# Patient Record
Sex: MALE | Race: WHITE | NOT HISPANIC OR LATINO | Employment: OTHER | URBAN - NONMETROPOLITAN AREA
[De-identification: names, ages, dates, MRNs, and addresses within clinical notes are randomized per-mention and may not be internally consistent; named-entity substitution may affect disease eponyms.]

---

## 2017-12-13 ENCOUNTER — HOSPITAL ENCOUNTER (INPATIENT)
Facility: HOSPITAL | Age: 57
LOS: 6 days | Discharge: HOME | End: 2017-12-19
Attending: FAMILY MEDICINE | Admitting: FAMILY MEDICINE

## 2017-12-13 ENCOUNTER — APPOINTMENT (OUTPATIENT)
Dept: GENERAL RADIOLOGY | Facility: HOSPITAL | Age: 57
End: 2017-12-13

## 2017-12-13 ENCOUNTER — APPOINTMENT (OUTPATIENT)
Dept: CARDIOLOGY | Facility: HOSPITAL | Age: 57
End: 2017-12-13

## 2017-12-13 DIAGNOSIS — I35.0 SEVERE AORTIC VALVE STENOSIS: ICD-10-CM

## 2017-12-13 DIAGNOSIS — R06.02 SHORTNESS OF BREATH: Primary | ICD-10-CM

## 2017-12-13 DIAGNOSIS — J18.9 PNEUMONIA OF RIGHT MIDDLE LOBE DUE TO INFECTIOUS ORGANISM: ICD-10-CM

## 2017-12-13 LAB
ALBUMIN SERPL-MCNC: 4.5 G/DL (ref 3.5–5)
ALBUMIN SERPL-MCNC: 4.7 G/DL (ref 3.5–5)
ALBUMIN/GLOB SERPL: 1.7 G/DL (ref 1.1–2.5)
ALBUMIN/GLOB SERPL: 1.7 G/DL (ref 1.1–2.5)
ALP SERPL-CCNC: 82 U/L (ref 24–120)
ALP SERPL-CCNC: 96 U/L (ref 24–120)
ALT SERPL W P-5'-P-CCNC: 41 U/L (ref 0–54)
ALT SERPL W P-5'-P-CCNC: 43 U/L (ref 0–54)
ANION GAP SERPL CALCULATED.3IONS-SCNC: 13 MMOL/L (ref 4–13)
ANION GAP SERPL CALCULATED.3IONS-SCNC: 13 MMOL/L (ref 4–13)
APTT PPP: 27.9 SECONDS (ref 24.1–34.8)
ARTERIAL PATENCY WRIST A: POSITIVE
ARTERIAL PATENCY WRIST A: POSITIVE
AST SERPL-CCNC: 32 U/L (ref 7–45)
AST SERPL-CCNC: 38 U/L (ref 7–45)
ATMOSPHERIC PRESS: 750 MMHG
ATMOSPHERIC PRESS: 753 MMHG
BASE EXCESS BLDA CALC-SCNC: -2.5 MMOL/L (ref 0–2)
BASE EXCESS BLDA CALC-SCNC: 1.8 MMOL/L (ref 0–2)
BDY SITE: ABNORMAL
BDY SITE: ABNORMAL
BH CV ECHO MEAS - AO MAX PG (FULL): 71.4 MMHG
BH CV ECHO MEAS - AO MAX PG: 79.9 MMHG
BH CV ECHO MEAS - AO MEAN PG (FULL): 59 MMHG
BH CV ECHO MEAS - AO MEAN PG: 64 MMHG
BH CV ECHO MEAS - AO ROOT AREA (BSA CORRECTED): 1.9
BH CV ECHO MEAS - AO ROOT AREA: 7.5 CM^2
BH CV ECHO MEAS - AO ROOT DIAM: 3.1 CM
BH CV ECHO MEAS - AO V2 MAX: 447 CM/SEC
BH CV ECHO MEAS - AO V2 MEAN: 395 CM/SEC
BH CV ECHO MEAS - AO V2 VTI: 117 CM
BH CV ECHO MEAS - AVA(I,A): 0.82 CM^2
BH CV ECHO MEAS - AVA(I,D): 0.82 CM^2
BH CV ECHO MEAS - AVA(V,A): 0.83 CM^2
BH CV ECHO MEAS - AVA(V,D): 0.83 CM^2
BH CV ECHO MEAS - BSA(HAYCOCK): 1.7 M^2
BH CV ECHO MEAS - BSA: 1.7 M^2
BH CV ECHO MEAS - BZI_BMI: 21 KILOGRAMS/M^2
BH CV ECHO MEAS - BZI_METRIC_HEIGHT: 167.6 CM
BH CV ECHO MEAS - BZI_METRIC_WEIGHT: 59 KG
BH CV ECHO MEAS - EDV(CUBED): 56.6 ML
BH CV ECHO MEAS - EDV(MOD-SP4): 56.7 ML
BH CV ECHO MEAS - EDV(TEICH): 63.5 ML
BH CV ECHO MEAS - EF(CUBED): 64.5 %
BH CV ECHO MEAS - EF(TEICH): 56.7 %
BH CV ECHO MEAS - ESV(CUBED): 20.1 ML
BH CV ECHO MEAS - ESV(TEICH): 27.5 ML
BH CV ECHO MEAS - FS: 29.2 %
BH CV ECHO MEAS - IVS/LVPW: 0.97
BH CV ECHO MEAS - IVSD: 1.6 CM
BH CV ECHO MEAS - LA DIMENSION: 4.1 CM
BH CV ECHO MEAS - LA/AO: 1.3
BH CV ECHO MEAS - LV DIASTOLIC VOL/BSA (35-75): 34 ML/M^2
BH CV ECHO MEAS - LV MASS(C)D: 245 GRAMS
BH CV ECHO MEAS - LV MASS(C)DI: 147.1 GRAMS/M^2
BH CV ECHO MEAS - LV MAX PG: 8.5 MMHG
BH CV ECHO MEAS - LV MEAN PG: 5 MMHG
BH CV ECHO MEAS - LV V1 MAX: 146 CM/SEC
BH CV ECHO MEAS - LV V1 MEAN: 108 CM/SEC
BH CV ECHO MEAS - LV V1 VTI: 37.9 CM
BH CV ECHO MEAS - LVIDD: 3.8 CM
BH CV ECHO MEAS - LVIDS: 2.7 CM
BH CV ECHO MEAS - LVLD AP4: 8 CM
BH CV ECHO MEAS - LVOT AREA (M): 2.5 CM^2
BH CV ECHO MEAS - LVOT AREA: 2.5 CM^2
BH CV ECHO MEAS - LVOT DIAM: 1.8 CM
BH CV ECHO MEAS - LVPWD: 1.6 CM
BH CV ECHO MEAS - MV A MAX VEL: 121 CM/SEC
BH CV ECHO MEAS - MV DEC TIME: 0.23 SEC
BH CV ECHO MEAS - MV E MAX VEL: 97 CM/SEC
BH CV ECHO MEAS - MV E/A: 0.8
BH CV ECHO MEAS - RAP SYSTOLE: 10 MMHG
BH CV ECHO MEAS - RVSP: 41.4 MMHG
BH CV ECHO MEAS - SI(AO): 530.2 ML/M^2
BH CV ECHO MEAS - SI(CUBED): 21.9 ML/M^2
BH CV ECHO MEAS - SI(LVOT): 57.9 ML/M^2
BH CV ECHO MEAS - SI(TEICH): 21.6 ML/M^2
BH CV ECHO MEAS - SV(AO): 883.1 ML
BH CV ECHO MEAS - SV(CUBED): 36.5 ML
BH CV ECHO MEAS - SV(LVOT): 96.4 ML
BH CV ECHO MEAS - SV(TEICH): 36 ML
BH CV ECHO MEAS - TR MAX VEL: 280 CM/SEC
BILIRUB SERPL-MCNC: 0.1 MG/DL (ref 0.1–1)
BILIRUB SERPL-MCNC: 0.3 MG/DL (ref 0.1–1)
BODY TEMPERATURE: 37 C
BODY TEMPERATURE: 37 C
BUN BLD-MCNC: 9 MG/DL (ref 5–21)
BUN BLD-MCNC: 9 MG/DL (ref 5–21)
BUN/CREAT SERPL: 13.6 (ref 7–25)
BUN/CREAT SERPL: 16.1 (ref 7–25)
CALCIUM SPEC-SCNC: 9.1 MG/DL (ref 8.4–10.4)
CALCIUM SPEC-SCNC: 9.2 MG/DL (ref 8.4–10.4)
CHLORIDE SERPL-SCNC: 97 MMOL/L (ref 98–110)
CHLORIDE SERPL-SCNC: 98 MMOL/L (ref 98–110)
CO2 SERPL-SCNC: 24 MMOL/L (ref 24–31)
CO2 SERPL-SCNC: 25 MMOL/L (ref 24–31)
CREAT BLD-MCNC: 0.56 MG/DL (ref 0.5–1.4)
CREAT BLD-MCNC: 0.66 MG/DL (ref 0.5–1.4)
D DIMER PPP FEU-MCNC: 0.69 MG/L (FEU) (ref 0–0.5)
D-LACTATE SERPL-SCNC: 2.2 MMOL/L (ref 0.5–2)
D-LACTATE SERPL-SCNC: 2.3 MMOL/L (ref 0.5–2)
DEPRECATED RDW RBC AUTO: 40.8 FL (ref 40–54)
E/E' RATIO: 19
EOSINOPHIL # BLD MANUAL: 0.22 10*3/MM3 (ref 0–0.7)
EOSINOPHIL NFR BLD MANUAL: 1 % (ref 0–4)
ERYTHROCYTE [DISTWIDTH] IN BLOOD BY AUTOMATED COUNT: 12.6 % (ref 12–15)
FLUAV AG NPH QL: NEGATIVE
FLUBV AG NPH QL IA: NEGATIVE
GAS FLOW AIRWAY: 3 LPM
GAS FLOW AIRWAY: 8 LPM
GFR SERPL CREATININE-BSD FRML MDRD: 124 ML/MIN/1.73
GFR SERPL CREATININE-BSD FRML MDRD: 150 ML/MIN/1.73
GLOBULIN UR ELPH-MCNC: 2.7 GM/DL
GLOBULIN UR ELPH-MCNC: 2.8 GM/DL
GLUCOSE BLD-MCNC: 151 MG/DL (ref 70–100)
GLUCOSE BLD-MCNC: 156 MG/DL (ref 70–100)
HCO3 BLDA-SCNC: 23.3 MMOL/L (ref 20–26)
HCO3 BLDA-SCNC: 25.4 MMOL/L (ref 20–26)
HCT VFR BLD AUTO: 39 % (ref 40–52)
HGB BLD-MCNC: 13.5 G/DL (ref 14–18)
HOLD SPECIMEN: NORMAL
HOROWITZ INDEX BLD+IHG-RTO: 35 %
INR PPP: 0.86 (ref 0.91–1.09)
LEFT ATRIUM VOLUME INDEX: 21.3 ML/M2
LEFT ATRIUM VOLUME: 35.6 CM3
LIPASE SERPL-CCNC: 91 U/L (ref 23–203)
LV EF 2D ECHO EST: 70 %
LYMPHOCYTES # BLD MANUAL: 5.96 10*3/MM3 (ref 0.72–4.86)
LYMPHOCYTES NFR BLD MANUAL: 27 % (ref 15–45)
LYMPHOCYTES NFR BLD MANUAL: 5 % (ref 4–12)
Lab: ABNORMAL
Lab: ABNORMAL
MAGNESIUM SERPL-MCNC: 1.4 MG/DL (ref 1.4–2.2)
MAXIMAL PREDICTED HEART RATE: 163 BPM
MCH RBC QN AUTO: 30.5 PG (ref 28–32)
MCHC RBC AUTO-ENTMCNC: 34.6 G/DL (ref 33–36)
MCV RBC AUTO: 88 FL (ref 82–95)
MODALITY: ABNORMAL
MODALITY: ABNORMAL
MONOCYTES # BLD AUTO: 1.1 10*3/MM3 (ref 0.19–1.3)
NEUTROPHILS # BLD AUTO: 13.92 10*3/MM3 (ref 1.87–8.4)
NEUTROPHILS NFR BLD MANUAL: 62 % (ref 39–78)
NEUTS BAND NFR BLD MANUAL: 1 % (ref 0–10)
NT-PROBNP SERPL-MCNC: 1990 PG/ML (ref 0–900)
NT-PROBNP SERPL-MCNC: 3000 PG/ML (ref 0–900)
PCO2 BLDA: 35.6 MM HG (ref 35–45)
PCO2 BLDA: 43 MM HG (ref 35–45)
PH BLDA: 7.34 PH UNITS (ref 7.35–7.45)
PH BLDA: 7.46 PH UNITS (ref 7.35–7.45)
PHOSPHATE SERPL-MCNC: 3.1 MG/DL (ref 2.5–4.5)
PLAT MORPH BLD: NORMAL
PLATELET # BLD AUTO: 393 10*3/MM3 (ref 130–400)
PMV BLD AUTO: 9.9 FL (ref 6–12)
PO2 BLDA: 61 MM HG (ref 83–108)
PO2 BLDA: 62.6 MM HG (ref 83–108)
POTASSIUM BLD-SCNC: 3.4 MMOL/L (ref 3.5–5.3)
POTASSIUM BLD-SCNC: 3.5 MMOL/L (ref 3.5–5.3)
PROCALCITONIN SERPL-MCNC: <0.25 NG/ML
PROT SERPL-MCNC: 7.2 G/DL (ref 6.3–8.7)
PROT SERPL-MCNC: 7.5 G/DL (ref 6.3–8.7)
PROTHROMBIN TIME: 12 SECONDS (ref 11.9–14.6)
RBC # BLD AUTO: 4.43 10*6/MM3 (ref 4.8–5.9)
RBC MORPH BLD: NORMAL
SAO2 % BLDCOA: 90.4 % (ref 94–99)
SAO2 % BLDCOA: 93.7 % (ref 94–99)
SCAN SLIDE: NORMAL
SODIUM BLD-SCNC: 135 MMOL/L (ref 135–145)
SODIUM BLD-SCNC: 135 MMOL/L (ref 135–145)
STRESS TARGET HR: 139 BPM
TROPONIN I SERPL-MCNC: 0.04 NG/ML (ref 0–0.03)
TROPONIN I SERPL-MCNC: 0.07 NG/ML (ref 0–0.03)
TROPONIN I SERPL-MCNC: 0.08 NG/ML (ref 0–0.03)
TROPONIN I SERPL-MCNC: 0.08 NG/ML (ref 0–0.03)
VARIANT LYMPHS NFR BLD MANUAL: 4 % (ref 0–5)
VENTILATOR MODE: ABNORMAL
VENTILATOR MODE: ABNORMAL
WBC MORPH BLD: NORMAL
WBC NRBC COR # BLD: 22.09 10*3/MM3 (ref 4.8–10.8)

## 2017-12-13 PROCEDURE — 82803 BLOOD GASES ANY COMBINATION: CPT

## 2017-12-13 PROCEDURE — 85730 THROMBOPLASTIN TIME PARTIAL: CPT | Performed by: FAMILY MEDICINE

## 2017-12-13 PROCEDURE — 83605 ASSAY OF LACTIC ACID: CPT | Performed by: FAMILY MEDICINE

## 2017-12-13 PROCEDURE — 94640 AIRWAY INHALATION TREATMENT: CPT

## 2017-12-13 PROCEDURE — 90732 PPSV23 VACC 2 YRS+ SUBQ/IM: CPT | Performed by: INTERNAL MEDICINE

## 2017-12-13 PROCEDURE — 85379 FIBRIN DEGRADATION QUANT: CPT | Performed by: FAMILY MEDICINE

## 2017-12-13 PROCEDURE — G0008 ADMIN INFLUENZA VIRUS VAC: HCPCS | Performed by: INTERNAL MEDICINE

## 2017-12-13 PROCEDURE — 93005 ELECTROCARDIOGRAM TRACING: CPT | Performed by: FAMILY MEDICINE

## 2017-12-13 PROCEDURE — 25010000002 CEFTRIAXONE PER 250 MG: Performed by: NURSE PRACTITIONER

## 2017-12-13 PROCEDURE — 87040 BLOOD CULTURE FOR BACTERIA: CPT | Performed by: FAMILY MEDICINE

## 2017-12-13 PROCEDURE — 93306 TTE W/DOPPLER COMPLETE: CPT

## 2017-12-13 PROCEDURE — 93010 ELECTROCARDIOGRAM REPORT: CPT | Performed by: INTERNAL MEDICINE

## 2017-12-13 PROCEDURE — 25010000002 METHYLPREDNISOLONE PER 125 MG: Performed by: FAMILY MEDICINE

## 2017-12-13 PROCEDURE — 25010000002 PNEUMOCOCCAL VAC POLYVALENT PER 0.5 ML: Performed by: INTERNAL MEDICINE

## 2017-12-13 PROCEDURE — 25010000002 METHYLPREDNISOLONE PER 125 MG: Performed by: INTERNAL MEDICINE

## 2017-12-13 PROCEDURE — 85025 COMPLETE CBC W/AUTO DIFF WBC: CPT | Performed by: FAMILY MEDICINE

## 2017-12-13 PROCEDURE — 90661 CCIIV3 VAC ABX FR 0.5 ML IM: CPT | Performed by: INTERNAL MEDICINE

## 2017-12-13 PROCEDURE — 94799 UNLISTED PULMONARY SVC/PX: CPT

## 2017-12-13 PROCEDURE — 93005 ELECTROCARDIOGRAM TRACING: CPT | Performed by: INTERNAL MEDICINE

## 2017-12-13 PROCEDURE — 99254 IP/OBS CNSLTJ NEW/EST MOD 60: CPT | Performed by: INTERNAL MEDICINE

## 2017-12-13 PROCEDURE — 84100 ASSAY OF PHOSPHORUS: CPT | Performed by: INTERNAL MEDICINE

## 2017-12-13 PROCEDURE — 83880 ASSAY OF NATRIURETIC PEPTIDE: CPT | Performed by: INTERNAL MEDICINE

## 2017-12-13 PROCEDURE — 25010000002 ENOXAPARIN PER 10 MG: Performed by: INTERNAL MEDICINE

## 2017-12-13 PROCEDURE — 80053 COMPREHEN METABOLIC PANEL: CPT | Performed by: INTERNAL MEDICINE

## 2017-12-13 PROCEDURE — 83880 ASSAY OF NATRIURETIC PEPTIDE: CPT | Performed by: FAMILY MEDICINE

## 2017-12-13 PROCEDURE — 71010 HC CHEST PA OR AP: CPT

## 2017-12-13 PROCEDURE — 93306 TTE W/DOPPLER COMPLETE: CPT | Performed by: INTERNAL MEDICINE

## 2017-12-13 PROCEDURE — 99285 EMERGENCY DEPT VISIT HI MDM: CPT

## 2017-12-13 PROCEDURE — 25010000002 INFLUENZA VAC SUBUNIT QUAD 0.5 ML SUSPENSION PREFILLED SYRINGE: Performed by: INTERNAL MEDICINE

## 2017-12-13 PROCEDURE — 84145 PROCALCITONIN (PCT): CPT | Performed by: FAMILY MEDICINE

## 2017-12-13 PROCEDURE — 36600 WITHDRAWAL OF ARTERIAL BLOOD: CPT

## 2017-12-13 PROCEDURE — 87804 INFLUENZA ASSAY W/OPTIC: CPT | Performed by: FAMILY MEDICINE

## 2017-12-13 PROCEDURE — 84484 ASSAY OF TROPONIN QUANT: CPT | Performed by: INTERNAL MEDICINE

## 2017-12-13 PROCEDURE — 25010000002 FUROSEMIDE PER 20 MG: Performed by: INTERNAL MEDICINE

## 2017-12-13 PROCEDURE — 83735 ASSAY OF MAGNESIUM: CPT | Performed by: INTERNAL MEDICINE

## 2017-12-13 PROCEDURE — 85007 BL SMEAR W/DIFF WBC COUNT: CPT | Performed by: FAMILY MEDICINE

## 2017-12-13 PROCEDURE — 80053 COMPREHEN METABOLIC PANEL: CPT | Performed by: FAMILY MEDICINE

## 2017-12-13 PROCEDURE — 25010000002 AZITHROMYCIN PER 500 MG: Performed by: NURSE PRACTITIONER

## 2017-12-13 PROCEDURE — 85610 PROTHROMBIN TIME: CPT | Performed by: FAMILY MEDICINE

## 2017-12-13 PROCEDURE — G0009 ADMIN PNEUMOCOCCAL VACCINE: HCPCS | Performed by: INTERNAL MEDICINE

## 2017-12-13 PROCEDURE — 83690 ASSAY OF LIPASE: CPT | Performed by: FAMILY MEDICINE

## 2017-12-13 PROCEDURE — 84484 ASSAY OF TROPONIN QUANT: CPT | Performed by: FAMILY MEDICINE

## 2017-12-13 PROCEDURE — 25010000002 LEVOFLOXACIN PER 250 MG: Performed by: FAMILY MEDICINE

## 2017-12-13 RX ORDER — IPRATROPIUM BROMIDE AND ALBUTEROL SULFATE 2.5; .5 MG/3ML; MG/3ML
3 SOLUTION RESPIRATORY (INHALATION) ONCE
Status: COMPLETED | OUTPATIENT
Start: 2017-12-13 | End: 2017-12-13

## 2017-12-13 RX ORDER — ATORVASTATIN CALCIUM 10 MG/1
10 TABLET, FILM COATED ORAL DAILY
COMMUNITY
End: 2017-12-19 | Stop reason: HOSPADM

## 2017-12-13 RX ORDER — LISINOPRIL 20 MG/1
20 TABLET ORAL DAILY
Status: DISCONTINUED | OUTPATIENT
Start: 2017-12-13 | End: 2017-12-18

## 2017-12-13 RX ORDER — METHYLPREDNISOLONE SODIUM SUCCINATE 125 MG/2ML
30 INJECTION, POWDER, LYOPHILIZED, FOR SOLUTION INTRAMUSCULAR; INTRAVENOUS EVERY 12 HOURS
Status: DISCONTINUED | OUTPATIENT
Start: 2017-12-13 | End: 2017-12-13

## 2017-12-13 RX ORDER — SODIUM CHLORIDE 0.9 % (FLUSH) 0.9 %
1-10 SYRINGE (ML) INJECTION AS NEEDED
Status: DISCONTINUED | OUTPATIENT
Start: 2017-12-13 | End: 2017-12-19 | Stop reason: HOSPADM

## 2017-12-13 RX ORDER — FUROSEMIDE 10 MG/ML
40 INJECTION INTRAMUSCULAR; INTRAVENOUS ONCE
Status: COMPLETED | OUTPATIENT
Start: 2017-12-13 | End: 2017-12-13

## 2017-12-13 RX ORDER — ATORVASTATIN CALCIUM 10 MG/1
10 TABLET, FILM COATED ORAL DAILY
Status: DISCONTINUED | OUTPATIENT
Start: 2017-12-13 | End: 2017-12-15

## 2017-12-13 RX ORDER — CHLORTHALIDONE 25 MG/1
25 TABLET ORAL DAILY
Status: DISCONTINUED | OUTPATIENT
Start: 2017-12-13 | End: 2017-12-18

## 2017-12-13 RX ORDER — LEVOFLOXACIN 5 MG/ML
750 INJECTION, SOLUTION INTRAVENOUS ONCE
Status: COMPLETED | OUTPATIENT
Start: 2017-12-13 | End: 2017-12-13

## 2017-12-13 RX ORDER — POTASSIUM CHLORIDE 750 MG/1
40 CAPSULE, EXTENDED RELEASE ORAL ONCE
Status: COMPLETED | OUTPATIENT
Start: 2017-12-13 | End: 2017-12-13

## 2017-12-13 RX ORDER — FUROSEMIDE 10 MG/ML
40 INJECTION INTRAMUSCULAR; INTRAVENOUS 2 TIMES DAILY
Status: DISCONTINUED | OUTPATIENT
Start: 2017-12-13 | End: 2017-12-14

## 2017-12-13 RX ORDER — CHLORTHALIDONE 25 MG/1
25 TABLET ORAL DAILY
COMMUNITY
End: 2018-01-25

## 2017-12-13 RX ORDER — METOPROLOL TARTRATE 5 MG/5ML
5 INJECTION INTRAVENOUS ONCE
Status: COMPLETED | OUTPATIENT
Start: 2017-12-13 | End: 2017-12-13

## 2017-12-13 RX ORDER — LISINOPRIL 20 MG/1
20 TABLET ORAL DAILY
COMMUNITY
End: 2018-01-25

## 2017-12-13 RX ORDER — METHYLPREDNISOLONE SODIUM SUCCINATE 125 MG/2ML
125 INJECTION, POWDER, LYOPHILIZED, FOR SOLUTION INTRAMUSCULAR; INTRAVENOUS ONCE
Status: COMPLETED | OUTPATIENT
Start: 2017-12-13 | End: 2017-12-13

## 2017-12-13 RX ADMIN — CEFTRIAXONE SODIUM 1 G: 1 INJECTION, POWDER, FOR SOLUTION INTRAMUSCULAR; INTRAVENOUS at 12:56

## 2017-12-13 RX ADMIN — LISINOPRIL 20 MG: 20 TABLET ORAL at 09:53

## 2017-12-13 RX ADMIN — LEVOFLOXACIN 750 MG: 5 INJECTION, SOLUTION INTRAVENOUS at 00:29

## 2017-12-13 RX ADMIN — IPRATROPIUM BROMIDE 0.5 MG: 0.5 SOLUTION RESPIRATORY (INHALATION) at 19:57

## 2017-12-13 RX ADMIN — IPRATROPIUM BROMIDE AND ALBUTEROL SULFATE 3 ML: 2.5; .5 SOLUTION RESPIRATORY (INHALATION) at 00:15

## 2017-12-13 RX ADMIN — IPRATROPIUM BROMIDE AND ALBUTEROL SULFATE 3 ML: 2.5; .5 SOLUTION RESPIRATORY (INHALATION) at 00:22

## 2017-12-13 RX ADMIN — IPRATROPIUM BROMIDE 0.5 MG: 0.5 SOLUTION RESPIRATORY (INHALATION) at 14:38

## 2017-12-13 RX ADMIN — METHYLPREDNISOLONE SODIUM SUCCINATE 30 MG: 125 INJECTION, POWDER, FOR SOLUTION INTRAMUSCULAR; INTRAVENOUS at 13:02

## 2017-12-13 RX ADMIN — IPRATROPIUM BROMIDE 0.5 MG: 0.5 SOLUTION RESPIRATORY (INHALATION) at 07:06

## 2017-12-13 RX ADMIN — METHYLPREDNISOLONE SODIUM SUCCINATE 125 MG: 125 INJECTION, POWDER, FOR SOLUTION INTRAMUSCULAR; INTRAVENOUS at 01:01

## 2017-12-13 RX ADMIN — ENOXAPARIN SODIUM 40 MG: 40 INJECTION SUBCUTANEOUS at 09:53

## 2017-12-13 RX ADMIN — METOROPROLOL TARTRATE 5 MG: 5 INJECTION, SOLUTION INTRAVENOUS at 03:51

## 2017-12-13 RX ADMIN — A/SINGAPORE/GP1908/2015 IVR-180 (H1N1) (AN A/MICHIGAN/45/2015-LIKE VIRUS), A/SINGAPORE/GP2050/2015 (H3N2) (AN A/HONG KONG/4801/2014 - LIKE VIRUS), B/UTAH/9/2014 (A B/PHUKET/3073/2013-LIKE VIRUS), B/HONG KONG/259/2010 (A B/BRISBANE/60/08-LIKE VIRUS) 0.5 ML: 15; 15; 15; 15 INJECTION, SUSPENSION INTRAMUSCULAR at 17:50

## 2017-12-13 RX ADMIN — FUROSEMIDE 40 MG: 10 INJECTION, SOLUTION INTRAMUSCULAR; INTRAVENOUS at 17:49

## 2017-12-13 RX ADMIN — POTASSIUM CHLORIDE 40 MEQ: 750 CAPSULE, EXTENDED RELEASE ORAL at 10:51

## 2017-12-13 RX ADMIN — SODIUM CHLORIDE 1000 ML: 9 INJECTION, SOLUTION INTRAVENOUS at 03:00

## 2017-12-13 RX ADMIN — IPRATROPIUM BROMIDE 0.5 MG: 0.5 SOLUTION RESPIRATORY (INHALATION) at 10:58

## 2017-12-13 RX ADMIN — FUROSEMIDE 40 MG: 10 INJECTION, SOLUTION INTRAMUSCULAR; INTRAVENOUS at 09:53

## 2017-12-13 RX ADMIN — PNEUMOCOCCAL VACCINE POLYVALENT 0.5 ML
25; 25; 25; 25; 25; 25; 25; 25; 25; 25; 25; 25; 25; 25; 25; 25; 25; 25; 25; 25; 25; 25; 25 INJECTION, SOLUTION INTRAMUSCULAR; SUBCUTANEOUS at 17:59

## 2017-12-13 RX ADMIN — FUROSEMIDE 40 MG: 10 INJECTION, SOLUTION INTRAMUSCULAR; INTRAVENOUS at 03:50

## 2017-12-13 RX ADMIN — AZITHROMYCIN MONOHYDRATE 500 MG: 500 INJECTION, POWDER, LYOPHILIZED, FOR SOLUTION INTRAVENOUS at 10:46

## 2017-12-13 RX ADMIN — CHLORTHALIDONE 25 MG: 25 TABLET ORAL at 09:53

## 2017-12-13 RX ADMIN — ATORVASTATIN CALCIUM 10 MG: 10 TABLET, FILM COATED ORAL at 09:53

## 2017-12-13 NOTE — CONSULTS
[facility] HEART GROUP CONSULT NOTE    Referring Provider: Manoj Allen MD    Reason for Consultation: elevated troponin, cardiac murmur    Chief Complaint   Patient presents with   • Shortness of Breath     Subjective     History of present illness:  Sharad Ryder is a 57 y.o. male with a known PMH significant for hypertension, hyperlipidemia, chronic back pain, and valvular disease. The patient presented to the ED last night with complaints of shortness of breath.  He relates that he had an acute episode of shortness of breath last night after coming in from taking the trash out.  This improved after some time and he went on to bed. He awoke from sleep in distress and couldn't get his breath.   Prior to last night he states that he was breathing well on his own at rest, he noted some dyspnea on exertion that he states he has had for some time.  He describes associated chest pain with the shortness of breath, that has resolved since breathing better. Positive for shortness of breath, orthopnea, PND, sputum production with cough.  Symptoms improving at this time.  Denies peripheral edema.  He has a PMH of hypertension and hyperlipidemia which he takes home medications for and states he was told back some time that he had a bad valve. He does not follow with a cardiologist and poor historian as it relates to his valvular disease. 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 He smokes 1-1.5 packs of cigarettes per day.  We were consulted to evaluate the patient due to his minimal rise in troponin and cardiac murmur.    History  Past Medical History:   Diagnosis Date   • Arthritis    • Hyperlipidemia    • Hypertension      Past Surgical History:   Procedure Laterality Date   • FINGER SURGERY Right 1990     History reviewed. No pertinent family history.,   Social History   Substance Use Topics   • Smoking status: Current Every Day Smoker     Packs/day: 1.50     Types: Cigarettes    • Smokeless tobacco: None   • Alcohol use No       Medications  Current Facility-Administered Medications   Medication Dose Route Frequency Provider Last Rate Last Dose   • atorvastatin (LIPITOR) tablet 10 mg  10 mg Oral Daily Manoj Allen MD   10 mg at 12/13/17 0953   • AZITHROMYCIN 500 MG/250 ML 0.9% NS IVPB (vial-mate)  500 mg Intravenous Q24H KIRBY Richards   500 mg at 12/13/17 1046   • cefTRIAXone (ROCEPHIN) 1 g/100 mL 0.9% NS (MBP)  1 g Intravenous Q24H Barbra Trent APRN       • chlorthalidone (HYGROTON) tablet 25 mg  25 mg Oral Daily Manoj Allen MD   25 mg at 12/13/17 0953   • enoxaparin (LOVENOX) syringe 40 mg  40 mg Subcutaneous Q24H Manoj Allen MD   40 mg at 12/13/17 0953   • furosemide (LASIX) injection 40 mg  40 mg Intravenous BID Manoj Allen MD   40 mg at 12/13/17 0953   • Influenza Vac Subunit Quad (FLUCELVAX) injection 0.5 mL  0.5 mL Intramuscular Once Hamlet Quinteros MD       • ipratropium (ATROVENT) nebulizer solution 0.5 mg  0.5 mg Nebulization 4x Daily - RT Manoj Allen MD   0.5 mg at 12/13/17 1058   • lisinopril (PRINIVIL,ZESTRIL) tablet 20 mg  20 mg Oral Daily Manoj Allen MD   20 mg at 12/13/17 0953   • methylPREDNISolone sodium succinate (SOLU-Medrol) injection 30 mg  30 mg Intravenous Q12H Manoj Allen MD       • pneumococcal polysaccharide 23-valent (PNEUMOVAX-23) vaccine 0.5 mL  0.5 mL Intramuscular Once Hamlet Quinteros MD       • sodium chloride 0.9 % flush 1-10 mL  1-10 mL Intravenous PRN Manoj Allen MD           Allergies:  Review of patient's allergies indicates no known allergies.    Review of Systems  Review of Systems   Constitution: Positive for malaise/fatigue. Negative for chills, fever, weight gain and weight loss.   Cardiovascular: Positive for chest pain, dyspnea on exertion, orthopnea, palpitations and paroxysmal nocturnal dyspnea. Negative for irregular heartbeat, leg swelling, near-syncope and  "syncope.   Respiratory: Positive for cough, shortness of breath and sputum production. Negative for wheezing.    Musculoskeletal: Positive for back pain. Negative for falls and joint swelling.   Gastrointestinal: Positive for bloating. Negative for abdominal pain, constipation, diarrhea, nausea and vomiting.   Genitourinary: Negative for bladder incontinence and dysuria.   Neurological: Negative for dizziness, headaches and light-headedness.   All other systems reviewed and are negative.      Objective     Physical Exam:  Patient Vitals for the past 24 hrs:   BP Temp Temp src Pulse Resp SpO2 Height Weight   12/13/17 1058 - - - - - 95 % - -   12/13/17 0825 125/70 98.5 °F (36.9 °C) Temporal Art 92 18 94 % - -   12/13/17 0706 - - - 88 18 97 % - -   12/13/17 0448 119/77 98.5 °F (36.9 °C) Temporal Art 100 20 92 % - -   12/13/17 0422 - - - 103 - 91 % - -   12/13/17 0404 - - - 106 - (!) 88 % - -   12/13/17 0357 - - - (!) 138 26 (!) 88 % - -   12/13/17 0350 - - - (!) 132 - - - -   12/13/17 0334 148/95 98.4 °F (36.9 °C) Temporal Art 106 24 90 % 167.6 cm (66\") 59 kg (130 lb)   12/13/17 0300 119/72 98.8 °F (37.1 °C) Temporal Art 108 20 94 % - -   12/13/17 0245 119/72 - - 107 - 93 % - -   12/13/17 0231 113/66 - - 106 - 93 % - -   12/13/17 0216 111/67 - - 108 - 93 % - -   12/13/17 0141 127/78 98.7 °F (37.1 °C) Oral 111 20 92 % - -   12/13/17 0121 - - - - - - 167.6 cm (66\") 59 kg (130 lb)   12/13/17 0044 165/95 - - - - - - -   12/13/17 0031 165/95 - - (!) 124 28 91 % - -   12/13/17 0022 - - - (!) 124 20 98 % - -   12/13/17 0015 - - - - 25 - - -     Physical Exam   Constitutional: He is oriented to person, place, and time. Vital signs are normal. He appears well-developed and well-nourished. He is cooperative. He has a sickly appearance. No distress. Nasal cannula in place.   Nasal cannula at 4L   HENT:   Head: Normocephalic and atraumatic.   Nose: Nose normal.   Mouth/Throat: No oropharyngeal exudate.   Eyes: Conjunctivae are " normal. No scleral icterus.   Cardiovascular: Normal rate and regular rhythm.  Exam reveals no gallop and no friction rub.    Murmur heard.  Pulmonary/Chest: Effort normal. No accessory muscle usage. No tachypnea. No respiratory distress. He has decreased breath sounds (throughout). He has no wheezes. He has no rales.   Abdominal: Soft. Bowel sounds are normal. He exhibits no distension. There is no tenderness.   Musculoskeletal: Normal range of motion. He exhibits no edema.   Neurological: He is alert and oriented to person, place, and time.   Skin: Skin is warm, dry and intact. No rash noted. He is not diaphoretic. No erythema.   Psychiatric: He has a normal mood and affect. His behavior is normal.   Vitals reviewed.      Results Review:   I reviewed the patient's new clinical results.    Lab Results (last 72 hours)     Procedure Component Value Units Date/Time    Blood Gas, Arterial [[id number]]  (Abnormal) Collected:  12/13/17 0027    Specimen:  Arterial Blood Updated:  12/13/17 0030     Site Right Radial     Glenroy's Test Positive     pH, Arterial 7.343 (L) pH units      pCO2, Arterial 43.0 mm Hg      pO2, Arterial 61.0 (L) mm Hg      HCO3, Arterial 23.3 mmol/L      Base Excess, Arterial -2.5 (L) mmol/L      O2 Saturation, Arterial 90.4 (L) %      Temperature 37.0 C      Barometric Pressure for Blood Gas 753 mmHg      Modality Nasal Cannula     Flow Rate 3.0 lpm      Ventilator Mode NA     Collected by [id number]    Blood Culture - Blood, [[id number]] Collected:  12/13/17 0015    Specimen:  Blood from Arm, Right Updated:  12/13/17 0032    Blood Culture - Blood, [[id number]] Collected:  12/13/17 0018    Specimen:  Blood from Arm, Right Updated:  12/13/17 0033    Protime-INR [[id number]]  (Abnormal) Collected:  12/13/17 0020    Specimen:  Blood Updated:  12/13/17 0042     Protime 12.0 Seconds      INR 0.86 (L)    aPTT [[id number]]  (Normal) Collected:  12/13/17 0020    Specimen:  Blood Updated:  12/13/17 0042     PTT 27.9  seconds     D-dimer, Quantitative [[id number]]  (Abnormal) Collected:  12/13/17 0020    Specimen:  Blood Updated:  12/13/17 0042     D-Dimer, Quantitative 0.69 (H) mg/L (FEU)     Narrative:       Reference Range is 0-0.50 mg/L FEU. However, results <0.50 mg/L FEU tends to rule out DVT or PE. Results >0.50 mg/L FEU are not useful in predicting absence or presence of DVT or PE.    Comprehensive Metabolic Panel [[id number]]  (Abnormal) Collected:  12/13/17 0020    Specimen:  Blood Updated:  12/13/17 0043     Glucose 156 (H) mg/dL      BUN 9 mg/dL      Creatinine 0.66 mg/dL      Sodium 135 mmol/L      Potassium 3.5 mmol/L      Chloride 97 (L) mmol/L      CO2 25.0 mmol/L      Calcium 9.1 mg/dL      Total Protein 7.2 g/dL      Albumin 4.50 g/dL      ALT (SGPT) 41 U/L      AST (SGOT) 32 U/L      Alkaline Phosphatase 96 U/L      Total Bilirubin 0.1 mg/dL      eGFR Non African Amer 124 mL/min/1.73      Globulin 2.7 gm/dL      A/G Ratio 1.7 g/dL      BUN/Creatinine Ratio 13.6     Anion Gap 13.0 mmol/L     Lipase [[id number]]  (Normal) Collected:  12/13/17 0020    Specimen:  Blood Updated:  12/13/17 0043     Lipase 91 U/L     Lactic Acid, Plasma [[id number]]  (Abnormal) Collected:  12/13/17 0020    Specimen:  Blood Updated:  12/13/17 0043     Lactate 2.3 (C) mmol/L     Troponin [[id number]]  (Abnormal) Collected:  12/13/17 0020    Specimen:  Blood Updated:  12/13/17 0054     Troponin I 0.044 (H) ng/mL     BNP [[id number]]  (Abnormal) Collected:  12/13/17 0020    Specimen:  Blood Updated:  12/13/17 0054     proBNP 1990.0 (H) pg/mL     CBC Auto Differential [[id number]]  (Abnormal) Collected:  12/13/17 0020    Specimen:  Blood Updated:  12/13/17 0057     WBC 22.09 (H) 10*3/mm3      RBC 4.43 (L) 10*6/mm3      Hemoglobin 13.5 (L) g/dL      Hematocrit 39.0 (L) %      MCV 88.0 fL      MCH 30.5 pg      MCHC 34.6 g/dL      RDW 12.6 %      RDW-SD 40.8 fl      MPV 9.9 fL      Platelets 393 10*3/mm3     Narrative:       The previously reported  component NRBC is no longer being reported.    Scan Slide [[id number]] Collected:  12/13/17 0020    Specimen:  Blood Updated:  12/13/17 0057     Scan Slide --      See Manual Differential Results       CBC & Differential [[id number]] Collected:  12/13/17 0020    Specimen:  Blood Updated:  12/13/17 0057    Narrative:       The following orders were created for panel order CBC & Differential.  Procedure                               Abnormality         Status                     ---------                               -----------         ------                     Manual Differential[[id number]]          Abnormal            Final result               Scan Slide[[id number]]                                       Final result               CBC Auto Differential[[id number]]        Abnormal            Final result                 Please view results for these tests on the individual orders.    Manual Differential [[id number]]  (Abnormal) Collected:  12/13/17 0020    Specimen:  Blood Updated:  12/13/17 0057     Neutrophil % 62.0 %      Lymphocyte % 27.0 %      Monocyte % 5.0 %      Eosinophil % 1.0 %      Bands %  1.0 %      Atypical Lymphocyte % 4.0 %      Neutrophils Absolute 13.92 (H) 10*3/mm3      Lymphocytes Absolute 5.96 (H) 10*3/mm3      Monocytes Absolute 1.10 10*3/mm3      Eosinophils Absolute 0.22 10*3/mm3      RBC Morphology Normal     WBC Morphology Normal     Platelet Morphology Normal    Procalcitonin [[id number]]  (Normal) Collected:  12/13/17 0020    Specimen:  Blood Updated:  12/13/17 0100     Procalcitonin <0.25 ng/mL     Narrative:       SIRS, sepsis, severe sepsis, and septic shock are categorized according to the criteria of the consensus conference of the American College of Chest Physicians/Society of Critical Care Medicine.    PCT < 0.5 ng/mL     Systemic infection (sepsis) is not likely.    PCT >0.5 and < 2.0 ng/mL Systemic infection (sepsis) is possible, but other conditions are known to elevate PCT as  well.    PCT > 2.0 ng/mL     Systemic infection (sepsis) is likely, unless other causes are known.      PCT > 10.0 ng/mL    Important systemic inflammatory response, almost exclusively due to severe bacterial sepsis or septic shock.    PCT values of < 0.5 ng/mL do not exclude an infection, because localized infections (without systemic signs) may be associated with such low concentrations, or a systemic infection in its initial stages (<6 hours).  Increased PCT can occur without infection.  PCT concentrations between 0.5 and 2.0 ng/mL should be interpreted taking into account the patients history.  It is recommended to retest PCT within 6-24 hours if any concentrations < 2.0 ng/mL are obtained.    Influenza Antigen, Rapid - Swab, Nasopharynx [[id number]]  (Normal) Collected:  12/13/17 0128    Specimen:  Swab from Nasopharynx Updated:  12/13/17 0151     Influenza A Ag, EIA Negative     Influenza B Ag, EIA Negative    Narrative:         Recommend confirmation of negative results by viral culture or molecular assay.    Lactic Acid, Reflex Timer [[id number]] Collected:  12/13/17 0020    Specimen:  Blood Updated:  12/13/17 0346    Lactic Acid, Reflex [[id number]]  (Abnormal) Collected:  12/13/17 0437    Specimen:  Blood Updated:  12/13/17 0457     Lactate 2.2 (C) mmol/L     Magnesium [[id number]]  (Normal) Collected:  12/13/17 0517    Specimen:  Blood Updated:  12/13/17 0535     Magnesium 1.4 mg/dL     Phosphorus [[id number]]  (Normal) Collected:  12/13/17 0517    Specimen:  Blood Updated:  12/13/17 0535     Phosphorus 3.1 mg/dL     Comprehensive Metabolic Panel [[id number]]  (Abnormal) Collected:  12/13/17 0517    Specimen:  Blood Updated:  12/13/17 0535     Glucose 151 (H) mg/dL      BUN 9 mg/dL      Creatinine 0.56 mg/dL      Sodium 135 mmol/L      Potassium 3.4 (L) mmol/L      Chloride 98 mmol/L      CO2 24.0 mmol/L      Calcium 9.2 mg/dL      Total Protein 7.5 g/dL      Albumin 4.70 g/dL      ALT (SGPT) 43 U/L      AST  (SGOT) 38 U/L      Alkaline Phosphatase 82 U/L      Total Bilirubin 0.3 mg/dL      eGFR Non African Amer 150 mL/min/1.73      Globulin 2.8 gm/dL      A/G Ratio 1.7 g/dL      BUN/Creatinine Ratio 16.1     Anion Gap 13.0 mmol/L     Blood Gas, Arterial [[id number]]  (Abnormal) Collected:  12/13/17 0534    Specimen:  Arterial Blood Updated:  12/13/17 0546     Site Right Radial     Glenroy's Test Positive     pH, Arterial 7.461 (H) pH units      pCO2, Arterial 35.6 mm Hg      pO2, Arterial 62.6 (L) mm Hg      HCO3, Arterial 25.4 mmol/L      Base Excess, Arterial 1.8 mmol/L      O2 Saturation, Arterial 93.7 (L) %      Temperature 37.0 C      Barometric Pressure for Blood Gas 750 mmHg      Modality Venti Mask     FIO2 35 %      Flow Rate 8.0 lpm      Ventilator Mode NA     Collected by [id number]    Troponin [[id number]]  (Abnormal) Collected:  12/13/17 0517    Specimen:  Blood Updated:  12/13/17 0547     Troponin I 0.076 (H) ng/mL     BNP [[id number]]  (Abnormal) Collected:  12/13/17 0517    Specimen:  Blood Updated:  12/13/17 0547     proBNP 3000.0 (H) pg/mL           Imaging Results (last 72 hours)     Procedure Component Value Units Date/Time    XR Chest 1 View [[id number]] Collected:  12/13/17 0703     Updated:  12/13/17 0707    Narrative:       EXAMINATION: XR CHEST 1 VW-. 12/13/2017 8:03 AM EST     CHEST, ONE VIEW:     HISTORY: Respiratory distress     COMPARISON: None     A single frontal chest radiograph was obtained.     FINDINGS:     Bilateral perihilar interstitial prominence appreciated with mild  interstitial prominence also noted in the lung bases with minimal patchy  airspace opacities in the right lung base medially. Septal line/Kerley B  lines also observed. Correlate for mild interstitial pulmonary edema  possible cardiogenic etiology.     Bony structures are intact.                                     Impression:       1. Interstitial prominence with mild patchy airspace opacities in the  right lung base, acute  interstitial infiltration from an inflammatory  process considered. Pulmonary edema also a differential consideration.     This report was finalized on 12/13/2017 07:04 by Dr. Vito Abrams MD.          Assessment     1. Acute hypoxic respiratory failure  2. Abnormal chest x ray, pulmonary edema and right lung opacities  3. Elevated BNP  4. Cardiac Murmur  5. Leukocytosis   6. Hypertension  7. Hyperlipidemia  8. Tobacco Abuse      Plan     - 2D echocardiogram today to help further diagnose and treatment of   ? Cardiomyopathy or ? Valvular disease  - close respiratory monitoring  - close renal monitoring with IV diuresis on board  - agree with antibiotics for ? Pneumonia  - continue cardiac telemetry   - Further orders per Dr. Francois upon his evaluation of the patient.     Thank you for the consultation, cardiology will gladly continue to follow.     KIRBY So  12/13/2017  11:09 AM      Please note this cardiology consultation note is the result of a face to face consultation with the patient, in addition to reviewing medical records at length by myself, KIRBY So.       Time: approximately 35 minutes

## 2017-12-13 NOTE — H&P
[facility] Medicine Services  HISTORY AND PHYSICAL    Date of Admission: 12/13/2017  Primary Care Physician: Sharad Cline MD    Subjective     Chief Complaint: Shortness of breath    History of Present Illness  Patient is a 57-year-old white male past medical history of hypertension who smokes.  He smoked most of his life.  He presents emergency room with increased shortness of breath over the last few days.  He states he also does have abdominal swelling and trouble taking breath.  He is been coughing up some yellow sputum as well.  He denies fevers chills.  He was given neb treatments in the emergency room and a fluid bolus.  His chest x-ray to me looks more like congestive heart failure type symptoms.  Upon my evaluation of him in his room he is in worsening respiratory distress and apparently when he was when he came in.  He states he initially got better but now is much worse than he was he is in tripod position is tachycardic with a heart rate apart 150.  His O2 sat is in the mid to upper 80s and he has JVD up to his mandible.  He denies chest pain he is using accessory muscles to breathe oh.        Review of Systems   14 point review of systems negative except for as per HPI  Otherwise complete ROS reviewed and negative except as mentioned in the HPI.    Past Medical History:   Past Medical History:   Diagnosis Date   • Arthritis    • Hyperlipidemia    • Hypertension      Past Surgical History:  Past Surgical History:   Procedure Laterality Date   • FINGER SURGERY Right 1990     Social History:  reports that he has been smoking Cigarettes.  He has been smoking about 1.50 packs per day. He does not have any smokeless tobacco history on file. He reports that he does not drink alcohol or use illicit drugs.    Family History: Patient's family has no history of diabetes or hypertension    Allergies:  No Known Allergies  Medications:  Prior to Admission medications   "  Medication Sig Start Date End Date Taking? Authorizing Provider   atorvastatin (LIPITOR) 10 MG tablet Take 10 mg by mouth Daily.   Yes Historical Provider, MD   chlorthalidone (HYGROTON) 25 MG tablet Take 25 mg by mouth Daily.   Yes Historical Provider, MD   lisinopril (PRINIVIL,ZESTRIL) 20 MG tablet Take 20 mg by mouth Daily.   Yes Historical Provider, MD     Objective     Vital Signs: /77 (BP Location: Left arm, Patient Position: Lying)  Pulse 100  Temp 98.5 °F (36.9 °C) (Temporal Artery )   Resp 20  Ht 167.6 cm (66\")  Wt 59 kg (130 lb)  SpO2 92%  BMI 20.98 kg/m2  Physical Exam  CONSTITUTIONAL: Well developed, well nourished, not diaphoretic nor distressed  HENT: Normocephalic, atraumatic, oropharynx clear and moist  EYES: PERRL, EOM normal, no discharge, no scleral icterus  NECK: ROM normal, supple, no tracheal deviation pos JVD, no stridor  CARDIOVASCULAR: Tachycardia, heart sounds normal, no rub no gallop, intact distal pulses, normal cap refill  PULMONARY: Patient is breathing heavily rapidly decreased breath sounds at bases fair air movement rales throughout  ABDOMINAL: Soft, nontender, no guarding, no mass, no rebound, no hernia  GENITOURINARY/ANORECTAL: deferred  MUSCKULOSKELETAL: ROM normal, no tenderness nor deformity, no edema  NEUROLOGICAL: Alert, oriented x 3, DTRS normal, CN x 10 normal, normal tone  SKIN: Warm, dry, no erythema, no rash, normal color  PSYCH: Mood and affect normal, behavior normal, thought content and judgement normal.      Results Reviewed:  Lab Results (last 24 hours)     Procedure Component Value Units Date/Time    Blood Gas, Arterial [[id number]]  (Abnormal) Collected:  12/13/17 0027    Specimen:  Arterial Blood Updated:  12/13/17 0030     Site Right Radial     Glenroy's Test Positive     pH, Arterial 7.343 (L) pH units      pCO2, Arterial 43.0 mm Hg      pO2, Arterial 61.0 (L) mm Hg      HCO3, Arterial 23.3 mmol/L      Base Excess, Arterial -2.5 (L) mmol/L      O2 " Saturation, Arterial 90.4 (L) %      Temperature 37.0 C      Barometric Pressure for Blood Gas 753 mmHg      Modality Nasal Cannula     Flow Rate 3.0 lpm      Ventilator Mode NA     Collected by [id number]    Blood Culture - Blood, [[id number]] Collected:  12/13/17 0015    Specimen:  Blood from Arm, Right Updated:  12/13/17 0032    Blood Culture - Blood, [[id number]] Collected:  12/13/17 0018    Specimen:  Blood from Arm, Right Updated:  12/13/17 0033    Protime-INR [[id number]]  (Abnormal) Collected:  12/13/17 0020    Specimen:  Blood Updated:  12/13/17 0042     Protime 12.0 Seconds      INR 0.86 (L)    aPTT [[id number]]  (Normal) Collected:  12/13/17 0020    Specimen:  Blood Updated:  12/13/17 0042     PTT 27.9 seconds     D-dimer, Quantitative [[id number]]  (Abnormal) Collected:  12/13/17 0020    Specimen:  Blood Updated:  12/13/17 0042     D-Dimer, Quantitative 0.69 (H) mg/L (FEU)     Narrative:       Reference Range is 0-0.50 mg/L FEU. However, results <0.50 mg/L FEU tends to rule out DVT or PE. Results >0.50 mg/L FEU are not useful in predicting absence or presence of DVT or PE.    Comprehensive Metabolic Panel [[id number]]  (Abnormal) Collected:  12/13/17 0020    Specimen:  Blood Updated:  12/13/17 0043     Glucose 156 (H) mg/dL      BUN 9 mg/dL      Creatinine 0.66 mg/dL      Sodium 135 mmol/L      Potassium 3.5 mmol/L      Chloride 97 (L) mmol/L      CO2 25.0 mmol/L      Calcium 9.1 mg/dL      Total Protein 7.2 g/dL      Albumin 4.50 g/dL      ALT (SGPT) 41 U/L      AST (SGOT) 32 U/L      Alkaline Phosphatase 96 U/L      Total Bilirubin 0.1 mg/dL      eGFR Non African Amer 124 mL/min/1.73      Globulin 2.7 gm/dL      A/G Ratio 1.7 g/dL      BUN/Creatinine Ratio 13.6     Anion Gap 13.0 mmol/L     Lipase [[id number]]  (Normal) Collected:  12/13/17 0020    Specimen:  Blood Updated:  12/13/17 0043     Lipase 91 U/L     Lactic Acid, Plasma [[id number]]  (Abnormal) Collected:  12/13/17 0020    Specimen:  Blood Updated:   12/13/17 0043     Lactate 2.3 (C) mmol/L     Troponin [[id number]]  (Abnormal) Collected:  12/13/17 0020    Specimen:  Blood Updated:  12/13/17 0054     Troponin I 0.044 (H) ng/mL     BNP [[id number]]  (Abnormal) Collected:  12/13/17 0020    Specimen:  Blood Updated:  12/13/17 0054     proBNP 1990.0 (H) pg/mL     CBC Auto Differential [[id number]]  (Abnormal) Collected:  12/13/17 0020    Specimen:  Blood Updated:  12/13/17 0057     WBC 22.09 (H) 10*3/mm3      RBC 4.43 (L) 10*6/mm3      Hemoglobin 13.5 (L) g/dL      Hematocrit 39.0 (L) %      MCV 88.0 fL      MCH 30.5 pg      MCHC 34.6 g/dL      RDW 12.6 %      RDW-SD 40.8 fl      MPV 9.9 fL      Platelets 393 10*3/mm3     Narrative:       The previously reported component NRBC is no longer being reported.    Scan Slide [[id number]] Collected:  12/13/17 0020    Specimen:  Blood Updated:  12/13/17 0057     Scan Slide --      See Manual Differential Results       CBC & Differential [[id number]] Collected:  12/13/17 0020    Specimen:  Blood Updated:  12/13/17 0057    Narrative:       The following orders were created for panel order CBC & Differential.  Procedure                               Abnormality         Status                     ---------                               -----------         ------                     Manual Differential[[id number]]          Abnormal            Final result               Scan Slide[[id number]]                                       Final result               CBC Auto Differential[[id number]]        Abnormal            Final result                 Please view results for these tests on the individual orders.    Manual Differential [[id number]]  (Abnormal) Collected:  12/13/17 0020    Specimen:  Blood Updated:  12/13/17 0057     Neutrophil % 62.0 %      Lymphocyte % 27.0 %      Monocyte % 5.0 %      Eosinophil % 1.0 %      Bands %  1.0 %      Atypical Lymphocyte % 4.0 %      Neutrophils Absolute 13.92 (H) 10*3/mm3      Lymphocytes Absolute  5.96 (H) 10*3/mm3      Monocytes Absolute 1.10 10*3/mm3      Eosinophils Absolute 0.22 10*3/mm3      RBC Morphology Normal     WBC Morphology Normal     Platelet Morphology Normal    Procalcitonin [[id number]]  (Normal) Collected:  12/13/17 0020    Specimen:  Blood Updated:  12/13/17 0100     Procalcitonin <0.25 ng/mL     Narrative:       SIRS, sepsis, severe sepsis, and septic shock are categorized according to the criteria of the consensus conference of the American College of Chest Physicians/Society of Critical Care Medicine.    PCT < 0.5 ng/mL     Systemic infection (sepsis) is not likely.    PCT >0.5 and < 2.0 ng/mL Systemic infection (sepsis) is possible, but other conditions are known to elevate PCT as well.    PCT > 2.0 ng/mL     Systemic infection (sepsis) is likely, unless other causes are known.      PCT > 10.0 ng/mL    Important systemic inflammatory response, almost exclusively due to severe bacterial sepsis or septic shock.    PCT values of < 0.5 ng/mL do not exclude an infection, because localized infections (without systemic signs) may be associated with such low concentrations, or a systemic infection in its initial stages (<6 hours).  Increased PCT can occur without infection.  PCT concentrations between 0.5 and 2.0 ng/mL should be interpreted taking into account the patients history.  It is recommended to retest PCT within 6-24 hours if any concentrations < 2.0 ng/mL are obtained.    Influenza Antigen, Rapid - Swab, Nasopharynx [[id number]]  (Normal) Collected:  12/13/17 0128    Specimen:  Swab from Nasopharynx Updated:  12/13/17 0151     Influenza A Ag, EIA Negative     Influenza B Ag, EIA Negative    Narrative:         Recommend confirmation of negative results by viral culture or molecular assay.    Lactic Acid, Reflex Timer [[id number]] Collected:  12/13/17 0020    Specimen:  Blood Updated:  12/13/17 0346     Extra Tube Hold for add-ons.      Auto resulted.       Lactic Acid, Reflex [[id number]]   (Abnormal) Collected:  12/13/17 0437    Specimen:  Blood Updated:  12/13/17 0457     Lactate 2.2 (C) mmol/L         Imaging Results (last 24 hours)     Procedure Component Value Units Date/Time    XR Chest 1 View [[id number]] Updated:  12/13/17 0034        I have personally reviewed and interpreted the radiology studies and ECG obtained at time of admission.     Assessment / Plan     Assessment:   Hospital Problem List     Shortness of breath      1.  Acute CHF exacerbation with probable COPD exacerbation as well plan is to admit patient I am going to diurese him now give him a dose of Lopressor to slow his heart rate down and see if he can stabilize him.  May have to consider transferring him to the intensive care unit he does not turn around with those measures.  We will also check a 2-D echo as well as continue serial troponins he does have an elevated troponin as well I note that he did screen positive for sepsis he was given a fluid bolus in the emergency room but I do not think he needed it.  We will cover with broad-spectrum antibiotics and follow cultures monitor progress patient.  2.  Hypertension continue current medications monitor blood pressure           Code Status: Full     I discussed the patients findings and my recommendations with the patient he designates his wife is his healthcare power surrogate.    Estimated length of stay 1-2 days    Manoj Allen MD   12/13/17   5:12 AM

## 2017-12-13 NOTE — PLAN OF CARE
Problem: Patient Care Overview (Adult)  Goal: Plan of Care Review  Outcome: Ongoing (interventions implemented as appropriate)  Admitted from ER to room 495 tachycardiac at 140 02 sats in 80's - eventually on Venti-mask at 40% - Dr Allen to bedside 40mg lasix & voided 1L which was bolused in ER - 5mg IV lopressor given HR down to 102 - patients voices feeling better    Problem: COPD, Chronic Bronchitis/Emphysema (Adult)  Goal: Signs and Symptoms of Listed Potential Problems Will be Absent or Manageable (COPD, Chronic Bronchitis/Emphysema)  Outcome: Ongoing (interventions implemented as appropriate)

## 2017-12-13 NOTE — PAYOR COMM NOTE
"Sharad Lopes (57 y.o. Male)     Date of Birth Social Security Number Address Home Phone MRN    1960  [address]   [place] [address] [phone] [id number]    Buddhist Marital Status          None        Admission Date Admission Type Admitting Provider Attending Provider Department, Room/Bed    12/13/17 Emergency Hamlet Quinteros MD [place], Glenn Riego, MD [facility] 4C, 495/1    Discharge Date Discharge Disposition Discharge Destination                      Attending Provider: Hamlet Quinteros MD     Allergies:  No Known Allergies    Isolation:  None   Infection:  None   Code Status:  FULL    Ht:  167.6 cm (66\")   Wt:  59 kg (130 lb)    Admission Cmt:  None   Principal Problem:  None                Active Insurance as of 12/13/2017     Primary Coverage     Payor Plan Insurance Group Employer/Plan Group    HUMANA HUMANA [id number]     Payor Plan Address Payor Plan Phone Number Effective From Effective To    [address] [address] [phone] 6/1/2017     [place] [address]       Subscriber Name Subscriber Birth Date Member ID       SHARAD LOPES 1960 [id number]                 Emergency Contacts      (Rel.) Home Phone Work Phone Mobile Phone    Ekaterina Lopes (Spouse) [phone] -- [phone]       12/13/17. [facility]  12/13/17 INPATIENT ADMISSION.  [phone]         H&P  Date of Service: 12/13/2017 3:12 AM  Manoj Allen MD   Medicine   Expand All Collapse All    []Hide copied text       [facility] Medicine Services  HISTORY AND PHYSICAL     Date of Admission: 12/13/2017  Primary Care Physician: Sharad Cline MD     Subjective      Chief Complaint: Shortness of breath     History of Present Illness  Patient is a 57-year-old white male past medical history of hypertension who smokes.  He smoked most of his life.  He presents emergency room with increased shortness of breath " over the last few days.  He states he also does have abdominal swelling and trouble taking breath.  He is been coughing up some yellow sputum as well.  He denies fevers chills.  He was given neb treatments in the emergency room and a fluid bolus.  His chest x-ray to me looks more like congestive heart failure type symptoms.  Upon my evaluation of him in his room he is in worsening respiratory distress and apparently when he was when he came in.  He states he initially got better but now is much worse than he was he is in tripod position is tachycardic with a heart rate apart 150.  His O2 sat is in the mid to upper 80s and he has JVD up to his mandible.  He denies chest pain he is using accessory muscles to breathe oh.           Review of Systems   14 point review of systems negative except for as per HPI  Otherwise complete ROS reviewed and negative except as mentioned in the HPI.     Past Medical History:    Medical History         Past Medical History:   Diagnosis Date   • Arthritis     • Hyperlipidemia     • Hypertension           Past Surgical History:   Surgical History          Past Surgical History:   Procedure Laterality Date   • FINGER SURGERY Right 1990         Social History:  reports that he has been smoking Cigarettes.  He has been smoking about 1.50 packs per day. He does not have any smokeless tobacco history on file. He reports that he does not drink alcohol or use illicit drugs.     Family History: Patient's family has no history of diabetes or hypertension     Allergies:  No Known Allergies  Medications:          Prior to Admission medications    Medication Sig Start Date End Date Taking? Authorizing Provider   atorvastatin (LIPITOR) 10 MG tablet Take 10 mg by mouth Daily.     Yes Historical Provider, MD   chlorthalidone (HYGROTON) 25 MG tablet Take 25 mg by mouth Daily.     Yes Historical Provider, MD   lisinopril (PRINIVIL,ZESTRIL) 20 MG tablet Take 20 mg by mouth Daily.     Yes Historical  "Provider, MD      Objective      Vital Signs: /77 (BP Location: Left arm, Patient Position: Lying)  Pulse 100  Temp 98.5 °F (36.9 °C) (Temporal Artery )   Resp 20  Ht 167.6 cm (66\")  Wt 59 kg (130 lb)  SpO2 92%  BMI 20.98 kg/m2  Physical Exam  CONSTITUTIONAL: Well developed, well nourished, not diaphoretic nor distressed  HENT: Normocephalic, atraumatic, oropharynx clear and moist  EYES: PERRL, EOM normal, no discharge, no scleral icterus  NECK: ROM normal, supple, no tracheal deviation pos JVD, no stridor  CARDIOVASCULAR: Tachycardia, heart sounds normal, no rub no gallop, intact distal pulses, normal cap refill  PULMONARY: Patient is breathing heavily rapidly decreased breath sounds at bases fair air movement rales throughout  ABDOMINAL: Soft, nontender, no guarding, no mass, no rebound, no hernia  GENITOURINARY/ANORECTAL: deferred  MUSCKULOSKELETAL: ROM normal, no tenderness nor deformity, no edema  NEUROLOGICAL: Alert, oriented x 3, DTRS normal, CN x 10 normal, normal tone  SKIN: Warm, dry, no erythema, no rash, normal color  PSYCH: Mood and affect normal, behavior normal, thought content and judgement normal.        Results Reviewed:  Lab Results (last 24 hours)     Procedure Component Value Units Date/Time             Blood Gas, Arterial [[id number]]  (Abnormal) Collected:  12/13/17 0027     Specimen:  Arterial Blood Updated:  12/13/17 0030       Site Right Radial       Glenroy's Test Positive       pH, Arterial 7.343 (L) pH units         pCO2, Arterial 43.0 mm Hg         pO2, Arterial 61.0 (L) mm Hg         HCO3, Arterial 23.3 mmol/L         Base Excess, Arterial -2.5 (L) mmol/L         O2 Saturation, Arterial 90.4 (L) %         Temperature 37.0 C         Barometric Pressure for Blood Gas 753 mmHg         Modality Nasal Cannula       Flow Rate 3.0 lpm         Ventilator Mode NA       Collected by [id number]     Blood Culture - Blood, [[id number]] Collected:  12/13/17 0015     Specimen:  Blood from Arm, " Right Updated:  12/13/17 0032     Blood Culture - Blood, [[id number]] Collected:  12/13/17 0018     Specimen:  Blood from Arm, Right Updated:  12/13/17 0033     Protime-INR [[id number]]  (Abnormal) Collected:  12/13/17 0020     Specimen:  Blood Updated:  12/13/17 0042       Protime 12.0 Seconds         INR 0.86 (L)     aPTT [[id number]]  (Normal) Collected:  12/13/17 0020     Specimen:  Blood Updated:  12/13/17 0042       PTT 27.9 seconds       D-dimer, Quantitative [[id number]]  (Abnormal) Collected:  12/13/17 0020     Specimen:  Blood Updated:  12/13/17 0042       D-Dimer, Quantitative 0.69 (H) mg/L (FEU)       Narrative:        Reference Range is 0-0.50 mg/L FEU. However, results <0.50 mg/L FEU tends to rule out DVT or PE. Results >0.50 mg/L FEU are not useful in predicting absence or presence of DVT or PE.     Comprehensive Metabolic Panel [[id number]]  (Abnormal) Collected:  12/13/17 0020     Specimen:  Blood Updated:  12/13/17 0043       Glucose 156 (H) mg/dL         BUN 9 mg/dL         Creatinine 0.66 mg/dL         Sodium 135 mmol/L         Potassium 3.5 mmol/L         Chloride 97 (L) mmol/L         CO2 25.0 mmol/L         Calcium 9.1 mg/dL         Total Protein 7.2 g/dL         Albumin 4.50 g/dL         ALT (SGPT) 41 U/L         AST (SGOT) 32 U/L         Alkaline Phosphatase 96 U/L         Total Bilirubin 0.1 mg/dL         eGFR Non African Amer 124 mL/min/1.73         Globulin 2.7 gm/dL         A/G Ratio 1.7 g/dL         BUN/Creatinine Ratio 13.6       Anion Gap 13.0 mmol/L       Lipase [[id number]]  (Normal) Collected:  12/13/17 0020     Specimen:  Blood Updated:  12/13/17 0043       Lipase 91 U/L       Lactic Acid, Plasma [[id number]]  (Abnormal) Collected:  12/13/17 0020     Specimen:  Blood Updated:  12/13/17 0043       Lactate 2.3 (C) mmol/L       Troponin [[id number]]  (Abnormal) Collected:  12/13/17 0020     Specimen:  Blood Updated:  12/13/17 0054       Troponin I 0.044 (H) ng/mL       BNP [[id number]]   (Abnormal) Collected:  12/13/17 0020     Specimen:  Blood Updated:  12/13/17 0054       proBNP 1990.0 (H) pg/mL       CBC Auto Differential [[id number]]  (Abnormal) Collected:  12/13/17 0020     Specimen:  Blood Updated:  12/13/17 0057       WBC 22.09 (H) 10*3/mm3         RBC 4.43 (L) 10*6/mm3         Hemoglobin 13.5 (L) g/dL         Hematocrit 39.0 (L) %         MCV 88.0 fL         MCH 30.5 pg         MCHC 34.6 g/dL         RDW 12.6 %         RDW-SD 40.8 fl         MPV 9.9 fL         Platelets 393 10*3/mm3       Narrative:        The previously reported component NRBC is no longer being reported.     Scan Slide [[id number]] Collected:  12/13/17 0020     Specimen:  Blood Updated:  12/13/17 0057       Scan Slide --         See Manual Differential Results         CBC & Differential [[id number]] Collected:  12/13/17 0020     Specimen:  Blood Updated:  12/13/17 0057     Narrative:        The following orders were created for panel order CBC & Differential.  Procedure                               Abnormality         Status                     ---------                               -----------         ------                     Manual Differential[[id number]]          Abnormal            Final result               Scan Slide[[id number]]                                       Final result               CBC Auto Differential[[id number]]        Abnormal            Final result                  Please view results for these tests on the individual orders.     Manual Differential [[id number]]  (Abnormal) Collected:  12/13/17 0020     Specimen:  Blood Updated:  12/13/17 0057       Neutrophil % 62.0 %         Lymphocyte % 27.0 %         Monocyte % 5.0 %         Eosinophil % 1.0 %         Bands %  1.0 %         Atypical Lymphocyte % 4.0 %         Neutrophils Absolute 13.92 (H) 10*3/mm3         Lymphocytes Absolute 5.96 (H) 10*3/mm3         Monocytes Absolute 1.10 10*3/mm3         Eosinophils Absolute 0.22 10*3/mm3         RBC  Morphology Normal       WBC Morphology Normal       Platelet Morphology Normal     Procalcitonin [[id number]]  (Normal) Collected:  12/13/17 0020     Specimen:  Blood Updated:  12/13/17 0100       Procalcitonin <0.25 ng/mL       Narrative:        SIRS, sepsis, severe sepsis, and septic shock are categorized according to the criteria of the consensus conference of the American College of Chest Physicians/Society of Critical Care Medicine.     PCT < 0.5 ng/mL                                                                                             Systemic infection (sepsis) is not likely.     PCT >0.5 and < 2.0 ng/mL                  Systemic infection (sepsis) is possible, but other conditions are known to elevate PCT as well.     PCT > 2.0 ng/mL                                                                                             Systemic infection (sepsis) is likely, unless other causes are known.        PCT > 10.0 ng/mL                                                                         Important systemic inflammatory response, almost exclusively due to severe bacterial sepsis or septic shock.     PCT values of < 0.5 ng/mL do not exclude an infection, because localized infections (without systemic signs) may be associated with such low concentrations, or a systemic infection in its initial stages (<6 hours).  Increased PCT can occur without infection.  PCT concentrations between 0.5 and 2.0 ng/mL should be interpreted taking into account the patients history.  It is recommended to retest PCT within 6-24 hours if any concentrations < 2.0 ng/mL are obtained.     Influenza Antigen, Rapid - Swab, Nasopharynx [[id number]]  (Normal) Collected:  12/13/17 0128     Specimen:  Swab from Nasopharynx Updated:  12/13/17 0151       Influenza A Ag, EIA Negative       Influenza B Ag, EIA Negative     Narrative:           Recommend confirmation of negative results by viral culture or molecular assay.     Lactic Acid,  Reflex Timer [[id number]] Collected:  12/13/17 0020     Specimen:  Blood Updated:  12/13/17 0346       Extra Tube Hold for add-ons.         Auto resulted.         Lactic Acid, Reflex [[id number]]  (Abnormal) Collected:  12/13/17 0437     Specimen:  Blood Updated:  12/13/17 0457       Lactate 2.2 (C) mmol/L           Imaging Results (last 24 hours)     Procedure Component Value Units Date/Time     XR Chest 1 View [[id number]] Updated:  12/13/17 0034         I have personally reviewed and interpreted the radiology studies and ECG obtained at time of admission.      Assessment / Plan      Assessment:       Hospital Problem List      Shortness of breath       1.  Acute CHF exacerbation with probable COPD exacerbation as well plan is to admit patient I am going to diurese him now give him a dose of Lopressor to slow his heart rate down and see if he can stabilize him.  May have to consider transferring him to the intensive care unit he does not turn around with those measures.  We will also check a 2-D echo as well as continue serial troponins he does have an elevated troponin as well I note that he did screen positive for sepsis he was given a fluid bolus in the emergency room but I do not think he needed it.  We will cover with broad-spectrum antibiotics and follow cultures monitor progress patient.  2.  Hypertension continue current medications monitor blood pressure               Code Status: Full      I discussed the patients findings and my recommendations with the patient he designates his wife is his healthcare power surrogate.     Estimated length of stay 1-2 days     Manoj Allen MD   12/13/17   5:12 AM                       Progress Notes  Cosign Needed   Date of Service: 12/13/2017 9:09 AM  KIRBY Richards   Medicine      []Hide copied text       [facility] Medicine Services  INPATIENT PROGRESS NOTE     Length of Stay: 0  Date of Admission: 12/13/2017  Primary Care  "Physician: Sharad Cline MD     Subjective   Chief Complaint: shortness of breath  HPI   Presented to the emergency room with shortness of breath.  He says he took the trash out yesterday came into the house and noted acute onset of shortness of breath.  He went to bed about 9 PM and awoke at 10 PM with worsening shortness of breath.  Reports he could not get his breath.  He complained of chest tightness and took 2 aspirin as he felt he was \"having a heart attack\".  He reports that he felt like he had the flu a few days ago.  He reported yellow sputum production yesterday.  He did not check his temperature.  He denies nausea or vomiting. He reports feeling as though his abdomen is a little swollen but denies any peripheral edema..  He has no prior history of COPD.  He does not wear oxygen at home.     ProBNP 1990.  D-dimer 0.69.  White blood cell count 22.  Heart rate 124, respirations 25.  He will required Venturi mask 50%.  He is currently on cannula at 8 L.  Received Lasix 40 mg IV in the emergency room.  He is breathing some better this morning.  He reports chest pressure at the time he had shortness of breath last night.  No sputum production today.     Review of Systems   Constitutional: Positive for fatigue. Negative for activity change and unexpected weight change.   HENT: Negative for congestion and trouble swallowing.    Eyes: Negative for photophobia and visual disturbance.   Respiratory: Positive for cough and shortness of breath. Negative for wheezing.    Cardiovascular: Positive for chest pain (Chest tightness). Negative for palpitations and leg swelling.   Gastrointestinal: Positive for abdominal distention (Reports abdomen feels \"a little swollen\"). Negative for constipation, diarrhea, nausea and vomiting.   Endocrine: Negative for cold intolerance, heat intolerance and polyuria.   Genitourinary: Negative for dysuria, frequency and urgency.   Musculoskeletal: Positive for back pain (Chronic). "   Skin: Negative for rash and wound.   Allergic/Immunologic: Negative for immunocompromised state.   Neurological: Positive for weakness. Negative for dizziness and numbness.   Hematological: Negative for adenopathy. Does not bruise/bleed easily.   Psychiatric/Behavioral: Negative for agitation, behavioral problems and confusion.      All pertinent negatives and positives are as above. All other systems have been reviewed and are negative unless otherwise stated.      Objective    Temp:  [98.4 °F (36.9 °C)-98.8 °F (37.1 °C)] 98.5 °F (36.9 °C)  Heart Rate:  [] 92  Resp:  [18-28] 18  BP: (111-165)/(66-95) 125/70  Physical Exam   Constitutional: He is oriented to person, place, and time. He appears well-developed and well-nourished.   HENT:   Head: Normocephalic and atraumatic.   Eyes: EOM are normal. Pupils are equal, round, and reactive to light.   Neck: Normal range of motion. Neck supple. No tracheal deviation present. No thyromegaly present.   Cardiovascular: Normal rate, regular rhythm and intact distal pulses.  Exam reveals no gallop and no friction rub.    Murmur heard.  Pulmonary/Chest: He has no wheezes. He exhibits no tenderness.   Oxygen at 8 L, decreased breath sounds posteriorly no wheezing.   Abdominal: Soft. Bowel sounds are normal. He exhibits distension (slight abdominal distention). There is no tenderness.   Genitourinary:   Genitourinary Comments: Deferred   Musculoskeletal: Normal range of motion. He exhibits no edema or deformity.   Neurological: He is alert and oriented to person, place, and time. He has normal reflexes.   Skin: Skin is warm and dry. No rash noted. No erythema.   Psychiatric: He has a normal mood and affect. His behavior is normal. Judgment and thought content normal.      Results Review:  I have reviewed the labs, radiology results, and diagnostic studies.     Laboratory Data:      Results from last 7 days  Lab Units 12/13/17  0020   WBC 10*3/mm3 22.09*   HEMOGLOBIN g/dL  13.5*   HEMATOCRIT % 39.0*   PLATELETS 10*3/mm3 393             Results from last 7 days  Lab Units 12/13/17  0517 12/13/17  0020   SODIUM mmol/L 135 135   POTASSIUM mmol/L 3.4* 3.5   CHLORIDE mmol/L 98 97*   CO2 mmol/L 24.0 25.0   BUN mg/dL 9 9   CREATININE mg/dL 0.56 0.66   CALCIUM mg/dL 9.2 9.1   BILIRUBIN mg/dL 0.3 0.1   ALK PHOS U/L 82 96   ALT (SGPT) U/L 43 41   AST (SGOT) U/L 38 32   GLUCOSE mg/dL 151* 156*      Culture Data:             Specimen: Swab from Nasopharynx Updated: 12/13/17 0151          Influenza A Ag, EIA Negative        Influenza B Ag, EIA Negative      Radiology Data:   Imaging Results (all)     Procedure Component Value Units Date/Time     XR Chest 1 View [[id number]] Collected:  12/13/17 0703       Updated:  12/13/17 0707     Narrative:        EXAMINATION: XR CHEST 1 VW-. 12/13/2017 8:03 AM EST      CHEST, ONE VIEW:      HISTORY: Respiratory distress      COMPARISON: None      A single frontal chest radiograph was obtained.      FINDINGS:      Bilateral perihilar interstitial prominence appreciated with mild  interstitial prominence also noted in the lung bases with minimal patchy  airspace opacities in the right lung base medially. Septal line/Kerley B  lines also observed. Correlate for mild interstitial pulmonary edema  possible cardiogenic etiology.      Bony structures are intact.             Impression:        1. Interstitial prominence with mild patchy airspace opacities in the  right lung base, acute interstitial infiltration from an inflammatory  process considered. Pulmonary edema also a differential consideration.      This report was finalized on 12/13/2017 07:04 by Dr. Vito Abrams MD.         Intake/Output     Intake/Output Summary (Last 24 hours) at 12/13/17 0910  Last data filed at 12/13/17 0532    Gross per 24 hour   Intake                0 ml   Output             1500 ml   Net            -1500 ml         Scheduled Meds     atorvastatin 10 mg Oral Daily   chlorthalidone 25  "mg Oral Daily   enoxaparin 40 mg Subcutaneous Q24H   furosemide 40 mg Intravenous BID   ipratropium 0.5 mg Nebulization 4x Daily - RT   lisinopril 20 mg Oral Daily   methylPREDNISolone sodium succinate 30 mg Intravenous Q12H         I have reviewed the patient current medications.      Assessment/Plan          Hospital Problem List      Shortness of breath          Assessment:  1.  Acute hypoxic respiratory failure, required 50% oxygen, currently 8 L  2.  Acute pulmonary edema  3.  Opacities right lung base probable pneumonia  4.  Chest pain with elevated troponin  5.  Elevated proBNP, concern for acute congestive heart failure, no prior history.  Check echocardiogram.  6.  Murmur  7.  Essential hypertension  8.  Hyperlipidemia  9.  Leukocytosis  10.  Mild hypokalemia  11.  Tobacco abuse     Plan:  1.  Sudden onset of acute shortness of breath last night.  Acute hypoxia.  On oxygen at 8 L.  Wean down oxygen as tolerated.  as tolerated.  2.  Chest x-ray curly B-lines and airspace opacities right lung.  Suspect both acute pulmonary edema and concern for pneumonia.  He received Lasix in the emergency room.  On Lasix 40 mg twice daily.  3.  ProBNP 3000.  No prior history of heart failure per patient.  No prior history of chronic obstructive pulmonary disease.  No inhalers.  4.  Murmur noted.  Check echocardiogram as patient also reported chest \"tightness and he felt as though he was having a heart attack\".  Troponins trending upward.  5.  Cardiology consult with troponins pending upward and episode of chest pain.  Patient took 2 aspirin at home.  6.  Home medications reviewed and resumed.  7.  Lovenox 40 mg every 24 hours deep vein thrombosis prophylaxis.  8.  Solu-Medrol 125 mg in ER.  30 mg every 12.  Discontinue.  He has no history of COPD.  9.  Received Levaquin in the ER.  We will start Rocephin and azithromycin as concern for pneumonia.  10.  CBC, basic metabolic panel tomorrow.  11.  Potassium 40 mEq orally ×1 " dose.     The above documentation resulted from a face-to-face encounter by me Barbra ORR, [facility].           Discharge Planning: I expect patient to be discharged to home in 2-4 days.     KIRBY Ho   12/13/17   9:10 AM                 Results   CBC & Differential (Order [id number])   Procedure Abnormality Status   Manual Differential Abnormal Final result   Scan Slide  Final result   CBC Auto Differential Abnormal Final result   CBC & Differential   Order: [id number]   Status:  Final result   Visible to patient:  No (Not Released)      Specimen Collected: 12/13/17 12:20 AM Last Resulted: 12/13/17 12:57 AM                         CBC Auto Differential   Order: [id number] - Part of Panel Order [id number]   Status:  Final result   Visible to patient:  No (Not Released)      Ref Range & Units 12:20 AM     WBC 4.80 - 10.80 10*3/mm3 22.09 (H)   RBC 4.80 - 5.90 10*6/mm3 4.43 (L)   Hemoglobin 14.0 - 18.0 g/dL 13.5 (L)   Hematocrit 40.0 - 52.0 % 39.0 (L)   MCV 82.0 - 95.0 fL 88.0   MCH 28.0 - 32.0 pg 30.5   MCHC 33.0 - 36.0 g/dL 34.6   RDW 12.0 - 15.0 % 12.6   RDW-SD 40.0 - 54.0 fl 40.8   MPV 6.0 - 12.0 fL 9.9   Platelets 130 - 400 10*3/mm3 393   Resulting [facility]   Narrative   The previously reported component NRBC is no longer being reported.      Specimen Collected: 12/13/17 12:20 AM Last Resulted: 12/13/17 12:57 AM                       Scan Slide   Order: [id number] - Part of Panel Order [id number]   Status:  Final result   Visible to patient:  No (Not Released)      12:20 AM     Scan Slide    Comments: See Manual Differential Results   Resulting Agency  [facility]      Specimen Collected: 12/13/17 12:20 AM Last Resulted: 12/13/17 12:57 AM                          Manual Differential   Order: [id number] - Part of Panel Order [id number]   Status:  Final result   Visible to patient:  No (Not Released)      Ref Range & Units 12:20 AM     Neutrophil % 39.0 - 78.0 % 62.0   Lymphocyte % 15.0 - 45.0 %  27.0   Monocyte % 4.0 - 12.0 % 5.0   Eosinophil % 0.0 - 4.0 % 1.0   Bands %  0.0 - 10.0 % 1.0   Atypical Lymphocyte % 0.0 - 5.0 % 4.0   Neutrophils Absolute 1.87 - 8.40 10*3/mm3 13.92 (H)   Lymphocytes Absolute 0.72 - 4.86 10*3/mm3 5.96 (H)   Monocytes Absolute 0.19 - 1.30 10*3/mm3 1.10   Eosinophils Absolute 0.00 - 0.70 10*3/mm3 0.22   RBC Morphology Normal Normal   WBC Morphology Normal Normal   Platelet Morphology Normal Normal   Resulting [facility]      Specimen Collected: 12/13/17 12:20 AM Last Resulted: 12/13/17 12:57 AM                              Additional Information   Specimen ID Bill Type Client ID   [id number]     Specimen Date Taken Specimen Time Taken Specimen Received Date Specimen Received Time Result Date Result Time   Dec 13, 2017 12:20 AM   Dec 13, 2017 12:57 AM   Results Routing Tracking   View Results Routing Information   CBC & Differential [[id number]] (Order [id number])   Lab Panel   Date: 12/13/2017 Department: [address] Released By/Authorizing: Idalia Wellington DO (auto-released)   Procedure Abnormality Status   Manual Differential Abnormal Final result   Scan Slide  Final result   CBC Auto Differential Abnormal Final result   Order History   Inpatient      Lactic Acid, Plasma   Order: [id number]   Status:  Final result   Visible to patient:  No (Not Released)      Newer results are available. Click to view them now.            Ref Range & Units 12:20 AM     Lactate 0.5 - 2.0 mmol/L 2.3 (C)   Resulting [facility]      Specimen Collected: 12/13/17 12:20 AM Last Resulted: 12/13/17 12:43 AM                       Lactic Acid, Reflex Timer   Order: [id number] - Reflex for Order [id number]   Status:  Final result   Visible to patient:  No (Not Released)      12:20 AM     Extra Tube Hold for add-ons.   Comments: Auto resulted.   Resulting [facility]      Specimen Collected: 12/13/17 12:20 AM Last Resulted: 12/13/17  3:46 AM                       Related Result  Highlights       Lactic Acid, Reflex  Final result 12/13/2017            Ot      BNP [UWE806] (Order [id number])   Lab   Date: 12/13/2017 Department: [address] Released By/Authorizing: Idalia Wellington DO (auto-released)          BNP   Order: [id number]   Status:  Final result   Visible to patient:  No (Not Released)      Newer results are available. Click to view them now.            Ref Range & Units 12:20 AM     proBNP 0.0 - 900.0 pg/mL 1990.0 (H)   Resulting Agency  [facility]      Specimen Collected: 12/13/17 12:20 AM Last Resulted: 12/13/17 12:54 AM                             XR Chest 1 View [[id number]] (Order [id number])   Status: Final result   Patient Location   Patient Class Location Department Phone   Inpatient [facility] 4C, 495, [phone]   Appointment Information   PACS Images   Radiology Images   Study Result   EXAMINATION: XR CHEST 1 VW-. 12/13/2017 8:03 AM EST      CHEST, ONE VIEW:      HISTORY: Respiratory distress      COMPARISON: None      A single frontal chest radiograph was obtained.      FINDINGS:      Bilateral perihilar interstitial prominence appreciated with mild  interstitial prominence also noted in the lung bases with minimal patchy  airspace opacities in the right lung base medially. Septal line/Kerley B  lines also observed. Correlate for mild interstitial pulmonary edema  possible cardiogenic etiology.      Bony structures are intact.          IMPRESSION:  1. Interstitial prominence with mild patchy airspace opacities in the  right lung base, acute interstitial infiltration from an inflammatory  process considered. Pulmonary edema also a differential consideration.      This report was finalized on 12/13/2017 07:04 by Dr. Vito Abrams MD.   Imaging   XR Chest 1 View (Order #[id number]) on 12/13/2017 - Imaging Information   Signed by   Signed Date/Time    Phone Pager   VITO ABRAMS 12/13/2017 07:04 [phone]                           Hospital Medications (active)        Dose Frequency Start End    atorvastatin (LIPITOR) tablet 10 mg 10 mg Daily 12/13/2017     Sig - Route: Take 1 tablet by mouth Daily. - Oral    AZITHROMYCIN 500 MG/250 ML 0.9% NS IVPB (vial-mate) 500 mg Every 24 Hours 12/13/2017 12/18/2017    Sig - Route: Infuse 500 mg into a venous catheter Daily. - Intravenous    cefTRIAXone (ROCEPHIN) 1 g/100 mL 0.9% NS (MBP) 1 g Every 24 Hours 12/13/2017 12/20/2017    Sig - Route: Infuse 100 mL into a venous catheter Daily. - Intravenous    chlorthalidone (HYGROTON) tablet 25 mg 25 mg Daily 12/13/2017     Sig - Route: Take 1 tablet by mouth Daily. - Oral    enoxaparin (LOVENOX) syringe 40 mg 40 mg Every 24 Hours 12/13/2017     Sig - Route: Inject 0.4 mL under the skin Daily. - Subcutaneous    furosemide (LASIX) injection 40 mg 40 mg Once 12/13/2017 12/13/2017    Sig - Route: Infuse 4 mL into a venous catheter 1 (One) Time. - Intravenous    furosemide (LASIX) injection 40 mg 40 mg 2 Times Daily 12/13/2017     Sig - Route: Infuse 4 mL into a venous catheter 2 (Two) Times a Day. - Intravenous    Influenza Vac Subunit Quad (FLUCELVAX) injection 0.5 mL 0.5 mL Once 12/13/2017     Sig - Route: Inject 0.5 mL into the shoulder, thigh, or buttocks 1 (One) Time. - Intramuscular    ipratropium (ATROVENT) nebulizer solution 0.5 mg 0.5 mg 4 Times Daily - RT 12/13/2017     Sig - Route: Take 2.5 mL by nebulization 4 (Four) Times a Day. - Nebulization    ipratropium-albuterol (DUO-NEB) nebulizer solution 3 mL 3 mL Once 12/13/2017 12/13/2017    Sig - Route: Take 3 mL by nebulization 1 (One) Time. - Nebulization    ipratropium-albuterol (DUO-NEB) nebulizer solution 3 mL 3 mL Once 12/13/2017 12/13/2017    Sig - Route: Take 3 mL by nebulization 1 (One) Time. - Nebulization    levoFLOXacin (LEVAQUIN) 750 mg/150 mL D5W (premix) (LEVAQUIN) 750 mg 750 mg Once 12/13/2017 12/13/2017    Sig - Route: Infuse 150 mL into a venous catheter 1 (One) Time. - Intravenous    lisinopril (PRINIVIL,ZESTRIL)  tablet 20 mg 20 mg Daily 12/13/2017     Sig - Route: Take 1 tablet by mouth Daily. - Oral    methylPREDNISolone sodium succinate (SOLU-Medrol) injection 125 mg 125 mg Once 12/13/2017 12/13/2017    Sig - Route: Infuse 2 mL into a venous catheter 1 (One) Time. - Intravenous    methylPREDNISolone sodium succinate (SOLU-Medrol) injection 30 mg 30 mg Every 12 Hours 12/13/2017     Sig - Route: Infuse 0.48 mL into a venous catheter Every 12 (Twelve) Hours. - Intravenous    metoprolol tartrate (LOPRESSOR) injection 5 mg 5 mg Once 12/13/2017 12/13/2017    Sig - Route: Infuse 5 mL into a venous catheter 1 (One) Time. - Intravenous    pneumococcal polysaccharide 23-valent (PNEUMOVAX-23) vaccine 0.5 mL 0.5 mL Once 12/13/2017     Sig - Route: Inject 0.5 mL into the shoulder, thigh, or buttocks 1 (One) Time. - Intramuscular    potassium chloride (MICRO-K) CR capsule 40 mEq 40 mEq Once 12/13/2017 12/13/2017    Sig - Route: Take 4 capsules by mouth 1 (One) Time. - Oral    sodium chloride 0.9 % bolus 1,000 mL 1,000 mL Once 12/13/2017 12/13/2017    Sig - Route: Infuse 1,000 mL into a venous catheter 1 (One) Time. - Intravenous    sodium chloride 0.9 % flush 1-10 mL 1-10 mL As Needed 12/13/2017     Sig - Route: Infuse 1-10 mL into a venous catheter As Needed for Line Care. - Intravenous    Influenza Vac Subunit Quad (FLUCELVAX) injection 0.5 mL (Discontinued) 0.5 mL During Hospitalization 12/13/2017 12/13/2017    Sig - Route: Inject 0.5 mL into the shoulder, thigh, or buttocks During Hospitalization for Immunization. - Intramuscular    pneumococcal polysaccharide 23-valent (PNEUMOVAX-23) vaccine 0.5 mL (Discontinued) 0.5 mL During Hospitalization 12/13/2017 12/13/2017    Sig - Route: Inject 0.5 mL into the shoulder, thigh, or buttocks During Hospitalization for Immunization. - Intramuscular

## 2017-12-13 NOTE — PROGRESS NOTES
"    [facility]  INPATIENT PROGRESS NOTE    Length of Stay: 0  Date of Admission: 12/13/2017  Primary Care Physician: Sharad Cline MD    Subjective   Chief Complaint: shortness of breath  HPI   Presented to the emergency room with shortness of breath.  He says he took the trash out yesterday came into the house and noted acute onset of shortness of breath.  He went to bed about 9 PM and awoke at 10 PM with worsening shortness of breath.  Reports he could not get his breath.  He complained of chest tightness and took 2 aspirin as he felt he was \"having a heart attack\".  He reports that he felt like he had the flu a few days ago.  He reported yellow sputum production yesterday.  He did not check his temperature.  He denies nausea or vomiting. He reports feeling as though his abdomen is a little swollen but denies any peripheral edema..  He has no prior history of COPD.  He does not wear oxygen at home.    ProBNP 1990.  D-dimer 0.69.  White blood cell count 22.  Heart rate 124, respirations 25.  He will required Venturi mask 50%.  He is currently on cannula at 8 L.  Received Lasix 40 mg IV in the emergency room.  He is breathing some better this morning.  He reports chest pressure at the time he had shortness of breath last night.  No sputum production today.    Review of Systems   Constitutional: Positive for fatigue. Negative for activity change and unexpected weight change.   HENT: Negative for congestion and trouble swallowing.    Eyes: Negative for photophobia and visual disturbance.   Respiratory: Positive for cough and shortness of breath. Negative for wheezing.    Cardiovascular: Positive for chest pain (Chest tightness). Negative for palpitations and leg swelling.   Gastrointestinal: Positive for abdominal distention (Reports abdomen feels \"a little swollen\"). Negative for constipation, diarrhea, nausea and vomiting.   Endocrine: Negative for cold " intolerance, heat intolerance and polyuria.   Genitourinary: Negative for dysuria, frequency and urgency.   Musculoskeletal: Positive for back pain (Chronic).   Skin: Negative for rash and wound.   Allergic/Immunologic: Negative for immunocompromised state.   Neurological: Positive for weakness. Negative for dizziness and numbness.   Hematological: Negative for adenopathy. Does not bruise/bleed easily.   Psychiatric/Behavioral: Negative for agitation, behavioral problems and confusion.      All pertinent negatives and positives are as above. All other systems have been reviewed and are negative unless otherwise stated.     Objective    Temp:  [98.4 °F (36.9 °C)-98.8 °F (37.1 °C)] 98.5 °F (36.9 °C)  Heart Rate:  [] 92  Resp:  [18-28] 18  BP: (111-165)/(66-95) 125/70  Physical Exam   Constitutional: He is oriented to person, place, and time. He appears well-developed and well-nourished.   HENT:   Head: Normocephalic and atraumatic.   Eyes: EOM are normal. Pupils are equal, round, and reactive to light.   Neck: Normal range of motion. Neck supple. No tracheal deviation present. No thyromegaly present.   Cardiovascular: Normal rate, regular rhythm and intact distal pulses.  Exam reveals no gallop and no friction rub.    Murmur heard.  Pulmonary/Chest: He has no wheezes. He exhibits no tenderness.   Oxygen at 8 L, decreased breath sounds posteriorly no wheezing.   Abdominal: Soft. Bowel sounds are normal. He exhibits distension (slight abdominal distention). There is no tenderness.   Genitourinary:   Genitourinary Comments: Deferred   Musculoskeletal: Normal range of motion. He exhibits no edema or deformity.   Neurological: He is alert and oriented to person, place, and time. He has normal reflexes.   Skin: Skin is warm and dry. No rash noted. No erythema.   Psychiatric: He has a normal mood and affect. His behavior is normal. Judgment and thought content normal.     Results Review:  I have reviewed the labs,  radiology results, and diagnostic studies.    Laboratory Data:     Results from last 7 days  Lab Units 12/13/17  0020   WBC 10*3/mm3 22.09*   HEMOGLOBIN g/dL 13.5*   HEMATOCRIT % 39.0*   PLATELETS 10*3/mm3 393          Results from last 7 days  Lab Units 12/13/17  0517 12/13/17  0020   SODIUM mmol/L 135 135   POTASSIUM mmol/L 3.4* 3.5   CHLORIDE mmol/L 98 97*   CO2 mmol/L 24.0 25.0   BUN mg/dL 9 9   CREATININE mg/dL 0.56 0.66   CALCIUM mg/dL 9.2 9.1   BILIRUBIN mg/dL 0.3 0.1   ALK PHOS U/L 82 96   ALT (SGPT) U/L 43 41   AST (SGOT) U/L 38 32   GLUCOSE mg/dL 151* 156*     Culture Data:    Specimen: Swab from Nasopharynx Updated: 12/13/17 0151       Influenza A Ag, EIA Negative      Influenza B Ag, EIA Negative     Radiology Data:   Imaging Results (all)     Procedure Component Value Units Date/Time    XR Chest 1 View [[id number]] Collected:  12/13/17 0703     Updated:  12/13/17 0707    Narrative:       EXAMINATION: XR CHEST 1 VW-. 12/13/2017 8:03 AM EST     CHEST, ONE VIEW:     HISTORY: Respiratory distress     COMPARISON: None     A single frontal chest radiograph was obtained.     FINDINGS:     Bilateral perihilar interstitial prominence appreciated with mild  interstitial prominence also noted in the lung bases with minimal patchy  airspace opacities in the right lung base medially. Septal line/Kerley B  lines also observed. Correlate for mild interstitial pulmonary edema  possible cardiogenic etiology.     Bony structures are intact.                                     Impression:       1. Interstitial prominence with mild patchy airspace opacities in the  right lung base, acute interstitial infiltration from an inflammatory  process considered. Pulmonary edema also a differential consideration.     This report was finalized on 12/13/2017 07:04 by Dr. Vito Abrams MD.        Intake/Output    Intake/Output Summary (Last 24 hours) at 12/13/17 0910  Last data filed at 12/13/17 0532   Gross per 24 hour   Intake         "        0 ml   Output             1500 ml   Net            -1500 ml       Scheduled Meds    atorvastatin 10 mg Oral Daily   chlorthalidone 25 mg Oral Daily   enoxaparin 40 mg Subcutaneous Q24H   furosemide 40 mg Intravenous BID   ipratropium 0.5 mg Nebulization 4x Daily - RT   lisinopril 20 mg Oral Daily   methylPREDNISolone sodium succinate 30 mg Intravenous Q12H       I have reviewed the patient current medications.     Assessment/Plan     Hospital Problem List     Shortness of breath        Assessment:  I think the patient had acute/flash pulmonary edema sec to critical aortic stenosis.  Will defer to cardiology further w/u; ?heart cath; ct surgery consult   1.  Acute hypoxic respiratory failure, required 50% oxygen, currently 8 L  2.  Acute pulmonary edema  3.  Opacities right lung base probable pneumonia  4.  Chest pain with elevated troponin  5.  Elevated proBNP likely secondary to #2, no prior history heart failure.  Check echocardiogram.  6.  Murmur  7.  Essential hypertension  8.  Hyperlipidemia  9.  Leukocytosis  10.  Mild hypokalemia  11.  Tobacco abuse  12.  Severe Aortic stenosis, bicuspid valve per echo 12/13/17    Plan:  1.  Sudden onset of acute shortness of breath last night.  Acute hypoxia.  On oxygen at 8 L.  Wean down oxygen as tolerated.  as tolerated.  2.  Chest x-ray curly B-lines and airspace opacities right lung.  Suspect both acute pulmonary edema and concern for pneumonia.  He received Lasix in the emergency room.  On Lasix 40 mg twice daily.  3.  ProBNP 3000.  No prior history of heart failure per patient.  No prior history of chronic obstructive pulmonary disease.  No inhalers.  4.  Murmur noted.  Check echocardiogram as patient also reported chest \"tightness and he felt as though he was having a heart attack\".  Troponins trending upward.  5.  Cardiology consult with troponins pending upward and episode of chest pain.  Patient took 2 aspirin at home.  6.  Home medications reviewed and " resumed.  7.  Lovenox 40 mg every 24 hours deep vein thrombosis prophylaxis.  8.  Solu-Medrol 125 mg in ER.  30 mg every 12.  Discontinue.  He has no history of COPD.  9.  Received Levaquin in the ER.  We will start Rocephin and azithromycin as concern for pneumonia.  10.  CBC, basic metabolic panel tomorrow.  11.  Potassium 40 mEq orally ×1 dose.    The above documentation resulted from a face-to-face encounter by me Barbra ORR, [facility].    Transthoracic echocardiogram 12/13/13  · Left ventricular wall thickness is consistent with moderate-to-severe concentric hypertrophy.  · Left ventricular systolic function is normal. Estimated EF = 70%.  · Left ventricular diastolic dysfunction (grade I a) consistent with impaired relaxation.  · Left atrial cavity size is mildly dilated.  · calcification of the aortic valve  · Severe aortic valve stenosis is present.  · The AV appears to be bicuspid        Discharge Planning: I expect patient to be discharged to home in 2-4 days.    KIRBY Ho   12/13/17   9:10 AM    I personally evaluated and examined the patient in conjunction with KIRBY Belle and agree with the assessment, treatment plan, and disposition of the patient as recorded by her. My history, exam, and further recommendations are:     Patient presented with acute SOB.  He did not endorse any fever or chills  His CXR showed Interstitial prominence with mild patchy airspace opacities in the  right lung base, acute interstitial infiltration from an inflammatory  process considered.  No history of passing out; he had chest described as tightness; troponin  Are slightly elevated and trending down.  BNP is 3000   Specimen: Arterial Blood Updated: 12/13/17 0030         Site Right Radial        Glenroy's Test Positive        pH, Arterial 7.343 (L) pH units         pCO2, Arterial 43.0 mm Hg         pO2, Arterial 61.0 (L) mm Hg         HCO3, Arterial 23.3 mmol/L         Base Excess, Arterial -2.5 (L) mmol/L          O2 Saturation, Arterial 90.4 (L) %         Temperature 37.0 C         Barometric Pressure for Blood Gas 753 mmHg         Modality Nasal Cannula        Flow Rate 3.0 lpm         Ventilator Mode NA        Collected by [id number]      He is breathing better.  Lung  Sounds are clear; he has had negative 2400ml fluid balance and now on 2lpm nc compared to 8 lpm earlier.  I rounded the glendy patient with Barbra who gave me feedback that he looks a lot better than earlier  He has loud systolic murmur at base; echo showed severe aortic stenosis ; F 70%; LVH  Cardiology has evaluated the patient      atorvastatin 10 mg Oral Daily   azithromycin 500 mg Intravenous Q24H   ceftriaxone 1 g Intravenous Q24H   chlorthalidone 25 mg Oral Daily   enoxaparin 40 mg Subcutaneous Q24H   furosemide 40 mg Intravenous BID   ipratropium 0.5 mg Nebulization 4x Daily - RT   lisinopril 20 mg Oral Daily   methylPREDNISolone sodium succinate 30 mg Intravenous Q12H   pneumococcal polysaccharide 23-valent 0.5 mL Intramuscular Once     D/c solumedrol  Cont diuretic  Cont empiric abx but anticipate this may be d/c soon  F/u cxr in AM   Hamlet Quinteros MD  12/13/17  5:49 PM

## 2017-12-13 NOTE — ED PROVIDER NOTES
[facility]  eMERGENCY dEPARTMENT eNCOUnter      Pt Name: Sharad Ryder  MRN: [id number]  Birthdate 1960  Date of evaluation: 12/13/2017      CHIEF COMPLAINT       Chief Complaint   Patient presents with   • Shortness of Breath       Nurses Notes reviewed and I agree except as noted in the HPI.      HISTORY OF PRESENT ILLNESS    Sharad Ryder is a 57 y.o. male who presents   Location/Symptom: Patient presents in respiratory distress tripoding.  Timing/Onset: Patient reports that he has had shortness of breath for several days worsening since yesterday.  Patient finally could not catch his breath at home so he presented here for evaluation and treatment.  Patient arrived in respiratory distress tripoding pale mottled skin obviously uncomfortable tachycardic.  Patient's O2 saturation upon arrival was 90% on 2 L.  Soon after beginning on emergent do AB patient went up to 98%.   severely tachypnea, arrival with a respiratory rate of 28-35.   had much decreased capillary refill on arrival.       REVIEW OF SYSTEMS     Review of Systems  CONSTITUION: No Fever, No chills, No activity change, No diaphoresis, No unexpected wt change.    HENT: No congestion, no dental problems, no ear pain or discharge,   EYES: No drainage, no itching, no photophobia, no visual disturbance  RESPIRATORY: As per the HPI otherwise negative   CARDIOVASCULAR: No chest pain, no palpitations, no leg swelling  GI: No abdominal distention, no abdominal pain, no rectal bleeding, no melena, no hematachezia, no diarrhea, no nausea, no vomiting  ENDOCRINE: No polydipsia, no polyphagia, no polyuria, no cold or heat intolerance  : No difficulty urinating, no dyspareunia, nodysuria, no flank pain, no frequency, no genital sore, no hematuria, no menstrual problem if female, no decreased urniation, no hesitation of urination, no vaginal discharge if female, no vaginal pain if female no penile pain if male  ALLERG/IMMUNO:  No env  "or food allergies, not immunocompromised  NEUROLOGICAL: No dizziness, no facial asymmetry, no Headaches, no Light-headedness, no numbess nor paresthesias, no seizures, no speech difficulty, No syncope, no temors, no weakness  HEMATOLOGIC: No adenopathy, no unusual bleeding or bruising  MUSC: No arthralgia, no back pain, no joint swelling, no myalgias, no neck pain, no neck stiffness  SKIN: No rash, no color change, no pallor, no wound  PSYCH: No agitation, no behavior problem, no confusion, no decr concentration, no hallucinations, no suicidal ideation, no homicidal ideation, no self injury, no sleep disturbance      PAST MEDICAL HISTORY     Past Medical History:   Diagnosis Date   • Arthritis    • Hyperlipidemia    • Hypertension        SURGICAL HISTORY      has a past surgical history that includes Finger surgery (Right, 1990).    CURRENT MEDICATIONS        Medication List      Notice     You have not been prescribed any medications.        ALLERGIES     has No Known Allergies.    FAMILY HISTORY     has no family status information on file.  family history is not on file.    SOCIAL HISTORY      reports that he has been smoking Cigarettes.  He has been smoking about 1.50 packs per day. He does not have any smokeless tobacco history on file. He reports that he does not drink alcohol or use illicit drugs.    PHYSICAL EXAM     INITIAL VITALS:  height is 167.6 cm (66\") and weight is 59 kg (130 lb). His oral temperature is 98.7 °F (37.1 °C). His blood pressure is 127/78 and his pulse is 111. His respiration is 20 and oxygen saturation is 92%.    Physical Exam    CONSTITUTIONAL: Well developed, well nourished, Patient very distressed on arrival diaphoretic   HENT: Normocephalic, atraumatic, oropharynx clear and moist  EYES: PERRL, EOM normal, no discharge, no scleral icterus  NECK: ROM normal, supple, no tracheal deviation nor JVD, no stridor  CARDIOVASCULAR: Normal rate and rhythm, heart sounds normal, no rub no gallop, " intact distal pulses, normal cap refill  PULMONARY: Patient in respiratory distress tripoding very little air movement throughout any lung fields on arrival.    ABDOMINAL: Soft, nontender, no guarding, no mass, no rebound, no hernia  GENITOURINARY/ANORECTAL: deferred  MUSCKULOSKELETAL: ROM normal, no tenderness nor deformity, no edema  NEUROLOGICAL: Alert, oriented x 3, DTRS normal, CN x 10 normal, normal tone  SKIN: Patient's skin is extremely mottled decreased capillary refill noted which greatly improved after DuoNeb.  Patient noted to be severely diaphoretic on arrival.  Prior to being admitted to the floor reevaluation revealed Refill less than 3 seconds.  PSYCH: Mood and affect normal, behavior normal, thought content and judgement normal.    DIFFERENTIAL DIAGNOSIS:       DIAGNOSTIC RESULTS     EKG: All EKG's are interpreted by the Emergency Department Physician who either signs or Co-signs this chart in the absence of a cardiologist.      RADIOLOGY: non-plain film images(s) such as CT, Ultrasound and MRI are read by the radiologist.  Plain radiographic images are visualized and preliminarily interpreted by the emergency physician unless otherwise stated below.  XR Chest 1 View    (Results Pending)              LABS:   Lab Results (last 24 hours)     Procedure Component Value Units Date/Time    Blood Culture - Blood, [[id number]] Collected:  12/13/17 0015    Specimen:  Blood from Arm, Right Updated:  12/13/17 0032    Blood Culture - Blood, [[id number]] Collected:  12/13/17 0018    Specimen:  Blood from Arm, Right Updated:  12/13/17 0033    Lactic Acid, Plasma [[id number]]  (Abnormal) Collected:  12/13/17 0020    Specimen:  Blood Updated:  12/13/17 0043     Lactate 2.3 (C) mmol/L     Procalcitonin [[id number]]  (Normal) Collected:  12/13/17 0020    Specimen:  Blood Updated:  12/13/17 0100     Procalcitonin <0.25 ng/mL     Narrative:       SIRS, sepsis, severe sepsis, and septic shock are categorized according to  the criteria of the consensus conference of the American College of Chest Physicians/Society of Critical Care Medicine.    PCT < 0.5 ng/mL     Systemic infection (sepsis) is not likely.    PCT >0.5 and < 2.0 ng/mL Systemic infection (sepsis) is possible, but other conditions are known to elevate PCT as well.    PCT > 2.0 ng/mL     Systemic infection (sepsis) is likely, unless other causes are known.      PCT > 10.0 ng/mL    Important systemic inflammatory response, almost exclusively due to severe bacterial sepsis or septic shock.    PCT values of < 0.5 ng/mL do not exclude an infection, because localized infections (without systemic signs) may be associated with such low concentrations, or a systemic infection in its initial stages (<6 hours).  Increased PCT can occur without infection.  PCT concentrations between 0.5 and 2.0 ng/mL should be interpreted taking into account the patients history.  It is recommended to retest PCT within 6-24 hours if any concentrations < 2.0 ng/mL are obtained.    CBC & Differential [[id number]] Collected:  12/13/17 0020    Specimen:  Blood Updated:  12/13/17 0057    Narrative:       The following orders were created for panel order CBC & Differential.  Procedure                               Abnormality         Status                     ---------                               -----------         ------                     Manual Differential[[id number]]          Abnormal            Final result               Scan Slide[[id number]]                                       Final result               CBC Auto Differential[[id number]]        Abnormal            Final result                 Please view results for these tests on the individual orders.    Comprehensive Metabolic Panel [[id number]]  (Abnormal) Collected:  12/13/17 0020    Specimen:  Blood Updated:  12/13/17 0043     Glucose 156 (H) mg/dL      BUN 9 mg/dL      Creatinine 0.66 mg/dL      Sodium 135 mmol/L      Potassium 3.5 mmol/L       Chloride 97 (L) mmol/L      CO2 25.0 mmol/L      Calcium 9.1 mg/dL      Total Protein 7.2 g/dL      Albumin 4.50 g/dL      ALT (SGPT) 41 U/L      AST (SGOT) 32 U/L      Alkaline Phosphatase 96 U/L      Total Bilirubin 0.1 mg/dL      eGFR Non African Amer 124 mL/min/1.73      Globulin 2.7 gm/dL      A/G Ratio 1.7 g/dL      BUN/Creatinine Ratio 13.6     Anion Gap 13.0 mmol/L     Protime-INR [[id number]]  (Abnormal) Collected:  12/13/17 0020    Specimen:  Blood Updated:  12/13/17 0042     Protime 12.0 Seconds      INR 0.86 (L)    aPTT [[id number]]  (Normal) Collected:  12/13/17 0020    Specimen:  Blood Updated:  12/13/17 0042     PTT 27.9 seconds     Lipase [[id number]]  (Normal) Collected:  12/13/17 0020    Specimen:  Blood Updated:  12/13/17 0043     Lipase 91 U/L     Troponin [[id number]]  (Abnormal) Collected:  12/13/17 0020    Specimen:  Blood Updated:  12/13/17 0054     Troponin I 0.044 (H) ng/mL     BNP [[id number]]  (Abnormal) Collected:  12/13/17 0020    Specimen:  Blood Updated:  12/13/17 0054     proBNP 1990.0 (H) pg/mL     D-dimer, Quantitative [[id number]]  (Abnormal) Collected:  12/13/17 0020    Specimen:  Blood Updated:  12/13/17 0042     D-Dimer, Quantitative 0.69 (H) mg/L (FEU)     Narrative:       Reference Range is 0-0.50 mg/L FEU. However, results <0.50 mg/L FEU tends to rule out DVT or PE. Results >0.50 mg/L FEU are not useful in predicting absence or presence of DVT or PE.    CBC Auto Differential [[id number]]  (Abnormal) Collected:  12/13/17 0020    Specimen:  Blood Updated:  12/13/17 0057     WBC 22.09 (H) 10*3/mm3      RBC 4.43 (L) 10*6/mm3      Hemoglobin 13.5 (L) g/dL      Hematocrit 39.0 (L) %      MCV 88.0 fL      MCH 30.5 pg      MCHC 34.6 g/dL      RDW 12.6 %      RDW-SD 40.8 fl      MPV 9.9 fL      Platelets 393 10*3/mm3     Narrative:       The previously reported component NRBC is no longer being reported.    Scan Slide [[id number]] Collected:  12/13/17 0020    Specimen:  Blood Updated:   "12/13/17 0057     Scan Slide --      See Manual Differential Results       Lactic Acid, Reflex Timer [[id number]] Collected:  12/13/17 0020    Specimen:  Blood Updated:  12/13/17 0043    Manual Differential [[id number]]  (Abnormal) Collected:  12/13/17 0020    Specimen:  Blood Updated:  12/13/17 0057     Neutrophil % 62.0 %      Lymphocyte % 27.0 %      Monocyte % 5.0 %      Eosinophil % 1.0 %      Bands %  1.0 %      Atypical Lymphocyte % 4.0 %      Neutrophils Absolute 13.92 (H) 10*3/mm3      Lymphocytes Absolute 5.96 (H) 10*3/mm3      Monocytes Absolute 1.10 10*3/mm3      Eosinophils Absolute 0.22 10*3/mm3      RBC Morphology Normal     WBC Morphology Normal     Platelet Morphology Normal    Blood Gas, Arterial [[id number]]  (Abnormal) Collected:  12/13/17 0027    Specimen:  Arterial Blood Updated:  12/13/17 0030     Site Right Radial     Glenroy's Test Positive     pH, Arterial 7.343 (L) pH units      pCO2, Arterial 43.0 mm Hg      pO2, Arterial 61.0 (L) mm Hg      HCO3, Arterial 23.3 mmol/L      Base Excess, Arterial -2.5 (L) mmol/L      O2 Saturation, Arterial 90.4 (L) %      Temperature 37.0 C      Barometric Pressure for Blood Gas 753 mmHg      Modality Nasal Cannula     Flow Rate 3.0 lpm      Ventilator Mode NA     Collected by [id number]    Influenza Antigen, Rapid - Swab, Nasopharynx [[id number]]  (Normal) Collected:  12/13/17 0128    Specimen:  Swab from Nasopharynx Updated:  12/13/17 0151     Influenza A Ag, EIA Negative     Influenza B Ag, EIA Negative    Narrative:         Recommend confirmation of negative results by viral culture or molecular assay.          EMERGENCY DEPARTMENT COURSE:   Vitals:    Vitals:    12/13/17 0031 12/13/17 0044 12/13/17 0121 12/13/17 0141   BP: 165/95 165/95  127/78   BP Location:    Left arm   Patient Position:    Sitting   Pulse: (!) 124   111   Resp: 28   20   Temp:    98.7 °F (37.1 °C)   TempSrc:    Oral   SpO2: 91%   92%   Weight:   59 kg (130 lb)    Height:   167.6 cm (66\")  "       The patient was given the following medications:  Medications   ipratropium-albuterol (DUO-NEB) nebulizer solution 3 mL (3 mL Nebulization Given 12/13/17 0022)   ipratropium-albuterol (DUO-NEB) nebulizer solution 3 mL (3 mL Nebulization Given 12/13/17 0015)   levoFLOXacin (LEVAQUIN) 750 mg/150 mL D5W (premix) (LEVAQUIN) 750 mg (0 mg Intravenous Stopped 12/13/17 0158)   methylPREDNISolone sodium succinate (SOLU-Medrol) injection 125 mg (125 mg Intravenous Given 12/13/17 0101)       ED Course       CRITICAL CARE:  CRITICAL CARE: There was a high probability of clinically significant/life threatening deterioration in this patient's condition which required my urgent intervention.  Total critical care time was 45 minutes.  This excludes any time for separately reportable procedures.         CONSULTS:  none    PROCEDURES:  None    FINAL IMPRESSION      1. Shortness of breath          DISPOSITION/PLAN   Admit to hospitalist service in much improved condition.      PATIENT REFERRED TO:  No follow-up provider specified.    DISCHARGE MEDICATIONS:     Medication List      Notice     You have not been prescribed any medications.        (Please note that portions of this note were completed with a voice recognition program.)    Idalia Wellington, DO Idalia Wellington,   12/13/17 3845

## 2017-12-13 NOTE — ED NOTES
Dr. Wellington made aware of pt screening sepsis positive. No new orders at this time.      Candis Molina RN  12/13/17 0141

## 2017-12-13 NOTE — PROGRESS NOTES
Discharge Planning Assessment  [facility]     Patient Name: Sharad Ryder  MRN: [id number]  Today's Date: 12/13/2017    Admit Date: 12/13/2017          Discharge Needs Assessment       12/13/17 1438    Living Environment    Lives With spouse    Living Arrangements house    Type of Financial/Environmental Concern none    Transportation Available car    Living Environment    Provides Primary Care For no one    Quality Of Family Relationships supportive    Able to Return to Prior Living Arrangements yes    Discharge Needs Assessment    Concerns To Be Addressed no discharge needs identified    Readmission Within The Last 30 Days no previous admission in last 30 days    Anticipated Changes Related to Illness none    Equipment Currently Used at Home nebulizer    Equipment Needed After Discharge oxygen            Discharge Plan       12/13/17 1439    Case Management/Social Work Plan    Plan Home    Additional Comments Spoke with pt to assess for home needs.  Pt lives with wife and plans same. Pt is independent, works on boat full-time.  Pt might need home O2 if ordered/qualifies.  No other needs identified. Will follow.         Discharge Placement     No information found                Demographic Summary     None            Functional Status     None            Psychosocial     None            Abuse/Neglect     None            Legal     None            Substance Abuse     None            Patient Forms     None          TERRELL Prater

## 2017-12-14 ENCOUNTER — APPOINTMENT (OUTPATIENT)
Dept: GENERAL RADIOLOGY | Facility: HOSPITAL | Age: 57
End: 2017-12-14

## 2017-12-14 ENCOUNTER — APPOINTMENT (OUTPATIENT)
Dept: CT IMAGING | Facility: HOSPITAL | Age: 57
End: 2017-12-14

## 2017-12-14 ENCOUNTER — APPOINTMENT (OUTPATIENT)
Dept: ULTRASOUND IMAGING | Facility: HOSPITAL | Age: 57
End: 2017-12-14

## 2017-12-14 PROBLEM — I10 HYPERTENSION: Status: ACTIVE | Noted: 2017-12-14

## 2017-12-14 PROBLEM — E78.5 HYPERLIPIDEMIA: Status: ACTIVE | Noted: 2017-12-14

## 2017-12-14 PROBLEM — I35.0 SEVERE AORTIC VALVE STENOSIS: Status: ACTIVE | Noted: 2017-12-14

## 2017-12-14 PROBLEM — Z72.0 TOBACCO USE: Status: ACTIVE | Noted: 2017-12-14

## 2017-12-14 LAB
ANION GAP SERPL CALCULATED.3IONS-SCNC: 9 MMOL/L (ref 4–13)
BASOPHILS # BLD AUTO: 0.01 10*3/MM3 (ref 0–0.2)
BASOPHILS NFR BLD AUTO: 0.1 % (ref 0–2)
BUN BLD-MCNC: 21 MG/DL (ref 5–21)
BUN/CREAT SERPL: 30.4 (ref 7–25)
CALCIUM SPEC-SCNC: 9.3 MG/DL (ref 8.4–10.4)
CHLORIDE SERPL-SCNC: 95 MMOL/L (ref 98–110)
CO2 SERPL-SCNC: 31 MMOL/L (ref 24–31)
CREAT BLD-MCNC: 0.69 MG/DL (ref 0.5–1.4)
DEPRECATED RDW RBC AUTO: 41.1 FL (ref 40–54)
EOSINOPHIL # BLD AUTO: 0 10*3/MM3 (ref 0–0.7)
EOSINOPHIL NFR BLD AUTO: 0 % (ref 0–4)
ERYTHROCYTE [DISTWIDTH] IN BLOOD BY AUTOMATED COUNT: 12.9 % (ref 12–15)
GFR SERPL CREATININE-BSD FRML MDRD: 118 ML/MIN/1.73
GLUCOSE BLD-MCNC: 118 MG/DL (ref 70–100)
HBA1C MFR BLD: 5.6 %
HCT VFR BLD AUTO: 35.8 % (ref 40–52)
HGB BLD-MCNC: 12.3 G/DL (ref 14–18)
IMM GRANULOCYTES # BLD: 0.08 10*3/MM3 (ref 0–0.03)
IMM GRANULOCYTES NFR BLD: 0.5 % (ref 0–5)
LYMPHOCYTES # BLD AUTO: 1.84 10*3/MM3 (ref 0.72–4.86)
LYMPHOCYTES NFR BLD AUTO: 11.1 % (ref 15–45)
MCH RBC QN AUTO: 30.2 PG (ref 28–32)
MCHC RBC AUTO-ENTMCNC: 34.4 G/DL (ref 33–36)
MCV RBC AUTO: 88 FL (ref 82–95)
MONOCYTES # BLD AUTO: 1.36 10*3/MM3 (ref 0.19–1.3)
MONOCYTES NFR BLD AUTO: 8.2 % (ref 4–12)
NEUTROPHILS # BLD AUTO: 13.28 10*3/MM3 (ref 1.87–8.4)
NEUTROPHILS NFR BLD AUTO: 80.1 % (ref 39–78)
NRBC BLD MANUAL-RTO: 0 /100 WBC (ref 0–0)
PLATELET # BLD AUTO: 359 10*3/MM3 (ref 130–400)
PMV BLD AUTO: 9.8 FL (ref 6–12)
POTASSIUM BLD-SCNC: 3.3 MMOL/L (ref 3.5–5.3)
RBC # BLD AUTO: 4.07 10*6/MM3 (ref 4.8–5.9)
SODIUM BLD-SCNC: 135 MMOL/L (ref 135–145)
WBC NRBC COR # BLD: 16.57 10*3/MM3 (ref 4.8–10.8)

## 2017-12-14 PROCEDURE — 25010000002 ENOXAPARIN PER 10 MG: Performed by: INTERNAL MEDICINE

## 2017-12-14 PROCEDURE — 99223 1ST HOSP IP/OBS HIGH 75: CPT | Performed by: NURSE PRACTITIONER

## 2017-12-14 PROCEDURE — 25010000002 FUROSEMIDE PER 20 MG: Performed by: NURSE PRACTITIONER

## 2017-12-14 PROCEDURE — 80048 BASIC METABOLIC PNL TOTAL CA: CPT | Performed by: NURSE PRACTITIONER

## 2017-12-14 PROCEDURE — 94799 UNLISTED PULMONARY SVC/PX: CPT

## 2017-12-14 PROCEDURE — 0 IOPAMIDOL PER 1 ML: Performed by: INTERNAL MEDICINE

## 2017-12-14 PROCEDURE — 85025 COMPLETE CBC W/AUTO DIFF WBC: CPT | Performed by: NURSE PRACTITIONER

## 2017-12-14 PROCEDURE — 93880 EXTRACRANIAL BILAT STUDY: CPT

## 2017-12-14 PROCEDURE — 83036 HEMOGLOBIN GLYCOSYLATED A1C: CPT | Performed by: NURSE PRACTITIONER

## 2017-12-14 PROCEDURE — 93880 EXTRACRANIAL BILAT STUDY: CPT | Performed by: SURGERY

## 2017-12-14 PROCEDURE — 25010000002 AZITHROMYCIN PER 500 MG: Performed by: NURSE PRACTITIONER

## 2017-12-14 PROCEDURE — 25010000002 CEFTRIAXONE PER 250 MG: Performed by: NURSE PRACTITIONER

## 2017-12-14 PROCEDURE — 71010 HC CHEST PA OR AP: CPT

## 2017-12-14 PROCEDURE — 94640 AIRWAY INHALATION TREATMENT: CPT

## 2017-12-14 PROCEDURE — 71275 CT ANGIOGRAPHY CHEST: CPT

## 2017-12-14 PROCEDURE — 74174 CTA ABD&PLVS W/CONTRAST: CPT

## 2017-12-14 PROCEDURE — 99232 SBSQ HOSP IP/OBS MODERATE 35: CPT | Performed by: INTERNAL MEDICINE

## 2017-12-14 RX ORDER — POTASSIUM CHLORIDE 750 MG/1
20 CAPSULE, EXTENDED RELEASE ORAL 2 TIMES DAILY WITH MEALS
Status: DISCONTINUED | OUTPATIENT
Start: 2017-12-14 | End: 2017-12-19 | Stop reason: HOSPADM

## 2017-12-14 RX ORDER — DOCUSATE SODIUM 100 MG/1
100 CAPSULE, LIQUID FILLED ORAL 2 TIMES DAILY
Status: DISCONTINUED | OUTPATIENT
Start: 2017-12-14 | End: 2017-12-18

## 2017-12-14 RX ORDER — ACETAMINOPHEN 325 MG/1
650 TABLET ORAL EVERY 6 HOURS PRN
Status: DISCONTINUED | OUTPATIENT
Start: 2017-12-14 | End: 2017-12-17 | Stop reason: SDUPTHER

## 2017-12-14 RX ORDER — POTASSIUM CHLORIDE 750 MG/1
40 CAPSULE, EXTENDED RELEASE ORAL DAILY
Status: DISCONTINUED | OUTPATIENT
Start: 2017-12-14 | End: 2017-12-19 | Stop reason: HOSPADM

## 2017-12-14 RX ORDER — CITALOPRAM 20 MG/1
20 TABLET ORAL DAILY
Status: DISCONTINUED | OUTPATIENT
Start: 2017-12-14 | End: 2017-12-18 | Stop reason: SDUPTHER

## 2017-12-14 RX ORDER — HYDROCODONE BITARTRATE AND ACETAMINOPHEN 5; 325 MG/1; MG/1
1 TABLET ORAL EVERY 4 HOURS PRN
Status: DISCONTINUED | OUTPATIENT
Start: 2017-12-14 | End: 2017-12-18 | Stop reason: SDUPTHER

## 2017-12-14 RX ORDER — FUROSEMIDE 10 MG/ML
40 INJECTION INTRAMUSCULAR; INTRAVENOUS DAILY
Status: DISCONTINUED | OUTPATIENT
Start: 2017-12-14 | End: 2017-12-18

## 2017-12-14 RX ORDER — POLYETHYLENE GLYCOL 3350 17 G/17G
17 POWDER, FOR SOLUTION ORAL DAILY
Status: DISCONTINUED | OUTPATIENT
Start: 2017-12-14 | End: 2017-12-18

## 2017-12-14 RX ORDER — CITALOPRAM 20 MG/1
20 TABLET ORAL DAILY
COMMUNITY

## 2017-12-14 RX ADMIN — HYDROCODONE BITARTRATE AND ACETAMINOPHEN 1 TABLET: 5; 325 TABLET ORAL at 20:57

## 2017-12-14 RX ADMIN — AZITHROMYCIN MONOHYDRATE 500 MG: 500 INJECTION, POWDER, LYOPHILIZED, FOR SOLUTION INTRAVENOUS at 09:36

## 2017-12-14 RX ADMIN — IPRATROPIUM BROMIDE 0.5 MG: 0.5 SOLUTION RESPIRATORY (INHALATION) at 20:20

## 2017-12-14 RX ADMIN — CITALOPRAM 20 MG: 20 TABLET, FILM COATED ORAL at 16:35

## 2017-12-14 RX ADMIN — IOPAMIDOL 150 ML: 755 INJECTION, SOLUTION INTRAVENOUS at 18:00

## 2017-12-14 RX ADMIN — DOCUSATE SODIUM 100 MG: 100 CAPSULE ORAL at 18:08

## 2017-12-14 RX ADMIN — IPRATROPIUM BROMIDE 0.5 MG: 0.5 SOLUTION RESPIRATORY (INHALATION) at 08:19

## 2017-12-14 RX ADMIN — ATORVASTATIN CALCIUM 10 MG: 10 TABLET, FILM COATED ORAL at 08:33

## 2017-12-14 RX ADMIN — POTASSIUM CHLORIDE 40 MEQ: 750 CAPSULE, EXTENDED RELEASE ORAL at 17:30

## 2017-12-14 RX ADMIN — CEFTRIAXONE SODIUM 1 G: 1 INJECTION, POWDER, FOR SOLUTION INTRAMUSCULAR; INTRAVENOUS at 13:36

## 2017-12-14 RX ADMIN — ACETAMINOPHEN 650 MG: 325 TABLET ORAL at 12:46

## 2017-12-14 RX ADMIN — FUROSEMIDE 40 MG: 10 INJECTION, SOLUTION INTRAMUSCULAR; INTRAVENOUS at 08:59

## 2017-12-14 RX ADMIN — POTASSIUM CHLORIDE 20 MEQ: 750 CAPSULE, EXTENDED RELEASE ORAL at 08:58

## 2017-12-14 RX ADMIN — IPRATROPIUM BROMIDE 0.5 MG: 0.5 SOLUTION RESPIRATORY (INHALATION) at 11:38

## 2017-12-14 RX ADMIN — IPRATROPIUM BROMIDE 0.5 MG: 0.5 SOLUTION RESPIRATORY (INHALATION) at 15:12

## 2017-12-14 RX ADMIN — ENOXAPARIN SODIUM 40 MG: 40 INJECTION SUBCUTANEOUS at 08:33

## 2017-12-14 RX ADMIN — HYDROCODONE BITARTRATE AND ACETAMINOPHEN 1 TABLET: 5; 325 TABLET ORAL at 16:35

## 2017-12-14 RX ADMIN — POTASSIUM CHLORIDE 20 MEQ: 750 CAPSULE, EXTENDED RELEASE ORAL at 18:08

## 2017-12-14 RX ADMIN — POLYETHYLENE GLYCOL (3350) 17 G: 17 POWDER, FOR SOLUTION ORAL at 08:33

## 2017-12-14 RX ADMIN — DOCUSATE SODIUM 100 MG: 100 CAPSULE ORAL at 08:33

## 2017-12-14 NOTE — CONSULTS
"Referring Provider: DANYA ORR  Reason for Consultation: Severe aortic valve stenosis    Patient Care Team:  Sharad KLEIN MD as PCP - General (Family Medicine)    Chief complaint Shortness of breath     Subjective      History of present illness:  Mr. Ryder is a 57 year old male with past medical history of hypertension, hyperlipidemia, chronic tobacco use, and chronic back pain. He sees Dr. Cline (PCP) in [place], [place] only when he is sick. 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 He is supposed to return to work tomorrow. He says he has progressively become more short of breath over the last couple months. He thinks he was told he had a cardiac murmur sometime ago. Strong family history of coronary artery disease. He also reports \"an aneurysm in my groin.\" Unsure of last follow up for this but states \"it's been a while.\" Smokes 1-1.5 packs of cigarettes per day. States he takes Talwin for back pain but this is not listed on MAR. Past surgical history includes appendectomy and left sided cranial surgery for an accident as a child.     He reports increased shortness of breath taking the trash out 2 nights ago. This prompted a visit to the emergency department and he was admitted for acute CHF exacerbation. He has required oxygen via venti mask at 50% but this has been weaned down to 2 liters nasal cannula currently. An echo was obtained and demonstrated severe aortic stenosis, bicupsid aortic valve, moderate-severe left ventricular hypertrophy with estimated ejection fraction of 70%. CT surgery has been consulted for severe aortic valve stenosis with evaluation for aortic valve replacement.     He is resting in bed now. No chest pain or shortness of breath. On Rocephin and azithromycin for upper respiratory infection. Receiving IV lasix daily. US carotids have been ordered.     History  Past Medical History:   Diagnosis Date   • Arthritis    • Hyperlipidemia    • Hypertension    , Past " "Surgical History:   Procedure Laterality Date   • FINGER SURGERY Right 1990   , History reviewed. No pertinent family history., Social History   Substance Use Topics   • Smoking status: Current Every Day Smoker     Packs/day: 1.50     Types: Cigarettes   • Smokeless tobacco: None   • Alcohol use No   , Prescriptions Prior to Admission   Medication Sig Dispense Refill Last Dose   • atorvastatin (LIPITOR) 10 MG tablet Take 10 mg by mouth Daily.      • chlorthalidone (HYGROTON) 25 MG tablet Take 25 mg by mouth Daily.      • lisinopril (PRINIVIL,ZESTRIL) 20 MG tablet Take 20 mg by mouth Daily.      , Allergies: Review of patient's allergies indicates no known allergies.    Review of Systems  Review of Systems   Constitutional: Positive for activity change and fatigue. Negative for appetite change, chills, diaphoresis and fever.   HENT: Negative for sore throat, trouble swallowing and voice change.         Full set of dentures   Respiratory: Positive for shortness of breath. Negative for cough and wheezing.    Cardiovascular: Positive for chest pain. Negative for palpitations and leg swelling.   Gastrointestinal: Negative for abdominal distention, abdominal pain, blood in stool, constipation, diarrhea and vomiting.   Endocrine: Negative for cold intolerance and heat intolerance.   Musculoskeletal: Positive for back pain. Negative for gait problem.   Skin: Negative for color change, pallor, rash and wound.   Neurological: Positive for dizziness. Negative for seizures, syncope, facial asymmetry, speech difficulty, weakness and headaches.   Hematological: Negative for adenopathy. Does not bruise/bleed easily.   Psychiatric/Behavioral: The patient is nervous/anxious.         Objective     Vital Signs   Visit Vitals   • /66   • Pulse 100   • Temp 97.9 °F (36.6 °C)   • Resp 18   • Ht 167.6 cm (66\")   • Wt 55.2 kg (121 lb 9.6 oz)   • SpO2 95%   • BMI 19.63 kg/m2       Physical Exam   Constitutional: He is oriented to " person, place, and time. He is active and cooperative.  Non-toxic appearance. No distress. Nasal cannula in place.   HENT:   Head: Normocephalic.   Eyes: Pupils are equal, round, and reactive to light.   Neck: Normal range of motion.   Cardiovascular: Normal rate, regular rhythm and intact distal pulses.    Murmur heard.   Systolic murmur is present with a grade of 3/6   Pulmonary/Chest: Effort normal and breath sounds normal. No accessory muscle usage or stridor. No respiratory distress.   Abdominal: Soft. He exhibits no distension. There is no tenderness. There is no rebound and no guarding.   Musculoskeletal: Normal range of motion. He exhibits no edema.   Neurological: He is alert and oriented to person, place, and time.   Skin: Skin is warm and dry. He is not diaphoretic.   Psychiatric: He has a normal mood and affect. His behavior is normal.   Nursing note and vitals reviewed.    LAB:   CBC:  Results from last 7 days  Lab Units 12/14/17  0532 12/13/17  0020   WBC 10*3/mm3 16.57* 22.09*   HEMATOCRIT % 35.8* 39.0*   PLATELETS 10*3/mm3 359 393          BMP:)  Results from last 7 days  Lab Units 12/14/17  0532 12/13/17  0517 12/13/17  0020   SODIUM mmol/L 135 135 135   POTASSIUM mmol/L 3.3* 3.4* 3.5   CHLORIDE mmol/L 95* 98 97*   CO2 mmol/L 31.0 24.0 25.0   GLUCOSE mg/dL 118* 151* 156*   BUN mg/dL 21 9 9   CREATININE mg/dL 0.69 0.56 0.66           COAG:  Results from last 7 days  Lab Units 12/13/17  0020   INR  0.86*   APTT seconds 27.9           IMAGES:       Imaging Results (last 24 hours)     Procedure Component Value Units Date/Time    XR Chest 1 View [[id number]] Collected:  12/14/17 0739     Updated:  12/14/17 0744    Narrative:       History:  57-year-old evaluated for pulmonary edema.      Reference:  Chest radiograph one day prior.     Findings:  Frontal chest radiograph performed.     Heart and mediastinum unchanged. Atherosclerotic thoracic aorta.  Background emphysema. Right basilar opacities remain. No  new or  increasing opacity. No pleural effusion or pneumothorax. No acute  osseous finding.       Impression:       Impression:  Persistent opacities at the right base suspicious for pneumonia.  Emphysema.  This report was finalized on 12/14/2017 07:41 by  Abel Suarez, .                   Assessment/Plan        Active Problems:    Shortness of breath  Severe aortic valve stenosis  Acute hypoxic respiratory failure  History of hypertension  Hyperlipidemia  History of anxiety  Chronic back pain  Leukocytosis  Chronic tobacco use    I discussed the patients findings and my recommendations with the patient.   We discussed the options for treatment of aortic valvular disease to include medical therapy, surgical aortic valve replacement, balloon valvuloplasty, and percutaneous aortic valve replacement. We discussed the pros and cons of each option and how it pertains to the current case. The best option given patient's findings and risk factors is probably surgical valve replacement, however additional workup is required. He will need cardiac catheterization. Carotid ultrasound has been ordered. Probably the best course of action is to proceed with obtaining a CT scan the chest and pulmonary function tests when medically optimized.       We discussed the operative conduct and expected hospital and outpatient recovery for SAV[facility]. Risks were briefly discussed to include but not limited to bleeding, infection,heart attack, need for additional procedures, anesthesia risk, organ dysfunction and/or death, prolonged mechanical ventilation, prolonged ICU stay, chronic pain syndromes, sternal nonunion, need for pacemaker, and/or death. We discussed valvular prosthetic options and that a tissue valve be recommended. The valvular prosthetic options and each's pros/cons were discussed. He has full set of dentures.     We discussed the above and the patient is very nervous. Attempted to provide support. Discussed that we probably need to  obtain medical records from Dr. Cline office regarding his aneurysm. We discussed that this may require additional workup. His wife is not present. Will follow along.     Nely Rodriguez, APRN  12/14/17  11:19 AM

## 2017-12-14 NOTE — PLAN OF CARE
Problem: Patient Care Overview (Adult)  Goal: Plan of Care Review  Outcome: Ongoing (interventions implemented as appropriate)    12/14/17 0331   Coping/Psychosocial Response Interventions   Plan Of Care Reviewed With patient   Patient Care Overview   Progress no change   Outcome Evaluation   Outcome Summary/Follow up Plan Patient denies any CP, or SOA at rest. O2 at 2L, O2 sats in mid 90s. NSR on tele. BP low throughout the night. Will continue to monitor.         Problem: COPD, Chronic Bronchitis/Emphysema (Adult)  Goal: Signs and Symptoms of Listed Potential Problems Will be Absent or Manageable (COPD, Chronic Bronchitis/Emphysema)  Outcome: Ongoing (interventions implemented as appropriate)    Problem: Infection, Risk/Actual (Adult)  Goal: Identify Related Risk Factors and Signs and Symptoms  Outcome: Ongoing (interventions implemented as appropriate)  Goal: Infection Prevention/Resolution  Outcome: Ongoing (interventions implemented as appropriate)

## 2017-12-14 NOTE — PLAN OF CARE
Problem: Patient Care Overview (Adult)  Goal: Plan of Care Review  Outcome: Ongoing (interventions implemented as appropriate)    12/13/17 1479   Coping/Psychosocial Response Interventions   Plan Of Care Reviewed With patient   Patient Care Overview   Progress improving   Outcome Evaluation   Outcome Summary/Follow up Plan Pt denies any CP, denies any SOA at rest and slight SOA with exertion. Denies any pain. O2 at 2LNC with Sats stable. WIll conitnue to monitor.          Problem: COPD, Chronic Bronchitis/Emphysema (Adult)  Goal: Signs and Symptoms of Listed Potential Problems Will be Absent or Manageable (COPD, Chronic Bronchitis/Emphysema)  Outcome: Ongoing (interventions implemented as appropriate)

## 2017-12-14 NOTE — PLAN OF CARE
Problem: Patient Care Overview (Adult)  Goal: Plan of Care Review  Outcome: Ongoing (interventions implemented as appropriate)    12/14/17 2079   Coping/Psychosocial Response Interventions   Plan Of Care Reviewed With patient   Patient Care Overview   Progress no change   Outcome Evaluation   Outcome Summary/Follow up Plan planning for heart cath on monday. us of carotids today. c/o left groin pain unrelieved with tylenol. vss. will continue to monitor         Problem: COPD, Chronic Bronchitis/Emphysema (Adult)  Goal: Signs and Symptoms of Listed Potential Problems Will be Absent or Manageable (COPD, Chronic Bronchitis/Emphysema)  Outcome: Ongoing (interventions implemented as appropriate)    Problem: Infection, Risk/Actual (Adult)  Goal: Infection Prevention/Resolution  Outcome: Ongoing (interventions implemented as appropriate)

## 2017-12-14 NOTE — NURSING NOTE
"Pt stated that he saw Dr. Wilkes in [place], [place]. Pt states he has an \"aneurysm\" and points to his groin area. I called the office and they stated that his records were in storage and she would obtain them and fax them. She stated that it may be next week. I also called Dr. Cline' office; they faxed records and I placed them in medical records cabinet to be scanned in.   "

## 2017-12-14 NOTE — PROGRESS NOTES
[facility]  INPATIENT PROGRESS NOTE    Length of Stay: 1  Date of Admission: 12/13/2017  Primary Care Physician: Sharad Cline MD    Subjective   Chief Complaint: follow up soa and chest pain     HPI   Currently sitting up in bed on 2 lpm nasal cannula watching TV.  Discussed his ECHO results with him and the need to be seen by CTS.  He understands and wishes to proceed to seeing what his options are.  Discussed surgical intervention briefly so that he wouldn't be in such shock when talking with the surgeon. Denies any shortness of breath or chest pain currently.  He was able to lie flat during the night without any difficulty.      Review of Systems   Constitutional: Positive for activity change and fatigue. Negative for appetite change, chills and fever.   HENT: Negative for hearing loss, nosebleeds, tinnitus and trouble swallowing.    Eyes: Negative for visual disturbance.   Respiratory: Positive for cough and shortness of breath (intermittent). Negative for chest tightness and wheezing.    Cardiovascular: Negative for chest pain, palpitations and leg swelling.   Gastrointestinal: Negative for abdominal distention, abdominal pain, blood in stool, constipation, diarrhea, nausea and vomiting.   Endocrine: Negative for cold intolerance, heat intolerance, polydipsia, polyphagia and polyuria.   Genitourinary: Negative for decreased urine volume, difficulty urinating, dysuria, flank pain, frequency and hematuria.   Musculoskeletal: Negative for arthralgias, joint swelling and myalgias.   Skin: Negative for rash.   Allergic/Immunologic: Negative for immunocompromised state.   Neurological: Positive for weakness. Negative for dizziness, syncope, light-headedness and headaches.   Hematological: Negative for adenopathy. Does not bruise/bleed easily.   Psychiatric/Behavioral: Negative for confusion and sleep disturbance. The patient is not nervous/anxious.       All  pertinent negatives and positives are as above. All other systems have been reviewed and are negative unless otherwise stated.     Objective    Temp:  [98 °F (36.7 °C)-99.3 °F (37.4 °C)] 98.1 °F (36.7 °C)  Heart Rate:  [] 76  Resp:  [16-20] 16  BP: ()/(46-76) 90/57     Physical Exam   Constitutional: He is oriented to person, place, and time. He appears well-developed and well-nourished.   Thin frame    HENT:   Head: Normocephalic and atraumatic.   Eyes: Conjunctivae and EOM are normal. Pupils are equal, round, and reactive to light.   Neck: Neck supple. No JVD present. No thyromegaly present.   Cardiovascular: Normal rate, regular rhythm and intact distal pulses.  Exam reveals no gallop and no friction rub.    Murmur heard.   Systolic murmur is present with a grade of 3/6   Sinus 74-97   Pulmonary/Chest: Effort normal and breath sounds normal. No respiratory distress. He has no wheezes. He has no rales. He exhibits no tenderness.   Diminished bilateral bases. Currently on 2 lpm nasal cannula.    Abdominal: Soft. Bowel sounds are normal. He exhibits no distension. There is no tenderness. There is no rebound and no guarding.   Musculoskeletal: Normal range of motion. He exhibits no edema, tenderness or deformity.   Lymphadenopathy:     He has no cervical adenopathy.   Neurological: He is alert and oriented to person, place, and time. He displays normal reflexes. No cranial nerve deficit. He exhibits normal muscle tone.   Skin: Skin is warm and dry. No rash noted.   Psychiatric: He has a normal mood and affect. His behavior is normal. Judgment and thought content normal.   Vitals reviewed.    Results Review:  I have reviewed the labs, radiology results, and diagnostic studies.    Laboratory Data:     Results from last 7 days  Lab Units 12/14/17  0532 12/13/17  0020   WBC 10*3/mm3 16.57* 22.09*   HEMOGLOBIN g/dL 12.3* 13.5*   HEMATOCRIT % 35.8* 39.0*   PLATELETS 10*3/mm3 359 393       Results from last 7  days  Lab Units 17  0532 17  0517 17  0020   SODIUM mmol/L 135 135 135   POTASSIUM mmol/L 3.3* 3.4* 3.5   CHLORIDE mmol/L 95* 98 97*   CO2 mmol/L 31.0 24.0 25.0   BUN mg/dL 21 9 9   CREATININE mg/dL 0.69 0.56 0.66   CALCIUM mg/dL 9.3 9.2 9.1   BILIRUBIN mg/dL  --  0.3 0.1   ALK PHOS U/L  --  82 96   ALT (SGPT) U/L  --  43 41   AST (SGOT) U/L  --  38 32   GLUCOSE mg/dL 118* 151* 156*     Culture Data:   Blood Culture   Date Value Ref Range Status   2017 No growth at 24 hours  Preliminary   2017 No growth at 24 hours  Preliminary     Radiology Data:   Imaging Results (last 24 hours)     Procedure Component Value Units Date/Time    XR Chest 1 View [[id number]] Collected:  17     Updated:  17    Narrative:       History:  57-year-old evaluated for pulmonary edema.      Reference:  Chest radiograph one day prior.     Findings:  Frontal chest radiograph performed.     Heart and mediastinum unchanged. Atherosclerotic thoracic aorta.  Background emphysema. Right basilar opacities remain. No new or  increasing opacity. No pleural effusion or pneumothorax. No acute  osseous finding.       Impression:       Impression:  Persistent opacities at the right base suspicious for pneumonia.  Emphysema.  This report was finalized on 2017 07:41 by  Abel Suarez .        Sharadtana Ryder   Echocardiogram   Order# [id number]    Reading physician: Son Hunter MD   Ordering physician: KIRBY Richards   Study date: 17         Patient Information      Patient Name MRN Sex  (Age)     Sharad Ryder [id number] Male 1960 (57 y.o.)       Admission Information      Admission Date/Time Discharge Date/Time Room/Bed     17  0010  495/1       Sedation Narrator Report      Sedation Narrator Report       Interpretation Summary      · Left ventricular wall thickness is consistent with moderate-to-severe concentric hypertrophy.  · Left ventricular systolic function is normal.  Estimated EF = 70%.  · Left ventricular diastolic dysfunction (grade I a) consistent with impaired relaxation.  · Left atrial cavity size is mildly dilated.  · calcification of the aortic valve  · Severe aortic valve stenosis is present.  · The AV appears to be bicuspid     I have reviewed the patient current medications.     Assessment/Plan     Assessment:  1. Severe aortic stenosis  2. Acute hypoxic respiratory failure, Spo2 88% on 40% venti mask  3. Pulmonary edema, secondary to severe aortic stenosis  4. Chest pain with mildly elevated troponin  5. Diastolic dysfunction, impaired relaxation, preserved EF 70%  6. Essential hypertension  7. Hyperlipidemia  8. Leukocytosis, improving  9. Hypokalemia, mild  10. Ongoing tobacco abuse    Plan:  1. AM CXR waiting formal read  2. Day #2 Rocephin 2/7 and Azithromycin 2/5  3. Replace K+, start oral replacement with 20 mEq BID.  4. Continue Lasix, 40 mg daily   5. Will need CTS consult, will defer to Cardiology on timing, before or after heart catheterization, etc.  6. CBC and BMP in AM  7. Continue Lovenox 40 mg SC every 24 hours for DVT prophylaxis  8. Continue oxygen therapy to keep saturations > 90%  9. Add colace and miralax  10. As needed tylenol  11. Lipid panel in am  12. Check hemoglobin A1c  13. Ambulate TID    Discharge Planning: I expect the patient to be discharged to home in ? days depending on course of action with severe aortic stenosis and valve replacement.    KIRBY Greer   12/14/17   7:59 AM  I personally evaluated and examined the patient in conjunction with KIRBY Vides and agree with the assessment, treatment plan, and disposition of the patient as recorded by her. My history, exam, and further recommendations are:     Doing better  Now in room air  CT surgery had seen him and  requested further w/u PFT, CT of chest  I have not seen cardiologist's note yet for today    Blood pressure 108 71, heart rate 95, respiratory rate 18, temperature  90.7, oxygen saturation is 95% in room air  Lungs are clear to auscultation  Positive murmur of aortic stenosis  Carotid ultrasound report still pending  Follow-up chest x-ray reported to have persistent opacities at the right base suspicious for pneumonia although clinically patient does behave as such.  Blood cultures remains no growth  I will continue to supplemental him with potassium given the fact that she he remains on Lasix  It is prudent to continue on antibiotic/s   Hamlet Quinteros MD  12/14/17  4:48 PM

## 2017-12-15 ENCOUNTER — APPOINTMENT (OUTPATIENT)
Dept: CT IMAGING | Facility: HOSPITAL | Age: 57
End: 2017-12-15

## 2017-12-15 LAB
ANION GAP SERPL CALCULATED.3IONS-SCNC: 8 MMOL/L (ref 4–13)
ARTICHOKE IGE QN: 104 MG/DL (ref 0–99)
BASOPHILS # BLD AUTO: 0.03 10*3/MM3 (ref 0–0.2)
BASOPHILS NFR BLD AUTO: 0.3 % (ref 0–2)
BUN BLD-MCNC: 14 MG/DL (ref 5–21)
BUN/CREAT SERPL: 21.9 (ref 7–25)
CALCIUM SPEC-SCNC: 9.2 MG/DL (ref 8.4–10.4)
CHLORIDE SERPL-SCNC: 97 MMOL/L (ref 98–110)
CHOLEST SERPL-MCNC: 159 MG/DL (ref 130–200)
CO2 SERPL-SCNC: 29 MMOL/L (ref 24–31)
CREAT BLD-MCNC: 0.64 MG/DL (ref 0.5–1.4)
DEPRECATED RDW RBC AUTO: 41.1 FL (ref 40–54)
EOSINOPHIL # BLD AUTO: 0.07 10*3/MM3 (ref 0–0.7)
EOSINOPHIL NFR BLD AUTO: 0.7 % (ref 0–4)
ERYTHROCYTE [DISTWIDTH] IN BLOOD BY AUTOMATED COUNT: 12.9 % (ref 12–15)
GFR SERPL CREATININE-BSD FRML MDRD: 129 ML/MIN/1.73
GLUCOSE BLD-MCNC: 96 MG/DL (ref 70–100)
HCT VFR BLD AUTO: 36.7 % (ref 40–52)
HDLC SERPL-MCNC: 40 MG/DL
HGB BLD-MCNC: 13 G/DL (ref 14–18)
IMM GRANULOCYTES # BLD: 0.02 10*3/MM3 (ref 0–0.03)
IMM GRANULOCYTES NFR BLD: 0.2 % (ref 0–5)
LDLC/HDLC SERPL: 1.94 {RATIO}
LYMPHOCYTES # BLD AUTO: 3.82 10*3/MM3 (ref 0.72–4.86)
LYMPHOCYTES NFR BLD AUTO: 38.5 % (ref 15–45)
MCH RBC QN AUTO: 31 PG (ref 28–32)
MCHC RBC AUTO-ENTMCNC: 35.4 G/DL (ref 33–36)
MCV RBC AUTO: 87.6 FL (ref 82–95)
MONOCYTES # BLD AUTO: 1.08 10*3/MM3 (ref 0.19–1.3)
MONOCYTES NFR BLD AUTO: 10.9 % (ref 4–12)
NEUTROPHILS # BLD AUTO: 4.9 10*3/MM3 (ref 1.87–8.4)
NEUTROPHILS NFR BLD AUTO: 49.4 % (ref 39–78)
NRBC BLD MANUAL-RTO: 0 /100 WBC (ref 0–0)
PLATELET # BLD AUTO: 346 10*3/MM3 (ref 130–400)
PMV BLD AUTO: 9.7 FL (ref 6–12)
POTASSIUM BLD-SCNC: 3.8 MMOL/L (ref 3.5–5.3)
RBC # BLD AUTO: 4.19 10*6/MM3 (ref 4.8–5.9)
SODIUM BLD-SCNC: 134 MMOL/L (ref 135–145)
TRIGL SERPL-MCNC: 207 MG/DL (ref 0–149)
WBC NRBC COR # BLD: 9.92 10*3/MM3 (ref 4.8–10.8)

## 2017-12-15 PROCEDURE — 80061 LIPID PANEL: CPT | Performed by: NURSE PRACTITIONER

## 2017-12-15 PROCEDURE — 25010000002 AZITHROMYCIN PER 500 MG: Performed by: NURSE PRACTITIONER

## 2017-12-15 PROCEDURE — 94799 UNLISTED PULMONARY SVC/PX: CPT

## 2017-12-15 PROCEDURE — 0 IOPAMIDOL PER 1 ML: Performed by: INTERNAL MEDICINE

## 2017-12-15 PROCEDURE — 99231 SBSQ HOSP IP/OBS SF/LOW 25: CPT | Performed by: NURSE PRACTITIONER

## 2017-12-15 PROCEDURE — 25010000002 FUROSEMIDE PER 20 MG: Performed by: NURSE PRACTITIONER

## 2017-12-15 PROCEDURE — 25010000002 ENOXAPARIN PER 10 MG: Performed by: INTERNAL MEDICINE

## 2017-12-15 PROCEDURE — 25010000002 CEFTRIAXONE PER 250 MG: Performed by: NURSE PRACTITIONER

## 2017-12-15 PROCEDURE — 99233 SBSQ HOSP IP/OBS HIGH 50: CPT | Performed by: INTERNAL MEDICINE

## 2017-12-15 PROCEDURE — 70498 CT ANGIOGRAPHY NECK: CPT

## 2017-12-15 PROCEDURE — 80048 BASIC METABOLIC PNL TOTAL CA: CPT | Performed by: NURSE PRACTITIONER

## 2017-12-15 PROCEDURE — 85025 COMPLETE CBC W/AUTO DIFF WBC: CPT | Performed by: NURSE PRACTITIONER

## 2017-12-15 RX ORDER — ATORVASTATIN CALCIUM 40 MG/1
80 TABLET, FILM COATED ORAL DAILY
Status: DISCONTINUED | OUTPATIENT
Start: 2017-12-16 | End: 2017-12-18

## 2017-12-15 RX ADMIN — POTASSIUM CHLORIDE 20 MEQ: 750 CAPSULE, EXTENDED RELEASE ORAL at 08:18

## 2017-12-15 RX ADMIN — CEFTRIAXONE SODIUM 1 G: 1 INJECTION, POWDER, FOR SOLUTION INTRAMUSCULAR; INTRAVENOUS at 13:11

## 2017-12-15 RX ADMIN — IPRATROPIUM BROMIDE 0.5 MG: 0.5 SOLUTION RESPIRATORY (INHALATION) at 12:00

## 2017-12-15 RX ADMIN — IPRATROPIUM BROMIDE 0.5 MG: 0.5 SOLUTION RESPIRATORY (INHALATION) at 08:03

## 2017-12-15 RX ADMIN — IOPAMIDOL 100 ML: 755 INJECTION, SOLUTION INTRAVENOUS at 11:45

## 2017-12-15 RX ADMIN — CITALOPRAM 20 MG: 20 TABLET, FILM COATED ORAL at 08:19

## 2017-12-15 RX ADMIN — FUROSEMIDE 40 MG: 10 INJECTION, SOLUTION INTRAMUSCULAR; INTRAVENOUS at 08:19

## 2017-12-15 RX ADMIN — IPRATROPIUM BROMIDE 0.5 MG: 0.5 SOLUTION RESPIRATORY (INHALATION) at 16:14

## 2017-12-15 RX ADMIN — HYDROCODONE BITARTRATE AND ACETAMINOPHEN 1 TABLET: 5; 325 TABLET ORAL at 10:02

## 2017-12-15 RX ADMIN — ENOXAPARIN SODIUM 40 MG: 40 INJECTION SUBCUTANEOUS at 08:19

## 2017-12-15 RX ADMIN — IPRATROPIUM BROMIDE 0.5 MG: 0.5 SOLUTION RESPIRATORY (INHALATION) at 19:50

## 2017-12-15 RX ADMIN — DOCUSATE SODIUM 100 MG: 100 CAPSULE ORAL at 17:37

## 2017-12-15 RX ADMIN — POTASSIUM CHLORIDE 40 MEQ: 750 CAPSULE, EXTENDED RELEASE ORAL at 08:19

## 2017-12-15 RX ADMIN — ATORVASTATIN CALCIUM 10 MG: 10 TABLET, FILM COATED ORAL at 08:19

## 2017-12-15 RX ADMIN — HYDROCODONE BITARTRATE AND ACETAMINOPHEN 1 TABLET: 5; 325 TABLET ORAL at 16:32

## 2017-12-15 RX ADMIN — POTASSIUM CHLORIDE 20 MEQ: 750 CAPSULE, EXTENDED RELEASE ORAL at 17:37

## 2017-12-15 RX ADMIN — AZITHROMYCIN MONOHYDRATE 500 MG: 500 INJECTION, POWDER, LYOPHILIZED, FOR SOLUTION INTRAVENOUS at 09:53

## 2017-12-15 NOTE — PROGRESS NOTES
"[facility] -  Progress Note     LOS: 2 days   Patient Care Team:  Sharad KLEIN MD as PCP - General (Family Medicine)    Chief Complaint: shortness of breath    Subjective     Interval History: sitting up in bed without oxygen, finishing breathing treatment. Denies chest pain and breathing has improved. Denies orthopnea, requiring nasal cannula at rest.     Patient Complaints: \"my back hurts\"      Review of Systems:     Review of Systems   Constitutional: Negative for activity change, appetite change, fatigue and fever.   Respiratory: Positive for cough (productive) and shortness of breath. Negative for chest tightness and wheezing.    Cardiovascular: Negative for chest pain, palpitations and leg swelling.   Genitourinary: Negative for difficulty urinating and dysuria.   Musculoskeletal: Positive for back pain.   Skin: Negative for color change and pallor.   Neurological: Negative for dizziness, seizures, syncope, speech difficulty, weakness and headaches.   Psychiatric/Behavioral: Negative for agitation and behavioral problems.   All other systems reviewed and are negative.    Objective     Vital Sign Min/Max for last 24 hours  Temp  Min: 97 °F (36.1 °C)  Max: 99.7 °F (37.6 °C)   BP  Min: 98/59  Max: 108/58   Pulse  Min: 74  Max: 95   Resp  Min: 16  Max: 18   SpO2  Min: 88 %  Max: 100 %   Flow (L/min)  Min: 2  Max: 2   Weight  Min: 55.8 kg (123 lb)  Max: 55.8 kg (123 lb)     Last Weight    12/14/17  2000   Weight: 55.8 kg (123 lb)       Physical Exam:    Physical Exam   Constitutional: He is oriented to person, place, and time. He appears well-developed and well-nourished. No distress.   HENT:   Head: Normocephalic and atraumatic.   Right Ear: External ear normal.   Left Ear: External ear normal.   Nose: Nose normal.   Mouth/Throat: Oropharynx is clear and moist. No oropharyngeal exudate.   Eyes: Conjunctivae and EOM are normal. Pupils are equal, round, and reactive to light. No scleral " icterus.   Neck: Normal range of motion. Neck supple. Thyromegaly present.   Cardiovascular: Normal rate and regular rhythm.  Exam reveals no gallop and no friction rub.    Murmur heard.  Pulmonary/Chest: Effort normal. He has decreased breath sounds. He has no wheezes. He has no rales.   Abdominal: Soft. Normal appearance and bowel sounds are normal. He exhibits no distension. There is no tenderness.   Musculoskeletal: Normal range of motion. He exhibits no edema.   Neurological: He is alert and oriented to person, place, and time.   Skin: Skin is warm, dry and intact. No rash noted. He is not diaphoretic. No erythema. No pallor.   Psychiatric: He has a normal mood and affect. His behavior is normal.   Vitals reviewed.    Results Review:   Lab Results (last 72 hours)     Procedure Component Value Units Date/Time    Blood Gas, Arterial [[id number]]  (Abnormal) Collected:  12/13/17 0027    Specimen:  Arterial Blood Updated:  12/13/17 0030     Site Right Radial     Glenroy's Test Positive     pH, Arterial 7.343 (L) pH units      pCO2, Arterial 43.0 mm Hg      pO2, Arterial 61.0 (L) mm Hg      HCO3, Arterial 23.3 mmol/L      Base Excess, Arterial -2.5 (L) mmol/L      O2 Saturation, Arterial 90.4 (L) %      Temperature 37.0 C      Barometric Pressure for Blood Gas 753 mmHg      Modality Nasal Cannula     Flow Rate 3.0 lpm      Ventilator Mode NA     Collected by [id number]    Protime-INR [[id number]]  (Abnormal) Collected:  12/13/17 0020    Specimen:  Blood Updated:  12/13/17 0042     Protime 12.0 Seconds      INR 0.86 (L)    aPTT [[id number]]  (Normal) Collected:  12/13/17 0020    Specimen:  Blood Updated:  12/13/17 0042     PTT 27.9 seconds     D-dimer, Quantitative [[id number]]  (Abnormal) Collected:  12/13/17 0020    Specimen:  Blood Updated:  12/13/17 0042     D-Dimer, Quantitative 0.69 (H) mg/L (FEU)     Narrative:       Reference Range is 0-0.50 mg/L FEU. However, results <0.50 mg/L FEU tends to rule out DVT or PE.  Results >0.50 mg/L FEU are not useful in predicting absence or presence of DVT or PE.    Comprehensive Metabolic Panel [[id number]]  (Abnormal) Collected:  12/13/17 0020    Specimen:  Blood Updated:  12/13/17 0043     Glucose 156 (H) mg/dL      BUN 9 mg/dL      Creatinine 0.66 mg/dL      Sodium 135 mmol/L      Potassium 3.5 mmol/L      Chloride 97 (L) mmol/L      CO2 25.0 mmol/L      Calcium 9.1 mg/dL      Total Protein 7.2 g/dL      Albumin 4.50 g/dL      ALT (SGPT) 41 U/L      AST (SGOT) 32 U/L      Alkaline Phosphatase 96 U/L      Total Bilirubin 0.1 mg/dL      eGFR Non African Amer 124 mL/min/1.73      Globulin 2.7 gm/dL      A/G Ratio 1.7 g/dL      BUN/Creatinine Ratio 13.6     Anion Gap 13.0 mmol/L     Lipase [[id number]]  (Normal) Collected:  12/13/17 0020    Specimen:  Blood Updated:  12/13/17 0043     Lipase 91 U/L     Lactic Acid, Plasma [[id number]]  (Abnormal) Collected:  12/13/17 0020    Specimen:  Blood Updated:  12/13/17 0043     Lactate 2.3 (C) mmol/L     Troponin [[id number]]  (Abnormal) Collected:  12/13/17 0020    Specimen:  Blood Updated:  12/13/17 0054     Troponin I 0.044 (H) ng/mL     BNP [[id number]]  (Abnormal) Collected:  12/13/17 0020    Specimen:  Blood Updated:  12/13/17 0054     proBNP 1990.0 (H) pg/mL     CBC Auto Differential [[id number]]  (Abnormal) Collected:  12/13/17 0020    Specimen:  Blood Updated:  12/13/17 0057     WBC 22.09 (H) 10*3/mm3      RBC 4.43 (L) 10*6/mm3      Hemoglobin 13.5 (L) g/dL      Hematocrit 39.0 (L) %      MCV 88.0 fL      MCH 30.5 pg      MCHC 34.6 g/dL      RDW 12.6 %      RDW-SD 40.8 fl      MPV 9.9 fL      Platelets 393 10*3/mm3     Narrative:       The previously reported component NRBC is no longer being reported.    Scan Slide [[id number]] Collected:  12/13/17 0020    Specimen:  Blood Updated:  12/13/17 0057     Scan Slide --      See Manual Differential Results       CBC & Differential [[id number]] Collected:  12/13/17 0020    Specimen:  Blood Updated:   12/13/17 0057    Narrative:       The following orders were created for panel order CBC & Differential.  Procedure                               Abnormality         Status                     ---------                               -----------         ------                     Manual Differential[[id number]]          Abnormal            Final result               Scan Slide[[id number]]                                       Final result               CBC Auto Differential[[id number]]        Abnormal            Final result                 Please view results for these tests on the individual orders.    Manual Differential [[id number]]  (Abnormal) Collected:  12/13/17 0020    Specimen:  Blood Updated:  12/13/17 0057     Neutrophil % 62.0 %      Lymphocyte % 27.0 %      Monocyte % 5.0 %      Eosinophil % 1.0 %      Bands %  1.0 %      Atypical Lymphocyte % 4.0 %      Neutrophils Absolute 13.92 (H) 10*3/mm3      Lymphocytes Absolute 5.96 (H) 10*3/mm3      Monocytes Absolute 1.10 10*3/mm3      Eosinophils Absolute 0.22 10*3/mm3      RBC Morphology Normal     WBC Morphology Normal     Platelet Morphology Normal    Procalcitonin [[id number]]  (Normal) Collected:  12/13/17 0020    Specimen:  Blood Updated:  12/13/17 0100     Procalcitonin <0.25 ng/mL     Narrative:       SIRS, sepsis, severe sepsis, and septic shock are categorized according to the criteria of the consensus conference of the American College of Chest Physicians/Society of Critical Care Medicine.    PCT < 0.5 ng/mL     Systemic infection (sepsis) is not likely.    PCT >0.5 and < 2.0 ng/mL Systemic infection (sepsis) is possible, but other conditions are known to elevate PCT as well.    PCT > 2.0 ng/mL     Systemic infection (sepsis) is likely, unless other causes are known.      PCT > 10.0 ng/mL    Important systemic inflammatory response, almost exclusively due to severe bacterial sepsis or septic shock.    PCT values of < 0.5 ng/mL do not exclude an infection,  because localized infections (without systemic signs) may be associated with such low concentrations, or a systemic infection in its initial stages (<6 hours).  Increased PCT can occur without infection.  PCT concentrations between 0.5 and 2.0 ng/mL should be interpreted taking into account the patients history.  It is recommended to retest PCT within 6-24 hours if any concentrations < 2.0 ng/mL are obtained.    Influenza Antigen, Rapid - Swab, Nasopharynx [[id number]]  (Normal) Collected:  12/13/17 0128    Specimen:  Swab from Nasopharynx Updated:  12/13/17 0151     Influenza A Ag, EIA Negative     Influenza B Ag, EIA Negative    Narrative:         Recommend confirmation of negative results by viral culture or molecular assay.    Lactic Acid, Reflex Timer [[id number]] Collected:  12/13/17 0020    Specimen:  Blood Updated:  12/13/17 0346     Extra Tube Hold for add-ons.      Auto resulted.       Lactic Acid, Reflex [[id number]]  (Abnormal) Collected:  12/13/17 0437    Specimen:  Blood Updated:  12/13/17 0457     Lactate 2.2 (C) mmol/L     Magnesium [[id number]]  (Normal) Collected:  12/13/17 0517    Specimen:  Blood Updated:  12/13/17 0535     Magnesium 1.4 mg/dL     Phosphorus [[id number]]  (Normal) Collected:  12/13/17 0517    Specimen:  Blood Updated:  12/13/17 0535     Phosphorus 3.1 mg/dL     Comprehensive Metabolic Panel [[id number]]  (Abnormal) Collected:  12/13/17 0517    Specimen:  Blood Updated:  12/13/17 0535     Glucose 151 (H) mg/dL      BUN 9 mg/dL      Creatinine 0.56 mg/dL      Sodium 135 mmol/L      Potassium 3.4 (L) mmol/L      Chloride 98 mmol/L      CO2 24.0 mmol/L      Calcium 9.2 mg/dL      Total Protein 7.5 g/dL      Albumin 4.70 g/dL      ALT (SGPT) 43 U/L      AST (SGOT) 38 U/L      Alkaline Phosphatase 82 U/L      Total Bilirubin 0.3 mg/dL      eGFR Non African Amer 150 mL/min/1.73      Globulin 2.8 gm/dL      A/G Ratio 1.7 g/dL      BUN/Creatinine Ratio 16.1     Anion Gap 13.0 mmol/L     Blood  Gas, Arterial [[id number]]  (Abnormal) Collected:  12/13/17 0534    Specimen:  Arterial Blood Updated:  12/13/17 0546     Site Right Radial     Glenroy's Test Positive     pH, Arterial 7.461 (H) pH units      pCO2, Arterial 35.6 mm Hg      pO2, Arterial 62.6 (L) mm Hg      HCO3, Arterial 25.4 mmol/L      Base Excess, Arterial 1.8 mmol/L      O2 Saturation, Arterial 93.7 (L) %      Temperature 37.0 C      Barometric Pressure for Blood Gas 750 mmHg      Modality Venti Mask     FIO2 35 %      Flow Rate 8.0 lpm      Ventilator Mode NA     Collected by [id number]    Troponin [[id number]]  (Abnormal) Collected:  12/13/17 0517    Specimen:  Blood Updated:  12/13/17 0547     Troponin I 0.076 (H) ng/mL     BNP [[id number]]  (Abnormal) Collected:  12/13/17 0517    Specimen:  Blood Updated:  12/13/17 0547     proBNP 3000.0 (H) pg/mL     Troponin [[id number]]  (Abnormal) Collected:  12/13/17 1105    Specimen:  Blood Updated:  12/13/17 1225     Troponin I 0.082 (H) ng/mL     Troponin [[id number]]  (Abnormal) Collected:  12/13/17 1647    Specimen:  Blood Updated:  12/13/17 1718     Troponin I 0.071 (H) ng/mL     CBC & Differential [[id number]] Collected:  12/14/17 0532    Specimen:  Blood Updated:  12/14/17 0629    Narrative:       The following orders were created for panel order CBC & Differential.  Procedure                               Abnormality         Status                     ---------                               -----------         ------                     CBC Auto Differential[[id number]]        Abnormal            Final result                 Please view results for these tests on the individual orders.    CBC Auto Differential [[id number]]  (Abnormal) Collected:  12/14/17 0532    Specimen:  Blood Updated:  12/14/17 0629     WBC 16.57 (H) 10*3/mm3      RBC 4.07 (L) 10*6/mm3      Hemoglobin 12.3 (L) g/dL      Hematocrit 35.8 (L) %      MCV 88.0 fL      MCH 30.2 pg      MCHC 34.4 g/dL      RDW 12.9 %      RDW-SD 41.1 fl       MPV 9.8 fL      Platelets 359 10*3/mm3      Neutrophil % 80.1 (H) %      Lymphocyte % 11.1 (L) %      Monocyte % 8.2 %      Eosinophil % 0.0 %      Basophil % 0.1 %      Immature Grans % 0.5 %      Neutrophils, Absolute 13.28 (H) 10*3/mm3      Lymphocytes, Absolute 1.84 10*3/mm3      Monocytes, Absolute 1.36 (H) 10*3/mm3      Eosinophils, Absolute 0.00 10*3/mm3      Basophils, Absolute 0.01 10*3/mm3      Immature Grans, Absolute 0.08 (H) 10*3/mm3      nRBC 0.0 /100 WBC     Basic Metabolic Panel [[id number]]  (Abnormal) Collected:  12/14/17 0532    Specimen:  Blood Updated:  12/14/17 0652     Glucose 118 (H) mg/dL      BUN 21 mg/dL      Creatinine 0.69 mg/dL      Sodium 135 mmol/L      Potassium 3.3 (L) mmol/L      Chloride 95 (L) mmol/L      CO2 31.0 mmol/L      Calcium 9.3 mg/dL      eGFR Non African Amer 118 mL/min/1.73      BUN/Creatinine Ratio 30.4 (H)     Anion Gap 9.0 mmol/L     Narrative:       GFR Normal >60  Chronic Kidney Disease <60  Kidney Failure <15    Hemoglobin A1c [[id number]] Collected:  12/14/17 0532    Specimen:  Blood Updated:  12/14/17 1010     Hemoglobin A1C 5.6 %     Narrative:       Less than 6.0           Non-Diabetic Range  6.0-7.0                 ADA Therapeutic Target  Greater than 7.0        Action Suggested    Blood Culture - Blood, [[id number]]  (Normal) Collected:  12/13/17 0015    Specimen:  Blood from Arm, Right Updated:  12/15/17 0046     Blood Culture No growth at 2 days    Blood Culture - Blood, [[id number]]  (Normal) Collected:  12/13/17 0018    Specimen:  Blood from Arm, Right Updated:  12/15/17 0046     Blood Culture No growth at 2 days    CBC & Differential [[id number]] Collected:  12/15/17 0444    Specimen:  Blood Updated:  12/15/17 0540    Narrative:       The following orders were created for panel order CBC & Differential.  Procedure                               Abnormality         Status                     ---------                               -----------         ------                      CBC Auto Differential[[id number]]        Abnormal            Final result                 Please view results for these tests on the individual orders.    CBC Auto Differential [[id number]]  (Abnormal) Collected:  12/15/17 0444    Specimen:  Blood Updated:  12/15/17 0540     WBC 9.92 10*3/mm3      RBC 4.19 (L) 10*6/mm3      Hemoglobin 13.0 (L) g/dL      Hematocrit 36.7 (L) %      MCV 87.6 fL      MCH 31.0 pg      MCHC 35.4 g/dL      RDW 12.9 %      RDW-SD 41.1 fl      MPV 9.7 fL      Platelets 346 10*3/mm3      Neutrophil % 49.4 %      Lymphocyte % 38.5 %      Monocyte % 10.9 %      Eosinophil % 0.7 %      Basophil % 0.3 %      Immature Grans % 0.2 %      Neutrophils, Absolute 4.90 10*3/mm3      Lymphocytes, Absolute 3.82 10*3/mm3      Monocytes, Absolute 1.08 10*3/mm3      Eosinophils, Absolute 0.07 10*3/mm3      Basophils, Absolute 0.03 10*3/mm3      Immature Grans, Absolute 0.02 10*3/mm3      nRBC 0.0 /100 WBC     Basic Metabolic Panel [[id number]]  (Abnormal) Collected:  12/15/17 0444    Specimen:  Blood Updated:  12/15/17 0552     Glucose 96 mg/dL      BUN 14 mg/dL      Creatinine 0.64 mg/dL      Sodium 134 (L) mmol/L      Potassium 3.8 mmol/L      Chloride 97 (L) mmol/L      CO2 29.0 mmol/L      Calcium 9.2 mg/dL      eGFR Non African Amer 129 mL/min/1.73      BUN/Creatinine Ratio 21.9     Anion Gap 8.0 mmol/L     Narrative:       GFR Normal >60  Chronic Kidney Disease <60  Kidney Failure <15    Lipid Panel [[id number]]  (Abnormal) Collected:  12/15/17 0444    Specimen:  Blood Updated:  12/15/17 0603     Total Cholesterol 159 mg/dL      Triglycerides 207 (H) mg/dL      HDL Cholesterol 40 mg/dL      LDL Cholesterol  104 (H) mg/dL      LDL/HDL Ratio 1.94              Echo EF Estimated  Lab Results   Component Value Date    ECHOEFEST 70 12/13/2017       Medication Review: yes  Current Facility-Administered Medications   Medication Dose Route Frequency Provider Last Rate Last Dose   • acetaminophen  (TYLENOL) tablet 650 mg  650 mg Oral Q6H PRN KIRBY Greer   650 mg at 12/14/17 1246   • atorvastatin (LIPITOR) tablet 10 mg  10 mg Oral Daily Manoj Allen MD   10 mg at 12/15/17 0819   • AZITHROMYCIN 500 MG/250 ML 0.9% NS IVPB (vial-mate)  500 mg Intravenous Q24H KIRBY Richards 0 mL/hr at 12/14/17 1336 500 mg at 12/15/17 0953   • cefTRIAXone (ROCEPHIN) 1 g/100 mL 0.9% NS (MBP)  1 g Intravenous Q24H KIRBY Richards   Stopped at 12/14/17 1538   • chlorthalidone (HYGROTON) tablet 25 mg  25 mg Oral Daily Manoj Allen MD   25 mg at 12/13/17 0953   • citalopram (CeleXA) tablet 20 mg  20 mg Oral Daily KIRBY Greer   20 mg at 12/15/17 0819   • docusate sodium (COLACE) capsule 100 mg  100 mg Oral BID KIRBY Greer   100 mg at 12/14/17 1808   • enoxaparin (LOVENOX) syringe 40 mg  40 mg Subcutaneous Q24H Manoj Allen MD   40 mg at 12/15/17 0819   • furosemide (LASIX) injection 40 mg  40 mg Intravenous Daily KIRBY Greer   40 mg at 12/15/17 0819   • HYDROcodone-acetaminophen (NORCO) 5-325 MG per tablet 1 tablet  1 tablet Oral Q4H PRN KIRBY Greer   1 tablet at 12/15/17 1002   • [COMPLETED] iopamidol (ISOVUE-370) 76 % injection 100 mL  100 mL Intravenous Once in imaging Hamlet Quinteros MD   100 mL at 12/15/17 1145   • ipratropium (ATROVENT) nebulizer solution 0.5 mg  0.5 mg Nebulization 4x Daily - RT Manoj Allne MD   0.5 mg at 12/15/17 0803   • lisinopril (PRINIVIL,ZESTRIL) tablet 20 mg  20 mg Oral Daily Manoj Allen MD   20 mg at 12/13/17 0953   • polyethylene glycol (MIRALAX) packet 17 g  17 g Oral Daily KIRBY Greer   17 g at 12/14/17 0833   • potassium chloride (MICRO-K) CR capsule 20 mEq  20 mEq Oral BID With Meals KIRBY Greer   20 mEq at 12/15/17 0818   • potassium chloride (MICRO-K) CR capsule 40 mEq  40 mEq Oral Daily Hamlet Quinteros MD   40 mEq at 12/15/17 0819   • sodium chloride 0.9 % flush  1-10 mL  1-10 mL Intravenous PRN Manoj Allen MD             Assessment/Plan     1. Acute hypoxic respiratory failure: improving with severe background lung emphysema per CT scan  2. Abnormal chest x ray, pulmonary edema and right lung opacities  3. Abnormal carotid doppler: identified heavy plaque burden at right bifurcation  4. Aortic Stenosis: severe  5. Leukocytosis: resolved   6. Hypertension  7. Hyperlipidemia:   8. Tobacco Abuse        Plan:    - needs cardiac catherization planned for Monday 12/18 1300  - CTA neck ordered   - continue to optimize respiratory status  - increase statin dose LDL 1041, Lipitor 80 daily  - close monitoring of renal status          Muriel Khan, APRN  12/15/17  11:31 AM

## 2017-12-15 NOTE — PROGRESS NOTES
Continued Stay Note   Cynthia     Patient Name: Sharad Ryder  MRN: [id number]  Today's Date: 12/15/2017    Admit Date: 12/13/2017          Discharge Plan       12/15/17 1216    Case Management/Social Work Plan    Plan Home    Additional Comments Pt lives with wife and plans same. Pt is independent, works on boat full-time.  Pt might need home O2 if ordered/qualifies. Note pt for heart cath on Monday.   No other needs identified. Will follow.              Discharge Codes     None            TERRELL Prater

## 2017-12-15 NOTE — PROGRESS NOTES
LOS: 1 day   Patient Care Team:  Sharad KLEIN MD as PCP - General (Family Medicine)    Chief Complaint: Shortness of breath  Subjective  Feeling  better  No chest pain   Moderate  shortness of breath  Still orthopneic  No new complaints    Interval History: Improved overall    Patient Complaints:   Chief Complaint   Patient presents with   • Shortness of Breath     Denies chest pain currently. Denies excessive shortness of breath. Denies abdominal pain, nausea vomiting or diarrhoea.    Telemetry: no malignant arrhythmia. No significant pauses.    Review of Systems   Constitutional: Negative for chills, fatigue and fever.   HENT: Negative.    Eyes: Negative.    Respiratory: Negative for cough, chest wall soreness,   Moderate shortness of breath, no wheezing, no stridor.    Cardiovascular: Negative for chest pain, palpitations and leg swelling.   Gastrointestinal: Negative for abdominal distention, Mild abdominal pain, No blood in stool, constipation, diarrhea, nausea and vomiting.   Endocrine: Negative.    Genitourinary: Negative for difficulty urinating, dysuria, flank pain and hematuria.   Musculoskeletal: Negative.    Skin: Negative for rash and wound.   Allergic/Immunologic: Negative.    Neurological: Negative for dizziness, syncope, weakness, light-headedness and headaches.   Hematological: Does not bruise/bleed easily.   Psychiatric/Behavioral: Negative for agitation, behavioral problems, confusion, the patient is not nervous/anxious.       History:   Past Medical History:   Diagnosis Date   • Arthritis    • Hyperlipidemia    • Hypertension      Past Surgical History:   Procedure Laterality Date   • FINGER SURGERY Right 1990     Social History     Social History   • Marital status:      Spouse name: N/A   • Number of children: N/A   • Years of education: N/A     Occupational History   • Not on file.     Social History Main Topics   • Smoking status: Current Every Day Smoker     Packs/day: 1.50      Types: Cigarettes   • Smokeless tobacco: Not on file   • Alcohol use No   • Drug use: No   • Sexual activity: Defer     Other Topics Concern   • Not on file     Social History Narrative   • No narrative on file     History reviewed. No pertinent family history.    Labs:  WBC WBC   Date Value Ref Range Status   12/14/2017 16.57 (H) 4.80 - 10.80 10*3/mm3 Final   12/13/2017 22.09 (H) 4.80 - 10.80 10*3/mm3 Final      HGB Hemoglobin   Date Value Ref Range Status   12/14/2017 12.3 (L) 14.0 - 18.0 g/dL Final   12/13/2017 13.5 (L) 14.0 - 18.0 g/dL Final      HCT Hematocrit   Date Value Ref Range Status   12/14/2017 35.8 (L) 40.0 - 52.0 % Final   12/13/2017 39.0 (L) 40.0 - 52.0 % Final      Platlets Platelets   Date Value Ref Range Status   12/14/2017 359 130 - 400 10*3/mm3 Final   12/13/2017 393 130 - 400 10*3/mm3 Final      MCV MCV   Date Value Ref Range Status   12/14/2017 88.0 82.0 - 95.0 fL Final   12/13/2017 88.0 82.0 - 95.0 fL Final        Results from last 7 days  Lab Units 12/14/17  0532 12/13/17  0517 12/13/17  0020   SODIUM mmol/L 135 135 135   POTASSIUM mmol/L 3.3* 3.4* 3.5   CHLORIDE mmol/L 95* 98 97*   CO2 mmol/L 31.0 24.0 25.0   BUN mg/dL 21 9 9   CREATININE mg/dL 0.69 0.56 0.66   CALCIUM mg/dL 9.3 9.2 9.1   BILIRUBIN mg/dL  --  0.3 0.1   ALK PHOS U/L  --  82 96   ALT (SGPT) U/L  --  43 41   AST (SGOT) U/L  --  38 32   GLUCOSE mg/dL 118* 151* 156*     Lab Results   Component Value Date    TROPONINI 0.071 (H) 12/13/2017     PT/INR:    Protime   Date Value Ref Range Status   12/13/2017 12.0 11.9 - 14.6 Seconds Final   /  INR   Date Value Ref Range Status   12/13/2017 0.86 (L) 0.91 - 1.09 Final       Imaging Results (last 72 hours)     Procedure Component Value Units Date/Time    XR Chest 1 View [[id number]] Collected:  12/13/17 0703     Updated:  12/13/17 0707    Narrative:       EXAMINATION: XR CHEST 1 VW-. 12/13/2017 8:03 AM EST     CHEST, ONE VIEW:     HISTORY: Respiratory distress     COMPARISON: None     A  single frontal chest radiograph was obtained.     FINDINGS:     Bilateral perihilar interstitial prominence appreciated with mild  interstitial prominence also noted in the lung bases with minimal patchy  airspace opacities in the right lung base medially. Septal line/Kerley B  lines also observed. Correlate for mild interstitial pulmonary edema  possible cardiogenic etiology.     Bony structures are intact.                                     Impression:       1. Interstitial prominence with mild patchy airspace opacities in the  right lung base, acute interstitial infiltration from an inflammatory  process considered. Pulmonary edema also a differential consideration.     This report was finalized on 12/13/2017 07:04 by Dr. Vito Abrams MD.    XR Chest 1 View [[id number]] Collected:  12/14/17 0739     Updated:  12/14/17 0744    Narrative:       History:  57-year-old evaluated for pulmonary edema.      Reference:  Chest radiograph one day prior.     Findings:  Frontal chest radiograph performed.     Heart and mediastinum unchanged. Atherosclerotic thoracic aorta.  Background emphysema. Right basilar opacities remain. No new or  increasing opacity. No pleural effusion or pneumothorax. No acute  osseous finding.       Impression:       Impression:  Persistent opacities at the right base suspicious for pneumonia.  Emphysema.  This report was finalized on 12/14/2017 07:41 by  Abel Suarez, .    US Carotid Bilateral [[id number]] Collected:  12/14/17 1652     Updated:  12/14/17 1656    Narrative:       History: Bruit       Impression:       Impression:  1. There is less than 50% stenosis of the right internal carotid artery.  2. There is less than 50% stenosis of the left internal carotid artery.  3. Antegrade flow is demonstrated in bilateral vertebral arteries.  4. There is heavy plaque burden at the right bifurcation. Further  imaging with a CTA the neck may be warranted.     Comments: Bilateral carotid vertebral arterial  duplex scan was  performed.     Grayscale imaging shows intimal thickening and calcified elements at the  carotid bifurcation. The right internal carotid artery peak systolic  velocity is 102 cm/sec. The end-diastolic velocity is 34.6 cm/sec. The  right ICA/CCA ratio is approximately 1.79 . These findings correlate  with less than 50% stenosis of the right internal carotid artery.     Grayscale imaging shows intimal thickening and calcified elements at the  carotid bifurcation. The left internal carotid artery peak systolic  velocity is 105 cm/sec. The end-diastolic velocity is 37.7 cm/sec. The  left ICA/CCA ratio is approximately 1.0 . These findings correlate with  less than 50% stenosis of the left internal carotid artery.     Antegrade flow is demonstrated in bilateral vertebral arteries.       This report was finalized on 12/14/2017 16:53 by Dr. Jl Salgado MD.    CT Angiogram Chest With & Without Contrast [[id number]] Collected:  12/14/17 1753     Updated:  12/14/17 1801    Narrative:       CT ANGIOGRAM CHEST W WO CONTRAST- 12/14/2017 5:14 PM CST      HISTORY: chest pain; R06.02-Shortness of breath; J18.1-Lobar pneumonia,  unspecified organism      COMPARISON: None.      DOSE LENGTH PRODUCT: 275 mGy cm. Automated exposure control was also  utilized to decrease patient radiation dose.     TECHNIQUE: Helical tomographic images of the chest were obtained after  the administration of intravenous contrast following angiogram protocol.  Additionally, 3D and multiplanar reformatted images were provided.        FINDINGS:    Pulmonary arteries: There is adequate enhancement of the pulmonary  arteries to evaluate for central and segmental pulmonary emboli. There  are no filling defects within the main, lobar, segmental or visualized  subsegmental pulmonary arteries. The pulmonary arteries are within  normal limits for size.      Aorta and great vessels: The aorta is well opacified and demonstrates no  dissection or  aneurysm. The great vessels are normal in appearance. Mild  ostial narrowing of the left common carotid and left subclavian origins.     Visualized neck base: The imaged portion of the base of the neck appears  unremarkable.      Lungs: There is severe background lung emphysema. Bilateral dependent  lung atelectasis. No mass or suspicious pulmonary nodule. No acute lung  infiltrate. The trachea and bronchial tree are patent.      Heart: The heart is normal in size. There is no pericardial effusion.  Moderate coronary artery atheromatous calcification.     Mediastinum and lymph nodes: No enlarged mediastinal, hilar, or axillary  lymph nodes are present.      Skeletal and soft tissues: Age-indeterminate mild wedge compression  deformity at T11. No destructive bone lesion.       Impression:       1. No evidence of pulmonary embolus or other acute cardiopulmonary  process.  2. Severe background lung emphysema.  3. Moderate coronary artery atheromatous calcification.  4. Age-indeterminate mild compression deformity at T11.        This report was finalized on 12/14/2017 17:58 by Dr Javy Romano, .    CT Angiogram Abdomen Pelvis With & Without Contrast [[id number]] Collected:  12/14/17 1808     Updated:  12/14/17 1914    Narrative:       CT ANGIOGRAM ABDOMEN PELVIS W WO CONTRAST- 12/14/2017 5:14 PM CST     HISTORY: groin pain, ? hx of aneursym; R06.02-Shortness of breath;  J18.1-Lobar pneumonia, unspecified organism     COMPARISON: None     DOSE LENGTH PRODUCT: 275 mGy cm. Automated exposure control was also  utilized to decrease patient radiation dose.     TECHNIQUE: Helical tomographic images of the abdomen and pelvis  utilizing angiographic protocol were obtained following the intravenous  infusion of contrast. Multiplanar and 3 D reformatted images were  provided for review.     FINDINGS:     Angiogram:  Moderate atheromatous calcification throughout the aorta. The origins of  the celiac and SMA are unremarkable. Renal  artery origins are  unremarkable. There is fusiform aneurysmal dilation of an approximately  4.6 cm segment of the infrarenal aorta with the aneurysm sac measuring  up to 3.4 cm (series 4-image 77). No evidence of aneurysm rupture. There  is moderate atheromatous narrowing of the bilateral common iliac artery  origins. Moderate diffuse disease throughout the bilateral internal and  external iliac arteries. No vessel cut off identified. Incidentally  noted small accessory left renal artery.     Other findings:  Extensive colonic diverticulosis without evidence of acute  diverticulitis. Moderate prostatomegaly with coarse calcification  identified in the gland. No suspicious adenopathy in the pelvis. Severe  L5-S1 level degenerative change with at least mild spinal stenosis.       Impression:       1. Fusiform aneurysmal dilation of the infrarenal aorta up to 3.4 cm. No  evidence of aneurysm rupture.  2. Colonic diverticulosis without acute diverticulitis. No acute process  identified in the abdomen on this arteriogram.     This report was finalized on 12/14/2017 19:11 by Dr Javy Romano, .          Objective     No Known Allergies    Medication Review: Performed  Current Facility-Administered Medications   Medication Dose Route Frequency Provider Last Rate Last Dose   • acetaminophen (TYLENOL) tablet 650 mg  650 mg Oral Q6H PRN KIRBY Greer   650 mg at 12/14/17 1246   • atorvastatin (LIPITOR) tablet 10 mg  10 mg Oral Daily Manoj Allen MD   10 mg at 12/14/17 0833   • AZITHROMYCIN 500 MG/250 ML 0.9% NS IVPB (vial-mate)  500 mg Intravenous Q24H KIRBY Richards   Stopped at 12/14/17 1336   • cefTRIAXone (ROCEPHIN) 1 g/100 mL 0.9% NS (MBP)  1 g Intravenous Q24H KIRBY Richards   Stopped at 12/14/17 1538   • chlorthalidone (HYGROTON) tablet 25 mg  25 mg Oral Daily Manoj Allen MD   25 mg at 12/13/17 0953   • citalopram (CeleXA) tablet 20 mg  20 mg Oral Daily Peewee Morejon APRN   20  "mg at 12/14/17 1635   • docusate sodium (COLACE) capsule 100 mg  100 mg Oral BID KIRBY Greer   100 mg at 12/14/17 1808   • enoxaparin (LOVENOX) syringe 40 mg  40 mg Subcutaneous Q24H Manoj Allen MD   40 mg at 12/14/17 0833   • furosemide (LASIX) injection 40 mg  40 mg Intravenous Daily KIRBY Greer   40 mg at 12/14/17 0859   • HYDROcodone-acetaminophen (NORCO) 5-325 MG per tablet 1 tablet  1 tablet Oral Q4H PRN KIRBY Greer   1 tablet at 12/14/17 2057   • ipratropium (ATROVENT) nebulizer solution 0.5 mg  0.5 mg Nebulization 4x Daily - RT Manoj Allen MD   0.5 mg at 12/14/17 2020   • lisinopril (PRINIVIL,ZESTRIL) tablet 20 mg  20 mg Oral Daily Manoj Allen MD   20 mg at 12/13/17 0953   • polyethylene glycol (MIRALAX) packet 17 g  17 g Oral Daily KIRBY Greer   17 g at 12/14/17 0833   • potassium chloride (MICRO-K) CR capsule 20 mEq  20 mEq Oral BID With Meals KIRBY Greer   20 mEq at 12/14/17 1808   • potassium chloride (MICRO-K) CR capsule 40 mEq  40 mEq Oral Daily Hamlet Quinteros MD   40 mEq at 12/14/17 1730   • sodium chloride 0.9 % flush 1-10 mL  1-10 mL Intravenous PRN Manoj Allen MD           Vital Sign Min/Max for last 24 hours  Temp  Min: 97 °F (36.1 °C)  Max: 99.7 °F (37.6 °C)   BP  Min: 90/57  Max: 109/57   Pulse  Min: 76  Max: 100   Resp  Min: 16  Max: 20   SpO2  Min: 91 %  Max: 98 %   Flow (L/min)  Min: 2  Max: 2   Weight  Min: 55.8 kg (123 lb)  Max: 55.8 kg (123 lb)     Flowsheet Rows         First Filed Value    Admission Height  167.6 cm (66\") Documented at 12/13/2017 0121    Admission Weight  59 kg (130 lb) Documented at 12/13/2017 0121          Results for orders placed during the hospital encounter of 12/13/17   Adult Transthoracic Echo Complete W/ Cont if Necessary Per Protocol    Narrative · Left ventricular wall thickness is consistent with moderate-to-severe   concentric hypertrophy.  · Left ventricular systolic " function is normal. Estimated EF = 70%.  · Left ventricular diastolic dysfunction (grade I a) consistent with   impaired relaxation.  · Left atrial cavity size is mildly dilated.  · calcification of the aortic valve  · Severe aortic valve stenosis is present.  · The AV appears to be bicuspid          Physical Exam:  Eyes: Pupils equal and reactive    Ears: Appear intact with no abnormalities noted  Nose: Nares normal, septum midline, mucosa normal and no drainage  Neck: supple, trachea midline, no thyromegaly, no carotid bruit and no JVD  Back: no kyphosis present, no scoliosis present, no skin lesions, erythema, or scars, no tenderness to percussion or palpation and range of motion normal  Lungs: respirations regular, respirations even and respirations unlabored  Heart: normal S1, S2,  2/6 pansystolic murmur in the left sternal border, , no rub and no click  Abdomen: soft  Skin: no bleeding, bruising or rash     Results Review:   I reviewed the patient's new clinical results.  I reviewed the patient's new imaging results and agree with the interpretation.  I reviewed the patient's other test results and agree with the interpretation  I personally viewed and interpreted the patient's EKG/Telemetry data  Discussed with patient and present family member(s)     Assessment/Plan     Active Problems:    Shortness of breath    Severe aortic valve stenosis    Tobacco use    Hyperlipidemia    Hypertension      Plan  Continue same medications  Cardiac cath once orthopnea improves, possibly Monday  Supportive care  Telemetry  Optimal medical therapy  Deep vein thrombosis prophylaxis/precautions  Appropriate diet, fluid, sodium, caffeine, stimulants intake   Compliance to diet and medications   Avoid NSAIDS    Aime Francois MD  12/14/17  11:21 PM

## 2017-12-15 NOTE — PLAN OF CARE
Problem: Patient Care Overview (Adult)  Goal: Plan of Care Review  Outcome: Ongoing (interventions implemented as appropriate)    12/15/17 1859   Coping/Psychosocial Response Interventions   Plan Of Care Reviewed With patient   Patient Care Overview   Progress no change   Outcome Evaluation   Outcome Summary/Follow up Plan prn med given x1 for pain, NSR on tele, No c/o SOA, or acute discomfort. Pt. currently resting, will continue to monitor         Problem: COPD, Chronic Bronchitis/Emphysema (Adult)  Goal: Signs and Symptoms of Listed Potential Problems Will be Absent or Manageable (COPD, Chronic Bronchitis/Emphysema)  Outcome: Ongoing (interventions implemented as appropriate)    Problem: Infection, Risk/Actual (Adult)  Goal: Identify Related Risk Factors and Signs and Symptoms  Outcome: Ongoing (interventions implemented as appropriate)  Goal: Infection Prevention/Resolution  Outcome: Ongoing (interventions implemented as appropriate)

## 2017-12-15 NOTE — PROGRESS NOTES
"\"Doing good\" No complaints of chest pain or shortness of breath. On room air.     Visit Vitals   • /58 (BP Location: Left arm, Patient Position: Lying)   • Pulse 79   • Temp 97.3 °F (36.3 °C) (Tympanic)   • Resp 18   • Ht 167.6 cm (66\")   • Wt 55.8 kg (123 lb)   • SpO2 95%   • BMI 19.85 kg/m2       Intake/Output Summary (Last 24 hours) at 12/15/17 1136  Last data filed at 12/15/17 0815   Gross per 24 hour   Intake              250 ml   Output             2100 ml   Net            -1850 ml     Labs: Na 134, K 3.8, BUN 14, Creat 0.64, Glucose 96, WBC 9.92, Hgb 13.0, Hct 36.7, Plt 346    CT chest, abd/pelvis reviewed  CTA neck ordered    Physical Exam:  General: No apparent distress. In good spirits.  Cardiovascular: Regular rate and rhythm. Systolic murmur 3/6. No rubs, or gallops.    Pulmonary: Clear to auscultation bilaterally without wheezing, rubs, or rales.  Abdomen: Soft, mild distention, and non-tender.  Extremities: Warm, moves all extremities. No edema.   Neurologic: Grossly intact with no focal deficits.       Impression:  Severe aortic valve stenosis, bicuspid AV  Acute hypoxic respiratory failure-improved  Severe emphysema  Hypertension  Hyperlipidemia  History of anxiety  Chronic back pain  [id number] azithromycin and rocephin   Chronic tobacco use  AAA    Plan:  Heart cath tentatively planned for Monday per Dr. Francois's note  Timing of SAVR to be determined   Will need PFTs once medically optimized  Favor completing antibiotic therapy prior to SAVR  Patient thinks he would like to go home and have surgery in Jan. Wants to discuss with his wife who is not present. Discussed that probably need heart cath results prior to making this decision.   Smoking cessation-patient states he is going to try to quit smoking  "

## 2017-12-15 NOTE — PROGRESS NOTES
[facility] Medicine Services  INPATIENT PROGRESS NOTE    Length of Stay: 2  Date of Admission: 12/13/2017  Primary Care Physician: Sharad Cline MD    Subjective   Chief Complaint: follow up soa and chest pain     HPI   Currently sitting up in bed on room air.  He states that he was able to lay flat most of the night last night.  He states that he was up in his room and to the bathroom yesterday without any shortness of breath.  He denies any chest pain.     Review of Systems   Constitutional: Positive for activity change and fatigue. Negative for appetite change and chills.   HENT: Negative for hearing loss, nosebleeds, tinnitus and trouble swallowing.    Eyes: Negative for visual disturbance.   Respiratory: Positive for cough. Negative for chest tightness.    Cardiovascular: Negative for palpitations.   Gastrointestinal: Negative for abdominal distention, blood in stool, constipation, diarrhea and nausea.   Endocrine: Negative for cold intolerance, heat intolerance, polydipsia, polyphagia and polyuria.   Genitourinary: Negative for decreased urine volume, difficulty urinating, dysuria, flank pain, frequency and hematuria.   Musculoskeletal: Negative for arthralgias, joint swelling and myalgias.   Allergic/Immunologic: Negative for immunocompromised state.   Neurological: Positive for weakness. Negative for dizziness, syncope and light-headedness.   Hematological: Negative for adenopathy. Does not bruise/bleed easily.   Psychiatric/Behavioral: Negative for confusion and sleep disturbance. The patient is not nervous/anxious.       All pertinent negatives and positives are as above. All other systems have been reviewed and are negative unless otherwise stated.     Objective    Temp:  [97 °F (36.1 °C)-99.7 °F (37.6 °C)] 97.7 °F (36.5 °C)  Heart Rate:  [] 74  Resp:  [16-18] 16  BP: ()/(56-71) 105/56     Physical Exam   Constitutional: He is oriented to person, place,  and time. He appears well-developed and well-nourished.   Thin frame    HENT:   Head: Normocephalic and atraumatic.   Eyes: Conjunctivae and EOM are normal. Pupils are equal, round, and reactive to light.   Neck: Neck supple. No JVD present. No thyromegaly present.   Cardiovascular: Normal rate, regular rhythm and intact distal pulses.  Exam reveals no gallop and no friction rub.    Murmur heard.   Systolic murmur is present with a grade of 3/6   Sinus 74-97   Pulmonary/Chest: Effort normal and breath sounds normal. No respiratory distress. He has no wheezes. He has no rales. He exhibits no tenderness.   Diminished bilateral bases. Currently on 2 lpm nasal cannula.    Abdominal: Soft. Bowel sounds are normal. He exhibits no distension. There is no tenderness. There is no rebound and no guarding.   Musculoskeletal: Normal range of motion. He exhibits no edema, tenderness or deformity.   Lymphadenopathy:     He has no cervical adenopathy.   Neurological: He is alert and oriented to person, place, and time. He displays normal reflexes. No cranial nerve deficit. He exhibits normal muscle tone.   Skin: Skin is warm and dry. No rash noted.   Psychiatric: He has a normal mood and affect. His behavior is normal. Judgment and thought content normal.   Vitals reviewed.    Results Review:  I have reviewed the labs, radiology results, and diagnostic studies.    Laboratory Data:     Results from last 7 days  Lab Units 12/15/17  0444 12/14/17  0532 12/13/17  0020   WBC 10*3/mm3 9.92 16.57* 22.09*   HEMOGLOBIN g/dL 13.0* 12.3* 13.5*   HEMATOCRIT % 36.7* 35.8* 39.0*   PLATELETS 10*3/mm3 346 359 393       Results from last 7 days  Lab Units 12/15/17  0444 12/14/17  0532 12/13/17  0517 12/13/17  0020   SODIUM mmol/L 134* 135 135 135   POTASSIUM mmol/L 3.8 3.3* 3.4* 3.5   CHLORIDE mmol/L 97* 95* 98 97*   CO2 mmol/L 29.0 31.0 24.0 25.0   BUN mg/dL 14 21 9 9   CREATININE mg/dL 0.64 0.69 0.56 0.66   CALCIUM mg/dL 9.2 9.3 9.2 9.1    BILIRUBIN mg/dL  --   --  0.3 0.1   ALK PHOS U/L  --   --  82 96   ALT (SGPT) U/L  --   --  43 41   AST (SGOT) U/L  --   --  38 32   GLUCOSE mg/dL 96 118* 151* 156*     Culture Data:   Blood Culture   Date Value Ref Range Status   12/13/2017 No growth at 24 hours  Preliminary   12/13/2017 No growth at 24 hours  Preliminary     Radiology Data:   Imaging Results (last 24 hours)     Procedure Component Value Units Date/Time    XR Chest 1 View [[id number]] Collected:  12/14/17 0739     Updated:  12/14/17 0744    Narrative:       History:  57-year-old evaluated for pulmonary edema.      Reference:  Chest radiograph one day prior.     Findings:  Frontal chest radiograph performed.     Heart and mediastinum unchanged. Atherosclerotic thoracic aorta.  Background emphysema. Right basilar opacities remain. No new or  increasing opacity. No pleural effusion or pneumothorax. No acute  osseous finding.       Impression:       Impression:  Persistent opacities at the right base suspicious for pneumonia.  Emphysema.  This report was finalized on 12/14/2017 07:41 by  Abel Suarez, .    US Carotid Bilateral [[id number]] Collected:  12/14/17 1652     Updated:  12/14/17 1656    Narrative:       History: Bruit       Impression:       Impression:  1. There is less than 50% stenosis of the right internal carotid artery.  2. There is less than 50% stenosis of the left internal carotid artery.  3. Antegrade flow is demonstrated in bilateral vertebral arteries.  4. There is heavy plaque burden at the right bifurcation. Further  imaging with a CTA the neck may be warranted.     Comments: Bilateral carotid vertebral arterial duplex scan was  performed.     Grayscale imaging shows intimal thickening and calcified elements at the  carotid bifurcation. The right internal carotid artery peak systolic  velocity is 102 cm/sec. The end-diastolic velocity is 34.6 cm/sec. The  right ICA/CCA ratio is approximately 1.79 . These findings correlate  with  less than 50% stenosis of the right internal carotid artery.     Grayscale imaging shows intimal thickening and calcified elements at the  carotid bifurcation. The left internal carotid artery peak systolic  velocity is 105 cm/sec. The end-diastolic velocity is 37.7 cm/sec. The  left ICA/CCA ratio is approximately 1.0 . These findings correlate with  less than 50% stenosis of the left internal carotid artery.     Antegrade flow is demonstrated in bilateral vertebral arteries.       This report was finalized on 12/14/2017 16:53 by Dr. Jl Salgado MD.    CT Angiogram Chest With & Without Contrast [[id number]] Collected:  12/14/17 1753     Updated:  12/14/17 1801    Narrative:       CT ANGIOGRAM CHEST W WO CONTRAST- 12/14/2017 5:14 PM CST      HISTORY: chest pain; R06.02-Shortness of breath; J18.1-Lobar pneumonia,  unspecified organism      COMPARISON: None.      DOSE LENGTH PRODUCT: 275 mGy cm. Automated exposure control was also  utilized to decrease patient radiation dose.     TECHNIQUE: Helical tomographic images of the chest were obtained after  the administration of intravenous contrast following angiogram protocol.  Additionally, 3D and multiplanar reformatted images were provided.        FINDINGS:    Pulmonary arteries: There is adequate enhancement of the pulmonary  arteries to evaluate for central and segmental pulmonary emboli. There  are no filling defects within the main, lobar, segmental or visualized  subsegmental pulmonary arteries. The pulmonary arteries are within  normal limits for size.      Aorta and great vessels: The aorta is well opacified and demonstrates no  dissection or aneurysm. The great vessels are normal in appearance. Mild  ostial narrowing of the left common carotid and left subclavian origins.     Visualized neck base: The imaged portion of the base of the neck appears  unremarkable.      Lungs: There is severe background lung emphysema. Bilateral dependent  lung atelectasis. No  mass or suspicious pulmonary nodule. No acute lung  infiltrate. The trachea and bronchial tree are patent.      Heart: The heart is normal in size. There is no pericardial effusion.  Moderate coronary artery atheromatous calcification.     Mediastinum and lymph nodes: No enlarged mediastinal, hilar, or axillary  lymph nodes are present.      Skeletal and soft tissues: Age-indeterminate mild wedge compression  deformity at T11. No destructive bone lesion.       Impression:       1. No evidence of pulmonary embolus or other acute cardiopulmonary  process.  2. Severe background lung emphysema.  3. Moderate coronary artery atheromatous calcification.  4. Age-indeterminate mild compression deformity at T11.        This report was finalized on 12/14/2017 17:58 by Dr Javy Romano, .    CT Angiogram Abdomen Pelvis With & Without Contrast [[id number]] Collected:  12/14/17 1808     Updated:  12/14/17 1914    Narrative:       CT ANGIOGRAM ABDOMEN PELVIS W WO CONTRAST- 12/14/2017 5:14 PM CST     HISTORY: groin pain, ? hx of aneursym; R06.02-Shortness of breath;  J18.1-Lobar pneumonia, unspecified organism     COMPARISON: None     DOSE LENGTH PRODUCT: 275 mGy cm. Automated exposure control was also  utilized to decrease patient radiation dose.     TECHNIQUE: Helical tomographic images of the abdomen and pelvis  utilizing angiographic protocol were obtained following the intravenous  infusion of contrast. Multiplanar and 3 D reformatted images were  provided for review.     FINDINGS:     Angiogram:  Moderate atheromatous calcification throughout the aorta. The origins of  the celiac and SMA are unremarkable. Renal artery origins are  unremarkable. There is fusiform aneurysmal dilation of an approximately  4.6 cm segment of the infrarenal aorta with the aneurysm sac measuring  up to 3.4 cm (series 4-image 77). No evidence of aneurysm rupture. There  is moderate atheromatous narrowing of the bilateral common iliac artery  origins.  Moderate diffuse disease throughout the bilateral internal and  external iliac arteries. No vessel cut off identified. Incidentally  noted small accessory left renal artery.     Other findings:  Extensive colonic diverticulosis without evidence of acute  diverticulitis. Moderate prostatomegaly with coarse calcification  identified in the gland. No suspicious adenopathy in the pelvis. Severe  L5-S1 level degenerative change with at least mild spinal stenosis.       Impression:       1. Fusiform aneurysmal dilation of the infrarenal aorta up to 3.4 cm. No  evidence of aneurysm rupture.  2. Colonic diverticulosis without acute diverticulitis. No acute process  identified in the abdomen on this arteriogram.     This report was finalized on 2017 19:11 by Dr Javy Romano, .        Sharad Ryder   Echocardiogram   Order# [id number]    Reading physician: Son Hunter MD   Ordering physician: KIRBY Richards   Study date: 17         Patient Information      Patient Name MRN Sex  (Age)     Sharad Ryder [id number] Male 1960 (57 y.o.)       Admission Information      Admission Date/Time Discharge Date/Time Room/Bed     17  0010  495/1       Sedation Narrator Report      Sedation Narrator Report       Interpretation Summary      · Left ventricular wall thickness is consistent with moderate-to-severe concentric hypertrophy.  · Left ventricular systolic function is normal. Estimated EF = 70%.  · Left ventricular diastolic dysfunction (grade I a) consistent with impaired relaxation.  · Left atrial cavity size is mildly dilated.  · calcification of the aortic valve  · Severe aortic valve stenosis is present.  · The AV appears to be bicuspid     I have reviewed the patient current medications.     Assessment/Plan     Assessment:  1. Severe aortic stenosis  2. Acute hypoxic respiratory failure, Spo2 88% on 40% venti mask  3. Pulmonary edema, secondary to severe aortic stenosis  4. Chest pain with  mildly elevated troponin  5. Diastolic dysfunction, impaired relaxation, preserved EF 70%  6. Essential hypertension  7. Hyperlipidemia  8. Leukocytosis, improving  9. Hypokalemia, mild  10. Ongoing tobacco abuse  11. Fusiform aneurysmal dilation of the infrarenal aorta up to 3.4 cm, No  evidence of aneurysm rupture.    Plan:  1. Heart catheterization on Monday per Dr. Francois   2. Day #2 Rocephin 3/7 and Azithromycin 3/5  3. Continue Potassium 20 mEq BID.  4. Continue Lasix 40 mg daily   5. Appreciate CTS and Cardiology assistance   6. CBC and BMP in AM  7. Continue Lovenox 40 mg SC every 24 hours for DVT prophylaxis  8. Continue oxygen therapy to keep saturations > 90%  9. Add colace and miralax  10. As needed tylenol  11. Lipid panel - total 159, HDL 40, , Triglycerirdes 207  12. HgbA1c 5.6  13. Ambulate TID in landeros    Discharge Planning: I expect the patient to be discharged to home in 3 days.     KIRBY Greer   12/15/17   6:41 AM    I personally evaluated and examined the patient in conjunction with KIRBY Vides and agree with the assessment, treatment plan, and disposition of the patient as recorded by her. My history, exam, and further recommendations are:     Discussed with Dr. Robin.  I thought with his presentation he has flash pulmonary edema.  The follow up cxr on 12/14 read as persistent opacities at the right base suspicious for pneumonia.  Emphysema.  CT angiogram though reports no acute cardiopulmonary process and severe lung emphysema.   I reviewed the CT of chest with Dr. Robin and he agreed but had mentioned he would want ID.  I would leave to his comfort level as we are expecting that he will undergo operation subsequently timing is up in the air pending other w/u from cardiology.   WBC improve (back to normal); ngtd on culture  Vitals:    12/15/17 0815   BP: 108/58   Pulse: 79   Resp: 18   Temp: 97.3 °F (36.3 °C)   SpO2: 95%   also he has been found with critical carotid bulb due to  bulky calcific plaquing.     Hamlet Quinteros MD  12/15/17  9:43 AM

## 2017-12-16 LAB
ANION GAP SERPL CALCULATED.3IONS-SCNC: 9 MMOL/L (ref 4–13)
BUN BLD-MCNC: 12 MG/DL (ref 5–21)
BUN/CREAT SERPL: 19.7 (ref 7–25)
CALCIUM SPEC-SCNC: 9.4 MG/DL (ref 8.4–10.4)
CHLORIDE SERPL-SCNC: 99 MMOL/L (ref 98–110)
CO2 SERPL-SCNC: 28 MMOL/L (ref 24–31)
CREAT BLD-MCNC: 0.61 MG/DL (ref 0.5–1.4)
DEPRECATED RDW RBC AUTO: 41.6 FL (ref 40–54)
ERYTHROCYTE [DISTWIDTH] IN BLOOD BY AUTOMATED COUNT: 12.7 % (ref 12–15)
GFR SERPL CREATININE-BSD FRML MDRD: 136 ML/MIN/1.73
GLUCOSE BLD-MCNC: 96 MG/DL (ref 70–100)
HCT VFR BLD AUTO: 38.2 % (ref 40–52)
HGB BLD-MCNC: 13 G/DL (ref 14–18)
MCH RBC QN AUTO: 30.3 PG (ref 28–32)
MCHC RBC AUTO-ENTMCNC: 34 G/DL (ref 33–36)
MCV RBC AUTO: 89 FL (ref 82–95)
PLATELET # BLD AUTO: 392 10*3/MM3 (ref 130–400)
PMV BLD AUTO: 9.6 FL (ref 6–12)
POTASSIUM BLD-SCNC: 4.3 MMOL/L (ref 3.5–5.3)
RBC # BLD AUTO: 4.29 10*6/MM3 (ref 4.8–5.9)
SODIUM BLD-SCNC: 136 MMOL/L (ref 135–145)
WBC NRBC COR # BLD: 9.49 10*3/MM3 (ref 4.8–10.8)

## 2017-12-16 PROCEDURE — 94799 UNLISTED PULMONARY SVC/PX: CPT

## 2017-12-16 PROCEDURE — 25010000002 CEFTRIAXONE PER 250 MG: Performed by: NURSE PRACTITIONER

## 2017-12-16 PROCEDURE — 25010000002 ENOXAPARIN PER 10 MG: Performed by: INTERNAL MEDICINE

## 2017-12-16 PROCEDURE — 85027 COMPLETE CBC AUTOMATED: CPT | Performed by: NURSE PRACTITIONER

## 2017-12-16 PROCEDURE — 25010000002 AZITHROMYCIN PER 500 MG: Performed by: NURSE PRACTITIONER

## 2017-12-16 PROCEDURE — 99232 SBSQ HOSP IP/OBS MODERATE 35: CPT | Performed by: INTERNAL MEDICINE

## 2017-12-16 PROCEDURE — 80048 BASIC METABOLIC PNL TOTAL CA: CPT | Performed by: NURSE PRACTITIONER

## 2017-12-16 PROCEDURE — 25010000002 FUROSEMIDE PER 20 MG: Performed by: NURSE PRACTITIONER

## 2017-12-16 RX ORDER — CEFDINIR 300 MG/1
300 CAPSULE ORAL EVERY 12 HOURS SCHEDULED
Status: DISCONTINUED | OUTPATIENT
Start: 2017-12-16 | End: 2017-12-18

## 2017-12-16 RX ORDER — ASPIRIN 81 MG/1
81 TABLET, CHEWABLE ORAL DAILY
Status: DISCONTINUED | OUTPATIENT
Start: 2017-12-16 | End: 2017-12-17 | Stop reason: SDUPTHER

## 2017-12-16 RX ORDER — AZITHROMYCIN 250 MG/1
500 TABLET, FILM COATED ORAL
Status: COMPLETED | OUTPATIENT
Start: 2017-12-17 | End: 2017-12-17

## 2017-12-16 RX ADMIN — HYDROCODONE BITARTRATE AND ACETAMINOPHEN 1 TABLET: 5; 325 TABLET ORAL at 15:24

## 2017-12-16 RX ADMIN — CITALOPRAM 20 MG: 20 TABLET, FILM COATED ORAL at 10:23

## 2017-12-16 RX ADMIN — DOCUSATE SODIUM 100 MG: 100 CAPSULE ORAL at 18:10

## 2017-12-16 RX ADMIN — HYDROCODONE BITARTRATE AND ACETAMINOPHEN 1 TABLET: 5; 325 TABLET ORAL at 10:46

## 2017-12-16 RX ADMIN — IPRATROPIUM BROMIDE 0.5 MG: 0.5 SOLUTION RESPIRATORY (INHALATION) at 08:13

## 2017-12-16 RX ADMIN — DOCUSATE SODIUM 100 MG: 100 CAPSULE ORAL at 10:24

## 2017-12-16 RX ADMIN — AZITHROMYCIN MONOHYDRATE 500 MG: 500 INJECTION, POWDER, LYOPHILIZED, FOR SOLUTION INTRAVENOUS at 10:45

## 2017-12-16 RX ADMIN — CEFTRIAXONE SODIUM 1 G: 1 INJECTION, POWDER, FOR SOLUTION INTRAMUSCULAR; INTRAVENOUS at 10:45

## 2017-12-16 RX ADMIN — CEFDINIR 300 MG: 300 CAPSULE ORAL at 21:24

## 2017-12-16 RX ADMIN — POTASSIUM CHLORIDE 40 MEQ: 750 CAPSULE, EXTENDED RELEASE ORAL at 10:25

## 2017-12-16 RX ADMIN — DESMOPRESSIN ACETATE 80 MG: 0.2 TABLET ORAL at 10:23

## 2017-12-16 RX ADMIN — CHLORTHALIDONE 25 MG: 25 TABLET ORAL at 10:24

## 2017-12-16 RX ADMIN — IPRATROPIUM BROMIDE 0.5 MG: 0.5 SOLUTION RESPIRATORY (INHALATION) at 12:03

## 2017-12-16 RX ADMIN — POTASSIUM CHLORIDE 20 MEQ: 750 CAPSULE, EXTENDED RELEASE ORAL at 10:23

## 2017-12-16 RX ADMIN — IPRATROPIUM BROMIDE 0.5 MG: 0.5 SOLUTION RESPIRATORY (INHALATION) at 20:39

## 2017-12-16 RX ADMIN — FUROSEMIDE 40 MG: 10 INJECTION, SOLUTION INTRAMUSCULAR; INTRAVENOUS at 10:24

## 2017-12-16 RX ADMIN — LISINOPRIL 20 MG: 20 TABLET ORAL at 10:24

## 2017-12-16 RX ADMIN — POTASSIUM CHLORIDE 20 MEQ: 750 CAPSULE, EXTENDED RELEASE ORAL at 18:10

## 2017-12-16 RX ADMIN — IPRATROPIUM BROMIDE 0.5 MG: 0.5 SOLUTION RESPIRATORY (INHALATION) at 16:15

## 2017-12-16 RX ADMIN — ASPIRIN 81 MG 81 MG: 81 TABLET ORAL at 11:56

## 2017-12-16 RX ADMIN — ENOXAPARIN SODIUM 40 MG: 40 INJECTION SUBCUTANEOUS at 10:24

## 2017-12-16 NOTE — PLAN OF CARE
Problem: Patient Care Overview (Adult)  Goal: Plan of Care Review    12/16/17 1731   Coping/Psychosocial Response Interventions   Plan Of Care Reviewed With patient   Patient Care Overview   Progress improving   Outcome Evaluation   Outcome Summary/Follow up Plan antibiotics now switched to oral, awaiting vascular consult, plan is for heart catheterization on monday. O2/2L prn, up ad linda, no SOA reported         Problem: COPD, Chronic Bronchitis/Emphysema (Adult)  Goal: Signs and Symptoms of Listed Potential Problems Will be Absent or Manageable (COPD, Chronic Bronchitis/Emphysema)  Outcome: Ongoing (interventions implemented as appropriate)    Problem: Infection, Risk/Actual (Adult)  Goal: Identify Related Risk Factors and Signs and Symptoms  Outcome: Ongoing (interventions implemented as appropriate)  Goal: Infection Prevention/Resolution  Outcome: Ongoing (interventions implemented as appropriate)

## 2017-12-16 NOTE — PLAN OF CARE
Problem: Patient Care Overview (Adult)  Goal: Plan of Care Review  Outcome: Ongoing (interventions implemented as appropriate)    12/15/17 0019   Coping/Psychosocial Response Interventions   Plan Of Care Reviewed With patient   Patient Care Overview   Progress no change   Outcome Evaluation   Outcome Summary/Follow up Plan RA. vss. c/o back pain; medicated with relief. heart cath planned for monday. will continue to monitor         Problem: COPD, Chronic Bronchitis/Emphysema (Adult)  Goal: Signs and Symptoms of Listed Potential Problems Will be Absent or Manageable (COPD, Chronic Bronchitis/Emphysema)  Outcome: Ongoing (interventions implemented as appropriate)    Problem: Infection, Risk/Actual (Adult)  Goal: Identify Related Risk Factors and Signs and Symptoms  Outcome: Ongoing (interventions implemented as appropriate)  Goal: Infection Prevention/Resolution  Outcome: Ongoing (interventions implemented as appropriate)

## 2017-12-16 NOTE — PROGRESS NOTES
[facility] Medicine Services  INPATIENT PROGRESS NOTE    Length of Stay: 3  Date of Admission: 12/13/2017  Primary Care Physician: Sharad Cline MD    Subjective   Chief Complaint: follow up soa and chest pain     HPI   Afebrile.  Sitting up in bed.  He denies any chest pain or shortness of breath. He states that he had a good night sleep and has no complaints.  He has been up ambulating ad linda.  No date set for surgery at this time as we are awaiting further information from his heart catheterization on Monday with Dr. Francois.     Review of Systems   Constitutional: Positive for activity change and fatigue. Negative for appetite change and chills.   HENT: Negative for hearing loss, nosebleeds, tinnitus and trouble swallowing.    Eyes: Negative for visual disturbance.   Respiratory: Positive for cough. Negative for chest tightness.    Cardiovascular: Negative for palpitations.   Gastrointestinal: Negative for abdominal distention, blood in stool, constipation, diarrhea and nausea.   Endocrine: Negative for cold intolerance, heat intolerance, polydipsia, polyphagia and polyuria.   Genitourinary: Negative for decreased urine volume, difficulty urinating, dysuria, flank pain, frequency and hematuria.   Musculoskeletal: Negative for arthralgias, joint swelling and myalgias.   Allergic/Immunologic: Negative for immunocompromised state.   Neurological: Positive for weakness. Negative for dizziness, syncope and light-headedness.   Hematological: Negative for adenopathy. Does not bruise/bleed easily.   Psychiatric/Behavioral: Negative for confusion and sleep disturbance. The patient is not nervous/anxious.       All pertinent negatives and positives are as above. All other systems have been reviewed and are negative unless otherwise stated.     Objective    Temp:  [97.3 °F (36.3 °C)-98.8 °F (37.1 °C)] 98.1 °F (36.7 °C)  Heart Rate:  [70-95] 70  Resp:  [16-18] 18  BP: ()/(54-84)  95/69     Physical Exam   Constitutional: He is oriented to person, place, and time. He appears well-developed and well-nourished.   Thin frame    HENT:   Head: Normocephalic and atraumatic.   Prominent carotid bruit b/l   Eyes: Conjunctivae and EOM are normal. Pupils are equal, round, and reactive to light.   Neck: Neck supple. No JVD present. No thyromegaly present.   Cardiovascular: Normal rate, regular rhythm and intact distal pulses.  Exam reveals no gallop and no friction rub.    Murmur heard.   Systolic murmur is present with a grade of 3/6   Sinus 74-97   Pulmonary/Chest: Effort normal and breath sounds normal. No respiratory distress. He has no wheezes. He has no rales. He exhibits no tenderness.   Diminished bilateral bases. On room air.    Abdominal: Soft. Bowel sounds are normal. He exhibits no distension. There is no tenderness. There is no rebound and no guarding.   Musculoskeletal: Normal range of motion. He exhibits no edema, tenderness or deformity.   Lymphadenopathy:     He has no cervical adenopathy.   Neurological: He is alert and oriented to person, place, and time. He displays normal reflexes. No cranial nerve deficit. He exhibits normal muscle tone.   Skin: Skin is warm and dry. No rash noted.   Psychiatric: He has a normal mood and affect. His behavior is normal. Judgment and thought content normal.   Vitals reviewed.    Results Review:  I have reviewed the labs, radiology results, and diagnostic studies.    Laboratory Data:     Intake/Output Summary (Last 24 hours) at 12/16/17 0612  Last data filed at 12/16/17 0329   Gross per 24 hour   Intake              600 ml   Output             3200 ml   Net            -2600 ml       Results from last 7 days  Lab Units 12/16/17  0417 12/15/17  0444 12/14/17  0532   WBC 10*3/mm3 9.49 9.92 16.57*   HEMOGLOBIN g/dL 13.0* 13.0* 12.3*   HEMATOCRIT % 38.2* 36.7* 35.8*   PLATELETS 10*3/mm3 392 346 359       Results from last 7 days  Lab Units 12/16/17  0416  12/15/17  0444 12/14/17  0532 12/13/17  0517 12/13/17  0020   SODIUM mmol/L 136 134* 135 135 135   POTASSIUM mmol/L 4.3 3.8 3.3* 3.4* 3.5   CHLORIDE mmol/L 99 97* 95* 98 97*   CO2 mmol/L 28.0 29.0 31.0 24.0 25.0   BUN mg/dL 12 14 21 9 9   CREATININE mg/dL 0.61 0.64 0.69 0.56 0.66   CALCIUM mg/dL 9.4 9.2 9.3 9.2 9.1   BILIRUBIN mg/dL  --   --   --  0.3 0.1   ALK PHOS U/L  --   --   --  82 96   ALT (SGPT) U/L  --   --   --  43 41   AST (SGOT) U/L  --   --   --  38 32   GLUCOSE mg/dL 96 96 118* 151* 156*     Culture Data:   Blood Culture   Date Value Ref Range Status   12/13/2017 No growth at 24 hours  Preliminary   12/13/2017 No growth at 24 hours  Preliminary     Radiology Data:   Imaging Results (last 24 hours)     Procedure Component Value Units Date/Time    CT Angiogram Neck With & Without Contrast [[id number]] Collected:  12/15/17 1338     Updated:  12/15/17 1351    Narrative:          History:  57-year-old evaluated for carotid plaques.      Reference   Carotid ultrasound 1 day prior.     Technique  Thin slice helical scanning of the neck is performed post intravenous  contrast administration for evaluation of the cervical arterial  vasculature. Multiple 3-D reformations are obtained. Automated exposure  control was utilized to limit radiation dose.  mGy-cm.     Findings  The visualized arch is atherosclerotic. There is a normal three-vessel  branching pattern. There is mild stenosis suspected of the left common  carotid artery origin. Eccentric plaquing of the mid left common carotid  artery without significant stenosis. The right common carotid artery  shows no significant stenosis.     There is marked plaquing in both carotid bulbs. There is severe stenosis  of the right carotid bulb. This may be exacerbated by blooming artifact  from degree of calcification. However lumen may only be 2 mm in  diameter. There is mild narrowing of the left carotid bulb from calcific  plaque.     There is eccentric bulky  plaquing of the proximal right cervical ICA  without significant stenosis. Remainder of the right cervical ICA is  unremarkable. Eccentric plaque of the proximal left cervical ICA without  stenosis. Remainder of the left cervical ICA is unremarkable.     Both vertebral artery origins are visualized with suspected mild  stenosis of the right vertebral artery origin. Otherwise the cervical  portions of both vertebral arteries are unremarkable without occlusion,  severe stenosis, or dissection.     Mild wall thickening of the proximal esophagus, nonspecific. There is  severe emphysema in the lung apices. Incidental mucosal thickening  throughout the left maxillary sinus with sclerosis consistent with  chronic sinusitis. Degenerative changes throughout the cervical spine,  worse at C5-C6 and C6-C7.        Impression  Severe stenosis at the right carotid bulb due to bulky calcific  plaquing.  This report was finalized on 12/15/2017 13:48 by  Maryann Velázquez   Echocardiogram   Order# [id number]    Reading physician: Son Hunter MD   Ordering physician: KIRBY Richards   Study date: 17         Patient Information      Patient Name MRN Sex  (Age)     Sharad Ryder [id number] Male 1960 (57 y.o.)       Admission Information      Admission Date/Time Discharge Date/Time Room/Bed     17  0010  495/1       Sedation Narrator Report      Sedation Narrator Report       Interpretation Summary      · Left ventricular wall thickness is consistent with moderate-to-severe concentric hypertrophy.  · Left ventricular systolic function is normal. Estimated EF = 70%.  · Left ventricular diastolic dysfunction (grade I a) consistent with impaired relaxation.  · Left atrial cavity size is mildly dilated.  · calcification of the aortic valve  · Severe aortic valve stenosis is present.  · The AV appears to be bicuspid     I have reviewed the patient current medications.     Assessment/Plan      Assessment:  1. Severe aortic stenosis  2. Severe right carotid bulb stenosis   3. Acute hypoxic respiratory failure, Spo2 88% on 40% venti mask  4. Pulmonary edema, secondary to severe aortic stenosis  5. Chest pain with mildly elevated troponin  6. Diastolic dysfunction, impaired relaxation, preserved EF 70%  7. Essential hypertension  8. Hyperlipidemia  9. Leukocytosis, improving  10. Hypokalemia, mild  11. Ongoing tobacco abuse  12. Fusiform aneurysmal dilation of the infrarenal aorta up to 3.4 cm, No  evidence of aneurysm rupture.    Plan:  1. Heart catheterization on Monday per Dr. Francois   2. Day #2 Rocephin 4/7 and Azithromycin 4/5  3. Continue Potassium 20 mEq BID.  4. Continue Lasix 40 mg daily   5. Appreciate CTS and Cardiology assistance   6. CBC and BMP in AM  7. Continue Lovenox 40 mg SC every 24 hours for DVT prophylaxis  8. Continue oxygen therapy to keep saturations > 90%  9. Add colace and miralax  10. As needed tylenol  11. Lipid panel - total 159, HDL 40, , Triglycerirdes 207  12. HgbA1c 5.6  13. Ambulate TID in [place]  14. Vascular surgery to see patient for severe right carotid bulb stenosis     Discharge Planning: I expect the patient to be discharged to home in ? days.     KIRBY Greer   12/16/17   6:08 AM    I personally evaluated and examined the patient in conjunction with KIRBY Vides and agree with the assessment, treatment plan, and disposition of the patient as recorded by her. My history, exam, and further recommendations are:   Pleasant man, NAD,   Prominent neck bruit b/l  + murmur  Clear breath sounds  No edema  Vitals:    12/16/17 1220   BP: 94/55   Pulse: 75   Resp: 20   Temp: 97.6 °F (36.4 °C)   SpO2: 94%     Added aspirin  Electrolytes ok   Awaiting further plan ofd care from others  awaiting vascular consult\  cta of neck requested by cardiology   Can be switched to oral abx (cultures negative)  Hamlet Quinteros MD  12/16/17  1:36 PM

## 2017-12-16 NOTE — PROGRESS NOTES
Pharmacy Dosing Service  Automatic IV to PO Conversion  Zithromax, Rocephin    Assessment/Action/Plan:  Patient meets criteria listed below.   Zithromax  500 mg IV every 24 hours has been changed to Zithromax  500 mg PO every 24 hours and   Rocephin 1g IV q24 has been changed to Cefdinir 300mg po q12.      Subjective:  Sharad Ryder is a 57 y.o. male who meets the following criteria for IV to PO therapy conversion     Policy Criteria:  · Tolerating oral fluids or 40ml/hour of enteral nutrition and oral route not otherwise compromised  · Receiving other oral medications on a scheduled basis  · Afebrile (temperature less than 100.4 degrees F) for at least 24 hours  · WBC within/decreasing toward normal range    Additional Factors Considered:  • Anti-emetic usage  • Patient disposition per documentation  • Disease states or conditions contraindicating oral conversion    Objective:  Lab Results   Component Value Date    WBC 9.49 12/16/2017     Temp Readings from Last 1 Encounters:   12/16/17 97.5 °F (36.4 °C) (Axillary)     Diet Order   Procedures   • Diet Regular; Cardiac       Active Medications    Current Facility-Administered Medications:   •  acetaminophen (TYLENOL) tablet 650 mg, 650 mg, Oral, Q6H PRN, KIRBY Greer, 650 mg at 12/14/17 1246  •  aspirin chewable tablet 81 mg, 81 mg, Oral, Daily, Hamlet Quinteros MD, 81 mg at 12/16/17 1156  •  atorvastatin (LIPITOR) tablet 80 mg, 80 mg, Oral, Daily, KIRBY So, 80 mg at 12/16/17 1023  •  [START ON 12/17/2017] azithromycin (ZITHROMAX) tablet 500 mg, 500 mg, Oral, Q24H, Hamlet Quinteros MD  •  cefdinir (OMNICEF) capsule 300 mg, 300 mg, Oral, Q12H, Hamlet Quinteros MD  •  chlorthalidone (HYGROTON) tablet 25 mg, 25 mg, Oral, Daily, Manoj Allen MD, 25 mg at 12/16/17 1024  •  citalopram (CeleXA) tablet 20 mg, 20 mg, Oral, Daily, KIRBY Greer, 20 mg at 12/16/17 1023  •  docusate sodium (COLACE) capsule 100 mg,  100 mg, Oral, BID, KIRBY Greer, 100 mg at 12/16/17 1024  •  enoxaparin (LOVENOX) syringe 40 mg, 40 mg, Subcutaneous, Q24H, Manoj Allen MD, 40 mg at 12/16/17 1024  •  furosemide (LASIX) injection 40 mg, 40 mg, Intravenous, Daily, KIRBY Greer, 40 mg at 12/16/17 1024  •  HYDROcodone-acetaminophen (NORCO) 5-325 MG per tablet 1 tablet, 1 tablet, Oral, Q4H PRN, KIRBY Greer, 1 tablet at 12/16/17 1046  •  ipratropium (ATROVENT) nebulizer solution 0.5 mg, 0.5 mg, Nebulization, 4x Daily - RT, Manoj Allen MD, 0.5 mg at 12/16/17 1203  •  lisinopril (PRINIVIL,ZESTRIL) tablet 20 mg, 20 mg, Oral, Daily, Manoj Allen MD, 20 mg at 12/16/17 1024  •  polyethylene glycol (MIRALAX) packet 17 g, 17 g, Oral, Daily, KIRBY Greer, 17 g at 12/14/17 0833  •  potassium chloride (MICRO-K) CR capsule 20 mEq, 20 mEq, Oral, BID With Meals, KIRBY Greer, 20 mEq at 12/16/17 1023  •  potassium chloride (MICRO-K) CR capsule 40 mEq, 40 mEq, Oral, Daily, Hamlet Quinteros MD, 40 mEq at 12/16/17 1025  •  sodium chloride 0.9 % flush 1-10 mL, 1-10 mL, Intravenous, PRN, MD MARCELLUS Chang, Pharm.D.    12/16/1712:16 PM

## 2017-12-16 NOTE — PLAN OF CARE
Problem: Patient Care Overview (Adult)  Goal: Plan of Care Review  Outcome: Ongoing (interventions implemented as appropriate)    12/16/17 0226   Coping/Psychosocial Response Interventions   Plan Of Care Reviewed With patient   Patient Care Overview   Progress no change   Outcome Evaluation   Outcome Summary/Follow up Plan Pt. denies pain so far this shift; O2 PRN; no SOA reported; will monitor.       Goal: Adult Individualization and Mutuality  Outcome: Ongoing (interventions implemented as appropriate)  Goal: Discharge Needs Assessment  Outcome: Ongoing (interventions implemented as appropriate)    Problem: COPD, Chronic Bronchitis/Emphysema (Adult)  Goal: Signs and Symptoms of Listed Potential Problems Will be Absent or Manageable (COPD, Chronic Bronchitis/Emphysema)  Outcome: Ongoing (interventions implemented as appropriate)    Problem: Infection, Risk/Actual (Adult)  Goal: Identify Related Risk Factors and Signs and Symptoms  Outcome: Ongoing (interventions implemented as appropriate)  Goal: Infection Prevention/Resolution  Outcome: Ongoing (interventions implemented as appropriate)

## 2017-12-17 LAB
ALBUMIN SERPL-MCNC: 4.4 G/DL (ref 3.5–5)
ALBUMIN/GLOB SERPL: 1.7 G/DL (ref 1.1–2.5)
ALP SERPL-CCNC: 66 U/L (ref 24–120)
ALT SERPL W P-5'-P-CCNC: 46 U/L (ref 0–54)
ANION GAP SERPL CALCULATED.3IONS-SCNC: 10 MMOL/L (ref 4–13)
ANION GAP SERPL CALCULATED.3IONS-SCNC: 10 MMOL/L (ref 4–13)
AST SERPL-CCNC: 22 U/L (ref 7–45)
BASOPHILS # BLD AUTO: 0.04 10*3/MM3 (ref 0–0.2)
BASOPHILS # BLD AUTO: 0.05 10*3/MM3 (ref 0–0.2)
BASOPHILS NFR BLD AUTO: 0.4 % (ref 0–2)
BASOPHILS NFR BLD AUTO: 0.6 % (ref 0–2)
BILIRUB SERPL-MCNC: 0.2 MG/DL (ref 0.1–1)
BUN BLD-MCNC: 13 MG/DL (ref 5–21)
BUN BLD-MCNC: 14 MG/DL (ref 5–21)
BUN/CREAT SERPL: 21 (ref 7–25)
BUN/CREAT SERPL: 22.2 (ref 7–25)
CALCIUM SPEC-SCNC: 9.3 MG/DL (ref 8.4–10.4)
CALCIUM SPEC-SCNC: 9.5 MG/DL (ref 8.4–10.4)
CHLORIDE SERPL-SCNC: 94 MMOL/L (ref 98–110)
CHLORIDE SERPL-SCNC: 99 MMOL/L (ref 98–110)
CO2 SERPL-SCNC: 24 MMOL/L (ref 24–31)
CO2 SERPL-SCNC: 26 MMOL/L (ref 24–31)
CREAT BLD-MCNC: 0.62 MG/DL (ref 0.5–1.4)
CREAT BLD-MCNC: 0.63 MG/DL (ref 0.5–1.4)
DEPRECATED RDW RBC AUTO: 39.6 FL (ref 40–54)
DEPRECATED RDW RBC AUTO: 41.1 FL (ref 40–54)
EOSINOPHIL # BLD AUTO: 0.18 10*3/MM3 (ref 0–0.7)
EOSINOPHIL # BLD AUTO: 0.24 10*3/MM3 (ref 0–0.7)
EOSINOPHIL NFR BLD AUTO: 1.9 % (ref 0–4)
EOSINOPHIL NFR BLD AUTO: 2.8 % (ref 0–4)
ERYTHROCYTE [DISTWIDTH] IN BLOOD BY AUTOMATED COUNT: 12.3 % (ref 12–15)
ERYTHROCYTE [DISTWIDTH] IN BLOOD BY AUTOMATED COUNT: 12.5 % (ref 12–15)
GFR SERPL CREATININE-BSD FRML MDRD: 131 ML/MIN/1.73
GFR SERPL CREATININE-BSD FRML MDRD: 134 ML/MIN/1.73
GLOBULIN UR ELPH-MCNC: 2.6 GM/DL
GLUCOSE BLD-MCNC: 90 MG/DL (ref 70–100)
GLUCOSE BLD-MCNC: 93 MG/DL (ref 70–100)
HCT VFR BLD AUTO: 37.2 % (ref 40–52)
HCT VFR BLD AUTO: 39 % (ref 40–52)
HGB BLD-MCNC: 12.9 G/DL (ref 14–18)
HGB BLD-MCNC: 13.3 G/DL (ref 14–18)
IMM GRANULOCYTES # BLD: 0.03 10*3/MM3 (ref 0–0.03)
IMM GRANULOCYTES # BLD: 0.03 10*3/MM3 (ref 0–0.03)
IMM GRANULOCYTES NFR BLD: 0.3 % (ref 0–5)
IMM GRANULOCYTES NFR BLD: 0.4 % (ref 0–5)
INR PPP: 0.94 (ref 0.91–1.09)
LYMPHOCYTES # BLD AUTO: 2.83 10*3/MM3 (ref 0.72–4.86)
LYMPHOCYTES # BLD AUTO: 3.08 10*3/MM3 (ref 0.72–4.86)
LYMPHOCYTES NFR BLD AUTO: 32.4 % (ref 15–45)
LYMPHOCYTES NFR BLD AUTO: 33.6 % (ref 15–45)
MCH RBC QN AUTO: 30.3 PG (ref 28–32)
MCH RBC QN AUTO: 30.3 PG (ref 28–32)
MCHC RBC AUTO-ENTMCNC: 34.1 G/DL (ref 33–36)
MCHC RBC AUTO-ENTMCNC: 34.7 G/DL (ref 33–36)
MCV RBC AUTO: 87.3 FL (ref 82–95)
MCV RBC AUTO: 88.8 FL (ref 82–95)
MONOCYTES # BLD AUTO: 0.81 10*3/MM3 (ref 0.19–1.3)
MONOCYTES # BLD AUTO: 0.82 10*3/MM3 (ref 0.19–1.3)
MONOCYTES NFR BLD AUTO: 8.5 % (ref 4–12)
MONOCYTES NFR BLD AUTO: 9.7 % (ref 4–12)
NEUTROPHILS # BLD AUTO: 4.46 10*3/MM3 (ref 1.87–8.4)
NEUTROPHILS # BLD AUTO: 5.37 10*3/MM3 (ref 1.87–8.4)
NEUTROPHILS NFR BLD AUTO: 52.9 % (ref 39–78)
NEUTROPHILS NFR BLD AUTO: 56.5 % (ref 39–78)
NRBC BLD MANUAL-RTO: 0 /100 WBC (ref 0–0)
NRBC BLD MANUAL-RTO: 0 /100 WBC (ref 0–0)
PLATELET # BLD AUTO: 388 10*3/MM3 (ref 130–400)
PLATELET # BLD AUTO: 401 10*3/MM3 (ref 130–400)
PMV BLD AUTO: 9.4 FL (ref 6–12)
PMV BLD AUTO: 9.5 FL (ref 6–12)
POTASSIUM BLD-SCNC: 4.1 MMOL/L (ref 3.5–5.3)
POTASSIUM BLD-SCNC: 4.3 MMOL/L (ref 3.5–5.3)
PROT SERPL-MCNC: 7 G/DL (ref 6.3–8.7)
PROTHROMBIN TIME: 12.8 SECONDS (ref 11.9–14.6)
RBC # BLD AUTO: 4.26 10*6/MM3 (ref 4.8–5.9)
RBC # BLD AUTO: 4.39 10*6/MM3 (ref 4.8–5.9)
SODIUM BLD-SCNC: 128 MMOL/L (ref 135–145)
SODIUM BLD-SCNC: 135 MMOL/L (ref 135–145)
WBC NRBC COR # BLD: 8.43 10*3/MM3 (ref 4.8–10.8)
WBC NRBC COR # BLD: 9.51 10*3/MM3 (ref 4.8–10.8)

## 2017-12-17 PROCEDURE — 94799 UNLISTED PULMONARY SVC/PX: CPT

## 2017-12-17 PROCEDURE — 85610 PROTHROMBIN TIME: CPT | Performed by: INTERNAL MEDICINE

## 2017-12-17 PROCEDURE — 80048 BASIC METABOLIC PNL TOTAL CA: CPT | Performed by: NURSE PRACTITIONER

## 2017-12-17 PROCEDURE — 99232 SBSQ HOSP IP/OBS MODERATE 35: CPT | Performed by: INTERNAL MEDICINE

## 2017-12-17 PROCEDURE — 85025 COMPLETE CBC W/AUTO DIFF WBC: CPT | Performed by: INTERNAL MEDICINE

## 2017-12-17 PROCEDURE — 80053 COMPREHEN METABOLIC PANEL: CPT | Performed by: INTERNAL MEDICINE

## 2017-12-17 PROCEDURE — 25010000002 ENOXAPARIN PER 10 MG: Performed by: INTERNAL MEDICINE

## 2017-12-17 PROCEDURE — 25010000002 FUROSEMIDE PER 20 MG: Performed by: NURSE PRACTITIONER

## 2017-12-17 PROCEDURE — 85025 COMPLETE CBC W/AUTO DIFF WBC: CPT | Performed by: NURSE PRACTITIONER

## 2017-12-17 RX ORDER — SODIUM CHLORIDE 9 MG/ML
75 INJECTION, SOLUTION INTRAVENOUS CONTINUOUS
Status: DISCONTINUED | OUTPATIENT
Start: 2017-12-17 | End: 2017-12-18

## 2017-12-17 RX ORDER — ASPIRIN 81 MG/1
81 TABLET ORAL DAILY
Status: DISCONTINUED | OUTPATIENT
Start: 2017-12-18 | End: 2017-12-18 | Stop reason: HOSPADM

## 2017-12-17 RX ORDER — SODIUM CHLORIDE 9 MG/ML
50 INJECTION, SOLUTION INTRAVENOUS ONCE
Status: COMPLETED | OUTPATIENT
Start: 2017-12-18 | End: 2017-12-18

## 2017-12-17 RX ORDER — HYDROCODONE BITARTRATE AND ACETAMINOPHEN 5; 325 MG/1; MG/1
1 TABLET ORAL EVERY 4 HOURS PRN
Status: DISCONTINUED | OUTPATIENT
Start: 2017-12-17 | End: 2017-12-17 | Stop reason: SDUPTHER

## 2017-12-17 RX ORDER — ACETAMINOPHEN 325 MG/1
650 TABLET ORAL EVERY 4 HOURS PRN
Status: DISCONTINUED | OUTPATIENT
Start: 2017-12-17 | End: 2017-12-18 | Stop reason: HOSPADM

## 2017-12-17 RX ORDER — ASPIRIN 81 MG/1
324 TABLET, CHEWABLE ORAL ONCE
Status: COMPLETED | OUTPATIENT
Start: 2017-12-17 | End: 2017-12-18

## 2017-12-17 RX ORDER — NITROGLYCERIN 0.4 MG/1
0.4 TABLET SUBLINGUAL
Status: DISCONTINUED | OUTPATIENT
Start: 2017-12-17 | End: 2017-12-18 | Stop reason: HOSPADM

## 2017-12-17 RX ORDER — DIPHENHYDRAMINE HCL 50 MG
50 CAPSULE ORAL ONCE
Status: DISCONTINUED | OUTPATIENT
Start: 2017-12-17 | End: 2017-12-18 | Stop reason: HOSPADM

## 2017-12-17 RX ORDER — ONDANSETRON 2 MG/ML
4 INJECTION INTRAMUSCULAR; INTRAVENOUS EVERY 6 HOURS PRN
Status: DISCONTINUED | OUTPATIENT
Start: 2017-12-17 | End: 2017-12-18 | Stop reason: HOSPADM

## 2017-12-17 RX ORDER — SODIUM CHLORIDE 0.9 % (FLUSH) 0.9 %
1-10 SYRINGE (ML) INJECTION AS NEEDED
Status: DISCONTINUED | OUTPATIENT
Start: 2017-12-17 | End: 2017-12-18 | Stop reason: HOSPADM

## 2017-12-17 RX ADMIN — IPRATROPIUM BROMIDE 0.5 MG: 0.5 SOLUTION RESPIRATORY (INHALATION) at 08:20

## 2017-12-17 RX ADMIN — AZITHROMYCIN 500 MG: 250 TABLET, FILM COATED ORAL at 11:27

## 2017-12-17 RX ADMIN — CEFDINIR 300 MG: 300 CAPSULE ORAL at 21:44

## 2017-12-17 RX ADMIN — FUROSEMIDE 40 MG: 10 INJECTION, SOLUTION INTRAMUSCULAR; INTRAVENOUS at 08:18

## 2017-12-17 RX ADMIN — DOCUSATE SODIUM 100 MG: 100 CAPSULE ORAL at 08:17

## 2017-12-17 RX ADMIN — HYDROCODONE BITARTRATE AND ACETAMINOPHEN 1 TABLET: 5; 325 TABLET ORAL at 07:02

## 2017-12-17 RX ADMIN — POTASSIUM CHLORIDE 20 MEQ: 750 CAPSULE, EXTENDED RELEASE ORAL at 17:04

## 2017-12-17 RX ADMIN — ENOXAPARIN SODIUM 40 MG: 40 INJECTION SUBCUTANEOUS at 08:17

## 2017-12-17 RX ADMIN — CEFDINIR 300 MG: 300 CAPSULE ORAL at 08:16

## 2017-12-17 RX ADMIN — DESMOPRESSIN ACETATE 80 MG: 0.2 TABLET ORAL at 08:17

## 2017-12-17 RX ADMIN — IPRATROPIUM BROMIDE 0.5 MG: 0.5 SOLUTION RESPIRATORY (INHALATION) at 16:16

## 2017-12-17 RX ADMIN — IPRATROPIUM BROMIDE 0.5 MG: 0.5 SOLUTION RESPIRATORY (INHALATION) at 12:13

## 2017-12-17 RX ADMIN — HYDROCODONE BITARTRATE AND ACETAMINOPHEN 1 TABLET: 5; 325 TABLET ORAL at 12:08

## 2017-12-17 RX ADMIN — DOCUSATE SODIUM 100 MG: 100 CAPSULE ORAL at 18:49

## 2017-12-17 RX ADMIN — ASPIRIN 81 MG 81 MG: 81 TABLET ORAL at 08:17

## 2017-12-17 RX ADMIN — CITALOPRAM 20 MG: 20 TABLET, FILM COATED ORAL at 08:17

## 2017-12-17 RX ADMIN — POTASSIUM CHLORIDE 20 MEQ: 750 CAPSULE, EXTENDED RELEASE ORAL at 08:17

## 2017-12-17 RX ADMIN — IPRATROPIUM BROMIDE 0.5 MG: 0.5 SOLUTION RESPIRATORY (INHALATION) at 20:59

## 2017-12-17 RX ADMIN — HYDROCODONE BITARTRATE AND ACETAMINOPHEN 1 TABLET: 5; 325 TABLET ORAL at 19:23

## 2017-12-17 RX ADMIN — POTASSIUM CHLORIDE 40 MEQ: 750 CAPSULE, EXTENDED RELEASE ORAL at 08:18

## 2017-12-17 NOTE — PROGRESS NOTES
[facility] Medicine Services  INPATIENT PROGRESS NOTE    Length of Stay: 4  Date of Admission: 12/13/2017  Primary Care Physician: hSarad Cline MD    Subjective   Chief Complaint: follow up soa and chest pain     HPI   Changed antibiotics to oral yesterday.  Getting a heart cath tomorrow morning. Vascular to see patient tomorrow.  Discussed with Dr. Abrams. Patient denies any shortness of breath or chest pain.  He states that he feels good.  Ready to get this heart cath done and figure out the next step.     Review of Systems   Constitutional: Positive for activity change and fatigue. Negative for appetite change and chills.   HENT: Negative for hearing loss, nosebleeds, tinnitus and trouble swallowing.    Eyes: Negative for visual disturbance.   Respiratory: Positive for cough. Negative for chest tightness.    Cardiovascular: Negative for palpitations.   Gastrointestinal: Negative for abdominal distention, blood in stool, constipation, diarrhea and nausea.   Endocrine: Negative for cold intolerance, heat intolerance, polydipsia, polyphagia and polyuria.   Genitourinary: Negative for decreased urine volume, difficulty urinating, dysuria, flank pain, frequency and hematuria.   Musculoskeletal: Negative for arthralgias, joint swelling and myalgias.   Allergic/Immunologic: Negative for immunocompromised state.   Neurological: Positive for weakness. Negative for dizziness, syncope and light-headedness.   Hematological: Negative for adenopathy. Does not bruise/bleed easily.   Psychiatric/Behavioral: Negative for confusion and sleep disturbance. The patient is not nervous/anxious.       All pertinent negatives and positives are as above. All other systems have been reviewed and are negative unless otherwise stated.     Objective    Temp:  [96.6 °F (35.9 °C)-98.4 °F (36.9 °C)] 96.6 °F (35.9 °C)  Heart Rate:  [71-88] 71  Resp:  [14-20] 16  BP: ()/(49-75) 85/49     Physical Exam    Constitutional: He is oriented to person, place, and time. He appears well-developed and well-nourished.   Thin frame    HENT:   Head: Normocephalic and atraumatic.   Prominent carotid bruit b/l   Eyes: Conjunctivae and EOM are normal. Pupils are equal, round, and reactive to light.   Neck: Neck supple. No JVD present. No thyromegaly present.   Cardiovascular: Normal rate, regular rhythm and intact distal pulses.  Exam reveals no gallop and no friction rub.    Murmur heard.   Systolic murmur is present with a grade of 3/6   Sinus 74-97   Pulmonary/Chest: Effort normal and breath sounds normal. No respiratory distress. He has no wheezes. He has no rales. He exhibits no tenderness.   Diminished bilateral bases. On room air.    Abdominal: Soft. Bowel sounds are normal. He exhibits no distension. There is no tenderness. There is no rebound and no guarding.   Musculoskeletal: Normal range of motion. He exhibits no edema, tenderness or deformity.   Lymphadenopathy:     He has no cervical adenopathy.   Neurological: He is alert and oriented to person, place, and time. He displays normal reflexes. No cranial nerve deficit. He exhibits normal muscle tone.   Skin: Skin is warm and dry. No rash noted.   Psychiatric: He has a normal mood and affect. His behavior is normal. Judgment and thought content normal.   Vitals reviewed.    Results Review:  I have reviewed the labs, radiology results, and diagnostic studies.    Laboratory Data:     Intake/Output Summary (Last 24 hours) at 12/17/17 0612  Last data filed at 12/16/17 1840   Gross per 24 hour   Intake              720 ml   Output             2700 ml   Net            -1980 ml       Results from last 7 days  Lab Units 12/17/17  0449 12/16/17  0417 12/15/17  0444   WBC 10*3/mm3 8.43 9.49 9.92   HEMOGLOBIN g/dL 13.3* 13.0* 13.0*   HEMATOCRIT % 39.0* 38.2* 36.7*   PLATELETS 10*3/mm3 388 392 346       Results from last 7 days  Lab Units 12/16/17  0417 12/15/17  0444  17  0532 17  0517 17  0020   SODIUM mmol/L 136 134* 135 135 135   POTASSIUM mmol/L 4.3 3.8 3.3* 3.4* 3.5   CHLORIDE mmol/L 99 97* 95* 98 97*   CO2 mmol/L 28.0 29.0 31.0 24.0 25.0   BUN mg/dL 12 14 21 9 9   CREATININE mg/dL 0.61 0.64 0.69 0.56 0.66   CALCIUM mg/dL 9.4 9.2 9.3 9.2 9.1   BILIRUBIN mg/dL  --   --   --  0.3 0.1   ALK PHOS U/L  --   --   --  82 96   ALT (SGPT) U/L  --   --   --  43 41   AST (SGOT) U/L  --   --   --  38 32   GLUCOSE mg/dL 96 96 118* 151* 156*     Culture Data:   Blood Culture   Date Value Ref Range Status   2017 No growth at 24 hours  Preliminary   2017 No growth at 24 hours  Preliminary     Radiology Data:   Imaging Results (last 24 hours)     ** No results found for the last 24 hours. **        Sharad Ryder   Echocardiogram   Order# [id number]    Reading physician: Son Hunter MD   Ordering physician: KIRBY Richards   Study date: 17         Patient Information      Patient Name MRN Sex  (Age)     Sharad Ryder [id number] Male 1960 (57 y.o.)       Admission Information      Admission Date/Time Discharge Date/Time Room/Bed     17  0010  495/1       Sedation Narrator Report      Sedation Narrator Report       Interpretation Summary      · Left ventricular wall thickness is consistent with moderate-to-severe concentric hypertrophy.  · Left ventricular systolic function is normal. Estimated EF = 70%.  · Left ventricular diastolic dysfunction (grade I a) consistent with impaired relaxation.  · Left atrial cavity size is mildly dilated.  · calcification of the aortic valve  · Severe aortic valve stenosis is present.  · The AV appears to be bicuspid     I have reviewed the patient current medications.     Assessment/Plan     Assessment:  1. Severe aortic stenosis  2. Severe right carotid bulb stenosis   3. Acute hypoxic respiratory failure, Spo2 88% on 40% venti mask  4. Acute  Pulmonary edema, secondary to severe aortic stenosis  5.  Chest pain with mildly elevated troponin  6. Diastolic dysfunction, impaired relaxation, preserved EF 70%  7. Essential hypertension  8. Hyperlipidemia  9. Leukocytosis, improving  10. Hypokalemia, mild  11. Ongoing tobacco abuse  12. Fusiform aneurysmal dilation of the infrarenal aorta up to 3.4 cm, No  evidence of aneurysm rupture.    Plan:  1. Heart catheterization in the AM per Dr. Francois   2. Oral antibiotic therapy: Rocephin 5/7 and Azithromycin 5/5  3. Continue Potassium 20 mEq BID.  4. Continue Lasix 40 mg daily   5. Appreciate CTS and Cardiology assistance   6. CBC and BMP in AM  7. Continue Lovenox 40 mg SC every 24 hours for DVT prophylaxis  8. Continue oxygen therapy to keep saturations > 90%  9. Add colace and miralax  10. As needed tylenol  11. Lipid panel - total 159, HDL 40, , Triglycerirdes 207  12. HgbA1c 5.6  13. Ambulate TID in [place]  14. Vascular surgery to see patient for severe right carotid bulb stenosis     Discharge Planning: I expect the patient to be discharged to home in ? days.     KIRBY Greer   12/17/17   6:12 AM    I personally evaluated and examined the patient in conjunction with KIRBY Vides and agree with the assessment, treatment plan, and disposition of the patient as recorded by her. My history, exam, and further recommendations are:   Stable vital signs with blood pressure 103/62, heart rate 74, respiratory rate 16 temperature 97.1  Doing well, no apparent distress, normal respiratory effort, positive murmur and carotid  Bruit, edema, no cyanosis or clubbing  Metabolic Panel [[id number]] (Normal) Collected: 12/17/17 0449        Lab Status: Final result Specimen: Blood Updated: 12/17/17 0627        Glucose 93 mg/dL         BUN 13 mg/dL         Creatinine 0.62 mg/dL         Sodium 135 mmol/L         Potassium 4.3 mmol/L         Chloride 99 mmol/L         CO2 26.0 mmol/L         Calcium 9.5 mg/dL         eGFR Non African Amer 134 mL/min/1.73         BUN/Creatinine  Ratio 21.0        Anion Gap 10.0 mmol/L        Narrative:         GFR Normal >60  Chronic Kidney Disease <60  Kidney Failure <15       CBC Auto Differential [[id number]] (Abnormal) Collected: 12/17/17 0449       Lab Status: Final result Specimen: Blood Updated: 12/17/17 0545        WBC 8.43 10*3/mm3         RBC 4.39 (L) 10*6/mm3         Hemoglobin 13.3 (L) g/dL         Hematocrit 39.0 (L) %         MCV 88.8 fL         MCH 30.3 pg         MCHC 34.1 g/dL         RDW 12.5 %         RDW-SD 41.1 fl         MPV 9.4 fL         Platelets 388 10*3/mm3         Neutrophil % 52.9 %         Lymphocyte % 33.6 %         Monocyte % 9.7 %         Eosinophil % 2.8 %         Basophil % 0.6 %         Immature Grans % 0.4 %         Neutrophils, Absolute 4.46 10*3/mm3         Lymphocytes, Absolute 2.83 10*3/mm3         Monocytes, Absolute 0.82 10*3/mm3         Eosinophils, Absolute 0.24 10*3/mm3         Basophils, Absolute 0.05 10*3/mm3         Immature Grans, Absolute 0.03 10*3/mm3         nRBC 0.0 /100 WBC        CBC (No Diff) [[id number]] (Abnormal) Collected      Anticipate cardiac catheterization tomorrow  Awaiting vascular consult  Continue present management  Hamlet Quinteros MD  12/17/17  11:45 AM

## 2017-12-17 NOTE — PROGRESS NOTES
LOS: 4 days   Patient Care Team:  Sharad KLEIN MD as PCP - General (Family Medicine)    Chief Complaint: Shortness of breath  Subjective  Feeling  better  No chest pain   Less  shortness of breath  Less orthopneic  No new complaints    Interval History: Improved overall    Patient Complaints:   Chief Complaint   Patient presents with   • Shortness of Breath     Denies chest pain currently. Denies excessive shortness of breath. Denies abdominal pain, nausea vomiting or diarrhoea.    Telemetry: no malignant arrhythmia. No significant pauses.    Review of Systems   Constitutional: Negative for chills, fatigue and fever.   HENT: Negative.    Eyes: Negative.    Respiratory: Negative for cough, chest wall soreness,   Mild  shortness of breath, no wheezing, no stridor.    Cardiovascular: Negative for chest pain, palpitations and leg swelling.   Gastrointestinal: Negative for abdominal distention, Mild abdominal pain, No blood in stool, constipation, diarrhea, nausea and vomiting.   Endocrine: Negative.    Genitourinary: Negative for difficulty urinating, dysuria, flank pain and hematuria.   Musculoskeletal: Negative.    Skin: Negative for rash and wound.   Allergic/Immunologic: Negative.    Neurological: Negative for dizziness, syncope, weakness, light-headedness and headaches.   Hematological: Does not bruise/bleed easily.   Psychiatric/Behavioral: Negative for agitation, behavioral problems, confusion, the patient is not nervous/anxious.       History:   Past Medical History:   Diagnosis Date   • Arthritis    • Hyperlipidemia    • Hypertension      Past Surgical History:   Procedure Laterality Date   • FINGER SURGERY Right 1990     Social History     Social History   • Marital status:      Spouse name: N/A   • Number of children: N/A   • Years of education: N/A     Occupational History   • Not on file.     Social History Main Topics   • Smoking status: Current Every Day Smoker     Packs/day: 1.50     Types:  Cigarettes   • Smokeless tobacco: Not on file   • Alcohol use No   • Drug use: No   • Sexual activity: Defer     Other Topics Concern   • Not on file     Social History Narrative   • No narrative on file     History reviewed. No pertinent family history.    Labs:  WBC WBC   Date Value Ref Range Status   12/17/2017 8.43 4.80 - 10.80 10*3/mm3 Final   12/16/2017 9.49 4.80 - 10.80 10*3/mm3 Final   12/15/2017 9.92 4.80 - 10.80 10*3/mm3 Final      HGB Hemoglobin   Date Value Ref Range Status   12/17/2017 13.3 (L) 14.0 - 18.0 g/dL Final   12/16/2017 13.0 (L) 14.0 - 18.0 g/dL Final   12/15/2017 13.0 (L) 14.0 - 18.0 g/dL Final      HCT Hematocrit   Date Value Ref Range Status   12/17/2017 39.0 (L) 40.0 - 52.0 % Final   12/16/2017 38.2 (L) 40.0 - 52.0 % Final   12/15/2017 36.7 (L) 40.0 - 52.0 % Final      Platlets Platelets   Date Value Ref Range Status   12/17/2017 388 130 - 400 10*3/mm3 Final   12/16/2017 392 130 - 400 10*3/mm3 Final   12/15/2017 346 130 - 400 10*3/mm3 Final      MCV MCV   Date Value Ref Range Status   12/17/2017 88.8 82.0 - 95.0 fL Final   12/16/2017 89.0 82.0 - 95.0 fL Final   12/15/2017 87.6 82.0 - 95.0 fL Final          Results from last 7 days  Lab Units 12/17/17  0449 12/16/17  0417 12/15/17  0444  12/13/17  0517 12/13/17  0020   SODIUM mmol/L 135 136 134*  < > 135 135   POTASSIUM mmol/L 4.3 4.3 3.8  < > 3.4* 3.5   CHLORIDE mmol/L 99 99 97*  < > 98 97*   CO2 mmol/L 26.0 28.0 29.0  < > 24.0 25.0   BUN mg/dL 13 12 14  < > 9 9   CREATININE mg/dL 0.62 0.61 0.64  < > 0.56 0.66   CALCIUM mg/dL 9.5 9.4 9.2  < > 9.2 9.1   BILIRUBIN mg/dL  --   --   --   --  0.3 0.1   ALK PHOS U/L  --   --   --   --  82 96   ALT (SGPT) U/L  --   --   --   --  43 41   AST (SGOT) U/L  --   --   --   --  38 32   GLUCOSE mg/dL 93 96 96  < > 151* 156*   < > = values in this interval not displayed.  Lab Results   Component Value Date    TROPONINI 0.071 (H) 12/13/2017     PT/INR:    No results found for: PROTIME/  No results found  for: INR    Imaging Results (last 72 hours)     Procedure Component Value Units Date/Time    XR Chest 1 View [[id number]] Collected:  12/13/17 0703     Updated:  12/13/17 0707    Narrative:       EXAMINATION: XR CHEST 1 VW-. 12/13/2017 8:03 AM EST     CHEST, ONE VIEW:     HISTORY: Respiratory distress     COMPARISON: None     A single frontal chest radiograph was obtained.     FINDINGS:     Bilateral perihilar interstitial prominence appreciated with mild  interstitial prominence also noted in the lung bases with minimal patchy  airspace opacities in the right lung base medially. Septal line/Kerley B  lines also observed. Correlate for mild interstitial pulmonary edema  possible cardiogenic etiology.     Bony structures are intact.                                     Impression:       1. Interstitial prominence with mild patchy airspace opacities in the  right lung base, acute interstitial infiltration from an inflammatory  process considered. Pulmonary edema also a differential consideration.     This report was finalized on 12/13/2017 07:04 by Dr. Vito Abrams MD.    XR Chest 1 View [[id number]] Collected:  12/14/17 0739     Updated:  12/14/17 0744    Narrative:       History:  57-year-old evaluated for pulmonary edema.      Reference:  Chest radiograph one day prior.     Findings:  Frontal chest radiograph performed.     Heart and mediastinum unchanged. Atherosclerotic thoracic aorta.  Background emphysema. Right basilar opacities remain. No new or  increasing opacity. No pleural effusion or pneumothorax. No acute  osseous finding.       Impression:       Impression:  Persistent opacities at the right base suspicious for pneumonia.  Emphysema.  This report was finalized on 12/14/2017 07:41 by  Abel Suarez, .    US Carotid Bilateral [[id number]] Collected:  12/14/17 1652     Updated:  12/14/17 1656    Narrative:       History: Bruit       Impression:       Impression:  1. There is less than 50% stenosis of the right  internal carotid artery.  2. There is less than 50% stenosis of the left internal carotid artery.  3. Antegrade flow is demonstrated in bilateral vertebral arteries.  4. There is heavy plaque burden at the right bifurcation. Further  imaging with a CTA the neck may be warranted.     Comments: Bilateral carotid vertebral arterial duplex scan was  performed.     Grayscale imaging shows intimal thickening and calcified elements at the  carotid bifurcation. The right internal carotid artery peak systolic  velocity is 102 cm/sec. The end-diastolic velocity is 34.6 cm/sec. The  right ICA/CCA ratio is approximately 1.79 . These findings correlate  with less than 50% stenosis of the right internal carotid artery.     Grayscale imaging shows intimal thickening and calcified elements at the  carotid bifurcation. The left internal carotid artery peak systolic  velocity is 105 cm/sec. The end-diastolic velocity is 37.7 cm/sec. The  left ICA/CCA ratio is approximately 1.0 . These findings correlate with  less than 50% stenosis of the left internal carotid artery.     Antegrade flow is demonstrated in bilateral vertebral arteries.       This report was finalized on 12/14/2017 16:53 by Dr. Jl Salgado MD.    CT Angiogram Chest With & Without Contrast [[id number]] Collected:  12/14/17 1753     Updated:  12/14/17 1801    Narrative:       CT ANGIOGRAM CHEST W WO CONTRAST- 12/14/2017 5:14 PM CST      HISTORY: chest pain; R06.02-Shortness of breath; J18.1-Lobar pneumonia,  unspecified organism      COMPARISON: None.      DOSE LENGTH PRODUCT: 275 mGy cm. Automated exposure control was also  utilized to decrease patient radiation dose.     TECHNIQUE: Helical tomographic images of the chest were obtained after  the administration of intravenous contrast following angiogram protocol.  Additionally, 3D and multiplanar reformatted images were provided.        FINDINGS:    Pulmonary arteries: There is adequate enhancement of the  pulmonary  arteries to evaluate for central and segmental pulmonary emboli. There  are no filling defects within the main, lobar, segmental or visualized  subsegmental pulmonary arteries. The pulmonary arteries are within  normal limits for size.      Aorta and great vessels: The aorta is well opacified and demonstrates no  dissection or aneurysm. The great vessels are normal in appearance. Mild  ostial narrowing of the left common carotid and left subclavian origins.     Visualized neck base: The imaged portion of the base of the neck appears  unremarkable.      Lungs: There is severe background lung emphysema. Bilateral dependent  lung atelectasis. No mass or suspicious pulmonary nodule. No acute lung  infiltrate. The trachea and bronchial tree are patent.      Heart: The heart is normal in size. There is no pericardial effusion.  Moderate coronary artery atheromatous calcification.     Mediastinum and lymph nodes: No enlarged mediastinal, hilar, or axillary  lymph nodes are present.      Skeletal and soft tissues: Age-indeterminate mild wedge compression  deformity at T11. No destructive bone lesion.       Impression:       1. No evidence of pulmonary embolus or other acute cardiopulmonary  process.  2. Severe background lung emphysema.  3. Moderate coronary artery atheromatous calcification.  4. Age-indeterminate mild compression deformity at T11.        This report was finalized on 12/14/2017 17:58 by Dr Javy Romano, .    CT Angiogram Abdomen Pelvis With & Without Contrast [[id number]] Collected:  12/14/17 1808     Updated:  12/14/17 1914    Narrative:       CT ANGIOGRAM ABDOMEN PELVIS W WO CONTRAST- 12/14/2017 5:14 PM CST     HISTORY: groin pain, ? hx of aneursym; R06.02-Shortness of breath;  J18.1-Lobar pneumonia, unspecified organism     COMPARISON: None     DOSE LENGTH PRODUCT: 275 mGy cm. Automated exposure control was also  utilized to decrease patient radiation dose.     TECHNIQUE: Helical tomographic  images of the abdomen and pelvis  utilizing angiographic protocol were obtained following the intravenous  infusion of contrast. Multiplanar and 3 D reformatted images were  provided for review.     FINDINGS:     Angiogram:  Moderate atheromatous calcification throughout the aorta. The origins of  the celiac and SMA are unremarkable. Renal artery origins are  unremarkable. There is fusiform aneurysmal dilation of an approximately  4.6 cm segment of the infrarenal aorta with the aneurysm sac measuring  up to 3.4 cm (series 4-image [id number]). No evidence of aneurysm rupture. There  is moderate atheromatous narrowing of the bilateral common iliac artery  origins. Moderate diffuse disease throughout the bilateral internal and  external iliac arteries. No vessel cut off identified. Incidentally  noted small accessory left renal artery.     Other findings:  Extensive colonic diverticulosis without evidence of acute  diverticulitis. Moderate prostatomegaly with coarse calcification  identified in the gland. No suspicious adenopathy in the pelvis. Severe  L5-S1 level degenerative change with at least mild spinal stenosis.       Impression:       1. Fusiform aneurysmal dilation of the infrarenal aorta up to 3.4 cm. No  evidence of aneurysm rupture.  2. Colonic diverticulosis without acute diverticulitis. No acute process  identified in the abdomen on this arteriogram.     This report was finalized on 12/14/2017 19:11 by Dr Javy Romano, .          Objective     No Known Allergies    Medication Review: Performed  Current Facility-Administered Medications   Medication Dose Route Frequency Provider Last Rate Last Dose   • acetaminophen (TYLENOL) tablet 650 mg  650 mg Oral Q6H PRN KIRBY Greer   650 mg at 12/14/17 1246   • aspirin chewable tablet 81 mg  81 mg Oral Daily Hamlet Quinteros MD   81 mg at 12/17/17 0817   • atorvastatin (LIPITOR) tablet 80 mg  80 mg Oral Daily KIRBY So   80 mg at 12/17/17 0817  "  • cefdinir (OMNICEF) capsule 300 mg  300 mg Oral Q12H Hamlet Quinteros MD   300 mg at 12/17/17 0816   • chlorthalidone (HYGROTON) tablet 25 mg  25 mg Oral Daily Manoj Allen MD   25 mg at 12/16/17 1024   • citalopram (CeleXA) tablet 20 mg  20 mg Oral Daily KIRBY Greer   20 mg at 12/17/17 0817   • docusate sodium (COLACE) capsule 100 mg  100 mg Oral BID KIRBY Greer   100 mg at 12/17/17 0817   • enoxaparin (LOVENOX) syringe 40 mg  40 mg Subcutaneous Q24H Manoj Allen MD   40 mg at 12/17/17 0817   • furosemide (LASIX) injection 40 mg  40 mg Intravenous Daily KIRBY Greer   40 mg at 12/17/17 0818   • HYDROcodone-acetaminophen (NORCO) 5-325 MG per tablet 1 tablet  1 tablet Oral Q4H PRN KIRBY Greer   1 tablet at 12/17/17 1208   • ipratropium (ATROVENT) nebulizer solution 0.5 mg  0.5 mg Nebulization 4x Daily - RT Manoj Allen MD   0.5 mg at 12/17/17 1616   • lisinopril (PRINIVIL,ZESTRIL) tablet 20 mg  20 mg Oral Daily Manoj Allen MD   20 mg at 12/16/17 1024   • polyethylene glycol (MIRALAX) packet 17 g  17 g Oral Daily KIRBY Greer   17 g at 12/14/17 0833   • potassium chloride (MICRO-K) CR capsule 20 mEq  20 mEq Oral BID With Meals KIRBY Greer   20 mEq at 12/17/17 0817   • potassium chloride (MICRO-K) CR capsule 40 mEq  40 mEq Oral Daily Hamlet Quinteros MD   40 mEq at 12/17/17 0818   • sodium chloride 0.9 % flush 1-10 mL  1-10 mL Intravenous PRN Manoj Allen MD           Vital Sign Min/Max for last 24 hours  Temp  Min: 96.6 °F (35.9 °C)  Max: 98.4 °F (36.9 °C)   BP  Min: 84/52  Max: 107/64   Pulse  Min: 69  Max: 88   Resp  Min: 14  Max: 20   SpO2  Min: 91 %  Max: 96 %   Flow (L/min)  Min: 2  Max: 2   Weight  Min: 58.6 kg (129 lb 1.6 oz)  Max: 58.6 kg (129 lb 1.6 oz)     Flowsheet Rows         First Filed Value    Admission Height  167.6 cm (66\") Documented at 12/13/2017 0121    Admission Weight  59 kg (130 lb) " Documented at 12/13/2017 0121          Results for orders placed during the hospital encounter of 12/13/17   Adult Transthoracic Echo Complete W/ Cont if Necessary Per Protocol    Narrative · Left ventricular wall thickness is consistent with moderate-to-severe   concentric hypertrophy.  · Left ventricular systolic function is normal. Estimated EF = 70%.  · Left ventricular diastolic dysfunction (grade I a) consistent with   impaired relaxation.  · Left atrial cavity size is mildly dilated.  · calcification of the aortic valve  · Severe aortic valve stenosis is present.  · The AV appears to be bicuspid          Physical Exam:  Eyes: Pupils equal and reactive    Ears: Appear intact with no abnormalities noted  Nose: Nares normal, septum midline, mucosa normal and no drainage  Neck: supple, trachea midline, no thyromegaly, no carotid bruit and no JVD  Back: no kyphosis present, no scoliosis present, no skin lesions, erythema, or scars, no tenderness to percussion or palpation and range of motion normal  Lungs: respirations regular, respirations even and respirations unlabored  Heart: normal S1, S2,  2/6 pansystolic murmur in the left sternal border, , no rub and no click  Abdomen: soft  Skin: no bleeding, bruising or rash     Results Review:   I reviewed the patient's new clinical results.  I reviewed the patient's new imaging results and agree with the interpretation.  I reviewed the patient's other test results and agree with the interpretation  I personally viewed and interpreted the patient's EKG/Telemetry data  Discussed with patient and present family member(s) and Dr Robin    Assessment/Plan     Active Problems:    Shortness of breath    Severe aortic valve stenosis    Tobacco use    Hyperlipidemia    Hypertension      Plan  Continue same medications  Cardiac cath tomorrow 2 pm  NPO after light breakfast  Supportive care  Telemetry  Optimal medical therapy  Deep vein thrombosis  prophylaxis/precautions  Appropriate diet, fluid, sodium, caffeine, stimulants intake   Compliance to diet and medications   Avoid NSAIDS  Improved  Will follow up, thank you for the opportunity to participate in the care of this  very pleasant.     Aime Francois MD  12/17/17  4:50 PM

## 2017-12-17 NOTE — PROGRESS NOTES
LOS: 3 days   Patient Care Team:  Sharad KLEIN MD as PCP - General (Family Medicine)    Chief Complaint: Shortness of breath  Subjective  Feeling  better  No chest pain   Moderate  shortness of breath  Less orthopneic  No new complaints    Interval History: Improved overall    Patient Complaints:   Chief Complaint   Patient presents with   • Shortness of Breath     Denies chest pain currently. Denies excessive shortness of breath. Denies abdominal pain, nausea vomiting or diarrhoea.    Telemetry: no malignant arrhythmia. No significant pauses.    Review of Systems   Constitutional: Negative for chills, fatigue and fever.   HENT: Negative.    Eyes: Negative.    Respiratory: Negative for cough, chest wall soreness,   Mild to moderate shortness of breath, no wheezing, no stridor.    Cardiovascular: Negative for chest pain, palpitations and leg swelling.   Gastrointestinal: Negative for abdominal distention, Mild abdominal pain, No blood in stool, constipation, diarrhea, nausea and vomiting.   Endocrine: Negative.    Genitourinary: Negative for difficulty urinating, dysuria, flank pain and hematuria.   Musculoskeletal: Negative.    Skin: Negative for rash and wound.   Allergic/Immunologic: Negative.    Neurological: Negative for dizziness, syncope, weakness, light-headedness and headaches.   Hematological: Does not bruise/bleed easily.   Psychiatric/Behavioral: Negative for agitation, behavioral problems, confusion, the patient is not nervous/anxious.       History:   Past Medical History:   Diagnosis Date   • Arthritis    • Hyperlipidemia    • Hypertension      Past Surgical History:   Procedure Laterality Date   • FINGER SURGERY Right 1990     Social History     Social History   • Marital status:      Spouse name: N/A   • Number of children: N/A   • Years of education: N/A     Occupational History   • Not on file.     Social History Main Topics   • Smoking status: Current Every Day Smoker     Packs/day:  1.50     Types: Cigarettes   • Smokeless tobacco: Not on file   • Alcohol use No   • Drug use: No   • Sexual activity: Defer     Other Topics Concern   • Not on file     Social History Narrative   • No narrative on file     History reviewed. No pertinent family history.    Labs:  WBC WBC   Date Value Ref Range Status   12/16/2017 9.49 4.80 - 10.80 10*3/mm3 Final   12/15/2017 9.92 4.80 - 10.80 10*3/mm3 Final   12/14/2017 16.57 (H) 4.80 - 10.80 10*3/mm3 Final      HGB Hemoglobin   Date Value Ref Range Status   12/16/2017 13.0 (L) 14.0 - 18.0 g/dL Final   12/15/2017 13.0 (L) 14.0 - 18.0 g/dL Final   12/14/2017 12.3 (L) 14.0 - 18.0 g/dL Final      HCT Hematocrit   Date Value Ref Range Status   12/16/2017 38.2 (L) 40.0 - 52.0 % Final   12/15/2017 36.7 (L) 40.0 - 52.0 % Final   12/14/2017 35.8 (L) 40.0 - 52.0 % Final      Platlets Platelets   Date Value Ref Range Status   12/16/2017 392 130 - 400 10*3/mm3 Final   12/15/2017 346 130 - 400 10*3/mm3 Final   12/14/2017 359 130 - 400 10*3/mm3 Final      MCV MCV   Date Value Ref Range Status   12/16/2017 89.0 82.0 - 95.0 fL Final   12/15/2017 87.6 82.0 - 95.0 fL Final   12/14/2017 88.0 82.0 - 95.0 fL Final          Results from last 7 days  Lab Units 12/16/17  0417 12/15/17  0444 12/14/17  0532 12/13/17  0517 12/13/17  0020   SODIUM mmol/L 136 134* 135 135 135   POTASSIUM mmol/L 4.3 3.8 3.3* 3.4* 3.5   CHLORIDE mmol/L 99 97* 95* 98 97*   CO2 mmol/L 28.0 29.0 31.0 24.0 25.0   BUN mg/dL 12 14 21 9 9   CREATININE mg/dL 0.61 0.64 0.69 0.56 0.66   CALCIUM mg/dL 9.4 9.2 9.3 9.2 9.1   BILIRUBIN mg/dL  --   --   --  0.3 0.1   ALK PHOS U/L  --   --   --  82 96   ALT (SGPT) U/L  --   --   --  43 41   AST (SGOT) U/L  --   --   --  38 32   GLUCOSE mg/dL 96 96 118* 151* 156*     Lab Results   Component Value Date    TROPONINI 0.071 (H) 12/13/2017     PT/INR:    No results found for: PROTIME/  No results found for: INR    Imaging Results (last 72 hours)     Procedure Component Value Units  Date/Time    XR Chest 1 View [[id number]] Collected:  12/13/17 0703     Updated:  12/13/17 0707    Narrative:       EXAMINATION: XR CHEST 1 VW-. 12/13/2017 8:03 AM EST     CHEST, ONE VIEW:     HISTORY: Respiratory distress     COMPARISON: None     A single frontal chest radiograph was obtained.     FINDINGS:     Bilateral perihilar interstitial prominence appreciated with mild  interstitial prominence also noted in the lung bases with minimal patchy  airspace opacities in the right lung base medially. Septal line/Kerley B  lines also observed. Correlate for mild interstitial pulmonary edema  possible cardiogenic etiology.     Bony structures are intact.                                     Impression:       1. Interstitial prominence with mild patchy airspace opacities in the  right lung base, acute interstitial infiltration from an inflammatory  process considered. Pulmonary edema also a differential consideration.     This report was finalized on 12/13/2017 07:04 by Dr. Vito Abrams MD.    XR Chest 1 View [[id number]] Collected:  12/14/17 0739     Updated:  12/14/17 0744    Narrative:       History:  57-year-old evaluated for pulmonary edema.      Reference:  Chest radiograph one day prior.     Findings:  Frontal chest radiograph performed.     Heart and mediastinum unchanged. Atherosclerotic thoracic aorta.  Background emphysema. Right basilar opacities remain. No new or  increasing opacity. No pleural effusion or pneumothorax. No acute  osseous finding.       Impression:       Impression:  Persistent opacities at the right base suspicious for pneumonia.  Emphysema.  This report was finalized on 12/14/2017 07:41 by  Abel Suarez, .    US Carotid Bilateral [[id number]] Collected:  12/14/17 1652     Updated:  12/14/17 1656    Narrative:       History: Bruit       Impression:       Impression:  1. There is less than 50% stenosis of the right internal carotid artery.  2. There is less than 50% stenosis of the left internal  carotid artery.  3. Antegrade flow is demonstrated in bilateral vertebral arteries.  4. There is heavy plaque burden at the right bifurcation. Further  imaging with a CTA the neck may be warranted.     Comments: Bilateral carotid vertebral arterial duplex scan was  performed.     Grayscale imaging shows intimal thickening and calcified elements at the  carotid bifurcation. The right internal carotid artery peak systolic  velocity is 102 cm/sec. The end-diastolic velocity is 34.6 cm/sec. The  right ICA/CCA ratio is approximately 1.79 . These findings correlate  with less than 50% stenosis of the right internal carotid artery.     Grayscale imaging shows intimal thickening and calcified elements at the  carotid bifurcation. The left internal carotid artery peak systolic  velocity is 105 cm/sec. The end-diastolic velocity is 37.7 cm/sec. The  left ICA/CCA ratio is approximately 1.0 . These findings correlate with  less than 50% stenosis of the left internal carotid artery.     Antegrade flow is demonstrated in bilateral vertebral arteries.       This report was finalized on 12/14/2017 16:53 by Dr. Jl Salgado MD.    CT Angiogram Chest With & Without Contrast [[id number]] Collected:  12/14/17 1753     Updated:  12/14/17 1801    Narrative:       CT ANGIOGRAM CHEST W WO CONTRAST- 12/14/2017 5:14 PM CST      HISTORY: chest pain; R06.02-Shortness of breath; J18.1-Lobar pneumonia,  unspecified organism      COMPARISON: None.      DOSE LENGTH PRODUCT: 275 mGy cm. Automated exposure control was also  utilized to decrease patient radiation dose.     TECHNIQUE: Helical tomographic images of the chest were obtained after  the administration of intravenous contrast following angiogram protocol.  Additionally, 3D and multiplanar reformatted images were provided.        FINDINGS:    Pulmonary arteries: There is adequate enhancement of the pulmonary  arteries to evaluate for central and segmental pulmonary emboli. There  are no  filling defects within the main, lobar, segmental or visualized  subsegmental pulmonary arteries. The pulmonary arteries are within  normal limits for size.      Aorta and great vessels: The aorta is well opacified and demonstrates no  dissection or aneurysm. The great vessels are normal in appearance. Mild  ostial narrowing of the left common carotid and left subclavian origins.     Visualized neck base: The imaged portion of the base of the neck appears  unremarkable.      Lungs: There is severe background lung emphysema. Bilateral dependent  lung atelectasis. No mass or suspicious pulmonary nodule. No acute lung  infiltrate. The trachea and bronchial tree are patent.      Heart: The heart is normal in size. There is no pericardial effusion.  Moderate coronary artery atheromatous calcification.     Mediastinum and lymph nodes: No enlarged mediastinal, hilar, or axillary  lymph nodes are present.      Skeletal and soft tissues: Age-indeterminate mild wedge compression  deformity at T11. No destructive bone lesion.       Impression:       1. No evidence of pulmonary embolus or other acute cardiopulmonary  process.  2. Severe background lung emphysema.  3. Moderate coronary artery atheromatous calcification.  4. Age-indeterminate mild compression deformity at T11.        This report was finalized on 12/14/2017 17:58 by Dr Javy Romano, .    CT Angiogram Abdomen Pelvis With & Without Contrast [[id number]] Collected:  12/14/17 1808     Updated:  12/14/17 1914    Narrative:       CT ANGIOGRAM ABDOMEN PELVIS W WO CONTRAST- 12/14/2017 5:14 PM CST     HISTORY: groin pain, ? hx of aneursym; R06.02-Shortness of breath;  J18.1-Lobar pneumonia, unspecified organism     COMPARISON: None     DOSE LENGTH PRODUCT: 275 mGy cm. Automated exposure control was also  utilized to decrease patient radiation dose.     TECHNIQUE: Helical tomographic images of the abdomen and pelvis  utilizing angiographic protocol were obtained following  the intravenous  infusion of contrast. Multiplanar and 3 D reformatted images were  provided for review.     FINDINGS:     Angiogram:  Moderate atheromatous calcification throughout the aorta. The origins of  the celiac and SMA are unremarkable. Renal artery origins are  unremarkable. There is fusiform aneurysmal dilation of an approximately  4.6 cm segment of the infrarenal aorta with the aneurysm sac measuring  up to 3.4 cm (series 4-image 77). No evidence of aneurysm rupture. There  is moderate atheromatous narrowing of the bilateral common iliac artery  origins. Moderate diffuse disease throughout the bilateral internal and  external iliac arteries. No vessel cut off identified. Incidentally  noted small accessory left renal artery.     Other findings:  Extensive colonic diverticulosis without evidence of acute  diverticulitis. Moderate prostatomegaly with coarse calcification  identified in the gland. No suspicious adenopathy in the pelvis. Severe  L5-S1 level degenerative change with at least mild spinal stenosis.       Impression:       1. Fusiform aneurysmal dilation of the infrarenal aorta up to 3.4 cm. No  evidence of aneurysm rupture.  2. Colonic diverticulosis without acute diverticulitis. No acute process  identified in the abdomen on this arteriogram.     This report was finalized on 12/14/2017 19:11 by Dr Javy Romano, .          Objective     No Known Allergies    Medication Review: Performed  Current Facility-Administered Medications   Medication Dose Route Frequency Provider Last Rate Last Dose   • acetaminophen (TYLENOL) tablet 650 mg  650 mg Oral Q6H PRN KIRBY Greer   650 mg at 12/14/17 1246   • aspirin chewable tablet 81 mg  81 mg Oral Daily Hamlet Quinteros MD   81 mg at 12/16/17 1156   • atorvastatin (LIPITOR) tablet 80 mg  80 mg Oral Daily KIRBY So   80 mg at 12/16/17 1023   • [START ON 12/17/2017] azithromycin (ZITHROMAX) tablet 500 mg  500 mg Oral Q24H Hamlet  "Jordan Quinteros MD       • cefdinir (OMNICEF) capsule 300 mg  300 mg Oral Q12H Hamlet Quinteros MD       • chlorthalidone (HYGROTON) tablet 25 mg  25 mg Oral Daily Manoj Allen MD   25 mg at 12/16/17 1024   • citalopram (CeleXA) tablet 20 mg  20 mg Oral Daily KIRBY Greer   20 mg at 12/16/17 1023   • docusate sodium (COLACE) capsule 100 mg  100 mg Oral BID KIRBY Greer   100 mg at 12/16/17 1810   • enoxaparin (LOVENOX) syringe 40 mg  40 mg Subcutaneous Q24H Manoj Allen MD   40 mg at 12/16/17 1024   • furosemide (LASIX) injection 40 mg  40 mg Intravenous Daily KIRBY Greer   40 mg at 12/16/17 1024   • HYDROcodone-acetaminophen (NORCO) 5-325 MG per tablet 1 tablet  1 tablet Oral Q4H PRN KIRBY Greer   1 tablet at 12/16/17 1524   • ipratropium (ATROVENT) nebulizer solution 0.5 mg  0.5 mg Nebulization 4x Daily - RT Manoj Allen MD   0.5 mg at 12/16/17 2039   • lisinopril (PRINIVIL,ZESTRIL) tablet 20 mg  20 mg Oral Daily Manoj Allen MD   20 mg at 12/16/17 1024   • polyethylene glycol (MIRALAX) packet 17 g  17 g Oral Daily KIRBY Greer   17 g at 12/14/17 0833   • potassium chloride (MICRO-K) CR capsule 20 mEq  20 mEq Oral BID With Meals KIRBY Greer   20 mEq at 12/16/17 1810   • potassium chloride (MICRO-K) CR capsule 40 mEq  40 mEq Oral Daily Hamlet Quinteros MD   40 mEq at 12/16/17 1025   • sodium chloride 0.9 % flush 1-10 mL  1-10 mL Intravenous PRN Manoj Allen MD           Vital Sign Min/Max for last 24 hours  Temp  Min: 97.5 °F (36.4 °C)  Max: 98.4 °F (36.9 °C)   BP  Min: 88/58  Max: 119/84   Pulse  Min: 70  Max: 88   Resp  Min: 16  Max: 20   SpO2  Min: 92 %  Max: 96 %   Flow (L/min)  Min: 2  Max: 2   No Data Recorded     Flowsheet Rows         First Filed Value    Admission Height  167.6 cm (66\") Documented at 12/13/2017 0121    Admission Weight  59 kg (130 lb) Documented at 12/13/2017 0121          Results for " orders placed during the hospital encounter of 12/13/17   Adult Transthoracic Echo Complete W/ Cont if Necessary Per Protocol    Narrative · Left ventricular wall thickness is consistent with moderate-to-severe   concentric hypertrophy.  · Left ventricular systolic function is normal. Estimated EF = 70%.  · Left ventricular diastolic dysfunction (grade I a) consistent with   impaired relaxation.  · Left atrial cavity size is mildly dilated.  · calcification of the aortic valve  · Severe aortic valve stenosis is present.  · The AV appears to be bicuspid          Physical Exam:  Eyes: Pupils equal and reactive    Ears: Appear intact with no abnormalities noted  Nose: Nares normal, septum midline, mucosa normal and no drainage  Neck: supple, trachea midline, no thyromegaly, no carotid bruit and no JVD  Back: no kyphosis present, no scoliosis present, no skin lesions, erythema, or scars, no tenderness to percussion or palpation and range of motion normal  Lungs: respirations regular, respirations even and respirations unlabored  Heart: normal S1, S2,  2/6 pansystolic murmur in the left sternal border, , no rub and no click  Abdomen: soft  Skin: no bleeding, bruising or rash     Results Review:   I reviewed the patient's new clinical results.  I reviewed the patient's new imaging results and agree with the interpretation.  I reviewed the patient's other test results and agree with the interpretation  I personally viewed and interpreted the patient's EKG/Telemetry data  Discussed with patient and present family member(s) and Dr Robin    Assessment/Plan     Active Problems:    Shortness of breath    Severe aortic valve stenosis    Tobacco use    Hyperlipidemia    Hypertension      Plan  Continue same medications  Cardiac cath in 2 days  Supportive care  Telemetry  Optimal medical therapy  Deep vein thrombosis prophylaxis/precautions  Appropriate diet, fluid, sodium, caffeine, stimulants intake   Compliance to diet and  medications   Avoid NSAIDS  Improved  Will follow up, thank you for the opportunity to participate in the care of this  very pleasant.     Aime Francois MD  12/16/17  8:56 PM

## 2017-12-17 NOTE — PLAN OF CARE
Problem: Patient Care Overview (Adult)  Goal: Plan of Care Review  Outcome: Ongoing (interventions implemented as appropriate)    12/17/17 0414   Coping/Psychosocial Response Interventions   Plan Of Care Reviewed With patient   Patient Care Overview   Progress improving   Outcome Evaluation   Outcome Summary/Follow up Plan Patient's systolic BP drops into the 80s while patient is sleeping, however patient shows no adverse signs r/t low bp. All other patient's vitals remained stable through shift. Patient's O2 saturation remains 91-94 on room air.       Goal: Adult Individualization and Mutuality  Outcome: Ongoing (interventions implemented as appropriate)  Goal: Discharge Needs Assessment  Outcome: Ongoing (interventions implemented as appropriate)    Problem: COPD, Chronic Bronchitis/Emphysema (Adult)  Goal: Signs and Symptoms of Listed Potential Problems Will be Absent or Manageable (COPD, Chronic Bronchitis/Emphysema)  Outcome: Ongoing (interventions implemented as appropriate)    Problem: Infection, Risk/Actual (Adult)  Goal: Identify Related Risk Factors and Signs and Symptoms  Outcome: Ongoing (interventions implemented as appropriate)  Goal: Infection Prevention/Resolution  Outcome: Ongoing (interventions implemented as appropriate)

## 2017-12-18 LAB
ANION GAP SERPL CALCULATED.3IONS-SCNC: 12 MMOL/L (ref 4–13)
BACTERIA SPEC AEROBE CULT: NORMAL
BACTERIA SPEC AEROBE CULT: NORMAL
BUN BLD-MCNC: 11 MG/DL (ref 5–21)
BUN/CREAT SERPL: 17.5 (ref 7–25)
CALCIUM SPEC-SCNC: 9.9 MG/DL (ref 8.4–10.4)
CHLORIDE SERPL-SCNC: 95 MMOL/L (ref 98–110)
CO2 SERPL-SCNC: 29 MMOL/L (ref 24–31)
CREAT BLD-MCNC: 0.63 MG/DL (ref 0.5–1.4)
GFR SERPL CREATININE-BSD FRML MDRD: 131 ML/MIN/1.73
GLUCOSE BLD-MCNC: 94 MG/DL (ref 70–100)
POTASSIUM BLD-SCNC: 4.4 MMOL/L (ref 3.5–5.3)
SODIUM BLD-SCNC: 136 MMOL/L (ref 135–145)

## 2017-12-18 PROCEDURE — C1894 INTRO/SHEATH, NON-LASER: HCPCS | Performed by: INTERNAL MEDICINE

## 2017-12-18 PROCEDURE — 93456 R HRT CORONARY ARTERY ANGIO: CPT | Performed by: INTERNAL MEDICINE

## 2017-12-18 PROCEDURE — 0 IOPAMIDOL PER 1 ML: Performed by: INTERNAL MEDICINE

## 2017-12-18 PROCEDURE — 94799 UNLISTED PULMONARY SVC/PX: CPT

## 2017-12-18 PROCEDURE — C1769 GUIDE WIRE: HCPCS | Performed by: INTERNAL MEDICINE

## 2017-12-18 PROCEDURE — C1760 CLOSURE DEV, VASC: HCPCS | Performed by: INTERNAL MEDICINE

## 2017-12-18 PROCEDURE — 80048 BASIC METABOLIC PNL TOTAL CA: CPT | Performed by: NURSE PRACTITIONER

## 2017-12-18 PROCEDURE — B2111ZZ FLUOROSCOPY OF MULTIPLE CORONARY ARTERIES USING LOW OSMOLAR CONTRAST: ICD-10-PCS | Performed by: INTERNAL MEDICINE

## 2017-12-18 PROCEDURE — 25010000002 FUROSEMIDE PER 20 MG: Performed by: NURSE PRACTITIONER

## 2017-12-18 PROCEDURE — 25010000002 ENOXAPARIN PER 10 MG: Performed by: INTERNAL MEDICINE

## 2017-12-18 PROCEDURE — 4A023N6 MEASUREMENT OF CARDIAC SAMPLING AND PRESSURE, RIGHT HEART, PERCUTANEOUS APPROACH: ICD-10-PCS | Performed by: INTERNAL MEDICINE

## 2017-12-18 PROCEDURE — 93454 CORONARY ARTERY ANGIO S&I: CPT | Performed by: INTERNAL MEDICINE

## 2017-12-18 RX ORDER — ATORVASTATIN CALCIUM 40 MG/1
80 TABLET, FILM COATED ORAL NIGHTLY
Status: DISCONTINUED | OUTPATIENT
Start: 2017-12-18 | End: 2017-12-19 | Stop reason: HOSPADM

## 2017-12-18 RX ORDER — DOCUSATE SODIUM 100 MG/1
100 CAPSULE, LIQUID FILLED ORAL 2 TIMES DAILY
Status: DISCONTINUED | OUTPATIENT
Start: 2017-12-18 | End: 2017-12-19 | Stop reason: HOSPADM

## 2017-12-18 RX ORDER — POLYETHYLENE GLYCOL 3350 17 G/17G
17 POWDER, FOR SOLUTION ORAL DAILY
Status: DISCONTINUED | OUTPATIENT
Start: 2017-12-18 | End: 2017-12-19 | Stop reason: HOSPADM

## 2017-12-18 RX ORDER — CITALOPRAM 20 MG/1
20 TABLET ORAL DAILY
Status: DISCONTINUED | OUTPATIENT
Start: 2017-12-19 | End: 2017-12-18 | Stop reason: SDUPTHER

## 2017-12-18 RX ORDER — CEFDINIR 300 MG/1
300 CAPSULE ORAL EVERY 12 HOURS SCHEDULED
Status: DISCONTINUED | OUTPATIENT
Start: 2017-12-18 | End: 2017-12-19 | Stop reason: HOSPADM

## 2017-12-18 RX ORDER — HYDROCODONE BITARTRATE AND ACETAMINOPHEN 5; 325 MG/1; MG/1
1 TABLET ORAL EVERY 4 HOURS PRN
Status: DISCONTINUED | OUTPATIENT
Start: 2017-12-18 | End: 2017-12-18 | Stop reason: SDUPTHER

## 2017-12-18 RX ORDER — HYDROCODONE BITARTRATE AND ACETAMINOPHEN 5; 325 MG/1; MG/1
1 TABLET ORAL EVERY 4 HOURS PRN
Status: DISCONTINUED | OUTPATIENT
Start: 2017-12-18 | End: 2017-12-19 | Stop reason: HOSPADM

## 2017-12-18 RX ORDER — CITALOPRAM 20 MG/1
20 TABLET ORAL DAILY
Status: DISCONTINUED | OUTPATIENT
Start: 2017-12-19 | End: 2017-12-19 | Stop reason: HOSPADM

## 2017-12-18 RX ORDER — SODIUM CHLORIDE 9 MG/ML
100 INJECTION, SOLUTION INTRAVENOUS CONTINUOUS
Status: DISCONTINUED | OUTPATIENT
Start: 2017-12-18 | End: 2017-12-19

## 2017-12-18 RX ORDER — CHLORTHALIDONE 25 MG/1
25 TABLET ORAL DAILY
Status: DISCONTINUED | OUTPATIENT
Start: 2017-12-19 | End: 2017-12-19 | Stop reason: HOSPADM

## 2017-12-18 RX ORDER — LISINOPRIL 20 MG/1
20 TABLET ORAL
Status: DISCONTINUED | OUTPATIENT
Start: 2017-12-18 | End: 2017-12-19 | Stop reason: HOSPADM

## 2017-12-18 RX ORDER — FUROSEMIDE 10 MG/ML
40 INJECTION INTRAMUSCULAR; INTRAVENOUS DAILY
Status: DISCONTINUED | OUTPATIENT
Start: 2017-12-19 | End: 2017-12-19

## 2017-12-18 RX ORDER — LIDOCAINE HYDROCHLORIDE 20 MG/ML
INJECTION, SOLUTION INFILTRATION; PERINEURAL AS NEEDED
Status: DISCONTINUED | OUTPATIENT
Start: 2017-12-18 | End: 2017-12-18 | Stop reason: HOSPADM

## 2017-12-18 RX ADMIN — CITALOPRAM 20 MG: 20 TABLET, FILM COATED ORAL at 09:04

## 2017-12-18 RX ADMIN — DESMOPRESSIN ACETATE 80 MG: 0.2 TABLET ORAL at 09:04

## 2017-12-18 RX ADMIN — POLYETHYLENE GLYCOL (3350) 17 G: 17 POWDER, FOR SOLUTION ORAL at 09:05

## 2017-12-18 RX ADMIN — IPRATROPIUM BROMIDE 0.5 MG: 0.5 SOLUTION RESPIRATORY (INHALATION) at 10:48

## 2017-12-18 RX ADMIN — HYDROCODONE BITARTRATE AND ACETAMINOPHEN 1 TABLET: 5; 325 TABLET ORAL at 17:43

## 2017-12-18 RX ADMIN — DOCUSATE SODIUM 100 MG: 100 CAPSULE ORAL at 09:04

## 2017-12-18 RX ADMIN — ENOXAPARIN SODIUM 40 MG: 40 INJECTION SUBCUTANEOUS at 21:58

## 2017-12-18 RX ADMIN — FUROSEMIDE 40 MG: 10 INJECTION, SOLUTION INTRAMUSCULAR; INTRAVENOUS at 09:05

## 2017-12-18 RX ADMIN — POTASSIUM CHLORIDE 40 MEQ: 750 CAPSULE, EXTENDED RELEASE ORAL at 09:04

## 2017-12-18 RX ADMIN — CEFDINIR 300 MG: 300 CAPSULE ORAL at 21:47

## 2017-12-18 RX ADMIN — SODIUM CHLORIDE 50 ML/HR: 9 INJECTION, SOLUTION INTRAVENOUS at 10:03

## 2017-12-18 RX ADMIN — SODIUM CHLORIDE 100 ML/HR: 9 INJECTION, SOLUTION INTRAVENOUS at 21:59

## 2017-12-18 RX ADMIN — CEFDINIR 300 MG: 300 CAPSULE ORAL at 09:05

## 2017-12-18 RX ADMIN — SODIUM CHLORIDE 100 ML/HR: 9 INJECTION, SOLUTION INTRAVENOUS at 18:36

## 2017-12-18 RX ADMIN — IPRATROPIUM BROMIDE 0.5 MG: 0.5 SOLUTION RESPIRATORY (INHALATION) at 07:01

## 2017-12-18 RX ADMIN — CHLORTHALIDONE 25 MG: 25 TABLET ORAL at 09:04

## 2017-12-18 RX ADMIN — Medication 324 MG: at 09:28

## 2017-12-18 RX ADMIN — POTASSIUM CHLORIDE 20 MEQ: 750 CAPSULE, EXTENDED RELEASE ORAL at 18:38

## 2017-12-18 RX ADMIN — DESMOPRESSIN ACETATE 80 MG: 0.2 TABLET ORAL at 21:48

## 2017-12-18 RX ADMIN — HYDROCODONE BITARTRATE AND ACETAMINOPHEN 1 TABLET: 5; 325 TABLET ORAL at 09:28

## 2017-12-18 RX ADMIN — HYDROCODONE BITARTRATE AND ACETAMINOPHEN 1 TABLET: 5; 325 TABLET ORAL at 21:57

## 2017-12-18 NOTE — PLAN OF CARE
Problem: Patient Care Overview (Adult)  Goal: Plan of Care Review    12/17/17 7410   Coping/Psychosocial Response Interventions   Plan Of Care Reviewed With patient   Patient Care Overview   Progress improving   Outcome Evaluation   Outcome Summary/Follow up Plan Cardiac cath tomorrow at 1400, NPO after light breakfast, pt refused lisinopril and hygrotron this AM due to low BPs, patient on RA         Problem: COPD, Chronic Bronchitis/Emphysema (Adult)  Goal: Signs and Symptoms of Listed Potential Problems Will be Absent or Manageable (COPD, Chronic Bronchitis/Emphysema)  Outcome: Ongoing (interventions implemented as appropriate)    Problem: Infection, Risk/Actual (Adult)  Goal: Identify Related Risk Factors and Signs and Symptoms  Outcome: Ongoing (interventions implemented as appropriate)  Goal: Infection Prevention/Resolution  Outcome: Ongoing (interventions implemented as appropriate)

## 2017-12-18 NOTE — PLAN OF CARE
Problem: Patient Care Overview (Adult)  Goal: Plan of Care Review  Outcome: Ongoing (interventions implemented as appropriate)    12/18/17 031   Coping/Psychosocial Response Interventions   Plan Of Care Reviewed With patient   Patient Care Overview   Progress improving   Outcome Evaluation   Outcome Summary/Follow up Plan cardiac cath today at 1400. pt rested well no c/o pain this shift. vss. sbp 110's. up ad linda. hibicleans bath given.        Goal: Adult Individualization and Mutuality  Outcome: Ongoing (interventions implemented as appropriate)  Goal: Discharge Needs Assessment  Outcome: Ongoing (interventions implemented as appropriate)    Problem: COPD, Chronic Bronchitis/Emphysema (Adult)  Goal: Signs and Symptoms of Listed Potential Problems Will be Absent or Manageable (COPD, Chronic Bronchitis/Emphysema)  Outcome: Ongoing (interventions implemented as appropriate)    Problem: Infection, Risk/Actual (Adult)  Goal: Identify Related Risk Factors and Signs and Symptoms  Outcome: Ongoing (interventions implemented as appropriate)  Goal: Infection Prevention/Resolution  Outcome: Ongoing (interventions implemented as appropriate)    Problem: Cardiac Catheterization with/without PCI (Adult)  Goal: Signs and Symptoms of Listed Potential Problems Will be Absent or Manageable (Cardiac Catheterization with/without PCI)  Outcome: Ongoing (interventions implemented as appropriate)

## 2017-12-18 NOTE — PROGRESS NOTES
Continued Stay Note   Cynthia     Patient Name: Sharad Ryder  MRN: [id number]  Today's Date: 12/18/2017    Admit Date: 12/13/2017          Discharge Plan       12/18/17 1446    Case Management/Social Work Plan    Plan Home    Patient/Family In Agreement With Plan yes    Additional Comments Pt had a heart cath today. He has been on O2 while in the hospital. Will arrange if pt needs and qualifies at d/c.               Discharge Codes     None            TERRELL Reed

## 2017-12-18 NOTE — PROGRESS NOTES
[facility]  INPATIENT PROGRESS NOTE    Length of Stay: 5  Date of Admission: 12/13/2017  Primary Care Physician: Sharad Cline MD    Subjective   Chief Complaint: follow up soa and chest pain     HPI   Ready for his heart catheterization today.  Vascular should see today.  Depending on results of heart cath depends on if he can be discharged home prior to surgery or not.  He denies any chest pain or shortness of breath.     Review of Systems   Constitutional: Positive for activity change and fatigue. Negative for appetite change and chills.   HENT: Negative for hearing loss, nosebleeds, tinnitus and trouble swallowing.    Eyes: Negative for visual disturbance.   Respiratory: Positive for cough. Negative for chest tightness.    Cardiovascular: Negative for palpitations.   Gastrointestinal: Negative for abdominal distention, blood in stool, constipation, diarrhea and nausea.   Endocrine: Negative for cold intolerance, heat intolerance, polydipsia, polyphagia and polyuria.   Genitourinary: Negative for decreased urine volume, difficulty urinating, dysuria, flank pain, frequency and hematuria.   Musculoskeletal: Negative for arthralgias, joint swelling and myalgias.   Allergic/Immunologic: Negative for immunocompromised state.   Neurological: Positive for weakness. Negative for dizziness, syncope and light-headedness.   Hematological: Negative for adenopathy. Does not bruise/bleed easily.   Psychiatric/Behavioral: Negative for confusion and sleep disturbance. The patient is not nervous/anxious.       All pertinent negatives and positives are as above. All other systems have been reviewed and are negative unless otherwise stated.     Objective    Temp:  [97.1 °F (36.2 °C)-98.4 °F (36.9 °C)] 97.7 °F (36.5 °C)  Heart Rate:  [69-88] 72  Resp:  [16-20] 18  BP: ()/(44-81) 91/44     Physical Exam   Constitutional: He is oriented to person, place, and time. He  appears well-developed and well-nourished.   Thin frame    HENT:   Head: Normocephalic and atraumatic.   Prominent carotid bruit b/l   Eyes: Conjunctivae and EOM are normal. Pupils are equal, round, and reactive to light.   Neck: Neck supple. No JVD present. No thyromegaly present.   Cardiovascular: Normal rate, regular rhythm and intact distal pulses.  Exam reveals no gallop and no friction rub.    Murmur heard.   Systolic murmur is present with a grade of 3/6   Sinus 74-97   Pulmonary/Chest: Effort normal and breath sounds normal. No respiratory distress. He has no wheezes. He has no rales. He exhibits no tenderness.   Diminished bilateral bases. On room air.    Abdominal: Soft. Bowel sounds are normal. He exhibits no distension. There is no tenderness. There is no rebound and no guarding.   Musculoskeletal: Normal range of motion. He exhibits no edema, tenderness or deformity.   Lymphadenopathy:     He has no cervical adenopathy.   Neurological: He is alert and oriented to person, place, and time. He displays normal reflexes. No cranial nerve deficit. He exhibits normal muscle tone.   Skin: Skin is warm and dry. No rash noted.   Psychiatric: He has a normal mood and affect. His behavior is normal. Judgment and thought content normal.   Vitals reviewed.    Results Review:  I have reviewed the labs, radiology results, and diagnostic studies.    Laboratory Data:     Intake/Output Summary (Last 24 hours) at 12/18/17 0635  Last data filed at 12/17/17 2000   Gross per 24 hour   Intake              840 ml   Output             2700 ml   Net            -1860 ml       Results from last 7 days  Lab Units 12/17/17  1704 12/17/17  0449 12/16/17  0417   WBC 10*3/mm3 9.51 8.43 9.49   HEMOGLOBIN g/dL 12.9* 13.3* 13.0*   HEMATOCRIT % 37.2* 39.0* 38.2*   PLATELETS 10*3/mm3 401* 388 392       Results from last 7 days  Lab Units 12/18/17  0418 12/17/17  1704 12/17/17  0449  12/13/17  0517 12/13/17  0020   SODIUM mmol/L 136 128* 135   < > 135 135   POTASSIUM mmol/L 4.4 4.1 4.3  < > 3.4* 3.5   CHLORIDE mmol/L 95* 94* 99  < > 98 97*   CO2 mmol/L 29.0 24.0 26.0  < > 24.0 25.0   BUN mg/dL 11 14 13  < > 9 9   CREATININE mg/dL 0.63 0.63 0.62  < > 0.56 0.66   CALCIUM mg/dL 9.9 9.3 9.5  < > 9.2 9.1   BILIRUBIN mg/dL  --  0.2  --   --  0.3 0.1   ALK PHOS U/L  --  66  --   --  82 96   ALT (SGPT) U/L  --  46  --   --  43 41   AST (SGOT) U/L  --  22  --   --  38 32   GLUCOSE mg/dL 94 90 93  < > 151* 156*   < > = values in this interval not displayed.     Culture Data:   Blood Culture   Date Value Ref Range Status   2017 No growth at 24 hours  Preliminary   2017 No growth at 24 hours  Preliminary     Radiology Data:   Imaging Results (last 24 hours)     ** No results found for the last 24 hours. **        Sharad Ryder   Echocardiogram   Order# [id number]    Reading physician: Son Hunter MD   Ordering physician: KIRBY Richards   Study date: 17         Patient Information      Patient Name MRN Sex  (Age)     Sharad Ryder [id number] Male 1960 (57 y.o.)       Admission Information      Admission Date/Time Discharge Date/Time Room/Bed     17  0010  495/1       Sedation Narrator Report      Sedation Narrator Report       Interpretation Summary      · Left ventricular wall thickness is consistent with moderate-to-severe concentric hypertrophy.  · Left ventricular systolic function is normal. Estimated EF = 70%.  · Left ventricular diastolic dysfunction (grade I a) consistent with impaired relaxation.  · Left atrial cavity size is mildly dilated.  · calcification of the aortic valve  · Severe aortic valve stenosis is present.  · The AV appears to be bicuspid     I have reviewed the patient current medications.     Assessment/Plan     Assessment:  1. Severe aortic stenosis  2. Severe right carotid bulb stenosis   3. Acute hypoxic respiratory failure, Spo2 88% on 40% venti mask  4. Acute  Pulmonary edema, secondary to severe  aortic stenosis  5. Chest pain with mildly elevated troponin  6. Diastolic dysfunction, impaired relaxation, preserved EF 70%  7. Essential hypertension  8. Hyperlipidemia  9. Leukocytosis, improving  10. Hypokalemia, mild  11. Ongoing tobacco abuse  12. Fusiform aneurysmal dilation of the infrarenal aorta up to 3.4 cm, No  evidence of aneurysm rupture.    Plan:  1. Heart catheterization at 2 pm this afternoon with Dr. Francois.    2. Oral antibiotic therapy: Rocephin 6/7 and Azithromycin 5/5 completed on 12/17/17.  3. Continue Potassium 20 mEq BID.  4. Continue Lasix 40 mg daily   5. Appreciate CTS and Cardiology assistance   6. CBC and BMP in AM  7. Continue Lovenox 40 mg SC every 24 hours for DVT prophylaxis  8. Continue oxygen therapy to keep saturations > 90%  9. Add colace and miralax  10. As needed tylenol  11. Lipid panel - total 159, HDL 40, , Triglycerirdes 207  12. HgbA1c 5.6  13. Ambulate TID in landeros  14. Vascular surgery to see patient for severe right carotid bulb stenosis     Discharge Planning: I expect the patient to be discharged to home in ? days.     KIRBY Greer   12/18/17   6:35 AM    Chart reviewed.  Patient examined.   Agree with assessment and plan.  Still awaiting consult from vascular surgery placed on 12/15/2017 at 1611.  Will have floor recall consult to vascular surgeon.    Phuong Lerner,   12/18/17  5:54 PM

## 2017-12-19 ENCOUNTER — APPOINTMENT (OUTPATIENT)
Dept: PULMONOLOGY | Facility: HOSPITAL | Age: 57
End: 2017-12-19
Attending: THORACIC SURGERY (CARDIOTHORACIC VASCULAR SURGERY)

## 2017-12-19 VITALS
DIASTOLIC BLOOD PRESSURE: 68 MMHG | WEIGHT: 126.98 LBS | TEMPERATURE: 98.2 F | OXYGEN SATURATION: 95 % | BODY MASS INDEX: 20.41 KG/M2 | HEIGHT: 66 IN | RESPIRATION RATE: 18 BRPM | SYSTOLIC BLOOD PRESSURE: 116 MMHG | HEART RATE: 76 BPM

## 2017-12-19 LAB
ANION GAP SERPL CALCULATED.3IONS-SCNC: 8 MMOL/L (ref 4–13)
BASOPHILS # BLD AUTO: 0.06 10*3/MM3 (ref 0–0.2)
BASOPHILS NFR BLD AUTO: 0.5 % (ref 0–2)
BUN BLD-MCNC: 9 MG/DL (ref 5–21)
BUN/CREAT SERPL: 13.8 (ref 7–25)
CALCIUM SPEC-SCNC: 9.3 MG/DL (ref 8.4–10.4)
CHLORIDE SERPL-SCNC: 99 MMOL/L (ref 98–110)
CO2 SERPL-SCNC: 29 MMOL/L (ref 24–31)
CREAT BLD-MCNC: 0.65 MG/DL (ref 0.5–1.4)
DEPRECATED RDW RBC AUTO: 40.5 FL (ref 40–54)
EOSINOPHIL # BLD AUTO: 0.31 10*3/MM3 (ref 0–0.7)
EOSINOPHIL NFR BLD AUTO: 2.4 % (ref 0–4)
ERYTHROCYTE [DISTWIDTH] IN BLOOD BY AUTOMATED COUNT: 12.4 % (ref 12–15)
GFR SERPL CREATININE-BSD FRML MDRD: 127 ML/MIN/1.73
GLUCOSE BLD-MCNC: 104 MG/DL (ref 70–100)
HCT VFR BLD AUTO: 37.8 % (ref 40–52)
HGB BLD-MCNC: 13 G/DL (ref 14–18)
IMM GRANULOCYTES # BLD: 0.06 10*3/MM3 (ref 0–0.03)
IMM GRANULOCYTES NFR BLD: 0.5 % (ref 0–5)
LYMPHOCYTES # BLD AUTO: 3.2 10*3/MM3 (ref 0.72–4.86)
LYMPHOCYTES NFR BLD AUTO: 24.8 % (ref 15–45)
MCH RBC QN AUTO: 30.8 PG (ref 28–32)
MCHC RBC AUTO-ENTMCNC: 34.4 G/DL (ref 33–36)
MCV RBC AUTO: 89.6 FL (ref 82–95)
MONOCYTES # BLD AUTO: 1.28 10*3/MM3 (ref 0.19–1.3)
MONOCYTES NFR BLD AUTO: 9.9 % (ref 4–12)
NEUTROPHILS # BLD AUTO: 8 10*3/MM3 (ref 1.87–8.4)
NEUTROPHILS NFR BLD AUTO: 61.9 % (ref 39–78)
NRBC BLD MANUAL-RTO: 0 /100 WBC (ref 0–0)
PLATELET # BLD AUTO: 382 10*3/MM3 (ref 130–400)
PMV BLD AUTO: 9.3 FL (ref 6–12)
POTASSIUM BLD-SCNC: 3.8 MMOL/L (ref 3.5–5.3)
RBC # BLD AUTO: 4.22 10*6/MM3 (ref 4.8–5.9)
SODIUM BLD-SCNC: 136 MMOL/L (ref 135–145)
WBC NRBC COR # BLD: 12.91 10*3/MM3 (ref 4.8–10.8)

## 2017-12-19 PROCEDURE — 94726 PLETHYSMOGRAPHY LUNG VOLUMES: CPT

## 2017-12-19 PROCEDURE — 25010000002 FUROSEMIDE PER 20 MG: Performed by: INTERNAL MEDICINE

## 2017-12-19 PROCEDURE — 94060 EVALUATION OF WHEEZING: CPT

## 2017-12-19 PROCEDURE — 94799 UNLISTED PULMONARY SVC/PX: CPT

## 2017-12-19 PROCEDURE — 99232 SBSQ HOSP IP/OBS MODERATE 35: CPT | Performed by: INTERNAL MEDICINE

## 2017-12-19 PROCEDURE — 94729 DIFFUSING CAPACITY: CPT

## 2017-12-19 PROCEDURE — 99222 1ST HOSP IP/OBS MODERATE 55: CPT | Performed by: SURGERY

## 2017-12-19 PROCEDURE — 80048 BASIC METABOLIC PNL TOTAL CA: CPT | Performed by: NURSE PRACTITIONER

## 2017-12-19 PROCEDURE — 85025 COMPLETE CBC W/AUTO DIFF WBC: CPT | Performed by: NURSE PRACTITIONER

## 2017-12-19 RX ORDER — FUROSEMIDE 40 MG/1
40 TABLET ORAL DAILY
Status: DISCONTINUED | OUTPATIENT
Start: 2017-12-20 | End: 2017-12-19 | Stop reason: HOSPADM

## 2017-12-19 RX ORDER — FUROSEMIDE 40 MG/1
40 TABLET ORAL DAILY
Qty: 30 TABLET | Refills: 0 | Status: ON HOLD | OUTPATIENT
Start: 2017-12-20 | End: 2018-02-16

## 2017-12-19 RX ORDER — BUDESONIDE AND FORMOTEROL FUMARATE DIHYDRATE 160; 4.5 UG/1; UG/1
2 AEROSOL RESPIRATORY (INHALATION)
Qty: 10.2 G | Refills: 0 | Status: SHIPPED | OUTPATIENT
Start: 2017-12-19 | End: 2018-10-15

## 2017-12-19 RX ORDER — ATORVASTATIN CALCIUM 80 MG/1
80 TABLET, FILM COATED ORAL NIGHTLY
Qty: 30 TABLET | Refills: 0 | Status: ON HOLD | OUTPATIENT
Start: 2017-12-19 | End: 2018-02-16

## 2017-12-19 RX ORDER — ALBUTEROL SULFATE 2.5 MG/3ML
2.5 SOLUTION RESPIRATORY (INHALATION) ONCE
Status: COMPLETED | OUTPATIENT
Start: 2017-12-19 | End: 2017-12-19

## 2017-12-19 RX ORDER — ALBUTEROL SULFATE 90 UG/1
2 AEROSOL, METERED RESPIRATORY (INHALATION) EVERY 4 HOURS PRN
Qty: 1 INHALER | Refills: 0 | Status: ON HOLD | OUTPATIENT
Start: 2017-12-19 | End: 2021-09-15

## 2017-12-19 RX ORDER — POTASSIUM CHLORIDE 750 MG/1
20 CAPSULE, EXTENDED RELEASE ORAL 2 TIMES DAILY
Qty: 120 CAPSULE | Refills: 0 | Status: SHIPPED | OUTPATIENT
Start: 2017-12-19 | End: 2018-01-04

## 2017-12-19 RX ADMIN — CEFDINIR 300 MG: 300 CAPSULE ORAL at 08:29

## 2017-12-19 RX ADMIN — POTASSIUM CHLORIDE 20 MEQ: 750 CAPSULE, EXTENDED RELEASE ORAL at 08:30

## 2017-12-19 RX ADMIN — CHLORTHALIDONE 25 MG: 25 TABLET ORAL at 08:28

## 2017-12-19 RX ADMIN — POTASSIUM CHLORIDE 40 MEQ: 750 CAPSULE, EXTENDED RELEASE ORAL at 08:29

## 2017-12-19 RX ADMIN — HYDROCODONE BITARTRATE AND ACETAMINOPHEN 1 TABLET: 5; 325 TABLET ORAL at 11:30

## 2017-12-19 RX ADMIN — ALBUTEROL SULFATE 2.5 MG: 2.5 SOLUTION RESPIRATORY (INHALATION) at 11:03

## 2017-12-19 RX ADMIN — CITALOPRAM 20 MG: 20 TABLET, FILM COATED ORAL at 08:29

## 2017-12-19 RX ADMIN — IPRATROPIUM BROMIDE 0.5 MG: 0.5 SOLUTION RESPIRATORY (INHALATION) at 15:48

## 2017-12-19 RX ADMIN — HYDROCODONE BITARTRATE AND ACETAMINOPHEN 1 TABLET: 5; 325 TABLET ORAL at 06:01

## 2017-12-19 RX ADMIN — LISINOPRIL 20 MG: 20 TABLET ORAL at 08:29

## 2017-12-19 RX ADMIN — SODIUM CHLORIDE 100 ML/HR: 9 INJECTION, SOLUTION INTRAVENOUS at 07:27

## 2017-12-19 RX ADMIN — DOCUSATE SODIUM 100 MG: 100 CAPSULE ORAL at 08:29

## 2017-12-19 RX ADMIN — FUROSEMIDE 40 MG: 10 INJECTION, SOLUTION INTRAMUSCULAR; INTRAVENOUS at 08:29

## 2017-12-19 NOTE — CONSULTS
Sharad Ryder  [id number]  [id number]  495/1  Phuong Lerner, DO  12/13/2017      HPI: Mr. Ryder is a 57 year old male with past medical history of hypertension, hyperlipidemia, chronic tobacco use, and chronic back pain. He sees Dr. Cline (PCP) in [place], [place] only when he is sick. 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 He is supposed to return to work tomorrow. He says he has progressively become more short of breath over the last couple months. He thinks he was told he had a cardiac murmur sometime ago. Strong family history of coronary artery disease. He reports increased shortness of breath taking the trash out 2 nights ago. This prompted a visit to the emergency department and he was admitted for acute CHF exacerbation. He has required oxygen via venti mask at 50% but this has been weaned down to 2 liters nasal cannula currently. An echo was obtained and demonstrated severe aortic stenosis, bicupsid aortic valve, moderate-severe left ventricular hypertrophy with estimated ejection fraction of 70%. CT surgery has been consulted for severe aortic valve stenosis with evaluation for aortic valve replacement.  He had a carotid duplex and a CTA of the neck both of which were personally reviewed by me.  He does have significant carotid occlusive disease bilaterally right greater than left.  He is asymptomatic from a strokelike standpoint.    Past Medical History:   Diagnosis Date   • Arthritis    • Hyperlipidemia    • Hypertension        Past Surgical History:   Procedure Laterality Date   • CARDIAC CATHETERIZATION N/A 12/18/2017    Procedure: Left Heart Cath;  Surgeon: Aime Francois MD;  Location:  [facility];  Service:    • CARDIAC CATHETERIZATION N/A 12/18/2017    Procedure: Right Heart Cath;  Surgeon: Aime Francois MD;  Location:  [facility];  Service:    • FINGER SURGERY Right 1990       History reviewed. No pertinent family history.    Social  History     Social History   • Marital status:      Spouse name: N/A   • Number of children: N/A   • Years of education: N/A     Occupational History   • Not on file.     Social History Main Topics   • Smoking status: Current Every Day Smoker     Packs/day: 1.50     Types: Cigarettes   • Smokeless tobacco: Not on file   • Alcohol use No   • Drug use: No   • Sexual activity: Defer     Other Topics Concern   • Not on file     Social History Narrative       No Known Allergies    Hospital Medications (active)       Dose Frequency Start End    albuterol (PROVENTIL) nebulizer solution 0.083% 2.5 mg/3mL 2.5 mg Once 12/19/2017 12/19/2017    Sig - Route: Take 2.5 mg by nebulization 1 (One) Time. - Nebulization    Notes to Pharmacy: Please send to resp care dept    atorvastatin (LIPITOR) tablet 80 mg 80 mg Nightly 12/18/2017     Sig - Route: Take 2 tablets by mouth Every Night. - Oral    cefdinir (OMNICEF) capsule 300 mg 300 mg Every 12 Hours Scheduled 12/18/2017 12/23/2017    Sig - Route: Take 1 capsule by mouth Every 12 (Twelve) Hours. - Oral    chlorthalidone (HYGROTON) tablet 25 mg 25 mg Daily 12/19/2017     Sig - Route: Take 1 tablet by mouth Daily. - Oral    citalopram (CeleXA) tablet 20 mg 20 mg Daily 12/19/2017     Sig - Route: Take 1 tablet by mouth Daily. - Oral    docusate sodium (COLACE) capsule 100 mg 100 mg 2 Times Daily 12/18/2017     Sig - Route: Take 1 capsule by mouth 2 (Two) Times a Day. - Oral    enoxaparin (LOVENOX) syringe 40 mg 40 mg Every 24 Hours 12/18/2017     Sig - Route: Inject 0.4 mL under the skin Daily. - Subcutaneous    furosemide (LASIX) injection 40 mg 40 mg Daily 12/19/2017     Sig - Route: Infuse 4 mL into a venous catheter Daily. - Intravenous    HYDROcodone-acetaminophen (NORCO) 5-325 MG per tablet 1 tablet 1 tablet Every 4 Hours PRN 12/18/2017 12/28/2017    Sig - Route: Take 1 tablet by mouth Every 4 (Four) Hours As Needed for Moderate Pain . - Oral    ipratropium (ATROVENT)  "nebulizer solution 0.5 mg 0.5 mg 4 Times Daily - RT 12/18/2017     Sig - Route: Take 2.5 mL by nebulization 4 (Four) Times a Day. - Nebulization    lisinopril (PRINIVIL,ZESTRIL) tablet 20 mg 20 mg Every 24 Hours Scheduled 12/18/2017     Sig - Route: Take 1 tablet by mouth Daily. - Oral    polyethylene glycol (MIRALAX) packet 17 g 17 g Daily 12/18/2017     Sig - Route: Take 17 g by mouth Daily. - Oral    potassium chloride (MICRO-K) CR capsule 20 mEq 20 mEq 2 Times Daily With Meals 12/14/2017     Sig - Route: Take 2 capsules by mouth 2 (Two) Times a Day With Meals. - Oral    potassium chloride (MICRO-K) CR capsule 40 mEq 40 mEq Daily 12/14/2017     Sig - Route: Take 4 capsules by mouth Daily. - Oral    sodium chloride 0.9 % flush 1-10 mL (MAR Hold) ((MAR Hold) since 12/18/2017  1:39 PM) 1-10 mL As Needed 12/13/2017     Sig - Route: Infuse 1-10 mL into a venous catheter As Needed for Line Care. - Intravenous    acetaminophen (TYLENOL) tablet 650 mg (Discontinued) 650 mg Every 4 Hours PRN 12/17/2017 12/18/2017    Sig - Route: Take 2 tablets by mouth Every 4 (Four) Hours As Needed for Mild Pain . - Oral    Reason for Discontinue: Patient Transfer    aspirin EC tablet 81 mg (Discontinued) 81 mg Daily 12/18/2017 12/18/2017    Sig - Route: Take 1 tablet by mouth Daily. - Oral    Reason for Discontinue: Patient Transfer    Cosign for Ordering: Accepted by Aime Francois MD on 12/18/2017  2:26 PM    Linked Group 1:  \"And\" Linked Group Details        atorvastatin (LIPITOR) tablet 80 mg (Discontinued) 80 mg Daily 12/16/2017 12/18/2017    Sig - Route: Take 2 tablets by mouth Daily. - Oral    cefdinir (OMNICEF) capsule 300 mg (Discontinued) 300 mg Every 12 Hours Scheduled 12/16/2017 12/18/2017    Sig - Route: Take 1 capsule by mouth Every 12 (Twelve) Hours. - Oral    chlorthalidone (HYGROTON) tablet 25 mg (Discontinued) 25 mg Daily 12/13/2017 12/18/2017    Sig - Route: Take 1 tablet by mouth Daily. - Oral    citalopram (CeleXA) " tablet 20 mg (Discontinued) 20 mg Daily 12/14/2017 12/18/2017    Sig - Route: Take 1 tablet by mouth Daily. - Oral    Reason for Discontinue: Duplicate order    citalopram (CeleXA) tablet 20 mg (Discontinued) 20 mg Daily 12/19/2017 12/18/2017    Sig - Route: Take 1 tablet by mouth Daily. - Oral    Reason for Discontinue: Duplicate order    diphenhydrAMINE (BENADRYL) capsule 50 mg (Discontinued) 50 mg Once 12/17/2017 12/18/2017    Sig - Route: Take 1 capsule by mouth 1 (One) Time. - Oral    Reason for Discontinue: Patient Transfer    Cosign for Ordering: Accepted by Aime Francois MD on 12/18/2017  2:26 PM    docusate sodium (COLACE) capsule 100 mg (Discontinued) 100 mg 2 Times Daily 12/14/2017 12/18/2017    Sig - Route: Take 1 capsule by mouth 2 (Two) Times a Day. - Oral    enoxaparin (LOVENOX) syringe 40 mg (Discontinued) 40 mg Every 24 Hours 12/13/2017 12/18/2017    Sig - Route: Inject 0.4 mL under the skin Daily. - Subcutaneous    furosemide (LASIX) injection 40 mg (Discontinued) 40 mg Daily 12/14/2017 12/18/2017    Sig - Route: Infuse 4 mL into a venous catheter Daily. - Intravenous    HYDROcodone-acetaminophen (NORCO) 5-325 MG per tablet 1 tablet (Discontinued) 1 tablet Every 4 Hours PRN 12/14/2017 12/18/2017    Sig - Route: Take 1 tablet by mouth Every 4 (Four) Hours As Needed for Moderate Pain . - Oral    Reason for Discontinue: Duplicate order    HYDROcodone-acetaminophen (NORCO) 5-325 MG per tablet 1 tablet (Discontinued) 1 tablet Every 4 Hours PRN 12/18/2017 12/18/2017    Sig - Route: Take 1 tablet by mouth Every 4 (Four) Hours As Needed for Moderate Pain . - Oral    Reason for Discontinue: Duplicate order    iopamidol (ISOVUE-370) 76 % injection (Discontinued)  As Needed 12/18/2017 12/18/2017    Sig: As Needed.    Reason for Discontinue: Patient Discharge    ipratropium (ATROVENT) nebulizer solution 0.5 mg (Discontinued) 0.5 mg 4 Times Daily - RT 12/13/2017 12/18/2017    Sig - Route: Take 2.5 mL by  nebulization 4 (Four) Times a Day. - Nebulization    Reason for Discontinue: Duplicate order    ipratropium (ATROVENT) nebulizer solution 0.5 mg (Discontinued) 0.5 mg 4 Times Daily - RT 12/18/2017 12/18/2017    Sig - Route: Take 2.5 mL by nebulization 4 (Four) Times a Day. - Nebulization    Reason for Discontinue: Duplicate order    lidocaine (XYLOCAINE) 2% injection (Discontinued)  As Needed 12/18/2017 12/18/2017    Sig: As Needed.    Reason for Discontinue: Patient Discharge    lisinopril (PRINIVIL,ZESTRIL) tablet 20 mg (Discontinued) 20 mg Daily 12/13/2017 12/18/2017    Sig - Route: Take 1 tablet by mouth Daily. - Oral    nitroglycerin (NITROSTAT) SL tablet 0.4 mg (Discontinued) 0.4 mg Every 5 Minutes PRN 12/17/2017 12/18/2017    Sig - Route: Place 1 tablet under the tongue Every 5 (Five) Minutes As Needed for Chest Pain. - Sublingual    Reason for Discontinue: Patient Transfer    ondansetron (ZOFRAN) injection 4 mg (Discontinued) 4 mg Every 6 Hours PRN 12/17/2017 12/18/2017    Sig - Route: Infuse 2 mL into a venous catheter Every 6 (Six) Hours As Needed for Nausea or Vomiting. - Intravenous    Reason for Discontinue: Patient Transfer    polyethylene glycol (MIRALAX) packet 17 g (Discontinued) 17 g Daily 12/14/2017 12/18/2017    Sig - Route: Take 17 g by mouth Daily. - Oral    polyethylene glycol 3350 powder (packet) (Discontinued) 17 g Daily 12/19/2017 12/18/2017    Sig - Route: Take 17 g by mouth Daily. - Oral    Reason for Discontinue: Reorder    sodium chloride 0.9 % flush 1-10 mL (Discontinued) 1-10 mL As Needed 12/17/2017 12/18/2017    Sig - Route: Infuse 1-10 mL into a venous catheter As Needed for Line Care. - Intravenous    Reason for Discontinue: Patient Transfer    sodium chloride 0.9 % infusion (Discontinued) 75 mL/hr Continuous 12/17/2017 12/18/2017    Sig - Route: Infuse 75 mL/hr into a venous catheter Continuous. - Intravenous    Reason for Discontinue: Therapy completed    Cosign for Ordering:  Accepted by Aime Francois MD on 12/18/2017  2:26 PM    sodium chloride 0.9 % infusion (Discontinued) 100 mL/hr Continuous 12/18/2017 12/19/2017    Sig - Route: Infuse 100 mL/hr into a venous catheter Continuous. - Intravenous          Review of Systems  Constitutional: Positive for activity change and fatigue. Negative for appetite change, chills, diaphoresis and fever.   HENT: Negative for sore throat, trouble swallowing and voice change.         Full set of dentures   Respiratory: Positive for shortness of breath. Negative for cough and wheezing.    Cardiovascular: Positive for chest pain. Negative for palpitations and leg swelling.   Gastrointestinal: Negative for abdominal distention, abdominal pain, blood in stool, constipation, diarrhea and vomiting.   Endocrine: Negative for cold intolerance and heat intolerance.   Musculoskeletal: Positive for back pain. Negative for gait problem.   Skin: Negative for color change, pallor, rash and wound.   Neurological: Positive for dizziness. Negative for seizures, syncope, facial asymmetry, speech difficulty, weakness and headaches.   Hematological: Negative for adenopathy. Does not bruise/bleed easily.   Psychiatric/Behavioral: The patient is nervous/anxious.      Physical Exam   Constitutional: He is oriented to person, place, and time. He appears well-developed and well-nourished.   HENT:   Head: Normocephalic and atraumatic.   Eyes: Pupils are equal, round, and reactive to light. No scleral icterus.   Neck: Neck supple. No JVD present. Carotid bruit is not present. No thyromegaly present.   Cardiovascular: Normal rate and regular rhythm.    Murmur heard.  Pulses:       Carotid pulses are 2+ on the right side, and 2+ on the left side.       Femoral pulses are 2+ on the right side, and 2+ on the left side.       Popliteal pulses are 1+ on the right side, and 1+ on the left side.        Dorsalis pedis pulses are 1+ on the right side, and 1+ on the left side.         Posterior tibial pulses are 1+ on the right side, and 1+ on the left side.   Pulmonary/Chest: Effort normal and breath sounds normal.   Abdominal: Soft. Bowel sounds are normal. He exhibits no distension, no abdominal bruit and no mass. There is no hepatosplenomegaly. There is no tenderness.   Musculoskeletal: Normal range of motion. He exhibits no edema.   Lymphadenopathy:     He has no cervical adenopathy.   Neurological: He is alert and oriented to person, place, and time. He has normal strength. No cranial nerve deficit or sensory deficit.   Skin: Skin is warm, dry and intact.   Psychiatric: He has a normal mood and affect.   Nursing note and vitals reviewed.      Laboratory Data:    Results from last 7 days  Lab Units 12/19/17 0417 12/17/17 1704 12/17/17  0449   WBC 10*3/mm3 12.91* 9.51 8.43   HEMOGLOBIN g/dL 13.0* 12.9* 13.3*   HEMATOCRIT % 37.8* 37.2* 39.0*   PLATELETS 10*3/mm3 382 401* 388         Results from last 7 days  Lab Units 12/19/17  0417 12/18/17  0418 12/17/17  1704  12/13/17  0517 12/13/17  0020   SODIUM mmol/L 136 136 128*  < > 135 135   POTASSIUM mmol/L 3.8 4.4 4.1  < > 3.4* 3.5   CHLORIDE mmol/L 99 95* 94*  < > 98 97*   CO2 mmol/L 29.0 29.0 24.0  < > 24.0 25.0   BUN mg/dL 9 11 14  < > 9 9   CREATININE mg/dL 0.65 0.63 0.63  < > 0.56 0.66   CALCIUM mg/dL 9.3 9.9 9.3  < > 9.2 9.1   BILIRUBIN mg/dL  --   --  0.2  --  0.3 0.1   ALK PHOS U/L  --   --  66  --  82 96   ALT (SGPT) U/L  --   --  46  --  43 41   AST (SGOT) U/L  --   --  22  --  38 32   GLUCOSE mg/dL 104* 94 90  < > 151* 156*   < > = values in this interval not displayed.    Results from last 7 days  Lab Units 12/17/17  1704 12/13/17  0020   INR  0.94 0.86*   APTT seconds  --  27.9         Diagnostic Data:  History:  57-year-old evaluated for carotid plaques.       Reference   Carotid ultrasound 1 day prior.      Technique  Thin slice helical scanning of the neck is performed post intravenous  contrast administration for evaluation of  the cervical arterial  vasculature. Multiple 3-D reformations are obtained. Automated exposure  control was utilized to limit radiation dose.  mGy-cm.      Findings  The visualized arch is atherosclerotic. There is a normal three-vessel  branching pattern. There is mild stenosis suspected of the left common  carotid artery origin. Eccentric plaquing of the mid left common carotid  artery without significant stenosis. The right common carotid artery  shows no significant stenosis.      There is marked plaquing in both carotid bulbs. There is severe stenosis  of the right carotid bulb. This may be exacerbated by blooming artifact  from degree of calcification. However lumen may only be 2 mm in  diameter. There is mild narrowing of the left carotid bulb from calcific  plaque.      There is eccentric bulky plaquing of the proximal right cervical ICA  without significant stenosis. Remainder of the right cervical ICA is  unremarkable. Eccentric plaque of the proximal left cervical ICA without  stenosis. Remainder of the left cervical ICA is unremarkable.      Both vertebral artery origins are visualized with suspected mild  stenosis of the right vertebral artery origin. Otherwise the cervical  portions of both vertebral arteries are unremarkable without occlusion,  severe stenosis, or dissection.      Mild wall thickening of the proximal esophagus, nonspecific. There is  severe emphysema in the lung apices. Incidental mucosal thickening  throughout the left maxillary sinus with sclerosis consistent with  chronic sinusitis. Degenerative changes throughout the cervical spine,  worse at C5-C6 and C6-C7.          Impression  Severe stenosis at the right carotid bulb due to bulky calcific  plaquing.  This report was finalized on 12/15/2017 13:48 by  Abel Suarez, .    History: Bruit      IMPRESSION:  Impression:  1. There is less than 50% stenosis of the right internal carotid artery.  2. There is less than 50% stenosis of  the left internal carotid artery.  3. Antegrade flow is demonstrated in bilateral vertebral arteries.  4. There is heavy plaque burden at the right bifurcation. Further  imaging with a CTA the neck may be warranted.      Comments: Bilateral carotid vertebral arterial duplex scan was  performed.      Grayscale imaging shows intimal thickening and calcified elements at the  carotid bifurcation. The right internal carotid artery peak systolic  velocity is 102 cm/sec. The end-diastolic velocity is 34.6 cm/sec. The  right ICA/CCA ratio is approximately 1.79 . These findings correlate  with less than 50% stenosis of the right internal carotid artery.      Grayscale imaging shows intimal thickening and calcified elements at the  carotid bifurcation. The left internal carotid artery peak systolic  velocity is 105 cm/sec. The end-diastolic velocity is 37.7 cm/sec. The  left ICA/CCA ratio is approximately 1.0 . These findings correlate with  less than 50% stenosis of the left internal carotid artery.      Antegrade flow is demonstrated in bilateral vertebral arteries.      This report was finalized on 12/14/2017 16:53 by Dr. Jl Salgado MD.    Impression:  Active Problems:    Shortness of breath    Severe aortic valve stenosis    Tobacco use    Hyperlipidemia    Hypertension  Severe asymptomatic bilateral carotid occlusive disease    Plan: After thoroughly evaluating Sharad Ryder, I believe the best course of action is to proceed with right carotid endarterectomy for stroke risk reduction.  The surgery will need to be coordinated with cardiothoracic surgery.  This will be performed sometime in January.  I will follow up in the office with Mr. Ryder to discuss the surgery further. Risks include, but are not limited to, bleeding, infection, vessel damage, nerve damage, MI, stroke, and death.  The patient understands these risks and wished to proceed.  From my standpoint, he is okay to be discharged home.  This was all  discussed in full with complete understanding.    Jl Salgado, DO

## 2017-12-19 NOTE — PROGRESS NOTES
"Patient: Sharad Ryder  : 1960     Procedure: Procedure(s):  Left Heart Cath  Right Heart Cath    Procedure Date: 2017 - 2017    POD: 1 Day Post-Op      Subjective   Alert and oriented.  No change in physical condition.  Left heart catheterization yesterday     Objective   Visit Vitals   • /65   • Pulse 83   • Temp 98 °F (36.7 °C)   • Resp 16   • Ht 167.6 cm (66\")   • Wt 57.6 kg (126 lb 15.8 oz)   • SpO2 95%   • BMI 20.5 kg/m2       Intake/Output Summary (Last 24 hours) at 17 0709  Last data filed at 17 0618   Gross per 24 hour   Intake             1643 ml   Output             2200 ml   Net             -557 ml                                        Lab Results:    CBC:   Results from last 7 days  Lab Units 17  0417 17  1704 17  0449   WBC 10*3/mm3 12.91* 9.51 8.43   HEMATOCRIT % 37.8* 37.2* 39.0*   PLATELETS 10*3/mm3 382 401* 388     BMP:   Results from last 7 days  Lab Units 17  0417 17  0418 17  1704   SODIUM mmol/L 136 136 128*   POTASSIUM mmol/L 3.8 4.4 4.1   CHLORIDE mmol/L 99 95* 94*   CO2 mmol/L 29.0 29.0 24.0   GLUCOSE mg/dL 104* 94 90   BUN mg/dL 9 11 14   CREATININE mg/dL 0.65 0.63 0.63     Coag:   Results from last 7 days  Lab Units 17  1704 17  0020   INR  0.94 0.86*   APTT seconds  --  27.9       Images:  Imaging Results (last 24 hours)     ** No results found for the last 24 hours. **        Images Today: None     Physical Exam:   Gen.: Third and oriented.  No change  Chest: Some rales on the right side at base.  Heart: Regular rate and rhythm    Impression: Still await vascular surgery consult and pulmonary function test results.  Left heart catheterization yesterday by Dr. Francois showed significant coronary disease so patient will need aVR and CABG when ready.    Plan: Okay to discharge home if okay with cardiology when vascular consult and PFTs completed.  Dr. Robin will see on his return for scheduling of operation " if needed.    Zia Abrams MD  12/19/17  7:09 AM

## 2017-12-19 NOTE — PROGRESS NOTES
[facility] -  Progress Note     LOS: 6 days   Patient Care Team:  Sharad KELIN MD as PCP - General (Family Medicine)    Chief Complaint: shortness of breath    Subjective     Interval History: denies shortness of breath, sitting up in bed, family present, eager to go home      Review of Systems:     Review of Systems   Constitutional: Positive for activity change and fatigue. Negative for fever.   HENT: Negative for congestion.    Respiratory: Positive for cough. Negative for chest tightness, shortness of breath and wheezing.    Cardiovascular: Negative for chest pain and leg swelling.   Gastrointestinal: Negative for abdominal distention and abdominal pain.   Genitourinary: Negative for difficulty urinating.   Skin: Negative for color change and pallor.   Neurological: Negative for weakness, light-headedness and headaches.   Psychiatric/Behavioral: Negative for confusion.   All other systems reviewed and are negative.    Objective     Vital Sign Min/Max for last 24 hours  Temp  Min: 98 °F (36.7 °C)  Max: 98.4 °F (36.9 °C)   BP  Min: 97/52  Max: 116/68   Pulse  Min: 74  Max: 88   Resp  Min: 15  Max: 16   SpO2  Min: 92 %  Max: 95 %   Flow (L/min)  Min: 2  Max: 2   Weight  Min: 57.6 kg (126 lb 15.8 oz)  Max: 57.6 kg (126 lb 15.8 oz)     Last Weight    12/18/17  2100   Weight: 57.6 kg (126 lb 15.8 oz)       Physical Exam:    Physical Exam   Constitutional: He is oriented to person, place, and time. He appears well-developed and well-nourished. He is cooperative. No distress.   HENT:   Head: Normocephalic and atraumatic.   Right Ear: External ear normal.   Left Ear: External ear normal.   Nose: Nose normal.   Mouth/Throat: No oropharyngeal exudate.   Eyes: Conjunctivae are normal. No scleral icterus.   Neck: Normal range of motion. Neck supple. No thyromegaly present.   Cardiovascular: Normal rate and intact distal pulses.    Murmur heard.  Pulmonary/Chest: Effort normal. He has decreased breath  sounds.   Abdominal: Soft. Normal appearance and bowel sounds are normal. He exhibits no distension. There is no tenderness.   Musculoskeletal: Normal range of motion. He exhibits no edema.   Neurological: He is alert and oriented to person, place, and time.   Skin: Skin is warm, dry and intact. No rash noted. He is not diaphoretic. No erythema. No pallor.   Psychiatric: He has a normal mood and affect. His behavior is normal.   Vitals reviewed.    Results Review:   Lab Results (last 72 hours)     Procedure Component Value Units Date/Time    CBC & Differential [[id number]] Collected:  12/17/17 0449    Specimen:  Blood Updated:  12/17/17 0545    Narrative:       The following orders were created for panel order CBC & Differential.  Procedure                               Abnormality         Status                     ---------                               -----------         ------                     CBC Auto Differential[[id number]]        Abnormal            Final result                 Please view results for these tests on the individual orders.    CBC Auto Differential [[id number]]  (Abnormal) Collected:  12/17/17 0449    Specimen:  Blood Updated:  12/17/17 0545     WBC 8.43 10*3/mm3      RBC 4.39 (L) 10*6/mm3      Hemoglobin 13.3 (L) g/dL      Hematocrit 39.0 (L) %      MCV 88.8 fL      MCH 30.3 pg      MCHC 34.1 g/dL      RDW 12.5 %      RDW-SD 41.1 fl      MPV 9.4 fL      Platelets 388 10*3/mm3      Neutrophil % 52.9 %      Lymphocyte % 33.6 %      Monocyte % 9.7 %      Eosinophil % 2.8 %      Basophil % 0.6 %      Immature Grans % 0.4 %      Neutrophils, Absolute 4.46 10*3/mm3      Lymphocytes, Absolute 2.83 10*3/mm3      Monocytes, Absolute 0.82 10*3/mm3      Eosinophils, Absolute 0.24 10*3/mm3      Basophils, Absolute 0.05 10*3/mm3      Immature Grans, Absolute 0.03 10*3/mm3      nRBC 0.0 /100 WBC     Basic Metabolic Panel [[id number]]  (Normal) Collected:  12/17/17 0449    Specimen:  Blood Updated:   12/17/17 0627     Glucose 93 mg/dL      BUN 13 mg/dL      Creatinine 0.62 mg/dL      Sodium 135 mmol/L      Potassium 4.3 mmol/L      Chloride 99 mmol/L      CO2 26.0 mmol/L      Calcium 9.5 mg/dL      eGFR Non African Amer 134 mL/min/1.73      BUN/Creatinine Ratio 21.0     Anion Gap 10.0 mmol/L     Narrative:       GFR Normal >60  Chronic Kidney Disease <60  Kidney Failure <15    CBC Auto Differential [[id number]]  (Abnormal) Collected:  12/17/17 1704    Specimen:  Blood Updated:  12/17/17 1743     WBC 9.51 10*3/mm3      RBC 4.26 (L) 10*6/mm3      Hemoglobin 12.9 (L) g/dL      Hematocrit 37.2 (L) %      MCV 87.3 fL      MCH 30.3 pg      MCHC 34.7 g/dL      RDW 12.3 %      RDW-SD 39.6 (L) fl      MPV 9.5 fL      Platelets 401 (H) 10*3/mm3      Neutrophil % 56.5 %      Lymphocyte % 32.4 %      Monocyte % 8.5 %      Eosinophil % 1.9 %      Basophil % 0.4 %      Immature Grans % 0.3 %      Neutrophils, Absolute 5.37 10*3/mm3      Lymphocytes, Absolute 3.08 10*3/mm3      Monocytes, Absolute 0.81 10*3/mm3      Eosinophils, Absolute 0.18 10*3/mm3      Basophils, Absolute 0.04 10*3/mm3      Immature Grans, Absolute 0.03 10*3/mm3      nRBC 0.0 /100 WBC     CBC & Differential [[id number]] Collected:  12/17/17 1704    Specimen:  Blood Updated:  12/17/17 1743    Narrative:       The following orders were created for panel order CBC & Differential.  Procedure                               Abnormality         Status                     ---------                               -----------         ------                     CBC Auto Differential[[id number]]        Abnormal            Final result                 Please view results for these tests on the individual orders.    Comprehensive Metabolic Panel [[id number]]  (Abnormal) Collected:  12/17/17 1704    Specimen:  Blood Updated:  12/17/17 1754     Glucose 90 mg/dL      BUN 14 mg/dL      Creatinine 0.63 mg/dL      Sodium 128 (L) mmol/L      Potassium 4.1 mmol/L      Chloride 94 (L)  mmol/L      CO2 24.0 mmol/L      Calcium 9.3 mg/dL      Total Protein 7.0 g/dL      Albumin 4.40 g/dL      ALT (SGPT) 46 U/L      AST (SGOT) 22 U/L      Alkaline Phosphatase 66 U/L      Total Bilirubin 0.2 mg/dL      eGFR Non African Amer 131 mL/min/1.73      Globulin 2.6 gm/dL      A/G Ratio 1.7 g/dL      BUN/Creatinine Ratio 22.2     Anion Gap 10.0 mmol/L     Protime-INR [[id number]]  (Normal) Collected:  12/17/17 1704    Specimen:  Blood Updated:  12/17/17 1802     Protime 12.8 Seconds      INR 0.94    Blood Culture - Blood, [[id number]]  (Normal) Collected:  12/13/17 0015    Specimen:  Blood from Arm, Right Updated:  12/18/17 0046     Blood Culture No growth at 5 days    Blood Culture - Blood, [[id number]]  (Normal) Collected:  12/13/17 0018    Specimen:  Blood from Arm, Right Updated:  12/18/17 0046     Blood Culture No growth at 5 days    Basic Metabolic Panel [[id number]]  (Abnormal) Collected:  12/18/17 0418    Specimen:  Blood Updated:  12/18/17 0520     Glucose 94 mg/dL      BUN 11 mg/dL      Creatinine 0.63 mg/dL      Sodium 136 mmol/L      Potassium 4.4 mmol/L      Chloride 95 (L) mmol/L      CO2 29.0 mmol/L      Calcium 9.9 mg/dL      eGFR Non African Amer 131 mL/min/1.73      BUN/Creatinine Ratio 17.5     Anion Gap 12.0 mmol/L     Narrative:       GFR Normal >60  Chronic Kidney Disease <60  Kidney Failure <15    CBC & Differential [[id number]] Collected:  12/19/17 0417    Specimen:  Blood Updated:  12/19/17 0503    Narrative:       The following orders were created for panel order CBC & Differential.  Procedure                               Abnormality         Status                     ---------                               -----------         ------                     CBC Auto Differential[[id number]]        Abnormal            Final result                 Please view results for these tests on the individual orders.    CBC Auto Differential [[id number]]  (Abnormal) Collected:  12/19/17 0417    Specimen:   Blood Updated:  12/19/17 0503     WBC 12.91 (H) 10*3/mm3      RBC 4.22 (L) 10*6/mm3      Hemoglobin 13.0 (L) g/dL      Hematocrit 37.8 (L) %      MCV 89.6 fL      MCH 30.8 pg      MCHC 34.4 g/dL      RDW 12.4 %      RDW-SD 40.5 fl      MPV 9.3 fL      Platelets 382 10*3/mm3      Neutrophil % 61.9 %      Lymphocyte % 24.8 %      Monocyte % 9.9 %      Eosinophil % 2.4 %      Basophil % 0.5 %      Immature Grans % 0.5 %      Neutrophils, Absolute 8.00 10*3/mm3      Lymphocytes, Absolute 3.20 10*3/mm3      Monocytes, Absolute 1.28 10*3/mm3      Eosinophils, Absolute 0.31 10*3/mm3      Basophils, Absolute 0.06 10*3/mm3      Immature Grans, Absolute 0.06 (H) 10*3/mm3      nRBC 0.0 /100 WBC     Basic Metabolic Panel [[id number]]  (Abnormal) Collected:  12/19/17 0417    Specimen:  Blood Updated:  12/19/17 0511     Glucose 104 (H) mg/dL      BUN 9 mg/dL      Creatinine 0.65 mg/dL      Sodium 136 mmol/L      Potassium 3.8 mmol/L      Chloride 99 mmol/L      CO2 29.0 mmol/L      Calcium 9.3 mg/dL      eGFR Non African Amer 127 mL/min/1.73      BUN/Creatinine Ratio 13.8     Anion Gap 8.0 mmol/L     Narrative:       GFR Normal >60  Chronic Kidney Disease <60  Kidney Failure <15            Echo EF Estimated  Lab Results   Component Value Date    ECHOEFEST 70 12/13/2017       Medication Review: yes  Current Facility-Administered Medications   Medication Dose Route Frequency Provider Last Rate Last Dose   • atorvastatin (LIPITOR) tablet 80 mg  80 mg Oral Nightly Tyson Zarate MD   80 mg at 12/18/17 2148   • cefdinir (OMNICEF) capsule 300 mg  300 mg Oral Q12H Tyson Zarate MD   300 mg at 12/19/17 0829   • chlorthalidone (HYGROTON) tablet 25 mg  25 mg Oral Daily Tyson Zarate MD   25 mg at 12/19/17 0828   • citalopram (CeleXA) tablet 20 mg  20 mg Oral Daily Tyson Zarate MD   20 mg at 12/19/17 0829   • docusate sodium (COLACE) capsule 100 mg  100 mg Oral BID Tyson Zarate MD   100 mg at  12/19/17 0829   • enoxaparin (LOVENOX) syringe 40 mg  40 mg Subcutaneous Q24H Tyson Zarate MD   40 mg at 12/18/17 2158   • [START ON 12/20/2017] furosemide (LASIX) tablet 40 mg  40 mg Oral Daily KIRBY Greer       • HYDROcodone-acetaminophen (NORCO) 5-325 MG per tablet 1 tablet  1 tablet Oral Q4H PRN Jl Salgado DO   1 tablet at 12/19/17 1130   • ipratropium (ATROVENT) nebulizer solution 0.5 mg  0.5 mg Nebulization 4x Daily - RT Tyson Zarate MD       • lisinopril (PRINIVIL,ZESTRIL) tablet 20 mg  20 mg Oral Q24H Tyson Zarate MD   20 mg at 12/19/17 0829   • polyethylene glycol (MIRALAX) packet 17 g  17 g Oral Daily Phuong Lerner DO       • potassium chloride (MICRO-K) CR capsule 20 mEq  20 mEq Oral BID With Meals KIRBY Greer   20 mEq at 12/19/17 0830   • potassium chloride (MICRO-K) CR capsule 40 mEq  40 mEq Oral Daily Hamlet Quinteros MD   40 mEq at 12/19/17 0829   • [MAR Hold] sodium chloride 0.9 % flush 1-10 mL  1-10 mL Intravenous PRN Tyson Zarate MD           Assessment/Plan     1. Acute hypoxic respiratory failure: improving with severe background lung emphysema per CT scan  2. Abnormal chest x ray, pulmonary edema and right lung opacities  3. Carotid artery disease: vascular following  4. Aortic Stenosis: severe  5. Coronary artery Disease: s/p cath  5. Leukocytosis: resolved   6. Hypertension  7. Hyperlipidemia:   8. Tobacco Abuse          - ok for discharge home   - continue daily aspirin and statin  - report back to ED with change in symptoms, chest pain, or shortness of breath  - outpatient planning for CABG/AVR/CEA per Dr. Robin and Dr. Salgado  - follow up at ED with any new complaints or return of symptoms        Muriel Khan, KIRBY  12/19/17  3:49 PM

## 2017-12-19 NOTE — PLAN OF CARE
Problem: Patient Care Overview (Adult)  Goal: Plan of Care Review  Outcome: Ongoing (interventions implemented as appropriate)    12/19/17 0411   Coping/Psychosocial Response Interventions   Plan Of Care Reviewed With patient   Patient Care Overview   Progress no change   Outcome Evaluation   Outcome Summary/Follow up Plan Pt c/o chronic back pain, medicated with Norco prn. SOA with exertion. Omnicef po bid. Afebrile. Dressing to right groin (cardiac cath puncture site) is C/D/I.        Goal: Adult Individualization and Mutuality  Outcome: Ongoing (interventions implemented as appropriate)  Goal: Discharge Needs Assessment  Outcome: Ongoing (interventions implemented as appropriate)    Problem: COPD, Chronic Bronchitis/Emphysema (Adult)  Goal: Signs and Symptoms of Listed Potential Problems Will be Absent or Manageable (COPD, Chronic Bronchitis/Emphysema)  Outcome: Ongoing (interventions implemented as appropriate)    Problem: Infection, Risk/Actual (Adult)  Goal: Identify Related Risk Factors and Signs and Symptoms  Outcome: Ongoing (interventions implemented as appropriate)  Goal: Infection Prevention/Resolution  Outcome: Ongoing (interventions implemented as appropriate)    Problem: Cardiac Catheterization with/without PCI (Adult)  Goal: Signs and Symptoms of Listed Potential Problems Will be Absent or Manageable (Cardiac Catheterization with/without PCI)  Outcome: Ongoing (interventions implemented as appropriate)

## 2017-12-19 NOTE — DISCHARGE SUMMARY
[facility] Medicine Services  DISCHARGE SUMMARY       Date of Admission: 12/13/2017  Date of Discharge:  12/19/2017  Primary Care Physician: Sharad Cline MD    Presenting Problem/History of Present Illness:  Shortness of breath [R06.02]     Final Discharge Diagnoses:  1. Severe aortic stenosis  2. Severe right carotid bulb stenosis   3. Coronary artery disease, severe three vessel disease, needs CABG   4. Acute hypoxic respiratory failure, Spo2 88% on 40% venti mask  5. Acute  Pulmonary edema, secondary to severe aortic stenosis  6. Chest pain with mildly elevated troponin  7. Diastolic dysfunction, impaired relaxation, preserved EF 70%  8. Essential hypertension  9. Hyperlipidemia  10. Leukocytosis, improving  11. Hypokalemia, mild  12. Ongoing tobacco abuse  13. Fusiform aneurysmal dilation of the infrarenal aorta up to 3.4 cm, No  evidence of aneurysm rupture.  14. Severe asymptomatic bilateral carotid occlusive disease     Consults:   1. Dr. Francois, Cardiology  2. Dr. Robin, Cardiothoracic Surgery  3. Dr. Salgado, Vascular Surgery     Procedures Performed:   1. Heart catheterization per Dr. Francois on 12/18/17  Conclusion  Significant triple-vessel coronary artery disease with codominant right coronary artery  Severe aortic stenosis by echocardiogram.  Could not cross the valve the.  Mild pulmonary hypertension.     Normal LV EF by echocardiogram      Plan  Refer for aortocoronary bypass surgery and aortic valve replacement.  If he is not a good surgical candidate due to his lung condition that would require percutaneous coronary intervention and transcatheter aortic valve replacement.  Intensive risk factor modifications for both primary and secondary prevention if applicable  Hydration   Observation     Pertinent Test Results:   Lab Results (last 24 hours)     Procedure Component Value Units Date/Time    CBC & Differential [[id number]] Collected:  12/19/17 0417    Specimen:   Blood Updated:  12/19/17 0503    Narrative:       The following orders were created for panel order CBC & Differential.  Procedure                               Abnormality         Status                     ---------                               -----------         ------                     CBC Auto Differential[[id number]]        Abnormal            Final result                 Please view results for these tests on the individual orders.    CBC Auto Differential [[id number]]  (Abnormal) Collected:  12/19/17 0417    Specimen:  Blood Updated:  12/19/17 0503     WBC 12.91 (H) 10*3/mm3      RBC 4.22 (L) 10*6/mm3      Hemoglobin 13.0 (L) g/dL      Hematocrit 37.8 (L) %      MCV 89.6 fL      MCH 30.8 pg      MCHC 34.4 g/dL      RDW 12.4 %      RDW-SD 40.5 fl      MPV 9.3 fL      Platelets 382 10*3/mm3      Neutrophil % 61.9 %      Lymphocyte % 24.8 %      Monocyte % 9.9 %      Eosinophil % 2.4 %      Basophil % 0.5 %      Immature Grans % 0.5 %      Neutrophils, Absolute 8.00 10*3/mm3      Lymphocytes, Absolute 3.20 10*3/mm3      Monocytes, Absolute 1.28 10*3/mm3      Eosinophils, Absolute 0.31 10*3/mm3      Basophils, Absolute 0.06 10*3/mm3      Immature Grans, Absolute 0.06 (H) 10*3/mm3      nRBC 0.0 /100 WBC     Basic Metabolic Panel [[id number]]  (Abnormal) Collected:  12/19/17 0417    Specimen:  Blood Updated:  12/19/17 0511     Glucose 104 (H) mg/dL      BUN 9 mg/dL      Creatinine 0.65 mg/dL      Sodium 136 mmol/L      Potassium 3.8 mmol/L      Chloride 99 mmol/L      CO2 29.0 mmol/L      Calcium 9.3 mg/dL      eGFR Non African Amer 127 mL/min/1.73      BUN/Creatinine Ratio 13.8     Anion Gap 8.0 mmol/L     Narrative:       GFR Normal >60  Chronic Kidney Disease <60  Kidney Failure <15           CT Angiogram Neck With & Without Contrast [[id number]] Not Reviewed        Order Status: Completed Collected: 12/15/17 1338        Updated: 12/15/17 1351       Narrative:            History:  57-year-old evaluated for  carotid plaques.      Reference   Carotid ultrasound 1 day prior.     Technique  Thin slice helical scanning of the neck is performed post intravenous  contrast administration for evaluation of the cervical arterial  vasculature. Multiple 3-D reformations are obtained. Automated exposure  control was utilized to limit radiation dose.  mGy-cm.     Findings  The visualized arch is atherosclerotic. There is a normal three-vessel  branching pattern. There is mild stenosis suspected of the left common  carotid artery origin. Eccentric plaquing of the mid left common carotid  artery without significant stenosis. The right common carotid artery  shows no significant stenosis.     There is marked plaquing in both carotid bulbs. There is severe stenosis  of the right carotid bulb. This may be exacerbated by blooming artifact  from degree of calcification. However lumen may only be 2 mm in  diameter. There is mild narrowing of the left carotid bulb from calcific  plaque.     There is eccentric bulky plaquing of the proximal right cervical ICA  without significant stenosis. Remainder of the right cervical ICA is  unremarkable. Eccentric plaque of the proximal left cervical ICA without  stenosis. Remainder of the left cervical ICA is unremarkable.     Both vertebral artery origins are visualized with suspected mild  stenosis of the right vertebral artery origin. Otherwise the cervical  portions of both vertebral arteries are unremarkable without occlusion,  severe stenosis, or dissection.     Mild wall thickening of the proximal esophagus, nonspecific. There is  severe emphysema in the lung apices. Incidental mucosal thickening  throughout the left maxillary sinus with sclerosis consistent with  chronic sinusitis. Degenerative changes throughout the cervical spine,  worse at C5-C6 and C6-C7.        Impression  Severe stenosis at the right carotid bulb due to bulky calcific  plaquing.  This report was finalized on  12/15/2017 13:48 by  Abel Suarez, .       CT Angiogram Chest With & Without Contrast [[id number]] Not Reviewed       Order Status: Completed Collected: 12/14/17 1753        Updated: 12/14/17 1801       Narrative:         CT ANGIOGRAM CHEST W WO CONTRAST- 12/14/2017 5:14 PM CST      HISTORY: chest pain; R06.02-Shortness of breath; J18.1-Lobar pneumonia,  unspecified organism      COMPARISON: None.      DOSE LENGTH PRODUCT: 275 mGy cm. Automated exposure control was also  utilized to decrease patient radiation dose.     TECHNIQUE: Helical tomographic images of the chest were obtained after  the administration of intravenous contrast following angiogram protocol.  Additionally, 3D and multiplanar reformatted images were provided.        FINDINGS:    Pulmonary arteries: There is adequate enhancement of the pulmonary  arteries to evaluate for central and segmental pulmonary emboli. There  are no filling defects within the main, lobar, segmental or visualized  subsegmental pulmonary arteries. The pulmonary arteries are within  normal limits for size.      Aorta and great vessels: The aorta is well opacified and demonstrates no  dissection or aneurysm. The great vessels are normal in appearance. Mild  ostial narrowing of the left common carotid and left subclavian origins.     Visualized neck base: The imaged portion of the base of the neck appears  unremarkable.      Lungs: There is severe background lung emphysema. Bilateral dependent  lung atelectasis. No mass or suspicious pulmonary nodule. No acute lung  infiltrate. The trachea and bronchial tree are patent.      Heart: The heart is normal in size. There is no pericardial effusion.  Moderate coronary artery atheromatous calcification.     Mediastinum and lymph nodes: No enlarged mediastinal, hilar, or axillary  lymph nodes are present.      Skeletal and soft tissues: Age-indeterminate mild wedge compression  deformity at T11. No destructive bone lesion.           Impression:         1. No evidence of pulmonary embolus or other acute cardiopulmonary  process.  2. Severe background lung emphysema.  3. Moderate coronary artery atheromatous calcification.  4. Age-indeterminate mild compression deformity at T11.        This report was finalized on 12/14/2017 17:58 by Dr Javy Romano, .       CT Angiogram Abdomen Pelvis With & Without Contrast [[id number]] Not Reviewed       Order Status: Completed Collected: 12/14/17 1808        Updated: 12/14/17 1914       Narrative:         CT ANGIOGRAM ABDOMEN PELVIS W WO CONTRAST- 12/14/2017 5:14 PM CST     HISTORY: groin pain, ? hx of aneursym; R06.02-Shortness of breath;  J18.1-Lobar pneumonia, unspecified organism     COMPARISON: None     DOSE LENGTH PRODUCT: 275 mGy cm. Automated exposure control was also  utilized to decrease patient radiation dose.     TECHNIQUE: Helical tomographic images of the abdomen and pelvis  utilizing angiographic protocol were obtained following the intravenous  infusion of contrast. Multiplanar and 3 D reformatted images were  provided for review.     FINDINGS:     Angiogram:  Moderate atheromatous calcification throughout the aorta. The origins of  the celiac and SMA are unremarkable. Renal artery origins are  unremarkable. There is fusiform aneurysmal dilation of an approximately  4.6 cm segment of the infrarenal aorta with the aneurysm sac measuring  up to 3.4 cm (series 4-image 77). No evidence of aneurysm rupture. There  is moderate atheromatous narrowing of the bilateral common iliac artery  origins. Moderate diffuse disease throughout the bilateral internal and  external iliac arteries. No vessel cut off identified. Incidentally  noted small accessory left renal artery.     Other findings:  Extensive colonic diverticulosis without evidence of acute  diverticulitis. Moderate prostatomegaly with coarse calcification  identified in the gland. No suspicious adenopathy in the pelvis. Severe  L5-S1 level  degenerative change with at least mild spinal stenosis.          Impression:         1. Fusiform aneurysmal dilation of the infrarenal aorta up to 3.4 cm. No  evidence of aneurysm rupture.  2. Colonic diverticulosis without acute diverticulitis. No acute process  identified in the abdomen on this arteriogram.     This report was finalized on 12/14/2017 19:11 by Dr Javy Romano, .       US Carotid Bilateral [[id number]] Not Reviewed       Order Status: Completed Collected: 12/14/17 1652        Updated: 12/14/17 1656       Narrative:         History: Bruit          Impression:         Impression:  1. There is less than 50% stenosis of the right internal carotid artery.  2. There is less than 50% stenosis of the left internal carotid artery.  3. Antegrade flow is demonstrated in bilateral vertebral arteries.  4. There is heavy plaque burden at the right bifurcation. Further  imaging with a CTA the neck may be warranted.     Comments: Bilateral carotid vertebral arterial duplex scan was  performed.     Grayscale imaging shows intimal thickening and calcified elements at the  carotid bifurcation. The right internal carotid artery peak systolic  velocity is 102 cm/sec. The end-diastolic velocity is 34.6 cm/sec. The  right ICA/CCA ratio is approximately 1.79 . These findings correlate  with less than 50% stenosis of the right internal carotid artery.     Grayscale imaging shows intimal thickening and calcified elements at the  carotid bifurcation. The left internal carotid artery peak systolic  velocity is 105 cm/sec. The end-diastolic velocity is 37.7 cm/sec. The  left ICA/CCA ratio is approximately 1.0 . These findings correlate with  less than 50% stenosis of the left internal carotid artery.     Antegrade flow is demonstrated in bilateral vertebral arteries.       This report was finalized on 12/14/2017 16:53 by Dr. Jl Salgado MD.       XR Chest 1 View [[id number]] Not Reviewed       Order Status: Completed  Collected: 12/14/17 0739        Updated: 12/14/17 0744       Narrative:         History:  57-year-old evaluated for pulmonary edema.      Reference:  Chest radiograph one day prior.     Findings:  Frontal chest radiograph performed.     Heart and mediastinum unchanged. Atherosclerotic thoracic aorta.  Background emphysema. Right basilar opacities remain. No new or  increasing opacity. No pleural effusion or pneumothorax. No acute  osseous finding.          Impression:         Impression:  Persistent opacities at the right base suspicious for pneumonia.  Emphysema.  This report was finalized on 12/14/2017 07:41 by  Abel Suarez, .       XR Chest 1 View [[id number]] Not Reviewed       Order Status: Completed Collected: 12/13/17 0703        Updated: 12/13/17 0707       Narrative:         EXAMINATION: XR CHEST 1 VW-. 12/13/2017 8:03 AM EST     CHEST, ONE VIEW:     HISTORY: Respiratory distress     COMPARISON: None     A single frontal chest radiograph was obtained.     FINDINGS:     Bilateral perihilar interstitial prominence appreciated with mild  interstitial prominence also noted in the lung bases with minimal patchy  airspace opacities in the right lung base medially. Septal line/Kerley B  lines also observed. Correlate for mild interstitial pulmonary edema  possible cardiogenic etiology.     Bony structures are intact.                                        Impression:         1. Interstitial prominence with mild patchy airspace opacities in the  right lung base, acute interstitial infiltration from an inflammatory  process considered. Pulmonary edema also a differential consideration.     This report was finalized on 12/13/2017 07:04 by Dr. Vito Abrams MD.      Hospital Course:  The patient is a 57 y.o. male who presented to [facility] with shortness of breath.  He stated on admission that he had been having shortness of breath and abdominal swelling for several days prior to coming in for evaluation.   During his stay in the emergency department he starting having increased jugular vein distention and tachycardia after receiving a fluid bolus.  He was IV Lasix in hopes to pull off excess fluid.  It was felt on admission that the patient had pulmonary edema and a congestive heart failure exacerbation.  He was admitted for further workup.  His troponin's were 0.044, 0.076, 0.082, 0.071.    He was found to have a systolic murmur as well.  Initially he was requiring a great deal of oxygen 50% venti mask and 8L nasal cannula however he has been saturating ok on room air at rest the past two days.      His 2D echocardiogram showed severe aortic valve stenosis.  Therefore Dr. Francois and Dr. Robin were consulted for surgical consultation.      He was still having orthopnea on hospital stay day 3 and so Dr. Francois wanted to wait until Monday to do his heart catheterization so we could have the weekend to tune him up respiratory wise.      His chest xray on admission also showed mild patchy air space disease questionable pneumonia, repeat chest xray the next day said the same, possible pneumonia.  Therefore he was treated with Azithromycin and Rocephin/Omnicef while in the hospital and has completed his therapy.      He also had preoperative carotid ultrasounds during this hospitalization and was found to have some questionable stenosis but was difficult to ascertain on ultrasound so CTA neck was performed and showed severe stenosis of the right carotid bulb.  He does infact have bilateral carotid occlusive disease R>L.  Dr. Salgado was consulted and recommends a right carotid endartectomy to be done and will coordinate this with Dr. Robin.    Yesterday on 12/18/17 Dr. Francois took the patient to the cath lab and did a heart catheterization.  He found severe three vessel disease with his known severe aortic valve stenosis.  He recommended CABG and AVR if PFTs permit surgery.      PFTs were completed today and are satisfactory for  "surgery.  He will be contacted by Dr. Robin's office with a surgery date and time.  He has his office number and can call anytime with questions or issues.    He will follow up with Dr. Salgado in two weeks to coordinate his right carotid endartectomy as well.    He will be discharged home on baby aspirin and Lipitor per Cardiology recommendations.  Discussed this case fully with Dr. Zia Abrams, Dr. Sina Robin, Nely Rodriguez NP, Lianet Rascon NP and Muriel Khan NP.      He is stable and in suitable condition for discharge home today.     Condition on Discharge:  Stable     Physical Exam on Discharge:  /68 (BP Location: Left arm, Patient Position: Lying)  Pulse 74  Temp 98.2 °F (36.8 °C) (Tympanic)   Resp 16  Ht 167.6 cm (66\")  Wt 57.6 kg (126 lb 15.8 oz)  SpO2 92%  BMI 20.5 kg/m2     Physical Exam  Constitutional: He is oriented to person, place, and time. He appears well-developed and well-nourished.   Thin frame    HENT:   Head: Normocephalic and atraumatic.   Prominent carotid bruit b/l   Eyes: Conjunctivae and EOM are normal. Pupils are equal, round, and reactive to light.   Neck: Neck supple. No JVD present. No thyromegaly present.   Cardiovascular: Normal rate, regular rhythm and intact distal pulses.  Exam reveals no gallop and no friction rub.    Murmur heard.   Systolic murmur is present with a grade of 3/6   Sinus 74-97   Pulmonary/Chest: Effort normal and breath sounds normal. No respiratory distress. He has no wheezes. He has no rales. He exhibits no tenderness.   Diminished bilateral bases. On room air.    Abdominal: Soft. Bowel sounds are normal. He exhibits no distension. There is no tenderness. There is no rebound and no guarding.   Musculoskeletal: Normal range of motion. He exhibits no edema, tenderness or deformity. Cath site CDI.   Lymphadenopathy:     He has no cervical adenopathy.   Neurological: He is alert and oriented to person, place, and time. He displays normal " reflexes. No cranial nerve deficit. He exhibits normal muscle tone.   Skin: Skin is warm and dry. No rash noted.   Psychiatric: He has a normal mood and affect. His behavior is normal. Judgment and thought content normal.   Vitals reviewed.    Discharge Disposition:  Home or Self Care    Discharge Medications:   Sharad Ryder   Home Medication Instructions VIDA:[id number]    Printed on:12/19/17 4887   Medication Information                      albuterol (PROVENTIL HFA;VENTOLIN HFA) 108 (90 Base) MCG/ACT inhaler  Inhale 2 puffs Every 4 (Four) Hours As Needed for Wheezing.             aspirin 81 MG tablet  Take 1 tablet by mouth Daily.             atorvastatin (LIPITOR) 80 MG tablet  Take 1 tablet by mouth Every Night.             budesonide-formoterol (SYMBICORT) 160-4.5 MCG/ACT inhaler  Inhale 2 puffs 2 (Two) Times a Day.             chlorthalidone (HYGROTON) 25 MG tablet  Take 25 mg by mouth Daily.             citalopram (CeleXA) 20 MG tablet  Take 20 mg by mouth Daily.             furosemide (LASIX) 40 MG tablet  Take 1 tablet by mouth Daily.             lisinopril (PRINIVIL,ZESTRIL) 20 MG tablet  Take 20 mg by mouth Daily.             potassium chloride (MICRO-K) 10 MEQ CR capsule  Take 2 capsules by mouth 2 (Two) Times a Day for 30 days.               Discharge Diet:   Diet Instructions     Diet: Regular, Cardiac; Thin       Discharge Diet:   Regular  Cardiac      Fluid Consistency:  Thin               Activity at Discharge:   Activity Instructions     Activity as Tolerated                   Follow-up Appointments:   1. Follow up with Dr. Salgado in two weeks  2. Follow up with Dr. Robin per his request.     Test Results Pending at Discharge: None    KIRBY Greer  12/19/17  3:38 PM    Time: 35 minutes     Chart reviewed.   Patient examined  Agree with assessment and plan  .honr

## 2017-12-19 NOTE — PROGRESS NOTES
Exercise Oximetry    Patient Name:Sharad Ryder   MRN: [id number]   Date: 12/19/17             ROOM AIR BASELINE   SpO2% 95   Heart Rate 88   Blood Pressure      EXERCISE ON ROOM AIR SpO2% EXERCISE ON O2 @  LPM SpO2%   1 MINUTE 95 1 MINUTE    2 MINUTES 95 2 MINUTES    3 MINUTES 94 3 MINUTES    4 MINUTES 94 4 MINUTES    5 MINUTES 93 5 MINUTES    6 MINUTES 93 6 MINUTES               Distance Walked  300 feet Distance Walked   Dyspnea (Christina Scale)   Dyspnea (Christina Scale)   Fatigue (Christina Scale)   Fatigue (Christina Scale)   SpO2% Post Exercise  93 SpO2% Post Exercise   HR Post Exercise  88 HR Post Exercise   Time to Recovery   Time to Recovery     Comments:

## 2017-12-21 ENCOUNTER — TELEPHONE (OUTPATIENT)
Dept: CALL CENTER | Facility: HOSPITAL | Age: 57
End: 2017-12-21

## 2018-01-03 ENCOUNTER — OFFICE VISIT (OUTPATIENT)
Dept: VASCULAR SURGERY | Facility: CLINIC | Age: 58
End: 2018-01-03

## 2018-01-03 VITALS
DIASTOLIC BLOOD PRESSURE: 62 MMHG | HEART RATE: 110 BPM | SYSTOLIC BLOOD PRESSURE: 98 MMHG | BODY MASS INDEX: 22.32 KG/M2 | HEIGHT: 63 IN | WEIGHT: 126 LBS

## 2018-01-03 DIAGNOSIS — I35.0 SEVERE AORTIC VALVE STENOSIS: ICD-10-CM

## 2018-01-03 DIAGNOSIS — I65.21 STENOSIS OF RIGHT CAROTID ARTERY: Primary | ICD-10-CM

## 2018-01-03 DIAGNOSIS — I10 ESSENTIAL HYPERTENSION: ICD-10-CM

## 2018-01-03 DIAGNOSIS — E78.5 HYPERLIPIDEMIA, UNSPECIFIED HYPERLIPIDEMIA TYPE: ICD-10-CM

## 2018-01-03 DIAGNOSIS — Z72.0 TOBACCO USE: ICD-10-CM

## 2018-01-03 PROCEDURE — 99214 OFFICE O/P EST MOD 30 MIN: CPT | Performed by: NURSE PRACTITIONER

## 2018-01-03 RX ORDER — PENTAZOCINE HYDROCHLORIDE AND NALOXONE HYDROCHLORIDE 50; .5 MG/1; MG/1
1 TABLET ORAL EVERY 6 HOURS PRN
COMMUNITY
Start: 2017-12-27 | End: 2018-02-16 | Stop reason: HOSPADM

## 2018-01-04 ENCOUNTER — APPOINTMENT (OUTPATIENT)
Dept: CARDIOLOGY | Facility: HOSPITAL | Age: 58
End: 2018-01-04

## 2018-01-04 ENCOUNTER — HOSPITAL ENCOUNTER (INPATIENT)
Facility: HOSPITAL | Age: 58
LOS: 11 days | Discharge: HOME | End: 2018-01-15
Attending: EMERGENCY MEDICINE | Admitting: INTERNAL MEDICINE

## 2018-01-04 ENCOUNTER — APPOINTMENT (OUTPATIENT)
Dept: GENERAL RADIOLOGY | Facility: HOSPITAL | Age: 58
End: 2018-01-04

## 2018-01-04 DIAGNOSIS — I21.4 NSTEMI (NON-ST ELEVATED MYOCARDIAL INFARCTION) (HCC): Primary | ICD-10-CM

## 2018-01-04 DIAGNOSIS — R94.31 ABNORMAL EKG: ICD-10-CM

## 2018-01-04 PROBLEM — J44.9 COPD (CHRONIC OBSTRUCTIVE PULMONARY DISEASE): Status: ACTIVE | Noted: 2018-01-04

## 2018-01-04 PROBLEM — I65.21 STENOSIS OF RIGHT CAROTID ARTERY: Status: ACTIVE | Noted: 2018-01-04

## 2018-01-04 PROBLEM — R07.9 CHEST PAIN: Status: ACTIVE | Noted: 2018-01-04

## 2018-01-04 PROBLEM — I25.119 CORONARY ARTERY DISEASE INVOLVING NATIVE CORONARY ARTERY OF NATIVE HEART WITH ANGINA PECTORIS: Status: ACTIVE | Noted: 2018-01-04

## 2018-01-04 PROBLEM — N17.9 AKI (ACUTE KIDNEY INJURY): Status: ACTIVE | Noted: 2018-01-04

## 2018-01-04 PROBLEM — I20.0 UNSTABLE ANGINA PECTORIS: Status: ACTIVE | Noted: 2018-01-04

## 2018-01-04 LAB
ALBUMIN SERPL-MCNC: 4.1 G/DL (ref 3.5–5)
ALBUMIN/GLOB SERPL: 1.6 G/DL (ref 1.1–2.5)
ALP SERPL-CCNC: 72 U/L (ref 24–120)
ALT SERPL W P-5'-P-CCNC: 38 U/L (ref 0–54)
ANION GAP SERPL CALCULATED.3IONS-SCNC: 13 MMOL/L (ref 4–13)
AST SERPL-CCNC: 29 U/L (ref 7–45)
BASOPHILS # BLD AUTO: 0.03 10*3/MM3 (ref 0–0.2)
BASOPHILS NFR BLD AUTO: 0.4 % (ref 0–2)
BH CV ECHO MEAS - AO MAX PG (FULL): 80.4 MMHG
BH CV ECHO MEAS - AO MAX PG: 87.2 MMHG
BH CV ECHO MEAS - AO MEAN PG (FULL): 43 MMHG
BH CV ECHO MEAS - AO MEAN PG: 47 MMHG
BH CV ECHO MEAS - AO ROOT AREA (BSA CORRECTED): 2
BH CV ECHO MEAS - AO ROOT AREA: 7.1 CM^2
BH CV ECHO MEAS - AO ROOT DIAM: 3 CM
BH CV ECHO MEAS - AO V2 MAX: 467 CM/SEC
BH CV ECHO MEAS - AO V2 MEAN: 307 CM/SEC
BH CV ECHO MEAS - AO V2 VTI: 100 CM
BH CV ECHO MEAS - AVA(I,A): 0.85 CM^2
BH CV ECHO MEAS - AVA(I,D): 0.85 CM^2
BH CV ECHO MEAS - AVA(V,A): 0.8 CM^2
BH CV ECHO MEAS - AVA(V,D): 0.8 CM^2
BH CV ECHO MEAS - BSA(HAYCOCK): 1.5 M^2
BH CV ECHO MEAS - BSA: 1.5 M^2
BH CV ECHO MEAS - BZI_BMI: 16.6 KILOGRAMS/M^2
BH CV ECHO MEAS - BZI_METRIC_HEIGHT: 167.6 CM
BH CV ECHO MEAS - BZI_METRIC_WEIGHT: 46.7 KG
BH CV ECHO MEAS - CONTRAST EF 4CH: 63 ML/M^2
BH CV ECHO MEAS - EDV(CUBED): 71 ML
BH CV ECHO MEAS - EDV(MOD-SP4): 63.3 ML
BH CV ECHO MEAS - EDV(TEICH): 75.9 ML
BH CV ECHO MEAS - EF(CUBED): 69.1 %
BH CV ECHO MEAS - EF(MOD-SP4): 63 %
BH CV ECHO MEAS - EF(TEICH): 61.1 %
BH CV ECHO MEAS - ESV(CUBED): 22 ML
BH CV ECHO MEAS - ESV(MOD-SP4): 23.4 ML
BH CV ECHO MEAS - ESV(TEICH): 29.6 ML
BH CV ECHO MEAS - FS: 32.4 %
BH CV ECHO MEAS - IVS/LVPW: 1.1
BH CV ECHO MEAS - IVSD: 1.5 CM
BH CV ECHO MEAS - LA DIMENSION: 4.1 CM
BH CV ECHO MEAS - LA/AO: 1.4
BH CV ECHO MEAS - LV DIASTOLIC VOL/BSA (35-75): 42 ML/M^2
BH CV ECHO MEAS - LV MASS(C)D: 231.9 GRAMS
BH CV ECHO MEAS - LV MASS(C)DI: 153.7 GRAMS/M^2
BH CV ECHO MEAS - LV MAX PG: 6.9 MMHG
BH CV ECHO MEAS - LV MEAN PG: 4 MMHG
BH CV ECHO MEAS - LV SYSTOLIC VOL/BSA (12-30): 15.5 ML/M^2
BH CV ECHO MEAS - LV V1 MAX: 131 CM/SEC
BH CV ECHO MEAS - LV V1 MEAN: 88 CM/SEC
BH CV ECHO MEAS - LV V1 VTI: 30 CM
BH CV ECHO MEAS - LVIDD: 4.1 CM
BH CV ECHO MEAS - LVIDS: 2.8 CM
BH CV ECHO MEAS - LVLD AP4: 7.7 CM
BH CV ECHO MEAS - LVLS AP4: 6.9 CM
BH CV ECHO MEAS - LVOT AREA (M): 2.8 CM^2
BH CV ECHO MEAS - LVOT AREA: 2.8 CM^2
BH CV ECHO MEAS - LVOT DIAM: 1.9 CM
BH CV ECHO MEAS - LVPWD: 1.4 CM
BH CV ECHO MEAS - MV A MAX VEL: 121 CM/SEC
BH CV ECHO MEAS - MV DEC SLOPE: 653 CM/SEC^2
BH CV ECHO MEAS - MV E MAX VEL: 125 CM/SEC
BH CV ECHO MEAS - MV E/A: 1
BH CV ECHO MEAS - MV P1/2T MAX VEL: 125 CM/SEC
BH CV ECHO MEAS - MV P1/2T: 56.1 MSEC
BH CV ECHO MEAS - MVA P1/2T LCG: 1.8 CM^2
BH CV ECHO MEAS - MVA(P1/2T): 3.9 CM^2
BH CV ECHO MEAS - SI(AO): 468.5 ML/M^2
BH CV ECHO MEAS - SI(CUBED): 32.5 ML/M^2
BH CV ECHO MEAS - SI(LVOT): 56.4 ML/M^2
BH CV ECHO MEAS - SI(MOD-SP4): 26.4 ML/M^2
BH CV ECHO MEAS - SI(TEICH): 30.8 ML/M^2
BH CV ECHO MEAS - SV(AO): 706.9 ML
BH CV ECHO MEAS - SV(CUBED): 49 ML
BH CV ECHO MEAS - SV(LVOT): 85.1 ML
BH CV ECHO MEAS - SV(MOD-SP4): 39.9 ML
BH CV ECHO MEAS - SV(TEICH): 46.4 ML
BILIRUB SERPL-MCNC: 0.2 MG/DL (ref 0.1–1)
BUN BLD-MCNC: 14 MG/DL (ref 5–21)
BUN/CREAT SERPL: 9.8 (ref 7–25)
CALCIUM SPEC-SCNC: 9.6 MG/DL (ref 8.4–10.4)
CHLORIDE SERPL-SCNC: 97 MMOL/L (ref 98–110)
CO2 SERPL-SCNC: 26 MMOL/L (ref 24–31)
CREAT BLD-MCNC: 1.43 MG/DL (ref 0.5–1.4)
DEPRECATED RDW RBC AUTO: 42.6 FL (ref 40–54)
E/E' RATIO: 25.5
EOSINOPHIL # BLD AUTO: 0.03 10*3/MM3 (ref 0–0.7)
EOSINOPHIL NFR BLD AUTO: 0.4 % (ref 0–4)
ERYTHROCYTE [DISTWIDTH] IN BLOOD BY AUTOMATED COUNT: 13 % (ref 12–15)
GFR SERPL CREATININE-BSD FRML MDRD: 51 ML/MIN/1.73
GLOBULIN UR ELPH-MCNC: 2.6 GM/DL
GLUCOSE BLD-MCNC: 112 MG/DL (ref 70–100)
HCT VFR BLD AUTO: 34.8 % (ref 40–52)
HGB BLD-MCNC: 12 G/DL (ref 14–18)
HOLD SPECIMEN: NORMAL
HOLD SPECIMEN: NORMAL
IMM GRANULOCYTES # BLD: 0.01 10*3/MM3 (ref 0–0.03)
IMM GRANULOCYTES NFR BLD: 0.1 % (ref 0–5)
LEFT ATRIUM VOLUME INDEX: 35 ML/M2
LEFT ATRIUM VOLUME: 45.8 CM3
LYMPHOCYTES # BLD AUTO: 0.61 10*3/MM3 (ref 0.72–4.86)
LYMPHOCYTES NFR BLD AUTO: 7.7 % (ref 15–45)
MAXIMAL PREDICTED HEART RATE: 163 BPM
MCH RBC QN AUTO: 30.9 PG (ref 28–32)
MCHC RBC AUTO-ENTMCNC: 34.5 G/DL (ref 33–36)
MCV RBC AUTO: 89.7 FL (ref 82–95)
MONOCYTES # BLD AUTO: 0.98 10*3/MM3 (ref 0.19–1.3)
MONOCYTES NFR BLD AUTO: 12.3 % (ref 4–12)
NEUTROPHILS # BLD AUTO: 6.28 10*3/MM3 (ref 1.87–8.4)
NEUTROPHILS NFR BLD AUTO: 79.1 % (ref 39–78)
NRBC BLD MANUAL-RTO: 0 /100 WBC (ref 0–0)
PLATELET # BLD AUTO: 323 10*3/MM3 (ref 130–400)
PMV BLD AUTO: 9.3 FL (ref 6–12)
POTASSIUM BLD-SCNC: 4 MMOL/L (ref 3.5–5.3)
PROT SERPL-MCNC: 6.7 G/DL (ref 6.3–8.7)
RBC # BLD AUTO: 3.88 10*6/MM3 (ref 4.8–5.9)
SODIUM BLD-SCNC: 136 MMOL/L (ref 135–145)
STRESS TARGET HR: 139 BPM
TROPONIN I SERPL-MCNC: 1.09 NG/ML (ref 0–0.03)
TROPONIN I SERPL-MCNC: 1.15 NG/ML (ref 0–0.03)
TROPONIN I SERPL-MCNC: 1.19 NG/ML (ref 0–0.03)
TROPONIN I SERPL-MCNC: 1.37 NG/ML (ref 0–0.03)
TROPONIN I SERPL-MCNC: 1.55 NG/ML (ref 0–0.03)
TROPONIN I SERPL-MCNC: 1.57 NG/ML (ref 0–0.03)
WBC NRBC COR # BLD: 7.94 10*3/MM3 (ref 4.8–10.8)
WHOLE BLOOD HOLD SPECIMEN: NORMAL
WHOLE BLOOD HOLD SPECIMEN: NORMAL

## 2018-01-04 PROCEDURE — 71045 X-RAY EXAM CHEST 1 VIEW: CPT

## 2018-01-04 PROCEDURE — 99223 1ST HOSP IP/OBS HIGH 75: CPT | Performed by: NURSE PRACTITIONER

## 2018-01-04 PROCEDURE — 94799 UNLISTED PULMONARY SVC/PX: CPT

## 2018-01-04 PROCEDURE — C1760 CLOSURE DEV, VASC: HCPCS | Performed by: INTERNAL MEDICINE

## 2018-01-04 PROCEDURE — 94640 AIRWAY INHALATION TREATMENT: CPT

## 2018-01-04 PROCEDURE — 99285 EMERGENCY DEPT VISIT HI MDM: CPT

## 2018-01-04 PROCEDURE — 93005 ELECTROCARDIOGRAM TRACING: CPT | Performed by: INTERNAL MEDICINE

## 2018-01-04 PROCEDURE — 0 IOPAMIDOL 61 % SOLUTION: Performed by: INTERNAL MEDICINE

## 2018-01-04 PROCEDURE — 25010000002 FENTANYL CITRATE (PF) 100 MCG/2ML SOLUTION: Performed by: INTERNAL MEDICINE

## 2018-01-04 PROCEDURE — 93010 ELECTROCARDIOGRAM REPORT: CPT | Performed by: INTERNAL MEDICINE

## 2018-01-04 PROCEDURE — 93306 TTE W/DOPPLER COMPLETE: CPT | Performed by: INTERNAL MEDICINE

## 2018-01-04 PROCEDURE — 85025 COMPLETE CBC W/AUTO DIFF WBC: CPT | Performed by: EMERGENCY MEDICINE

## 2018-01-04 PROCEDURE — 99152 MOD SED SAME PHYS/QHP 5/>YRS: CPT | Performed by: INTERNAL MEDICINE

## 2018-01-04 PROCEDURE — C1894 INTRO/SHEATH, NON-LASER: HCPCS | Performed by: INTERNAL MEDICINE

## 2018-01-04 PROCEDURE — 80053 COMPREHEN METABOLIC PANEL: CPT | Performed by: EMERGENCY MEDICINE

## 2018-01-04 PROCEDURE — 93454 CORONARY ARTERY ANGIO S&I: CPT | Performed by: INTERNAL MEDICINE

## 2018-01-04 PROCEDURE — 25010000002 DIPHENHYDRAMINE PER 50 MG: Performed by: INTERNAL MEDICINE

## 2018-01-04 PROCEDURE — S0260 H&P FOR SURGERY: HCPCS | Performed by: INTERNAL MEDICINE

## 2018-01-04 PROCEDURE — 84484 ASSAY OF TROPONIN QUANT: CPT | Performed by: EMERGENCY MEDICINE

## 2018-01-04 PROCEDURE — 93005 ELECTROCARDIOGRAM TRACING: CPT | Performed by: EMERGENCY MEDICINE

## 2018-01-04 PROCEDURE — 93005 ELECTROCARDIOGRAM TRACING: CPT

## 2018-01-04 PROCEDURE — 25010000002 MIDAZOLAM PER 1 MG: Performed by: INTERNAL MEDICINE

## 2018-01-04 PROCEDURE — 36415 COLL VENOUS BLD VENIPUNCTURE: CPT | Performed by: EMERGENCY MEDICINE

## 2018-01-04 PROCEDURE — B2111ZZ FLUOROSCOPY OF MULTIPLE CORONARY ARTERIES USING LOW OSMOLAR CONTRAST: ICD-10-PCS | Performed by: INTERNAL MEDICINE

## 2018-01-04 PROCEDURE — 99223 1ST HOSP IP/OBS HIGH 75: CPT | Performed by: INTERNAL MEDICINE

## 2018-01-04 PROCEDURE — 25010000002 ENOXAPARIN PER 10 MG: Performed by: EMERGENCY MEDICINE

## 2018-01-04 PROCEDURE — 93306 TTE W/DOPPLER COMPLETE: CPT

## 2018-01-04 PROCEDURE — B2161ZZ FLUOROSCOPY OF RIGHT AND LEFT HEART USING LOW OSMOLAR CONTRAST: ICD-10-PCS | Performed by: INTERNAL MEDICINE

## 2018-01-04 RX ORDER — ATORVASTATIN CALCIUM 40 MG/1
80 TABLET, FILM COATED ORAL NIGHTLY
Status: DISCONTINUED | OUTPATIENT
Start: 2018-01-04 | End: 2018-01-15 | Stop reason: HOSPADM

## 2018-01-04 RX ORDER — DIPHENHYDRAMINE HYDROCHLORIDE 50 MG/ML
INJECTION INTRAMUSCULAR; INTRAVENOUS AS NEEDED
Status: DISCONTINUED | OUTPATIENT
Start: 2018-01-04 | End: 2018-01-04 | Stop reason: HOSPADM

## 2018-01-04 RX ORDER — FENTANYL CITRATE 50 UG/ML
INJECTION, SOLUTION INTRAMUSCULAR; INTRAVENOUS AS NEEDED
Status: DISCONTINUED | OUTPATIENT
Start: 2018-01-04 | End: 2018-01-04 | Stop reason: HOSPADM

## 2018-01-04 RX ORDER — ASPIRIN 81 MG/1
324 TABLET, CHEWABLE ORAL ONCE
Status: COMPLETED | OUTPATIENT
Start: 2018-01-04 | End: 2018-01-04

## 2018-01-04 RX ORDER — SODIUM CHLORIDE 0.9 % (FLUSH) 0.9 %
1-10 SYRINGE (ML) INJECTION AS NEEDED
Status: DISCONTINUED | OUTPATIENT
Start: 2018-01-04 | End: 2018-01-15 | Stop reason: HOSPADM

## 2018-01-04 RX ORDER — ECHINACEA PURPUREA EXTRACT 125 MG
2 TABLET ORAL AS NEEDED
Status: DISCONTINUED | OUTPATIENT
Start: 2018-01-04 | End: 2018-01-15 | Stop reason: HOSPADM

## 2018-01-04 RX ORDER — ASPIRIN 81 MG/1
81 TABLET, CHEWABLE ORAL DAILY
Status: DISCONTINUED | OUTPATIENT
Start: 2018-01-05 | End: 2018-01-15 | Stop reason: HOSPADM

## 2018-01-04 RX ORDER — CITALOPRAM 20 MG/1
20 TABLET ORAL DAILY
Status: DISCONTINUED | OUTPATIENT
Start: 2018-01-04 | End: 2018-01-15 | Stop reason: HOSPADM

## 2018-01-04 RX ORDER — LISINOPRIL 10 MG/1
20 TABLET ORAL DAILY
Status: DISCONTINUED | OUTPATIENT
Start: 2018-01-04 | End: 2018-01-04

## 2018-01-04 RX ORDER — SODIUM CHLORIDE 0.9 % (FLUSH) 0.9 %
10 SYRINGE (ML) INJECTION AS NEEDED
Status: DISCONTINUED | OUTPATIENT
Start: 2018-01-04 | End: 2018-01-04

## 2018-01-04 RX ORDER — POTASSIUM CHLORIDE 750 MG/1
20 CAPSULE, EXTENDED RELEASE ORAL 2 TIMES DAILY
Status: DISCONTINUED | OUTPATIENT
Start: 2018-01-04 | End: 2018-01-15 | Stop reason: HOSPADM

## 2018-01-04 RX ORDER — ALBUTEROL SULFATE 2.5 MG/3ML
2.5 SOLUTION RESPIRATORY (INHALATION) EVERY 4 HOURS PRN
COMMUNITY
End: 2018-10-15

## 2018-01-04 RX ORDER — SODIUM CHLORIDE 9 MG/ML
100 INJECTION, SOLUTION INTRAVENOUS CONTINUOUS
Status: DISCONTINUED | OUTPATIENT
Start: 2018-01-04 | End: 2018-01-05

## 2018-01-04 RX ORDER — HYDROCODONE BITARTRATE AND ACETAMINOPHEN 5; 325 MG/1; MG/1
1 TABLET ORAL EVERY 6 HOURS PRN
Status: DISCONTINUED | OUTPATIENT
Start: 2018-01-04 | End: 2018-01-06

## 2018-01-04 RX ORDER — LIDOCAINE HYDROCHLORIDE 20 MG/ML
INJECTION, SOLUTION INFILTRATION; PERINEURAL AS NEEDED
Status: DISCONTINUED | OUTPATIENT
Start: 2018-01-04 | End: 2018-01-04 | Stop reason: HOSPADM

## 2018-01-04 RX ORDER — ACETAMINOPHEN 500 MG
1000 TABLET ORAL ONCE
Status: COMPLETED | OUTPATIENT
Start: 2018-01-04 | End: 2018-01-04

## 2018-01-04 RX ORDER — ALBUTEROL SULFATE 90 UG/1
2 AEROSOL, METERED RESPIRATORY (INHALATION) EVERY 4 HOURS PRN
Status: DISCONTINUED | OUTPATIENT
Start: 2018-01-04 | End: 2018-01-04 | Stop reason: CLARIF

## 2018-01-04 RX ORDER — ATORVASTATIN CALCIUM 40 MG/1
40 TABLET, FILM COATED ORAL NIGHTLY
Status: DISCONTINUED | OUTPATIENT
Start: 2018-01-04 | End: 2018-01-04

## 2018-01-04 RX ORDER — ALBUTEROL SULFATE 2.5 MG/3ML
2.5 SOLUTION RESPIRATORY (INHALATION) EVERY 4 HOURS PRN
Status: DISCONTINUED | OUTPATIENT
Start: 2018-01-04 | End: 2018-01-15 | Stop reason: HOSPADM

## 2018-01-04 RX ORDER — POTASSIUM CHLORIDE 750 MG/1
20 TABLET, FILM COATED, EXTENDED RELEASE ORAL 2 TIMES DAILY
Status: ON HOLD | COMMUNITY
End: 2018-02-16

## 2018-01-04 RX ORDER — CHLORTHALIDONE 25 MG/1
25 TABLET ORAL DAILY
Status: DISCONTINUED | OUTPATIENT
Start: 2018-01-04 | End: 2018-01-04

## 2018-01-04 RX ORDER — BUDESONIDE AND FORMOTEROL FUMARATE DIHYDRATE 160; 4.5 UG/1; UG/1
2 AEROSOL RESPIRATORY (INHALATION)
Status: DISCONTINUED | OUTPATIENT
Start: 2018-01-04 | End: 2018-01-15 | Stop reason: HOSPADM

## 2018-01-04 RX ORDER — FUROSEMIDE 40 MG/1
40 TABLET ORAL DAILY
Status: DISCONTINUED | OUTPATIENT
Start: 2018-01-04 | End: 2018-01-15 | Stop reason: HOSPADM

## 2018-01-04 RX ORDER — MIDAZOLAM HYDROCHLORIDE 1 MG/ML
INJECTION INTRAMUSCULAR; INTRAVENOUS AS NEEDED
Status: DISCONTINUED | OUTPATIENT
Start: 2018-01-04 | End: 2018-01-04 | Stop reason: HOSPADM

## 2018-01-04 RX ADMIN — ASPIRIN 81 MG 324 MG: 81 TABLET ORAL at 13:24

## 2018-01-04 RX ADMIN — HYDROCODONE BITARTRATE AND ACETAMINOPHEN 1 TABLET: 5; 325 TABLET ORAL at 19:50

## 2018-01-04 RX ADMIN — BUDESONIDE AND FORMOTEROL FUMARATE DIHYDRATE 2 PUFF: 160; 4.5 AEROSOL RESPIRATORY (INHALATION) at 11:03

## 2018-01-04 RX ADMIN — ENOXAPARIN SODIUM 60 MG: 60 INJECTION SUBCUTANEOUS at 04:12

## 2018-01-04 RX ADMIN — ALBUTEROL SULFATE 2.5 MG: 2.5 SOLUTION RESPIRATORY (INHALATION) at 16:57

## 2018-01-04 RX ADMIN — FUROSEMIDE 40 MG: 40 TABLET ORAL at 11:02

## 2018-01-04 RX ADMIN — SODIUM CHLORIDE 1000 ML: 9 INJECTION, SOLUTION INTRAVENOUS at 00:43

## 2018-01-04 RX ADMIN — POTASSIUM CHLORIDE 20 MEQ: 750 CAPSULE, EXTENDED RELEASE ORAL at 11:01

## 2018-01-04 RX ADMIN — ACETAMINOPHEN 1000 MG: 500 TABLET, FILM COATED ORAL at 04:11

## 2018-01-04 RX ADMIN — POTASSIUM CHLORIDE 20 MEQ: 750 CAPSULE, EXTENDED RELEASE ORAL at 19:50

## 2018-01-04 RX ADMIN — CITALOPRAM 20 MG: 20 TABLET, FILM COATED ORAL at 10:59

## 2018-01-04 RX ADMIN — ALBUTEROL SULFATE 2.5 MG: 2.5 SOLUTION RESPIRATORY (INHALATION) at 19:59

## 2018-01-04 RX ADMIN — HYDROCODONE BITARTRATE AND ACETAMINOPHEN 1 TABLET: 5; 325 TABLET ORAL at 12:20

## 2018-01-04 RX ADMIN — BUDESONIDE AND FORMOTEROL FUMARATE DIHYDRATE 2 PUFF: 160; 4.5 AEROSOL RESPIRATORY (INHALATION) at 20:00

## 2018-01-04 RX ADMIN — Medication 2 SPRAY: at 23:42

## 2018-01-04 RX ADMIN — DESMOPRESSIN ACETATE 80 MG: 0.2 TABLET ORAL at 22:20

## 2018-01-04 RX ADMIN — SODIUM CHLORIDE 1000 ML: 9 INJECTION, SOLUTION INTRAVENOUS at 08:28

## 2018-01-04 NOTE — PROGRESS NOTES
01/03/2018      Sharad KLEIN MD  [address] [facility]  [address]  [place] [address]    Sharad Ryder  1960    Chief Complaint   Patient presents with   • Follow-up     Post hospitalization visit.Consult for carotid artery disease.       Dear Sharad KLEIN MD:      HPI  I had the pleasure of seeing your patient Sharad Ryder in the office today.  Thank you kindly for this consultation.  As you recall, Sharad Ryder is a 57 y.o.  male who was recently seen in the hospital.  He has a past medical history of hypertension, hyperlipidemia, chronic tobacco use, and chronic back pain. He sees Dr. Cline (PCP) in [place], [place] only when he is sick. 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 He is supposed to return to work tomorrow. He says he has progressively become more short of breath over the last couple months. He thinks he was told he had a cardiac murmur sometime ago. Strong family history of coronary artery disease. He reports increased shortness of breath taking the trash out 2 nights ago. This prompted a visit to the emergency department and he was admitted for acute CHF exacerbation. He has required oxygen via venti mask at 50% but this has been weaned down to 2 liters nasal cannula currently. An echo was obtained and demonstrated severe aortic stenosis, bicupsid aortic valve, moderate-severe left ventricular hypertrophy with estimated ejection fraction of 70%.  He did have a carotid duplex and CTA of the neck showing significant disease on the right.  He was also seen by Dr. Robin at that time to evaluate for aortic valve replacement.    Past Medical History:   Diagnosis Date   • Aneurysm    • Arthritis    • Carotid artery disease    • Carotid stenosis    • COPD (chronic obstructive pulmonary disease)    • Hyperlipidemia    • Hypertension        Past Surgical History:   Procedure Laterality Date   • APPENDECTOMY     • CARDIAC CATHETERIZATION N/A 12/18/2017    Procedure: Left Heart Cath;   Surgeon: Aime Francois MD;  Location:  [facility] CATH INVASIVE LOC[facility];  Service:    • CARDIAC CATHETERIZATION N/A 12/18/2017    Procedure: Right Heart Cath;  Surgeon: Aime Francois MD;  Location:  [facility] CATH INVASIVE LOC[facility];  Service:    • FINGER SURGERY Right 1990   • OTHER SURGICAL HISTORY      skull srgery from accident in childhood.       Family History   Problem Relation Age of Onset   • Heart disease Mother    • Heart disease Father    • Hypertension Sister    • Hypertension Brother        Social History     Social History   • Marital status:      Spouse name: N/A   • Number of children: N/A   • Years of education: N/A     Occupational History   • Not on file.     Social History Main Topics   • Smoking status: Current Every Day Smoker     Packs/day: 1.00     Types: Cigarettes   • Smokeless tobacco: Current User     Types: Snuff   • Alcohol use No   • Drug use: No   • Sexual activity: Defer     Other Topics Concern   • Not on file     Social History Narrative       No Known Allergies    Prior to Admission medications    Medication Sig Start Date End Date Taking? Authorizing Provider   albuterol (PROVENTIL HFA;VENTOLIN HFA) 108 (90 Base) MCG/ACT inhaler Inhale 2 puffs Every 4 (Four) Hours As Needed for Wheezing. 12/19/17  Yes KIRBY Greer   aspirin 81 MG tablet Take 1 tablet by mouth Daily. 12/19/17  Yes KIRBY Greer   atorvastatin (LIPITOR) 80 MG tablet Take 1 tablet by mouth Every Night. 12/19/17  Yes KIRBY Greer   budesonide-formoterol (SYMBICORT) 160-4.5 MCG/ACT inhaler Inhale 2 puffs 2 (Two) Times a Day. 12/19/17  Yes KIRBY Greer   chlorthalidone (HYGROTON) 25 MG tablet Take 25 mg by mouth Daily.   Yes Historical Provider, MD   citalopram (CeleXA) 20 MG tablet Take 20 mg by mouth Daily.   Yes Historical Provider, MD   furosemide (LASIX) 40 MG tablet Take 1 tablet by mouth Daily. 12/20/17  Yes KIRBY Greer   lisinopril (PRINIVIL,ZESTRIL) 20 MG tablet  "Take 20 mg by mouth Daily.   Yes Historical Provider, MD   pentazocine-naloxone (TALWIN NX) 50-0.5 MG per tablet  12/27/17  Yes Historical Provider, MD   potassium chloride (MICRO-K) 10 MEQ CR capsule Take 2 capsules by mouth 2 (Two) Times a Day for 30 days. 12/19/17 1/18/18 Yes KIRBY Greer       Review of Systems   Constitutional: Positive for fatigue.   HENT: Negative.    Eyes: Negative.    Respiratory: Positive for shortness of breath.    Cardiovascular: Negative.  Negative for chest pain.   Gastrointestinal: Negative.  Negative for abdominal pain.   Endocrine: Negative.    Genitourinary: Negative.    Musculoskeletal: Positive for back pain.   Skin: Negative.    Allergic/Immunologic: Negative.    Neurological: Negative.    Hematological: Negative.    Psychiatric/Behavioral: Negative.    All other systems reviewed and are negative.      BP 98/62  Pulse 110  Ht 160 cm (63\")  Wt 57.2 kg (126 lb)  BMI 22.32 kg/m2  Physical Exam   Constitutional: He is oriented to person, place, and time. He appears well-developed and well-nourished.   HENT:   Head: Normocephalic and atraumatic.   Eyes: Pupils are equal, round, and reactive to light. No scleral icterus.   Neck: Normal range of motion. Neck supple. No thyromegaly present.   Cardiovascular: Normal rate and regular rhythm.    Murmur heard.  Pulses:       Femoral pulses are 2+ on the right side, and 2+ on the left side.       Popliteal pulses are 1+ on the right side, and 1+ on the left side.        Dorsalis pedis pulses are 1+ on the right side, and 1+ on the left side.        Posterior tibial pulses are 1+ on the right side, and 1+ on the left side.   Pulmonary/Chest: Effort normal. He has decreased breath sounds.   Abdominal: Soft. Bowel sounds are normal.   Musculoskeletal: Normal range of motion.   Neurological: He is alert and oriented to person, place, and time.   Skin: Skin is warm and dry.   Psychiatric: He has a normal mood and affect. His " behavior is normal. Judgment and thought content normal.   Nursing note and vitals reviewed.      Ct Angiogram Neck With & Without Contrast    Result Date: 12/15/2017  Narrative:  History: 57-year-old evaluated for carotid plaques.  Reference Carotid ultrasound 1 day prior.  Technique Thin slice helical scanning of the neck is performed post intravenous contrast administration for evaluation of the cervical arterial vasculature. Multiple 3-D reformations are obtained. Automated exposure control was utilized to limit radiation dose.  mGy-cm.  Findings The visualized arch is atherosclerotic. There is a normal three-vessel branching pattern. There is mild stenosis suspected of the left common carotid artery origin. Eccentric plaquing of the mid left common carotid artery without significant stenosis. The right common carotid artery shows no significant stenosis.  There is marked plaquing in both carotid bulbs. There is severe stenosis of the right carotid bulb. This may be exacerbated by blooming artifact from degree of calcification. However lumen may only be 2 mm in diameter. There is mild narrowing of the left carotid bulb from calcific plaque.  There is eccentric bulky plaquing of the proximal right cervical ICA without significant stenosis. Remainder of the right cervical ICA is unremarkable. Eccentric plaque of the proximal left cervical ICA without stenosis. Remainder of the left cervical ICA is unremarkable.  Both vertebral artery origins are visualized with suspected mild stenosis of the right vertebral artery origin. Otherwise the cervical portions of both vertebral arteries are unremarkable without occlusion, severe stenosis, or dissection.  Mild wall thickening of the proximal esophagus, nonspecific. There is severe emphysema in the lung apices. Incidental mucosal thickening throughout the left maxillary sinus with sclerosis consistent with chronic sinusitis. Degenerative changes throughout the  cervical spine, worse at C5-C6 and C6-C7.   Impression Severe stenosis at the right carotid bulb due to bulky calcific plaquing. This report was finalized on 12/15/2017 13:48 by  Abel Suarez, .    Ct Angiogram Chest With & Without Contrast    Result Date: 12/14/2017  Narrative: CT ANGIOGRAM CHEST W WO CONTRAST- 12/14/2017 5:14 PM CST  HISTORY: chest pain; R06.02-Shortness of breath; J18.1-Lobar pneumonia, unspecified organism  COMPARISON: None.  DOSE LENGTH PRODUCT: 275 mGy cm. Automated exposure control was also utilized to decrease patient radiation dose.  TECHNIQUE: Helical tomographic images of the chest were obtained after the administration of intravenous contrast following angiogram protocol. Additionally, 3D and multiplanar reformatted images were provided.    FINDINGS:  Pulmonary arteries: There is adequate enhancement of the pulmonary arteries to evaluate for central and segmental pulmonary emboli. There are no filling defects within the main, lobar, segmental or visualized subsegmental pulmonary arteries. The pulmonary arteries are within normal limits for size.  Aorta and great vessels: The aorta is well opacified and demonstrates no dissection or aneurysm. The great vessels are normal in appearance. Mild ostial narrowing of the left common carotid and left subclavian origins.  Visualized neck base: The imaged portion of the base of the neck appears unremarkable.  Lungs: There is severe background lung emphysema. Bilateral dependent lung atelectasis. No mass or suspicious pulmonary nodule. No acute lung infiltrate. The trachea and bronchial tree are patent.  Heart: The heart is normal in size. There is no pericardial effusion. Moderate coronary artery atheromatous calcification.  Mediastinum and lymph nodes: No enlarged mediastinal, hilar, or axillary lymph nodes are present.  Skeletal and soft tissues: Age-indeterminate mild wedge compression deformity at T11. No destructive bone lesion.      Impression:  1. No evidence of pulmonary embolus or other acute cardiopulmonary process. 2. Severe background lung emphysema. 3. Moderate coronary artery atheromatous calcification. 4. Age-indeterminate mild compression deformity at T11.   This report was finalized on 12/14/2017 17:58 by Dr Javy Romano, .    Ct Angiogram Abdomen Pelvis With & Without Contrast    Result Date: 12/14/2017  Narrative: CT ANGIOGRAM ABDOMEN PELVIS W WO CONTRAST- 12/14/2017 5:14 PM CST  HISTORY: groin pain, ? hx of aneursym; R06.02-Shortness of breath; J18.1-Lobar pneumonia, unspecified organism  COMPARISON: None  DOSE LENGTH PRODUCT: 275 mGy cm. Automated exposure control was also utilized to decrease patient radiation dose.  TECHNIQUE: Helical tomographic images of the abdomen and pelvis utilizing angiographic protocol were obtained following the intravenous infusion of contrast. Multiplanar and 3 D reformatted images were provided for review.  FINDINGS:  Angiogram: Moderate atheromatous calcification throughout the aorta. The origins of the celiac and SMA are unremarkable. Renal artery origins are unremarkable. There is fusiform aneurysmal dilation of an approximately 4.6 cm segment of the infrarenal aorta with the aneurysm sac measuring up to 3.4 cm (series 4-image 77). No evidence of aneurysm rupture. There is moderate atheromatous narrowing of the bilateral common iliac artery origins. Moderate diffuse disease throughout the bilateral internal and external iliac arteries. No vessel cut off identified. Incidentally noted small accessory left renal artery.  Other findings: Extensive colonic diverticulosis without evidence of acute diverticulitis. Moderate prostatomegaly with coarse calcification identified in the gland. No suspicious adenopathy in the pelvis. Severe L5-S1 level degenerative change with at least mild spinal stenosis.      Impression: 1. Fusiform aneurysmal dilation of the infrarenal aorta up to 3.4 cm. No evidence of aneurysm  rupture. 2. Colonic diverticulosis without acute diverticulitis. No acute process identified in the abdomen on this arteriogram.  This report was finalized on 12/14/2017 19:11 by Dr Javy Romano, .    Xr Chest 1 View    Result Date: 12/14/2017  Narrative: History: 57-year-old evaluated for pulmonary edema.  Reference: Chest radiograph one day prior.  Findings: Frontal chest radiograph performed.  Heart and mediastinum unchanged. Atherosclerotic thoracic aorta. Background emphysema. Right basilar opacities remain. No new or increasing opacity. No pleural effusion or pneumothorax. No acute osseous finding.      Impression: Impression: Persistent opacities at the right base suspicious for pneumonia. Emphysema. This report was finalized on 12/14/2017 07:41 by  Abel Suarez, .    Xr Chest 1 View    Result Date: 12/13/2017  Narrative: EXAMINATION: XR CHEST 1 VW-. 12/13/2017 8:03 AM EST  CHEST, ONE VIEW:  HISTORY: Respiratory distress  COMPARISON: None  A single frontal chest radiograph was obtained.  FINDINGS:  Bilateral perihilar interstitial prominence appreciated with mild interstitial prominence also noted in the lung bases with minimal patchy airspace opacities in the right lung base medially. Septal line/Kerley B lines also observed. Correlate for mild interstitial pulmonary edema possible cardiogenic etiology.  Bony structures are intact.                                  Impression: 1. Interstitial prominence with mild patchy airspace opacities in the right lung base, acute interstitial infiltration from an inflammatory process considered. Pulmonary edema also a differential consideration.  This report was finalized on 12/13/2017 07:04 by Dr. Vito Abrams MD.    Us Carotid Bilateral    Result Date: 12/14/2017  Narrative: History: Bruit      Impression: Impression: 1. There is less than 50% stenosis of the right internal carotid artery. 2. There is less than 50% stenosis of the left internal carotid artery. 3.  Antegrade flow is demonstrated in bilateral vertebral arteries. 4. There is heavy plaque burden at the right bifurcation. Further imaging with a CTA the neck may be warranted.  Comments: Bilateral carotid vertebral arterial duplex scan was performed.  Grayscale imaging shows intimal thickening and calcified elements at the carotid bifurcation. The right internal carotid artery peak systolic velocity is 102 cm/sec. The end-diastolic velocity is 34.6 cm/sec. The right ICA/CCA ratio is approximately 1.79 . These findings correlate with less than 50% stenosis of the right internal carotid artery.  Grayscale imaging shows intimal thickening and calcified elements at the carotid bifurcation. The left internal carotid artery peak systolic velocity is 105 cm/sec. The end-diastolic velocity is 37.7 cm/sec. The left ICA/CCA ratio is approximately 1.0 . These findings correlate with less than 50% stenosis of the left internal carotid artery.  Antegrade flow is demonstrated in bilateral vertebral arteries.    This report was finalized on 12/14/2017 16:53 by Dr. Jl Salgado MD.      Patient Active Problem List   Diagnosis   • Shortness of breath   • Severe aortic valve stenosis   • Tobacco use   • Hyperlipidemia   • Hypertension         ICD-10-CM ICD-9-CM   1. Stenosis of right carotid artery I65.21 433.10   2. Severe aortic valve stenosis I35.0 424.1   3. Hyperlipidemia, unspecified hyperlipidemia type E78.5 272.4   4. Essential hypertension I10 401.9   5. Tobacco use Z72.0 305.1           Plan: After thoroughly evaluating Sharad Ryder, I believe the best course of action is to proceed with a right carotid endarterectomy.  We will coordinate this with Dr. Robin for [facility].  Risks of carotid endarterectomy include, but are not limited to, bleeding, infection, vessel damage, nerve damage, MI, stroke, and death.  I advised the patient of the risks in continuing to use tobacco, and I provided this patient with smoking cessation  educational materials.  I also discussed how to quit smoking and the patient has not expressed the willingness to quit.  During this visit, I spent greater than 10 minutes counseling the patient regarding smoking cessation.  I did discuss vascular risk factors as they pertain to the progression of vascular disease including controlling his hypertension, hyperlipidemia, and smoking cessation.   The patient understands these risks and would like to proceed with the procedure.  The patient can continue taking her current medication regimen as previously planned.  This was all discussed in full with complete understanding.    Thank you for allowing me to participate in the care of your patient.  Please do not hesitate with any questions or concerns.  I will keep you aware of any further encounters with Sharad Ryder.        Sincerely yours,         KIRBY Nolan MD

## 2018-01-04 NOTE — PROGRESS NOTES
Discharge Planning Assessment  [facility]     Patient Name: Sharad Ryder  MRN: [id number]  Today's Date: 1/4/2018    Admit Date: 1/4/2018          Discharge Needs Assessment       01/04/18 0901    Living Environment    Lives With spouse    Living Arrangements house    Type of Financial/Environmental Concern none    Transportation Available car;family or friend will provide    Living Environment    Provides Primary Care For no one    Quality Of Family Relationships supportive    Able to Return to Prior Living Arrangements yes    Discharge Needs Assessment    Concerns To Be Addressed no discharge needs identified    Readmission Within The Last 30 Days previous discharge plan unsuccessful    Equipment Currently Used at Home none    Equipment Needed After Discharge none            Discharge Plan       01/04/18 0902    Case Management/Social Work Plan    Plan Home    Additional Comments Spoke with pt to assess for home needs. Pt lives at home with wife/step-dtr and plans same. Pt is independent and still works full-time.  He says they are telling him he needs open heart surgery. Will follow.         Discharge Placement     No information found                Demographic Summary     None            Functional Status     None            Psychosocial     None            Abuse/Neglect     None            Legal     None            Substance Abuse     None            Patient Forms     None          TERRELL Prater

## 2018-01-04 NOTE — H&P
"   [facility] HEART GROUP HISTORY AND PHYSICAL      Patient Care Team:  Sharad KLEIN MD as PCP - General (Family Medicine)    Chief complaint: Chest Pain    Subjective     Sharad Ryder is a 57 y.o. male presents with one day history of dull, substernal chest pain. He reports onset of chest pain yesterday afternoon while walking back to his car from an appointment at Dr. Salgado's office. The pain has been waxing and waning since that time and was rated at a 10/10 at its worst. He reports associated mild increase in shortness of breath, diaphoresis and blurred vision. He reports that he had \"the flu\" the previous day with body aches, cough, congestion and fever, but this has now resolved. He has a history of coronary artery disease, severe aortic stenosis, carotid artery disease, chronic obstructive pulmonary disease, tobacco abuse, hypertension, hyperlipidemia and chronic back pain. EKG on presentation revealed ST depression in anterolateral leads that was present on last EKG 12/13/17. Initial troponin level elevated at 1.190 with repeat level slightly trended up to 1.370 and finally 1.570. Creatinine 1.43 and GFR 51. Other labs unremarkable. Chest xray reveals mild COPD with no acute cardiopulmonary processes. He continues to complain of chest pain at a 2/10.  Of note, pt was recently admitted to [facility] for NSTEMI. 2D echo revealed severe aortic stenosis with normal ejection fraction and diastolic dysfunction. Heart cath on 12/18/17 revealed triple vessel coronary artery disease. CT angiogram of neck revealed severe stenosis of right carotid bulb due to plaquing. He has been evaluated by vascular surgery for planned right carotid endarterectomy. CT surgery has started workup for evaluation for surgical aortic valve replacement with coronary artery bypass grafting.     Review of Systems  Review of Systems   Constitution: Positive for diaphoresis. Negative for chills, decreased appetite, fever, weakness, " malaise/fatigue, night sweats, weight gain and weight loss.   HENT: Negative for nosebleeds.    Eyes: Positive for blurred vision. Negative for visual disturbance.   Cardiovascular: Positive for chest pain. Negative for dyspnea on exertion, leg swelling, near-syncope, orthopnea, palpitations, paroxysmal nocturnal dyspnea and syncope.   Respiratory: Positive for shortness of breath. Negative for cough, hemoptysis, snoring and wheezing.    Endocrine: Negative for cold intolerance and heat intolerance.   Hematologic/Lymphatic: Does not bruise/bleed easily.   Skin: Negative for rash.   Musculoskeletal: Negative for back pain and falls.   Gastrointestinal: Negative for abdominal pain, change in bowel habit, constipation, diarrhea, dysphagia, heartburn, nausea and vomiting.   Genitourinary: Negative for hematuria.   Neurological: Negative for dizziness, headaches and light-headedness.   Psychiatric/Behavioral: Negative for altered mental status.   Allergic/Immunologic: Negative for persistent infections.        History  Past Medical History:   Diagnosis Date   • Aneurysm    • Arthritis    • Carotid artery disease    • Carotid stenosis    • COPD (chronic obstructive pulmonary disease)    • Hyperlipidemia    • Hypertension      Past Surgical History:   Procedure Laterality Date   • APPENDECTOMY     • CARDIAC CATHETERIZATION N/A 12/18/2017    Procedure: Left Heart Cath;  Surgeon: Aime Francois MD;  Location:  [facility] CATH INVASIVE LOCATION;  Service:    • CARDIAC CATHETERIZATION N/A 12/18/2017    Procedure: Right Heart Cath;  Surgeon: Aime Francois MD;  Location:  [facility] CATH INVASIVE LOCATION;  Service:    • FINGER SURGERY Right 1990   • OTHER SURGICAL HISTORY      skull srgery from accident in childhood.     Family History   Problem Relation Age of Onset   • Heart disease Mother    • Heart disease Father    • Hypertension Sister    • Hypertension Brother      Social History   Substance Use Topics   • Smoking status: Current  Every Day Smoker     Packs/day: 1.00     Types: Cigarettes   • Smokeless tobacco: Current User     Types: Snuff   • Alcohol use No       Medications  Prior to Admission medications    Medication Sig Start Date End Date Taking? Authorizing Provider   albuterol (PROVENTIL HFA;VENTOLIN HFA) 108 (90 Base) MCG/ACT inhaler Inhale 2 puffs Every 4 (Four) Hours As Needed for Wheezing. 12/19/17   KIRBY Greer   aspirin 81 MG tablet Take 1 tablet by mouth Daily. 12/19/17   KIRBY Greer   atorvastatin (LIPITOR) 80 MG tablet Take 1 tablet by mouth Every Night. 12/19/17   KIRBY Greer   budesonide-formoterol (SYMBICORT) 160-4.5 MCG/ACT inhaler Inhale 2 puffs 2 (Two) Times a Day. 12/19/17   KIRBY Greer   chlorthalidone (HYGROTON) 25 MG tablet Take 25 mg by mouth Daily.    Historical Provider, MD   citalopram (CeleXA) 20 MG tablet Take 20 mg by mouth Daily.    Historical Provider, MD   furosemide (LASIX) 40 MG tablet Take 1 tablet by mouth Daily. 12/20/17   KIRBY Greer   lisinopril (PRINIVIL,ZESTRIL) 20 MG tablet Take 20 mg by mouth Daily.    Historical Provider, MD   pentazocine-naloxone (TALWIN NX) 50-0.5 MG per tablet  12/27/17   Historical Provider, MD   potassium chloride (MICRO-K) 10 MEQ CR capsule Take 2 capsules by mouth 2 (Two) Times a Day for 30 days. 12/19/17 1/18/18  KIRBY Greer       Current Facility-Administered Medications   Medication Dose Route Frequency Provider Last Rate Last Dose   • aspirin chewable tablet 324 mg  324 mg Oral Once Cory Lynch MD       • budesonide-formoterol (SYMBICORT) 160-4.5 MCG/ACT inhaler 2 puff  2 puff Inhalation BID - RT KIRBY Caputo       • citalopram (CeleXA) tablet 20 mg  20 mg Oral Daily KIRBY Caputo       • enoxaparin (LOVENOX) syringe 60 mg  60 mg Subcutaneous Q12H Rylee KLEIN MD       • furosemide (LASIX) tablet 40 mg  40 mg Oral Daily Ashtyn Cooley, APRN       • lisinopril  (PRINIVIL,ZESTRIL) tablet 20 mg  20 mg Oral Daily KIRBY Caputo       • nitroglycerin (NITROSTAT) ointment 1 inch  1 inch Topical Once Cory Lynch MD   Stopped at 01/04/18 0344   • Pharmacy to Dose enoxaparin (LOVENOX)  60 mg Does not apply Continuous PRN KIRBY Caputo       • potassium chloride (MICRO-K) CR capsule 20 mEq  20 mEq Oral BID KIRBY Caputo       • sodium chloride 0.9 % flush 1-10 mL  1-10 mL Intravenous PRN KIRBY Caputo       • sodium chloride 0.9 % flush 10 mL  10 mL Intravenous PRN Cory Lynch MD         Current Outpatient Prescriptions   Medication Sig Dispense Refill   • albuterol (PROVENTIL HFA;VENTOLIN HFA) 108 (90 Base) MCG/ACT inhaler Inhale 2 puffs Every 4 (Four) Hours As Needed for Wheezing. 1 inhaler 0   • aspirin 81 MG tablet Take 1 tablet by mouth Daily. 30 tablet 0   • atorvastatin (LIPITOR) 80 MG tablet Take 1 tablet by mouth Every Night. 30 tablet 0   • budesonide-formoterol (SYMBICORT) 160-4.5 MCG/ACT inhaler Inhale 2 puffs 2 (Two) Times a Day. 10.2 g 0   • chlorthalidone (HYGROTON) 25 MG tablet Take 25 mg by mouth Daily.     • citalopram (CeleXA) 20 MG tablet Take 20 mg by mouth Daily.     • furosemide (LASIX) 40 MG tablet Take 1 tablet by mouth Daily. 30 tablet 0   • lisinopril (PRINIVIL,ZESTRIL) 20 MG tablet Take 20 mg by mouth Daily.     • pentazocine-naloxone (TALWIN NX) 50-0.5 MG per tablet      • potassium chloride (MICRO-K) 10 MEQ CR capsule Take 2 capsules by mouth 2 (Two) Times a Day for 30 days. 120 capsule 0        Allergies:  Review of patient's allergies indicates no known allergies.    Objective     Vital Signs  Temp:  [99 °F (37.2 °C)-99.2 °F (37.3 °C)] 99 °F (37.2 °C)  Heart Rate:  [] 89  Resp:  [15-20] 18  BP: ()/(50-69) 105/67    Labs  Lab Results (last 72 hours)     Procedure Component Value Units Date/Time    CBC & Differential [[id number]] Collected:  01/04/18 0047    Specimen:  Blood Updated:  01/04/18  0054    Narrative:       The following orders were created for panel order CBC & Differential.  Procedure                               Abnormality         Status                     ---------                               -----------         ------                     CBC Auto Differential[[id number]]        Abnormal            Final result                 Please view results for these tests on the individual orders.    CBC Auto Differential [[id number]]  (Abnormal) Collected:  01/04/18 0047    Specimen:  Blood Updated:  01/04/18 0054     WBC 7.94 10*3/mm3      RBC 3.88 (L) 10*6/mm3      Hemoglobin 12.0 (L) g/dL      Hematocrit 34.8 (L) %      MCV 89.7 fL      MCH 30.9 pg      MCHC 34.5 g/dL      RDW 13.0 %      RDW-SD 42.6 fl      MPV 9.3 fL      Platelets 323 10*3/mm3      Neutrophil % 79.1 (H) %      Lymphocyte % 7.7 (L) %      Monocyte % 12.3 (H) %      Eosinophil % 0.4 %      Basophil % 0.4 %      Immature Grans % 0.1 %      Neutrophils, Absolute 6.28 10*3/mm3      Lymphocytes, Absolute 0.61 (L) 10*3/mm3      Monocytes, Absolute 0.98 10*3/mm3      Eosinophils, Absolute 0.03 10*3/mm3      Basophils, Absolute 0.03 10*3/mm3      Immature Grans, Absolute 0.01 10*3/mm3      nRBC 0.0 /100 WBC     Comprehensive Metabolic Panel [[id number]]  (Abnormal) Collected:  01/04/18 0047    Specimen:  Blood Updated:  01/04/18 0111     Glucose 112 (H) mg/dL      BUN 14 mg/dL      Creatinine 1.43 (H) mg/dL      Sodium 136 mmol/L      Potassium 4.0 mmol/L      Chloride 97 (L) mmol/L      CO2 26.0 mmol/L      Calcium 9.6 mg/dL      Total Protein 6.7 g/dL      Albumin 4.10 g/dL      ALT (SGPT) 38 U/L      AST (SGOT) 29 U/L      Alkaline Phosphatase 72 U/L      Total Bilirubin 0.2 mg/dL      eGFR Non African Amer 51 (L) mL/min/1.73      Globulin 2.6 gm/dL      A/G Ratio 1.6 g/dL      BUN/Creatinine Ratio 9.8     Anion Gap 13.0 mmol/L     Troponin [[id number]]  (Abnormal) Collected:  01/04/18 0047    Specimen:  Blood Updated:  01/04/18  0129     Troponin I 1.190 (C) ng/mL     Troponin [[id number]]  (Abnormal) Collected:  01/04/18 0317    Specimen:  Blood Updated:  01/04/18 0402     Troponin I 1.370 (C) ng/mL     Troponin [[id number]]  (Abnormal) Collected:  01/04/18 0627    Specimen:  Blood Updated:  01/04/18 0701     Troponin I 1.570 (C) ng/mL           Physical Exam:  Physical Exam   Constitutional: He is oriented to person, place, and time. Vital signs are normal. He appears well-developed and well-nourished. No distress. Nasal cannula in place.   HENT:   Head: Normocephalic and atraumatic.   Right Ear: External ear normal.   Left Ear: External ear normal.   Eyes: Conjunctivae are normal. Pupils are equal, round, and reactive to light. Right eye exhibits no discharge. Left eye exhibits no discharge.   Neck: Normal range of motion. Neck supple. No JVD present. Carotid bruit is not present. No thyromegaly present.   Cardiovascular: Normal rate, regular rhythm and intact distal pulses.  PMI is not displaced.  Exam reveals no gallop, no friction rub and no decreased pulses.    Murmur heard.   Harsh midsystolic murmur is present with a grade of 2/6  at the upper right sternal border radiating to the neck  Pulses:       Radial pulses are 2+ on the right side, and 2+ on the left side.        Dorsalis pedis pulses are 2+ on the right side, and 2+ on the left side.        Posterior tibial pulses are 2+ on the right side, and 2+ on the left side.   Pulmonary/Chest: Effort normal. No respiratory distress. He has decreased breath sounds in the right upper field, the right middle field, the right lower field, the left upper field, the left middle field and the left lower field. He has no wheezes. He has no rhonchi. He has no rales. He exhibits no tenderness.   Abdominal: Soft. Bowel sounds are normal. He exhibits no distension. There is no tenderness.   Musculoskeletal: Normal range of motion. He exhibits no edema.   Neurological: He is alert and oriented to  person, place, and time.   Skin: Skin is warm and dry. No rash noted. He is not diaphoretic. No erythema. No pallor.   Psychiatric: He has a normal mood and affect. His behavior is normal. Judgment and thought content normal.   Vitals reviewed.      Results Review:    I reviewed the patient's new clinical results.    Assessment/Plan     Principal Problem:    Chest pain  Active Problems:    Shortness of breath    NSTEMI (non-ST elevated myocardial infarction)    Severe aortic valve stenosis    Coronary artery disease involving native coronary artery of native heart with angina pectoris    Stenosis of right carotid artery    COPD (chronic obstructive pulmonary disease)    LAZ (acute kidney injury)    Tobacco use    Hyperlipidemia    Hypertension      Plan    1. Admit to CCU.  2. Stat 2D echo.  3. Continue to trend troponin levels.   4. NPO.  5. Daily BMP.  6. Continue home medications as ordered.  7. Hold lisinopril due to hypotension.  8. Hold chlorthalidone for acute kidney injury.   9. Pharmacy to dose therapeutic lovenox.   10. Consult CT surgery.  11. Possible heart cath this afternoon, if chest pain persists.     I discussed the patients findings and my recommendations with patient, nursing staff, primary care team and consulting provider.     Ashtyn Cooley, KIRBY  01/04/18  10:12 AM    Time: 45 minutes

## 2018-01-04 NOTE — ED PROVIDER NOTES
"Subjective   HPI Comments: 58 y/o male with recent admission for CP 12/18 with cath by Dr. Francois showing triple vessel disease and severe aortic stensosis who is to have CABG and AVR performed in the near future arrives for evaluation of >12 hours of intermittent cp 9/10 that is retrosternal without radiation in nature described as pressure like that is present ONLY with exertion and then abates with rest without nausea, vomiting, diarrhea, weakness, sob, falls, trauma, fevers, chills, cough, blood in cough or any other issues. He states he \"waited to come to the ED because I though it would go away.\" He arrives in NAD noting pain to be current 2/10.     Patient is a 57 y.o. male presenting with chest pain.   History provided by:  Patient   used: No    Chest Pain   Pain location:  L chest  Pain quality: tightness    Pain radiates to:  Does not radiate  Pain severity:  Moderate  Onset quality:  Gradual  Chronicity:  New  Context: movement    Relieved by:  Rest  Worsened by:  Exertion  Ineffective treatments:  None tried  Associated symptoms: no cough, no fever, no headache, no nausea, no shortness of breath, no vomiting and no weakness        Review of Systems   Constitutional: Negative for fever.   Respiratory: Negative for cough and shortness of breath.    Cardiovascular: Positive for chest pain.   Gastrointestinal: Negative for nausea and vomiting.   Neurological: Negative for weakness and headaches.   All other systems reviewed and are negative.      Past Medical History:   Diagnosis Date   • Aneurysm    • Arthritis    • Carotid artery disease    • Carotid stenosis    • COPD (chronic obstructive pulmonary disease)    • Hyperlipidemia    • Hypertension        No Known Allergies    Past Surgical History:   Procedure Laterality Date   • APPENDECTOMY     • CARDIAC CATHETERIZATION N/A 12/18/2017    Procedure: Left Heart Cath;  Surgeon: Aime Francois MD;  Location: [facility] CATH INVASIVE LOCATION;  " Service:    • CARDIAC CATHETERIZATION N/A 12/18/2017    Procedure: Right Heart Cath;  Surgeon: Aime Francois MD;  Location:  [facility] CATH INVASIVE LOCATION;  Service:    • FINGER SURGERY Right 1990   • OTHER SURGICAL HISTORY      skull srgery from accident in childhood.       Family History   Problem Relation Age of Onset   • Heart disease Mother    • Heart disease Father    • Hypertension Sister    • Hypertension Brother        Social History     Social History   • Marital status:      Spouse name: N/A   • Number of children: N/A   • Years of education: N/A     Social History Main Topics   • Smoking status: Current Every Day Smoker     Packs/day: 1.00     Types: Cigarettes   • Smokeless tobacco: Current User     Types: Snuff   • Alcohol use No   • Drug use: No   • Sexual activity: Defer     Other Topics Concern   • None     Social History Narrative           Objective   Physical Exam   Constitutional: He is oriented to person, place, and time. He appears well-developed.   HENT:   Head: Normocephalic.   Nose: Nose normal.   Eyes: Conjunctivae are normal. Pupils are equal, round, and reactive to light.   Neck: Normal range of motion. Neck supple.   Cardiovascular: Normal rate, regular rhythm and intact distal pulses.  Exam reveals no gallop and no friction rub.    Murmur heard.  Pulmonary/Chest: Effort normal and breath sounds normal. No respiratory distress. He has no wheezes. He has no rales. He exhibits no tenderness.   Abdominal: Soft. Bowel sounds are normal. He exhibits no distension. There is no tenderness.   Musculoskeletal: Normal range of motion.   Neurological: He is alert and oriented to person, place, and time. He has normal reflexes.   Skin: Skin is warm and dry.   Psychiatric: He has a normal mood and affect. His behavior is normal. Judgment and thought content normal.   Vitals reviewed.      ECG 12 Lead    Date/Time: 1/4/2018 12:27 AM  Performed by: CATINA LOZANO  Authorized by: MARTA  CATINA BAPTISTE   Interpreted by physician  Comparison: compared with previous ECG   Comparison to previous ECG: St depression v4 is new  Rhythm: sinus tachycardia  Rate: tachycardic  BPM: 110  QRS axis: normal        ECG 12 Lead    Date/Time: 1/4/2018 8:00 AM  Performed by: CATINA LOZANO  Authorized by: CATINA LOZANO   Interpreted by physician  Comparison: compared with previous ECG   Similar to previous ECG  Comparison to previous ECG: Unchanged from prior today  Rhythm: sinus rhythm  Rate: normal  BPM: 91  QRS axis: normal                 ED Course  ED Course      Labs Reviewed   COMPREHENSIVE METABOLIC PANEL - Abnormal; Notable for the following:        Result Value    Glucose 112 (*)     Creatinine 1.43 (*)     Chloride 97 (*)     eGFR Non  Amer 51 (*)     All other components within normal limits   TROPONIN (IN-HOUSE) - Abnormal; Notable for the following:     Troponin I 1.190 (*)     All other components within normal limits   TROPONIN (IN-HOUSE) - Abnormal; Notable for the following:     Troponin I 1.370 (*)     All other components within normal limits   TROPONIN (IN-HOUSE) - Abnormal; Notable for the following:     Troponin I 1.570 (*)     All other components within normal limits   CBC WITH AUTO DIFFERENTIAL - Abnormal; Notable for the following:     RBC 3.88 (*)     Hemoglobin 12.0 (*)     Hematocrit 34.8 (*)     Neutrophil % 79.1 (*)     Lymphocyte % 7.7 (*)     Monocyte % 12.3 (*)     Lymphocytes, Absolute 0.61 (*)     All other components within normal limits   RAINBOW DRAW    Narrative:     The following orders were created for panel order [place] Draw.  Procedure                               Abnormality         Status                     ---------                               -----------         ------                     Light Blue Top[[id number]]                                   Final result               Green Top (Gel)[[id number]]                                  Final result                Lavender Top[[id number]]                                     Final result               Red Top[[id number]]                                          Final result                 Please view results for these tests on the individual orders.   TROPONIN (IN-HOUSE)   TROPONIN (IN-HOUSE)   CBC AND DIFFERENTIAL    Narrative:     The following orders were created for panel order CBC & Differential.  Procedure                               Abnormality         Status                     ---------                               -----------         ------                     CBC Auto Differential[[id number]]        Abnormal            Final result                 Please view results for these tests on the individual orders.   LIGHT BLUE TOP   GREEN TOP   LAVENDER TOP   RED TOP       XR Chest 1 View   Final Result   Mild COPD. No acute cardiopulmonary abnormality.   This report was finalized on 01/04/2018 07:16 by Dr. Son Womack MD.        I spoke with DR. Hunter, EKGS are changed from prior with ST depression in V4 with elevated troponin. Dr. Hunter okay with lovenox and admission for NSTEMI. Patient without current pain.           MDM    Final diagnoses:   NSTEMI (non-ST elevated myocardial infarction)   Abnormal EKG            Cory Lynch MD  01/04/18 0801

## 2018-01-05 ENCOUNTER — APPOINTMENT (OUTPATIENT)
Dept: GENERAL RADIOLOGY | Facility: HOSPITAL | Age: 58
End: 2018-01-05

## 2018-01-05 PROBLEM — J11.1 FLU: Status: ACTIVE | Noted: 2018-01-05

## 2018-01-05 LAB
ANION GAP SERPL CALCULATED.3IONS-SCNC: 8 MMOL/L (ref 4–13)
ARTERIAL PATENCY WRIST A: POSITIVE
ATMOSPHERIC PRESS: 762 MMHG
BASE EXCESS BLDA CALC-SCNC: 1.5 MMOL/L (ref 0–2)
BDY SITE: ABNORMAL
BODY TEMPERATURE: 37 C
BUN BLD-MCNC: 8 MG/DL (ref 5–21)
BUN/CREAT SERPL: 11.6 (ref 7–25)
CALCIUM SPEC-SCNC: 9.1 MG/DL (ref 8.4–10.4)
CHLORIDE SERPL-SCNC: 97 MMOL/L (ref 98–110)
CO2 SERPL-SCNC: 30 MMOL/L (ref 24–31)
CREAT BLD-MCNC: 0.69 MG/DL (ref 0.5–1.4)
FLUAV AG NPH QL: POSITIVE
FLUBV AG NPH QL IA: NEGATIVE
GFR SERPL CREATININE-BSD FRML MDRD: 118 ML/MIN/1.73
GLUCOSE BLD-MCNC: 94 MG/DL (ref 70–100)
HCO3 BLDA-SCNC: 26.1 MMOL/L (ref 20–26)
Lab: ABNORMAL
MODALITY: ABNORMAL
PCO2 BLDA: 40 MM HG (ref 35–45)
PH BLDA: 7.42 PH UNITS (ref 7.35–7.45)
PO2 BLDA: 68.9 MM HG (ref 83–108)
POTASSIUM BLD-SCNC: 3.6 MMOL/L (ref 3.5–5.3)
SAO2 % BLDCOA: 94.1 % (ref 94–99)
SODIUM BLD-SCNC: 135 MMOL/L (ref 135–145)
TROPONIN I SERPL-MCNC: 1.17 NG/ML (ref 0–0.03)
VENTILATOR MODE: ABNORMAL

## 2018-01-05 PROCEDURE — 36600 WITHDRAWAL OF ARTERIAL BLOOD: CPT

## 2018-01-05 PROCEDURE — 99231 SBSQ HOSP IP/OBS SF/LOW 25: CPT | Performed by: NURSE PRACTITIONER

## 2018-01-05 PROCEDURE — 82803 BLOOD GASES ANY COMBINATION: CPT

## 2018-01-05 PROCEDURE — 94799 UNLISTED PULMONARY SVC/PX: CPT

## 2018-01-05 PROCEDURE — 99223 1ST HOSP IP/OBS HIGH 75: CPT | Performed by: SURGERY

## 2018-01-05 PROCEDURE — 80048 BASIC METABOLIC PNL TOTAL CA: CPT | Performed by: NURSE PRACTITIONER

## 2018-01-05 PROCEDURE — 36415 COLL VENOUS BLD VENIPUNCTURE: CPT | Performed by: NURSE PRACTITIONER

## 2018-01-05 PROCEDURE — 84484 ASSAY OF TROPONIN QUANT: CPT | Performed by: EMERGENCY MEDICINE

## 2018-01-05 PROCEDURE — 87804 INFLUENZA ASSAY W/OPTIC: CPT | Performed by: NURSE PRACTITIONER

## 2018-01-05 PROCEDURE — 71046 X-RAY EXAM CHEST 2 VIEWS: CPT

## 2018-01-05 PROCEDURE — 99232 SBSQ HOSP IP/OBS MODERATE 35: CPT | Performed by: INTERNAL MEDICINE

## 2018-01-05 PROCEDURE — 94760 N-INVAS EAR/PLS OXIMETRY 1: CPT

## 2018-01-05 RX ORDER — OSELTAMIVIR PHOSPHATE 75 MG/1
75 CAPSULE ORAL EVERY 12 HOURS SCHEDULED
Status: COMPLETED | OUTPATIENT
Start: 2018-01-05 | End: 2018-01-09

## 2018-01-05 RX ADMIN — FUROSEMIDE 40 MG: 40 TABLET ORAL at 08:57

## 2018-01-05 RX ADMIN — HYDROCODONE BITARTRATE AND ACETAMINOPHEN 1 TABLET: 5; 325 TABLET ORAL at 13:48

## 2018-01-05 RX ADMIN — CITALOPRAM 20 MG: 20 TABLET, FILM COATED ORAL at 08:57

## 2018-01-05 RX ADMIN — BUDESONIDE AND FORMOTEROL FUMARATE DIHYDRATE 2 PUFF: 160; 4.5 AEROSOL RESPIRATORY (INHALATION) at 21:50

## 2018-01-05 RX ADMIN — ASPIRIN 81 MG: 81 TABLET, CHEWABLE ORAL at 08:57

## 2018-01-05 RX ADMIN — BUDESONIDE AND FORMOTEROL FUMARATE DIHYDRATE 2 PUFF: 160; 4.5 AEROSOL RESPIRATORY (INHALATION) at 07:49

## 2018-01-05 RX ADMIN — HYDROCODONE BITARTRATE AND ACETAMINOPHEN 1 TABLET: 5; 325 TABLET ORAL at 07:40

## 2018-01-05 RX ADMIN — OSELTAMIVIR PHOSPHATE 75 MG: 75 CAPSULE ORAL at 20:17

## 2018-01-05 RX ADMIN — POTASSIUM CHLORIDE 20 MEQ: 750 CAPSULE, EXTENDED RELEASE ORAL at 08:57

## 2018-01-05 RX ADMIN — HYDROCODONE BITARTRATE AND ACETAMINOPHEN 1 TABLET: 5; 325 TABLET ORAL at 19:51

## 2018-01-05 RX ADMIN — ALBUTEROL SULFATE 2.5 MG: 2.5 SOLUTION RESPIRATORY (INHALATION) at 21:50

## 2018-01-05 RX ADMIN — POTASSIUM CHLORIDE 20 MEQ: 750 CAPSULE, EXTENDED RELEASE ORAL at 20:17

## 2018-01-05 RX ADMIN — DESMOPRESSIN ACETATE 80 MG: 0.2 TABLET ORAL at 20:17

## 2018-01-05 RX ADMIN — OSELTAMIVIR PHOSPHATE 75 MG: 75 CAPSULE ORAL at 13:48

## 2018-01-05 NOTE — PLAN OF CARE
Problem: Patient Care Overview (Adult)  Goal: Plan of Care Review  Outcome: Ongoing (interventions implemented as appropriate)   01/05/18 7790   Outcome Evaluation   Outcome Summary/Follow up Plan Vss, right groin cath site soft, pulses palp. Priscila Robin Bicking planning plan of care. C/o stuffy nose. No c/o pain voiced. Continue to monitor.      Goal: Adult Individualization and Mutuality  Outcome: Ongoing (interventions implemented as appropriate)    Goal: Discharge Needs Assessment  Outcome: Ongoing (interventions implemented as appropriate)      Problem: Acute Coronary Syndrome (ACS) (Adult)  Goal: Signs and Symptoms of Listed Potential Problems Will be Absent or Manageable (Acute Coronary Syndrome)  Outcome: Ongoing (interventions implemented as appropriate)      Problem: Cardiac Catheterization with/without PCI (Adult)  Goal: Signs and Symptoms of Listed Potential Problems Will be Absent or Manageable (Cardiac Catheterization with/without PCI)  Outcome: Ongoing (interventions implemented as appropriate)

## 2018-01-05 NOTE — PLAN OF CARE
Problem: Patient Care Overview (Adult)  Goal: Plan of Care Review  Outcome: Ongoing (interventions implemented as appropriate)   01/05/18 9886   Coping/Psychosocial Response Interventions   Plan Of Care Reviewed With patient   Patient Care Overview   Progress no change   Outcome Evaluation   Outcome Summary/Follow up Plan Pt has a good appetite. Provided diet education r/t elevated triglycerides. Provided RD contact information for questions s/p d/c.

## 2018-01-05 NOTE — PAYOR COMM NOTE
"Sharad Lopes (57 y.o. Male)     Date of Birth Social Security Number Address Home Phone MRN    1960  [address]   [facility] [address] [phone] [id number]    Denominational Marital Status          None        Admission Date Admission Type Admitting Provider Attending Provider Department, Room/Bed    1/4/18 Emergency Aime Francois MD Bose, Sanjay, MD [facility] 4B, 444/1    Discharge Date Discharge Disposition Discharge Destination                      Attending Provider: Aime Francois MD     Allergies:  No Known Allergies    Isolation:  None   Infection:  None   Code Status:  FULL    Ht:  167.6 cm (66\")   Wt:  54.3 kg (119 lb 11.2 oz)    Admission Cmt:  None   Principal Problem:  Chest pain [R07.9]                 Active Insurance as of 1/4/2018     Primary Coverage     Payor Plan Insurance Group Employer/Plan Group    HUMANA HUMANA [id number]     Payor Plan Address Payor Plan Phone Number Effective From Effective To    [address] [address] [phone] 6/1/2017     [place] [address]       Subscriber Name Subscriber Birth Date Member ID       SHARAD LOPES 1960 [id number]                 Emergency Contacts      (Rel.) Home Phone Work Phone Mobile Phone    Ekaterina Lopes (Spouse) [phone] -- [phone]            1/5/18 [facility]  PATIENT ER ADMIT WITH NON STEMI MI. INPATIENT ORDER.  STARTED ON AVAILITY. [facility] [phone]           H&P  Date of Service: 1/4/2018 9:28 AM  KIRBY Caputo   Cardiology   Cosigned by: Alfonso Yi MD at 1/4/2018  4:15 PM   Attestation signed by Alfonso Yi MD at 1/4/2018 4:15 PM   I have seen and examined the patient. I have discussed the findings with the patient, their family and KIRBY Prasad.  I agree with her findings as documented above.     CC:  Chest pain  Subjective:  57-year-old male with known severe aortic stenosis, recently admitted and diagnosed with this, presented back " to emergency department overnight was severe and acute onset of chest discomfort.  Described as tightness that occurred initially when walking and has been present with some intermittent waxing and waning ever since.  Blood pressure in the emergency department was too low to give nitroglycerin, and pain persisted for several hours until he was given a narcotic.     During last admission, he underwent diagnostic coronary angiography with Dr. Francois which noted native three-vessel coronary disease with plans for surgical evaluation both for valve replacement and coronary arterial bypass grafting.  It is my understanding from talking to the patient and reviewing the record that workup was completed, but he has never seen Dr. Robin and there are no definitive plans in place as of yet for this surgery.     Objective:  Vitals reviewed     GEN: mildly ill appearing  LUNGS: CTA B  CV: RRR, no m/r/g  EXT: no edema     DATA REVIEWED:   I have reviewed all laboratory data, with pertinent findings: TrI increasing up to 1.57.  Cr 1.43.  K 4.0     I have reviewed the chest x-ray report: mild COPD, no acute abnormality     I have visualized all the electrocardiograms performed, with my interpretations to follow: NSR, LVH with repolarization abnormalities     Assessment:  1. NSTEMI - high risk SHAHID score  2. Severe aortic stenosis (bicuspid AV)  3.  Acute kidney injury - Cr 1.43 today and was 0.65 last on 12/19/17  4.  Tobacco abuse  5.  Hypertension: Currently hypotensive.     Plan:  Given prolonged episode of discomfort with cardiac biomarkers that are more elevated than on prior admission, I will proceed urgently with diagnostic coronary angiography to assess for acute changes.  If coronaries appear unchanged from previous exam, will complete medical therapy for acute coronary syndrome in the hospital and confer with CT surgery regarding whether or not he is indeed a surgical candidate.     Dr. Francois to resume care  "tomorrow.        Alfonso Yi MD         Expand All Collapse All    []Hide copied text  []Hover for attribution information      [facility]ART [facility] HISTORY AND PHYSICAL      Patient Care Team:  Sharad KLEIN MD as PCP - General (Family Medicine)     Chief complaint: Chest Pain        Subjective          Sharad Ryder is a 57 y.o. male presents with one day history of dull, substernal chest pain. He reports onset of chest pain yesterday afternoon while walking back to his car from an appointment at Dr. Salgado's office. The pain has been waxing and waning since that time and was rated at a 10/10 at its worst. He reports associated mild increase in shortness of breath, diaphoresis and blurred vision. He reports that he had \"the flu\" the previous day with body aches, cough, congestion and fever, but this has now resolved. He has a history of coronary artery disease, severe aortic stenosis, carotid artery disease, chronic obstructive pulmonary disease, tobacco abuse, hypertension, hyperlipidemia and chronic back pain. EKG on presentation revealed ST depression in anterolateral leads that was present on last EKG 12/13/17. Initial troponin level elevated at 1.190 with repeat level slightly trended up to 1.370 and finally 1.570. Creatinine 1.43 and GFR 51. Other labs unremarkable. Chest xray reveals mild COPD with no acute cardiopulmonary processes. He continues to complain of chest pain at a 2/10.  Of note, pt was recently admitted to [facility] for NSTEMI. 2D echo revealed severe aortic stenosis with normal ejection fraction and diastolic dysfunction. Heart cath on 12/18/17 revealed triple vessel coronary artery disease. CT angiogram of neck revealed severe stenosis of right carotid bulb due to plaquing. He has been evaluated by vascular surgery for planned right carotid endarterectomy. CT surgery has started workup for evaluation for surgical aortic valve replacement with coronary artery bypass grafting. "      Review of Systems  Review of Systems   Constitution: Positive for diaphoresis. Negative for chills, decreased appetite, fever, weakness, malaise/fatigue, night sweats, weight gain and weight loss.   HENT: Negative for nosebleeds.    Eyes: Positive for blurred vision. Negative for visual disturbance.   Cardiovascular: Positive for chest pain. Negative for dyspnea on exertion, leg swelling, near-syncope, orthopnea, palpitations, paroxysmal nocturnal dyspnea and syncope.   Respiratory: Positive for shortness of breath. Negative for cough, hemoptysis, snoring and wheezing.    Endocrine: Negative for cold intolerance and heat intolerance.   Hematologic/Lymphatic: Does not bruise/bleed easily.   Skin: Negative for rash.   Musculoskeletal: Negative for back pain and falls.   Gastrointestinal: Negative for abdominal pain, change in bowel habit, constipation, diarrhea, dysphagia, heartburn, nausea and vomiting.   Genitourinary: Negative for hematuria.   Neurological: Negative for dizziness, headaches and light-headedness.   Psychiatric/Behavioral: Negative for altered mental status.   Allergic/Immunologic: Negative for persistent infections.         History   Medical History         Past Medical History:   Diagnosis Date   • Aneurysm     • Arthritis     • Carotid artery disease     • Carotid stenosis     • COPD (chronic obstructive pulmonary disease)     • Hyperlipidemia     • Hypertension            Surgical History          Past Surgical History:   Procedure Laterality Date   • APPENDECTOMY       • CARDIAC CATHETERIZATION N/A 12/18/2017     Procedure: Left Heart Cath;  Surgeon: Aime Francois MD;  Location:  [facility] CATH INVASIVE LOCATION;  Service:    • CARDIAC CATHETERIZATION N/A 12/18/2017     Procedure: Right Heart Cath;  Surgeon: Aime Francois MD;  Location:  [facility] CATH INVASIVE LOCATION;  Service:    • FINGER SURGERY Right 1990   • OTHER SURGICAL HISTORY         skull srgery from accident in childhood.                Family History   Problem Relation Age of Onset   • Heart disease Mother     • Heart disease Father     • Hypertension Sister     • Hypertension Brother              Social History   Substance Use Topics   • Smoking status: Current Every Day Smoker       Packs/day: 1.00       Types: Cigarettes   • Smokeless tobacco: Current User       Types: Snuff   • Alcohol use No         Medications          Prior to Admission medications    Medication Sig Start Date End Date Taking? Authorizing Provider   albuterol (PROVENTIL HFA;VENTOLIN HFA) 108 (90 Base) MCG/ACT inhaler Inhale 2 puffs Every 4 (Four) Hours As Needed for Wheezing. 12/19/17     KIRBY Greer   aspirin 81 MG tablet Take 1 tablet by mouth Daily. 12/19/17     KIRBY Greer   atorvastatin (LIPITOR) 80 MG tablet Take 1 tablet by mouth Every Night. 12/19/17     KIRBY Greer   budesonide-formoterol (SYMBICORT) 160-4.5 MCG/ACT inhaler Inhale 2 puffs 2 (Two) Times a Day. 12/19/17     KIRBY Greer   chlorthalidone (HYGROTON) 25 MG tablet Take 25 mg by mouth Daily.       Historical Provider, MD   citalopram (CeleXA) 20 MG tablet Take 20 mg by mouth Daily.       Historical Provider, MD   furosemide (LASIX) 40 MG tablet Take 1 tablet by mouth Daily. 12/20/17     KIRBY Greer   lisinopril (PRINIVIL,ZESTRIL) 20 MG tablet Take 20 mg by mouth Daily.       Historical Provider, MD   pentazocine-naloxone (TALWIN NX) 50-0.5 MG per tablet   12/27/17     Historical Provider, MD   potassium chloride (MICRO-K) 10 MEQ CR capsule Take 2 capsules by mouth 2 (Two) Times a Day for 30 days. 12/19/17 1/18/18   KIRBY Greer          Current Medications              Current Facility-Administered Medications   Medication Dose Route Frequency Provider Last Rate Last Dose   • aspirin chewable tablet 324 mg  324 mg Oral Once Cory Lynch MD         • budesonide-formoterol (SYMBICORT) 160-4.5 MCG/ACT inhaler 2 puff  2 puff Inhalation  BID - RT KIRBY Caputo         • citalopram (CeleXA) tablet 20 mg  20 mg Oral Daily KIRBY Caputo         • enoxaparin (LOVENOX) syringe 60 mg  60 mg Subcutaneous Q12H Rylee KLEIN MD         • furosemide (LASIX) tablet 40 mg  40 mg Oral Daily KIRBY Caputo         • lisinopril (PRINIVIL,ZESTRIL) tablet 20 mg  20 mg Oral Daily KIRBY Caputo         • nitroglycerin (NITROSTAT) ointment 1 inch  1 inch Topical Once Cory Lynch MD    Stopped at 01/04/18 0344   • Pharmacy to Dose enoxaparin (LOVENOX)  60 mg Does not apply Continuous PRN KIRBY Caputo         • potassium chloride (MICRO-K) CR capsule 20 mEq  20 mEq Oral BID KIRBY Caputo         • sodium chloride 0.9 % flush 1-10 mL  1-10 mL Intravenous PRN KIRBY Caputo         • sodium chloride 0.9 % flush 10 mL  10 mL Intravenous PRN Cory Lynch MD                   Current Outpatient Prescriptions   Medication Sig Dispense Refill   • albuterol (PROVENTIL HFA;VENTOLIN HFA) 108 (90 Base) MCG/ACT inhaler Inhale 2 puffs Every 4 (Four) Hours As Needed for Wheezing. 1 inhaler 0   • aspirin 81 MG tablet Take 1 tablet by mouth Daily. 30 tablet 0   • atorvastatin (LIPITOR) 80 MG tablet Take 1 tablet by mouth Every Night. 30 tablet 0   • budesonide-formoterol (SYMBICORT) 160-4.5 MCG/ACT inhaler Inhale 2 puffs 2 (Two) Times a Day. 10.2 g 0   • chlorthalidone (HYGROTON) 25 MG tablet Take 25 mg by mouth Daily.       • citalopram (CeleXA) 20 MG tablet Take 20 mg by mouth Daily.       • furosemide (LASIX) 40 MG tablet Take 1 tablet by mouth Daily. 30 tablet 0   • lisinopril (PRINIVIL,ZESTRIL) 20 MG tablet Take 20 mg by mouth Daily.       • pentazocine-naloxone (TALWIN NX) 50-0.5 MG per tablet         • potassium chloride (MICRO-K) 10 MEQ CR capsule Take 2 capsules by mouth 2 (Two) Times a Day for 30 days. 120 capsule 0            Allergies:  Review of patient's allergies indicates no known  allergies.        Objective         Vital Signs  Temp:  [99 °F (37.2 °C)-99.2 °F (37.3 °C)] 99 °F (37.2 °C)  Heart Rate:  [] 89  Resp:  [15-20] 18  BP: ()/(50-69) 105/67     Labs  Lab Results (last 72 hours)     Procedure Component Value Units Date/Time             CBC & Differential [[id number]] Collected:  01/04/18 0047     Specimen:  Blood Updated:  01/04/18 0054     Narrative:        The following orders were created for panel order CBC & Differential.  Procedure                               Abnormality         Status                     ---------                               -----------         ------                     CBC Auto Differential[[id number]]        Abnormal            Final result                  Please view results for these tests on the individual orders.     CBC Auto Differential [[id number]]  (Abnormal) Collected:  01/04/18 0047     Specimen:  Blood Updated:  01/04/18 0054       WBC 7.94 10*3/mm3         RBC 3.88 (L) 10*6/mm3         Hemoglobin 12.0 (L) g/dL         Hematocrit 34.8 (L) %         MCV 89.7 fL         MCH 30.9 pg         MCHC 34.5 g/dL         RDW 13.0 %         RDW-SD 42.6 fl         MPV 9.3 fL         Platelets 323 10*3/mm3         Neutrophil % 79.1 (H) %         Lymphocyte % 7.7 (L) %         Monocyte % 12.3 (H) %         Eosinophil % 0.4 %         Basophil % 0.4 %         Immature Grans % 0.1 %         Neutrophils, Absolute 6.28 10*3/mm3         Lymphocytes, Absolute 0.61 (L) 10*3/mm3         Monocytes, Absolute 0.98 10*3/mm3         Eosinophils, Absolute 0.03 10*3/mm3         Basophils, Absolute 0.03 10*3/mm3         Immature Grans, Absolute 0.01 10*3/mm3         nRBC 0.0 /100 WBC       Comprehensive Metabolic Panel [[id number]]  (Abnormal) Collected:  01/04/18 0047     Specimen:  Blood Updated:  01/04/18 0111       Glucose 112 (H) mg/dL         BUN 14 mg/dL         Creatinine 1.43 (H) mg/dL         Sodium 136 mmol/L         Potassium 4.0 mmol/L         Chloride 97  (L) mmol/L         CO2 26.0 mmol/L         Calcium 9.6 mg/dL         Total Protein 6.7 g/dL         Albumin 4.10 g/dL         ALT (SGPT) 38 U/L         AST (SGOT) 29 U/L         Alkaline Phosphatase 72 U/L         Total Bilirubin 0.2 mg/dL         eGFR Non African Amer 51 (L) mL/min/1.73         Globulin 2.6 gm/dL         A/G Ratio 1.6 g/dL         BUN/Creatinine Ratio 9.8       Anion Gap 13.0 mmol/L       Troponin [[id number]]  (Abnormal) Collected:  01/04/18 0047     Specimen:  Blood Updated:  01/04/18 0129       Troponin I 1.190 (C) ng/mL       Troponin [[id number]]  (Abnormal) Collected:  01/04/18 0317     Specimen:  Blood Updated:  01/04/18 0402       Troponin I 1.370 (C) ng/mL       Troponin [[id number]]  (Abnormal) Collected:  01/04/18 0627     Specimen:  Blood Updated:  01/04/18 0701       Troponin I 1.570 (C) ng/mL              Physical Exam:  Physical Exam   Constitutional: He is oriented to person, place, and time. Vital signs are normal. He appears well-developed and well-nourished. No distress. Nasal cannula in place.   HENT:   Head: Normocephalic and atraumatic.   Right Ear: External ear normal.   Left Ear: External ear normal.   Eyes: Conjunctivae are normal. Pupils are equal, round, and reactive to light. Right eye exhibits no discharge. Left eye exhibits no discharge.   Neck: Normal range of motion. Neck supple. No JVD present. Carotid bruit is not present. No thyromegaly present.   Cardiovascular: Normal rate, regular rhythm and intact distal pulses.  PMI is not displaced.  Exam reveals no gallop, no friction rub and no decreased pulses.    Murmur heard.   Harsh midsystolic murmur is present with a grade of 2/6  at the upper right sternal border radiating to the neck  Pulses:       Radial pulses are 2+ on the right side, and 2+ on the left side.        Dorsalis pedis pulses are 2+ on the right side, and 2+ on the left side.        Posterior tibial pulses are 2+ on the right side, and 2+ on the left  side.   Pulmonary/Chest: Effort normal. No respiratory distress. He has decreased breath sounds in the right upper field, the right middle field, the right lower field, the left upper field, the left middle field and the left lower field. He has no wheezes. He has no rhonchi. He has no rales. He exhibits no tenderness.   Abdominal: Soft. Bowel sounds are normal. He exhibits no distension. There is no tenderness.   Musculoskeletal: Normal range of motion. He exhibits no edema.   Neurological: He is alert and oriented to person, place, and time.   Skin: Skin is warm and dry. No rash noted. He is not diaphoretic. No erythema. No pallor.   Psychiatric: He has a normal mood and affect. His behavior is normal. Judgment and thought content normal.   Vitals reviewed.        Results Review:                         I reviewed the patient's new clinical results.        Assessment/Plan          Principal Problem:    Chest pain  Active Problems:    Shortness of breath    NSTEMI (non-ST elevated myocardial infarction)    Severe aortic valve stenosis    Coronary artery disease involving native coronary artery of native heart with angina pectoris    Stenosis of right carotid artery    COPD (chronic obstructive pulmonary disease)    LAZ (acute kidney injury)    Tobacco use    Hyperlipidemia    Hypertension        Plan     1. Admit to CCU.  2. Stat 2D echo.  3. Continue to trend troponin levels.   4. NPO.  5. Daily BMP.  6. Continue home medications as ordered.  7. Hold lisinopril due to hypotension.  8. Hold chlorthalidone for acute kidney injury.   9. Pharmacy to dose therapeutic lovenox.   10. Consult CT surgery.  11. Possible heart cath this afternoon, if chest pain persists.      I discussed the patients findings and my recommendations with patient, nursing staff, primary care team and consulting provider.      KIRBY Caputo  01/04/18  10:12 AM     Time: 45 minutes                          Consults  Date of Service:  1/4/2018 4:28 PM  KIRBY Munoz   Cardiothoracic Surgery   Cosigned by: Cj Robin MD at 1/4/2018 10:43 PM   Consult Orders:   1. Inpatient Consult to Cardiothoracic Surgery [[id number]] ordered by KIRBY Chris at 01/04/18 0826   Attestation signed by Cj Robin MD at 1/4/2018 10:43 PM (Updated)   I have reviewed the documentation above and agree.  It is to be noted that this patient has been seen during the previous admission by me personally for AS.  He desired to return as an outpatient for planned surgery.  During the remaining portion of his admission he was found to have significant CAD and carotid artery stenosis.    Will need to dw Dr. Salgado operative conduct.  This is a difficult case with no clear best answer.  Arguments could be made for AVR/CABG with concomitant CEA.  Arguments could be made for CEA and then to follow with AVR/CABG.  Proceeding forward without addressing the carotid artery is probably not prudent.  Each will carry risks and described mortality.I've answered all questions and agreeable to plan.  Favor intervention during this admission given his presentation.        Expand All Collapse All    []Hide copied text  Referring Provider: KIRBY Chris  Reason for Consultation: CAD, severe AS, NSTEMI     Patient Care Team:  Sharad KLEIN MD as PCP - General (Family Medicine)     Chief complaint Chest pain         Subjective    .      History of present illness:  Mr. Ryder is a 57 year old  male well known to our service. He was initially seen December 14 when he was admitted for shortness of breath. Workup demonstrated severe aortic stenosis and a bicuspid aortic valve. Pre op testing for aortic valve replacement was obtained and left heart cath demonstrated three vessel disease with mild pulmonary hypertension. Carotid ultrasound and CTA of the neck demonstrated significant disease on the right. The patient was agreeable to proceed with  surgery, but he wanted to wait till the new year. Decision-making was made for patient to be discharged home and follow up with Dr. Salgado in the office and subsequently present at a later date for cardiac surgery. Mr. Ryder was seen in the office yesterday by Lianet ORR with Dr. Salgado with plan to proceed with right carotid endarterectomy coordinated with Dr. Robin for SAVR and CABG.      He states he developed severe chest pain last night. He reports he did not take any Nitro because he did not have any on hand. He presented to the ER. He was too hypotensive for Nitro and given narcotics. He was ruled in as a NSTEMI and taken to cardiac catheterization lab by Dr. Yi. Left heart cath demonstrated three vessel disease with no changes compared to prior heart cath.      Past medical history includes: hypertension, hyperlipidemia, chronic tobacco use, chronic back pain, chronic obstructive pulmonary disease, chronic tobacco use, carotid disease, coronary artery disease, and severe aortic stenosis with a bicuspid aortic valve. He has full dentures. He reports recent body aches, upper respiratory infection symptoms of nasal congestion, post nasal drip, sore throat, sweats and chills. He is unsure of fevers as he has not taken temperature. He reports these symptoms are improving. He is still smoking about 1 ppd.      History   Medical History                            Past Medical History:     Diagnosis Date     • Aneurysm       • Arthritis       • Carotid artery disease       • Carotid stenosis       • COPD (chronic obstructive pulmonary disease)       • Hyperlipidemia       • Hypertension                                     , Past Surgical History:   Procedure Laterality Date   • APPENDECTOMY       • CARDIAC CATHETERIZATION N/A 12/18/2017     Procedure: Left Heart Cath;  Surgeon: Aime Francois MD;  Location: [facility] CATH INVASIVE LOCATION;  Service:    • CARDIAC CATHETERIZATION N/A 12/18/2017     Procedure:  "Right Heart Cath;  Surgeon: Aime Francois MD;  Location:  [facility] CATH INVASIVE LOCATION;  Service:    • FINGER SURGERY Right 1990   • OTHER SURGICAL HISTORY         skull srgery from accident in childhood.                , Family History     Problem Relation Age of Onset     • Heart disease Mother       • Heart disease Father       • Hypertension Sister       • Hypertension Brother                  , Social History    Substance Use Topics    • Smoking status: Current Every Day Smoker        Packs/day: 1.00        Types: Cigarettes    • Smokeless tobacco: Current User        Types: Snuff    • Alcohol use No          Review of Systems  Review of Systems   Constitutional: Positive for activity change, chills and diaphoresis. Negative for appetite change.   HENT: Positive for congestion, dental problem (full set dentures) and sore throat.    Respiratory: Positive for shortness of breath. Negative for stridor.    Cardiovascular: Positive for chest pain and leg swelling. Negative for palpitations.   Musculoskeletal: Positive for back pain.   Skin: Negative for color change, pallor, rash and wound.   Neurological: Negative for dizziness, seizures and headaches.   Hematological: Negative for adenopathy. Does not bruise/bleed easily.   Psychiatric/Behavioral: Negative.          Objective         Vital Signs        Visit Vitals   • BP 90/56 (BP Location: Right arm, Patient Position: Lying)   • Pulse 92   • Temp 99.3 °F (37.4 °C) (Temporal Artery )   • Resp 18   • Ht 167.6 cm (66\")   • Wt 54.3 kg (119 lb 11.2 oz)   • SpO2 97%   • BMI 19.32 kg/m2         Physical Exam   Constitutional: He is oriented to person, place, and time. No distress.   HENT:   Head: Normocephalic.   Eyes: Pupils are equal, round, and reactive to light.   Neck: Normal range of motion. No JVD present.   Cardiovascular: Normal rate, regular rhythm and intact distal pulses.    Murmur heard.   Systolic murmur is present with a grade of 3/6   Pulmonary/Chest: " Effort normal and breath sounds normal. No stridor. No respiratory distress. He has no wheezes.   Abdominal: Soft. He exhibits no distension. There is no tenderness.   Musculoskeletal: He exhibits no edema.   Neurological: He is alert and oriented to person, place, and time.   Skin: Skin is warm and dry. He is not diaphoretic.   Right groin clean, dry, intact.    Psychiatric: He has a normal mood and affect. His behavior is normal.   Vitals reviewed.           LAB:   CBC:  Results from last 7 days  Lab Units 01/04/18  0047   WBC 10*3/mm3 7.94   HEMATOCRIT % 34.8*   PLATELETS 10*3/mm3 323            BMP:)  Results from last 7 days  Lab Units 01/04/18  0047   SODIUM mmol/L 136   POTASSIUM mmol/L 4.0   CHLORIDE mmol/L 97*   CO2 mmol/L 26.0   GLUCOSE mg/dL 112*   BUN mg/dL 14   CREATININE mg/dL 1.43*          IMAGES:      Imaging Results (last 24 hours)     Procedure Component Value Units Date/Time     XR Chest 1 View [[id number]] Collected:  01/04/18 0715       Updated:  01/04/18 0719     Narrative:        EXAMINATION: XR CHEST 1 VW- 1/4/2018 7:15 AM CST      HISTORY: Chest Pain protocol.      REPORT: Comparison is made with the study from 12/14/2017.      Lungs are mildly hyperinflated, no focal infiltrate or consolidation is  identified. Heart size is normal. No pneumothorax or effusion is  identified. The osseous structures show nothing acute, there are  degenerative changes in the thoracic spine with mild levoscoliosis.         Impression:        Mild COPD. No acute cardiopulmonary abnormality.  This report was finalized on 01/04/2018 07:16 by Dr. Son Womack MD.                                               Assessment/Plan          Principal Problem:    Chest pain  Active Problems:    Shortness of breath    Severe aortic valve stenosis, bicuspid aortic valve    Tobacco use    Hyperlipidemia    Hypertension    NSTEMI (non-ST elevated myocardial infarction)    Coronary artery disease involving native coronary  artery of native heart with angina pectoris    Stenosis of right carotid artery    COPD (chronic obstructive pulmonary disease)    LAZ (acute kidney injury)  Chronic back pain        I discussed the patients findings and my recommendations with the patient. Pre-op testing for cardiac surgery has been completed. Will discuss surgical timing with Dr. Robin and Dr. Salgado. Schedule SAVR and CABG by Dr. Robin in near future. All questions answered to the best of my ability. Patient has been counseled as to smoking cessation.         Nely Rodriguez, APRN  18  4:28 PM              [facility] CATH LAB  [address] [place] [id number] [phone]             Patient Information   Patient Name MRN Sex  (Age)   Sharad Ryder [id number] Male 1960 (57 y.o.)   Race Ethnicity Encounter Category   White or  Not  or  Emergency   Procedures   Coronary angiography    Performed Date   2018      Physicians   Panel Physicians Referring Physician Case Authorizing Physician   Alfonso Yi MD (Primary) MD Alfonso Dejesus MD   Indications   NSTEMI (non-ST elevated myocardial infarction) [I21.4 (ICD-10-CM)]       Conclusion     [facility]  Date of procedure: 2018      Procedures performed:      1. Selective coronary angiography  2. Supervision of the administration of moderate sedation      Indication: NSTEMI  Brief history: 57-year-old male with recent admission in mid 2017 during which she was found to have severe aortic stenosis and a coronary angiogram that showed three-vessel coronary disease.  He was referred to CT surgery for aortic valve replacement and CABG, but has not completed evaluation and there is no procedure planned as of yet.  He re-presented to the emergency department overnight with new onset severe chest discomfort.  His blood pressure was too low to give nitroglycerin, and he had ongoing  discomfort for multiple hours in the emergency department.  Serial biomarkers were more elevated this time than on last admission, and trended up to approximately 1.5.  Therefore, conversation was had with the patient and decision was made to come back to the cath lab to repeat coronary angiography and ensure that no culprit lesion had changed since last catheterization to that might be responsible for this current presentation.  Dr. Robin of CT surgery, who had been consult to last time, was informed of the patient's presentation and agree with the plan of coming to the catheter for diagnostic and possible interventional purposes.  Premedication: Versed, Fentanyl  Contrast: Isovue 300 - 150 ml to patient  Radiation: Flouro time= 6:46. Air Kerma= 286 mGy  Catheters:  6Fr JL4, 6Fr Jr4     Procedural details:  The patient was brought to the cath lab and prepped and draped in the usual fashion.  Under fluoroscopic guidance, a needle was used to puncture the right common femoral artery proximal to the site of prior arteriotomy.  A Seldinger technique was used to place a 6 Fr sheath in the right common femoral artery.  A 6 Fr JL4 catheter was used to engage the left main coronary artery for left system angiography. That was exchanged for a 6 Fr 3DRC catheter which was engaged in the ostium of the right coronary artery for right system angiography. The catheter and sheath were then removed and arterial hemostasis was obtained using Perclose. There were no obvious complications and the patient was transferred to the [facility] in hemodynamically stable condition.     I supervised the administration of conscious sedation by nursing staff throughout the case.  First dose was given at 1347 and the end of my face-to-face encounter was at 1422, accounting for a total of 35 minutes of supervision.  During the case, continuous pulse oximetry, heart rate, blood pressure, and patient status were monitored.      Findings:     Selective  coronary angiography:   Dominance: Codominant  Left Main coronary artery: Very short vessel arising normally from the left cusp and very quickly bifurcating.  Calcified but otherwise normal.   Left anterior descending artery: Heavily calcified vessel in its proximal segment.  Supplies a very large diagonal branch early on with eccentric 70% stenoses 70% stenoses distal to this takeoff in the LAD proper extending into the mid LAD segment.  Otherwise, supplies for diminutive diagonal branches and is tortuous through heavy calcification in its midsegment, and wraps the apex.  Left circumflex:  Moderate to large vessel arising from the short left main. It supplies 2 small tortuous obtuse marginals and a high wall, then the mid to distal AV circumflex has a focal eccentric 60-70% stenosis.  He goes on to supply another 6 small PDA and 2 small posterolateral branches.  Right coronary artery:  Small size vessel arising normally from the right cusp that is diffusely diseased with 60-70% stenosis in the hospital extending into the midsegment.  Beyond this, there are minor luminal irregularities.  It is dominant for posterior circulation, supplying a small PDA.     Impression:  1.  Native 3 vessel coronary artery disease with no changes noted when comparing today's angiography with that of 12/14/17.      Plan:   1.  Two hours bedrest  2.  Continue aspirin 81mg daily indefinitely  3.  Continue assessment by CT surgery for AVR + CABG     Alfonso Yi MD         Radiation      Event Details User   2:20 PM Radiation Tracking Cumulative Air Kerma: Total Dose (mGy) = 286.000  Physician: Alfonso Yi MD  Dose (mGy) = 286.000  Fluoro Time (mins) = 6.5  DAP (Gy-cm2) = 40927.000 EM   Stent Inflated      Event Details User   No information to display   Balloon Inflated      Event Details User     Cardiac Catheterization/Vascular Study [CATH01] (Order [id number])   Cardiac Cath   Date: 1/4/2018 Department: [address]  Released By: Goldie Bryant RN Authorizing: Alfonso Yi MD     NSTEMI (non-ST elevated myocardial infarction)  - Primary     I21.4 410.70             Hospital Medications (active)       Dose Frequency Start End    albuterol (PROVENTIL) nebulizer solution 0.083% 2.5 mg/3mL 2.5 mg Every 4 Hours PRN 1/4/2018     Sig - Route: Take 2.5 mg by nebulization Every 4 (Four) Hours As Needed for Wheezing or Shortness of Air. - Nebulization    aspirin chewable tablet 324 mg 324 mg Once 1/4/2018 1/4/2018    Sig - Route: Chew 4 tablets 1 (One) Time. - Oral    aspirin chewable tablet 81 mg 81 mg Daily 1/5/2018     Sig - Route: Chew 1 tablet Daily. - Oral    atorvastatin (LIPITOR) tablet 80 mg 80 mg Nightly 1/4/2018     Sig - Route: Take 2 tablets by mouth Every Night. - Oral    budesonide-formoterol (SYMBICORT) 160-4.5 MCG/ACT inhaler 2 puff 2 puff 2 Times Daily - RT 1/4/2018     Sig - Route: Inhale 2 puffs 2 (Two) Times a Day. - Inhalation    citalopram (CeleXA) tablet 20 mg 20 mg Daily 1/4/2018     Sig - Route: Take 1 tablet by mouth Daily. - Oral    furosemide (LASIX) tablet 40 mg 40 mg Daily 1/4/2018     Sig - Route: Take 1 tablet by mouth Daily. - Oral    HYDROcodone-acetaminophen (NORCO) 5-325 MG per tablet 1 tablet 1 tablet Every 6 Hours PRN 1/4/2018 1/14/2018    Sig - Route: Take 1 tablet by mouth Every 6 (Six) Hours As Needed for Moderate Pain . - Oral    potassium chloride (MICRO-K) CR capsule 20 mEq 20 mEq 2 Times Daily (BID) 1/4/2018 1/18/2018    Sig - Route: Take 2 capsules by mouth 2 (Two) Times a Day. - Oral    sodium chloride (OCEAN) nasal spray 2 spray 2 spray As Needed 1/4/2018     Sig - Route: 2 sprays by Each Nare route As Needed for Congestion. - Each Nare    sodium chloride 0.9 % flush 1-10 mL 1-10 mL As Needed 1/4/2018     Sig - Route: Infuse 1-10 mL into a venous catheter As Needed for Line Care. - Intravenous    albuterol (PROVENTIL HFA;VENTOLIN HFA) inhaler 2 puff (Discontinued) 2 puff Every 4 Hours PRN  1/4/2018 1/4/2018    Sig - Route: Inhale 2 puffs Every 4 (Four) Hours As Needed for Wheezing. - Inhalation    Reason for Discontinue: Formulary change    atorvastatin (LIPITOR) tablet 40 mg (Discontinued) 40 mg Nightly 1/4/2018 1/4/2018    Sig - Route: Take 1 tablet by mouth Every Night. - Oral    diphenhydrAMINE (BENADRYL) injection (Discontinued)  As Needed 1/4/2018 1/4/2018    Sig: As Needed.    Reason for Discontinue: Patient Discharge    enoxaparin (LOVENOX) syringe 60 mg (Discontinued) 60 mg Every 12 Hours 1/4/2018 1/4/2018    Sig - Route: Inject 0.6 mL under the skin Every 12 (Twelve) Hours. - Subcutaneous    fentaNYL citrate (PF) (SUBLIMAZE) injection (Discontinued)  As Needed 1/4/2018 1/4/2018    Sig: As Needed.    Reason for Discontinue: Patient Discharge    iopamidol (ISOVUE-300) 61 % injection (Discontinued)  As Needed 1/4/2018 1/4/2018    Sig: As Needed.    Reason for Discontinue: Patient Discharge    lidocaine (XYLOCAINE) 2% injection (Discontinued)  As Needed 1/4/2018 1/4/2018    Sig: As Needed.    Reason for Discontinue: Patient Discharge    midazolam (VERSED) injection (Discontinued)  As Needed 1/4/2018 1/4/2018    Sig: As Needed.    Reason for Discontinue: Patient Discharge    nitroglycerin (NITROSTAT) ointment 1 inch (Discontinued) 1 inch Once 1/4/2018 1/4/2018    Sig - Route: Apply 1 inch topically 1 (One) Time. - Topical    Pharmacy to Dose enoxaparin (LOVENOX) (Discontinued) 60 mg Continuous PRN 1/4/2018 1/4/2018    Sig - Route: 60 mg Continuous As Needed for Consult. - Does not apply    sodium chloride 0.9 % flush 10 mL (Discontinued) 10 mL As Needed 1/4/2018 1/4/2018    Sig - Route: Infuse 10 mL into a venous catheter As Needed for Line Care. - Intravenous    Cosign for Ordering: Accepted by Cory Lynch MD on 1/4/2018  7:46 AM    sodium chloride 0.9 % infusion (Discontinued) 100 mL/hr Continuous 1/4/2018 1/5/2018    Sig - Route: Infuse 100 mL/hr into a venous catheter Continuous. -  Intravenous

## 2018-01-05 NOTE — PROGRESS NOTES
[facility] -  Progress Note     LOS: 1 day   Patient Care Team:  Sharad KLEIN MD as PCP - General (Family Medicine)    Chief Complaint: Chest Pain    Subjective     Interval History: Heart cath yesterday revealed known native 3 vessel coronary artery disease with no progression from previous heart cath 12/14/17. Plan for surgical aortic valve replacement with triple vessel coronary artery bypass grafting continues. Vascular surgery has evaluated pt with plan for right carotid endarterectomy. Ongoing discussion between CT and vascular surgeons as to which procedure will be done first. Pt has tested positive for Influenza A and has been started on Tamiflu. He continues to complain of intermittent, dull, substernal chest pain that is much less intense than on presentation. He also complains of mild shortness of breath, productive cough, body aches and chills. He denies any palpitations, dizziness, syncope, orthopnea, PND or swelling.    Patient Complaints: chest pain, shortness of breath, cough, body aches and chills        Review of Systems:     Review of Systems   Constitutional: Negative for chills, fatigue and fever.   HENT: Negative.    Eyes: Negative.    Respiratory: Negative for cough, chest tightness, shortness of breath, wheezing and stridor.    Cardiovascular: Negative for chest pain, palpitations and leg swelling.   Gastrointestinal: Negative for abdominal distention, abdominal pain, blood in stool, constipation, diarrhea, nausea and vomiting.   Endocrine: Negative.    Genitourinary: Negative for difficulty urinating, dysuria, flank pain and hematuria.   Musculoskeletal: Negative.    Skin: Negative for rash and wound.   Allergic/Immunologic: Negative.    Neurological: Negative for dizziness, syncope, weakness, light-headedness and headaches.   Hematological: Does not bruise/bleed easily.   Psychiatric/Behavioral: Negative for agitation, behavioral problems, confusion, hallucinations,  sleep disturbance and suicidal ideas. The patient is not nervous/anxious.      Objective     Vital Sign Min/Max for last 24 hours  Temp  Min: 97.4 °F (36.3 °C)  Max: 99.5 °F (37.5 °C)   BP  Min: 90/56  Max: 151/82   Pulse  Min: 85  Max: 109   Resp  Min: 16  Max: 24   SpO2  Min: 93 %  Max: 99 %   Flow (L/min)  Min: 2  Max: 3   Weight  Min: 54.3 kg (119 lb 11.2 oz)  Max: 54.3 kg (119 lb 11.2 oz)     Telemetry: SR 79-98     Last Weight    01/04/18  1515   Weight: 54.3 kg (119 lb 11.2 oz)     Last 3 weights    01/04/18  0035 01/04/18  1515   Weight: 59.4 kg (131 lb) 54.3 kg (119 lb 11.2 oz)     Weight change: -5.126 kg (-11 lb 4.8 oz)    Intake/Output Summary (Last 24 hours) at 01/05/18 1445  Last data filed at 01/05/18 1425   Gross per 24 hour   Intake              840 ml   Output             1050 ml   Net             -210 ml       Physical Exam:    Physical Exam   Constitutional: He is oriented to person, place, and time. Vital signs are normal. He appears well-developed and well-nourished. No distress.   HENT:   Head: Normocephalic and atraumatic.   Right Ear: External ear normal.   Left Ear: External ear normal.   Eyes: Conjunctivae are normal. Pupils are equal, round, and reactive to light. Right eye exhibits no discharge. Left eye exhibits no discharge.   Neck: Normal range of motion. Neck supple. No JVD present. Carotid bruit is not present. No thyromegaly present.   Cardiovascular: Normal rate, regular rhythm, normal heart sounds and intact distal pulses.  PMI is not displaced.  Exam reveals no gallop, no friction rub and no decreased pulses.    No murmur heard.  Pulses:       Radial pulses are 2+ on the right side, and 2+ on the left side.        Dorsalis pedis pulses are 2+ on the right side, and 2+ on the left side.        Posterior tibial pulses are 2+ on the right side, and 2+ on the left side.   Pulmonary/Chest: Effort normal and breath sounds normal. No respiratory distress. He has no decreased breath  sounds. He has no wheezes. He has no rhonchi. He has no rales. He exhibits no tenderness.   Abdominal: Soft. Bowel sounds are normal. He exhibits no distension. There is no tenderness.   Musculoskeletal: Normal range of motion. He exhibits no edema.   Neurological: He is alert and oriented to person, place, and time.   Skin: Skin is warm and dry. No rash noted. He is not diaphoretic. No erythema. No pallor.   Psychiatric: He has a normal mood and affect. His behavior is normal. Judgment and thought content normal.   Vitals reviewed.    Results Review:   Lab Results (last 72 hours)     Procedure Component Value Units Date/Time    CBC & Differential [[id number]] Collected:  01/04/18 0047    Specimen:  Blood Updated:  01/04/18 0054    Narrative:       The following orders were created for panel order CBC & Differential.  Procedure                               Abnormality         Status                     ---------                               -----------         ------                     CBC Auto Differential[[id number]]        Abnormal            Final result                 Please view results for these tests on the individual orders.    CBC Auto Differential [[id number]]  (Abnormal) Collected:  01/04/18 0047    Specimen:  Blood Updated:  01/04/18 0054     WBC 7.94 10*3/mm3      RBC 3.88 (L) 10*6/mm3      Hemoglobin 12.0 (L) g/dL      Hematocrit 34.8 (L) %      MCV 89.7 fL      MCH 30.9 pg      MCHC 34.5 g/dL      RDW 13.0 %      RDW-SD 42.6 fl      MPV 9.3 fL      Platelets 323 10*3/mm3      Neutrophil % 79.1 (H) %      Lymphocyte % 7.7 (L) %      Monocyte % 12.3 (H) %      Eosinophil % 0.4 %      Basophil % 0.4 %      Immature Grans % 0.1 %      Neutrophils, Absolute 6.28 10*3/mm3      Lymphocytes, Absolute 0.61 (L) 10*3/mm3      Monocytes, Absolute 0.98 10*3/mm3      Eosinophils, Absolute 0.03 10*3/mm3      Basophils, Absolute 0.03 10*3/mm3      Immature Grans, Absolute 0.01 10*3/mm3      nRBC 0.0 /100 WBC      Comprehensive Metabolic Panel [[id number]]  (Abnormal) Collected:  01/04/18 0047    Specimen:  Blood Updated:  01/04/18 0111     Glucose 112 (H) mg/dL      BUN 14 mg/dL      Creatinine 1.43 (H) mg/dL      Sodium 136 mmol/L      Potassium 4.0 mmol/L      Chloride 97 (L) mmol/L      CO2 26.0 mmol/L      Calcium 9.6 mg/dL      Total Protein 6.7 g/dL      Albumin 4.10 g/dL      ALT (SGPT) 38 U/L      AST (SGOT) 29 U/L      Alkaline Phosphatase 72 U/L      Total Bilirubin 0.2 mg/dL      eGFR Non African Amer 51 (L) mL/min/1.73      Globulin 2.6 gm/dL      A/G Ratio 1.6 g/dL      BUN/Creatinine Ratio 9.8     Anion Gap 13.0 mmol/L     Troponin [[id number]]  (Abnormal) Collected:  01/04/18 0047    Specimen:  Blood Updated:  01/04/18 0129     Troponin I 1.190 (C) ng/mL     Troponin [[id number]]  (Abnormal) Collected:  01/04/18 0317    Specimen:  Blood Updated:  01/04/18 0402     Troponin I 1.370 (C) ng/mL     Troponin [[id number]]  (Abnormal) Collected:  01/04/18 0627    Specimen:  Blood Updated:  01/04/18 0701     Troponin I 1.570 (C) ng/mL     Troponin [[id number]]  (Abnormal) Collected:  01/04/18 1053    Specimen:  Blood Updated:  01/04/18 1139     Troponin I 1.550 (C) ng/mL     Troponin [[id number]]  (Abnormal) Collected:  01/04/18 1843    Specimen:  Blood Updated:  01/04/18 1919     Troponin I 1.150 (C) ng/mL     Troponin [[id number]]  (Abnormal) Collected:  01/04/18 2140    Specimen:  Blood Updated:  01/04/18 2215     Troponin I 1.090 (C) ng/mL     Basic Metabolic Panel [[id number]]  (Abnormal) Collected:  01/05/18 0053    Specimen:  Blood Updated:  01/05/18 0208     Glucose 94 mg/dL      BUN 8 mg/dL      Creatinine 0.69 mg/dL      Sodium 135 mmol/L      Potassium 3.6 mmol/L      Chloride 97 (L) mmol/L      CO2 30.0 mmol/L      Calcium 9.1 mg/dL      eGFR Non African Amer 118 mL/min/1.73      BUN/Creatinine Ratio 11.6     Anion Gap 8.0 mmol/L     Narrative:       GFR Normal >60  Chronic Kidney Disease <60  Kidney Failure <15     Troponin [[id number]]  (Abnormal) Collected:  01/05/18 0053    Specimen:  Blood Updated:  01/05/18 0220     Troponin I 1.170 (C) ng/mL     Blood Gas, Arterial [[id number]]  (Abnormal) Collected:  01/05/18 1123    Specimen:  Arterial Blood Updated:  01/05/18 1128     Site Right Radial     Glenroy's Test Positive     pH, Arterial 7.422 pH units      pCO2, Arterial 40.0 mm Hg      pO2, Arterial 68.9 (L) mm Hg      HCO3, Arterial 26.1 (H) mmol/L      Base Excess, Arterial 1.5 mmol/L      O2 Saturation, Arterial 94.1 %      Temperature 37.0 C      Barometric Pressure for Blood Gas 762 mmHg      Modality Room Air     Ventilator Mode NA     Collected by [id number]    Influenza Antigen, Rapid - Swab, Nasopharynx [[id number]]  (Abnormal) Collected:  01/05/18 1050    Specimen:  Swab from Nasopharynx Updated:  01/05/18 1146     Influenza A Ag, EIA Positive (A)     Influenza B Ag, EIA Negative    Narrative:         Recommend confirmation of negative results by viral culture or molecular assay.              Echo EF Estimated  Lab Results   Component Value Date    ECHOEFEST 70 12/13/2017         Medication Review: yes  Current Facility-Administered Medications   Medication Dose Route Frequency Provider Last Rate Last Dose   • albuterol (PROVENTIL) nebulizer solution 0.083% 2.5 mg/3mL  2.5 mg Nebulization Q4H PRN Alfonso Yi MD   2.5 mg at 01/04/18 1959   • aspirin chewable tablet 81 mg  81 mg Oral Daily Ashtyn Cooley APRN   81 mg at 01/05/18 0857   • atorvastatin (LIPITOR) tablet 80 mg  80 mg Oral Nightly Ashtyn Cooley APRN   80 mg at 01/04/18 2220   • budesonide-formoterol (SYMBICORT) 160-4.5 MCG/ACT inhaler 2 puff  2 puff Inhalation BID - RT KIRBY Caputo   2 puff at 01/05/18 0749   • citalopram (CeleXA) tablet 20 mg  20 mg Oral Daily Ashtyn Cooley APRN   20 mg at 01/05/18 0857   • furosemide (LASIX) tablet 40 mg  40 mg Oral Daily Ashtyn Cooley APRN   40 mg at 01/05/18 0857   • HYDROcodone-acetaminophen (NORCO)  5-325 MG per tablet 1 tablet  1 tablet Oral Q6H PRN Aime Francois MD   1 tablet at 01/05/18 1348   • oseltamivir (TAMIFLU) capsule 75 mg  75 mg Oral Q12H KIRBY Caputo   75 mg at 01/05/18 1348   • potassium chloride (MICRO-K) CR capsule 20 mEq  20 mEq Oral BID KIRBY Caputo   20 mEq at 01/05/18 0857   • sodium chloride (OCEAN) nasal spray 2 spray  2 spray Each Nare PRN Aime Francois MD   2 spray at 01/04/18 5332   • sodium chloride 0.9 % flush 1-10 mL  1-10 mL Intravenous PRN KIRBY Caputo             Assessment/Plan     Principal Problem:    Chest pain  Active Problems:    Shortness of breath    NSTEMI (non-ST elevated myocardial infarction)    Severe aortic valve stenosis    Coronary artery disease involving native coronary artery of native heart with angina pectoris    Stenosis of right carotid artery    COPD (chronic obstructive pulmonary disease)    LAZ (acute kidney injury)    Tobacco use    Hyperlipidemia    Hypertension      Plan    1. Continue telemetry.  2. Continue aspirin, atorvastatin, furosemide and potassium.   3. Tamiflu 75 mg by mouth twice daily for 5 days.   4. Continue to hold lisinopril until blood pressure normalizes.  5. Continue cardiac diet.   6. Continue daily weights.  7. Continue to monitor intake and output.       KIRBY Caputo  01/05/18  2:22 PM

## 2018-01-05 NOTE — PLAN OF CARE
Problem: Fall Risk (Adult)  Goal: Identify Related Risk Factors and Signs and Symptoms  Outcome: Outcome(s) achieved Date Met: 01/04/18    Goal: Absence of Falls  Outcome: Ongoing (interventions implemented as appropriate)      Problem: Patient Care Overview (Adult)  Goal: Plan of Care Review  Outcome: Ongoing (interventions implemented as appropriate)   01/04/18 1801   Coping/Psychosocial Response Interventions   Plan Of Care Reviewed With patient   Patient Care Overview   Progress no change   Outcome Evaluation   Outcome Summary/Follow up Plan VSS, right groin cath site c/d/i, gauze and teg with no staining, PPP distal to cath site, initiating plan of care at this time, pt has no further questions or complaints, bed in lowest position, will continue to monitor.      Goal: Adult Individualization and Mutuality  Outcome: Ongoing (interventions implemented as appropriate)    Goal: Discharge Needs Assessment  Outcome: Ongoing (interventions implemented as appropriate)      Problem: Acute Coronary Syndrome (ACS) (Adult)  Goal: Signs and Symptoms of Listed Potential Problems Will be Absent or Manageable (Acute Coronary Syndrome)  Outcome: Ongoing (interventions implemented as appropriate)      Problem: Cardiac Catheterization with/without PCI (Adult)  Goal: Signs and Symptoms of Listed Potential Problems Will be Absent or Manageable (Cardiac Catheterization with/without PCI)  Outcome: Ongoing (interventions implemented as appropriate)

## 2018-01-05 NOTE — CONSULTS
Sharad Ryder  [id number]  [id number]  444/1  Aime Francois MD  1/4/2018      HPI: Sharad Ryder is a 57 y.o. male who was initially seen on December 14 when he was admitted for shortness of breath. Workup demonstrated severe aortic stenosis and a bicuspid aortic valve. Pre op testing for aortic valve replacement was obtained and left heart cath demonstrated three vessel disease with mild pulmonary hypertension. Carotid ultrasound and CTA of the neck demonstrated significant asymptomatic disease on the right. The patient was agreeable to proceed with surgery, but he wanted to wait till the new year. Decision-making was made for patient to be discharged home and follow up in the office.  He presented back to the hospital last night after he developed severe chest pain.  He did not take his nitroglycerin because he did not have any on hand.  He presented to emergency room and was hypotensive.  He was ruled in as a NSTEMI and taken for heart catheterization by Dr. Yi.  Left heart catheterization demonstrated 3 vessel disease with no changes compared to prior heart catheterization.    Past Medical History:   Diagnosis Date   • Aneurysm    • Arthritis    • Carotid artery disease    • Carotid stenosis    • COPD (chronic obstructive pulmonary disease)    • Hyperlipidemia    • Hypertension        Past Surgical History:   Procedure Laterality Date   • APPENDECTOMY     • CARDIAC CATHETERIZATION N/A 12/18/2017    Procedure: Left Heart Cath;  Surgeon: Aime Francois MD;  Location:  [facility];  Service:    • CARDIAC CATHETERIZATION N/A 12/18/2017    Procedure: Right Heart Cath;  Surgeon: Aime Francois MD;  Location:  [facility];  Service:    • CARDIAC CATHETERIZATION Bilateral 1/4/2018    Procedure: Coronary angiography;  Surgeon: Alfonso Yi MD;  Location:  [facility];  Service:    • FINGER SURGERY Right 1990   • OTHER SURGICAL HISTORY      skull srgery from accident in  childhood.       Family History   Problem Relation Age of Onset   • Heart disease Mother    • Heart disease Father    • Hypertension Sister    • Hypertension Brother        Social History     Social History   • Marital status:      Spouse name: N/A   • Number of children: N/A   • Years of education: N/A     Occupational History   • Not on file.     Social History Main Topics   • Smoking status: Current Every Day Smoker     Packs/day: 1.00     Types: Cigarettes   • Smokeless tobacco: Current User     Types: Snuff   • Alcohol use No   • Drug use: No   • Sexual activity: Defer     Other Topics Concern   • Not on file     Social History Narrative       No Known Allergies    Hospital Medications (active)       Dose Frequency Start End    albuterol (PROVENTIL) nebulizer solution 0.083% 2.5 mg/3mL 2.5 mg Every 4 Hours PRN 1/4/2018     Sig - Route: Take 2.5 mg by nebulization Every 4 (Four) Hours As Needed for Wheezing or Shortness of Air. - Nebulization    aspirin chewable tablet 81 mg 81 mg Daily 1/5/2018     Sig - Route: Chew 1 tablet Daily. - Oral    atorvastatin (LIPITOR) tablet 80 mg 80 mg Nightly 1/4/2018     Sig - Route: Take 2 tablets by mouth Every Night. - Oral    budesonide-formoterol (SYMBICORT) 160-4.5 MCG/ACT inhaler 2 puff 2 puff 2 Times Daily - RT 1/4/2018     Sig - Route: Inhale 2 puffs 2 (Two) Times a Day. - Inhalation    citalopram (CeleXA) tablet 20 mg 20 mg Daily 1/4/2018     Sig - Route: Take 1 tablet by mouth Daily. - Oral    furosemide (LASIX) tablet 40 mg 40 mg Daily 1/4/2018     Sig - Route: Take 1 tablet by mouth Daily. - Oral    HYDROcodone-acetaminophen (NORCO) 5-325 MG per tablet 1 tablet 1 tablet Every 6 Hours PRN 1/4/2018 1/14/2018    Sig - Route: Take 1 tablet by mouth Every 6 (Six) Hours As Needed for Moderate Pain . - Oral    oseltamivir (TAMIFLU) capsule 75 mg 75 mg Every 12 Hours Scheduled 1/5/2018 1/10/2018    Sig - Route: Take 1 capsule by mouth Every 12 (Twelve) Hours. - Oral     potassium chloride (MICRO-K) CR capsule 20 mEq 20 mEq 2 Times Daily (BID) 1/4/2018 1/18/2018    Sig - Route: Take 2 capsules by mouth 2 (Two) Times a Day. - Oral    sodium chloride (OCEAN) nasal spray 2 spray 2 spray As Needed 1/4/2018     Sig - Route: 2 sprays by Each Nare route As Needed for Congestion. - Each Nare    sodium chloride 0.9 % flush 1-10 mL 1-10 mL As Needed 1/4/2018     Sig - Route: Infuse 1-10 mL into a venous catheter As Needed for Line Care. - Intravenous    albuterol (PROVENTIL HFA;VENTOLIN HFA) inhaler 2 puff (Discontinued) 2 puff Every 4 Hours PRN 1/4/2018 1/4/2018    Sig - Route: Inhale 2 puffs Every 4 (Four) Hours As Needed for Wheezing. - Inhalation    Reason for Discontinue: Formulary change    atorvastatin (LIPITOR) tablet 40 mg (Discontinued) 40 mg Nightly 1/4/2018 1/4/2018    Sig - Route: Take 1 tablet by mouth Every Night. - Oral    diphenhydrAMINE (BENADRYL) injection (Discontinued)  As Needed 1/4/2018 1/4/2018    Sig: As Needed.    Reason for Discontinue: Patient Discharge    enoxaparin (LOVENOX) syringe 60 mg (Discontinued) 60 mg Every 12 Hours 1/4/2018 1/4/2018    Sig - Route: Inject 0.6 mL under the skin Every 12 (Twelve) Hours. - Subcutaneous    fentaNYL citrate (PF) (SUBLIMAZE) injection (Discontinued)  As Needed 1/4/2018 1/4/2018    Sig: As Needed.    Reason for Discontinue: Patient Discharge    iopamidol (ISOVUE-300) 61 % injection (Discontinued)  As Needed 1/4/2018 1/4/2018    Sig: As Needed.    Reason for Discontinue: Patient Discharge    lidocaine (XYLOCAINE) 2% injection (Discontinued)  As Needed 1/4/2018 1/4/2018    Sig: As Needed.    Reason for Discontinue: Patient Discharge    midazolam (VERSED) injection (Discontinued)  As Needed 1/4/2018 1/4/2018    Sig: As Needed.    Reason for Discontinue: Patient Discharge    nitroglycerin (NITROSTAT) ointment 1 inch (Discontinued) 1 inch Once 1/4/2018 1/4/2018    Sig - Route: Apply 1 inch topically 1 (One) Time. - Topical     Pharmacy to Dose enoxaparin (LOVENOX) (Discontinued) 60 mg Continuous PRN 1/4/2018 1/4/2018    Sig - Route: 60 mg Continuous As Needed for Consult. - Does not apply    sodium chloride 0.9 % flush 10 mL (Discontinued) 10 mL As Needed 1/4/2018 1/4/2018    Sig - Route: Infuse 10 mL into a venous catheter As Needed for Line Care. - Intravenous    Cosign for Ordering: Accepted by Cory Lynch MD on 1/4/2018  7:46 AM    sodium chloride 0.9 % infusion (Discontinued) 100 mL/hr Continuous 1/4/2018 1/5/2018    Sig - Route: Infuse 100 mL/hr into a venous catheter Continuous. - Intravenous          Review of Systems  Constitutional: Positive for activity change, chills and diaphoresis. Negative for appetite change.   HENT: Positive for congestion, dental problem (full set dentures) and sore throat.    Respiratory: Positive for shortness of breath. Negative for stridor.    Cardiovascular: Positive for chest pain and leg swelling. Negative for palpitations.   Musculoskeletal: Positive for back pain.   Skin: Negative for color change, pallor, rash and wound.   Neurological: Negative for dizziness, seizures and headaches.   Hematological: Negative for adenopathy. Does not bruise/bleed easily.   Psychiatric/Behavioral: Negative.      Physical Exam   Constitutional: He is oriented to person, place, and time. He appears well-developed and well-nourished.   HENT:   Head: Normocephalic and atraumatic.   Eyes: Pupils are equal, round, and reactive to light. No scleral icterus.   Neck: Neck supple. No JVD present. Carotid bruit is not present. No thyromegaly present.   Cardiovascular: Normal rate and regular rhythm.    Murmur heard.   Systolic murmur is present with a grade of 3/6   Pulses:       Carotid pulses are 2+ on the right side, and 2+ on the left side.       Femoral pulses are 2+ on the right side, and 2+ on the left side.       Popliteal pulses are 2+ on the right side, and 2+ on the left side.        Dorsalis pedis  pulses are 2+ on the right side, and 2+ on the left side.        Posterior tibial pulses are 2+ on the right side, and 2+ on the left side.   Pulmonary/Chest: Effort normal and breath sounds normal.   Abdominal: Soft. Bowel sounds are normal. He exhibits no distension, no abdominal bruit and no mass. There is no hepatosplenomegaly. There is no tenderness.   Musculoskeletal: Normal range of motion. He exhibits no edema.   Lymphadenopathy:     He has no cervical adenopathy.   Neurological: He is alert and oriented to person, place, and time. He has normal strength. No cranial nerve deficit or sensory deficit.   Skin: Skin is warm, dry and intact.   Psychiatric: He has a normal mood and affect.   Nursing note and vitals reviewed.      Laboratory Data:    Results from last 7 days  Lab Units 01/04/18  0047   WBC 10*3/mm3 7.94   HEMOGLOBIN g/dL 12.0*   HEMATOCRIT % 34.8*   PLATELETS 10*3/mm3 323         Results from last 7 days  Lab Units 01/05/18  0053 01/04/18  0047   SODIUM mmol/L 135 136   POTASSIUM mmol/L 3.6 4.0   CHLORIDE mmol/L 97* 97*   CO2 mmol/L 30.0 26.0   BUN mg/dL 8 14   CREATININE mg/dL 0.69 1.43*   CALCIUM mg/dL 9.1 9.6   BILIRUBIN mg/dL  --  0.2   ALK PHOS U/L  --  72   ALT (SGPT) U/L  --  38   AST (SGOT) U/L  --  29   GLUCOSE mg/dL 94 112*             Diagnostic Data:  Imaging Results (last 24 hours)     Procedure Component Value Units Date/Time    XR Chest PA & Lateral [[id number]] Collected:  01/05/18 1411     Updated:  01/05/18 1414    Narrative:       XR CHEST PA AND LATERAL- 1/5/2018 1:50 PM CST     HISTORY: chest pain, coughing, CAD; I21.4-Non-ST elevation (NSTEMI)  myocardial infarction; R94.31-Abnormal electrocardiogram (ECG) (EKG)      COMPARISON: 01/04/2018     FINDINGS:   Upright frontal and lateral radiographs of the chest were obtained.     The lungs are clear. The cardiomediastinal silhouette and pulmonary  vascularity are unchanged. The osseous structures and surrounding soft  tissues  demonstrate no acute abnormality.       Impression:       1. No radiographic evidence of acute cardiopulmonary process.        This report was finalized on 01/05/2018 14:11 by Dr. Elia Crabtree MD.          Impression:  Principal Problem:    Chest pain  Active Problems:    Shortness of breath    Severe aortic valve stenosis    Tobacco use    Hyperlipidemia    Hypertension    NSTEMI (non-ST elevated myocardial infarction)    Coronary artery disease involving native coronary artery of native heart with angina pectoris    Stenosis of right carotid artery    COPD (chronic obstructive pulmonary disease)    LAZ (acute kidney injury)      Plan: After thoroughly evaluating Sharad Ryder, I believe the best course of action is to further discuss operative management with Dr. Robin going forward.  This is a very difficult case with no clear answer.  The patient could have carotid surgery followed by AVR/CABG either concomitantly or a few days apart as well as AVR/CABG followed by carotid surgery after the patient recovers.  Each carries very significant risk.  He will need definitive treatment during this admission.  Continue with aspirin/statin.  This was all discussed in full with complete understanding.  Thank you for allowing me to participate in the care of your patients.  Please do not hesitate to call with any questions.    Jl Salgado, DO

## 2018-01-05 NOTE — PROGRESS NOTES
"\"Had a rough night\" Reports increased shortness of breath, chest pain, and coughing overnight. Had to be placed on 2 liters nasal cannula. Looks like lowest documented O2 sat is 95%. Currently he is on room air. Reports he is coming down with the \"flu.\" Has had recent sick contacts. Reports chest pain located at sternum. Worse with deep inspiration and coughing. Reports this is different type of chest pain than he had the other night when he came to the ER. Tmax 99.5F. Worried about coughing spells after carotid and cardiac surgery.     Visit Vitals   • /58 (BP Location: Right arm, Patient Position: Lying)   • Pulse 90   • Temp 99.5 °F (37.5 °C) (Temporal Artery )   • Resp 18   • Ht 167.6 cm (66\")   • Wt 54.3 kg (119 lb 11.2 oz)   • SpO2 98%   • BMI 19.32 kg/m2       Intake/Output Summary (Last 24 hours) at 01/05/18 1032  Last data filed at 01/05/18 0844   Gross per 24 hour   Intake              600 ml   Output             1750 ml   Net            -1150 ml     Labs: Na 135, K 3.6, CO2 30, BUN 8, Creat 0.69, Glucose 94, Troponin 1.170    Physical Exam:  General: No apparent distress. Resting in bed.    Cardiovascular: Regular rate and rhythm. Systolic murmur III/VI.     Pulmonary: Decreased breath sounds throughout. No wheezing, rubs, or rales. No respiratory distress  Abdomen: Soft, non-distended, and non-tender.  Extremities: Warm, moves all extremities. No edema.   Neurologic: Grossly intact with no focal deficits.       Impression:  Severe aortic stenosis, bicuspid aortic valve  NSTEMI  Coronary artery disease  Stenosis of right carotid artery  Chronic obstructive pulmonary disease  Chronic back pain  Chronic tobacco use  Acute kidney injury-improved  Hypertension  Hyperlipidemia  Chest pain, ? pleurisy    Plan:  ABG, check chest x ray  Swab for flu  Consult Vascular surgery-right carotid stenosis, need for right CEA  CABG/SAVR in near future, timing to be determined by Dr. Robin and Dr. Salgado   Discussed " with patient

## 2018-01-06 PROCEDURE — 94799 UNLISTED PULMONARY SVC/PX: CPT

## 2018-01-06 PROCEDURE — 99232 SBSQ HOSP IP/OBS MODERATE 35: CPT | Performed by: INTERNAL MEDICINE

## 2018-01-06 PROCEDURE — 99231 SBSQ HOSP IP/OBS SF/LOW 25: CPT | Performed by: THORACIC SURGERY (CARDIOTHORACIC VASCULAR SURGERY)

## 2018-01-06 PROCEDURE — 94760 N-INVAS EAR/PLS OXIMETRY 1: CPT

## 2018-01-06 PROCEDURE — 99232 SBSQ HOSP IP/OBS MODERATE 35: CPT | Performed by: SURGERY

## 2018-01-06 RX ORDER — HYDROCODONE BITARTRATE AND ACETAMINOPHEN 7.5; 325 MG/1; MG/1
1 TABLET ORAL EVERY 6 HOURS PRN
Status: DISCONTINUED | OUTPATIENT
Start: 2018-01-06 | End: 2018-01-15 | Stop reason: HOSPADM

## 2018-01-06 RX ADMIN — HYDROCODONE BITARTRATE AND ACETAMINOPHEN 1 TABLET: 5; 325 TABLET ORAL at 02:34

## 2018-01-06 RX ADMIN — HYDROCODONE BITARTRATE AND ACETAMINOPHEN 1 TABLET: 5; 325 TABLET ORAL at 09:04

## 2018-01-06 RX ADMIN — DESMOPRESSIN ACETATE 80 MG: 0.2 TABLET ORAL at 20:50

## 2018-01-06 RX ADMIN — HYDROCODONE BITARTRATE AND ACETAMINOPHEN 1 TABLET: 7.5; 325 TABLET ORAL at 15:03

## 2018-01-06 RX ADMIN — POTASSIUM CHLORIDE 20 MEQ: 750 CAPSULE, EXTENDED RELEASE ORAL at 20:50

## 2018-01-06 RX ADMIN — FUROSEMIDE 40 MG: 40 TABLET ORAL at 09:04

## 2018-01-06 RX ADMIN — BUDESONIDE AND FORMOTEROL FUMARATE DIHYDRATE 2 PUFF: 160; 4.5 AEROSOL RESPIRATORY (INHALATION) at 20:38

## 2018-01-06 RX ADMIN — BUDESONIDE AND FORMOTEROL FUMARATE DIHYDRATE 2 PUFF: 160; 4.5 AEROSOL RESPIRATORY (INHALATION) at 09:34

## 2018-01-06 RX ADMIN — ALBUTEROL SULFATE 2.5 MG: 2.5 SOLUTION RESPIRATORY (INHALATION) at 09:34

## 2018-01-06 RX ADMIN — ASPIRIN 81 MG: 81 TABLET, CHEWABLE ORAL at 09:04

## 2018-01-06 RX ADMIN — HYDROCODONE BITARTRATE AND ACETAMINOPHEN 1 TABLET: 7.5; 325 TABLET ORAL at 20:50

## 2018-01-06 RX ADMIN — ALBUTEROL SULFATE 2.5 MG: 2.5 SOLUTION RESPIRATORY (INHALATION) at 15:09

## 2018-01-06 RX ADMIN — ALBUTEROL SULFATE 2.5 MG: 2.5 SOLUTION RESPIRATORY (INHALATION) at 20:38

## 2018-01-06 RX ADMIN — POTASSIUM CHLORIDE 20 MEQ: 750 CAPSULE, EXTENDED RELEASE ORAL at 09:04

## 2018-01-06 RX ADMIN — OSELTAMIVIR PHOSPHATE 75 MG: 75 CAPSULE ORAL at 20:50

## 2018-01-06 RX ADMIN — CITALOPRAM 20 MG: 20 TABLET, FILM COATED ORAL at 09:04

## 2018-01-06 RX ADMIN — OSELTAMIVIR PHOSPHATE 75 MG: 75 CAPSULE ORAL at 09:04

## 2018-01-06 NOTE — PROGRESS NOTES
[facility] -  Progress Note     LOS: 2 days   Patient Care Team:  Sharad KLEIN MD as PCP - General (Family Medicine)    Chief Complaint: chest pain     Subjective     Interval History:     Patient Complaints: The patient reports he has some generalized aches and pains- his chronic back pain is worsened by sitting in the bed. He also admits cough with colored sputum production. He denies chest pain or shortness of breath.     Review of Systems:     Review of Systems   Constitutional: Positive for fatigue. Negative for diaphoresis, fever and unexpected weight change.   HENT: Negative for nosebleeds.    Respiratory: Positive for cough (with sputum production ). Negative for apnea, chest tightness, shortness of breath and wheezing.    Cardiovascular: Negative for chest pain, palpitations and leg swelling.   Gastrointestinal: Negative for abdominal distention, nausea and vomiting.   Genitourinary: Negative for hematuria.   Musculoskeletal: Positive for back pain. Negative for gait problem.   Skin: Negative for color change.   Neurological: Positive for weakness. Negative for dizziness, syncope and light-headedness.     Objective     Vital Sign Min/Max for last 24 hours  Temp  Min: 97.3 °F (36.3 °C)  Max: 99.2 °F (37.3 °C)   BP  Min: 96/54  Max: 112/60   Pulse  Min: 76  Max: 89   Resp  Min: 18  Max: 24   SpO2  Min: 91 %  Max: 96 %   No Data Recorded   Weight  Min: 55 kg (121 lb 3.2 oz)  Max: 55 kg (121 lb 3.2 oz)     Last Weight    01/05/18 2000   Weight: 55 kg (121 lb 3.2 oz)       Physical Exam:    Physical Exam   Constitutional: He is oriented to person, place, and time. He appears well-developed and well-nourished. No distress.   HENT:   Head: Normocephalic and atraumatic.   Eyes: Pupils are equal, round, and reactive to light.   Neck: Normal range of motion. Neck supple. No JVD present. No thyromegaly present.   Cardiovascular: Normal rate, regular rhythm and intact distal pulses.  Exam  reveals no gallop and no friction rub.    Murmur (3/6 systolic murmur ) heard.  Pulmonary/Chest: Effort normal and breath sounds normal. No respiratory distress. He has no wheezes. He has no rales. He exhibits no tenderness.   Abdominal: Soft. Bowel sounds are normal. He exhibits no distension. There is no tenderness.   Musculoskeletal: Normal range of motion. He exhibits no edema.   Neurological: He is alert and oriented to person, place, and time. No cranial nerve deficit.   Skin: Skin is warm and dry. He is not diaphoretic.   Psychiatric: He has a normal mood and affect. His behavior is normal.     Results Review:   Lab Results (last 72 hours)     Procedure Component Value Units Date/Time    CBC & Differential [[id number]] Collected:  01/04/18 0047    Specimen:  Blood Updated:  01/04/18 0054    Narrative:       The following orders were created for panel order CBC & Differential.  Procedure                               Abnormality         Status                     ---------                               -----------         ------                     CBC Auto Differential[[id number]]        Abnormal            Final result                 Please view results for these tests on the individual orders.    CBC Auto Differential [[id number]]  (Abnormal) Collected:  01/04/18 0047    Specimen:  Blood Updated:  01/04/18 0054     WBC 7.94 10*3/mm3      RBC 3.88 (L) 10*6/mm3      Hemoglobin 12.0 (L) g/dL      Hematocrit 34.8 (L) %      MCV 89.7 fL      MCH 30.9 pg      MCHC 34.5 g/dL      RDW 13.0 %      RDW-SD 42.6 fl      MPV 9.3 fL      Platelets 323 10*3/mm3      Neutrophil % 79.1 (H) %      Lymphocyte % 7.7 (L) %      Monocyte % 12.3 (H) %      Eosinophil % 0.4 %      Basophil % 0.4 %      Immature Grans % 0.1 %      Neutrophils, Absolute 6.28 10*3/mm3      Lymphocytes, Absolute 0.61 (L) 10*3/mm3      Monocytes, Absolute 0.98 10*3/mm3      Eosinophils, Absolute 0.03 10*3/mm3      Basophils, Absolute 0.03 10*3/mm3       Immature Grans, Absolute 0.01 10*3/mm3      nRBC 0.0 /100 WBC     Comprehensive Metabolic Panel [[id number]]  (Abnormal) Collected:  01/04/18 0047    Specimen:  Blood Updated:  01/04/18 0111     Glucose 112 (H) mg/dL      BUN 14 mg/dL      Creatinine 1.43 (H) mg/dL      Sodium 136 mmol/L      Potassium 4.0 mmol/L      Chloride 97 (L) mmol/L      CO2 26.0 mmol/L      Calcium 9.6 mg/dL      Total Protein 6.7 g/dL      Albumin 4.10 g/dL      ALT (SGPT) 38 U/L      AST (SGOT) 29 U/L      Alkaline Phosphatase 72 U/L      Total Bilirubin 0.2 mg/dL      eGFR Non African Amer 51 (L) mL/min/1.73      Globulin 2.6 gm/dL      A/G Ratio 1.6 g/dL      BUN/Creatinine Ratio 9.8     Anion Gap 13.0 mmol/L     Troponin [[id number]]  (Abnormal) Collected:  01/04/18 0047    Specimen:  Blood Updated:  01/04/18 0129     Troponin I 1.190 (C) ng/mL     Light Blue Top [[id number]] Collected:  01/04/18 0047    Specimen:  Blood Updated:  01/04/18 0207     Extra Tube hold for add-on      Auto resulted       Lavender Top [[id number]] Collected:  01/04/18 0047    Specimen:  Blood Updated:  01/04/18 0207     Extra Tube hold for add-on      Auto resulted       [place] Draw [[id number]] Collected:  01/04/18 0047    Specimen:  Blood Updated:  01/04/18 0207    Narrative:       The following orders were created for panel order [place] Draw.  Procedure                               Abnormality         Status                     ---------                               -----------         ------                     Light Blue Top[[id number]]                                   Final result               Green Top (Gel)[[id number]]                                  Final result               Lavender Top[[id number]]                                     Final result               Red Top[[id number]]                                          Final result                 Please view results for these tests on the individual orders.    Green Top (Gel) [[id number]]  Collected:  01/04/18 0047    Specimen:  Blood Updated:  01/04/18 0207     Extra Tube Hold for add-ons.      Auto resulted.       Red Top [[id number]] Collected:  01/04/18 0047    Specimen:  Blood Updated:  01/04/18 0207     Extra Tube Hold for add-ons.      Auto resulted.       Troponin [[id number]]  (Abnormal) Collected:  01/04/18 0317    Specimen:  Blood Updated:  01/04/18 0402     Troponin I 1.370 (C) ng/mL     Troponin [[id number]]  (Abnormal) Collected:  01/04/18 0627    Specimen:  Blood Updated:  01/04/18 0701     Troponin I 1.570 (C) ng/mL     Troponin [[id number]]  (Abnormal) Collected:  01/04/18 1053    Specimen:  Blood Updated:  01/04/18 1139     Troponin I 1.550 (C) ng/mL     Troponin [[id number]]  (Abnormal) Collected:  01/04/18 1843    Specimen:  Blood Updated:  01/04/18 1919     Troponin I 1.150 (C) ng/mL     Troponin [[id number]]  (Abnormal) Collected:  01/04/18 2140    Specimen:  Blood Updated:  01/04/18 2215     Troponin I 1.090 (C) ng/mL     Basic Metabolic Panel [[id number]]  (Abnormal) Collected:  01/05/18 0053    Specimen:  Blood Updated:  01/05/18 0208     Glucose 94 mg/dL      BUN 8 mg/dL      Creatinine 0.69 mg/dL      Sodium 135 mmol/L      Potassium 3.6 mmol/L      Chloride 97 (L) mmol/L      CO2 30.0 mmol/L      Calcium 9.1 mg/dL      eGFR Non African Amer 118 mL/min/1.73      BUN/Creatinine Ratio 11.6     Anion Gap 8.0 mmol/L     Narrative:       GFR Normal >60  Chronic Kidney Disease <60  Kidney Failure <15    Troponin [[id number]]  (Abnormal) Collected:  01/05/18 0053    Specimen:  Blood Updated:  01/05/18 0220     Troponin I 1.170 (C) ng/mL     Blood Gas, Arterial [[id number]]  (Abnormal) Collected:  01/05/18 1123    Specimen:  Arterial Blood Updated:  01/05/18 1128     Site Right Radial     Glenroy's Test Positive     pH, Arterial 7.422 pH units      pCO2, Arterial 40.0 mm Hg      pO2, Arterial 68.9 (L) mm Hg      HCO3, Arterial 26.1 (H) mmol/L      Base Excess, Arterial 1.5 mmol/L      O2  Saturation, Arterial 94.1 %      Temperature 37.0 C      Barometric Pressure for Blood Gas 762 mmHg      Modality Room Air     Ventilator Mode NA     Collected by [id number]    Influenza Antigen, Rapid - Swab, Nasopharynx [[id number]]  (Abnormal) Collected:  01/05/18 1050    Specimen:  Swab from Nasopharynx Updated:  01/05/18 1146     Influenza A Ag, EIA Positive (A)     Influenza B Ag, EIA Negative    Narrative:         Recommend confirmation of negative results by viral culture or molecular assay.              Echo EF Estimated  Lab Results   Component Value Date    ECHOEFEST 70 12/13/2017         Cath Ejection Fraction Quantitative  No results found for: CATHEF        Medication Review: yes  Current Facility-Administered Medications   Medication Dose Route Frequency Provider Last Rate Last Dose   • albuterol (PROVENTIL) nebulizer solution 0.083% 2.5 mg/3mL  2.5 mg Nebulization Q4H PRN Alfonso Yi MD   2.5 mg at 01/06/18 0934   • aspirin chewable tablet 81 mg  81 mg Oral Daily Ashtyn Cooley APRN   81 mg at 01/06/18 0904   • atorvastatin (LIPITOR) tablet 80 mg  80 mg Oral Nightly Ashtyn Cooley APRN   80 mg at 01/05/18 2017   • budesonide-formoterol (SYMBICORT) 160-4.5 MCG/ACT inhaler 2 puff  2 puff Inhalation BID - RT Ashtyn Cooley APRN   2 puff at 01/06/18 0934   • citalopram (CeleXA) tablet 20 mg  20 mg Oral Daily Ashtyn Cooley APRN   20 mg at 01/06/18 0904   • furosemide (LASIX) tablet 40 mg  40 mg Oral Daily Ashtyn Cooley APRN   40 mg at 01/06/18 0904   • HYDROcodone-acetaminophen (NORCO) 7.5-325 MG per tablet 1 tablet  1 tablet Oral Q6H PRN KIRBY Bowie       • oseltamivir (TAMIFLU) capsule 75 mg  75 mg Oral Q12H Ashtyn Cooley APRN   75 mg at 01/06/18 0904   • potassium chloride (MICRO-K) CR capsule 20 mEq  20 mEq Oral BID Ashtyn Cooley APRN   20 mEq at 01/06/18 0904   • sodium chloride (OCEAN) nasal spray 2 spray  2 spray Each Nare PRN Aime Francois MD   2 spray at 01/04/18 2341   • sodium  chloride 0.9 % flush 1-10 mL  1-10 mL Intravenous PRN Ashtyn Cooley, APRN             Assessment/Plan      NSTEMI   Coronary artery disease  Severe aortic stenosis  Carotid artery disease   Flu A  COPD   Tobacco abuse  Chronic back pain  Hypertension  Hyperlipidemia    Plan-  CABG/AVR, carotid surgery per Dr. Robin, Dr. Salgado in future - complicated case- timing of surgeries to be determined  Tamiflu  Continue ASA, statin, lasix, K  BP too soft for ACEI (on hold), beta blocker     Claribel Plascencia, APRN  01/06/18  1:23 PM

## 2018-01-06 NOTE — PROGRESS NOTES
LOS: 2 days   Patient Care Team:  Sharad KLEIN MD as PCP - General (Family Medicine)    Chief Complaint:  Severe asymptomatic carotid occlusive disease    Subjective     The patient was seen/examined at the bedside.  No issues overnight.    Objective     Vital Signs  Temp:  [97.3 °F (36.3 °C)-99.2 °F (37.3 °C)] 97.5 °F (36.4 °C)  Heart Rate:  [76-89] 77  Resp:  [18-24] 24  BP: ()/(54-76) 100/76    Physical Exam  Constitutional: He is oriented to person, place, and time. He appears well-developed and well-nourished.   HENT:   Head: Normocephalic and atraumatic.   Eyes: Pupils are equal, round, and reactive to light. No scleral icterus.   Neck: Neck supple. No JVD present. Carotid bruit is not present. No thyromegaly present.   Cardiovascular: Normal rate and regular rhythm.    Murmur heard.   Systolic murmur is present with a grade of 3/6   Pulses:       Carotid pulses are 2+ on the right side, and 2+ on the left side.       Femoral pulses are 2+ on the right side, and 2+ on the left side.       Popliteal pulses are 2+ on the right side, and 2+ on the left side.        Dorsalis pedis pulses are 2+ on the right side, and 2+ on the left side.        Posterior tibial pulses are 2+ on the right side, and 2+ on the left side.   Pulmonary/Chest: Effort normal and breath sounds normal.   Abdominal: Soft. Bowel sounds are normal. He exhibits no distension, no abdominal bruit and no mass. There is no hepatosplenomegaly. There is no tenderness.   Musculoskeletal: Normal range of motion. He exhibits no edema.   Lymphadenopathy:     He has no cervical adenopathy.   Neurological: He is alert and oriented to person, place, and time. He has normal strength. No cranial nerve deficit or sensory deficit.   Skin: Skin is warm, dry and intact.   Psychiatric: He has a normal mood and affect.   Nursing note and vitals reviewed.  Laboratory Data:     Results from last 7 days  Lab Units 01/04/18  0047   WBC 10*3/mm3 7.94    HEMOGLOBIN g/dL 12.0*   HEMATOCRIT % 34.8*   PLATELETS 10*3/mm3 323         Results from last 7 days  Lab Units 01/05/18  0053 01/04/18  0047   SODIUM mmol/L 135 136   POTASSIUM mmol/L 3.6 4.0   CHLORIDE mmol/L 97* 97*   CO2 mmol/L 30.0 26.0   BUN mg/dL 8 14   CREATININE mg/dL 0.69 1.43*   CALCIUM mg/dL 9.1 9.6   BILIRUBIN mg/dL  --  0.2   ALK PHOS U/L  --  72   ALT (SGPT) U/L  --  38   AST (SGOT) U/L  --  29   GLUCOSE mg/dL 94 112*               Assessment/Plan     Principal Problem:    Chest pain  Active Problems:    Shortness of breath    Severe aortic valve stenosis    Tobacco use    Hyperlipidemia    Hypertension    NSTEMI (non-ST elevated myocardial infarction)    Coronary artery disease involving native coronary artery of native heart with angina pectoris    Stenosis of right carotid artery    COPD (chronic obstructive pulmonary disease)    LAZ (acute kidney injury)    Flu      Plan:  1.  Patient will need right carotid endarterectomy prior to AVR/CABG.  The carotid will be performed under local/cervical block.  This will likely take place later toward the end of the week, maybe even next week.  This case was discussed with Dr. Robin and Dr. Blake.  2.  Continue aspirin/statin.        Jl Salgado DO  01/06/18  1:26 PM

## 2018-01-06 NOTE — PLAN OF CARE
Problem: Fall Risk (Adult)  Goal: Absence of Falls  Outcome: Ongoing (interventions implemented as appropriate)      Problem: Patient Care Overview (Adult)  Goal: Plan of Care Review  Outcome: Ongoing (interventions implemented as appropriate)   01/05/18 2025 01/06/18 0045   Coping/Psychosocial Response Interventions   Plan Of Care Reviewed With patient --    Patient Care Overview   Progress --  progress toward functional goals as expected   Outcome Evaluation   Outcome Summary/Follow up Plan --  VSS. Pt c/o back pain and given prn pain medication with good result. O2 @ 2L NC PRN. Heart cath revealed 3 vessel disease and severe aortic stenosis. Droplet precautions due to Influenza type A. Will continue to monitor and notify MD of changes.     Goal: Adult Individualization and Mutuality  Outcome: Ongoing (interventions implemented as appropriate)      Problem: Acute Coronary Syndrome (ACS) (Adult)  Goal: Signs and Symptoms of Listed Potential Problems Will be Absent or Manageable (Acute Coronary Syndrome)  Outcome: Ongoing (interventions implemented as appropriate)      Problem: Cardiac Catheterization with/without PCI (Adult)  Goal: Signs and Symptoms of Listed Potential Problems Will be Absent or Manageable (Cardiac Catheterization with/without PCI)  Outcome: Outcome(s) achieved Date Met: 01/06/18      Problem: Pain, Chronic (Adult)  Goal: Identify Related Risk Factors and Signs and Symptoms  Outcome: Ongoing (interventions implemented as appropriate)    Goal: Acceptable Pain Control/Comfort Level  Outcome: Ongoing (interventions implemented as appropriate)

## 2018-01-06 NOTE — PLAN OF CARE
Problem: Fall Risk (Adult)  Goal: Absence of Falls  Outcome: Ongoing (interventions implemented as appropriate)      Problem: Patient Care Overview (Adult)  Goal: Plan of Care Review  Outcome: Ongoing (interventions implemented as appropriate)   01/06/18 9621   Coping/Psychosocial Response Interventions   Plan Of Care Reviewed With patient   Patient Care Overview   Progress improving   Outcome Evaluation   Outcome Summary/Follow up Plan PATIENT C/O CHRONIC BACK PAIN, NORCO INCREASED TO 7.5 MG AND GIVEN Q6H PRN. SATS WNL ON RA, FREQUENT COUGH. SURGERY AT LATER DATE PER DR MEEYR AND DR PAN. CONT TO MONITOR.     Goal: Adult Individualization and Mutuality  Outcome: Ongoing (interventions implemented as appropriate)    Goal: Discharge Needs Assessment  Outcome: Ongoing (interventions implemented as appropriate)      Problem: Acute Coronary Syndrome (ACS) (Adult)  Goal: Signs and Symptoms of Listed Potential Problems Will be Absent or Manageable (Acute Coronary Syndrome)  Outcome: Ongoing (interventions implemented as appropriate)      Problem: Pain, Chronic (Adult)  Goal: Identify Related Risk Factors and Signs and Symptoms  Outcome: Ongoing (interventions implemented as appropriate)    Goal: Acceptable Pain Control/Comfort Level  Outcome: Ongoing (interventions implemented as appropriate)

## 2018-01-07 LAB
ANION GAP SERPL CALCULATED.3IONS-SCNC: 7 MMOL/L (ref 4–13)
BUN BLD-MCNC: 7 MG/DL (ref 5–21)
BUN/CREAT SERPL: 12.5 (ref 7–25)
CALCIUM SPEC-SCNC: 9.4 MG/DL (ref 8.4–10.4)
CHLORIDE SERPL-SCNC: 101 MMOL/L (ref 98–110)
CO2 SERPL-SCNC: 30 MMOL/L (ref 24–31)
CREAT BLD-MCNC: 0.56 MG/DL (ref 0.5–1.4)
GFR SERPL CREATININE-BSD FRML MDRD: 150 ML/MIN/1.73
GLUCOSE BLD-MCNC: 102 MG/DL (ref 70–100)
POTASSIUM BLD-SCNC: 4.1 MMOL/L (ref 3.5–5.3)
SODIUM BLD-SCNC: 138 MMOL/L (ref 135–145)

## 2018-01-07 PROCEDURE — 94760 N-INVAS EAR/PLS OXIMETRY 1: CPT

## 2018-01-07 PROCEDURE — 94799 UNLISTED PULMONARY SVC/PX: CPT

## 2018-01-07 PROCEDURE — 80048 BASIC METABOLIC PNL TOTAL CA: CPT | Performed by: INTERNAL MEDICINE

## 2018-01-07 RX ADMIN — CITALOPRAM 20 MG: 20 TABLET, FILM COATED ORAL at 08:33

## 2018-01-07 RX ADMIN — ASPIRIN 81 MG: 81 TABLET, CHEWABLE ORAL at 08:33

## 2018-01-07 RX ADMIN — BUDESONIDE AND FORMOTEROL FUMARATE DIHYDRATE 2 PUFF: 160; 4.5 AEROSOL RESPIRATORY (INHALATION) at 20:34

## 2018-01-07 RX ADMIN — POTASSIUM CHLORIDE 20 MEQ: 750 CAPSULE, EXTENDED RELEASE ORAL at 20:59

## 2018-01-07 RX ADMIN — OSELTAMIVIR PHOSPHATE 75 MG: 75 CAPSULE ORAL at 08:32

## 2018-01-07 RX ADMIN — POTASSIUM CHLORIDE 20 MEQ: 750 CAPSULE, EXTENDED RELEASE ORAL at 08:33

## 2018-01-07 RX ADMIN — BUDESONIDE AND FORMOTEROL FUMARATE DIHYDRATE 2 PUFF: 160; 4.5 AEROSOL RESPIRATORY (INHALATION) at 10:25

## 2018-01-07 RX ADMIN — OSELTAMIVIR PHOSPHATE 75 MG: 75 CAPSULE ORAL at 20:59

## 2018-01-07 RX ADMIN — FUROSEMIDE 40 MG: 40 TABLET ORAL at 08:33

## 2018-01-07 RX ADMIN — HYDROCODONE BITARTRATE AND ACETAMINOPHEN 1 TABLET: 7.5; 325 TABLET ORAL at 08:40

## 2018-01-07 RX ADMIN — HYDROCODONE BITARTRATE AND ACETAMINOPHEN 1 TABLET: 7.5; 325 TABLET ORAL at 14:47

## 2018-01-07 RX ADMIN — DESMOPRESSIN ACETATE 80 MG: 0.2 TABLET ORAL at 20:59

## 2018-01-07 RX ADMIN — ALBUTEROL SULFATE 2.5 MG: 2.5 SOLUTION RESPIRATORY (INHALATION) at 10:25

## 2018-01-07 RX ADMIN — HYDROCODONE BITARTRATE AND ACETAMINOPHEN 1 TABLET: 7.5; 325 TABLET ORAL at 20:58

## 2018-01-07 RX ADMIN — ALBUTEROL SULFATE 2.5 MG: 2.5 SOLUTION RESPIRATORY (INHALATION) at 20:34

## 2018-01-07 NOTE — PLAN OF CARE
"Problem: Fall Risk (Adult)  Goal: Absence of Falls  Outcome: Ongoing (interventions implemented as appropriate)      Problem: Patient Care Overview (Adult)  Goal: Plan of Care Review  Outcome: Ongoing (interventions implemented as appropriate)   01/06/18 1711 01/06/18 2008 01/07/18 0346   Coping/Psychosocial Response Interventions   Plan Of Care Reviewed With --  patient --    Patient Care Overview   Progress improving --  --    Outcome Evaluation   Outcome Summary/Follow up Plan --  --  VSS. Pt c/o chronic back pain and given prn pain medication with good result. Pt states \"I'm beginning to feel better\". Will continue to monitor and notify MD of changes.     Goal: Adult Individualization and Mutuality  Outcome: Ongoing (interventions implemented as appropriate)      Problem: Acute Coronary Syndrome (ACS) (Adult)  Goal: Signs and Symptoms of Listed Potential Problems Will be Absent or Manageable (Acute Coronary Syndrome)  Outcome: Ongoing (interventions implemented as appropriate)      Problem: Pain, Chronic (Adult)  Goal: Identify Related Risk Factors and Signs and Symptoms  Outcome: Ongoing (interventions implemented as appropriate)    Goal: Acceptable Pain Control/Comfort Level  Outcome: Ongoing (interventions implemented as appropriate)        "

## 2018-01-07 NOTE — PROGRESS NOTES
[facility] -  Progress Note     LOS: 3 days   Patient Care Team:  Sharad KLEIN MD as PCP - General (Family Medicine)    Chief Complaint: chest pain     Subjective     Interval History:     Patient Complaints: The patient reports he is feeling okay. He denies chest pain or shortness of breath. He admits fatigue and cough with dark sputum production.     Review of Systems:     Review of Systems   Constitutional: Positive for fatigue. Negative for diaphoresis, fever and unexpected weight change.   HENT: Negative for nosebleeds.    Respiratory: Positive for cough (with sputum production ). Negative for apnea, chest tightness, shortness of breath and wheezing.    Cardiovascular: Negative for chest pain, palpitations and leg swelling.   Gastrointestinal: Negative for abdominal distention, nausea and vomiting.   Genitourinary: Negative for hematuria.   Musculoskeletal: Positive for back pain. Negative for gait problem.   Skin: Negative for color change.   Neurological: Positive for weakness. Negative for dizziness, syncope and light-headedness.     Objective     Vital Sign Min/Max for last 24 hours  Temp  Min: 97.6 °F (36.4 °C)  Max: 98.2 °F (36.8 °C)   BP  Min: 94/65  Max: 118/65   Pulse  Min: 70  Max: 86   Resp  Min: 16  Max: 20   SpO2  Min: 94 %  Max: 97 %   No Data Recorded   Weight  Min: 56.1 kg (123 lb 9.6 oz)  Max: 56.1 kg (123 lb 9.6 oz)     Last Weight    01/06/18 2000   Weight: 56.1 kg (123 lb 9.6 oz)       Physical Exam:    Physical Exam   Constitutional: He is oriented to person, place, and time. He appears well-developed and well-nourished. No distress.   HENT:   Head: Normocephalic and atraumatic.   Eyes: Pupils are equal, round, and reactive to light.   Neck: Normal range of motion. Neck supple. No JVD present. No thyromegaly present.   Cardiovascular: Normal rate, regular rhythm and intact distal pulses.  Exam reveals no gallop and no friction rub.    Murmur (3/6 systolic murmur )  heard.  Pulmonary/Chest: Effort normal and breath sounds normal. No respiratory distress. He has no wheezes. He has no rales. He exhibits no tenderness.   Abdominal: Soft. Bowel sounds are normal. He exhibits no distension. There is no tenderness.   Musculoskeletal: Normal range of motion. He exhibits no edema.   Neurological: He is alert and oriented to person, place, and time. No cranial nerve deficit.   Skin: Skin is warm and dry. He is not diaphoretic.   Psychiatric: He has a normal mood and affect. His behavior is normal.     Results Review:   Lab Results (last 72 hours)     Procedure Component Value Units Date/Time    CBC & Differential [[id number]] Collected:  01/04/18 0047    Specimen:  Blood Updated:  01/04/18 0054    Narrative:       The following orders were created for panel order CBC & Differential.  Procedure                               Abnormality         Status                     ---------                               -----------         ------                     CBC Auto Differential[[id number]]        Abnormal            Final result                 Please view results for these tests on the individual orders.    CBC Auto Differential [[id number]]  (Abnormal) Collected:  01/04/18 0047    Specimen:  Blood Updated:  01/04/18 0054     WBC 7.94 10*3/mm3      RBC 3.88 (L) 10*6/mm3      Hemoglobin 12.0 (L) g/dL      Hematocrit 34.8 (L) %      MCV 89.7 fL      MCH 30.9 pg      MCHC 34.5 g/dL      RDW 13.0 %      RDW-SD 42.6 fl      MPV 9.3 fL      Platelets 323 10*3/mm3      Neutrophil % 79.1 (H) %      Lymphocyte % 7.7 (L) %      Monocyte % 12.3 (H) %      Eosinophil % 0.4 %      Basophil % 0.4 %      Immature Grans % 0.1 %      Neutrophils, Absolute 6.28 10*3/mm3      Lymphocytes, Absolute 0.61 (L) 10*3/mm3      Monocytes, Absolute 0.98 10*3/mm3      Eosinophils, Absolute 0.03 10*3/mm3      Basophils, Absolute 0.03 10*3/mm3      Immature Grans, Absolute 0.01 10*3/mm3      nRBC 0.0 /100 WBC      Comprehensive Metabolic Panel [[id number]]  (Abnormal) Collected:  01/04/18 0047    Specimen:  Blood Updated:  01/04/18 0111     Glucose 112 (H) mg/dL      BUN 14 mg/dL      Creatinine 1.43 (H) mg/dL      Sodium 136 mmol/L      Potassium 4.0 mmol/L      Chloride 97 (L) mmol/L      CO2 26.0 mmol/L      Calcium 9.6 mg/dL      Total Protein 6.7 g/dL      Albumin 4.10 g/dL      ALT (SGPT) 38 U/L      AST (SGOT) 29 U/L      Alkaline Phosphatase 72 U/L      Total Bilirubin 0.2 mg/dL      eGFR Non African Amer 51 (L) mL/min/1.73      Globulin 2.6 gm/dL      A/G Ratio 1.6 g/dL      BUN/Creatinine Ratio 9.8     Anion Gap 13.0 mmol/L     Troponin [[id number]]  (Abnormal) Collected:  01/04/18 0047    Specimen:  Blood Updated:  01/04/18 0129     Troponin I 1.190 (C) ng/mL     Light Blue Top [[id number]] Collected:  01/04/18 0047    Specimen:  Blood Updated:  01/04/18 0207     Extra Tube hold for add-on      Auto resulted       Lavender Top [[id number]] Collected:  01/04/18 0047    Specimen:  Blood Updated:  01/04/18 0207     Extra Tube hold for add-on      Auto resulted       Ralph Draw [[id number]] Collected:  01/04/18 0047    Specimen:  Blood Updated:  01/04/18 0207    Narrative:       The following orders were created for panel order Ralph Draw.  Procedure                               Abnormality         Status                     ---------                               -----------         ------                     Light Blue Top[[id number]]                                   Final result               Green Top (Gel)[[id number]]                                  Final result               Lavender Top[[id number]]                                     Final result               Red Top[[id number]]                                          Final result                 Please view results for these tests on the individual orders.    Green Top (Gel) [[id number]] Collected:  01/04/18 0047    Specimen:  Blood Updated:  01/04/18 0207      Extra Tube Hold for add-ons.      Auto resulted.       Red Top [[id number]] Collected:  01/04/18 0047    Specimen:  Blood Updated:  01/04/18 0207     Extra Tube Hold for add-ons.      Auto resulted.       Troponin [[id number]]  (Abnormal) Collected:  01/04/18 0317    Specimen:  Blood Updated:  01/04/18 0402     Troponin I 1.370 (C) ng/mL     Troponin [[id number]]  (Abnormal) Collected:  01/04/18 0627    Specimen:  Blood Updated:  01/04/18 0701     Troponin I 1.570 (C) ng/mL     Troponin [[id number]]  (Abnormal) Collected:  01/04/18 1053    Specimen:  Blood Updated:  01/04/18 1139     Troponin I 1.550 (C) ng/mL     Troponin [[id number]]  (Abnormal) Collected:  01/04/18 1843    Specimen:  Blood Updated:  01/04/18 1919     Troponin I 1.150 (C) ng/mL     Troponin [[id number]]  (Abnormal) Collected:  01/04/18 2140    Specimen:  Blood Updated:  01/04/18 2215     Troponin I 1.090 (C) ng/mL     Basic Metabolic Panel [[id number]]  (Abnormal) Collected:  01/05/18 0053    Specimen:  Blood Updated:  01/05/18 0208     Glucose 94 mg/dL      BUN 8 mg/dL      Creatinine 0.69 mg/dL      Sodium 135 mmol/L      Potassium 3.6 mmol/L      Chloride 97 (L) mmol/L      CO2 30.0 mmol/L      Calcium 9.1 mg/dL      eGFR Non African Amer 118 mL/min/1.73      BUN/Creatinine Ratio 11.6     Anion Gap 8.0 mmol/L     Narrative:       GFR Normal >60  Chronic Kidney Disease <60  Kidney Failure <15    Troponin [[id number]]  (Abnormal) Collected:  01/05/18 0053    Specimen:  Blood Updated:  01/05/18 0220     Troponin I 1.170 (C) ng/mL     Blood Gas, Arterial [[id number]]  (Abnormal) Collected:  01/05/18 1123    Specimen:  Arterial Blood Updated:  01/05/18 1128     Site Right Radial     Glenroy's Test Positive     pH, Arterial 7.422 pH units      pCO2, Arterial 40.0 mm Hg      pO2, Arterial 68.9 (L) mm Hg      HCO3, Arterial 26.1 (H) mmol/L      Base Excess, Arterial 1.5 mmol/L      O2 Saturation, Arterial 94.1 %      Temperature 37.0 C      Barometric Pressure  for Blood Gas 762 mmHg      Modality Room Air     Ventilator Mode NA     Collected by [id number]    Influenza Antigen, Rapid - Swab, Nasopharynx [[id number]]  (Abnormal) Collected:  01/05/18 1050    Specimen:  Swab from Nasopharynx Updated:  01/05/18 1146     Influenza A Ag, EIA Positive (A)     Influenza B Ag, EIA Negative    Narrative:         Recommend confirmation of negative results by viral culture or molecular assay.              Echo EF Estimated  Lab Results   Component Value Date    ECHOEFEST 70 12/13/2017         Cath Ejection Fraction Quantitative  No results found for: CATHEF        Medication Review: yes  Current Facility-Administered Medications   Medication Dose Route Frequency Provider Last Rate Last Dose   • albuterol (PROVENTIL) nebulizer solution 0.083% 2.5 mg/3mL  2.5 mg Nebulization Q4H PRN Alfonso Yi MD   2.5 mg at 01/07/18 1025   • aspirin chewable tablet 81 mg  81 mg Oral Daily Ashtyn Cooley APRN   81 mg at 01/07/18 0833   • atorvastatin (LIPITOR) tablet 80 mg  80 mg Oral Nightly Ashtyn Cooley APRN   80 mg at 01/06/18 2050   • budesonide-formoterol (SYMBICORT) 160-4.5 MCG/ACT inhaler 2 puff  2 puff Inhalation BID - RT KIRBY Caputo   2 puff at 01/07/18 1025   • citalopram (CeleXA) tablet 20 mg  20 mg Oral Daily Ashtyn Cooley APRN   20 mg at 01/07/18 0833   • furosemide (LASIX) tablet 40 mg  40 mg Oral Daily Ashtyn Cooley APRN   40 mg at 01/07/18 0833   • HYDROcodone-acetaminophen (NORCO) 7.5-325 MG per tablet 1 tablet  1 tablet Oral Q6H PRN Claribel Plascencia, APRN   1 tablet at 01/07/18 1447   • oseltamivir (TAMIFLU) capsule 75 mg  75 mg Oral Q12H Ashtyn Cooley APRN   75 mg at 01/07/18 0832   • potassium chloride (MICRO-K) CR capsule 20 mEq  20 mEq Oral BID Ashtyn Cooley APRN   20 mEq at 01/07/18 0833   • sodium chloride (OCEAN) nasal spray 2 spray  2 spray Each Nare PRN Aime Francois MD   2 spray at 01/04/18 6774   • sodium chloride 0.9 % flush 1-10 mL  1-10 mL Intravenous PRN  Ashtyn Cooley, APRN             Assessment/Plan      NSTEMI   Coronary artery disease  Severe aortic stenosis  Carotid artery disease   Flu A  COPD   Tobacco abuse  Chronic back pain  Hypertension  Hyperlipidemia    Plan-  CABG/AVR, carotid surgery per Dr. Robin, Dr. Salgado in future - complicated case- timing of surgeries to be determined  Tamiflu  Continue ASA, statin, lasix, K  Lisinopril has been on hold for hypotension     Claribel Plascencia, APRN  01/07/18  5:12 PM

## 2018-01-07 NOTE — PROGRESS NOTES
"No complaints.  No events.  (+) flu    Visit Vitals   • /70 (BP Location: Right arm, Patient Position: Sitting)   • Pulse 73   • Temp 97.6 °F (36.4 °C) (Oral)   • Resp 18   • Ht 167.6 cm (66\")   • Wt 56.1 kg (123 lb 9.6 oz)   • SpO2 96%   • BMI 19.95 kg/m2       Intake/Output Summary (Last 24 hours) at 01/06/18 2330  Last data filed at 01/06/18 1750   Gross per 24 hour   Intake              620 ml   Output                0 ml   Net              620 ml     Physical Exam:  General: No apparent distress. Resting in bed.    Cardiovascular: Regular rate and rhythm. Systolic murmur III/VI.     Pulmonary: Decreased breath sounds throughout. No wheezing, rubs, or rales. No respiratory distress  Abdomen: Soft, non-distended, and non-tender.  Extremities: Warm, moves all extremities. No edema.   Neurologic: Grossly intact with no focal deficits.       Impression:  Severe aortic stenosis, bicuspid aortic valve  NSTEMI  Coronary artery disease  Stenosis of right carotid artery  Chronic obstructive pulmonary disease  Chronic back pain  Chronic tobacco use  Acute kidney injury-improved  Hypertension  Hyperlipidemia  Flu    Plan:    Complex case.  Case discussed at length with Dr. Salgado and Dr. Blake.  Probably best solution is awake carotid followed by CABG/SAVR.  Anticipate proceeding forward once he has recovered from the flu.  Given his presentation and complex cardiac disease, he should have his AS, CAD, and CVOD addressed during this hospital stay.  Timing to be determined if he remains clinically stable.  No new cardiac symptoms at this time.    Discussed with patient.  Agreeable to plan of care.    "

## 2018-01-08 PROCEDURE — 99232 SBSQ HOSP IP/OBS MODERATE 35: CPT | Performed by: INTERNAL MEDICINE

## 2018-01-08 PROCEDURE — 94799 UNLISTED PULMONARY SVC/PX: CPT

## 2018-01-08 PROCEDURE — 94760 N-INVAS EAR/PLS OXIMETRY 1: CPT

## 2018-01-08 PROCEDURE — 99232 SBSQ HOSP IP/OBS MODERATE 35: CPT | Performed by: NURSE PRACTITIONER

## 2018-01-08 RX ADMIN — HYDROCODONE BITARTRATE AND ACETAMINOPHEN 1 TABLET: 7.5; 325 TABLET ORAL at 08:32

## 2018-01-08 RX ADMIN — POTASSIUM CHLORIDE 20 MEQ: 750 CAPSULE, EXTENDED RELEASE ORAL at 20:11

## 2018-01-08 RX ADMIN — HYDROCODONE BITARTRATE AND ACETAMINOPHEN 1 TABLET: 7.5; 325 TABLET ORAL at 21:09

## 2018-01-08 RX ADMIN — FUROSEMIDE 40 MG: 40 TABLET ORAL at 08:32

## 2018-01-08 RX ADMIN — POTASSIUM CHLORIDE 20 MEQ: 750 CAPSULE, EXTENDED RELEASE ORAL at 08:32

## 2018-01-08 RX ADMIN — OSELTAMIVIR PHOSPHATE 75 MG: 75 CAPSULE ORAL at 08:32

## 2018-01-08 RX ADMIN — HYDROCODONE BITARTRATE AND ACETAMINOPHEN 1 TABLET: 7.5; 325 TABLET ORAL at 14:46

## 2018-01-08 RX ADMIN — ASPIRIN 81 MG: 81 TABLET, CHEWABLE ORAL at 08:32

## 2018-01-08 RX ADMIN — CITALOPRAM 20 MG: 20 TABLET, FILM COATED ORAL at 08:32

## 2018-01-08 RX ADMIN — BUDESONIDE AND FORMOTEROL FUMARATE DIHYDRATE 2 PUFF: 160; 4.5 AEROSOL RESPIRATORY (INHALATION) at 19:57

## 2018-01-08 RX ADMIN — BUDESONIDE AND FORMOTEROL FUMARATE DIHYDRATE 2 PUFF: 160; 4.5 AEROSOL RESPIRATORY (INHALATION) at 07:51

## 2018-01-08 RX ADMIN — DESMOPRESSIN ACETATE 80 MG: 0.2 TABLET ORAL at 20:12

## 2018-01-08 RX ADMIN — OSELTAMIVIR PHOSPHATE 75 MG: 75 CAPSULE ORAL at 20:12

## 2018-01-08 NOTE — PROGRESS NOTES
[facility] -  Progress Note     LOS: 4 days   Patient Care Team:  Sharad KLEIN MD as PCP - General (Family Medicine)    Chief Complaint: feeling better    Reason for consultation: CAD    Subjective     Interval History:   Pt reports he's feeling better today. Denies any fever, chills, chest pain, dyspnea or similar.         Review of Systems:   Review of Systems   Constitution: Negative for malaise/fatigue and weight gain.   Cardiovascular: Negative for chest pain, dyspnea on exertion, irregular heartbeat, leg swelling, near-syncope, orthopnea, palpitations, paroxysmal nocturnal dyspnea and syncope.   Respiratory: Negative for hemoptysis and shortness of breath.    Hematologic/Lymphatic: Negative for bleeding problem.   Gastrointestinal: Negative for melena, nausea and vomiting.   Genitourinary: Negative for hematuria.   Neurological: Negative for focal weakness and light-headedness.   All other systems reviewed and are negative.       Objective     Vital Sign Min/Max for last 24 hours  Temp  Min: 96.8 °F (36 °C)  Max: 98.1 °F (36.7 °C)   BP  Min: 98/54  Max: 100/60   Pulse  Min: 65  Max: 84   Resp  Min: 16  Max: 18   SpO2  Min: 92 %  Max: 99 %   No Data Recorded   Weight  Min: 56.6 kg (124 lb 12.8 oz)  Max: 56.6 kg (124 lb 12.8 oz)     Last Weight    01/07/18 2034   Weight: 56.6 kg (124 lb 12.8 oz)         Intake/Output Summary (Last 24 hours) at 01/08/18 1238  Last data filed at 01/08/18 1008   Gross per 24 hour   Intake              720 ml   Output                0 ml   Net              720 ml         Physical Exam:  Physical Exam   Constitutional: He is oriented to person, place, and time. He appears well-developed and well-nourished.   HENT:   Head: Normocephalic and atraumatic.   Eyes: Conjunctivae and EOM are normal. Pupils are equal, round, and reactive to light.   Neck: Normal range of motion. Neck supple. No JVD present.   Cardiovascular: Normal rate, regular rhythm, S1 normal, S2  normal, intact distal pulses and normal pulses.    Murmur heard.   Systolic murmur is present with a grade of 3/6  at the upper right sternal border  Pulmonary/Chest: Effort normal and breath sounds normal. No respiratory distress.   Abdominal: Soft. Bowel sounds are normal. He exhibits no distension.   Musculoskeletal: He exhibits no edema.   Neurological: He is alert and oriented to person, place, and time.   Skin: Skin is warm and dry.   Psychiatric: He has a normal mood and affect. Judgment normal.   Vitals reviewed.       Results Review:   Lab Results (last 72 hours)     Procedure Component Value Units Date/Time    Basic Metabolic Panel [[id number]]  (Abnormal) Collected:  01/07/18 0733    Specimen:  Blood Updated:  01/07/18 0802     Glucose 102 (H) mg/dL      BUN 7 mg/dL      Creatinine 0.56 mg/dL      Sodium 138 mmol/L      Potassium 4.1 mmol/L      Chloride 101 mmol/L      CO2 30.0 mmol/L      Calcium 9.4 mg/dL      eGFR Non African Amer 150 mL/min/1.73      BUN/Creatinine Ratio 12.5     Anion Gap 7.0 mmol/L     Narrative:       GFR Normal >60  Chronic Kidney Disease <60  Kidney Failure <15              Echo EF Estimated  Lab Results   Component Value Date    ECHOEFEST 70 12/13/2017         Cath Ejection Fraction Quantitative  No results found for: CATHEF        Medication Review: yes  Current Facility-Administered Medications   Medication Dose Route Frequency Provider Last Rate Last Dose   • albuterol (PROVENTIL) nebulizer solution 0.083% 2.5 mg/3mL  2.5 mg Nebulization Q4H PRN Alfonso Yi MD   2.5 mg at 01/07/18 2034   • aspirin chewable tablet 81 mg  81 mg Oral Daily KIRBY Caputo   81 mg at 01/08/18 0832   • atorvastatin (LIPITOR) tablet 80 mg  80 mg Oral Nightly KIRBY Caputo   80 mg at 01/07/18 2059   • budesonide-formoterol (SYMBICORT) 160-4.5 MCG/ACT inhaler 2 puff  2 puff Inhalation BID - RT KIRBY Caputo   2 puff at 01/08/18 0751   • citalopram (CeleXA) tablet 20 mg  20 mg  Oral Daily Ashtyn Cooley, APRN   20 mg at 01/08/18 0832   • furosemide (LASIX) tablet 40 mg  40 mg Oral Daily Ashtyn Cooley, APRN   40 mg at 01/08/18 0832   • HYDROcodone-acetaminophen (NORCO) 7.5-325 MG per tablet 1 tablet  1 tablet Oral Q6H PRN Claribel Plascencia, APRN   1 tablet at 01/08/18 0832   • oseltamivir (TAMIFLU) capsule 75 mg  75 mg Oral Q12H Ashtyn Cooley, APRN   75 mg at 01/08/18 0832   • potassium chloride (MICRO-K) CR capsule 20 mEq  20 mEq Oral BID Ashtyn Cooley, APRN   20 mEq at 01/08/18 0832   • sodium chloride (OCEAN) nasal spray 2 spray  2 spray Each Nare PRN Aime Francois MD   2 spray at 01/04/18 9362   • sodium chloride 0.9 % flush 1-10 mL  1-10 mL Intravenous PRN Ashtyn Cooley, APRN             Assessment/Plan   1. Coronary artery disease  2. Severe aortic stenosis  3. Carotid artery disease  4. Flu A  5. Tobacco abuse  6. Hypotension   7. Hyperlipidemia   8. Chronic obstructive pulmonary disease      Plan   1. Defer timing of carotid endarterectomy, AVR/CABG to vascular, CTS, respectively.   2. Continue tamiflu (last dose tomorrow), aspirin, statin, lasix and potassium  3. Lisinopril held due to hypotension     Erna Morel PA-C  01/08/18  12:38 PM

## 2018-01-08 NOTE — PLAN OF CARE
Problem: Fall Risk (Adult)  Goal: Absence of Falls  Outcome: Outcome(s) achieved Date Met: 01/08/18      Problem: Patient Care Overview (Adult)  Goal: Plan of Care Review  Outcome: Ongoing (interventions implemented as appropriate)   01/08/18 0259   Coping/Psychosocial Response Interventions   Plan Of Care Reviewed With patient   Patient Care Overview   Progress improving   Outcome Evaluation   Outcome Summary/Follow up Plan up ad linda. tamilflu given. no c/o CP this shift. c/o chronic back pain.     Goal: Adult Individualization and Mutuality  Outcome: Ongoing (interventions implemented as appropriate)    Goal: Discharge Needs Assessment  Outcome: Ongoing (interventions implemented as appropriate)      Problem: Acute Coronary Syndrome (ACS) (Adult)  Goal: Signs and Symptoms of Listed Potential Problems Will be Absent or Manageable (Acute Coronary Syndrome)  Outcome: Ongoing (interventions implemented as appropriate)      Problem: Pain, Chronic (Adult)  Goal: Identify Related Risk Factors and Signs and Symptoms  Outcome: Ongoing (interventions implemented as appropriate)    Goal: Acceptable Pain Control/Comfort Level  Outcome: Ongoing (interventions implemented as appropriate)

## 2018-01-08 NOTE — PLAN OF CARE
Problem: Patient Care Overview (Adult)  Goal: Plan of Care Review  Outcome: Ongoing (interventions implemented as appropriate)   01/08/18 1201   Coping/Psychosocial Response Interventions   Plan Of Care Reviewed With patient   Patient Care Overview   Progress improving   Pt receiving Tamiflu. Pain pill given once for back pain. Denies any chest pain. Up ab linda. IID.   Goal: Adult Individualization and Mutuality  Outcome: Ongoing (interventions implemented as appropriate)    Goal: Discharge Needs Assessment  Outcome: Ongoing (interventions implemented as appropriate)   01/08/18 1201   Current Health   Anticipated Changes Related to Illness none   Discharge Needs Assessment   Discharge Disposition still a patient       Problem: Acute Coronary Syndrome (ACS) (Adult)  Goal: Signs and Symptoms of Listed Potential Problems Will be Absent or Manageable (Acute Coronary Syndrome)  Outcome: Ongoing (interventions implemented as appropriate)   01/08/18 1201   Acute Coronary Syndrome (ACS)   Problems Assessed (Acute Coronary Syndrome (ACS)) all   Problems Present (Acute Coronary Syndrome (ACS)) none       Problem: Pain, Chronic (Adult)  Goal: Identify Related Risk Factors and Signs and Symptoms  Outcome: Ongoing (interventions implemented as appropriate)   01/08/18 1201   Pain, Chronic   Related Risk Factors (Chronic Pain) disease process   Signs and Symptoms (Chronic Pain) verbalization of pain descriptors     Goal: Acceptable Pain Control/Comfort Level  Outcome: Ongoing (interventions implemented as appropriate)   01/08/18 1201   Pain, Chronic (Adult)   Acceptable Pain Control/Comfort Level making progress toward outcome

## 2018-01-08 NOTE — PROGRESS NOTES
LOS: 4 days   Patient Care Team:  Sharad KLEIN MD as PCP - General (Family Medicine)    Chief Complaint:  Severe asymptomatic carotid occlusive disease    Subjective     The patient was seen/examined at the bedside.  No issues overnight.    Objective     Vital Signs  Temp:  [96.8 °F (36 °C)-98.1 °F (36.7 °C)] 96.8 °F (36 °C)  Heart Rate:  [65-84] 66  Resp:  [16-18] 18  BP: ()/(50-60) 98/54    Physical Exam  Constitutional: He is oriented to person, place, and time. He appears well-developed and well-nourished.   HENT:   Head: Normocephalic and atraumatic.   Eyes: Pupils are equal, round, and reactive to light. No scleral icterus.   Neck: Neck supple. No JVD present. Carotid bruit is not present. No thyromegaly present.   Cardiovascular: Normal rate and regular rhythm.    Murmur heard.   Systolic murmur is present with a grade of 3/6   Pulses:       Carotid pulses are 2+ on the right side, and 2+ on the left side.       Femoral pulses are 2+ on the right side, and 2+ on the left side.       Popliteal pulses are 2+ on the right side, and 2+ on the left side.        Dorsalis pedis pulses are 2+ on the right side, and 2+ on the left side.        Posterior tibial pulses are 2+ on the right side, and 2+ on the left side.   Pulmonary/Chest: Effort normal and breath sounds normal.   Abdominal: Soft. Bowel sounds are normal. He exhibits no distension, no abdominal bruit and no mass. There is no hepatosplenomegaly. There is no tenderness.   Musculoskeletal: Normal range of motion. He exhibits no edema.   Lymphadenopathy:     He has no cervical adenopathy.   Neurological: He is alert and oriented to person, place, and time. He has normal strength. No cranial nerve deficit or sensory deficit.   Skin: Skin is warm, dry and intact.   Psychiatric: He has a normal mood and affect.   Nursing note and vitals reviewed.  Laboratory Data:     Results from last 7 days  Lab Units 01/04/18  0047   WBC 10*3/mm3 7.94   HEMOGLOBIN  g/dL 12.0*   HEMATOCRIT % 34.8*   PLATELETS 10*3/mm3 323         Results from last 7 days  Lab Units 01/07/18  0733 01/05/18  0053 01/04/18  0047   SODIUM mmol/L 138 135 136   POTASSIUM mmol/L 4.1 3.6 4.0   CHLORIDE mmol/L 101 97* 97*   CO2 mmol/L 30.0 30.0 26.0   BUN mg/dL 7 8 14   CREATININE mg/dL 0.56 0.69 1.43*   CALCIUM mg/dL 9.4 9.1 9.6   BILIRUBIN mg/dL  --   --  0.2   ALK PHOS U/L  --   --  72   ALT (SGPT) U/L  --   --  38   AST (SGOT) U/L  --   --  29   GLUCOSE mg/dL 102* 94 112*               Assessment/Plan     Principal Problem:    Chest pain  Active Problems:    Shortness of breath    Severe aortic valve stenosis    Tobacco use    Hyperlipidemia    Hypertension    NSTEMI (non-ST elevated myocardial infarction)    Coronary artery disease involving native coronary artery of native heart with angina pectoris    Stenosis of right carotid artery    COPD (chronic obstructive pulmonary disease)    LAZ (acute kidney injury)    Flu      Plan:  1.  Patient will need right carotid endarterectomy prior to AVR/CABG.  The carotid will be performed under local/cervical block.  This will likely take place later toward the end of the week, maybe even next week.  This case was discussed with Dr. Robin and Dr. Blake.  2.  Continue aspirin/statin.        Lianet Rascon, APRN  01/08/18  4:39 PM

## 2018-01-09 PROCEDURE — 94799 UNLISTED PULMONARY SVC/PX: CPT

## 2018-01-09 PROCEDURE — 94760 N-INVAS EAR/PLS OXIMETRY 1: CPT

## 2018-01-09 PROCEDURE — 99231 SBSQ HOSP IP/OBS SF/LOW 25: CPT | Performed by: NURSE PRACTITIONER

## 2018-01-09 PROCEDURE — 99232 SBSQ HOSP IP/OBS MODERATE 35: CPT | Performed by: INTERNAL MEDICINE

## 2018-01-09 RX ADMIN — HYDROCODONE BITARTRATE AND ACETAMINOPHEN 1 TABLET: 7.5; 325 TABLET ORAL at 15:03

## 2018-01-09 RX ADMIN — CITALOPRAM 20 MG: 20 TABLET, FILM COATED ORAL at 08:36

## 2018-01-09 RX ADMIN — POTASSIUM CHLORIDE 20 MEQ: 750 CAPSULE, EXTENDED RELEASE ORAL at 20:32

## 2018-01-09 RX ADMIN — HYDROCODONE BITARTRATE AND ACETAMINOPHEN 1 TABLET: 7.5; 325 TABLET ORAL at 21:13

## 2018-01-09 RX ADMIN — FUROSEMIDE 40 MG: 40 TABLET ORAL at 08:36

## 2018-01-09 RX ADMIN — POTASSIUM CHLORIDE 20 MEQ: 750 CAPSULE, EXTENDED RELEASE ORAL at 08:36

## 2018-01-09 RX ADMIN — OSELTAMIVIR PHOSPHATE 75 MG: 75 CAPSULE ORAL at 08:36

## 2018-01-09 RX ADMIN — HYDROCODONE BITARTRATE AND ACETAMINOPHEN 1 TABLET: 7.5; 325 TABLET ORAL at 08:36

## 2018-01-09 RX ADMIN — ASPIRIN 81 MG: 81 TABLET, CHEWABLE ORAL at 08:36

## 2018-01-09 RX ADMIN — BUDESONIDE AND FORMOTEROL FUMARATE DIHYDRATE 2 PUFF: 160; 4.5 AEROSOL RESPIRATORY (INHALATION) at 07:43

## 2018-01-09 RX ADMIN — OSELTAMIVIR PHOSPHATE 75 MG: 75 CAPSULE ORAL at 20:32

## 2018-01-09 RX ADMIN — DESMOPRESSIN ACETATE 80 MG: 0.2 TABLET ORAL at 20:32

## 2018-01-09 NOTE — PROGRESS NOTES
[facility] -  Progress Note     LOS: 5 days   Patient Care Team:  Sharad KLEIN MD as PCP - General (Family Medicine)    Chief Complaint: cough    Reason for consultation: NSTEMI    Subjective     Interval History:   Pt reports he continues with improvement today. Does report some nonproductive cough still, but otherwise feels well.       Review of Systems:   Review of Systems   Constitution: Negative for malaise/fatigue and weight gain.   Cardiovascular: Negative for chest pain, claudication, dyspnea on exertion, irregular heartbeat, leg swelling, near-syncope, orthopnea, palpitations, paroxysmal nocturnal dyspnea and syncope.   Respiratory: Positive for cough (nonproductive). Negative for hemoptysis and shortness of breath.    Hematologic/Lymphatic: Negative for bleeding problem.   Musculoskeletal: Negative for myalgias.   Gastrointestinal: Negative for melena, nausea and vomiting.   Genitourinary: Negative for hematuria.   Neurological: Negative for focal weakness and light-headedness.   All other systems reviewed and are negative.       Objective     Vital Sign Min/Max for last 24 hours  Temp  Min: 96.8 °F (36 °C)  Max: 99 °F (37.2 °C)   BP  Min: 98/54  Max: 130/84   Pulse  Min: 66  Max: 75   Resp  Min: 18  Max: 18   SpO2  Min: 95 %  Max: 100 %   No Data Recorded   Weight  Min: 56.7 kg (125 lb)  Max: 56.7 kg (125 lb)     Last Weight    01/08/18  2102   Weight: 56.7 kg (125 lb)         Intake/Output Summary (Last 24 hours) at 01/09/18 1126  Last data filed at 01/09/18 0908   Gross per 24 hour   Intake             1160 ml   Output                0 ml   Net             1160 ml         Physical Exam:  Physical Exam   Constitutional: He is oriented to person, place, and time. He appears well-developed and well-nourished.   HENT:   Head: Normocephalic and atraumatic.   Eyes: Conjunctivae and EOM are normal. Pupils are equal, round, and reactive to light.   Neck: Normal range of motion. Neck  supple. No JVD present.   Cardiovascular: Normal rate, regular rhythm, S1 normal, S2 normal, intact distal pulses and normal pulses.    Murmur heard.   Systolic murmur is present with a grade of 3/6  at the upper right sternal border  Pulmonary/Chest: Effort normal and breath sounds normal. No respiratory distress.   Abdominal: Soft. Bowel sounds are normal. He exhibits no distension.   Musculoskeletal: He exhibits no edema.   Neurological: He is alert and oriented to person, place, and time.   Skin: Skin is warm and dry.   Psychiatric: He has a normal mood and affect. Judgment normal.   Vitals reviewed.       Results Review:   Lab Results (last 72 hours)     Procedure Component Value Units Date/Time    Basic Metabolic Panel [[id number]]  (Abnormal) Collected:  01/07/18 0733    Specimen:  Blood Updated:  01/07/18 0802     Glucose 102 (H) mg/dL      BUN 7 mg/dL      Creatinine 0.56 mg/dL      Sodium 138 mmol/L      Potassium 4.1 mmol/L      Chloride 101 mmol/L      CO2 30.0 mmol/L      Calcium 9.4 mg/dL      eGFR Non African Amer 150 mL/min/1.73      BUN/Creatinine Ratio 12.5     Anion Gap 7.0 mmol/L     Narrative:       GFR Normal >60  Chronic Kidney Disease <60  Kidney Failure <15              Echo EF Estimated  Lab Results   Component Value Date    ECHOEFEST 70 12/13/2017         Cath Ejection Fraction Quantitative  No results found for: CATHEF        Medication Review: yes  Current Facility-Administered Medications   Medication Dose Route Frequency Provider Last Rate Last Dose   • albuterol (PROVENTIL) nebulizer solution 0.083% 2.5 mg/3mL  2.5 mg Nebulization Q4H PRN Alfonso Yi MD   2.5 mg at 01/07/18 2034   • aspirin chewable tablet 81 mg  81 mg Oral Daily KIRBY Caputo   81 mg at 01/09/18 0836   • atorvastatin (LIPITOR) tablet 80 mg  80 mg Oral Nightly KIRBY Caputo   80 mg at 01/08/18 2012   • budesonide-formoterol (SYMBICORT) 160-4.5 MCG/ACT inhaler 2 puff  2 puff Inhalation BID - RT Ashtyn  KIRBY Ferguson   2 puff at 01/09/18 0743   • citalopram (CeleXA) tablet 20 mg  20 mg Oral Daily Ashtyn Cooley APRN   20 mg at 01/09/18 0836   • furosemide (LASIX) tablet 40 mg  40 mg Oral Daily Ashtyn Cooley, APRN   40 mg at 01/09/18 0836   • HYDROcodone-acetaminophen (NORCO) 7.5-325 MG per tablet 1 tablet  1 tablet Oral Q6H PRN KIRBY Bowie   1 tablet at 01/09/18 0836   • oseltamivir (TAMIFLU) capsule 75 mg  75 mg Oral Q12H Ashtyn Cooley APRN   75 mg at 01/09/18 0836   • potassium chloride (MICRO-K) CR capsule 20 mEq  20 mEq Oral BID Ashtyn Cooley APRN   20 mEq at 01/09/18 0836   • sodium chloride (OCEAN) nasal spray 2 spray  2 spray Each Nare PRN Aime Francois MD   2 spray at 01/04/18 9062   • sodium chloride 0.9 % flush 1-10 mL  1-10 mL Intravenous PRN Ashtyn Cooley, KIRBY             Assessment/Plan   1. Coronary artery disease  2. Severe aortic stenosis  3. Carotid artery disease  4. Flu A  5. Tobacco abuse  6. Hypotension   7. Hyperlipidemia   8. Chronic obstructive pulmonary disease    Plan   1. For carotid endarterectomy on Friday or Monday. CTS to follow.  2. Continue present therapy otherwise. Tamiflu course near completion.   3. Monitor telemetry     Erna Morel PA-C  01/09/18  11:26 AM

## 2018-01-09 NOTE — PLAN OF CARE
Problem: Patient Care Overview (Adult)  Goal: Plan of Care Review  Outcome: Ongoing (interventions implemented as appropriate)   01/09/18 0346   Coping/Psychosocial Response Interventions   Plan Of Care Reviewed With patient   Patient Care Overview   Progress improving   Outcome Evaluation   Outcome Summary/Follow up Plan Medicated for c/o chronic pain. Slept after pain med given. Tamiflu cont. Plan RCA with Dr. Salgado then AVR/CABG with Dr. Robin to follow. S [id number]       Problem: Acute Coronary Syndrome (ACS) (Adult)  Goal: Signs and Symptoms of Listed Potential Problems Will be Absent or Manageable (Acute Coronary Syndrome)  Outcome: Ongoing (interventions implemented as appropriate)   01/09/18 0346   Acute Coronary Syndrome (ACS)   Problems Assessed (Acute Coronary Syndrome (ACS)) all   Problems Present (Acute Coronary Syndrome (ACS)) none       Problem: Pain, Chronic (Adult)  Goal: Identify Related Risk Factors and Signs and Symptoms  Outcome: Ongoing (interventions implemented as appropriate)   01/09/18 0346   Pain, Chronic   Related Risk Factors (Chronic Pain) disease process   Signs and Symptoms (Chronic Pain) verbalization of pain descriptors

## 2018-01-10 LAB
ANION GAP SERPL CALCULATED.3IONS-SCNC: 10 MMOL/L (ref 4–13)
BUN BLD-MCNC: 9 MG/DL (ref 5–21)
BUN/CREAT SERPL: 13.2 (ref 7–25)
CALCIUM SPEC-SCNC: 9.3 MG/DL (ref 8.4–10.4)
CHLORIDE SERPL-SCNC: 100 MMOL/L (ref 98–110)
CO2 SERPL-SCNC: 30 MMOL/L (ref 24–31)
CREAT BLD-MCNC: 0.68 MG/DL (ref 0.5–1.4)
DEPRECATED RDW RBC AUTO: 41.3 FL (ref 40–54)
ERYTHROCYTE [DISTWIDTH] IN BLOOD BY AUTOMATED COUNT: 12.5 % (ref 12–15)
GFR SERPL CREATININE-BSD FRML MDRD: 120 ML/MIN/1.73
GLUCOSE BLD-MCNC: 94 MG/DL (ref 70–100)
HCT VFR BLD AUTO: 37.4 % (ref 40–52)
HGB BLD-MCNC: 12.4 G/DL (ref 14–18)
MCH RBC QN AUTO: 29.7 PG (ref 28–32)
MCHC RBC AUTO-ENTMCNC: 33.2 G/DL (ref 33–36)
MCV RBC AUTO: 89.5 FL (ref 82–95)
PLATELET # BLD AUTO: 319 10*3/MM3 (ref 130–400)
PMV BLD AUTO: 9.6 FL (ref 6–12)
POTASSIUM BLD-SCNC: 4.5 MMOL/L (ref 3.5–5.3)
RBC # BLD AUTO: 4.18 10*6/MM3 (ref 4.8–5.9)
SODIUM BLD-SCNC: 140 MMOL/L (ref 135–145)
WBC NRBC COR # BLD: 6.59 10*3/MM3 (ref 4.8–10.8)

## 2018-01-10 PROCEDURE — 85027 COMPLETE CBC AUTOMATED: CPT | Performed by: PHYSICIAN ASSISTANT

## 2018-01-10 PROCEDURE — 99231 SBSQ HOSP IP/OBS SF/LOW 25: CPT | Performed by: NURSE PRACTITIONER

## 2018-01-10 PROCEDURE — 99231 SBSQ HOSP IP/OBS SF/LOW 25: CPT | Performed by: INTERNAL MEDICINE

## 2018-01-10 PROCEDURE — 80048 BASIC METABOLIC PNL TOTAL CA: CPT | Performed by: PHYSICIAN ASSISTANT

## 2018-01-10 PROCEDURE — 94799 UNLISTED PULMONARY SVC/PX: CPT

## 2018-01-10 RX ADMIN — POTASSIUM CHLORIDE 20 MEQ: 750 CAPSULE, EXTENDED RELEASE ORAL at 10:48

## 2018-01-10 RX ADMIN — BUDESONIDE AND FORMOTEROL FUMARATE DIHYDRATE 2 PUFF: 160; 4.5 AEROSOL RESPIRATORY (INHALATION) at 07:41

## 2018-01-10 RX ADMIN — POTASSIUM CHLORIDE 20 MEQ: 750 CAPSULE, EXTENDED RELEASE ORAL at 22:10

## 2018-01-10 RX ADMIN — HYDROCODONE BITARTRATE AND ACETAMINOPHEN 1 TABLET: 7.5; 325 TABLET ORAL at 10:52

## 2018-01-10 RX ADMIN — DESMOPRESSIN ACETATE 80 MG: 0.2 TABLET ORAL at 22:10

## 2018-01-10 RX ADMIN — HYDROCODONE BITARTRATE AND ACETAMINOPHEN 1 TABLET: 7.5; 325 TABLET ORAL at 18:35

## 2018-01-10 RX ADMIN — FUROSEMIDE 40 MG: 40 TABLET ORAL at 10:48

## 2018-01-10 RX ADMIN — ASPIRIN 81 MG: 81 TABLET, CHEWABLE ORAL at 10:48

## 2018-01-10 RX ADMIN — CITALOPRAM 20 MG: 20 TABLET, FILM COATED ORAL at 10:48

## 2018-01-10 RX ADMIN — BUDESONIDE AND FORMOTEROL FUMARATE DIHYDRATE 2 PUFF: 160; 4.5 AEROSOL RESPIRATORY (INHALATION) at 20:00

## 2018-01-10 NOTE — PROGRESS NOTES
LOS: 6 days   Patient Care Team:  Sharad KLEIN MD as PCP - General (Family Medicine)    Chief Complaint:  Severe asymptomatic carotid occlusive disease    Subjective     The patient was seen/examined at the bedside.  No issues overnight.    Objective     Vital Signs  Temp:  [96.3 °F (35.7 °C)-97.8 °F (36.6 °C)] 97.3 °F (36.3 °C)  Heart Rate:  [52-72] 52  Resp:  [18] 18  BP: (108-133)/(60-72) 108/62    Physical Exam  Constitutional: He is oriented to person, place, and time. He appears well-developed and well-nourished.   HENT:   Head: Normocephalic and atraumatic.   Eyes: Pupils are equal, round, and reactive to light. No scleral icterus.   Neck: Neck supple. No JVD present. Carotid bruit is not present. No thyromegaly present.   Cardiovascular: Normal rate and regular rhythm.    Murmur heard.   Systolic murmur is present with a grade of 3/6   Pulses:       Carotid pulses are 2+ on the right side, and 2+ on the left side.       Femoral pulses are 2+ on the right side, and 2+ on the left side.       Popliteal pulses are 2+ on the right side, and 2+ on the left side.        Dorsalis pedis pulses are 2+ on the right side, and 2+ on the left side.        Posterior tibial pulses are 2+ on the right side, and 2+ on the left side.   Pulmonary/Chest: Effort normal and breath sounds normal.   Abdominal: Soft. Bowel sounds are normal. He exhibits no distension, no abdominal bruit and no mass. There is no hepatosplenomegaly. There is no tenderness.   Musculoskeletal: Normal range of motion. He exhibits no edema.   Lymphadenopathy:     He has no cervical adenopathy.   Neurological: He is alert and oriented to person, place, and time. He has normal strength. No cranial nerve deficit or sensory deficit.   Skin: Skin is warm, dry and intact.   Psychiatric: He has a normal mood and affect.   Nursing note and vitals reviewed.  Laboratory Data:     Results from last 7 days  Lab Units 01/10/18  0418 01/04/18  0047   WBC 10*3/mm3  6.59 7.94   HEMOGLOBIN g/dL 12.4* 12.0*   HEMATOCRIT % 37.4* 34.8*   PLATELETS 10*3/mm3 319 323         Results from last 7 days  Lab Units 01/10/18  0418 01/07/18  0733 01/05/18  0053 01/04/18  0047   SODIUM mmol/L 140 138 135 136   POTASSIUM mmol/L 4.5 4.1 3.6 4.0   CHLORIDE mmol/L 100 101 97* 97*   CO2 mmol/L 30.0 30.0 30.0 26.0   BUN mg/dL 9 7 8 14   CREATININE mg/dL 0.68 0.56 0.69 1.43*   CALCIUM mg/dL 9.3 9.4 9.1 9.6   BILIRUBIN mg/dL  --   --   --  0.2   ALK PHOS U/L  --   --   --  72   ALT (SGPT) U/L  --   --   --  38   AST (SGOT) U/L  --   --   --  29   GLUCOSE mg/dL 94 102* 94 112*               Assessment/Plan     Principal Problem:    Chest pain  Active Problems:    Shortness of breath    Severe aortic valve stenosis    Tobacco use    Hyperlipidemia    Hypertension    NSTEMI (non-ST elevated myocardial infarction)    Coronary artery disease involving native coronary artery of native heart with angina pectoris    Stenosis of right carotid artery    COPD (chronic obstructive pulmonary disease)    LAZ (acute kidney injury)    Flu      Plan:  1.  Patient will need right carotid endarterectomy prior to AVR/CABG.  The carotid will be performed under local/cervical block.  This will likely take place later toward the end of the week, maybe even next week.  This case was discussed with Dr. Robin and Dr. Blake.  2.  Continue aspirin/statin.        Lianet Rascon, KIRBY  01/10/18  8:58 AM

## 2018-01-10 NOTE — PROGRESS NOTES
LOS: 6 days   Patient Care Team:  Sharad KLEIN MD as PCP - General (Family Medicine)    Chief Complaint:  Severe asymptomatic carotid occlusive disease    Subjective     The patient was seen/examined at the bedside.  No issues overnight.    Objective     Vital Signs  Temp:  [96.3 °F (35.7 °C)-97.5 °F (36.4 °C)] 97 °F (36.1 °C)  Heart Rate:  [52-71] 62  Resp:  [18] 18  BP: (108-143)/(60-70) 115/60    Physical Exam  Constitutional: He is oriented to person, place, and time. He appears well-developed and well-nourished.   HENT:   Head: Normocephalic and atraumatic.   Eyes: Pupils are equal, round, and reactive to light. No scleral icterus.   Neck: Neck supple. No JVD present. Carotid bruit is not present. No thyromegaly present.   Cardiovascular: Normal rate and regular rhythm.    Murmur heard.   Systolic murmur is present with a grade of 3/6   Pulses:       Carotid pulses are 2+ on the right side, and 2+ on the left side.       Femoral pulses are 2+ on the right side, and 2+ on the left side.       Popliteal pulses are 2+ on the right side, and 2+ on the left side.        Dorsalis pedis pulses are 2+ on the right side, and 2+ on the left side.        Posterior tibial pulses are 2+ on the right side, and 2+ on the left side.   Pulmonary/Chest: Effort normal and breath sounds normal.   Abdominal: Soft. Bowel sounds are normal. He exhibits no distension, no abdominal bruit and no mass. There is no hepatosplenomegaly. There is no tenderness.   Musculoskeletal: Normal range of motion. He exhibits no edema.   Lymphadenopathy:     He has no cervical adenopathy.   Neurological: He is alert and oriented to person, place, and time. He has normal strength. No cranial nerve deficit or sensory deficit.   Skin: Skin is warm, dry and intact.   Psychiatric: He has a normal mood and affect.   Nursing note and vitals reviewed.  Laboratory Data:     Results from last 7 days  Lab Units 01/10/18  0418 01/04/18  0047   WBC 10*3/mm3  6.59 7.94   HEMOGLOBIN g/dL 12.4* 12.0*   HEMATOCRIT % 37.4* 34.8*   PLATELETS 10*3/mm3 319 323         Results from last 7 days  Lab Units 01/10/18  0418 01/07/18  0733 01/05/18  0053 01/04/18  0047   SODIUM mmol/L 140 138 135 136   POTASSIUM mmol/L 4.5 4.1 3.6 4.0   CHLORIDE mmol/L 100 101 97* 97*   CO2 mmol/L 30.0 30.0 30.0 26.0   BUN mg/dL 9 7 8 14   CREATININE mg/dL 0.68 0.56 0.69 1.43*   CALCIUM mg/dL 9.3 9.4 9.1 9.6   BILIRUBIN mg/dL  --   --   --  0.2   ALK PHOS U/L  --   --   --  72   ALT (SGPT) U/L  --   --   --  38   AST (SGOT) U/L  --   --   --  29   GLUCOSE mg/dL 94 102* 94 112*               Assessment/Plan     Principal Problem:    Chest pain  Active Problems:    Shortness of breath    Severe aortic valve stenosis    Tobacco use    Hyperlipidemia    Hypertension    NSTEMI (non-ST elevated myocardial infarction)    Coronary artery disease involving native coronary artery of native heart with angina pectoris    Stenosis of right carotid artery    COPD (chronic obstructive pulmonary disease)    LAZ (acute kidney injury)    Flu      Plan:  1.  Patient will need right carotid endarterectomy prior to AVR/CABG.  The carotid will be performed under local/cervical block.  This will likely take place l next week Monday or Wednesday.  This case was discussed with Dr. Robin and Dr. Blake.  2.  Continue aspirin/statin.        Lianet Rascon, KIRBY  01/10/18  4:11 PM

## 2018-01-10 NOTE — PROGRESS NOTES
[facility] -  Progress Note     LOS: 6 days   Patient Care Team:  Sharad KLEIN MD as PCP - General (Family Medicine)    Chief Complaint: feeling better    Reason for consultation: NSTEMI    Subjective     Interval History:   Pt reports some continued nonproductive cough, but relates this is lessened. He denies any chest pain, dyspnea or similar.         Review of Systems:   Review of Systems   Constitution: Negative for malaise/fatigue and weight gain.   Cardiovascular: Negative for chest pain, dyspnea on exertion, irregular heartbeat, leg swelling, near-syncope, orthopnea, palpitations, paroxysmal nocturnal dyspnea and syncope.   Respiratory: Positive for cough (improving). Negative for hemoptysis and shortness of breath.    Hematologic/Lymphatic: Negative for bleeding problem.   Musculoskeletal: Negative for myalgias.   Gastrointestinal: Negative for melena, nausea and vomiting.   Genitourinary: Negative for hematuria.   Neurological: Negative for focal weakness and light-headedness.   All other systems reviewed and are negative.       Objective     Vital Sign Min/Max for last 24 hours  Temp  Min: 96.3 °F (35.7 °C)  Max: 97.8 °F (36.6 °C)   BP  Min: 108/62  Max: 143/65   Pulse  Min: 52  Max: 71   Resp  Min: 18  Max: 18   SpO2  Min: 93 %  Max: 98 %   No Data Recorded   No Data Recorded     Last Weight    01/08/18  2102   Weight: 56.7 kg (125 lb)         Intake/Output Summary (Last 24 hours) at 01/10/18 1025  Last data filed at 01/10/18 0948   Gross per 24 hour   Intake              720 ml   Output                0 ml   Net              720 ml         Physical Exam:  Physical Exam   Constitutional: He is oriented to person, place, and time. He appears well-developed and well-nourished.   HENT:   Head: Normocephalic and atraumatic.   Eyes: Conjunctivae and EOM are normal. Pupils are equal, round, and reactive to light.   Neck: Normal range of motion. Neck supple. No JVD present.    Cardiovascular: Normal rate, regular rhythm, S1 normal, S2 normal, intact distal pulses and normal pulses.    Murmur heard.   Systolic murmur is present with a grade of 3/6  at the upper right sternal border  Pulmonary/Chest: Effort normal and breath sounds normal. No respiratory distress.   Abdominal: Soft. Bowel sounds are normal. He exhibits no distension.   Musculoskeletal: He exhibits no edema.   Neurological: He is alert and oriented to person, place, and time.   Skin: Skin is warm and dry.   Psychiatric: He has a normal mood and affect. Judgment normal.   Vitals reviewed.       Results Review:   Lab Results (last 72 hours)     Procedure Component Value Units Date/Time    CBC (No Diff) [[id number]]  (Abnormal) Collected:  01/10/18 0418    Specimen:  Blood Updated:  01/10/18 0444     WBC 6.59 10*3/mm3      RBC 4.18 (L) 10*6/mm3      Hemoglobin 12.4 (L) g/dL      Hematocrit 37.4 (L) %      MCV 89.5 fL      MCH 29.7 pg      MCHC 33.2 g/dL      RDW 12.5 %      RDW-SD 41.3 fl      MPV 9.6 fL      Platelets 319 10*3/mm3     Basic Metabolic Panel [[id number]]  (Normal) Collected:  01/10/18 0418    Specimen:  Blood Updated:  01/10/18 0502     Glucose 94 mg/dL      BUN 9 mg/dL      Creatinine 0.68 mg/dL      Sodium 140 mmol/L      Potassium 4.5 mmol/L      Chloride 100 mmol/L      CO2 30.0 mmol/L      Calcium 9.3 mg/dL      eGFR Non African Amer 120 mL/min/1.73      BUN/Creatinine Ratio 13.2     Anion Gap 10.0 mmol/L     Narrative:       GFR Normal >60  Chronic Kidney Disease <60  Kidney Failure <15              Echo EF Estimated  Lab Results   Component Value Date    ECHOEFEST 70 12/13/2017         Cath Ejection Fraction Quantitative  No results found for: CATHEF        Medication Review: yes  Current Facility-Administered Medications   Medication Dose Route Frequency Provider Last Rate Last Dose   • albuterol (PROVENTIL) nebulizer solution 0.083% 2.5 mg/3mL  2.5 mg Nebulization Q4H PRN Alfonso Yi MD   2.5 mg at  01/07/18 2034   • aspirin chewable tablet 81 mg  81 mg Oral Daily Ashtyn Cooley, APRN   81 mg at 01/09/18 0836   • atorvastatin (LIPITOR) tablet 80 mg  80 mg Oral Nightly Ashtyn ZAHRAA Longoriae, APRN   80 mg at 01/09/18 2032   • budesonide-formoterol (SYMBICORT) 160-4.5 MCG/ACT inhaler 2 puff  2 puff Inhalation BID - RT Ashtyn Cooley, APRN   2 puff at 01/10/18 0741   • citalopram (CeleXA) tablet 20 mg  20 mg Oral Daily Ashtyn ZAHRAA Longoriae, APRN   20 mg at 01/09/18 0836   • furosemide (LASIX) tablet 40 mg  40 mg Oral Daily Ashtyn ZAHRAA Longoriae, APRN   40 mg at 01/09/18 0836   • HYDROcodone-acetaminophen (NORCO) 7.5-325 MG per tablet 1 tablet  1 tablet Oral Q6H PRN Claribel Plascencia, APRN   1 tablet at 01/09/18 2113   • potassium chloride (MICRO-K) CR capsule 20 mEq  20 mEq Oral BID Ashtyn Cooley, APRN   20 mEq at 01/09/18 2032   • sodium chloride (OCEAN) nasal spray 2 spray  2 spray Each Nare PRN Aime Francois MD   2 spray at 01/04/18 2342   • sodium chloride 0.9 % flush 1-10 mL  1-10 mL Intravenous PRN Ashtyn Cooley, APRN             Assessment/Plan       1. Coronary artery disease  2. Severe aortic stenosis  3. Carotid artery disease  4. Flu A  5. Tobacco abuse  6. Hypotension   7. Hyperlipidemia   8. Chronic obstructive pulmonary disease    Plan   1. For carotid endarterectomy on Friday or Monday. CTS to follow.   2. Continue present therapy otherwise. Tamiflu course complete  3. Monitor telemetry     Erna Morel PA-C  01/10/18  10:25 AM

## 2018-01-10 NOTE — PLAN OF CARE
Problem: Patient Care Overview (Adult)  Goal: Plan of Care Review  Outcome: Ongoing (interventions implemented as appropriate)   01/10/18 0406   Coping/Psychosocial Response Interventions   Plan Of Care Reviewed With patient   Patient Care Overview   Progress improving   Outcome Evaluation   Outcome Summary/Follow up Plan Medicated for chronic back pain x1. Slept at long intervals, no c/o chest pain. Tamiflu given. Plan RCE with Dr. Salgado then AVR/CABG with Dr. Robin to follow. SB/S 59-63       Problem: Acute Coronary Syndrome (ACS) (Adult)  Goal: Signs and Symptoms of Listed Potential Problems Will be Absent or Manageable (Acute Coronary Syndrome)  Outcome: Ongoing (interventions implemented as appropriate)   01/10/18 0406   Acute Coronary Syndrome (ACS)   Problems Assessed (Acute Coronary Syndrome (ACS)) all   Problems Present (Acute Coronary Syndrome (ACS)) none       Problem: Pain, Chronic (Adult)  Goal: Identify Related Risk Factors and Signs and Symptoms  Outcome: Ongoing (interventions implemented as appropriate)   01/10/18 0406   Pain, Chronic   Related Risk Factors (Chronic Pain) disease process   Signs and Symptoms (Chronic Pain) verbalization of pain descriptors

## 2018-01-10 NOTE — PROGRESS NOTES
"States he is slowly feeling better from the flu. Denies shortness of breath or chest pain. On room air. Dry cough continues.     Visit Vitals   • /65 (BP Location: Right arm, Patient Position: Sitting)   • Pulse 71   • Temp 97.5 °F (36.4 °C) (Oral)   • Resp 18   • Ht 167.6 cm (66\")   • Wt 56.7 kg (125 lb)   • SpO2 94%   • BMI 20.18 kg/m2         Intake/Output Summary (Last 24 hours) at 01/10/18 1125  Last data filed at 01/10/18 0948   Gross per 24 hour   Intake              720 ml   Output                0 ml   Net              720 ml     Labs: Na 140, K 4.5, CO2 30, BUN 9, Creat 0.68, Glucose 94, WBC 6.59, Hgb 12.4, Hct 37.4, Plt 319    Physical Exam:  General: No apparent distress. Resting in bed.    Cardiovascular: Regular rate and rhythm. Systolic murmur III/VI.     Pulmonary: Decreased breath sounds throughout. No wheezing, rubs, or rales. No respiratory distress  Abdomen: Soft, non-distended, and non-tender.  Extremities: Warm, moves all extremities. No edema.   Neurologic: Grossly intact with no focal deficits.       Impression:  Severe aortic stenosis, bicuspid aortic valve  NSTEMI  Coronary artery disease  Stenosis of right carotid artery  Chronic obstructive pulmonary disease  Chronic back pain  Chronic tobacco use  Hypertension  Hyperlipidemia  Flu A     Plan:  Continue supportive measures  For awake CEA by Dr. Salgado later this week, maybe even next week  CABG/SAVR in near future  Discussed with patient-all questions answered.   "

## 2018-01-11 PROCEDURE — 94799 UNLISTED PULMONARY SVC/PX: CPT

## 2018-01-11 PROCEDURE — 99231 SBSQ HOSP IP/OBS SF/LOW 25: CPT | Performed by: INTERNAL MEDICINE

## 2018-01-11 RX ADMIN — POTASSIUM CHLORIDE 20 MEQ: 750 CAPSULE, EXTENDED RELEASE ORAL at 20:44

## 2018-01-11 RX ADMIN — HYDROCODONE BITARTRATE AND ACETAMINOPHEN 1 TABLET: 7.5; 325 TABLET ORAL at 07:25

## 2018-01-11 RX ADMIN — FUROSEMIDE 40 MG: 40 TABLET ORAL at 09:19

## 2018-01-11 RX ADMIN — HYDROCODONE BITARTRATE AND ACETAMINOPHEN 1 TABLET: 7.5; 325 TABLET ORAL at 15:06

## 2018-01-11 RX ADMIN — ASPIRIN 81 MG: 81 TABLET, CHEWABLE ORAL at 09:19

## 2018-01-11 RX ADMIN — CITALOPRAM 20 MG: 20 TABLET, FILM COATED ORAL at 09:19

## 2018-01-11 RX ADMIN — POTASSIUM CHLORIDE 20 MEQ: 750 CAPSULE, EXTENDED RELEASE ORAL at 09:19

## 2018-01-11 RX ADMIN — DESMOPRESSIN ACETATE 80 MG: 0.2 TABLET ORAL at 20:44

## 2018-01-11 RX ADMIN — HYDROCODONE BITARTRATE AND ACETAMINOPHEN 1 TABLET: 7.5; 325 TABLET ORAL at 20:44

## 2018-01-11 RX ADMIN — BUDESONIDE AND FORMOTEROL FUMARATE DIHYDRATE 2 PUFF: 160; 4.5 AEROSOL RESPIRATORY (INHALATION) at 08:02

## 2018-01-11 NOTE — PLAN OF CARE
Problem: Patient Care Overview (Adult)  Goal: Plan of Care Review  Outcome: Ongoing (interventions implemented as appropriate)   01/10/18 0406 01/10/18 1811   Patient Care Overview   Progress improving --    Outcome Evaluation   Outcome Summary/Follow up Plan --  VSS, medicated x2 for chronic back pain. Patient has ambulated in the hallway without distress. Continue with plan for RCE w/ Dr. Salgado and AVR/CABG with Dr. Robin. S 62-76 on tele. Continue to monitor     Goal: Adult Individualization and Mutuality  Outcome: Ongoing (interventions implemented as appropriate)    Goal: Discharge Needs Assessment  Outcome: Ongoing (interventions implemented as appropriate)      Problem: Acute Coronary Syndrome (ACS) (Adult)  Goal: Signs and Symptoms of Listed Potential Problems Will be Absent or Manageable (Acute Coronary Syndrome)  Outcome: Ongoing (interventions implemented as appropriate)      Problem: Pain, Chronic (Adult)  Goal: Identify Related Risk Factors and Signs and Symptoms  Outcome: Ongoing (interventions implemented as appropriate)    Goal: Acceptable Pain Control/Comfort Level  Outcome: Ongoing (interventions implemented as appropriate)

## 2018-01-11 NOTE — PLAN OF CARE
Problem: Patient Care Overview (Adult)  Goal: Plan of Care Review  Outcome: Ongoing (interventions implemented as appropriate)   01/11/18 0417 01/11/18 1621   Coping/Psychosocial Response Interventions   Plan Of Care Reviewed With patient --    Patient Care Overview   Progress improving --    Outcome Evaluation   Outcome Summary/Follow up Plan --  Medicated x 2 for chronic pain. VSS Pt ambulates on unit without any distress. S 66-77 on tele. Possible RCE on Monday, then patient will need to have CABG/AVR per Dr. Robin. Continue to monitor.     Goal: Adult Individualization and Mutuality  Outcome: Ongoing (interventions implemented as appropriate)    Goal: Discharge Needs Assessment  Outcome: Ongoing (interventions implemented as appropriate)      Problem: Acute Coronary Syndrome (ACS) (Adult)  Goal: Signs and Symptoms of Listed Potential Problems Will be Absent or Manageable (Acute Coronary Syndrome)  Outcome: Ongoing (interventions implemented as appropriate)      Problem: Pain, Chronic (Adult)  Goal: Identify Related Risk Factors and Signs and Symptoms  Outcome: Ongoing (interventions implemented as appropriate)    Goal: Acceptable Pain Control/Comfort Level  Outcome: Ongoing (interventions implemented as appropriate)

## 2018-01-11 NOTE — PROGRESS NOTES
"\"feeling better\" he has completed tamiflu course and is no longer in isolation. States he is unsure when carotid surgery will be. Denies chest pain or shortness of breath.      Visit Vitals   • /84 (BP Location: Right arm, Patient Position: Sitting)   • Pulse 65   • Temp 97.6 °F (36.4 °C) (Temporal Artery )   • Resp 18   • Ht 167.6 cm (66\")   • Wt 55.2 kg (121 lb 12.8 oz)   • SpO2 94%   • BMI 19.66 kg/m2         Intake/Output Summary (Last 24 hours) at 01/11/18 1219  Last data filed at 01/11/18 0952   Gross per 24 hour   Intake              840 ml   Output                0 ml   Net              840 ml     Physical Exam:  General: No apparent distress. Resting in bed.    Cardiovascular: Regular rate and rhythm. Systolic murmur III/VI.     Pulmonary: Decreased breath sounds throughout. No wheezing, rubs, or rales. No respiratory distress  Abdomen: Soft, non-distended, and non-tender.  Extremities: Warm, moves all extremities. No edema.   Neurologic: Grossly intact with no focal deficits.       Impression:  Severe aortic stenosis, bicuspid aortic valve  NSTEMI  Coronary artery disease  Stenosis of right carotid artery  Chronic obstructive pulmonary disease  Chronic back pain  Chronic tobacco use  Hypertension  Hyperlipidemia  Flu A-resolved    Plan:  Continue supportive measures  For awake CEA by Dr. Salgado on Monday or Wednesday according to notes   CABG/SAVR 1-2 days after CEA  Discussed with patient-all questions answered.   "

## 2018-01-11 NOTE — PLAN OF CARE
Problem: Patient Care Overview (Adult)  Goal: Plan of Care Review  Outcome: Ongoing (interventions implemented as appropriate)   01/11/18 0417   Coping/Psychosocial Response Interventions   Plan Of Care Reviewed With patient   Patient Care Overview   Progress improving   Outcome Evaluation   Outcome Summary/Follow up Plan No c/o voiced this shift. Slept at long intervals. Plan to have RCE then CABG/AVR. Contact precautions removed pt completed Tamiflu. No S/S of sickness. S 64-90       Problem: Acute Coronary Syndrome (ACS) (Adult)  Goal: Signs and Symptoms of Listed Potential Problems Will be Absent or Manageable (Acute Coronary Syndrome)  Outcome: Ongoing (interventions implemented as appropriate)   01/11/18 0417   Acute Coronary Syndrome (ACS)   Problems Assessed (Acute Coronary Syndrome (ACS)) all   Problems Present (Acute Coronary Syndrome (ACS)) none       Problem: Pain, Chronic (Adult)  Goal: Identify Related Risk Factors and Signs and Symptoms  Outcome: Ongoing (interventions implemented as appropriate)   01/11/18 0417   Pain, Chronic   Related Risk Factors (Chronic Pain) disease process   Signs and Symptoms (Chronic Pain) verbalization of pain descriptors

## 2018-01-11 NOTE — PROGRESS NOTES
[facility] -  Progress Note     LOS: 7 days   Patient Care Team:  Sharad KLEIN MD as PCP - General (Family Medicine)    Chief Complaint: feeling better    Reason for consultation: nstemi    Subjective     Interval History:   Pt relates he feels much better. Denies any cough today or other complaint. He is anxious to get his procedures done.        Review of Systems:   Review of Systems   Constitution: Negative for malaise/fatigue and weight gain.   Cardiovascular: Negative for chest pain, claudication, dyspnea on exertion, irregular heartbeat, leg swelling, near-syncope, orthopnea, palpitations, paroxysmal nocturnal dyspnea and syncope.   Respiratory: Negative for hemoptysis and shortness of breath.    Hematologic/Lymphatic: Negative for bleeding problem.   Musculoskeletal: Negative for myalgias.   Gastrointestinal: Negative for melena, nausea and vomiting.   Genitourinary: Negative for hematuria.   Neurological: Negative for focal weakness and light-headedness.   All other systems reviewed and are negative.       Objective     Vital Sign Min/Max for last 24 hours  Temp  Min: 97 °F (36.1 °C)  Max: 98.3 °F (36.8 °C)   BP  Min: 91/58  Max: 147/84   Pulse  Min: 62  Max: 81   Resp  Min: 16  Max: 20   SpO2  Min: 92 %  Max: 94 %   Flow (L/min)  Min: 2  Max: 2   Weight  Min: 55.2 kg (121 lb 12.8 oz)  Max: 55.2 kg (121 lb 12.8 oz)     Last Weight    01/10/18  2000   Weight: 55.2 kg (121 lb 12.8 oz)         Intake/Output Summary (Last 24 hours) at 01/11/18 1529  Last data filed at 01/11/18 1300   Gross per 24 hour   Intake             1080 ml   Output                0 ml   Net             1080 ml         Physical Exam:  Physical Exam   Constitutional: He is oriented to person, place, and time. He appears well-developed and well-nourished.   HENT:   Head: Normocephalic and atraumatic.   Eyes: Conjunctivae and EOM are normal. Pupils are equal, round, and reactive to light.   Neck: Normal range of  motion. Neck supple. No JVD present.   Cardiovascular: Normal rate, regular rhythm, S1 normal, S2 normal, intact distal pulses and normal pulses.    Murmur heard.   Systolic murmur is present with a grade of 3/6  at the upper right sternal border  Pulmonary/Chest: Effort normal and breath sounds normal. No respiratory distress.   Abdominal: Soft. Bowel sounds are normal. He exhibits no distension.   Musculoskeletal: He exhibits no edema.   Neurological: He is alert and oriented to person, place, and time.   Skin: Skin is warm and dry.   Psychiatric: He has a normal mood and affect. Judgment normal.   Vitals reviewed.       Results Review:   Lab Results (last 72 hours)     Procedure Component Value Units Date/Time    CBC (No Diff) [[id number]]  (Abnormal) Collected:  01/10/18 0418    Specimen:  Blood Updated:  01/10/18 0444     WBC 6.59 10*3/mm3      RBC 4.18 (L) 10*6/mm3      Hemoglobin 12.4 (L) g/dL      Hematocrit 37.4 (L) %      MCV 89.5 fL      MCH 29.7 pg      MCHC 33.2 g/dL      RDW 12.5 %      RDW-SD 41.3 fl      MPV 9.6 fL      Platelets 319 10*3/mm3     Basic Metabolic Panel [[id number]]  (Normal) Collected:  01/10/18 0418    Specimen:  Blood Updated:  01/10/18 0502     Glucose 94 mg/dL      BUN 9 mg/dL      Creatinine 0.68 mg/dL      Sodium 140 mmol/L      Potassium 4.5 mmol/L      Chloride 100 mmol/L      CO2 30.0 mmol/L      Calcium 9.3 mg/dL      eGFR Non African Amer 120 mL/min/1.73      BUN/Creatinine Ratio 13.2     Anion Gap 10.0 mmol/L     Narrative:       GFR Normal >60  Chronic Kidney Disease <60  Kidney Failure <15              Echo EF Estimated  Lab Results   Component Value Date    ECHOEFEST 70 12/13/2017         Cath Ejection Fraction Quantitative  No results found for: CATHEF        Medication Review: yes  Current Facility-Administered Medications   Medication Dose Route Frequency Provider Last Rate Last Dose   • albuterol (PROVENTIL) nebulizer solution 0.083% 2.5 mg/3mL  2.5 mg Nebulization  Q4H PRN Alfonso Yi MD   2.5 mg at 01/07/18 2034   • aspirin chewable tablet 81 mg  81 mg Oral Daily Ashtyn Cooley, APRN   81 mg at 01/11/18 0919   • atorvastatin (LIPITOR) tablet 80 mg  80 mg Oral Nightly Ashtyn Cooley, APRN   80 mg at 01/10/18 2210   • budesonide-formoterol (SYMBICORT) 160-4.5 MCG/ACT inhaler 2 puff  2 puff Inhalation BID - RT Ashtyn Cooley, APRN   2 puff at 01/11/18 0802   • citalopram (CeleXA) tablet 20 mg  20 mg Oral Daily Ashtyn Cooley, APRN   20 mg at 01/11/18 0919   • furosemide (LASIX) tablet 40 mg  40 mg Oral Daily Ashtyn Cooley, APRN   40 mg at 01/11/18 0919   • HYDROcodone-acetaminophen (NORCO) 7.5-325 MG per tablet 1 tablet  1 tablet Oral Q6H PRN Claribel Plascencia, APRN   1 tablet at 01/11/18 1506   • potassium chloride (MICRO-K) CR capsule 20 mEq  20 mEq Oral BID Ashtyn Cooley, APRN   20 mEq at 01/11/18 0919   • sodium chloride (OCEAN) nasal spray 2 spray  2 spray Each Nare PRN Aime Francois MD   2 spray at 01/04/18 6472   • sodium chloride 0.9 % flush 1-10 mL  1-10 mL Intravenous PRN Ashtyn Cooley, APRN             Assessment/Plan       1. Coronary artery disease  2. Severe aortic stenosis  3. Carotid artery disease  4. Flu A  5. Tobacco abuse  6. Hypotension   7. Hyperlipidemia   8. Chronic obstructive pulmonary disease    Plan   1. For carotid endarterectomy on Monday or Wednesday per notes. CABG/AVR 1-2 days after  2. Continue present therapy otherwise    Erna Morel PA-C  01/11/18  3:29 PM

## 2018-01-12 ENCOUNTER — APPOINTMENT (OUTPATIENT)
Dept: PULMONOLOGY | Facility: HOSPITAL | Age: 58
End: 2018-01-12

## 2018-01-12 LAB
ARTERIAL PATENCY WRIST A: POSITIVE
ATMOSPHERIC PRESS: 754 MMHG
BASE EXCESS BLDA CALC-SCNC: 1.2 MMOL/L (ref 0–2)
BDY SITE: ABNORMAL
BODY TEMPERATURE: 37 C
HCO3 BLDA-SCNC: 25.5 MMOL/L (ref 20–26)
Lab: ABNORMAL
MODALITY: ABNORMAL
PCO2 BLDA: 38.3 MM HG (ref 35–45)
PH BLDA: 7.43 PH UNITS (ref 7.35–7.45)
PO2 BLDA: 78.4 MM HG (ref 83–108)
SAO2 % BLDCOA: 95.9 % (ref 94–99)
VENTILATOR MODE: ABNORMAL

## 2018-01-12 PROCEDURE — 94060 EVALUATION OF WHEEZING: CPT

## 2018-01-12 PROCEDURE — 36600 WITHDRAWAL OF ARTERIAL BLOOD: CPT

## 2018-01-12 PROCEDURE — 94726 PLETHYSMOGRAPHY LUNG VOLUMES: CPT

## 2018-01-12 PROCEDURE — 94729 DIFFUSING CAPACITY: CPT

## 2018-01-12 PROCEDURE — 99231 SBSQ HOSP IP/OBS SF/LOW 25: CPT | Performed by: INTERNAL MEDICINE

## 2018-01-12 PROCEDURE — 82803 BLOOD GASES ANY COMBINATION: CPT

## 2018-01-12 RX ORDER — ALBUTEROL SULFATE 2.5 MG/3ML
2.5 SOLUTION RESPIRATORY (INHALATION) ONCE
Status: COMPLETED | OUTPATIENT
Start: 2018-01-12 | End: 2018-01-12

## 2018-01-12 RX ADMIN — HYDROCODONE BITARTRATE AND ACETAMINOPHEN 1 TABLET: 7.5; 325 TABLET ORAL at 14:47

## 2018-01-12 RX ADMIN — HYDROCODONE BITARTRATE AND ACETAMINOPHEN 1 TABLET: 7.5; 325 TABLET ORAL at 08:36

## 2018-01-12 RX ADMIN — FUROSEMIDE 40 MG: 40 TABLET ORAL at 08:36

## 2018-01-12 RX ADMIN — DESMOPRESSIN ACETATE 80 MG: 0.2 TABLET ORAL at 21:07

## 2018-01-12 RX ADMIN — CITALOPRAM 20 MG: 20 TABLET, FILM COATED ORAL at 08:36

## 2018-01-12 RX ADMIN — POTASSIUM CHLORIDE 20 MEQ: 750 CAPSULE, EXTENDED RELEASE ORAL at 21:07

## 2018-01-12 RX ADMIN — ASPIRIN 81 MG: 81 TABLET, CHEWABLE ORAL at 08:36

## 2018-01-12 RX ADMIN — POTASSIUM CHLORIDE 20 MEQ: 750 CAPSULE, EXTENDED RELEASE ORAL at 08:36

## 2018-01-12 RX ADMIN — HYDROCODONE BITARTRATE AND ACETAMINOPHEN 1 TABLET: 7.5; 325 TABLET ORAL at 21:07

## 2018-01-12 RX ADMIN — ALBUTEROL SULFATE 2.5 MG: 2.5 SOLUTION RESPIRATORY (INHALATION) at 16:00

## 2018-01-12 NOTE — PLAN OF CARE
Problem: Patient Care Overview (Adult)  Goal: Plan of Care Review  Outcome: Ongoing (interventions implemented as appropriate)   01/11/18 1944 01/12/18 0212   Coping/Psychosocial Response Interventions   Plan Of Care Reviewed With patient --    Patient Care Overview   Progress --  progress toward functional goals as expected   Outcome Evaluation   Outcome Summary/Follow up Plan --  VSS. Pt c/o chronic pain and pain medication given per order. Possible R carotid endarectomy on Monday. Will continue to monitor and notify MD of changes.     Goal: Adult Individualization and Mutuality  Outcome: Ongoing (interventions implemented as appropriate)      Problem: Acute Coronary Syndrome (ACS) (Adult)  Goal: Signs and Symptoms of Listed Potential Problems Will be Absent or Manageable (Acute Coronary Syndrome)  Outcome: Ongoing (interventions implemented as appropriate)      Problem: Pain, Chronic (Adult)  Goal: Identify Related Risk Factors and Signs and Symptoms  Outcome: Ongoing (interventions implemented as appropriate)    Goal: Acceptable Pain Control/Comfort Level  Outcome: Ongoing (interventions implemented as appropriate)

## 2018-01-12 NOTE — PLAN OF CARE
Problem: Patient Care Overview (Adult)  Goal: Plan of Care Review  Outcome: Ongoing (interventions implemented as appropriate)   01/12/18 1443   Coping/Psychosocial Response Interventions   Plan Of Care Reviewed With patient   Patient Care Overview   Progress no change   Outcome Evaluation   Outcome Summary/Follow up Plan Pt running NSR on tele. PO pain meds PRN with good relief. Up ad linda. Carotid endardectomy monday?       Problem: Acute Coronary Syndrome (ACS) (Adult)  Goal: Signs and Symptoms of Listed Potential Problems Will be Absent or Manageable (Acute Coronary Syndrome)  Outcome: Ongoing (interventions implemented as appropriate)      Problem: Pain, Chronic (Adult)  Goal: Identify Related Risk Factors and Signs and Symptoms  Outcome: Outcome(s) achieved Date Met: 01/12/18    Goal: Acceptable Pain Control/Comfort Level  Outcome: Ongoing (interventions implemented as appropriate)

## 2018-01-13 PROCEDURE — 94799 UNLISTED PULMONARY SVC/PX: CPT

## 2018-01-13 PROCEDURE — 99231 SBSQ HOSP IP/OBS SF/LOW 25: CPT | Performed by: INTERNAL MEDICINE

## 2018-01-13 RX ADMIN — HYDROCODONE BITARTRATE AND ACETAMINOPHEN 1 TABLET: 7.5; 325 TABLET ORAL at 14:29

## 2018-01-13 RX ADMIN — POTASSIUM CHLORIDE 20 MEQ: 750 CAPSULE, EXTENDED RELEASE ORAL at 20:38

## 2018-01-13 RX ADMIN — HYDROCODONE BITARTRATE AND ACETAMINOPHEN 1 TABLET: 7.5; 325 TABLET ORAL at 20:38

## 2018-01-13 RX ADMIN — POTASSIUM CHLORIDE 20 MEQ: 750 CAPSULE, EXTENDED RELEASE ORAL at 08:23

## 2018-01-13 RX ADMIN — DESMOPRESSIN ACETATE 80 MG: 0.2 TABLET ORAL at 20:38

## 2018-01-13 RX ADMIN — HYDROCODONE BITARTRATE AND ACETAMINOPHEN 1 TABLET: 7.5; 325 TABLET ORAL at 08:23

## 2018-01-13 RX ADMIN — BUDESONIDE AND FORMOTEROL FUMARATE DIHYDRATE 2 PUFF: 160; 4.5 AEROSOL RESPIRATORY (INHALATION) at 06:32

## 2018-01-13 RX ADMIN — ASPIRIN 81 MG: 81 TABLET, CHEWABLE ORAL at 08:23

## 2018-01-13 RX ADMIN — FUROSEMIDE 40 MG: 40 TABLET ORAL at 08:23

## 2018-01-13 RX ADMIN — CITALOPRAM 20 MG: 20 TABLET, FILM COATED ORAL at 08:23

## 2018-01-13 NOTE — PROGRESS NOTES
LOS: 8 days   Patient Care Team:  Sharad KLEIN MD as PCP - General (Family Medicine)    Chief Complaint: Principal Problem:    Chest pain  Shortness of breath    Subjective  Feeling  better  No chest pain   No excessive shortness of breath  No new complaints    Interval History: Improved overall    Patient Complaints:   Chief Complaint   Patient presents with   • Chest Pain       Denies chest pain currently. Denies excessive shortness of breath. Denies abdominal pain, nausea vomiting or diarrhoea.    Telemetry: no malignant arrhythmia. No significant pauses.    Review of Systems   Constitutional: Negative for chills, fatigue and fever.   HENT: Negative.    Eyes: Negative.    Respiratory: Negative for cough,   No chest wall soreness,   Mild shortness of breath,   no wheezing, no stridor.    Cardiovascular: Negative for chest pain,   No palpitations   No significant  leg swelling.   Gastrointestinal: Negative for abdominal distention,  No abdominal pain, No blood in stool, constipation, diarrhea, nausea and vomiting.   Endocrine: Negative.    Genitourinary: Negative for difficulty urinating, dysuria, flank pain and hematuria.   Musculoskeletal: Negative.    Skin: Negative for rash and wound.   Allergic/Immunologic: Negative.    Neurological: Negative for dizziness, syncope, weakness, light-headedness and headaches.   Hematological: Does not bruise/bleed easily.   Psychiatric/Behavioral: Negative for agitation, behavioral problems, confusion, the patient adams not appear to be nervous/anxious.       History:   Past Medical History:   Diagnosis Date   • Aneurysm    • Arthritis    • Carotid artery disease    • Carotid stenosis    • COPD (chronic obstructive pulmonary disease)    • Hyperlipidemia    • Hypertension      Past Surgical History:   Procedure Laterality Date   • APPENDECTOMY     • CARDIAC CATHETERIZATION N/A 12/18/2017    Procedure: Left Heart Cath;  Surgeon: Aime Francois MD;  Location: [facility] CATH  INVASIVE LOCATION;  Service:    • CARDIAC CATHETERIZATION N/A 12/18/2017    Procedure: Right Heart Cath;  Surgeon: Aime Francois MD;  Location:  [facility] CATH INVASIVE LOCATION;  Service:    • CARDIAC CATHETERIZATION Bilateral 1/4/2018    Procedure: Coronary angiography;  Surgeon: Alfonso Yi MD;  Location:  [facility] CATH INVASIVE LOCATION;  Service:    • FINGER SURGERY Right 1990   • OTHER SURGICAL HISTORY      skull srgery from accident in childhood.     Social History     Social History   • Marital status:      Spouse name: N/A   • Number of children: N/A   • Years of education: N/A     Occupational History   • Not on file.     Social History Main Topics   • Smoking status: Current Every Day Smoker     Packs/day: 1.00     Types: Cigarettes   • Smokeless tobacco: Current User     Types: Snuff   • Alcohol use No   • Drug use: No   • Sexual activity: Defer     Other Topics Concern   • Not on file     Social History Narrative     Family History   Problem Relation Age of Onset   • Heart disease Mother    • Heart disease Father    • Hypertension Sister    • Hypertension Brother        Labs:  WBC WBC   Date Value Ref Range Status   01/10/2018 6.59 4.80 - 10.80 10*3/mm3 Final      HGB Hemoglobin   Date Value Ref Range Status   01/10/2018 12.4 (L) 14.0 - 18.0 g/dL Final      HCT Hematocrit   Date Value Ref Range Status   01/10/2018 37.4 (L) 40.0 - 52.0 % Final      Platlets Platelets   Date Value Ref Range Status   01/10/2018 319 130 - 400 10*3/mm3 Final      MCV MCV   Date Value Ref Range Status   01/10/2018 89.5 82.0 - 95.0 fL Final        Results from last 7 days  Lab Units 01/10/18  0418 01/07/18  0733   SODIUM mmol/L 140 138   POTASSIUM mmol/L 4.5 4.1   CHLORIDE mmol/L 100 101   CO2 mmol/L 30.0 30.0   BUN mg/dL 9 7   CREATININE mg/dL 0.68 0.56   CALCIUM mg/dL 9.3 9.4   GLUCOSE mg/dL 94 102*     Lab Results   Component Value Date    TROPONINI 1.170 (C) 01/05/2018     PT/INR:  No results found for: PROTIME/No  "results found for: INR    Imaging Results (last 72 hours)     ** No results found for the last 72 hours. **          Objective     No Known Allergies    Medication Review: Performed  Current Facility-Administered Medications   Medication Dose Route Frequency Provider Last Rate Last Dose   • albuterol (PROVENTIL) nebulizer solution 0.083% 2.5 mg/3mL  2.5 mg Nebulization Q4H PRN Alfonso Yi MD   2.5 mg at 01/07/18 2034   • aspirin chewable tablet 81 mg  81 mg Oral Daily Ashtyn Cooley, APRN   81 mg at 01/12/18 0836   • atorvastatin (LIPITOR) tablet 80 mg  80 mg Oral Nightly Ashtyn Cooley, APRN   80 mg at 01/11/18 2044   • budesonide-formoterol (SYMBICORT) 160-4.5 MCG/ACT inhaler 2 puff  2 puff Inhalation BID - RT Ashtyn Cooley, APRN   2 puff at 01/11/18 0802   • citalopram (CeleXA) tablet 20 mg  20 mg Oral Daily Ashtyn Cooley, APRN   20 mg at 01/12/18 0836   • furosemide (LASIX) tablet 40 mg  40 mg Oral Daily Ashtyn Cooley, APRN   40 mg at 01/12/18 0836   • HYDROcodone-acetaminophen (NORCO) 7.5-325 MG per tablet 1 tablet  1 tablet Oral Q6H PRN Claribel Plascencia, APRN   1 tablet at 01/12/18 1447   • potassium chloride (MICRO-K) CR capsule 20 mEq  20 mEq Oral BID Ashtyn Cooley, APRN   20 mEq at 01/12/18 0836   • sodium chloride (OCEAN) nasal spray 2 spray  2 spray Each Nare PRN Aime Francois MD   2 spray at 01/04/18 2342   • sodium chloride 0.9 % flush 1-10 mL  1-10 mL Intravenous PRN Ashtyn Cooley APRN           Vital Sign Min/Max for last 24 hours  Temp  Min: 97.7 °F (36.5 °C)  Max: 98.2 °F (36.8 °C)   BP  Min: 94/55  Max: 111/64   Pulse  Min: 66  Max: 73   Resp  Min: 18  Max: 18   SpO2  Min: 94 %  Max: 95 %   No Data Recorded   No Data Recorded     Flowsheet Rows         First Filed Value    Admission Height  167.6 cm (66\") Documented at 01/04/2018 0035    Admission Weight  59.4 kg (131 lb) Documented at 01/04/2018 0035          Results for orders placed during the hospital encounter of 01/04/18   Adult " Transthoracic Echo Complete W/ Cont if Necessary Per Protocol    Narrative · Normal LVEF with normal wall motion.  · Severe aortic valve stenosis is present.  · Left ventricular wall thickness is consistent with mild-to-moderate   concentric hypertrophy.  · Left atrial cavity size is mild-to-moderately dilated.          Physical Exam:  Eyes: Pupils equal and reactive    Ears: Appear intact with no abnormalities noted  Nose: Nares normal, no drainage  Neck: supple, trachea midline, no carotid bruit and no JVD  Back: no kyphosis present,    Lungs: respirations regular, respirations even and respirations unlabored  Heart: normal S1, S2,  2/6 pansystolic murmur in the left sternal border,  no rub and no click  Abdomen: soft  Skin: no bleeding, bruising or rash  Extremities no cyanosis     Results Review:   I reviewed the patient's new clinical results.  I reviewed the patient's new imaging results and agree with the interpretation.  I reviewed the patient's other test results and agree with the interpretation  I personally viewed and interpreted the patient's EKG/Telemetry data  Discussed with patient, and present family member(s)     Assessment/Plan     Principal Problem:    Chest pain  Active Problems:    Shortness of breath    Severe aortic valve stenosis    Tobacco use    Hyperlipidemia    Hypertension    NSTEMI (non-ST elevated myocardial infarction)    Coronary artery disease involving native coronary artery of native heart with angina pectoris    Stenosis of right carotid artery    COPD (chronic obstructive pulmonary disease)    LAZ (acute kidney injury)    Flu      Plan    Carotid endarterectomy next week  followed by ortic valve replacement    Vascular surgery advised against discharge home yesterday  Supportive care  Telemetry  Optimal medical therapy  Deep vein thrombosis prophylaxis/precautions  Appropriate diet, fluid, sodium, caffeine, stimulants intake   Compliance to diet and medications   Avoid  NSAIDS    Aime Francois MD  01/12/18  8:18 PM

## 2018-01-13 NOTE — PLAN OF CARE
Problem: Patient Care Overview (Adult)  Goal: Plan of Care Review  Outcome: Ongoing (interventions implemented as appropriate)   01/13/18 0235   Coping/Psychosocial Response Interventions   Plan Of Care Reviewed With patient;spouse   Outcome Evaluation   Outcome Summary/Follow up Plan NSR on telemetry. amb in hallway, no falls. no cough. neuros WNL. back pain treated with norco, with relief. For RCE / AVR this week.

## 2018-01-13 NOTE — PROGRESS NOTES
"[facility], [facility] Progress Note     LOS: 9 days   Patient Care Team:  Sharad KLEIN MD as PCP - General (Family Medicine)    Chief Complaint:  CP, SOB    Subjective     Pt denies cp or sob presently    Review of Systems:   A 10-point review of systems is obtained and negative except for otherwise mentioned above.    Objective     Vital Sign Min/Max for last 24 hours  Temp  Min: 97.2 °F (36.2 °C)  Max: 97.7 °F (36.5 °C)   BP  Min: 109/47  Max: 112/57   Pulse  Min: 66  Max: 71   Resp  Min: 18  Max: 18   SpO2  Min: 95 %  Max: 99 %   No Data Recorded   Weight  Min: 55 kg (121 lb 3.2 oz)  Max: 55 kg (121 lb 3.2 oz)     Flowsheet Rows         First Filed Value    Admission Height  167.6 cm (66\") Documented at 01/04/2018 0035    Admission Weight  59.4 kg (131 lb) Documented at 01/04/2018 0035          Physical Exam:   General Appearance: alert, appears stated age and cooperative  Lungs: clear to auscultation, respirations regular, respirations even and respirations unlabored  Heart: RRR, 2/6 SM       Results Review:     I reviewed the patient's new clinical results.      Results from last 7 days  Lab Units 01/10/18  0418   WBC 10*3/mm3 6.59   HEMOGLOBIN g/dL 12.4*   HEMATOCRIT % 37.4*   PLATELETS 10*3/mm3 319       Results from last 7 days  Lab Units 01/10/18  0418   SODIUM mmol/L 140   POTASSIUM mmol/L 4.5   CHLORIDE mmol/L 100   CO2 mmol/L 30.0   BUN mg/dL 9   CREATININE mg/dL 0.68   GLUCOSE mg/dL 94   CALCIUM mg/dL 9.3       Results from last 7 days  Lab Units 01/10/18  0418   SODIUM mmol/L 140   POTASSIUM mmol/L 4.5   CHLORIDE mmol/L 100   CO2 mmol/L 30.0   BUN mg/dL 9   CREATININE mg/dL 0.68   CALCIUM mg/dL 9.3   GLUCOSE mg/dL 94                   Medication Review: yes    Assessment/Plan     Principal Problem:    Chest pain  Active Problems:    Shortness of breath    Severe aortic valve stenosis    Tobacco use    Hyperlipidemia    Hypertension    NSTEMI (non-ST elevated " myocardial infarction)    Coronary artery disease involving native coronary artery of native heart with angina pectoris    Stenosis of right carotid artery    COPD (chronic obstructive pulmonary disease)    LAZ (acute kidney injury)    Flu      Patient awaiting CEA and AVR next week.  Will continue to monitor    Kerry Brito PA-C  01/13/18  3:11 PM

## 2018-01-13 NOTE — PLAN OF CARE
Problem: Patient Care Overview (Adult)  Goal: Plan of Care Review  Outcome: Ongoing (interventions implemented as appropriate)   01/13/18 1502   Coping/Psychosocial Response Interventions   Plan Of Care Reviewed With patient   Patient Care Overview   Progress no change   Outcome Evaluation   Outcome Summary/Follow up Plan pt co back pain. medication given with some relief. cont to monitor.      Goal: Adult Individualization and Mutuality  Outcome: Ongoing (interventions implemented as appropriate)    Goal: Discharge Needs Assessment  Outcome: Ongoing (interventions implemented as appropriate)      Problem: Acute Coronary Syndrome (ACS) (Adult)  Goal: Signs and Symptoms of Listed Potential Problems Will be Absent or Manageable (Acute Coronary Syndrome)  Outcome: Ongoing (interventions implemented as appropriate)      Problem: Pain, Chronic (Adult)  Goal: Acceptable Pain Control/Comfort Level  Outcome: Ongoing (interventions implemented as appropriate)

## 2018-01-14 PROCEDURE — 99231 SBSQ HOSP IP/OBS SF/LOW 25: CPT | Performed by: THORACIC SURGERY (CARDIOTHORACIC VASCULAR SURGERY)

## 2018-01-14 PROCEDURE — 99231 SBSQ HOSP IP/OBS SF/LOW 25: CPT | Performed by: INTERNAL MEDICINE

## 2018-01-14 PROCEDURE — 94799 UNLISTED PULMONARY SVC/PX: CPT

## 2018-01-14 RX ADMIN — DESMOPRESSIN ACETATE 80 MG: 0.2 TABLET ORAL at 20:18

## 2018-01-14 RX ADMIN — POTASSIUM CHLORIDE 20 MEQ: 750 CAPSULE, EXTENDED RELEASE ORAL at 08:52

## 2018-01-14 RX ADMIN — BUDESONIDE AND FORMOTEROL FUMARATE DIHYDRATE 2 PUFF: 160; 4.5 AEROSOL RESPIRATORY (INHALATION) at 08:13

## 2018-01-14 RX ADMIN — ASPIRIN 81 MG: 81 TABLET, CHEWABLE ORAL at 08:52

## 2018-01-14 RX ADMIN — HYDROCODONE BITARTRATE AND ACETAMINOPHEN 1 TABLET: 7.5; 325 TABLET ORAL at 14:42

## 2018-01-14 RX ADMIN — CITALOPRAM 20 MG: 20 TABLET, FILM COATED ORAL at 08:52

## 2018-01-14 RX ADMIN — HYDROCODONE BITARTRATE AND ACETAMINOPHEN 1 TABLET: 7.5; 325 TABLET ORAL at 20:22

## 2018-01-14 RX ADMIN — FUROSEMIDE 40 MG: 40 TABLET ORAL at 08:52

## 2018-01-14 RX ADMIN — HYDROCODONE BITARTRATE AND ACETAMINOPHEN 1 TABLET: 7.5; 325 TABLET ORAL at 08:52

## 2018-01-14 RX ADMIN — POTASSIUM CHLORIDE 20 MEQ: 750 CAPSULE, EXTENDED RELEASE ORAL at 20:18

## 2018-01-14 NOTE — PROGRESS NOTES
"[facility], [facility] Progress Note     LOS: 10 days   Patient Care Team:  Sharad KLEIN MD as PCP - General (Family Medicine)    Chief Complaint:  CP, SOB    Subjective         Review of Systems:   A 10-point review of systems is obtained and negative except for otherwise mentioned above.    Objective     Vital Sign Min/Max for last 24 hours  Temp  Min: 97.2 °F (36.2 °C)  Max: 97.5 °F (36.4 °C)   BP  Min: 109/47  Max: 118/64   Pulse  Min: 67  Max: 79   Resp  Min: 18  Max: 18   SpO2  Min: 95 %  Max: 97 %   No Data Recorded   Weight  Min: 55.6 kg (122 lb 9 oz)  Max: 55.6 kg (122 lb 9 oz)     Flowsheet Rows         First Filed Value    Admission Height  167.6 cm (66\") Documented at 01/04/2018 0035    Admission Weight  59.4 kg (131 lb) Documented at 01/04/2018 0035          Physical Exam:              General Appearance: alert, appears stated age and cooperative  Lungs: clear to auscultation, respirations regular, respirations even and respirations unlabored  Heart: RRR, 2/6 SM   Results Review:     I reviewed the patient's new clinical results.      Results from last 7 days  Lab Units 01/10/18  0418   WBC 10*3/mm3 6.59   HEMOGLOBIN g/dL 12.4*   HEMATOCRIT % 37.4*   PLATELETS 10*3/mm3 319       Results from last 7 days  Lab Units 01/10/18  0418   SODIUM mmol/L 140   POTASSIUM mmol/L 4.5   CHLORIDE mmol/L 100   CO2 mmol/L 30.0   BUN mg/dL 9   CREATININE mg/dL 0.68   GLUCOSE mg/dL 94   CALCIUM mg/dL 9.3       Results from last 7 days  Lab Units 01/10/18  0418   SODIUM mmol/L 140   POTASSIUM mmol/L 4.5   CHLORIDE mmol/L 100   CO2 mmol/L 30.0   BUN mg/dL 9   CREATININE mg/dL 0.68   CALCIUM mg/dL 9.3   GLUCOSE mg/dL 94                   Medication Review: yes    Assessment/Plan     Principal Problem:    Chest pain  Active Problems:    Shortness of breath    Severe aortic valve stenosis    Tobacco use    Hyperlipidemia    Hypertension    NSTEMI (non-ST elevated myocardial infarction)    " Coronary artery disease involving native coronary artery of native heart with angina pectoris    Stenosis of right carotid artery    COPD (chronic obstructive pulmonary disease)    LAZ (acute kidney injury)    Flu      Patient is awaiting CEA and AVR next week.    Kerry Brito PA-C  01/14/18  10:03 AM

## 2018-01-14 NOTE — PLAN OF CARE
Problem: Patient Care Overview (Adult)  Goal: Plan of Care Review  Outcome: Ongoing (interventions implemented as appropriate)   01/14/18 0307   Coping/Psychosocial Response Interventions   Plan Of Care Reviewed With patient   Outcome Evaluation   Outcome Summary/Follow up Plan Nsr on tele. medicated for back pain. no c/o chest pain or SOB. no falls.

## 2018-01-14 NOTE — PROGRESS NOTES
"No chest pain.  Walking down [address].  Not smoking.    Visit Vitals   • /64 (BP Location: Right arm, Patient Position: Lying)   • Pulse 87   • Temp 97.1 °F (36.2 °C) (Temporal Artery )   • Resp 18   • Ht 167.6 cm (66\")   • Wt 55.6 kg (122 lb 9 oz)   • SpO2 97%   • BMI 19.78 kg/m2         Intake/Output Summary (Last 24 hours) at 01/14/18 1413  Last data filed at 01/14/18 1344   Gross per 24 hour   Intake              960 ml   Output                0 ml   Net              960 ml     PFT's reviewed performed on 1/12/2017.  Certainly acceptable for cardiac surgery.      Physical Exam:    General: NAD, In good spirits  Cardiovascular: RRR, (+) murmur.  No rub or gallops.    Pulmonary: CTAB, No wheezing, rubs, or rales.  Abdomen: soft, Non-distended, and non-tender  Extremities: warm, PORTILLO  Neurologic:  No focal deficits, CN II-XII intact grossly.      Impression:   Severe aortic stenosis, bicuspid aortic valve  NSTEMI  Coronary artery disease  Stenosis of right carotid artery severe, asymptomatic  Chronic obstructive pulmonary disease  Chronic back pain  Chronic tobacco use  Hypertension  Hyperlipidemia  Flu A-resolved     Plan:  Continue supportive measures.  For awake CEA by Dr. Salgado  Prior to cardiac surgery.    CABG/SAVR 1-2 days after CEA  Working to coordinate care.  Dr. Salgado unable to proceed tomorrow.    Discussed with patient-all questions answered.  Will talk with Dr. Salgado tomorrow to coordinate timing.    "

## 2018-01-14 NOTE — PLAN OF CARE
Problem: Patient Care Overview (Adult)  Goal: Plan of Care Review  Outcome: Ongoing (interventions implemented as appropriate)   01/14/18 1507   Coping/Psychosocial Response Interventions   Plan Of Care Reviewed With patient   Patient Care Overview   Progress no change   Outcome Evaluation   Outcome Summary/Follow up Plan co pain in back. medication given with relief. cont to monitor.     Goal: Adult Individualization and Mutuality  Outcome: Ongoing (interventions implemented as appropriate)    Goal: Discharge Needs Assessment  Outcome: Ongoing (interventions implemented as appropriate)      Problem: Acute Coronary Syndrome (ACS) (Adult)  Goal: Signs and Symptoms of Listed Potential Problems Will be Absent or Manageable (Acute Coronary Syndrome)  Outcome: Ongoing (interventions implemented as appropriate)      Problem: Pain, Chronic (Adult)  Goal: Acceptable Pain Control/Comfort Level  Outcome: Ongoing (interventions implemented as appropriate)

## 2018-01-15 VITALS
OXYGEN SATURATION: 96 % | TEMPERATURE: 98.6 F | HEART RATE: 71 BPM | WEIGHT: 124.4 LBS | SYSTOLIC BLOOD PRESSURE: 149 MMHG | BODY MASS INDEX: 19.99 KG/M2 | RESPIRATION RATE: 18 BRPM | HEIGHT: 66 IN | DIASTOLIC BLOOD PRESSURE: 71 MMHG

## 2018-01-15 PROCEDURE — 94799 UNLISTED PULMONARY SVC/PX: CPT

## 2018-01-15 PROCEDURE — 99233 SBSQ HOSP IP/OBS HIGH 50: CPT | Performed by: SURGERY

## 2018-01-15 PROCEDURE — 99239 HOSP IP/OBS DSCHRG MGMT >30: CPT | Performed by: INTERNAL MEDICINE

## 2018-01-15 RX ORDER — NITROGLYCERIN 0.4 MG/1
0.4 TABLET SUBLINGUAL
Qty: 30 TABLET | Refills: 1 | Status: SHIPPED | OUTPATIENT
Start: 2018-01-15 | End: 2018-02-16 | Stop reason: HOSPADM

## 2018-01-15 RX ORDER — NICOTINE 21 MG/24HR
1 PATCH, TRANSDERMAL 24 HOURS TRANSDERMAL EVERY 24 HOURS
Qty: 21 PATCH | Refills: 1 | Status: SHIPPED | OUTPATIENT
Start: 2018-01-15 | End: 2018-02-16 | Stop reason: HOSPADM

## 2018-01-15 RX ADMIN — CITALOPRAM 20 MG: 20 TABLET, FILM COATED ORAL at 08:37

## 2018-01-15 RX ADMIN — BUDESONIDE AND FORMOTEROL FUMARATE DIHYDRATE 2 PUFF: 160; 4.5 AEROSOL RESPIRATORY (INHALATION) at 08:38

## 2018-01-15 RX ADMIN — HYDROCODONE BITARTRATE AND ACETAMINOPHEN 1 TABLET: 7.5; 325 TABLET ORAL at 15:01

## 2018-01-15 RX ADMIN — FUROSEMIDE 40 MG: 40 TABLET ORAL at 08:37

## 2018-01-15 RX ADMIN — POTASSIUM CHLORIDE 20 MEQ: 750 CAPSULE, EXTENDED RELEASE ORAL at 08:37

## 2018-01-15 RX ADMIN — ASPIRIN 81 MG: 81 TABLET, CHEWABLE ORAL at 08:37

## 2018-01-15 RX ADMIN — HYDROCODONE BITARTRATE AND ACETAMINOPHEN 1 TABLET: 7.5; 325 TABLET ORAL at 08:46

## 2018-01-15 NOTE — PROGRESS NOTES
LOS: 11 days   Patient Care Team:  Sharad KLEIN MD as PCP - General (Family Medicine)    Chief Complaint:  Asymptomatic carotid occlusive disease    Subjective     The patient was seen/examined at the bedside.  No issues overnight.  Denies any strokelike symptoms or chest pain.    Objective     Vital Signs  Temp:  [96.9 °F (36.1 °C)-98.6 °F (37 °C)] 98.6 °F (37 °C)  Heart Rate:  [] 71  Resp:  [16-18] 18  BP: ()/(43-73) 149/71    Physical Exam  Constitutional: He is oriented to person, place, and time. He appears well-developed and well-nourished.   HENT:   Head: Normocephalic and atraumatic.   Eyes: Pupils are equal, round, and reactive to light. No scleral icterus.   Neck: Neck supple. No JVD present. Carotid bruit is not present. No thyromegaly present.   Cardiovascular: Normal rate and regular rhythm.    Murmur heard.  Pulses:       Carotid pulses are 2+ on the right side, and 2+ on the left side.       Femoral pulses are 2+ on the right side, and 2+ on the left side.       Popliteal pulses are 1+ on the right side, and 1+ on the left side.        Dorsalis pedis pulses are 1+ on the right side, and 1+ on the left side.        Posterior tibial pulses are 1+ on the right side, and 1+ on the left side.   Pulmonary/Chest: Effort normal and breath sounds normal.   Abdominal: Soft. Bowel sounds are normal. He exhibits no distension, no abdominal bruit and no mass. There is no hepatosplenomegaly. There is no tenderness.   Musculoskeletal: Normal range of motion. He exhibits no edema.   Lymphadenopathy:     He has no cervical adenopathy.   Neurological: He is alert and oriented to person, place, and time. He has normal strength. No cranial nerve deficit or sensory deficit.   Skin: Skin is warm, dry and intact.   Psychiatric: He has a normal mood and affect.   Nursing note and vitals reviewed.  Laboratory Data:     Results from last 7 days  Lab Units 01/10/18  0418   WBC 10*3/mm3 6.59   HEMOGLOBIN g/dL  12.4*   HEMATOCRIT % 37.4*   PLATELETS 10*3/mm3 319         Results from last 7 days  Lab Units 01/10/18  0418   SODIUM mmol/L 140   POTASSIUM mmol/L 4.5   CHLORIDE mmol/L 100   CO2 mmol/L 30.0   BUN mg/dL 9   CREATININE mg/dL 0.68   CALCIUM mg/dL 9.3   GLUCOSE mg/dL 94                    Assessment/Plan     Principal Problem:    Chest pain  Active Problems:    Shortness of breath    Severe aortic valve stenosis    Tobacco use    Hyperlipidemia    Hypertension    NSTEMI (non-ST elevated myocardial infarction)    Coronary artery disease involving native coronary artery of native heart with angina pectoris    Stenosis of right carotid artery    COPD (chronic obstructive pulmonary disease)    LAZ (acute kidney injury)    Flu      Plan:  1.  We will perform an awake right carotid endarterectomy sometime this week.  A lengthy discussion was had with the patient regarding this procedure.  2.  Continue with aspirin and Lipitor          Jl Salgado DO  01/15/18  2:16 PM

## 2018-01-15 NOTE — PLAN OF CARE
Problem: Patient Care Overview (Adult)  Goal: Plan of Care Review  Outcome: Ongoing (interventions implemented as appropriate)   01/15/18 2032   Coping/Psychosocial Response Interventions   Plan Of Care Reviewed With patient   Patient Care Overview   Progress no change   Outcome Evaluation   Outcome Summary/Follow up Plan Patient waiting on Dr. Salgado and Dr. Robin to coordinate O.R. plan, he is getting frustrated because there is no plan in place at the moment. C/O pain once this shift, medicated per PRN orders. Continue to monitor overall condition and notify Md of any changes.        Problem: Acute Coronary Syndrome (ACS) (Adult)  Goal: Signs and Symptoms of Listed Potential Problems Will be Absent or Manageable (Acute Coronary Syndrome)  Outcome: Ongoing (interventions implemented as appropriate)      Problem: Pain, Chronic (Adult)  Goal: Acceptable Pain Control/Comfort Level  Outcome: Ongoing (interventions implemented as appropriate)

## 2018-01-15 NOTE — DISCHARGE INSTRUCTIONS
Activity: No strenuous activity  Diet: Cardiac diet  Medications: Take all medications as prescribed  Follow-up: Return to hospital next week for scheduled procedures as directed  Other: please return to emergency dept with recurrence/worsening symptoms. Follow-up with primary care provider in 1 week.

## 2018-01-15 NOTE — DISCHARGE SUMMARY
[facility]ART [facility] DISCHARGE    Date of Discharge:  1/15/2018    Discharge Diagnosis: Non ST elevation myocardial infarction, multivessel coronary artery disease, severe aortic stenosis, carotid artery disease, influenza A    Presenting Problem/History of Present Illness  NSTEMI (non-ST elevated myocardial infarction) [I21.4]  NSTEMI (non-ST elevated myocardial infarction) [I21.4]      Hospital Course  Patient is a 57 y.o. male with history of coronary artery disease, chronic obstructive pulmonary disease, tobacco abuse, hypertension, hyperlipidemia, aortic stenosis who presented to [facility] ED on 1/4/18 with complaints of dull, substernal chest pain. He described associated dyspnea, diaphoresis, blurred vision, myalgias, cough, congestion and fever. EKG revealed ST depression in anterolateral leads initially. Peak troponin of 1.570. He was also noted with acute kidney injury, Creatinine 1.43. 2D Echo showed severe aortic stenosis with normal LVEF. Cardiac cath on 12/18/17 had revealed triple vessel coronary artery disease. CT angiogram of neck revealed severe stenosis on right. Vascular had evaluated the patient and he was to have a scheduled right carotid endarterectomy.The patient was taken to cardiac cath on 1/4/18 which revealed native 3 vessel coronary artery disease with no changes when compared to 12/14/17 cardiac cath. Repeat echo showed severe aortic stenosis with normal LVEF, normal wall motion. The patient did test positive for influenza A and was subsequently treated with tamiflu, supportive care. He continued evaluation by CT surgery, including PFTs showing minimal decrease in FVC, FEV1 and a slight improvement of diffusion capacity, when compared to 12/19/17 study. Vascular determined the best course for the patient would be awake right carotid endarterectomy prior to coronary artery bypass grafting. This case will be planned by the respective surgical teams and he will be notified of his  surgical date accordingly. The patient is aware of his risks with returning home prior to surgery with his extensive comorbidities. He is adamant about going home. He continues to feel well, denying any chest pain, dyspnea, leg swelling, syncope or similar. His vitals, telemetry and labs remain stable.     Procedures Performed  Procedure(s):  Coronary angiography       Consults:   Consults     Date and Time Order Name Status Description    1/5/2018 0905 Inpatient Consult to Vascular Surgery Completed     1/4/2018 0826 Inpatient Consult to Cardiothoracic Surgery Completed     12/15/2017 1611 Inpatient Consult to Vascular Surgery Completed     12/14/2017 0815 Inpatient Consult to Cardiothoracic Surgery Completed     12/13/2017 0952 Inpatient Consult to Cardiology Completed           Echo EF Estimated  Lab Results   Component Value Date    ECHOEFEST 70 12/13/2017       Condition on Discharge:  Stable, no acute distress    Physical Exam at Discharge  Const: aaox3, no acute distress  Heart: NRR, 3/6 MARY ANNE at [facility]  Abd: bsx4,nd,nt  Ext: no edema, pulses +2  Vital Signs  Temp:  [97.2 °F (36.2 °C)-98.6 °F (37 °C)] 98.6 °F (37 °C)  Heart Rate:  [] 71  Resp:  [16-18] 18  BP: ()/(43-72) 149/71    Discharge Disposition  Home or Self Care    Discharge Medications   Sharad Ryder   Home Medication Instructions VIDA:[id number]    Printed on:01/15/18 1634   Medication Information                      albuterol (PROVENTIL HFA;VENTOLIN HFA) 108 (90 Base) MCG/ACT inhaler  Inhale 2 puffs Every 4 (Four) Hours As Needed for Wheezing.             albuterol (PROVENTIL) (2.5 MG/3ML) 0.083% nebulizer solution  Take 2.5 mg by nebulization Every 4 (Four) Hours As Needed for Wheezing or Shortness of Air.             aspirin 81 MG tablet  Take 1 tablet by mouth Daily.             atorvastatin (LIPITOR) 80 MG tablet  Take 1 tablet by mouth Every Night.             budesonide-formoterol (SYMBICORT) 160-4.5 MCG/ACT inhaler  Inhale 2  puffs 2 (Two) Times a Day.             chlorthalidone (HYGROTON) 25 MG tablet  Take 25 mg by mouth Daily.             citalopram (CeleXA) 20 MG tablet  Take 20 mg by mouth Daily.             furosemide (LASIX) 40 MG tablet  Take 1 tablet by mouth Daily.             lisinopril (PRINIVIL,ZESTRIL) 20 MG tablet  Take 20 mg by mouth Daily.             nicotine (NICODERM CQ) 14 MG/24HR patch  Place 1 patch on the skin Daily.             nitroglycerin (NITROSTAT) 0.4 MG SL tablet  Place 1 tablet under the tongue Every 5 (Five) Minutes As Needed for Chest Pain. Take no more than 3 doses in 15 minutes.             pentazocine-naloxone (TALWIN NX) 50-0.5 MG per tablet  Take 1 tablet by mouth Every 6 (Six) Hours As Needed for Moderate Pain .             potassium chloride (K-DUR) 10 MEQ CR tablet  Take 20 mEq by mouth 2 (Two) Times a Day.                 Discharge Diet: as below    Activity at Discharge: as below    Follow-up Appointments  Future Appointments  Date Time Provider Department [place]   2/2/2018 9:00 AM Aime Francois MD [facility] None   as below      Test Results Pending at Discharge  None    Discharge Instructions  Activity: No strenuous activity  Diet: Cardiac diet  Medications: Take all medications as prescribed  Follow-up: Return to hospital next week for scheduled procedures as directed. Follow-up with primary care provider in 1 week.   Other: please return to emergency dept with recurrence/worsening symptoms       Erna Morel PA-C  01/15/18  4:34 PM

## 2018-01-18 ENCOUNTER — PREP FOR SURGERY (OUTPATIENT)
Dept: OTHER | Facility: HOSPITAL | Age: 58
End: 2018-01-18

## 2018-01-18 DIAGNOSIS — I65.21 STENOSIS OF RIGHT CAROTID ARTERY: Primary | ICD-10-CM

## 2018-01-19 ENCOUNTER — TELEPHONE (OUTPATIENT)
Dept: VASCULAR SURGERY | Facility: CLINIC | Age: 58
End: 2018-01-19

## 2018-01-19 NOTE — TELEPHONE ENCOUNTER
Patient returned phone call.  Advised of sx on 2/1/2018 and pre work on 1/25/2018.  Patient advised of all prep and instructions, as well as arrival times.  Patient expressed understanding for all that was discussed.

## 2018-01-25 ENCOUNTER — APPOINTMENT (OUTPATIENT)
Dept: PREADMISSION TESTING | Facility: HOSPITAL | Age: 58
End: 2018-01-25

## 2018-01-25 VITALS
HEIGHT: 65 IN | BODY MASS INDEX: 21.05 KG/M2 | SYSTOLIC BLOOD PRESSURE: 119 MMHG | WEIGHT: 126.32 LBS | DIASTOLIC BLOOD PRESSURE: 73 MMHG | OXYGEN SATURATION: 94 % | RESPIRATION RATE: 20 BRPM | HEART RATE: 94 BPM

## 2018-01-25 DIAGNOSIS — I65.21 STENOSIS OF RIGHT CAROTID ARTERY: ICD-10-CM

## 2018-01-25 LAB
ANION GAP SERPL CALCULATED.3IONS-SCNC: 12 MMOL/L (ref 4–13)
APTT PPP: 29.1 SECONDS (ref 24.1–34.8)
BASOPHILS # BLD AUTO: 0.02 10*3/MM3 (ref 0–0.2)
BASOPHILS NFR BLD AUTO: 0.2 % (ref 0–2)
BUN BLD-MCNC: 8 MG/DL (ref 5–21)
BUN/CREAT SERPL: 11.8 (ref 7–25)
CALCIUM SPEC-SCNC: 9.6 MG/DL (ref 8.4–10.4)
CHLORIDE SERPL-SCNC: 100 MMOL/L (ref 98–110)
CO2 SERPL-SCNC: 30 MMOL/L (ref 24–31)
CREAT BLD-MCNC: 0.68 MG/DL (ref 0.5–1.4)
DEPRECATED RDW RBC AUTO: 44 FL (ref 40–54)
EOSINOPHIL # BLD AUTO: 0.13 10*3/MM3 (ref 0–0.7)
EOSINOPHIL NFR BLD AUTO: 1.5 % (ref 0–4)
ERYTHROCYTE [DISTWIDTH] IN BLOOD BY AUTOMATED COUNT: 13.1 % (ref 12–15)
GFR SERPL CREATININE-BSD FRML MDRD: 120 ML/MIN/1.73
GLUCOSE BLD-MCNC: 89 MG/DL (ref 70–100)
HCT VFR BLD AUTO: 40.4 % (ref 40–52)
HGB BLD-MCNC: 13.5 G/DL (ref 14–18)
IMM GRANULOCYTES # BLD: 0.02 10*3/MM3 (ref 0–0.03)
IMM GRANULOCYTES NFR BLD: 0.2 % (ref 0–5)
INR PPP: 0.89 (ref 0.91–1.09)
LYMPHOCYTES # BLD AUTO: 2.94 10*3/MM3 (ref 0.72–4.86)
LYMPHOCYTES NFR BLD AUTO: 34.7 % (ref 15–45)
MCH RBC QN AUTO: 30.8 PG (ref 28–32)
MCHC RBC AUTO-ENTMCNC: 33.4 G/DL (ref 33–36)
MCV RBC AUTO: 92 FL (ref 82–95)
MONOCYTES # BLD AUTO: 0.7 10*3/MM3 (ref 0.19–1.3)
MONOCYTES NFR BLD AUTO: 8.3 % (ref 4–12)
NEUTROPHILS # BLD AUTO: 4.67 10*3/MM3 (ref 1.87–8.4)
NEUTROPHILS NFR BLD AUTO: 55.1 % (ref 39–78)
PLATELET # BLD AUTO: 345 10*3/MM3 (ref 130–400)
PMV BLD AUTO: 10.3 FL (ref 6–12)
POTASSIUM BLD-SCNC: 3.7 MMOL/L (ref 3.5–5.3)
PROTHROMBIN TIME: 12.3 SECONDS (ref 11.9–14.6)
RBC # BLD AUTO: 4.39 10*6/MM3 (ref 4.8–5.9)
SODIUM BLD-SCNC: 142 MMOL/L (ref 135–145)
WBC NRBC COR # BLD: 8.48 10*3/MM3 (ref 4.8–10.8)

## 2018-01-25 PROCEDURE — 80048 BASIC METABOLIC PNL TOTAL CA: CPT | Performed by: NURSE PRACTITIONER

## 2018-01-25 PROCEDURE — 85730 THROMBOPLASTIN TIME PARTIAL: CPT | Performed by: NURSE PRACTITIONER

## 2018-01-25 PROCEDURE — 36415 COLL VENOUS BLD VENIPUNCTURE: CPT

## 2018-01-25 PROCEDURE — 85610 PROTHROMBIN TIME: CPT | Performed by: NURSE PRACTITIONER

## 2018-01-25 PROCEDURE — 85025 COMPLETE CBC W/AUTO DIFF WBC: CPT | Performed by: NURSE PRACTITIONER

## 2018-01-25 NOTE — DISCHARGE INSTRUCTIONS
DAY OF SURGERY INSTRUCTIONS        YOUR SURGEON: Bicking     PROCEDURE: Carotid Endarterectomy    DATE OF SURGERY: February 1, 2017     ARRIVAL TIME: AS DIRECTED BY OFFICE    DAY OF SURGERY TAKE ONLY THESE MEDICATIONS: None         BEFORE YOU COME TO THE HOSPITAL  (Pre-op instructions)  • Do not eat, drink, smoke or chew gum after midnight the night before surgery.  This also includes no mints.  • Morning of surgery take only the medicines you have been instructed with a sip of water unless otherwise instructed  by your physician.  • Do not shave, wear makeup or dark nail polish.  • Remove all jewelry including rings.  • Leave anything you consider valuable at home.  • Leave your suitcase in the car until after your surgery.  • Bring the following with you if applicable:  o Picture ID and insurance, Medicare or Medicaid cards  o Co-pay/deductible required by insurance (cash, check, credit card)  o Copy of advance directive, living will or power-of- documents if not brought to PAT  o CPAP or BIPAP mask and tubing  o Relaxation aids (MP3 player, book, magazine)  • On the day of surgery check in at registration located at the main entrance of the hospital.       Outpatient Surgery Guidelines, Adult  Outpatient procedures are those for which the person having the procedure is allowed to go home the same day as the procedure. Various procedures are done on an outpatient basis. You should follow some general guidelines if you will be having an outpatient procedure.  LET YOUR HEALTH CARE PROVIDER KNOW ABOUT:  · Any allergies you have.  · All medicines you are taking, including vitamins, herbs, eye drops, creams, and over-the-counter medicines.  · Previous problems you or members of your family have had with the use of anesthetics.  · Any blood disorders you have.  · Previous surgeries you have had.  · Medical conditions you have.  RISKS AND COMPLICATIONS  Your health care provider will discuss possible risks and  complications with you before surgery. Common risks and complications include:    · Problems due to the use of anesthetics.  · Blood loss and replacement (does not apply to minor surgical procedures).  · Temporary increase in pain due to surgery.  · Uncorrected pain or problems that the surgery was meant to correct.  · Infection.  · New damage.  BEFORE THE PROCEDURE  · Ask your health care provider about changing or stopping your regular medicines. You may need to stop taking certain medicines in the days or weeks before the procedure.  · Stop smoking at least 2 weeks before surgery. This lowers your risk for complications during and after surgery. Ask your health care provider for help with this if needed.  · Eat your usual meals and a light supper the day before surgery. Continue fluid intake. Do not drink alcohol.  · Do not eat or drink after midnight the night before your surgery.   · Arrange for someone to take you home and to stay with you for 24 hours after the procedure. Medicine given for your procedure may affect your ability to drive or to care for yourself.  · Call your health care provider's office if you develop an illness or problem that may prevent you from safely having your procedure.  AFTER THE PROCEDURE  After surgery, you will be taken to a recovery area, where your progress will be monitored. If there are no complications, you will be allowed to go home when you are awake, stable, and taking fluids well. You may have numbness around the surgical site. Healing will take some time. You will have tenderness at the surgical site and may have some swelling and bruising. You may also have some nausea.  HOME CARE INSTRUCTIONS  · Do not drive for 24 hours, or as directed by your health care provider. Do not drive while taking prescription pain medicines.  · Do not drink alcohol for 24 hours.  · Do not make important decisions or sign legal documents for 24 hours.  · You may resume a normal diet and  activities as directed.  · Do not lift anything heavier than 10 pounds (4.5 kg) or play contact sports until your health care provider says it is okay.  · Change your bandages (dressings) as directed.  · Only take over-the-counter or prescription medicines as directed by your health care provider.  · Follow up with your health care provider as directed.  SEEK MEDICAL CARE IF:  · You have increased bleeding (more than a small spot) from the surgical site.  · You have redness, swelling, or increasing pain in the wound.  · You see pus coming from the wound.  · You have a fever.  · You notice a bad smell coming from the wound or dressing.  · You feel lightheaded or faint.  · You develop a rash.  · You have trouble breathing.  · You develop allergies.  MAKE SURE YOU:  · Understand these instructions.  · Will watch your condition.  · Will get help right away if you are not doing well or get worse.     This information is not intended to replace advice given to you by your health care provider. Make sure you discuss any questions you have with your health care provider.     Document Released: 09/12/2002 Document Revised: 05/03/2016 Document Reviewed: 05/22/2014  [employer] Interactive Patient Education ©2016 [employer]       Fall Prevention in Hospitals, Adult  As a hospital patient, your condition and the treatments you receive can increase your risk for falls. Some additional risk factors for falls in a hospital include:  · Being in an unfamiliar environment.  · Being on bed rest.  · Your surgery.  · Taking certain medicines.  · Your tubing requirements, such as intravenous (IV) therapy or catheters.  It is important that you learn how to decrease fall risks while at the hospital. Below are important tips that can help prevent falls.  SAFETY TIPS FOR PREVENTING FALLS  Talk about your risk of falling.  · Ask your health care provider why you are at risk for falling. Is it your medicine, illness, tubing placement, or  something else?  · Make a plan with your health care provider to keep you safe from falls.  · Ask your health care provider or pharmacist about side effects of your medicines. Some medicines can make you dizzy or affect your coordination.  Ask for help.  · Ask for help before getting out of bed. You may need to press your call button.  · Ask for assistance in getting safely to the toilet.  · Ask for a walker or cane to be put at your bedside. Ask that most of the side rails on your bed be placed up before your health care provider leaves the room.  · Ask family or friends to sit with you.  · Ask for things that are out of your reach, such as your glasses, hearing aids, telephone, bedside table, or call button.  Follow these tips to avoid falling:  · Stay lying or seated, rather than standing, while waiting for help.  · Wear rubber-soled slippers or shoes whenever you walk in the hospital.  · Avoid quick, sudden movements.  ¨ Change positions slowly.  ¨ Sit on the side of your bed before standing.  ¨ Stand up slowly and wait before you start to walk.  · Let your health care provider know if there is a spill on the floor.  · Pay careful attention to the medical equipment, electrical cords, and tubes around you.  · When you need help, use your call button by your bed or in the bathroom. Wait for one of your health care providers to help you.  · If you feel dizzy or unsure of your footing, return to bed and wait for assistance.  · Avoid being distracted by the TV, telephone, or another person in your room.  · Do not lean or support yourself on rolling objects, such as IV poles or bedside tables.     This information is not intended to replace advice given to you by your health care provider. Make sure you discuss any questions you have with your health care provider.     Document Released: 12/15/2001 Document Revised: 01/08/2016 Document Reviewed: 08/25/2013  Elsevier Interactive Patient Education ©2016 [employer]       Surgical Site Infections FAQs  What is a Surgical Site Infection (SSI)?  A surgical site infection is an infection that occurs after surgery in the part of the body where the surgery took place. Most patients who have surgery do not develop an infection. However, infections develop in about 1 to 3 out of every 100 patients who have surgery.  Some of the common symptoms of a surgical site infection are:  · Redness and pain around the area where you had surgery  · Drainage of cloudy fluid from your surgical wound  · Fever  Can SSIs be treated?  Yes. Most surgical site infections can be treated with antibiotics. The antibiotic given to you depends on the bacteria (germs) causing the infection. Sometimes patients with SSIs also need another surgery to treat the infection.  What are some of the things that hospitals are doing to prevent SSIs?  To prevent SSIs, doctors, nurses, and other healthcare providers:  · Clean their hands and arms up to their elbows with an antiseptic agent just before the surgery.  · Clean their hands with soap and water or an alcohol-based hand rub before and after caring for each patient.  · May remove some of your hair immediately before your surgery using electric clippers if the hair is in the same area where the procedure will occur. They should not shave you with a razor.  · Wear special hair covers, masks, gowns, and gloves during surgery to keep the surgery area clean.  · Give you antibiotics before your surgery starts. In most cases, you should get antibiotics within 60 minutes before the surgery starts and the antibiotics should be stopped within 24 hours after surgery.  · Clean the skin at the site of your surgery with a special soap that kills germs.  What can I do to help prevent SSIs?  Before your surgery:  · Tell your doctor about other medical problems you may have. Health problems such as allergies, diabetes, and obesity could affect your surgery and your  treatment.  · Quit smoking. Patients who smoke get more infections. Talk to your doctor about how you can quit before your surgery.  · Do not shave near where you will have surgery. Shaving with a razor can irritate your skin and make it easier to develop an infection.  At the time of your surgery:  · Speak up if someone tries to shave you with a razor before surgery. Ask why you need to be shaved and talk with your surgeon if you have any concerns.  · Ask if you will get antibiotics before surgery.  After your surgery:  · Make sure that your healthcare providers clean their hands before examining you, either with soap and water or an alcohol-based hand rub.  · If you do not see your providers clean their hands, please ask them to do so.  · Family and friends who visit you should not touch the surgical wound or dressings.  · Family and friends should clean their hands with soap and water or an alcohol-based hand rub before and after visiting you. If you do not see them clean their hands, ask them to clean their hands.  What do I need to do when I go home from the hospital?  · Before you go home, your doctor or nurse should explain everything you need to know about taking care of your wound. Make sure you understand how to care for your wound before you leave the hospital.  · Always clean your hands before and after caring for your wound.  · Before you go home, make sure you know who to contact if you have questions or problems after you get home.  · If you have any symptoms of an infection, such as redness and pain at the surgery site, drainage, or fever, call your doctor immediately.  If you have additional questions, please ask your doctor or nurse.  Developed and co-sponsored by The Society for Healthcare Epidemiology of [place] (SHEA); Infectious Diseases Society of [place] (IDSA); American Hospital Association; Association for Professionals in Infection Control and Epidemiology (APIC); Centers for Disease  Control and Prevention (CDC); and The Joint Commission.     This information is not intended to replace advice given to you by your health care provider. Make sure you discuss any questions you have with your health care provider.     Document Released: 12/23/2014 Document Revised: 01/08/2016 Document Reviewed: 03/02/2016  [employer] Interactive Patient Education ©2016 [employer]vier Inc.       Psychiatric  CHG 4% Patient Instruction Sheet    Preparing the Skin Before Surgery  Preparing or “prepping” skin before surgery can reduce the risk of infection at the surgical site. To make the process easier,[facility] has chosen 4% Chlorhexidine Gluconate (CHG) antiseptic solution.   The steps below outline the prepping process and should be carefully followed.                                                                                                                                                      Prep the skin at the following time(s):                                                      We recommend you shower the night before surgery, and again the morning of surgery with the 4% CHG antiseptic solution using half of the bottle and a cloth each time.  Dress in clean clothes/sleepwear after showering.  See instructions below for application.          Do not apply any lotions or moisturizers.       Do not shave the area to be prepped for at least 2 days prior to surgery.    Clipping the hair may be done immediately prior to your surgery at the hospital    if needed.    Directions:  Thoroughly rinse your body with water.  Apply 4% CHG to a cloth and wash skin gently, paying special attention to the operative site.  Rinse again thoroughly.  Once you have begun using this product do not apply anything else to your skin. If itching or redness persists, rinse affected areas and discontinue use.    When using this product:  • Keep out of eyes, ears, and mouth.  • If solution should contact these areas, rinse out promptly  and thoroughly with water.  • For external use only.  • Do not use in genital area, on your face or head.      PATIENT/FAMILY/RESPONSIBLE PARTY VERBALIZES UNDERSTANDING OF ABOVE EDUCATION.  COPY OF PAIN SCALE GIVEN AND REVIEWED WITH VERBALIZED UNDERSTANDING.

## 2018-01-31 ENCOUNTER — ANESTHESIA EVENT (OUTPATIENT)
Dept: PERIOP | Facility: HOSPITAL | Age: 58
End: 2018-01-31

## 2018-02-01 ENCOUNTER — HOSPITAL ENCOUNTER (OUTPATIENT)
Facility: HOSPITAL | Age: 58
Discharge: HOME | End: 2018-02-02
Attending: SURGERY | Admitting: SURGERY

## 2018-02-01 ENCOUNTER — ANESTHESIA (OUTPATIENT)
Dept: PERIOP | Facility: HOSPITAL | Age: 58
End: 2018-02-01

## 2018-02-01 ENCOUNTER — APPOINTMENT (OUTPATIENT)
Dept: ULTRASOUND IMAGING | Facility: HOSPITAL | Age: 58
End: 2018-02-01

## 2018-02-01 DIAGNOSIS — I65.21 STENOSIS OF RIGHT CAROTID ARTERY: ICD-10-CM

## 2018-02-01 DIAGNOSIS — G89.18 POST-OP PAIN: Primary | ICD-10-CM

## 2018-02-01 LAB
ABO GROUP BLD: NORMAL
BLD GP AB SCN SERPL QL: NEGATIVE
RH BLD: POSITIVE

## 2018-02-01 PROCEDURE — 25010000003 CEFAZOLIN PER 500 MG: Performed by: NURSE ANESTHETIST, CERTIFIED REGISTERED

## 2018-02-01 PROCEDURE — 88311 DECALCIFY TISSUE: CPT | Performed by: SURGERY

## 2018-02-01 PROCEDURE — 95955 EEG DURING SURGERY: CPT | Performed by: SURGERY

## 2018-02-01 PROCEDURE — 94640 AIRWAY INHALATION TREATMENT: CPT

## 2018-02-01 PROCEDURE — 25010000002 FENTANYL CITRATE (PF) 250 MCG/5ML SOLUTION: Performed by: NURSE ANESTHETIST, CERTIFIED REGISTERED

## 2018-02-01 PROCEDURE — C1768 GRAFT, VASCULAR: HCPCS | Performed by: SURGERY

## 2018-02-01 PROCEDURE — 25010000002 PROTAMINE SULFATE PER 10 MG: Performed by: NURSE ANESTHETIST, CERTIFIED REGISTERED

## 2018-02-01 PROCEDURE — 25010000002 HEPARIN (PORCINE) PER 1000 UNITS: Performed by: SURGERY

## 2018-02-01 PROCEDURE — 25010000002 HEPARIN (PORCINE) PER 1000 UNITS: Performed by: NURSE ANESTHETIST, CERTIFIED REGISTERED

## 2018-02-01 PROCEDURE — 86920 COMPATIBILITY TEST SPIN: CPT

## 2018-02-01 PROCEDURE — 93882 EXTRACRANIAL UNI/LTD STUDY: CPT

## 2018-02-01 PROCEDURE — 88304 TISSUE EXAM BY PATHOLOGIST: CPT | Performed by: SURGERY

## 2018-02-01 PROCEDURE — 25010000002 MORPHINE PER 10 MG: Performed by: NURSE PRACTITIONER

## 2018-02-01 PROCEDURE — 86901 BLOOD TYPING SEROLOGIC RH(D): CPT | Performed by: NURSE PRACTITIONER

## 2018-02-01 PROCEDURE — 93882 EXTRACRANIAL UNI/LTD STUDY: CPT | Performed by: SURGERY

## 2018-02-01 PROCEDURE — 25010000002 FENTANYL CITRATE (PF) 100 MCG/2ML SOLUTION: Performed by: NURSE ANESTHETIST, CERTIFIED REGISTERED

## 2018-02-01 PROCEDURE — 86900 BLOOD TYPING SEROLOGIC ABO: CPT | Performed by: NURSE PRACTITIONER

## 2018-02-01 PROCEDURE — 86850 RBC ANTIBODY SCREEN: CPT | Performed by: NURSE PRACTITIONER

## 2018-02-01 PROCEDURE — 25010000002 PROPOFOL 10 MG/ML EMULSION: Performed by: NURSE ANESTHETIST, CERTIFIED REGISTERED

## 2018-02-01 PROCEDURE — 35301 RECHANNELING OF ARTERY: CPT | Performed by: SURGERY

## 2018-02-01 PROCEDURE — 94799 UNLISTED PULMONARY SVC/PX: CPT

## 2018-02-01 PROCEDURE — 25010000002 ONDANSETRON PER 1 MG: Performed by: NURSE ANESTHETIST, CERTIFIED REGISTERED

## 2018-02-01 PROCEDURE — 94760 N-INVAS EAR/PLS OXIMETRY 1: CPT

## 2018-02-01 DEVICE — PTCH VASC XENOSURE BIO 0.8X8CM: Type: IMPLANTABLE DEVICE | Site: CAROTID | Status: FUNCTIONAL

## 2018-02-01 RX ORDER — CEFAZOLIN SODIUM 1 G/3ML
INJECTION, POWDER, FOR SOLUTION INTRAMUSCULAR; INTRAVENOUS AS NEEDED
Status: DISCONTINUED | OUTPATIENT
Start: 2018-02-01 | End: 2018-02-01 | Stop reason: SURG

## 2018-02-01 RX ORDER — POTASSIUM CHLORIDE 750 MG/1
20 CAPSULE, EXTENDED RELEASE ORAL 2 TIMES DAILY WITH MEALS
Status: DISCONTINUED | OUTPATIENT
Start: 2018-02-01 | End: 2018-02-02 | Stop reason: HOSPADM

## 2018-02-01 RX ORDER — ASPIRIN 81 MG/1
81 TABLET ORAL DAILY
Status: DISCONTINUED | OUTPATIENT
Start: 2018-02-01 | End: 2018-02-02 | Stop reason: HOSPADM

## 2018-02-01 RX ORDER — NICOTINE 21 MG/24HR
1 PATCH, TRANSDERMAL 24 HOURS TRANSDERMAL
Status: DISCONTINUED | OUTPATIENT
Start: 2018-02-01 | End: 2018-02-02 | Stop reason: HOSPADM

## 2018-02-01 RX ORDER — BUPIVACAINE HYDROCHLORIDE 5 MG/ML
INJECTION, SOLUTION PERINEURAL AS NEEDED
Status: DISCONTINUED | OUTPATIENT
Start: 2018-02-01 | End: 2018-02-01 | Stop reason: HOSPADM

## 2018-02-01 RX ORDER — ONDANSETRON 4 MG/1
4 TABLET, FILM COATED ORAL EVERY 6 HOURS PRN
Status: DISCONTINUED | OUTPATIENT
Start: 2018-02-01 | End: 2018-02-02 | Stop reason: HOSPADM

## 2018-02-01 RX ORDER — NITROGLYCERIN 0.4 MG/1
0.4 TABLET SUBLINGUAL
Status: DISCONTINUED | OUTPATIENT
Start: 2018-02-01 | End: 2018-02-02 | Stop reason: HOSPADM

## 2018-02-01 RX ORDER — MEPERIDINE HYDROCHLORIDE 25 MG/ML
12.5 INJECTION INTRAMUSCULAR; INTRAVENOUS; SUBCUTANEOUS
Status: DISCONTINUED | OUTPATIENT
Start: 2018-02-01 | End: 2018-02-01 | Stop reason: HOSPADM

## 2018-02-01 RX ORDER — HEPARIN SODIUM 1000 [USP'U]/ML
INJECTION, SOLUTION INTRAVENOUS; SUBCUTANEOUS AS NEEDED
Status: DISCONTINUED | OUTPATIENT
Start: 2018-02-01 | End: 2018-02-01 | Stop reason: SURG

## 2018-02-01 RX ORDER — ONDANSETRON 2 MG/ML
INJECTION INTRAMUSCULAR; INTRAVENOUS AS NEEDED
Status: DISCONTINUED | OUTPATIENT
Start: 2018-02-01 | End: 2018-02-01 | Stop reason: SURG

## 2018-02-01 RX ORDER — ONDANSETRON 4 MG/1
4 TABLET, ORALLY DISINTEGRATING ORAL EVERY 6 HOURS PRN
Status: DISCONTINUED | OUTPATIENT
Start: 2018-02-01 | End: 2018-02-02 | Stop reason: HOSPADM

## 2018-02-01 RX ORDER — ACETAMINOPHEN 325 MG/1
650 TABLET ORAL EVERY 4 HOURS PRN
Status: DISCONTINUED | OUTPATIENT
Start: 2018-02-01 | End: 2018-02-02 | Stop reason: HOSPADM

## 2018-02-01 RX ORDER — IPRATROPIUM BROMIDE AND ALBUTEROL SULFATE 2.5; .5 MG/3ML; MG/3ML
3 SOLUTION RESPIRATORY (INHALATION) ONCE AS NEEDED
Status: DISCONTINUED | OUTPATIENT
Start: 2018-02-01 | End: 2018-02-01 | Stop reason: HOSPADM

## 2018-02-01 RX ORDER — LIDOCAINE HYDROCHLORIDE 10 MG/ML
INJECTION, SOLUTION INFILTRATION; PERINEURAL AS NEEDED
Status: DISCONTINUED | OUTPATIENT
Start: 2018-02-01 | End: 2018-02-01 | Stop reason: HOSPADM

## 2018-02-01 RX ORDER — SODIUM CHLORIDE, SODIUM LACTATE, POTASSIUM CHLORIDE, CALCIUM CHLORIDE 600; 310; 30; 20 MG/100ML; MG/100ML; MG/100ML; MG/100ML
30 INJECTION, SOLUTION INTRAVENOUS CONTINUOUS
Status: DISCONTINUED | OUTPATIENT
Start: 2018-02-01 | End: 2018-02-01 | Stop reason: HOSPADM

## 2018-02-01 RX ORDER — MORPHINE SULFATE 4 MG/ML
4 INJECTION, SOLUTION INTRAMUSCULAR; INTRAVENOUS EVERY 4 HOURS PRN
Status: DISCONTINUED | OUTPATIENT
Start: 2018-02-01 | End: 2018-02-02 | Stop reason: HOSPADM

## 2018-02-01 RX ORDER — OXYCODONE HYDROCHLORIDE 5 MG/1
5 TABLET ORAL EVERY 6 HOURS PRN
Status: DISCONTINUED | OUTPATIENT
Start: 2018-02-01 | End: 2018-02-02 | Stop reason: HOSPADM

## 2018-02-01 RX ORDER — MIDAZOLAM HYDROCHLORIDE 1 MG/ML
1 INJECTION INTRAMUSCULAR; INTRAVENOUS
Status: DISCONTINUED | OUTPATIENT
Start: 2018-02-01 | End: 2018-02-01 | Stop reason: HOSPADM

## 2018-02-01 RX ORDER — LABETALOL HYDROCHLORIDE 5 MG/ML
5 INJECTION, SOLUTION INTRAVENOUS
Status: DISCONTINUED | OUTPATIENT
Start: 2018-02-01 | End: 2018-02-01 | Stop reason: HOSPADM

## 2018-02-01 RX ORDER — MIDAZOLAM HYDROCHLORIDE 1 MG/ML
2 INJECTION INTRAMUSCULAR; INTRAVENOUS
Status: DISCONTINUED | OUTPATIENT
Start: 2018-02-01 | End: 2018-02-01 | Stop reason: HOSPADM

## 2018-02-01 RX ORDER — SODIUM CHLORIDE 0.9 % (FLUSH) 0.9 %
3 SYRINGE (ML) INJECTION AS NEEDED
Status: DISCONTINUED | OUTPATIENT
Start: 2018-02-01 | End: 2018-02-01 | Stop reason: HOSPADM

## 2018-02-01 RX ORDER — SODIUM CHLORIDE 0.9 % (FLUSH) 0.9 %
1-10 SYRINGE (ML) INJECTION AS NEEDED
Status: DISCONTINUED | OUTPATIENT
Start: 2018-02-01 | End: 2018-02-01 | Stop reason: HOSPADM

## 2018-02-01 RX ORDER — ONDANSETRON 2 MG/ML
4 INJECTION INTRAMUSCULAR; INTRAVENOUS EVERY 6 HOURS PRN
Status: DISCONTINUED | OUTPATIENT
Start: 2018-02-01 | End: 2018-02-02 | Stop reason: HOSPADM

## 2018-02-01 RX ORDER — MORPHINE SULFATE 2 MG/ML
2 INJECTION, SOLUTION INTRAMUSCULAR; INTRAVENOUS
Status: DISCONTINUED | OUTPATIENT
Start: 2018-02-01 | End: 2018-02-01 | Stop reason: HOSPADM

## 2018-02-01 RX ORDER — DEXTROSE MONOHYDRATE 25 G/50ML
12.5 INJECTION, SOLUTION INTRAVENOUS AS NEEDED
Status: DISCONTINUED | OUTPATIENT
Start: 2018-02-01 | End: 2018-02-01 | Stop reason: HOSPADM

## 2018-02-01 RX ORDER — ATORVASTATIN CALCIUM 40 MG/1
80 TABLET, FILM COATED ORAL NIGHTLY
Status: DISCONTINUED | OUTPATIENT
Start: 2018-02-01 | End: 2018-02-02 | Stop reason: HOSPADM

## 2018-02-01 RX ORDER — LABETALOL HYDROCHLORIDE 5 MG/ML
INJECTION, SOLUTION INTRAVENOUS AS NEEDED
Status: DISCONTINUED | OUTPATIENT
Start: 2018-02-01 | End: 2018-02-01 | Stop reason: SURG

## 2018-02-01 RX ORDER — FENTANYL CITRATE 50 UG/ML
25 INJECTION, SOLUTION INTRAMUSCULAR; INTRAVENOUS
Status: DISCONTINUED | OUTPATIENT
Start: 2018-02-01 | End: 2018-02-01 | Stop reason: HOSPADM

## 2018-02-01 RX ORDER — ALBUTEROL SULFATE 2.5 MG/3ML
2.5 SOLUTION RESPIRATORY (INHALATION) EVERY 4 HOURS PRN
Status: DISCONTINUED | OUTPATIENT
Start: 2018-02-01 | End: 2018-02-02 | Stop reason: HOSPADM

## 2018-02-01 RX ORDER — CITALOPRAM 20 MG/1
20 TABLET ORAL DAILY
Status: DISCONTINUED | OUTPATIENT
Start: 2018-02-01 | End: 2018-02-02 | Stop reason: HOSPADM

## 2018-02-01 RX ORDER — FUROSEMIDE 40 MG/1
40 TABLET ORAL DAILY
Status: DISCONTINUED | OUTPATIENT
Start: 2018-02-01 | End: 2018-02-02 | Stop reason: HOSPADM

## 2018-02-01 RX ORDER — SODIUM CHLORIDE, SODIUM LACTATE, POTASSIUM CHLORIDE, CALCIUM CHLORIDE 600; 310; 30; 20 MG/100ML; MG/100ML; MG/100ML; MG/100ML
1000 INJECTION, SOLUTION INTRAVENOUS CONTINUOUS
Status: DISCONTINUED | OUTPATIENT
Start: 2018-02-01 | End: 2018-02-01 | Stop reason: HOSPADM

## 2018-02-01 RX ORDER — PROTAMINE SULFATE 10 MG/ML
INJECTION, SOLUTION INTRAVENOUS AS NEEDED
Status: DISCONTINUED | OUTPATIENT
Start: 2018-02-01 | End: 2018-02-01 | Stop reason: SURG

## 2018-02-01 RX ORDER — BUDESONIDE AND FORMOTEROL FUMARATE DIHYDRATE 160; 4.5 UG/1; UG/1
2 AEROSOL RESPIRATORY (INHALATION)
Status: DISCONTINUED | OUTPATIENT
Start: 2018-02-01 | End: 2018-02-02 | Stop reason: HOSPADM

## 2018-02-01 RX ORDER — SODIUM CHLORIDE, SODIUM LACTATE, POTASSIUM CHLORIDE, CALCIUM CHLORIDE 600; 310; 30; 20 MG/100ML; MG/100ML; MG/100ML; MG/100ML
9 INJECTION, SOLUTION INTRAVENOUS CONTINUOUS
Status: DISCONTINUED | OUTPATIENT
Start: 2018-02-01 | End: 2018-02-01 | Stop reason: HOSPADM

## 2018-02-01 RX ORDER — FENTANYL CITRATE 50 UG/ML
INJECTION, SOLUTION INTRAMUSCULAR; INTRAVENOUS AS NEEDED
Status: DISCONTINUED | OUTPATIENT
Start: 2018-02-01 | End: 2018-02-01 | Stop reason: SURG

## 2018-02-01 RX ORDER — NALOXONE HCL 0.4 MG/ML
0.4 VIAL (ML) INJECTION AS NEEDED
Status: DISCONTINUED | OUTPATIENT
Start: 2018-02-01 | End: 2018-02-01 | Stop reason: HOSPADM

## 2018-02-01 RX ORDER — NICARDIPINE HYDROCHLORIDE 2.5 MG/ML
INJECTION INTRAVENOUS AS NEEDED
Status: DISCONTINUED | OUTPATIENT
Start: 2018-02-01 | End: 2018-02-01 | Stop reason: SURG

## 2018-02-01 RX ORDER — IPRATROPIUM BROMIDE AND ALBUTEROL SULFATE 2.5; .5 MG/3ML; MG/3ML
3 SOLUTION RESPIRATORY (INHALATION) ONCE
Status: COMPLETED | OUTPATIENT
Start: 2018-02-01 | End: 2018-02-01

## 2018-02-01 RX ORDER — SODIUM CHLORIDE 0.9 % (FLUSH) 0.9 %
1-10 SYRINGE (ML) INJECTION AS NEEDED
Status: DISCONTINUED | OUTPATIENT
Start: 2018-02-01 | End: 2018-02-02 | Stop reason: HOSPADM

## 2018-02-01 RX ORDER — ALBUTEROL SULFATE 90 UG/1
2 AEROSOL, METERED RESPIRATORY (INHALATION) EVERY 4 HOURS PRN
Status: DISCONTINUED | OUTPATIENT
Start: 2018-02-01 | End: 2018-02-01 | Stop reason: CLARIF

## 2018-02-01 RX ORDER — SODIUM CHLORIDE 9 MG/ML
50 INJECTION, SOLUTION INTRAVENOUS CONTINUOUS
Status: DISCONTINUED | OUTPATIENT
Start: 2018-02-01 | End: 2018-02-02 | Stop reason: HOSPADM

## 2018-02-01 RX ORDER — PROPOFOL 10 MG/ML
VIAL (ML) INTRAVENOUS AS NEEDED
Status: DISCONTINUED | OUTPATIENT
Start: 2018-02-01 | End: 2018-02-01 | Stop reason: SURG

## 2018-02-01 RX ORDER — ONDANSETRON 2 MG/ML
4 INJECTION INTRAMUSCULAR; INTRAVENOUS ONCE AS NEEDED
Status: DISCONTINUED | OUTPATIENT
Start: 2018-02-01 | End: 2018-02-01 | Stop reason: HOSPADM

## 2018-02-01 RX ORDER — HYDRALAZINE HYDROCHLORIDE 20 MG/ML
5 INJECTION INTRAMUSCULAR; INTRAVENOUS
Status: DISCONTINUED | OUTPATIENT
Start: 2018-02-01 | End: 2018-02-01 | Stop reason: HOSPADM

## 2018-02-01 RX ORDER — DIPHENHYDRAMINE HYDROCHLORIDE 50 MG/ML
12.5 INJECTION INTRAMUSCULAR; INTRAVENOUS
Status: DISCONTINUED | OUTPATIENT
Start: 2018-02-01 | End: 2018-02-01 | Stop reason: HOSPADM

## 2018-02-01 RX ADMIN — BUDESONIDE AND FORMOTEROL FUMARATE DIHYDRATE 2 PUFF: 160; 4.5 AEROSOL RESPIRATORY (INHALATION) at 20:30

## 2018-02-01 RX ADMIN — PHENYLEPHRINE HYDROCHLORIDE 0.5 MCG/KG/MIN: 10 INJECTION INTRAVENOUS at 14:46

## 2018-02-01 RX ADMIN — MORPHINE SULFATE 4 MG: 4 INJECTION, SOLUTION INTRAMUSCULAR; INTRAVENOUS at 20:54

## 2018-02-01 RX ADMIN — PROPOFOL 20 MG: 10 INJECTION, EMULSION INTRAVENOUS at 07:42

## 2018-02-01 RX ADMIN — SODIUM CHLORIDE, POTASSIUM CHLORIDE, SODIUM LACTATE AND CALCIUM CHLORIDE 30 ML/HR: 600; 310; 30; 20 INJECTION, SOLUTION INTRAVENOUS at 05:51

## 2018-02-01 RX ADMIN — CEFAZOLIN 2 G: 1 INJECTION, POWDER, FOR SOLUTION INTRAVENOUS at 07:38

## 2018-02-01 RX ADMIN — FENTANYL CITRATE 150 MCG: 50 INJECTION INTRAMUSCULAR; INTRAVENOUS at 07:16

## 2018-02-01 RX ADMIN — SODIUM CHLORIDE 50 ML/HR: 9 INJECTION, SOLUTION INTRAVENOUS at 11:21

## 2018-02-01 RX ADMIN — PROPOFOL 20 MG: 10 INJECTION, EMULSION INTRAVENOUS at 07:22

## 2018-02-01 RX ADMIN — IPRATROPIUM BROMIDE AND ALBUTEROL SULFATE 3 ML: 2.5; .5 SOLUTION RESPIRATORY (INHALATION) at 06:37

## 2018-02-01 RX ADMIN — PROPOFOL 20 MG: 10 INJECTION, EMULSION INTRAVENOUS at 07:52

## 2018-02-01 RX ADMIN — NICOTINE 1 PATCH: 14 PATCH, EXTENDED RELEASE TRANSDERMAL at 11:21

## 2018-02-01 RX ADMIN — NICARDIPINE HYDROCHLORIDE 500 MCG: 25 INJECTION INTRAVENOUS at 08:40

## 2018-02-01 RX ADMIN — HEPARIN SODIUM 6000 UNITS: 1000 INJECTION, SOLUTION INTRAVENOUS; SUBCUTANEOUS at 07:58

## 2018-02-01 RX ADMIN — LABETALOL HYDROCHLORIDE 5 MG: 5 INJECTION, SOLUTION INTRAVENOUS at 08:43

## 2018-02-01 RX ADMIN — SODIUM CHLORIDE, POTASSIUM CHLORIDE, SODIUM LACTATE AND CALCIUM CHLORIDE 1000 ML: 600; 310; 30; 20 INJECTION, SOLUTION INTRAVENOUS at 06:01

## 2018-02-01 RX ADMIN — ONDANSETRON HYDROCHLORIDE 4 MG: 2 SOLUTION INTRAMUSCULAR; INTRAVENOUS at 08:44

## 2018-02-01 RX ADMIN — OXYCODONE HYDROCHLORIDE 5 MG: 5 TABLET ORAL at 10:49

## 2018-02-01 RX ADMIN — CITALOPRAM 20 MG: 20 TABLET, FILM COATED ORAL at 11:21

## 2018-02-01 RX ADMIN — FENTANYL CITRATE 100 MCG: 50 INJECTION, SOLUTION INTRAMUSCULAR; INTRAVENOUS at 08:30

## 2018-02-01 RX ADMIN — FENTANYL CITRATE 100 MCG: 50 INJECTION INTRAMUSCULAR; INTRAVENOUS at 07:10

## 2018-02-01 RX ADMIN — FENTANYL CITRATE 100 MCG: 50 INJECTION, SOLUTION INTRAMUSCULAR; INTRAVENOUS at 07:50

## 2018-02-01 RX ADMIN — FUROSEMIDE 40 MG: 40 TABLET ORAL at 11:21

## 2018-02-01 RX ADMIN — MORPHINE SULFATE 4 MG: 4 INJECTION, SOLUTION INTRAMUSCULAR; INTRAVENOUS at 15:58

## 2018-02-01 RX ADMIN — PROPOFOL 20 MG: 10 INJECTION, EMULSION INTRAVENOUS at 07:48

## 2018-02-01 RX ADMIN — PROTAMINE SULFATE 20 MG: 10 INJECTION, SOLUTION INTRAVENOUS at 08:50

## 2018-02-01 RX ADMIN — PROPOFOL 10 MG: 10 INJECTION, EMULSION INTRAVENOUS at 07:31

## 2018-02-01 RX ADMIN — OXYCODONE HYDROCHLORIDE 5 MG: 5 TABLET ORAL at 23:41

## 2018-02-01 RX ADMIN — DESMOPRESSIN ACETATE 80 MG: 0.2 TABLET ORAL at 20:55

## 2018-02-01 RX ADMIN — POTASSIUM CHLORIDE 20 MEQ: 750 CAPSULE, EXTENDED RELEASE ORAL at 11:22

## 2018-02-01 RX ADMIN — POTASSIUM CHLORIDE 20 MEQ: 750 CAPSULE, EXTENDED RELEASE ORAL at 17:14

## 2018-02-01 RX ADMIN — LIDOCAINE HYDROCHLORIDE 0.5 ML: 10 INJECTION, SOLUTION EPIDURAL; INFILTRATION; INTRACAUDAL; PERINEURAL at 05:50

## 2018-02-01 RX ADMIN — Medication 81 MG: at 11:22

## 2018-02-01 NOTE — ANESTHESIA POSTPROCEDURE EVALUATION
"Patient: Sharad Ryder    Procedure Summary     Date Anesthesia Start Anesthesia Stop Room / Location    02/01/18 0718 0908 [facility] OR 15 / [facility] OR       Procedure Diagnosis Surgeon Provider    RIGHT CAROTID ENDARTERECTOMY WITH EEG-awake (Right Neck) Stenosis of right carotid artery  (Stenosis of right carotid artery [I65.21]) DO Bladimir Bell, CRNA          Anesthesia Type: regional  Last vitals  BP   (!) 88/58 (02/01/18 1400)   Temp   98.5 °F (36.9 °C) (02/01/18 0945)   Pulse   77 (02/01/18 1400)   Resp   14 (02/01/18 1037)     SpO2   96 % (02/01/18 1400)     Post Anesthesia Care and Evaluation    Patient location during evaluation: PACU  Patient participation: complete - patient participated  Level of consciousness: awake and alert  Pain management: adequate  Airway patency: patent  Anesthetic complications: No anesthetic complications  PONV Status: none  Cardiovascular status: acceptable and hemodynamically stable  Respiratory status: acceptable  Hydration status: acceptable    Comments: Blood pressure (!) 88/58, pulse 77, temperature 98.5 °F (36.9 °C), temperature source Temporal Artery , resp. rate 14, height 163.8 cm (64.5\"), weight 57.2 kg (126 lb 1.7 oz), SpO2 96 %.    Patient discharged from PACU based upon Nita score. Please see RN notes for further details      "

## 2018-02-01 NOTE — ANESTHESIA PROCEDURE NOTES
Peripheral Block    Patient location during procedure: holding area  Start time: 2/1/2018 6:56 AM  Stop time: 2/1/2018 6:59 AM  Reason for block: post-op pain management  Performed by  Anesthesiologist: FRANSISCO PATRICIO  Preanesthetic Checklist  Completed: patient identified, site marked, surgical consent, pre-op evaluation, timeout performed, IV checked, risks and benefits discussed and monitors and equipment checked  Prep:  Pt Position: supine  Sterile barriers:gloves  Prep: ChloraPrep  Patient monitoring: blood pressure monitoring, continuous pulse oximetry and EKG  Procedure  Sedation:yes    Guidance:ultrasound guided  Images:still images not obtained    Laterality:right  Anesthesia block type: Cervical Plexus.  Injection Technique:single-shot  Needle Type:echogenic  Needle Gauge:20 G    Medications  Local Injected:ropivacaine 0.5% without epinephrine Local Amount Injected:20mL  Post Assessment  Injection Assessment: negative aspiration for heme, no paresthesia on injection and incremental injection  Patient Tolerance:comfortable throughout block  Complications:no

## 2018-02-01 NOTE — ANESTHESIA PREPROCEDURE EVALUATION
Anesthesia Evaluation     Patient summary reviewed   no history of anesthetic complications:  NPO Solid Status: > 8 hours       Airway   Mallampati: II  TM distance: >3 FB  Neck ROM: full  Dental    (+) upper dentures and lower dentures    Pulmonary    (+) a smoker, COPD,   Cardiovascular     ECG reviewed    (+) hypertension, valvular problems/murmurs (severe AS) AS, past MI (NSTEMI) , CAD, hyperlipidemia  (-) pacemaker, CHF, cardiac stents      Neuro/Psych  (-) seizures, TIA, CVA    ROS Comment: Asymptomatic COD  GI/Hepatic/Renal/Endo    (-) liver disease, no renal disease, diabetes    Musculoskeletal     Abdominal    Substance History      OB/GYN          Other                                                Anesthesia Plan    ASA 3     regional     intravenous induction   Anesthetic plan and risks discussed with patient.  Use of blood products discussed with patient  Consented to blood products.

## 2018-02-02 ENCOUNTER — PREP FOR SURGERY (OUTPATIENT)
Dept: OTHER | Facility: HOSPITAL | Age: 58
End: 2018-02-02

## 2018-02-02 VITALS
DIASTOLIC BLOOD PRESSURE: 72 MMHG | HEART RATE: 85 BPM | OXYGEN SATURATION: 92 % | TEMPERATURE: 98.5 F | BODY MASS INDEX: 21.3 KG/M2 | RESPIRATION RATE: 16 BRPM | HEIGHT: 65 IN | WEIGHT: 127.87 LBS | SYSTOLIC BLOOD PRESSURE: 112 MMHG

## 2018-02-02 DIAGNOSIS — I35.0 AORTIC VALVE STENOSIS, ETIOLOGY OF CARDIAC VALVE DISEASE UNSPECIFIED: Primary | ICD-10-CM

## 2018-02-02 PROBLEM — G89.18 POST-OP PAIN: Status: ACTIVE | Noted: 2018-02-02

## 2018-02-02 PROCEDURE — 94799 UNLISTED PULMONARY SVC/PX: CPT

## 2018-02-02 PROCEDURE — 99024 POSTOP FOLLOW-UP VISIT: CPT | Performed by: SURGERY

## 2018-02-02 RX ORDER — SODIUM CHLORIDE 0.9 % (FLUSH) 0.9 %
1-10 SYRINGE (ML) INJECTION AS NEEDED
Status: CANCELLED | OUTPATIENT
Start: 2018-02-02

## 2018-02-02 RX ORDER — HYDROCODONE BITARTRATE AND ACETAMINOPHEN 5; 325 MG/1; MG/1
1 TABLET ORAL EVERY 6 HOURS PRN
Qty: 30 TABLET | Refills: 0 | Status: SHIPPED | OUTPATIENT
Start: 2018-02-02 | End: 2018-02-16 | Stop reason: HOSPADM

## 2018-02-02 RX ADMIN — SODIUM CHLORIDE 50 ML/HR: 9 INJECTION, SOLUTION INTRAVENOUS at 05:32

## 2018-02-02 RX ADMIN — NICOTINE 1 PATCH: 14 PATCH, EXTENDED RELEASE TRANSDERMAL at 08:34

## 2018-02-02 RX ADMIN — FUROSEMIDE 40 MG: 40 TABLET ORAL at 08:35

## 2018-02-02 RX ADMIN — BUDESONIDE AND FORMOTEROL FUMARATE DIHYDRATE 2 PUFF: 160; 4.5 AEROSOL RESPIRATORY (INHALATION) at 09:49

## 2018-02-02 RX ADMIN — OXYCODONE HYDROCHLORIDE 5 MG: 5 TABLET ORAL at 05:24

## 2018-02-02 RX ADMIN — Medication 81 MG: at 08:35

## 2018-02-02 RX ADMIN — POTASSIUM CHLORIDE 20 MEQ: 750 CAPSULE, EXTENDED RELEASE ORAL at 08:35

## 2018-02-02 RX ADMIN — CITALOPRAM 20 MG: 20 TABLET, FILM COATED ORAL at 08:35

## 2018-02-05 ENCOUNTER — ANESTHESIA EVENT (OUTPATIENT)
Dept: PERIOP | Facility: HOSPITAL | Age: 58
End: 2018-02-05

## 2018-02-05 PROBLEM — I35.0 AORTIC VALVE STENOSIS: Status: ACTIVE | Noted: 2018-02-05

## 2018-02-05 LAB
ABO + RH BLD: NORMAL
ABO + RH BLD: NORMAL
BH BB BLOOD EXPIRATION DATE: NORMAL
BH BB BLOOD EXPIRATION DATE: NORMAL
BH BB BLOOD TYPE BARCODE: 7300
BH BB BLOOD TYPE BARCODE: 7300
BH BB DISPENSE STATUS: NORMAL
BH BB DISPENSE STATUS: NORMAL
BH BB PRODUCT CODE: NORMAL
BH BB PRODUCT CODE: NORMAL
BH BB UNIT NUMBER: NORMAL
BH BB UNIT NUMBER: NORMAL
CROSSMATCH INTERPRETATION: NORMAL
CROSSMATCH INTERPRETATION: NORMAL
CYTO UR: NORMAL
LAB AP CASE REPORT: NORMAL
Lab: NORMAL
PATH REPORT.FINAL DX SPEC: NORMAL
PATH REPORT.GROSS SPEC: NORMAL
UNIT  ABO: NORMAL
UNIT  ABO: NORMAL
UNIT  RH: NORMAL
UNIT  RH: NORMAL

## 2018-02-07 RX ORDER — SODIUM CHLORIDE, SODIUM LACTATE, POTASSIUM CHLORIDE, CALCIUM CHLORIDE 600; 310; 30; 20 MG/100ML; MG/100ML; MG/100ML; MG/100ML
30 INJECTION, SOLUTION INTRAVENOUS CONTINUOUS
Status: DISCONTINUED | OUTPATIENT
Start: 2018-02-07 | End: 2018-02-08 | Stop reason: HOSPADM

## 2018-02-07 RX ORDER — SODIUM CHLORIDE 0.9 % (FLUSH) 0.9 %
1-10 SYRINGE (ML) INJECTION AS NEEDED
Status: DISCONTINUED | OUTPATIENT
Start: 2018-02-07 | End: 2018-02-08 | Stop reason: HOSPADM

## 2018-02-08 ENCOUNTER — APPOINTMENT (OUTPATIENT)
Dept: CARDIOLOGY | Facility: HOSPITAL | Age: 58
End: 2018-02-08
Attending: THORACIC SURGERY (CARDIOTHORACIC VASCULAR SURGERY)

## 2018-02-08 ENCOUNTER — ANESTHESIA (OUTPATIENT)
Dept: PERIOP | Facility: HOSPITAL | Age: 58
End: 2018-02-08

## 2018-02-08 ENCOUNTER — APPOINTMENT (OUTPATIENT)
Dept: GENERAL RADIOLOGY | Facility: HOSPITAL | Age: 58
End: 2018-02-08

## 2018-02-08 ENCOUNTER — HOSPITAL ENCOUNTER (INPATIENT)
Facility: HOSPITAL | Age: 58
LOS: 8 days | Discharge: HOME | End: 2018-02-16
Attending: THORACIC SURGERY (CARDIOTHORACIC VASCULAR SURGERY) | Admitting: THORACIC SURGERY (CARDIOTHORACIC VASCULAR SURGERY)

## 2018-02-08 DIAGNOSIS — D64.9 ANEMIA, UNSPECIFIED TYPE: ICD-10-CM

## 2018-02-08 DIAGNOSIS — E46 PROTEIN-CALORIE MALNUTRITION, UNSPECIFIED SEVERITY (HCC): ICD-10-CM

## 2018-02-08 DIAGNOSIS — E87.1 HYPONATREMIA: ICD-10-CM

## 2018-02-08 DIAGNOSIS — Z74.09 IMPAIRED FUNCTIONAL MOBILITY, BALANCE, GAIT, AND ENDURANCE: ICD-10-CM

## 2018-02-08 DIAGNOSIS — E43 SEVERE PROTEIN-CALORIE MALNUTRITION (HCC): Primary | ICD-10-CM

## 2018-02-08 DIAGNOSIS — I35.0 AORTIC VALVE STENOSIS, ETIOLOGY OF CARDIAC VALVE DISEASE UNSPECIFIED: ICD-10-CM

## 2018-02-08 LAB
ABO GROUP BLD: NORMAL
ALBUMIN SERPL-MCNC: 2.7 G/DL (ref 3.5–5)
ALBUMIN SERPL-MCNC: 3.1 G/DL (ref 3.5–5)
ANION GAP SERPL CALCULATED.3IONS-SCNC: 10 MMOL/L (ref 4–13)
ANION GAP SERPL CALCULATED.3IONS-SCNC: 6 MMOL/L (ref 4–13)
APTT PPP: 32.9 SECONDS (ref 24.1–34.8)
ARTERIAL PATENCY WRIST A: ABNORMAL
ATMOSPHERIC PRESS: 758 MMHG
ATMOSPHERIC PRESS: 760 MMHG
ATMOSPHERIC PRESS: 761 MMHG
ATMOSPHERIC PRESS: 761 MMHG
ATMOSPHERIC PRESS: 762 MMHG
ATMOSPHERIC PRESS: 762 MMHG
ATMOSPHERIC PRESS: 763 MMHG
ATMOSPHERIC PRESS: 763 MMHG
ATMOSPHERIC PRESS: 764 MMHG
ATMOSPHERIC PRESS: 764 MMHG
BASE EXCESS BLDA CALC-SCNC: -0.3 MMOL/L (ref 0–2)
BASE EXCESS BLDA CALC-SCNC: -1.1 MMOL/L (ref 0–2)
BASE EXCESS BLDA CALC-SCNC: -2.6 MMOL/L (ref 0–2)
BASE EXCESS BLDA CALC-SCNC: -5.7 MMOL/L (ref 0–2)
BASE EXCESS BLDA CALC-SCNC: 1.2 MMOL/L (ref 0–2)
BASE EXCESS BLDA CALC-SCNC: 1.8 MMOL/L (ref 0–2)
BASE EXCESS BLDA CALC-SCNC: 2.2 MMOL/L (ref 0–2)
BASE EXCESS BLDA CALC-SCNC: 2.3 MMOL/L (ref 0–2)
BASE EXCESS BLDA CALC-SCNC: 2.4 MMOL/L (ref 0–2)
BASE EXCESS BLDA CALC-SCNC: 2.7 MMOL/L (ref 0–2)
BASE EXCESS BLDA CALC-SCNC: 3.5 MMOL/L (ref 0–2)
BASE EXCESS BLDA CALC-SCNC: 4.9 MMOL/L (ref 0–2)
BASE EXCESS BLDA CALC-SCNC: 5.2 MMOL/L (ref 0–2)
BASE EXCESS BLDA CALC-SCNC: 6.6 MMOL/L (ref 0–2)
BASOPHILS # BLD AUTO: 0.03 10*3/MM3 (ref 0–0.2)
BASOPHILS NFR BLD AUTO: 0.2 % (ref 0–2)
BDY SITE: ABNORMAL
BLD GP AB SCN SERPL QL: NEGATIVE
BODY TEMPERATURE: 37 C
BODY TEMPERATURE: 37.7 C
BODY TEMPERATURE: 38 C
BUN BLD-MCNC: 10 MG/DL (ref 5–21)
BUN BLD-MCNC: 8 MG/DL (ref 5–21)
BUN/CREAT SERPL: 14 (ref 7–25)
BUN/CREAT SERPL: 15.2 (ref 7–25)
CA-I BLD-MCNC: 3.82 MG/DL (ref 4.6–5.4)
CA-I BLD-MCNC: 3.84 MG/DL (ref 4.6–5.4)
CA-I BLD-MCNC: 3.85 MG/DL (ref 4.6–5.4)
CA-I BLD-MCNC: 3.9 MG/DL (ref 4.6–5.4)
CA-I BLD-MCNC: 3.91 MG/DL (ref 4.6–5.4)
CA-I BLD-MCNC: 4.29 MG/DL (ref 4.6–5.4)
CA-I BLD-MCNC: 4.39 MG/DL (ref 4.6–5.4)
CA-I BLD-MCNC: 4.7 MG/DL (ref 4.6–5.4)
CA-I BLD-MCNC: 4.78 MG/DL (ref 4.6–5.4)
CA-I BLD-MCNC: 4.89 MG/DL (ref 4.6–5.4)
CALCIUM SPEC-SCNC: 8 MG/DL (ref 8.4–10.4)
CALCIUM SPEC-SCNC: 8.9 MG/DL (ref 8.4–10.4)
CHLORIDE SERPL-SCNC: 101 MMOL/L (ref 98–110)
CHLORIDE SERPL-SCNC: 106 MMOL/L (ref 98–110)
CO2 SERPL-SCNC: 26 MMOL/L (ref 24–31)
CO2 SERPL-SCNC: 31 MMOL/L (ref 24–31)
COHGB MFR BLD: 0.5 % (ref 0–5)
COHGB MFR BLD: 0.7 % (ref 0–5)
COHGB MFR BLD: 0.8 % (ref 0–5)
COHGB MFR BLD: 1 % (ref 0–5)
COHGB MFR BLD: 1.2 % (ref 0–5)
COHGB MFR BLD: 1.3 % (ref 0–5)
COHGB MFR BLD: 1.5 % (ref 0–5)
CREAT BLD-MCNC: 0.57 MG/DL (ref 0.5–1.4)
CREAT BLD-MCNC: 0.66 MG/DL (ref 0.5–1.4)
DEPRECATED RDW RBC AUTO: 43 FL (ref 40–54)
DEPRECATED RDW RBC AUTO: 43.2 FL (ref 40–54)
DEPRECATED RDW RBC AUTO: 43.4 FL (ref 40–54)
DEPRECATED RDW RBC AUTO: 43.8 FL (ref 40–54)
EOSINOPHIL # BLD AUTO: 0.12 10*3/MM3 (ref 0–0.7)
EOSINOPHIL NFR BLD AUTO: 0.8 % (ref 0–4)
ERYTHROCYTE [DISTWIDTH] IN BLOOD BY AUTOMATED COUNT: 13.1 % (ref 12–15)
ERYTHROCYTE [DISTWIDTH] IN BLOOD BY AUTOMATED COUNT: 13.3 % (ref 12–15)
GFR SERPL CREATININE-BSD FRML MDRD: 124 ML/MIN/1.73
GFR SERPL CREATININE-BSD FRML MDRD: 147 ML/MIN/1.73
GLUCOSE BLD-MCNC: 110 MG/DL (ref 70–100)
GLUCOSE BLD-MCNC: 144 MG/DL (ref 70–100)
GLUCOSE BLDC GLUCOMTR-MCNC: 113 MG/DL (ref 70–130)
GLUCOSE BLDC GLUCOMTR-MCNC: 130 MG/DL (ref 70–130)
GLUCOSE BLDC GLUCOMTR-MCNC: 165 MG/DL (ref 70–130)
GLUCOSE BLDC GLUCOMTR-MCNC: 220 MG/DL (ref 70–130)
GLUCOSE BLDC GLUCOMTR-MCNC: 241 MG/DL (ref 70–130)
GLUCOSE BLDC GLUCOMTR-MCNC: 95 MG/DL (ref 70–130)
HCO3 BLDA-SCNC: 21.5 MMOL/L (ref 20–26)
HCO3 BLDA-SCNC: 22.9 MMOL/L (ref 20–26)
HCO3 BLDA-SCNC: 25.3 MMOL/L (ref 20–26)
HCO3 BLDA-SCNC: 25.8 MMOL/L (ref 20–26)
HCO3 BLDA-SCNC: 27.5 MMOL/L (ref 20–26)
HCO3 BLDA-SCNC: 27.6 MMOL/L (ref 20–26)
HCO3 BLDA-SCNC: 27.7 MMOL/L (ref 20–26)
HCO3 BLDA-SCNC: 27.9 MMOL/L (ref 20–26)
HCO3 BLDA-SCNC: 29.3 MMOL/L (ref 20–26)
HCO3 BLDA-SCNC: 30.2 MMOL/L (ref 20–26)
HCO3 BLDA-SCNC: 30.3 MMOL/L (ref 20–26)
HCT VFR BLD AUTO: 23.5 % (ref 40–52)
HCT VFR BLD AUTO: 24 % (ref 40–52)
HCT VFR BLD AUTO: 24.2 % (ref 40–52)
HCT VFR BLD AUTO: 26.8 % (ref 40–52)
HCT VFR BLD CALC: 18.4 % (ref 38–51)
HCT VFR BLD CALC: 21.5 % (ref 38–51)
HCT VFR BLD CALC: 21.6 % (ref 38–51)
HCT VFR BLD CALC: 23 % (ref 38–51)
HCT VFR BLD CALC: 23.1 % (ref 38–51)
HCT VFR BLD CALC: 24.1 % (ref 38–51)
HCT VFR BLD CALC: 24.4 % (ref 38–51)
HCT VFR BLD CALC: 24.8 % (ref 38–51)
HCT VFR BLD CALC: 26.3 % (ref 38–51)
HCT VFR BLD CALC: 26.4 % (ref 38–51)
HCT VFR BLD CALC: 35.2 % (ref 38–51)
HCT VFR BLD CALC: 35.4 % (ref 38–51)
HGB BLD-MCNC: 7.9 G/DL (ref 14–18)
HGB BLD-MCNC: 8.2 G/DL (ref 14–18)
HGB BLD-MCNC: 8.2 G/DL (ref 14–18)
HGB BLD-MCNC: 8.9 G/DL (ref 14–18)
HGB BLDA-MCNC: 11.5 G/DL (ref 14–18)
HGB BLDA-MCNC: 11.6 G/DL (ref 14–18)
HGB BLDA-MCNC: 6 G/DL (ref 14–18)
HGB BLDA-MCNC: 7 G/DL (ref 14–18)
HGB BLDA-MCNC: 7 G/DL (ref 14–18)
HGB BLDA-MCNC: 7.5 G/DL (ref 14–18)
HGB BLDA-MCNC: 7.6 G/DL (ref 14–18)
HGB BLDA-MCNC: 7.8 G/DL (ref 14–18)
HGB BLDA-MCNC: 7.9 G/DL (ref 14–18)
HGB BLDA-MCNC: 8.1 G/DL (ref 14–18)
HGB BLDA-MCNC: 8.6 G/DL (ref 14–18)
HGB BLDA-MCNC: 8.6 G/DL (ref 14–18)
HOROWITZ INDEX BLD+IHG-RTO: 100 %
HOROWITZ INDEX BLD+IHG-RTO: 40 %
HOROWITZ INDEX BLD+IHG-RTO: 60 %
HOROWITZ INDEX BLD+IHG-RTO: 85 %
HOROWITZ INDEX BLD+IHG-RTO: 85 %
IMM GRANULOCYTES # BLD: 0.1 10*3/MM3 (ref 0–0.03)
IMM GRANULOCYTES NFR BLD: 0.6 % (ref 0–5)
INR PPP: 1.28 (ref 0.91–1.09)
INR PPP: 1.36 (ref 0.91–1.09)
INR PPP: 1.62 (ref 0.91–1.09)
LV EF 2D ECHO EST: 65 %
LYMPHOCYTES # BLD AUTO: 2.97 10*3/MM3 (ref 0.72–4.86)
LYMPHOCYTES NFR BLD AUTO: 19.2 % (ref 15–45)
Lab: ABNORMAL
MCH RBC QN AUTO: 30.4 PG (ref 28–32)
MCH RBC QN AUTO: 30.4 PG (ref 28–32)
MCH RBC QN AUTO: 30.9 PG (ref 28–32)
MCH RBC QN AUTO: 31.1 PG (ref 28–32)
MCHC RBC AUTO-ENTMCNC: 33.2 G/DL (ref 33–36)
MCHC RBC AUTO-ENTMCNC: 33.6 G/DL (ref 33–36)
MCHC RBC AUTO-ENTMCNC: 33.9 G/DL (ref 33–36)
MCHC RBC AUTO-ENTMCNC: 34.2 G/DL (ref 33–36)
MCV RBC AUTO: 90.4 FL (ref 82–95)
MCV RBC AUTO: 90.6 FL (ref 82–95)
MCV RBC AUTO: 91.5 FL (ref 82–95)
MCV RBC AUTO: 91.7 FL (ref 82–95)
METHGB BLD QL: 0.7 % (ref 0–3)
METHGB BLD QL: 0.8 % (ref 0–3)
METHGB BLD QL: 1 % (ref 0–3)
METHGB BLD QL: 1.1 % (ref 0–3)
METHGB BLD QL: 1.2 % (ref 0–3)
MODALITY: ABNORMAL
MONOCYTES # BLD AUTO: 0.79 10*3/MM3 (ref 0.19–1.3)
MONOCYTES NFR BLD AUTO: 5.1 % (ref 4–12)
NEUTROPHILS # BLD AUTO: 11.46 10*3/MM3 (ref 1.87–8.4)
NEUTROPHILS NFR BLD AUTO: 74.1 % (ref 39–78)
NOTIFIED BY: ABNORMAL
NOTIFIED WHO: ABNORMAL
NRBC BLD MANUAL-RTO: 0 /100 WBC (ref 0–0)
OXYHGB MFR BLDV: 97.7 % (ref 94–99)
OXYHGB MFR BLDV: 97.9 % (ref 94–99)
OXYHGB MFR BLDV: 98 % (ref 94–99)
OXYHGB MFR BLDV: 98 % (ref 94–99)
OXYHGB MFR BLDV: 98.1 % (ref 94–99)
OXYHGB MFR BLDV: 98.2 % (ref 94–99)
OXYHGB MFR BLDV: 98.4 % (ref 94–99)
OXYHGB MFR BLDV: >98.5 % (ref 94–99)
PCO2 BLDA: 34.7 MM HG (ref 35–45)
PCO2 BLDA: 39 MM HG (ref 35–45)
PCO2 BLDA: 41 MM HG (ref 35–45)
PCO2 BLDA: 42.3 MM HG (ref 35–45)
PCO2 BLDA: 42.7 MM HG (ref 35–45)
PCO2 BLDA: 44.9 MM HG (ref 35–45)
PCO2 BLDA: 45.9 MM HG (ref 35–45)
PCO2 BLDA: 46.2 MM HG (ref 35–45)
PCO2 BLDA: 46.6 MM HG (ref 35–45)
PCO2 BLDA: 46.8 MM HG (ref 35–45)
PCO2 BLDA: 51.1 MM HG (ref 35–45)
PCO2 BLDA: 53.4 MM HG (ref 35–45)
PCO2 BLDA: 53.9 MM HG (ref 35–45)
PCO2 BLDA: 61.5 MM HG (ref 35–45)
PEEP RESPIRATORY: 5 CM[H2O]
PH BLDA: 7.23 PH UNITS (ref 7.35–7.45)
PH BLDA: 7.23 PH UNITS (ref 7.35–7.45)
PH BLDA: 7.32 PH UNITS (ref 7.35–7.45)
PH BLDA: 7.33 PH UNITS (ref 7.35–7.45)
PH BLDA: 7.36 PH UNITS (ref 7.35–7.45)
PH BLDA: 7.38 PH UNITS (ref 7.35–7.45)
PH BLDA: 7.38 PH UNITS (ref 7.35–7.45)
PH BLDA: 7.39 PH UNITS (ref 7.35–7.45)
PH BLDA: 7.42 PH UNITS (ref 7.35–7.45)
PH BLDA: 7.42 PH UNITS (ref 7.35–7.45)
PH BLDA: 7.43 PH UNITS (ref 7.35–7.45)
PH BLDA: 7.44 PH UNITS (ref 7.35–7.45)
PH BLDA: 7.45 PH UNITS (ref 7.35–7.45)
PH BLDA: 7.5 PH UNITS (ref 7.35–7.45)
PHOSPHATE SERPL-MCNC: 3.1 MG/DL (ref 2.5–4.5)
PHOSPHATE SERPL-MCNC: 3.7 MG/DL (ref 2.5–4.5)
PLATELET # BLD AUTO: 130 10*3/MM3 (ref 130–400)
PLATELET # BLD AUTO: 132 10*3/MM3 (ref 130–400)
PLATELET # BLD AUTO: 142 10*3/MM3 (ref 130–400)
PLATELET # BLD AUTO: 152 10*3/MM3 (ref 130–400)
PMV BLD AUTO: 10.1 FL (ref 6–12)
PMV BLD AUTO: 10.1 FL (ref 6–12)
PMV BLD AUTO: 10.4 FL (ref 6–12)
PMV BLD AUTO: 9.8 FL (ref 6–12)
PO2 BLDA: 134 MM HG (ref 83–108)
PO2 BLDA: 159 MM HG (ref 83–108)
PO2 BLDA: 232 MM HG (ref 83–108)
PO2 BLDA: 253 MM HG (ref 83–108)
PO2 BLDA: 383 MM HG (ref 83–108)
PO2 BLDA: 384 MM HG (ref 83–108)
PO2 BLDA: 477 MM HG (ref 83–108)
PO2 BLDA: 519 MM HG (ref 83–108)
PO2 BLDA: 536 MM HG (ref 83–108)
PO2 BLDA: 537 MM HG (ref 83–108)
PO2 BLDA: 539 MM HG (ref 83–108)
PO2 BLDA: >547 MM HG (ref 83–108)
POTASSIUM BLD-SCNC: 3.7 MMOL/L (ref 3.5–5.3)
POTASSIUM BLD-SCNC: 4.7 MMOL/L (ref 3.5–5.3)
POTASSIUM BLDA-SCNC: 3.6 MMOL/L (ref 3.5–5.2)
POTASSIUM BLDA-SCNC: 3.7 MMOL/L (ref 3.5–5.2)
POTASSIUM BLDA-SCNC: 3.8 MMOL/L (ref 3.5–5.2)
POTASSIUM BLDA-SCNC: 3.9 MMOL/L (ref 3.5–5.2)
POTASSIUM BLDA-SCNC: 4 MMOL/L (ref 3.5–5.2)
POTASSIUM BLDA-SCNC: 4 MMOL/L (ref 3.5–5.2)
POTASSIUM BLDA-SCNC: 4.3 MMOL/L (ref 3.5–5.2)
POTASSIUM BLDA-SCNC: 4.4 MMOL/L (ref 3.5–5.2)
POTASSIUM BLDA-SCNC: 4.6 MMOL/L (ref 3.5–5.2)
POTASSIUM BLDA-SCNC: 4.7 MMOL/L (ref 3.5–5.2)
PROTHROMBIN TIME: 16.4 SECONDS (ref 11.9–14.6)
PROTHROMBIN TIME: 17.2 SECONDS (ref 11.9–14.6)
PROTHROMBIN TIME: 19.8 SECONDS (ref 11.9–14.6)
PSV: 10 CMH2O
RBC # BLD AUTO: 2.6 10*6/MM3 (ref 4.8–5.9)
RBC # BLD AUTO: 2.64 10*6/MM3 (ref 4.8–5.9)
RBC # BLD AUTO: 2.65 10*6/MM3 (ref 4.8–5.9)
RBC # BLD AUTO: 2.93 10*6/MM3 (ref 4.8–5.9)
RH BLD: POSITIVE
SAO2 % BLDCOA: 100 % (ref 94–99)
SAO2 % BLDCOA: 99.1 % (ref 94–99)
SAO2 % BLDCOA: 99.1 % (ref 94–99)
SAO2 % BLDCOA: 99.8 % (ref 94–99)
SAO2 % BLDCOA: 99.9 % (ref 94–99)
SAO2 % BLDCOA: 99.9 % (ref 94–99)
SAO2 % BLDCOA: >100.1 % (ref 94–99)
SET MECH RESP RATE: 10
SET MECH RESP RATE: 14
SET MECH RESP RATE: 14
SET MECH RESP RATE: 20
SET MECH RESP RATE: 20
SODIUM BLD-SCNC: 138 MMOL/L (ref 135–145)
SODIUM BLD-SCNC: 142 MMOL/L (ref 135–145)
SODIUM BLDA-SCNC: 127 MMOL/L (ref 136–145)
SODIUM BLDA-SCNC: 129 MMOL/L (ref 136–145)
SODIUM BLDA-SCNC: 132 MMOL/L (ref 136–145)
SODIUM BLDA-SCNC: 134 MMOL/L (ref 136–145)
SODIUM BLDA-SCNC: 136 MMOL/L (ref 136–145)
SODIUM BLDA-SCNC: 136 MMOL/L (ref 136–145)
SODIUM BLDA-SCNC: 138 MMOL/L (ref 136–145)
SODIUM BLDA-SCNC: 139 MMOL/L (ref 136–145)
SODIUM BLDA-SCNC: 141 MMOL/L (ref 136–145)
SODIUM BLDA-SCNC: 142 MMOL/L (ref 136–145)
VENTILATOR MODE: ABNORMAL
VT ON VENT VENT: 550 ML
WBC NRBC COR # BLD: 15.47 10*3/MM3 (ref 4.8–10.8)
WBC NRBC COR # BLD: 15.74 10*3/MM3 (ref 4.8–10.8)
WBC NRBC COR # BLD: 15.86 10*3/MM3 (ref 4.8–10.8)
WBC NRBC COR # BLD: 16.42 10*3/MM3 (ref 4.8–10.8)

## 2018-02-08 PROCEDURE — 82962 GLUCOSE BLOOD TEST: CPT

## 2018-02-08 PROCEDURE — 25010000002 MIDAZOLAM PER 1 MG: Performed by: NURSE ANESTHETIST, CERTIFIED REGISTERED

## 2018-02-08 PROCEDURE — 25010000003 POTASSIUM CHLORIDE PER 2 MEQ: Performed by: THORACIC SURGERY (CARDIOTHORACIC VASCULAR SURGERY)

## 2018-02-08 PROCEDURE — 5A1221Z PERFORMANCE OF CARDIAC OUTPUT, CONTINUOUS: ICD-10-PCS | Performed by: THORACIC SURGERY (CARDIOTHORACIC VASCULAR SURGERY)

## 2018-02-08 PROCEDURE — 25010000002 PROTAMINE SULFATE PER 10 MG: Performed by: NURSE ANESTHETIST, CERTIFIED REGISTERED

## 2018-02-08 PROCEDURE — 86923 COMPATIBILITY TEST ELECTRIC: CPT

## 2018-02-08 PROCEDURE — 25010000002 FUROSEMIDE PER 20 MG

## 2018-02-08 PROCEDURE — 71045 X-RAY EXAM CHEST 1 VIEW: CPT

## 2018-02-08 PROCEDURE — 86901 BLOOD TYPING SEROLOGIC RH(D): CPT | Performed by: NURSE PRACTITIONER

## 2018-02-08 PROCEDURE — 25010000002 PROTAMINE SULFATE PER 10 MG

## 2018-02-08 PROCEDURE — 25010000002 MORPHINE SULFATE (PF) 2 MG/ML SOLUTION: Performed by: THORACIC SURGERY (CARDIOTHORACIC VASCULAR SURGERY)

## 2018-02-08 PROCEDURE — 85025 COMPLETE CBC W/AUTO DIFF WBC: CPT | Performed by: THORACIC SURGERY (CARDIOTHORACIC VASCULAR SURGERY)

## 2018-02-08 PROCEDURE — 25010000003 CEFAZOLIN PER 500 MG: Performed by: THORACIC SURGERY (CARDIOTHORACIC VASCULAR SURGERY)

## 2018-02-08 PROCEDURE — 021109W BYPASS CORONARY ARTERY, TWO ARTERIES FROM AORTA WITH AUTOLOGOUS VENOUS TISSUE, OPEN APPROACH: ICD-10-PCS | Performed by: THORACIC SURGERY (CARDIOTHORACIC VASCULAR SURGERY)

## 2018-02-08 PROCEDURE — P9046 ALBUMIN (HUMAN), 25%, 20 ML: HCPCS

## 2018-02-08 PROCEDURE — 88305 TISSUE EXAM BY PATHOLOGIST: CPT | Performed by: THORACIC SURGERY (CARDIOTHORACIC VASCULAR SURGERY)

## 2018-02-08 PROCEDURE — 33518 CABG ARTERY-VEIN TWO: CPT | Performed by: THORACIC SURGERY (CARDIOTHORACIC VASCULAR SURGERY)

## 2018-02-08 PROCEDURE — 02100Z9 BYPASS CORONARY ARTERY, ONE ARTERY FROM LEFT INTERNAL MAMMARY, OPEN APPROACH: ICD-10-PCS | Performed by: THORACIC SURGERY (CARDIOTHORACIC VASCULAR SURGERY)

## 2018-02-08 PROCEDURE — 25010000002 HEPARIN (PORCINE) PER 1000 UNITS: Performed by: THORACIC SURGERY (CARDIOTHORACIC VASCULAR SURGERY)

## 2018-02-08 PROCEDURE — 82803 BLOOD GASES ANY COMBINATION: CPT

## 2018-02-08 PROCEDURE — 25010000002 HEPARIN (PORCINE) PER 1000 UNITS: Performed by: NURSE ANESTHETIST, CERTIFIED REGISTERED

## 2018-02-08 PROCEDURE — 86900 BLOOD TYPING SEROLOGIC ABO: CPT | Performed by: NURSE PRACTITIONER

## 2018-02-08 PROCEDURE — 33405 REPLACEMENT AORTIC VALVE OPN: CPT | Performed by: THORACIC SURGERY (CARDIOTHORACIC VASCULAR SURGERY)

## 2018-02-08 PROCEDURE — 85027 COMPLETE CBC AUTOMATED: CPT | Performed by: THORACIC SURGERY (CARDIOTHORACIC VASCULAR SURGERY)

## 2018-02-08 PROCEDURE — 94002 VENT MGMT INPAT INIT DAY: CPT

## 2018-02-08 PROCEDURE — A4648 IMPLANTABLE TISSUE MARKER: HCPCS | Performed by: THORACIC SURGERY (CARDIOTHORACIC VASCULAR SURGERY)

## 2018-02-08 PROCEDURE — 25010000002 PHENYLEPHRINE PER 1 ML

## 2018-02-08 PROCEDURE — 33259 ABLATE ATRIA W/BYPASS ADD-ON: CPT | Performed by: THORACIC SURGERY (CARDIOTHORACIC VASCULAR SURGERY)

## 2018-02-08 PROCEDURE — 93325 DOPPLER ECHO COLOR FLOW MAPG: CPT | Performed by: INTERNAL MEDICINE

## 2018-02-08 PROCEDURE — 82805 BLOOD GASES W/O2 SATURATION: CPT

## 2018-02-08 PROCEDURE — 86850 RBC ANTIBODY SCREEN: CPT | Performed by: NURSE PRACTITIONER

## 2018-02-08 PROCEDURE — 25010000002 MANNITOL PER 50 ML

## 2018-02-08 PROCEDURE — 25010000002 AMIODARONE IN DEXTROSE 5% 360-4.14 MG/200ML-% SOLUTION: Performed by: THORACIC SURGERY (CARDIOTHORACIC VASCULAR SURGERY)

## 2018-02-08 PROCEDURE — 88311 DECALCIFY TISSUE: CPT | Performed by: THORACIC SURGERY (CARDIOTHORACIC VASCULAR SURGERY)

## 2018-02-08 PROCEDURE — 25010000002 VANCOMYCIN PER 500 MG: Performed by: NURSE ANESTHETIST, CERTIFIED REGISTERED

## 2018-02-08 PROCEDURE — 94799 UNLISTED PULMONARY SVC/PX: CPT

## 2018-02-08 PROCEDURE — 25010000002 VANCOMYCIN PER 500 MG

## 2018-02-08 PROCEDURE — 94640 AIRWAY INHALATION TREATMENT: CPT

## 2018-02-08 PROCEDURE — 85730 THROMBOPLASTIN TIME PARTIAL: CPT | Performed by: THORACIC SURGERY (CARDIOTHORACIC VASCULAR SURGERY)

## 2018-02-08 PROCEDURE — 93355 ECHO TRANSESOPHAGEAL (TEE): CPT

## 2018-02-08 PROCEDURE — 25010000002 VANCOMYCIN 10 G RECONSTITUTED SOLUTION: Performed by: THORACIC SURGERY (CARDIOTHORACIC VASCULAR SURGERY)

## 2018-02-08 PROCEDURE — 83050 HGB METHEMOGLOBIN QUAN: CPT

## 2018-02-08 PROCEDURE — 33141 HEART TMR W/OTHER PROCEDURE: CPT | Performed by: THORACIC SURGERY (CARDIOTHORACIC VASCULAR SURGERY)

## 2018-02-08 PROCEDURE — 80069 RENAL FUNCTION PANEL: CPT | Performed by: THORACIC SURGERY (CARDIOTHORACIC VASCULAR SURGERY)

## 2018-02-08 PROCEDURE — 82330 ASSAY OF CALCIUM: CPT

## 2018-02-08 PROCEDURE — 85610 PROTHROMBIN TIME: CPT | Performed by: THORACIC SURGERY (CARDIOTHORACIC VASCULAR SURGERY)

## 2018-02-08 PROCEDURE — 93312 ECHO TRANSESOPHAGEAL: CPT | Performed by: INTERNAL MEDICINE

## 2018-02-08 PROCEDURE — 82375 ASSAY CARBOXYHB QUANT: CPT

## 2018-02-08 PROCEDURE — 02L70CK OCCLUSION OF LEFT ATRIAL APPENDAGE WITH EXTRALUMINAL DEVICE, OPEN APPROACH: ICD-10-PCS | Performed by: THORACIC SURGERY (CARDIOTHORACIC VASCULAR SURGERY)

## 2018-02-08 PROCEDURE — 25810000003 DEXTROSE 5 % WITH KCL 20 MEQ 20-5 MEQ/L-% SOLUTION: Performed by: THORACIC SURGERY (CARDIOTHORACIC VASCULAR SURGERY)

## 2018-02-08 PROCEDURE — 93005 ELECTROCARDIOGRAM TRACING: CPT | Performed by: NURSE PRACTITIONER

## 2018-02-08 PROCEDURE — 02RF08Z REPLACEMENT OF AORTIC VALVE WITH ZOOPLASTIC TISSUE, OPEN APPROACH: ICD-10-PCS | Performed by: THORACIC SURGERY (CARDIOTHORACIC VASCULAR SURGERY)

## 2018-02-08 PROCEDURE — 33533 CABG ARTERIAL SINGLE: CPT | Performed by: THORACIC SURGERY (CARDIOTHORACIC VASCULAR SURGERY)

## 2018-02-08 PROCEDURE — 25010000002 PHENYLEPHRINE PER 1 ML: Performed by: NURSE ANESTHETIST, CERTIFIED REGISTERED

## 2018-02-08 PROCEDURE — 25010000002 HEPARIN (PORCINE) PER 1000 UNITS

## 2018-02-08 PROCEDURE — 93318 ECHO TRANSESOPHAGEAL INTRAOP: CPT | Performed by: NURSE ANESTHETIST, CERTIFIED REGISTERED

## 2018-02-08 PROCEDURE — 93010 ELECTROCARDIOGRAM REPORT: CPT | Performed by: INTERNAL MEDICINE

## 2018-02-08 PROCEDURE — 25010000003 CEFAZOLIN PER 500 MG: Performed by: NURSE ANESTHETIST, CERTIFIED REGISTERED

## 2018-02-08 PROCEDURE — 06BP4ZZ EXCISION OF RIGHT SAPHENOUS VEIN, PERCUTANEOUS ENDOSCOPIC APPROACH: ICD-10-PCS | Performed by: THORACIC SURGERY (CARDIOTHORACIC VASCULAR SURGERY)

## 2018-02-08 PROCEDURE — 25010000002 ALBUMIN HUMAN 25% PER 50 ML

## 2018-02-08 DEVICE — DISK-SHAPED STYLE, SILICONE (1 PER STERILE PKG)
Type: IMPLANTABLE DEVICE | Status: FUNCTIONAL
Brand: SCANLAN® RADIOMARK® GRAFT MARKERS

## 2018-02-08 DEVICE — IMPLANTABLE DEVICE: Type: IMPLANTABLE DEVICE | Status: FUNCTIONAL

## 2018-02-08 DEVICE — APPL CLIP LAA ATRICLIP FLX 35MM: Type: IMPLANTABLE DEVICE | Status: FUNCTIONAL

## 2018-02-08 RX ORDER — MEPERIDINE HYDROCHLORIDE 25 MG/ML
12.5 INJECTION INTRAMUSCULAR; INTRAVENOUS; SUBCUTANEOUS
Status: ACTIVE | OUTPATIENT
Start: 2018-02-08 | End: 2018-02-09

## 2018-02-08 RX ORDER — SODIUM CHLORIDE, SODIUM LACTATE, POTASSIUM CHLORIDE, CALCIUM CHLORIDE 600; 310; 30; 20 MG/100ML; MG/100ML; MG/100ML; MG/100ML
INJECTION, SOLUTION INTRAVENOUS CONTINUOUS PRN
Status: DISCONTINUED | OUTPATIENT
Start: 2018-02-08 | End: 2018-02-08 | Stop reason: SURG

## 2018-02-08 RX ORDER — LIDOCAINE HYDROCHLORIDE 20 MG/ML
INJECTION, SOLUTION INFILTRATION; PERINEURAL AS NEEDED
Status: DISCONTINUED | OUTPATIENT
Start: 2018-02-08 | End: 2018-02-08 | Stop reason: SURG

## 2018-02-08 RX ORDER — METOPROLOL TARTRATE 5 MG/5ML
INJECTION INTRAVENOUS AS NEEDED
Status: DISCONTINUED | OUTPATIENT
Start: 2018-02-08 | End: 2018-02-08 | Stop reason: SURG

## 2018-02-08 RX ORDER — IPRATROPIUM BROMIDE AND ALBUTEROL SULFATE 2.5; .5 MG/3ML; MG/3ML
3 SOLUTION RESPIRATORY (INHALATION)
Status: DISCONTINUED | OUTPATIENT
Start: 2018-02-08 | End: 2018-02-15

## 2018-02-08 RX ORDER — ASPIRIN 81 MG/1
162 TABLET, CHEWABLE ORAL ONCE
Status: COMPLETED | OUTPATIENT
Start: 2018-02-09 | End: 2018-02-09

## 2018-02-08 RX ORDER — SODIUM CHLORIDE 0.9 % (FLUSH) 0.9 %
3 SYRINGE (ML) INJECTION AS NEEDED
Status: DISCONTINUED | OUTPATIENT
Start: 2018-02-08 | End: 2018-02-08 | Stop reason: HOSPADM

## 2018-02-08 RX ORDER — POTASSIUM CHLORIDE 29.8 MG/ML
20 INJECTION INTRAVENOUS
Status: COMPLETED | OUTPATIENT
Start: 2018-02-08 | End: 2018-02-08

## 2018-02-08 RX ORDER — METOCLOPRAMIDE HYDROCHLORIDE 5 MG/ML
5 INJECTION INTRAMUSCULAR; INTRAVENOUS
Status: DISCONTINUED | OUTPATIENT
Start: 2018-02-08 | End: 2018-02-11 | Stop reason: HOSPADM

## 2018-02-08 RX ORDER — DEXTROSE MONOHYDRATE 25 G/50ML
25-50 INJECTION, SOLUTION INTRAVENOUS
Status: DISCONTINUED | OUTPATIENT
Start: 2018-02-08 | End: 2018-02-09

## 2018-02-08 RX ORDER — SODIUM CHLORIDE 9 MG/ML
INJECTION, SOLUTION INTRAVENOUS CONTINUOUS PRN
Status: DISCONTINUED | OUTPATIENT
Start: 2018-02-08 | End: 2018-02-08

## 2018-02-08 RX ORDER — MORPHINE SULFATE 2 MG/ML
2 INJECTION, SOLUTION INTRAMUSCULAR; INTRAVENOUS
Status: DISCONTINUED | OUTPATIENT
Start: 2018-02-08 | End: 2018-02-09

## 2018-02-08 RX ORDER — POTASSIUM CHLORIDE 29.8 MG/ML
20 INJECTION INTRAVENOUS
Status: DISCONTINUED | OUTPATIENT
Start: 2018-02-08 | End: 2018-02-11

## 2018-02-08 RX ORDER — VECURONIUM BROMIDE 1 MG/ML
INJECTION, POWDER, LYOPHILIZED, FOR SOLUTION INTRAVENOUS AS NEEDED
Status: DISCONTINUED | OUTPATIENT
Start: 2018-02-08 | End: 2018-02-08 | Stop reason: SURG

## 2018-02-08 RX ORDER — HEPARIN SODIUM 1000 [USP'U]/ML
INJECTION, SOLUTION INTRAVENOUS; SUBCUTANEOUS AS NEEDED
Status: DISCONTINUED | OUTPATIENT
Start: 2018-02-08 | End: 2018-02-08 | Stop reason: SURG

## 2018-02-08 RX ORDER — SODIUM CHLORIDE 0.9 % (FLUSH) 0.9 %
1-10 SYRINGE (ML) INJECTION AS NEEDED
Status: DISCONTINUED | OUTPATIENT
Start: 2018-02-08 | End: 2018-02-08 | Stop reason: HOSPADM

## 2018-02-08 RX ORDER — HYDRALAZINE HYDROCHLORIDE 20 MG/ML
5 INJECTION INTRAMUSCULAR; INTRAVENOUS
Status: DISCONTINUED | OUTPATIENT
Start: 2018-02-08 | End: 2018-02-11 | Stop reason: HOSPADM

## 2018-02-08 RX ORDER — VASOPRESSIN 20 U/ML
INJECTION PARENTERAL AS NEEDED
Status: DISCONTINUED | OUTPATIENT
Start: 2018-02-08 | End: 2018-02-08 | Stop reason: SURG

## 2018-02-08 RX ORDER — ONDANSETRON 2 MG/ML
4 INJECTION INTRAMUSCULAR; INTRAVENOUS AS NEEDED
Status: ACTIVE | OUTPATIENT
Start: 2018-02-08 | End: 2018-02-08

## 2018-02-08 RX ORDER — ASPIRIN 81 MG/1
81 TABLET ORAL DAILY
Status: DISCONTINUED | OUTPATIENT
Start: 2018-02-10 | End: 2018-02-16 | Stop reason: HOSPADM

## 2018-02-08 RX ORDER — IPRATROPIUM BROMIDE AND ALBUTEROL SULFATE 2.5; .5 MG/3ML; MG/3ML
3 SOLUTION RESPIRATORY (INHALATION) ONCE AS NEEDED
Status: DISCONTINUED | OUTPATIENT
Start: 2018-02-08 | End: 2018-02-11 | Stop reason: HOSPADM

## 2018-02-08 RX ORDER — POTASSIUM CHLORIDE, DEXTROSE MONOHYDRATE 150; 5 MG/100ML; G/100ML
30 INJECTION, SOLUTION INTRAVENOUS CONTINUOUS
Status: DISCONTINUED | OUTPATIENT
Start: 2018-02-08 | End: 2018-02-11

## 2018-02-08 RX ORDER — CALCIUM CHLORIDE 100 MG/ML
INJECTION INTRAVENOUS; INTRAVENTRICULAR
Status: DISPENSED
Start: 2018-02-08 | End: 2018-02-09

## 2018-02-08 RX ORDER — PHENYLEPHRINE HCL IN 0.9% NACL 0.8MG/10ML
SYRINGE (ML) INTRAVENOUS AS NEEDED
Status: DISCONTINUED | OUTPATIENT
Start: 2018-02-08 | End: 2018-02-08 | Stop reason: SURG

## 2018-02-08 RX ORDER — CHLORHEXIDINE GLUCONATE 0.12 MG/ML
15 RINSE ORAL EVERY 12 HOURS SCHEDULED
Status: DISCONTINUED | OUTPATIENT
Start: 2018-02-08 | End: 2018-02-11

## 2018-02-08 RX ORDER — SUFENTANIL CITRATE 50 UG/ML
INJECTION EPIDURAL; INTRAVENOUS AS NEEDED
Status: DISCONTINUED | OUTPATIENT
Start: 2018-02-08 | End: 2018-02-08 | Stop reason: SURG

## 2018-02-08 RX ORDER — AMIODARONE HYDROCHLORIDE 200 MG/1
400 TABLET ORAL EVERY 6 HOURS
Status: DISCONTINUED | OUTPATIENT
Start: 2018-02-09 | End: 2018-02-09

## 2018-02-08 RX ORDER — NALOXONE HCL 0.4 MG/ML
0.04 VIAL (ML) INJECTION AS NEEDED
Status: DISCONTINUED | OUTPATIENT
Start: 2018-02-08 | End: 2018-02-11 | Stop reason: HOSPADM

## 2018-02-08 RX ORDER — NITROGLYCERIN 20 MG/100ML
5 INJECTION INTRAVENOUS CONTINUOUS
Status: DISCONTINUED | OUTPATIENT
Start: 2018-02-08 | End: 2018-02-11

## 2018-02-08 RX ORDER — OXYCODONE AND ACETAMINOPHEN 10; 325 MG/1; MG/1
1 TABLET ORAL
Status: DISCONTINUED | OUTPATIENT
Start: 2018-02-08 | End: 2018-02-15

## 2018-02-08 RX ORDER — SODIUM CHLORIDE 9 MG/ML
INJECTION, SOLUTION INTRAVENOUS CONTINUOUS PRN
Status: DISCONTINUED | OUTPATIENT
Start: 2018-02-08 | End: 2018-02-08 | Stop reason: HOSPADM

## 2018-02-08 RX ORDER — MAGNESIUM HYDROXIDE 1200 MG/15ML
LIQUID ORAL AS NEEDED
Status: DISCONTINUED | OUTPATIENT
Start: 2018-02-08 | End: 2018-02-08 | Stop reason: HOSPADM

## 2018-02-08 RX ORDER — ATORVASTATIN CALCIUM 10 MG/1
20 TABLET, FILM COATED ORAL NIGHTLY
Status: DISCONTINUED | OUTPATIENT
Start: 2018-02-09 | End: 2018-02-11

## 2018-02-08 RX ORDER — PROTAMINE SULFATE 10 MG/ML
INJECTION, SOLUTION INTRAVENOUS AS NEEDED
Status: DISCONTINUED | OUTPATIENT
Start: 2018-02-08 | End: 2018-02-08 | Stop reason: SURG

## 2018-02-08 RX ORDER — OXYCODONE HYDROCHLORIDE AND ACETAMINOPHEN 5; 325 MG/1; MG/1
1 TABLET ORAL
Status: DISCONTINUED | OUTPATIENT
Start: 2018-02-08 | End: 2018-02-16 | Stop reason: HOSPADM

## 2018-02-08 RX ORDER — MIDAZOLAM HYDROCHLORIDE 1 MG/ML
INJECTION INTRAMUSCULAR; INTRAVENOUS AS NEEDED
Status: DISCONTINUED | OUTPATIENT
Start: 2018-02-08 | End: 2018-02-08 | Stop reason: SURG

## 2018-02-08 RX ORDER — SODIUM CHLORIDE, SODIUM LACTATE, POTASSIUM CHLORIDE, CALCIUM CHLORIDE 600; 310; 30; 20 MG/100ML; MG/100ML; MG/100ML; MG/100ML
1000 INJECTION, SOLUTION INTRAVENOUS CONTINUOUS
Status: DISCONTINUED | OUTPATIENT
Start: 2018-02-08 | End: 2018-02-08 | Stop reason: HOSPADM

## 2018-02-08 RX ORDER — LABETALOL HYDROCHLORIDE 5 MG/ML
5 INJECTION, SOLUTION INTRAVENOUS
Status: DISCONTINUED | OUTPATIENT
Start: 2018-02-08 | End: 2018-02-11 | Stop reason: HOSPADM

## 2018-02-08 RX ORDER — ETOMIDATE 2 MG/ML
INJECTION INTRAVENOUS AS NEEDED
Status: DISCONTINUED | OUTPATIENT
Start: 2018-02-08 | End: 2018-02-08 | Stop reason: SURG

## 2018-02-08 RX ORDER — CALCIUM CHLORIDE 100 MG/ML
1 INJECTION INTRAVENOUS; INTRAVENTRICULAR ONCE
Status: COMPLETED | OUTPATIENT
Start: 2018-02-08 | End: 2018-02-08

## 2018-02-08 RX ORDER — NITROGLYCERIN 20 MG/100ML
INJECTION INTRAVENOUS CONTINUOUS PRN
Status: DISCONTINUED | OUTPATIENT
Start: 2018-02-08 | End: 2018-02-08 | Stop reason: SURG

## 2018-02-08 RX ORDER — MEPERIDINE HYDROCHLORIDE 25 MG/ML
25 INJECTION INTRAMUSCULAR; INTRAVENOUS; SUBCUTANEOUS
Status: ACTIVE | OUTPATIENT
Start: 2018-02-08 | End: 2018-02-09

## 2018-02-08 RX ORDER — CLOPIDOGREL BISULFATE 75 MG/1
75 TABLET ORAL DAILY
Status: DISCONTINUED | OUTPATIENT
Start: 2018-02-09 | End: 2018-02-16 | Stop reason: HOSPADM

## 2018-02-08 RX ORDER — ONDANSETRON 2 MG/ML
4 INJECTION INTRAMUSCULAR; INTRAVENOUS EVERY 6 HOURS PRN
Status: DISCONTINUED | OUTPATIENT
Start: 2018-02-08 | End: 2018-02-16 | Stop reason: HOSPADM

## 2018-02-08 RX ORDER — BISACODYL 5 MG/1
10 TABLET, DELAYED RELEASE ORAL 2 TIMES DAILY
Status: DISCONTINUED | OUTPATIENT
Start: 2018-02-09 | End: 2018-02-16

## 2018-02-08 RX ORDER — MORPHINE SULFATE 2 MG/ML
2 INJECTION, SOLUTION INTRAMUSCULAR; INTRAVENOUS AS NEEDED
Status: ACTIVE | OUTPATIENT
Start: 2018-02-08 | End: 2018-02-08

## 2018-02-08 RX ORDER — SODIUM CHLORIDE, SODIUM LACTATE, POTASSIUM CHLORIDE, CALCIUM CHLORIDE 600; 310; 30; 20 MG/100ML; MG/100ML; MG/100ML; MG/100ML
100 INJECTION, SOLUTION INTRAVENOUS CONTINUOUS
Status: DISCONTINUED | OUTPATIENT
Start: 2018-02-08 | End: 2018-02-08 | Stop reason: HOSPADM

## 2018-02-08 RX ORDER — CEFAZOLIN SODIUM 1 G/3ML
INJECTION, POWDER, FOR SOLUTION INTRAMUSCULAR; INTRAVENOUS AS NEEDED
Status: DISCONTINUED | OUTPATIENT
Start: 2018-02-08 | End: 2018-02-08 | Stop reason: SURG

## 2018-02-08 RX ORDER — FLUMAZENIL 0.1 MG/ML
0.2 INJECTION INTRAVENOUS AS NEEDED
Status: DISCONTINUED | OUTPATIENT
Start: 2018-02-08 | End: 2018-02-11 | Stop reason: HOSPADM

## 2018-02-08 RX ADMIN — NITROGLYCERIN 16.6 MCG/MIN: 20 INJECTION INTRAVENOUS at 10:19

## 2018-02-08 RX ADMIN — VECURONIUM BROMIDE 4 MG: 1 INJECTION, POWDER, LYOPHILIZED, FOR SOLUTION INTRAVENOUS at 13:45

## 2018-02-08 RX ADMIN — SUFENTANIL CITRATE 50 MCG: 50 INJECTION, SOLUTION EPIDURAL; INTRAVENOUS at 13:15

## 2018-02-08 RX ADMIN — HEPARIN SODIUM 3000 UNITS: 1000 INJECTION, SOLUTION INTRAVENOUS; SUBCUTANEOUS at 10:25

## 2018-02-08 RX ADMIN — VASOPRESSIN 0.5 UNITS: 20 INJECTION INTRAVENOUS at 16:52

## 2018-02-08 RX ADMIN — VECURONIUM BROMIDE 3 MG: 1 INJECTION, POWDER, LYOPHILIZED, FOR SOLUTION INTRAVENOUS at 11:29

## 2018-02-08 RX ADMIN — HEPARIN SODIUM 30000 UNITS: 1000 INJECTION, SOLUTION INTRAVENOUS; SUBCUTANEOUS at 11:11

## 2018-02-08 RX ADMIN — IPRATROPIUM BROMIDE AND ALBUTEROL SULFATE 3 ML: 2.5; .5 SOLUTION RESPIRATORY (INHALATION) at 19:51

## 2018-02-08 RX ADMIN — MIDAZOLAM HYDROCHLORIDE 2 MG: 1 INJECTION, SOLUTION INTRAMUSCULAR; INTRAVENOUS at 12:03

## 2018-02-08 RX ADMIN — SODIUM CHLORIDE, POTASSIUM CHLORIDE, SODIUM LACTATE AND CALCIUM CHLORIDE 30 ML/HR: 600; 310; 30; 20 INJECTION, SOLUTION INTRAVENOUS at 06:13

## 2018-02-08 RX ADMIN — SUFENTANIL CITRATE 60 MCG: 50 INJECTION, SOLUTION EPIDURAL; INTRAVENOUS at 09:37

## 2018-02-08 RX ADMIN — VECURONIUM BROMIDE 20 MG: 1 INJECTION, POWDER, LYOPHILIZED, FOR SOLUTION INTRAVENOUS at 09:37

## 2018-02-08 RX ADMIN — LIDOCAINE HYDROCHLORIDE 40 MG: 20 INJECTION, SOLUTION INFILTRATION; PERINEURAL at 09:37

## 2018-02-08 RX ADMIN — CHLORHEXIDINE GLUCONATE 15 ML: 1.2 RINSE ORAL at 20:57

## 2018-02-08 RX ADMIN — METOPROLOL TARTRATE 1 MG: 5 INJECTION INTRAVENOUS at 09:45

## 2018-02-08 RX ADMIN — VASOPRESSIN 0.25 UNITS: 20 INJECTION INTRAVENOUS at 17:07

## 2018-02-08 RX ADMIN — POTASSIUM CHLORIDE AND DEXTROSE MONOHYDRATE 30 ML/HR: 150; 5 INJECTION, SOLUTION INTRAVENOUS at 18:00

## 2018-02-08 RX ADMIN — PHENYLEPHRINE HYDROCHLORIDE 0.5 MCG/KG/MIN: 10 INJECTION INTRAVENOUS at 18:34

## 2018-02-08 RX ADMIN — Medication 0.01 UNITS/MIN: at 20:10

## 2018-02-08 RX ADMIN — SODIUM CHLORIDE, POTASSIUM CHLORIDE, SODIUM LACTATE AND CALCIUM CHLORIDE 1000 ML: 600; 310; 30; 20 INJECTION, SOLUTION INTRAVENOUS at 06:14

## 2018-02-08 RX ADMIN — SUFENTANIL CITRATE 1 MCG/KG/HR: 0.05 INJECTION EPIDURAL; INTRAVENOUS at 10:21

## 2018-02-08 RX ADMIN — NITROGLYCERIN 40 MCG: 20 INJECTION INTRAVENOUS at 11:31

## 2018-02-08 RX ADMIN — VANCOMYCIN HYDROCHLORIDE 1 G: 1 INJECTION, POWDER, LYOPHILIZED, FOR SOLUTION INTRAVENOUS at 10:04

## 2018-02-08 RX ADMIN — CALCIUM CHLORIDE 1 G: 100 INJECTION INTRAVENOUS; INTRAVENTRICULAR at 20:14

## 2018-02-08 RX ADMIN — Medication 160 MCG: at 16:50

## 2018-02-08 RX ADMIN — VECURONIUM BROMIDE 4 MG: 1 INJECTION, POWDER, LYOPHILIZED, FOR SOLUTION INTRAVENOUS at 14:55

## 2018-02-08 RX ADMIN — MIDAZOLAM HYDROCHLORIDE 2 MG: 1 INJECTION, SOLUTION INTRAMUSCULAR; INTRAVENOUS at 09:22

## 2018-02-08 RX ADMIN — Medication 50 MEQ: at 20:14

## 2018-02-08 RX ADMIN — MUPIROCIN: 20 OINTMENT TOPICAL at 20:57

## 2018-02-08 RX ADMIN — MORPHINE SULFATE 2 MG: 2 INJECTION, SOLUTION INTRAMUSCULAR; INTRAVENOUS at 23:49

## 2018-02-08 RX ADMIN — CEFAZOLIN 2 G: 1 INJECTION, POWDER, FOR SOLUTION INTRAVENOUS at 13:41

## 2018-02-08 RX ADMIN — LIDOCAINE HYDROCHLORIDE 0.5 ML: 10 INJECTION, SOLUTION EPIDURAL; INFILTRATION; INTRACAUDAL; PERINEURAL at 06:12

## 2018-02-08 RX ADMIN — VANCOMYCIN HYDROCHLORIDE 750 MG: 10 INJECTION, POWDER, LYOPHILIZED, FOR SOLUTION INTRAVENOUS at 21:32

## 2018-02-08 RX ADMIN — SUFENTANIL CITRATE 50 MCG: 50 INJECTION, SOLUTION EPIDURAL; INTRAVENOUS at 12:03

## 2018-02-08 RX ADMIN — ETOMIDATE 4 MG: 2 INJECTION, SOLUTION INTRAVENOUS at 09:37

## 2018-02-08 RX ADMIN — NITROGLYCERIN 40 MCG: 20 INJECTION INTRAVENOUS at 10:40

## 2018-02-08 RX ADMIN — Medication 80 MCG: at 11:39

## 2018-02-08 RX ADMIN — Medication 80 MCG: at 11:42

## 2018-02-08 RX ADMIN — CEFAZOLIN 2 G: 1 INJECTION, POWDER, FOR SOLUTION INTRAVENOUS at 09:41

## 2018-02-08 RX ADMIN — Medication 160 MCG: at 09:30

## 2018-02-08 RX ADMIN — MIDAZOLAM HYDROCHLORIDE 3 MG: 1 INJECTION, SOLUTION INTRAMUSCULAR; INTRAVENOUS at 14:55

## 2018-02-08 RX ADMIN — SODIUM CHLORIDE, POTASSIUM CHLORIDE, SODIUM LACTATE AND CALCIUM CHLORIDE: 600; 310; 30; 20 INJECTION, SOLUTION INTRAVENOUS at 09:18

## 2018-02-08 RX ADMIN — AMINOCAPROIC ACID 1 G/HR: 250 INJECTION, SOLUTION INTRAVENOUS at 10:09

## 2018-02-08 RX ADMIN — PHENYLEPHRINE HYDROCHLORIDE 1 MCG/KG/MIN: 10 INJECTION INTRAVENOUS at 09:45

## 2018-02-08 RX ADMIN — SODIUM CHLORIDE, POTASSIUM CHLORIDE, SODIUM LACTATE AND CALCIUM CHLORIDE: 600; 310; 30; 20 INJECTION, SOLUTION INTRAVENOUS at 14:30

## 2018-02-08 RX ADMIN — VECURONIUM BROMIDE 3 MG: 1 INJECTION, POWDER, LYOPHILIZED, FOR SOLUTION INTRAVENOUS at 16:00

## 2018-02-08 RX ADMIN — VECURONIUM BROMIDE 3 MG: 1 INJECTION, POWDER, LYOPHILIZED, FOR SOLUTION INTRAVENOUS at 17:10

## 2018-02-08 RX ADMIN — MIDAZOLAM HYDROCHLORIDE 3 MG: 1 INJECTION, SOLUTION INTRAMUSCULAR; INTRAVENOUS at 09:30

## 2018-02-08 RX ADMIN — SODIUM BICARBONATE 50 MEQ: 84 INJECTION, SOLUTION INTRAVENOUS at 20:14

## 2018-02-08 RX ADMIN — SODIUM CHLORIDE 2 UNITS/HR: 9 INJECTION, SOLUTION INTRAVENOUS at 19:32

## 2018-02-08 RX ADMIN — POTASSIUM CHLORIDE 20 MEQ: 400 INJECTION, SOLUTION INTRAVENOUS at 18:33

## 2018-02-08 RX ADMIN — PROTAMINE SULFATE 200 MG: 10 INJECTION, SOLUTION INTRAVENOUS at 15:51

## 2018-02-08 RX ADMIN — VECURONIUM BROMIDE 3 MG: 1 INJECTION, POWDER, LYOPHILIZED, FOR SOLUTION INTRAVENOUS at 12:03

## 2018-02-08 RX ADMIN — CEFAZOLIN 2 G: 1 INJECTION, POWDER, FOR SOLUTION INTRAVENOUS at 20:57

## 2018-02-08 RX ADMIN — SUFENTANIL CITRATE 40 MCG: 50 INJECTION, SOLUTION EPIDURAL; INTRAVENOUS at 10:36

## 2018-02-08 RX ADMIN — AMIODARONE HYDROCHLORIDE 1 MG/MIN: 1.8 INJECTION, SOLUTION INTRAVENOUS at 18:01

## 2018-02-08 NOTE — ANESTHESIA POSTPROCEDURE EVALUATION
Patient: Sharad Ryder    Procedure Summary     Date Anesthesia Start Anesthesia Stop Room / Location    02/08/18 0918 1748 [facility] 15 / [facility]       Procedure Diagnosis Surgeon Provider    AORTIC VALVE REPLACEMENT,CORONARY ARTERY BYPASS GRAFTING x 3  WITH CONCHA AND RIGHT EVH, ATRIAL APPENDAGE EXCLUSION LEFT WITH TRANSESOPHAGEAL ECHOCARDIOGRAM, 17-CHANNEL TMR (N/A Chest) Aortic valve stenosis, etiology of cardiac valve disease unspecified  (Aortic valve stenosis, etiology of cardiac valve disease unspecified [I35.0]) MD Zia Liriano CRNA          Anesthesia Type: general  Last vitals  BP   123/75 (02/08/18 0544)   Temp   98.1 °F (36.7 °C) (02/08/18 0544)   Pulse   84 (02/08/18 0741)   Resp   16 (02/08/18 0741)     SpO2   94 % (02/08/18 0741)     Post Anesthesia Care and Evaluation    Patient location during evaluation: ICU  Patient participation: complete - patient cannot participate  Level of consciousness: obtunded/minimal responses  Pain score: 0  Pain management: adequate  Airway patency: patent  Anesthetic complications: No anesthetic complications  PONV Status: none  Cardiovascular status: acceptable  Respiratory status: acceptable  Hydration status: acceptable

## 2018-02-08 NOTE — H&P
Expand All Collapse All    []Hide copied text  Referring Provider: KIRBY Chris  Reason for Consultation: CAD, severe AS, NSTEMI     Patient Care Team:  Sharad KLEIN MD as PCP - General (Family Medicine)     Chief complaint Chest pain      Subjective .      History of present illness:  Mr. Ryder is a 57 year old  male well known to our service. He was initially seen December 14 when he was admitted for shortness of breath. Workup demonstrated severe aortic stenosis and a bicuspid aortic valve. Pre op testing for aortic valve replacement was obtained and left heart cath demonstrated three vessel disease with mild pulmonary hypertension. Carotid ultrasound and CTA of the neck demonstrated significant disease on the right. The patient was agreeable to proceed with surgery, but he wanted to wait till the new year. Decision-making was made for patient to be discharged home and follow up with Dr. Salgado in the office and subsequently present at a later date for cardiac surgery. In the interim, he did require admission for the flu and further optimization.  He did proceed forward with right carotid endarterectomy performed by Dr. Salgado.  He now returns for planned AVR/CABG/LAAE.     No further infectious type symptoms.        Past medical history includes: hypertension, hyperlipidemia, chronic tobacco use, chronic back pain, chronic obstructive pulmonary disease, chronic tobacco use, carotid disease, coronary artery disease, and severe aortic stenosis with a bicuspid aortic valve. He has full dentures. He is down to at least 1/2 pack of cigarettes per day.        History   Medical History                          Past Medical History:   Diagnosis Date   • Aneurysm     • Arthritis     • Carotid artery disease     • Carotid stenosis     • COPD (chronic obstructive pulmonary disease)     • Hyperlipidemia     • Hypertension                                   , Past Surgical History:   Procedure  Laterality Date   • APPENDECTOMY       • CARDIAC CATHETERIZATION N/A 12/18/2017     Procedure: Left Heart Cath;  Surgeon: Aime Francois MD;  Location:  [facility];  Service:    • CARDIAC CATHETERIZATION N/A 12/18/2017     Procedure: Right Heart Cath;  Surgeon: Aime Francois MD;  Location:  [facility];  Service:    • FINGER SURGERY Right 1990   • OTHER SURGICAL HISTORY         skull srgery from accident in childhood.                , Family History     Problem Relation Age of Onset     • Heart disease Mother       • Heart disease Father       • Hypertension Sister       • Hypertension Brother                  , Social History    Substance Use Topics    • Smoking status: Current Every Day Smoker        Packs/day: 1.00        Types: Cigarettes    • Smokeless tobacco: Current User        Types: Snuff    • Alcohol use No     Prescriptions Prior to Admission            , Prescriptions Prior to Admission   Medication Sig Dispense Refill Last Dose   • albuterol (PROVENTIL HFA;VENTOLIN HFA) 108 (90 Base) MCG/ACT inhaler Inhale 2 puffs Every 4 (Four) Hours As Needed for Wheezing. 1 inhaler 0 1/3/2018 at Unknown time   • albuterol (PROVENTIL) (2.5 MG/3ML) 0.083% nebulizer solution Take 2.5 mg by nebulization Every 4 (Four) Hours As Needed for Wheezing or Shortness of Air.     1/3/2018 at Unknown time   • aspirin 81 MG tablet Take 1 tablet by mouth Daily. 30 tablet 0 1/4/2018 at 0000   • atorvastatin (LIPITOR) 80 MG tablet Take 1 tablet by mouth Every Night. 30 tablet 0 1/2/2018 at Unknown time   • budesonide-formoterol (SYMBICORT) 160-4.5 MCG/ACT inhaler Inhale 2 puffs 2 (Two) Times a Day. 10.2 g 0 1/3/2018 at Unknown time   • chlorthalidone (HYGROTON) 25 MG tablet Take 25 mg by mouth Daily.     1/3/2018 at 0800   • citalopram (CeleXA) 20 MG tablet Take 20 mg by mouth Daily.     1/4/2018 at 1100   • furosemide (LASIX) 40 MG tablet Take 1 tablet by mouth Daily. 30 tablet 0 1/3/2018 at 0800   •  "lisinopril (PRINIVIL,ZESTRIL) 20 MG tablet Take 20 mg by mouth Daily.     1/3/2018 at 0800   • pentazocine-naloxone (TALWIN NX) 50-0.5 MG per tablet Take 1 tablet by mouth Every 6 (Six) Hours As Needed for Moderate Pain .     1/2/2018 at Unknown time   • potassium chloride (K-DUR) 10 MEQ CR tablet Take 20 mEq by mouth 2 (Two) Times a Day.     1/3/2018 at Unknown time      , Allergies: Review of patient's allergies indicates no known allergies.     Review of Systems  Review of Systems   Constitutional: Positive for activity change, chills and diaphoresis. Negative for appetite change.   HENT: Positive for congestion, dental problem (full set dentures) and sore throat.    Respiratory: Positive for shortness of breath. Negative for stridor.    Cardiovascular: Positive for chest pain and leg swelling. Negative for palpitations.   Musculoskeletal: Positive for back pain.   Skin: Negative for color change, pallor, rash and wound.   Neurological: Negative for dizziness, seizures and headaches.   Hematological: Negative for adenopathy. Does not bruise/bleed easily.   Psychiatric/Behavioral: Negative.       Objective      Vital Signs        Visit Vitals     Visit Vitals   • /75 (BP Location: Left arm, Patient Position: Sitting)  Comment (BP Location): R 127/79   • Pulse 84   • Temp 98.1 °F (36.7 °C) (Temporal Artery )   • Resp 16   • Ht 162 cm (63.78\")   • Wt 56.4 kg (124 lb 5.4 oz)   • SpO2 94%   • BMI 21.49 kg/m2           Physical Exam   Constitutional: He is oriented to person, place, and time. No distress.   HENT:   Head: Normocephalic.   Eyes: Pupils are equal, round, and reactive to light.   Neck: Normal range of motion. No JVD present.   Cardiovascular: Normal rate, regular rhythm and intact distal pulses.    Murmur heard.   Systolic murmur is present with a grade of 3/6   Pulmonary/Chest: Effort normal and breath sounds normal. No stridor. No respiratory distress. He has no wheezes.   Abdominal: Soft. He " exhibits no distension. There is no tenderness.   Musculoskeletal: He exhibits no edema.   Neurological: He is alert and oriented to person, place, and time.   Skin: Skin is warm and dry. He is not diaphoretic.   Right groin clean, dry, intact.    Psychiatric: He has a normal mood and affect. His behavior is normal.   Vitals reviewed.   right neck is intact.  No hematoma.  Healing nicely incision          LAB:   CBC:  Results from last 7 days  Lab Units 01/04/18  0047   WBC 10*3/mm3 7.94   HEMATOCRIT % 34.8*   PLATELETS 10*3/mm3 323            BMP:)  Results from last 7 days  Lab Units 01/04/18  0047   SODIUM mmol/L 136   POTASSIUM mmol/L 4.0   CHLORIDE mmol/L 97*   CO2 mmol/L 26.0   GLUCOSE mg/dL 112*   BUN mg/dL 14   CREATININE mg/dL 1.43*          IMAGES:      Imaging Results (last 24 hours)     Procedure Component Value Units Date/Time     XR Chest 1 View [[id number]] Collected:  01/04/18 0715       Updated:  01/04/18 0719     Narrative:        EXAMINATION: XR CHEST 1 VW- 1/4/2018 7:15 AM CST      HISTORY: Chest Pain protocol.      REPORT: Comparison is made with the study from 12/14/2017.      Lungs are mildly hyperinflated, no focal infiltrate or consolidation is  identified. Heart size is normal. No pneumothorax or effusion is  identified. The osseous structures show nothing acute, there are  degenerative changes in the thoracic spine with mild levoscoliosis.         Impression:        Mild COPD. No acute cardiopulmonary abnormality.  This report was finalized on 01/04/2018 07:16 by Dr. Son Womack MD.                                            Assessment/Plan       Principal Problem:    Chest pain  Active Problems:    Shortness of breath    Severe aortic valve stenosis, bicuspid aortic valve    Tobacco use    Hyperlipidemia    Hypertension    NSTEMI (non-ST elevated myocardial infarction)    Coronary artery disease involving native coronary artery of native heart with angina pectoris    Stenosis of right  carotid artery    COPD (chronic obstructive pulmonary disease)    LAZ (acute kidney injury)  Chronic back pain          I discussed the patients findings and my recommendations with patient.  I have discussed at length the options for treatment of aortic valvular disease to include medical therapy, surgical aortic valve replacement, balloon valvuloplasty, and percutaneous aortic valve replacement. He will need also cabg.  We discussed the pros and cons of each option and how it pertains to the current case. He has been recommended AVR/CABG/LAAE and this remains valid.  The STS Risk score was calculated using the STS Risk Calculator and discussed with the patient and family.  The patient and family understand the risks, benefits and wishes to proceed forward with surgery.  We discussed the operative conduct and expected hospital and outpatient recovery for cardiac surgery.  Risks were discussed to include but not limited to bleeding, infection,heart attack, need for additional procedures, anesthesia risk, organ dysfunction and/or death, prolonged mechanical ventilation, prolonged ICU stay, chronic pain syndromes, sternal nonunion, need for pacemaker, and/or death. We discussed should a surgical valve be recommended, that a tissue valve be recommended. The valvular prosthetic options and each's pros/cons were discussed. Ok to proceed forward.  He would like a bovine pericardial valve.      .

## 2018-02-08 NOTE — ANESTHESIA PROCEDURE NOTES
Procedure Performed: Emergent/Open-Heart Anesthesia HONG       Start Time:  2/8/2018 9:54 AM       End Time:   2/8/2018 10:04 AM    Preanesthesia Checklist:  Patient identified, IV assessed, risks and benefits discussed, monitors and equipment assessed, procedure being performed at surgeon's request and anesthesia consent obtained.    General Procedure Information  HONG Placed for monitoring purposes only -- This is not a diagnostic HONG  Physician Requesting Echo: SIENNA PAN  Location performed:  OR  Intubated  Bite block not placed  Heart visualized  Probe Insertion:  Easy  Probe Type:  Multiplane  Modalities:  2D only, color flow mapping and pulse wave Doppler        Anesthesia Information  Performed Personally  Anesthesiologist:  ALYSA HNADLEY      Echocardiogram Comments:       Probe passed atraumatically on first attempt

## 2018-02-08 NOTE — ANESTHESIA PROCEDURE NOTES
Airway  Urgency: elective      General Information and Staff    Patient location during procedure: OR  CRNA: DAHLIA GUTIERREZ    Indications and Patient Condition  Indications for airway management: airway protection    Preoxygenated: yes  Mask difficulty assessment: 2 - vent by mask + OA or adjuvant +/- NMBA    Final Airway Details  Final airway type: endotracheal airway      Successful airway: ETT  Cuffed: yes   Successful intubation technique: direct laryngoscopy  Facilitating devices/methods: intubating stylet  Endotracheal tube insertion site: oral  Blade: Joel  Blade size: #3  ETT size: 8.0 mm  Cormack-Lehane Classification: grade I - full view of glottis  Placement verified by: chest auscultation, capnometry and palpation of cuff   Cuff volume (mL): 8  Measured from: lips  ETT to lips (cm): 23  Number of attempts at approach: 1

## 2018-02-08 NOTE — PERIOPERATIVE NURSING NOTE
Gabriella in ICU notified of surgery start at 1044  Anne-Marie in ICU notified that patient was on bypass at 1204

## 2018-02-08 NOTE — PLAN OF CARE
Problem: Patient Care Overview (Adult)  Goal: Plan of Care Review  Outcome: Ongoing (interventions implemented as appropriate)   02/08/18 0744   Coping/Psychosocial Response Interventions   Plan Of Care Reviewed With patient   Patient Care Overview   Progress no change       Problem: Perioperative Period (Adult)  Goal: Signs and Symptoms of Listed Potential Problems Will be Absent or Manageable (Perioperative Period)  Outcome: Ongoing (interventions implemented as appropriate)   02/08/18 0744   Perioperative Period   Problems Assessed (Perioperative Period) pain;perioperative injury;infection;situational response   Problems Present (Perioperative Period) situational response;pain

## 2018-02-08 NOTE — ANESTHESIA PREPROCEDURE EVALUATION
Anesthesia Evaluation     Patient summary reviewed   no history of anesthetic complications:  NPO Solid Status: > 8 hours  NPO Liquid Status: > 8 hours           Airway   Mallampati: II  TM distance: >3 FB  Neck ROM: full  Dental    (+) upper dentures and lower dentures    Pulmonary    (+) a smoker Current, COPD,   (-) recent URI, sleep apnea  Cardiovascular   Exercise tolerance: poor (<4 METS)    ECG reviewed    (+) hypertension, valvular problems/murmurs (severe AS) AS, past MI (NSTEMI) , CAD, angina with exertion, PVD, hyperlipidemia  (-) pacemaker, CHF, cardiac stents      Neuro/Psych  (-) seizures, TIA, CVA    ROS Comment: Asymptomatic COD  GI/Hepatic/Renal/Endo    (-) GERD, liver disease, no renal disease, diabetes, hypothyroidism, hyperthyroidism    Musculoskeletal     Abdominal    Substance History      OB/GYN          Other        ROS/Med Hx Other: Right CEA one week prior  Denies esophageal surgeries, denies dysphagia                  Anesthesia Plan    ASA 4     general   (No contraindications to HONG  Preop arterial line  GETA  Post induction Central Line  HONG placement)  intravenous induction   Anesthetic plan and risks discussed with patient.  Use of blood products discussed with patient  Consented to blood products.

## 2018-02-08 NOTE — ANESTHESIA PROCEDURE NOTES
Central Line    Patient location during procedure: OR  Start time: 2/8/2018 9:45 AM  Stop Time:2/8/2018 9:52 AM  Staff  Anesthesiologist: ALYSA HANDLEY  Preanesthetic Checklist  Completed: patient identified, site marked, surgical consent, pre-op evaluation, timeout performed, IV checked, risks and benefits discussed and monitors and equipment checked  Central Line Prep  Sterile Tech:cap, gloves, gown, mask and sterile barriers  Prep: chloraprep  Patient monitoring: blood pressure monitoring, continuous pulse oximetry and EKG  Central Line Procedure  Laterality:right  Location:internal jugular  Catheter Type:Cordis, Pickens-Chasity and MAC  Guidance:ultrasound guided  PROCEDURE NOTE/ULTRASOUND INTERPRETATION.  Using ultrasound guidance the potential vascular sites for insertion of the catheter were visualized to determine the patency of the vessel to be used for vascular access.  After selecting the appropriate site for insertion, the needle was visualized under ultrasound being inserted into the internal jugular vein, followed by ultrasound confirmation of wire and catheter placement. There were no abnormalities seen on ultrasound; an image was taken; and the patient tolerated the procedure with no complications.   Assessment  Post procedure:biopatch applied, line sutured and secured with tape  Assessement:blood return through all ports and free fluid flow  Complications:no  Patient Tolerance:patient tolerated the procedure well with no apparent complications  Additional Notes  Pickens-Chasity catheter placed at 52 cm

## 2018-02-09 ENCOUNTER — APPOINTMENT (OUTPATIENT)
Dept: GENERAL RADIOLOGY | Facility: HOSPITAL | Age: 58
End: 2018-02-09

## 2018-02-09 LAB
ANION GAP SERPL CALCULATED.3IONS-SCNC: 10 MMOL/L (ref 4–13)
ARTERIAL PATENCY WRIST A: ABNORMAL
ARTERIAL PATENCY WRIST A: NORMAL
ATMOSPHERIC PRESS: 755 MMHG
ATMOSPHERIC PRESS: 757 MMHG
BASE EXCESS BLDA CALC-SCNC: -0.8 MMOL/L (ref 0–2)
BASE EXCESS BLDA CALC-SCNC: -4.8 MMOL/L (ref 0–2)
BASE EXCESS BLDA CALC-SCNC: 0.2 MMOL/L (ref 0–2)
BASE EXCESS BLDA CALC-SCNC: 0.9 MMOL/L (ref 0–2)
BASOPHILS # BLD AUTO: 0.01 10*3/MM3 (ref 0–0.2)
BASOPHILS # BLD AUTO: 0.01 10*3/MM3 (ref 0–0.2)
BASOPHILS NFR BLD AUTO: 0.1 % (ref 0–2)
BASOPHILS NFR BLD AUTO: 0.1 % (ref 0–2)
BDY SITE: ABNORMAL
BDY SITE: NORMAL
BODY TEMPERATURE: 37 C
BODY TEMPERATURE: 37.7 C
BODY TEMPERATURE: 37.7 C
BODY TEMPERATURE: 37.8 C
BUN BLD-MCNC: 11 MG/DL (ref 5–21)
BUN/CREAT SERPL: 15.1 (ref 7–25)
CALCIUM SPEC-SCNC: 8.4 MG/DL (ref 8.4–10.4)
CHLORIDE SERPL-SCNC: 107 MMOL/L (ref 98–110)
CO2 SERPL-SCNC: 26 MMOL/L (ref 24–31)
CPAP: 5 CMH2O
CREAT BLD-MCNC: 0.73 MG/DL (ref 0.5–1.4)
CYTO UR: NORMAL
DEPRECATED RDW RBC AUTO: 44.5 FL (ref 40–54)
DEPRECATED RDW RBC AUTO: 46.3 FL (ref 40–54)
EOSINOPHIL # BLD AUTO: 0 10*3/MM3 (ref 0–0.7)
EOSINOPHIL # BLD AUTO: 0.01 10*3/MM3 (ref 0–0.7)
EOSINOPHIL NFR BLD AUTO: 0 % (ref 0–4)
EOSINOPHIL NFR BLD AUTO: 0.1 % (ref 0–4)
ERYTHROCYTE [DISTWIDTH] IN BLOOD BY AUTOMATED COUNT: 13.4 % (ref 12–15)
ERYTHROCYTE [DISTWIDTH] IN BLOOD BY AUTOMATED COUNT: 14.4 % (ref 12–15)
GAS FLOW AIRWAY: 2 LPM
GFR SERPL CREATININE-BSD FRML MDRD: 111 ML/MIN/1.73
GLUCOSE BLD-MCNC: 101 MG/DL (ref 70–100)
GLUCOSE BLDC GLUCOMTR-MCNC: 102 MG/DL (ref 70–130)
GLUCOSE BLDC GLUCOMTR-MCNC: 105 MG/DL (ref 70–130)
GLUCOSE BLDC GLUCOMTR-MCNC: 108 MG/DL (ref 70–130)
GLUCOSE BLDC GLUCOMTR-MCNC: 110 MG/DL (ref 70–130)
GLUCOSE BLDC GLUCOMTR-MCNC: 110 MG/DL (ref 70–130)
GLUCOSE BLDC GLUCOMTR-MCNC: 115 MG/DL (ref 70–130)
GLUCOSE BLDC GLUCOMTR-MCNC: 120 MG/DL (ref 70–130)
GLUCOSE BLDC GLUCOMTR-MCNC: 123 MG/DL (ref 70–130)
GLUCOSE BLDC GLUCOMTR-MCNC: 123 MG/DL (ref 70–130)
GLUCOSE BLDC GLUCOMTR-MCNC: 81 MG/DL (ref 70–130)
GLUCOSE BLDC GLUCOMTR-MCNC: 98 MG/DL (ref 70–130)
GLUCOSE BLDC GLUCOMTR-MCNC: 98 MG/DL (ref 70–130)
HCO3 BLDA-SCNC: 19.7 MMOL/L (ref 20–26)
HCO3 BLDA-SCNC: 24.1 MMOL/L (ref 20–26)
HCO3 BLDA-SCNC: 25.3 MMOL/L (ref 20–26)
HCO3 BLDA-SCNC: 25.5 MMOL/L (ref 20–26)
HCT VFR BLD AUTO: 22.4 % (ref 40–52)
HCT VFR BLD AUTO: 24.1 % (ref 40–52)
HGB BLD-MCNC: 7.6 G/DL (ref 14–18)
HGB BLD-MCNC: 8.3 G/DL (ref 14–18)
HOROWITZ INDEX BLD+IHG-RTO: 30 %
IMM GRANULOCYTES # BLD: 0.08 10*3/MM3 (ref 0–0.03)
IMM GRANULOCYTES # BLD: 0.09 10*3/MM3 (ref 0–0.03)
IMM GRANULOCYTES NFR BLD: 0.5 % (ref 0–5)
IMM GRANULOCYTES NFR BLD: 0.6 % (ref 0–5)
LAB AP CASE REPORT: NORMAL
LYMPHOCYTES # BLD AUTO: 1.17 10*3/MM3 (ref 0.72–4.86)
LYMPHOCYTES # BLD AUTO: 2.48 10*3/MM3 (ref 0.72–4.86)
LYMPHOCYTES NFR BLD AUTO: 16.5 % (ref 15–45)
LYMPHOCYTES NFR BLD AUTO: 7.7 % (ref 15–45)
Lab: ABNORMAL
Lab: NORMAL
Lab: NORMAL
MCH RBC QN AUTO: 30.3 PG (ref 28–32)
MCH RBC QN AUTO: 30.9 PG (ref 28–32)
MCHC RBC AUTO-ENTMCNC: 33.9 G/DL (ref 33–36)
MCHC RBC AUTO-ENTMCNC: 34.4 G/DL (ref 33–36)
MCV RBC AUTO: 88 FL (ref 82–95)
MCV RBC AUTO: 91.1 FL (ref 82–95)
MODALITY: ABNORMAL
MODALITY: NORMAL
MONOCYTES # BLD AUTO: 1.08 10*3/MM3 (ref 0.19–1.3)
MONOCYTES # BLD AUTO: 1.62 10*3/MM3 (ref 0.19–1.3)
MONOCYTES NFR BLD AUTO: 10.8 % (ref 4–12)
MONOCYTES NFR BLD AUTO: 7.1 % (ref 4–12)
NEUTROPHILS # BLD AUTO: 10.84 10*3/MM3 (ref 1.87–8.4)
NEUTROPHILS # BLD AUTO: 12.8 10*3/MM3 (ref 1.87–8.4)
NEUTROPHILS NFR BLD AUTO: 72 % (ref 39–78)
NEUTROPHILS NFR BLD AUTO: 84.5 % (ref 39–78)
NRBC BLD MANUAL-RTO: 0 /100 WBC (ref 0–0)
NRBC BLD MANUAL-RTO: 0 /100 WBC (ref 0–0)
PATH REPORT.FINAL DX SPEC: NORMAL
PATH REPORT.GROSS SPEC: NORMAL
PCO2 BLDA: 33 MM HG (ref 35–45)
PCO2 BLDA: 38.5 MM HG (ref 35–45)
PCO2 BLDA: 39.7 MM HG (ref 35–45)
PCO2 BLDA: 43.2 MM HG (ref 35–45)
PEEP RESPIRATORY: 5 CM[H2O]
PEEP RESPIRATORY: 5 CM[H2O]
PH BLDA: 7.38 PH UNITS (ref 7.35–7.45)
PH BLDA: 7.38 PH UNITS (ref 7.35–7.45)
PH BLDA: 7.39 PH UNITS (ref 7.35–7.45)
PH BLDA: 7.42 PH UNITS (ref 7.35–7.45)
PLATELET # BLD AUTO: 106 10*3/MM3 (ref 130–400)
PLATELET # BLD AUTO: 151 10*3/MM3 (ref 130–400)
PMV BLD AUTO: 10 FL (ref 6–12)
PMV BLD AUTO: 10.3 FL (ref 6–12)
PO2 BLDA: 75.9 MM HG (ref 83–108)
PO2 BLDA: 81.7 MM HG (ref 83–108)
PO2 BLDA: 86.1 MM HG (ref 83–108)
PO2 BLDA: 94 MM HG (ref 83–108)
POTASSIUM BLD-SCNC: 4.5 MMOL/L (ref 3.5–5.3)
PSV: 10 CMH2O
RBC # BLD AUTO: 2.46 10*6/MM3 (ref 4.8–5.9)
RBC # BLD AUTO: 2.74 10*6/MM3 (ref 4.8–5.9)
SAO2 % BLDCOA: 94.7 % (ref 94–99)
SAO2 % BLDCOA: 96.1 % (ref 94–99)
SAO2 % BLDCOA: 96.9 % (ref 94–99)
SAO2 % BLDCOA: 98 % (ref 94–99)
SET MECH RESP RATE: 10
SET MECH RESP RATE: 6
SODIUM BLD-SCNC: 143 MMOL/L (ref 135–145)
VENTILATOR MODE: ABNORMAL
VENTILATOR MODE: NORMAL
VT ON VENT VENT: 550 ML
VT ON VENT VENT: 550 ML
WBC NRBC COR # BLD: 15.04 10*3/MM3 (ref 4.8–10.8)
WBC NRBC COR # BLD: 15.15 10*3/MM3 (ref 4.8–10.8)

## 2018-02-09 PROCEDURE — 93005 ELECTROCARDIOGRAM TRACING: CPT | Performed by: THORACIC SURGERY (CARDIOTHORACIC VASCULAR SURGERY)

## 2018-02-09 PROCEDURE — 94799 UNLISTED PULMONARY SVC/PX: CPT

## 2018-02-09 PROCEDURE — 94003 VENT MGMT INPAT SUBQ DAY: CPT

## 2018-02-09 PROCEDURE — 97163 PT EVAL HIGH COMPLEX 45 MIN: CPT

## 2018-02-09 PROCEDURE — 97162 PT EVAL MOD COMPLEX 30 MIN: CPT

## 2018-02-09 PROCEDURE — 25010000003 CEFAZOLIN PER 500 MG: Performed by: THORACIC SURGERY (CARDIOTHORACIC VASCULAR SURGERY)

## 2018-02-09 PROCEDURE — G8978 MOBILITY CURRENT STATUS: HCPCS

## 2018-02-09 PROCEDURE — 86900 BLOOD TYPING SEROLOGIC ABO: CPT

## 2018-02-09 PROCEDURE — 25010000002 ENOXAPARIN PER 10 MG: Performed by: THORACIC SURGERY (CARDIOTHORACIC VASCULAR SURGERY)

## 2018-02-09 PROCEDURE — 36430 TRANSFUSION BLD/BLD COMPNT: CPT

## 2018-02-09 PROCEDURE — 93010 ELECTROCARDIOGRAM REPORT: CPT | Performed by: INTERNAL MEDICINE

## 2018-02-09 PROCEDURE — 82962 GLUCOSE BLOOD TEST: CPT

## 2018-02-09 PROCEDURE — 80048 BASIC METABOLIC PNL TOTAL CA: CPT | Performed by: THORACIC SURGERY (CARDIOTHORACIC VASCULAR SURGERY)

## 2018-02-09 PROCEDURE — 25010000002 VANCOMYCIN 10 G RECONSTITUTED SOLUTION: Performed by: THORACIC SURGERY (CARDIOTHORACIC VASCULAR SURGERY)

## 2018-02-09 PROCEDURE — 82803 BLOOD GASES ANY COMBINATION: CPT

## 2018-02-09 PROCEDURE — 25010000002 FENTANYL CITRATE (PF) 100 MCG/2ML SOLUTION: Performed by: THORACIC SURGERY (CARDIOTHORACIC VASCULAR SURGERY)

## 2018-02-09 PROCEDURE — P9016 RBC LEUKOCYTES REDUCED: HCPCS

## 2018-02-09 PROCEDURE — 99024 POSTOP FOLLOW-UP VISIT: CPT | Performed by: THORACIC SURGERY (CARDIOTHORACIC VASCULAR SURGERY)

## 2018-02-09 PROCEDURE — G8979 MOBILITY GOAL STATUS: HCPCS

## 2018-02-09 PROCEDURE — 71045 X-RAY EXAM CHEST 1 VIEW: CPT

## 2018-02-09 PROCEDURE — 85025 COMPLETE CBC W/AUTO DIFF WBC: CPT | Performed by: THORACIC SURGERY (CARDIOTHORACIC VASCULAR SURGERY)

## 2018-02-09 PROCEDURE — 94770: CPT

## 2018-02-09 RX ORDER — FENTANYL CITRATE 50 UG/ML
50 INJECTION, SOLUTION INTRAMUSCULAR; INTRAVENOUS
Status: DISCONTINUED | OUTPATIENT
Start: 2018-02-09 | End: 2018-02-11

## 2018-02-09 RX ORDER — FENTANYL CITRATE 50 UG/ML
25 INJECTION, SOLUTION INTRAMUSCULAR; INTRAVENOUS
Status: DISCONTINUED | OUTPATIENT
Start: 2018-02-09 | End: 2018-02-11

## 2018-02-09 RX ADMIN — FENTANYL CITRATE 50 MCG: 50 INJECTION INTRAMUSCULAR; INTRAVENOUS at 09:50

## 2018-02-09 RX ADMIN — OXYCODONE HYDROCHLORIDE AND ACETAMINOPHEN 1 TABLET: 10; 325 TABLET ORAL at 16:58

## 2018-02-09 RX ADMIN — CEFAZOLIN 2 G: 1 INJECTION, POWDER, FOR SOLUTION INTRAVENOUS at 20:16

## 2018-02-09 RX ADMIN — FENTANYL CITRATE 50 MCG: 50 INJECTION INTRAMUSCULAR; INTRAVENOUS at 07:52

## 2018-02-09 RX ADMIN — FENTANYL CITRATE 25 MCG: 50 INJECTION INTRAMUSCULAR; INTRAVENOUS at 00:35

## 2018-02-09 RX ADMIN — OXYCODONE HYDROCHLORIDE AND ACETAMINOPHEN 1 TABLET: 10; 325 TABLET ORAL at 08:58

## 2018-02-09 RX ADMIN — ENOXAPARIN SODIUM 30 MG: 30 INJECTION SUBCUTANEOUS at 20:17

## 2018-02-09 RX ADMIN — CHLORHEXIDINE GLUCONATE 15 ML: 1.2 RINSE ORAL at 09:37

## 2018-02-09 RX ADMIN — BISACODYL 10 MG: 5 TABLET ORAL at 08:58

## 2018-02-09 RX ADMIN — FENTANYL CITRATE 50 MCG: 50 INJECTION INTRAMUSCULAR; INTRAVENOUS at 14:58

## 2018-02-09 RX ADMIN — OXYCODONE HYDROCHLORIDE AND ACETAMINOPHEN 1 TABLET: 10; 325 TABLET ORAL at 21:27

## 2018-02-09 RX ADMIN — FENTANYL CITRATE 50 MCG: 50 INJECTION INTRAMUSCULAR; INTRAVENOUS at 06:22

## 2018-02-09 RX ADMIN — BISACODYL 10 MG: 5 TABLET ORAL at 20:16

## 2018-02-09 RX ADMIN — CLOPIDOGREL 75 MG: 75 TABLET, FILM COATED ORAL at 18:44

## 2018-02-09 RX ADMIN — AMIODARONE HYDROCHLORIDE 400 MG: 200 TABLET ORAL at 05:19

## 2018-02-09 RX ADMIN — OXYCODONE HYDROCHLORIDE AND ACETAMINOPHEN 1 TABLET: 10; 325 TABLET ORAL at 13:52

## 2018-02-09 RX ADMIN — IPRATROPIUM BROMIDE AND ALBUTEROL SULFATE 3 ML: 2.5; .5 SOLUTION RESPIRATORY (INHALATION) at 06:19

## 2018-02-09 RX ADMIN — PHENYLEPHRINE HYDROCHLORIDE 1.1 MCG/KG/MIN: 10 INJECTION INTRAVENOUS at 05:08

## 2018-02-09 RX ADMIN — Medication 0.02 UNITS/MIN: at 13:00

## 2018-02-09 RX ADMIN — METOPROLOL TARTRATE 12.5 MG: 25 TABLET, FILM COATED ORAL at 08:59

## 2018-02-09 RX ADMIN — IPRATROPIUM BROMIDE AND ALBUTEROL SULFATE 3 ML: 2.5; .5 SOLUTION RESPIRATORY (INHALATION) at 15:02

## 2018-02-09 RX ADMIN — MUPIROCIN: 20 OINTMENT TOPICAL at 11:19

## 2018-02-09 RX ADMIN — FENTANYL CITRATE 50 MCG: 50 INJECTION INTRAMUSCULAR; INTRAVENOUS at 02:32

## 2018-02-09 RX ADMIN — PHENYLEPHRINE HYDROCHLORIDE 0.5 MCG/KG/MIN: 10 INJECTION INTRAVENOUS at 11:18

## 2018-02-09 RX ADMIN — MUPIROCIN 1 APPLICATION: 20 OINTMENT TOPICAL at 21:00

## 2018-02-09 RX ADMIN — ASPIRIN 81 MG 162 MG: 81 TABLET ORAL at 08:58

## 2018-02-09 RX ADMIN — IPRATROPIUM BROMIDE AND ALBUTEROL SULFATE 3 ML: 2.5; .5 SOLUTION RESPIRATORY (INHALATION) at 11:16

## 2018-02-09 RX ADMIN — FENTANYL CITRATE 50 MCG: 50 INJECTION INTRAMUSCULAR; INTRAVENOUS at 01:26

## 2018-02-09 RX ADMIN — VANCOMYCIN HYDROCHLORIDE 750 MG: 10 INJECTION, POWDER, LYOPHILIZED, FOR SOLUTION INTRAVENOUS at 20:16

## 2018-02-09 RX ADMIN — IPRATROPIUM BROMIDE AND ALBUTEROL SULFATE 3 ML: 2.5; .5 SOLUTION RESPIRATORY (INHALATION) at 22:00

## 2018-02-09 RX ADMIN — CEFAZOLIN 2 G: 1 INJECTION, POWDER, FOR SOLUTION INTRAVENOUS at 05:08

## 2018-02-09 RX ADMIN — NITROGLYCERIN 5 MCG/MIN: 20 INJECTION INTRAVENOUS at 13:46

## 2018-02-09 RX ADMIN — FENTANYL CITRATE 50 MCG: 50 INJECTION INTRAMUSCULAR; INTRAVENOUS at 20:09

## 2018-02-09 RX ADMIN — CEFAZOLIN 2 G: 1 INJECTION, POWDER, FOR SOLUTION INTRAVENOUS at 11:19

## 2018-02-09 RX ADMIN — CHLORHEXIDINE GLUCONATE 15 ML: 1.2 RINSE ORAL at 20:16

## 2018-02-09 RX ADMIN — FENTANYL CITRATE 50 MCG: 50 INJECTION INTRAMUSCULAR; INTRAVENOUS at 05:06

## 2018-02-09 RX ADMIN — SODIUM CHLORIDE 500 ML: 0.9 INJECTION, SOLUTION INTRAVENOUS at 11:15

## 2018-02-09 RX ADMIN — VANCOMYCIN HYDROCHLORIDE 750 MG: 10 INJECTION, POWDER, LYOPHILIZED, FOR SOLUTION INTRAVENOUS at 09:36

## 2018-02-09 RX ADMIN — FENTANYL CITRATE 50 MCG: 50 INJECTION INTRAMUSCULAR; INTRAVENOUS at 03:27

## 2018-02-09 RX ADMIN — ENOXAPARIN SODIUM 30 MG: 30 INJECTION SUBCUTANEOUS at 08:58

## 2018-02-09 RX ADMIN — ATORVASTATIN CALCIUM 20 MG: 10 TABLET, FILM COATED ORAL at 20:16

## 2018-02-09 NOTE — PLAN OF CARE
Problem: Patient Care Overview (Adult)  Goal: Plan of Care Review  Outcome: Ongoing (interventions implemented as appropriate)   02/09/18 3195   Coping/Psychosocial Response Interventions   Plan Of Care Reviewed With patient   Outcome Evaluation   Outcome Summary/Follow up Plan B/P issues after pt OOB. Restarted joshua, vaso, and nitro. 1 PRBC given, labs ordered post infusion.      Goal: Adult Individualization and Mutuality  Outcome: Ongoing (interventions implemented as appropriate)    Goal: Discharge Needs Assessment  Outcome: Ongoing (interventions implemented as appropriate)      Problem: Fall Risk (Adult)  Goal: Identify Related Risk Factors and Signs and Symptoms  Outcome: Ongoing (interventions implemented as appropriate)      Problem: Cardiac Surgery (Adult)  Goal: Signs and Symptoms of Listed Potential Problems Will be Absent or Manageable (Cardiac Surgery)  Outcome: Ongoing (interventions implemented as appropriate)

## 2018-02-09 NOTE — PLAN OF CARE
Problem: Patient Care Overview (Adult)  Goal: Plan of Care Review  Outcome: Ongoing (interventions implemented as appropriate)   02/09/18 1120   Coping/Psychosocial Response Interventions   Plan Of Care Reviewed With patient   Outcome Evaluation   Outcome Summary/Follow up Plan PT rosaal complete. Pt was able to perform bed mobility maxA but was able to support himself once in sitting. He t/f sit-stand-sit and ambulated 20 feet modAx2. Pt demonstrated impaired balance and strength, decreased ability to weight shift, and R knee buckling. Vitals remained stable throughout the evaluation. Pt will benefit from therapy to improve balance, strength, gait, and activity tolerance. Anticipate discharge to transitional care.       Problem: Inpatient Physical Therapy  Goal: Bed Mobility Goal LTG- PT  Outcome: Ongoing (interventions implemented as appropriate)   02/09/18 1120   Bed Mobility PT LTG   Bed Mobility PT LTG, Date Established 02/09/18   Bed Mobility PT LTG, Time to Achieve by discharge   Bed Mobility PT LTG, Activity Type all bed mobility   Bed Mobility PT LTG, [place] Level minimum assist (75% patient effort)     Goal: Transfer Training Goal 1 LTG- PT  Outcome: Ongoing (interventions implemented as appropriate)    Goal: Gait Training Goal LTG- PT  Outcome: Ongoing (interventions implemented as appropriate)   02/09/18 1120   Gait Training PT LTG   Gait Training Goal PT LTG, Date Established 02/09/18   Gait Training Goal PT LTG, Time to Achieve by discharge   Gait Training Goal PT LTG, [place] Level contact guard assist   Gait Training Goal PT LTG, Distance to Achieve 100 feet

## 2018-02-09 NOTE — PLAN OF CARE
Problem: Patient Care Overview (Adult)  Goal: Plan of Care Review  Outcome: Ongoing (interventions implemented as appropriate)   02/08/18 0744 02/09/18 1120   Coping/Psychosocial Response Interventions   Plan Of Care Reviewed With --  patient   Patient Care Overview   Progress no change --    Outcome Evaluation   Outcome Summary/Follow up Plan --  PT eval complete. Pt was able to perform bed mobility maxA but was able to support himself once in sitting. He t/f sit-stand-sit and ambulated 20 feet modAx2. Pt demonstrated impaired balance and strength, decreased ability to weight shift, and R knee buckling. Vitals remained stable throughout the evaluation. Pt will benefit from therapy to improve balance, strength, gait, and activity tolerance. Anticipate discharge to transitional care.       Problem: Inpatient Physical Therapy  Goal: Bed Mobility Goal LTG- PT  Outcome: Ongoing (interventions implemented as appropriate)   02/09/18 1120   Bed Mobility PT LTG   Bed Mobility PT LTG, Date Established 02/09/18   Bed Mobility PT LTG, Time to Achieve by discharge   Bed Mobility PT LTG, Activity Type all bed mobility   Bed Mobility PT LTG, [place] Level minimum assist (75% patient effort)     Goal: Transfer Training Goal 1 LTG- PT  Outcome: Ongoing (interventions implemented as appropriate)   02/09/18 1120   Transfer Training PT LTG   Transfer Training PT LTG, Date Established 02/09/18   Transfer Training PT LTG, Time to Achieve by discharge   Transfer Training PT LTG, Activity Type bed to chair /chair to bed;sit to stand/stand to sit   Transfer Training PT LTG, [place] Level supervision required     Goal: Gait Training Goal LTG- PT  Outcome: Ongoing (interventions implemented as appropriate)   02/09/18 1120 02/09/18 1146   Gait Training PT LTG   Gait Training Goal PT LTG, Date Established --  02/09/18   Gait Training Goal PT LTG, Time to Achieve by discharge --    Gait Training Goal PT LTG, [place] Level contact  guard assist --    Gait Training Goal PT LTG, Distance to Achieve 100 feet --

## 2018-02-09 NOTE — THERAPY EVALUATION
Acute Care - Physical Therapy Initial Evaluation  [facility]     Patient Name: Sharad Ryder  : 1960  MRN: [id number]  Today's Date: 2018   Onset of Illness/Injury or Date of Surgery Date: 18  Date of Referral to PT: 18  Referring Physician: Dr. Robin      Admit Date: 2018     Visit Dx:    ICD-10-CM ICD-9-CM   1. Aortic valve stenosis, etiology of cardiac valve disease unspecified I35.0 424.1   2. Impaired functional mobility, balance, gait, and endurance Z74.09 V49.89     Patient Active Problem List   Diagnosis   • Shortness of breath   • Severe aortic valve stenosis   • Tobacco use   • Hyperlipidemia   • Hypertension   • NSTEMI (non-ST elevated myocardial infarction)   • Chest pain   • Coronary artery disease involving native coronary artery of native heart with angina pectoris   • Stenosis of right carotid artery   • COPD (chronic obstructive pulmonary disease)   • LAZ (acute kidney injury)   • Flu   • Carotid stenosis, right   • Post-op pain   • Aortic valve stenosis     Past Medical History:   Diagnosis Date   • Aneurysm    • Anxiety    • Arthritis    • Carotid artery disease    • Carotid stenosis    • Chest pain    • COPD (chronic obstructive pulmonary disease)    • Heart murmur    • Hyperlipidemia    • Hypertension    • Pneumonia    • Wears glasses      Past Surgical History:   Procedure Laterality Date   • APPENDECTOMY     • CARDIAC CATHETERIZATION N/A 2017    Procedure: Left Heart Cath;  Surgeon: Aime Francois MD;  Location:  [facility] CATH [facility]ASIVE [facility];  Service:    • CARDIAC CATHETERIZATION N/A 2017    Procedure: Right Heart Cath;  Surgeon: Aime Francois MD;  Location:  [facility]ATH [facility];  Service:    • CARDIAC CATHETERIZATION Bilateral 2018    Procedure: Coronary angiography;  Surgeon: Alfonso Yi MD;  Location:  [facility] CATH INVAS[facility];  Service:    • CAROTID ENDARTERECTOMY Right 2018    Procedure: RIGHT CAROTID ENDARTERECTOMY WITH  EEG-awake;  Surgeon: Jl Salgado DO;  Location:  [facility] OR;  Service:    • CORONARY ARTERY BYPASS GRAFT WITH AORTIC VALVE REPAIR/REPLACEMENT N/A 2/8/2018    Procedure: AORTIC VALVE REPLACEMENT,CORONARY ARTERY BYPASS GRAFTING x 3  WITH CONCHA AND RIGHT EVH, ATRIAL APPENDAGE EXCLUSION LEFT WITH TRANSESOPHAGEAL ECHOCARDIOGRAM, 17-CHANNEL TMR;  Surgeon: Cj Robin MD;  Location:  [facility] OR;  Service:    • FINGER SURGERY Right 1990   • OTHER SURGICAL HISTORY      skull srgery from accident in childhood.          PT ASSESSMENT (last 72 hours)      PT Evaluation       02/09/18 0958 02/07/18 1550    Rehab Evaluation    Document Type evaluation   see MAR  -LIBRA (r) SERGIO (t) LIBRA (c)     Subjective Information agree to therapy;complains of;weakness;fatigue;pain;numbness;dizziness;nausea/vomiting  -LIBRA (r) SERGIO (t) LIBRA (c)     Patient Effort, Rehab Treatment adequate  -LIBRA (cordelia) SERGIO (t) LIBRA (c)     Symptoms Noted During/After Treatment dizziness  -LIBRA (cordelia) SERGIO (t) LIBRA (c)     General Information    Patient Profile Review yes  -LIBRA (cordelia) SERGIO (t) LIBRA (c)     Onset of Illness/Injury or Date of Surgery Date 02/08/18  -LIBRA (r) SERGIO (t) LIBRA (c)     Referring Physician Dr. Robin  -LIBRA bond) SERGIO (t) LIBRA (c)     General Observations Marta's  -LIBRA (cordelia) SERGIO (t) LIBRA (c)     Pertinent History Of Current Problem Pt. was seen on 12/14/17 with shortness of breath. Dx of severe aortic stenosis and bicuspid aortic valvfle and 3 vessel disesae. Planned surgery for 2/8/18. S/p AVR/CABGx3/LAAE on 2/8/18  -LIBRA (r) SERGOI (t) LIBRA (c)     Precautions/Limitations sternal precautions;fall precautions   Chest tube  -LIBRA     Prior Level of Function independent:;all household mobility;community mobility;ADL's  -LIBRA (cordelia) SERGIO (t) LIBRA (c)     Equipment Currently Used at Home none  -LIBRA (cordelia) SERGIO (t) LIBRA (c)     Plans/Goals Discussed With patient;agreed upon  -LIBRA (cordelia) SERGIO (t) LIBRA (c)     Risks Reviewed patient:;LOB;nausea/vomiting;dizziness;increased discomfort;lines disloged  -LIBRA bond) SERGIO Corbettt) LIBRA saunders)      Benefits Reviewed patient:;improve function;increase independence;increase strength;increase balance;increase knowledge  -LIBRA (cordelia) SERGIO (t) LIBRA (c)     Barriers to Rehab medically complex;physical barrier  -LIBRA (cordelia) SERGIO (t) LIBRA (c)     Living Environment    Lives With spouse  -LIBRA bond) SERGIO (shawna) LIBRA (c) spouse  -KP    Living Arrangements house  -LIBRA (cordelia) SERGIO (t) LIBRA (c)     Home Accessibility no concerns  -LIBRA Corbettr) SERGIO (shawna) LIBRA (c)     Stair Railings at Home none  -LIBRA (r) SERGIO (t) LIBRA (c)     Type of Financial/Environmental Concern none  -LIBRA (r) SERGIO (t) LIBRA (c)     Transportation Available car;family or friend will provide  -LIBRA bond) SERGIO (shawna) LIBRA (leyla) car;family or friend will provide  -    Clinical Impression    Date of Referral to PT 02/08/18  -LIBRA bond) SERGIO (shawna) LIRBA (c)     PT Diagnosis Limited mobility, decreased activity tolerance, impaired balance and gait  -LIBRA bond) SERGIO (t) LIBRA (c)     Functional Level At Time Of Evaluation (P)  able to peroform bed mobility maxA, t/f modAx2 and ambulate 20 feet modAx2  -SERGIO     Patient/Family Goals Statement return home  -LIBRA bond) SERGIO (t) LIBRA (c)     Criteria for Skilled Therapeutic Interventions Met yes;treatment indicated  -LIBRA HILL (r) (t) LIBRA (c)     Pathology/Pathophysiology Noted (Describe Specifically for Each System) cardiovascular;musculoskeletal;pulmonary  -LIBRA bond) SERGIO (t) LIBRA (c)     Impairments Found (describe specific impairments) aerobic capacity/endurance;gait, locomotion, and balance;muscle performance;posture  -LIBRA HILL (r) (t) LIBRA (c)     Rehab Potential good, to achieve stated therapy goals  -LIBRA bond) SERGIO (shawna) LIBRA (c)     Predicted Duration of Therapy Intervention (days/wks) until d/c  -LIBRA bond) SERGIO (t) LIBRA (c)     Vital Signs    Pre Systolic BP Rehab 120  -LIBRA (r) SERGIO (t) LIBRA (c)     Pre Treatment Diastolic BP 63  -LIBRA (r) SERGIO (t) LIBRA (c)     Post Systolic BP Rehab 118  -LIBRA (r) SERGIO (t) LIBRA (c)     Post Treatment Diastolic   -LIBRA (r) SERGIO (t) LIBRA (c)     Pretreatment Heart Rate (beats/min) 100  -LIBRA (r) SERGIO (t) LIBRA (c)      Intratreatment Heart Rate (beats/min) 103  -LIBRA (r) JM (t) LIBRA (c)     Posttreatment Heart Rate (beats/min) 101  -LIBRA (r) JM (t) LIBRA (c)     O2 Delivery Pre Treatment supplemental O2   2L  -LIBRA (r) JM (t) LIBRA (c)     Intra SpO2 (%) 97  -LIBRA (r) JM (t) LIBRA (c)     O2 Delivery Intra Treatment supplemental O2   2L  -LIBRA (r) JM (t) LIBRA (c)     O2 Delivery Post Treatment supplemental O2   2L  -LIBRA (r) JM (t) LIBRA (c)     Pre Patient Position Supine  -LIBRA (r) JM (t) LIBRA (c)     Intra Patient Position Sitting  -LIBRA (r) JM (t) LIBRA (c)     Post Patient Position Sitting  -LIBRA (r) JM (t) LIBRA (c)     Pain Assessment    Pain Assessment 0-10  -LIBRA (r) JM (t) LIBRA (c)     Pain Score 9  -LIBRA (r) JM (t) LIBRA (c)     Pain Type Surgical pain  -LIBRA (r) JM (t) LIBRA (c)     Pain Location Chest  -LIBRA (r) JM (t) LIBRA (c)     Pain Orientation Mid  -LIBRA (r) JM (t) LIBRA (c)     Pain Descriptors Sharp  -LIBRA (r) JM (t) LIBRA (c)     Pain Frequency Constant/continuous  -LIBRA (r) JM (t) LIBRA (c)     Pain Intervention(s) Medication (See MAR);Ambulation/increased activity  -LIBRA (r) JM (t) LIBRA (c)     Response to Interventions tolerated  -LIBRA     Cognitive Assessment/Intervention    Current Cognitive/Communication Assessment functional  -LIBRA (r) JM (t) LIBRA (c)     Orientation Status oriented x 4  -LIBRA (r) JM (t) LIBRA (c)     Follows Commands/Answers Questions 100% of the time;able to follow multi-step instructions  -LIBRA (r) JM (t) LIBRA (c)     Personal Safety WNL/WFL  -LIBRA (r) JM (t) LIBRA (c)     Personal Safety Interventions fall prevention program maintained;gait belt;nonskid shoes/slippers when out of bed;supervised activity  -LIBRA (r) JM (t) LIBRA (c)     ROM (Range of Motion)    General ROM Detail impaired BLE AROM during ambulation  -LIBRA (r) JM (t) LIBRA (c)     MMT (Manual Muscle Testing)    General MMT Assessment Detail impaired strengh during ambualtion, functionally able to pull him self to side of bed with legs  -LIBRA (r) SERGIO (t) LIBRA (c)     Bed Mobility, Assessment/Treatment    Bed Mobility, Assistive  Device head of bed elevated  -LIBRA (r) SERGIO (t) LIBRA (c)     Bed Mobility, Scoot/Bridge, [place] maximum assist (25% patient effort)  -LIBRA (r) SERGIO (t) LIBRA (c)     Bed Mob, Supine to Sit, [place] maximum assist (25% patient effort)  -LIBRA (r) JM (t) LIBRA (c)     Bed Mobility, Safety Issues decreased use of arms for pushing/pulling;impaired trunk control for bed mobility  -LIBRA (r) JM (t) LIBRA (c)     Bed Mobility, Impairments strength decreased;impaired balance;postural control impaired  -LIBRA (r) JM (t) LIBRA (c)     Bed Mobility, Comment able to sit EOB with SBA once in sitting position  -LIBRA (r) SERGIO (t) LIBRA (c)     Transfer Assessment/Treatment    Transfers, Bed-Chair [place] moderate assist (50% patient effort);2 person assist required  -LIBRA (r) SERGIO (t) LIBRA (c)     Transfers, Sit-Stand [place] moderate assist (50% patient effort);2 person assist required  -LIBRA (r) SERGIO (t) LIBRA (c)     Transfers, Stand-Sit [place] moderate assist (50% patient effort);2 person assist required  -LIBRA (r) JM (t) LIBRA (c)     Transfer, Safety Issues balance decreased during turns;sequencing ability decreased;step length decreased;weight-shifting ability decreased;knees buckling  -LIBRA (r) JM (t) LIBRA (c)     Transfer, Impairments strength decreased;impaired balance;motor control impaired;postural control impaired  -LIBRA (r) SERGIO (t) LIBRA (c)     Gait Assessment/Treatment    Gait, [place] Level moderate assist (50% patient effort);2 person assist required  -ILBRA (r) SERGIO (t) LIBRA (c)     Gait, Distance (Feet) 20  -LIBRA (r) JM (t) LIBRA (c)     Gait, Gait Pattern Analysis 2-point gait  -LIBRA (r) SERGIO (t) LIBRA (c)     Gait, Gait Deviations bilateral:;daniel decreased;forward flexed posture;knee buckling;step length decreased;stride length decreased;swing-to-stance ratio decreased;double stance time increased;weight-shifting ability decreased  -LIBRA (r) SERGIO (t) LIBRA (c)     Gait, Safety Issues balance decreased during turns;sequencing ability decreased;step length  decreased;weight-shifting ability decreased;supplemental O2  -LIBRA (r)  LIBRA (c)     Gait, Impairments strength decreased;impaired balance;postural control impaired;motor control impaired  -LIBRA (r) SERGIO (t) LIBRA (c)     Gait, Comment Pt.'s right knee appeared to buckle several times and had trouble with weight shifting to swing contralateral LE  -LIBRA (r)  LIBRA (c)     Motor Skills/Interventions    Additional Documentation Balance Skills Training (Group)  -LIBRA (r) SERGIO (t) LIBRA (c)     Balance Skills Training    Sitting-Level of Assistance Close supervision  -LIBRA (r) SERGIO (t) LIBRA (c)     Sitting-Balance Support Feet unsupported;No upper extremity supported  -LIBRA (r) SERGIO (t) LIBRA (c)     Standing-Level of Assistance Moderate assistance;x2  -LIBRA (r) SERGIO (t) LIBRA (c)     Static Standing Balance Support Right upper extremity supported;Left upper extremity supported  -LIBRA (r) SERGIO (t) LIBRA (c)     Gait Balance-Level of Assistance Moderate assistance;x2  -LIBRA (r) SERGIO (t) LIBRA (c)     Gait Balance Support Right upper extremity supported;Left upper extremity supported  -LIBRA (r) SERGIO (t) LIBRA (c)     Positioning and Restraints    Pre-Treatment Position in bed  -LIBRA (r) SERGIO (t) LIBRA (c)     Post Treatment Position chair  -LIBRA (r) SERGIO (t) LIBRA (c)     In Chair reclined;call light within reach;encouraged to call for assist;notified nsg;legs elevated  -LIBRA (r) SERGIO (t) LIBRA (c)       User Key  (r) = Recorded By, (t) = Taken By, (c) = Cosigned By    Initials Name Provider Type    LIBRA Kilpatrick, PT DPT Physical Therapist    CARTER Phillips, RN Registered Nurse    SERGIO Barcenas, PT Student PT Student          Physical Therapy Education     Title: PT OT SLP Therapies (Done)     Topic: Physical Therapy (Done)     Point: Mobility training (Done)    Learning Progress Summary    Learner Readiness Method Response Comment Documented by Status   Patient Acceptance E VU Educated on importance of activity with respect to procedures performed, weight-shifting during gait, t/f and bed  mobility technioques  02/09/18 1116 Done               Point: Precautions (Done)    Learning Progress Summary    Learner Readiness Method Response Comment Documented by Status   Patient Acceptance E VU Educated on sternal precautions  02/09/18 1117 Done                      User Key     Initials Effective Dates Name Provider Type Discipline     01/10/18 -  James Barcenas, PT Student PT Student PT                PT Recommendation and Plan  Anticipated Equipment Needs At Discharge: front wheeled walker  Anticipated Discharge Disposition: transitional care  Planned Therapy Interventions: balance training, bed mobility training, gait training, patient/family education, postural re-education, strengthening, transfer training  PT Frequency: 2 times/day, per priority policy  Plan of Care Review  Plan Of Care Reviewed With: patient  Outcome Summary/Follow up Plan: PT eval complete. Pt was able to perform bed mobility maxA but was able to support himself once in sitting. He t/f sit-stand-sit and ambulated 20 feet modAx2. Pt demonstrated impaired balance and strength, decreased ability to weight shift, and R knee buckling. Vitals remained stable throughout the evaluation. Pt will benefit from therapy to improve balance, strength, gait,  and activity tolerance. Anticipate discharge to transitional care.          IP PT Goals       02/09/18 1146 02/09/18 1120       Bed Mobility PT LTG    Bed Mobility PT LTG, Date Established  02/09/18  -LIBRA (r) SERGIO (shawna) LIBRA (c)     Bed Mobility PT LTG, Time to Achieve  by discharge  -LIBRA (r) SERGIO (t) LIBRA (c)     Bed Mobility PT LTG, Activity Type  all bed mobility  -LIBRA (r) SERGIO (t) LIBRA (c)     Bed Mobility PT LTG, [place] Level  minimum assist (75% patient effort)  -LIBRA (r) SERGIO (t) LIBRA (c)     Transfer Training PT LTG    Transfer Training PT LTG, Date Established  02/09/18  -LIBRA (r) SERGIO (t) LIBRA (c)     Transfer Training PT LTG, Time to Achieve  by discharge  -LIBRA (r) SERGIO (t) LIBRA (c)     Transfer  Training PT LTG, Activity Type  bed to chair /chair to bed;sit to stand/stand to sit  -LIBRA (r) SERGIO (t) LIBRA (c)     Transfer Training PT LTG, [place] Level  supervision required  -LIBRA (r) SERGIO (t) LIBRA (c)     Gait Training PT LTG    Gait Training Goal PT LTG, Date Established (P)  02/09/18  -SERGIO 02/09/18  -LIBRA (r) SERGIO (t) LIBRA (c)     Gait Training Goal PT LTG, Time to Achieve  by discharge  -LIBRA (r) SERGIO (t) LIBRA (c)     Gait Training Goal PT LTG, [place] Level  contact guard assist  -LIBRA (r) SERGIO (t) LIBRA (c)     Gait Training Goal PT LTG, Distance to Achieve  100 feet  -LIBRA (r) SERGIO (t) LIBRA (c)       User Key  (r) = Recorded By, (t) = Taken By, (c) = Cosigned By    Initials Name Provider Type    LIBRA Kilpatrick, PT DPT Physical Therapist    SERGIO Barcenas, PT Student PT Student                Outcome Measures       02/09/18 1100          How much help from another person do you currently need...    Turning from your back to your side while in flat bed without using bedrails? 3  -LIBRA (r) JM (t) LIBRA (c)      Moving from lying on back to sitting on the side of a flat bed without bedrails? 2  -LIBRA (r) JM (t) LIBRA (c)      Moving to and from a bed to a chair (including a wheelchair)? 2  -LIBRA (r) JM (t) LIBRA (c)      Standing up from a chair using your arms (e.g., wheelchair, bedside chair)? 2  -LIBRA (r) JM (t) LIBRA (c)      Climbing 3-5 steps with a railing? 2  -LIBRA (r) JM (t) LIBRA (c)      To walk in hospital room? 2  -LIBRA (r) JM (t) LIBRA (c)      AM-PAC 6 Clicks Score 13  -LIBRA (r) JM (t)      Functional Assessment    Outcome Measure Options AM-PAC 6 Clicks Basic Mobility (PT)  -LIBRA (r) SERGIO (t) LIBRA (c)        User Key  (r) = Recorded By, (t) = Taken By, (c) = Cosigned By    Initials Name Provider Type    LIBRA Kilpatrick, PT DPT Physical Therapist    SERGIO Barcenas, PT Student PT Student           Time Calculation:         PT Charges       02/09/18 8908          Time Calculation    Start Time 0958  -LIBRA (r) SERGIO (t) LIBRA (c)      Stop Time  1100  -LIBRA (r) SERGIO (t) LIBRA (leyla)      Time Calculation (min) 62 min  -LIBRA (r) SERGIO (t)      PT Received On 02/09/18  -LIBRA (r) SERGIO (t) LIBRA (c)      PT Goal Re-Cert Due Date 02/19/18  -LIBRA (r) SERGIO (t) LIBRA (c)        User Key  (r) = Recorded By, (t) = Taken By, (c) = Cosigned By    Initials Name Provider Type    LIBRA Kilpatrick, PT DPT Physical Therapist    SERGIO Barcenas, PT Student PT Student          Therapy Charges for Today     Code Description Service Date Service Provider Modifiers Qty    42544983654 HC PT EVAL MOD COMPLEXITY 4 2/9/2018 James Barcenas, PT Student GP 1          PT G-Codes  Outcome Measure Options: AM-PAC 6 Clicks Basic Mobility (PT)  Score: 13  Functional Limitation: Mobility: Walking and moving around  Mobility: Walking and Moving Around Current Status (): At least 40 percent but less than 60 percent impaired, limited or restricted  Mobility: Walking and Moving Around Goal Status (): At least 20 percent but less than 40 percent impaired, limited or restricted      James Barcenas, PT Student  2/9/2018

## 2018-02-09 NOTE — PROGRESS NOTES
Discharge Planning Assessment  [facility]     Patient Name: Sharad Ryder  MRN: [id number]  Today's Date: 2/9/2018    Admit Date: 2/8/2018          Discharge Needs Assessment       02/09/18 1157    Living Environment    Lives With spouse    Living Arrangements house    Transportation Available car;family or friend will provide    Living Environment    Provides Primary Care For no one    Quality Of Family Relationships supportive    Able to Return to Prior Living Arrangements yes    Discharge Needs Assessment    Concerns To Be Addressed denies needs/concerns at this time    Readmission Within The Last 30 Days planned readmission    Anticipated Changes Related to Illness none    Equipment Currently Used at Home none    Equipment Needed After Discharge none    Current Discharge Risk chronically ill    Discharge Planning Comments Spoke with patient and patient's sister regarding discharge plan/needs.  Patient plans to return home at discharge.  Patient has been independent and denies discharge needs.  Patient does not utilize DME or HH.  Patient resides with his spouse, has a PCP and RX coverage.            Discharge Plan     None        Discharge Placement     No information found                Demographic Summary     None            Functional Status     None            Psychosocial     None            Abuse/Neglect     None            Legal     None            Substance Abuse     None            Patient Forms     None          KARIE [place]

## 2018-02-09 NOTE — PROGRESS NOTES
"AVR/CABG=3V/TMR-17 channel/ LAAE  POD 1    Extubated this am.        [facility]  amio off    Visit Vitals   • /45   • Pulse 93   • Temp 100.2 °F (37.9 °C)   • Resp 26   • Ht 162 cm (63.78\")   • Wt 67.1 kg (148 lb)   • SpO2 94%   • BMI 25.58 kg/m2         Intake/Output Summary (Last 24 hours) at 02/09/18 1453  Last data filed at 02/09/18 1243   Gross per 24 hour   Intake           5899.4 ml   Output             2811 ml   Net           3088.4 ml     MCT: 532ml/24 hours  L marlene: 120 ml/24 hours        CXR:  Well expanded lungs, no ptx, no pleural effusion, no significant evidence of heart failrue    Physical Exam:    General: NAD, In good spirits, intubated.  11t:  Cardiovascular: RRR, No rubs, or gallops.  Usual flow prosthetic murmur  Pulmonary: CTAB, No wheezing, rubs, or rales.  Dressings c/d/i  Abdomen: soft, Non-distended, and non-tender  Extremities: warm, PORTILLO  Neurologic:  No focal deficits, CN II-XII intact grossly.        Impression:    Aortic stenosis  CAD  Recent NSTEMI  Recent flu infection  CVOD, recent R CEA  COPD  PVOD  Anemia- multifactorial- hemodilutional    Medical decision-making/recommendations/plan:  Usual post cardiac surgery recovery  Doing well.    Likely keep in icu today.    DW patient plan of care.      "

## 2018-02-09 NOTE — PLAN OF CARE
Problem: Patient Care Overview (Adult)  Goal: Plan of Care Review  Outcome: Ongoing (interventions implemented as appropriate)   02/09/18 0527   Coping/Psychosocial Response Interventions   Plan Of Care Reviewed With sibling   Outcome Evaluation   Outcome Summary/Follow up Plan Patient recovering in ICU post CABG and AVR surgery. Patient curretly on spontaneous mode on vent. Able to make progress weaning vasppressors. Adequate UOP.        Problem: Perioperative Period (Adult)  Goal: Signs and Symptoms of Listed Potential Problems Will be Absent or Manageable (Perioperative Period)  Outcome: Outcome(s) achieved Date Met: 02/09/18      Problem: Fall Risk (Adult)  Goal: Identify Related Risk Factors and Signs and Symptoms  Outcome: Ongoing (interventions implemented as appropriate)    Goal: Identify Related Risk Factors and Signs and Symptoms  Outcome: Ongoing (interventions implemented as appropriate)    Goal: Absence of Falls  Outcome: Ongoing (interventions implemented as appropriate)

## 2018-02-10 ENCOUNTER — APPOINTMENT (OUTPATIENT)
Dept: GENERAL RADIOLOGY | Facility: HOSPITAL | Age: 58
End: 2018-02-10

## 2018-02-10 LAB
ANION GAP SERPL CALCULATED.3IONS-SCNC: 13 MMOL/L (ref 4–13)
ARTERIAL PATENCY WRIST A: ABNORMAL
ARTERIAL PATENCY WRIST A: ABNORMAL
ATMOSPHERIC PRESS: 750 MMHG
ATMOSPHERIC PRESS: 751 MMHG
BASE EXCESS BLDA CALC-SCNC: -1.3 MMOL/L (ref 0–2)
BASE EXCESS BLDA CALC-SCNC: -3.8 MMOL/L (ref 0–2)
BDY SITE: ABNORMAL
BDY SITE: ABNORMAL
BODY TEMPERATURE: 37 C
BODY TEMPERATURE: 37 C
BUN BLD-MCNC: 33 MG/DL (ref 5–21)
BUN/CREAT SERPL: 36.3 (ref 7–25)
CALCIUM SPEC-SCNC: 8.4 MG/DL (ref 8.4–10.4)
CHLORIDE SERPL-SCNC: 104 MMOL/L (ref 98–110)
CO2 SERPL-SCNC: 20 MMOL/L (ref 24–31)
CREAT BLD-MCNC: 0.91 MG/DL (ref 0.5–1.4)
DEPRECATED RDW RBC AUTO: 46.5 FL (ref 40–54)
ERYTHROCYTE [DISTWIDTH] IN BLOOD BY AUTOMATED COUNT: 14.5 % (ref 12–15)
GAS FLOW AIRWAY: 2 LPM
GAS FLOW AIRWAY: 2 LPM
GFR SERPL CREATININE-BSD FRML MDRD: 86 ML/MIN/1.73
GLUCOSE BLD-MCNC: 131 MG/DL (ref 70–100)
HCO3 BLDA-SCNC: 19.8 MMOL/L (ref 20–26)
HCO3 BLDA-SCNC: 22 MMOL/L (ref 20–26)
HCT VFR BLD AUTO: 25.2 % (ref 40–52)
HGB BLD-MCNC: 8.4 G/DL (ref 14–18)
Lab: ABNORMAL
Lab: ABNORMAL
MCH RBC QN AUTO: 29.7 PG (ref 28–32)
MCHC RBC AUTO-ENTMCNC: 33.3 G/DL (ref 33–36)
MCV RBC AUTO: 89 FL (ref 82–95)
MODALITY: ABNORMAL
MODALITY: ABNORMAL
PCO2 BLDA: 29.6 MM HG (ref 35–45)
PCO2 BLDA: 30.4 MM HG (ref 35–45)
PH BLDA: 7.43 PH UNITS (ref 7.35–7.45)
PH BLDA: 7.47 PH UNITS (ref 7.35–7.45)
PLATELET # BLD AUTO: 106 10*3/MM3 (ref 130–400)
PMV BLD AUTO: 10.1 FL (ref 6–12)
PO2 BLDA: 73.5 MM HG (ref 83–108)
PO2 BLDA: 88.3 MM HG (ref 83–108)
POTASSIUM BLD-SCNC: 5 MMOL/L (ref 3.5–5.3)
RBC # BLD AUTO: 2.83 10*6/MM3 (ref 4.8–5.9)
SAO2 % BLDCOA: 94.6 % (ref 94–99)
SAO2 % BLDCOA: 97 % (ref 94–99)
SODIUM BLD-SCNC: 137 MMOL/L (ref 135–145)
VENTILATOR MODE: ABNORMAL
VENTILATOR MODE: ABNORMAL
WBC NRBC COR # BLD: 18.03 10*3/MM3 (ref 4.8–10.8)

## 2018-02-10 PROCEDURE — 94640 AIRWAY INHALATION TREATMENT: CPT

## 2018-02-10 PROCEDURE — 25010000002 FENTANYL CITRATE (PF) 100 MCG/2ML SOLUTION: Performed by: THORACIC SURGERY (CARDIOTHORACIC VASCULAR SURGERY)

## 2018-02-10 PROCEDURE — 94799 UNLISTED PULMONARY SVC/PX: CPT

## 2018-02-10 PROCEDURE — 36600 WITHDRAWAL OF ARTERIAL BLOOD: CPT

## 2018-02-10 PROCEDURE — 85027 COMPLETE CBC AUTOMATED: CPT | Performed by: THORACIC SURGERY (CARDIOTHORACIC VASCULAR SURGERY)

## 2018-02-10 PROCEDURE — 80048 BASIC METABOLIC PNL TOTAL CA: CPT | Performed by: THORACIC SURGERY (CARDIOTHORACIC VASCULAR SURGERY)

## 2018-02-10 PROCEDURE — 93005 ELECTROCARDIOGRAM TRACING: CPT | Performed by: THORACIC SURGERY (CARDIOTHORACIC VASCULAR SURGERY)

## 2018-02-10 PROCEDURE — 93010 ELECTROCARDIOGRAM REPORT: CPT | Performed by: INTERNAL MEDICINE

## 2018-02-10 PROCEDURE — 97116 GAIT TRAINING THERAPY: CPT

## 2018-02-10 PROCEDURE — 25010000002 ENOXAPARIN PER 10 MG: Performed by: THORACIC SURGERY (CARDIOTHORACIC VASCULAR SURGERY)

## 2018-02-10 PROCEDURE — 99024 POSTOP FOLLOW-UP VISIT: CPT | Performed by: THORACIC SURGERY (CARDIOTHORACIC VASCULAR SURGERY)

## 2018-02-10 PROCEDURE — 25010000003 CEFAZOLIN PER 500 MG: Performed by: THORACIC SURGERY (CARDIOTHORACIC VASCULAR SURGERY)

## 2018-02-10 PROCEDURE — 71045 X-RAY EXAM CHEST 1 VIEW: CPT

## 2018-02-10 PROCEDURE — 25010000002 FUROSEMIDE PER 20 MG: Performed by: THORACIC SURGERY (CARDIOTHORACIC VASCULAR SURGERY)

## 2018-02-10 PROCEDURE — 82803 BLOOD GASES ANY COMBINATION: CPT

## 2018-02-10 RX ORDER — LIDOCAINE 50 MG/G
2 PATCH TOPICAL
Status: DISCONTINUED | OUTPATIENT
Start: 2018-02-10 | End: 2018-02-11

## 2018-02-10 RX ORDER — POTASSIUM CHLORIDE 750 MG/1
20 CAPSULE, EXTENDED RELEASE ORAL DAILY
Status: DISCONTINUED | OUTPATIENT
Start: 2018-02-10 | End: 2018-02-12

## 2018-02-10 RX ORDER — FUROSEMIDE 10 MG/ML
40 INJECTION INTRAMUSCULAR; INTRAVENOUS EVERY 12 HOURS
Status: DISCONTINUED | OUTPATIENT
Start: 2018-02-10 | End: 2018-02-11

## 2018-02-10 RX ADMIN — CEFAZOLIN 2 G: 1 INJECTION, POWDER, FOR SOLUTION INTRAVENOUS at 03:59

## 2018-02-10 RX ADMIN — IPRATROPIUM BROMIDE AND ALBUTEROL SULFATE 3 ML: 2.5; .5 SOLUTION RESPIRATORY (INHALATION) at 19:54

## 2018-02-10 RX ADMIN — IPRATROPIUM BROMIDE AND ALBUTEROL SULFATE 3 ML: 2.5; .5 SOLUTION RESPIRATORY (INHALATION) at 10:13

## 2018-02-10 RX ADMIN — OXYCODONE HYDROCHLORIDE AND ACETAMINOPHEN 1 TABLET: 5; 325 TABLET ORAL at 23:48

## 2018-02-10 RX ADMIN — ASPIRIN 81 MG: 81 TABLET ORAL at 08:06

## 2018-02-10 RX ADMIN — IPRATROPIUM BROMIDE AND ALBUTEROL SULFATE 3 ML: 2.5; .5 SOLUTION RESPIRATORY (INHALATION) at 14:42

## 2018-02-10 RX ADMIN — BISACODYL 10 MG: 5 TABLET ORAL at 20:03

## 2018-02-10 RX ADMIN — ENOXAPARIN SODIUM 30 MG: 30 INJECTION SUBCUTANEOUS at 20:03

## 2018-02-10 RX ADMIN — BISACODYL 10 MG: 5 TABLET ORAL at 08:06

## 2018-02-10 RX ADMIN — ATORVASTATIN CALCIUM 20 MG: 10 TABLET, FILM COATED ORAL at 20:03

## 2018-02-10 RX ADMIN — OXYCODONE HYDROCHLORIDE AND ACETAMINOPHEN 1 TABLET: 10; 325 TABLET ORAL at 13:10

## 2018-02-10 RX ADMIN — METOPROLOL TARTRATE 12.5 MG: 25 TABLET, FILM COATED ORAL at 20:03

## 2018-02-10 RX ADMIN — LIDOCAINE 2 PATCH: 50 PATCH TOPICAL at 08:06

## 2018-02-10 RX ADMIN — POTASSIUM CHLORIDE 20 MEQ: 750 CAPSULE, EXTENDED RELEASE ORAL at 08:06

## 2018-02-10 RX ADMIN — CHLORHEXIDINE GLUCONATE 15 ML: 1.2 RINSE ORAL at 08:06

## 2018-02-10 RX ADMIN — METOPROLOL TARTRATE 12.5 MG: 25 TABLET, FILM COATED ORAL at 08:06

## 2018-02-10 RX ADMIN — FENTANYL CITRATE 50 MCG: 50 INJECTION INTRAMUSCULAR; INTRAVENOUS at 02:23

## 2018-02-10 RX ADMIN — OXYCODONE HYDROCHLORIDE AND ACETAMINOPHEN 1 TABLET: 10; 325 TABLET ORAL at 18:29

## 2018-02-10 RX ADMIN — CHLORHEXIDINE GLUCONATE 15 ML: 1.2 RINSE ORAL at 20:03

## 2018-02-10 RX ADMIN — FUROSEMIDE 40 MG: 10 INJECTION, SOLUTION INTRAMUSCULAR; INTRAVENOUS at 04:33

## 2018-02-10 RX ADMIN — FENTANYL CITRATE 50 MCG: 50 INJECTION INTRAMUSCULAR; INTRAVENOUS at 21:01

## 2018-02-10 RX ADMIN — OXYCODONE HYDROCHLORIDE AND ACETAMINOPHEN 1 TABLET: 10; 325 TABLET ORAL at 00:15

## 2018-02-10 RX ADMIN — MUPIROCIN: 20 OINTMENT TOPICAL at 09:00

## 2018-02-10 RX ADMIN — ENOXAPARIN SODIUM 30 MG: 30 INJECTION SUBCUTANEOUS at 08:06

## 2018-02-10 RX ADMIN — PHENYLEPHRINE HYDROCHLORIDE 0.8 MCG/KG/MIN: 10 INJECTION INTRAVENOUS at 00:09

## 2018-02-10 RX ADMIN — FUROSEMIDE 40 MG: 10 INJECTION, SOLUTION INTRAMUSCULAR; INTRAVENOUS at 17:14

## 2018-02-10 RX ADMIN — IPRATROPIUM BROMIDE AND ALBUTEROL SULFATE 3 ML: 2.5; .5 SOLUTION RESPIRATORY (INHALATION) at 06:39

## 2018-02-10 RX ADMIN — CLOPIDOGREL 75 MG: 75 TABLET, FILM COATED ORAL at 17:14

## 2018-02-10 RX ADMIN — OXYCODONE HYDROCHLORIDE AND ACETAMINOPHEN 1 TABLET: 10; 325 TABLET ORAL at 04:40

## 2018-02-10 RX ADMIN — OXYCODONE HYDROCHLORIDE AND ACETAMINOPHEN 1 TABLET: 10; 325 TABLET ORAL at 08:33

## 2018-02-10 NOTE — PLAN OF CARE
Problem: Patient Care Overview (Adult)  Goal: Plan of Care Review  Outcome: Ongoing (interventions implemented as appropriate)   02/10/18 1326   Coping/Psychosocial Response Interventions   Plan Of Care Reviewed With patient   Patient Care Overview   Progress progress toward functional goals is gradual   Outcome Evaluation   Outcome Summary/Follow up Plan Pt. continues to c/o 9/10 pain and appears to have increased anxiety with mobility. Pt. again walked 50' x 4 with standing rests. Pt's vitals remained stable with activity. Will continue to work with pt. on walking without rests and slow pursed lip breathing.

## 2018-02-10 NOTE — THERAPY TREATMENT NOTE
Acute Care - Physical Therapy Treatment Note  [facility]     Patient Name: Sharad Ryder  : 1960  MRN: [id number]  Today's Date: 2/10/2018  Onset of Illness/Injury or Date of Surgery Date: 18  Date of Referral to PT: 18  Referring Physician: Dr. Robin    Admit Date: 2018    Visit Dx:    ICD-10-CM ICD-9-CM   1. Aortic valve stenosis, etiology of cardiac valve disease unspecified I35.0 424.1   2. Impaired functional mobility, balance, gait, and endurance Z74.09 V49.89     Patient Active Problem List   Diagnosis   • Shortness of breath   • Severe aortic valve stenosis   • Tobacco use   • Hyperlipidemia   • Hypertension   • NSTEMI (non-ST elevated myocardial infarction)   • Chest pain   • Coronary artery disease involving native coronary artery of native heart with angina pectoris   • Stenosis of right carotid artery   • COPD (chronic obstructive pulmonary disease)   • LAZ (acute kidney injury)   • Flu   • Carotid stenosis, right   • Post-op pain   • Aortic valve stenosis               Adult Rehabilitation Note       02/10/18 1326 02/10/18 0832 18 1307    Rehab Assessment/Intervention    Discipline physical therapy assistant  -MF physical therapy assistant  -MF physical therapy assistant  -NHUNG    Document Type therapy note (daily note)  - therapy note (daily note)  -MF     Subjective Information agree to therapy;complains of;pain  -MF agree to therapy;complains of;pain  -MF     Treatment Not Performed   other (see comments)  -NHUNG    Treatment Not Performed, Comment   blood pressure issues per Nsg.  -NHUNG    Precautions/Limitations fall precautions;oxygen therapy device and L/min;sternal precautions   chest tube  - fall precautions;oxygen therapy device and L/min;sternal precautions   chest tube  -MF     Recorded by [MF] Kamille Chakraborty PTA [MF] Kamille Chakraborty PTA [NHUNG] Abby Corona PTA    Vital Signs    Pre Systolic BP Rehab 124  -  -MF     Pre Treatment Diastolic BP 71   -MF 71  -MF     Post Systolic BP Rehab 141  -MF      Post Treatment Diastolic BP 85  -MF      Pretreatment Heart Rate (beats/min) 97  -  -MF     Intratreatment Heart Rate (beats/min) 105  -MF      Posttreatment Heart Rate (beats/min) 101  -  -MF     Pre SpO2 (%) 95  -MF 89  -MF     O2 Delivery Pre Treatment supplemental O2   1L  -MF supplemental O2   2L  -MF     Intra SpO2 (%) 94  -MF      O2 Delivery Intra Treatment supplemental O2   2L  -MF --   3L  -MF     Post SpO2 (%) 92  -MF 95  -MF     O2 Delivery Post Treatment supplemental O2   1L  -MF --   2L  -MF     Recorded by [MF] Kamille Chakraborty PTA [MF] Kamille Chakraborty PTA     Pain Assessment    Pain Assessment 0-10  -MF 0-10  -MF     Pain Score 9  -MF 9  -MF     Pain Type Acute pain;Surgical pain  -MF Surgical pain;Acute pain  -MF     Pain Location Chest  -MF Chest  -MF     Pain Orientation Mid  -MF Mid  -MF     Pain Descriptors Aching;Sharp  -MF Sharp;Aching  -MF     Pain Frequency Constant/continuous  -MF Constant/continuous  -MF     Pain Intervention(s) Medication (See MAR);Repositioned  - Medication (See MAR);Repositioned;Ambulation/increased activity  -MF     Response to Interventions tolerated  -MF tolerated  -MF     Recorded by [MF] Kamille Chakraborty PTA [MF] Kamille Chakraborty PTA     Bed Mobility, Assessment/Treatment    Bed Mobility, Comment up in chair  -MF up in chair  -MF     Recorded by [MF] Kamille Chakraborty PTA [MF] Kamille Chakraborty PTA     Transfer Assessment/Treatment    Transfers, Sit-Stand [place] verbal cues required;contact guard assist;minimum assist (75% patient effort);2 person assist required  -MF verbal cues required;minimum assist (75% patient effort);2 person assist required  -     Transfers, Stand-Sit [place] verbal cues required;contact guard assist;minimum assist (75% patient effort);2 person assist required  -MF verbal cues required;minimum assist (75% patient effort);2 person assist required   -MF     Recorded by [] Kamille Chakraborty PTA [MF] Kamille Chakraborty PTA     Gait Assessment/Treatment    Gait, [place] Level verbal cues required;hand held assist;minimum assist (75% patient effort);2 person assist required  -MF verbal cues required;hand held assist;minimum assist (75% patient effort);2 person assist required  -MF     Gait, Distance (Feet) 50   x 4 with standing rests  -MF 50   x 4 with standing rests  -MF     Gait, Gait Deviations forward flexed posture;step length decreased  -MF daniel decreased;step length decreased;forward flexed posture  -MF     Gait, Safety Issues supplemental O2;step length decreased  -MF supplemental O2;step length decreased  -MF     Gait, Impairments pain  -MF strength decreased;postural control impaired  -MF     Gait, Comment Pt. appears to have some anxiety while walking. Instructed pt. to slow breathing and to shorten standing rests, but pt. continues to breathe rapidly.   - Lots of cues for postural and to limit standing rests  -MF     Recorded by [] Kamille Chakraborty PTA [] Kamille Chakraborty PTA     Therapy Exercises    Bilateral Lower Extremities AROM:;20 reps;sitting;ankle pumps/circles;LAQ  -MF AROM:;20 reps;sitting;ankle pumps/circles;LAQ  -MF     Bilateral Upper Extremity AROM:;20 reps;sitting;elbow flexion/extension  -MF AROM:;20 reps;sitting;elbow flexion/extension  -MF     Recorded by [] Kamille Chakraborty PTA [] Kamille Chakraborty PTA     Positioning and Restraints    Pre-Treatment Position sitting in chair/recliner  - sitting in chair/recliner  -     Post Treatment Position chair  - chair  -MF     In Chair reclined;call light within reach;encouraged to call for assist;notified nsg  - reclined;call light within reach;encouraged to call for assist;notified nsg  -     Recorded by [] Kamille Chakraborty PTA [] Kamille Chakraborty PTA       User Key  (r) = Recorded By, (t) = Taken By, (c) = Cosigned By    Initials Name  Effective Dates    NHUNG Abby Corona, PTA 08/02/16 -     MF Kamille Chakraborty, PTA 08/02/16 -                 IP PT Goals       02/09/18 1146 02/09/18 1120       Bed Mobility PT LTG    Bed Mobility PT LTG, Date Established  02/09/18  -LIBRA (r) JM (t) LIBRA (c)     Bed Mobility PT LTG, Time to Achieve  by discharge  -LIBRA (r) JM (t) LIBRA (c)     Bed Mobility PT LTG, Activity Type  all bed mobility  -LIBRA (r) JM (t) LIBRA (c)     Bed Mobility PT LTG, [place] Level  minimum assist (75% patient effort)  -LIBRA (r) JM (t) LIBRA (c)     Transfer Training PT LTG    Transfer Training PT LTG, Date Established  02/09/18  -LIBRA (r) JM (t) LIBRA (c)     Transfer Training PT LTG, Time to Achieve  by discharge  -LIBRA (r) JM (t) LIBRA (c)     Transfer Training PT LTG, Activity Type  bed to chair /chair to bed;sit to stand/stand to sit  -LIBRA (r) JM (t) LIBRA (c)     Transfer Training PT LTG, [place] Level  supervision required  -LIBRA (r) JM (t) LIBRA (c)     Gait Training PT LTG    Gait Training Goal PT LTG, Date Established 02/09/18  -LIBRA (r) JM (t) LIBRA (c) 02/09/18  -LIBRA (r) JM (t) LIBRA (c)     Gait Training Goal PT LTG, Time to Achieve  by discharge  -LIBRA (r) JM (t) LIBRA (c)     Gait Training Goal PT LTG, [place] Level  contact guard assist  -LIBRA (r) JM (t) LIBRA (c)     Gait Training Goal PT LTG, Distance to Achieve  100 feet  -LIBRA (r) JM (t) LIBRA (c)       User Key  (r) = Recorded By, (t) = Taken By, (c) = Cosigned By    Initials Name Provider Type    LIBRA Kilpatrick, PT DPT Physical Therapist    SERGIO Barcenas, PT Student PT Student          Physical Therapy Education     Title: PT OT SLP Therapies (Active)     Topic: Physical Therapy (Active)     Point: Mobility training (Active)    Learning Progress Summary    Learner Readiness Method Response Comment Documented by Status   Patient Acceptance E,D NR sternal precautions, posture, transfers, and gait.  02/10/18 0852 Active    Acceptance E VU Educated on importance of activity with respect to  procedures performed, weight-shifting during gait, t/f and bed mobility technioques  02/09/18 1116 Done               Point: Precautions (Active)    Learning Progress Summary    Learner Readiness Method Response Comment Documented by Status   Patient Acceptance E,D NR sternal precautions, posture, transfers, and gait.  02/10/18 0852 Active    Acceptance E VU Educated on sternal precautions  02/09/18 1117 Done                      User Key     Initials Effective Dates Name Provider Type Discipline     08/02/16 -  Kamille Chakraborty, PTA Physical Therapy Assistant PT     01/10/18 -  James Barcenas, PT Student PT Student PT                    PT Recommendation and Plan  Anticipated Equipment Needs At Discharge: front wheeled walker  Anticipated Discharge Disposition: transitional care  Planned Therapy Interventions: balance training, bed mobility training, gait training, patient/family education, postural re-education, strengthening, transfer training  PT Frequency: 2 times/day, per priority policy  Plan of Care Review  Plan Of Care Reviewed With: patient  Progress: progress toward functional goals is gradual  Outcome Summary/Follow up Plan: Pt. continues to c/o 9/10 pain and appears to have increased anxiety with mobility. Pt. again walked 50' x 4 with standing rests. Pt's vitals remained stable with activity. Will continue to work with pt. on walking without rests and slow pursed lip breathing.          Outcome Measures       02/10/18 0832 02/09/18 1100       How much help from another person do you currently need...    Turning from your back to your side while in flat bed without using bedrails? 2  -MF 3  -LIBRA (r) JM (t) LIBRA (c)     Moving from lying on back to sitting on the side of a flat bed without bedrails? 2  -MF 2  -LIBRA (r) JM (t) LIBRA (c)     Moving to and from a bed to a chair (including a wheelchair)? 2  -MF 2  -LIBRA (r) JM (t) LIBRA (c)     Standing up from a chair using your arms (e.g., wheelchair,  bedside chair)? 2  -MF 2  -LIBRA (r) JM (t) LIBRA (c)     Climbing 3-5 steps with a railing? 2  -MF 2  -LIBRA (r) JM (t) LIBRA (c)     To walk in hospital room? 2  -MF 2  -LIBRA (r) JM (t) LIBRA (c)     AM-PAC 6 Clicks Score 12  -MF 13  -LIBRA (r) JM (t)     Functional Assessment    Outcome Measure Options AM-PAC 6 Clicks Basic Mobility (PT)  -MF AM-PAC 6 Clicks Basic Mobility (PT)  -LIBRA (r) JM (t) LIBRA (c)       User Key  (r) = Recorded By, (t) = Taken By, (c) = Cosigned By    Initials Name Provider Type    LIBRA Kilpatrick, PT DPT Physical Therapist    ABIGAIL Charkaborty PTA Physical Therapy Assistant    SERGIO Barcenas, PT Student PT Student           Time Calculation:         PT Charges       02/10/18 1347 02/10/18 0854       Time Calculation    Start Time 1326  - 0832  -     Stop Time 1349  - 0855  -     Time Calculation (min) 23 min  -MF 23 min  -     PT Received On 02/10/18  -MF 02/10/18  -     PT Goal Re-Cert Due Date 02/19/18  - 02/19/18  -     Time Calculation- PT    Total Timed Code Minutes- PT 23 minute(s)  -MF 23 minute(s)  -MF       User Key  (r) = Recorded By, (t) = Taken By, (c) = Cosigned By    Initials Name Provider Type    ABIGAIL Chakraborty PTA Physical Therapy Assistant          Therapy Charges for Today     Code Description Service Date Service Provider Modifiers Qty    62151637608 HC GAIT TRAINING EA 15 MIN 2/10/2018 Kamille Chakraborty PTA GP 2    61654201967 HC GAIT TRAINING EA 15 MIN 2/10/2018 Kamille Chakraborty PTA GP 2          PT G-Codes  Outcome Measure Options: AM-PAC 6 Clicks Basic Mobility (PT)  Score: 13  Functional Limitation: Mobility: Walking and moving around  Mobility: Walking and Moving Around Current Status (): At least 40 percent but less than 60 percent impaired, limited or restricted  Mobility: Walking and Moving Around Goal Status (): At least 20 percent but less than 40 percent impaired, limited or restricted    Kamille Chakraborty,  PTA  2/10/2018

## 2018-02-10 NOTE — PLAN OF CARE
Problem: Patient Care Overview (Adult)  Goal: Plan of Care Review  Outcome: Ongoing (interventions implemented as appropriate)   02/10/18 0518   Coping/Psychosocial Response Interventions   Plan Of Care Reviewed With patient;family   Outcome Evaluation   Outcome Summary/Follow up Plan Adequate urine output, Vasopressin .01, nitroglycerin 5 mcg/hour, mst draining 30 per 4 hours. Up in chair. Requires a lot of pain medication. Has history of neuropathy.      Goal: Adult Individualization and Mutuality  Outcome: Ongoing (interventions implemented as appropriate)    Goal: Discharge Needs Assessment  Outcome: Ongoing (interventions implemented as appropriate)      Problem: Fall Risk (Adult)  Goal: Identify Related Risk Factors and Signs and Symptoms  Outcome: Ongoing (interventions implemented as appropriate)    Goal: Absence of Falls  Outcome: Ongoing (interventions implemented as appropriate)    Goal: Identify Related Risk Factors and Signs and Symptoms  Outcome: Ongoing (interventions implemented as appropriate)    Goal: Absence of Falls  Outcome: Ongoing (interventions implemented as appropriate)      Problem: Cardiac Surgery (Adult)  Goal: Signs and Symptoms of Listed Potential Problems Will be Absent or Manageable (Cardiac Surgery)  Outcome: Ongoing (interventions implemented as appropriate)      Problem: Pressure Ulcer Risk (Parish Scale) (Adult,Obstetrics,Pediatric)  Goal: Identify Related Risk Factors and Signs and Symptoms  Outcome: Ongoing (interventions implemented as appropriate)    Goal: Skin Integrity  Outcome: Ongoing (interventions implemented as appropriate)      Problem: Skin Integrity Impairment, Risk/Actual (Adult)  Goal: Identify Related Risk Factors and Signs and Symptoms  Outcome: Ongoing (interventions implemented as appropriate)    Goal: Skin Integrity/Wound Healing  Outcome: Ongoing (interventions implemented as appropriate)

## 2018-02-10 NOTE — PLAN OF CARE
Problem: Patient Care Overview (Adult)  Goal: Plan of Care Review  Outcome: Ongoing (interventions implemented as appropriate)   02/10/18 0832   Coping/Psychosocial Response Interventions   Plan Of Care Reviewed With patient   Patient Care Overview   Progress progress toward functional goals is gradual   Outcome Evaluation   Outcome Summary/Follow up Plan Pt. agreeable to therapy,, but continues to c/o 9/10 pain. Pt. was min x 2 to stand and to ambulate. Pt. walked 50' x 4 with standing rests. Pt. required cues for posture and to limit standing rests. Will continue to work on progressive ambulation and independence with mobility.

## 2018-02-10 NOTE — PROGRESS NOTES
"Patient: Sharad Ryder  : 1960     Procedure: Procedure(s):  AORTIC VALVE REPLACEMENT,CORONARY ARTERY BYPASS GRAFTING x 3  WITH CONCHA AND RIGHT EVH, ATRIAL APPENDAGE EXCLUSION LEFT WITH TRANSESOPHAGEAL ECHOCARDIOGRAM, 17-CHANNEL TMR    Procedure Date: 2018    POD: 2 Days Post-Op      Subjective   Mr. Ryder is alert and oriented.  Moving all extremities to command.  Complaints of soreness and tiredness     Objective   Visit Vitals   • BP 92/54   • Pulse 94   • Temp 98.6 °F (37 °C) (Oral)   • Resp 22   • Ht 162 cm (63.78\")   • Wt 67.1 kg (148 lb)   • SpO2 93%   • BMI 25.58 kg/m2       Intake/Output Summary (Last 24 hours) at 02/10/18 0351  Last data filed at 02/10/18 0015   Gross per 24 hour   Intake           3173.3 ml   Output             1202 ml   Net           1971.3 ml                                        Lab Results:    CBC:   Results from last 7 days  Lab Units 02/10/18  0222 02/09/18  2030 02/09/18  0213   WBC 10*3/mm3 18.03* 15.04* 15.15*   HEMATOCRIT % 25.2* 24.1* 22.4*   PLATELETS 10*3/mm3 106* 106* 151     BMP:   Results from last 7 days  Lab Units 02/10/18  0222 02/09/18  0213 02/08/18  2126   SODIUM mmol/L 137 143 142   POTASSIUM mmol/L 5.0 4.5 4.7   CHLORIDE mmol/L 104 107 106   CO2 mmol/L 20.0* 26.0 26.0   GLUCOSE mg/dL 131* 101* 144*   BUN mg/dL 33* 11 10   CREATININE mg/dL 0.91 0.73 0.66     Coag:   Results from last 7 days  Lab Units 18  1647   INR  1.28*  < >  --    APTT seconds  --   --  32.9   < > = values in this interval not displayed.    Images:  Imaging Results (last 24 hours)     Procedure Component Value Units Date/Time    XR Chest 1 View [[id number]] Collected:  18 0651     Updated:  18 0656    Narrative:       EXAMINATION: XR CHEST 1 VW- 2018 6:51 AM CST     HISTORY: Postop heart surgery     COMPARISON: 2018     FINDINGS:  An endotracheal tube is in place with tip approximately 3 cm above the  deo. A right IJ [place]-Chasity catheter " is in place with tip directed  toward the main pulmonary trunk, retracted from the previous exam.  Prosthetic valve is in place. There is evidence of previous CABG. A left  atrial appendage clip is noted. Heart appears normal in size. Aorta is  tortuous with atherosclerotic calcifications. Drainage catheters remain  in place. There is mild pulmonary vascular congestion and perihilar  opacities likely reflecting mild pulmonary edema. Trace pleural fluid  suspected on the right.       Impression:          1. Interval retraction of the right IJ [place]-Chasity catheter.     2. Mild pulmonary vascular congestion and pulmonary edema.  This report was finalized on 02/09/2018 06:53 by Dr. Tim Villatoro MD.        Images Today:Pending     Physical Exam:   General: Alert, oriented, in no acute distress  Chest: Wound okay, sternum stable  Lungs: Diminished breath sounds at bases  Heart: Regular rhythm and normal rate, no murmur, no gallop, no rub (normal sinus rhythm on monitor)  Extremities: No swelling or edema.Greater saphenous vein harvesting site healing well.  Neurologic: Grossly intact      Impression: Doing fairly well postoperative day #2 from AVR/CABG.  Requiring a fair amount of pain medications.  Also requiring vasopressin and [place]-Synephrine.  Urine output has been fair but he is positive in fluid by 2 L yesterday.  Hematocrit stable at approximately 25%.  Creatinine stable also.    Plan: Keep in ICU.  Wean [place]-Synephrine first then the vasopressin.  Diuresis with Lasix.  We will try Lidoderm patch for help with pain control.  Recheck labs and chest x-ray tomorrow    Zia Abrams MD  02/10/18  3:51 AM

## 2018-02-10 NOTE — THERAPY TREATMENT NOTE
Acute Care - Physical Therapy Treatment Note  [facility]     Patient Name: Sharad Ryder  : 1960  MRN: [id number]  Today's Date: 2/10/2018  Onset of Illness/Injury or Date of Surgery Date: 18  Date of Referral to PT: 18  Referring Physician: Dr. Robin    Admit Date: 2018    Visit Dx:    ICD-10-CM ICD-9-CM   1. Aortic valve stenosis, etiology of cardiac valve disease unspecified I35.0 424.1   2. Impaired functional mobility, balance, gait, and endurance Z74.09 V49.89     Patient Active Problem List   Diagnosis   • Shortness of breath   • Severe aortic valve stenosis   • Tobacco use   • Hyperlipidemia   • Hypertension   • NSTEMI (non-ST elevated myocardial infarction)   • Chest pain   • Coronary artery disease involving native coronary artery of native heart with angina pectoris   • Stenosis of right carotid artery   • COPD (chronic obstructive pulmonary disease)   • LAZ (acute kidney injury)   • Flu   • Carotid stenosis, right   • Post-op pain   • Aortic valve stenosis               Adult Rehabilitation Note       02/10/18 0832 18 1307       Rehab Assessment/Intervention    Discipline physical therapy assistant  -MF physical therapy assistant  -NHUNG     Document Type therapy note (daily note)  -      Subjective Information agree to therapy;complains of;pain  -      Treatment Not Performed  other (see comments)  -NHUNG     Treatment Not Performed, Comment  blood pressure issues per Nsg.  -NHUNG     Precautions/Limitations fall precautions;oxygen therapy device and L/min;sternal precautions   chest tube  -MF      Recorded by [] Kamille Chakraborty PTA [NHUNG] Abby Corona PTA     Vital Signs    Pre Systolic BP Rehab 118  -MF      Pre Treatment Diastolic BP 71  -MF      Pretreatment Heart Rate (beats/min) 100  -MF      Posttreatment Heart Rate (beats/min) 102  -MF      Pre SpO2 (%) 89  -MF      O2 Delivery Pre Treatment supplemental O2   2L  -MF      O2 Delivery Intra Treatment --   3L  -MF       Post SpO2 (%) 95  -      O2 Delivery Post Treatment --   2L  -MF      Recorded by [] Kamille Chakraborty PTA      Pain Assessment    Pain Assessment 0-10  -      Pain Score 9  -      Pain Type Surgical pain;Acute pain  -      Pain Location Chest  -      Pain Orientation Mid  -      Pain Descriptors Sharp;Aching  -      Pain Frequency Constant/continuous  -      Pain Intervention(s) Medication (See MAR);Repositioned;Ambulation/increased activity  -      Response to Interventions tolerated  -MF      Recorded by [] Kamille Chakraborty PTA      Bed Mobility, Assessment/Treatment    Bed Mobility, Comment up in chair  -      Recorded by [] Kamille Chakraborty PTA      Transfer Assessment/Treatment    Transfers, Sit-Stand [place] verbal cues required;minimum assist (75% patient effort);2 person assist required  -      Transfers, Stand-Sit [place] verbal cues required;minimum assist (75% patient effort);2 person assist required  -      Recorded by [] Kamille Chakraborty PTA      Gait Assessment/Treatment    Gait, [place] Level verbal cues required;hand held assist;minimum assist (75% patient effort);2 person assist required  -      Gait, Distance (Feet) 50   x 4 with standing rests  -      Gait, Gait Deviations daneil decreased;step length decreased;forward flexed posture  -      Gait, Safety Issues supplemental O2;step length decreased  -      Gait, Impairments strength decreased;postural control impaired  -      Gait, Comment Lots of cues for postural and to limit standing rests  -MF      Recorded by [] Kamille Chakraborty PTA      Therapy Exercises    Bilateral Lower Extremities AROM:;20 reps;sitting;ankle pumps/circles;LAQ  -      Bilateral Upper Extremity AROM:;20 reps;sitting;elbow flexion/extension  -      Recorded by [] Kamille Chakraborty PTA      Positioning and Restraints    Pre-Treatment Position sitting in chair/recliner  -      Post  Treatment Position chair  -MF      In Chair reclined;call light within reach;encouraged to call for assist;notified nsg  -MF      Recorded by [] Kamille Chakraborty, PTA        User Key  (r) = Recorded By, (t) = Taken By, (c) = Cosigned By    Initials Name Effective Dates    NHUNG Abby BECKER Kaciemirella, PTA 08/02/16 -     MF Kamille Chakraborty, PTA 08/02/16 -                 IP PT Goals       02/09/18 1146 02/09/18 1120       Bed Mobility PT LTG    Bed Mobility PT LTG, Date Established  02/09/18  -LIBRA (r) JM (t) LIBRA (c)     Bed Mobility PT LTG, Time to Achieve  by discharge  -LIBRA (r) JM (t) LIBRA (c)     Bed Mobility PT LTG, Activity Type  all bed mobility  -LIBRA (r) JM (t) LIBRA (c)     Bed Mobility PT LTG, [place] Level  minimum assist (75% patient effort)  -LIBRA (r) JM (t) LIBRA (c)     Transfer Training PT LTG    Transfer Training PT LTG, Date Established  02/09/18  -LIBRA (r) JM (t) LIBRA (c)     Transfer Training PT LTG, Time to Achieve  by discharge  -LIBRA (r) JM (t) LIBRA (c)     Transfer Training PT LTG, Activity Type  bed to chair /chair to bed;sit to stand/stand to sit  -LIBRA (r) JM (t) LIBRA (c)     Transfer Training PT LTG, [place] Level  supervision required  -LIBRA (r) JM (t) LIBRA (c)     Gait Training PT LTG    Gait Training Goal PT LTG, Date Established 02/09/18  -LIBRA (r) JM (t) LIBRA (c) 02/09/18  -LIBRA (r) JM (t) LIBRA (c)     Gait Training Goal PT LTG, Time to Achieve  by discharge  -LIBRA (r) JM (t) LIBRA (c)     Gait Training Goal PT LTG, [place] Level  contact guard assist  -LIBRA (r) JM (t) LIBRA (c)     Gait Training Goal PT LTG, Distance to Achieve  100 feet  -LIBRA (r) JM (t) LIBRA (c)       User Key  (r) = Recorded By, (t) = Taken By, (c) = Cosigned By    Initials Name Provider Type    LIBRA Kilpatrick, PT DPT Physical Therapist    SERGIO Barcenas, PT Student PT Student          Physical Therapy Education     Title: PT OT SLP Therapies (Active)     Topic: Physical Therapy (Active)     Point: Mobility training (Active)    Learning  Progress Summary    Learner Readiness Method Response Comment Documented by Status   Patient Acceptance E,D NR sternal precautions, posture, transfers, and gait.  02/10/18 0852 Active    Acceptance E VU Educated on importance of activity with respect to procedures performed, weight-shifting during gait, t/f and bed mobility technioques  02/09/18 1116 Done               Point: Precautions (Active)    Learning Progress Summary    Learner Readiness Method Response Comment Documented by Status   Patient Acceptance E,D NR sternal precautions, posture, transfers, and gait.  02/10/18 0852 Active    Acceptance E VU Educated on sternal precautions  02/09/18 1117 Done                      User Key     Initials Effective Dates Name Provider Type Discipline     08/02/16 -  Kamille Chakraborty, PTA Physical Therapy Assistant PT     01/10/18 -  James Barcenas, PT Student PT Student PT                    PT Recommendation and Plan  Anticipated Equipment Needs At Discharge: front wheeled walker  Anticipated Discharge Disposition: transitional care  Planned Therapy Interventions: balance training, bed mobility training, gait training, patient/family education, postural re-education, strengthening, transfer training  PT Frequency: 2 times/day, per priority policy  Plan of Care Review  Plan Of Care Reviewed With: patient  Progress: progress toward functional goals is gradual  Outcome Summary/Follow up Plan: Pt. agreeable to therapy,, but continues to c/o 9/10 pain. Pt. was min x 2 to stand and to ambulate. Pt. walked 50' x 4 with standing rests. Pt. required cues for posture and to limit  standing rests. Will continue to work on progressive ambulation and independence with mobility.          Outcome Measures       02/10/18 0832 02/09/18 1100       How much help from another person do you currently need...    Turning from your back to your side while in flat bed without using bedrails? 2  - 3  -LIBRA (r) SERGIO (t) LIBRA (c)      Moving from lying on back to sitting on the side of a flat bed without bedrails? 2  -MF 2  -LIBRA (r) JM (t) LIBRA (c)     Moving to and from a bed to a chair (including a wheelchair)? 2  -MF 2  -LIBRA (r) JM (t) LIBRA (c)     Standing up from a chair using your arms (e.g., wheelchair, bedside chair)? 2  -MF 2  -LIBRA (r) JM (t) LIBRA (c)     Climbing 3-5 steps with a railing? 2  -MF 2  -LIBRA (r) JM (t) LIBRA (c)     To walk in hospital room? 2  -MF 2  -LIBRA (r) JM (t) LIBRA (c)     AM-PAC 6 Clicks Score 12  -MF 13  -LIBRA (r) JM (t)     Functional Assessment    Outcome Measure Options AM-PAC 6 Clicks Basic Mobility (PT)  - AM-PAC 6 Clicks Basic Mobility (PT)  -LIBRA (r) JM (t) LIBRA (c)       User Key  (r) = Recorded By, (t) = Taken By, (c) = Cosigned By    Initials Name Provider Type    LIBRA Kilpatrick, PT DPT Physical Therapist    ABIGAIL Chakraborty PTA Physical Therapy Assistant    SERGIO Barcenas, PT Student PT Student           Time Calculation:         PT Charges       02/10/18 0854          Time Calculation    Start Time 0832  -      Stop Time 0855  -      Time Calculation (min) 23 min  -      PT Received On 02/10/18  -      PT Goal Re-Cert Due Date 02/19/18  -      Time Calculation- PT    Total Timed Code Minutes- PT 23 minute(s)  -        User Key  (r) = Recorded By, (t) = Taken By, (c) = Cosigned By    Initials Name Provider Type     Kamille Chakraborty PTA Physical Therapy Assistant          Therapy Charges for Today     Code Description Service Date Service Provider Modifiers Qty    [id number] HC GAIT TRAINING EA 15 MIN 2/10/2018 Kamille Chakraborty PTA GP 2          PT G-Codes  Outcome Measure Options: AM-PAC 6 Clicks Basic Mobility (PT)  Score: 13  Functional Limitation: Mobility: Walking and moving around  Mobility: Walking and Moving Around Current Status (): At least 40 percent but less than 60 percent impaired, limited or restricted  Mobility: Walking and Moving Around Goal Status (): At least  20 percent but less than 40 percent impaired, limited or restricted    Kamille Chakraborty, PTA  2/10/2018

## 2018-02-11 ENCOUNTER — APPOINTMENT (OUTPATIENT)
Dept: GENERAL RADIOLOGY | Facility: HOSPITAL | Age: 58
End: 2018-02-11

## 2018-02-11 LAB
ANION GAP SERPL CALCULATED.3IONS-SCNC: 10 MMOL/L (ref 4–13)
BUN BLD-MCNC: 22 MG/DL (ref 5–21)
BUN/CREAT SERPL: 34.4 (ref 7–25)
CALCIUM SPEC-SCNC: 8.7 MG/DL (ref 8.4–10.4)
CHLORIDE SERPL-SCNC: 96 MMOL/L (ref 98–110)
CO2 SERPL-SCNC: 30 MMOL/L (ref 24–31)
CREAT BLD-MCNC: 0.64 MG/DL (ref 0.5–1.4)
DEPRECATED RDW RBC AUTO: 45.1 FL (ref 40–54)
ERYTHROCYTE [DISTWIDTH] IN BLOOD BY AUTOMATED COUNT: 13.8 % (ref 12–15)
GFR SERPL CREATININE-BSD FRML MDRD: 129 ML/MIN/1.73
GLUCOSE BLD-MCNC: 106 MG/DL (ref 70–100)
HCT VFR BLD AUTO: 26.2 % (ref 40–52)
HGB BLD-MCNC: 8.6 G/DL (ref 14–18)
MCH RBC QN AUTO: 29.4 PG (ref 28–32)
MCHC RBC AUTO-ENTMCNC: 32.8 G/DL (ref 33–36)
MCV RBC AUTO: 89.4 FL (ref 82–95)
PLATELET # BLD AUTO: 78 10*3/MM3 (ref 130–400)
PMV BLD AUTO: 10.6 FL (ref 6–12)
POTASSIUM BLD-SCNC: 4 MMOL/L (ref 3.5–5.3)
RBC # BLD AUTO: 2.93 10*6/MM3 (ref 4.8–5.9)
SODIUM BLD-SCNC: 136 MMOL/L (ref 135–145)
WBC NRBC COR # BLD: 14.78 10*3/MM3 (ref 4.8–10.8)

## 2018-02-11 PROCEDURE — 97116 GAIT TRAINING THERAPY: CPT

## 2018-02-11 PROCEDURE — 25010000002 ENOXAPARIN PER 10 MG: Performed by: THORACIC SURGERY (CARDIOTHORACIC VASCULAR SURGERY)

## 2018-02-11 PROCEDURE — 80048 BASIC METABOLIC PNL TOTAL CA: CPT | Performed by: THORACIC SURGERY (CARDIOTHORACIC VASCULAR SURGERY)

## 2018-02-11 PROCEDURE — 94760 N-INVAS EAR/PLS OXIMETRY 1: CPT

## 2018-02-11 PROCEDURE — 71045 X-RAY EXAM CHEST 1 VIEW: CPT

## 2018-02-11 PROCEDURE — 99024 POSTOP FOLLOW-UP VISIT: CPT | Performed by: THORACIC SURGERY (CARDIOTHORACIC VASCULAR SURGERY)

## 2018-02-11 PROCEDURE — 25010000002 FUROSEMIDE PER 20 MG: Performed by: THORACIC SURGERY (CARDIOTHORACIC VASCULAR SURGERY)

## 2018-02-11 PROCEDURE — 94799 UNLISTED PULMONARY SVC/PX: CPT

## 2018-02-11 PROCEDURE — 85027 COMPLETE CBC AUTOMATED: CPT | Performed by: THORACIC SURGERY (CARDIOTHORACIC VASCULAR SURGERY)

## 2018-02-11 RX ORDER — ATORVASTATIN CALCIUM 40 MG/1
40 TABLET, FILM COATED ORAL NIGHTLY
Status: DISCONTINUED | OUTPATIENT
Start: 2018-02-11 | End: 2018-02-16 | Stop reason: HOSPADM

## 2018-02-11 RX ORDER — LIDOCAINE 50 MG/G
2 PATCH TOPICAL
Status: DISCONTINUED | OUTPATIENT
Start: 2018-02-12 | End: 2018-02-16

## 2018-02-11 RX ORDER — FUROSEMIDE 10 MG/ML
20 INJECTION INTRAMUSCULAR; INTRAVENOUS EVERY 12 HOURS
Status: DISCONTINUED | OUTPATIENT
Start: 2018-02-11 | End: 2018-02-12

## 2018-02-11 RX ORDER — LISINOPRIL 2.5 MG/1
2.5 TABLET ORAL
Status: DISCONTINUED | OUTPATIENT
Start: 2018-02-11 | End: 2018-02-16 | Stop reason: HOSPADM

## 2018-02-11 RX ADMIN — ENOXAPARIN SODIUM 30 MG: 30 INJECTION SUBCUTANEOUS at 08:19

## 2018-02-11 RX ADMIN — OXYCODONE HYDROCHLORIDE AND ACETAMINOPHEN 1 TABLET: 5; 325 TABLET ORAL at 19:55

## 2018-02-11 RX ADMIN — BISACODYL 10 MG: 5 TABLET ORAL at 10:53

## 2018-02-11 RX ADMIN — BISACODYL 10 MG: 5 TABLET ORAL at 21:09

## 2018-02-11 RX ADMIN — IPRATROPIUM BROMIDE AND ALBUTEROL SULFATE 3 ML: 2.5; .5 SOLUTION RESPIRATORY (INHALATION) at 14:39

## 2018-02-11 RX ADMIN — FUROSEMIDE 20 MG: 10 INJECTION, SOLUTION INTRAMUSCULAR; INTRAVENOUS at 17:51

## 2018-02-11 RX ADMIN — POTASSIUM CHLORIDE 20 MEQ: 750 CAPSULE, EXTENDED RELEASE ORAL at 08:18

## 2018-02-11 RX ADMIN — CLOPIDOGREL 75 MG: 75 TABLET, FILM COATED ORAL at 17:52

## 2018-02-11 RX ADMIN — METOPROLOL TARTRATE 12.5 MG: 25 TABLET, FILM COATED ORAL at 08:18

## 2018-02-11 RX ADMIN — OXYCODONE HYDROCHLORIDE AND ACETAMINOPHEN 1 TABLET: 5; 325 TABLET ORAL at 14:47

## 2018-02-11 RX ADMIN — OXYCODONE HYDROCHLORIDE AND ACETAMINOPHEN 1 TABLET: 10; 325 TABLET ORAL at 11:41

## 2018-02-11 RX ADMIN — OXYCODONE HYDROCHLORIDE AND ACETAMINOPHEN 1 TABLET: 5; 325 TABLET ORAL at 08:18

## 2018-02-11 RX ADMIN — IPRATROPIUM BROMIDE AND ALBUTEROL SULFATE 3 ML: 2.5; .5 SOLUTION RESPIRATORY (INHALATION) at 21:18

## 2018-02-11 RX ADMIN — ASPIRIN 81 MG: 81 TABLET ORAL at 08:18

## 2018-02-11 RX ADMIN — FUROSEMIDE 40 MG: 10 INJECTION, SOLUTION INTRAMUSCULAR; INTRAVENOUS at 04:21

## 2018-02-11 RX ADMIN — OXYCODONE HYDROCHLORIDE AND ACETAMINOPHEN 1 TABLET: 10; 325 TABLET ORAL at 17:51

## 2018-02-11 RX ADMIN — LISINOPRIL 2.5 MG: 2.5 TABLET ORAL at 21:07

## 2018-02-11 RX ADMIN — OXYCODONE HYDROCHLORIDE AND ACETAMINOPHEN 1 TABLET: 5; 325 TABLET ORAL at 04:34

## 2018-02-11 RX ADMIN — METOPROLOL TARTRATE 12.5 MG: 25 TABLET, FILM COATED ORAL at 21:08

## 2018-02-11 RX ADMIN — DESMOPRESSIN ACETATE 40 MG: 0.2 TABLET ORAL at 21:09

## 2018-02-11 RX ADMIN — ENOXAPARIN SODIUM 30 MG: 30 INJECTION SUBCUTANEOUS at 21:08

## 2018-02-11 NOTE — OP NOTE
Preoperative diagnosis:  1. Aortic valve stenosis  2. Coronary artery disease  3. Recent NSTEMI  4. Chronic tobacco use  5. Chronic obstructive pulmonary disease, mild with low DLCO  6. Carotid artery disease asymptomatic now post R CEA       Postoperative diagnosis:   Same      Procedure:    1.  Aortic valve replacement (Graciela-Juarez bovine pericardial 19 mm valve)  2.  Coronary artery bypass grafting -3 V (Left internal mammary artery/Left anterior descending, reverse saphenous vein graft/posterior descending artery, and reverse saphenous vein graft/diagonal artery)  3.  Transmyocardial revascularization- 17 channel  4   Left atrial appendage exclusion (AtriCure clip device)  5.  Right endoscopic vein harvest     Surgeon: Cj Robin M.D.  Asst.: Laura Mo    Anesthesia: Otoniel Spear M.D.  Estimated blood loss: Minimal (Cell Saver)  Drains:  24 Cambodian Omid drains-anterior and posterior mediastinum     Specimens: Aortic valve leaflets       Operative findings: An aortic valve with fusion of the left and right leaflets with no evidence of endocarditis. Extensive calcific burden involving the leaflet edges and annulus. Post valvular implant echocardiography reveals a well functioning bioprosthetic valve without perivalvular leak. The coronary ostia were not obstructed. Ventricular function is stable. There is no central aortic insufficiency.   Good arterial conduit due to small size. Good venous conduit. The diagonal artery at site of grafting measured 1.7 mm in size and had mild atherosclerotic disease burden. Post bypass grafting it had excellent arterial runoff. The LAD at site of grafting measured 1.8 mm in size and had mild atherosclerotic disease burden. Post bypass grafting it had excellent arterial runoff. The posterior descending artery measured 1.3 mm in size and had good arterial runoff with moderate atherosclerotic disease burden.       Total aortic cross clamp time: 166 min  Total  cardiopulmonary bypass time: 220 min     Operative description in detail:  He was taken to the operative suite where he was placed in a supine position. Induction of general anesthesia and placement of a single-lumen endotracheal tube was performed without remark. Appropriate arterial and venous access was established without remark. Through the previously placed right internal jugular central venous line, a pulmonary artery catheter was floated into position. He was then prepped and draped in the usual and sterile surgical fashion. A timeout was performed. Perioperative antibiotics were administered. Beta blocker was given.  A two team approach was utilized to harvest both the left internal mammary artery and the right greater saphenous vein. Briefly the greater saphenous vein was taken at the level of the knee medially and taken in a prograde fashion for an anticipated length of vein to the fossa ovalis. Blunt dissection was performed and each branch was taken using the [employer] device.  A counter stab incision was made with both proximal and distal control obtained.  The was extracted from a hemostatic tunnel.  The leg was closed in a layered fashion with Vicryl suture.  The vein was prepared without remark.    While the vein was being harvested,  median sternotomy was performed by me. Pericardial fat in midline from the level of the innominate vein to the level of the diaphragm was divided in midline. A Rultract device was used to expose the posterior sternal table. The left internal mammary artery was taken down using a standard pedicle technique with a combination of electrocautery and/or clips to control all side branches. At that time, he was systemically anticoagulated with IV heparin. A 19 Ukrainian Omid drain was placed in the left pleural space. After suitable time of circulating heparin, clips were placed doubly onto the mammary artery distally and it was divided proximal to the previously placed clips.  It had excellent arterial inflow. The mammary artery was controlled distally. The mammary artery harvest bed was hemostatic. The Rultract device was removed from the sterile field and a Genesse retractor was used for exposure. The mammary artery was prepared for bypass grafting and deemed excellent. A pericardial well was created. I elected to cannulate the heart centrally accessing distally the ascending aorta and the right atrial appendage.  Each cannula was placed in continuity with the appropriate pump line. Retrograde autologous prime was completed as indicated with a small circuit and venous line primed dry. A combined cardioplegia/aortic root vent set was secured with a horizontal mattress 4-0 Prolene suture. With an appropriate ACT and all in readiness, cardiopulmonary bypass was commenced. I dissected out the the circumflex system finding no suitable sites for grafting due to small vessels but did dissect out the PDA, diagonal, and the LAD for suitable sites for bypass grafting.    The right superior pulmonary vein was isolated and a pursestring suture was placed through which a LV vent was placed via a transverse venotomy. With that, I proceeded to apply the aortic cross-clamp and administered custodiol cardioplegia antegrade with electrical-mechanical arrest achieved. At this time the aorta was opened in an oblique fashion  We implement systemic mild hypothermia. Topical hypothermia was also implemented. The left atrial appendage was measured and excluded with an AtriCure clamp.  To that end, The aortic valve was inspected and is as per operative findings. This was sharply excised. The annulus was cleared of all calcific burden.  The annulus was measured and a 19  mm aortic valve was selected. With all vents discontinued, multiple aliquots of irrigant were used and cleared via separate wall suction to ensure no debris is present within the left ventricle cavity, left ventricular outflow tract, or aortic  root. With that vents were restored. I placed about the aortic valve annulus horizontal mattress non-everting pledgeted 2-0 Ethibond sutures. The aortic valve was prepared per  instructions and was introduced onto the field. Each valve stitch was passed through the sewing ring of the aortic valve, the aortic valve was then lowered and seated, and each valve stitch tied down securely. The coronary ostia were not obstructed. The valve was satisfactory seated. To that end, I proceeded to close the aortotomy using 4-0 prolene suture with McGoon technique.      To that end I directed my attention towards the posterior descending artery.  A coronary arteriotomy was made and augmented to size with Warner scissors. It is as per operative findings. The anastomosis was constructed in an end-to-side orientation with running 7-0 Prolene suture. Wth that its proximal anastomosis was  constructed following aortotomy with 11 blade and augmented size with 4 mm arterial punch subsequently.  This was constructed in a side aorta end vein graft fashion with running 6-0 Prolene suture. An AC  was placed. The graft was assessed for lay which was excellent. It is without tension or torsion.     To that end I directed my attention towards the diagonal artery.  A coronary arteriotomy was made and augmented to size with Warner scissors. It is as per operative findings. The anastomosis was constructed in an end-to-side orientation with running 7-0 Prolene suture. Wth that its proximal anastomosis was  constructed following aortotomy with 11 blade and augmented size with 4 mm arterial punch subsequently.  This was constructed in a side aorta end vein graft fashion with running 6-0 Prolene suture. An AC  was placed. The graft was assessed for lay which was excellent. It is without tension or torsion.    A pericardial slit left lateral was made while dividing associate pericardiophrenic fat and vasculature while being  mindful of the lay of the phrenic nerve. As such I proceeded to obtain control proximally onto the mammary artery and spatulated it distally. I grafted this onto the LAD following coronary arteriotomy using previous described techniques. The anastomosis was constructed in an end-to-side orientation with running 8-0 Prolene suture. It is as per operative findings and the anastomosis was hemostatic. I did tack the mammary artery pedicle to the anterior aspect heart with 6-0 Prolene sutures.    With that being accomplished, a modified terminal hotshot was administered. He was placed in trendelenburg position. Upon completion of terminal hotshot and placement of temporary epicardial ventricular pacing wires, with the aortic vent on high and pump flows diminished, the aortic cross-clamp was released. With that, full support was implemented. A nonworking beating phase was implemented.  During this time, transmyocardial revascularization performed to the circumflex and right coronary artery distribution performing 17 separate channels using a Holmium:YAG Laser.      Upon completion, ventilation was restored. I surveyed the chest and all sutured sites were hemostatic.  With all in readiness, the heart was allowed to fill. De-aring maneuvers were performed as guided by transesophageal echocardiography. With that the heart was decompressed and we removed the LV vent first and then sequentially the aortic root vent/cardioplegia cannulas set. Its associated pursestring sutures were tied securely and this reinforced as per matter routine. The table was now placed in neutral position. And with all in readiness we proceeded to wean from and separate from cardiopulmonary bypass. I did decannulate the venous line and snared down its associated pursestring suture. Systemic intravenous protamine was administered. All associated blood volume was returned to the patient. With continued good hemodynamics, I decannulated the arterial  cannula and tied down its associated pursestring sutures. At this time I did tie down the previously snared atrial pursestring suture. The mediastinum was drained with 24 Yoruba Omid drains placed anteriorly and posteriorly. I surveyed the chest and hemostasis was pristine. With that I impregnated the sternal edges with vancomycin, thrombin, and Gelfoam paste. The sternum was reapproximated with stainless sterile wires placed in an interrupted fashion. In layers anatomically the soft tissue planes were reapproximated. Instruments, sharps, and sponge counts were reported as correct.       Complications: None      Disposition: Transferred to ICU in stable and guarded condition.

## 2018-02-11 NOTE — PLAN OF CARE
Problem: Patient Care Overview (Adult)  Goal: Plan of Care Review  Outcome: Ongoing (interventions implemented as appropriate)   02/11/18 8947   Coping/Psychosocial Response Interventions   Plan Of Care Reviewed With patient   Patient Care Overview   Progress improving   Outcome Evaluation   Outcome Summary/Follow up Plan Pt. with less pain this afternoon: ;5/10. Pt. was CGA to stand and min x 1 to walk 250' with one standing rest. Pt's O2 sat was 97% on room air. WIll continue to work on ambulation and bed mobiity.

## 2018-02-11 NOTE — PLAN OF CARE
Problem: Patient Care Overview (Adult)  Goal: Plan of Care Review   02/11/18 0543   Outcome Evaluation   Outcome Summary/Follow up Plan VSS, 2L NC, Pain treated x 3 per shift. Perc/5 x2 and fent/50mcg once. Chest tubes, NHUNG, and tovar removed 0530. Patient walked 1 lap around icu last night .      Goal: Adult Individualization and Mutuality  Outcome: Ongoing (interventions implemented as appropriate)    Goal: Discharge Needs Assessment  Outcome: Ongoing (interventions implemented as appropriate)      Problem: Fall Risk (Adult)  Goal: Identify Related Risk Factors and Signs and Symptoms  Outcome: Ongoing (interventions implemented as appropriate)    Goal: Absence of Falls  Outcome: Ongoing (interventions implemented as appropriate)    Goal: Identify Related Risk Factors and Signs and Symptoms  Outcome: Ongoing (interventions implemented as appropriate)    Goal: Absence of Falls  Outcome: Ongoing (interventions implemented as appropriate)      Problem: Cardiac Surgery (Adult)  Goal: Signs and Symptoms of Listed Potential Problems Will be Absent or Manageable (Cardiac Surgery)  Outcome: Ongoing (interventions implemented as appropriate)      Problem: Pressure Ulcer Risk (Parish Scale) (Adult,Obstetrics,Pediatric)  Goal: Identify Related Risk Factors and Signs and Symptoms  Outcome: Ongoing (interventions implemented as appropriate)    Goal: Skin Integrity  Outcome: Ongoing (interventions implemented as appropriate)      Problem: Skin Integrity Impairment, Risk/Actual (Adult)  Goal: Identify Related Risk Factors and Signs and Symptoms  Outcome: Ongoing (interventions implemented as appropriate)    Goal: Skin Integrity/Wound Healing  Outcome: Ongoing (interventions implemented as appropriate)

## 2018-02-11 NOTE — PROGRESS NOTES
"Patient: Sharad Ryder  : 1960     Procedure: Procedure(s):  AORTIC VALVE REPLACEMENT,CORONARY ARTERY BYPASS GRAFTING x 3  WITH CONCHA AND RIGHT EVH, ATRIAL APPENDAGE EXCLUSION LEFT WITH TRANSESOPHAGEAL ECHOCARDIOGRAM, 17-CHANNEL TMR    Procedure Date: 2018    POD: 3 Days Post-Op      Subjective   He is alert and oriented.  He is moving all extremities to command.  Complains of soreness but otherwise doing well     Objective   Visit Vitals   • /67   • Pulse 93   • Temp 98.4 °F (36.9 °C) (Temporal Artery )   • Resp 20   • Ht 162 cm (63.78\")   • Wt 63.3 kg (139 lb 9.6 oz)   • SpO2 100%   • BMI 24.13 kg/m2       Intake/Output Summary (Last 24 hours) at 18 0509  Last data filed at 18 0425   Gross per 24 hour   Intake              320 ml   Output             3250 ml   Net            -2930 ml                                        Lab Results:    CBC:   Results from last 7 days  Lab Units 18  0301 02/10/18  0222 02/09/18  2030   WBC 10*3/mm3 14.78* 18.03* 15.04*   HEMATOCRIT % 26.2* 25.2* 24.1*   PLATELETS 10*3/mm3 78* 106* 106*     BMP:   Results from last 7 days  Lab Units 18  0301 02/10/18  0222 02/09/18  0213   SODIUM mmol/L 136 137 143   POTASSIUM mmol/L 4.0 5.0 4.5   CHLORIDE mmol/L 96* 104 107   CO2 mmol/L 30.0 20.0* 26.0   GLUCOSE mg/dL 106* 131* 101*   BUN mg/dL 22* 33* 11   CREATININE mg/dL 0.64 0.91 0.73     Coag:   Results from last 7 days  Lab Units 18  2126  18  1647   INR  1.28*  < >  --    APTT seconds  --   --  32.9   < > = values in this interval not displayed.    Images:  Imaging Results (last 24 hours)     Procedure Component Value Units Date/Time    XR Chest 1 View [[id number]] Collected:  02/10/18 0839     Updated:  02/10/18 0843    Narrative:       EXAMINATION: XR CHEST 1 VW- 2/10/2018 8:39 AM CST     HISTORY: Post-Op Heart Surgery; I35.0-Nonrheumatic aortic (valve)  stenosis; Z74.09-Other reduced mobility.     REPORT: Comparison is made with the " study from 02/09/2018 0302 hours.     The patient has been extubated and the right internal jugular pulmonary  artery catheter removed. The right IJ sheath remains in good position.  Mediastinal and left pleural drain appear in satisfactory position as  before. The lungs are hypoaerated. CABG is noted. Heart size is normal.       Impression:       Mild bibasilar atelectasis, interval removal of the  endotracheal tube and pulmonary artery catheter. No pneumothorax is  identified. No acute findings.  This report was finalized on 02/10/2018 08:40 by Dr. Son Womack MD.    XR Chest 1 View [[id number]] Updated:  02/11/18 0451        Images Today: Chest x-ray today looks good with lung expanded.  Mediastinal tubes in place.      Physical Exam:   General: Alert, oriented, in no acute distress  Chest: Wound okay, sternum stable  Lungs: Clear to auscultation, no rales or wheezes, respirations regular  Heart: Regular rhythm and normal rate, no murmur, no gallop, no rub (normal sinus rhythm on monitor)  Extremities: No swelling or edema.Greater saphenous vein harvesting site healing well.  Neurologic: Grossly intact      Impression: Proving.  Off all pressors.  Walk around the unit 3 times yesterday.  Good diuresis and creatinine normal.  Hematocrit improving.    Plan: Discontinue mediastinal tubes and Josias-Sellers.  Transfer to floor.    Zia Abrams MD  02/11/18  5:09 AM

## 2018-02-11 NOTE — THERAPY TREATMENT NOTE
Acute Care - Physical Therapy Treatment Note  [facility]     Patient Name: Sharad Ryder  : 1960  MRN: [id number]  Today's Date: 2018  Onset of Illness/Injury or Date of Surgery Date: 18  Date of Referral to PT: 18  Referring Physician: Dr. Robin    Admit Date: 2018    Visit Dx:    ICD-10-CM ICD-9-CM   1. Aortic valve stenosis, etiology of cardiac valve disease unspecified I35.0 424.1   2. Impaired functional mobility, balance, gait, and endurance Z74.09 V49.89     Patient Active Problem List   Diagnosis   • Shortness of breath   • Severe aortic valve stenosis   • Tobacco use   • Hyperlipidemia   • Hypertension   • NSTEMI (non-ST elevated myocardial infarction)   • Chest pain   • Coronary artery disease involving native coronary artery of native heart with angina pectoris   • Stenosis of right carotid artery   • COPD (chronic obstructive pulmonary disease)   • LAZ (acute kidney injury)   • Flu   • Carotid stenosis, right   • Post-op pain   • Aortic valve stenosis               Adult Rehabilitation Note       18 0830 02/10/18 1326 02/10/18 0832    Rehab Assessment/Intervention    Discipline physical therapy assistant  -MF physical therapy assistant  - physical therapy assistant  -    Document Type therapy note (daily note)  - therapy note (daily note)  - therapy note (daily note)  -    Subjective Information agree to therapy;complains of;pain  - agree to therapy;complains of;pain  -MF agree to therapy;complains of;pain  -    Precautions/Limitations fall precautions;oxygen therapy device and L/min;sternal precautions  - fall precautions;oxygen therapy device and L/min;sternal precautions   chest tube  - fall precautions;oxygen therapy device and L/min;sternal precautions   chest tube  -MF    Recorded by [MF] Kamille Chakraborty PTA [MF] Kamille Chakraborty PTA [MF] Kamille Chakraborty PTA    Vital Signs    Pre Systolic BP Rehab 131  -  -  -MF    Pre  Treatment Diastolic BP 64  -MF 71  -MF 71  -MF    Post Systolic BP Rehab  141  -MF     Post Treatment Diastolic BP  85  -MF     Pretreatment Heart Rate (beats/min) 98  -MF 97  -  -MF    Intratreatment Heart Rate (beats/min)  105  -MF     Posttreatment Heart Rate (beats/min) 108  -  -  -MF    Pre SpO2 (%) 99  -MF 95  -MF 89  -MF    O2 Delivery Pre Treatment room air  -MF supplemental O2   1L  -MF supplemental O2   2L  -MF    Intra SpO2 (%)  94  -MF     O2 Delivery Intra Treatment room air  -MF supplemental O2   2L  -MF --   3L  -MF    Post SpO2 (%) 97  -MF 92  -MF 95  -MF    O2 Delivery Post Treatment room air  -MF supplemental O2   1L  -MF --   2L  -MF    Recorded by [MF] Kamille Chakraborty PTA [MF] Kamille Chakraborty PTA [MF] Kamille Chakraborty PTA    Pain Assessment    Pain Assessment 0-10  -MF 0-10  -MF 0-10  -MF    Pain Score 8  -MF 9  -MF 9  -MF    Pain Type Acute pain;Surgical pain  -MF Acute pain;Surgical pain  -MF Surgical pain;Acute pain  -MF    Pain Location Chest  -MF Chest  -MF Chest  -MF    Pain Orientation Mid  -MF Mid  -MF Mid  -MF    Pain Descriptors Aching;Sharp  -MF Aching;Sharp  -MF Sharp;Aching  -MF    Pain Frequency Constant/continuous  -MF Constant/continuous  -MF Constant/continuous  -MF    Pain Intervention(s) Medication (See MAR);Repositioned  -MF Medication (See MAR);Repositioned  - Medication (See MAR);Repositioned;Ambulation/increased activity  -    Response to Interventions tolerated  -MF tolerated  -MF tolerated  -MF    Recorded by [MF] Kamille Chakraborty PTA [MF] Kamille Chakraborty PTA [MF] Kamille Chakraborty PTA    Bed Mobility, Assessment/Treatment    Bed Mobility, Assistive Device head of bed elevated  -      Bed Mob, Supine to Sit, [place] verbal cues required;minimum assist (75% patient effort);moderate assist (50% patient effort)  -      Bed Mobility, Comment  up in chair  -MF up in chair  -MF    Recorded by [MF] Kamille Chakraborty PTA [MF]  Kamille Chakraborty PTA [MF] Kamille Chakraborty PTA    Transfer Assessment/Treatment    Transfers, Sit-Stand [place] verbal cues required;contact guard assist;minimum assist (75% patient effort);2 person assist required  -MF verbal cues required;contact guard assist;minimum assist (75% patient effort);2 person assist required  -MF verbal cues required;minimum assist (75% patient effort);2 person assist required  -MF    Transfers, Stand-Sit [place] verbal cues required;contact guard assist;minimum assist (75% patient effort);2 person assist required  -MF verbal cues required;contact guard assist;minimum assist (75% patient effort);2 person assist required  -MF verbal cues required;minimum assist (75% patient effort);2 person assist required  -MF    Recorded by [MF] Kamille Chakraborty PTA [MF] Kamille Chakraborty PTA [MF] Kamille Chakraborty PTA    Gait Assessment/Treatment    Gait, [place] Level verbal cues required;hand held assist;minimum assist (75% patient effort);2 person assist required  -MF verbal cues required;hand held assist;minimum assist (75% patient effort);2 person assist required  -MF verbal cues required;hand held assist;minimum assist (75% patient effort);2 person assist required  -MF    Gait, Distance (Feet) 200  -MF 50   x 4 with standing rests  -MF 50   x 4 with standing rests  -MF    Gait, Gait Deviations  forward flexed posture;step length decreased  -MF daniel decreased;step length decreased;forward flexed posture  -MF    Gait, Safety Issues  supplemental O2;step length decreased  -MF supplemental O2;step length decreased  -    Gait, Impairments  pain  -MF strength decreased;postural control impaired  -MF    Gait, Comment Pt. did not have to rest!  -MF Pt. appears to have some anxiety while walking. Instructed pt. to slow breathing and to shorten standing rests, but pt. continues to breathe rapidly.   -MF Lots of cues for postural and to limit standing rests  -MF    Recorded  by [] Kamille Chakraborty PTA [MF] Kamille Chakraborty PTA [MF] Kamille Chakraborty PTA    Therapy Exercises    Bilateral Lower Extremities AROM:;20 reps;sitting;ankle pumps/circles;LAQ  -MF AROM:;20 reps;sitting;ankle pumps/circles;LAQ  -MF AROM:;20 reps;sitting;ankle pumps/circles;LAQ  -MF    Bilateral Upper Extremity AROM:;20 reps;sitting;elbow flexion/extension  -MF AROM:;20 reps;sitting;elbow flexion/extension  -MF AROM:;20 reps;sitting;elbow flexion/extension  -MF    Recorded by [MF] Kamille Chakraborty PTA [MF] Kamille Chakraborty PTA [MF] Kamille Chakraborty PTA    Positioning and Restraints    Pre-Treatment Position in bed  - sitting in chair/recliner  - sitting in chair/recliner  -    Post Treatment Position chair  - chair  - chair  -    In Chair notified nsg;reclined;call light within reach;encouraged to call for assist  - reclined;call light within reach;encouraged to call for assist;notified nsg  - reclined;call light within reach;encouraged to call for assist;notified nsg  -    Recorded by [] Kamille Chakraborty PTA [MF] Kamille Chakraborty, NATAN [MF] Kamille Chakraborty, PTA      02/09/18 1307          Rehab Assessment/Intervention    Discipline physical therapy assistant  -      Treatment Not Performed other (see comments)  -      Treatment Not Performed, Comment blood pressure issues per Nsg.  -NHUNG      Recorded by [NHUNG] Abby Corona PTA        User Key  (r) = Recorded By, (t) = Taken By, (c) = Cosigned By    Initials Name Effective Dates    NHUNG Abby Corona PTA 08/02/16 -      Kamille Chakraborty, NATAN 08/02/16 -                 IP PT Goals       02/09/18 1146 02/09/18 1120       Bed Mobility PT LTG    Bed Mobility PT LTG, Date Established  02/09/18  -LIBRA (r) SERGIO (t) LIBRA (c)     Bed Mobility PT LTG, Time to Achieve  by discharge  -LIBRA bond) SERGIO (shawna) LIBRA (leyla)     Bed Mobility PT LTG, Activity Type  all bed mobility  -LIBRA bond) SERGIO (shawna) LIBRA (leyla)     Bed Mobility PT LTG, [place]  Level  minimum assist (75% patient effort)  -LIBRA (r) JM (t) LIBRA (c)     Transfer Training PT LTG    Transfer Training PT LTG, Date Established  02/09/18  -LIBRA (r) JM (t) LIBRA (c)     Transfer Training PT LTG, Time to Achieve  by discharge  -LIBRA (r) JM (t) LIBRA (c)     Transfer Training PT LTG, Activity Type  bed to chair /chair to bed;sit to stand/stand to sit  -LIBRA (r) JM (t) LIBRA (c)     Transfer Training PT LTG, [place] Level  supervision required  -LIBRA (r) JM (t) LIBRA (c)     Gait Training PT LTG    Gait Training Goal PT LTG, Date Established 02/09/18  -LIBRA (r) JM (t) LIBRA (c) 02/09/18  -LIBRA (r) JM (t) LIBRA (c)     Gait Training Goal PT LTG, Time to Achieve  by discharge  -LIBRA (r) JM (t) LIBRA (c)     Gait Training Goal PT LTG, [place] Level  contact guard assist  -LIBRA (r) JM (t) LIBRA (c)     Gait Training Goal PT LTG, Distance to Achieve  100 feet  -LIBRA (r) JM (t) LIBRA (c)       User Key  (r) = Recorded By, (t) = Taken By, (c) = Cosigned By    Initials Name Provider Type    LIBRA Kilpatrick, PT DPT Physical Therapist    SERGIO Barcenas, PT Student PT Student          Physical Therapy Education     Title: PT OT SLP Therapies (Done)     Topic: Physical Therapy (Done)     Point: Mobility training (Done)    Learning Progress Summary    Learner Readiness Method Response Comment Documented by Status   Patient Acceptance E,D VU,DU,NR bed mobility, transfers, and gait.  02/11/18 0853 Done    Acceptance E,D NR sternal precautions, posture, transfers, and gait.  02/10/18 0852 Active    Acceptance E VU Educated on importance of activity with respect to procedures performed, weight-shifting during gait, t/f and bed mobility technioques  02/09/18 1116 Done               Point: Precautions (Done)    Learning Progress Summary    Learner Readiness Method Response Comment Documented by Status   Patient Acceptance E,D VU,DU,NR bed mobility, transfers, and gait.  02/11/18 0853 Done    Acceptance E,D NR sternal precautions, posture,  transfers, and gait.  02/10/18 0852 Active    Acceptance E VU Educated on sternal precautions  02/09/18 1117 Done                      User Key     Initials Effective Dates Name Provider Type Discipline     08/02/16 -  Kamille Chakraborty, PTA Physical Therapy Assistant PT     01/10/18 -  James Barcenas, PT Student PT Student PT                    PT Recommendation and Plan  Anticipated Equipment Needs At Discharge: front wheeled walker  Anticipated Discharge Disposition: transitional care  Planned Therapy Interventions: balance training, bed mobility training, gait training, patient/family education, postural re-education, strengthening, transfer training  PT Frequency: 2 times/day, per priority policy  Plan of Care Review  Plan Of Care Reviewed With: patient  Progress: improving  Outcome Summary/Follow up Plan: Pt. doing much better today. Pt. was min-mod x 1 for bed mobility. Pt. stood and walked with cga-min x 2. Pt. walked 200' without having to stop and rest. Pt's O2 sat was 97% on room air after activity. Will continue to work on progressive ambulation.          Outcome Measures       02/11/18 0830 02/10/18 0832 02/09/18 1100    How much help from another person do you currently need...    Turning from your back to your side while in flat bed without using bedrails? 3  -MF 2  -MF 3  -LIBRA (r) JM (t) LIBRA (c)    Moving from lying on back to sitting on the side of a flat bed without bedrails? 3  -MF 2  -MF 2  -LIBRA (r) JM (t) LIBRA (c)    Moving to and from a bed to a chair (including a wheelchair)? 3  -MF 2  -MF 2  -LIBRA (r) JM (t) LIBRA (c)    Standing up from a chair using your arms (e.g., wheelchair, bedside chair)? 3  -MF 2  -MF 2  -LIBRA (r) JM (t) LIBRA (c)    Climbing 3-5 steps with a railing? 2  -MF 2  -MF 2  -LIBRA (r) JM (t) LIBRA (c)    To walk in hospital room? 2  -MF 2  -MF 2  -LIBRA (r) JM (t) LIBRA (c)    AM-PAC 6 Clicks Score 16  -MF 12  -MF 13  -LIBRA (r) JM (t)    Functional Assessment    Outcome Measure Options  AM-PAC 6 Clicks Basic Mobility (PT)  -MF AM-PAC 6 Clicks Basic Mobility (PT)  -MF AM-PAC 6 Clicks Basic Mobility (PT)  -LIBRA (r) SERGIO (t) LIBRA (c)      User Key  (r) = Recorded By, (t) = Taken By, (c) = Cosigned By    Initials Name Provider Type    LIBRA Kilpatrick, PT DPT Physical Therapist     Kamille Chakraborty PTA Physical Therapy Assistant    SERGIO Barcenas, PT Student PT Student           Time Calculation:         PT Charges       02/11/18 0856          Time Calculation    Start Time 0830  -      Stop Time 0856  -      Time Calculation (min) 26 min  -MF      PT Received On 02/11/18  -      PT Goal Re-Cert Due Date 02/19/18  -      Time Calculation- PT    Total Timed Code Minutes- PT 26 minute(s)  -        User Key  (r) = Recorded By, (t) = Taken By, (c) = Cosigned By    Initials Name Provider Type     Kamille Chakraborty PTA Physical Therapy Assistant          Therapy Charges for Today     Code Description Service Date Service Provider Modifiers Qty    28536829575 HC GAIT TRAINING EA 15 MIN 2/10/2018 Kamille Chakraborty, PTA GP 2    60681404736 HC GAIT TRAINING EA 15 MIN 2/10/2018 Kamille Chakraborty PTA GP 2    49854864217 HC GAIT TRAINING EA 15 MIN 2/11/2018 Kamille Chakraborty PTA GP 2          PT G-Codes  Outcome Measure Options: AM-PAC 6 Clicks Basic Mobility (PT)  Score: 13  Functional Limitation: Mobility: Walking and moving around  Mobility: Walking and Moving Around Current Status (): At least 40 percent but less than 60 percent impaired, limited or restricted  Mobility: Walking and Moving Around Goal Status (): At least 20 percent but less than 40 percent impaired, limited or restricted    Kamille Chakraborty PTA  2/11/2018

## 2018-02-11 NOTE — PLAN OF CARE
Problem: Patient Care Overview (Adult)  Goal: Plan of Care Review  Outcome: Ongoing (interventions implemented as appropriate)   02/11/18 1416   Coping/Psychosocial Response Interventions   Plan Of Care Reviewed With patient   Patient Care Overview   Progress improving   Outcome Evaluation   Outcome Summary/Follow up Plan Patient transferred to . C/O incisional pain and generalized pain, medicated per Md order. VSS. Continue to monitor overall condition and notify Md of any changes.

## 2018-02-11 NOTE — THERAPY TREATMENT NOTE
Acute Care - Physical Therapy Treatment Note  [facility]     Patient Name: Sharad Ryder  : 1960  MRN: [id number]  Today's Date: 2018  Onset of Illness/Injury or Date of Surgery Date: 18  Date of Referral to PT: 18  Referring Physician: Dr. Robin    Admit Date: 2018    Visit Dx:    ICD-10-CM ICD-9-CM   1. Aortic valve stenosis, etiology of cardiac valve disease unspecified I35.0 424.1   2. Impaired functional mobility, balance, gait, and endurance Z74.09 V49.89     Patient Active Problem List   Diagnosis   • Shortness of breath   • Severe aortic valve stenosis   • Tobacco use   • Hyperlipidemia   • Hypertension   • NSTEMI (non-ST elevated myocardial infarction)   • Chest pain   • Coronary artery disease involving native coronary artery of native heart with angina pectoris   • Stenosis of right carotid artery   • COPD (chronic obstructive pulmonary disease)   • LAZ (acute kidney injury)   • Flu   • Carotid stenosis, right   • Post-op pain   • Aortic valve stenosis               Adult Rehabilitation Note       18 1307 18 0830 02/10/18 1326    Rehab Assessment/Intervention    Discipline physical therapy assistant  - physical therapy assistant  - physical therapy assistant  -    Document Type therapy note (daily note)  - therapy note (daily note)  - therapy note (daily note)  -    Subjective Information agree to therapy;complains of;pain  - agree to therapy;complains of;pain  -MF agree to therapy;complains of;pain  -    Precautions/Limitations fall precautions;oxygen therapy device and L/min  - fall precautions;oxygen therapy device and L/min;sternal precautions  - fall precautions;oxygen therapy device and L/min;sternal precautions   chest tube  -MF    Recorded by [MF] Kamille Chakraborty PTA [MF] Kamille Chakraborty, NATAN [MF] Kamille Chakraborty, PTA    Vital Signs    Pre Systolic BP Rehab 116  -  -  -MF    Pre Treatment Diastolic BP 56  -MF 64   -MF 71  -MF    Post Systolic BP Rehab 138  -MF  141  -MF    Post Treatment Diastolic BP 71  -MF  85  -MF    Pretreatment Heart Rate (beats/min) 94  -MF 98  -MF 97  -MF    Intratreatment Heart Rate (beats/min)   105  -MF    Posttreatment Heart Rate (beats/min) 100  -  -  -MF    Pre SpO2 (%) 98  -MF 99  -MF 95  -MF    O2 Delivery Pre Treatment --   2L  -MF room air  -MF supplemental O2   1L  -MF    Intra SpO2 (%)   94  -MF    O2 Delivery Intra Treatment room air  -MF room air  -MF supplemental O2   2L  -MF    Post SpO2 (%) 96  -MF 97  -MF 92  -MF    O2 Delivery Post Treatment room air  -MF room air  -MF supplemental O2   1L  -MF    Recorded by [MF] Kamille Chakraborty PTA [MF] Kamille Chakraborty PTA [MF] Kamille Chakraborty PTA    Pain Assessment    Pain Assessment 0-10  -MF 0-10  -MF 0-10  -MF    Pain Score 5  -MF 8  -MF 9  -MF    Pain Type Acute pain;Surgical pain  -MF Acute pain;Surgical pain  -MF Acute pain;Surgical pain  -MF    Pain Location Chest  -MF Chest  -MF Chest  -MF    Pain Orientation Mid  -MF Mid  -MF Mid  -MF    Pain Descriptors Aching  -MF Aching;Sharp  -MF Aching;Sharp  -MF    Pain Frequency Constant/continuous  -MF Constant/continuous  -MF Constant/continuous  -MF    Pain Intervention(s) Medication (See MAR);Repositioned  -MF Medication (See MAR);Repositioned  -MF Medication (See MAR);Repositioned  -MF    Response to Interventions tolerated  -MF tolerated  -MF tolerated  -    Recorded by [MF] Kamille Chakraborty PTA [MF] Kamille Chakraborty PTA [MF] Kamille Chakraborty PTA    Bed Mobility, Assessment/Treatment    Bed Mobility, Assistive Device  head of bed elevated  -     Bed Mob, Supine to Sit, [place]  verbal cues required;minimum assist (75% patient effort);moderate assist (50% patient effort)  -     Bed Mobility, Comment up in chair  -MF  up in chair  -MF    Recorded by [MF] Kamille Chakraborty PTA [MF] Kamille Chakraborty, PTA [MF] Kamille Chakraborty PTA    Transfer  Assessment/Treatment    Transfers, Sit-Stand [place] verbal cues required;contact guard assist  -MF verbal cues required;contact guard assist;minimum assist (75% patient effort);2 person assist required  -MF verbal cues required;contact guard assist;minimum assist (75% patient effort);2 person assist required  -MF    Transfers, Stand-Sit [place] contact guard assist  -MF verbal cues required;contact guard assist;minimum assist (75% patient effort);2 person assist required  -MF verbal cues required;contact guard assist;minimum assist (75% patient effort);2 person assist required  -MF    Recorded by [MF] Kamille Chakraborty PTA [MF] Kamille Chakraborty PTA [MF] Kamille Chakraborty PTA    Gait Assessment/Treatment    Gait, [place] Level verbal cues required;hand held assist;minimum assist (75% patient effort)  -MF verbal cues required;hand held assist;minimum assist (75% patient effort);2 person assist required  -MF verbal cues required;hand held assist;minimum assist (75% patient effort);2 person assist required  -MF    Gait, Distance (Feet) 250  -  -MF 50   x 4 with standing rests  -    Gait, Gait Deviations decreased heel strike;step length decreased  -  forward flexed posture;step length decreased  -MF    Gait, Safety Issues   supplemental O2;step length decreased  -MF    Gait, Impairments   pain  -MF    Gait, Comment one standing rest  -MF Pt. did not have to rest!  -MF Pt. appears to have some anxiety while walking. Instructed pt. to slow breathing and to shorten standing rests, but pt. continues to breathe rapidly.   -MF    Recorded by [MF] Kamille Chakraborty PTA [MF] Kamille Chakraborty PTA [MF] Kamille Chakraborty PTA    Therapy Exercises    Bilateral Lower Extremities AROM:;20 reps;sitting;ankle pumps/circles;LAQ  -MF AROM:;20 reps;sitting;ankle pumps/circles;LAQ  -MF AROM:;20 reps;sitting;ankle pumps/circles;LAQ  -MF    Bilateral Upper Extremity AROM:;20 reps;sitting;elbow  flexion/extension  -MF AROM:;20 reps;sitting;elbow flexion/extension  -MF AROM:;20 reps;sitting;elbow flexion/extension  -    Recorded by [] Kamille Chakraborty PTA [] Kamille Chakraborty PTA [] Kamille Chakraborty PTA    Positioning and Restraints    Pre-Treatment Position sitting in chair/recliner  - in bed  - sitting in chair/recliner  -    Post Treatment Position chair  -MF chair  -MF chair  -MF    In Chair reclined;call light within reach;encouraged to call for assist;with family/caregiver  - notified nsg;reclined;call light within reach;encouraged to call for assist  - reclined;call light within reach;encouraged to call for assist;notified nsg  -    Recorded by [] Kamille Chakraborty PTA [] Kamille Chakraborty PTA [] Kamille Chakraborty PTA      02/10/18 0832 02/09/18 1307       Rehab Assessment/Intervention    Discipline physical therapy assistant  - physical therapy assistant  -     Document Type therapy note (daily note)  -      Subjective Information agree to therapy;complains of;pain  -      Treatment Not Performed  other (see comments)  -     Treatment Not Performed, Comment  blood pressure issues per Nsg.  -     Precautions/Limitations fall precautions;oxygen therapy device and L/min;sternal precautions   chest tube  -      Recorded by [] Kamille Chakraborty PTA [NHUNG] Abby Corona PTA     Vital Signs    Pre Systolic BP Rehab 118  -MF      Pre Treatment Diastolic BP 71  -      Pretreatment Heart Rate (beats/min) 100  -MF      Posttreatment Heart Rate (beats/min) 102  -      Pre SpO2 (%) 89  -MF      O2 Delivery Pre Treatment supplemental O2   2L  -MF      O2 Delivery Intra Treatment --   3L  -      Post SpO2 (%) 95  -MF      O2 Delivery Post Treatment --   2L  -MF      Recorded by [] Kamille Chakraborty PTA      Pain Assessment    Pain Assessment 0-10  -      Pain Score 9  -      Pain Type Surgical pain;Acute pain  -      Pain Location Chest   -      Pain Orientation Mid  -MF      Pain Descriptors Sharp;Aching  -      Pain Frequency Constant/continuous  -      Pain Intervention(s) Medication (See MAR);Repositioned;Ambulation/increased activity  -      Response to Interventions tolerated  -      Recorded by [] Kamille Chakraborty PTA      Bed Mobility, Assessment/Treatment    Bed Mobility, Comment up in chair  -MF      Recorded by [] Kamille Chakraborty PTA      Transfer Assessment/Treatment    Transfers, Sit-Stand [place] verbal cues required;minimum assist (75% patient effort);2 person assist required  -      Transfers, Stand-Sit [place] verbal cues required;minimum assist (75% patient effort);2 person assist required  -      Recorded by [] Kamille Chakraborty PTA      Gait Assessment/Treatment    Gait, [place] Level verbal cues required;hand held assist;minimum assist (75% patient effort);2 person assist required  -      Gait, Distance (Feet) 50   x 4 with standing rests  -      Gait, Gait Deviations daniel decreased;step length decreased;forward flexed posture  -      Gait, Safety Issues supplemental O2;step length decreased  -      Gait, Impairments strength decreased;postural control impaired  -      Gait, Comment Lots of cues for postural and to limit standing rests  -MF      Recorded by [] Kamille Chakraborty PTA      Therapy Exercises    Bilateral Lower Extremities AROM:;20 reps;sitting;ankle pumps/circles;LAQ  -      Bilateral Upper Extremity AROM:;20 reps;sitting;elbow flexion/extension  -      Recorded by [] Kamille Chakraborty PTA      Positioning and Restraints    Pre-Treatment Position sitting in chair/recliner  -      Post Treatment Position chair  -      In Chair reclined;call light within reach;encouraged to call for assist;notified nsg  -MF      Recorded by [] Kamille Chakraborty PTA        User Key  (r) = Recorded By, (t) = Taken By, (c) = Cosigned By    Initials Name Effective  Dates    NHUNG Abby EUGENIO Hestermirella, PTA 08/02/16 -      Kamille Chakraborty, PTA 08/02/16 -                 IP PT Goals       02/09/18 1146 02/09/18 1120       Bed Mobility PT LTG    Bed Mobility PT LTG, Date Established  02/09/18  -LIBRA (r) JM (t) LIBRA (c)     Bed Mobility PT LTG, Time to Achieve  by discharge  -LIBRA (r) JM (t) LIBRA (c)     Bed Mobility PT LTG, Activity Type  all bed mobility  -LIBRA (r) JM (t) LIBRA (c)     Bed Mobility PT LTG, [place] Level  minimum assist (75% patient effort)  -LIBRA (r) JM (t) LIBRA (c)     Transfer Training PT LTG    Transfer Training PT LTG, Date Established  02/09/18  -LIBRA (r) JM (t) LIBRA (c)     Transfer Training PT LTG, Time to Achieve  by discharge  -LIBRA (r) JM (t) LIBRA (c)     Transfer Training PT LTG, Activity Type  bed to chair /chair to bed;sit to stand/stand to sit  -LIBRA (r) JM (t) LIBRA (c)     Transfer Training PT LTG, [place] Level  supervision required  -LIBRA (r) JM (t) LIBRA (c)     Gait Training PT LTG    Gait Training Goal PT LTG, Date Established 02/09/18  -LIBRA (r) JM (t) LIBRA (c) 02/09/18  -LIBRA (r) JM (t) LIBRA (c)     Gait Training Goal PT LTG, Time to Achieve  by discharge  -LIBRA (r) JM (t) LIBRA (c)     Gait Training Goal PT LTG, [place] Level  contact guard assist  -LIBRA (r) JM (t) LIBRA (c)     Gait Training Goal PT LTG, Distance to Achieve  100 feet  -LIBRA (r) JM (t) LIBRA (c)       User Key  (r) = Recorded By, (t) = Taken By, (c) = Cosigned By    Initials Name Provider Type    LIBRA Kilpatrick, PT DPT Physical Therapist    SERGIO Barcenas, PT Student PT Student          Physical Therapy Education     Title: PT OT SLP Therapies (Done)     Topic: Physical Therapy (Done)     Point: Mobility training (Done)    Learning Progress Summary    Learner Readiness Method Response Comment Documented by Status   Patient Acceptance E,D JUAN FRANCISCO,VHAID,NR bed mobility, transfers, and gait.  02/11/18 0853 Done    Acceptance E,D NR sternal precautions, posture, transfers, and gait.  02/10/18 0852 Active     Acceptance E VU Educated on importance of activity with respect to procedures performed, weight-shifting during gait, t/f and bed mobility technioques  02/09/18 1116 Done               Point: Precautions (Done)    Learning Progress Summary    Learner Readiness Method Response Comment Documented by Status   Patient Acceptance E,D VU,DU,NR bed mobility, transfers, and gait.  02/11/18 0853 Done    Acceptance E,D NR sternal precautions, posture, transfers, and gait.  02/10/18 0852 Active    Acceptance E VU Educated on sternal precautions  02/09/18 1117 Done                      User Key     Initials Effective Dates Name Provider Type Discipline     08/02/16 -  Kamille Chakraborty, PTA Physical Therapy Assistant PT     01/10/18 -  James Barcenas, PT Student PT Student PT                    PT Recommendation and Plan  Anticipated Equipment Needs At Discharge: front wheeled walker  Anticipated Discharge Disposition: transitional care  Planned Therapy Interventions: balance training, bed mobility training, gait training, patient/family education, postural re-education, strengthening, transfer training  PT Frequency: 2 times/day, per priority policy  Plan of Care Review  Plan Of Care Reviewed With: patient  Progress: improving  Outcome Summary/Follow up Plan: Pt. with less pain this afternoon: ;5/10. Pt. was CGA to stand and min x 1 to walk 250' with one standing rest. Pt's O2 sat was 97% on room air. WIll continue to work on ambulation and bed mobiity.          Outcome Measures       02/11/18 0830 02/10/18 0832 02/09/18 1100    How much help from another person do you currently need...    Turning from your back to your side while in flat bed without using bedrails? 3  -MF 2  -MF 3  -LIBRA (r) JM (t) LIBRA (c)    Moving from lying on back to sitting on the side of a flat bed without bedrails? 3  -MF 2  -MF 2  -LIBRA (r) JM (t) LIBRA (c)    Moving to and from a bed to a chair (including a wheelchair)? 3  - 2  -MF 2  -LIBRA  (r) JM (t) LIBRA (c)    Standing up from a chair using your arms (e.g., wheelchair, bedside chair)? 3  -MF 2  -MF 2  -LIBRA (r) SERGIO (t) LIBRA (c)    Climbing 3-5 steps with a railing? 2  -MF 2  -MF 2  -LIBRA (r) SERGIO (t) LIBRA (c)    To walk in hospital room? 2  -MF 2  -MF 2  -LIBRA (r) JM (t) LIBRA (c)    AM-PAC 6 Clicks Score 16  -MF 12  -MF 13  -LIBRA (r) JM (t)    Functional Assessment    Outcome Measure Options AM-PAC 6 Clicks Basic Mobility (PT)  -MF AM-PAC 6 Clicks Basic Mobility (PT)  -MF AM-PAC 6 Clicks Basic Mobility (PT)  -LIBRA (r) JM (t) LIBRA (c)      User Key  (r) = Recorded By, (t) = Taken By, (c) = Cosigned By    Initials Name Provider Type    LIBRA Kilpatrick, PT DPT Physical Therapist    ABIGAIL Chakraborty PTA Physical Therapy Assistant    SERGIO Barcenas, PT Student PT Student           Time Calculation:         PT Charges       02/11/18 1407 02/11/18 0856       Time Calculation    Start Time 1307  -MF 0830  -MF     Stop Time 1330  -MF 0856  -MF     Time Calculation (min) 23 min  -MF 26 min  -MF     PT Received On 02/11/18  -MF 02/11/18  -MF     PT Goal Re-Cert Due Date 02/19/18  -MF 02/19/18  -MF     Time Calculation- PT    Total Timed Code Minutes- PT 23 minute(s)  -MF 26 minute(s)  -MF       User Key  (r) = Recorded By, (t) = Taken By, (c) = Cosigned By    Initials Name Provider Type    ABIGAIL Chakraborty PTA Physical Therapy Assistant          Therapy Charges for Today     Code Description Service Date Service Provider Modifiers Qty    47297055419 HC GAIT TRAINING EA 15 MIN 2/10/2018 Kamille Chakraborty PTA GP 2    98723645268 HC GAIT TRAINING EA 15 MIN 2/10/2018 Kamille Chakraborty PTA GP 2    [id number] HC GAIT TRAINING EA 15 MIN 2/11/2018 Kamille Chakraborty PTA GP 2    18041488008 HC GAIT TRAINING EA 15 MIN 2/11/2018 Kamille Chakraborty PTA GP 2          PT G-Codes  Outcome Measure Options: AM-PAC 6 Clicks Basic Mobility (PT)  Score: 13  Functional Limitation: Mobility: Walking and moving  around  Mobility: Walking and Moving Around Current Status (): At least 40 percent but less than 60 percent impaired, limited or restricted  Mobility: Walking and Moving Around Goal Status (): At least 20 percent but less than 40 percent impaired, limited or restricted    Kamille Chakraborty, PTA  2/11/2018

## 2018-02-11 NOTE — PLAN OF CARE
Problem: Patient Care Overview (Adult)  Goal: Plan of Care Review  Outcome: Ongoing (interventions implemented as appropriate)   02/11/18 0830   Coping/Psychosocial Response Interventions   Plan Of Care Reviewed With patient   Patient Care Overview   Progress improving   Outcome Evaluation   Outcome Summary/Follow up Plan Pt. doing much better today. Pt. was min-mod x 1 for bed mobility. Pt. stood and walked with cga-min x 2. Pt. walked 200' without having to stop and rest. Pt's O2 sat was 97% on room air after activity. Will continue to work on progressive ambulation.

## 2018-02-12 ENCOUNTER — APPOINTMENT (OUTPATIENT)
Dept: GENERAL RADIOLOGY | Facility: HOSPITAL | Age: 58
End: 2018-02-12

## 2018-02-12 LAB
ABO + RH BLD: NORMAL
ANION GAP SERPL CALCULATED.3IONS-SCNC: 8 MMOL/L (ref 4–13)
BH BB BLOOD EXPIRATION DATE: NORMAL
BH BB BLOOD TYPE BARCODE: 7300
BH BB DISPENSE STATUS: NORMAL
BH BB PRODUCT CODE: NORMAL
BH BB UNIT NUMBER: NORMAL
BUN BLD-MCNC: 16 MG/DL (ref 5–21)
BUN/CREAT SERPL: 26.7 (ref 7–25)
CALCIUM SPEC-SCNC: 7.8 MG/DL (ref 8.4–10.4)
CHLORIDE SERPL-SCNC: 91 MMOL/L (ref 98–110)
CO2 SERPL-SCNC: 34 MMOL/L (ref 24–31)
CREAT BLD-MCNC: 0.6 MG/DL (ref 0.5–1.4)
DEPRECATED RDW RBC AUTO: 43.3 FL (ref 40–54)
ERYTHROCYTE [DISTWIDTH] IN BLOOD BY AUTOMATED COUNT: 13.3 % (ref 12–15)
GFR SERPL CREATININE-BSD FRML MDRD: 139 ML/MIN/1.73
GLUCOSE BLD-MCNC: 139 MG/DL (ref 70–100)
GLUCOSE BLDC GLUCOMTR-MCNC: 123 MG/DL (ref 70–130)
GLUCOSE BLDC GLUCOMTR-MCNC: 125 MG/DL (ref 70–130)
GLUCOSE BLDC GLUCOMTR-MCNC: 129 MG/DL (ref 70–130)
HCT VFR BLD AUTO: 23.6 % (ref 40–52)
HGB BLD-MCNC: 7.7 G/DL (ref 14–18)
MCH RBC QN AUTO: 29.2 PG (ref 28–32)
MCHC RBC AUTO-ENTMCNC: 32.6 G/DL (ref 33–36)
MCV RBC AUTO: 89.4 FL (ref 82–95)
PLATELET # BLD AUTO: 65 10*3/MM3 (ref 130–400)
PMV BLD AUTO: 10.9 FL (ref 6–12)
POTASSIUM BLD-SCNC: 3.2 MMOL/L (ref 3.5–5.3)
RBC # BLD AUTO: 2.64 10*6/MM3 (ref 4.8–5.9)
SODIUM BLD-SCNC: 133 MMOL/L (ref 135–145)
UNIT  ABO: NORMAL
UNIT  RH: NORMAL
WBC NRBC COR # BLD: 9.64 10*3/MM3 (ref 4.8–10.8)

## 2018-02-12 PROCEDURE — 99024 POSTOP FOLLOW-UP VISIT: CPT | Performed by: NURSE PRACTITIONER

## 2018-02-12 PROCEDURE — 94799 UNLISTED PULMONARY SVC/PX: CPT

## 2018-02-12 PROCEDURE — 85027 COMPLETE CBC AUTOMATED: CPT | Performed by: THORACIC SURGERY (CARDIOTHORACIC VASCULAR SURGERY)

## 2018-02-12 PROCEDURE — 80048 BASIC METABOLIC PNL TOTAL CA: CPT | Performed by: THORACIC SURGERY (CARDIOTHORACIC VASCULAR SURGERY)

## 2018-02-12 PROCEDURE — 82962 GLUCOSE BLOOD TEST: CPT

## 2018-02-12 PROCEDURE — 71046 X-RAY EXAM CHEST 2 VIEWS: CPT

## 2018-02-12 PROCEDURE — 97116 GAIT TRAINING THERAPY: CPT

## 2018-02-12 PROCEDURE — 25010000002 ENOXAPARIN PER 10 MG: Performed by: THORACIC SURGERY (CARDIOTHORACIC VASCULAR SURGERY)

## 2018-02-12 PROCEDURE — 97110 THERAPEUTIC EXERCISES: CPT

## 2018-02-12 PROCEDURE — 25010000002 FUROSEMIDE PER 20 MG: Performed by: THORACIC SURGERY (CARDIOTHORACIC VASCULAR SURGERY)

## 2018-02-12 PROCEDURE — 25010000002 FUROSEMIDE PER 20 MG: Performed by: NURSE PRACTITIONER

## 2018-02-12 RX ORDER — POTASSIUM CHLORIDE 750 MG/1
20 CAPSULE, EXTENDED RELEASE ORAL
Status: DISCONTINUED | OUTPATIENT
Start: 2018-02-12 | End: 2018-02-16 | Stop reason: HOSPADM

## 2018-02-12 RX ORDER — FUROSEMIDE 10 MG/ML
20 INJECTION INTRAMUSCULAR; INTRAVENOUS
Status: DISCONTINUED | OUTPATIENT
Start: 2018-02-12 | End: 2018-02-16

## 2018-02-12 RX ORDER — PRENATAL VIT/IRON FUM/FOLIC AC 27MG-0.8MG
1 TABLET ORAL DAILY
Status: DISCONTINUED | OUTPATIENT
Start: 2018-02-12 | End: 2018-02-16 | Stop reason: HOSPADM

## 2018-02-12 RX ADMIN — OXYCODONE HYDROCHLORIDE AND ACETAMINOPHEN 1 TABLET: 5; 325 TABLET ORAL at 19:00

## 2018-02-12 RX ADMIN — ASPIRIN 81 MG: 81 TABLET ORAL at 08:25

## 2018-02-12 RX ADMIN — PRENATAL VIT W/ FE FUMARATE-FA TAB 27-0.8 MG 1 TABLET: 27-0.8 TAB at 08:24

## 2018-02-12 RX ADMIN — FUROSEMIDE 20 MG: 10 INJECTION, SOLUTION INTRAMUSCULAR; INTRAVENOUS at 17:14

## 2018-02-12 RX ADMIN — IPRATROPIUM BROMIDE AND ALBUTEROL SULFATE 3 ML: 2.5; .5 SOLUTION RESPIRATORY (INHALATION) at 20:10

## 2018-02-12 RX ADMIN — POTASSIUM CHLORIDE 20 MEQ: 750 CAPSULE, EXTENDED RELEASE ORAL at 17:09

## 2018-02-12 RX ADMIN — IPRATROPIUM BROMIDE AND ALBUTEROL SULFATE 3 ML: 2.5; .5 SOLUTION RESPIRATORY (INHALATION) at 10:33

## 2018-02-12 RX ADMIN — FUROSEMIDE 20 MG: 10 INJECTION, SOLUTION INTRAMUSCULAR; INTRAVENOUS at 08:25

## 2018-02-12 RX ADMIN — DESMOPRESSIN ACETATE 40 MG: 0.2 TABLET ORAL at 20:20

## 2018-02-12 RX ADMIN — OXYCODONE HYDROCHLORIDE AND ACETAMINOPHEN 1 TABLET: 5; 325 TABLET ORAL at 08:25

## 2018-02-12 RX ADMIN — METOPROLOL TARTRATE 25 MG: 25 TABLET, FILM COATED ORAL at 20:21

## 2018-02-12 RX ADMIN — OXYCODONE HYDROCHLORIDE AND ACETAMINOPHEN 1 TABLET: 5; 325 TABLET ORAL at 11:45

## 2018-02-12 RX ADMIN — FUROSEMIDE 20 MG: 10 INJECTION, SOLUTION INTRAMUSCULAR; INTRAVENOUS at 11:45

## 2018-02-12 RX ADMIN — POTASSIUM CHLORIDE 20 MEQ: 750 CAPSULE, EXTENDED RELEASE ORAL at 08:25

## 2018-02-12 RX ADMIN — LIDOCAINE 2 PATCH: 50 PATCH CUTANEOUS at 08:24

## 2018-02-12 RX ADMIN — CLOPIDOGREL 75 MG: 75 TABLET, FILM COATED ORAL at 17:09

## 2018-02-12 RX ADMIN — POTASSIUM CHLORIDE 20 MEQ: 750 CAPSULE, EXTENDED RELEASE ORAL at 11:45

## 2018-02-12 RX ADMIN — LISINOPRIL 2.5 MG: 2.5 TABLET ORAL at 08:25

## 2018-02-12 RX ADMIN — OXYCODONE HYDROCHLORIDE AND ACETAMINOPHEN 1 TABLET: 10; 325 TABLET ORAL at 04:06

## 2018-02-12 RX ADMIN — ENOXAPARIN SODIUM 30 MG: 30 INJECTION SUBCUTANEOUS at 08:25

## 2018-02-12 RX ADMIN — FUROSEMIDE 20 MG: 10 INJECTION, SOLUTION INTRAMUSCULAR; INTRAVENOUS at 05:35

## 2018-02-12 RX ADMIN — ENOXAPARIN SODIUM 30 MG: 30 INJECTION SUBCUTANEOUS at 20:21

## 2018-02-12 RX ADMIN — BISACODYL 10 MG: 5 TABLET ORAL at 08:25

## 2018-02-12 RX ADMIN — OXYCODONE HYDROCHLORIDE AND ACETAMINOPHEN 1 TABLET: 5; 325 TABLET ORAL at 23:17

## 2018-02-12 RX ADMIN — BISACODYL 10 MG: 5 TABLET ORAL at 20:20

## 2018-02-12 RX ADMIN — OXYCODONE HYDROCHLORIDE AND ACETAMINOPHEN 1 TABLET: 10; 325 TABLET ORAL at 01:17

## 2018-02-12 RX ADMIN — IPRATROPIUM BROMIDE AND ALBUTEROL SULFATE 3 ML: 2.5; .5 SOLUTION RESPIRATORY (INHALATION) at 14:35

## 2018-02-12 RX ADMIN — OXYCODONE HYDROCHLORIDE AND ACETAMINOPHEN 1 TABLET: 10; 325 TABLET ORAL at 14:48

## 2018-02-12 RX ADMIN — METOPROLOL TARTRATE 25 MG: 25 TABLET, FILM COATED ORAL at 08:25

## 2018-02-12 RX ADMIN — IPRATROPIUM BROMIDE AND ALBUTEROL SULFATE 3 ML: 2.5; .5 SOLUTION RESPIRATORY (INHALATION) at 06:50

## 2018-02-12 NOTE — PROGRESS NOTES
"Aortic valve replacement (Graciela-Juarez bovine pericardial 19 mm valve), Coronary artery bypass grafting -3 V (Left internal mammary artery/Left anterior descending, reverse saphenous vein graft/posterior descending artery, and reverse saphenous vein graft/diagonal artery), Transmyocardial revascularization- 17 channel, Left atrial appendage exclusion (AtriCure clip device, Right endoscopic vein harvest    Postop day 4    Up in the chair.  Walked 4 times yesterday. On 2 L nasal cannula.  Incentive spirometer 1500.  Adequate pain control.+ BM.  Asking about possible discharge home soon. Baseline weight: 124 pounds     Visit Vitals   • /60 (BP Location: Left arm, Patient Position: Lying)   • Pulse 100   • Temp 97.6 °F (36.4 °C) (Temporal Artery )   • Resp 18   • Ht 162 cm (63.78\")   • Wt 60.3 kg (132 lb 14.4 oz)   • SpO2 92%   • BMI 22.97 kg/m2       Intake/Output Summary (Last 24 hours) at 02/12/18 0936  Last data filed at 02/12/18 0800   Gross per 24 hour   Intake              860 ml   Output             3475 ml   Net            -2615 ml         Chest x-ray: No pneumothorax, right greater than left basilar atelectasis and small effusion    Physical Exam:  General: No apparent distress. In good spirits.  Cardiovascular: Regular rate and rhythm without murmur, rubs, or gallops.    Pulmonary: Clear to auscultation bilaterally without wheezing, rubs, or rales.  Chest: Sternotomy incision clean, dry, and intact. Sternum stable. No clicks.   Abdomen: Soft, non-distended, and non-tender.  Extremities: Warm, moves all extremities.  Saphenectomy site clean, dry, and intact.  Neurologic: Grossly intact with no focal deficits.       Impression:  Aortic valve stenosis status post aortic valve replacement  Coronary artery disease status post coronary artery bypass grafting  Recent non-STEMI  Chronic tobacco use  Chronic obstructive pulmonary disease, mild with low DLCO  Carotid artery disease asymptomatic now post " right carotid endarterectomy  Anemia likely multifactorial  Thrombocytopenia    Plan:  Wean O2 as tolerated  Continue diuresis  Continue routine post cardiac surgery regimen  Check labs tomorrow   Hopefully home soon

## 2018-02-12 NOTE — PLAN OF CARE
Problem: Patient Care Overview (Adult)  Goal: Plan of Care Review  Outcome: Ongoing (interventions implemented as appropriate)   02/12/18 1503   Coping/Psychosocial Response Interventions   Plan Of Care Reviewed With patient;family   Patient Care Overview   Progress improving   Outcome Evaluation   Outcome Summary/Follow up Plan VSS, NSR, Leg incision D/C/I pulses weak but palapble, Chest incision is continuing to drain small amount of serous fluid, MD aware. +8lbs, perp edema, as well as scrotal and penile edema present. Ambulate x3. Attempted to radha O2 off several times today, pt still requires 1 liter       Problem: Fall Risk (Adult)  Goal: Identify Related Risk Factors and Signs and Symptoms  Outcome: Outcome(s) achieved Date Met: 02/12/18    Goal: Absence of Falls  Outcome: Ongoing (interventions implemented as appropriate)      Problem: Cardiac Surgery (Adult)  Goal: Signs and Symptoms of Listed Potential Problems Will be Absent or Manageable (Cardiac Surgery)  Outcome: Ongoing (interventions implemented as appropriate)      Problem: Pressure Ulcer Risk (Parish Scale) (Adult,Obstetrics,Pediatric)  Goal: Identify Related Risk Factors and Signs and Symptoms  Outcome: Ongoing (interventions implemented as appropriate)      Problem: Skin Integrity Impairment, Risk/Actual (Adult)  Goal: Identify Related Risk Factors and Signs and Symptoms  Outcome: Ongoing (interventions implemented as appropriate)

## 2018-02-12 NOTE — THERAPY TREATMENT NOTE
Acute Care - Physical Therapy Treatment Note  [facility]     Patient Name: Sharad Ryder  : 1960  MRN: [id number]  Today's Date: 2018  Onset of Illness/Injury or Date of Surgery Date: 18  Date of Referral to PT: 18  Referring Physician: Dr. Robin    Admit Date: 2018    Visit Dx:    ICD-10-CM ICD-9-CM   1. Aortic valve stenosis, etiology of cardiac valve disease unspecified I35.0 424.1   2. Impaired functional mobility, balance, gait, and endurance Z74.09 V49.89     Patient Active Problem List   Diagnosis   • Shortness of breath   • Severe aortic valve stenosis   • Tobacco use   • Hyperlipidemia   • Hypertension   • NSTEMI (non-ST elevated myocardial infarction)   • Chest pain   • Coronary artery disease involving native coronary artery of native heart with angina pectoris   • Stenosis of right carotid artery   • COPD (chronic obstructive pulmonary disease)   • LAZ (acute kidney injury)   • Flu   • Carotid stenosis, right   • Post-op pain   • Aortic valve stenosis               Adult Rehabilitation Note       18 0846 18 1307 18 0830    Rehab Assessment/Intervention    Discipline physical therapy assistant  -SHERMAN physical therapy assistant  - physical therapy assistant  -    Document Type therapy note (daily note)  -SHERMAN therapy note (daily note)  - therapy note (daily note)  -    Subjective Information agree to therapy;complains of;pain;dyspnea  -SHERMAN agree to therapy;complains of;pain  - agree to therapy;complains of;pain  -    Precautions/Limitations fall precautions;oxygen therapy device and L/min  -SHERMAN fall precautions;oxygen therapy device and L/min  - fall precautions;oxygen therapy device and L/min;sternal precautions  -    Recorded by [SHERMAN] Juan Miguel Garcia PTA [MF] Kamille Chakraborty, NATAN [MF] Kamille Chakraborty, PTA    Vital Signs    Pre Systolic BP Rehab  116  -  -MF    Pre Treatment Diastolic BP  56  -MF 64  -MF    Post Systolic BP Rehab  138   -MF     Post Treatment Diastolic BP  71  -MF     Pretreatment Heart Rate (beats/min) 110  -SHERMAN 94  -MF 98  -MF    Posttreatment Heart Rate (beats/min) 116  -SHERMAN 100  -  -MF    Pre SpO2 (%) 94  -SHERMAN 98  -MF 99  -MF    O2 Delivery Pre Treatment room air  -SHERMAN --   2L  -MF room air  -MF    O2 Delivery Intra Treatment  room air  -MF room air  -MF    Post SpO2 (%) 92  -SHERMAN 96  -MF 97  -MF    O2 Delivery Post Treatment room air  -SHERMAN room air  -MF room air  -    Recorded by [SHERMAN] Juan Miguel Garcia PTA [MF] Kamille Chakraborty PTA [MF] Kamille Chakraborty PTA    Pain Assessment    Pain Assessment 0-10  -SHERMAN 0-10  - 0-10  -    Pain Score 6  -SHERMAN 5  -MF 8  -MF    Pain Type Acute pain;Surgical pain  -SHERMAN Acute pain;Surgical pain  - Acute pain;Surgical pain  -    Pain Location Chest  -SHERMAN Chest  -MF Chest  -MF    Pain Orientation  Mid  -MF Mid  -MF    Pain Descriptors Aching  -SHERMAN Aching  - Aching;Sharp  -    Pain Frequency Constant/continuous  -SHERMAN Constant/continuous  - Constant/continuous  -    Pain Intervention(s) Repositioned;Ambulation/increased activity  -SHERMAN Medication (See MAR);Repositioned  - Medication (See MAR);Repositioned  -    Response to Interventions tolerated  -SHERMAN tolerated  - tolerated  -    Recorded by [SHERMAN] Juan Miguel Garcia PTA [MF] Kamille Chakraborty PTA [] Kamille Chakraborty PTA    Bed Mobility, Assessment/Treatment    Bed Mobility, Assistive Device   head of bed elevated  -    Bed Mob, Supine to Sit, [place]   verbal cues required;minimum assist (75% patient effort);moderate assist (50% patient effort)  -    Bed Mobility, Comment up in chair  -SHERMAN up in chair  -     Recorded by [SHERMAN] Juan Miguel Garcia PTA [MF] Kamille Chakraborty PTA [] Kamille Chakraborty PTA    Transfer Assessment/Treatment    Transfers, Sit-Stand [place] verbal cues required;contact guard assist  -SHERMAN verbal cues required;contact guard assist  - verbal cues required;contact guard assist;minimum  assist (75% patient effort);2 person assist required  -MF    Transfers, Stand-Sit [place] verbal cues required;contact guard assist  -SHERMAN contact guard assist  -MF verbal cues required;contact guard assist;minimum assist (75% patient effort);2 person assist required  -MF    Recorded by [SHERMAN] Juan Miguel Garcia PTA [MF] Kamille Chakraborty PTA [MF] Kamille Chakraborty PTA    Gait Assessment/Treatment    Gait, [place] Level verbal cues required;contact guard assist;minimum assist (75% patient effort)  -SHERMAN verbal cues required;hand held assist;minimum assist (75% patient effort)  -MF verbal cues required;hand held assist;minimum assist (75% patient effort);2 person assist required  -MF    Gait, Distance (Feet) 250  -SHERMAN 250  -  -MF    Gait, Gait Deviations  decreased heel strike;step length decreased  -MF     Gait, Comment one standing rest, pt had 2 slight LOB that required min assist to correct  -SHERMAN one standing rest  - Pt. did not have to rest!  -MF    Recorded by [SHERMAN] Juan Miguel Garcia PTA [MF] Kamille Chakraborty PTA [MF] Kamille Chakraborty PTA    Therapy Exercises    Bilateral Lower Extremities AROM:;20 reps;sitting  -SHERMAN AROM:;20 reps;sitting;ankle pumps/circles;LAQ  -MF AROM:;20 reps;sitting;ankle pumps/circles;LAQ  -MF    Bilateral Upper Extremity AROM:;20 reps;sitting;elbow flexion/extension  -SHERMAN AROM:;20 reps;sitting;elbow flexion/extension  -MF AROM:;20 reps;sitting;elbow flexion/extension  -MF    Recorded by [SHERMAN] Juan Miguel Garcia PTA [MF] Kamille Chakraborty PTA [MF] Kamille Chakraborty PTA    Positioning and Restraints    Pre-Treatment Position sitting in chair/recliner  -SHERMAN sitting in chair/recliner  - in bed  -    Post Treatment Position chair  -SHERMAN chair  -MF chair  -MF    In Chair sitting;call light within reach;encouraged to call for assist  -SHERMAN reclined;call light within reach;encouraged to call for assist;with family/caregiver  - notified nsg;reclined;call light within  reach;encouraged to call for assist  -    Recorded by [SHERMAN] Juan Miguel Garcia PTA [] Kamille Chakraborty PTA [] Kamille Chakraborty PTA      02/10/18 1326 02/10/18 0832 02/09/18 1307    Rehab Assessment/Intervention    Discipline physical therapy assistant  -MF physical therapy assistant  -MF physical therapy assistant  -NHUNG    Document Type therapy note (daily note)  - therapy note (daily note)  -     Subjective Information agree to therapy;complains of;pain  - agree to therapy;complains of;pain  -MF     Treatment Not Performed   other (see comments)  -NHUNG    Treatment Not Performed, Comment   blood pressure issues per Nsg.  -NHUNG    Precautions/Limitations fall precautions;oxygen therapy device and L/min;sternal precautions   chest tube  - fall precautions;oxygen therapy device and L/min;sternal precautions   chest tube  -     Recorded by [MF] Kamille Chakraborty PTA [] Kamille Chakraborty PTA [NHUNG] Abby Corona PTA    Vital Signs    Pre Systolic BP Rehab 124  -  -MF     Pre Treatment Diastolic BP 71  -MF 71  -MF     Post Systolic BP Rehab 141  -MF      Post Treatment Diastolic BP 85  -MF      Pretreatment Heart Rate (beats/min) 97  -  -MF     Intratreatment Heart Rate (beats/min) 105  -MF      Posttreatment Heart Rate (beats/min) 101  -  -MF     Pre SpO2 (%) 95  -MF 89  -MF     O2 Delivery Pre Treatment supplemental O2   1L  -MF supplemental O2   2L  -MF     Intra SpO2 (%) 94  -MF      O2 Delivery Intra Treatment supplemental O2   2L  -MF --   3L  -MF     Post SpO2 (%) 92  -MF 95  -MF     O2 Delivery Post Treatment supplemental O2   1L  -MF --   2L  -MF     Recorded by [MF] Kamille Chakraborty PTA [MF] Kamille Chakraborty PTA     Pain Assessment    Pain Assessment 0-10  - 0-10  -MF     Pain Score 9  -MF 9  -MF     Pain Type Acute pain;Surgical pain  -MF Surgical pain;Acute pain  -MF     Pain Location Chest  -MF Chest  -MF     Pain Orientation Mid  -MF Mid  -MF     Pain  Descriptors Aching;Sharp  -MF Sharp;Aching  -     Pain Frequency Constant/continuous  -MF Constant/continuous  -MF     Pain Intervention(s) Medication (See MAR);Repositioned  -MF Medication (See MAR);Repositioned;Ambulation/increased activity  -     Response to Interventions tolerated  -MF tolerated  -MF     Recorded by [MF] Kamille Chakraborty PTA [MF] Kamille Chakraborty PTA     Bed Mobility, Assessment/Treatment    Bed Mobility, Comment up in chair  -MF up in chair  -MF     Recorded by [MF] Kamille Chakraborty PTA [MF] Kamille Chakraborty PTA     Transfer Assessment/Treatment    Transfers, Sit-Stand [place] verbal cues required;contact guard assist;minimum assist (75% patient effort);2 person assist required  -MF verbal cues required;minimum assist (75% patient effort);2 person assist required  -     Transfers, Stand-Sit [place] verbal cues required;contact guard assist;minimum assist (75% patient effort);2 person assist required  -MF verbal cues required;minimum assist (75% patient effort);2 person assist required  -MF     Recorded by [MF] Kamille Chakraborty PTA [MF] Kamille Chakraborty PTA     Gait Assessment/Treatment    Gait, [place] Level verbal cues required;hand held assist;minimum assist (75% patient effort);2 person assist required  - verbal cues required;hand held assist;minimum assist (75% patient effort);2 person assist required  -     Gait, Distance (Feet) 50   x 4 with standing rests  - 50   x 4 with standing rests  -     Gait, Gait Deviations forward flexed posture;step length decreased  - daniel decreased;step length decreased;forward flexed posture  -     Gait, Safety Issues supplemental O2;step length decreased  - supplemental O2;step length decreased  -     Gait, Impairments pain  -MF strength decreased;postural control impaired  -     Gait, Comment Pt. appears to have some anxiety while walking. Instructed pt. to slow breathing and to shorten standing  rests, but pt. continues to breathe rapidly.   -MF Lots of cues for postural and to limit standing rests  -MF     Recorded by [MF] Kamille Chakarborty PTA [MF] Kamille Chakraborty PTA     Therapy Exercises    Bilateral Lower Extremities AROM:;20 reps;sitting;ankle pumps/circles;LAQ  -MF AROM:;20 reps;sitting;ankle pumps/circles;LAQ  -MF     Bilateral Upper Extremity AROM:;20 reps;sitting;elbow flexion/extension  -MF AROM:;20 reps;sitting;elbow flexion/extension  -MF     Recorded by [MF] Kamille Chakraborty PTA [MF] Kamille Chakraborty PTA     Positioning and Restraints    Pre-Treatment Position sitting in chair/recliner  - sitting in chair/recliner  -MF     Post Treatment Position chair  -MF chair  -MF     In Chair reclined;call light within reach;encouraged to call for assist;notified nsg  -MF reclined;call light within reach;encouraged to call for assist;notified nsg  -MF     Recorded by [MF] Kamille Chakraborty PTA [MF] Kamille Chakraborty PTA       User Key  (r) = Recorded By, (t) = Taken By, (c) = Cosigned By    Initials Name Effective Dates    NHUNG Abby Corona, PTA 08/02/16 -     SHERMAN Garcia, PTA 12/08/16 -     ABIGAIL Chakraborty, PTA 08/02/16 -                 IP PT Goals       02/09/18 1146 02/09/18 1120       Bed Mobility PT LTG    Bed Mobility PT LTG, Date Established  02/09/18  -LIBRA (cordelia) SERGIO (t) LIBRA (c)     Bed Mobility PT LTG, Time to Achieve  by discharge  -LIBRA (cordelia) SERGIO (t) LIBRA (c)     Bed Mobility PT LTG, Activity Type  all bed mobility  -LIBRA (cordelia) SERGIO (t) LIBRA (c)     Bed Mobility PT LTG, [place] Level  minimum assist (75% patient effort)  -LIBRA (cordelia) SERGIO (t) LIBRA (c)     Transfer Training PT LTG    Transfer Training PT LTG, Date Established  02/09/18  -LIBRA (cordelia) SERGIO (t) LIBRA (c)     Transfer Training PT LTG, Time to Achieve  by discharge  -LIBRA bond) SERGIO (shawna) LIBRA (leyla)     Transfer Training PT LTG, Activity Type  bed to chair /chair to bed;sit to stand/stand to sit  -LIBRA bond) SERGIO (shawna) LIBRA (leyla)     Transfer Training PT  LTG, [place] Level  supervision required  -LIBRA (r) SERGIO (t) LIBRA (c)     Gait Training PT LTG    Gait Training Goal PT LTG, Date Established 02/09/18  -LIBRA (r) SERGIO (t) LIBRA (c) 02/09/18  -LIBRA (r) SERGIO (t) LIBRA (c)     Gait Training Goal PT LTG, Time to Achieve  by discharge  -LIBRA (r) SERGIO (t) LIBRA (c)     Gait Training Goal PT LTG, [place] Level  contact guard assist  -LIBRA (r) SERGIO (t) LIBRA (c)     Gait Training Goal PT LTG, Distance to Achieve  100 feet  -LIBRA (r) JM (t) LIBRA (c)       User Key  (r) = Recorded By, (t) = Taken By, (c) = Cosigned By    Initials Name Provider Type    LIBRA Kilpatrick, PT DPT Physical Therapist    SERGIO Barcenas, PT Student PT Student          Physical Therapy Education     Title: PT OT SLP Therapies (Done)     Topic: Physical Therapy (Done)     Point: Mobility training (Done)    Learning Progress Summary    Learner Readiness Method Response Comment Documented by Status   Patient Acceptance E VU progression with amb, safety with amb  02/12/18 0846 Done    Acceptance E,D VU,DU,NR bed mobility, transfers, and gait.  02/11/18 0853 Done    Acceptance E,D NR sternal precautions, posture, transfers, and gait.  02/10/18 0852 Active    Acceptance E VU Educated on importance of activity with respect to procedures performed, weight-shifting during gait, t/f and bed mobility technioques  02/09/18 1116 Done               Point: Precautions (Done)    Learning Progress Summary    Learner Readiness Method Response Comment Documented by Status   Patient Acceptance E,D VU,DU,NR bed mobility, transfers, and gait.  02/11/18 0853 Done    Acceptance E,D NR sternal precautions, posture, transfers, and gait.  02/10/18 0852 Active    Acceptance E VU Educated on sternal precautions  02/09/18 1117 Done                      User Key     Initials Effective Dates Name Provider Type Discipline     12/08/16 -  Juan Miguel Garcia PTA Physical Therapy Assistant PT     08/02/16 -  Kamille Chakraborty PTA  Physical Therapy Assistant PT    SERGIO 01/10/18 -  James Barcenas, PT Student PT Student PT                    PT Recommendation and Plan  Anticipated Equipment Needs At Discharge: front wheeled walker  Anticipated Discharge Disposition: transitional care  Planned Therapy Interventions: balance training, bed mobility training, gait training, patient/family education, postural re-education, strengthening, transfer training  PT Frequency: 2 times/day, per priority policy  Plan of Care Review  Plan Of Care Reviewed With: patient  Progress: progress toward functional goals as expected  Outcome Summary/Follow up Plan: Pt was sitting up in the chair.  Stood with CGA.  Amb 250' with one standing rest with CGA.  Pt amb on room air and O2 remained in the 90's.  Will continue to work with pt to increase strength and endurance.          Outcome Measures       02/12/18 0846 02/11/18 0830 02/10/18 0832    How much help from another person do you currently need...    Turning from your back to your side while in flat bed without using bedrails? 3  -SHERMAN 3  -MF 2  -MF    Moving from lying on back to sitting on the side of a flat bed without bedrails? 3  -SHERMAN 3  -MF 2  -MF    Moving to and from a bed to a chair (including a wheelchair)? 3  -SHERMAN 3  -MF 2  -MF    Standing up from a chair using your arms (e.g., wheelchair, bedside chair)? 3  -SHERMAN 3  -MF 2  -MF    Climbing 3-5 steps with a railing? 3  -SHERMAN 2  -MF 2  -MF    To walk in hospital room? 3  -SHERMAN 2  -MF 2  -MF    AM-PAC 6 Clicks Score 18  -SHERMAN 16  -MF 12  -MF    Functional Assessment    Outcome Measure Options AM-PAC 6 Clicks Basic Mobility (PT)  -SHERMAN AM-PAC 6 Clicks Basic Mobility (PT)  -MF AM-PAC 6 Clicks Basic Mobility (PT)  -MF      02/09/18 1100          How much help from another person do you currently need...    Turning from your back to your side while in flat bed without using bedrails? 3  -LIBRA (r) SERGIO (t) LIBRA (c)      Moving from lying on back to sitting on the side of a flat  bed without bedrails? 2  -LIBRA (r) JM (t) LIBRA (c)      Moving to and from a bed to a chair (including a wheelchair)? 2  -LIBRA (r) JM (t) LIBRA (c)      Standing up from a chair using your arms (e.g., wheelchair, bedside chair)? 2  -LIBRA (r) JM (t) LIBRA (c)      Climbing 3-5 steps with a railing? 2  -LIBRA (r) JM (t) LIBRA (c)      To walk in hospital room? 2  -LIBRA (r) JM (t) LIBRA (c)      AM-PAC 6 Clicks Score 13  -LIBRA (r) JM (t)      Functional Assessment    Outcome Measure Options AM-PAC 6 Clicks Basic Mobility (PT)  -LIBRA (r) JM (t) LIBRA (c)        User Key  (r) = Recorded By, (t) = Taken By, (c) = Cosigned By    Initials Name Provider Type    LIBRA Kilpatrick, PT DPT Physical Therapist    SHERMAN Garcia, NATAN Physical Therapy Assistant    ABIGAIL Chakraborty, PTA Physical Therapy Assistant    SERGIO Barcenas, PT Student PT Student           Time Calculation:         PT Charges       02/12/18 0846          Time Calculation    Start Time 0846  -SHERMAN      Stop Time 0914  -SHERMAN      Time Calculation (min) 28 min  -SHERMAN      PT Received On 02/12/18  -SHERMAN      PT Goal Re-Cert Due Date 02/19/18  -SHERMAN      Time Calculation- PT    Total Timed Code Minutes- PT 28 minute(s)  -SHERMAN        User Key  (r) = Recorded By, (t) = Taken By, (c) = Cosigned By    Initials Name Provider Type    SHERMAN Garcia PTA Physical Therapy Assistant          Therapy Charges for Today     Code Description Service Date Service Provider Modifiers Qty    [id number] HC GAIT TRAINING EA 15 MIN 2/12/2018 Juan Miguel Garcia PTA GP 1    [id number] HC PT THER PROC EA 15 MIN 2/12/2018 Juan Miguel Garcia PTA GP 1          PT G-Codes  Outcome Measure Options: AM-PAC 6 Clicks Basic Mobility (PT)  Score: 13  Functional Limitation: Mobility: Walking and moving around  Mobility: Walking and Moving Around Current Status (): At least 40 percent but less than 60 percent impaired, limited or restricted  Mobility: Walking and Moving Around Goal Status (): At least 20  percent but less than 40 percent impaired, limited or restricted    Juan Miguel Garcia, PTA  2/12/2018

## 2018-02-12 NOTE — THERAPY TREATMENT NOTE
Acute Care - Physical Therapy Treatment Note  [facility]     Patient Name: Sharad Ryder  : 1960  MRN: [id number]  Today's Date: 2018  Onset of Illness/Injury or Date of Surgery Date: 18  Date of Referral to PT: 18  Referring Physician: Dr. Robin    Admit Date: 2018    Visit Dx:    ICD-10-CM ICD-9-CM   1. Aortic valve stenosis, etiology of cardiac valve disease unspecified I35.0 424.1   2. Impaired functional mobility, balance, gait, and endurance Z74.09 V49.89     Patient Active Problem List   Diagnosis   • Shortness of breath   • Severe aortic valve stenosis   • Tobacco use   • Hyperlipidemia   • Hypertension   • NSTEMI (non-ST elevated myocardial infarction)   • Chest pain   • Coronary artery disease involving native coronary artery of native heart with angina pectoris   • Stenosis of right carotid artery   • COPD (chronic obstructive pulmonary disease)   • LAZ (acute kidney injury)   • Flu   • Carotid stenosis, right   • Post-op pain   • Aortic valve stenosis               Adult Rehabilitation Note       18 1316 18 0846 18 1307    Rehab Assessment/Intervention    Discipline physical therapy assistant  -SHERMAN physical therapy assistant  -SHERMAN physical therapy assistant  -    Document Type therapy note (daily note)  -SHERMAN therapy note (daily note)  -SHERMAN therapy note (daily note)  -    Subjective Information agree to therapy;complains of;weakness;pain  -SHERMAN agree to therapy;complains of;pain;dyspnea  -SHERMAN agree to therapy;complains of;pain  -    Precautions/Limitations fall precautions;oxygen therapy device and L/min  -SHERMAN fall precautions;oxygen therapy device and L/min  -SHERMAN fall precautions;oxygen therapy device and L/min  -    Recorded by [SHERMAN] Juan Miguel Garcia PTA [SHERMAN] Juan Miguel Garica PTA [MF] Kamille Chakraborty PTA    Vital Signs    Pre Systolic BP Rehab   116  -MF    Pre Treatment Diastolic BP   56  -MF    Post Systolic BP Rehab   138  -MF    Post Treatment  Diastolic BP   71  -MF    Pretreatment Heart Rate (beats/min)  110  -SHERMAN 94  -MF    Posttreatment Heart Rate (beats/min)  116  -SHERMAN 100  -MF    Pre SpO2 (%) 84   slowly increased to 90 with deep breathing  -SHERMAN 94  -SHERMAN 98  -MF    O2 Delivery Pre Treatment room air  -SHERMAN room air  -SHERMAN --   2L  -MF    O2 Delivery Intra Treatment   room air  -MF    Post SpO2 (%) 92  -SHERMAN 92  -SHERMAN 96  -MF    O2 Delivery Post Treatment supplemental O2   1.0  -SHERMAN room air  -SHERMAN room air  -    Recorded by [SHERMAN] Juan Miguel Garcia PTA [SHERMAN] Juan Miguel Garcia PTA [] Kamille Chakraborty PTA    Pain Assessment    Pain Assessment 0-10  -SHERMAN 0-10  -SHERMAN 0-10  -MF    Pain Score 6  -SHERMAN 6  -SHERMAN 5  -MF    Pain Type Acute pain;Surgical pain  -SHERMAN Acute pain;Surgical pain  -SHERMAN Acute pain;Surgical pain  -MF    Pain Location Chest  -SHERMAN Chest  -SHERMAN Chest  -MF    Pain Orientation   Mid  -MF    Pain Descriptors Aching  -SHERMAN Aching  -SHERMAN Aching  -MF    Pain Frequency Constant/continuous  -SHERMAN Constant/continuous  -SHERMAN Constant/continuous  -MF    Pain Intervention(s) Ambulation/increased activity  -SHERMAN Repositioned;Ambulation/increased activity  -SHERMAN Medication (See MAR);Repositioned  -    Response to Interventions tolerated  -SHERMAN tolerated  -SHERMAN tolerated  -    Recorded by [SHERMAN] Juan Miguel Garcia PTA [SHERMAN] Juan Miguel Garcia PTA [] Kamille Chakraborty PTA    Bed Mobility, Assessment/Treatment    Bed Mob, Supine to Sit, [place] --   in chair  -SHERMAN      Bed Mob, Sit to Supine, [place] verbal cues required;contact guard assist  -SHERMAN      Bed Mobility, Comment  up in chair  -SHERMAN up in chair  -    Recorded by [SHERMAN] Juan Miguel Garcia PTA [SHERMAN] Juan Miguel Garcia PTA [] Kamille Chakraborty PTA    Transfer Assessment/Treatment    Transfers, Sit-Stand [place] verbal cues required;contact guard assist  -SHERMAN verbal cues required;contact guard assist  -SHERMAN verbal cues required;contact guard assist  -    Transfers, Stand-Sit [place] verbal cues required;contact guard  assist  -SHERMAN verbal cues required;contact guard assist  -SHERMAN contact guard assist  -MF    Recorded by [SHERMAN] Juan Miguel Garcia PTA [SHERMAN] Juan Miguel Garcia PTA [] Kamille Chakraborty PTA    Gait Assessment/Treatment    Gait, [place] Level verbal cues required;contact guard assist  -SHERMAN verbal cues required;contact guard assist;minimum assist (75% patient effort)  -SHERMAN verbal cues required;hand held assist;minimum assist (75% patient effort)  -MF    Gait, Distance (Feet) 250  -SHERMAN 250  -SHERMAN 250  -MF    Gait, Gait Deviations   decreased heel strike;step length decreased  -MF    Gait, Comment one standing rest  -SHERMAN one standing rest, pt had 2 slight LOB that required min assist to correct  -SHERMAN one standing rest  -MF    Recorded by [SHERMAN] Juan Miguel Garcia PTA [SHERMAN] Juan Miguel Garcia PTA [] Kamille Chakraborty PTA    Therapy Exercises    Bilateral Lower Extremities AROM:;20 reps;sitting  -SHERMAN AROM:;20 reps;sitting  -SHERMAN AROM:;20 reps;sitting;ankle pumps/circles;LAQ  -MF    Bilateral Upper Extremity AROM:;20 reps;sitting;elbow flexion/extension  -SHERMAN AROM:;20 reps;sitting;elbow flexion/extension  -SHERMAN AROM:;20 reps;sitting;elbow flexion/extension  -MF    Recorded by [SHERMAN] Juan Miguel Garcia PTA [SHERMAN] Juan Miguel Garcia PTA [] Kamille Chakraborty PTA    Positioning and Restraints    Pre-Treatment Position sitting in chair/recliner  -SHERMAN sitting in chair/recliner  -SHERMAN sitting in chair/recliner  -    Post Treatment Position bed  -SHERMAN chair  -SHERMAN chair  -MF    In Bed fowlers;call light within reach;encouraged to call for assist;with family/caregiver;side rails up x2  -SHERMAN      In Chair  sitting;call light within reach;encouraged to call for assist  -SHERMAN reclined;call light within reach;encouraged to call for assist;with family/caregiver  -    Recorded by [SHERMAN] Juan Miguel Garcia PTA [SHERMAN] Juan Miguel Garcia PTA [] Kamille Chakraborty PTA      02/11/18 0830 02/10/18 1326 02/10/18 0832    Rehab Assessment/Intervention    Discipline  physical therapy assistant  - physical therapy assistant  - physical therapy assistant  -    Document Type therapy note (daily note)  - therapy note (daily note)  - therapy note (daily note)  -    Subjective Information agree to therapy;complains of;pain  -MF agree to therapy;complains of;pain  -MF agree to therapy;complains of;pain  -MF    Precautions/Limitations fall precautions;oxygen therapy device and L/min;sternal precautions  -MF fall precautions;oxygen therapy device and L/min;sternal precautions   chest tube  -MF fall precautions;oxygen therapy device and L/min;sternal precautions   chest tube  -    Recorded by [MF] Kamille Chakraborty PTA [MF] Kamille Chakraborty PTA [] Kamille Chakraborty PTA    Vital Signs    Pre Systolic BP Rehab 131  -  -  -MF    Pre Treatment Diastolic BP 64  -MF 71  -MF 71  -MF    Post Systolic BP Rehab  141  -MF     Post Treatment Diastolic BP  85  -MF     Pretreatment Heart Rate (beats/min) 98  -MF 97  -  -MF    Intratreatment Heart Rate (beats/min)  105  -MF     Posttreatment Heart Rate (beats/min) 108  -  -  -MF    Pre SpO2 (%) 99  -MF 95  -MF 89  -MF    O2 Delivery Pre Treatment room air  - supplemental O2   1L  -MF supplemental O2   2L  -MF    Intra SpO2 (%)  94  -MF     O2 Delivery Intra Treatment room air  - supplemental O2   2L  -MF --   3L  -MF    Post SpO2 (%) 97  -MF 92  -MF 95  -MF    O2 Delivery Post Treatment room air  - supplemental O2   1L  -MF --   2L  -MF    Recorded by [MF] Kamille Chakraborty PTA [] Kamille Chakraborty PTA [MF] Kamille Chakraborty PTA    Pain Assessment    Pain Assessment 0-10  -MF 0-10  -MF 0-10  -MF    Pain Score 8  -MF 9  -MF 9  -MF    Pain Type Acute pain;Surgical pain  -MF Acute pain;Surgical pain  -MF Surgical pain;Acute pain  -MF    Pain Location Chest  -MF Chest  -MF Chest  -MF    Pain Orientation Mid  -MF Mid  -MF Mid  -MF    Pain Descriptors Aching;Sharp  -MF Aching;Sharp  -MF  Sharp;Aching  -    Pain Frequency Constant/continuous  -MF Constant/continuous  -MF Constant/continuous  -MF    Pain Intervention(s) Medication (See MAR);Repositioned  -MF Medication (See MAR);Repositioned  -MF Medication (See MAR);Repositioned;Ambulation/increased activity  -    Response to Interventions tolerated  -MF tolerated  -MF tolerated  -MF    Recorded by [MF] Kamille Chakraborty PTA [MF] Kamille Chakraborty, PTA [MF] Kamille Chakraborty PTA    Bed Mobility, Assessment/Treatment    Bed Mobility, Assistive Device head of bed elevated  -      Bed Mob, Supine to Sit, [place] verbal cues required;minimum assist (75% patient effort);moderate assist (50% patient effort)  -      Bed Mobility, Comment  up in chair  -MF up in chair  -MF    Recorded by [MF] Kamille Chakraborty PTA [MF] Kamille Chakraborty, PTA [MF] Kamille Chakraborty PTA    Transfer Assessment/Treatment    Transfers, Sit-Stand [place] verbal cues required;contact guard assist;minimum assist (75% patient effort);2 person assist required  -MF verbal cues required;contact guard assist;minimum assist (75% patient effort);2 person assist required  -MF verbal cues required;minimum assist (75% patient effort);2 person assist required  -    Transfers, Stand-Sit [place] verbal cues required;contact guard assist;minimum assist (75% patient effort);2 person assist required  -MF verbal cues required;contact guard assist;minimum assist (75% patient effort);2 person assist required  -MF verbal cues required;minimum assist (75% patient effort);2 person assist required  -MF    Recorded by [MF] Kamille Chakraborty, PTA [MF] Kamille Chakraborty, PTA [MF] Kamille Chakraborty, PTA    Gait Assessment/Treatment    Gait, [place] Level verbal cues required;hand held assist;minimum assist (75% patient effort);2 person assist required  -MF verbal cues required;hand held assist;minimum assist (75% patient effort);2 person assist required  - verbal  cues required;hand held assist;minimum assist (75% patient effort);2 person assist required  -MF    Gait, Distance (Feet) 200  -MF 50   x 4 with standing rests  -MF 50   x 4 with standing rests  -MF    Gait, Gait Deviations  forward flexed posture;step length decreased  -MF daniel decreased;step length decreased;forward flexed posture  -MF    Gait, Safety Issues  supplemental O2;step length decreased  -MF supplemental O2;step length decreased  -MF    Gait, Impairments  pain  -MF strength decreased;postural control impaired  -MF    Gait, Comment Pt. did not have to rest!  - Pt. appears to have some anxiety while walking. Instructed pt. to slow breathing and to shorten standing rests, but pt. continues to breathe rapidly.   - Lots of cues for postural and to limit standing rests  -MF    Recorded by [MF] Kamille Chakraborty PTA [MF] Kamille Chakraborty PTA [MF] Kamille Chakraborty PTA    Therapy Exercises    Bilateral Lower Extremities AROM:;20 reps;sitting;ankle pumps/circles;LAQ  -MF AROM:;20 reps;sitting;ankle pumps/circles;LAQ  -MF AROM:;20 reps;sitting;ankle pumps/circles;LAQ  -MF    Bilateral Upper Extremity AROM:;20 reps;sitting;elbow flexion/extension  -MF AROM:;20 reps;sitting;elbow flexion/extension  -MF AROM:;20 reps;sitting;elbow flexion/extension  -MF    Recorded by [MF] Kamille Chakraborty PTA [MF] Kamille Chakraborty PTA [MF] Kamille Chakraborty PTA    Positioning and Restraints    Pre-Treatment Position in bed  -MF sitting in chair/recliner  - sitting in chair/recliner  -    Post Treatment Position chair  -MF chair  -MF chair  -MF    In Chair notified nsg;reclined;call light within reach;encouraged to call for assist  -MF reclined;call light within reach;encouraged to call for assist;notified nsg  - reclined;call light within reach;encouraged to call for assist;notified nsg  -    Recorded by [MF] Kamille Chakraborty PTA [MF] Kamille Chakraborty PTA [MF] Kamille Chakraborty PTA      User Davis   (r) = Recorded By, (t) = Taken By, (c) = Cosigned By    Initials Name Effective Dates    SHERMAN Juan Miguel Garcia, PTA 12/08/16 -     MF Kamille EMaryann Palmer, PTA 08/02/16 -                 IP PT Goals       02/09/18 1146 02/09/18 1120       Bed Mobility PT LTG    Bed Mobility PT LTG, Date Established  02/09/18  -LIBRA (r) JM (t) LIBRA (c)     Bed Mobility PT LTG, Time to Achieve  by discharge  -LIBRA (r) JM (t) LIBRA (c)     Bed Mobility PT LTG, Activity Type  all bed mobility  -LIBRA (r) JM (t) LIBRA (c)     Bed Mobility PT LTG, [place] Level  minimum assist (75% patient effort)  -LIBRA (r) JM (t) LIBRA (c)     Transfer Training PT LTG    Transfer Training PT LTG, Date Established  02/09/18  -LIBRA (r) JM (t) LIBRA (c)     Transfer Training PT LTG, Time to Achieve  by discharge  -LIBRA (r) JM (t) LIBRA (c)     Transfer Training PT LTG, Activity Type  bed to chair /chair to bed;sit to stand/stand to sit  -LIBRA (r) JM (t) LIBRA (c)     Transfer Training PT LTG, [place] Level  supervision required  -LIBRA (r) JM (t) LIBRA (c)     Gait Training PT LTG    Gait Training Goal PT LTG, Date Established 02/09/18  -LIBRA (r) JM (t) LIBRA (c) 02/09/18  -LIBRA (r) JM (t) LIBRA (c)     Gait Training Goal PT LTG, Time to Achieve  by discharge  -LIBRA (r) JM (t) LIBRA (c)     Gait Training Goal PT LTG, [place] Level  contact guard assist  -LIBRA (r) JM (t) LIBRA (c)     Gait Training Goal PT LTG, Distance to Achieve  100 feet  -LIBRA (r) JM (t) LIBRA (c)       User Key  (r) = Recorded By, (t) = Taken By, (c) = Cosigned By    Initials Name Provider Type    LIBRA Kilpatrick, PT DPT Physical Therapist    SERGIO Barcenas, PT Student PT Student          Physical Therapy Education     Title: PT OT SLP Therapies (Done)     Topic: Physical Therapy (Done)     Point: Mobility training (Done)    Learning Progress Summary    Learner Readiness Method Response Comment Documented by Status   Patient Acceptance E JUAN FRANCISCO progression with amb, safety with amb SHERMAN 02/12/18 0846 Done    Acceptance E,D JUAN FRANCISCO,VAHID,NR  bed mobility, transfers, and gait.  02/11/18 0853 Done    Acceptance E,D NR sternal precautions, posture, transfers, and gait.  02/10/18 0852 Active    Acceptance E VU Educated on importance of activity with respect to procedures performed, weight-shifting during gait, t/f and bed mobility technioques  02/09/18 1116 Done               Point: Precautions (Done)    Learning Progress Summary    Learner Readiness Method Response Comment Documented by Status   Patient Acceptance E,D VU,DU,NR bed mobility, transfers, and gait.  02/11/18 0853 Done    Acceptance E,D NR sternal precautions, posture, transfers, and gait.  02/10/18 0852 Active    Acceptance E VU Educated on sternal precautions  02/09/18 1117 Done                      User Key     Initials Effective Dates Name Provider Type Discipline     12/08/16 -  Juan Miguel Garcia, PTA Physical Therapy Assistant PT     08/02/16 -  Kamille Chakraborty, PTA Physical Therapy Assistant PT     01/10/18 -  James Barcenas, PT Student PT Student PT                    PT Recommendation and Plan  Anticipated Equipment Needs At Discharge: front wheeled walker  Anticipated Discharge Disposition: transitional care  Planned Therapy Interventions: balance training, bed mobility training, gait training, patient/family education, postural re-education, strengthening, transfer training  PT Frequency: 2 times/day, per priority policy  Plan of Care Review  Plan Of Care Reviewed With: patient  Progress: progress toward functional goals as expected  Outcome Summary/Follow up Plan: Pt was sitting up in the chair.  Stood with CGA.  Amb 250' with one standing rest with CGA.  Pt amb on room air and O2 remained in the 90's.  Will continue to work with pt to increase strength and endurance.          Outcome Measures       02/12/18 0846 02/11/18 0830 02/10/18 0832    How much help from another person do you currently need...    Turning from your back to your side while in flat bed  without using bedrails? 3  -SHERMAN 3  -MF 2  -MF    Moving from lying on back to sitting on the side of a flat bed without bedrails? 3  -SHERMAN 3  -MF 2  -MF    Moving to and from a bed to a chair (including a wheelchair)? 3  -SHERMAN 3  -MF 2  -MF    Standing up from a chair using your arms (e.g., wheelchair, bedside chair)? 3  -SHERMAN 3  -MF 2  -MF    Climbing 3-5 steps with a railing? 3  -SHERMAN 2  -MF 2  -MF    To walk in hospital room? 3  -SHERMAN 2  -MF 2  -MF    AM-PAC 6 Clicks Score 18  -SHERMAN 16  -MF 12  -MF    Functional Assessment    Outcome Measure Options AM-PAC 6 Clicks Basic Mobility (PT)  -SHERMAN AM-PAC 6 Clicks Basic Mobility (PT)  -MF AM-PAC 6 Clicks Basic Mobility (PT)  -MF      User Key  (r) = Recorded By, (t) = Taken By, (c) = Cosigned By    Initials Name Provider Type    SHERMAN Garcia PTA Physical Therapy Assistant     Kamille Chakraborty PTA Physical Therapy Assistant           Time Calculation:         PT Charges       02/12/18 1316 02/12/18 0846       Time Calculation    Start Time 1316  -SHERMAN 0846  -SHERMAN     Stop Time 1342  -SHERMAN 0914  -SHERMAN     Time Calculation (min) 26 min  -SHERMAN 28 min  -SHERMAN     PT Received On 02/12/18  -SHERMAN 02/12/18  -SHERMAN     PT Goal Re-Cert Due Date 02/19/18  -SHERMAN 02/19/18  -SHERMAN     Time Calculation- PT    Total Timed Code Minutes- PT 26 minute(s)  -SHERMAN 28 minute(s)  -SHERMAN       User Key  (r) = Recorded By, (t) = Taken By, (c) = Cosigned By    Initials Name Provider Type    SHERMAN Garcia PTA Physical Therapy Assistant          Therapy Charges for Today     Code Description Service Date Service Provider Modifiers Qty    19209408893 HC GAIT TRAINING EA 15 MIN 2/12/2018 Juan Miguel Garcia PTA GP 1    64409537866 HC PT THER PROC EA 15 MIN 2/12/2018 Juan Miguel Garcia PTA GP 1    58732082546 HC GAIT TRAINING EA 15 MIN 2/12/2018 Juan Miguel Garcia PTA GP 1    36993067474 HC PT THER PROC EA 15 MIN 2/12/2018 Juan Miguel L Hammond, PTA GP 1          PT G-Codes  Outcome Measure Options: AM-PAC 6 Clicks Basic Mobility  (PT)  Score: 13  Functional Limitation: Mobility: Walking and moving around  Mobility: Walking and Moving Around Current Status (): At least 40 percent but less than 60 percent impaired, limited or restricted  Mobility: Walking and Moving Around Goal Status (): At least 20 percent but less than 40 percent impaired, limited or restricted    Juan Miguel Garcia, PTA  2/12/2018

## 2018-02-12 NOTE — PLAN OF CARE
Problem: Patient Care Overview (Adult)  Goal: Plan of Care Review  Outcome: Ongoing (interventions implemented as appropriate)   02/12/18 1850   Coping/Psychosocial Response Interventions   Plan Of Care Reviewed With patient;family   Outcome Evaluation   Outcome Summary/Follow up Plan Initial dietary assessment/eval. Pt currently on a regular, cardiac diet with 68% intake recorded. This is likely under his estimated needs. This dietetic intern interviewed for preferences, encouraged intake, advised low Na+ food choices, and will cont to follow.

## 2018-02-12 NOTE — PLAN OF CARE
Problem: Patient Care Overview (Adult)  Goal: Plan of Care Review  Outcome: Ongoing (interventions implemented as appropriate)   02/11/18 1416 02/11/18 2118 02/12/18 0233   Coping/Psychosocial Response Interventions   Plan Of Care Reviewed With --  patient --    Patient Care Overview   Progress improving --  --    Outcome Evaluation   Outcome Summary/Follow up Plan --  --  VSS. Pt c/o incisional pain and given PRN pain medication with relief. Good urine output. Assist x1 for ambulation. Will continue to monitor and notify MD of changes.     Goal: Adult Individualization and Mutuality  Outcome: Ongoing (interventions implemented as appropriate)      Problem: Fall Risk (Adult)  Goal: Identify Related Risk Factors and Signs and Symptoms  Outcome: Ongoing (interventions implemented as appropriate)    Goal: Absence of Falls  Outcome: Ongoing (interventions implemented as appropriate)    Goal: Identify Related Risk Factors and Signs and Symptoms  Outcome: Ongoing (interventions implemented as appropriate)      Problem: Cardiac Surgery (Adult)  Goal: Signs and Symptoms of Listed Potential Problems Will be Absent or Manageable (Cardiac Surgery)  Outcome: Ongoing (interventions implemented as appropriate)      Problem: Pressure Ulcer Risk (Parish Scale) (Adult,Obstetrics,Pediatric)  Goal: Identify Related Risk Factors and Signs and Symptoms  Outcome: Ongoing (interventions implemented as appropriate)    Goal: Skin Integrity  Outcome: Ongoing (interventions implemented as appropriate)      Problem: Skin Integrity Impairment, Risk/Actual (Adult)  Goal: Identify Related Risk Factors and Signs and Symptoms  Outcome: Ongoing (interventions implemented as appropriate)    Goal: Skin Integrity/Wound Healing  Outcome: Ongoing (interventions implemented as appropriate)

## 2018-02-12 NOTE — PLAN OF CARE
Problem: Patient Care Overview (Adult)  Goal: Plan of Care Review  Outcome: Ongoing (interventions implemented as appropriate)   02/12/18 0846   Coping/Psychosocial Response Interventions   Plan Of Care Reviewed With patient   Patient Care Overview   Progress progress toward functional goals as expected   Outcome Evaluation   Outcome Summary/Follow up Plan Pt was sitting up in the chair. Stood with CGA. Amb 250' with one standing rest with CGA. Pt amb on room air and O2 remained in the 90's. Will continue to work with pt to increase strength and endurance.

## 2018-02-13 LAB
ANION GAP SERPL CALCULATED.3IONS-SCNC: 8 MMOL/L (ref 4–13)
BUN BLD-MCNC: 10 MG/DL (ref 5–21)
BUN/CREAT SERPL: 18.5 (ref 7–25)
CALCIUM SPEC-SCNC: 8.2 MG/DL (ref 8.4–10.4)
CHLORIDE SERPL-SCNC: 95 MMOL/L (ref 98–110)
CO2 SERPL-SCNC: 33 MMOL/L (ref 24–31)
CREAT BLD-MCNC: 0.54 MG/DL (ref 0.5–1.4)
DEPRECATED RDW RBC AUTO: 42.1 FL (ref 40–54)
ERYTHROCYTE [DISTWIDTH] IN BLOOD BY AUTOMATED COUNT: 13.4 % (ref 12–15)
GFR SERPL CREATININE-BSD FRML MDRD: >150 ML/MIN/1.73
GLUCOSE BLD-MCNC: 102 MG/DL (ref 70–100)
HCT VFR BLD AUTO: 23.6 % (ref 40–52)
HGB BLD-MCNC: 8.2 G/DL (ref 14–18)
MCH RBC QN AUTO: 30.5 PG (ref 28–32)
MCHC RBC AUTO-ENTMCNC: 34.7 G/DL (ref 33–36)
MCV RBC AUTO: 87.7 FL (ref 82–95)
PLATELET # BLD AUTO: 97 10*3/MM3 (ref 130–400)
PMV BLD AUTO: 10.8 FL (ref 6–12)
POTASSIUM BLD-SCNC: 3.6 MMOL/L (ref 3.5–5.3)
RBC # BLD AUTO: 2.69 10*6/MM3 (ref 4.8–5.9)
SODIUM BLD-SCNC: 136 MMOL/L (ref 135–145)
WBC NRBC COR # BLD: 10.12 10*3/MM3 (ref 4.8–10.8)

## 2018-02-13 PROCEDURE — 94799 UNLISTED PULMONARY SVC/PX: CPT

## 2018-02-13 PROCEDURE — 94760 N-INVAS EAR/PLS OXIMETRY 1: CPT

## 2018-02-13 PROCEDURE — 80048 BASIC METABOLIC PNL TOTAL CA: CPT | Performed by: THORACIC SURGERY (CARDIOTHORACIC VASCULAR SURGERY)

## 2018-02-13 PROCEDURE — 97116 GAIT TRAINING THERAPY: CPT

## 2018-02-13 PROCEDURE — 85027 COMPLETE CBC AUTOMATED: CPT | Performed by: THORACIC SURGERY (CARDIOTHORACIC VASCULAR SURGERY)

## 2018-02-13 PROCEDURE — 25010000002 ENOXAPARIN PER 10 MG: Performed by: THORACIC SURGERY (CARDIOTHORACIC VASCULAR SURGERY)

## 2018-02-13 PROCEDURE — 25010000002 FUROSEMIDE PER 20 MG: Performed by: NURSE PRACTITIONER

## 2018-02-13 PROCEDURE — 99024 POSTOP FOLLOW-UP VISIT: CPT | Performed by: NURSE PRACTITIONER

## 2018-02-13 RX ADMIN — CLOPIDOGREL 75 MG: 75 TABLET, FILM COATED ORAL at 17:07

## 2018-02-13 RX ADMIN — BISACODYL 10 MG: 5 TABLET ORAL at 08:10

## 2018-02-13 RX ADMIN — PRENATAL VIT W/ FE FUMARATE-FA TAB 27-0.8 MG 1 TABLET: 27-0.8 TAB at 08:10

## 2018-02-13 RX ADMIN — ENOXAPARIN SODIUM 30 MG: 30 INJECTION SUBCUTANEOUS at 20:18

## 2018-02-13 RX ADMIN — DESMOPRESSIN ACETATE 40 MG: 0.2 TABLET ORAL at 20:18

## 2018-02-13 RX ADMIN — IPRATROPIUM BROMIDE AND ALBUTEROL SULFATE 3 ML: 2.5; .5 SOLUTION RESPIRATORY (INHALATION) at 11:34

## 2018-02-13 RX ADMIN — OXYCODONE HYDROCHLORIDE AND ACETAMINOPHEN 1 TABLET: 5; 325 TABLET ORAL at 05:08

## 2018-02-13 RX ADMIN — POTASSIUM CHLORIDE 20 MEQ: 750 CAPSULE, EXTENDED RELEASE ORAL at 17:07

## 2018-02-13 RX ADMIN — OXYCODONE HYDROCHLORIDE AND ACETAMINOPHEN 1 TABLET: 10; 325 TABLET ORAL at 20:18

## 2018-02-13 RX ADMIN — FUROSEMIDE 20 MG: 10 INJECTION, SOLUTION INTRAMUSCULAR; INTRAVENOUS at 17:07

## 2018-02-13 RX ADMIN — BISACODYL 10 MG: 5 TABLET ORAL at 20:17

## 2018-02-13 RX ADMIN — FUROSEMIDE 20 MG: 10 INJECTION, SOLUTION INTRAMUSCULAR; INTRAVENOUS at 08:14

## 2018-02-13 RX ADMIN — LISINOPRIL 2.5 MG: 2.5 TABLET ORAL at 08:10

## 2018-02-13 RX ADMIN — ASPIRIN 81 MG: 81 TABLET ORAL at 08:10

## 2018-02-13 RX ADMIN — POTASSIUM CHLORIDE 20 MEQ: 750 CAPSULE, EXTENDED RELEASE ORAL at 08:10

## 2018-02-13 RX ADMIN — OXYCODONE HYDROCHLORIDE AND ACETAMINOPHEN 1 TABLET: 10; 325 TABLET ORAL at 08:14

## 2018-02-13 RX ADMIN — ENOXAPARIN SODIUM 30 MG: 30 INJECTION SUBCUTANEOUS at 08:10

## 2018-02-13 RX ADMIN — POTASSIUM CHLORIDE 20 MEQ: 750 CAPSULE, EXTENDED RELEASE ORAL at 12:00

## 2018-02-13 RX ADMIN — OXYCODONE HYDROCHLORIDE AND ACETAMINOPHEN 1 TABLET: 10; 325 TABLET ORAL at 14:16

## 2018-02-13 RX ADMIN — METOPROLOL TARTRATE 25 MG: 25 TABLET, FILM COATED ORAL at 08:10

## 2018-02-13 RX ADMIN — IPRATROPIUM BROMIDE AND ALBUTEROL SULFATE 3 ML: 2.5; .5 SOLUTION RESPIRATORY (INHALATION) at 19:42

## 2018-02-13 RX ADMIN — IPRATROPIUM BROMIDE AND ALBUTEROL SULFATE 3 ML: 2.5; .5 SOLUTION RESPIRATORY (INHALATION) at 16:28

## 2018-02-13 RX ADMIN — METOPROLOL TARTRATE 25 MG: 25 TABLET, FILM COATED ORAL at 20:18

## 2018-02-13 RX ADMIN — FUROSEMIDE 20 MG: 10 INJECTION, SOLUTION INTRAMUSCULAR; INTRAVENOUS at 11:59

## 2018-02-13 NOTE — PLAN OF CARE
Problem: Patient Care Overview (Adult)  Goal: Plan of Care Review  Outcome: Ongoing (interventions implemented as appropriate)   02/13/18 0633   Coping/Psychosocial Response Interventions   Plan Of Care Reviewed With patient;spouse   Patient Care Overview   Progress improving   Outcome Evaluation   Outcome Summary/Follow up Plan Pt had uneventful shift. Encouraged ambulation and IS use. Pt has had excellent urinary output. Weight trending down. Mediastinal chest tube sites and pacer wire sites cleaned this AM. All sites WNL. VSS. SR-ST on tele.       Problem: Fall Risk (Adult)  Goal: Absence of Falls  Outcome: Ongoing (interventions implemented as appropriate)    Goal: Identify Related Risk Factors and Signs and Symptoms  Outcome: Ongoing (interventions implemented as appropriate)    Goal: Absence of Falls  Outcome: Ongoing (interventions implemented as appropriate)      Problem: Cardiac Surgery (Adult)  Goal: Signs and Symptoms of Listed Potential Problems Will be Absent or Manageable (Cardiac Surgery)  Outcome: Ongoing (interventions implemented as appropriate)      Problem: Pressure Ulcer Risk (Parish Scale) (Adult,Obstetrics,Pediatric)  Goal: Identify Related Risk Factors and Signs and Symptoms  Outcome: Outcome(s) achieved Date Met: 02/13/18    Goal: Skin Integrity  Outcome: Ongoing (interventions implemented as appropriate)      Problem: Skin Integrity Impairment, Risk/Actual (Adult)  Goal: Identify Related Risk Factors and Signs and Symptoms  Outcome: Outcome(s) achieved Date Met: 02/13/18    Goal: Skin Integrity/Wound Healing  Outcome: Ongoing (interventions implemented as appropriate)

## 2018-02-13 NOTE — PROGRESS NOTES
"Aortic valve replacement (Graciela-Juarez bovine pericardial 19 mm valve), Coronary artery bypass grafting -3 V (Left internal mammary artery/Left anterior descending, reverse saphenous vein graft/posterior descending artery, and reverse saphenous vein graft/diagonal artery), Transmyocardial revascularization- 17 channel, Left atrial appendage exclusion (AtriCure clip device, Right endoscopic vein harvest    Postop day 5    Up in the chair.  Walking >  200 feet with PT. Remains on 1 liter basal cannula.  Good pain control.     Baseline weight: 124 pounds     Visit Vitals   • /66 (BP Location: Left arm, Patient Position: Lying)   • Pulse 75   • Temp 97.2 °F (36.2 °C) (Temporal Artery )   • Resp 18   • Ht 162 cm (63.78\")   • Wt 59.6 kg (131 lb 6.4 oz)   • SpO2 94%   • BMI 22.71 kg/m2       Intake/Output Summary (Last 24 hours) at 02/13/18 1025  Last data filed at 02/13/18 0915   Gross per 24 hour   Intake              760 ml   Output             4750 ml   Net            -3990 ml         Physical Exam:  General: No apparent distress. In good spirits.  Cardiovascular: Regular rate and rhythm without murmur, rubs, or gallops.    Pulmonary: Clear to auscultation bilaterally without wheezing, rubs, or rales.  Chest: Sternotomy incision clean. Inferior portion with serosanguinous drainage but improved from yesterday afternoon. No evidence of infection. Sternum stable. No clicks.   Abdomen: Soft, non-distended, and non-tender.  Extremities: Warm, moves all extremities.  Saphenectomy site clean, dry, and intact. Bruising noted to right thigh.   Neurologic: Grossly intact with no focal deficits.       Impression:  Aortic valve stenosis status post aortic valve replacement  Coronary artery disease status post coronary artery bypass grafting  Recent non-STEMI  Chronic tobacco use  Chronic obstructive pulmonary disease, mild with low DLCO  Carotid artery disease asymptomatic now post right carotid endarterectomy  Anemia " likely multifactorial-stable  Thrombocytopenia-improving    Plan:  Wean O2 as tolerated  Continue diuresis  Encourage good oral intake   Continue routine post cardiac surgery regimen

## 2018-02-14 LAB
ANION GAP SERPL CALCULATED.3IONS-SCNC: 9 MMOL/L (ref 4–13)
BUN BLD-MCNC: 8 MG/DL (ref 5–21)
BUN/CREAT SERPL: 15.7 (ref 7–25)
CALCIUM SPEC-SCNC: 8.4 MG/DL (ref 8.4–10.4)
CHLORIDE SERPL-SCNC: 94 MMOL/L (ref 98–110)
CO2 SERPL-SCNC: 31 MMOL/L (ref 24–31)
CREAT BLD-MCNC: 0.51 MG/DL (ref 0.5–1.4)
DEPRECATED RDW RBC AUTO: 43.3 FL (ref 40–54)
ERYTHROCYTE [DISTWIDTH] IN BLOOD BY AUTOMATED COUNT: 13.9 % (ref 12–15)
GFR SERPL CREATININE-BSD FRML MDRD: >150 ML/MIN/1.73
GLUCOSE BLD-MCNC: 127 MG/DL (ref 70–100)
HCT VFR BLD AUTO: 23 % (ref 40–52)
HGB BLD-MCNC: 7.8 G/DL (ref 14–18)
MCH RBC QN AUTO: 30 PG (ref 28–32)
MCHC RBC AUTO-ENTMCNC: 33.9 G/DL (ref 33–36)
MCV RBC AUTO: 88.5 FL (ref 82–95)
PLATELET # BLD AUTO: 146 10*3/MM3 (ref 130–400)
PMV BLD AUTO: 10.7 FL (ref 6–12)
POTASSIUM BLD-SCNC: 3.6 MMOL/L (ref 3.5–5.3)
RBC # BLD AUTO: 2.6 10*6/MM3 (ref 4.8–5.9)
SODIUM BLD-SCNC: 134 MMOL/L (ref 135–145)
WBC NRBC COR # BLD: 9.91 10*3/MM3 (ref 4.8–10.8)

## 2018-02-14 PROCEDURE — 94799 UNLISTED PULMONARY SVC/PX: CPT

## 2018-02-14 PROCEDURE — 85027 COMPLETE CBC AUTOMATED: CPT | Performed by: THORACIC SURGERY (CARDIOTHORACIC VASCULAR SURGERY)

## 2018-02-14 PROCEDURE — 25010000002 PAPAVERINE PER 60 MG: Performed by: THORACIC SURGERY (CARDIOTHORACIC VASCULAR SURGERY)

## 2018-02-14 PROCEDURE — 97116 GAIT TRAINING THERAPY: CPT

## 2018-02-14 PROCEDURE — 99024 POSTOP FOLLOW-UP VISIT: CPT | Performed by: NURSE PRACTITIONER

## 2018-02-14 PROCEDURE — 25010000002 FUROSEMIDE PER 20 MG: Performed by: NURSE PRACTITIONER

## 2018-02-14 PROCEDURE — 80048 BASIC METABOLIC PNL TOTAL CA: CPT | Performed by: THORACIC SURGERY (CARDIOTHORACIC VASCULAR SURGERY)

## 2018-02-14 PROCEDURE — 25010000002 ENOXAPARIN PER 10 MG: Performed by: THORACIC SURGERY (CARDIOTHORACIC VASCULAR SURGERY)

## 2018-02-14 RX ADMIN — OXYCODONE HYDROCHLORIDE AND ACETAMINOPHEN 1 TABLET: 10; 325 TABLET ORAL at 12:52

## 2018-02-14 RX ADMIN — PRENATAL VIT W/ FE FUMARATE-FA TAB 27-0.8 MG 1 TABLET: 27-0.8 TAB at 09:02

## 2018-02-14 RX ADMIN — FUROSEMIDE 20 MG: 10 INJECTION, SOLUTION INTRAMUSCULAR; INTRAVENOUS at 09:02

## 2018-02-14 RX ADMIN — OXYCODONE HYDROCHLORIDE AND ACETAMINOPHEN 1 TABLET: 10; 325 TABLET ORAL at 21:38

## 2018-02-14 RX ADMIN — IPRATROPIUM BROMIDE AND ALBUTEROL SULFATE 3 ML: 2.5; .5 SOLUTION RESPIRATORY (INHALATION) at 11:08

## 2018-02-14 RX ADMIN — OXYCODONE HYDROCHLORIDE AND ACETAMINOPHEN 1 TABLET: 10; 325 TABLET ORAL at 02:21

## 2018-02-14 RX ADMIN — BISACODYL 10 MG: 5 TABLET ORAL at 20:55

## 2018-02-14 RX ADMIN — LIDOCAINE 2 PATCH: 50 PATCH CUTANEOUS at 09:03

## 2018-02-14 RX ADMIN — FUROSEMIDE 20 MG: 10 INJECTION, SOLUTION INTRAMUSCULAR; INTRAVENOUS at 17:15

## 2018-02-14 RX ADMIN — ASPIRIN 81 MG: 81 TABLET ORAL at 09:02

## 2018-02-14 RX ADMIN — CLOPIDOGREL 75 MG: 75 TABLET, FILM COATED ORAL at 17:15

## 2018-02-14 RX ADMIN — POTASSIUM CHLORIDE 20 MEQ: 750 CAPSULE, EXTENDED RELEASE ORAL at 17:15

## 2018-02-14 RX ADMIN — IPRATROPIUM BROMIDE AND ALBUTEROL SULFATE 3 ML: 2.5; .5 SOLUTION RESPIRATORY (INHALATION) at 14:57

## 2018-02-14 RX ADMIN — POTASSIUM CHLORIDE 20 MEQ: 750 CAPSULE, EXTENDED RELEASE ORAL at 09:02

## 2018-02-14 RX ADMIN — BISACODYL 10 MG: 5 TABLET ORAL at 09:03

## 2018-02-14 RX ADMIN — ENOXAPARIN SODIUM 30 MG: 30 INJECTION SUBCUTANEOUS at 20:55

## 2018-02-14 RX ADMIN — METOPROLOL TARTRATE 25 MG: 25 TABLET, FILM COATED ORAL at 09:02

## 2018-02-14 RX ADMIN — POTASSIUM CHLORIDE 20 MEQ: 750 CAPSULE, EXTENDED RELEASE ORAL at 12:52

## 2018-02-14 RX ADMIN — LISINOPRIL 2.5 MG: 2.5 TABLET ORAL at 09:02

## 2018-02-14 RX ADMIN — METOPROLOL TARTRATE 25 MG: 25 TABLET, FILM COATED ORAL at 20:55

## 2018-02-14 RX ADMIN — IPRATROPIUM BROMIDE AND ALBUTEROL SULFATE 3 ML: 2.5; .5 SOLUTION RESPIRATORY (INHALATION) at 07:00

## 2018-02-14 RX ADMIN — ENOXAPARIN SODIUM 30 MG: 30 INJECTION SUBCUTANEOUS at 09:02

## 2018-02-14 RX ADMIN — DESMOPRESSIN ACETATE 40 MG: 0.2 TABLET ORAL at 20:55

## 2018-02-14 RX ADMIN — IPRATROPIUM BROMIDE AND ALBUTEROL SULFATE 3 ML: 2.5; .5 SOLUTION RESPIRATORY (INHALATION) at 20:08

## 2018-02-14 RX ADMIN — FUROSEMIDE 20 MG: 10 INJECTION, SOLUTION INTRAMUSCULAR; INTRAVENOUS at 12:52

## 2018-02-14 RX ADMIN — OXYCODONE HYDROCHLORIDE AND ACETAMINOPHEN 1 TABLET: 10; 325 TABLET ORAL at 17:15

## 2018-02-14 RX ADMIN — OXYCODONE HYDROCHLORIDE AND ACETAMINOPHEN 1 TABLET: 10; 325 TABLET ORAL at 09:03

## 2018-02-14 NOTE — PROGRESS NOTES
"Aortic valve replacement (Graciela-Juarez bovine pericardial 19 mm valve), Coronary artery bypass grafting -3 V (Left internal mammary artery/Left anterior descending, reverse saphenous vein graft/posterior descending artery, and reverse saphenous vein graft/diagonal artery), Transmyocardial revascularization- 17 channel, Left atrial appendage exclusion (AtriCure clip device, Right endoscopic vein harvest    Postop day 6    Resting in bed. On room air most of yesterday, but placed back on 1 liter nasal cannula last night.  Good pain control. Walking 400 feet with PT. Weight down 1 pound.     Baseline weight: 124 pounds     Visit Vitals   • /58 (BP Location: Left arm, Patient Position: Lying)   • Pulse 75   • Temp 98.7 °F (37.1 °C) (Temporal Artery )   • Resp 18   • Ht 162 cm (63.78\")   • Wt 59.1 kg (130 lb 6.4 oz)   • SpO2 97%   • BMI 22.54 kg/m2       Intake/Output Summary (Last 24 hours) at 02/14/18 1041  Last data filed at 02/14/18 0909   Gross per 24 hour   Intake                0 ml   Output             5000 ml   Net            -5000 ml           Physical Exam:  General: No apparent distress. In good spirits.  Cardiovascular: Regular rate and rhythm without murmur, rubs, or gallops.    Pulmonary: Clear to auscultation bilaterally without wheezing, rubs, or rales.  Chest: Sternotomy incision clean. Inferior portion with serosanguinous drainage but improving. No evidence of infection. Sternum stable. No clicks.   Abdomen: Soft, non-distended, and non-tender.  Extremities: Warm, moves all extremities.  Saphenectomy site clean, dry, and intact. Bruising noted to right thigh.   Neurologic: Grossly intact with no focal deficits.       Impression:  Aortic valve stenosis status post aortic valve replacement  Coronary artery disease status post coronary artery bypass grafting  Recent non-STEMI  Chronic tobacco use  Chronic obstructive pulmonary disease, mild with low DLCO  Carotid artery disease asymptomatic " now post right carotid endarterectomy  Anemia likely multifactorial-stable    Plan:  Discussed only re-applying O2 therapy if indicated  Continue diuresis  Continue to encourage good oral intake   Continue routine post cardiac surgery regimen  Hopefully discharge home tomorrow

## 2018-02-14 NOTE — PLAN OF CARE
Problem: Patient Care Overview (Adult)  Goal: Plan of Care Review  Outcome: Ongoing (interventions implemented as appropriate)   02/14/18 1025   Coping/Psychosocial Response Interventions   Plan Of Care Reviewed With patient   Patient Care Overview   Progress progress toward functional goals as expected   Outcome Evaluation   Outcome Summary/Follow up Plan Pt hopes to go home today. Reports pain is minimal. Pt sitting up in chair o2 sats RA 93 Pt was able to amb 450' supervision w/ no stnding rest. O2 RA when returned 95%. Reviewed Home ssafety and car transfers. Feel pt is safet to go home from PT stand point

## 2018-02-14 NOTE — PLAN OF CARE
Problem: Patient Care Overview (Adult)  Goal: Plan of Care Review  Outcome: Ongoing (interventions implemented as appropriate)   02/14/18 0553   Coping/Psychosocial Response Interventions   Plan Of Care Reviewed With patient   Patient Care Overview   Progress improving   Outcome Evaluation   Outcome Summary/Follow up Plan Edema improved. Pt states he feels better. Pain better controlled - pt going appx. 6 hours without pain medication. Pt had BM yesterday. VSS. SR on tele.        Problem: Fall Risk (Adult)  Goal: Absence of Falls  Outcome: Ongoing (interventions implemented as appropriate)      Problem: Cardiac Surgery (Adult)  Goal: Signs and Symptoms of Listed Potential Problems Will be Absent or Manageable (Cardiac Surgery)  Outcome: Ongoing (interventions implemented as appropriate)      Problem: Pressure Ulcer Risk (Parish Scale) (Adult,Obstetrics,Pediatric)  Goal: Skin Integrity  Outcome: Ongoing (interventions implemented as appropriate)      Problem: Skin Integrity Impairment, Risk/Actual (Adult)  Goal: Skin Integrity/Wound Healing  Outcome: Ongoing (interventions implemented as appropriate)

## 2018-02-14 NOTE — PLAN OF CARE
Problem: Patient Care Overview (Adult)  Goal: Plan of Care Review  Outcome: Ongoing (interventions implemented as appropriate)   02/14/18 4209   Coping/Psychosocial Response Interventions   Plan Of Care Reviewed With patient   Patient Care Overview   Progress improving   Outcome Evaluation   Outcome Summary/Follow up Plan Pt states he is ready for D/C possibly tomorrow. O2 WNL on room air. C/O incisional pain/chronic back pain. Medicated per PRN orders. Midline incision scant drainage, MD aware. VSS. Continue to monitor overall condition and notify MD of any changes.      Goal: Adult Individualization and Mutuality  Outcome: Ongoing (interventions implemented as appropriate)    Goal: Discharge Needs Assessment  Outcome: Ongoing (interventions implemented as appropriate)      Problem: Fall Risk (Adult)  Goal: Absence of Falls  Outcome: Ongoing (interventions implemented as appropriate)    Goal: Absence of Falls  Outcome: Ongoing (interventions implemented as appropriate)      Problem: Cardiac Surgery (Adult)  Goal: Signs and Symptoms of Listed Potential Problems Will be Absent or Manageable (Cardiac Surgery)  Outcome: Ongoing (interventions implemented as appropriate)      Problem: Pressure Ulcer Risk (Parish Scale) (Adult,Obstetrics,Pediatric)  Goal: Skin Integrity  Outcome: Ongoing (interventions implemented as appropriate)      Problem: Skin Integrity Impairment, Risk/Actual (Adult)  Goal: Skin Integrity/Wound Healing  Outcome: Ongoing (interventions implemented as appropriate)

## 2018-02-14 NOTE — PLAN OF CARE
Problem: Patient Care Overview (Adult)  Goal: Plan of Care Review  Outcome: Ongoing (interventions implemented as appropriate)   02/13/18 4758   Coping/Psychosocial Response Interventions   Plan Of Care Reviewed With patient   Patient Care Overview   Progress improving   Outcome Evaluation   Outcome Summary/Follow up Plan Pt doing much better today. Pt voicing how much better he feels today. Weaned off O2 - Sats staying 93% and above on room air. Walking well with standby assist. Good urine output with continued diuretics. Edema improved. SR 86-98 on tele. Continue to monitor.      Goal: Adult Individualization and Mutuality  Outcome: Ongoing (interventions implemented as appropriate)    Goal: Discharge Needs Assessment  Outcome: Ongoing (interventions implemented as appropriate)      Problem: Fall Risk (Adult)  Goal: Identify Related Risk Factors and Signs and Symptoms  Outcome: Outcome(s) achieved Date Met: 02/13/18    Goal: Absence of Falls  Outcome: Ongoing (interventions implemented as appropriate)      Problem: Cardiac Surgery (Adult)  Goal: Signs and Symptoms of Listed Potential Problems Will be Absent or Manageable (Cardiac Surgery)  Outcome: Ongoing (interventions implemented as appropriate)      Problem: Pressure Ulcer Risk (Parish Scale) (Adult,Obstetrics,Pediatric)  Goal: Skin Integrity  Outcome: Ongoing (interventions implemented as appropriate)      Problem: Skin Integrity Impairment, Risk/Actual (Adult)  Goal: Skin Integrity/Wound Healing  Outcome: Ongoing (interventions implemented as appropriate)

## 2018-02-15 LAB
ANION GAP SERPL CALCULATED.3IONS-SCNC: 11 MMOL/L (ref 4–13)
BUN BLD-MCNC: 9 MG/DL (ref 5–21)
BUN/CREAT SERPL: 18 (ref 7–25)
CALCIUM SPEC-SCNC: 9.1 MG/DL (ref 8.4–10.4)
CHLORIDE SERPL-SCNC: 94 MMOL/L (ref 98–110)
CO2 SERPL-SCNC: 28 MMOL/L (ref 24–31)
CREAT BLD-MCNC: 0.5 MG/DL (ref 0.5–1.4)
DEPRECATED RDW RBC AUTO: 45.3 FL (ref 40–54)
ERYTHROCYTE [DISTWIDTH] IN BLOOD BY AUTOMATED COUNT: 14.4 % (ref 12–15)
GFR SERPL CREATININE-BSD FRML MDRD: >150 ML/MIN/1.73
GLUCOSE BLD-MCNC: 123 MG/DL (ref 70–100)
HCT VFR BLD AUTO: 25.5 % (ref 40–52)
HGB BLD-MCNC: 8.4 G/DL (ref 14–18)
MCH RBC QN AUTO: 29.8 PG (ref 28–32)
MCHC RBC AUTO-ENTMCNC: 32.9 G/DL (ref 33–36)
MCV RBC AUTO: 90.4 FL (ref 82–95)
PLATELET # BLD AUTO: 223 10*3/MM3 (ref 130–400)
PMV BLD AUTO: 10.1 FL (ref 6–12)
POTASSIUM BLD-SCNC: 3.7 MMOL/L (ref 3.5–5.3)
PREALB SERPL-MCNC: 10.9 MG/DL (ref 18–36)
RBC # BLD AUTO: 2.82 10*6/MM3 (ref 4.8–5.9)
SODIUM BLD-SCNC: 133 MMOL/L (ref 135–145)
WBC NRBC COR # BLD: 10.23 10*3/MM3 (ref 4.8–10.8)

## 2018-02-15 PROCEDURE — 25010000002 ENOXAPARIN PER 10 MG: Performed by: THORACIC SURGERY (CARDIOTHORACIC VASCULAR SURGERY)

## 2018-02-15 PROCEDURE — 80048 BASIC METABOLIC PNL TOTAL CA: CPT | Performed by: THORACIC SURGERY (CARDIOTHORACIC VASCULAR SURGERY)

## 2018-02-15 PROCEDURE — 84134 ASSAY OF PREALBUMIN: CPT | Performed by: NURSE PRACTITIONER

## 2018-02-15 PROCEDURE — 94799 UNLISTED PULMONARY SVC/PX: CPT

## 2018-02-15 PROCEDURE — 99024 POSTOP FOLLOW-UP VISIT: CPT | Performed by: NURSE PRACTITIONER

## 2018-02-15 PROCEDURE — 25010000002 FUROSEMIDE PER 20 MG: Performed by: NURSE PRACTITIONER

## 2018-02-15 PROCEDURE — 85027 COMPLETE CBC AUTOMATED: CPT | Performed by: THORACIC SURGERY (CARDIOTHORACIC VASCULAR SURGERY)

## 2018-02-15 PROCEDURE — 94760 N-INVAS EAR/PLS OXIMETRY 1: CPT

## 2018-02-15 RX ORDER — METOPROLOL TARTRATE 5 MG/5ML
5 INJECTION INTRAVENOUS ONCE
Status: COMPLETED | OUTPATIENT
Start: 2018-02-15 | End: 2018-02-15

## 2018-02-15 RX ORDER — ZINC GLUCONATE 50 MG
50 TABLET ORAL DAILY
Status: DISCONTINUED | OUTPATIENT
Start: 2018-02-15 | End: 2018-02-16 | Stop reason: HOSPADM

## 2018-02-15 RX ORDER — MULTIVIT,CALC,MINS/IRON/FOLIC 9MG-400MCG
1 TABLET ORAL DAILY
Status: DISCONTINUED | OUTPATIENT
Start: 2018-02-16 | End: 2018-02-16

## 2018-02-15 RX ORDER — DIPHENOXYLATE HYDROCHLORIDE AND ATROPINE SULFATE 2.5; .025 MG/1; MG/1
1 TABLET ORAL DAILY
Status: DISCONTINUED | OUTPATIENT
Start: 2018-02-15 | End: 2018-02-15 | Stop reason: CLARIF

## 2018-02-15 RX ADMIN — FUROSEMIDE 20 MG: 10 INJECTION, SOLUTION INTRAMUSCULAR; INTRAVENOUS at 12:35

## 2018-02-15 RX ADMIN — POTASSIUM CHLORIDE 20 MEQ: 750 CAPSULE, EXTENDED RELEASE ORAL at 12:35

## 2018-02-15 RX ADMIN — IPRATROPIUM BROMIDE AND ALBUTEROL SULFATE 3 ML: 2.5; .5 SOLUTION RESPIRATORY (INHALATION) at 10:48

## 2018-02-15 RX ADMIN — POTASSIUM CHLORIDE 20 MEQ: 750 CAPSULE, EXTENDED RELEASE ORAL at 08:07

## 2018-02-15 RX ADMIN — OXYCODONE HYDROCHLORIDE AND ACETAMINOPHEN 1 TABLET: 10; 325 TABLET ORAL at 21:52

## 2018-02-15 RX ADMIN — FUROSEMIDE 20 MG: 10 INJECTION, SOLUTION INTRAMUSCULAR; INTRAVENOUS at 08:06

## 2018-02-15 RX ADMIN — METOPROLOL TARTRATE 25 MG: 25 TABLET, FILM COATED ORAL at 08:14

## 2018-02-15 RX ADMIN — ENOXAPARIN SODIUM 30 MG: 30 INJECTION SUBCUTANEOUS at 21:43

## 2018-02-15 RX ADMIN — BISACODYL 10 MG: 5 TABLET ORAL at 21:43

## 2018-02-15 RX ADMIN — METOPROLOL TARTRATE 37.5 MG: 25 TABLET, FILM COATED ORAL at 21:43

## 2018-02-15 RX ADMIN — OXYCODONE HYDROCHLORIDE AND ACETAMINOPHEN 1 TABLET: 10; 325 TABLET ORAL at 17:53

## 2018-02-15 RX ADMIN — OXYCODONE HYDROCHLORIDE AND ACETAMINOPHEN 1 TABLET: 10; 325 TABLET ORAL at 09:52

## 2018-02-15 RX ADMIN — CLOPIDOGREL 75 MG: 75 TABLET, FILM COATED ORAL at 17:52

## 2018-02-15 RX ADMIN — METOPROLOL TARTRATE 5 MG: 5 INJECTION, SOLUTION INTRAVENOUS at 19:28

## 2018-02-15 RX ADMIN — ENOXAPARIN SODIUM 30 MG: 30 INJECTION SUBCUTANEOUS at 08:06

## 2018-02-15 RX ADMIN — ASPIRIN 81 MG: 81 TABLET ORAL at 08:07

## 2018-02-15 RX ADMIN — BISACODYL 10 MG: 5 TABLET ORAL at 08:07

## 2018-02-15 RX ADMIN — IPRATROPIUM BROMIDE AND ALBUTEROL SULFATE 3 ML: 2.5; .5 SOLUTION RESPIRATORY (INHALATION) at 15:47

## 2018-02-15 RX ADMIN — FUROSEMIDE 20 MG: 10 INJECTION, SOLUTION INTRAMUSCULAR; INTRAVENOUS at 17:54

## 2018-02-15 RX ADMIN — POTASSIUM CHLORIDE 20 MEQ: 750 CAPSULE, EXTENDED RELEASE ORAL at 17:53

## 2018-02-15 RX ADMIN — DESMOPRESSIN ACETATE 40 MG: 0.2 TABLET ORAL at 21:52

## 2018-02-15 RX ADMIN — LISINOPRIL 2.5 MG: 2.5 TABLET ORAL at 08:14

## 2018-02-15 RX ADMIN — OXYCODONE HYDROCHLORIDE AND ACETAMINOPHEN 1 TABLET: 10; 325 TABLET ORAL at 14:37

## 2018-02-15 RX ADMIN — IPRATROPIUM BROMIDE AND ALBUTEROL SULFATE 3 ML: 2.5; .5 SOLUTION RESPIRATORY (INHALATION) at 07:56

## 2018-02-15 RX ADMIN — PRENATAL VIT W/ FE FUMARATE-FA TAB 27-0.8 MG 1 TABLET: 27-0.8 TAB at 08:08

## 2018-02-15 RX ADMIN — OXYCODONE HYDROCHLORIDE AND ACETAMINOPHEN 1 TABLET: 10; 325 TABLET ORAL at 04:13

## 2018-02-15 NOTE — PLAN OF CARE
Problem: Inpatient Physical Therapy  Goal: Bed Mobility Goal LTG- PT  Outcome: Outcome(s) achieved Date Met: 02/15/18   02/09/18 1120 02/15/18 1401   Bed Mobility PT LTG   Bed Mobility PT LTG, Date Established 02/09/18 --    Bed Mobility PT LTG, Time to Achieve by discharge --    Bed Mobility PT LTG, Activity Type all bed mobility --    Bed Mobility PT LTG, [place] Level minimum assist (75% patient effort) --    Bed Mobility PT LTG, Date Goal Reviewed --  02/15/18   Bed Mobility PT LTG, Outcome --  goal met     Goal: Transfer Training Goal 1 LTG- PT  Outcome: Outcome(s) achieved Date Met: 02/15/18   02/09/18 1120 02/15/18 1401   Transfer Training PT LTG   Transfer Training PT LTG, Date Established 02/09/18 --    Transfer Training PT LTG, Time to Achieve by discharge --    Transfer Training PT LTG, Activity Type bed to chair /chair to bed;sit to stand/stand to sit --    Transfer Training PT LTG, [place] Level supervision required --    Transfer Training PT LTG, Date Goal Reviewed --  02/15/18   Transfer Training PT LTG, Outcome --  goal met     Goal: Gait Training Goal LTG- PT  Outcome: Outcome(s) achieved Date Met: 02/15/18   02/09/18 1120 02/09/18 1146 02/15/18 1401   Gait Training PT LTG   Gait Training Goal PT LTG, Date Established --  02/09/18 --    Gait Training Goal PT LTG, Time to Achieve by discharge --  --    Gait Training Goal PT LTG, [place] Level contact guard assist --  --    Gait Training Goal PT LTG, Distance to Achieve 100 feet --  --    Gait Training Goal PT LTG, Date Goal Reviewed --  --  02/15/18   Gait Training Goal PT LTG, Outcome --  --  goal met

## 2018-02-15 NOTE — PROGRESS NOTES
"Aortic valve replacement (Graciela-Juarez bovine pericardial 19 mm valve), Coronary artery bypass grafting -3 V (Left internal mammary artery/Left anterior descending, reverse saphenous vein graft/posterior descending artery, and reverse saphenous vein graft/diagonal artery), Transmyocardial revascularization- 17 channel, Left atrial appendage exclusion (AtriCure clip device, Right endoscopic vein harvest    Postop day 7    Up in the chair. Eager for discharge home today. On room air with good saturation. Ambulating several times per day without PT. Weight down 3 pounds. Good pain control.     Baseline weight: 124 pounds     Visit Vitals   • /59 (BP Location: Left arm, Patient Position: Sitting)   • Pulse 108   • Temp 98.2 °F (36.8 °C) (Oral)   • Resp 18   • Ht 162 cm (63.78\")   • Wt 57.9 kg (127 lb 9.6 oz)   • SpO2 94%   • BMI 22.05 kg/m2       Intake/Output Summary (Last 24 hours) at 02/15/18 1013  Last data filed at 02/15/18 0900   Gross per 24 hour   Intake              590 ml   Output             3725 ml   Net            -3135 ml         Physical Exam:  General: No apparent distress. In good spirits.  Cardiovascular: Regular rate and rhythm without murmur, rubs, or gallops.    Pulmonary: Clear to auscultation bilaterally without wheezing, rubs, or rales.  Chest: Sternotomy incision clean. Inferior portion with serosanguinous drainage. No evidence of infection. Sternum stable. No clicks. Previous mediastinal tube sites still not closed over.   Abdomen: Soft, non-distended, and non-tender.  Extremities: Warm, moves all extremities.  Saphenectomy site clean, dry, and intact. Bruising noted to right thigh.   Neurologic: Grossly intact with no focal deficits.       Impression:  Aortic valve stenosis status post aortic valve replacement  Coronary artery disease status post coronary artery bypass grafting  Recent non-STEMI  Chronic tobacco use  Chronic obstructive pulmonary disease, mild with low " DLCO  Carotid artery disease asymptomatic now post right carotid endarterectomy  Anemia likely multifactorial-stable    Plan:  Pacing wires discontinued  Continue diuresis  Continue to encourage good oral intake, nursing reports patient ate pizza last night. Will check prealbumin today. Patient eager for discharge however, does not appear incisional sites are healing.   Continue routine post cardiac surgery regimen  Will re-evaluate patient later today and discuss with Dr. Robin

## 2018-02-15 NOTE — THERAPY DISCHARGE NOTE
Acute Care - Physical Therapy Discharge Summary  [facility]       Patient Name: Sharad Ryder  : 1960  MRN: [id number]    Today's Date: 2/15/2018  Onset of Illness/Injury or Date of Surgery Date: 18    Date of Referral to PT: 18  Referring Physician: Dr. Robin      Admit Date: 2018      PT Recommendation and Plan    Visit Dx:    ICD-10-CM ICD-9-CM   1. Aortic valve stenosis, etiology of cardiac valve disease unspecified I35.0 424.1   2. Impaired functional mobility, balance, gait, and endurance Z74.09 V49.89             Outcome Measures       18 1000 18 1000       How much help from another person do you currently need...    Turning from your back to your side while in flat bed without using bedrails? 4  -NW 3  -NW     Moving from lying on back to sitting on the side of a flat bed without bedrails? 4  -NW 3  -NW     Moving to and from a bed to a chair (including a wheelchair)? 4  -NW 3  -NW     Standing up from a chair using your arms (e.g., wheelchair, bedside chair)? 4  -NW 3  -NW     Climbing 3-5 steps with a railing? 3  -NW 3  -NW     To walk in hospital room? 4  -NW 3  -NW     AM-PAC 6 Clicks Score 23  -NW 18  -NW     Functional Assessment    Outcome Measure Options AM-PAC 6 Clicks Basic Mobility (PT)  -NW AM-PAC 6 Clicks Basic Mobility (PT)  -NW       User Key  (r) = Recorded By, (t) = Taken By, (c) = Cosigned By    Initials Name Provider Type    LINA Montana PTA Physical Therapy Assistant                      IP PT Goals       02/15/18 1401 18 1146 18 1120    Bed Mobility PT LTG    Bed Mobility PT LTG, Date Established   18  -LIBRA (r) SERGIO (t) LIBRA (c)    Bed Mobility PT LTG, Time to Achieve   by discharge  -LIBRA (r) SERGIO (t) LIBRA (c)    Bed Mobility PT LTG, Activity Type   all bed mobility  -LIBRA (r) SERGIO (t) LIBRA (c)    Bed Mobility PT LTG, [place] Level   minimum assist (75% patient effort)  -LIBRA (r) SERGIO (t) LIBRA (leyla)    Bed Mobility PT LTG, Date Goal Reviewed  02/15/18  -NW      Bed Mobility PT LTG, Outcome goal met  -NW      Transfer Training PT LTG    Transfer Training PT LTG, Date Established   02/09/18  -LIBRA (r) JM (t) LIBRA (c)    Transfer Training PT LTG, Time to Achieve   by discharge  -LIBRA (r) JM (t) LIBRA (c)    Transfer Training PT LTG, Activity Type   bed to chair /chair to bed;sit to stand/stand to sit  -LIBRA (r) JM (t) LIBRA (c)    Transfer Training PT LTG, [place] Level   supervision required  -LIBRA (r) JM (t) LIBRA (c)    Transfer Training PT  LTG, Date Goal Reviewed 02/15/18  -NW      Transfer Training PT LTG, Outcome goal met  -NW      Gait Training PT LTG    Gait Training Goal PT LTG, Date Established  02/09/18  -LIBRA (r) JM (t) LIBRA (c) 02/09/18  -LIBRA (r) JM (t) LIBRA (c)    Gait Training Goal PT LTG, Time to Achieve   by discharge  -LIBRA (r) JM (t) LIBRA (c)    Gait Training Goal PT LTG, [place] Level   contact guard assist  -LIBRA (r) JM (t) LIBRA (c)    Gait Training Goal PT LTG, Distance to Achieve   100 feet  -LIBRA (r) JM (t) LIBRA (c)    Gait Training Goal PT LTG, Date Goal Reviewed 02/15/18  -NW      Gait Training Goal PT LTG, Outcome goal met  -NW        User Key  (r) = Recorded By, (t) = Taken By, (c) = Cosigned By    Initials Name Provider Type    LIBRA Kilpatrick, PT DPT Physical Therapist    NW Suri [place] PTA Physical Therapy Assistant    SERGIO Barcenas, PT Student PT Student          Therapy Charges for Today     Code Description Service Date Service Provider Modifiers Qty    [id number] HC GAIT TRAINING EA 15 MIN 2/14/2018 Suri Montana, NATAN GP 2          PT Discharge Summary  Anticipated Discharge Disposition: home  Reason for Discharge: All goals achieved, Independent, other (comment) (christopher bar xs day Independently)  Outcomes Achieved: Refer to plan of care for updates on goals achieved  Discharge Destination: Home      Suri Montana PTA   2/15/2018

## 2018-02-15 NOTE — CONSULTS
Nutrition Services    Patient Name:  Sharad Ryder  YOB: 1960  MRN: [id number]  Admit Date:  2/8/2018    Pt has consumed 100% of the last 5 meals. Started on Ensure Enlive TID with meals today r/t low PAB of 10.9. Pt has been educated on a cardiac/low Na+ diet. Will cont to follow for nutrition needs.    Electronically signed by:  Purnima Avilez  02/15/18 3:17 PM

## 2018-02-15 NOTE — PLAN OF CARE
Problem: Patient Care Overview (Adult)  Goal: Plan of Care Review  Outcome: Ongoing (interventions implemented as appropriate)   02/15/18 3382   Coping/Psychosocial Response Interventions   Plan Of Care Reviewed With patient   Patient Care Overview   Progress no change   Outcome Evaluation   Outcome Summary/Follow up Plan C/O incisional pain, medicated per PRN order. Bottom of midline incision continues to have scant drainage, Md aware. Wires pulled today. Continue to monitor overall condition and notify Md of any changes.        Problem: Fall Risk (Adult)  Goal: Absence of Falls  Outcome: Ongoing (interventions implemented as appropriate)    Goal: Absence of Falls  Outcome: Ongoing (interventions implemented as appropriate)      Problem: Cardiac Surgery (Adult)  Goal: Signs and Symptoms of Listed Potential Problems Will be Absent or Manageable (Cardiac Surgery)  Outcome: Ongoing (interventions implemented as appropriate)      Problem: Pressure Ulcer Risk (Parish Scale) (Adult,Obstetrics,Pediatric)  Goal: Skin Integrity  Outcome: Ongoing (interventions implemented as appropriate)      Problem: Skin Integrity Impairment, Risk/Actual (Adult)  Goal: Skin Integrity/Wound Healing  Outcome: Ongoing (interventions implemented as appropriate)

## 2018-02-15 NOTE — PLAN OF CARE
Problem: Patient Care Overview (Adult)  Goal: Plan of Care Review  Outcome: Ongoing (interventions implemented as appropriate)   02/15/18 0029   Coping/Psychosocial Response Interventions   Plan Of Care Reviewed With patient   Patient Care Overview   Progress progress toward functional goals as expected   Outcome Evaluation   Outcome Summary/Follow up Plan VSS. Pt c/o incisional pain and given prn pain medication per order. Scant drainage from chest tube site. Pt ambulates in hallway often. Pt anticipates discharge tomorrow.      Goal: Adult Individualization and Mutuality  Outcome: Ongoing (interventions implemented as appropriate)      Problem: Fall Risk (Adult)  Goal: Absence of Falls  Outcome: Ongoing (interventions implemented as appropriate)    Goal: Absence of Falls  Outcome: Ongoing (interventions implemented as appropriate)      Problem: Cardiac Surgery (Adult)  Goal: Signs and Symptoms of Listed Potential Problems Will be Absent or Manageable (Cardiac Surgery)  Outcome: Ongoing (interventions implemented as appropriate)      Problem: Pressure Ulcer Risk (Parish Scale) (Adult,Obstetrics,Pediatric)  Goal: Skin Integrity  Outcome: Ongoing (interventions implemented as appropriate)      Problem: Skin Integrity Impairment, Risk/Actual (Adult)  Goal: Skin Integrity/Wound Healing  Outcome: Ongoing (interventions implemented as appropriate)

## 2018-02-15 NOTE — PLAN OF CARE
Problem: Patient Care Overview (Adult)  Goal: Plan of Care Review  Outcome: Ongoing (interventions implemented as appropriate)   02/15/18 9464   Coping/Psychosocial Response Interventions   Plan Of Care Reviewed With patient   Patient Care Overview   Progress no change   Outcome Evaluation   Outcome Summary/Follow up Plan Consumed 100% of the last 5 meals. PAB is low, started on Ensure Enlive TID for extra kcal and protein. Pt has been previously educated on cardiac/low sodium guidelines. Will cont to follow for nutrition needs.

## 2018-02-16 VITALS
DIASTOLIC BLOOD PRESSURE: 63 MMHG | TEMPERATURE: 97.8 F | HEART RATE: 104 BPM | SYSTOLIC BLOOD PRESSURE: 97 MMHG | OXYGEN SATURATION: 95 % | BODY MASS INDEX: 21.37 KG/M2 | RESPIRATION RATE: 18 BRPM | WEIGHT: 125.19 LBS | HEIGHT: 64 IN

## 2018-02-16 PROBLEM — E46 PROTEIN CALORIE MALNUTRITION (HCC): Status: ACTIVE | Noted: 2018-02-16

## 2018-02-16 PROBLEM — Z87.09 HISTORY OF INFLUENZA: Status: ACTIVE | Noted: 2018-01-05

## 2018-02-16 PROBLEM — E87.1 HYPONATREMIA: Status: ACTIVE | Noted: 2018-02-16

## 2018-02-16 PROBLEM — D64.9 ANEMIA: Status: ACTIVE | Noted: 2018-02-16

## 2018-02-16 PROBLEM — I25.2 HISTORY OF NON-ST ELEVATION MYOCARDIAL INFARCTION (NSTEMI): Status: ACTIVE | Noted: 2018-01-04

## 2018-02-16 LAB
ANION GAP SERPL CALCULATED.3IONS-SCNC: 8 MMOL/L (ref 4–13)
BUN BLD-MCNC: 10 MG/DL (ref 5–21)
BUN/CREAT SERPL: 18.9 (ref 7–25)
CALCIUM SPEC-SCNC: 8.8 MG/DL (ref 8.4–10.4)
CHLORIDE SERPL-SCNC: 96 MMOL/L (ref 98–110)
CO2 SERPL-SCNC: 29 MMOL/L (ref 24–31)
CREAT BLD-MCNC: 0.53 MG/DL (ref 0.5–1.4)
DEPRECATED RDW RBC AUTO: 46.2 FL (ref 40–54)
ERYTHROCYTE [DISTWIDTH] IN BLOOD BY AUTOMATED COUNT: 14.7 % (ref 12–15)
GFR SERPL CREATININE-BSD FRML MDRD: >150 ML/MIN/1.73
GLUCOSE BLD-MCNC: 108 MG/DL (ref 70–100)
HCT VFR BLD AUTO: 23.7 % (ref 40–52)
HGB BLD-MCNC: 7.9 G/DL (ref 14–18)
MCH RBC QN AUTO: 29.9 PG (ref 28–32)
MCHC RBC AUTO-ENTMCNC: 33.3 G/DL (ref 33–36)
MCV RBC AUTO: 89.8 FL (ref 82–95)
PLATELET # BLD AUTO: 259 10*3/MM3 (ref 130–400)
PMV BLD AUTO: 9.5 FL (ref 6–12)
POTASSIUM BLD-SCNC: 4.1 MMOL/L (ref 3.5–5.3)
RBC # BLD AUTO: 2.64 10*6/MM3 (ref 4.8–5.9)
SODIUM BLD-SCNC: 133 MMOL/L (ref 135–145)
WBC NRBC COR # BLD: 12.16 10*3/MM3 (ref 4.8–10.8)

## 2018-02-16 PROCEDURE — 25010000002 FUROSEMIDE PER 20 MG: Performed by: NURSE PRACTITIONER

## 2018-02-16 PROCEDURE — 25010000002 ENOXAPARIN PER 10 MG: Performed by: THORACIC SURGERY (CARDIOTHORACIC VASCULAR SURGERY)

## 2018-02-16 PROCEDURE — 85027 COMPLETE CBC AUTOMATED: CPT | Performed by: THORACIC SURGERY (CARDIOTHORACIC VASCULAR SURGERY)

## 2018-02-16 PROCEDURE — 99024 POSTOP FOLLOW-UP VISIT: CPT | Performed by: NURSE PRACTITIONER

## 2018-02-16 PROCEDURE — 80048 BASIC METABOLIC PNL TOTAL CA: CPT | Performed by: THORACIC SURGERY (CARDIOTHORACIC VASCULAR SURGERY)

## 2018-02-16 RX ORDER — METOPROLOL TARTRATE 50 MG/1
50 TABLET, FILM COATED ORAL EVERY 12 HOURS SCHEDULED
Status: DISCONTINUED | OUTPATIENT
Start: 2018-02-16 | End: 2018-02-16 | Stop reason: HOSPADM

## 2018-02-16 RX ORDER — FUROSEMIDE 20 MG/1
20 TABLET ORAL DAILY
Qty: 14 TABLET | Refills: 0 | Status: SHIPPED | OUTPATIENT
Start: 2018-02-16 | End: 2018-10-15

## 2018-02-16 RX ORDER — FUROSEMIDE 20 MG/1
20 TABLET ORAL DAILY
Status: DISCONTINUED | OUTPATIENT
Start: 2018-02-17 | End: 2018-02-16 | Stop reason: HOSPADM

## 2018-02-16 RX ORDER — METOPROLOL TARTRATE 50 MG/1
50 TABLET, FILM COATED ORAL EVERY 12 HOURS SCHEDULED
Qty: 60 TABLET | Refills: 2 | Status: SHIPPED | OUTPATIENT
Start: 2018-02-16 | End: 2021-09-15 | Stop reason: HOSPADM

## 2018-02-16 RX ORDER — CLOPIDOGREL BISULFATE 75 MG/1
75 TABLET ORAL DAILY
Qty: 30 TABLET | Refills: 2 | Status: SHIPPED | OUTPATIENT
Start: 2018-02-16

## 2018-02-16 RX ORDER — ATORVASTATIN CALCIUM 80 MG/1
80 TABLET, FILM COATED ORAL NIGHTLY
Qty: 30 TABLET | Refills: 2 | Status: SHIPPED | OUTPATIENT
Start: 2018-02-16 | End: 2021-08-30 | Stop reason: ALTCHOICE

## 2018-02-16 RX ORDER — OXYCODONE HYDROCHLORIDE AND ACETAMINOPHEN 5; 325 MG/1; MG/1
1 TABLET ORAL EVERY 6 HOURS PRN
Qty: 30 TABLET | Refills: 0 | Status: SHIPPED | OUTPATIENT
Start: 2018-02-16 | End: 2018-04-17

## 2018-02-16 RX ORDER — METOPROLOL TARTRATE 37.5 MG/1
37.5 TABLET, FILM COATED ORAL 2 TIMES DAILY
Qty: 60 TABLET | Refills: 2 | Status: CANCELLED | OUTPATIENT
Start: 2018-02-16

## 2018-02-16 RX ORDER — LISINOPRIL 2.5 MG/1
2.5 TABLET ORAL
Qty: 30 TABLET | Refills: 2 | Status: SHIPPED | OUTPATIENT
Start: 2018-02-16 | End: 2021-07-30

## 2018-02-16 RX ORDER — PRENATAL VIT/IRON FUM/FOLIC AC 27MG-0.8MG
1 TABLET ORAL DAILY
Qty: 30 TABLET | Refills: 0 | Status: SHIPPED | OUTPATIENT
Start: 2018-02-17 | End: 2018-10-15

## 2018-02-16 RX ORDER — POTASSIUM CHLORIDE 750 MG/1
20 TABLET, FILM COATED, EXTENDED RELEASE ORAL DAILY
Qty: 28 TABLET | Refills: 0 | Status: SHIPPED | OUTPATIENT
Start: 2018-02-16 | End: 2018-10-15

## 2018-02-16 RX ADMIN — PRENATAL VIT W/ FE FUMARATE-FA TAB 27-0.8 MG 1 TABLET: 27-0.8 TAB at 08:23

## 2018-02-16 RX ADMIN — ENOXAPARIN SODIUM 30 MG: 30 INJECTION SUBCUTANEOUS at 08:24

## 2018-02-16 RX ADMIN — LISINOPRIL 2.5 MG: 2.5 TABLET ORAL at 08:23

## 2018-02-16 RX ADMIN — FUROSEMIDE 20 MG: 10 INJECTION, SOLUTION INTRAMUSCULAR; INTRAVENOUS at 08:23

## 2018-02-16 RX ADMIN — BISACODYL 10 MG: 5 TABLET ORAL at 08:23

## 2018-02-16 RX ADMIN — POTASSIUM CHLORIDE 20 MEQ: 750 CAPSULE, EXTENDED RELEASE ORAL at 08:23

## 2018-02-16 RX ADMIN — OXYCODONE HYDROCHLORIDE AND ACETAMINOPHEN 1 TABLET: 5; 325 TABLET ORAL at 04:54

## 2018-02-16 RX ADMIN — Medication 1 TABLET: at 00:06

## 2018-02-16 RX ADMIN — OXYCODONE HYDROCHLORIDE AND ACETAMINOPHEN 1 TABLET: 5; 325 TABLET ORAL at 08:23

## 2018-02-16 RX ADMIN — ASPIRIN 81 MG: 81 TABLET ORAL at 08:24

## 2018-02-16 RX ADMIN — Medication 50 MG: at 00:06

## 2018-02-16 RX ADMIN — LIDOCAINE 2 PATCH: 50 PATCH CUTANEOUS at 08:23

## 2018-02-16 RX ADMIN — METOPROLOL TARTRATE 37.5 MG: 25 TABLET, FILM COATED ORAL at 08:23

## 2018-02-16 RX ADMIN — OXYCODONE HYDROCHLORIDE AND ACETAMINOPHEN 1 TABLET: 5; 325 TABLET ORAL at 01:28

## 2018-02-16 NOTE — DISCHARGE SUMMARY
[facility] Cardiothoracic Surgery  DISCHARGE SUMMARY        Date of Admission: 2/8/2018  Date of Discharge:  2/16/2018  Primary Care Physician: Sharad Cline MD    Discharge Diagnoses:  Hospital Problem List     * (Principal)Aortic valve stenosis    Overview Signed 2/5/2018  8:14 AM by KIRBY Munoz     Added automatically from request for surgery [id number]         Tobacco use    Hyperlipidemia    Hypertension    History of non-ST elevation myocardial infarction (NSTEMI)    Coronary artery disease involving native coronary artery of native heart with angina pectoris    COPD (chronic obstructive pulmonary disease)    History of influenza    Overview Signed 2/16/2018 10:18 AM by KIRBY Munoz     Recent flu         Carotid stenosis, right    Overview Signed 2/16/2018 10:15 AM by KIRBY Munoz     Post right carotid endarterectomy         Protein calorie malnutrition    Anemia    Overview Signed 2/16/2018 10:19 AM by KIRBY Munoz     multifactorial         Hyponatremia          Procedures Performed: Aortic valve replacement (Graciela-Juarez bovine pericardial 19 mm valve), Coronary artery bypass grafting -3 V (Left internal mammary artery/Left anterior descending, reverse saphenous vein graft/posterior descending artery, and reverse saphenous vein graft/diagonal artery), Transmyocardial revascularization- 17 channel, Left atrial appendage exclusion (AtriCure clip device, Right endoscopic vein harvest on February 8, 2018 by Dr. Robin.      HPI:  Mr. Sharad Ryder is a 57 y.o. male well known to our service. He was initially seen December 14, 2017 when he was admitted for shortness of breath. Workup demonstrated severe aortic stenosis and a bicuspid aortic valve. Pre op testing for aortic valve replacement was obtained and left heart cath demonstrated three vessel disease with mild pulmonary hypertension. Carotid ultrasound and CTA of the neck demonstrated  significant disease on the right. The patient was agreeable to proceed with surgery, but he wanted to wait till the new year. Decision-making was made for patient to be discharged home and follow up with Dr. Salgado in the office and subsequently present at a later date for cardiac surgery. In the interim, he did require admission for the flu and further optimization.  He did proceed forward with right carotid endarterectomy performed by Dr. Salgado.  He now returns for planned AVR/CABG/LAAE. No further infectious type symptoms.         Past medical history includes: hypertension, hyperlipidemia, chronic tobacco use, chronic back pain, chronic obstructive pulmonary disease, chronic tobacco use, carotid disease, coronary artery disease, and severe aortic stenosis with a bicuspid aortic valve. He has full dentures. He is down to at least 1/2 pack of cigarettes per day.       Hospital Course: Mr. Ryder was admitted on the morning of surgery on 02/08/2018.  Please see a separate op note detailing the operation by Dr. Robin.  Following surgery, he was transferred to the intensive care unit in stable and guarded condition.  He was extubated on postop day 1 and demonstrated a neurologically intact exam.  He did require transfusion of 1 unit blood and vasoactive drips were weaned.  He walked around the unit 3 times.  Mediastinal tubes and Omid drain were removed without remark.  He was transferred to  for continued recovery on postop day 3.  The remaining stay of his hospitalization was remarkable for ongoing diuresis for volume overload, encouraging good nutritional supplementation for protein calorie malnutrition, and monitoring incisional wounds for healing. He was ambulating well with PT. His oxygen was weaned off to room air and was saturating appropriately. He has minimal drainage from inferior portion of the sternotomy site that is serous and there is evidence of healing previous mediastinal tube sites. The  patient meets discharge criteria on 2/16/2018 and he is agreeable for discharge home with spouse.     Condition on Discharge:  Neurologically intact and has good pain control.  He is eating well and has demonstrable good bowel function.  The sternum is stable without clicks and the saphenectomy incisions are healing nicely.  The heart is in normal sinus rhythm.  He has met all physical therapy criteria and verbalizes understanding of sternotomy precautions.   He verbalizes understanding of a separately handed out cardiac surgery handout.       Discharge Disposition: Home    Discharge Medications:   Ryder Sharad   Home Medication Instructions VIDA:[id number]    Printed on:02/16/18 6004   Medication Information                      albuterol (PROVENTIL HFA;VENTOLIN HFA) 108 (90 Base) MCG/ACT inhaler  Inhale 2 puffs Every 4 (Four) Hours As Needed for Wheezing.             albuterol (PROVENTIL) (2.5 MG/3ML) 0.083% nebulizer solution  Take 2.5 mg by nebulization Every 4 (Four) Hours As Needed for Wheezing or Shortness of Air.             aspirin 81 MG tablet  Take 1 tablet by mouth Daily.             atorvastatin (LIPITOR) 80 MG tablet  Take 1 tablet by mouth Every Night.             budesonide-formoterol (SYMBICORT) 160-4.5 MCG/ACT inhaler  Inhale 2 puffs 2 (Two) Times a Day.             citalopram (CeleXA) 20 MG tablet  Take 20 mg by mouth Daily.             clopidogrel (PLAVIX) 75 MG tablet  Take 1 tablet by mouth Daily.             furosemide (LASIX) 20 MG tablet  Take 1 tablet by mouth Daily.             lisinopril (PRINIVIL,ZESTRIL) 2.5 MG tablet  Take 1 tablet by mouth Daily.             metoprolol tartrate (LOPRESSOR) 50 MG tablet  Take 1 tablet by mouth Every 12 (Twelve) Hours.             oxyCODONE-acetaminophen (PERCOCET) 5-325 MG per tablet  Take 1 tablet by mouth Every 6 (Six) Hours As Needed (pain).             potassium chloride (K-DUR) 10 MEQ CR tablet  Take 2 tablets by mouth Daily.              Prenatal Vit-Fe Fumarate-FA (PRENATAL VITAMIN 27-0.8) 27-0.8 MG tablet tablet  Take 1 tablet by mouth Daily.                 Discharge Diet: Regular diet and patient has been instructed to drink 3 Boost shakes per day. 1500 ml/24 hour fluid restriction (not including Boost shakes).       Discharge Care Plan / Instructions: Please see the separately handed out cardiac surgery handout.    Activity at Discharge:   No heavy lifting greater than 5 pounds or a 1/2 gallon of milk while maintaining sternal precautions.  Sharad Ryder has been instructed on an exercise  regiment as detailed in a handed out cardiac surgery handout.    Tobacco:  Patient has been counseled as to smoking cessation. We discussed its benefits in regards to immediate recovery and the long-term benefits of avoidance of tobacco products. Informed of the smoking cessation program available here.     Follow-up Appointments: Sharad Ryder  is requested to see Sharad Cline MD within 1-2 weeks from time of discharge, to see Nely ORR in 1 week with labs, to follow-up with Dr. Robin in 4 weeks,  and to follow-up with Dr. Francois of the cardiology service in 6 weeks.

## 2018-02-16 NOTE — DISCHARGE INSTRUCTIONS
Change dressing to midsternal incision twice daily.  Drink 3 Boost nutritional supplemental shakes per day (breakfast, lunch, dinner). Continue 1500 ml/24 hours fluid restriction (shakes do not count towards fluid restriction).   Obtain and take prenatal multivitamin and zinc tablet daily.  Call Dr. Robin's office at [phone] with any questions or issues.

## 2018-02-16 NOTE — PROGRESS NOTES
"Aortic valve replacement (Graciela-Juarez bovine pericardial 19 mm valve), Coronary artery bypass grafting -3 V (Left internal mammary artery/Left anterior descending, reverse saphenous vein graft/posterior descending artery, and reverse saphenous vein graft/diagonal artery), Transmyocardial revascularization- 17 channel, Left atrial appendage exclusion (AtriCure clip device, Right endoscopic vein harvest    Postop day 8    Ready to go home. Met with nutrition yesterday and drank Ensure shakes. Walking well. Weight down 2 pounds. Drinking lots of water. Afebrile. Good pain control.     Baseline weight: 124 pounds     Visit Vitals   • BP 97/63 (BP Location: Right arm, Patient Position: Sitting)   • Pulse 104   • Temp 97.8 °F (36.6 °C) (Temporal Artery )   • Resp 18   • Ht 162 cm (63.78\")   • Wt 56.8 kg (125 lb 3 oz)   • SpO2 95%   • BMI 21.64 kg/m2       Intake/Output Summary (Last 24 hours) at 02/16/18 1021  Last data filed at 02/16/18 0835   Gross per 24 hour   Intake             2400 ml   Output              300 ml   Net             2100 ml         Physical Exam:  General: No apparent distress. In good spirits.  Cardiovascular: Regular rate and rhythm without murmur, rubs, or gallops.    Pulmonary: Clear to auscultation bilaterally without wheezing, rubs, or rales.  Chest: Sternotomy incision clean. Inferior portion with serous-serosanguinous drainage. Does not appear infected. Sternum stable. No clicks. Previous mediastinal tube sites starting to close over.    Abdomen: Soft, non-distended, and non-tender.  Extremities: Warm, moves all extremities.  Saphenectomy site clean, dry, and intact. Bruising noted to right thigh.   Neurologic: Grossly intact with no focal deficits.       Impression:  Aortic valve stenosis status post aortic valve replacement  Coronary artery disease status post coronary artery bypass grafting  Recent non-STEMI  Chronic tobacco use  Chronic obstructive pulmonary disease, mild with low " DLCO  Carotid artery disease asymptomatic now post right carotid endarterectomy  Anemia likely multifactorial-stable  Hyponatremia  Protein calorie malnutrition    Plan:  OK to discharge home today  Discussed fluid restriction of 1500 ml/24 hours upon discharge  Dressing change to midsternal incision BID, continue nutritional supplementation with 3 boost shakes per day, taking prenatal vitamin and zinc tablet daily  Continue routine post cardiac surgery regimen  Will see in office next week

## 2018-02-16 NOTE — PLAN OF CARE
Problem: Patient Care Overview (Adult)  Goal: Plan of Care Review  Outcome: Ongoing (interventions implemented as appropriate)   02/16/18 0425   Coping/Psychosocial Response Interventions   Plan Of Care Reviewed With patient   Patient Care Overview   Progress no change   Outcome Evaluation   Outcome Summary/Follow up Plan VSS, c/o constant pain prn pain meds given x 2, no edema noted, scant drainage to bottom of midline incision, S-ST  on telemetry       Problem: Fall Risk (Adult)  Goal: Absence of Falls  Outcome: Ongoing (interventions implemented as appropriate)      Problem: Cardiac Surgery (Adult)  Goal: Signs and Symptoms of Listed Potential Problems Will be Absent or Manageable (Cardiac Surgery)  Outcome: Ongoing (interventions implemented as appropriate)      Problem: Pressure Ulcer Risk (Parish Scale) (Adult,Obstetrics,Pediatric)  Goal: Skin Integrity  Outcome: Ongoing (interventions implemented as appropriate)      Problem: Skin Integrity Impairment, Risk/Actual (Adult)  Goal: Skin Integrity/Wound Healing  Outcome: Ongoing (interventions implemented as appropriate)

## 2018-02-21 ENCOUNTER — OFFICE VISIT (OUTPATIENT)
Dept: CARDIAC SURGERY | Facility: CLINIC | Age: 58
End: 2018-02-21

## 2018-02-21 ENCOUNTER — LAB (OUTPATIENT)
Dept: LAB | Facility: HOSPITAL | Age: 58
End: 2018-02-21
Attending: THORACIC SURGERY (CARDIOTHORACIC VASCULAR SURGERY)

## 2018-02-21 VITALS
HEIGHT: 64 IN | HEART RATE: 83 BPM | DIASTOLIC BLOOD PRESSURE: 50 MMHG | WEIGHT: 126 LBS | OXYGEN SATURATION: 98 % | SYSTOLIC BLOOD PRESSURE: 90 MMHG | BODY MASS INDEX: 21.51 KG/M2

## 2018-02-21 DIAGNOSIS — E87.1 HYPONATREMIA: ICD-10-CM

## 2018-02-21 DIAGNOSIS — I25.119 CORONARY ARTERY DISEASE INVOLVING NATIVE CORONARY ARTERY OF NATIVE HEART WITH ANGINA PECTORIS (HCC): ICD-10-CM

## 2018-02-21 DIAGNOSIS — Z72.0 TOBACCO USE: ICD-10-CM

## 2018-02-21 DIAGNOSIS — E46 PROTEIN-CALORIE MALNUTRITION, UNSPECIFIED SEVERITY (HCC): ICD-10-CM

## 2018-02-21 DIAGNOSIS — I35.0 SEVERE AORTIC VALVE STENOSIS: Primary | ICD-10-CM

## 2018-02-21 DIAGNOSIS — E43 SEVERE PROTEIN-CALORIE MALNUTRITION (HCC): ICD-10-CM

## 2018-02-21 DIAGNOSIS — D64.9 ANEMIA, UNSPECIFIED TYPE: ICD-10-CM

## 2018-02-21 LAB
ANION GAP SERPL CALCULATED.3IONS-SCNC: 12 MMOL/L (ref 4–13)
BUN BLD-MCNC: 5 MG/DL (ref 5–21)
BUN/CREAT SERPL: 9.1 (ref 7–25)
CALCIUM SPEC-SCNC: 9.2 MG/DL (ref 8.4–10.4)
CHLORIDE SERPL-SCNC: 99 MMOL/L (ref 98–110)
CO2 SERPL-SCNC: 28 MMOL/L (ref 24–31)
CREAT BLD-MCNC: 0.55 MG/DL (ref 0.5–1.4)
DEPRECATED RDW RBC AUTO: 49.9 FL (ref 40–54)
ERYTHROCYTE [DISTWIDTH] IN BLOOD BY AUTOMATED COUNT: 14.9 % (ref 12–15)
GFR SERPL CREATININE-BSD FRML MDRD: >150 ML/MIN/1.73
GLUCOSE BLD-MCNC: 104 MG/DL (ref 70–100)
HCT VFR BLD AUTO: 31.9 % (ref 40–52)
HGB BLD-MCNC: 10 G/DL (ref 14–18)
MCH RBC QN AUTO: 28.9 PG (ref 28–32)
MCHC RBC AUTO-ENTMCNC: 31.3 G/DL (ref 33–36)
MCV RBC AUTO: 92.2 FL (ref 82–95)
PLATELET # BLD AUTO: 649 10*3/MM3 (ref 130–400)
PMV BLD AUTO: 8.6 FL (ref 6–12)
POTASSIUM BLD-SCNC: 4.1 MMOL/L (ref 3.5–5.3)
PREALB SERPL-MCNC: 16.7 MG/DL (ref 18–36)
RBC # BLD AUTO: 3.46 10*6/MM3 (ref 4.8–5.9)
SODIUM BLD-SCNC: 139 MMOL/L (ref 135–145)
WBC NRBC COR # BLD: 11.54 10*3/MM3 (ref 4.8–10.8)

## 2018-02-21 PROCEDURE — 85027 COMPLETE CBC AUTOMATED: CPT | Performed by: THORACIC SURGERY (CARDIOTHORACIC VASCULAR SURGERY)

## 2018-02-21 PROCEDURE — 84134 ASSAY OF PREALBUMIN: CPT | Performed by: THORACIC SURGERY (CARDIOTHORACIC VASCULAR SURGERY)

## 2018-02-21 PROCEDURE — 80048 BASIC METABOLIC PNL TOTAL CA: CPT | Performed by: THORACIC SURGERY (CARDIOTHORACIC VASCULAR SURGERY)

## 2018-02-21 PROCEDURE — 36415 COLL VENOUS BLD VENIPUNCTURE: CPT

## 2018-02-21 PROCEDURE — 99024 POSTOP FOLLOW-UP VISIT: CPT | Performed by: NURSE PRACTITIONER

## 2018-02-21 RX ORDER — OXYCODONE HYDROCHLORIDE AND ACETAMINOPHEN 5; 325 MG/1; MG/1
1 TABLET ORAL EVERY 6 HOURS PRN
Qty: 40 TABLET | Refills: 0 | Status: SHIPPED | OUTPATIENT
Start: 2018-02-21 | End: 2018-04-17

## 2018-02-21 NOTE — PROGRESS NOTES
Subjective   Patient ID: Sharad Ryder is a 57 y.o. male who is here for follow-up having had Aortic valve replacement (Graciela-Juarez bovine pericardial 19 mm valve), Coronary artery bypass grafting -3 V (Left internal mammary artery/Left anterior descending, reverse saphenous vein graft/posterior descending artery, and reverse saphenous vein graft/diagonal artery), Transmyocardial revascularization- 17 channel, Left atrial appendage exclusion (AtriCure clip device, Right endoscopic vein harvest on February 8, 2018 by Dr. Robin.      History of Present Illness  Post operative recovery was uneventful without any major complications. Sleep habits are poor. He reports only sleeping a couple hours each night. Pain control has been fair. He reports he is almost out of pain pills. Reports incisional pain to sternum and taking pain pills around-the-clock. No fevers/sweats/chills.  Reports improvement in drainage from sternotomy site.  Describes drainage as minimal and mostly clear to blood-tinged.  No drainage from saphenectomy incisions.  No sternal clicks. No chest pain or shortness of breath. Appetite is improving.  Weight is stable.  Reports he is drinking 2 ensure shakes per day.  Smoking 1-2 cigarettes per day.  Reports he is trying to quit.  Ambulating 10 minutes twice daily.     The following portions of the patient's history were reviewed and updated as appropriate: allergies, current medications, past family history, past medical history, past social history, past surgical history and problem list.    Review of Systems   Constitutional: Negative for chills, diaphoresis, fatigue and fever.   Respiratory: Negative for shortness of breath.    Cardiovascular: Positive for chest pain (incisional). Negative for palpitations and leg swelling.   Musculoskeletal: Positive for back pain.   Skin: Negative for rash.       Objective   Visit Vitals   • BP 90/50 (BP Location: Right arm, Patient Position: Sitting, Cuff  "Size: Adult)   • Pulse 83   • Ht 162.6 cm (64\")   • Wt 57.2 kg (126 lb)   • SpO2 98%   • BMI 21.63 kg/m2       Physical Exam   Constitutional: He is oriented to person, place, and time. He appears well-developed. No distress.   HENT:   Head: Normocephalic and atraumatic.   Eyes: Pupils are equal, round, and reactive to light.   Neck: Normal range of motion.   Cardiovascular: Normal rate and regular rhythm.    Murmur (murmur of prosthetic aortic valve) heard.  Pulmonary/Chest: Effort normal and breath sounds normal. No respiratory distress. He has no wheezes. He has no rales.   Abdominal: Soft. He exhibits no distension. There is no tenderness.   Musculoskeletal: He exhibits no edema.   Neurological: He is alert and oriented to person, place, and time.   Skin: Skin is warm and dry. No rash noted. He is not diaphoretic.   Sternotomy site is clean, dry, and intact.  Gauze dressing present to inferior portion of incision.  Patient reports dressing was placed at noon today and there is no drainage currently on the dressing.  Occasionally, there is serous to serosanguineous drainage on the dressing.  Sternum is stable.  No clicks.  Previous mediastinal tube sites show evidence of healing with scant serous drainage.  No evidence of infection.  Saphenectomy site clean, dry, and intact.  Resolving ecchymosis to right leg.   Psychiatric: He has a normal mood and affect. His behavior is normal.   Vitals reviewed.    Lab on 02/21/2018   Component Date Value Ref Range Status   • Glucose 02/21/2018 104* 70 - 100 mg/dL Final   • BUN 02/21/2018 5  5 - 21 mg/dL Final   • Creatinine 02/21/2018 0.55  0.50 - 1.40 mg/dL Final   • Sodium 02/21/2018 139  135 - 145 mmol/L Final   • Potassium 02/21/2018 4.1  3.5 - 5.3 mmol/L Final   • Chloride 02/21/2018 99  98 - 110 mmol/L Final   • CO2 02/21/2018 28.0  24.0 - 31.0 mmol/L Final   • Calcium 02/21/2018 9.2  8.4 - 10.4 mg/dL Final   • eGFR Non African Amer 02/21/2018 >150  >60 mL/min/1.73 " Final   • BUN/Creatinine Ratio 02/21/2018 9.1  7.0 - 25.0 Final   • Anion Gap 02/21/2018 12.0  4.0 - 13.0 mmol/L Final   • WBC 02/21/2018 11.54* 4.80 - 10.80 10*3/mm3 Final   • RBC 02/21/2018 3.46* 4.80 - 5.90 10*6/mm3 Final   • Hemoglobin 02/21/2018 10.0* 14.0 - 18.0 g/dL Final   • Hematocrit 02/21/2018 31.9* 40.0 - 52.0 % Final   • MCV 02/21/2018 92.2  82.0 - 95.0 fL Final   • MCH 02/21/2018 28.9  28.0 - 32.0 pg Final   • MCHC 02/21/2018 31.3* 33.0 - 36.0 g/dL Final   • RDW 02/21/2018 14.9  12.0 - 15.0 % Final   • RDW-SD 02/21/2018 49.9  40.0 - 54.0 fl Final   • MPV 02/21/2018 8.6  6.0 - 12.0 fL Final   • Platelets 02/21/2018 649* 130 - 400 10*3/mm3 Final   • Prealbumin 02/21/2018 16.7* 18.0 - 36.0 mg/dL Final       Assessment/Plan       Sharad was seen today for post-op follow-up.    Diagnoses and all orders for this visit:    Severe aortic valve stenosis    Coronary artery disease involving native coronary artery of native heart with angina pectoris    Protein-calorie malnutrition, unspecified severity  -     Prealbumin; Future    Tobacco use    Other orders  -     oxyCODONE-acetaminophen (PERCOCET) 5-325 MG per tablet; Take 1 tablet by mouth Every 6 (Six) Hours As Needed (pain).         Overall, Sharad Ryder is doing well.  We discussed his lab results, all except his prealbumin which was not resulted at the time of his office visit.  He and his wife are agreeable for me to call him at home later today with prealbumin results.  His sternotomy site appears improved.  He reports an overall improvement in the drainage amount.  I would like to see him in 1 week to check on this to see if it has resolved.  Dr. Robin has provided him with a prescription for Percocet. We discussed that he should only take pain pill if experiencing pain.  We discussed taking a pain pill prior to bedtime to see if that would aid in pain relief during the night.  We discussed examples of how to space out pain pills. We discussed current  sternotomy precautions and how these will not be advanced yet. Following post op cardiac surgery home instructions. Provided support and encouragement. All questions have been answered to the best of my ability. Will discuss starting cardiac rehabilitation at official 1 month post op visit. Patient has follow up with Dr. Robin in a few weeks. Patient has follow up with Cardiology in a few weeks. Patient has been instructed to contact our office with any questions or concerns should they arise prior to the next office visit.     His prealbumin has improved to 16.7 but this is still low.  We will encourage him to drink 3 ensure shakes daily.  We will repeat a prealbumin level at his next office visit next Wednesday.    Patient's BMI is within normal parameters. No follow-up required.      We discussed the need to discontinue smoking as this reduces the overall graft patency.    I advised the patient of the risks in continuing to use tobacco, and I provided this patient with smoking cessation educational materials.    During this visit, I spent < 3 minutes counseling the patient regarding smoking cessation.

## 2018-02-28 ENCOUNTER — TELEPHONE (OUTPATIENT)
Dept: CARDIAC SURGERY | Facility: CLINIC | Age: 58
End: 2018-02-28

## 2018-02-28 ENCOUNTER — OFFICE VISIT (OUTPATIENT)
Dept: CARDIAC SURGERY | Facility: CLINIC | Age: 58
End: 2018-02-28

## 2018-02-28 ENCOUNTER — LAB (OUTPATIENT)
Dept: LAB | Facility: HOSPITAL | Age: 58
End: 2018-02-28

## 2018-02-28 VITALS
WEIGHT: 125 LBS | SYSTOLIC BLOOD PRESSURE: 108 MMHG | BODY MASS INDEX: 21.34 KG/M2 | HEIGHT: 64 IN | DIASTOLIC BLOOD PRESSURE: 58 MMHG

## 2018-02-28 DIAGNOSIS — Z95.2 S/P AVR (AORTIC VALVE REPLACEMENT): Primary | ICD-10-CM

## 2018-02-28 DIAGNOSIS — Z95.1 S/P CABG (CORONARY ARTERY BYPASS GRAFT): ICD-10-CM

## 2018-02-28 DIAGNOSIS — Z72.0 TOBACCO USE: ICD-10-CM

## 2018-02-28 DIAGNOSIS — E46 PROTEIN-CALORIE MALNUTRITION, UNSPECIFIED SEVERITY (HCC): ICD-10-CM

## 2018-02-28 LAB — PREALB SERPL-MCNC: 25.6 MG/DL (ref 18–36)

## 2018-02-28 PROCEDURE — 36415 COLL VENOUS BLD VENIPUNCTURE: CPT

## 2018-02-28 PROCEDURE — 99024 POSTOP FOLLOW-UP VISIT: CPT | Performed by: NURSE PRACTITIONER

## 2018-02-28 PROCEDURE — 84134 ASSAY OF PREALBUMIN: CPT | Performed by: NURSE PRACTITIONER

## 2018-02-28 NOTE — PROGRESS NOTES
"Subjective   Patient ID: Sharad Ryder is a 57 y.o. male who is here for follow-up having had Aortic valve replacement (Graciela-Juarez bovine pericardial 19 mm valve), Coronary artery bypass grafting -3 V (Left internal mammary artery/Left anterior descending, reverse saphenous vein graft/posterior descending artery, and reverse saphenous vein graft/diagonal artery), Transmyocardial revascularization- 17 channel, Left atrial appendage exclusion (AtriCure clip device, Right endoscopic vein harvest on February 8, 2018 by Dr. Robin.      History of Present Illness  He returns today for evaluation of sternotomy site and drainage.   He reports site is no longer draining.   Sleep habits are improving.   Pain control has improved. No fevers/sweats/chills.  No drainage from saphenectomy incisions.  No sternal clicks. No chest pain or shortness of breath. Appetite is improving.  Weight is stable.  Reports he is drinking 2 ensure shakes per day.    Still smoking 1-2 cigarettes per day.  Reports he is trying to quit.  Ambulating 10 minutes twice daily.     The following portions of the patient's history were reviewed and updated as appropriate: allergies, current medications, past family history, past medical history, past social history, past surgical history and problem list.    Review of Systems   Constitutional: Negative for chills, diaphoresis, fatigue and fever.   Respiratory: Negative for shortness of breath.    Cardiovascular: Positive for chest pain (incisional). Negative for palpitations and leg swelling.   Gastrointestinal: Negative.    Musculoskeletal: Positive for back pain.   Skin: Negative for rash.   Neurological: Negative for dizziness, seizures and syncope.   Psychiatric/Behavioral: Negative.        Objective   Visit Vitals   • /58 (BP Location: Right arm, Patient Position: Sitting, Cuff Size: Adult)   • Ht 162.6 cm (64\")   • Wt 56.7 kg (125 lb)   • BMI 21.46 kg/m2       Physical Exam "   Constitutional: He is oriented to person, place, and time. He appears well-developed. No distress.   HENT:   Head: Normocephalic and atraumatic.   Eyes: Pupils are equal, round, and reactive to light.   Neck: Normal range of motion.   Cardiovascular: Normal rate and regular rhythm.    Murmur (murmur of prosthetic aortic valve) heard.  Pulmonary/Chest: Effort normal and breath sounds normal. No respiratory distress. He has no wheezes. He has no rales.   Abdominal: Soft. He exhibits no distension. There is no tenderness.   Musculoskeletal: He exhibits no edema.   Neurological: He is alert and oriented to person, place, and time.   Skin: Skin is warm and dry. No rash noted. He is not diaphoretic.   Sternotomy site is clean, dry, and intact.  Gauze dressing present to inferior portion of incision.  Remove dressing and no evidence of drainage on gauze.  Sternum stable.  No clicks. Previous mediastinal tube sites have scabbed over and are not draining.  No evidence of infection.  Saphenectomy site clean, dry, and intact.    Psychiatric: He has a normal mood and affect. His behavior is normal.   Vitals reviewed.    Lab on 02/28/2018   Component Date Value Ref Range Status   • Prealbumin 02/28/2018 25.6  18.0 - 36.0 mg/dL Final       Assessment/Plan       Sharad was seen today for post-op follow-up.    Diagnoses and all orders for this visit:    S/P AVR (aortic valve replacement)    S/P CABG (coronary artery bypass graft)    Tobacco use    Protein-calorie malnutrition, unspecified severity-resolved       Overall, Sharad Rydre is doing well.    He did not obtain labs prior to this office visit.  Discussed that he will proceed to outpatient lab Center for prealbumin lab draw after this visit.  He is agreeable for me to call lab results to him later today. His sternotomy site looks greatly improved.  There is no further drainage.  No evidence of infection or cellulitis. We discussed current sternotomy precautions and how  these will not be advanced yet.   He is eager to drive and I informed him that this would be discussed at his one month postop office visit with Dr. Robin. Provided support and encouragement. All questions have been answered to the best of my ability. Will discuss starting cardiac rehabilitation at official 1 month post op visit. Patient has follow up with Dr. Robin in a few weeks. Patient has follow up with Cardiology in a few weeks. Patient has been instructed to contact our office with any questions or concerns should they arise prior to the next office visit.     His prealbumin has improved to 25.6.   Called patient and left message to call office back for lab results.    Patient's BMI is within normal parameters. No follow-up required.    We discussed the need to discontinue smoking as this reduces the overall graft patency.    I advised the patient of the risks in continuing to use tobacco, and I provided this patient with smoking cessation educational materials.  Patient declined any further information on smoking cessation classes.    During this visit, I spent < 3 minutes counseling the patient regarding smoking cessation.

## 2018-03-02 ENCOUNTER — OFFICE VISIT (OUTPATIENT)
Dept: VASCULAR SURGERY | Facility: CLINIC | Age: 58
End: 2018-03-02

## 2018-03-02 ENCOUNTER — DOCUMENTATION (OUTPATIENT)
Dept: CARDIAC SURGERY | Facility: CLINIC | Age: 58
End: 2018-03-02

## 2018-03-02 VITALS
SYSTOLIC BLOOD PRESSURE: 120 MMHG | BODY MASS INDEX: 21 KG/M2 | HEART RATE: 87 BPM | HEIGHT: 64 IN | WEIGHT: 123 LBS | OXYGEN SATURATION: 100 % | DIASTOLIC BLOOD PRESSURE: 70 MMHG | RESPIRATION RATE: 20 BRPM

## 2018-03-02 DIAGNOSIS — I65.23 BILATERAL CAROTID ARTERY STENOSIS: Primary | ICD-10-CM

## 2018-03-02 DIAGNOSIS — E78.5 HYPERLIPIDEMIA, UNSPECIFIED HYPERLIPIDEMIA TYPE: ICD-10-CM

## 2018-03-02 DIAGNOSIS — Z72.0 TOBACCO USE: ICD-10-CM

## 2018-03-02 DIAGNOSIS — I10 ESSENTIAL HYPERTENSION: ICD-10-CM

## 2018-03-02 PROBLEM — I65.21 STENOSIS OF RIGHT CAROTID ARTERY: Status: RESOLVED | Noted: 2018-01-04 | Resolved: 2018-03-02

## 2018-03-02 PROCEDURE — 99024 POSTOP FOLLOW-UP VISIT: CPT | Performed by: NURSE PRACTITIONER

## 2018-03-02 NOTE — PROGRESS NOTES
03/02/2018       Sharad KLEIN MD  [address] [facility]  [address]  [place] [address]    Sharad Ryder  1960    Chief Complaint   Patient presents with   • Follow-up     1 month hospital follow-up. Follow-up with carotid procedure.  Patient states that he has alot of pain from heart surgery but his neck isn't in alot of pain.       Dear Sharad KLEIN MD:      HPI  I had the pleasure of seeing your patient Sharad Ryder in the office today.  As you recall, Sharad Ryder is a 57 y.o.  male who was recently seen in the hospital.  He has a past medical history of hypertension, hyperlipidemia, chronic tobacco use, and chronic back pain. He sees Dr. Cline (PCP) in [place], [place] only when he is sick. He says he has progressively become more short of breath over the last couple months. He thinks he was told he had a cardiac murmur sometime ago. Strong family history of coronary artery disease.  An echo was obtained and demonstrated severe aortic stenosis, bicupsid aortic valve, moderate-severe left ventricular hypertrophy with estimated ejection fraction of 70%.  He did have a carotid duplex and CTA of the neck showing significant disease on the right.  He did undergo a right carotid endarterectomy on 2/1/2018 and is now back for follow up. He had AVR and 3 vessel bypass on 2/8/18.  He is doing well, but has pain from sternal incision.  His neck is healed with some numbness along jaw line.      Review of Systems   Constitutional: Positive for fatigue.   HENT: Negative.    Eyes: Negative.    Respiratory: Positive for shortness of breath.    Cardiovascular: Negative.  Negative for chest pain.   Gastrointestinal: Negative.  Negative for abdominal pain.   Endocrine: Negative.    Genitourinary: Negative.    Musculoskeletal: Positive for back pain.   Skin: Negative.    Allergic/Immunologic: Negative.    Neurological: Negative.    Hematological: Negative.    Psychiatric/Behavioral: Negative.    All other systems reviewed and are  "negative.      /70  Pulse 87  Resp 20  Ht 162.6 cm (64\")  Wt 55.8 kg (123 lb)  SpO2 100%  BMI 21.11 kg/m2  Physical Exam   Constitutional: He is oriented to person, place, and time. He appears well-developed and well-nourished.   HENT:   Head: Normocephalic and atraumatic.   Eyes: Pupils are equal, round, and reactive to light. No scleral icterus.   Neck: Normal range of motion. Neck supple. No thyromegaly present.   Right neck incision healed   Cardiovascular: Normal rate and regular rhythm.    Murmur heard.  Pulses:       Femoral pulses are 2+ on the right side, and 2+ on the left side.       Popliteal pulses are 1+ on the right side, and 1+ on the left side.        Dorsalis pedis pulses are 1+ on the right side, and 1+ on the left side.        Posterior tibial pulses are 1+ on the right side, and 1+ on the left side.   Sternal incision healing   Pulmonary/Chest: Effort normal. He has decreased breath sounds.   Abdominal: Soft. Bowel sounds are normal.   Musculoskeletal: Normal range of motion.   Neurological: He is alert and oriented to person, place, and time.   Skin: Skin is warm and dry.   Psychiatric: He has a normal mood and affect. His behavior is normal. Judgment and thought content normal.   Nursing note and vitals reviewed.      Xr Chest 1 View    Result Date: 2/11/2018  Narrative: EXAMINATION: XR CHEST 1 VW- 2/11/2018 8:18 AM CST  HISTORY: Postoperative CABG/AVR; I35.0-Nonrheumatic aortic (valve) stenosis; Z74.09-Other reduced mobility.  REPORT: Comparison is made with the study from 02/10/2018 0318 hours.  Lungs are hypoaerated, mediastinal drains, left pleural drain and right IJ sheath remain in satisfactory position. No pneumothorax is seen. Underlying COPD is evident. Heart size is normal. Evidence of previous CABG is noted. There is no significant change.      Impression: Stable 1 day appearance of the chest. This report was finalized on 02/11/2018 08:19 by Dr. Son Womack MD.    Xr " Chest 1 View    Result Date: 2/10/2018  Narrative: EXAMINATION: XR CHEST 1 VW- 2/10/2018 8:39 AM CST  HISTORY: Post-Op Heart Surgery; I35.0-Nonrheumatic aortic (valve) stenosis; Z74.09-Other reduced mobility.  REPORT: Comparison is made with the study from 02/09/2018 0302 hours.  The patient has been extubated and the right internal jugular pulmonary artery catheter removed. The right IJ sheath remains in good position. Mediastinal and left pleural drain appear in satisfactory position as before. The lungs are hypoaerated. CABG is noted. Heart size is normal.      Impression: Mild bibasilar atelectasis, interval removal of the endotracheal tube and pulmonary artery catheter. No pneumothorax is identified. No acute findings. This report was finalized on 02/10/2018 08:40 by Dr. Son Womack MD.    Xr Chest 1 View    Result Date: 2/9/2018  Narrative: EXAMINATION: XR CHEST 1 VW- 2/9/2018 6:51 AM CST  HISTORY: Postop heart surgery  COMPARISON: 02/08/2018  FINDINGS: An endotracheal tube is in place with tip approximately 3 cm above the deo. A right IJ Crewe-Chasity catheter is in place with tip directed toward the main pulmonary trunk, retracted from the previous exam. Prosthetic valve is in place. There is evidence of previous CABG. A left atrial appendage clip is noted. Heart appears normal in size. Aorta is tortuous with atherosclerotic calcifications. Drainage catheters remain in place. There is mild pulmonary vascular congestion and perihilar opacities likely reflecting mild pulmonary edema. Trace pleural fluid suspected on the right.      Impression:  1. Interval retraction of the right IJ Crewe-Chasity catheter.  2. Mild pulmonary vascular congestion and pulmonary edema. This report was finalized on 02/09/2018 06:53 by Dr. Tim Villatoro MD.    Xr Chest 1 View    Result Date: 2/8/2018  Narrative: History: 57-year-old evaluated for decreased O2 saturation.  Reference: Chest radiograph 2 arch prior.  Findings: Frontal  chest radiograph performed.  Postoperative changes again noted described 2 hours prior unchanged. Again severe emphysema with minimal atelectasis. No pleural effusion or pneumothorax.       Impression: Impression: No acute change from the postoperative radiograph 2 hours prior. This report was finalized on 02/08/2018 19:55 by  Abel Suarez, .    Xr Chest 1 View    Result Date: 2/8/2018  Narrative: History: 57-year-old evaluated postoperatively.  Reference: Chest radiographs 01/05/2018.  Findings: Frontal chest radiograph performed.  Heart size within expected limits. Interval median sternotomy with CABG, valve prosthesis, and left atrial appendage occlusion device. Emphysema with scattered atelectasis is noted. No pleural effusion or pneumothorax.  Endotracheal tube is in good position approximately 6.5 cm above the deo. Right IJ approach Organ-Chasity catheter with tip directed in right main pulmonary artery. Mediastinal drain is noted. Presumed left medial basilar chest tube.       Impression: Impression: Expected postoperative chest. This report was finalized on 02/08/2018 19:00 by  Abel Suarez, .    Us Carotid Limited Intraop    Result Date: 2/1/2018  Narrative: History: Right carotid stenosis.  Comments: Gray scale imaging as well as color flow duplex were used to evaluate the right carotid system intraoperatively during carotid endarterectomy.  The peak systolic velocity in the common carotid artery measured 140 cm/s. In the internal carotid artery measured 137 cm/s. In the external carotid artery measured 175 cm/s. There is no evidence of flap formation, debris, or thrombosis.      Impression: Successful right carotid endarterectomy. This report was finalized on 02/01/2018 17:31 by Dr. Jl Salgado MD.    Xr Chest Pa & Lateral    Result Date: 2/12/2018  Narrative: EXAMINATION: XR CHEST PA AND LATERAL-  2/12/2018 7:36 AM CST  TWO-VIEW CHEST:  HISTORY: Follow-up CABG  Frontal and lateral projection chest  radiograph obtained.  COMPARISON:  02/11/2018, 02/10/2018 and 02/09/2018  FINDINGS:  Changes from median sternotomy and cardiac valve replacement observed. And support tubes have been removed.  Small bilateral pleural effusions with mild patchy lower lobe airspace opacities observed.  Perihilar interstitial prominence noted, mild volume overload/pulmonary venous hypertension considered.  There are no pneumothoraces.                                                                                                                  Impression: 1. Changes from recent cardiac surgery. No pneumothoraces. 2. Probable mild Postoperative atelectasis and pleural effusions. 3. Bilateral perihilar interstitial prominence, mild volume overload considered.   This report was finalized on 02/12/2018 07:38 by Dr. Vito Abrams MD.      Patient Active Problem List   Diagnosis   • Shortness of breath   • Severe aortic valve stenosis   • Tobacco use   • Hyperlipidemia   • Hypertension   • History of non-ST elevation myocardial infarction (NSTEMI)   • Chest pain   • Coronary artery disease involving native coronary artery of native heart with angina pectoris   • COPD (chronic obstructive pulmonary disease)   • LAZ (acute kidney injury)   • History of influenza   • Carotid stenosis, right   • Post-op pain   • Aortic valve stenosis   • Protein calorie malnutrition   • Anemia   • Hyponatremia         ICD-10-CM ICD-9-CM   1. Bilateral carotid artery stenosis I65.23 433.10     433.30   2. Hyperlipidemia, unspecified hyperlipidemia type E78.5 272.4   3. Essential hypertension I10 401.9   4. Tobacco use Z72.0 305.1       Plan: After thoroughly evaluating Sharad Ryder, I am pleased to report he is doing well status post right carotid endarterectomy.  His neck is healed with some numbness along his jaw line.  He has pain from sternal incision from CABG.  We will see him back in 6 months with repeat noninvasive testing for continued surveillance.   I advised the patient of the risks in continuing to use tobacco, and I provided this patient with smoking cessation educational materials.  I also discussed how to quit smoking and the patient has not expressed the willingness to quit.  During this visit, I spent greater than 10 minutes counseling the patient regarding smoking cessation.  I did discuss vascular risk factors as they pertain to the progression of vascular disease including controlling his hypertension, hyperlipidemia, and smoking cessation.  The patient understands these risks and would like to proceed with the procedure.  The patient can continue taking her current medication regimen as previously planned.  This was all discussed in full with complete understanding.    Thank you for allowing me to participate in the care of your patient.  Please do not hesitate with any questions or concerns.  I will keep you aware of any further encounters with Sharad Ryder.        Sincerely yours,         KIRBY Nolan MD

## 2018-03-02 NOTE — PROGRESS NOTES
Multiple phone call attempts made to discuss prealbumin lab result with patient. Left message to return call. Awaiting return call.

## 2018-03-13 ENCOUNTER — TELEPHONE (OUTPATIENT)
Dept: CARDIAC SURGERY | Facility: CLINIC | Age: 58
End: 2018-03-13

## 2018-03-20 ENCOUNTER — OFFICE VISIT (OUTPATIENT)
Dept: CARDIAC SURGERY | Facility: CLINIC | Age: 58
End: 2018-03-20

## 2018-03-20 VITALS
DIASTOLIC BLOOD PRESSURE: 76 MMHG | WEIGHT: 123 LBS | HEIGHT: 64 IN | BODY MASS INDEX: 21 KG/M2 | SYSTOLIC BLOOD PRESSURE: 140 MMHG | OXYGEN SATURATION: 98 % | HEART RATE: 72 BPM

## 2018-03-20 DIAGNOSIS — I65.21 CAROTID STENOSIS, RIGHT: ICD-10-CM

## 2018-03-20 DIAGNOSIS — I25.119 CORONARY ARTERY DISEASE INVOLVING NATIVE CORONARY ARTERY OF NATIVE HEART WITH ANGINA PECTORIS (HCC): ICD-10-CM

## 2018-03-20 DIAGNOSIS — Z72.0 TOBACCO USE: ICD-10-CM

## 2018-03-20 DIAGNOSIS — I35.0 SEVERE AORTIC VALVE STENOSIS: Primary | ICD-10-CM

## 2018-03-20 DIAGNOSIS — J44.9 CHRONIC OBSTRUCTIVE PULMONARY DISEASE, UNSPECIFIED COPD TYPE (HCC): ICD-10-CM

## 2018-03-20 PROCEDURE — 99024 POSTOP FOLLOW-UP VISIT: CPT | Performed by: THORACIC SURGERY (CARDIOTHORACIC VASCULAR SURGERY)

## 2018-03-20 RX ORDER — OXYCODONE HYDROCHLORIDE AND ACETAMINOPHEN 5; 325 MG/1; MG/1
1 TABLET ORAL EVERY 8 HOURS PRN
Qty: 30 TABLET | Refills: 0 | Status: SHIPPED | OUTPATIENT
Start: 2018-03-20 | End: 2018-04-17

## 2018-04-12 ENCOUNTER — TREATMENT (OUTPATIENT)
Dept: CARDIAC REHAB | Facility: HOSPITAL | Age: 58
End: 2018-04-12

## 2018-04-12 DIAGNOSIS — Z95.1 S/P CABG (CORONARY ARTERY BYPASS GRAFT): Primary | ICD-10-CM

## 2018-04-12 PROCEDURE — 93798 PHYS/QHP OP CAR RHAB W/ECG: CPT

## 2018-04-17 ENCOUNTER — OFFICE VISIT (OUTPATIENT)
Dept: CARDIAC SURGERY | Facility: CLINIC | Age: 58
End: 2018-04-17

## 2018-04-17 ENCOUNTER — TREATMENT (OUTPATIENT)
Dept: CARDIAC REHAB | Facility: HOSPITAL | Age: 58
End: 2018-04-17

## 2018-04-17 VITALS
SYSTOLIC BLOOD PRESSURE: 130 MMHG | BODY MASS INDEX: 20.49 KG/M2 | DIASTOLIC BLOOD PRESSURE: 80 MMHG | OXYGEN SATURATION: 97 % | HEART RATE: 81 BPM | WEIGHT: 120 LBS | HEIGHT: 64 IN

## 2018-04-17 DIAGNOSIS — I65.21 CAROTID STENOSIS, RIGHT: ICD-10-CM

## 2018-04-17 DIAGNOSIS — Z95.1 S/P CABG (CORONARY ARTERY BYPASS GRAFT): Primary | ICD-10-CM

## 2018-04-17 DIAGNOSIS — I35.0 SEVERE AORTIC VALVE STENOSIS: Primary | ICD-10-CM

## 2018-04-17 DIAGNOSIS — Z72.0 TOBACCO USE: ICD-10-CM

## 2018-04-17 PROCEDURE — 99024 POSTOP FOLLOW-UP VISIT: CPT | Performed by: THORACIC SURGERY (CARDIOTHORACIC VASCULAR SURGERY)

## 2018-04-17 PROCEDURE — 93798 PHYS/QHP OP CAR RHAB W/ECG: CPT

## 2018-04-19 ENCOUNTER — TREATMENT (OUTPATIENT)
Dept: CARDIAC REHAB | Facility: HOSPITAL | Age: 58
End: 2018-04-19

## 2018-04-19 DIAGNOSIS — Z95.1 S/P CABG (CORONARY ARTERY BYPASS GRAFT): Primary | ICD-10-CM

## 2018-04-19 PROCEDURE — 93798 PHYS/QHP OP CAR RHAB W/ECG: CPT

## 2018-04-24 ENCOUNTER — TREATMENT (OUTPATIENT)
Dept: CARDIAC REHAB | Facility: HOSPITAL | Age: 58
End: 2018-04-24

## 2018-04-24 DIAGNOSIS — Z95.1 S/P CABG (CORONARY ARTERY BYPASS GRAFT): Primary | ICD-10-CM

## 2018-04-24 PROCEDURE — 93798 PHYS/QHP OP CAR RHAB W/ECG: CPT

## 2018-04-26 ENCOUNTER — TREATMENT (OUTPATIENT)
Dept: CARDIAC REHAB | Facility: HOSPITAL | Age: 58
End: 2018-04-26

## 2018-04-26 DIAGNOSIS — Z95.1 S/P CABG (CORONARY ARTERY BYPASS GRAFT): Primary | ICD-10-CM

## 2018-05-03 ENCOUNTER — TREATMENT (OUTPATIENT)
Dept: CARDIAC REHAB | Facility: HOSPITAL | Age: 58
End: 2018-05-03

## 2018-05-03 DIAGNOSIS — Z95.1 S/P CABG (CORONARY ARTERY BYPASS GRAFT): Primary | ICD-10-CM

## 2018-05-03 PROCEDURE — 93798 PHYS/QHP OP CAR RHAB W/ECG: CPT

## 2018-06-05 ENCOUNTER — APPOINTMENT (OUTPATIENT)
Dept: CARDIAC REHAB | Facility: HOSPITAL | Age: 58
End: 2018-06-05

## 2018-06-07 ENCOUNTER — APPOINTMENT (OUTPATIENT)
Dept: CARDIAC REHAB | Facility: HOSPITAL | Age: 58
End: 2018-06-07

## 2018-06-12 ENCOUNTER — APPOINTMENT (OUTPATIENT)
Dept: CARDIAC REHAB | Facility: HOSPITAL | Age: 58
End: 2018-06-12

## 2018-06-14 ENCOUNTER — APPOINTMENT (OUTPATIENT)
Dept: CARDIAC REHAB | Facility: HOSPITAL | Age: 58
End: 2018-06-14

## 2018-06-19 ENCOUNTER — APPOINTMENT (OUTPATIENT)
Dept: CARDIAC REHAB | Facility: HOSPITAL | Age: 58
End: 2018-06-19

## 2018-06-21 ENCOUNTER — APPOINTMENT (OUTPATIENT)
Dept: CARDIAC REHAB | Facility: HOSPITAL | Age: 58
End: 2018-06-21

## 2018-06-26 ENCOUNTER — APPOINTMENT (OUTPATIENT)
Dept: CARDIAC REHAB | Facility: HOSPITAL | Age: 58
End: 2018-06-26

## 2018-06-28 ENCOUNTER — APPOINTMENT (OUTPATIENT)
Dept: CARDIAC REHAB | Facility: HOSPITAL | Age: 58
End: 2018-06-28

## 2018-07-03 ENCOUNTER — APPOINTMENT (OUTPATIENT)
Dept: CARDIAC REHAB | Facility: HOSPITAL | Age: 58
End: 2018-07-03

## 2018-07-05 ENCOUNTER — APPOINTMENT (OUTPATIENT)
Dept: CARDIAC REHAB | Facility: HOSPITAL | Age: 58
End: 2018-07-05

## 2018-07-10 ENCOUNTER — APPOINTMENT (OUTPATIENT)
Dept: CARDIAC REHAB | Facility: HOSPITAL | Age: 58
End: 2018-07-10

## 2018-07-12 ENCOUNTER — APPOINTMENT (OUTPATIENT)
Dept: CARDIAC REHAB | Facility: HOSPITAL | Age: 58
End: 2018-07-12

## 2018-07-17 ENCOUNTER — APPOINTMENT (OUTPATIENT)
Dept: CARDIAC REHAB | Facility: HOSPITAL | Age: 58
End: 2018-07-17

## 2018-07-19 ENCOUNTER — APPOINTMENT (OUTPATIENT)
Dept: CARDIAC REHAB | Facility: HOSPITAL | Age: 58
End: 2018-07-19

## 2018-07-24 ENCOUNTER — APPOINTMENT (OUTPATIENT)
Dept: CARDIAC REHAB | Facility: HOSPITAL | Age: 58
End: 2018-07-24

## 2018-07-26 ENCOUNTER — APPOINTMENT (OUTPATIENT)
Dept: CARDIAC REHAB | Facility: HOSPITAL | Age: 58
End: 2018-07-26

## 2018-07-31 ENCOUNTER — APPOINTMENT (OUTPATIENT)
Dept: CARDIAC REHAB | Facility: HOSPITAL | Age: 58
End: 2018-07-31

## 2018-08-02 ENCOUNTER — APPOINTMENT (OUTPATIENT)
Dept: CARDIAC REHAB | Facility: HOSPITAL | Age: 58
End: 2018-08-02

## 2018-08-07 ENCOUNTER — APPOINTMENT (OUTPATIENT)
Dept: CARDIAC REHAB | Facility: HOSPITAL | Age: 58
End: 2018-08-07

## 2018-08-09 ENCOUNTER — APPOINTMENT (OUTPATIENT)
Dept: CARDIAC REHAB | Facility: HOSPITAL | Age: 58
End: 2018-08-09

## 2018-08-14 ENCOUNTER — APPOINTMENT (OUTPATIENT)
Dept: CARDIAC REHAB | Facility: HOSPITAL | Age: 58
End: 2018-08-14

## 2018-10-15 ENCOUNTER — HOSPITAL ENCOUNTER (OUTPATIENT)
Dept: ULTRASOUND IMAGING | Facility: HOSPITAL | Age: 58
Discharge: HOME | End: 2018-10-15
Admitting: NURSE PRACTITIONER

## 2018-10-15 ENCOUNTER — OFFICE VISIT (OUTPATIENT)
Dept: VASCULAR SURGERY | Facility: CLINIC | Age: 58
End: 2018-10-15

## 2018-10-15 VITALS
HEART RATE: 60 BPM | SYSTOLIC BLOOD PRESSURE: 144 MMHG | WEIGHT: 130 LBS | DIASTOLIC BLOOD PRESSURE: 84 MMHG | OXYGEN SATURATION: 99 % | HEIGHT: 66 IN | BODY MASS INDEX: 20.89 KG/M2

## 2018-10-15 DIAGNOSIS — I10 ESSENTIAL HYPERTENSION: ICD-10-CM

## 2018-10-15 DIAGNOSIS — Z72.0 TOBACCO USE: ICD-10-CM

## 2018-10-15 DIAGNOSIS — E78.5 HYPERLIPIDEMIA, UNSPECIFIED HYPERLIPIDEMIA TYPE: ICD-10-CM

## 2018-10-15 DIAGNOSIS — I65.23 BILATERAL CAROTID ARTERY STENOSIS: ICD-10-CM

## 2018-10-15 DIAGNOSIS — I65.23 BILATERAL CAROTID ARTERY STENOSIS: Primary | ICD-10-CM

## 2018-10-15 PROCEDURE — 93880 EXTRACRANIAL BILAT STUDY: CPT

## 2018-10-15 PROCEDURE — 99213 OFFICE O/P EST LOW 20 MIN: CPT | Performed by: NURSE PRACTITIONER

## 2018-10-15 PROCEDURE — 93880 EXTRACRANIAL BILAT STUDY: CPT | Performed by: SURGERY

## 2018-10-15 RX ORDER — PENTAZOCINE HYDROCHLORIDE AND NALOXONE HYDROCHLORIDE 50; .5 MG/1; MG/1
TABLET ORAL
Status: ON HOLD | COMMUNITY
Start: 2018-09-26 | End: 2022-01-27

## 2018-10-15 NOTE — PROGRESS NOTES
"10/15/2018       Sharad Cline MD  [address] [facility] [address] [address]    Sharad Ryder  1960    Chief Complaint   Patient presents with   • Follow-up     6 month f/u with testing.  No symptoms noted       Dear Sharad Cline MD       HPI  I had the pleasure of seeing your patient Sharad Ryder in the office today.  As you recall, Sharad Ryder is a 58 y.o.  male who we are following for asymptomatic carotid disease. He was initially seen after significant carotid disease found during workup for CABG.  He did have a carotid duplex and CTA of the neck showing significant disease on the right.  He did undergo a right carotid endarterectomy on 2/1/2018 and is now back for follow up. He had AVR and 3 vessel bypass on 2/8/18.  He did have noninvasive testing performed today, which I did review in office.    Review of Systems   Constitutional: Positive for fatigue.   HENT: Negative.    Eyes: Negative.    Respiratory: Positive for shortness of breath.    Cardiovascular: Negative.  Negative for chest pain.   Gastrointestinal: Negative.  Negative for abdominal pain.   Endocrine: Negative.    Genitourinary: Negative.    Musculoskeletal: Positive for back pain.   Skin: Negative.    Allergic/Immunologic: Negative.    Neurological: Negative.    Hematological: Negative.    Psychiatric/Behavioral: Negative.    All other systems reviewed and are negative.      /84 (BP Location: Left arm, Patient Position: Sitting, Cuff Size: Adult)   Pulse 60   Ht 167.6 cm (66\")   Wt 59 kg (130 lb)   SpO2 99%   BMI 20.98 kg/m²   Physical Exam   Constitutional: He is oriented to person, place, and time. He appears well-developed and well-nourished.   HENT:   Head: Normocephalic and atraumatic.   Eyes: Pupils are equal, round, and reactive to light. No scleral icterus.   Neck: Normal range of motion. Neck supple. No thyromegaly present.   Right neck incision healed   Cardiovascular: Normal rate and regular rhythm.  "   Murmur heard.  Pulses:       Femoral pulses are 2+ on the right side, and 2+ on the left side.       Popliteal pulses are 1+ on the right side, and 1+ on the left side.        Dorsalis pedis pulses are 1+ on the right side, and 1+ on the left side.        Posterior tibial pulses are 1+ on the right side, and 1+ on the left side.   Sternal incision healing   Pulmonary/Chest: Effort normal. He has decreased breath sounds.   Abdominal: Soft. Bowel sounds are normal.   Musculoskeletal: Normal range of motion.   Neurological: He is alert and oriented to person, place, and time.   Skin: Skin is warm and dry.   Psychiatric: He has a normal mood and affect. His behavior is normal. Judgment and thought content normal.   Nursing note and vitals reviewed.    Diagnostic Data:  Noninvasive testing including a carotid duplex shows less than 50% stenosis bilaterally with bilateral antegrade vertebral flow.      Patient Active Problem List   Diagnosis   • Shortness of breath   • Severe aortic valve stenosis   • Tobacco use   • Hyperlipidemia   • Hypertension   • History of non-ST elevation myocardial infarction (NSTEMI)   • Chest pain   • Coronary artery disease involving native coronary artery of native heart with angina pectoris (CMS/[facility])   • COPD (chronic obstructive pulmonary disease) (CMS/[facility])   • LAZ (acute kidney injury) (CMS/[facility])   • History of influenza   • Carotid stenosis, right   • Post-op pain   • Aortic valve stenosis   • Protein calorie malnutrition (CMS/[facility])   • Anemia   • Hyponatremia   • BMI 21.0-21.9, adult         ICD-10-CM ICD-9-CM   1. Bilateral carotid artery stenosis I65.23 433.10     433.30   2. Hyperlipidemia, unspecified hyperlipidemia type E78.5 272.4   3. Essential hypertension I10 401.9   4. Tobacco use Z72.0 305.1       Plan: After thoroughly evaluating Sharad Ryder, I believe the best course of action is to remain conservative from a vascular standpoint.  He is doing well and denies any  strokelike symptoms.    We will see him back in 6 months with repeat noninvasive testing for continued surveillance.  I advised the patient of the risks in continuing to use tobacco, and I provided this patient with smoking cessation educational materials.  I also discussed how to quit smoking and the patient has not expressed the willingness to quit.  During this visit, I spent greater than 10 minutes counseling the patient regarding smoking cessation.  I did discuss vascular risk factors as they pertain to the progression of vascular disease including controlling his hypertension, hyperlipidemia, and smoking cessation.  Body mass index is 20.98 kg/m². The patient can continue taking her current medication regimen as previously planned.  This was all discussed in full with complete understanding.    Thank you for allowing me to participate in the care of your patient.  Please do not hesitate with any questions or concerns.  I will keep you aware of any further encounters with Sharad Ryder.        Sincerely yours,         KIRBY Nolan Gary V, MD

## 2019-04-16 ENCOUNTER — HOSPITAL ENCOUNTER (OUTPATIENT)
Facility: HOSPITAL | Age: 59
Setting detail: OBSERVATION
Discharge: HOME | End: 2019-04-17
Attending: EMERGENCY MEDICINE | Admitting: FAMILY MEDICINE

## 2019-04-16 ENCOUNTER — APPOINTMENT (OUTPATIENT)
Dept: GENERAL RADIOLOGY | Facility: HOSPITAL | Age: 59
End: 2019-04-16

## 2019-04-16 ENCOUNTER — APPOINTMENT (OUTPATIENT)
Dept: CT IMAGING | Facility: HOSPITAL | Age: 59
End: 2019-04-16

## 2019-04-16 DIAGNOSIS — R07.9 CHEST PAIN, UNSPECIFIED TYPE: ICD-10-CM

## 2019-04-16 DIAGNOSIS — G45.9 TIA (TRANSIENT ISCHEMIC ATTACK): Primary | ICD-10-CM

## 2019-04-16 DIAGNOSIS — R91.8 MASS OF UPPER LOBE OF LEFT LUNG: ICD-10-CM

## 2019-04-16 DIAGNOSIS — T78.40XA ALLERGIC REACTION, INITIAL ENCOUNTER: ICD-10-CM

## 2019-04-16 LAB
ALBUMIN SERPL-MCNC: 4 G/DL (ref 3.5–5)
ALBUMIN/GLOB SERPL: 2.1 G/DL (ref 1.1–2.5)
ALP SERPL-CCNC: 67 U/L (ref 24–120)
ALT SERPL W P-5'-P-CCNC: 19 U/L (ref 0–54)
ANION GAP SERPL CALCULATED.3IONS-SCNC: 11 MMOL/L (ref 4–13)
APTT PPP: 25.5 SECONDS (ref 24.1–35)
AST SERPL-CCNC: 24 U/L (ref 7–45)
BILIRUB SERPL-MCNC: 0.3 MG/DL (ref 0.1–1)
BUN BLD-MCNC: 7 MG/DL (ref 5–21)
BUN/CREAT SERPL: 9.5 (ref 7–25)
CALCIUM SPEC-SCNC: 9.3 MG/DL (ref 8.4–10.4)
CHLORIDE SERPL-SCNC: 98 MMOL/L (ref 98–110)
CO2 SERPL-SCNC: 27 MMOL/L (ref 24–31)
CREAT BLD-MCNC: 0.74 MG/DL (ref 0.5–1.4)
DEPRECATED RDW RBC AUTO: 44.1 FL (ref 40–54)
EOSINOPHIL # BLD MANUAL: 0.22 10*3/MM3 (ref 0–0.7)
EOSINOPHIL NFR BLD MANUAL: 2 % (ref 0–4)
ERYTHROCYTE [DISTWIDTH] IN BLOOD BY AUTOMATED COUNT: 13 % (ref 12–15)
GFR SERPL CREATININE-BSD FRML MDRD: 108 ML/MIN/1.73
GIANT PLATELETS: ABNORMAL
GLOBULIN UR ELPH-MCNC: 1.9 GM/DL
GLUCOSE BLD-MCNC: 143 MG/DL (ref 70–100)
GLUCOSE BLDC GLUCOMTR-MCNC: 141 MG/DL (ref 70–130)
HCT VFR BLD AUTO: 46.4 % (ref 40–52)
HGB BLD-MCNC: 16 G/DL (ref 14–18)
HOLD SPECIMEN: NORMAL
HOLD SPECIMEN: NORMAL
INR PPP: 0.97 (ref 0.91–1.09)
LYMPHOCYTES # BLD MANUAL: 4.71 10*3/MM3 (ref 0.72–4.86)
LYMPHOCYTES NFR BLD MANUAL: 42.9 % (ref 15–45)
LYMPHOCYTES NFR BLD MANUAL: 5.1 % (ref 4–12)
MCH RBC QN AUTO: 31.9 PG (ref 28–32)
MCHC RBC AUTO-ENTMCNC: 34.5 G/DL (ref 33–36)
MCV RBC AUTO: 92.6 FL (ref 82–95)
MONOCYTES # BLD AUTO: 0.56 10*3/MM3 (ref 0.19–1.3)
NEUTROPHILS # BLD AUTO: 4.82 10*3/MM3 (ref 1.87–8.4)
NEUTROPHILS NFR BLD MANUAL: 43.9 % (ref 39–78)
PLATELET # BLD AUTO: 340 10*3/MM3 (ref 130–400)
PMV BLD AUTO: 9.6 FL (ref 6–12)
POTASSIUM BLD-SCNC: 3.6 MMOL/L (ref 3.5–5.3)
PROT SERPL-MCNC: 5.9 G/DL (ref 6.3–8.7)
PROTHROMBIN TIME: 13.2 SECONDS (ref 11.9–14.6)
RBC # BLD AUTO: 5.01 10*6/MM3 (ref 4.8–5.9)
RBC MORPH BLD: NORMAL
SODIUM BLD-SCNC: 136 MMOL/L (ref 135–145)
TROPONIN I SERPL-MCNC: 0.04 NG/ML (ref 0–0.03)
VARIANT LYMPHS NFR BLD MANUAL: 6.1 % (ref 0–5)
WBC MORPH BLD: NORMAL
WBC NRBC COR # BLD: 10.97 10*3/MM3 (ref 4.8–10.8)
WHOLE BLOOD HOLD SPECIMEN: NORMAL
WHOLE BLOOD HOLD SPECIMEN: NORMAL

## 2019-04-16 PROCEDURE — 82962 GLUCOSE BLOOD TEST: CPT

## 2019-04-16 PROCEDURE — 85025 COMPLETE CBC W/AUTO DIFF WBC: CPT | Performed by: EMERGENCY MEDICINE

## 2019-04-16 PROCEDURE — 93005 ELECTROCARDIOGRAM TRACING: CPT

## 2019-04-16 PROCEDURE — 99285 EMERGENCY DEPT VISIT HI MDM: CPT

## 2019-04-16 PROCEDURE — 85007 BL SMEAR W/DIFF WBC COUNT: CPT | Performed by: EMERGENCY MEDICINE

## 2019-04-16 PROCEDURE — 80053 COMPREHEN METABOLIC PANEL: CPT | Performed by: EMERGENCY MEDICINE

## 2019-04-16 PROCEDURE — 93010 ELECTROCARDIOGRAM REPORT: CPT | Performed by: INTERNAL MEDICINE

## 2019-04-16 PROCEDURE — 74174 CTA ABD&PLVS W/CONTRAST: CPT

## 2019-04-16 PROCEDURE — 70450 CT HEAD/BRAIN W/O DYE: CPT

## 2019-04-16 PROCEDURE — 71045 X-RAY EXAM CHEST 1 VIEW: CPT

## 2019-04-16 PROCEDURE — 71275 CT ANGIOGRAPHY CHEST: CPT

## 2019-04-16 PROCEDURE — 85730 THROMBOPLASTIN TIME PARTIAL: CPT | Performed by: EMERGENCY MEDICINE

## 2019-04-16 PROCEDURE — 93005 ELECTROCARDIOGRAM TRACING: CPT | Performed by: EMERGENCY MEDICINE

## 2019-04-16 PROCEDURE — 85610 PROTHROMBIN TIME: CPT | Performed by: EMERGENCY MEDICINE

## 2019-04-16 PROCEDURE — 84484 ASSAY OF TROPONIN QUANT: CPT | Performed by: EMERGENCY MEDICINE

## 2019-04-16 RX ORDER — SODIUM CHLORIDE 0.9 % (FLUSH) 0.9 %
10 SYRINGE (ML) INJECTION AS NEEDED
Status: DISCONTINUED | OUTPATIENT
Start: 2019-04-16 | End: 2019-04-17 | Stop reason: HOSPADM

## 2019-04-16 RX ADMIN — IOPAMIDOL 100 ML: 755 INJECTION, SOLUTION INTRAVENOUS at 23:52

## 2019-04-16 RX ADMIN — SODIUM CHLORIDE 1000 ML: 9 INJECTION, SOLUTION INTRAVENOUS at 23:10

## 2019-04-17 ENCOUNTER — APPOINTMENT (OUTPATIENT)
Dept: MRI IMAGING | Facility: HOSPITAL | Age: 59
End: 2019-04-17

## 2019-04-17 ENCOUNTER — APPOINTMENT (OUTPATIENT)
Dept: ULTRASOUND IMAGING | Facility: HOSPITAL | Age: 59
End: 2019-04-17

## 2019-04-17 ENCOUNTER — APPOINTMENT (OUTPATIENT)
Dept: CARDIOLOGY | Facility: HOSPITAL | Age: 59
End: 2019-04-17

## 2019-04-17 VITALS
HEART RATE: 89 BPM | WEIGHT: 132.38 LBS | BODY MASS INDEX: 21.28 KG/M2 | OXYGEN SATURATION: 94 % | DIASTOLIC BLOOD PRESSURE: 55 MMHG | TEMPERATURE: 98.3 F | RESPIRATION RATE: 16 BRPM | SYSTOLIC BLOOD PRESSURE: 134 MMHG | HEIGHT: 66 IN

## 2019-04-17 PROBLEM — G45.9 TRANSIENT ISCHEMIC ATTACK (TIA): Status: ACTIVE | Noted: 2019-04-17

## 2019-04-17 PROBLEM — Z95.1 HX OF CABG: Status: ACTIVE | Noted: 2019-04-17

## 2019-04-17 PROBLEM — J44.9 COPD (CHRONIC OBSTRUCTIVE PULMONARY DISEASE): Status: ACTIVE | Noted: 2019-04-17

## 2019-04-17 PROBLEM — I25.10 CORONARY ARTERY DISEASE INVOLVING NATIVE CORONARY ARTERY OF NATIVE HEART WITHOUT ANGINA PECTORIS: Status: ACTIVE | Noted: 2019-04-17

## 2019-04-17 PROBLEM — I65.21 CAROTID STENOSIS, RIGHT: Chronic | Status: ACTIVE | Noted: 2018-02-01

## 2019-04-17 PROBLEM — T78.40XA ALLERGIC REACTION: Status: ACTIVE | Noted: 2019-04-17

## 2019-04-17 LAB
ANION GAP SERPL CALCULATED.3IONS-SCNC: 9 MMOL/L (ref 4–13)
ARTICHOKE IGE QN: 113 MG/DL (ref 0–99)
BASOPHILS # BLD AUTO: 0.03 10*3/MM3 (ref 0–0.2)
BASOPHILS NFR BLD AUTO: 0.3 % (ref 0–2)
BH CV ECHO MEAS - AO MAX PG (FULL): 33.1 MMHG
BH CV ECHO MEAS - AO MAX PG: 46.2 MMHG
BH CV ECHO MEAS - AO MEAN PG (FULL): 18.3 MMHG
BH CV ECHO MEAS - AO MEAN PG: 25.3 MMHG
BH CV ECHO MEAS - AO ROOT AREA (BSA CORRECTED): 1.2
BH CV ECHO MEAS - AO ROOT AREA: 3.1 CM^2
BH CV ECHO MEAS - AO ROOT DIAM: 2 CM
BH CV ECHO MEAS - AO V2 MAX: 340 CM/SEC
BH CV ECHO MEAS - AO V2 MEAN: 234.3 CM/SEC
BH CV ECHO MEAS - AO V2 VTI: 60.9 CM
BH CV ECHO MEAS - AVA(I,A): 1.1 CM^2
BH CV ECHO MEAS - AVA(I,D): 1.1 CM^2
BH CV ECHO MEAS - AVA(V,A): 1.1 CM^2
BH CV ECHO MEAS - AVA(V,D): 1.1 CM^2
BH CV ECHO MEAS - BSA(HAYCOCK): 1.7 M^2
BH CV ECHO MEAS - BSA: 1.7 M^2
BH CV ECHO MEAS - BZI_BMI: 21.3 KILOGRAMS/M^2
BH CV ECHO MEAS - BZI_METRIC_HEIGHT: 167.6 CM
BH CV ECHO MEAS - BZI_METRIC_WEIGHT: 59.9 KG
BH CV ECHO MEAS - EDV(CUBED): 103.8 ML
BH CV ECHO MEAS - EDV(MOD-SP4): 52.8 ML
BH CV ECHO MEAS - EDV(TEICH): 102.4 ML
BH CV ECHO MEAS - EF(CUBED): 81.9 %
BH CV ECHO MEAS - EF(MOD-SP4): 71.6 %
BH CV ECHO MEAS - EF(TEICH): 74.6 %
BH CV ECHO MEAS - ESV(CUBED): 18.8 ML
BH CV ECHO MEAS - ESV(MOD-SP4): 15 ML
BH CV ECHO MEAS - ESV(TEICH): 26 ML
BH CV ECHO MEAS - FS: 43.4 %
BH CV ECHO MEAS - IVS/LVPW: 0.86
BH CV ECHO MEAS - IVSD: 1.1 CM
BH CV ECHO MEAS - LA DIMENSION: 3.8 CM
BH CV ECHO MEAS - LA/AO: 1.9
BH CV ECHO MEAS - LAT PEAK E' VEL: 4.2 CM/SEC
BH CV ECHO MEAS - LV DIASTOLIC VOL/BSA (35-75): 31.5 ML/M^2
BH CV ECHO MEAS - LV MASS(C)D: 202 GRAMS
BH CV ECHO MEAS - LV MASS(C)DI: 120.5 GRAMS/M^2
BH CV ECHO MEAS - LV MAX PG: 13.1 MMHG
BH CV ECHO MEAS - LV MEAN PG: 7 MMHG
BH CV ECHO MEAS - LV SYSTOLIC VOL/BSA (12-30): 8.9 ML/M^2
BH CV ECHO MEAS - LV V1 MAX: 180.7 CM/SEC
BH CV ECHO MEAS - LV V1 MEAN: 123 CM/SEC
BH CV ECHO MEAS - LV V1 VTI: 33.7 CM
BH CV ECHO MEAS - LVIDD: 4.7 CM
BH CV ECHO MEAS - LVIDS: 2.7 CM
BH CV ECHO MEAS - LVLD AP4: 8.3 CM
BH CV ECHO MEAS - LVLS AP4: 6 CM
BH CV ECHO MEAS - LVOT AREA (M): 2 CM^2
BH CV ECHO MEAS - LVOT AREA: 2 CM^2
BH CV ECHO MEAS - LVOT DIAM: 1.6 CM
BH CV ECHO MEAS - LVPWD: 1.3 CM
BH CV ECHO MEAS - MED PEAK E' VEL: 5.77 CM/SEC
BH CV ECHO MEAS - MV A MAX VEL: 131 CM/SEC
BH CV ECHO MEAS - MV DEC SLOPE: 484 CM/SEC^2
BH CV ECHO MEAS - MV DEC TIME: 0.23 SEC
BH CV ECHO MEAS - MV E MAX VEL: 133 CM/SEC
BH CV ECHO MEAS - MV E/A: 1
BH CV ECHO MEAS - MV MAX PG: 8.8 MMHG
BH CV ECHO MEAS - MV MEAN PG: 5 MMHG
BH CV ECHO MEAS - MV P1/2T MAX VEL: 147 CM/SEC
BH CV ECHO MEAS - MV P1/2T: 89 MSEC
BH CV ECHO MEAS - MV V2 MAX: 148 CM/SEC
BH CV ECHO MEAS - MV V2 MEAN: 101 CM/SEC
BH CV ECHO MEAS - MV V2 VTI: 38.7 CM
BH CV ECHO MEAS - MVA P1/2T LCG: 1.5 CM^2
BH CV ECHO MEAS - MVA(P1/2T): 2.5 CM^2
BH CV ECHO MEAS - MVA(VTI): 1.8 CM^2
BH CV ECHO MEAS - PA MAX PG: 2.2 MMHG
BH CV ECHO MEAS - PA V2 MAX: 74.2 CM/SEC
BH CV ECHO MEAS - RAP SYSTOLE: 5 MMHG
BH CV ECHO MEAS - RVSP: 37.3 MMHG
BH CV ECHO MEAS - SI(AO): 114.2 ML/M^2
BH CV ECHO MEAS - SI(CUBED): 50.7 ML/M^2
BH CV ECHO MEAS - SI(LVOT): 40.4 ML/M^2
BH CV ECHO MEAS - SI(MOD-SP4): 22.5 ML/M^2
BH CV ECHO MEAS - SI(TEICH): 45.5 ML/M^2
BH CV ECHO MEAS - SV(AO): 191.4 ML
BH CV ECHO MEAS - SV(CUBED): 85 ML
BH CV ECHO MEAS - SV(LVOT): 67.8 ML
BH CV ECHO MEAS - SV(MOD-SP4): 37.8 ML
BH CV ECHO MEAS - SV(TEICH): 76.3 ML
BH CV ECHO MEAS - TR MAX VEL: 284 CM/SEC
BH CV ECHO MEASUREMENTS AVERAGE E/E' RATIO: 26.68
BILIRUB UR QL STRIP: NEGATIVE
BUN BLD-MCNC: 6 MG/DL (ref 5–21)
BUN/CREAT SERPL: 9.8 (ref 7–25)
CALCIUM SPEC-SCNC: 8.2 MG/DL (ref 8.4–10.4)
CHLORIDE SERPL-SCNC: 104 MMOL/L (ref 98–110)
CHOLEST SERPL-MCNC: 177 MG/DL (ref 130–200)
CLARITY UR: CLEAR
CO2 SERPL-SCNC: 23 MMOL/L (ref 24–31)
COLOR UR: YELLOW
CREAT BLD-MCNC: 0.61 MG/DL (ref 0.5–1.4)
DEPRECATED RDW RBC AUTO: 46.2 FL (ref 40–54)
EOSINOPHIL # BLD AUTO: 0.09 10*3/MM3 (ref 0–0.7)
EOSINOPHIL NFR BLD AUTO: 0.9 % (ref 0–4)
ERYTHROCYTE [DISTWIDTH] IN BLOOD BY AUTOMATED COUNT: 13.3 % (ref 12–15)
GFR SERPL CREATININE-BSD FRML MDRD: 135 ML/MIN/1.73
GLUCOSE BLD-MCNC: 108 MG/DL (ref 70–100)
GLUCOSE UR STRIP-MCNC: ABNORMAL MG/DL
HBA1C MFR BLD: 5.7 %
HCT VFR BLD AUTO: 39.6 % (ref 40–52)
HDLC SERPL-MCNC: 33 MG/DL
HGB BLD-MCNC: 13.3 G/DL (ref 14–18)
HGB UR QL STRIP.AUTO: NEGATIVE
IMM GRANULOCYTES # BLD AUTO: 0.05 10*3/MM3 (ref 0–0.05)
IMM GRANULOCYTES NFR BLD AUTO: 0.5 % (ref 0–5)
KETONES UR QL STRIP: NEGATIVE
LDLC/HDLC SERPL: 2.56 {RATIO}
LEFT ATRIUM VOLUME INDEX: 40.4 ML/M2
LEFT ATRIUM VOLUME: 67.8 CM3
LEUKOCYTE ESTERASE UR QL STRIP.AUTO: NEGATIVE
LYMPHOCYTES # BLD AUTO: 1.82 10*3/MM3 (ref 0.72–4.86)
LYMPHOCYTES NFR BLD AUTO: 17.3 % (ref 15–45)
MAXIMAL PREDICTED HEART RATE: 161 BPM
MCH RBC QN AUTO: 31.7 PG (ref 28–32)
MCHC RBC AUTO-ENTMCNC: 33.6 G/DL (ref 33–36)
MCV RBC AUTO: 94.3 FL (ref 82–95)
MONOCYTES # BLD AUTO: 0.69 10*3/MM3 (ref 0.19–1.3)
MONOCYTES NFR BLD AUTO: 6.6 % (ref 4–12)
NEUTROPHILS # BLD AUTO: 7.83 10*3/MM3 (ref 1.87–8.4)
NEUTROPHILS NFR BLD AUTO: 74.4 % (ref 39–78)
NITRITE UR QL STRIP: NEGATIVE
NRBC BLD AUTO-RTO: 0 /100 WBC (ref 0–0)
PH UR STRIP.AUTO: 7.5 [PH] (ref 5–8)
PLATELET # BLD AUTO: 288 10*3/MM3 (ref 130–400)
PMV BLD AUTO: 9.8 FL (ref 6–12)
POTASSIUM BLD-SCNC: 3.9 MMOL/L (ref 3.5–5.3)
PROT UR QL STRIP: ABNORMAL
RBC # BLD AUTO: 4.2 10*6/MM3 (ref 4.8–5.9)
SODIUM BLD-SCNC: 136 MMOL/L (ref 135–145)
SP GR UR STRIP: >1.03 (ref 1–1.03)
STRESS TARGET HR: 137 BPM
TRIGL SERPL-MCNC: 298 MG/DL (ref 0–149)
TROPONIN I SERPL-MCNC: 0.03 NG/ML (ref 0–0.03)
TSH SERPL DL<=0.05 MIU/L-ACNC: 0.7 MIU/ML (ref 0.47–4.68)
UROBILINOGEN UR QL STRIP: ABNORMAL
WBC NRBC COR # BLD: 10.51 10*3/MM3 (ref 4.8–10.8)

## 2019-04-17 PROCEDURE — 84484 ASSAY OF TROPONIN QUANT: CPT | Performed by: EMERGENCY MEDICINE

## 2019-04-17 PROCEDURE — 93306 TTE W/DOPPLER COMPLETE: CPT | Performed by: INTERNAL MEDICINE

## 2019-04-17 PROCEDURE — G0378 HOSPITAL OBSERVATION PER HR: HCPCS

## 2019-04-17 PROCEDURE — 70553 MRI BRAIN STEM W/O & W/DYE: CPT

## 2019-04-17 PROCEDURE — 85025 COMPLETE CBC W/AUTO DIFF WBC: CPT | Performed by: FAMILY MEDICINE

## 2019-04-17 PROCEDURE — 80048 BASIC METABOLIC PNL TOTAL CA: CPT | Performed by: FAMILY MEDICINE

## 2019-04-17 PROCEDURE — 93880 EXTRACRANIAL BILAT STUDY: CPT | Performed by: SURGERY

## 2019-04-17 PROCEDURE — 93880 EXTRACRANIAL BILAT STUDY: CPT

## 2019-04-17 PROCEDURE — 93005 ELECTROCARDIOGRAM TRACING: CPT | Performed by: EMERGENCY MEDICINE

## 2019-04-17 PROCEDURE — 81003 URINALYSIS AUTO W/O SCOPE: CPT | Performed by: EMERGENCY MEDICINE

## 2019-04-17 PROCEDURE — 0 IOPAMIDOL PER 1 ML: Performed by: EMERGENCY MEDICINE

## 2019-04-17 PROCEDURE — 93010 ELECTROCARDIOGRAM REPORT: CPT | Performed by: INTERNAL MEDICINE

## 2019-04-17 PROCEDURE — 96372 THER/PROPH/DIAG INJ SC/IM: CPT

## 2019-04-17 PROCEDURE — 94799 UNLISTED PULMONARY SVC/PX: CPT

## 2019-04-17 PROCEDURE — 99204 OFFICE O/P NEW MOD 45 MIN: CPT | Performed by: PSYCHIATRY & NEUROLOGY

## 2019-04-17 PROCEDURE — 84443 ASSAY THYROID STIM HORMONE: CPT | Performed by: FAMILY MEDICINE

## 2019-04-17 PROCEDURE — 93306 TTE W/DOPPLER COMPLETE: CPT

## 2019-04-17 PROCEDURE — 80061 LIPID PANEL: CPT | Performed by: FAMILY MEDICINE

## 2019-04-17 PROCEDURE — 0 GADOBENATE DIMEGLUMINE 529 MG/ML SOLUTION: Performed by: FAMILY MEDICINE

## 2019-04-17 PROCEDURE — A9577 INJ MULTIHANCE: HCPCS | Performed by: FAMILY MEDICINE

## 2019-04-17 PROCEDURE — 25010000002 ENOXAPARIN PER 10 MG: Performed by: FAMILY MEDICINE

## 2019-04-17 PROCEDURE — 83036 HEMOGLOBIN GLYCOSYLATED A1C: CPT | Performed by: FAMILY MEDICINE

## 2019-04-17 RX ORDER — ASPIRIN 325 MG
325 TABLET ORAL ONCE
Status: COMPLETED | OUTPATIENT
Start: 2019-04-17 | End: 2019-04-17

## 2019-04-17 RX ORDER — SODIUM CHLORIDE 0.9 % (FLUSH) 0.9 %
3 SYRINGE (ML) INJECTION EVERY 12 HOURS SCHEDULED
Status: DISCONTINUED | OUTPATIENT
Start: 2019-04-17 | End: 2019-04-17 | Stop reason: HOSPADM

## 2019-04-17 RX ORDER — ATORVASTATIN CALCIUM 40 MG/1
80 TABLET, FILM COATED ORAL NIGHTLY
Status: DISCONTINUED | OUTPATIENT
Start: 2019-04-17 | End: 2019-04-17 | Stop reason: HOSPADM

## 2019-04-17 RX ORDER — SODIUM CHLORIDE 0.9 % (FLUSH) 0.9 %
3-10 SYRINGE (ML) INJECTION AS NEEDED
Status: DISCONTINUED | OUTPATIENT
Start: 2019-04-17 | End: 2019-04-17 | Stop reason: HOSPADM

## 2019-04-17 RX ORDER — ACETAMINOPHEN 325 MG/1
650 TABLET ORAL EVERY 4 HOURS PRN
Status: DISCONTINUED | OUTPATIENT
Start: 2019-04-17 | End: 2019-04-17 | Stop reason: HOSPADM

## 2019-04-17 RX ORDER — IPRATROPIUM BROMIDE AND ALBUTEROL SULFATE 2.5; .5 MG/3ML; MG/3ML
3 SOLUTION RESPIRATORY (INHALATION) EVERY 4 HOURS PRN
Status: DISCONTINUED | OUTPATIENT
Start: 2019-04-17 | End: 2019-04-17 | Stop reason: HOSPADM

## 2019-04-17 RX ORDER — CITALOPRAM 20 MG/1
20 TABLET ORAL DAILY
Status: DISCONTINUED | OUTPATIENT
Start: 2019-04-17 | End: 2019-04-17 | Stop reason: HOSPADM

## 2019-04-17 RX ORDER — METOPROLOL TARTRATE 50 MG/1
50 TABLET, FILM COATED ORAL EVERY 12 HOURS SCHEDULED
Status: DISCONTINUED | OUTPATIENT
Start: 2019-04-17 | End: 2019-04-17 | Stop reason: HOSPADM

## 2019-04-17 RX ORDER — ASPIRIN 81 MG/1
81 TABLET ORAL DAILY
Status: DISCONTINUED | OUTPATIENT
Start: 2019-04-18 | End: 2019-04-17 | Stop reason: HOSPADM

## 2019-04-17 RX ORDER — CLOPIDOGREL BISULFATE 75 MG/1
75 TABLET ORAL DAILY
Status: DISCONTINUED | OUTPATIENT
Start: 2019-04-17 | End: 2019-04-17 | Stop reason: HOSPADM

## 2019-04-17 RX ORDER — ONDANSETRON 2 MG/ML
4 INJECTION INTRAMUSCULAR; INTRAVENOUS EVERY 6 HOURS PRN
Status: DISCONTINUED | OUTPATIENT
Start: 2019-04-17 | End: 2019-04-17 | Stop reason: HOSPADM

## 2019-04-17 RX ORDER — LISINOPRIL 2.5 MG/1
2.5 TABLET ORAL
Status: DISCONTINUED | OUTPATIENT
Start: 2019-04-17 | End: 2019-04-17 | Stop reason: HOSPADM

## 2019-04-17 RX ADMIN — GADOBENATE DIMEGLUMINE 10 ML: 529 INJECTION, SOLUTION INTRAVENOUS at 10:00

## 2019-04-17 RX ADMIN — ACETAMINOPHEN 650 MG: 325 TABLET, FILM COATED ORAL at 10:38

## 2019-04-17 RX ADMIN — CLOPIDOGREL 75 MG: 75 TABLET, FILM COATED ORAL at 10:34

## 2019-04-17 RX ADMIN — CITALOPRAM 20 MG: 20 TABLET, FILM COATED ORAL at 10:34

## 2019-04-17 RX ADMIN — LISINOPRIL 2.5 MG: 2.5 TABLET ORAL at 10:34

## 2019-04-17 RX ADMIN — SODIUM CHLORIDE, PRESERVATIVE FREE 3 ML: 5 INJECTION INTRAVENOUS at 10:35

## 2019-04-17 RX ADMIN — METOPROLOL TARTRATE 50 MG: 50 TABLET ORAL at 10:34

## 2019-04-17 RX ADMIN — ASPIRIN 325 MG: 325 TABLET, COATED ORAL at 02:30

## 2019-04-17 RX ADMIN — ENOXAPARIN SODIUM 40 MG: 40 INJECTION SUBCUTANEOUS at 10:35

## 2019-04-17 NOTE — PLAN OF CARE
Problem: Patient Care Overview  Goal: Plan of Care Review  Outcome: Ongoing (interventions implemented as appropriate)   04/17/19 0458   Coping/Psychosocial   Plan of Care Reviewed With patient   Plan of Care Review   Progress no change   OTHER   Outcome Summary from er; vss; alert and oriented x 4; c/o back pain; nih = 0; up x 1 to br; continue to monitor     Goal: Individualization and Mutuality  Outcome: Ongoing (interventions implemented as appropriate)    Goal: Discharge Needs Assessment  Outcome: Ongoing (interventions implemented as appropriate)    Goal: Interprofessional Rounds/Family Conf  Outcome: Ongoing (interventions implemented as appropriate)      Problem: Stroke (Ischemic) (Adult)  Goal: Signs and Symptoms of Listed Potential Problems Will be Absent, Minimized or Managed (Stroke)  Outcome: Ongoing (interventions implemented as appropriate)

## 2019-04-17 NOTE — DISCHARGE SUMMARY
[facility] Medicine Services  DISCHARGE SUMMARY       Date of Admission: 4/16/2019  Date of Discharge:  4/17/2019  Primary Care Physician: Sharad Cline MD    Discharge Diagnoses:  Patient Active Problem List   Diagnosis   • Hyperlipidemia   • Hypertension   • COPD (chronic obstructive pulmonary disease) (CMS/HCC)   • Transient ischemic attack (TIA)   • Allergic reaction   • Coronary artery disease involving native coronary artery of native heart without angina pectoris         Presenting Problem/History of Present Illness:  TIA (transient ischemic attack) [G45.9]     Chief Complaint on Day of Discharge:   None    History of Present Illness on Day of Discharge:   The patient's admission symptoms have resolved completely.  He has had no further perioral paresthesias.  He relates that the paresthesias in his left arm have been occurring for a very long time and are associated with what he believes is a pinched nerve in his neck.  MRI scan of the brain shows no evidence of acute ischemia.  Neurology has evaluated the patient and has recommended the patient continue aspirin Plavix.  They felt no further workup was needed at this point and that there was no evidence of a TIA but instead a medication reaction.  The obligatory CT angiogram of the chest was performed in the emergency department which revealed severe emphysema and a new 7 mm nodule associated with a linear scarring in the left upper lobe.  Recommendation was to have a follow-up CT in 6 months.  Patient is appropriate for discharge home to follow-up with his primary care physician in order to schedule a repeat CT scan in 6 months.    Hospital Course  Is a 59-year-old white male with past known history of CABG, carotid endarterectomy, hypertension, COPD who presents the hospital with fairly sudden onset of left arm numbness and perioral numbness and tingling.  This actually happened after patient developed onset of a rash  of which he attributes to a likely allergic reaction to some unknown food.  He states this happens periodically however it has been quite some time.  He took a Benadryl about 10 minutes after that he developed some perioral numbness and tingling with possibility of some swelling and itching of his tongue.  He does state however this could have very well been due to the allergic reaction he was having.  He also however at the same time developed some left upper extremity numbness and tingling.  Again this was about 10 minutes after he had taken a Benadryl for some type of allergic reaction he was having.  He states he also felt a little dizzy at the time.  He denied having any trouble swallowing however he states his wife said he was difficult to understand.  Currently he is symptom-free.  This happened around 1030 the evening prior at time of arrival the patient was symptom-free.  He did arrive somewhat hypotensive however he was given labetalol in the ambulance due to some concern of hypertension.  Currently his blood pressure is normal again patient is asymptomatic.  He denies any history of previous stroke however CT scan does show some evidence of frontal lobe encephalomalacia.  CT scans were performed which did not really reveal anything acute other than a new pulmonary nodule that needs to be further evaluated and a known abdominal aneurysm without change in size.  Patient's vital signs have been stable.  Concern is that of a possible TIA and further evaluation has been recommended therefore admission has been requested.  Plan:    1. Admit for further eval of possible TIA although could have been due to allergic rxn and or benadryl. Mri, echo, carotids, neuro consult  2. Hx of cabg and CEA. Place on tele. Carotids, echo  3. Allergic rxn. Says it happens sometimes with foods.   4. Copd, stable.   5. New pulmonary nodule. Discussed with pt. Likely could follow with pulm outpt    The patient's admission symptoms  have resolved completely.  He has had no further perioral paresthesias.  He relates that the paresthesias in his left arm have been occurring for a very long time and are associated with what he believes is a pinched nerve in his neck.  MRI scan of the brain shows no evidence of acute ischemia.  Neurology has evaluated the patient and has recommended the patient continue aspirin Plavix.  They felt no further workup was needed at this point and that there was no evidence of a TIA but instead a medication reaction.  The obligatory CT angiogram of the chest was performed in the emergency department which revealed severe emphysema and a new 7 mm nodule associated with a linear scarring in the left upper lobe.  Recommendation was to have a follow-up CT in 6 months.  Patient is appropriate for discharge home to follow-up with his primary care physician in order to schedule a repeat CT scan in 6 months.      Consults:   Neurology:  Impression     · Hypotension  · Unlikely transient ischemic attack.  Likely medication reaction  · Tobacco use  · Hypertension historically  · Hyperlipidemia  · Aortic aneurysm stable according imaging     Plan     · Recommend statin therapy with an LDL goal less than 70  · Continue home doses of aspirin and Plavix  · No further neurologic workup needed at this time as I do not believe this is a transient ischemic attack but instead a medication reaction.  Next  ·    · Follow-up with neurology as needed.     I discussed the patients findings and my recommendations with patient     Juan Miramontes MD        Pertinent Test Results:   .  Lab Results (last 7 days)     Procedure Component Value Units Date/Time    Hemoglobin A1c [[id number]] Collected:  04/17/19 0458    Specimen:  Blood Updated:  04/17/19 0732     Hemoglobin A1C 5.7 %     Narrative:       Less than 6.0           Non-Diabetic Range  6.0-7.0                 ADA Therapeutic Target  Greater than 7.0        Action Suggested    TSH  [[id number]]  (Normal) Collected:  04/17/19 0458    Specimen:  Blood Updated:  04/17/19 0650     TSH 0.700 mIU/mL     Lipid Panel [[id number]]  (Abnormal) Collected:  04/17/19 0458    Specimen:  Blood Updated:  04/17/19 0630     Total Cholesterol 177 mg/dL      Triglycerides 298 mg/dL      HDL Cholesterol 33 mg/dL      LDL Cholesterol  113 mg/dL      LDL/HDL Ratio 2.56    Basic Metabolic Panel [[id number]]  (Abnormal) Collected:  04/17/19 0458    Specimen:  Blood Updated:  04/17/19 0620     Glucose 108 mg/dL      BUN 6 mg/dL      Creatinine 0.61 mg/dL      Sodium 136 mmol/L      Potassium 3.9 mmol/L      Chloride 104 mmol/L      CO2 23.0 mmol/L      Calcium 8.2 mg/dL      eGFR Non African Amer 135 mL/min/1.73      BUN/Creatinine Ratio 9.8     Anion Gap 9.0 mmol/L     Narrative:       GFR Normal >60  Chronic Kidney Disease <60  Kidney Failure <15    CBC Auto Differential [[id number]]  (Abnormal) Collected:  04/17/19 0458    Specimen:  Blood Updated:  04/17/19 0607     WBC 10.51 10*3/mm3      RBC 4.20 10*6/mm3      Hemoglobin 13.3 g/dL      Hematocrit 39.6 %      MCV 94.3 fL      MCH 31.7 pg      MCHC 33.6 g/dL      RDW 13.3 %      RDW-SD 46.2 fl      MPV 9.8 fL      Platelets 288 10*3/mm3      Neutrophil % 74.4 %      Lymphocyte % 17.3 %      Monocyte % 6.6 %      Eosinophil % 0.9 %      Basophil % 0.3 %      Immature Grans % 0.5 %      Neutrophils, Absolute 7.83 10*3/mm3      Lymphocytes, Absolute 1.82 10*3/mm3      Monocytes, Absolute 0.69 10*3/mm3      Eosinophils, Absolute 0.09 10*3/mm3      Basophils, Absolute 0.03 10*3/mm3      Immature Grans, Absolute 0.05 10*3/mm3      nRBC 0.0 /100 WBC     Urinalysis With Microscopic If Indicated (No Culture) - Urine, Clean Catch [[id number]]  (Abnormal) Collected:  04/17/19 0302    Specimen:  Urine, Clean Catch Updated:  04/17/19 0311     Color, UA Yellow     Appearance, UA Clear     pH, UA 7.5     Specific Gravity, UA >1.030     Glucose,  mg/dL (Trace)     Ketones, UA  Negative     Bilirubin, UA Negative     Blood, UA Negative     Protein, UA Trace     Leuk Esterase, UA Negative     Nitrite, UA Negative     Urobilinogen, UA 0.2 E.U./dL    Narrative:       Urine microscopic not indicated.    Troponin [[id number]]  (Normal) Collected:  04/17/19 0216    Specimen:  Blood Updated:  04/17/19 0245     Troponin I 0.029 ng/mL     [place] Draw [[id number]] Collected:  04/16/19 2253    Specimen:  Blood Updated:  04/16/19 2354    Narrative:       The following orders were created for panel order [place] Draw.  Procedure                               Abnormality         Status                     ---------                               -----------         ------                     Light Blue Top[[id number]]                                   Final result               Green Top (Gel)[[id number]]                                  Final result               Lavender Top[[id number]]                                     Final result               Red Top[[id number]]                                          Final result                 Please view results for these tests on the individual orders.    Green Top (Gel) [[id number]] Collected:  04/16/19 2253    Specimen:  Blood Updated:  04/16/19 2354     Extra Tube Hold for add-ons.     Comment: Auto resulted.       Light Blue Top [[id number]] Collected:  04/16/19 2253    Specimen:  Blood Updated:  04/16/19 2354     Extra Tube hold for add-on     Comment: Auto resulted       Lavender Top [[id number]] Collected:  04/16/19 2253    Specimen:  Blood Updated:  04/16/19 2354     Extra Tube hold for add-on     Comment: Auto resulted       Red Top [[id number]] Collected:  04/16/19 2253    Specimen:  Blood Updated:  04/16/19 2354     Extra Tube Hold for add-ons.     Comment: Auto resulted.       CBC & Differential [[id number]] Collected:  04/16/19 2253    Specimen:  Blood Updated:  04/16/19 2336    Narrative:       The following orders were created for panel order CBC &  Differential.  Procedure                               Abnormality         Status                     ---------                               -----------         ------                     CBC Auto Differential[[id number]]        Abnormal            Final result                 Please view results for these tests on the individual orders.    CBC Auto Differential [[id number]]  (Abnormal) Collected:  04/16/19 2253    Specimen:  Blood Updated:  04/16/19 2336     WBC 10.97 10*3/mm3      RBC 5.01 10*6/mm3      Hemoglobin 16.0 g/dL      Hematocrit 46.4 %      MCV 92.6 fL      MCH 31.9 pg      MCHC 34.5 g/dL      RDW 13.0 %      RDW-SD 44.1 fl      MPV 9.6 fL      Platelets 340 10*3/mm3     Narrative:       The previously reported component NRBC is no longer being reported.    Manual Differential [[id number]]  (Abnormal) Collected:  04/16/19 2253    Specimen:  Blood Updated:  04/16/19 2336     Neutrophil % 43.9 %      Lymphocyte % 42.9 %      Monocyte % 5.1 %      Eosinophil % 2.0 %      Atypical Lymphocyte % 6.1 %      Neutrophils Absolute 4.82 10*3/mm3      Lymphocytes Absolute 4.71 10*3/mm3      Monocytes Absolute 0.56 10*3/mm3      Eosinophils Absolute 0.22 10*3/mm3      RBC Morphology Normal     WBC Morphology Normal     Giant Platelets Slight/1+    Troponin [[id number]]  (Abnormal) Collected:  04/16/19 2253    Specimen:  Blood Updated:  04/16/19 2332     Troponin I 0.038 ng/mL     aPTT [[id number]]  (Normal) Collected:  04/16/19 2253    Specimen:  Blood Updated:  04/16/19 2325     PTT 25.5 seconds     Protime-INR [[id number]]  (Normal) Collected:  04/16/19 2253    Specimen:  Blood Updated:  04/16/19 2325     Protime 13.2 Seconds      INR 0.97    Comprehensive Metabolic Panel [[id number]]  (Abnormal) Collected:  04/16/19 2253    Specimen:  Blood Updated:  04/16/19 2321     Glucose 143 mg/dL      BUN 7 mg/dL      Creatinine 0.74 mg/dL      Sodium 136 mmol/L      Potassium 3.6 mmol/L      Chloride 98 mmol/L      CO2 27.0  mmol/L      Calcium 9.3 mg/dL      Total Protein 5.9 g/dL      Albumin 4.00 g/dL      ALT (SGPT) 19 U/L      AST (SGOT) 24 U/L      Alkaline Phosphatase 67 U/L      Total Bilirubin 0.3 mg/dL      eGFR Non African Amer 108 mL/min/1.73      Globulin 1.9 gm/dL      A/G Ratio 2.1 g/dL      BUN/Creatinine Ratio 9.5     Anion Gap 11.0 mmol/L     Narrative:       GFR Normal >60  Chronic Kidney Disease <60  Kidney Failure <15    POC Glucose Once [[id number]]  (Abnormal) Collected:  04/16/19 2253    Specimen:  Blood Updated:  04/16/19 2304     Glucose 141 mg/dL      Comment: : [id number] Kristel JamiMeter ID: [id number]           Imaging Results (last 7 days)     Procedure Component Value Units Date/Time    SCANNED - IMAGING [[id number]] Resulted:  04/16/19      Updated:  04/17/19 1132    MRI Brain With & Without Contrast [[id number]] Collected:  04/17/19 1054     Updated:  04/17/19 1105    Narrative:       EXAMINATION: MRI BRAIN W WO CONTRAST- 4/17/2019 10:54 AM CDT     HISTORY: TIA, left arm and perioral paresthesias     COMPARISON: CT head without contrast 04/16/2019     Technical: Multiplanar, multisequence imaging was performed through the  brain before and after the administration of IV contrast.     FINDINGS:  There is no diffusion restriction to suggest acute ischemia.     There is normal-appearing anatomy at craniocervical junction.      There is a mostly empty sella configuration. The T1 hyperintense area in  the posterior pituitary gland is preserved.      There is degradation of some sequences due to motion. Allowing for this,  there appears to be no acute intracranial hemorrhage or midline shift.  There is an area of encephalomalacia with surrounding gliosis in the  inferior left frontal lobe as seen on the recent CT exam.     The ventricles appear normal in configuration. Normal signal void is  seen in the carotid and basilar arteries. The area in the left frontal  lobe appears somewhat cystic.     There is  left maxillary sinus mucosal thickening. Ethmoid and frontal  sinus mucosal thickening is also evident. There is some fluid in the  right mastoid air cells.     There are scattered periventricular and subcortical FLAIR signal  hyperintensities, a nonspecific finding most often seen as sequela of  chronic microvascular ischemia.     On postcontrast imaging, no abnormal enhancement is identified.       Impression:          1. No evidence of acute ischemia.  2. FLAIR signal abnormalities in the white matter which are nonspecific  but most often related to chronic microvascular ischemia.  3. Encephalomalacia with cystic formation in the inferior left frontal  lobe.  This report was finalized on 04/17/2019 11:02 by Dr. Tim Villatoro MD.    SCANNED - IMAGING [[id number]] Resulted:  04/16/19      Updated:  04/17/19 1000    CT Angiogram Abdomen Pelvis With & Without Contrast [[id number]] Collected:  04/17/19 0723     Updated:  04/17/19 0738    Narrative:          History:  59-year-old chest pain. Rule out dissection. Known aneurysm.      Reference:  CTA abdomen/pelvis December 2017     Technique  Contrast-enhanced CT abdomen/pelvis was performed during arterial phase  of contrast enhancement. Coronal and sagittal reformatted images  provided. MIP reformatted images were obtained.     For this CT exam, one or more of the following dose reduction techniques  was employed:  -automated exposure control  -mA and/or kVp adjustment for patient size  -iterative reconstruction      mGy-cm     Findings     Vascular findings  Approximately 1.5 cm below the renal artery origins is a fusiform  abdominal aortic aneurysm measuring up to 3.6 cm in maximal diameter.  Using same measurement technique, this is unchanged from December 2017.  There are severe atherosclerosis of the aorta in image iliofemoral  arteries. No dissection. Stenoses of the right common iliac arteries  bilaterally. Moderate stenosis right common femoral artery.  Segmental  stenoses of both external iliac arteries. No significant stenosis of the  SMA or celiac axis. There is focal calcific plaque at the SMA origin. No  significant renal artery stenosis seen.     Nonvascular findings  Tiny hiatal hernia with distal esophageal wall thickening, consider  reflux.     Liver is unremarkable other than focal fat deposition along the inferior  falciform ligament.. Gallbladder is unremarkable. Spleen is small in  size, 6 cm. Tiny right renal cyst. Kidneys otherwise normal. Normal  pancreas.     No bowel obstruction or acute inflammatory process. Colonic  diverticulosis. Previous appendectomy. No free air, free fluid, or  lymphadenopathy. Fluid present in the right inguinal canal, partially  imaged. Urinary bladder nondistended and not well evaluated otherwise.     Severe degenerative disc disease at L5-S1 with complete disc height  loss. Old mild superior endplate deformity of T11.          Impression:          1. Stable 3.6 cm infrarenal abdominal aortic aneurysm compared to 2017.   2. 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        A preliminary report was provided by [facility].   This report was finalized on 04/17/2019 07:35 by Dr Abel Suarez, .    CT Angiogram Chest With Contrast [[id number]] Collected:  04/17/19 0712     Updated:  04/17/19 0725    Narrative:       History:  59-year-old with chest pain. Numbness.     Reference:  CTA chest December 2017.     Technique:  Following the administration of intravenous contrast, helical  acquisition was obtained from the thoracic inlet through the diaphragm  during the arterial phase. Multiplanar reconstructed images including 3D  MIP were obtained.     For this CT exam, one or more of the following dose reduction techniques  was employed:  -automated exposure control  -mA and/or kVp adjustment for patient size  -iterative reconstruction      mGy-cm     Findings:     Vascular findings:  Previous median  sternotomy with CABG and aortic valve replacement.  Caliber of the ascending thoracic aorta, 3.8 cm. Moderate to marked  atherosclerosis of the thoracic aorta without dissection. Heart size is  normal. No pericardial effusion.     Nonvascular findings:  No axillary adenopathy. Mildly enlarged right hilar lymph node measures  2.5 x 1.3 cm, similar to prior. Aortopulmonary window node has short  axis 10 mm, unchanged from reference.     Severe emphysema. No consolidation or edema. 7 mm nodular density  appears be assessed with linear scar in the inferior left upper lobe  series 8 image 72. Focal secretions present in the distal right trachea.  No pleural effusion or pneumothorax.     Slight esophageal wall thickening is questioned throughout, consider  esophagitis.     Slight superior endplate deformities of T6 and T11, unchanged.          Impression:          1. No acute findings. No pulmonary embolus or thoracic aortic  dissection.  2. Severe emphysema without acute pulmonary process.  3. 7 mm nodule associated with a linear scar in the left upper lobe.  Follow-up CT recommended in 6 months.  4. Borderline enlarged mediastinal and right hilar lymph nodes are  essentially unchanged from December 2017, favoring benignity.     A preliminary report was provided by [facility]. No discrepancy.  This report was finalized on 04/17/2019 07:22 by Dr Abel Suarez, .    US Carotid Bilateral [[id number]] Updated:  04/17/19 0714    CT Head Without Contrast [[id number]] Collected:  04/17/19 0656     Updated:  04/17/19 0703    Narrative:       History:  59-year-old with left upper extremity numbness.     Reference:  None.     Technique:   Unenhanced CT imaging of the head/brain performed.     For this CT exam, one or more of the following dose reduction techniques  was employed:  -automated exposure control  -mA and/or kVp adjustment for patient size  -iterative reconstruction      mGy-cm     Findings:   Acute infarction -  "negative  Hemorrhage - negative  Mass - negative     No acute infarction, hemorrhage, or extra-axial collection is  identified. No mass lesion is identified. A 4 cm region of  encephalomalacia in the inferior left frontal lobe is noted. Gray-white  matter differentiation is well-maintained. The ventricular system  appears normal. The basal cisterns are clear.     No acute osseous abnormalities. Moderate mucosal thickening of the  partially imaged ethmoid air cells.          Impression:          1. No acute findings.  2. Inferior left frontal lobe encephalomalacia.  3. Ethmoid mucosal sinus disease.     A preliminary report was provided by [facility]. No discrepancy.  This report was finalized on 04/17/2019 07:00 by Dr Abel Suarez, .    XR Chest 1 View [[id number]] Collected:  04/17/19 0649     Updated:  04/17/19 0653    Narrative:       EXAMINATION:   XR CHEST 1 VW-  4/17/2019 6:49 AM CDT     HISTORY: Chest pain     Frontal upright radiograph of the chest 4/16/2019 11:28 PM CDT     COMPARISON: 02/12/2018.     FINDINGS:   The lungs are clear. Cardiac silhouettes normal. Wires are present  previous median sternotomy. Prosthetic cardiac valve is in the aortic  position. Left atrial appendage clamp is present..      The osseous structures and surrounding soft tissues demonstrate no acute  abnormality.       Impression:       1. No radiographic evidence of acute cardiopulmonary process.        This report was finalized on 04/17/2019 06:50 by Dr. Edwar Howard MD.            Condition on Discharge:    Stable     Physical Exam on Discharge:  /55 (BP Location: Right arm, Patient Position: Lying)   Pulse 89   Temp 98.3 °F (36.8 °C) (Oral)   Resp 16   Ht 167.6 cm (66\")   Wt 60 kg (132 lb 6 oz)   SpO2 94%   BMI 21.37 kg/m²   Physical Exam   Constitutional: He is oriented to person, place, and time. He appears well-developed and well-nourished.   HENT:   Head: Normocephalic and atraumatic.   Nose: Nose normal. "   Mouth/Throat: Oropharynx is clear and moist.   Eyes: Conjunctivae are normal. No scleral icterus.   Neck: Normal range of motion. Neck supple. No JVD present.   Cardiovascular: Normal rate and regular rhythm.   No murmur heard.  Pulmonary/Chest: Effort normal and breath sounds normal.   Abdominal: Soft. Bowel sounds are normal. There is no tenderness.   Musculoskeletal: Normal range of motion. He exhibits no edema.   Neurological: He is alert and oriented to person, place, and time. He displays normal reflexes. Coordination normal.   Skin: Skin is warm and dry. No rash noted.   Psychiatric: He has a normal mood and affect.         Discharge Disposition:  Home or Self Care    Discharge Medications:     Discharge Medications      Continue These Medications      Instructions Start Date   albuterol sulfate  (90 Base) MCG/ACT inhaler  Commonly known as:  PROVENTIL HFA;VENTOLIN HFA;PROAIR HFA   2 puffs, Inhalation, Every 4 Hours PRN      aspirin 81 MG tablet   81 mg, Oral, Daily      atorvastatin 80 MG tablet  Commonly known as:  LIPITOR   80 mg, Oral, Nightly      citalopram 20 MG tablet  Commonly known as:  CeleXA   20 mg, Oral, Daily      clopidogrel 75 MG tablet  Commonly known as:  PLAVIX   75 mg, Oral, Daily      lisinopril 2.5 MG tablet  Commonly known as:  PRINIVIL,ZESTRIL   2.5 mg, Oral, Every 24 Hours Scheduled      metoprolol tartrate 50 MG tablet  Commonly known as:  LOPRESSOR   50 mg, Oral, Every 12 Hours Scheduled      pentazocine-naloxone 50-0.5 MG per tablet  Commonly known as:  TALWIN NX   No dose, route, or frequency recorded.             Discharge Diet:   Diet Instructions     Diet: Regular; Thin      Discharge Diet:  Regular    Fluid Consistency:  Thin          Discharge Care Plan / Instructions:   Discharge home    Activity at Discharge:   Activity Instructions     Activity as Tolerated            Follow-up Appointments:  Follow-up with Dr. Cline next week in order to schedule a repeat CT  scan for continued observation of newly diagnosed pulmonary nodule       Víctor Segura, DO  04/17/19  1:37 PM    Time: Discharge Less than 30 min    Please note that portions of this note may have been completed with a voice recognition program. Efforts were made to edit the dictations, but occasionally words are mistranscribed.

## 2019-04-17 NOTE — CONSULTS
Neurology Consult Note    Referring Provider: Dr. Víctor Segura  Reason for Consultation: TIA      History of present illness:      This is a 59-year-old male with multiple stroke risk factors including hypertension, hyperlipidemia, status post right carotid enterectomy, tobacco usage, and severe aortic valve stenosis.  Last night before he went to bed he ate some donuts.  He began having an allergic reaction where he felt pruritus.  Reportedly this is very common for him.  He went to the door and found that they were out of the typical Benadryl that he uses.  He instead found some children's Benadryl and took 2 doses of this.  Not long after this he noticed paresthesias in a perioral fashion bilaterally.  He also noticed some paresthesias in his left arm.  The paresthesia in his left arm occur quite often and are often associated with what he believes to be a pinched nerve in his neck.  This lasted for approximately 10 minutes and then resolved.  He denies any facial drooping, unilateral weakness, nor speech changes.  He reportedly received some antihypertensives in the ambulance on the way in and was hypotensive when he got here.  Those numbers are listed below.    04/16/19 2330  --  79  18  89/62 Abnormal   --  95   04/16/19 2315  --  73  18  64/49 Abnormal   --  94   04/16/19 2302  97.4 (36.3)  --  --  --  --  --   04/16/19 2300  --  --  --  66/49 Abnormal   --  --   04/16/19 2254  --  84  19  77/53 Abnormal            Past Medical History:   Diagnosis Date   • Aneurysm (CMS/[facility])    • Anxiety    • Arthritis    • Carotid artery disease (CMS/[facility])    • Carotid stenosis    • Carotid stenosis, right 2/1/2018    Post right carotid endarterectomy   • Chest pain    • COPD (chronic obstructive pulmonary disease) (CMS/[facility])    • Heart murmur    • Hx of CABG 4/17/2019   • Hyperlipidemia    • Hypertension    • Pneumonia    • Severe aortic valve stenosis 12/14/2017   • Tobacco use 12/14/2017   • Wears glasses        No  Known Allergies  No current facility-administered medications on file prior to encounter.      Current Outpatient Medications on File Prior to Encounter   Medication Sig   • albuterol (PROVENTIL HFA;VENTOLIN HFA) 108 (90 Base) MCG/ACT inhaler Inhale 2 puffs Every 4 (Four) Hours As Needed for Wheezing.   • aspirin 81 MG tablet Take 1 tablet by mouth Daily.   • atorvastatin (LIPITOR) 80 MG tablet Take 1 tablet by mouth Every Night.   • citalopram (CeleXA) 20 MG tablet Take 20 mg by mouth Daily.   • clopidogrel (PLAVIX) 75 MG tablet Take 1 tablet by mouth Daily.   • lisinopril (PRINIVIL,ZESTRIL) 2.5 MG tablet Take 1 tablet by mouth Daily.   • metoprolol tartrate (LOPRESSOR) 50 MG tablet Take 1 tablet by mouth Every 12 (Twelve) Hours.   • pentazocine-naloxone (TALWIN NX) 50-0.5 MG per tablet        Social History     Socioeconomic History   • Marital status:      Spouse name: Not on file   • Number of children: Not on file   • Years of education: Not on file   • Highest education level: Not on file   Tobacco Use   • Smoking status: Current Some Day Smoker     Packs/day: 1.00     Years: 35.00     Pack years: 35.00     Types: Cigarettes   • Smokeless tobacco: Current User     Types: Snuff   • Tobacco comment: Would like to quit.   Substance and Sexual Activity   • Alcohol use: No   • Drug use: No   • Sexual activity: Defer     Family History   Problem Relation Age of Onset   • Heart disease Mother    • Heart disease Father    • Diabetes Father    • Hypertension Sister    • Asthma Sister    • Kidney disease Sister    • Hypertension Brother    • Diabetes Brother    • Cancer Maternal Uncle        Review of Systems  A 14 point review of systems was reviewed and was negative except for paresthesias    Vital Signs   Temp:  [97.4 °F (36.3 °C)-98.4 °F (36.9 °C)] 98.3 °F (36.8 °C)  Heart Rate:  [73-91] 89  Resp:  [16-19] 16  BP: ()/(49-68) 134/55    General Exam:  Head:  Normal cephalic, atraumatic  HEENT:  Neck  supple  Fundoscopic Exam:  No signs of disc edema  CVS:  Regular rate and rhythm.  No murmurs  Carotid Examination:  No bruits  Lungs:  Clear to auscultation  Abdomen:  Non-tender, Non-distended  Extremities:  No signs of peripheral edema  Skin:  No rashes    Neurologic Exam:    Mental Status:    -Awake, Alert, Oriented X 3  -No word finding difficulties  -No aphasia  -No dysarthria  -Follows simple and complex commands    CN II:  Visual fields full.  Pupils equally reactive to light  CN III, IV, VI:  Extraocular Muscles full with no signs of nystagmus  CN V:  Facial sensory is symmetric with no asymmetries.  CN VII:  Facial motor symmetric  CN VIII:  Gross hearing intact bilaterally  CN IX:  Palate elevates symmetrically  CN X:  Palate elevates symmetrically  CN XI:  Shoulder shrug symmetric  CN XII:  Tongue is midline on protrusion    Motor: (strength out of 5:  1= minimal movement, 2 = movement in plane of gravity, 3 = movement against gravity, 4 = movement against some resistance, 5 = full strength)    -Right Upper Ext: Proximal: 5 Distal: 5  -Left Upper Ext: Proximal: 5 Distal: 5    -Right Lower Ext: Proximal: 5 Distal: 5  -Left Lower Ext: Proximal: 5 Distal: 5    DTR:  -Right   Bicep: 2+ Tricep: 2+ Brachoradialis: 2+   Patella: 2+ Ankle: 2+ Neg Babinski  -Left   Bicep: 2+ Tricep: 2+ Brachoradialis: 2+   Patella: 2+ Ankle: 2+ Neg Babinski    Sensory:  -Intact to light touch, pinprick, temperature, pain, and proprioception    Coordination:  -Finger to nose intact  -Heel to shin intact  -No ataxia    Gait  -No signs of ataxia  -ambulates unassisted      Results Review:  Lab Results (last 24 hours)     Procedure Component Value Units Date/Time    Hemoglobin A1c [[id number]] Collected:  04/17/19 0458    Specimen:  Blood Updated:  04/17/19 0732     Hemoglobin A1C 5.7 %     Narrative:       Less than 6.0           Non-Diabetic Range  6.0-7.0                 ADA Therapeutic Target  Greater than 7.0        Action  Suggested    TSH [[id number]]  (Normal) Collected:  04/17/19 0458    Specimen:  Blood Updated:  04/17/19 0650     TSH 0.700 mIU/mL     Lipid Panel [[id number]]  (Abnormal) Collected:  04/17/19 0458    Specimen:  Blood Updated:  04/17/19 0630     Total Cholesterol 177 mg/dL      Triglycerides 298 mg/dL      HDL Cholesterol 33 mg/dL      LDL Cholesterol  113 mg/dL      LDL/HDL Ratio 2.56    Basic Metabolic Panel [[id number]]  (Abnormal) Collected:  04/17/19 0458    Specimen:  Blood Updated:  04/17/19 0620     Glucose 108 mg/dL      BUN 6 mg/dL      Creatinine 0.61 mg/dL      Sodium 136 mmol/L      Potassium 3.9 mmol/L      Chloride 104 mmol/L      CO2 23.0 mmol/L      Calcium 8.2 mg/dL      eGFR Non African Amer 135 mL/min/1.73      BUN/Creatinine Ratio 9.8     Anion Gap 9.0 mmol/L     Narrative:       GFR Normal >60  Chronic Kidney Disease <60  Kidney Failure <15    CBC Auto Differential [[id number]]  (Abnormal) Collected:  04/17/19 0458    Specimen:  Blood Updated:  04/17/19 0607     WBC 10.51 10*3/mm3      RBC 4.20 10*6/mm3      Hemoglobin 13.3 g/dL      Hematocrit 39.6 %      MCV 94.3 fL      MCH 31.7 pg      MCHC 33.6 g/dL      RDW 13.3 %      RDW-SD 46.2 fl      MPV 9.8 fL      Platelets 288 10*3/mm3      Neutrophil % 74.4 %      Lymphocyte % 17.3 %      Monocyte % 6.6 %      Eosinophil % 0.9 %      Basophil % 0.3 %      Immature Grans % 0.5 %      Neutrophils, Absolute 7.83 10*3/mm3      Lymphocytes, Absolute 1.82 10*3/mm3      Monocytes, Absolute 0.69 10*3/mm3      Eosinophils, Absolute 0.09 10*3/mm3      Basophils, Absolute 0.03 10*3/mm3      Immature Grans, Absolute 0.05 10*3/mm3      nRBC 0.0 /100 WBC     Urinalysis With Microscopic If Indicated (No Culture) - Urine, Clean Catch [[id number]]  (Abnormal) Collected:  04/17/19 0302    Specimen:  Urine, Clean Catch Updated:  04/17/19 0311     Color, UA Yellow     Appearance, UA Clear     pH, UA 7.5     Specific Gravity, UA >1.030     Glucose,  mg/dL (Trace)      Ketones, UA Negative     Bilirubin, UA Negative     Blood, UA Negative     Protein, UA Trace     Leuk Esterase, UA Negative     Nitrite, UA Negative     Urobilinogen, UA 0.2 E.U./dL    Narrative:       Urine microscopic not indicated.    Troponin [[id number]]  (Normal) Collected:  04/17/19 0216    Specimen:  Blood Updated:  04/17/19 0245     Troponin I 0.029 ng/mL     [facility] Draw [[id number]] Collected:  04/16/19 2253    Specimen:  Blood Updated:  04/16/19 2354    Narrative:       The following orders were created for panel order [facility] Draw.  Procedure                               Abnormality         Status                     ---------                               -----------         ------                     Light Blue Top[[id number]]                                   Final result               Green Top (Gel)[[id number]]                                  Final result               Lavender Top[[id number]]                                     Final result               Red Top[[id number]]                                          Final result                 Please view results for these tests on the individual orders.    Green Top (Gel) [[id number]] Collected:  04/16/19 2253    Specimen:  Blood Updated:  04/16/19 2354     Extra Tube Hold for add-ons.     Comment: Auto resulted.       Light Blue Top [[id number]] Collected:  04/16/19 2253    Specimen:  Blood Updated:  04/16/19 2354     Extra Tube hold for add-on     Comment: Auto resulted       Lavender Top [[id number]] Collected:  04/16/19 2253    Specimen:  Blood Updated:  04/16/19 2354     Extra Tube hold for add-on     Comment: Auto resulted       Red Top [[id number]] Collected:  04/16/19 2253    Specimen:  Blood Updated:  04/16/19 2354     Extra Tube Hold for add-ons.     Comment: Auto resulted.       CBC & Differential [[id number]] Collected:  04/16/19 2253    Specimen:  Blood Updated:  04/16/19 2336    Narrative:       The following orders were created for panel  order CBC & Differential.  Procedure                               Abnormality         Status                     ---------                               -----------         ------                     CBC Auto Differential[[id number]]        Abnormal            Final result                 Please view results for these tests on the individual orders.    CBC Auto Differential [[id number]]  (Abnormal) Collected:  04/16/19 2253    Specimen:  Blood Updated:  04/16/19 2336     WBC 10.97 10*3/mm3      RBC 5.01 10*6/mm3      Hemoglobin 16.0 g/dL      Hematocrit 46.4 %      MCV 92.6 fL      MCH 31.9 pg      MCHC 34.5 g/dL      RDW 13.0 %      RDW-SD 44.1 fl      MPV 9.6 fL      Platelets 340 10*3/mm3     Narrative:       The previously reported component NRBC is no longer being reported.    Manual Differential [[id number]]  (Abnormal) Collected:  04/16/19 2253    Specimen:  Blood Updated:  04/16/19 2336     Neutrophil % 43.9 %      Lymphocyte % 42.9 %      Monocyte % 5.1 %      Eosinophil % 2.0 %      Atypical Lymphocyte % 6.1 %      Neutrophils Absolute 4.82 10*3/mm3      Lymphocytes Absolute 4.71 10*3/mm3      Monocytes Absolute 0.56 10*3/mm3      Eosinophils Absolute 0.22 10*3/mm3      RBC Morphology Normal     WBC Morphology Normal     Giant Platelets Slight/1+    Troponin [[id number]]  (Abnormal) Collected:  04/16/19 2253    Specimen:  Blood Updated:  04/16/19 2332     Troponin I 0.038 ng/mL     aPTT [[id number]]  (Normal) Collected:  04/16/19 2253    Specimen:  Blood Updated:  04/16/19 2325     PTT 25.5 seconds     Protime-INR [[id number]]  (Normal) Collected:  04/16/19 2253    Specimen:  Blood Updated:  04/16/19 2325     Protime 13.2 Seconds      INR 0.97    Comprehensive Metabolic Panel [[id number]]  (Abnormal) Collected:  04/16/19 2253    Specimen:  Blood Updated:  04/16/19 2321     Glucose 143 mg/dL      BUN 7 mg/dL      Creatinine 0.74 mg/dL      Sodium 136 mmol/L      Potassium 3.6 mmol/L      Chloride 98 mmol/L       CO2 27.0 mmol/L      Calcium 9.3 mg/dL      Total Protein 5.9 g/dL      Albumin 4.00 g/dL      ALT (SGPT) 19 U/L      AST (SGOT) 24 U/L      Alkaline Phosphatase 67 U/L      Total Bilirubin 0.3 mg/dL      eGFR Non African Amer 108 mL/min/1.73      Globulin 1.9 gm/dL      A/G Ratio 2.1 g/dL      BUN/Creatinine Ratio 9.5     Anion Gap 11.0 mmol/L     Narrative:       GFR Normal >60  Chronic Kidney Disease <60  Kidney Failure <15    POC Glucose Once [[id number]]  (Abnormal) Collected:  04/16/19 2253    Specimen:  Blood Updated:  04/16/19 2304     Glucose 141 mg/dL      Comment: : [id number] Kristel JamiMeter ID: [id number]             .  Imaging Results (last 24 hours)     Procedure Component Value Units Date/Time    SCANNED - IMAGING [[id number]] Resulted:  04/16/19      Updated:  04/17/19 1000    MRI Brain With & Without Contrast [[id number]] Updated:  04/17/19 0926    CT Angiogram Abdomen Pelvis With & Without Contrast [[id number]] Collected:  04/17/19 0723     Updated:  04/17/19 0738    Narrative:          History:  59-year-old chest pain. Rule out dissection. Known aneurysm.      Reference:  CTA abdomen/pelvis December 2017     Technique  Contrast-enhanced CT abdomen/pelvis was performed during arterial phase  of contrast enhancement. Coronal and sagittal reformatted images  provided. MIP reformatted images were obtained.     For this CT exam, one or more of the following dose reduction techniques  was employed:  -automated exposure control  -mA and/or kVp adjustment for patient size  -iterative reconstruction      mGy-cm     Findings     Vascular findings  Approximately 1.5 cm below the renal artery origins is a fusiform  abdominal aortic aneurysm measuring up to 3.6 cm in maximal diameter.  Using same measurement technique, this is unchanged from December 2017.  There are severe atherosclerosis of the aorta in image iliofemoral  arteries. No dissection. Stenoses of the right common iliac  arteries  bilaterally. Moderate stenosis right common femoral artery. Segmental  stenoses of both external iliac arteries. No significant stenosis of the  SMA or celiac axis. There is focal calcific plaque at the SMA origin. No  significant renal artery stenosis seen.     Nonvascular findings  Tiny hiatal hernia with distal esophageal wall thickening, consider  reflux.     Liver is unremarkable other than focal fat deposition along the inferior  falciform ligament.. Gallbladder is unremarkable. Spleen is small in  size, 6 cm. Tiny right renal cyst. Kidneys otherwise normal. Normal  pancreas.     No bowel obstruction or acute inflammatory process. Colonic  diverticulosis. Previous appendectomy. No free air, free fluid, or  lymphadenopathy. Fluid present in the right inguinal canal, partially  imaged. Urinary bladder nondistended and not well evaluated otherwise.     Severe degenerative disc disease at L5-S1 with complete disc height  loss. Old mild superior endplate deformity of T11.          Impression:          1. Stable 3.6 cm infrarenal abdominal aortic aneurysm compared to 2017.   2. 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        A preliminary report was provided by [employer].   This report was finalized on 04/17/2019 07:35 by Dr Abel Suarez, .    CT Angiogram Chest With Contrast [[id number]] Collected:  04/17/19 0712     Updated:  04/17/19 0725    Narrative:       History:  59-year-old with chest pain. Numbness.     Reference:  CTA chest December 2017.     Technique:  Following the administration of intravenous contrast, helical  acquisition was obtained from the thoracic inlet through the diaphragm  during the arterial phase. Multiplanar reconstructed images including 3D  MIP were obtained.     For this CT exam, one or more of the following dose reduction techniques  was employed:  -automated exposure control  -mA and/or kVp adjustment for patient size  -iterative reconstruction       mGy-cm     Findings:     Vascular findings:  Previous median sternotomy with CABG and aortic valve replacement.  Caliber of the ascending thoracic aorta, 3.8 cm. Moderate to marked  atherosclerosis of the thoracic aorta without dissection. Heart size is  normal. No pericardial effusion.     Nonvascular findings:  No axillary adenopathy. Mildly enlarged right hilar lymph node measures  2.5 x 1.3 cm, similar to prior. Aortopulmonary window node has short  axis 10 mm, unchanged from reference.     Severe emphysema. No consolidation or edema. 7 mm nodular density  appears be assessed with linear scar in the inferior left upper lobe  series 8 image 72. Focal secretions present in the distal right trachea.  No pleural effusion or pneumothorax.     Slight esophageal wall thickening is questioned throughout, consider  esophagitis.     Slight superior endplate deformities of T6 and T11, unchanged.          Impression:          1. No acute findings. No pulmonary embolus or thoracic aortic  dissection.  2. Severe emphysema without acute pulmonary process.  3. 7 mm nodule associated with a linear scar in the left upper lobe.  Follow-up CT recommended in 6 months.  4. Borderline enlarged mediastinal and right hilar lymph nodes are  essentially unchanged from December 2017, favoring benignity.     A preliminary report was provided by [employer]. No discrepancy.  This report was finalized on 04/17/2019 07:22 by Dr Abel Suarez, .    US Carotid Bilateral [[id number]] Updated:  04/17/19 0714    CT Head Without Contrast [[id number]] Collected:  04/17/19 0656     Updated:  04/17/19 0703    Narrative:       History:  59-year-old with left upper extremity numbness.     Reference:  None.     Technique:   Unenhanced CT imaging of the head/brain performed.     For this CT exam, one or more of the following dose reduction techniques  was employed:  -automated exposure control  -mA and/or kVp adjustment for patient size  -iterative  reconstruction      mGy-cm     Findings:   Acute infarction - negative  Hemorrhage - negative  Mass - negative     No acute infarction, hemorrhage, or extra-axial collection is  identified. No mass lesion is identified. A 4 cm region of  encephalomalacia in the inferior left frontal lobe is noted. Gray-white  matter differentiation is well-maintained. The ventricular system  appears normal. The basal cisterns are clear.     No acute osseous abnormalities. Moderate mucosal thickening of the  partially imaged ethmoid air cells.          Impression:          1. No acute findings.  2. Inferior left frontal lobe encephalomalacia.  3. Ethmoid mucosal sinus disease.     A preliminary report was provided by [email or web address]. No discrepancy.  This report was finalized on 04/17/2019 07:00 by Dr Abel Suarez, .    XR Chest 1 View [[id number]] Collected:  04/17/19 0649     Updated:  04/17/19 0653    Narrative:       EXAMINATION:   XR CHEST 1 VW-  4/17/2019 6:49 AM CDT     HISTORY: Chest pain     Frontal upright radiograph of the chest 4/16/2019 11:28 PM CDT     COMPARISON: 02/12/2018.     FINDINGS:   The lungs are clear. Cardiac silhouettes normal. Wires are present  previous median sternotomy. Prosthetic cardiac valve is in the aortic  position. Left atrial appendage clamp is present..      The osseous structures and surrounding soft tissues demonstrate no acute  abnormality.       Impression:       1. No radiographic evidence of acute cardiopulmonary process.        This report was finalized on 04/17/2019 06:50 by Dr. Edwar Howard MD.           CT of brain without contrast reviewed by me.  No acute findings.  There is a focal area of encephalomalacia left frontal lobe.    Carotid ultrasound shows no significant stenoses.    MRI of the brain reviewed by me.  No diffusion abnormalities.    Cardiac echo pending      Impression    · Hypotension  · Unlikely transient ischemic attack.  Likely medication reaction  · Tobacco  use  · Hypertension historically  · Hyperlipidemia  · Aortic aneurysm stable according imaging    Plan    · Recommend statin therapy with an LDL goal less than 70  · Continue home doses of aspirin and Plavix  · No further neurologic workup needed at this time as I do not believe this is a transient ischemic attack but instead a medication reaction.  Next  ·   · Follow-up with neurology as needed.    I discussed the patients findings and my recommendations with patient    Juan Miramontes MD  04/17/19  10:28 AM

## 2019-04-17 NOTE — H&P
[facility] Medicine Services  HISTORY AND PHYSICAL    Date of Admission: 4/16/2019  Primary Care Physician: Sharad Cline MD    Subjective     Chief Complaint: left upper extremity numbness and facial numbess    History of Present Illness  Is a 59-year-old white male with past known history of CABG, carotid endarterectomy, hypertension, COPD who presents the hospital with fairly sudden onset of left arm numbness and perioral numbness and tingling.  This actually happened after patient developed onset of a rash of which he attributes to a likely allergic reaction to some unknown food.  He states this happens periodically however it has been quite some time.  He took a Benadryl about 10 minutes after that he developed some perioral numbness and tingling with possibility of some swelling and itching of his tongue.  He does state however this could have very well been due to the allergic reaction he was having.  He also however at the same time developed some left upper extremity numbness and tingling.  Again this was about 10 minutes after he had taken a Benadryl for some type of allergic reaction he was having.  He states he also felt a little dizzy at the time.  He denied having any trouble swallowing however he states his wife said he was difficult to understand.  Currently he is symptom-free.  This happened around 1030 the evening prior at time of arrival the patient was symptom-free.  He did arrive somewhat hypotensive however he was given labetalol in the ambulance due to some concern of hypertension.  Currently his blood pressure is normal again patient is asymptomatic.  He denies any history of previous stroke however CT scan does show some evidence of frontal lobe encephalomalacia.  CT scans were performed which did not really reveal anything acute other than a new pulmonary nodule that needs to be further evaluated and a known abdominal aneurysm without change in size.   Patient's vital signs have been stable.  Concern is that of a possible TIA and further evaluation has been recommended therefore admission has been requested.        Review of Systems     Otherwise complete ROS reviewed and negative except as mentioned in the HPI.    Past Medical History:   Past Medical History:   Diagnosis Date   • Aneurysm (CMS/HCC)    • Anxiety    • Arthritis    • Carotid artery disease (CMS/HCC)    • Carotid stenosis    • Chest pain    • COPD (chronic obstructive pulmonary disease) (CMS/HCC)    • Heart murmur    • Hyperlipidemia    • Hypertension    • Pneumonia    • Wears glasses      Past Surgical History:  Past Surgical History:   Procedure Laterality Date   • APPENDECTOMY     • CARDIAC CATHETERIZATION N/A 12/18/2017    Procedure: Left Heart Cath;  Surgeon: Aime Francois MD;  Location:  [facility] CATH INVASIVE LOCATION;  Service:    • CARDIAC CATHETERIZATION N/A 12/18/2017    Procedure: Right Heart Cath;  Surgeon: Aime Francois MD;  Location:  [facility] CATH INVASIVE LOC[facility];  Service:    • CARDIAC CATHETERIZATION Bilateral 1/4/2018    Procedure: Coronary angiography;  Surgeon: Alfonso Yi MD;  Location:  [facility] CATH INVASIVE LOC[facility];  Service:    • CAROTID ENDARTERECTOMY Right 2/1/2018    Procedure: RIGHT CAROTID ENDARTERECTOMY WITH EEG-awake;  Surgeon: Jl Salgado DO;  Location:  [facility] OR;  Service:    • CORONARY ARTERY BYPASS GRAFT WITH AORTIC VALVE REPAIR/REPLACEMENT N/A 2/8/2018    Procedure: AORTIC VALVE REPLACEMENT,CORONARY ARTERY BYPASS GRAFTING x 3  WITH CONCHA AND RIGHT EVH, ATRIAL APPENDAGE EXCLUSION LEFT WITH TRANSESOPHAGEAL ECHOCARDIOGRAM, 17-CHANNEL TMR;  Surgeon: Cj Robin MD;  Location:  [facility] OR;  Service:    • FINGER SURGERY Right 1990   • OTHER SURGICAL HISTORY      skull srgery from accident in childhood.     Social History:  reports that he has been smoking cigarettes.  He has a 35.00 pack-year smoking history. His smokeless tobacco use includes snuff. He reports  "that he does not drink alcohol or use drugs.    Family History: family history includes Asthma in his sister; Cancer in his maternal uncle; Diabetes in his brother and father; Heart disease in his father and mother; Hypertension in his brother and sister; Kidney disease in his sister.       Allergies:  No Known Allergies  Medications:  Prior to Admission medications    Medication Sig Start Date End Date Taking? Authorizing Provider   albuterol (PROVENTIL HFA;VENTOLIN HFA) 108 (90 Base) MCG/ACT inhaler Inhale 2 puffs Every 4 (Four) Hours As Needed for Wheezing. 12/19/17   Peewee Morejon APRN   aspirin 81 MG tablet Take 1 tablet by mouth Daily. 2/16/18   Cj Robin MD   atorvastatin (LIPITOR) 80 MG tablet Take 1 tablet by mouth Every Night. 2/16/18   Cj Robin MD   citalopram (CeleXA) 20 MG tablet Take 20 mg by mouth Daily.    ProviderEstrellita MD   clopidogrel (PLAVIX) 75 MG tablet Take 1 tablet by mouth Daily. 2/16/18   Cj Robin MD   lisinopril (PRINIVIL,ZESTRIL) 2.5 MG tablet Take 1 tablet by mouth Daily. 2/16/18   Cj Robin MD   metoprolol tartrate (LOPRESSOR) 50 MG tablet Take 1 tablet by mouth Every 12 (Twelve) Hours. 2/16/18   Cj Robin MD   pentazocine-naloxone (TALWIN NX) 50-0.5 MG per tablet  9/26/18   Provider, MD Estrellita     Objective     Vital Signs: /68   Pulse 91   Temp 97.8 °F (36.6 °C)   Resp 18   Ht 167.6 cm (66\")   Wt 61.5 kg (135 lb 8 oz)   SpO2 95%   BMI 21.87 kg/m²   Physical Exam   Constitutional: He is oriented to person, place, and time. He appears well-developed and well-nourished.   HENT:   Head: Normocephalic and atraumatic.   Eyes: Conjunctivae and EOM are normal. Pupils are equal, round, and reactive to light.   Neck: Neck supple. No JVD present. No thyromegaly present.   Cardiovascular: Normal rate, regular rhythm and intact distal pulses. Exam reveals no gallop and no friction rub.   Murmur heard.  Pulmonary/Chest: " Effort normal and breath sounds normal. No respiratory distress. He has no wheezes. He has no rales. He exhibits no tenderness.   Abdominal: Soft. Bowel sounds are normal. He exhibits no distension. There is no tenderness. There is no rebound and no guarding.   Musculoskeletal: Normal range of motion. He exhibits no edema, tenderness or deformity.   Lymphadenopathy:     He has no cervical adenopathy.   Neurological: He is alert and oriented to person, place, and time. He displays normal reflexes. No cranial nerve deficit. He exhibits normal muscle tone.   Full  strength equal bilaterally.  Moving all extremities equal.  Station intact   Skin: Skin is warm and dry. No rash noted.   Psychiatric: He has a normal mood and affect. His behavior is normal. Judgment and thought content normal.           Results Reviewed:  Lab Results (last 24 hours)     Procedure Component Value Units Date/Time    Urinalysis With Microscopic If Indicated (No Culture) - Urine, Clean Catch [[id number]] Collected:  04/17/19 0302    Specimen:  Urine, Clean Catch Updated:  04/17/19 0305    Troponin [[id number]]  (Normal) Collected:  04/17/19 0216    Specimen:  Blood Updated:  04/17/19 0245     Troponin I 0.029 ng/mL     [place] Draw [[id number]] Collected:  04/16/19 2253    Specimen:  Blood Updated:  04/16/19 5354    Narrative:       The following orders were created for panel order [place] Draw.  Procedure                               Abnormality         Status                     ---------                               -----------         ------                     Light Blue Top[[id number]]                                   Final result               Green Top (Gel)[[id number]]                                  Final result               Lavender Top[[id number]]                                     Final result               Red Top[[id number]]                                          Final result                 Please view results for these  tests on the individual orders.    Green Top (Gel) [[id number]] Collected:  04/16/19 2253    Specimen:  Blood Updated:  04/16/19 2354     Extra Tube Hold for add-ons.     Comment: Auto resulted.       Light Blue Top [[id number]] Collected:  04/16/19 2253    Specimen:  Blood Updated:  04/16/19 2354     Extra Tube hold for add-on     Comment: Auto resulted       Lavender Top [[id number]] Collected:  04/16/19 2253    Specimen:  Blood Updated:  04/16/19 2354     Extra Tube hold for add-on     Comment: Auto resulted       Red Top [[id number]] Collected:  04/16/19 2253    Specimen:  Blood Updated:  04/16/19 2354     Extra Tube Hold for add-ons.     Comment: Auto resulted.       CBC & Differential [[id number]] Collected:  04/16/19 2253    Specimen:  Blood Updated:  04/16/19 2336    Narrative:       The following orders were created for panel order CBC & Differential.  Procedure                               Abnormality         Status                     ---------                               -----------         ------                     CBC Auto Differential[[id number]]        Abnormal            Final result                 Please view results for these tests on the individual orders.    CBC Auto Differential [[id number]]  (Abnormal) Collected:  04/16/19 2253    Specimen:  Blood Updated:  04/16/19 2336     WBC 10.97 10*3/mm3      RBC 5.01 10*6/mm3      Hemoglobin 16.0 g/dL      Hematocrit 46.4 %      MCV 92.6 fL      MCH 31.9 pg      MCHC 34.5 g/dL      RDW 13.0 %      RDW-SD 44.1 fl      MPV 9.6 fL      Platelets 340 10*3/mm3     Narrative:       The previously reported component NRBC is no longer being reported.    Manual Differential [[id number]]  (Abnormal) Collected:  04/16/19 2253    Specimen:  Blood Updated:  04/16/19 2336     Neutrophil % 43.9 %      Lymphocyte % 42.9 %      Monocyte % 5.1 %      Eosinophil % 2.0 %      Atypical Lymphocyte % 6.1 %      Neutrophils Absolute 4.82 10*3/mm3      Lymphocytes Absolute 4.71  10*3/mm3      Monocytes Absolute 0.56 10*3/mm3      Eosinophils Absolute 0.22 10*3/mm3      RBC Morphology Normal     WBC Morphology Normal     Giant Platelets Slight/1+    Troponin [[id number]]  (Abnormal) Collected:  04/16/19 2253    Specimen:  Blood Updated:  04/16/19 2332     Troponin I 0.038 ng/mL     aPTT [[id number]]  (Normal) Collected:  04/16/19 2253    Specimen:  Blood Updated:  04/16/19 2325     PTT 25.5 seconds     Protime-INR [[id number]]  (Normal) Collected:  04/16/19 2253    Specimen:  Blood Updated:  04/16/19 2325     Protime 13.2 Seconds      INR 0.97    Comprehensive Metabolic Panel [[id number]]  (Abnormal) Collected:  04/16/19 2253    Specimen:  Blood Updated:  04/16/19 2321     Glucose 143 mg/dL      BUN 7 mg/dL      Creatinine 0.74 mg/dL      Sodium 136 mmol/L      Potassium 3.6 mmol/L      Chloride 98 mmol/L      CO2 27.0 mmol/L      Calcium 9.3 mg/dL      Total Protein 5.9 g/dL      Albumin 4.00 g/dL      ALT (SGPT) 19 U/L      AST (SGOT) 24 U/L      Alkaline Phosphatase 67 U/L      Total Bilirubin 0.3 mg/dL      eGFR Non African Amer 108 mL/min/1.73      Globulin 1.9 gm/dL      A/G Ratio 2.1 g/dL      BUN/Creatinine Ratio 9.5     Anion Gap 11.0 mmol/L     Narrative:       GFR Normal >60  Chronic Kidney Disease <60  Kidney Failure <15    POC Glucose Once [[id number]]  (Abnormal) Collected:  04/16/19 2253    Specimen:  Blood Updated:  04/16/19 2304     Glucose 141 mg/dL      Comment: : [id number] Kristel JamiMeter ID: [id number]           Imaging Results (last 24 hours)     Procedure Component Value Units Date/Time    CT Head Without Contrast [[id number]] Updated:  04/17/19 0035    CT Angiogram Chest With Contrast [[id number]] Updated:  04/17/19 0035    CT Angiogram Abdomen Pelvis With & Without Contrast [[id number]] Updated:  04/17/19 0035    XR Chest 1 View [[id number]] Updated:  04/16/19 2332        I have personally reviewed and interpreted the radiology studies and ECG obtained at time of  admission.     Assessment / Plan     Assessment:   Active Hospital Problems    Diagnosis   • Transient ischemic attack (TIA)   • Allergic reaction   • Hx of CABG   • COPD (chronic obstructive pulmonary disease) (CMS/[facility])         Plan:    1. Admit for further eval of possible TIA although could have been due to allergic rxn and or benadryl. Mri, echo, carotids, neuro consult  2. Hx of cabg and CEA. Place on tele. Carotids, echo  3. Allergic rxn. Says it happens sometimes with foods.   4. Copd, stable.   5. New pulmonary nodule. Discussed with pt. Likely could follow with pulm outpt      Code Status: full     I discussed the patient's findings and my recommendations with the patient    Estimated length of stay 1-3 days    Liliana Espinal MD   04/17/19   3:10 AM

## 2019-04-17 NOTE — ED NOTES
MANUAL BLOOD PRESSURE 70 PALP,DR MURRAY AWARE OF PATIENTS BLOOD PRESSURE.     Ofe Mosley, RN  04/17/19 0055

## 2019-04-17 NOTE — ED PROVIDER NOTES
[facility]  emergency department encounter      Pt Name: Sharad Ryder  MRN: [id number]  Birthdate 1960  Date of evaluation: 4/17/2019      CHIEF COMPLAINT       Chief Complaint   Patient presents with   • stroke symptoms       Nurses Notes reviewed and I agree except as noted in the HPI.      HISTORY OF PRESENT ILLNESS    Sharad Ryder is a 59 y.o. male who presents patient presents to the emergency department with multiple complaints.  He notes that at 10:20 PM he was lying down in bed while awake and noticed that he was having a diffuse urticarial reaction.  Shortly thereafter he took Benadryl which she has taken multiple times previously without any adverse effects.  He notes that shortly after taking the Benadryl he began having numbness in his left upper extremity and his perioral region.  Additionally he notes that he had chest pain which resolved prior to arrival.  Patient with history of CABG as well as a known abdominal aneurysm.  He denies shortness of breath, weakness, fever, chills.    REVIEW OF SYSTEMS     All systems reviewed and otherwise negative except as listed above in HPI      PAST MEDICAL HISTORY     Past Medical History:   Diagnosis Date   • Aneurysm (CMS/Formerly [facility])    • Anxiety    • Arthritis    • Carotid artery disease (CMS/Formerly [facility])    • Carotid stenosis    • Chest pain    • COPD (chronic obstructive pulmonary disease) (CMS/Formerly [facility])    • Heart murmur    • Hyperlipidemia    • Hypertension    • Pneumonia    • Wears glasses        SURGICAL HISTORY      has a past surgical history that includes Finger surgery (Right, 1990); Cardiac catheterization (N/A, 12/18/2017); Cardiac catheterization (N/A, 12/18/2017); Other surgical history; Appendectomy; Cardiac catheterization (Bilateral, 1/4/2018); Carotid Endarterectomy (Right, 2/1/2018); and coronary artery bypass graft with aortic valve repair/replacement (N/A, 2/8/2018).    CURRENT MEDICATIONS        Medication List      ASK your doctor  "about these medications    albuterol sulfate  (90 Base) MCG/ACT inhaler  Commonly known as:  PROVENTIL HFA;VENTOLIN HFA;PROAIR HFA  Inhale 2 puffs Every 4 (Four) Hours As Needed for Wheezing.     aspirin 81 MG tablet  Take 1 tablet by mouth Daily.     atorvastatin 80 MG tablet  Commonly known as:  LIPITOR  Take 1 tablet by mouth Every Night.     citalopram 20 MG tablet  Commonly known as:  CeleXA     clopidogrel 75 MG tablet  Commonly known as:  PLAVIX  Take 1 tablet by mouth Daily.     lisinopril 2.5 MG tablet  Commonly known as:  PRINIVIL,ZESTRIL  Take 1 tablet by mouth Daily.     metoprolol tartrate 50 MG tablet  Commonly known as:  LOPRESSOR  Take 1 tablet by mouth Every 12 (Twelve) Hours.     pentazocine-naloxone 50-0.5 MG per tablet  Commonly known as:  TALWIN NX          ALLERGIES     has No Known Allergies.    FAMILY HISTORY     indicated that his mother is . He indicated that his father is . He indicated that the status of his sister is unknown. He indicated that the status of his brother is unknown. He indicated that the status of his maternal uncle is unknown.   family history includes Asthma in his sister; Cancer in his maternal uncle; Diabetes in his brother and father; Heart disease in his father and mother; Hypertension in his brother and sister; Kidney disease in his sister.    SOCIAL HISTORY      reports that he has been smoking cigarettes.  He has a 35.00 pack-year smoking history. His smokeless tobacco use includes snuff. He reports that he does not drink alcohol or use drugs.    PHYSICAL EXAM     INITIAL VITALS:  height is 167.6 cm (66\") and weight is 60 kg (132 lb 6 oz). His oral temperature is 98.4 °F (36.9 °C). His blood pressure is 124/65 and his pulse is 90. His respiration is 18 and oxygen saturation is 95%.    Physical Exam    CONSTITUTIONAL: Well developed, well nourished, not diaphoretic nor distressed  HENT: Normocephalic, atraumatic, oropharynx clear and " "moist  EYES: PERRL, EOM normal, no discharge, no scleral icterus  NECK: ROM normal, supple, no tracheal deviation nor JVD, no stridor  CARDIOVASCULAR: Normal rate and rhythm, heart sounds normal, no rub no gallop, intact distal pulses, normal cap refill  PULMONARY: Normal effort and breath sounds, no distress, no wheezes, rhonchi or rales, no chest tenderness  ABDOMINAL: Soft, nontender, no guarding, no mass, no rebound, no hernia  GENITOURINARY/ANORECTAL: deferred  MUSCULOSKELETAL: ROM normal, no tenderness nor deformity, no edema  NEUROLOGICAL: Alert, oriented x 3, DTRs normal, CN 2-12 normal, normal tone, sensation normal  SKIN: Warm, dry, no erythema, no rash, normal color  PSYCH: Mood and affect normal, behavior normal, thought content and judgement normal.    DIAGNOSTIC RESULTS     EKG: All EKG's are interpreted by the Emergency Department Physician who either signs or Co-signs this chart in the absence of a cardiologist.  EKG performed at 2251 shows normal sinus rhythm with a rate of 82 bpm, normal axis, normal intervals, nonspecific ST segments and T waves.      RADIOLOGY: non-plain film images(s) such as CT, Ultrasound and MRI are read by the radiologist.  Plain radiographic images are visualized and preliminarily interpreted by the emergency physician unless otherwise stated below.  CT head reviewed and interpreted by radiologist shows \"encephalomalacia left frontal lobe.  Negative for mass, mass-effect or intra-cranial hemorrhage.  Negative for skull fracture or mastoid effusions.\"    CTA chest viewed and inserted by stat read radiologist shows \"patchy partially calcified atherosclerotic plaque throughout the thoracic aorta.  Negative for aneurysm, dissection or intramural hematoma.  Borderline cardiomegaly without significant pericardial effusion.  Indeterminate mild mediastinal and hilar lymphadenopathy.  Advanced centrilobular and paraseptal emphysema bilaterally.  A new ill-defined nodule with " "architectural distortion in the anterior left upper lobe measuring approximately 10.5 mm.  Finding is indeterminate for developing neoplasm and pulmonary consultation is recommended.  Negative for pneumothorax or pleural fluid collections.\"    CTA abdomen and pelvis with contrast fusion inserted by stat read radiologist shows \"moderate paraseptal predominant centrilobular emphysematous changes are present with mild chronic interstitial lung disease in the lung bases.  No acute findings involving the solid abdominal organs.  The lower GI tract demonstrates no obstruction, focal wall thickening or pneumatosis.  Fluid present in the right inguinal canal.  Nonspecific wall thickening with reticulation of the urinary bladder that is only partially expanded.    Again noted is moderate partially calcified after sclerotic plaque throughout the abdominal aorta with irregular plaque in the abdominal aorta near the level of the renal arteries again noted.  Peripheral mural thrombus distal abdominal aorta again noted without significant changes.  Fusiform infrarenal abdominal aortic aneurysm is again noted with a maximal AP dimension of 3.4 cm, unchanged from the prior study.  No evidence of dissection or acute intramural hematoma.  At least mild atherosclerotic plaque at the origin of the celiac trunk and SMA but negative for occlusion.  Stenosis of the proximal SMA approaches at least 50%.  There is a small focus of soft plaque versus nonocclusive thrombus in the proximal SMA that was not present on the prior study.  Moderate partially calcified plaque at the origin of both renal arteries right greater than left.  Renal artery stenosis on the right approaches 50%.  Negative for occlusion.  The CONCHA was not identified.  Extensive partially calcified plaque bilateral common iliac arteries with high-grade stenosis proximal bilateral common iliac arteries and moderate to severe foci of stenosis in the more distal common iliac " "arteries.  Extensive plaque also present in the internal and external iliac arteries bilaterally with significant multifocal and fairly diffuse luminal narrowing.\"             LABS:   Lab Results (last 24 hours)     Procedure Component Value Units Date/Time    aPTT [[id number]]  (Normal) Collected:  04/16/19 2253    Specimen:  Blood Updated:  04/16/19 2325     PTT 25.5 seconds     CBC & Differential [[id number]] Collected:  04/16/19 2253    Specimen:  Blood Updated:  04/16/19 2336    Narrative:       The following orders were created for panel order CBC & Differential.  Procedure                               Abnormality         Status                     ---------                               -----------         ------                     CBC Auto Differential[[id number]]        Abnormal            Final result                 Please view results for these tests on the individual orders.    Comprehensive Metabolic Panel [[id number]]  (Abnormal) Collected:  04/16/19 2253    Specimen:  Blood Updated:  04/16/19 2321     Glucose 143 mg/dL      BUN 7 mg/dL      Creatinine 0.74 mg/dL      Sodium 136 mmol/L      Potassium 3.6 mmol/L      Chloride 98 mmol/L      CO2 27.0 mmol/L      Calcium 9.3 mg/dL      Total Protein 5.9 g/dL      Albumin 4.00 g/dL      ALT (SGPT) 19 U/L      AST (SGOT) 24 U/L      Alkaline Phosphatase 67 U/L      Total Bilirubin 0.3 mg/dL      eGFR Non African Amer 108 mL/min/1.73      Globulin 1.9 gm/dL      A/G Ratio 2.1 g/dL      BUN/Creatinine Ratio 9.5     Anion Gap 11.0 mmol/L     Narrative:       GFR Normal >60  Chronic Kidney Disease <60  Kidney Failure <15    Troponin [[id number]]  (Abnormal) Collected:  04/16/19 2253    Specimen:  Blood Updated:  04/16/19 2332     Troponin I 0.038 ng/mL     Protime-INR [[id number]]  (Normal) Collected:  04/16/19 2253    Specimen:  Blood Updated:  04/16/19 2325     Protime 13.2 Seconds      INR 0.97    POC Glucose Once [[id number]]  (Abnormal) Collected:  04/16/19 " 2253    Specimen:  Blood Updated:  04/16/19 2304     Glucose 141 mg/dL      Comment: : [id number] Kristel JamiMeter ID: [id number]       CBC Auto Differential [[id number]]  (Abnormal) Collected:  04/16/19 2253    Specimen:  Blood Updated:  04/16/19 2336     WBC 10.97 10*3/mm3      RBC 5.01 10*6/mm3      Hemoglobin 16.0 g/dL      Hematocrit 46.4 %      MCV 92.6 fL      MCH 31.9 pg      MCHC 34.5 g/dL      RDW 13.0 %      RDW-SD 44.1 fl      MPV 9.6 fL      Platelets 340 10*3/mm3     Narrative:       The previously reported component NRBC is no longer being reported.    Manual Differential [[id number]]  (Abnormal) Collected:  04/16/19 2253    Specimen:  Blood Updated:  04/16/19 2336     Neutrophil % 43.9 %      Lymphocyte % 42.9 %      Monocyte % 5.1 %      Eosinophil % 2.0 %      Atypical Lymphocyte % 6.1 %      Neutrophils Absolute 4.82 10*3/mm3      Lymphocytes Absolute 4.71 10*3/mm3      Monocytes Absolute 0.56 10*3/mm3      Eosinophils Absolute 0.22 10*3/mm3      RBC Morphology Normal     WBC Morphology Normal     Giant Platelets Slight/1+    Troponin [[id number]]  (Normal) Collected:  04/17/19 0216    Specimen:  Blood Updated:  04/17/19 0245     Troponin I 0.029 ng/mL     Urinalysis With Microscopic If Indicated (No Culture) - Urine, Clean Catch [[id number]]  (Abnormal) Collected:  04/17/19 0302    Specimen:  Urine, Clean Catch Updated:  04/17/19 0311     Color, UA Yellow     Appearance, UA Clear     pH, UA 7.5     Specific Gravity, UA >1.030     Glucose,  mg/dL (Trace)     Ketones, UA Negative     Bilirubin, UA Negative     Blood, UA Negative     Protein, UA Trace     Leuk Esterase, UA Negative     Nitrite, UA Negative     Urobilinogen, UA 0.2 E.U./dL    Narrative:       Urine microscopic not indicated.    Basic Metabolic Panel [[id number]]  (Abnormal) Collected:  04/17/19 0458    Specimen:  Blood Updated:  04/17/19 0620     Glucose 108 mg/dL      BUN 6 mg/dL      Creatinine 0.61 mg/dL      Sodium 136  "mmol/L      Potassium 3.9 mmol/L      Chloride 104 mmol/L      CO2 23.0 mmol/L      Calcium 8.2 mg/dL      eGFR Non African Amer 135 mL/min/1.73      BUN/Creatinine Ratio 9.8     Anion Gap 9.0 mmol/L     Narrative:       GFR Normal >60  Chronic Kidney Disease <60  Kidney Failure <15    CBC Auto Differential [[id number]]  (Abnormal) Collected:  04/17/19 0458    Specimen:  Blood Updated:  04/17/19 0607     WBC 10.51 10*3/mm3      RBC 4.20 10*6/mm3      Hemoglobin 13.3 g/dL      Hematocrit 39.6 %      MCV 94.3 fL      MCH 31.7 pg      MCHC 33.6 g/dL      RDW 13.3 %      RDW-SD 46.2 fl      MPV 9.8 fL      Platelets 288 10*3/mm3      Neutrophil % 74.4 %      Lymphocyte % 17.3 %      Monocyte % 6.6 %      Eosinophil % 0.9 %      Basophil % 0.3 %      Immature Grans % 0.5 %      Neutrophils, Absolute 7.83 10*3/mm3      Lymphocytes, Absolute 1.82 10*3/mm3      Monocytes, Absolute 0.69 10*3/mm3      Eosinophils, Absolute 0.09 10*3/mm3      Basophils, Absolute 0.03 10*3/mm3      Immature Grans, Absolute 0.05 10*3/mm3      nRBC 0.0 /100 WBC     Hemoglobin A1c [[id number]] Collected:  04/17/19 0458    Specimen:  Blood Updated:  04/17/19 0601    Lipid Panel [[id number]]  (Abnormal) Collected:  04/17/19 0458    Specimen:  Blood Updated:  04/17/19 0630     Total Cholesterol 177 mg/dL      Triglycerides 298 mg/dL      HDL Cholesterol 33 mg/dL      LDL Cholesterol  113 mg/dL      LDL/HDL Ratio 2.56    TSH [[id number]]  (Normal) Collected:  04/17/19 0458    Specimen:  Blood Updated:  04/17/19 0650     TSH 0.700 mIU/mL           EMERGENCY DEPARTMENT COURSE:   Vitals:    Vitals:    04/17/19 0200 04/17/19 0255 04/17/19 0311 04/17/19 0405   BP: 122/60 133/68 124/65    BP Location:   Right arm    Patient Position:   Lying    Pulse: 90 91 90    Resp: 18 18 18    Temp:  97.8 °F (36.6 °C) 98.4 °F (36.9 °C)    TempSrc:   Oral    SpO2: 96% 95% 97% 95%   Weight:   60 kg (132 lb 6 oz)    Height:   167.6 cm (66\")        The patient was given the " following medications:  Medications   sodium chloride 0.9 % flush 10 mL (not administered)   aspirin EC tablet 81 mg (not administered)   atorvastatin (LIPITOR) tablet 80 mg (not administered)   citalopram (CeleXA) tablet 20 mg (not administered)   clopidogrel (PLAVIX) tablet 75 mg (not administered)   lisinopril (PRINIVIL,ZESTRIL) tablet 2.5 mg (not administered)   metoprolol tartrate (LOPRESSOR) tablet 50 mg (not administered)   sodium chloride 0.9 % flush 3 mL (not administered)   sodium chloride 0.9 % flush 3-10 mL (not administered)   enoxaparin (LOVENOX) syringe 40 mg (not administered)   acetaminophen (TYLENOL) tablet 650 mg (not administered)   ondansetron (ZOFRAN) injection 4 mg (not administered)   ipratropium-albuterol (DUO-NEB) nebulizer solution 3 mL (not administered)   sodium chloride 0.9 % bolus 1,000 mL (0 mL Intravenous Stopped 4/17/19 0030)   sodium chloride 0.9 % bolus 1,000 mL (0 mL Intravenous Stopped 4/17/19 0030)   iopamidol (ISOVUE-370) 76 % injection 100 mL (100 mL Intravenous Given 4/16/19 2352)   aspirin tablet 325 mg (325 mg Oral Given 4/17/19 0230)            CRITICAL CARE:  none    CONSULTS:  none    PROCEDURES:  Procedures        Patient presents to the emergency department with an allergic reaction which resolved prior to arrival after taking Benadryl.  He also had numbness in his perioral region and left upper extremity.  At time of arrival his NIH stroke score equals 0.  He is chest pain-free at time of arrival as well.  Labs show elevated troponin which are actually significantly decreased from the patient's baseline troponin.  CT brain shows encephalomalacia but no acute abnormalities.  CTA of chest/abdomen/pelvis shows no obvious dissection but does show aneurysms and shows a new left upper lobe mass cyst with possible neoplasm.  Given the patient's neurological symptoms it is possible that he had a TIA.  He was given aspirin and I spoke to the hospitalist who agrees to admit  the patient.  He is comfortable at time of admission and agreeable with plan of care.    FINAL IMPRESSION      1. TIA (transient ischemic attack)    2. Chest pain, unspecified type    3. Allergic reaction, initial encounter    4. Mass of upper lobe of left lung          DISPOSITION/PLAN   Admit      PATIENT REFERRED TO:  No follow-up provider specified.    DISCHARGE MEDICATIONS:     Medication List      ASK your doctor about these medications    albuterol sulfate  (90 Base) MCG/ACT inhaler  Commonly known as:  PROVENTIL HFA;VENTOLIN HFA;PROAIR HFA  Inhale 2 puffs Every 4 (Four) Hours As Needed for Wheezing.     aspirin 81 MG tablet  Take 1 tablet by mouth Daily.     atorvastatin 80 MG tablet  Commonly known as:  LIPITOR  Take 1 tablet by mouth Every Night.     citalopram 20 MG tablet  Commonly known as:  CeleXA     clopidogrel 75 MG tablet  Commonly known as:  PLAVIX  Take 1 tablet by mouth Daily.     lisinopril 2.5 MG tablet  Commonly known as:  PRINIVIL,ZESTRIL  Take 1 tablet by mouth Daily.     metoprolol tartrate 50 MG tablet  Commonly known as:  LOPRESSOR  Take 1 tablet by mouth Every 12 (Twelve) Hours.     pentazocine-naloxone 50-0.5 MG per tablet  Commonly known as:  TALWIN NX          (Please note that portions of this note were completed with a voice recognition program.)    DO Alexey Breen Jason Paul, DO  04/17/19 0701

## 2019-04-17 NOTE — ED NOTES
PATIENT DENIES NUMBNESS OR TINGLING AT THIS TIME,  COMPLAINS OF HEADACHE AND SOME CHEST DISCOMFORT.      Ofe Mosley RN  04/17/19 0057

## 2019-04-17 NOTE — PLAN OF CARE
Problem: Patient Care Overview  Goal: Plan of Care Review  Outcome: Outcome(s) achieved Date Met: 04/17/19 04/17/19 1554   Coping/Psychosocial   Plan of Care Reviewed With patient   Plan of Care Review   Progress improving   OTHER   Outcome Summary Pt being d/c home with family. D/c instructions given to patient and family. Stable at d/c.      Goal: Individualization and Mutuality  Outcome: Outcome(s) achieved Date Met: 04/17/19    Goal: Discharge Needs Assessment  Outcome: Outcome(s) achieved Date Met: 04/17/19    Goal: Interprofessional Rounds/Family Conf  Outcome: Outcome(s) achieved Date Met: 04/17/19

## 2019-04-18 ENCOUNTER — READMISSION MANAGEMENT (OUTPATIENT)
Dept: CALL CENTER | Facility: HOSPITAL | Age: 59
End: 2019-04-18

## 2019-04-18 NOTE — OUTREACH NOTE
"Prep Survey      Responses   Facility patient discharged from?  [place]   Is patient eligible?  Yes   Discharge diagnosis  HLD, HTN, COPD, TIA, Allergic reaction, CAD involving native coronary artery of native heart w/o angina pectoris   Does the patient have one of the following disease processes/diagnoses(primary or secondary)?  Other [D/C summary \"no evidence of a TIA but instead a medication reaction.\" LACE <7]   Does the patient have Home health ordered?  No   Is there a DME ordered?  No   Comments regarding appointments  See AVS   Prep survey completed?  Yes          Court Bueno RN        "

## 2019-04-19 ENCOUNTER — READMISSION MANAGEMENT (OUTPATIENT)
Dept: CALL CENTER | Facility: HOSPITAL | Age: 59
End: 2019-04-19

## 2019-04-19 NOTE — OUTREACH NOTE
Medical Week 1 Survey      Responses   Facility patient discharged from?  [place]   Does the patient have one of the following disease processes/diagnoses(primary or secondary)?  Other   Is there a successful TCM telephone encounter documented?  No   Week 1 attempt successful?  No   Unsuccessful attempts  Attempt 1          Sherly Duarte RN

## 2019-04-23 ENCOUNTER — READMISSION MANAGEMENT (OUTPATIENT)
Dept: CALL CENTER | Facility: HOSPITAL | Age: 59
End: 2019-04-23

## 2019-04-23 NOTE — OUTREACH NOTE
Medical Week 1 Survey      Responses   Facility patient discharged from?  [facility]   Does the patient have one of the following disease processes/diagnoses(primary or secondary)?  Other   Is there a successful TCM telephone encounter documented?  No   Week 1 attempt successful?  Yes   Call start time  1519   Call end time  1526   Discharge diagnosis  HLD, HTN, COPD, TIA, Allergic reaction, CAD involving native coronary artery of native heart w/o angina pectoris   Is patient permission given to speak with other caregiver?  Yes   Person spoke with today (if not patient) and relationship  Ekaterina/ wife   Meds reviewed with patient/caregiver?  Yes   Is the patient having any side effects they believe may be caused by any medication additions or changes?  No   Does the patient have all medications ordered at discharge?  Yes   Is the patient taking all medications as directed (includes completed medication regime)?  Yes   Does the patient have a primary care provider?   Yes   Does the patient have an appointment with their PCP within 7 days of discharge?  N/A   Comments regarding PCP  Dr Cline/ - encouraged wife to schedule asap. She verbalized understanding.    Has the patient kept scheduled appointments due by today?  N/A   Has home health visited the patient within 72 hours of discharge?  N/A   Psychosocial issues?  No   Comments  Wife reports he was diagnosed with an allergic reaction, not TIA. She states he has returned to normal. No further left side weakness.    Did the patient receive a copy of their discharge instructions?  Yes   Nursing interventions  Reviewed instructions with patient   What is the patient's perception of their health status since discharge?  Improving   Is the patient/caregiver able to teach back signs and symptoms related to disease process for when to call PCP?  Yes   Is the patient/caregiver able to teach back signs and symptoms related to disease process for when to call 911?  Yes   Is the  patient/caregiver able to teach back the hierarchy of who to call/visit for symptoms/problems? PCP, Specialist, Home health nurse, Urgent Care, ED, 911  Yes   Week 1 call completed?  Yes          Quentin Matias RN

## 2019-04-29 DIAGNOSIS — I65.23 BILATERAL CAROTID ARTERY STENOSIS: Primary | ICD-10-CM

## 2019-05-02 ENCOUNTER — READMISSION MANAGEMENT (OUTPATIENT)
Dept: CALL CENTER | Facility: HOSPITAL | Age: 59
End: 2019-05-02

## 2019-05-02 NOTE — OUTREACH NOTE
Medical Week 2 Survey      Responses   Facility patient discharged from?  [facility]   Does the patient have one of the following disease processes/diagnoses(primary or secondary)?  Other   Call start time  1005   Rescheduled  Revoked [Has returned to work on the [employer].]   Revoke  Decline to participate   Person spoke with today (if not patient) and relationship  Ekaterina/ wife          Antonina Aranda RN

## 2019-09-09 ENCOUNTER — TELEPHONE (OUTPATIENT)
Dept: VASCULAR SURGERY | Facility: CLINIC | Age: 59
End: 2019-09-09

## 2019-09-09 NOTE — TELEPHONE ENCOUNTER
Attempted to call several times to get testing scheduled. Mailed a letter to have pt call the office to get testing scheduled. If pt doesn't call we will have to cancel this appt.

## 2019-09-13 ENCOUNTER — TELEPHONE (OUTPATIENT)
Dept: VASCULAR SURGERY | Facility: CLINIC | Age: 59
End: 2019-09-13

## 2019-09-23 ENCOUNTER — TELEPHONE (OUTPATIENT)
Dept: VASCULAR SURGERY | Facility: CLINIC | Age: 59
End: 2019-09-23

## 2019-09-23 NOTE — TELEPHONE ENCOUNTER
We need to reach patient to get his follow up testing scheduled. I am mailing a reminder letter for patient to call the office so we can get his test scheduled.

## 2019-09-26 ENCOUNTER — TELEPHONE (OUTPATIENT)
Dept: VASCULAR SURGERY | Facility: CLINIC | Age: 59
End: 2019-09-26

## 2020-12-30 NOTE — PROGRESS NOTES
Discharge Planning Assessment   Cynthia     Patient Name: Sharad Ryder  MRN: [id number]  Today's Date: 1/12/2018    Admit Date: 1/4/2018          Discharge Needs Assessment       01/12/18 0845    Living Environment    Lives With spouse    Living Arrangements house    Transportation Available car;family or friend will provide    Living Environment    Provides Primary Care For no one    Quality Of Family Relationships supportive    Able to Return to Prior Living Arrangements yes    Discharge Needs Assessment    Concerns To Be Addressed no discharge needs identified    Readmission Within The Last 30 Days previous discharge plan unsuccessful    Equipment Currently Used at Home none    Current Discharge Risk chronically ill    Discharge Planning Comments Patient admitted from home where he resides with his spouse.  Patient will be having surgery next week.  Will follow for needs post op.              Discharge Plan     None        Discharge Placement     No information found                Demographic Summary     None            Functional Status     None            Psychosocial     None            Abuse/Neglect     None            Legal     None            Substance Abuse     None            Patient Forms     None          KARIE Houston     Patient Name:  Fady Talley  Patient :  1941  Patient MRN:  [id number]     Primary Oncologist: Rai Matthews MD  Referring Provider: Jonas Pacheco [facility]. [facility]. She moved from [facility] to [facility] 

## 2021-07-30 ENCOUNTER — TELEPHONE (OUTPATIENT)
Dept: VASCULAR SURGERY | Facility: CLINIC | Age: 61
End: 2021-07-30

## 2021-07-30 ENCOUNTER — OFFICE VISIT (OUTPATIENT)
Dept: CARDIOLOGY | Facility: CLINIC | Age: 61
End: 2021-07-30

## 2021-07-30 VITALS
DIASTOLIC BLOOD PRESSURE: 70 MMHG | HEIGHT: 65 IN | HEART RATE: 73 BPM | SYSTOLIC BLOOD PRESSURE: 130 MMHG | BODY MASS INDEX: 22.82 KG/M2 | WEIGHT: 137 LBS

## 2021-07-30 DIAGNOSIS — I65.23 BILATERAL CAROTID ARTERY STENOSIS: ICD-10-CM

## 2021-07-30 DIAGNOSIS — G45.9 TRANSIENT ISCHEMIC ATTACK (TIA): Primary | ICD-10-CM

## 2021-07-30 DIAGNOSIS — I49.3 PVC (PREMATURE VENTRICULAR CONTRACTION): ICD-10-CM

## 2021-07-30 DIAGNOSIS — I25.10 CORONARY ARTERY DISEASE INVOLVING NATIVE CORONARY ARTERY OF NATIVE HEART WITHOUT ANGINA PECTORIS: ICD-10-CM

## 2021-07-30 DIAGNOSIS — E78.5 HYPERLIPIDEMIA, UNSPECIFIED HYPERLIPIDEMIA TYPE: ICD-10-CM

## 2021-07-30 DIAGNOSIS — I71.40 ABDOMINAL AORTIC ANEURYSM (AAA) WITHOUT RUPTURE (HCC): Primary | ICD-10-CM

## 2021-07-30 DIAGNOSIS — I10 ESSENTIAL HYPERTENSION: ICD-10-CM

## 2021-07-30 DIAGNOSIS — Z98.890 H/O CAROTID ENDARTERECTOMY: ICD-10-CM

## 2021-07-30 DIAGNOSIS — R07.2 PRECORDIAL CHEST PAIN: ICD-10-CM

## 2021-07-30 DIAGNOSIS — J44.9 CHRONIC OBSTRUCTIVE PULMONARY DISEASE, UNSPECIFIED COPD TYPE (HCC): ICD-10-CM

## 2021-07-30 PROCEDURE — 99204 OFFICE O/P NEW MOD 45 MIN: CPT | Performed by: INTERNAL MEDICINE

## 2021-07-30 PROCEDURE — 93000 ELECTROCARDIOGRAM COMPLETE: CPT | Performed by: INTERNAL MEDICINE

## 2021-07-30 RX ORDER — NITROGLYCERIN 0.4 MG/1
TABLET SUBLINGUAL
Qty: 25 TABLET | Refills: 11 | Status: ON HOLD | OUTPATIENT
Start: 2021-07-30 | End: 2022-01-27

## 2021-07-30 RX ORDER — GABAPENTIN 100 MG/1
CAPSULE ORAL
Status: ON HOLD | COMMUNITY
Start: 2021-07-19 | End: 2022-01-27

## 2021-07-30 RX ORDER — LOSARTAN POTASSIUM 25 MG/1
TABLET ORAL
Status: ON HOLD | COMMUNITY
Start: 2021-07-19 | End: 2022-01-27

## 2021-07-30 NOTE — PROGRESS NOTES
Sharad Ryder  [id number]  1960  61 y.o.  male    Referring Provider: No primary care provider on file.    Reason for  Visit:  Initial visit to establish care with myself for ongoing longitudinal care related to cardiovascular issues   Wants clearance to work on boat  aortic valve replacement with bioprosthetic valve 2018  coronary artery bypass grafting x 3 grafts 2018    Subjective    Mild chronic exertional shortness of breath on exertion relieved with rest  No significant cough or wheezing    No palpitations    Recent chest pain lasted for 90 mins   BP was elevated at that time   No radiation   Had  nausea   Had  sweating   Admitted to [facility] as below     No significant pedal edema    No fever or chills  No significant expectoration    No hemoptysis  No presyncope or syncope    Tolerating current medications well with no untoward side effects   Compliant with prescribed medication regimen. Tries to adhere to cardiac diet.     Joint pain in small, medium and large joints   Chronic low back pain        History of present illness:  Sharad Ryder is a 61 y.o. yo male with history of coronary artery disease coronary artery bypass grafting aortic valve replacement with bioprosthetic valve  who presents today for   Chief Complaint   Patient presents with   • Coronary Artery Disease     1yr- patient is doing ok. had some CP and went to ER back in april.    .    History  Past Medical History:   Diagnosis Date   • Aneurysm (CMS/HCC)    • Anxiety    • Arthritis    • Carotid artery disease (CMS/HCC)    • Carotid stenosis    • Carotid stenosis, right 2/1/2018    Post right carotid endarterectomy   • Chest pain    • COPD (chronic obstructive pulmonary disease) (CMS/HCC)    • Heart murmur    • Hx of CABG 4/17/2019   • Hyperlipidemia    • Hypertension    • Pneumonia    • Severe aortic valve stenosis 12/14/2017   • Tobacco use 12/14/2017   • Wears glasses    ,   Past Surgical History:   Procedure Laterality Date    • APPENDECTOMY     • CARDIAC CATHETERIZATION N/A 12/18/2017    Procedure: Left Heart Cath;  Surgeon: Aime Francois MD;  Location:  [facility] CATH INVASIVE LOCATION;  Service:    • CARDIAC CATHETERIZATION N/A 12/18/2017    Procedure: Right Heart Cath;  Surgeon: Aime Francois MD;  Location:  [facility] CATH INVASIVE [facility];  Service:    • CARDIAC CATHETERIZATION Bilateral 1/4/2018    Procedure: Coronary angiography;  Surgeon: Alfonso Yi MD;  Location:  [facility] CATH INVASIVE LOC[facility];  Service:    • CAROTID ENDARTERECTOMY Right 2/1/2018    Procedure: RIGHT CAROTID ENDARTERECTOMY WITH EEG-awake;  Surgeon: Jl Salgado DO;  Location:  [facility] OR;  Service:    • CORONARY ARTERY BYPASS GRAFT WITH AORTIC VALVE REPAIR/REPLACEMENT N/A 2/8/2018    Procedure: AORTIC VALVE REPLACEMENT,CORONARY ARTERY BYPASS GRAFTING x 3  WITH CONCHA AND RIGHT EVH, ATRIAL APPENDAGE EXCLUSION LEFT WITH TRANSESOPHAGEAL ECHOCARDIOGRAM, 17-CHANNEL TMR;  Surgeon: Cj Robin MD;  Location:  [facility] OR;  Service:    • FINGER SURGERY Right 1990   • OTHER SURGICAL HISTORY      skull srgery from accident in childhood.   ,   Family History   Problem Relation Age of Onset   • Heart disease Mother    • Heart disease Father    • Diabetes Father    • Hypertension Sister    • Asthma Sister    • Kidney disease Sister    • Hypertension Brother    • Diabetes Brother    • Cancer Maternal Uncle    ,   Social History     Tobacco Use   • Smoking status: Current Some Day Smoker     Packs/day: 1.00     Years: 35.00     Pack years: 35.00     Types: Cigarettes   • Smokeless tobacco: Current User     Types: Snuff   • Tobacco comment: Would like to quit.   Vaping Use   • Vaping Use: Never used   Substance Use Topics   • Alcohol use: No   • Drug use: No   ,     Medications  Current Outpatient Medications   Medication Sig Dispense Refill   • aspirin 81 MG tablet Take 1 tablet by mouth Daily. 30 tablet 2   • atorvastatin (LIPITOR) 80 MG tablet Take 1 tablet by mouth Every  "Night. 30 tablet 2   • citalopram (CeleXA) 20 MG tablet Take 20 mg by mouth Daily.     • clopidogrel (PLAVIX) 75 MG tablet Take 1 tablet by mouth Daily. 30 tablet 2   • gabapentin (NEURONTIN) 100 MG capsule      • losartan (COZAAR) 25 MG tablet      • metoprolol tartrate (LOPRESSOR) 50 MG tablet Take 1 tablet by mouth Every 12 (Twelve) Hours. 60 tablet 2   • pentazocine-naloxone (TALWIN NX) 50-0.5 MG per tablet      • albuterol (PROVENTIL HFA;VENTOLIN HFA) 108 (90 Base) MCG/ACT inhaler Inhale 2 puffs Every 4 (Four) Hours As Needed for Wheezing. 1 inhaler 0     No current facility-administered medications for this visit.       Allergies:  Patient has no known allergies.    Review of Systems  Review of Systems   Constitutional: Negative.   HENT: Negative.    Eyes: Negative.    Cardiovascular: Positive for chest pain and dyspnea on exertion. Negative for claudication, cyanosis, irregular heartbeat, leg swelling, near-syncope, orthopnea, palpitations, paroxysmal nocturnal dyspnea and syncope.   Respiratory: Negative.    Endocrine: Negative.    Hematologic/Lymphatic: Negative.    Skin: Negative.    Musculoskeletal: Positive for arthritis, back pain and joint pain.   Gastrointestinal: Negative for anorexia.   Genitourinary: Negative.    Neurological: Negative.    Psychiatric/Behavioral: Negative.        Objective     Physical Exam:  /70   Pulse 73   Ht 165.1 cm (65\")   Wt 62.1 kg (137 lb)   BMI 22.80 kg/m²     Physical Exam  Constitutional:       Appearance: He is well-developed.   HENT:      Head: Normocephalic.   Neck:      Vascular: Normal carotid pulses. No carotid bruit or JVD.      Trachea: No tracheal tenderness or tracheal deviation.   Cardiovascular:      Rate and Rhythm: Regular rhythm.      Pulses: Normal pulses.      Heart sounds: Murmur heard.   Systolic murmur is present with a grade of 2/6.     Pulmonary:      Effort: Pulmonary effort is normal.      Breath sounds: No stridor.   Abdominal:      " General: There is no distension.      Palpations: Abdomen is soft.      Tenderness: There is no abdominal tenderness.   Musculoskeletal:      Cervical back: No edema.   Skin:     General: Skin is warm.   Neurological:      Mental Status: He is alert.      Cranial Nerves: No cranial nerve deficit.      Sensory: No sensory deficit.   Psychiatric:         Speech: Speech normal.         Behavior: Behavior normal.         Results Review:    · Normal left ventricular ejection fraction   · Bioprosthetic aortic valve without specific evidence of dysfunction   · Moderate mitral annular calcification   · Moderate left atrial dilatation   · Moderate pulmonary hypertension  Other Result Text    This result has an attachment that is not available.     Date: 04/13/21   Received From: [facility]              ____________________________________________________________________________________________________________________________________________  Health maintenance and recommendations    Low salt/ HTN/ Heart healthy carbohydrate restricted cardiac diet   The patient is advised to reduce or avoid caffeine or other cardiac stimulants.   Minimize or avoid  NSAID-type medications      Monitor for any signs of bleeding including red or dark stools. Fall precautions.   Advised staying uptodate with immunizations per established standard guidelines.    Offered to give patient  a copy of my notes     Questions were encouraged, asked and answered to the patient's  understanding and satisfaction. Questions if any regarding current medications and side effects, need for refills and importance of compliance to medications stressed.    Reviewed available prior notes, consults, prior visits, laboratory findings, radiology and cardiology relevant reports. Updated chart as applicable. I have reviewed the patient's medical history in detail and updated the computerized patient record as relevant.      Updated patient  regarding any new or relevant abnormalities on review of records or any new findings on physical exam. Mentioned to patient about purpose of visit and desirable health short and long term goals and objectives.    Primary to monitor CBC CMP Lipid panel and TSH as applicable    ___________________________________________________________________________________________________________________________________________     ECG 12 Lead    Date/Time: 7/30/2021 10:48 AM  Performed by: Aime Francois MD  Authorized by: Aime Francois MD   Comparison: compared with previous ECG from 4/14/2019  Comparison to previous ECG: premature ventricular contractions   Rhythm: sinus rhythm  Ectopy: unifocal PVCs  Rate: normal  Q waves: II, III, aVF, V4, V5 and V6    QRS axis: normal  Other findings: left ventricular hypertrophy    Clinical impression: abnormal EKG            Assessment/Plan   Diagnoses and all orders for this visit:    1. Transient ischemic attack (TIA) (Primary)    2. Chronic obstructive pulmonary disease, unspecified COPD type (CMS/HCC)    3. Essential hypertension    4. Hyperlipidemia, unspecified hyperlipidemia type    5. Coronary artery disease involving native coronary artery of native heart without angina pectoris    Other orders  -     ECG 12 Lead          Plan      Orders Placed This Encounter   Procedures   • Ambulatory Referral to Vascular Surgery     Referral Priority:   Routine     Referral Type:   Consultation     Referral Reason:   Specialty Services Required     Requested Specialty:   Vascular Surgery     Number of Visits Requested:   1   • Holter Monitor - 72 Hour Up To 15 Days     Standing Status:   Future     Standing Expiration Date:   7/30/2022     Order Specific Question:   Reason for exam?     Answer:   Other     Order Specific Question:   Other reason?     Answer:   TIA     Order Specific Question:   Release to patient     Answer:   Immediate   • Stress Test With Myocardial Perfusion (1 Day)      Standing Status:   Future     Standing Expiration Date:   7/30/2022     Order Specific Question:   What stress agent will be used?     Answer:   Regadenoson (Lexiscan)     Order Specific Question:   Difficulty walking criteria?     Answer:   Musculoskeletal (hips, knees, feet, back, amputee)     Order Specific Question:   Reason for exam?     Answer:   Chest Pain     Order Specific Question:   Release to patient     Answer:   Immediate   • ECG 12 Lead     This order was created via procedure documentation     Order Specific Question:   Release to patient     Answer:   Immediate        Keep LDL below 70 mg/dl. Monitor liver and renal functions.   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

## 2021-08-30 ENCOUNTER — OFFICE VISIT (OUTPATIENT)
Dept: CARDIOLOGY | Facility: CLINIC | Age: 61
End: 2021-08-30

## 2021-08-30 VITALS
WEIGHT: 133 LBS | SYSTOLIC BLOOD PRESSURE: 150 MMHG | HEIGHT: 65 IN | OXYGEN SATURATION: 98 % | HEART RATE: 70 BPM | DIASTOLIC BLOOD PRESSURE: 80 MMHG | BODY MASS INDEX: 22.16 KG/M2

## 2021-08-30 DIAGNOSIS — R07.2 PRECORDIAL CHEST PAIN: ICD-10-CM

## 2021-08-30 DIAGNOSIS — I49.3 PVC (PREMATURE VENTRICULAR CONTRACTION): ICD-10-CM

## 2021-08-30 DIAGNOSIS — G45.9 TRANSIENT ISCHEMIC ATTACK (TIA): ICD-10-CM

## 2021-08-30 DIAGNOSIS — Z98.890 H/O CAROTID ENDARTERECTOMY: ICD-10-CM

## 2021-08-30 DIAGNOSIS — E78.5 HYPERLIPIDEMIA, UNSPECIFIED HYPERLIPIDEMIA TYPE: Primary | ICD-10-CM

## 2021-08-30 PROCEDURE — 99214 OFFICE O/P EST MOD 30 MIN: CPT | Performed by: INTERNAL MEDICINE

## 2021-08-30 RX ORDER — ISOSORBIDE MONONITRATE 30 MG/1
30 TABLET, EXTENDED RELEASE ORAL DAILY
Qty: 30 TABLET | Refills: 11 | Status: SHIPPED | OUTPATIENT
Start: 2021-08-30 | End: 2021-09-22 | Stop reason: SDUPTHER

## 2021-08-30 RX ORDER — METOPROLOL SUCCINATE 100 MG/1
100 TABLET, EXTENDED RELEASE ORAL DAILY
COMMUNITY
End: 2022-01-28 | Stop reason: HOSPADM

## 2021-08-30 RX ORDER — ROSUVASTATIN CALCIUM 20 MG/1
20 TABLET, COATED ORAL DAILY
COMMUNITY
Start: 2021-08-04 | End: 2022-01-28 | Stop reason: HOSPADM

## 2021-08-30 RX ORDER — BUSPIRONE HYDROCHLORIDE 5 MG/1
5 TABLET ORAL 2 TIMES DAILY
COMMUNITY
Start: 2021-08-04

## 2021-08-30 RX ORDER — CLONIDINE HYDROCHLORIDE 0.1 MG/1
0.1 TABLET ORAL 2 TIMES DAILY PRN
Qty: 60 TABLET | Refills: 3 | Status: SHIPPED | OUTPATIENT
Start: 2021-08-30 | End: 2022-03-07

## 2021-08-30 RX ORDER — SODIUM CHLORIDE 9 MG/ML
75 INJECTION, SOLUTION INTRAVENOUS CONTINUOUS
Status: CANCELLED | OUTPATIENT
Start: 2021-08-30

## 2021-08-30 NOTE — PROGRESS NOTES
Sharad Ryder  [id number]  1960  61 y.o.  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  aortic valve replacement with bioprosthetic valve 2018  coronary artery bypass grafting x 3 grafts 2018  Did not mail outpatient cardiac telemetry as did not get time     Subjective    Now with recurent chest pain   Was admitted in [place] and went to ER   Severe chest pain that took him to the ER   NTG helped   Could not get treatment fully due to Covid pandemic   Could not go to a referral hospital from the smaller facility   Mild chronic exertional shortness of breath on exertion relieved with rest  No significant cough or wheezing    Intermittent palpitations    Prior chest pain lasted for 90 mins   BP was elevated at that time   No radiation   Had  nausea   Had  sweating   Admitted to [facility] prior to last visit  Echo as below   No significant pedal edema    No fever or chills  No significant expectoration    No hemoptysis  No presyncope or syncope    Tolerating current medications well with no untoward side effects   Compliant with prescribed medication regimen. Tries to adhere to cardiac diet.     Joint pain in small, medium and large joints   Chronic low back pain    No bleeding, excessive bruising, gait instability or fall risks    Still smoking      History of present illness:  Sharad Ryder is a 61 y.o. yo male with history of coronary artery disease coronary artery bypass grafting aortic valve replacement with bioprosthetic valve  who presents today for   Chief Complaint   Patient presents with   • Coronary Artery Disease     Sooner appt today due to recent MI (8/23/21 [facility])   .    History  Past Medical History:   Diagnosis  Date   • Aneurysm (CMS/[facility])    • Anxiety    • Arthritis    • Carotid artery disease (CMS/[facility])    • Carotid stenosis    • Carotid stenosis, right 2/1/2018    Post right carotid endarterectomy   • Chest pain    • COPD (chronic obstructive pulmonary disease) (CMS/[facility])    • Heart murmur    • Hx of CABG 4/17/2019   • Hyperlipidemia    • Hypertension    • Pneumonia    • Severe aortic valve stenosis 12/14/2017   • Tobacco use 12/14/2017   • Wears glasses    ,   Past Surgical History:   Procedure Laterality Date   • APPENDECTOMY     • CARDIAC CATHETERIZATION N/A 12/18/2017    Procedure: Left Heart Cath;  Surgeon: Aime Francois MD;  Location:  [facility] CATH INVASIVE LOCATION;  Service:    • CARDIAC CATHETERIZATION N/A 12/18/2017    Procedure: Right Heart Cath;  Surgeon: Aime Francois MD;  Location:  [facility] CATH INVASIVE LOC[facility];  Service:    • CARDIAC CATHETERIZATION Bilateral 1/4/2018    Procedure: Coronary angiography;  Surgeon: Alfonso Yi MD;  Location:  [facility] CATH INVASIVE LOC[facility];  Service:    • CAROTID ENDARTERECTOMY Right 2/1/2018    Procedure: RIGHT CAROTID ENDARTERECTOMY WITH EEG-awake;  Surgeon: Jl Salgado DO;  Location:  [facility] OR;  Service:    • CORONARY ARTERY BYPASS GRAFT WITH AORTIC VALVE REPAIR/REPLACEMENT N/A 2/8/2018    Procedure: AORTIC VALVE REPLACEMENT,CORONARY ARTERY BYPASS GRAFTING x 3  WITH CONCHA AND RIGHT EVH, ATRIAL APPENDAGE EXCLUSION LEFT WITH TRANSESOPHAGEAL ECHOCARDIOGRAM, 17-CHANNEL TMR;  Surgeon: Cj Robin MD;  Location:  [facility] OR;  Service:    • FINGER SURGERY Right 1990   • OTHER SURGICAL HISTORY      skull srgery from accident in childhood.   ,   Family History   Problem Relation Age of Onset   • Heart disease Mother    • Heart disease Father    • Diabetes Father    • Hypertension Sister    • Asthma Sister    • Kidney disease Sister    • Hypertension Brother    • Diabetes Brother    • Cancer Maternal Uncle    ,   Social History     Tobacco Use   • Smoking status: Current Some  Day Smoker     Packs/day: 1.00     Years: 35.00     Pack years: 35.00     Types: Cigarettes   • Smokeless tobacco: Current User     Types: Snuff   • Tobacco comment: Would like to quit.   Vaping Use   • Vaping Use: Never used   Substance Use Topics   • Alcohol use: No   • Drug use: No   ,     Medications  Current Outpatient Medications   Medication Sig Dispense Refill   • albuterol (PROVENTIL HFA;VENTOLIN HFA) 108 (90 Base) MCG/ACT inhaler Inhale 2 puffs Every 4 (Four) Hours As Needed for Wheezing. 1 inhaler 0   • aspirin 81 MG tablet Take 1 tablet by mouth Daily. 30 tablet 2   • busPIRone (BUSPAR) 5 MG tablet      • citalopram (CeleXA) 20 MG tablet Take 20 mg by mouth Daily.     • gabapentin (NEURONTIN) 100 MG capsule      • losartan (COZAAR) 25 MG tablet      • metoprolol succinate XL (TOPROL-XL) 100 MG 24 hr tablet Take 100 mg by mouth Daily.     • nitroglycerin (NITROSTAT) 0.4 MG SL tablet 1 under the tongue as needed for angina, may repeat q5mins for up three doses 25 tablet 11   • rosuvastatin (CRESTOR) 20 MG tablet      • clopidogrel (PLAVIX) 75 MG tablet Take 1 tablet by mouth Daily. 30 tablet 2   • metoprolol tartrate (LOPRESSOR) 50 MG tablet Take 1 tablet by mouth Every 12 (Twelve) Hours. 60 tablet 2   • pentazocine-naloxone (TALWIN NX) 50-0.5 MG per tablet        No current facility-administered medications for this visit.       Allergies:  Patient has no known allergies.    Review of Systems  Review of Systems   Constitutional: Negative.   HENT: Negative.    Eyes: Negative.    Cardiovascular: Positive for chest pain and dyspnea on exertion. Negative for claudication, cyanosis, irregular heartbeat, leg swelling, near-syncope, orthopnea, palpitations, paroxysmal nocturnal dyspnea and syncope.   Respiratory: Negative.    Endocrine: Negative.    Hematologic/Lymphatic: Negative.    Skin: Negative.    Musculoskeletal: Positive for arthritis, back pain and joint pain.   Gastrointestinal: Negative for anorexia.  "  Genitourinary: Negative.    Neurological: Negative.    Psychiatric/Behavioral: Negative.        Objective     Physical Exam:  /80   Pulse 70   Ht 165.1 cm (65\")   Wt 60.3 kg (133 lb)   SpO2 98%   BMI 22.13 kg/m²     Physical Exam  Constitutional:       Appearance: He is well-developed.   HENT:      Head: Normocephalic.   Neck:      Vascular: Normal carotid pulses. No carotid bruit or JVD.      Trachea: No tracheal tenderness or tracheal deviation.   Cardiovascular:      Rate and Rhythm: Regular rhythm.      Pulses: Normal pulses.      Heart sounds: Murmur heard.   Systolic murmur is present with a grade of 2/6.     Pulmonary:      Effort: Pulmonary effort is normal.      Breath sounds: No stridor.   Abdominal:      General: There is no distension.      Palpations: Abdomen is soft.      Tenderness: There is no abdominal tenderness.   Musculoskeletal:      Cervical back: No edema.   Skin:     General: Skin is warm.   Neurological:      Mental Status: He is alert.      Cranial Nerves: No cranial nerve deficit.      Sensory: No sensory deficit.   Psychiatric:         Speech: Speech normal.         Behavior: Behavior normal.         Results Review:    · Normal left ventricular ejection fraction   · Bioprosthetic aortic valve without specific evidence of dysfunction   · Moderate mitral annular calcification   · Moderate left atrial dilatation   · Moderate pulmonary hypertension  Other Result Text    This result has an attachment that is not available.     Date: 21   Received From: [facility]              ____________________________________________________________________________________________________________________________________________  Health maintenance and recommendations    PLEASE READ THE FOLLOWIN minutes reading provided     Why is smoking bad for me?  Smoking increases the risk of heart disease, lung disease, vascular disease, stroke, and cancer.      If you " smoke, STOP!     If you would like more information on quitting smoking, please visit the [employer] website: [email or web address]/corporate/[email or web address]   This link will provide additional resources including the QUIT line and the Beat the Pack support groups.      For more information:     Quit Now [place]  1-800-QUIT-NOW  [email or web address]/en-[email or web address]        Low salt/ HTN/ Heart healthy carbohydrate restricted cardiac diet   The patient is advised to reduce or avoid caffeine or other cardiac stimulants.   Minimize or avoid  NSAID-type medications      Monitor for any signs of bleeding including red or dark stools. Fall precautions.   Advised staying uptodate with immunizations per established standard guidelines.    Offered to give patient  a copy of my notes     Questions were encouraged, asked and answered to the patient's  understanding and satisfaction. Questions if any regarding current medications and side effects, need for refills and importance of compliance to medications stressed.    Reviewed available prior notes, consults, prior visits, laboratory findings, radiology and cardiology relevant reports. Updated chart as applicable. I have reviewed the patient's medical history in detail and updated the computerized patient record as relevant.      Updated patient regarding any new or relevant abnormalities on review of records or any new findings on physical exam. Mentioned to patient about purpose of visit and desirable health short and long term goals and objectives.    Primary to monitor CBC CMP Lipid panel and TSH as applicable    ___________________________________________________________________________________________________________________________________________   Procedures    Assessment/Plan   Diagnoses and all orders for this visit:    1. Hyperlipidemia, unspecified hyperlipidemia type (Primary)    2. PVC (premature ventricular contraction)    3. H/O carotid  endarterectomy bilateral    4. Transient ischemic attack (TIA)          Plan    Due to recurrent chest pain and ER visits will proceed with cath  Recommend cardiac catheterization, selective coronary angiography, left ventriculography and percutaneous coronary intervention with application of arteriotomy hemostatic closure device.    I discussed cardiac catheterization, the procedure, risks (including bleeding, infection, vascular damage [including minor oozing, bruising, bleeding, and up to and including but not limited to the need for vascular surgery, emergency cardiothoracic surgery, contrast reaction, renal failure, respiratory failure, heart attack, stroke, arrhythmia and even death), benefits, and alternatives and the patient has voiced understanding and is willing to proceed.    Adequate pre-hydration and post cardiac catheterization hydration.  Premedications as required and indicated for cardiac catheterization.  No contraindication to drug eluting stent placement if required  Further recommendations pending results of cardiac catheterization       Keep LDL below 70 mg/dl. Monitor liver and renal functions.   Monitor CBC, CMP, TSH (as indicated) and Lipid Panel by primary     Off work till cath and further determination   Check BP and heart rates twice daily at least 3x / week, week a month  at home and bring a recording for me to review next visit  If BP >130/85 or < 100/60 persistently over 3 reading 30 mins apart call sooner      Continue beta blocker therapy     S/L NTG PRN for chest pain, call me or go to ER if has to use S/L nitroglycerin     Needs better BP control    Escalate antianginal therapy with short term follow up    Requested Prescriptions     Signed Prescriptions Disp Refills   • cloNIDine (Catapres) 0.1 MG tablet 60 tablet 3     Sig: Take 1 tablet by mouth 2 (Two) Times a Day As Needed for High Blood Pressure (> 150/90).   • isosorbide mononitrate (IMDUR) 30 MG 24 hr tablet 30 tablet 11      Sig: Take 1 tablet by mouth Daily.              Return in about 3 weeks (around 9/20/2021).

## 2021-08-31 PROBLEM — R07.2 PRECORDIAL CHEST PAIN: Status: ACTIVE | Noted: 2021-08-31

## 2021-09-07 ENCOUNTER — APPOINTMENT (OUTPATIENT)
Dept: CARDIOLOGY | Facility: HOSPITAL | Age: 61
End: 2021-09-07

## 2021-09-10 ENCOUNTER — APPOINTMENT (OUTPATIENT)
Dept: CT IMAGING | Facility: HOSPITAL | Age: 61
End: 2021-09-10

## 2021-09-10 ENCOUNTER — APPOINTMENT (OUTPATIENT)
Dept: ULTRASOUND IMAGING | Facility: HOSPITAL | Age: 61
End: 2021-09-10

## 2021-09-13 ENCOUNTER — HOSPITAL ENCOUNTER (OUTPATIENT)
Dept: ULTRASOUND IMAGING | Facility: HOSPITAL | Age: 61
Discharge: HOME | End: 2021-09-13

## 2021-09-13 ENCOUNTER — TELEPHONE (OUTPATIENT)
Dept: VASCULAR SURGERY | Facility: CLINIC | Age: 61
End: 2021-09-13

## 2021-09-13 ENCOUNTER — HOSPITAL ENCOUNTER (OUTPATIENT)
Dept: CT IMAGING | Facility: HOSPITAL | Age: 61
Discharge: HOME | End: 2021-09-13

## 2021-09-13 DIAGNOSIS — I65.23 BILATERAL CAROTID ARTERY STENOSIS: ICD-10-CM

## 2021-09-13 DIAGNOSIS — I71.40 ABDOMINAL AORTIC ANEURYSM (AAA) WITHOUT RUPTURE (HCC): ICD-10-CM

## 2021-09-13 LAB — CREAT BLDA-MCNC: 0.8 MG/DL (ref 0.6–1.3)

## 2021-09-13 PROCEDURE — 82565 ASSAY OF CREATININE: CPT

## 2021-09-13 PROCEDURE — 0 IOPAMIDOL PER 1 ML: Performed by: NURSE PRACTITIONER

## 2021-09-13 PROCEDURE — 93880 EXTRACRANIAL BILAT STUDY: CPT | Performed by: SURGERY

## 2021-09-13 PROCEDURE — 93880 EXTRACRANIAL BILAT STUDY: CPT

## 2021-09-13 PROCEDURE — 74174 CTA ABD&PLVS W/CONTRAST: CPT

## 2021-09-13 RX ADMIN — IOPAMIDOL 100 ML: 755 INJECTION, SOLUTION INTRAVENOUS at 09:38

## 2021-09-13 NOTE — TELEPHONE ENCOUNTER
Spoke with Mr Ryder reminding him of his appointment for Tuesday, September 14th, 2021 at 1130 am with Dr Salgado. Mr Ryder confirmed he would be here.

## 2021-09-14 ENCOUNTER — OFFICE VISIT (OUTPATIENT)
Dept: VASCULAR SURGERY | Facility: CLINIC | Age: 61
End: 2021-09-14

## 2021-09-14 VITALS
HEIGHT: 65 IN | DIASTOLIC BLOOD PRESSURE: 84 MMHG | SYSTOLIC BLOOD PRESSURE: 136 MMHG | OXYGEN SATURATION: 96 % | HEART RATE: 73 BPM | BODY MASS INDEX: 22.16 KG/M2 | WEIGHT: 133 LBS

## 2021-09-14 DIAGNOSIS — I71.40 ABDOMINAL AORTIC ANEURYSM (AAA) WITHOUT RUPTURE (HCC): Primary | ICD-10-CM

## 2021-09-14 DIAGNOSIS — E78.5 HYPERLIPIDEMIA, UNSPECIFIED HYPERLIPIDEMIA TYPE: ICD-10-CM

## 2021-09-14 DIAGNOSIS — I65.23 BILATERAL CAROTID ARTERY STENOSIS: ICD-10-CM

## 2021-09-14 DIAGNOSIS — I10 ESSENTIAL HYPERTENSION: ICD-10-CM

## 2021-09-14 DIAGNOSIS — I73.9 PAD (PERIPHERAL ARTERY DISEASE) (HCC): ICD-10-CM

## 2021-09-14 PROCEDURE — 99214 OFFICE O/P EST MOD 30 MIN: CPT | Performed by: SURGERY

## 2021-09-14 NOTE — PROGRESS NOTES
09/14/2021      Nila Alexander, KIRBY  [address] [facility] [address]        Sharad Ryder  1960    Chief Complaint   Patient presents with   • Follow-up     Follow Up For Bilateral Carotid Artery Stenosis and Abdominal Aortic Aneurysm without Rupture. PT Last seen 10/15/2018.  Test [id number] US pad carotid bilateral and CT pad angiogram abd pelvis w wo. Patient denies any stroke like symptoms.    • Smoker     Patient is a Current Some Day Smoker    • other     patient recently had a light heart attack in 08/2021...pt having heart cath tomorrow with Dr Francois. patient also states he has been having double vision from time to time/.    • Med Management     Verbally verified medicaitons with patient/wife        Dear Nila Alexander APRN:    HPI     I had the pleasure of seeing you patient in the office today for follow up.  As you recall, the patient is a 61 y.o. male who we are currently following for carotid stenosis and a small abdominal aortic aneurysm.  He was last seen in our office in March 2018.  He had a previous right carotid endarterectomy on 2/1/2018.  He is a current daily smoking.  He did have a mild heart attack last month.  He is scheduled to have a heart catheterization tomorrow with Dr. Francois.  He reports double vision from time to time.  He is maintained on aspirin, Plavix, and Crestor.  He did have noninvasive testing performed, which I did review in office.    Review of Systems   Constitutional: Negative.    HENT: Negative.    Eyes: Positive for visual disturbance (Double vision intermittently).   Respiratory: Positive for shortness of breath (on exertion).    Cardiovascular: Positive for chest pain.   Gastrointestinal: Negative.    Endocrine: Negative.    Genitourinary: Negative.    Musculoskeletal: Positive for arthralgias and joint swelling.   Skin: Negative.    Allergic/Immunologic: Negative.    Neurological: Negative.    Hematological: Negative.    Psychiatric/Behavioral:  "Negative.    All other systems reviewed and are negative.      /84 (BP Location: Left arm, Patient Position: Sitting, Cuff Size: Adult)   Pulse 73   Ht 165.1 cm (65\")   Wt 60.3 kg (133 lb)   SpO2 96%   BMI 22.13 kg/m²   Physical Exam  Constitutional:       General: He is not in acute distress.     Appearance: Normal appearance. He is well-developed. He is not diaphoretic.   HENT:      Head: Normocephalic and atraumatic.   Neck:      Vascular: No carotid bruit or JVD.   Cardiovascular:      Rate and Rhythm: Normal rate and regular rhythm.      Pulses:           Femoral pulses are 2+ on the right side and 2+ on the left side.       Popliteal pulses are 2+ on the right side and 2+ on the left side.      Heart sounds: S1 normal and S2 normal. Murmur heard.   No friction rub. No gallop.    Pulmonary:      Effort: Pulmonary effort is normal.      Breath sounds: Normal breath sounds.   Abdominal:      General: Bowel sounds are normal. There is no abdominal bruit.      Palpations: Abdomen is soft.      Tenderness: There is no abdominal tenderness.   Musculoskeletal:         General: Normal range of motion.   Skin:     General: Skin is warm and dry.   Neurological:      Mental Status: He is alert and oriented to person, place, and time.      Cranial Nerves: No cranial nerve deficit.   Psychiatric:         Behavior: Behavior normal.         Thought Content: Thought content normal.         Judgment: Judgment normal.         DIAGNOSTIC DATA:    CT Angiogram Abdomen Pelvis    Result Date: 9/13/2021  Narrative: EXAM: CT ANGIOGRAM ABDOMEN PELVIS- - 9/13/2021 9:23 AM CDT  HISTORY: AAA, surveillance; I71.4-Abdominal aortic aneurysm, without rupture   COMPARISON: 4/16/2019.  DOSE LENGTH PRODUCT: 93 mGy cm. Automatic exposure control was utilized to make radiation dose as low as reasonably achievable.  TECHNIQUE: Enhanced  axial images of the abdomen and pelvis obtained with multiplanar reformats. 3D postprocessing, " including MIPs, performed and images saved to PACS.  FINDINGS: VISUALIZED CHEST: No pleural or pericardial effusion. Lung bases clear. Prominent inferior heart size.  Arterial phase of imaging limits evaluation of solid organs.  LIVER: No focally suspicious finding.  BILIARY: No calcified gallstone. No intrahepatic or extrahepatic bile duct dilation.  PANCREAS: Normal pancreas contour and enhancement.  SPLEEN: Normal size and contour.  ADRENAL: Normal appearance of the bilateral adrenal glands.  GENITOURINARY: No hydronephrosis, urolithiasis or solid renal lesion.  Urinary bladder is within normal limits. Normal prostate size.  PERITONEUM: No free air or ascites.  GI TRACT: Normal configuration of the stomach and duodenum.  Numerous colonic diverticula. No evidence of acute diverticulitis. No evidence of bowel obstruction. Appendix not discretely identified. No secondary signs of appendicitis.  VESSELS: Abdominal aortic aneurysm measures 3.8 cm, previously 3.6 cm. 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  Celiac, superior mesenteric, single right and 2 left renal arteries patent. Inferior mesenteric artery not definitely identified.  Bilateral common iliac, internal iliac, external iliac, common femoral, partially visualized superficial and deep femoral arteries are patent with multifocal stenosis. There appears to be severe stenosis of the left common iliac artery.  RETROPERITONEUM: No mass, lymphadenopathy or hemorrhage.  SOFT TISSUES: Normal appearance of the overlying abdominal soft tissues.   BONES: No acute or aggressive bony lesion. Similar chronic height loss of T11.       Impression: 1. Abdominal aortic aneurysm measures 3.8 cm, previously 3.6 cm on 4/16/2019. Short segment aortic stenosis at the level of the renal arteries and chronic dissection below the level of the renal arteries, overall  appearance similar to prior. 2. Multifocal iliofemoral atherosclerosis and stenosis, most severe at the left common iliac artery.  This report was finalized on 09/13/2021 10:00 by Dr Meredith Schneider MD.    US Carotid Bilateral    Result Date: 9/13/2021  Narrative: History: Carotid occlusive disease      Impression: Impression: 1. There is less than 50% stenosis of the right internal carotid artery. 2. There is 50-69% stenosis of the left internal carotid artery. 3. Antegrade flow is demonstrated in bilateral vertebral arteries.  Comments: Bilateral carotid vertebral arterial duplex scan was performed.  Grayscale imaging shows intimal thickening and calcified elements at the carotid bifurcation. The right internal carotid artery peak systolic velocity is 101.2 cm/sec. The end-diastolic velocity is 36.2 cm/sec. The right ICA/CCA ratio is approximately 1.1 . These findings correlate with less than 50% stenosis of the right internal carotid artery.  Grayscale imaging shows intimal thickening and calcified elements at the carotid bifurcation. The left internal carotid artery peak systolic velocity is 174.5 cm/sec. The end-diastolic velocity is 37.1 cm/sec. The left ICA/CCA ratio is approximately 2.0 . These findings correlate with 50-69% stenosis of the left internal carotid artery.  Antegrade flow is demonstrated in bilateral vertebral arteries. There is greater than 50% stenosis in bilateral external carotid arteries. This report was finalized on 09/13/2021 11:27 by Dr. Jl Salgado MD.      Patient Active Problem List   Diagnosis   • Hyperlipidemia   • Hypertension   • COPD (chronic obstructive pulmonary disease) (CMS/HCC)   • Transient ischemic attack (TIA)   • Allergic reaction   • Coronary artery disease involving native coronary artery of native heart without angina pectoris   • PVC (premature ventricular contraction)   • H/O carotid endarterectomy bilateral   • Precordial chest pain         ICD-10-CM ICD-9-CM    1. Abdominal aortic aneurysm (AAA) without rupture (CMS/[facility])  I71.4 441.4   2. PAD (peripheral artery disease) (CMS/[facility])  I73.9 443.9   3. Bilateral carotid artery stenosis  I65.23 433.10     433.30   4. Hyperlipidemia, unspecified hyperlipidemia type  E78.5 272.4   5. Essential hypertension  I10 401.9         PLAN: After thoroughly evaluating Sharad Ryder, I believe the best course of action is to remain conservative from a vascular surgery standpoint.  I did review his testing, and he does have a AAAdissection in the aorta and ulcerated plaque.  This has increased and measuring about 3.8 cm.  His carotid duplex shows less than 50% right carotid stenosis and 50-69% left carotid stenosis.  I will see him back in 1 years time with repeat noninvasive testing which will include a CTA of the abdomen and pelvis, carotid duplex, and ABIs.  His aorta at some point in the near future will likely need to be repaired. I did discuss vascular risk factors as they pertain to the progression of vascular disease including controlling hypertension and hyperlipidemia.  These risk factors are currently controlled and stable.  The patient is to continue taking their medications as previously discussed.   This was all discussed in full with complete understanding.  Thank you for allowing me to participate in the care of your patient.  Please do not hesitate to call with any questions or concerns.  We will keep you aware of any further encounters with Sharad Ryder.      Sincerely Yours,      Jl Salgado, DO

## 2021-09-15 ENCOUNTER — HOSPITAL ENCOUNTER (OUTPATIENT)
Facility: HOSPITAL | Age: 61
Setting detail: HOSPITAL OUTPATIENT SURGERY
Discharge: HOME | End: 2021-09-15
Attending: INTERNAL MEDICINE | Admitting: INTERNAL MEDICINE

## 2021-09-15 VITALS
RESPIRATION RATE: 20 BRPM | BODY MASS INDEX: 22.04 KG/M2 | HEIGHT: 65 IN | HEART RATE: 67 BPM | DIASTOLIC BLOOD PRESSURE: 79 MMHG | OXYGEN SATURATION: 96 % | TEMPERATURE: 98.6 F | WEIGHT: 132.28 LBS | SYSTOLIC BLOOD PRESSURE: 173 MMHG

## 2021-09-15 DIAGNOSIS — R07.2 PRECORDIAL CHEST PAIN: ICD-10-CM

## 2021-09-15 LAB
ALBUMIN SERPL-MCNC: 4.7 G/DL (ref 3.5–5.2)
ALBUMIN/GLOB SERPL: 1.7 G/DL
ALP SERPL-CCNC: 65 U/L (ref 39–117)
ALT SERPL W P-5'-P-CCNC: 22 U/L (ref 1–41)
ANION GAP SERPL CALCULATED.3IONS-SCNC: 10 MMOL/L (ref 5–15)
AST SERPL-CCNC: 17 U/L (ref 1–40)
BASOPHILS # BLD AUTO: 0.03 10*3/MM3 (ref 0–0.2)
BASOPHILS NFR BLD AUTO: 0.3 % (ref 0–1.5)
BILIRUB SERPL-MCNC: 0.2 MG/DL (ref 0–1.2)
BUN SERPL-MCNC: 7 MG/DL (ref 8–23)
BUN/CREAT SERPL: 14.9 (ref 7–25)
CALCIUM SPEC-SCNC: 9.4 MG/DL (ref 8.6–10.5)
CHLORIDE SERPL-SCNC: 102 MMOL/L (ref 98–107)
CO2 SERPL-SCNC: 24 MMOL/L (ref 22–29)
CREAT SERPL-MCNC: 0.47 MG/DL (ref 0.76–1.27)
DEPRECATED RDW RBC AUTO: 44 FL (ref 37–54)
EOSINOPHIL # BLD AUTO: 0.06 10*3/MM3 (ref 0–0.4)
EOSINOPHIL NFR BLD AUTO: 0.6 % (ref 0.3–6.2)
ERYTHROCYTE [DISTWIDTH] IN BLOOD BY AUTOMATED COUNT: 12.6 % (ref 12.3–15.4)
GFR SERPL CREATININE-BSD FRML MDRD: >150 ML/MIN/1.73
GLOBULIN UR ELPH-MCNC: 2.7 GM/DL
GLUCOSE SERPL-MCNC: 126 MG/DL (ref 65–99)
HCT VFR BLD AUTO: 40.4 % (ref 37.5–51)
HGB BLD-MCNC: 13.2 G/DL (ref 13–17.7)
IMM GRANULOCYTES # BLD AUTO: 0.03 10*3/MM3 (ref 0–0.05)
IMM GRANULOCYTES NFR BLD AUTO: 0.3 % (ref 0–0.5)
INR PPP: 1.06 (ref 0.91–1.09)
LYMPHOCYTES # BLD AUTO: 1.76 10*3/MM3 (ref 0.7–3.1)
LYMPHOCYTES NFR BLD AUTO: 18.5 % (ref 19.6–45.3)
MCH RBC QN AUTO: 31.3 PG (ref 26.6–33)
MCHC RBC AUTO-ENTMCNC: 32.7 G/DL (ref 31.5–35.7)
MCV RBC AUTO: 95.7 FL (ref 79–97)
MONOCYTES # BLD AUTO: 0.64 10*3/MM3 (ref 0.1–0.9)
MONOCYTES NFR BLD AUTO: 6.7 % (ref 5–12)
NEUTROPHILS NFR BLD AUTO: 6.98 10*3/MM3 (ref 1.7–7)
NEUTROPHILS NFR BLD AUTO: 73.6 % (ref 42.7–76)
NRBC BLD AUTO-RTO: 0 /100 WBC (ref 0–0.2)
PLATELET # BLD AUTO: 298 10*3/MM3 (ref 140–450)
PMV BLD AUTO: 9.8 FL (ref 6–12)
POTASSIUM SERPL-SCNC: 4.1 MMOL/L (ref 3.5–5.2)
PROT SERPL-MCNC: 7.4 G/DL (ref 6–8.5)
PROTHROMBIN TIME: 13.4 SECONDS (ref 11.9–14.6)
RBC # BLD AUTO: 4.22 10*6/MM3 (ref 4.14–5.8)
SODIUM SERPL-SCNC: 136 MMOL/L (ref 136–145)
WBC # BLD AUTO: 9.5 10*3/MM3 (ref 3.4–10.8)

## 2021-09-15 PROCEDURE — 93459 L HRT ART/GRFT ANGIO: CPT | Performed by: INTERNAL MEDICINE

## 2021-09-15 PROCEDURE — 99153 MOD SED SAME PHYS/QHP EA: CPT | Performed by: INTERNAL MEDICINE

## 2021-09-15 PROCEDURE — 25010000002 ADENOSINE (DIAGNOSTIC) PER 30 MG: Performed by: INTERNAL MEDICINE

## 2021-09-15 PROCEDURE — C1760 CLOSURE DEV, VASC: HCPCS | Performed by: INTERNAL MEDICINE

## 2021-09-15 PROCEDURE — C1887 CATHETER, GUIDING: HCPCS | Performed by: INTERNAL MEDICINE

## 2021-09-15 PROCEDURE — 99152 MOD SED SAME PHYS/QHP 5/>YRS: CPT | Performed by: INTERNAL MEDICINE

## 2021-09-15 PROCEDURE — 0 IOPAMIDOL PER 1 ML: Performed by: INTERNAL MEDICINE

## 2021-09-15 PROCEDURE — C1894 INTRO/SHEATH, NON-LASER: HCPCS | Performed by: INTERNAL MEDICINE

## 2021-09-15 PROCEDURE — C1769 GUIDE WIRE: HCPCS | Performed by: INTERNAL MEDICINE

## 2021-09-15 PROCEDURE — 85610 PROTHROMBIN TIME: CPT | Performed by: INTERNAL MEDICINE

## 2021-09-15 PROCEDURE — 25010000002 DIPHENHYDRAMINE PER 50 MG: Performed by: INTERNAL MEDICINE

## 2021-09-15 PROCEDURE — 25010000002 FENTANYL CITRATE (PF) 100 MCG/2ML SOLUTION: Performed by: INTERNAL MEDICINE

## 2021-09-15 PROCEDURE — 80053 COMPREHEN METABOLIC PANEL: CPT | Performed by: INTERNAL MEDICINE

## 2021-09-15 PROCEDURE — 25010000002 HEPARIN (PORCINE) 1000-0.9 UT/500ML-% SOLUTION: Performed by: INTERNAL MEDICINE

## 2021-09-15 PROCEDURE — 93571 IV DOP VEL&/PRESS C FLO 1ST: CPT | Performed by: INTERNAL MEDICINE

## 2021-09-15 PROCEDURE — 85025 COMPLETE CBC W/AUTO DIFF WBC: CPT | Performed by: INTERNAL MEDICINE

## 2021-09-15 PROCEDURE — 25010000002 MIDAZOLAM HCL (PF) 5 MG/5ML SOLUTION: Performed by: INTERNAL MEDICINE

## 2021-09-15 PROCEDURE — 25010000002 HEPARIN (PORCINE) 2000-0.9 UNIT/L-% SOLUTION: Performed by: INTERNAL MEDICINE

## 2021-09-15 RX ORDER — FENTANYL CITRATE 50 UG/ML
INJECTION, SOLUTION INTRAMUSCULAR; INTRAVENOUS AS NEEDED
Status: DISCONTINUED | OUTPATIENT
Start: 2021-09-15 | End: 2021-09-15 | Stop reason: HOSPADM

## 2021-09-15 RX ORDER — SODIUM CHLORIDE 9 MG/ML
80 INJECTION, SOLUTION INTRAVENOUS CONTINUOUS
Status: CANCELLED | OUTPATIENT
Start: 2021-09-15

## 2021-09-15 RX ORDER — DIPHENHYDRAMINE HYDROCHLORIDE 50 MG/ML
INJECTION INTRAMUSCULAR; INTRAVENOUS AS NEEDED
Status: DISCONTINUED | OUTPATIENT
Start: 2021-09-15 | End: 2021-09-15 | Stop reason: HOSPADM

## 2021-09-15 RX ORDER — MIDAZOLAM HYDROCHLORIDE 1 MG/ML
INJECTION, SOLUTION INTRAMUSCULAR; INTRAVENOUS AS NEEDED
Status: DISCONTINUED | OUTPATIENT
Start: 2021-09-15 | End: 2021-09-15 | Stop reason: HOSPADM

## 2021-09-15 RX ORDER — LIDOCAINE HYDROCHLORIDE 20 MG/ML
INJECTION, SOLUTION INFILTRATION; PERINEURAL AS NEEDED
Status: DISCONTINUED | OUTPATIENT
Start: 2021-09-15 | End: 2021-09-15 | Stop reason: HOSPADM

## 2021-09-15 RX ORDER — SODIUM CHLORIDE 9 MG/ML
75 INJECTION, SOLUTION INTRAVENOUS CONTINUOUS
Status: DISCONTINUED | OUTPATIENT
Start: 2021-09-15 | End: 2021-09-15 | Stop reason: HOSPADM

## 2021-09-15 RX ORDER — HEPARIN SODIUM 200 [USP'U]/100ML
INJECTION, SOLUTION INTRAVENOUS AS NEEDED
Status: DISCONTINUED | OUTPATIENT
Start: 2021-09-15 | End: 2021-09-15 | Stop reason: HOSPADM

## 2021-09-22 ENCOUNTER — OFFICE VISIT (OUTPATIENT)
Dept: CARDIOLOGY | Facility: CLINIC | Age: 61
End: 2021-09-22

## 2021-09-22 VITALS
DIASTOLIC BLOOD PRESSURE: 66 MMHG | SYSTOLIC BLOOD PRESSURE: 140 MMHG | WEIGHT: 134 LBS | BODY MASS INDEX: 22.33 KG/M2 | HEIGHT: 65 IN | HEART RATE: 71 BPM | OXYGEN SATURATION: 98 %

## 2021-09-22 DIAGNOSIS — I10 ESSENTIAL HYPERTENSION: Primary | ICD-10-CM

## 2021-09-22 DIAGNOSIS — I49.3 PVC (PREMATURE VENTRICULAR CONTRACTION): ICD-10-CM

## 2021-09-22 DIAGNOSIS — J44.9 CHRONIC OBSTRUCTIVE PULMONARY DISEASE, UNSPECIFIED COPD TYPE (HCC): ICD-10-CM

## 2021-09-22 DIAGNOSIS — G45.9 TRANSIENT ISCHEMIC ATTACK (TIA): ICD-10-CM

## 2021-09-22 DIAGNOSIS — E78.5 HYPERLIPIDEMIA, UNSPECIFIED HYPERLIPIDEMIA TYPE: ICD-10-CM

## 2021-09-22 DIAGNOSIS — Z98.890 H/O CAROTID ENDARTERECTOMY: ICD-10-CM

## 2021-09-22 PROCEDURE — 99214 OFFICE O/P EST MOD 30 MIN: CPT | Performed by: INTERNAL MEDICINE

## 2021-09-22 RX ORDER — ISOSORBIDE MONONITRATE 60 MG/1
60 TABLET, EXTENDED RELEASE ORAL DAILY
Qty: 90 TABLET | Refills: 3 | Status: SHIPPED | OUTPATIENT
Start: 2021-09-22 | End: 2022-02-17 | Stop reason: SDUPTHER

## 2021-09-22 NOTE — PROGRESS NOTES
Sharad Ryder  [id number]  1960  61 y.o.  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  aortic valve replacement with bioprosthetic valve 2018  coronary artery bypass grafting x 3 grafts 2018  Did not mail outpatient cardiac telemetry as did not get time   Cardiac workup test results as below: cath    Subjective      Overall feels the same   No new events or complaints since last visit   Overall the patient feels no major change from baseline symptoms   Similar symptoms as during last visit      Less recurent chest pain   Was admitted in [place] and went to ER   Severe chest pain that took him to the ER   NTG helped   Could not get treatment fully due to Covid pandemic   Could not go to a referral hospital from the smaller facility   Mild chronic exertional shortness of breath on exertion relieved with rest  No significant cough or wheezing    Intermittent palpitations    Prior chest pain lasted for 90 mins   BP was elevated at that time   No radiation   Had  nausea     Had  sweating   Admitted to [facility] prior to last visit  Echo as below   No significant pedal edema    No fever or chills  No significant expectoration    No hemoptysis  No presyncope or syncope    Tolerating current medications well with no untoward side effects   Compliant with prescribed medication regimen. Tries to adhere to cardiac diet.     Joint pain in small, medium and large joints   Chronic low back pain    No bleeding, excessive bruising, gait instability or fall risks    Still smoking but less     Groin minor soreness but healing well     History of present illness:  Sharad Ryder is a 61 y.o. yo male  with history of coronary artery disease coronary artery bypass grafting aortic valve replacement with bioprosthetic valve  who presents today for   Chief Complaint   Patient presents with   • Transient Ischemic Attack     3 WEEK FOLLOW UP S/P CATH    .    History  Past Medical History:   Diagnosis Date   • Aneurysm (CMS/HCC)    • Anxiety    • Arthritis    • Carotid artery disease (CMS/[facility])    • Carotid stenosis    • Carotid stenosis, right 2/1/2018    Post right carotid endarterectomy   • Chest pain    • COPD (chronic obstructive pulmonary disease) (CMS/[facility])    • Heart murmur    • Hx of CABG 4/17/2019   • Hyperlipidemia    • Hypertension    • Pneumonia    • Severe aortic valve stenosis 12/14/2017   • Tobacco use 12/14/2017   • Wears glasses    ,   Past Surgical History:   Procedure Laterality Date   • APPENDECTOMY     • CARDIAC CATHETERIZATION N/A 12/18/2017    Procedure: Left Heart Cath;  Surgeon: Aime Francois MD;  Location:  [facility] CATH INVASIVE LOC[facility];  Service:    • CARDIAC CATHETERIZATION N/A 12/18/2017    Procedure: Right Heart Cath;  Surgeon: Aime Francois MD;  Location:  [facility] CATH INVASIVE [facility];  Service:    • CARDIAC CATHETERIZATION Bilateral 1/4/2018    Procedure: Coronary angiography;  Surgeon: Alfonso Yi MD;  Location:  [facility] CATH INVASIVE [facility];  Service:    • CARDIAC CATHETERIZATION Left 9/15/2021    Procedure: Cardiac Catheterization/Vascular Study NIMCO [place]  Has 3 graft 2018;  Surgeon: Aime Francois MD;  Location:  [facility] CATH INVASIVE LOC[facility];  Service: Cardiology;  Laterality: Left;   • CAROTID ENDARTERECTOMY Right 2/1/2018    Procedure: RIGHT CAROTID ENDARTERECTOMY WITH EEG-awake;  Surgeon: Jl Salgado DO;  Location:  [facility] OR;  Service:    • CORONARY ARTERY BYPASS GRAFT WITH AORTIC VALVE REPAIR/REPLACEMENT N/A 2/8/2018    Procedure: AORTIC VALVE REPLACEMENT,CORONARY ARTERY BYPASS GRAFTING x 3  WITH CONCHA AND RIGHT EVH, ATRIAL APPENDAGE EXCLUSION LEFT WITH TRANSESOPHAGEAL  ECHOCARDIOGRAM, 17-CHANNEL TMR;  Surgeon: Cj Robin MD;  Location: [facility] OR;  Service:    • FINGER SURGERY Right 1990   • OTHER SURGICAL HISTORY      skull srgery from accident in childhood.   ,   Family History   Problem Relation Age of Onset   • Heart disease Mother    • Heart disease Father    • Diabetes Father    • Hypertension Sister    • Asthma Sister    • Kidney disease Sister    • Hypertension Brother    • Diabetes Brother    • Cancer Maternal Uncle    ,   Social History     Tobacco Use   • Smoking status: Current Some Day Smoker     Packs/day: 1.00     Years: 35.00     Pack years: 35.00     Types: Cigarettes   • Smokeless tobacco: Current User     Types: Snuff   • Tobacco comment: Would like to quit.   Vaping Use   • Vaping Use: Never used   Substance Use Topics   • Alcohol use: No   • Drug use: No   ,     Medications  Current Outpatient Medications   Medication Sig Dispense Refill   • aspirin 81 MG tablet Take 1 tablet by mouth Daily. 30 tablet 2   • busPIRone (BUSPAR) 5 MG tablet      • citalopram (CeleXA) 20 MG tablet Take 20 mg by mouth Daily.     • cloNIDine (Catapres) 0.1 MG tablet Take 1 tablet by mouth 2 (Two) Times a Day As Needed for High Blood Pressure (> 150/90). 60 tablet 3   • clopidogrel (PLAVIX) 75 MG tablet Take 1 tablet by mouth Daily. 30 tablet 2   • gabapentin (NEURONTIN) 100 MG capsule      • isosorbide mononitrate (IMDUR) 60 MG 24 hr tablet Take 1 tablet by mouth Daily. 90 tablet 3   • losartan (COZAAR) 25 MG tablet      • metoprolol succinate XL (TOPROL-XL) 100 MG 24 hr tablet Take 100 mg by mouth Daily.     • nitroglycerin (NITROSTAT) 0.4 MG SL tablet 1 under the tongue as needed for angina, may repeat q5mins for up three doses 25 tablet 11   • pentazocine-naloxone (TALWIN NX) 50-0.5 MG per tablet      • rosuvastatin (CRESTOR) 20 MG tablet        No current facility-administered medications for this visit.       Allergies:  Patient has no known allergies.    Review of  "Systems  Review of Systems   Constitutional: Negative.   HENT: Negative.    Eyes: Negative.    Cardiovascular: Positive for chest pain and dyspnea on exertion. Negative for claudication, cyanosis, irregular heartbeat, leg swelling, near-syncope, orthopnea, palpitations, paroxysmal nocturnal dyspnea and syncope.   Respiratory: Negative.    Endocrine: Negative.    Hematologic/Lymphatic: Negative.    Skin: Negative.    Musculoskeletal: Positive for arthritis, back pain and joint pain.   Gastrointestinal: Negative for anorexia.   Genitourinary: Negative.    Neurological: Negative.    Psychiatric/Behavioral: Negative.        Objective     Physical Exam:  /66   Pulse 71   Ht 165.1 cm (65\")   Wt 60.8 kg (134 lb)   SpO2 98%   BMI 22.30 kg/m²     Physical Exam  Constitutional:       Appearance: He is well-developed.   HENT:      Head: Normocephalic.   Neck:      Vascular: Normal carotid pulses. No carotid bruit or JVD.      Trachea: No tracheal tenderness or tracheal deviation.   Cardiovascular:      Rate and Rhythm: Regular rhythm.      Pulses: Normal pulses.      Heart sounds: Murmur heard.   Systolic murmur is present with a grade of 2/6.     Pulmonary:      Effort: Pulmonary effort is normal.      Breath sounds: No stridor.   Abdominal:      General: There is no distension.      Palpations: Abdomen is soft.      Tenderness: There is no abdominal tenderness.   Musculoskeletal:      Cervical back: No edema.   Skin:     General: Skin is warm.   Neurological:      Mental Status: He is alert.      Cranial Nerves: No cranial nerve deficit.      Sensory: No sensory deficit.   Psychiatric:         Speech: Speech normal.         Behavior: Behavior normal.         Results Review:      Conclusion of cardiac catheterization    9/15/2021     Distal left main coronary artery has 40 to 50% stenosis with normal FFR of 0.95  Proximal left anterior descending coronary artery 70% stenosis  Patent left internal mammary artery " graft to the left anterior descending coronary artery  Occluded saphenous venous graft to diagonal branch  Occluded mid right coronary artery  Occluded saphenous venous graft to right posterior descending coronary artery  No significant disease noted of left circumflex coronary artery  Ectasia of the aortic root by aortic root angiography which confirms occluded saphenous venous grafts  Mild to moderate aortic regurgitation  Normally functioning bioprosthetic aortic valve with peak to peak gradient of 20 mmHg on pullback  Severely elevated left ventricular end-diastolic pressure 32 mmHg [LV gram not performed]  Evidence of infrarenal aortic aneurysm on fluoroscopy        ____________________________________________________________________________________________________________________________________________     Plan after cardiac catheterization     No targets for intervention  Continue medical therapy  Acceptable risk for any planned procedures  Intensive risk factor modifications for both primary and secondary prevention if applicable  Hydration   Observation    · Normal left ventricular ejection fraction   · Bioprosthetic aortic valve without specific evidence of dysfunction   · Moderate mitral annular calcification   · Moderate left atrial dilatation   · Moderate pulmonary hypertension       This result has an attachment that is not available.     Date: 21   Received From: [facility]              ____________________________________________________________________________________________________________________________________________  Health maintenance and recommendations    PLEASE READ THE FOLLOWIN minutes reading provided     Why is smoking bad for me?  Smoking increases the risk of heart disease, lung disease, vascular disease, stroke, and cancer.      If you smoke, STOP!     If you would like more information on quitting smoking, please visit the [facility] website:  [email or web address]   This link will provide additional resources including the QUIT line and the Beat the Pack support groups.      For more information:     Quit Now [place]  1-800-QUIT-NOW  https://[place].quitlogix.org/en-[place]/        Low salt/ HTN/ Heart healthy carbohydrate restricted cardiac diet   The patient is advised to reduce or avoid caffeine or other cardiac stimulants.   Minimize or avoid  NSAID-type medications      Monitor for any signs of bleeding including red or dark stools. Fall precautions.   Advised staying uptodate with immunizations per established standard guidelines.    Offered to give patient  a copy of my notes     Questions were encouraged, asked and answered to the patient's  understanding and satisfaction. Questions if any regarding current medications and side effects, need for refills and importance of compliance to medications stressed.    Reviewed available prior notes, consults, prior visits, laboratory findings, radiology and cardiology relevant reports. Updated chart as applicable. I have reviewed the patient's medical history in detail and updated the computerized patient record as relevant.      Updated patient regarding any new or relevant abnormalities on review of records or any new findings on physical exam. Mentioned to patient about purpose of visit and desirable health short and long term goals and objectives.    Primary to monitor CBC CMP Lipid panel and TSH as applicable    ___________________________________________________________________________________________________________________________________________   Procedures    Assessment/Plan   Diagnoses and all orders for this visit:    1. Essential hypertension (Primary)    2. Hyperlipidemia, unspecified hyperlipidemia type    3. Chronic obstructive pulmonary disease, unspecified COPD type (HCC)    4. H/O carotid endarterectomy bilateral    5. PVC (premature ventricular  contraction)    6. Transient ischemic attack (TIA)    Other orders  -     isosorbide mononitrate (IMDUR) 60 MG 24 hr tablet; Take 1 tablet by mouth Daily.  Dispense: 90 tablet; Refill: 3          Plan    The current medical regimen is effective;  continue present plan and medications.   Escalate antianginal therapy   Requested Prescriptions     Signed Prescriptions Disp Refills   • isosorbide mononitrate (IMDUR) 60 MG 24 hr tablet 90 tablet 3     Sig: Take 1 tablet by mouth Daily.        Patient undecided regarding the Covid vaccine   Urged to consider vaccination further   I can provide more input if required   Recommend further discussion with primary care provider   Planning to get today     Keep LDL below 70 mg/dl. Monitor liver and renal functions.   Monitor CBC, CMP, TSH (as indicated) and Lipid Panel by primary     Off work till cath and further determination   Check BP and heart rates twice daily at least 3x / week, week a month  at home and bring a recording for me to review next visit  If BP >130/85 or < 100/60 persistently over 3 reading 30 mins apart call sooner      Continue beta blocker therapy   S/L NTG PRN for chest pain, call me or go to ER if has to use S/L nitroglycerin     Unable to work due to disabling angina  No targets for intervention         Return in about 8 weeks (around 11/17/2021).

## 2021-10-12 ENCOUNTER — TELEPHONE (OUTPATIENT)
Dept: CARDIOLOGY | Facility: CLINIC | Age: 61
End: 2021-10-12

## 2021-11-17 ENCOUNTER — OFFICE VISIT (OUTPATIENT)
Dept: CARDIOLOGY | Facility: CLINIC | Age: 61
End: 2021-11-17

## 2021-11-17 VITALS
HEIGHT: 65 IN | HEART RATE: 63 BPM | SYSTOLIC BLOOD PRESSURE: 90 MMHG | DIASTOLIC BLOOD PRESSURE: 48 MMHG | WEIGHT: 130 LBS | BODY MASS INDEX: 21.66 KG/M2

## 2021-11-17 DIAGNOSIS — J44.9 CHRONIC OBSTRUCTIVE PULMONARY DISEASE, UNSPECIFIED COPD TYPE (HCC): ICD-10-CM

## 2021-11-17 DIAGNOSIS — E78.5 HYPERLIPIDEMIA, UNSPECIFIED HYPERLIPIDEMIA TYPE: ICD-10-CM

## 2021-11-17 DIAGNOSIS — I25.10 CORONARY ARTERY DISEASE INVOLVING NATIVE CORONARY ARTERY OF NATIVE HEART WITHOUT ANGINA PECTORIS: ICD-10-CM

## 2021-11-17 DIAGNOSIS — I49.3 PVC (PREMATURE VENTRICULAR CONTRACTION): Primary | ICD-10-CM

## 2021-11-17 DIAGNOSIS — I10 PRIMARY HYPERTENSION: ICD-10-CM

## 2021-11-17 PROCEDURE — 99214 OFFICE O/P EST MOD 30 MIN: CPT | Performed by: INTERNAL MEDICINE

## 2021-11-17 NOTE — PROGRESS NOTES
Sharad Ryder  [id number]  1960  61 y.o.  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  aortic valve replacement with bioprosthetic valve 2018  coronary artery bypass grafting x 3 grafts 2018  Did not mail outpatient cardiac telemetry as did not get time   Cardiac workup test results as below: cath    Subjective    No new events or complaints since last visit   Continues to have intermittent chest pain   Somewhat better though   Cath as below   No targets for intervention     Was [place] in [place] and went to ER   Severe chest pain that took him to the ER   NTG helped   Could not get treatment fully due to Covid pandemic   Could not go to a referral hospital from the smaller facility   Mild chronic exertional shortness of breath on exertion relieved with rest  No significant cough or wheezing    Intermittent palpitations  Not checking blood pressures regularly at home     BP well controlled at home when he checks   No radiation   Has  nausea   Had  sweating   Admitted to [facility] in past  Echo as below   No significant pedal edema    No fever or chills  No significant expectoration    No hemoptysis  No presyncope or syncope    Tolerating current medications well with no untoward side effects   Compliant with prescribed medication regimen. Tries to adhere to cardiac diet.     Joint pain in small, medium and large joints   Chronic low back pain    No bleeding, excessive bruising, gait instability or fall risks    Still smoking but less     Groin healed well   Easy fatiguability   Feels tired   Uses cane for support and balance   Came in wheel chair to the office         History of  present illness:  Sharad Ryder is a 61 y.o. yo male with coronary artery disease coronary artery bypass grafting aortic valve replacement with bioprosthetic valve  who presents today for   Chief Complaint   Patient presents with   • Follow-up   .    History  Past Medical History:   Diagnosis Date   • Aneurysm (HCC)    • Anxiety    • Arthritis    • Carotid artery disease (HCC)    • Carotid stenosis    • Carotid stenosis, right 2/1/2018    Post right carotid endarterectomy   • Chest pain    • COPD (chronic obstructive pulmonary disease) ([facility])    • Heart murmur    • Hx of CABG 4/17/2019   • Hyperlipidemia    • Hypertension    • Pneumonia    • Severe aortic valve stenosis 12/14/2017   • Tobacco use 12/14/2017   • Wears glasses    ,   Past Surgical History:   Procedure Laterality Date   • APPENDECTOMY     • CARDIAC CATHETERIZATION N/A 12/18/2017    Procedure: Left Heart Cath;  Surgeon: Aime Francois MD;  Location:  [facility] CATH INVASIVE [facility];  Service:    • CARDIAC CATHETERIZATION N/A 12/18/2017    Procedure: Right Heart Cath;  Surgeon: Aime Francois MD;  Location:  [facility]ATH [facility];  Service:    • CARDIAC CATHETERIZATION Bilateral 1/4/2018    Procedure: Coronary angiography;  Surgeon: Alfonso Yi MD;  Location:  [facility] CATH [facility];  Service:    • CARDIAC CATHETERIZATION Left 9/15/2021    Procedure: Cardiac Catheterization/Vascular Study NIMCO [place]  Has 3 graft 2018;  Surgeon: Aime Francois MD;  Location:  [facility] CATH INVASIVE [facility];  Service: Cardiology;  Laterality: Left;   • CAROTID ENDARTERECTOMY Right 2/1/2018    Procedure: RIGHT CAROTID ENDARTERECTOMY WITH EEG-awake;  Surgeon: Jl Salgado DO;  Location:  [facility] OR;  Service:    • CORONARY ARTERY BYPASS GRAFT WITH AORTIC VALVE REPAIR/REPLACEMENT N/A 2/8/2018    Procedure: AORTIC VALVE REPLACEMENT,CORONARY ARTERY BYPASS GRAFTING x 3  WITH CONCHA AND RIGHT EVH, ATRIAL APPENDAGE EXCLUSION LEFT WITH TRANSESOPHAGEAL ECHOCARDIOGRAM,  17-CHANNEL TMR;  Surgeon: Cj Robin MD;  Location: [facility] OR;  Service:    • FINGER SURGERY Right 1990   • OTHER SURGICAL HISTORY      skull srgery from accident in childhood.   ,   Family History   Problem Relation Age of Onset   • Heart disease Mother    • Heart disease Father    • Diabetes Father    • Hypertension Sister    • Asthma Sister    • Kidney disease Sister    • Hypertension Brother    • Diabetes Brother    • Cancer Maternal Uncle    ,   Social History     Tobacco Use   • Smoking status: Current Some Day Smoker     Packs/day: 1.00     Years: 35.00     Pack years: 35.00     Types: Cigarettes   • Smokeless tobacco: Current User     Types: Snuff   • Tobacco comment: Would like to quit.   Vaping Use   • Vaping Use: Never used   Substance Use Topics   • Alcohol use: No   • Drug use: No   ,     Medications  Current Outpatient Medications   Medication Sig Dispense Refill   • aspirin 81 MG tablet Take 1 tablet by mouth Daily. 30 tablet 2   • busPIRone (BUSPAR) 5 MG tablet      • citalopram (CeleXA) 20 MG tablet Take 20 mg by mouth Daily.     • cloNIDine (Catapres) 0.1 MG tablet Take 1 tablet by mouth 2 (Two) Times a Day As Needed for High Blood Pressure (> 150/90). 60 tablet 3   • clopidogrel (PLAVIX) 75 MG tablet Take 1 tablet by mouth Daily. 30 tablet 2   • gabapentin (NEURONTIN) 100 MG capsule      • isosorbide mononitrate (IMDUR) 60 MG 24 hr tablet Take 1 tablet by mouth Daily. 90 tablet 3   • losartan (COZAAR) 25 MG tablet      • metoprolol succinate XL (TOPROL-XL) 100 MG 24 hr tablet Take 100 mg by mouth Daily.     • nitroglycerin (NITROSTAT) 0.4 MG SL tablet 1 under the tongue as needed for angina, may repeat q5mins for up three doses 25 tablet 11   • pentazocine-naloxone (TALWIN NX) 50-0.5 MG per tablet      • rosuvastatin (CRESTOR) 20 MG tablet        No current facility-administered medications for this visit.       Allergies:  Patient has no known allergies.    Review of Systems  Review of  "Systems   Constitutional: Negative.   HENT: Negative.    Eyes: Negative.    Cardiovascular: Positive for chest pain and dyspnea on exertion. Negative for claudication, cyanosis, irregular heartbeat, leg swelling, near-syncope, orthopnea, palpitations, paroxysmal nocturnal dyspnea and syncope.   Respiratory: Negative.    Endocrine: Negative.    Hematologic/Lymphatic: Negative.    Skin: Negative.    Musculoskeletal: Positive for arthritis, back pain and joint pain.   Gastrointestinal: Negative for anorexia.   Genitourinary: Negative.    Neurological: Negative.    Psychiatric/Behavioral: Negative.        Objective     Physical Exam:    Vitals:    11/17/21 0935 11/17/21 0936   BP: (!) 84/50 90/48   Pulse: 63    Weight: 59 kg (130 lb)    Height: 165.1 cm (65\")      BP 90/48   Pulse 63   Ht 165.1 cm (65\")   Wt 59 kg (130 lb)   BMI 21.63 kg/m²     Physical Exam  Constitutional:       Appearance: He is well-developed.   HENT:      Head: Normocephalic.   Neck:      Vascular: Normal carotid pulses. No carotid bruit or JVD.      Trachea: No tracheal tenderness or tracheal deviation.   Cardiovascular:      Rate and Rhythm: Regular rhythm.      Pulses: Normal pulses.      Heart sounds: Murmur heard.    Systolic murmur is present with a grade of 2/6.      Pulmonary:      Effort: Pulmonary effort is normal.      Breath sounds: No stridor.   Abdominal:      General: There is no distension.      Palpations: Abdomen is soft.      Tenderness: There is no abdominal tenderness.   Musculoskeletal:      Cervical back: No edema.   Skin:     General: Skin is warm.   Neurological:      Mental Status: He is alert.      Cranial Nerves: No cranial nerve deficit.      Sensory: No sensory deficit.   Psychiatric:         Speech: Speech normal.         Behavior: Behavior normal.         Results Review:      Conclusion of cardiac catheterization    9/15/2021     Distal left main coronary artery has 40 to 50% stenosis with normal FFR of " 0.95  Proximal left anterior descending coronary artery 70% stenosis  Patent left internal mammary artery graft to the left anterior descending coronary artery  Occluded saphenous venous graft to diagonal branch  Occluded mid right coronary artery  Occluded saphenous venous graft to right posterior descending coronary artery  No significant disease noted of left circumflex coronary artery  Ectasia of the aortic root by aortic root angiography which confirms occluded saphenous venous grafts  Mild to moderate aortic regurgitation  Normally functioning bioprosthetic aortic valve with peak to peak gradient of 20 mmHg on pullback  Severely elevated left ventricular end-diastolic pressure 32 mmHg [LV gram not performed]  Evidence of infrarenal aortic aneurysm on fluoroscopy        ____________________________________________________________________________________________________________________________________________     Plan after cardiac catheterization     No targets for intervention  Continue medical therapy  Acceptable risk for any planned procedures  Intensive risk factor modifications for both primary and secondary prevention if applicable  Hydration   Observation    · Normal left ventricular ejection fraction   · Bioprosthetic aortic valve without specific evidence of dysfunction   · Moderate mitral annular calcification   · Moderate left atrial dilatation   · Moderate pulmonary hypertension       This result has an attachment that is not available.     Date: 21   Received From: [facility]              ____________________________________________________________________________________________________________________________________________  Health maintenance and recommendations    PLEASE READ THE FOLLOWIN minutes reading provided     Why is smoking bad for me?  Smoking increases the risk of heart disease, lung disease, vascular disease, stroke, and cancer.      If you smoke,  STOP!     If you would like more information on quitting smoking, please visit the [employer] website: [email or web address]/[email or web address]   This link will provide additional resources including the QUIT line and the Beat the Pack support groups.      For more information:     Quit Now [place]  1-800-QUIT-NOW  [email or web address]        Low salt/ HTN/ Heart healthy carbohydrate restricted cardiac diet   The patient is advised to reduce or avoid caffeine or other cardiac stimulants.   Minimize or avoid  NSAID-type medications      Monitor for any signs of bleeding including red or dark stools. Fall precautions.   Advised staying uptodate with immunizations per established standard guidelines.    Offered to give patient  a copy of my notes     Questions were encouraged, asked and answered to the patient's  understanding and satisfaction. Questions if any regarding current medications and side effects, need for refills and importance of compliance to medications stressed.    Reviewed available prior notes, consults, prior visits, laboratory findings, radiology and cardiology relevant reports. Updated chart as applicable. I have reviewed the patient's medical history in detail and updated the computerized patient record as relevant.      Updated patient regarding any new or relevant abnormalities on review of records or any new findings on physical exam. Mentioned to patient about purpose of visit and desirable health short and long term goals and objectives.    Primary to monitor CBC CMP Lipid panel and TSH as applicable    ___________________________________________________________________________________________________________________________________________   Procedures    Assessment/Plan   Diagnoses and all orders for this visit:    1. PVC (premature ventricular contraction) (Primary)    2. Primary hypertension    3. Coronary artery disease involving native  coronary artery of native heart without angina pectoris    4. Chronic obstructive pulmonary disease, unspecified COPD type (HCC)    5. Hyperlipidemia, unspecified hyperlipidemia type          Plan    The current medical regimen is effective;  continue present plan and medications.   Escalate antianginal therapy     I support the patient's decision to take the Covid -19 vaccine   Had one dose  Will complete course   No major issues   Recommend booster  In future      Keep LDL below 70 mg/dl. Monitor liver and renal functions.   Monitor CBC, CMP, TSH (as indicated) and Lipid Panel by primary     Off work till cath and further determination   Check BP and heart rates twice daily at least 3x / week, week a month  at home and bring a recording for me to review next visit  If BP >130/85 or < 100/60 persistently over 3 reading 30 mins apart call sooner      Continue beta blocker therapy   S/L NTG PRN for chest pain, call me or go to ER if has to use S/L nitroglycerin     Unable to work due to disabling angina  No targets for intervention     [place]    Will fill his paperwork  Follow up with KIRBY De La Cruz  or myself              Return in about 3 months (around 2/17/2022).

## 2022-01-05 ENCOUNTER — TELEPHONE (OUTPATIENT)
Dept: CARDIOLOGY | Facility: CLINIC | Age: 62
End: 2022-01-05

## 2022-01-05 NOTE — TELEPHONE ENCOUNTER
Patient called and stated he called [employer] and they stated they don't have his paper work so it has been faxed over again on 01/05/2022.    Form signed for release and scanned.

## 2022-01-27 ENCOUNTER — APPOINTMENT (OUTPATIENT)
Dept: GENERAL RADIOLOGY | Facility: HOSPITAL | Age: 62
End: 2022-01-27

## 2022-01-27 ENCOUNTER — APPOINTMENT (OUTPATIENT)
Dept: CT IMAGING | Facility: HOSPITAL | Age: 62
End: 2022-01-27

## 2022-01-27 ENCOUNTER — APPOINTMENT (OUTPATIENT)
Dept: MRI IMAGING | Facility: HOSPITAL | Age: 62
End: 2022-01-27

## 2022-01-27 ENCOUNTER — HOSPITAL ENCOUNTER (OUTPATIENT)
Facility: HOSPITAL | Age: 62
Setting detail: OBSERVATION
Discharge: HOME | End: 2022-01-28
Attending: FAMILY MEDICINE | Admitting: INTERNAL MEDICINE

## 2022-01-27 ENCOUNTER — APPOINTMENT (OUTPATIENT)
Dept: ULTRASOUND IMAGING | Facility: HOSPITAL | Age: 62
End: 2022-01-27

## 2022-01-27 DIAGNOSIS — R46.89 COGNITIVE AND BEHAVIORAL CHANGES: ICD-10-CM

## 2022-01-27 DIAGNOSIS — Z78.9 DECREASED ACTIVITIES OF DAILY LIVING (ADL): ICD-10-CM

## 2022-01-27 DIAGNOSIS — R41.89 COGNITIVE AND BEHAVIORAL CHANGES: ICD-10-CM

## 2022-01-27 DIAGNOSIS — G45.9 TIA (TRANSIENT ISCHEMIC ATTACK): Primary | ICD-10-CM

## 2022-01-27 DIAGNOSIS — Z74.09 IMPAIRED MOBILITY: ICD-10-CM

## 2022-01-27 PROBLEM — I77.9 RIGHT-SIDED CAROTID ARTERY DISEASE: Status: ACTIVE | Noted: 2022-01-27

## 2022-01-27 PROBLEM — Z86.73 HISTORY OF CVA (CEREBROVASCULAR ACCIDENT): Chronic | Status: ACTIVE | Noted: 2022-01-27

## 2022-01-27 PROBLEM — I63.9 ACUTE CVA (CEREBROVASCULAR ACCIDENT): Status: ACTIVE | Noted: 2022-01-27

## 2022-01-27 PROBLEM — R53.1 RIGHT SIDED WEAKNESS: Status: ACTIVE | Noted: 2022-01-27

## 2022-01-27 PROBLEM — D72.829 LEUKOCYTOSIS: Status: ACTIVE | Noted: 2022-01-27

## 2022-01-27 PROBLEM — I95.9 HYPOTENSION: Status: ACTIVE | Noted: 2022-01-27

## 2022-01-27 LAB
ALBUMIN SERPL-MCNC: 4 G/DL (ref 3.5–5.2)
ALBUMIN SERPL-MCNC: 4.4 G/DL (ref 3.5–5.2)
ALBUMIN/GLOB SERPL: 1.4 G/DL
ALBUMIN/GLOB SERPL: 1.5 G/DL
ALP SERPL-CCNC: 75 U/L (ref 39–117)
ALP SERPL-CCNC: 79 U/L (ref 39–117)
ALT SERPL W P-5'-P-CCNC: 11 U/L (ref 1–41)
ALT SERPL W P-5'-P-CCNC: 16 U/L (ref 1–41)
ANION GAP SERPL CALCULATED.3IONS-SCNC: 13 MMOL/L (ref 5–15)
ANION GAP SERPL CALCULATED.3IONS-SCNC: 13 MMOL/L (ref 5–15)
APTT PPP: 28 SECONDS (ref 24.1–35)
AST SERPL-CCNC: 22 U/L (ref 1–40)
AST SERPL-CCNC: 30 U/L (ref 1–40)
BACTERIA UR QL AUTO: ABNORMAL /HPF
BASOPHILS # BLD AUTO: 0.05 10*3/MM3 (ref 0–0.2)
BASOPHILS # BLD AUTO: 0.07 10*3/MM3 (ref 0–0.2)
BASOPHILS NFR BLD AUTO: 0.3 % (ref 0–1.5)
BASOPHILS NFR BLD AUTO: 0.4 % (ref 0–1.5)
BILIRUB SERPL-MCNC: 0.2 MG/DL (ref 0–1.2)
BILIRUB SERPL-MCNC: 0.2 MG/DL (ref 0–1.2)
BILIRUB UR QL STRIP: NEGATIVE
BUN SERPL-MCNC: 13 MG/DL (ref 8–23)
BUN SERPL-MCNC: 18 MG/DL (ref 8–23)
BUN/CREAT SERPL: 13.3 (ref 7–25)
BUN/CREAT SERPL: 18.8 (ref 7–25)
CALCIUM SPEC-SCNC: 9 MG/DL (ref 8.6–10.5)
CALCIUM SPEC-SCNC: 9.3 MG/DL (ref 8.6–10.5)
CHLORIDE SERPL-SCNC: 93 MMOL/L (ref 98–107)
CHLORIDE SERPL-SCNC: 96 MMOL/L (ref 98–107)
CHOLEST SERPL-MCNC: 194 MG/DL (ref 0–200)
CLARITY UR: ABNORMAL
CO2 SERPL-SCNC: 25 MMOL/L (ref 22–29)
CO2 SERPL-SCNC: 30 MMOL/L (ref 22–29)
COD CRY URNS QL: ABNORMAL /HPF
COLOR UR: YELLOW
CREAT SERPL-MCNC: 0.96 MG/DL (ref 0.76–1.27)
CREAT SERPL-MCNC: 0.98 MG/DL (ref 0.76–1.27)
CRP SERPL-MCNC: 0.92 MG/DL (ref 0–0.5)
DEPRECATED RDW RBC AUTO: 43.9 FL (ref 37–54)
DEPRECATED RDW RBC AUTO: 44.3 FL (ref 37–54)
EOSINOPHIL # BLD AUTO: 0.03 10*3/MM3 (ref 0–0.4)
EOSINOPHIL # BLD AUTO: 0.37 10*3/MM3 (ref 0–0.4)
EOSINOPHIL NFR BLD AUTO: 0.2 % (ref 0.3–6.2)
EOSINOPHIL NFR BLD AUTO: 1.6 % (ref 0.3–6.2)
ERYTHROCYTE [DISTWIDTH] IN BLOOD BY AUTOMATED COUNT: 13 % (ref 12.3–15.4)
ERYTHROCYTE [DISTWIDTH] IN BLOOD BY AUTOMATED COUNT: 13 % (ref 12.3–15.4)
ERYTHROCYTE [SEDIMENTATION RATE] IN BLOOD: 3 MM/HR (ref 0–20)
GFR SERPL CREATININE-BSD FRML MDRD: 78 ML/MIN/1.73
GFR SERPL CREATININE-BSD FRML MDRD: 80 ML/MIN/1.73
GLOBULIN UR ELPH-MCNC: 2.9 GM/DL
GLOBULIN UR ELPH-MCNC: 2.9 GM/DL
GLUCOSE SERPL-MCNC: 148 MG/DL (ref 65–99)
GLUCOSE SERPL-MCNC: 184 MG/DL (ref 65–99)
GLUCOSE UR STRIP-MCNC: NEGATIVE MG/DL
GRAN CASTS URNS QL MICRO: ABNORMAL /LPF
HBA1C MFR BLD: 6.4 % (ref 4.8–5.6)
HCT VFR BLD AUTO: 40 % (ref 37.5–51)
HCT VFR BLD AUTO: 43 % (ref 37.5–51)
HCT VFR BLD AUTO: 49.1 % (ref 37.5–51)
HDLC SERPL-MCNC: 40 MG/DL (ref 40–60)
HGB BLD-MCNC: 14.4 G/DL (ref 13–17.7)
HGB BLD-MCNC: 15.8 G/DL (ref 13–17.7)
HGB UR QL STRIP.AUTO: NEGATIVE
HOLD SPECIMEN: NORMAL
HYALINE CASTS UR QL AUTO: ABNORMAL /LPF
IMM GRANULOCYTES # BLD AUTO: 0.08 10*3/MM3 (ref 0–0.05)
IMM GRANULOCYTES # BLD AUTO: 0.18 10*3/MM3 (ref 0–0.05)
IMM GRANULOCYTES NFR BLD AUTO: 0.6 % (ref 0–0.5)
IMM GRANULOCYTES NFR BLD AUTO: 0.8 % (ref 0–0.5)
INR PPP: 1 (ref 0.91–1.09)
KETONES UR QL STRIP: NEGATIVE
LDLC SERPL CALC-MCNC: 123 MG/DL (ref 0–100)
LDLC/HDLC SERPL: 2.99 {RATIO}
LEUKOCYTE ESTERASE UR QL STRIP.AUTO: ABNORMAL
LYMPHOCYTES # BLD AUTO: 1.54 10*3/MM3 (ref 0.7–3.1)
LYMPHOCYTES # BLD AUTO: 3.72 10*3/MM3 (ref 0.7–3.1)
LYMPHOCYTES NFR BLD AUTO: 11.1 % (ref 19.6–45.3)
LYMPHOCYTES NFR BLD AUTO: 16.2 % (ref 19.6–45.3)
MAGNESIUM SERPL-MCNC: 1.9 MG/DL (ref 1.6–2.4)
MCH RBC QN AUTO: 29.9 PG (ref 26.6–33)
MCH RBC QN AUTO: 31 PG (ref 26.6–33)
MCHC RBC AUTO-ENTMCNC: 32.2 G/DL (ref 31.5–35.7)
MCHC RBC AUTO-ENTMCNC: 33.5 G/DL (ref 31.5–35.7)
MCV RBC AUTO: 92.5 FL (ref 79–97)
MCV RBC AUTO: 93 FL (ref 79–97)
MONOCYTES # BLD AUTO: 0.95 10*3/MM3 (ref 0.1–0.9)
MONOCYTES # BLD AUTO: 1.33 10*3/MM3 (ref 0.1–0.9)
MONOCYTES NFR BLD AUTO: 5.8 % (ref 5–12)
MONOCYTES NFR BLD AUTO: 6.8 % (ref 5–12)
NEUTROPHILS NFR BLD AUTO: 11.26 10*3/MM3 (ref 1.7–7)
NEUTROPHILS NFR BLD AUTO: 17.26 10*3/MM3 (ref 1.7–7)
NEUTROPHILS NFR BLD AUTO: 75.3 % (ref 42.7–76)
NEUTROPHILS NFR BLD AUTO: 80.9 % (ref 42.7–76)
NITRITE UR QL STRIP: NEGATIVE
NRBC BLD AUTO-RTO: 0 /100 WBC (ref 0–0.2)
NRBC BLD AUTO-RTO: 0 /100 WBC (ref 0–0.2)
PA ADP PRP-ACNC: 240 PRU (ref 194–418)
PH UR STRIP.AUTO: 6 [PH] (ref 5–8)
PLATELET # BLD AUTO: 347 10*3/MM3 (ref 140–450)
PLATELET # BLD AUTO: 357 10*3/MM3 (ref 140–450)
PLATELET # BLD AUTO: 427 10*3/MM3 (ref 140–450)
PMV BLD AUTO: 9.3 FL (ref 6–12)
PMV BLD AUTO: 9.4 FL (ref 6–12)
POTASSIUM SERPL-SCNC: 3.9 MMOL/L (ref 3.5–5.2)
POTASSIUM SERPL-SCNC: 4.3 MMOL/L (ref 3.5–5.2)
PROT SERPL-MCNC: 6.9 G/DL (ref 6–8.5)
PROT SERPL-MCNC: 7.3 G/DL (ref 6–8.5)
PROT UR QL STRIP: ABNORMAL
PROTHROMBIN TIME: 12.8 SECONDS (ref 11.9–14.6)
QT INTERVAL: 404 MS
QTC INTERVAL: 486 MS
RBC # BLD AUTO: 4.65 10*6/MM3 (ref 4.14–5.8)
RBC # BLD AUTO: 5.28 10*6/MM3 (ref 4.14–5.8)
RBC # UR STRIP: ABNORMAL /HPF
REF LAB TEST METHOD: ABNORMAL
SARS-COV-2 RNA PNL SPEC NAA+PROBE: NOT DETECTED
SODIUM SERPL-SCNC: 134 MMOL/L (ref 136–145)
SODIUM SERPL-SCNC: 136 MMOL/L (ref 136–145)
SP GR UR STRIP: 1.01 (ref 1–1.03)
SQUAMOUS #/AREA URNS HPF: ABNORMAL /HPF
T4 FREE SERPL-MCNC: 1.23 NG/DL (ref 0.93–1.7)
TRIGL SERPL-MCNC: 172 MG/DL (ref 0–150)
TSH SERPL DL<=0.05 MIU/L-ACNC: 2.02 UIU/ML (ref 0.27–4.2)
UROBILINOGEN UR QL STRIP: ABNORMAL
VIT B12 BLD-MCNC: 391 PG/ML (ref 211–946)
VLDLC SERPL-MCNC: 31 MG/DL (ref 5–40)
WBC # UR STRIP: ABNORMAL /HPF
WBC NRBC COR # BLD: 13.91 10*3/MM3 (ref 3.4–10.8)
WBC NRBC COR # BLD: 22.93 10*3/MM3 (ref 3.4–10.8)
WHOLE BLOOD HOLD SPECIMEN: NORMAL
WHOLE BLOOD HOLD SPECIMEN: NORMAL

## 2022-01-27 PROCEDURE — 99214 OFFICE O/P EST MOD 30 MIN: CPT | Performed by: PSYCHIATRY & NEUROLOGY

## 2022-01-27 PROCEDURE — 85576 BLOOD PLATELET AGGREGATION: CPT | Performed by: PHYSICIAN ASSISTANT

## 2022-01-27 PROCEDURE — 93005 ELECTROCARDIOGRAM TRACING: CPT | Performed by: FAMILY MEDICINE

## 2022-01-27 PROCEDURE — 80061 LIPID PANEL: CPT | Performed by: INTERNAL MEDICINE

## 2022-01-27 PROCEDURE — G0378 HOSPITAL OBSERVATION PER HR: HCPCS

## 2022-01-27 PROCEDURE — 87040 BLOOD CULTURE FOR BACTERIA: CPT | Performed by: INTERNAL MEDICINE

## 2022-01-27 PROCEDURE — 36415 COLL VENOUS BLD VENIPUNCTURE: CPT | Performed by: PHYSICIAN ASSISTANT

## 2022-01-27 PROCEDURE — 97165 OT EVAL LOW COMPLEX 30 MIN: CPT

## 2022-01-27 PROCEDURE — 84439 ASSAY OF FREE THYROXINE: CPT | Performed by: INTERNAL MEDICINE

## 2022-01-27 PROCEDURE — 87635 SARS-COV-2 COVID-19 AMP PRB: CPT | Performed by: FAMILY MEDICINE

## 2022-01-27 PROCEDURE — 81001 URINALYSIS AUTO W/SCOPE: CPT | Performed by: NURSE PRACTITIONER

## 2022-01-27 PROCEDURE — 99285 EMERGENCY DEPT VISIT HI MDM: CPT

## 2022-01-27 PROCEDURE — 85025 COMPLETE CBC W/AUTO DIFF WBC: CPT | Performed by: FAMILY MEDICINE

## 2022-01-27 PROCEDURE — C9803 HOPD COVID-19 SPEC COLLECT: HCPCS

## 2022-01-27 PROCEDURE — 92523 SPEECH SOUND LANG COMPREHEN: CPT

## 2022-01-27 PROCEDURE — 87186 SC STD MICRODIL/AGAR DIL: CPT | Performed by: INTERNAL MEDICINE

## 2022-01-27 PROCEDURE — 86140 C-REACTIVE PROTEIN: CPT | Performed by: INTERNAL MEDICINE

## 2022-01-27 PROCEDURE — 82607 VITAMIN B-12: CPT | Performed by: INTERNAL MEDICINE

## 2022-01-27 PROCEDURE — 87086 URINE CULTURE/COLONY COUNT: CPT | Performed by: NURSE PRACTITIONER

## 2022-01-27 PROCEDURE — 93880 EXTRACRANIAL BILAT STUDY: CPT

## 2022-01-27 PROCEDURE — 93010 ELECTROCARDIOGRAM REPORT: CPT | Performed by: INTERNAL MEDICINE

## 2022-01-27 PROCEDURE — 96361 HYDRATE IV INFUSION ADD-ON: CPT

## 2022-01-27 PROCEDURE — 93880 EXTRACRANIAL BILAT STUDY: CPT | Performed by: SURGERY

## 2022-01-27 PROCEDURE — 97161 PT EVAL LOW COMPLEX 20 MIN: CPT | Performed by: PHYSICAL THERAPIST

## 2022-01-27 PROCEDURE — 71045 X-RAY EXAM CHEST 1 VIEW: CPT

## 2022-01-27 PROCEDURE — 85025 COMPLETE CBC W/AUTO DIFF WBC: CPT | Performed by: INTERNAL MEDICINE

## 2022-01-27 PROCEDURE — 70551 MRI BRAIN STEM W/O DYE: CPT

## 2022-01-27 PROCEDURE — 85610 PROTHROMBIN TIME: CPT | Performed by: FAMILY MEDICINE

## 2022-01-27 PROCEDURE — 96360 HYDRATION IV INFUSION INIT: CPT

## 2022-01-27 PROCEDURE — 85730 THROMBOPLASTIN TIME PARTIAL: CPT | Performed by: FAMILY MEDICINE

## 2022-01-27 PROCEDURE — 70450 CT HEAD/BRAIN W/O DYE: CPT

## 2022-01-27 PROCEDURE — 85652 RBC SED RATE AUTOMATED: CPT | Performed by: INTERNAL MEDICINE

## 2022-01-27 PROCEDURE — 87150 DNA/RNA AMPLIFIED PROBE: CPT | Performed by: INTERNAL MEDICINE

## 2022-01-27 PROCEDURE — 83036 HEMOGLOBIN GLYCOSYLATED A1C: CPT | Performed by: INTERNAL MEDICINE

## 2022-01-27 PROCEDURE — 80053 COMPREHEN METABOLIC PANEL: CPT | Performed by: FAMILY MEDICINE

## 2022-01-27 PROCEDURE — 83735 ASSAY OF MAGNESIUM: CPT | Performed by: INTERNAL MEDICINE

## 2022-01-27 PROCEDURE — 80053 COMPREHEN METABOLIC PANEL: CPT | Performed by: INTERNAL MEDICINE

## 2022-01-27 PROCEDURE — 84443 ASSAY THYROID STIM HORMONE: CPT | Performed by: INTERNAL MEDICINE

## 2022-01-27 RX ORDER — NITROGLYCERIN 0.4 MG/1
0.4 TABLET SUBLINGUAL
COMMUNITY

## 2022-01-27 RX ORDER — SODIUM CHLORIDE 0.9 % (FLUSH) 0.9 %
10 SYRINGE (ML) INJECTION AS NEEDED
Status: DISCONTINUED | OUTPATIENT
Start: 2022-01-27 | End: 2022-01-28 | Stop reason: HOSPADM

## 2022-01-27 RX ORDER — ONDANSETRON 2 MG/ML
4 INJECTION INTRAMUSCULAR; INTRAVENOUS EVERY 6 HOURS PRN
Status: DISCONTINUED | OUTPATIENT
Start: 2022-01-27 | End: 2022-01-28 | Stop reason: HOSPADM

## 2022-01-27 RX ORDER — SODIUM CHLORIDE 0.9 % (FLUSH) 0.9 %
10 SYRINGE (ML) INJECTION EVERY 12 HOURS SCHEDULED
Status: DISCONTINUED | OUTPATIENT
Start: 2022-01-27 | End: 2022-01-28 | Stop reason: HOSPADM

## 2022-01-27 RX ORDER — ROSUVASTATIN CALCIUM 20 MG/1
20 TABLET, COATED ORAL DAILY
Status: DISCONTINUED | OUTPATIENT
Start: 2022-01-27 | End: 2022-01-27

## 2022-01-27 RX ORDER — CLOPIDOGREL BISULFATE 75 MG/1
75 TABLET ORAL DAILY
Status: DISCONTINUED | OUTPATIENT
Start: 2022-01-27 | End: 2022-01-28 | Stop reason: HOSPADM

## 2022-01-27 RX ORDER — ACETAMINOPHEN 325 MG/1
650 TABLET ORAL EVERY 4 HOURS PRN
Status: DISCONTINUED | OUTPATIENT
Start: 2022-01-27 | End: 2022-01-28 | Stop reason: HOSPADM

## 2022-01-27 RX ORDER — CITALOPRAM 20 MG/1
20 TABLET ORAL DAILY
Status: DISCONTINUED | OUTPATIENT
Start: 2022-01-27 | End: 2022-01-28 | Stop reason: HOSPADM

## 2022-01-27 RX ORDER — ONDANSETRON 4 MG/1
4 TABLET, FILM COATED ORAL EVERY 6 HOURS PRN
Status: DISCONTINUED | OUTPATIENT
Start: 2022-01-27 | End: 2022-01-28 | Stop reason: HOSPADM

## 2022-01-27 RX ORDER — ASPIRIN 81 MG/1
81 TABLET ORAL DAILY
Status: DISCONTINUED | OUTPATIENT
Start: 2022-01-27 | End: 2022-01-28 | Stop reason: HOSPADM

## 2022-01-27 RX ORDER — ACETAMINOPHEN 650 MG/1
650 SUPPOSITORY RECTAL EVERY 4 HOURS PRN
Status: DISCONTINUED | OUTPATIENT
Start: 2022-01-27 | End: 2022-01-28 | Stop reason: HOSPADM

## 2022-01-27 RX ORDER — SODIUM CHLORIDE, SODIUM LACTATE, POTASSIUM CHLORIDE, CALCIUM CHLORIDE 600; 310; 30; 20 MG/100ML; MG/100ML; MG/100ML; MG/100ML
100 INJECTION, SOLUTION INTRAVENOUS CONTINUOUS
Status: DISCONTINUED | OUTPATIENT
Start: 2022-01-27 | End: 2022-01-27

## 2022-01-27 RX ORDER — ISOSORBIDE MONONITRATE 60 MG/1
60 TABLET, EXTENDED RELEASE ORAL DAILY
Status: DISCONTINUED | OUTPATIENT
Start: 2022-01-27 | End: 2022-01-28 | Stop reason: HOSPADM

## 2022-01-27 RX ORDER — ROSUVASTATIN CALCIUM 20 MG/1
40 TABLET, COATED ORAL DAILY
Status: DISCONTINUED | OUTPATIENT
Start: 2022-01-28 | End: 2022-01-28 | Stop reason: HOSPADM

## 2022-01-27 RX ORDER — BUSPIRONE HYDROCHLORIDE 5 MG/1
5 TABLET ORAL EVERY 12 HOURS SCHEDULED
Status: DISCONTINUED | OUTPATIENT
Start: 2022-01-27 | End: 2022-01-28 | Stop reason: HOSPADM

## 2022-01-27 RX ADMIN — SODIUM CHLORIDE, POTASSIUM CHLORIDE, SODIUM LACTATE AND CALCIUM CHLORIDE 100 ML/HR: 600; 310; 30; 20 INJECTION, SOLUTION INTRAVENOUS at 05:10

## 2022-01-27 RX ADMIN — SODIUM CHLORIDE, PRESERVATIVE FREE 10 ML: 5 INJECTION INTRAVENOUS at 22:33

## 2022-01-27 RX ADMIN — ASPIRIN 81 MG: 81 TABLET, COATED ORAL at 08:03

## 2022-01-27 RX ADMIN — ROSUVASTATIN CALCIUM 20 MG: 20 TABLET, FILM COATED ORAL at 08:02

## 2022-01-27 RX ADMIN — SODIUM CHLORIDE, POTASSIUM CHLORIDE, SODIUM LACTATE AND CALCIUM CHLORIDE 500 ML: 600; 310; 30; 20 INJECTION, SOLUTION INTRAVENOUS at 04:20

## 2022-01-27 RX ADMIN — BUSPIRONE HYDROCHLORIDE 5 MG: 5 TABLET ORAL at 22:33

## 2022-01-27 RX ADMIN — BUSPIRONE HYDROCHLORIDE 5 MG: 5 TABLET ORAL at 08:02

## 2022-01-27 RX ADMIN — ISOSORBIDE MONONITRATE 60 MG: 60 TABLET, EXTENDED RELEASE ORAL at 08:02

## 2022-01-27 RX ADMIN — CITALOPRAM 20 MG: 20 TABLET, FILM COATED ORAL at 08:02

## 2022-01-27 RX ADMIN — CLOPIDOGREL 75 MG: 75 TABLET, FILM COATED ORAL at 08:02

## 2022-01-28 ENCOUNTER — TELEPHONE (OUTPATIENT)
Dept: NEUROLOGY | Facility: CLINIC | Age: 62
End: 2022-01-28

## 2022-01-28 ENCOUNTER — APPOINTMENT (OUTPATIENT)
Dept: CARDIOLOGY | Facility: HOSPITAL | Age: 62
End: 2022-01-28

## 2022-01-28 ENCOUNTER — READMISSION MANAGEMENT (OUTPATIENT)
Dept: CALL CENTER | Facility: HOSPITAL | Age: 62
End: 2022-01-28

## 2022-01-28 VITALS
HEIGHT: 65 IN | BODY MASS INDEX: 22.49 KG/M2 | OXYGEN SATURATION: 95 % | SYSTOLIC BLOOD PRESSURE: 121 MMHG | WEIGHT: 135 LBS | HEART RATE: 75 BPM | RESPIRATION RATE: 20 BRPM | DIASTOLIC BLOOD PRESSURE: 59 MMHG | TEMPERATURE: 98.7 F

## 2022-01-28 LAB
ANION GAP SERPL CALCULATED.3IONS-SCNC: 8 MMOL/L (ref 5–15)
BACTERIA BLD CULT: ABNORMAL
BACTERIA SPEC AEROBE CULT: NO GROWTH
BH CV ECHO MEAS - AO MAX PG (FULL): 39.4 MMHG
BH CV ECHO MEAS - AO MAX PG: 51.8 MMHG
BH CV ECHO MEAS - AO MEAN PG (FULL): 20 MMHG
BH CV ECHO MEAS - AO MEAN PG: 26 MMHG
BH CV ECHO MEAS - AO ROOT AREA: 5.7 CM^2
BH CV ECHO MEAS - AO ROOT DIAM: 2.7 CM
BH CV ECHO MEAS - AO V2 MAX: 360 CM/SEC
BH CV ECHO MEAS - AO V2 MEAN: 233.5 CM/SEC
BH CV ECHO MEAS - AO V2 VTI: 66.1 CM
BH CV ECHO MEAS - AVA(I,A): 1.7 CM^2
BH CV ECHO MEAS - AVA(I,D): 1.7 CM^2
BH CV ECHO MEAS - AVA(V,A): 1.5 CM^2
BH CV ECHO MEAS - AVA(V,D): 1.5 CM^2
BH CV ECHO MEAS - EDV(CUBED): 67.9 ML
BH CV ECHO MEAS - EDV(MOD-SP4): 90.7 ML
BH CV ECHO MEAS - EDV(TEICH): 73.4 ML
BH CV ECHO MEAS - EF(CUBED): 72.3 %
BH CV ECHO MEAS - EF(MOD-SP4): 65.6 %
BH CV ECHO MEAS - EF(TEICH): 64.5 %
BH CV ECHO MEAS - ESV(CUBED): 18.8 ML
BH CV ECHO MEAS - ESV(MOD-SP4): 31.2 ML
BH CV ECHO MEAS - ESV(TEICH): 26 ML
BH CV ECHO MEAS - FS: 34.8 %
BH CV ECHO MEAS - IVS/LVPW: 0.91
BH CV ECHO MEAS - IVSD: 1.1 CM
BH CV ECHO MEAS - LA DIMENSION: 4 CM
BH CV ECHO MEAS - LA/AO: 1.5
BH CV ECHO MEAS - LAT PEAK E' VEL: 5 CM/SEC
BH CV ECHO MEAS - LV MASS(C)D: 163.2 GRAMS
BH CV ECHO MEAS - LV MAX PG: 12.4 MMHG
BH CV ECHO MEAS - LV MEAN PG: 6 MMHG
BH CV ECHO MEAS - LV V1 MAX: 176 CM/SEC
BH CV ECHO MEAS - LV V1 MEAN: 113 CM/SEC
BH CV ECHO MEAS - LV V1 VTI: 36.1 CM
BH CV ECHO MEAS - LVIDD: 4.1 CM
BH CV ECHO MEAS - LVIDS: 2.7 CM
BH CV ECHO MEAS - LVLD AP4: 8.1 CM
BH CV ECHO MEAS - LVLS AP4: 6.7 CM
BH CV ECHO MEAS - LVOT AREA (M): 3.1 CM^2
BH CV ECHO MEAS - LVOT AREA: 3.1 CM^2
BH CV ECHO MEAS - LVOT DIAM: 2 CM
BH CV ECHO MEAS - LVPWD: 1.2 CM
BH CV ECHO MEAS - MED PEAK E' VEL: 4.5 CM/SEC
BH CV ECHO MEAS - MV A MAX VEL: 113 CM/SEC
BH CV ECHO MEAS - MV DEC SLOPE: 589.5 CM/SEC^2
BH CV ECHO MEAS - MV DEC TIME: 0.28 SEC
BH CV ECHO MEAS - MV E MAX VEL: 143 CM/SEC
BH CV ECHO MEAS - MV E/A: 1.3
BH CV ECHO MEAS - MV P1/2T MAX VEL: 163 CM/SEC
BH CV ECHO MEAS - MV P1/2T: 81 MSEC
BH CV ECHO MEAS - MVA P1/2T LCG: 1.3 CM^2
BH CV ECHO MEAS - MVA(P1/2T): 2.7 CM^2
BH CV ECHO MEAS - PA MAX PG: 8.3 MMHG
BH CV ECHO MEAS - PA V2 MAX: 144 CM/SEC
BH CV ECHO MEAS - RAP SYSTOLE: 5 MMHG
BH CV ECHO MEAS - RVSP: 37.7 MMHG
BH CV ECHO MEAS - SV(AO): 378.2 ML
BH CV ECHO MEAS - SV(CUBED): 49.1 ML
BH CV ECHO MEAS - SV(LVOT): 113.4 ML
BH CV ECHO MEAS - SV(MOD-SP4): 59.5 ML
BH CV ECHO MEAS - SV(TEICH): 47.3 ML
BH CV ECHO MEAS - TR MAX VEL: 286 CM/SEC
BH CV ECHO MEASUREMENTS AVERAGE E/E' RATIO: 30.11
BOTTLE TYPE: ABNORMAL
BUN SERPL-MCNC: 9 MG/DL (ref 8–23)
BUN/CREAT SERPL: 16.1 (ref 7–25)
CALCIUM SPEC-SCNC: 9.2 MG/DL (ref 8.6–10.5)
CHLORIDE SERPL-SCNC: 104 MMOL/L (ref 98–107)
CO2 SERPL-SCNC: 26 MMOL/L (ref 22–29)
CREAT SERPL-MCNC: 0.56 MG/DL (ref 0.76–1.27)
GFR SERPL CREATININE-BSD FRML MDRD: 148 ML/MIN/1.73
GLUCOSE BLDC GLUCOMTR-MCNC: 99 MG/DL (ref 70–130)
GLUCOSE SERPL-MCNC: 99 MG/DL (ref 65–99)
LEFT ATRIUM VOLUME INDEX: 63 ML/M2
MAXIMAL PREDICTED HEART RATE: 159 BPM
POTASSIUM SERPL-SCNC: 3.7 MMOL/L (ref 3.5–5.2)
SODIUM SERPL-SCNC: 138 MMOL/L (ref 136–145)
STRESS TARGET HR: 135 BPM

## 2022-01-28 PROCEDURE — 97116 GAIT TRAINING THERAPY: CPT

## 2022-01-28 PROCEDURE — G0378 HOSPITAL OBSERVATION PER HR: HCPCS

## 2022-01-28 PROCEDURE — 80048 BASIC METABOLIC PNL TOTAL CA: CPT | Performed by: NURSE PRACTITIONER

## 2022-01-28 PROCEDURE — 82962 GLUCOSE BLOOD TEST: CPT

## 2022-01-28 PROCEDURE — 93246 EXT ECG>7D<15D RECORDING: CPT

## 2022-01-28 PROCEDURE — 93306 TTE W/DOPPLER COMPLETE: CPT | Performed by: INTERNAL MEDICINE

## 2022-01-28 PROCEDURE — 93306 TTE W/DOPPLER COMPLETE: CPT

## 2022-01-28 PROCEDURE — 97530 THERAPEUTIC ACTIVITIES: CPT

## 2022-01-28 PROCEDURE — 99214 OFFICE O/P EST MOD 30 MIN: CPT | Performed by: PSYCHIATRY & NEUROLOGY

## 2022-01-28 PROCEDURE — 87040 BLOOD CULTURE FOR BACTERIA: CPT | Performed by: NURSE PRACTITIONER

## 2022-01-28 RX ORDER — METOPROLOL SUCCINATE 50 MG/1
50 TABLET, EXTENDED RELEASE ORAL
Qty: 30 TABLET | Refills: 0 | Status: SHIPPED | OUTPATIENT
Start: 2022-01-29

## 2022-01-28 RX ORDER — ROSUVASTATIN CALCIUM 40 MG/1
40 TABLET, COATED ORAL DAILY
Qty: 30 TABLET | Refills: 0 | Status: SHIPPED | OUTPATIENT
Start: 2022-01-28

## 2022-01-28 RX ORDER — METOPROLOL SUCCINATE 50 MG/1
50 TABLET, EXTENDED RELEASE ORAL
Status: DISCONTINUED | OUTPATIENT
Start: 2022-01-28 | End: 2022-01-28 | Stop reason: HOSPADM

## 2022-01-28 RX ADMIN — ISOSORBIDE MONONITRATE 60 MG: 60 TABLET, EXTENDED RELEASE ORAL at 08:37

## 2022-01-28 RX ADMIN — METOPROLOL SUCCINATE 50 MG: 50 TABLET, FILM COATED, EXTENDED RELEASE ORAL at 08:37

## 2022-01-28 RX ADMIN — SODIUM CHLORIDE, PRESERVATIVE FREE 10 ML: 5 INJECTION INTRAVENOUS at 08:38

## 2022-01-28 RX ADMIN — CITALOPRAM 20 MG: 20 TABLET, FILM COATED ORAL at 08:37

## 2022-01-28 RX ADMIN — BUSPIRONE HYDROCHLORIDE 5 MG: 5 TABLET ORAL at 08:37

## 2022-01-28 RX ADMIN — ASPIRIN 81 MG: 81 TABLET, COATED ORAL at 08:37

## 2022-01-28 RX ADMIN — ACETAMINOPHEN 650 MG: 325 TABLET, FILM COATED ORAL at 08:42

## 2022-01-28 RX ADMIN — ROSUVASTATIN CALCIUM 40 MG: 20 TABLET, FILM COATED ORAL at 10:47

## 2022-01-28 RX ADMIN — CLOPIDOGREL 75 MG: 75 TABLET, FILM COATED ORAL at 08:37

## 2022-01-29 NOTE — OUTREACH NOTE
Prep Survey      Responses   [facility] facility patient discharged from? [place]   Is LACE score < 7 ? Yes   Emergency Room discharge w/ pulse ox? No   Eligibility Readm Mgmt   Discharge diagnosis Left frontal lobe, right thalamus and right occipital CVA    Does the patient have one of the following disease processes/diagnoses(primary or secondary)? Stroke (TIA)   Does the patient have Home health ordered? No   Is there a DME ordered? No   Prep survey completed? Yes          Radha Espinal RN

## 2022-01-30 LAB
BACTERIA SPEC AEROBE CULT: ABNORMAL
GRAM STN SPEC: ABNORMAL

## 2022-01-31 LAB
BACTERIA SPEC AEROBE CULT: ABNORMAL
GRAM STN SPEC: ABNORMAL
GRAM STN SPEC: ABNORMAL

## 2022-02-01 ENCOUNTER — READMISSION MANAGEMENT (OUTPATIENT)
Dept: CALL CENTER | Facility: HOSPITAL | Age: 62
End: 2022-02-01

## 2022-02-01 LAB — BACTERIA SPEC AEROBE CULT: NORMAL

## 2022-02-02 LAB — BACTERIA SPEC AEROBE CULT: NORMAL

## 2022-02-02 NOTE — OUTREACH NOTE
Stroke Week 1 Survey      Responses   [facility] facility patient discharged from? [place]   Does the patient have one of the following disease processes/diagnoses(primary or secondary)? Stroke (TIA)   Week 1 attempt successful? No   Unsuccessful attempts Attempt 1          Claribel Perez RN

## 2022-02-04 ENCOUNTER — READMISSION MANAGEMENT (OUTPATIENT)
Dept: CALL CENTER | Facility: HOSPITAL | Age: 62
End: 2022-02-04

## 2022-02-04 NOTE — OUTREACH NOTE
Stroke Week 1 Survey      Responses   [facility] patient discharged from? [place]   Does the patient have one of the following disease processes/diagnoses(primary or secondary)? Stroke (TIA)   Week 1 attempt successful? No   Unsuccessful attempts Attempt 2          Jud Kern RN

## 2022-02-08 ENCOUNTER — READMISSION MANAGEMENT (OUTPATIENT)
Dept: CALL CENTER | Facility: HOSPITAL | Age: 62
End: 2022-02-08

## 2022-02-08 NOTE — OUTREACH NOTE
Stroke Week 1 Survey      Responses   [facility] patient discharged from? [place]   Does the patient have one of the following disease processes/diagnoses(primary or secondary)? Stroke (TIA)   Week 1 attempt successful? No   Unsuccessful attempts Attempt 3          Court Perez RN

## 2022-02-16 ENCOUNTER — READMISSION MANAGEMENT (OUTPATIENT)
Dept: CALL CENTER | Facility: HOSPITAL | Age: 62
End: 2022-02-16

## 2022-02-16 PROBLEM — I51.7 LVH (LEFT VENTRICULAR HYPERTROPHY): Status: ACTIVE | Noted: 2022-02-16

## 2022-02-16 PROBLEM — I10 PRIMARY HYPERTENSION: Chronic | Status: ACTIVE | Noted: 2017-12-14

## 2022-02-16 PROBLEM — Z95.1 S/P CABG X 3: Status: ACTIVE | Noted: 2022-02-16

## 2022-02-16 PROBLEM — I51.7 LVH (LEFT VENTRICULAR HYPERTROPHY): Chronic | Status: ACTIVE | Noted: 2022-02-16

## 2022-02-16 PROBLEM — I25.810 CORONARY ARTERY DISEASE INVOLVING CORONARY BYPASS GRAFT OF NATIVE HEART WITHOUT ANGINA PECTORIS: Chronic | Status: ACTIVE | Noted: 2019-04-17

## 2022-02-16 PROBLEM — E78.5 HYPERLIPIDEMIA LDL GOAL <70: Chronic | Status: ACTIVE | Noted: 2017-12-14

## 2022-02-16 PROBLEM — Z95.3 STATUS POST AORTIC VALVE REPLACEMENT WITH BIOPROSTHETIC VALVE: Chronic | Status: ACTIVE | Noted: 2022-02-16

## 2022-02-16 PROBLEM — Z95.3 STATUS POST AORTIC VALVE REPLACEMENT WITH BIOPROSTHETIC VALVE: Status: ACTIVE | Noted: 2022-02-16

## 2022-02-16 PROBLEM — Z72.0 TOBACCO USE: Status: ACTIVE | Noted: 2017-12-14

## 2022-02-16 PROBLEM — I25.810 CORONARY ARTERY DISEASE INVOLVING CORONARY BYPASS GRAFT OF NATIVE HEART WITHOUT ANGINA PECTORIS: Status: ACTIVE | Noted: 2019-04-17

## 2022-02-16 PROBLEM — I95.9 HYPOTENSION: Status: RESOLVED | Noted: 2022-01-27 | Resolved: 2022-02-16

## 2022-02-16 PROBLEM — I77.9 RIGHT-SIDED CAROTID ARTERY DISEASE: Chronic | Status: ACTIVE | Noted: 2022-01-27

## 2022-02-16 PROBLEM — Z95.1 S/P CABG X 3: Chronic | Status: ACTIVE | Noted: 2022-02-16

## 2022-02-16 PROBLEM — Z98.890 HISTORY OF RIGHT-SIDED CAROTID ENDARTERECTOMY: Status: ACTIVE | Noted: 2022-02-16

## 2022-02-16 NOTE — PROGRESS NOTES
Chief Complaint  Coronary Artery Disease (3mo F/U. Occ discomfort has improved. ) and Stroke (Recent D/C)    Subjective          Sharad Ryder presents to [facility]IOLOG[facility] for routine follow-up.  He has coronary artery disease status post NSTEMI with subsequent CABG x3 (LIMA to LAD, SVG to PDA and SVG to diagonal branch with TMR and left atrial appendage exclusion with atricure clip device) 2/8/2018 per Dr. jC Robin at [facility] (saphenous vein graft to the PDA and saphenous vein graft to diagonal branch were noted to be occluded on most recent left heart catheterization 9/15/2021), aortic valve stenosis status post bioprosthetic aortic valve replacement with Graciela Juarez bovine pericardial 19 mm valve 2/8/2018 per Dr. Cj Robin at [facility], hypertension, hyperlipidemia, left ventricular hypertrophy, carotid artery disease status post right carotid endarterectomy 2/1/2018, cerebrovascular accident X 3 with most recent being 1/27/2022, COPD and tobacco use. He continues to report chest pain and shortness of breath. He reports intermittent episodes of non-exertional, substernal chest pain that does not radiate and resolves on its own. Patient denies palpitations, dizziness, syncope, orthopnea, PND, edema or decreased stamina.  Patient denies any signs of bleeding.    Coronary Artery Disease  Presents for follow-up visit. Symptoms include chest pain and shortness of breath. Pertinent negatives include no chest pressure, chest tightness, dizziness, leg swelling, muscle weakness, palpitations or weight gain. Risk factors include hyperlipidemia. The symptoms have been stable. Compliance with diet is variable. Compliance with exercise is variable. Compliance with medications is good.   Hypertension  This is a chronic problem. The current episode started more than 1 year ago. The problem is controlled. Associated symptoms include chest pain and shortness  Discharge Instructions- Postpartum    Immunizations given:   Most Recent Immunizations   Administered Date(s) Administered   • DPT 10/20/1987   • Depo-provera 2014   • Hepatitis B Child 1998   • Influenza 10/19/2016   • MMR 1992   • PPD 2005   • Polio, ORAL 10/20/1987   • RH IMMUNE GLOBULIN 2017   • Rho (D) Immune Globulin 2012   • Tb Verna 1983   • Td:Adult type tetanus/diphtheria 2007   • Tdap 2017   Deferred Date(s) Deferred   • MMR 2017   • Tdap 2017   • Varicella 2017       Diet: Eat a well balanced diet including plenty of fiber and fluids such as juice and water.    Activity:   Bathing: Shower or bathe as needed.   Driving: As directed by your doctor or midwife.   Return to work or school: As directed by your doctor or midwife.   Lifting/ Bending: Lift nothing heavier than 10 pounds, bend at the knees, not hips.   Resuming sexual activity: 6 weeks or as recommended by your doctor. Your doctor will discuss birth control options with you at your postpartum check up.   Housework: Limit for the first 2 weeks.   Exercise: Walking is fine. Check with your doctor or midwife for any restrictions.   Other: No douching or tampons.   Other: Wear a supportive bra at all times.    Follow up care  Self care:   -Vaginal discharge may continue for 10 days to 6 weeks, becoming light in color and amount.   -Continue to use your water bottle, tucks, spray, ointment, sitz bath until bleeding stops or discomfort is gone.   -If you had a  section or tubal ligation, keep incision clean and dry.   -Apply cold compresses to breasts to relieve swelling and soothe discomfort.   -Apply warm compresses 5 minutes prior to breastfeeding to relieve engorgement.    Notify your doctor/ midwife if:   -Your temperature is over 100.0 degrees F.   -You notice hard, red, warm or painful areas in your breasts or legs.   -Your vaginal discharge becomes foul smelling,  "of breath. Pertinent negatives include no anxiety, blurred vision, headaches, malaise/fatigue, neck pain, orthopnea, palpitations, peripheral edema, PND or sweats. Risk factors for coronary artery disease include male gender, dyslipidemia and smoking/tobacco exposure. Current antihypertension treatment includes central alpha agonists, direct vasodilators and beta blockers. The current treatment provides significant improvement. Hypertensive end-organ damage includes CAD/MI, left ventricular hypertrophy and PVD.   Hyperlipidemia  This is a chronic problem. The current episode started more than 1 year ago. Associated symptoms include chest pain and shortness of breath. Current antihyperlipidemic treatment includes statins. Risk factors for coronary artery disease include hypertension, male sex and dyslipidemia.       Objective   Vital Signs:   /70   Pulse 81   Ht 165.1 cm (65\")   Wt 56.7 kg (125 lb)   SpO2 99%   BMI 20.80 kg/m²     Vitals and nursing note reviewed.   Constitutional:       General: Awake.      Appearance: Normal and healthy appearance. Well-developed, normal weight and not in distress.   Eyes:      General: Lids are normal.      Conjunctiva/sclera: Conjunctivae normal.      Pupils: Pupils are equal, round, and reactive to light.   HENT:      Head: Normocephalic and atraumatic.      Nose: Nose normal.   Neck:      Vascular: No JVR. JVD normal.   Pulmonary:      Effort: Pulmonary effort is normal.      Breath sounds: Examination of the right-lower field reveals decreased breath sounds. Examination of the left-lower field reveals decreased breath sounds. Decreased breath sounds present. No wheezing. No rhonchi. No rales.   Chest:      Chest wall: Not tender to palpatation.   Cardiovascular:      PMI at left midclavicular line. Normal rate. Regular rhythm. Normal S1. Normal S2.      Murmurs: There is a grade 2/6 harsh midsystolic murmur at the URSB, radiating to the neck.      No gallop. No " increases in amount, soaks more than one pad in an hour, is bright red or you pass large clots.   -You have difficulty going to the bathroom.   -You notice swelling, drainage warmth or tenderness at your incision.   -You have symptoms of postpartum depression (loss of appetite, feelings of hopelessness or loss of control, inability to sleep or extensive sleep).    In some cases, you may experience the following; If you do, call your doctor immediately:  · Increased swelling in your face, hands or legs  · Headache  · Blurred vision/ seeing spots  · Throbbing or rushing sounds in your ears  · Pain in your upper abdomen  · Tightness in your chest  · Shortness of breath  · Wheezing  · Coughing      Who is your help at home after you leave the hospital?    Ideas for help at home: friends, family members, neighbors, and members of your alfa community are all there to help you.    [facility] Lactation Services (Breastfeeding help):[phone]    My Morning with Mom:  An opportunity to network with other new mothers and share experiences and discuss topics such as feeding infants, sleep patterns, soothing fussy babies and other parenting concerns.  The group is facilitated by a registered nurse and babies are welcome to attend.  Sessions are held Thursdays from 10:00 - 11:30 am at [facility].  No registration is required. For more information, call [phone].       Understanding Postpartum Depression  You’ve just had a baby. You know you should be excited and happy. But instead you find yourself crying for no reason. You may have trouble coping with your daily tasks. You feel sad, tired, and hopeless most of the time. You may even feel ashamed or guilty. But what you’re going through is not your fault and you can feel better. Talk to your healthcare provider. He or she can help.    What is depression?  Depression is a mood disorder that affects the way you think and feel. The most common  click. No rub.   Pulses:     Intact distal pulses.   Edema:     Peripheral edema absent.   Abdominal:      General: Bowel sounds are normal.      Palpations: Abdomen is soft.      Tenderness: There is no abdominal tenderness.   Musculoskeletal: Normal range of motion.         General: No tenderness.      Cervical back: Normal range of motion. Skin:     General: Skin is warm and dry.   Neurological:      General: No focal deficit present.      Mental Status: Alert, oriented to person, place, and time and oriented to person, place and time.   Psychiatric:         Attention and Perception: Attention and perception normal.         Mood and Affect: Mood and affect normal.         Speech: Speech normal.         Behavior: Behavior normal. Behavior is cooperative.         Thought Content: Thought content normal.         Cognition and Memory: Cognition and memory normal.         Judgment: Judgment normal.        Result Review :   The following data was reviewed by: KIRBY Colindres on 02/17/2022:  Common labs    Common Labsle 9/15/21 9/15/21 1/27/22 1/27/22 1/27/22 1/27/22 1/27/22 1/27/22 1/27/22 1/28/22    1240 1240 0134 0134 0134 0643 0643 0643 1533    Glucose  126 (A)  184 (A)    148 (A)  99   BUN  7 (A)  13    18  9   Creatinine  0.47 (A)  0.98    0.96  0.56 (A)   eGFR Non African Am  >150  78    80  148   Sodium  136  136    134 (A)  138   Potassium  4.1  3.9    4.3  3.7   Chloride  102  93 (A)    96 (A)  104   Calcium  9.4  9.3    9.0  9.2   Albumin  4.70  4.40    4.00     Total Bilirubin  0.2  0.2    0.2     Alkaline Phosphatase  65  79    75     AST (SGOT)  17  30    22     ALT (SGPT)  22  16    11     WBC 9.50  22.93 (A)   13.91 (A)       Hemoglobin 13.2  15.8   14.4       Hematocrit 40.4  49.1   43.0   40.0    Platelets 298  427   347   357    Total Cholesterol       194      Triglycerides       172 (A)      HDL Cholesterol       40      LDL Cholesterol        123 (A)      Hemoglobin A1C     6.40 (A)       symptom is a feeling of deep sadness. You may also feel as if you just can’t cope with life. Other symptoms include:  · Gaining or losing a lot of weight  · Sleeping too much or too little  · Feeling tired all the time  · Feeling restless  · Crying a lot  · Having too little or too much appetite.  · Withdrawing from friends and family  · Having headaches, aches and pains, or stomach problems that won't go away.  · Fears of harming your baby  · Lack of interest in your baby  · Feeling worthless or guilty  · No longer finding pleasure in things you used to  · Having trouble thinking clearly or making decisions  · Thinking about death or suicide   Depression after childbirth  You may be weepy and tired right after giving birth. These feelings are normal. They’re sometimes called the “baby blues.” These blues go away after 2 or 3 weeks. However, postpartum (meaning “after birth”) depression lasts much longer and is more severe than the \"baby blues.\" It can make you feel sad and hopeless. You may also fear that your baby will be harmed and worry about being a bad mother.  What causes postpartum depression?  The exact cause of postpartum depression is unknown. Changes in brain chemistry or structure are believed to play a big role in depression. It may be due to changes in your hormones during and after childbirth. You may also be tired from caring for your baby and adjusting to being a mother. All these factors may make you feel depressed. In some cases, your genes may also play a role.  Depression can be treated  The good news is that there are many ways to treat postpartum depression. Talking to your healthcare provider is the first step toward feeling better.  Resources  · National [place] of Mental Etmlib335-838-6299nff.nimh.nih.gov  · National [place] on Mental Uevfuxw868-506-0225nrv.luis a.org  · Mental Health Qvtinhe626-714-2242dco.nmha.org  · National Suicide Jdlqndv590-738-9809 (800-SUICIDE)   © 5194-5484 The    (A) Abnormal value       Comments are available for some flowsheets but are not being displayed.           Data reviewed: Radiologic studies bilateral carotid artery ultrasound 1/27/22  and Cardiology studies left heart catheterization 9/15/21 and 2d echo 1/28/22          Assessment and Plan    Diagnoses and all orders for this visit:    1. Coronary artery disease involving coronary bypass graft of native heart with stable angina pectoris (HCC)(Primary)- increase Imdur to 120 mg daily. Consider addition of Ranexa in future, if symptoms persist.     2. S/P CABG x 3-LIMA to LAD, SVG to PDA and SVG to diagonal branch with TMR and left atrial appendage exclusion with atricure clip device 2/8/2018 per Dr. Cj Robin at [facility].  SVG to PDA and SVG to diagonal branch occluded on C 9/15/21. Patient continues on aspirin and Plavix.  Denies bleeding.     3. Status post aortic valve replacement with bioprosthetic valve-Graciela Juarez bovine pericardial 19 mm valve 2/8/2018 per Dr. Cj Robin at [facility].     4. Primary hypertension-blood pressures well controlled.  Continue metoprolol succinate.    5. Hyperlipidemia LDL goal <70-management per PCP.  Continue Crestor.    6. LVH (left ventricular hypertrophy)-mild on 2D echo 1/28/2022.    7. Stenosis of right carotid artery-history of right-sided carotid endarterectomy 2/1/2018.  Patient follows with Dr. Jl Salgado with vascular surgery.    8. Chronic obstructive pulmonary disease, unspecified COPD type (HCC)- pt does not follow with pulmonology.  Stable on room air.    9. Tobacco use- Sharad ANSARI Ryder  reports that he has been smoking cigarettes. He has a 52.50 pack-year smoking history. His smokeless tobacco use includes snuff.. I have educated him on the risk of diseases from using tobacco products such as cancer, COPD and heart disease. I advised him to quit and he is not willing to quit. I spent 3  minutes counseling the  123people. [address] [place] [address], [place] [address]. All rights reserved. This information is not intended as a substitute for professional medical care. Always follow your healthcare professional's instructions.    Ibuprofen Oral tablet  What is this medicine?  IBUPROFEN (eye BYOO proe fen) is a non-steroidal anti-inflammatory drug (NSAID). It is used for dental pain, fever, headaches or migraines, osteoarthritis, rheumatoid arthritis, or painful monthly periods. It can also relieve minor aches and pains caused by a cold, flu, or sore throat.  This medicine may be used for other purposes; ask your health care provider or pharmacist if you have questions.  What should I tell my health care provider before I take this medicine?  They need to know if you have any of these conditions:  · asthma  · cigarette smoker  · drink more than 3 alcohol containing drinks a day  · heart disease or circulation problems such as heart failure or leg edema (fluid retention)  · high blood pressure  · kidney disease  · liver disease  · stomach bleeding or ulcers  · an unusual or allergic reaction to ibuprofen, aspirin, other NSAIDS, other medicines, foods, dyes, or preservatives  · pregnant or trying to get pregnant  · breast-feeding  How should I use this medicine?  Take this medicine by mouth with a glass of water. Follow the directions on the prescription label. Take this medicine with food if your stomach gets upset. Try to not lie down for at least 10 minutes after you take the medicine. Take your medicine at regular intervals. Do not take your medicine more often than directed.  A special MedGuide will be given to you by the pharmacist with each prescription and refill. Be sure to read this information carefully each time.  Talk to your pediatrician regarding the use of this medicine in children. Special care may be needed.  Overdosage: If you think you have taken too much of this medicine contact a poison control  center or emergency room at once.  NOTE: This medicine is only for you. Do not share this medicine with others.  What if I miss a dose?  If you miss a dose, take it as soon as you can. If it is almost time for your next dose, take only that dose. Do not take double or extra doses.  What may interact with this medicine?  Do not take this medicine with any of the following medications:  · cidofovir  · ketorolac  · methotrexate  · pemetrexed  This medicine may also interact with the following medications:  · alcohol  · aspirin  · diuretics  · lithium  · other drugs for inflammation like prednisone  · warfarin  This list may not describe all possible interactions. Give your health care provider a list of all the medicines, herbs, non-prescription drugs, or dietary supplements you use. Also tell them if you smoke, drink alcohol, or use illegal drugs. Some items may interact with your medicine.  What should I watch for while using this medicine?  Tell your doctor or healthcare professional if your symptoms do not start to get better or if they get worse.  This medicine does not prevent heart attack or stroke. In fact, this medicine may increase the chance of a heart attack or stroke. The chance may increase with longer use of this medicine and in people who have heart disease. If you take aspirin to prevent heart attack or stroke, talk with your doctor or health care professional.  Do not take other medicines that contain aspirin, ibuprofen, or naproxen with this medicine. Side effects such as stomach upset, nausea, or ulcers may be more likely to occur. Many medicines available without a prescription should not be taken with this medicine.  This medicine can cause ulcers and bleeding in the stomach and intestines at any time during treatment. Ulcers and bleeding can happen without warning symptoms and can cause death. To reduce your risk, do not smoke cigarettes or drink alcohol while you are taking this medicine.  You  patient.    10. Chronic stable angina (HCC)- Increase Imdur to 120 mg daily.     Current outpatient and discharge medications have been reconciled for the patient.  Reviewed by: KIRYB Colindres      Follow Up   Return in about 3 months (around 5/17/2022) for Next scheduled follow up with Dr. Francois.  Patient was given instructions and counseling regarding his condition or for health maintenance advice. Please see specific information pulled into the AVS if appropriate.        may get drowsy or dizzy. Do not drive, use machinery, or do anything that needs mental alertness until you know how this medicine affects you. Do not stand or sit up quickly, especially if you are an older patient. This reduces the risk of dizzy or fainting spells.  This medicine can cause you to bleed more easily. Try to avoid damage to your teeth and gums when you brush or floss your teeth.  This medicine may be used to treat migraines. If you take migraine medicines for 10 or more days a month, your migraines may get worse. Keep a diary of headache days and medicine use. Contact your healthcare professional if your migraine attacks occur more frequently.  What side effects may I notice from receiving this medicine?  Side effects that you should report to your doctor or health care professional as soon as possible:  · allergic reactions like skin rash, itching or hives, swelling of the face, lips, or tongue  · severe stomach pain  · signs and symptoms of bleeding such as bloody or black, tarry stools; red or dark-brown urine; spitting up blood or brown material that looks like coffee grounds; red spots on the skin; unusual bruising or bleeding from the eye, gums, or nose  · signs and symptoms of a blood clot such as changes in vision; chest pain; severe, sudden headache; trouble speaking; sudden numbness or weakness of the face, arm, or leg  · unexplained weight gain or swelling  · unusually weak or tired  · yellowing of eyes or skin  Side effects that usually do not require medical attention (report to your doctor or health care professional if they continue or are bothersome):  · bruising  · diarrhea  · dizziness, drowsiness  · headache  · nausea, vomiting  This list may not describe all possible side effects. Call your doctor for medical advice about side effects. You may report side effects to FDA at 3-853-FDA-4396.  Where should I keep my medicine?  Keep out of the reach of children.   Store at room  temperature between 15 and 30 degrees C (59 and 86 degrees F). Keep container tightly closed. Throw away any unused medicine after the expiration date.  NOTE:This sheet is a summary. It may not cover all possible information. If you have questions about this medicine, talk to your doctor, pharmacist, or health care provider. Copyright© 2016 [employer]

## 2022-02-16 NOTE — PATIENT INSTRUCTIONS
Steps to Quit Smoking  Smoking tobacco is the leading cause of preventable death. It can affect almost every organ in the body. Smoking puts you and those around you at risk for developing many serious chronic diseases. Quitting smoking can be difficult, but it is one of the best things that you can do for your health. It is never too late to quit.  How do I get ready to quit?  When you decide to quit smoking, create a plan to help you succeed. Before you quit:  · Pick a date to quit. Set a date within the next 2 weeks to give you time to prepare.  · Write down the reasons why you are quitting. Keep this list in places where you will see it often.  · Tell your family, friends, and co-workers that you are quitting. Support from your loved ones can make quitting easier.  · Talk with your health care provider about your options for quitting smoking.  · Find out what treatment options are covered by your health insurance.  · Identify people, places, things, and activities that make you want to smoke (triggers). Avoid them.  What first steps can I take to quit smoking?  · Throw away all cigarettes at home, at work, and in your car.  · Throw away smoking accessories, such as ashtrays and lighters.  · Clean your car. Make sure to empty the ashtray.  · Clean your home, including curtains and carpets.  What strategies can I use to quit smoking?  Talk with your health care provider about combining strategies, such as taking medicines while you are also receiving in-person counseling. Using these two strategies together makes you more likely to succeed in quitting than if you used either strategy on its own.  · If you are pregnant or breastfeeding, talk with your health care provider about finding counseling or other support strategies to quit smoking. Do not take medicine to help you quit smoking unless your health care provider tells you to do so.  To quit smoking:  Quit right away  · Quit smoking completely, instead of  gradually reducing how much you smoke over a period of time. Research shows that stopping smoking right away is more successful than gradually quitting.  · Attend in-person counseling to help you build problem-solving skills. You are more likely to succeed in quitting if you attend counseling sessions regularly. Even short sessions of 10 minutes can be effective.  Take medicine  You may take medicines to help you quit smoking. Some medicines require a prescription and some you can purchase over-the-counter. Medicines may have nicotine in them to replace the nicotine in cigarettes. Medicines may:  · Help to stop cravings.  · Help to relieve withdrawal symptoms.  Your health care provider may recommend:  · Nicotine patches, gum, or lozenges.  · Nicotine inhalers or sprays.  · Non-nicotine medicine that is taken by mouth.  Find resources  Find resources and support systems that can help you to quit smoking and remain smoke-free after you quit. These resources are most helpful when you use them often. They include:  · Online chats with a counselor.  · Telephone quitlines.  · Printed self-help materials.  · Support groups or group counseling.  · Text messaging programs.  · Mobile phone apps or applications. Use apps that can help you stick to your quit plan by providing reminders, tips, and encouragement. There are many free apps for mobile devices as well as websites. Examples include Quit Guide from the CDC and smokefree.gov  What things can I do to make it easier to quit?    · Reach out to your family and friends for support and encouragement. Call telephone quitlines (2-292-QUIT-NOW), reach out to support groups, or work with a counselor for support.  · Ask people who smoke to avoid smoking around you.  · Avoid places that trigger you to smoke, such as bars, parties, or smoke-break areas at work.  · Spend time with people who do not smoke.  · Lessen the stress in your life. Stress can be a smoking trigger for some  people. To lessen stress, try:  ? Exercising regularly.  ? Doing deep-breathing exercises.  ? Doing yoga.  ? Meditating.  ? Performing a body scan. This involves closing your eyes, scanning your body from head to toe, and noticing which parts of your body are particularly tense. Try to relax the muscles in those areas.  How will I feel when I quit smoking?  Day 1 to 3 weeks  Within the first 24 hours of quitting smoking, you may start to feel withdrawal symptoms. These symptoms are usually most noticeable 2-3 days after quitting, but they usually do not last for more than 2-3 weeks. You may experience these symptoms:  · Mood swings.  · Restlessness, anxiety, or irritability.  · Trouble concentrating.  · Dizziness.  · Strong cravings for sugary foods and nicotine.  · Mild weight gain.  · Constipation.  · Nausea.  · Coughing or a sore throat.  · Changes in how the medicines that you take for unrelated issues work in your body.  · Depression.  · Trouble sleeping (insomnia).  Week 3 and afterward  After the first 2-3 weeks of quitting, you may start to notice more positive results, such as:  · Improved sense of smell and taste.  · Decreased coughing and sore throat.  · Slower heart rate.  · Lower blood pressure.  · Clearer skin.  · The ability to breathe more easily.  · Fewer sick days.  Quitting smoking can be very challenging. Do not get discouraged if you are not successful the first time. Some people need to make many attempts to quit before they achieve long-term success. Do your best to stick to your quit plan, and talk with your health care provider if you have any questions or concerns.  Summary  · Smoking tobacco is the leading cause of preventable death. Quitting smoking is one of the best things that you can do for your health.  · When you decide to quit smoking, create a plan to help you succeed.  · Quit smoking right away, not slowly over a period of time.  · When you start quitting, seek help from your  health care provider, family, or friends.  This information is not intended to replace advice given to you by your health care provider. Make sure you discuss any questions you have with your health care provider.  Document Revised: 09/11/2020 Document Reviewed: 03/07/2020  Elsevier Patient Education © 2021 [employer]

## 2022-02-16 NOTE — OUTREACH NOTE
Stroke Week 2 Survey      Responses   [facility] patient discharged from? [facility]   Does the patient have one of the following disease processes/diagnoses(primary or secondary)? Stroke (TIA)   Week 2 attempt successful? No   Unsuccessful attempts Attempt 1   Rescheduled Revoked  [no answer numerous calls]          Quentin Matias RN

## 2022-02-17 ENCOUNTER — OFFICE VISIT (OUTPATIENT)
Dept: CARDIOLOGY | Facility: CLINIC | Age: 62
End: 2022-02-17

## 2022-02-17 VITALS
HEART RATE: 81 BPM | DIASTOLIC BLOOD PRESSURE: 70 MMHG | BODY MASS INDEX: 20.83 KG/M2 | OXYGEN SATURATION: 99 % | SYSTOLIC BLOOD PRESSURE: 118 MMHG | WEIGHT: 125 LBS | HEIGHT: 65 IN

## 2022-02-17 DIAGNOSIS — I51.7 LVH (LEFT VENTRICULAR HYPERTROPHY): ICD-10-CM

## 2022-02-17 DIAGNOSIS — I10 PRIMARY HYPERTENSION: Chronic | ICD-10-CM

## 2022-02-17 DIAGNOSIS — Z95.3 STATUS POST AORTIC VALVE REPLACEMENT WITH BIOPROSTHETIC VALVE: Chronic | ICD-10-CM

## 2022-02-17 DIAGNOSIS — I20.8 CHRONIC STABLE ANGINA: ICD-10-CM

## 2022-02-17 DIAGNOSIS — I25.708 CORONARY ARTERY DISEASE OF BYPASS GRAFT OF NATIVE HEART WITH STABLE ANGINA PECTORIS: Primary | ICD-10-CM

## 2022-02-17 DIAGNOSIS — Z72.0 TOBACCO USE: ICD-10-CM

## 2022-02-17 DIAGNOSIS — I65.21 STENOSIS OF RIGHT CAROTID ARTERY: Chronic | ICD-10-CM

## 2022-02-17 DIAGNOSIS — E78.5 HYPERLIPIDEMIA LDL GOAL <70: Chronic | ICD-10-CM

## 2022-02-17 DIAGNOSIS — J44.9 CHRONIC OBSTRUCTIVE PULMONARY DISEASE, UNSPECIFIED COPD TYPE: ICD-10-CM

## 2022-02-17 DIAGNOSIS — Z95.1 S/P CABG X 3: Chronic | ICD-10-CM

## 2022-02-17 PROBLEM — I20.89 CHRONIC STABLE ANGINA: Status: ACTIVE | Noted: 2022-02-17

## 2022-02-17 PROCEDURE — 99214 OFFICE O/P EST MOD 30 MIN: CPT | Performed by: NURSE PRACTITIONER

## 2022-02-17 RX ORDER — ISOSORBIDE MONONITRATE 120 MG/1
120 TABLET, EXTENDED RELEASE ORAL DAILY
Qty: 90 TABLET | Refills: 3 | Status: SHIPPED | OUTPATIENT
Start: 2022-02-17

## 2022-02-19 LAB
MAXIMAL PREDICTED HEART RATE: 159 BPM
STRESS TARGET HR: 135 BPM

## 2022-02-19 PROCEDURE — 93248 EXT ECG>7D<15D REV&INTERPJ: CPT | Performed by: INTERNAL MEDICINE

## 2022-02-25 ENCOUNTER — DOCUMENTATION (OUTPATIENT)
Dept: CARDIOLOGY | Facility: CLINIC | Age: 62
End: 2022-02-25

## 2022-03-07 RX ORDER — CLONIDINE HYDROCHLORIDE 0.1 MG/1
TABLET ORAL
Qty: 60 TABLET | Refills: 3 | Status: SHIPPED | OUTPATIENT
Start: 2022-03-07 | End: 2022-11-01

## 2022-04-07 ENCOUNTER — APPOINTMENT (OUTPATIENT)
Dept: GENERAL RADIOLOGY | Facility: HOSPITAL | Age: 62
End: 2022-04-07

## 2022-04-07 ENCOUNTER — APPOINTMENT (OUTPATIENT)
Dept: CT IMAGING | Facility: HOSPITAL | Age: 62
End: 2022-04-07

## 2022-04-07 ENCOUNTER — HOSPITAL ENCOUNTER (EMERGENCY)
Facility: HOSPITAL | Age: 62
Discharge: HOME | End: 2022-04-08
Attending: STUDENT IN AN ORGANIZED HEALTH CARE EDUCATION/TRAINING PROGRAM | Admitting: STUDENT IN AN ORGANIZED HEALTH CARE EDUCATION/TRAINING PROGRAM

## 2022-04-07 DIAGNOSIS — R53.1 WEAKNESS: Primary | ICD-10-CM

## 2022-04-07 LAB
ALBUMIN SERPL-MCNC: 3.8 G/DL (ref 3.5–5.2)
ALBUMIN/GLOB SERPL: 1.6 G/DL
ALP SERPL-CCNC: 77 U/L (ref 39–117)
ALT SERPL W P-5'-P-CCNC: 19 U/L (ref 1–41)
ANION GAP SERPL CALCULATED.3IONS-SCNC: 14 MMOL/L (ref 5–15)
AST SERPL-CCNC: 25 U/L (ref 1–40)
BASOPHILS # BLD AUTO: 0.05 10*3/MM3 (ref 0–0.2)
BASOPHILS NFR BLD AUTO: 0.3 % (ref 0–1.5)
BILIRUB SERPL-MCNC: 0.3 MG/DL (ref 0–1.2)
BUN SERPL-MCNC: 12 MG/DL (ref 8–23)
BUN/CREAT SERPL: 18.5 (ref 7–25)
CALCIUM SPEC-SCNC: 8.7 MG/DL (ref 8.6–10.5)
CHLORIDE SERPL-SCNC: 95 MMOL/L (ref 98–107)
CO2 SERPL-SCNC: 23 MMOL/L (ref 22–29)
CREAT SERPL-MCNC: 0.65 MG/DL (ref 0.76–1.27)
DEPRECATED RDW RBC AUTO: 43.4 FL (ref 37–54)
EGFRCR SERPLBLD CKD-EPI 2021: 107.2 ML/MIN/1.73
EOSINOPHIL # BLD AUTO: 0.43 10*3/MM3 (ref 0–0.4)
EOSINOPHIL NFR BLD AUTO: 2.6 % (ref 0.3–6.2)
ERYTHROCYTE [DISTWIDTH] IN BLOOD BY AUTOMATED COUNT: 12.7 % (ref 12.3–15.4)
GLOBULIN UR ELPH-MCNC: 2.4 GM/DL
GLUCOSE SERPL-MCNC: 303 MG/DL (ref 65–99)
HCT VFR BLD AUTO: 44.2 % (ref 37.5–51)
HGB BLD-MCNC: 14.9 G/DL (ref 13–17.7)
HOLD SPECIMEN: NORMAL
HOLD SPECIMEN: NORMAL
IMM GRANULOCYTES # BLD AUTO: 0.07 10*3/MM3 (ref 0–0.05)
IMM GRANULOCYTES NFR BLD AUTO: 0.4 % (ref 0–0.5)
LYMPHOCYTES # BLD AUTO: 3.11 10*3/MM3 (ref 0.7–3.1)
LYMPHOCYTES NFR BLD AUTO: 18.6 % (ref 19.6–45.3)
MAGNESIUM SERPL-MCNC: 1.7 MG/DL (ref 1.6–2.4)
MCH RBC QN AUTO: 31.2 PG (ref 26.6–33)
MCHC RBC AUTO-ENTMCNC: 33.7 G/DL (ref 31.5–35.7)
MCV RBC AUTO: 92.7 FL (ref 79–97)
MONOCYTES # BLD AUTO: 0.97 10*3/MM3 (ref 0.1–0.9)
MONOCYTES NFR BLD AUTO: 5.8 % (ref 5–12)
NEUTROPHILS NFR BLD AUTO: 12.08 10*3/MM3 (ref 1.7–7)
NEUTROPHILS NFR BLD AUTO: 72.3 % (ref 42.7–76)
NRBC BLD AUTO-RTO: 0 /100 WBC (ref 0–0.2)
PLATELET # BLD AUTO: 337 10*3/MM3 (ref 140–450)
PMV BLD AUTO: 9.3 FL (ref 6–12)
POTASSIUM SERPL-SCNC: 4.2 MMOL/L (ref 3.5–5.2)
PROT SERPL-MCNC: 6.2 G/DL (ref 6–8.5)
RBC # BLD AUTO: 4.77 10*6/MM3 (ref 4.14–5.8)
SODIUM SERPL-SCNC: 132 MMOL/L (ref 136–145)
TROPONIN T SERPL-MCNC: <0.01 NG/ML (ref 0–0.03)
WBC NRBC COR # BLD: 16.71 10*3/MM3 (ref 3.4–10.8)
WHOLE BLOOD HOLD SPECIMEN: NORMAL
WHOLE BLOOD HOLD SPECIMEN: NORMAL

## 2022-04-07 PROCEDURE — 71045 X-RAY EXAM CHEST 1 VIEW: CPT

## 2022-04-07 PROCEDURE — 25010000002 METHYLPREDNISOLONE PER 125 MG: Performed by: STUDENT IN AN ORGANIZED HEALTH CARE EDUCATION/TRAINING PROGRAM

## 2022-04-07 PROCEDURE — 96374 THER/PROPH/DIAG INJ IV PUSH: CPT

## 2022-04-07 PROCEDURE — 80053 COMPREHEN METABOLIC PANEL: CPT | Performed by: STUDENT IN AN ORGANIZED HEALTH CARE EDUCATION/TRAINING PROGRAM

## 2022-04-07 PROCEDURE — 85025 COMPLETE CBC W/AUTO DIFF WBC: CPT | Performed by: STUDENT IN AN ORGANIZED HEALTH CARE EDUCATION/TRAINING PROGRAM

## 2022-04-07 PROCEDURE — 36415 COLL VENOUS BLD VENIPUNCTURE: CPT

## 2022-04-07 PROCEDURE — 84484 ASSAY OF TROPONIN QUANT: CPT | Performed by: STUDENT IN AN ORGANIZED HEALTH CARE EDUCATION/TRAINING PROGRAM

## 2022-04-07 PROCEDURE — 93005 ELECTROCARDIOGRAM TRACING: CPT | Performed by: STUDENT IN AN ORGANIZED HEALTH CARE EDUCATION/TRAINING PROGRAM

## 2022-04-07 PROCEDURE — 70487 CT MAXILLOFACIAL W/DYE: CPT

## 2022-04-07 PROCEDURE — 25010000002 IOPAMIDOL 61 % SOLUTION: Performed by: STUDENT IN AN ORGANIZED HEALTH CARE EDUCATION/TRAINING PROGRAM

## 2022-04-07 PROCEDURE — 99284 EMERGENCY DEPT VISIT MOD MDM: CPT

## 2022-04-07 PROCEDURE — 70450 CT HEAD/BRAIN W/O DYE: CPT

## 2022-04-07 PROCEDURE — 83735 ASSAY OF MAGNESIUM: CPT | Performed by: STUDENT IN AN ORGANIZED HEALTH CARE EDUCATION/TRAINING PROGRAM

## 2022-04-07 RX ORDER — METHYLPREDNISOLONE SODIUM SUCCINATE 125 MG/2ML
125 INJECTION, POWDER, LYOPHILIZED, FOR SOLUTION INTRAMUSCULAR; INTRAVENOUS ONCE
Status: COMPLETED | OUTPATIENT
Start: 2022-04-07 | End: 2022-04-07

## 2022-04-07 RX ORDER — GABAPENTIN 100 MG/1
100 CAPSULE ORAL 3 TIMES DAILY
COMMUNITY

## 2022-04-07 RX ADMIN — METHYLPREDNISOLONE SODIUM SUCCINATE 125 MG: 125 INJECTION, POWDER, FOR SOLUTION INTRAMUSCULAR; INTRAVENOUS at 22:49

## 2022-04-07 RX ADMIN — IOPAMIDOL 100 ML: 612 INJECTION, SOLUTION INTRAVENOUS at 23:31

## 2022-04-08 VITALS
BODY MASS INDEX: 21.83 KG/M2 | HEIGHT: 65 IN | OXYGEN SATURATION: 93 % | DIASTOLIC BLOOD PRESSURE: 71 MMHG | RESPIRATION RATE: 16 BRPM | WEIGHT: 131 LBS | SYSTOLIC BLOOD PRESSURE: 124 MMHG | HEART RATE: 75 BPM | TEMPERATURE: 98.8 F

## 2022-04-08 LAB
HOLD SPECIMEN: NORMAL
QT INTERVAL: 392 MS
QTC INTERVAL: 455 MS

## 2022-04-08 NOTE — ED PROVIDER NOTES
Subjective   Patient states that he has a history of TIAs.  States that he has also had brain surgery when he was a kid.  States that he noticed a possible allergic reaction earlier today and so he took some Benadryl.  States that he also noticed some tongue swelling and states that usually in the past when he had his mini strokes he had some sort of an allergic reaction.  He states that his face went red and so they called 911 and brought him in for further evaluation.  He states that he did not having any numbness or tingling.  Denies any difficulty walking at this time.  States that he feels like he is normal right now.  States that he does not have a headache or dizziness or neck pain or any new or logical deficits.          Review of Systems   All other systems reviewed and are negative.      Past Medical History:   Diagnosis Date   • Aneurysm ([facility])    • Anxiety    • Arthritis    • Carotid artery disease ([facility])    • Carotid stenosis, right 2/1/2018    Post right carotid endarterectomy   • COPD (chronic obstructive pulmonary disease) ([facility])    • Heart murmur    • History of right-sided carotid endarterectomy 2/16/2022   • Hyperlipidemia LDL goal <70 12/14/2017   • Left frontal lobe, right thalamus and right occipital CVA (cerebrovascular accident) ([facility]) 1/27/2022   • LVH (left ventricular hypertrophy) 2/16/2022   • Pneumonia    • Primary hypertension 12/14/2017   • S/P CABG x 3 2/16/2022    LIMA to LAD, SVG to PDA and SVG to diagonal branch with TMR and left atrial appendage exclusion with atricure clip device 2/8/2018 per Dr. Cj Robin at [facility]    • Severe aortic valve stenosis 12/14/2017   • Status post aortic valve replacement with bioprosthetic valve 2/16/2022    Graciela Juarez bovine pericardial 19 mm valve 2/8/2018 per Dr. Cj Robin at [facility]   • Tobacco use 12/14/2017   • Wears glasses        No Known Allergies    Past Surgical History:   Procedure Laterality  Date   • APPENDECTOMY     • CARDIAC CATHETERIZATION N/A 12/18/2017    Procedure: Left Heart Cath;  Surgeon: Aime Francois MD;  Location:  [facility] CATH INVASIVE LOC[facility];  Service:    • CARDIAC CATHETERIZATION N/A 12/18/2017    Procedure: Right Heart Cath;  Surgeon: Aime Francois MD;  Location:  [facility] CATH INV[facility];  Service:    • CARDIAC CATHETERIZATION Bilateral 1/4/2018    Procedure: Coronary angiography;  Surgeon: Alfonso Yi MD;  Location:  [facility] CATH INVAS[facility];  Service:    • CARDIAC CATHETERIZATION Left 9/15/2021    Procedure: Cardiac Catheterization/Vascular Study NIMCO OK  Has 3 graft 2018;  Surgeon: Aime Francois MD;  Location:  [facility] CATH INVASIVE [facility];  Service: Cardiology;  Laterality: Left;   • CAROTID ENDARTERECTOMY Right 2/1/2018    Procedure: RIGHT CAROTID ENDARTERECTOMY WITH EEG-awake;  Surgeon: Jl Salgado DO;  Location:  [facility] OR;  Service:    • CORONARY ARTERY BYPASS GRAFT WITH AORTIC VALVE REPAIR/REPLACEMENT N/A 2/8/2018    Procedure: AORTIC VALVE REPLACEMENT,CORONARY ARTERY BYPASS GRAFTING x 3  WITH CONCHA AND RIGHT EVH, ATRIAL APPENDAGE EXCLUSION LEFT WITH TRANSESOPHAGEAL ECHOCARDIOGRAM, 17-CHANNEL TMR;  Surgeon: Cj Robin MD;  Location:  [facility] OR;  Service:    • FINGER SURGERY Right 1990   • OTHER SURGICAL HISTORY      skull srgery from accident in childhood.       Family History   Problem Relation Age of Onset   • Heart disease Mother    • Heart disease Father    • Diabetes Father    • Hypertension Sister    • Asthma Sister    • Kidney disease Sister    • Hypertension Brother    • Diabetes Brother    • Cancer Maternal Uncle        Social History     Socioeconomic History   • Marital status:    Tobacco Use   • Smoking status: Current Some Day Smoker     Packs/day: 1.50     Years: 35.00     Pack years: 52.50     Types: Cigarettes   • Smokeless tobacco: Current User     Types: Snuff   • Tobacco comment: Would like to quit.   Vaping Use   • Vaping Use: Never  used   Substance and Sexual Activity   • Alcohol use: No   • Drug use: No   • Sexual activity: Defer           Objective   Physical Exam  Vitals and nursing note reviewed.   Constitutional:       General: He is not in acute distress.     Appearance: He is not toxic-appearing or diaphoretic.   HENT:      Head: Normocephalic and atraumatic.      Nose: Nose normal.   Eyes:      General:         Right eye: No discharge.         Left eye: No discharge.      Extraocular Movements: Extraocular movements intact.      Conjunctiva/sclera: Conjunctivae normal.   Cardiovascular:      Rate and Rhythm: Normal rate.      Pulses: Normal pulses.   Pulmonary:      Effort: Pulmonary effort is normal. No respiratory distress.      Breath sounds: No stridor. No rhonchi.   Abdominal:      General: Abdomen is flat.   Musculoskeletal:         General: Normal range of motion.      Cervical back: No rigidity.   Skin:     General: Skin is warm.   Neurological:      Mental Status: He is alert and oriented to person, place, and time. Mental status is at baseline.      Cranial Nerves: No cranial nerve deficit.      Sensory: No sensory deficit.      Motor: No weakness.   Psychiatric:         Mood and Affect: Mood normal.         Behavior: Behavior normal.         Thought Content: Thought content normal.         Judgment: Judgment normal.         Procedures           ED Course  ED Course as of 04/08/22 0200   Fri Apr 08, 2022 0046 I reassessed the patient and discussed the findings of the work up so far. I told him that I would encourage staying for an MRI in the morning to look into what this could have been.  He states that he would rather not stay and would rather follow-up as an outpatient because he feels normal and does not have any complaints at this time. I answered all his questions regarding the emergency department evaluation, diagnosis, and treatment plan in plain and simple language that he was able to understand.     He voiced  agreement with the plan of care so far and had no further questions. I told him that there is always some diagnostic uncertainty in the ER and that his work up, physical exam, and even his current presentation may not always reveal other underlying conditions. I also went over the fact that his condition may change or show itself after being discharged. He expressed understanding and agreed that there are reasonable limitations with the practice of emergency medicine.    I gave him return precautions and told him to return to the emergency department within 24 - 48hrs if he has any new, worsening, or concerning symptoms.     I told him that it is VERY IMPORTANT that he follows up (by calling to set up an appointment) with his primary care doctor within the next few days or as soon as reasonably possible so that he can be re-evaluated for improvement in his symptoms or for any other questions. He verbalized understanding of these instructions.     He was discharged in stable condition and was observed ambulating out of the ER.    [NP]      ED Course User Index  [NP] Mary Beth Malik MD                                                 MDM  Number of Diagnoses or Management Options  Weakness  Diagnosis management comments: This is a 61yoM presenting with a gradual onset headache. Patient arrived hemodynamically stable and was afebrile. Neurological exam without any new focal deficits. NIHSS is a 0 at this time. Patient was placed on the monitor and IV access was established.     Differentials include, but are not limited to migraine, ICH, CVA, tension headache. Plan to obtain cbc, cmp, CT head, CT face, control pain, and reassess. EKG was obtained and did not reveal any malignant/unstable dysrhythmia or any acute ST elevations.    Presentation not consistent with other acute, emergent causes of headache at this time. No red flags for SAH (based on history) and headache is not the worst headache of the patient's life. No  evidence of meningismus on exam and no photophobia. No neck stiffness or overlying skin changes. Low suspicion for acute angle closure glaucoma at this time given lack of pupillary findings. Low suspicion for temporal arteritis as there is no bulging temporal artery, pain is not localized to temporal area, and patient does not have any vision loss or history of vasculitis. Low suspicion for CRAO/CRVO as the patient did not have painless vision loss. Low suspicion for ACS as the patient does not have any associated chest pain or shortness of breath and has a non-suspicious history of presenting illness. No gait disturbance. No diplopia, dysarthria, or dysphagia on exam.         Amount and/or Complexity of Data Reviewed  Clinical lab tests: reviewed and ordered  Tests in the radiology section of CPT®: ordered and reviewed  Tests in the medicine section of CPT®: reviewed        Final diagnoses:   Weakness       ED Disposition  ED Disposition     ED Disposition   Discharge    Condition   Stable    Comment   --             Nila Alexander, APRN  [address] [facility] [address]  [phone]    Call in 1 day  As needed, If symptoms worsen         Medication List      No changes were made to your prescriptions during this visit.          Mary Beth Malik MD  04/08/22 0201

## 2022-04-08 NOTE — DISCHARGE INSTRUCTIONS
You were evaluated in the ER for weakness. Your workup showed no indication at this time for admission to the hospital. Please use your home medications for symptomatic improvement. Like we discussed, if your symptoms return or you have other concerns, please return.    It is VERY IMPORTANT that you follow up (call them to set up an appointment) with your primary care doctor* within the next few days or as soon as possible so that you can be re-evaluated for improvement in your symptoms or for any other questions. If you were prescribed any medications, please take them as directed or call us back with any questions.     Return to the ER within 24-48 hours if you have any new symptoms, worsening symptoms, or any other concerns.    _____  *If you do not have a primary care doctor, follow up with one of the [facility] physician groups below to setup primary care.     If you have trouble making an appointment, please call the [facility] Nurse Line at [phone]    Dr. Phuong Lerner DO, Dr. Nevin Portillo DO, and KIRBY Her  [facility]  [address] [place] [address], [address] [phone]    Dr. Babatunde Hair MD  [facility] - [address] [address], [address], [place] [address] [phone]    Dr. Tyson Keith DO, Dr. Harpal Caban DO,  KIRBY Hess, and KIRBY Montoya  [facility] - [address] [address], [address], [place] [address] [phone]     Dr. Muriel Henry MD, and KIRBY Burrell  [facility] - [address] , [place] [address] [phone]    Dr. Kings Carrington MD and Dr. Ramirez Diop MD  [facility] [address], [address] [phone]    Dr. Nael Burks MD  [facility]  Family Medicine - [address], [address], [place], [place], [address] [phone]    Dr. Juwan Alejo MD  [facility] - [address], [address] [phone]

## 2022-06-08 ENCOUNTER — OFFICE VISIT (OUTPATIENT)
Dept: CARDIOLOGY | Facility: CLINIC | Age: 62
End: 2022-06-08

## 2022-06-08 VITALS — HEART RATE: 70 BPM | BODY MASS INDEX: 20.49 KG/M2 | OXYGEN SATURATION: 97 % | WEIGHT: 123 LBS | HEIGHT: 65 IN

## 2022-06-08 DIAGNOSIS — J44.9 CHRONIC OBSTRUCTIVE PULMONARY DISEASE, UNSPECIFIED COPD TYPE: ICD-10-CM

## 2022-06-08 DIAGNOSIS — Z95.1 S/P CABG X 3: Primary | Chronic | ICD-10-CM

## 2022-06-08 DIAGNOSIS — Z86.73 HISTORY OF CVA (CEREBROVASCULAR ACCIDENT): Chronic | ICD-10-CM

## 2022-06-08 DIAGNOSIS — I65.21 STENOSIS OF RIGHT CAROTID ARTERY: Chronic | ICD-10-CM

## 2022-06-08 DIAGNOSIS — I20.8 CHRONIC STABLE ANGINA: ICD-10-CM

## 2022-06-08 DIAGNOSIS — I51.7 LVH (LEFT VENTRICULAR HYPERTROPHY): Chronic | ICD-10-CM

## 2022-06-08 DIAGNOSIS — E78.5 HYPERLIPIDEMIA LDL GOAL <70: Chronic | ICD-10-CM

## 2022-06-08 PROCEDURE — 99214 OFFICE O/P EST MOD 30 MIN: CPT | Performed by: INTERNAL MEDICINE

## 2022-06-08 NOTE — PROGRESS NOTES
Sharad Ryder  [id number]  1960  62 y.o.  male    Referring Provider: Nila Alexander APRN    Reason for  Visit:         Prior visit  patient called to be seen due to new symptoms of worsening chest pain   Initial visit to establish care with myself for ongoing longitudinal care related to cardiovascular issues   Wanted prior visit clearance to work on boat  aortic valve replacement with bioprosthetic valve 2018  coronary artery bypass grafting x 3 grafts 2018  Did not mail outpatient cardiac telemetry as did not get time   Cardiac workup test results as below: cath    Subjective     Chest pain both with exertion   Overall better after increasing antianginal     Easy fatiguability and persistently tired  Feels energy levels running low      Dull substernal,   Pressure like   Chest pain non pleuritic  Chest pain non positional and non gustatory   Lasts less than 5 minutes    Occurs once or twice a week on the average but can be variable in frequency  No associated diaphoresis    No associated nausea  No radiation    Relieved with rest  S/L NTG helps subside the chest pain within 5 mins of taking   Not taken any NTG lately   Not positional    No change with intake of food or antacids  No change with breathing  Mild associated dyspnea    No similar chest pain episodes in the past     Uses cane for support and balance   No bleeding, excessive bruising, gait instability or fall risks      BP well controlled at home.           History of present illness:  Sharad Ryder is a 62 y.o. yo male with coronary artery disease coronary artery bypass grafting aortic valve replacement with bioprosthetic valve  who presents today for   Chief Complaint   Patient presents with   • Coronary Artery Disease     3 mo - pt reports new onset joint pain in both hands   .    History  Past Medical History:   Diagnosis Date   • Aneurysm (HCC)    • Anxiety    • Arthritis    • Carotid artery disease (HCC)    • Carotid stenosis,  right 2/1/2018    Post right carotid endarterectomy   • COPD (chronic obstructive pulmonary disease) ([facility])    • Heart murmur    • History of right-sided carotid endarterectomy 2/16/2022   • Hyperlipidemia LDL goal <70 12/14/2017   • Left frontal lobe, right thalamus and right occipital CVA (cerebrovascular accident) (Formerly [facility]) 1/27/2022   • LVH (left ventricular hypertrophy) 2/16/2022   • Pneumonia    • Primary hypertension 12/14/2017   • S/P CABG x 3 2/16/2022    LIMA to LAD, SVG to PDA and SVG to diagonal branch with TMR and left atrial appendage exclusion with atricure clip device 2/8/2018 per Dr. Cj Robin at [facility]    • Severe aortic valve stenosis 12/14/2017   • Status post aortic valve replacement with bioprosthetic valve 2/16/2022    Graciela Juarez bovine pericardial 19 mm valve 2/8/2018 per Dr. Cj Robin at [facility]   • Tobacco use 12/14/2017   • Wears glasses    ,   Past Surgical History:   Procedure Laterality Date   • APPENDECTOMY     • CARDIAC CATHETERIZATION N/A 12/18/2017    Procedure: Left Heart Cath;  Surgeon: Aime Francois MD;  Location:  [facility] CATH INVASIVE LOC[facility];  Service:    • CARDIAC CATHETERIZATION N/A 12/18/2017    Procedure: Right Heart Cath;  Surgeon: Aime Francois MD;  Location:  [facility] CATH INVAS[facility];  Service:    • CARDIAC CATHETERIZATION Bilateral 1/4/2018    Procedure: Coronary angiography;  Surgeon: Alfonso Yi MD;  Location:  [facility] CATH INVAS[facility];  Service:    • CARDIAC CATHETERIZATION Left 9/15/2021    Procedure: Cardiac Catheterization/Vascular Study NIMCO [place]  Has 3 graft 2018;  Surgeon: Aime Francois MD;  Location:  [facility] CATH INVASIVE [facility];  Service: Cardiology;  Laterality: Left;   • CAROTID ENDARTERECTOMY Right 2/1/2018    Procedure: RIGHT CAROTID ENDARTERECTOMY WITH EEG-awake;  Surgeon: Jl Salgado DO;  Location:  [facility] OR;  Service:    • CORONARY ARTERY BYPASS GRAFT WITH AORTIC VALVE REPAIR/REPLACEMENT N/A  2/8/2018    Procedure: AORTIC VALVE REPLACEMENT,CORONARY ARTERY BYPASS GRAFTING x 3  WITH CONCHA AND RIGHT EVH, ATRIAL APPENDAGE EXCLUSION LEFT WITH TRANSESOPHAGEAL ECHOCARDIOGRAM, 17-CHANNEL TMR;  Surgeon: Cj Robin MD;  Location: [facility] OR;  Service:    • FINGER SURGERY Right 1990   • OTHER SURGICAL HISTORY      skull srgery from accident in childhood.   ,   Family History   Problem Relation Age of Onset   • Heart disease Mother    • Heart disease Father    • Diabetes Father    • Hypertension Sister    • Asthma Sister    • Kidney disease Sister    • Hypertension Brother    • Diabetes Brother    • Cancer Maternal Uncle    ,   Social History     Tobacco Use   • Smoking status: Current Some Day Smoker     Packs/day: 1.50     Years: 35.00     Pack years: 52.50     Types: Cigarettes   • Smokeless tobacco: Current User     Types: Snuff   • Tobacco comment: Would like to quit.   Vaping Use   • Vaping Use: Never used   Substance Use Topics   • Alcohol use: No   • Drug use: No   ,     Medications  Current Outpatient Medications   Medication Sig Dispense Refill   • aspirin 81 MG tablet Take 1 tablet by mouth Daily. 30 tablet 2   • busPIRone (BUSPAR) 5 MG tablet Take 5 mg by mouth 3 (Three) Times a Day.     • citalopram (CeleXA) 20 MG tablet Take 20 mg by mouth Daily.     • cloNIDine (CATAPRES) 0.1 MG tablet TAKE 1 TABLET BY MOUTH TWICE DAILY AS NEEDED FOR HIGH BLOOD PRESSURE >150/90 60 tablet 3   • clopidogrel (PLAVIX) 75 MG tablet Take 1 tablet by mouth Daily. 30 tablet 2   • isosorbide mononitrate (IMDUR) 120 MG 24 hr tablet Take 1 tablet by mouth Daily. 90 tablet 3   • metoprolol succinate XL (TOPROL-XL) 50 MG 24 hr tablet Take 1 tablet by mouth Daily. 30 tablet 0   • nitroglycerin (NITROSTAT) 0.4 MG SL tablet Place 0.4 mg under the tongue Every 5 (Five) Minutes As Needed for Chest Pain. Take no more than 3 doses in 15 minutes.     • rosuvastatin (CRESTOR) 40 MG tablet Take 1 tablet by mouth Daily. 30 tablet 0   •  "gabapentin (NEURONTIN) 100 MG capsule Take 100 mg by mouth 3 (Three) Times a Day.       No current facility-administered medications for this visit.       Allergies:  Patient has no known allergies.    Review of Systems  Review of Systems   Constitutional: Negative.   HENT: Negative.    Eyes: Negative.    Cardiovascular: Positive for chest pain and dyspnea on exertion. Negative for claudication, cyanosis, irregular heartbeat, leg swelling, near-syncope, orthopnea, palpitations, paroxysmal nocturnal dyspnea and syncope.   Respiratory: Negative.    Endocrine: Negative.    Hematologic/Lymphatic: Negative.    Skin: Negative.    Musculoskeletal: Positive for arthritis, back pain and joint pain.   Gastrointestinal: Negative for anorexia.   Genitourinary: Negative.    Neurological: Negative.    Psychiatric/Behavioral: Negative.        Objective     Physical Exam:    Vitals:    06/08/22 1031   Pulse: 70   SpO2: 97%   Weight: 55.8 kg (123 lb)   Height: 165.1 cm (65\")     Pulse 70   Ht 165.1 cm (65\")   Wt 55.8 kg (123 lb)   SpO2 97%   BMI 20.47 kg/m²     Physical Exam  Constitutional:       Appearance: He is well-developed.   HENT:      Head: Normocephalic.   Neck:      Vascular: Normal carotid pulses. No carotid bruit or JVD.      Trachea: No tracheal tenderness or tracheal deviation.   Cardiovascular:      Rate and Rhythm: Regular rhythm.      Pulses: Normal pulses.      Heart sounds: Murmur heard.    Systolic murmur is present with a grade of 2/6.  Pulmonary:      Effort: Pulmonary effort is normal.      Breath sounds: No stridor.   Abdominal:      General: There is no distension.      Palpations: Abdomen is soft.      Tenderness: There is no abdominal tenderness.   Musculoskeletal:      Cervical back: No edema.   Skin:     General: Skin is warm.   Neurological:      Mental Status: He is alert.      Cranial Nerves: No cranial nerve deficit.      Sensory: No sensory deficit.   Psychiatric:         Speech: Speech normal. "         Behavior: Behavior normal.         Results Review:      Conclusion of cardiac catheterization    9/15/2021     Distal left main coronary artery has 40 to 50% stenosis with normal FFR of 0.95  Proximal left anterior descending coronary artery 70% stenosis  Patent left internal mammary artery graft to the left anterior descending coronary artery  Occluded saphenous venous graft to diagonal branch  Occluded mid right coronary artery  Occluded saphenous venous graft to right posterior descending coronary artery  No significant disease noted of left circumflex coronary artery  Ectasia of the aortic root by aortic root angiography which confirms occluded saphenous venous grafts  Mild to moderate aortic regurgitation  Normally functioning bioprosthetic aortic valve with peak to peak gradient of 20 mmHg on pullback  Severely elevated left ventricular end-diastolic pressure 32 mmHg [LV gram not performed]  Evidence of infrarenal aortic aneurysm on fluoroscopy        ____________________________________________________________________________________________________________________________________________     Plan after cardiac catheterization     No targets for intervention  Continue medical therapy  Acceptable risk for any planned procedures  Intensive risk factor modifications for both primary and secondary prevention if applicable  Hydration   Observation    · Normal left ventricular ejection fraction   · Bioprosthetic aortic valve without specific evidence of dysfunction   · Moderate mitral annular calcification   · Moderate left atrial dilatation   · Moderate pulmonary hypertension       This result has an attachment that is not available.     Date: 04/13/21   Received From: [facility]           ____________________________________________________________________________________________________________________________________________  Health maintenance and recommendations    Low salt/ HTN/  Heart healthy carbohydrate restricted cardiac diet (printed dietary and general instructions provided for home review )  The patient is advised to reduce or avoid caffeine or other cardiac stimulants.     The patient was advised to avoid long term NSAIDS , use Tylenol PRN instead  Avoid cardiac stimulants including common drugs like Pseudoephedrine or excessive amounts of caffeine  Monitor for any signs of bleeding including red or dark stools. Fall precautions.   Advised staying uptodate with immunizations per established standard guidelines.  Offered to give patient  a copy      Questions were encouraged, asked and answered to the patient's  understanding and satisfaction. Questions if any regarding current medications and side effects, need for refills and importance of compliance to medications stressed.    Reviewed available prior notes, consults, prior visits, laboratory findings, radiology and cardiology relevant reports. Updated chart as applicable. I have reviewed the patient's medical history in detail and updated the computerized patient record as relevant.      Updated patient regarding any new or relevant abnormalities on review of records or any new findings on physical exam. Mentioned to patient about purpose of visit and desirable health short and long term goals and objectives.    Primary to monitor CBC CMP Lipid panel and TSH as applicable    ___________________________________________________________________________________________________________________________________________         Procedures    Assessment & Plan   Diagnoses and all orders for this visit:    1. S/P CABG x 3 2018 Dr Robin  (Primary)    2. Stenosis of right carotid artery    3. LVH (left ventricular hypertrophy)    4. Chronic obstructive pulmonary disease, unspecified COPD type (HCC)    5. Hyperlipidemia LDL goal <70    6. History of CVA (cerebrovascular accident)    7. Chronic stable angina (HCC)        Plan      Overall doing  well no new cardiovascular symptoms and therefore no additional cardiac testing is required prior to next visit  If any interim issues arise will call me for further evaluation.     Encouraged smoking cessation   Check BP and heart rates twice daily initially till blood pressures and heart rates under good control and then at least 3x / week,   If blood pressures continue to be well-controlled then can check week a month  at home and bring a recording for review next visit  If BP >130/85 or < 100/60 persistently over 3 reading 30 mins apart or if heart rates persistently above 100 bpm or less than 55 bpm call sooner for evaluation and advise     The current medical regimen is effective;  continue present plan and medications.   Escalate antianginal therapy     I support the patient's decision to take the Covid -19 vaccine   Had required complete course   No major issues   Now fully immunized    Recommend booster        Keep LDL below 70 mg/dl. Monitor liver and renal functions.   Monitor CBC, CMP, TSH (as indicated) and Lipid Panel by primary      Check BP and heart rates twice daily at least 3x / week, week a month  at home and bring a recording for me to review next visit  If BP >130/85 or < 100/60 persistently over 3 reading 30 mins apart call sooner      Continue beta blocker therapy   S/L NTG PRN for chest pain, call me or go to ER if has to use S/L nitroglycerin     Unable to work due to disabling angina  No targets for intervention     Follow up with Ashtyn ORR , Ishaan ORR, Jasimn Sanabria PA-C  or myself            Return in about 4 months (around 10/8/2022).

## 2022-09-12 ENCOUNTER — TELEPHONE (OUTPATIENT)
Dept: VASCULAR SURGERY | Facility: CLINIC | Age: 62
End: 2022-09-12

## 2022-09-12 NOTE — TELEPHONE ENCOUNTER
Confirmed arrival at the main registration at 9:00 on 9/13/22 and NPO 6 hours prior and for US testing.  F/U appt at 11:45 with Dr Salgado.

## 2022-09-13 ENCOUNTER — OFFICE VISIT (OUTPATIENT)
Dept: VASCULAR SURGERY | Facility: CLINIC | Age: 62
End: 2022-09-13

## 2022-09-13 ENCOUNTER — HOSPITAL ENCOUNTER (OUTPATIENT)
Dept: CT IMAGING | Facility: HOSPITAL | Age: 62
Discharge: HOME | End: 2022-09-13

## 2022-09-13 ENCOUNTER — HOSPITAL ENCOUNTER (OUTPATIENT)
Dept: ULTRASOUND IMAGING | Facility: HOSPITAL | Age: 62
Discharge: HOME | End: 2022-09-13

## 2022-09-13 VITALS
BODY MASS INDEX: 19.83 KG/M2 | DIASTOLIC BLOOD PRESSURE: 80 MMHG | OXYGEN SATURATION: 94 % | HEART RATE: 66 BPM | WEIGHT: 119 LBS | SYSTOLIC BLOOD PRESSURE: 150 MMHG | RESPIRATION RATE: 18 BRPM | HEIGHT: 65 IN

## 2022-09-13 DIAGNOSIS — I10 PRIMARY HYPERTENSION: Chronic | ICD-10-CM

## 2022-09-13 DIAGNOSIS — I73.9 PAD (PERIPHERAL ARTERY DISEASE): ICD-10-CM

## 2022-09-13 DIAGNOSIS — I65.23 BILATERAL CAROTID ARTERY STENOSIS: ICD-10-CM

## 2022-09-13 DIAGNOSIS — I71.40 ABDOMINAL AORTIC ANEURYSM (AAA) WITHOUT RUPTURE: ICD-10-CM

## 2022-09-13 DIAGNOSIS — Z72.0 TOBACCO USE: ICD-10-CM

## 2022-09-13 DIAGNOSIS — E78.5 HYPERLIPIDEMIA LDL GOAL <70: Chronic | ICD-10-CM

## 2022-09-13 DIAGNOSIS — I71.40 ABDOMINAL AORTIC ANEURYSM (AAA) WITHOUT RUPTURE: Primary | ICD-10-CM

## 2022-09-13 LAB — CREAT BLDA-MCNC: 0.6 MG/DL (ref 0.6–1.3)

## 2022-09-13 PROCEDURE — 82565 ASSAY OF CREATININE: CPT

## 2022-09-13 PROCEDURE — 74174 CTA ABD&PLVS W/CONTRAST: CPT

## 2022-09-13 PROCEDURE — 93880 EXTRACRANIAL BILAT STUDY: CPT | Performed by: SURGERY

## 2022-09-13 PROCEDURE — 0 IOPAMIDOL PER 1 ML: Performed by: SURGERY

## 2022-09-13 PROCEDURE — 93880 EXTRACRANIAL BILAT STUDY: CPT

## 2022-09-13 PROCEDURE — 93923 UPR/LXTR ART STDY 3+ LVLS: CPT

## 2022-09-13 PROCEDURE — 99214 OFFICE O/P EST MOD 30 MIN: CPT | Performed by: SURGERY

## 2022-09-13 PROCEDURE — 93923 UPR/LXTR ART STDY 3+ LVLS: CPT | Performed by: SURGERY

## 2022-09-13 RX ADMIN — IOPAMIDOL 100 ML: 755 INJECTION, SOLUTION INTRAVENOUS at 09:18

## 2022-09-13 NOTE — PROGRESS NOTES
"9/13/2022       Nila Alexander, APRN  [address] [facility] [address]        Sharad Ryder  1960    Chief Complaint   Patient presents with   • Carotid Artery Disease     Follow-up with US pad carotid bilat. Pt denies any stroke like symptoms.    • Aortic Aneurysm     1 year follow-up with CT pad angiogram abd pelvis w wo.  Pt denies any abdominal or back pain.   • Peripheral Vascular Disease     Follow-up with US pad ankle/brach ind ext comp.  Pt states he has leg pain when he goes to bed with feet tingling and stay cold. Pt states that left hand and finger on the right hand hurt and stiff in the morning and asks if it can be related to vascular issue.       Dear Nila Alexander, KIRBY:     I had the pleasure of seeing you patient in the office today for follow up.  As you recall, the patient is a 62 y.o. male who we are currently following for carotid stenosis and a small abdominal aortic aneurysm.    He had a previous right carotid endarterectomy on 2/1/2018.  He is a current daily smoker.  He is maintained on aspirin, Plavix, and Crestor.  He did have noninvasive testing performed, which I did review in office.    Review of Systems   Constitutional: Negative.    HENT: Negative.    Eyes: Positive for visual disturbance (Double vision intermittently).   Respiratory: Positive for shortness of breath (on exertion).    Cardiovascular: Negative for chest pain.   Gastrointestinal: Negative.    Endocrine: Negative.    Genitourinary: Negative.    Musculoskeletal: Positive for arthralgias and joint swelling.   Skin: Negative.    Allergic/Immunologic: Negative.    Neurological: Negative.    Hematological: Negative.    Psychiatric/Behavioral: Negative.    All other systems reviewed and are negative.      /80 (BP Location: Left arm, Patient Position: Sitting, Cuff Size: Adult)   Pulse 66   Resp 18   Ht 165.1 cm (65\")   Wt 54 kg (119 lb)   SpO2 94%   BMI 19.80 kg/m²   Physical " Exam  Constitutional:       General: He is not in acute distress.     Appearance: Normal appearance. He is well-developed. He is not diaphoretic.   HENT:      Head: Normocephalic and atraumatic.   Neck:      Vascular: No carotid bruit or JVD.   Cardiovascular:      Rate and Rhythm: Normal rate and regular rhythm.      Pulses:           Femoral pulses are 2+ on the right side and 2+ on the left side.       Popliteal pulses are 2+ on the right side and 2+ on the left side.      Heart sounds: S1 normal and S2 normal. Murmur heard.     No friction rub. No gallop.   Pulmonary:      Effort: Pulmonary effort is normal.      Breath sounds: Normal breath sounds.   Abdominal:      General: Bowel sounds are normal. There is no abdominal bruit.      Palpations: Abdomen is soft.      Tenderness: There is no abdominal tenderness.   Musculoskeletal:         General: Normal range of motion.   Skin:     General: Skin is warm and dry.   Neurological:      Mental Status: He is alert and oriented to person, place, and time.      Cranial Nerves: No cranial nerve deficit.   Psychiatric:         Behavior: Behavior normal.         Thought Content: Thought content normal.         Judgment: Judgment normal.         DIAGNOSTIC DATA:  CT Angiogram Abdomen Pelvis    Result Date: 9/13/2022  Narrative: EXAMINATION: CT angiogram the abdomen and pelvis with contrast 9/13/2022  HISTORY: Abdominal aortic aneurysm surveillance.  DOSE: 199 mGycm. Automated exposure control was utilized to diminish patient radiation dose  FINDINGS: Multiple contiguous axials are obtained through the abdomen and pelvis following intravenous contrast infusion with reformatted images obtained in sagittal coronal projections from the original data set as well as MIPS.  There is moderate cardiomegaly. Mitral annulus calcifications are present with left ventricular hypertrophy. There are changes of paraseptal and centrilobular emphysema. There is scarring in the inferior  lingular segment of the left upper lobe as well as bibasilar atelectasis. There is no pericardial effusion present. The descending thoracic aorta demonstrates calcific plaquing without evidence of aneurysm or dissection.  The liver is homogeneous in density with no evidence of focal hepatic mass. The gallbladder is unremarkable. No pericholecystic fluid collections are present. There is no biliary dilatation.  The pancreas is normal in appearance. No evidence of splenomegaly.  The adrenals are unremarkable. There is a tiny cortical cyst involving the posterior midpole of the right kidney. No additional renal lesions are identified. There is no evidence of nephrolithiasis. Both ureters are decompressed and normal in appearance.  No evidence of retroperitoneal hematoma or adenopathy.  There is diverticulosis of the sigmoid colon. There is mild constipation. The appendix is normal in appearance. The small bowel is normal in distribution and appearance. There is a small fat-containing periumbilical hernia.  There is mild prominence of the urinary bladder wall. The prostate gland is mildly enlarged. No free fluid or adenopathy in the pelvis.  Examination of the abdominal aorta and branch vessels demonstrate minimal calcific plaquing at the origin of the celiac and SMA without rate limiting stenosis. There is an accessory left renal artery. No evidence of rate limiting stenosis within either renal artery. First noted just above the origin of the renal arteries is a chronic aortic dissection with associated calcification and luminal narrowing at and just above the origin of the renal arteries. This is stable in appearance from the previous exam. There is an infrarenal abdominal aortic aneurysm. This is measured at the same level in the same manner as the previous exam. On the current exam it measures 4.0 x 4.1 cm previously measured at 3.8 x 3.9 cm suggesting slight interval increase. No evidence of retroperitoneal  hematoma. There is heavy calcific plaquing of the distal abdominal aorta extending into the proximal iliac arteries. There is moderate stenosis at the origin of the right common iliac artery with a high-grade proximal left common iliac artery stenosis. The inferior mesenteric artery is not visualized and is likely occluded. There is calcific plaquing and mild stenosis associated with both external iliac arteries. There is also disease of the common femorals, proximal superficial femoral and profunda arteries.      Impression: 1. Infrarenal abdominal aortic aneurysm shows mild interval increase in size from the previous exam of one year earlier. There is evidence of a chronic aortic dissection essentially unchanged from the previous exam. Luminal stenosis is noted just above the origin of the renal arteries. There is an accessory left renal artery. The SMA and celiac as well as the renal arteries demonstrate mild plaquing but without rate limiting stenosis. The CONCHA is not identified and is likely occluded. There is heavy calcific plaquing at the distal aortic bifurcation with moderate right-sided and severe left-sided common iliac stenosis. Additional sites of stenosis within the common iliac and external iliac arteries are again demonstrated similar in appearance to the previous exam. 2. Diverticulosis of the descending and sigmoid colon with no radiographic evidence of acute diverticulitis. There is mild constipation. 3. There is cardiomegaly with mitral annulus calcifications. No evidence of pericardial effusion. 4. Small fat-containing periumbilical hernia. 5. Prostatic enlargement. Mild prominence of the bladder wall. This report was finalized on 09/13/2022 13:55 by Dr. Elgiio Lorenz MD.    US Carotid Bilateral    Result Date: 9/13/2022  Narrative: History: Carotid occlusive disease      Impression: Impression: 1. There is 50-69% stenosis of the right internal carotid artery. 2. There is 50-69% stenosis of  the left internal carotid artery. 3. Antegrade flow is demonstrated in bilateral vertebral arteries.  Comments: Bilateral carotid vertebral arterial duplex scan was performed.  Grayscale imaging shows intimal thickening and calcified elements at the carotid bifurcation. The right internal carotid artery peak systolic velocity is 177.9 cm/sec. The end-diastolic velocity is 41.2 cm/sec. The right ICA/CCA ratio is approximately 1.2 . These findings correlate with 50-69% stenosis of the right internal carotid artery.  Grayscale imaging shows intimal thickening and calcified elements at the carotid bifurcation. The left internal carotid artery peak systolic velocity is 194.1 cm/sec. The end-diastolic velocity is 38.8 cm/sec. The left ICA/CCA ratio is approximately 1.8 . These findings correlate with 50-69% stenosis of the left internal carotid artery.  Antegrade flow is demonstrated in bilateral vertebral arteries. There is greater than 50% stenosis in the right common carotid artery. This report was finalized on 09/13/2022 15:53 by Dr. Jl Salgado MD.    US Ankle / Brachial Indices Extremity Complete    Result Date: 9/13/2022  Narrative:  History: PAD  Comments: Bilateral lower extremity arterial with multi-level pulse volume recordings and segmental pressures were performed at rest and stress.  The right ankle/brachial index is 1.05. The waveforms are triphasic without dampening.These findings are consistent with no significant arterial insufficiency of the right lower extremity at rest.  The left ankle/brachial index is 0.62. The waveforms are biphasic without dampening. These findings are consistent with moderate arterial insufficiency of the left lower extremity at rest.    There is likely evidence of aortoiliac inflow disease.      Impression: Impression: 1. No significant arterial insufficiency of the right lower extremity at rest. 2. Moderate arterial insufficiency of the left lower extremity at rest.   This  report was finalized on 09/13/2022 15:45 by Dr. Jl Salgado MD.      Patient Active Problem List   Diagnosis   • Hyperlipidemia LDL goal <70   • Primary hypertension   • COPD (chronic obstructive pulmonary disease) ([facility])   • Transient ischemic attack (TIA)   • Allergic reaction   • Coronary artery disease of bypass graft of native heart with stable angina pectoris ([facility])   • PVC (premature ventricular contraction)   • Leukocytosis   • History of CVA (cerebrovascular accident)   • Right sided weakness resolved   • Left frontal lobe, right thalamus and right occipital CVA (cerebrovascular accident) ([facility])   • Right-sided carotid artery disease 50-69% stenosis right ICA ([facility])   • S/P CABG x 3 2018 Dr Robin    • Status post aortic valve replacement with bioprosthetic valve   • LVH (left ventricular hypertrophy)   • History of right-sided carotid endarterectomy   • Tobacco use   • Chronic stable angina ([facility])         ICD-10-CM ICD-9-CM   1. Abdominal aortic aneurysm (AAA) without rupture ([facility])  I71.4 441.4   2. Hyperlipidemia LDL goal <70  E78.5 272.4   3. Primary hypertension  I10 401.9   4. Tobacco use  Z72.0 305.1   5. Bilateral carotid artery stenosis  I65.23 433.10     433.30   6. PAD (peripheral artery disease) ([facility])  I73.9 443.9         PLAN: After thoroughly evaluating Sharad COTY SahaRyder, I believe the best course of action is to remain conservative from a vascular surgery standpoint.  I did review his testing, and he does have a dissection in the aorta and ulcerated plaque.  This has mildly increased and is measuring about 4.1 cm.  His carotid duplex shows 50-69% carotid stenosis bilaterally.  His ABIs show moderate disease in the left lower extremity but is asymptomatic.  I will see him back in 1 years time with repeat noninvasive testing which will include a CTA of the abdomen and pelvis, carotid duplex, and ABIs.  His aorta at some point in the near future will likely need to be repaired. I did discuss  vascular risk factors as they pertain to the progression of vascular disease including controlling hypertension and hyperlipidemia.  These risk factors are currently controlled and stable.  We did discuss tobacco abuse cessation for over 3 minutes letting him know the continued risk of tobacco use and worsening vascular disease and aneurysmal degeneration.  He is not willing to quit at this time.  The patient is to continue taking their medications as previously discussed.   This was all discussed in full with complete understanding.  Thank you for allowing me to participate in the care of your patient.  Please do not hesitate to call with any questions or concerns.  We will keep you aware of any further encounters with Sharad Ryder.      Sincerely Yours,      Jl Salgado, DO

## 2022-11-01 RX ORDER — CLONIDINE HYDROCHLORIDE 0.1 MG/1
TABLET ORAL
Qty: 60 TABLET | Refills: 0 | Status: SHIPPED | OUTPATIENT
Start: 2022-11-01

## 2022-11-07 ENCOUNTER — OFFICE VISIT (OUTPATIENT)
Dept: CARDIOLOGY | Facility: CLINIC | Age: 62
End: 2022-11-07

## 2022-11-07 VITALS
HEART RATE: 70 BPM | WEIGHT: 120 LBS | HEIGHT: 65 IN | DIASTOLIC BLOOD PRESSURE: 76 MMHG | SYSTOLIC BLOOD PRESSURE: 166 MMHG | OXYGEN SATURATION: 98 % | BODY MASS INDEX: 19.99 KG/M2

## 2022-11-07 DIAGNOSIS — Z95.3 STATUS POST AORTIC VALVE REPLACEMENT WITH BIOPROSTHETIC VALVE: Chronic | ICD-10-CM

## 2022-11-07 DIAGNOSIS — I20.8 CHRONIC STABLE ANGINA: ICD-10-CM

## 2022-11-07 DIAGNOSIS — I63.9 ACUTE CVA (CEREBROVASCULAR ACCIDENT): ICD-10-CM

## 2022-11-07 DIAGNOSIS — I10 ESSENTIAL HYPERTENSION: Primary | ICD-10-CM

## 2022-11-07 DIAGNOSIS — Z95.1 S/P CABG X 3: Chronic | ICD-10-CM

## 2022-11-07 PROCEDURE — 99215 OFFICE O/P EST HI 40 MIN: CPT | Performed by: PHYSICIAN ASSISTANT

## 2022-11-07 PROCEDURE — 93000 ELECTROCARDIOGRAM COMPLETE: CPT | Performed by: PHYSICIAN ASSISTANT

## 2022-11-07 RX ORDER — AMLODIPINE BESYLATE 5 MG/1
5 TABLET ORAL DAILY
Qty: 30 TABLET | Refills: 11 | Status: SHIPPED | OUTPATIENT
Start: 2022-11-07 | End: 2023-01-03 | Stop reason: SDUPTHER

## 2022-11-07 RX ORDER — NICOTINE 21 MG/24HR
1 PATCH, TRANSDERMAL 24 HOURS TRANSDERMAL EVERY 24 HOURS
Qty: 30 PATCH | Refills: 3 | Status: SHIPPED | OUTPATIENT
Start: 2022-11-07

## 2022-11-07 NOTE — PROGRESS NOTES
[facility] -  CLINIC FOLLOW UP     Patient Care Team:  Nila Alexander APRN as PCP - General (Family Medicine)  Aime Francois MD as Cardiologist (Cardiology)  Nila Alexander PA-C as Referring Physician (Physician Assistant)    Chief Complaint: CAD    Subjective     HPI: Today I had the pleasure of seeing Sharad Ryder in the cardiology clinic for follow up. He is a 62 year old male followed for extensive atherosclerosis by cardiology and vascular surgery for CAD, Aortic stenosis s/p AVR with CABG (LIMA to LAD, SVG to PDA and SVG to diagonal branch with TMR and left atrial appendage exclusion with atricure clip device 2/8/2018 per Dr. Cj Robin at [facility].  SVG to PDA and SVG to diagonal branch occluded on [facility] 9/15/21), HTN, HLP, ongoing tobacco abuse, PVD with Left common iliac stenosis, previous carotid endarterectomy on chronic DAPT. He denies any recent ER visits or hospitalizations. 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 He denies any recurrent palpitations or tachycardia.     Due to chronic angina at his last visit, he was increased on his Imdur up to 120mg daily, which he states helped to resolve his chest pain. Review of his last echo showed he has moderate AS after his AVR with a peak gradient of 51mmHg and mean gradient of 26mmHg.     Recently, he had evaluation by Vascular with repeat ABIs demonstrating significant stenosis with an TIMOTEO of 0.6 on the left. He also has dissection in the aorta and ulcerated plaque.  This has mildly increased and is measuring about 4.1 cm.  His carotid duplex shows 50-69% carotid stenosis bilaterally.  His ABIs show moderate disease in the left lower extremity but is asymptomatic. Dr. Salgado feels his aorta will need repairing in the near future, but will continue conservative management for now. Mr. Ryder realizes he needs to quit smoking, but is not interested in it currently.       Objective  "    Visit Vitals  /76   Pulse 70   Ht 165.1 cm (65\")   Wt 54.4 kg (120 lb)   SpO2 98%   BMI 19.97 kg/m²           Vitals reviewed.   Constitutional:       Appearance: Not in distress. Frail.   Eyes:      Extraocular Movements: Extraocular movements intact.      Conjunctiva/sclera: Conjunctivae normal.      Pupils: Pupils are equal, round, and reactive to light.   HENT:      Head: Normocephalic and atraumatic.      Nose: Nose normal.    Mouth/Throat:      Lips: Pink.      Mouth: Mucous membranes are moist.      Pharynx: Oropharynx is clear.   Neck:      Vascular: No carotid bruit or JVD. JVD normal.   Pulmonary:      Effort: Pulmonary effort is normal.   Chest:      Chest wall: Not tender to palpatation.   Cardiovascular:      PMI at left midclavicular line. Normal rate. Regular rhythm. Normal S1. Normal S2.      Murmurs: There is a harsh midsystolic murmur at the URSB, radiating to the neck.      No gallop. No rub.   Pulses:     Radial: 2+ bilaterally.     Posterior tibial: 2+ on the right side.  Edema:     Peripheral edema absent.   Abdominal:      General: Bowel sounds are normal.      Palpations: Abdomen is soft.   Musculoskeletal: Normal range of motion.      Extremities: No clubbing present.     Cervical back: Normal range of motion. Skin:     General: Skin is warm and dry.   Neurological:      General: No focal deficit present.      Mental Status: Alert and oriented to person, place, and time.      Cranial Nerves: Cranial nerves are intact.   Psychiatric:         Attention and Perception: Attention normal.         Mood and Affect: Affect normal.         Speech: Speech normal.         Behavior: Behavior normal.         Cognition and Memory: Cognition normal.             The following portions of the patient's history were reviewed and updated as appropriate: allergies, current medications, past medical history, past social history, past and problem list.     Review of Systems   Constitutional: Negative.  "   HENT: Negative.    Eyes: Negative.    Respiratory: Negative.    Cardiovascular: Negative.    Gastrointestinal: Negative.    Endocrine: Negative.    Musculoskeletal: Negative.    Skin: Negative.    Allergic/Immunologic: Negative.    Neurological: Negative.    Hematological: Negative.    Psychiatric/Behavioral: Negative.           Echo EF Estimated  Lab Results   Component Value Date    ECHOEFEST 65 02/08/2018         ECG 12 Lead    Date/Time: 11/7/2022 11:37 AM  Performed by: Jasmin Sanabria PA  Authorized by: Jasmin Sanabria PA   Rhythm: sinus rhythm  Rate: normal  QRS axis: normal  Other findings: T wave abnormality    Clinical impression: normal ECG              Medication Review: yes    Current Outpatient Medications:   •  aspirin 81 MG tablet, Take 1 tablet by mouth Daily., Disp: 30 tablet, Rfl: 2  •  busPIRone (BUSPAR) 5 MG tablet, Take 1 tablet by mouth 2 (Two) Times a Day., Disp: , Rfl:   •  citalopram (CeleXA) 20 MG tablet, Take 20 mg by mouth Daily., Disp: , Rfl:   •  cloNIDine (CATAPRES) 0.1 MG tablet, TAKE 1 TABLET BY MOUTH TWICE DAILY AS NEEDED FOR HIGH BLOOD PRESSURE >150/90, Disp: 60 tablet, Rfl: 0  •  clopidogrel (PLAVIX) 75 MG tablet, Take 1 tablet by mouth Daily., Disp: 30 tablet, Rfl: 2  •  gabapentin (NEURONTIN) 100 MG capsule, Take 100 mg by mouth 3 (Three) Times a Day., Disp: , Rfl:   •  isosorbide mononitrate (IMDUR) 120 MG 24 hr tablet, Take 1 tablet by mouth Daily., Disp: 90 tablet, Rfl: 3  •  metoprolol succinate XL (TOPROL-XL) 50 MG 24 hr tablet, Take 1 tablet by mouth Daily., Disp: 30 tablet, Rfl: 0  •  nitroglycerin (NITROSTAT) 0.4 MG SL tablet, Place 0.4 mg under the tongue Every 5 (Five) Minutes As Needed for Chest Pain. Take no more than 3 doses in 15 minutes., Disp: , Rfl:   •  rosuvastatin (CRESTOR) 40 MG tablet, Take 1 tablet by mouth Daily., Disp: 30 tablet, Rfl: 0  •  amLODIPine (NORVASC) 5 MG tablet, Take 1 tablet by mouth Daily., Disp: 30 tablet, Rfl: 11  •  nicotine  (Nicotine Step 1) 21 MG/24HR patch, Place 1 patch on the skin as directed by provider Daily., Disp: 30 patch, Rfl: 3   No Known Allergies    I have reviewed       CBC:  Lab Results - Last 18 Months   Lab Units 04/07/22  2146   WBC 10*3/mm3 16.71*   HEMOGLOBIN g/dL 14.9   HEMATOCRIT % 44.2   PLATELETS 10*3/mm3 337      BMP/CMP:  Lab Results - Last 18 Months   Lab Units 09/13/22  0914 04/07/22  2146 01/28/22  0458   SODIUM mmol/L  --  132* 138   POTASSIUM mmol/L  --  4.2 3.7   CHLORIDE mmol/L  --  95* 104   CO2 mmol/L  --  23.0 26.0   GLUCOSE mg/dL  --  303* 99   BUN mg/dL  --  12 9   CREATININE mg/dL 0.60 0.65* 0.56*   EGFR IF NONAFRICN AM mL/min/1.73  --   --  148   CALCIUM mg/dL  --  8.7 9.2     BNP: No results for input(s): PROBNP in the last 69941 hours.   THYROID:  Lab Results - Last 18 Months   Lab Units 01/27/22  0134   TSH uIU/mL 2.020   FREE T4 ng/dL 1.23       Results for orders placed during the hospital encounter of 01/27/22    Adult Transthoracic Echo Complete W/ Cont if Necessary Per Protocol (With Agitated Saline)    Interpretation Summary  · Left ventricular systolic function is normal. Left ventricular ejection fraction appears to be 56 - 60%.  · Left ventricular wall thickness is consistent with mild concentric hypertrophy.  · Normal right ventricular cavity size and systolic function noted.  · No evidence of a patent foramen ovale.  · There is a bioprosthetic aortic valve present with moderately elevated gradients.     Assessment:   Diagnoses and all orders for this visit:    1. Essential hypertension (Primary)  -     ECG 12 Lead  -     amLODIPine (NORVASC) 5 MG tablet; Take 1 tablet by mouth Daily.  Dispense: 30 tablet; Refill: 11  -     nicotine (Nicotine Step 1) 21 MG/24HR patch; Place 1 patch on the skin as directed by provider Daily.  Dispense: 30 patch; Refill: 3    2. Status post aortic valve replacement with bioprosthetic valve  Overview:  Graciela Juarez bovine pericardial 19 mm valve  2/8/2018 per Dr. Cj Robin at [facility]      3. S/P CABG x 3 2018 Dr Robin   Overview:  LIMA to LAD, SVG to PDA and SVG to diagonal branch with TMR and left atrial appendage exclusion with atricure clip device 2/8/2018 per Dr. Cj Robin at [facility]       4. Left frontal lobe, right thalamus and right occipital CVA (cerebrovascular accident) (HCC)    5. Chronic stable angina (HCC)     CAD: Continue DAPT. He would like to have some injections in his back for pain management, but it is difficult to know if he is referring to epidurals vs steroid injections. Imdur is controlling angina for now. If recurs, can add Ranexa. However, advised him smoking cessation is the one aspect he has control over which would benefit his CV care in all areas.   -LDL was 123 in Jan 2022 and is now on Crestor 40mg qd     Bioprosthetic AVR 2018 CE bovine pericardial 19mm valve. Mean gradient is 26mmHg. Given elevated gradients, would prefer to monitor with echos annually.     CVA: continue statin and DAPT therapy, risk factor modifications. Now s/p MAUREEN atricure clip.     HTN: He states he did not take meds yet today, prefers to take them at night. Will add amlodipine 5mg daily to his regiment.     Tobacco: >5 minutes discussing risks of continued smoking, progression of vascular disease. Will send in nicotine patches.     I spent 40 minutes caring for Sharad on this date of service. This time includes time spent by me in the following activities:preparing for the visit, reviewing tests, obtaining and/or reviewing a separately obtained history, performing a medically appropriate examination and/or evaluation , counseling and educating the patient/family/caregiver, ordering medications, tests, or procedures, referring and communicating with other health care professionals  and documenting information in the medical record        Electronically signed by HOLLIE Levine

## 2023-01-03 ENCOUNTER — OFFICE VISIT (OUTPATIENT)
Dept: CARDIOLOGY | Facility: CLINIC | Age: 63
End: 2023-01-03
Payer: MEDICAID

## 2023-01-03 VITALS
SYSTOLIC BLOOD PRESSURE: 170 MMHG | WEIGHT: 122 LBS | DIASTOLIC BLOOD PRESSURE: 77 MMHG | BODY MASS INDEX: 20.33 KG/M2 | HEART RATE: 80 BPM | HEIGHT: 65 IN

## 2023-01-03 DIAGNOSIS — I25.708 CORONARY ARTERY DISEASE OF BYPASS GRAFT OF NATIVE HEART WITH STABLE ANGINA PECTORIS: ICD-10-CM

## 2023-01-03 DIAGNOSIS — Z95.3 STATUS POST AORTIC VALVE REPLACEMENT WITH BIOPROSTHETIC VALVE: Chronic | ICD-10-CM

## 2023-01-03 DIAGNOSIS — Z72.0 TOBACCO USE: ICD-10-CM

## 2023-01-03 DIAGNOSIS — Z86.73 HISTORY OF CVA (CEREBROVASCULAR ACCIDENT): ICD-10-CM

## 2023-01-03 DIAGNOSIS — I10 ESSENTIAL HYPERTENSION: Primary | ICD-10-CM

## 2023-01-03 DIAGNOSIS — I77.810 AORTIC ROOT DILATION: ICD-10-CM

## 2023-01-03 DIAGNOSIS — Z98.890 HISTORY OF RIGHT-SIDED CAROTID ENDARTERECTOMY: ICD-10-CM

## 2023-01-03 DIAGNOSIS — I73.9 PVD (PERIPHERAL VASCULAR DISEASE) WITH CLAUDICATION: ICD-10-CM

## 2023-01-03 DIAGNOSIS — E78.5 HYPERLIPIDEMIA LDL GOAL <70: Chronic | ICD-10-CM

## 2023-01-03 PROCEDURE — 1160F RVW MEDS BY RX/DR IN RCRD: CPT | Performed by: NURSE PRACTITIONER

## 2023-01-03 PROCEDURE — 99214 OFFICE O/P EST MOD 30 MIN: CPT | Performed by: NURSE PRACTITIONER

## 2023-01-03 PROCEDURE — 1159F MED LIST DOCD IN RCRD: CPT | Performed by: NURSE PRACTITIONER

## 2023-01-03 RX ORDER — AMLODIPINE BESYLATE 5 MG/1
10 TABLET ORAL DAILY
Qty: 30 TABLET | Refills: 11 | Status: SHIPPED | OUTPATIENT
Start: 2023-01-03

## 2023-01-03 NOTE — PROGRESS NOTES
Subjective:     Encounter Date: 01/03/2023      Patient ID: Sharad Ryder is a 62 y.o. male with coronary artery disease s/p CABG (LIMA to LAD, SVG to PDA and SVG to diagonal branch with TMR and left atrial appendage exclusion with atricure clip device 2/8/2018 per Dr. Cj Robin), aortic stenosis s/p AVR at time of CABG, hypertension, hyperlipidemia, PVD, carotid artery disease and tobacco abuse.    Chief Complaint: 6 week follow up  Hypertension  This is a chronic problem. The current episode started more than 1 year ago. The problem is uncontrolled. Associated symptoms include chest pain and malaise/fatigue. Pertinent negatives include no anxiety, neck pain, orthopnea, palpitations, peripheral edema, PND or shortness of breath.     Patient presents today for management of coronary artery disease. At LOV his BP was elevated and Norvasc was added to regimen.   Today he reports that he has been taking the norvasc as directed. He reports that he hasnt checked his blood pressure since last office visit. He denies any worsening chest pain; he has chronic chest pain and reports two episodes that were brief and he took some baby aspirin and the pain relieved. He reports that he has chronic dyspnea on exertion that is unchanged. He denies any heart racing or palpitations. He denies any leg swelling, orthopnea or PND. He denies any bleeding issues. He reports that he has been going to pain management for chronic back pain. Patient follows with KIRBY Lockett as PCP.     The following portions of the patient's history were reviewed and updated as appropriate: allergies, current medications, past family history, past medical history, past social history, past surgical history and problem list.    No Known Allergies    Current Outpatient Medications:   •  amLODIPine (NORVASC) 5 MG tablet, Take 2 tablets by mouth Daily., Disp: 30 tablet, Rfl: 11  •  aspirin 81 MG tablet, Take 1 tablet by mouth Daily., Disp: 30  tablet, Rfl: 2  •  busPIRone (BUSPAR) 5 MG tablet, Take 1 tablet by mouth 2 (Two) Times a Day., Disp: , Rfl:   •  citalopram (CeleXA) 20 MG tablet, Take 20 mg by mouth Daily., Disp: , Rfl:   •  cloNIDine (CATAPRES) 0.1 MG tablet, TAKE 1 TABLET BY MOUTH TWICE DAILY AS NEEDED FOR HIGH BLOOD PRESSURE >150/90, Disp: 60 tablet, Rfl: 0  •  clopidogrel (PLAVIX) 75 MG tablet, Take 1 tablet by mouth Daily., Disp: 30 tablet, Rfl: 2  •  gabapentin (NEURONTIN) 100 MG capsule, Take 100 mg by mouth 3 (Three) Times a Day., Disp: , Rfl:   •  isosorbide mononitrate (IMDUR) 120 MG 24 hr tablet, Take 1 tablet by mouth Daily., Disp: 90 tablet, Rfl: 3  •  metoprolol succinate XL (TOPROL-XL) 50 MG 24 hr tablet, Take 1 tablet by mouth Daily., Disp: 30 tablet, Rfl: 0  •  nicotine (Nicotine Step 1) 21 MG/24HR patch, Place 1 patch on the skin as directed by provider Daily., Disp: 30 patch, Rfl: 3  •  nitroglycerin (NITROSTAT) 0.4 MG SL tablet, Place 0.4 mg under the tongue Every 5 (Five) Minutes As Needed for Chest Pain. Take no more than 3 doses in 15 minutes., Disp: , Rfl:   •  rosuvastatin (CRESTOR) 40 MG tablet, Take 1 tablet by mouth Daily., Disp: 30 tablet, Rfl: 0  Past Medical History:   Diagnosis Date   • Aneurysm ([facility])    • Anxiety    • Arthritis    • Carotid artery disease ([facility])    • Carotid stenosis, right 2/1/2018    Post right carotid endarterectomy   • COPD (chronic obstructive pulmonary disease) ([facility])    • Heart murmur    • History of right-sided carotid endarterectomy 2/16/2022   • Hyperlipidemia LDL goal <70 12/14/2017   • Left frontal lobe, right thalamus and right occipital CVA (cerebrovascular accident) ([facility]) 1/27/2022   • LVH (left ventricular hypertrophy) 2/16/2022   • Pneumonia    • Primary hypertension 12/14/2017   • S/P CABG x 3 2/16/2022    LIMA to LAD, SVG to PDA and SVG to diagonal branch with TMR and left atrial appendage exclusion with atricure clip device 2/8/2018 per Dr. Cj Robin at [facility]  [place]    • Severe aortic valve stenosis 12/14/2017   • Status post aortic valve replacement with bioprosthetic valve 2/16/2022    Graciela Juarez bovine pericardial 19 mm valve 2/8/2018 per Dr. Cj Robin at [facility]   • Tobacco use 12/14/2017   • Wears glasses      Social History     Socioeconomic History   • Marital status:    Tobacco Use   • Smoking status: Some Days     Packs/day: 1.50     Years: 35.00     Pack years: 52.50     Types: Cigarettes   • Smokeless tobacco: Current     Types: Snuff   • Tobacco comments:     Would like to quit.   Vaping Use   • Vaping Use: Never used   Substance and Sexual Activity   • Alcohol use: No   • Drug use: No   • Sexual activity: Defer       Review of Systems   Constitutional: Positive for malaise/fatigue.   HENT: Negative for nosebleeds.    Cardiovascular: Positive for chest pain. Negative for dyspnea on exertion (chronic and unchanged), irregular heartbeat, leg swelling, near-syncope, orthopnea, palpitations and paroxysmal nocturnal dyspnea.   Respiratory: Negative for shortness of breath.    Hematologic/Lymphatic: Bruises/bleeds easily.   Musculoskeletal: Negative for neck pain.   Genitourinary: Negative for hematuria.   Neurological: Negative for dizziness and weakness.   All other systems reviewed and are negative.         Objective:     Vitals reviewed.   Constitutional:       General: Not in acute distress.     Appearance: Normal appearance. Well-developed.   Eyes:      Pupils: Pupils are equal, round, and reactive to light.   HENT:      Head: Normocephalic and atraumatic.      Nose: Nose normal.   Neck:      Vascular: No carotid bruit.   Pulmonary:      Effort: Pulmonary effort is normal. No respiratory distress.      Breath sounds: Normal breath sounds. No wheezing. No rales.   Cardiovascular:      Normal rate. Regular rhythm.      Murmurs: There is a grade 2/6 systolic murmur.   Edema:     Peripheral edema absent.   Abdominal:       General: There is no distension.      Palpations: Abdomen is soft.   Musculoskeletal: Normal range of motion.      Cervical back: Normal range of motion and neck supple. Skin:     General: Skin is warm.      Findings: No erythema or rash.   Neurological:      General: No focal deficit present.      Mental Status: Alert and oriented to person, place, and time.   Psychiatric:         Attention and Perception: Attention normal.         Mood and Affect: Mood normal.         Speech: Speech normal.         Behavior: Behavior normal.         Thought Content: Thought content normal.         Judgment: Judgment normal.         /77 (BP Location: Left arm, Patient Position: Sitting, Cuff Size: Large Adult)   Pulse 80   Ht 165.1 cm (65\")   Wt 55.3 kg (122 lb)   BMI 20.30 kg/m²     Procedures    Lab Review:     Results for orders placed during the hospital encounter of 01/27/22    Adult Transthoracic Echo Complete W/ Cont if Necessary Per Protocol (With Agitated Saline)    Interpretation Summary  · Left ventricular systolic function is normal. Left ventricular ejection fraction appears to be 56 - 60%.  · Left ventricular wall thickness is consistent with mild concentric hypertrophy.  · Normal right ventricular cavity size and systolic function noted.  · No evidence of a patent foramen ovale.  · There is a bioprosthetic aortic valve present with moderately elevated gradients.    Holter monitor (1/28/2022):  Conclusion:      Baseline rhythm is sinus.  No significant ectopy   2 runs of nonsustained ventricular tachycardia longest 4 beats at a maximum rate of 118 bpm at 6:53 AM  6 runs of supraventricular tachycardia longest and fastest 10 beats at a maximum rate of 179 bpm and average rate of 144 bpm at 12:36 PM     Lab Results   Component Value Date    CHOL 194 01/27/2022    TRIG 172 (H) 01/27/2022    HDL 40 01/27/2022     (H) 01/27/2022     I have personally reviewed holter monitor, echo, labs and past office  notes prior to patients visit  Assessment:          Diagnosis Plan   1. Essential hypertension  amLODIPine (NORVASC) 5 MG tablet      2. Status post aortic valve replacement with bioprosthetic valve        3. Coronary artery disease of bypass graft of native heart with stable angina pectoris (HCC)        4. History of right-sided carotid endarterectomy        5. PVD (peripheral vascular disease) with claudication (HCC)        6. Hyperlipidemia LDL goal <70        7. Aortic root dilation (HCC)        8. Tobacco use        9. History of CVA (cerebrovascular accident)               Plan:       1. Hypertension: BP remains elevated in office today. Increase Norvasc to 10 mg daily. Discussed importance of blood pressure control with patient. Recommend to monitor at home routinely and if consistently >140/90 notify office.     2. Aortic stenosis: s/p AVR with bioprosthetic valve 2/8/2018 per Dr Robin. Echo done 1/27/2022. Mean gradient 26mmHg. Recommend to recheck echo at follow up    3. CAD: s/p LIMA to LAD, SVG to PDA and SVG to diagonal branch with TMR and left atrial appendage exclusion with atricure clip device 2/8/2018 per Dr. Cj Robin. Patient reports chronic stable angina with no change. Continue aspirin, plavix, rosuvastatin, metoprolol and imdur.     4. Carotid artery disease: s/p endarterectomy and on chronic DAPT. Follows with Dr Salgado    5. PVD: Follows with Dr Salgado for left common iliac stenosis    6. Hyperlipidemia: Lipid panel from 1/2022 showed poorly controlled cholesterol. Rosuvastatin was increased to 40 mg. Will order repeat at follow up    7. Aortic dilation: most recent was 4.1 cm. Following with vascular    8. Tobacco abuse: Sharad ANSARI Ryder  reports that he has been smoking cigarettes. He has a 52.50 pack-year smoking history. His smokeless tobacco use includes snuff.. I have educated him on the risk of diseases from using tobacco products such as cancer, COPD and heart disease. I advised  him to quit and he is not willing to quit. I spent 3  minutes counseling the patient.    9: Hx of stroke    I spent 45 minutes caring for Sharad on this date of service. This time includes time spent by me in the following activities: preparing for the visit, reviewing tests, obtaining and/or reviewing a separately obtained history, performing a medically appropriate examination and/or evaluation, counseling and educating the patient/family/caregiver, ordering medications, tests, or procedures, referring and communicating with other health care professionals and documenting information in the medical record    Patient is to follow up in 6 weeks or sooner if needed

## 2023-01-17 DIAGNOSIS — I10 ESSENTIAL HYPERTENSION: ICD-10-CM

## 2023-01-17 RX ORDER — AMLODIPINE BESYLATE 5 MG/1
10 TABLET ORAL DAILY
Qty: 30 TABLET | Refills: 0 | OUTPATIENT
Start: 2023-01-17

## 2023-02-14 ENCOUNTER — OFFICE VISIT (OUTPATIENT)
Dept: CARDIOLOGY | Facility: CLINIC | Age: 63
End: 2023-02-14
Payer: MEDICAID

## 2023-02-14 VITALS
HEART RATE: 71 BPM | WEIGHT: 120 LBS | HEIGHT: 65 IN | SYSTOLIC BLOOD PRESSURE: 124 MMHG | DIASTOLIC BLOOD PRESSURE: 62 MMHG | BODY MASS INDEX: 19.99 KG/M2

## 2023-02-14 DIAGNOSIS — E78.5 HYPERLIPIDEMIA LDL GOAL <70: ICD-10-CM

## 2023-02-14 DIAGNOSIS — I77.810 AORTIC ROOT DILATION: ICD-10-CM

## 2023-02-14 DIAGNOSIS — I10 ESSENTIAL HYPERTENSION: Primary | ICD-10-CM

## 2023-02-14 DIAGNOSIS — Z95.3 STATUS POST AORTIC VALVE REPLACEMENT WITH BIOPROSTHETIC VALVE: ICD-10-CM

## 2023-02-14 DIAGNOSIS — Z72.0 TOBACCO USE: ICD-10-CM

## 2023-02-14 DIAGNOSIS — Z86.73 HISTORY OF CVA (CEREBROVASCULAR ACCIDENT): ICD-10-CM

## 2023-02-14 DIAGNOSIS — I73.9 PVD (PERIPHERAL VASCULAR DISEASE) WITH CLAUDICATION: ICD-10-CM

## 2023-02-14 DIAGNOSIS — I25.708 CORONARY ARTERY DISEASE OF BYPASS GRAFT OF NATIVE HEART WITH STABLE ANGINA PECTORIS: ICD-10-CM

## 2023-02-14 DIAGNOSIS — Z98.890 HISTORY OF RIGHT-SIDED CAROTID ENDARTERECTOMY: ICD-10-CM

## 2023-02-14 PROCEDURE — 99214 OFFICE O/P EST MOD 30 MIN: CPT | Performed by: NURSE PRACTITIONER

## 2023-02-14 RX ORDER — HYDROCODONE BITARTRATE AND ACETAMINOPHEN 7.5; 325 MG/1; MG/1
1 TABLET ORAL EVERY 6 HOURS PRN
COMMUNITY

## 2023-02-14 NOTE — PROGRESS NOTES
Subjective:     Encounter Date: 02/14/2023      Patient ID: Sharad Ryder is a 62 y.o. male with coronary artery disease s/p CABG (LIMA to LAD, SVG to PDA and SVG to diagonal branch with TMR and left atrial appendage exclusion with atricure clip device 2/8/2018 per Dr. Cj Robin), aortic stenosis s/p AVR at time of CABG, hypertension, hyperlipidemia, PVD, carotid artery disease and tobacco abuse.    Chief Complaint: follow up  Hypertension  This is a chronic problem. The current episode started more than 1 year ago. The problem is uncontrolled. Associated symptoms include chest pain and malaise/fatigue. Pertinent negatives include no anxiety, neck pain, orthopnea, palpitations, peripheral edema, PND or shortness of breath.     Patient presents today for management of coronary artery disease. At LOV his BP was elevated and Norvasc was increased to 10 mg.    Today he reports that he has been doing ok. He reports that he had went to pain management and his blood pressure was elevated recently. He reports some rare intermittent chest pain. He states that he has taken aspirin and pain is relieved.  He reports that he has chronic dyspnea on exertion that is unchanged. He reports that just a little activity wears him out. He is having a lot of back pain and is ambulating with a cane. He reports that he is more fatigued over the past year or so. He denies any heart racing or palpitations. He denies any leg swelling, orthopnea or PND. He denies any bleeding issues. He reports that he has been going to pain management for chronic back pain. Patient follows with KIRBY Lockett as PCP.     The following portions of the patient's history were reviewed and updated as appropriate: allergies, current medications, past family history, past medical history, past social history, past surgical history and problem list.    No Known Allergies    Current Outpatient Medications:   •  amLODIPine (NORVASC) 5 MG tablet, Take 2  tablets by mouth Daily., Disp: 30 tablet, Rfl: 11  •  aspirin 81 MG tablet, Take 1 tablet by mouth Daily., Disp: 30 tablet, Rfl: 2  •  busPIRone (BUSPAR) 5 MG tablet, Take 1 tablet by mouth 2 (Two) Times a Day., Disp: , Rfl:   •  citalopram (CeleXA) 20 MG tablet, Take 20 mg by mouth Daily., Disp: , Rfl:   •  cloNIDine (CATAPRES) 0.1 MG tablet, TAKE 1 TABLET BY MOUTH TWICE DAILY AS NEEDED FOR HIGH BLOOD PRESSURE >150/90, Disp: 60 tablet, Rfl: 0  •  clopidogrel (PLAVIX) 75 MG tablet, Take 1 tablet by mouth Daily., Disp: 30 tablet, Rfl: 2  •  gabapentin (NEURONTIN) 100 MG capsule, Take 100 mg by mouth 3 (Three) Times a Day., Disp: , Rfl:   •  HYDROcodone-acetaminophen (NORCO) 7.5-325 MG per tablet, Take 1 tablet by mouth Every 6 (Six) Hours As Needed for Moderate Pain., Disp: , Rfl:   •  isosorbide mononitrate (IMDUR) 120 MG 24 hr tablet, Take 1 tablet by mouth Daily., Disp: 90 tablet, Rfl: 3  •  metoprolol succinate XL (TOPROL-XL) 50 MG 24 hr tablet, Take 1 tablet by mouth Daily., Disp: 30 tablet, Rfl: 0  •  nicotine (Nicotine Step 1) 21 MG/24HR patch, Place 1 patch on the skin as directed by provider Daily., Disp: 30 patch, Rfl: 3  •  nitroglycerin (NITROSTAT) 0.4 MG SL tablet, Place 0.4 mg under the tongue Every 5 (Five) Minutes As Needed for Chest Pain. Take no more than 3 doses in 15 minutes., Disp: , Rfl:   •  rosuvastatin (CRESTOR) 40 MG tablet, Take 1 tablet by mouth Daily., Disp: 30 tablet, Rfl: 0  Past Medical History:   Diagnosis Date   • Aneurysm (HCC)    • Anxiety    • Arthritis    • Carotid artery disease (HCC)    • Carotid stenosis, right 2/1/2018    Post right carotid endarterectomy   • COPD (chronic obstructive pulmonary disease) (HCC)    • Heart murmur    • History of right-sided carotid endarterectomy 2/16/2022   • Hyperlipidemia LDL goal <70 12/14/2017   • Left frontal lobe, right thalamus and right occipital CVA (cerebrovascular accident) ([facility]) 1/27/2022   • LVH (left ventricular hypertrophy)  2/16/2022   • Pneumonia    • Primary hypertension 12/14/2017   • S/P CABG x 3 2/16/2022    LIMA to LAD, SVG to PDA and SVG to diagonal branch with TMR and left atrial appendage exclusion with atricure clip device 2/8/2018 per Dr. Cj Robin at [facility]    • Severe aortic valve stenosis 12/14/2017   • Status post aortic valve replacement with bioprosthetic valve 2/16/2022    Graciela Juarez bovine pericardial 19 mm valve 2/8/2018 per Dr. Cj Robin at [facility]   • Tobacco use 12/14/2017   • Wears glasses      Social History     Socioeconomic History   • Marital status:    Tobacco Use   • Smoking status: Some Days     Packs/day: 1.50     Years: 35.00     Pack years: 52.50     Types: Cigarettes   • Smokeless tobacco: Current     Types: Snuff   • Tobacco comments:     Would like to quit.   Vaping Use   • Vaping Use: Never used   Substance and Sexual Activity   • Alcohol use: No   • Drug use: No   • Sexual activity: Defer       Review of Systems   Constitutional: Positive for malaise/fatigue.   HENT: Negative for nosebleeds.    Cardiovascular: Positive for chest pain. Negative for dyspnea on exertion (chronic and unchanged), irregular heartbeat, leg swelling, near-syncope, orthopnea, palpitations and paroxysmal nocturnal dyspnea.   Respiratory: Negative for shortness of breath.    Hematologic/Lymphatic: Bruises/bleeds easily.   Musculoskeletal: Negative for neck pain.   Genitourinary: Negative for hematuria.   Neurological: Negative for dizziness and weakness.   All other systems reviewed and are negative.         Objective:     Vitals reviewed.   Constitutional:       General: Not in acute distress.     Appearance: Normal appearance. Well-developed.   Eyes:      Pupils: Pupils are equal, round, and reactive to light.   HENT:      Head: Normocephalic and atraumatic.      Nose: Nose normal.   Neck:      Vascular: No carotid bruit.   Pulmonary:      Effort: Pulmonary effort is  "normal. No respiratory distress.      Breath sounds: Normal breath sounds. No wheezing. No rales.   Cardiovascular:      Normal rate. Regular rhythm.      Murmurs: There is a grade 2/6 systolic murmur.   Edema:     Peripheral edema absent.   Abdominal:      General: There is no distension.      Palpations: Abdomen is soft.   Musculoskeletal: Normal range of motion.      Cervical back: Normal range of motion and neck supple. Skin:     General: Skin is warm.      Findings: No erythema or rash.   Neurological:      General: No focal deficit present.      Mental Status: Alert and oriented to person, place, and time.   Psychiatric:         Attention and Perception: Attention normal.         Mood and Affect: Mood normal.         Speech: Speech normal.         Behavior: Behavior normal.         Thought Content: Thought content normal.         Judgment: Judgment normal.         /62   Pulse 71   Ht 165.1 cm (65\")   Wt 54.4 kg (120 lb)   BMI 19.97 kg/m²     Procedures    Lab Review:     Results for orders placed during the hospital encounter of 01/27/22    Adult Transthoracic Echo Complete W/ Cont if Necessary Per Protocol (With Agitated Saline)    Interpretation Summary  · Left ventricular systolic function is normal. Left ventricular ejection fraction appears to be 56 - 60%.  · Left ventricular wall thickness is consistent with mild concentric hypertrophy.  · Normal right ventricular cavity size and systolic function noted.  · No evidence of a patent foramen ovale.  · There is a bioprosthetic aortic valve present with moderately elevated gradients.    Holter monitor (1/28/2022):  Conclusion:      Baseline rhythm is sinus.  No significant ectopy   2 runs of nonsustained ventricular tachycardia longest 4 beats at a maximum rate of 118 bpm at 6:53 AM  6 runs of supraventricular tachycardia longest and fastest 10 beats at a maximum rate of 179 bpm and average rate of 144 bpm at 12:36 PM     Lab Results   Component " Value Date    CHOL 194 01/27/2022    TRIG 172 (H) 01/27/2022    HDL 40 01/27/2022     (H) 01/27/2022     I have personally reviewed holter monitor, echo, labs and past office notes prior to patients visit  Assessment:          Diagnosis Plan   1. Essential hypertension        2. Status post aortic valve replacement with bioprosthetic valve  Adult Transthoracic Echo Complete w/ Color, Spectral and Contrast if necessary per protocol      3. Coronary artery disease of bypass graft of native heart with stable angina pectoris (HCC)        4. History of right-sided carotid endarterectomy        5. PVD (peripheral vascular disease) with claudication (HCC)        6. Hyperlipidemia LDL goal <70        7. Aortic root dilation (HCC)        8. Tobacco use        9. History of CVA (cerebrovascular accident)               Plan:       1. Hypertension: BP remains controlled in office today. Continue current medications. Recommend to monitor at home routinely and if consistently >140/90 notify office.     2. Aortic stenosis: s/p AVR with bioprosthetic valve 2/8/2018 per Dr Robin. Echo done 1/27/2022. Mean gradient 26mmHg. Recheck echo ordered today     3. CAD: s/p LIMA to LAD, SVG to PDA and SVG to diagonal branch with TMR and left atrial appendage exclusion with atricure clip device 2/8/2018 per Dr. Cj Robin. Patient reports chronic stable angina with no change. Continue aspirin, plavix, rosuvastatin, metoprolol and imdur.     4. Carotid artery disease: s/p endarterectomy and on chronic DAPT. Follows with Dr Salgado    5. PVD: Follows with Dr Salgado for left common iliac stenosis    6. Hyperlipidemia: Lipid panel from 1/2022 showed poorly controlled cholesterol. Rosuvastatin was increased to 40 mg. Will obtain most recent labs from PCP    7. Aortic dilation: most recent was 4.1 cm. Following with vascular    8. Tobacco abuse: Sharad COTY Ryder  reports that he has been smoking cigarettes. He has a 52.50 pack-year  smoking history. His smokeless tobacco use includes snuff.. I have educated him on the risk of diseases from using tobacco products such as cancer, COPD and heart disease. I advised him to quit and he is not willing to quit. I spent 3  minutes counseling the patient.    9: Hx of stroke    I attest that all portions of this note reviewed and all information has been updated by myself to reflect the patient's current status.      I spent 35 minutes caring for Sharad on this date of service. This time includes time spent by me in the following activities:preparing for the visit, reviewing tests, obtaining and/or reviewing a separately obtained history, performing a medically appropriate examination and/or evaluation , counseling and educating the patient/family/caregiver and documenting information in the medical record    Patient is to follow up in 6 weeks or sooner if needed

## 2023-05-25 RX ORDER — CLONIDINE HYDROCHLORIDE 0.1 MG/1
TABLET ORAL
Qty: 60 TABLET | Refills: 0 | Status: SHIPPED | OUTPATIENT
Start: 2023-05-25

## 2023-08-30 ENCOUNTER — TELEPHONE (OUTPATIENT)
Dept: VASCULAR SURGERY | Facility: CLINIC | Age: 63
End: 2023-08-30
Payer: MEDICAID

## 2023-08-30 NOTE — TELEPHONE ENCOUNTER
Unable to confirm appointment with Naomi left vm informing patient that Naomi will be in surgery and will be seeing Lianet instead. Asked for call back if they would rather  reschedule to see Naomi.

## 2023-08-31 ENCOUNTER — HOSPITAL ENCOUNTER (OUTPATIENT)
Dept: ULTRASOUND IMAGING | Facility: HOSPITAL | Age: 63
Discharge: HOME | End: 2023-08-31
Payer: MEDICAID

## 2023-08-31 ENCOUNTER — OFFICE VISIT (OUTPATIENT)
Dept: VASCULAR SURGERY | Facility: CLINIC | Age: 63
End: 2023-08-31
Payer: MEDICAID

## 2023-08-31 ENCOUNTER — HOSPITAL ENCOUNTER (OUTPATIENT)
Dept: CT IMAGING | Facility: HOSPITAL | Age: 63
Discharge: HOME | End: 2023-08-31
Payer: MEDICAID

## 2023-08-31 VITALS
HEART RATE: 68 BPM | OXYGEN SATURATION: 98 % | HEIGHT: 64 IN | SYSTOLIC BLOOD PRESSURE: 104 MMHG | WEIGHT: 115 LBS | DIASTOLIC BLOOD PRESSURE: 58 MMHG | BODY MASS INDEX: 19.63 KG/M2

## 2023-08-31 DIAGNOSIS — I71.40 ABDOMINAL AORTIC ANEURYSM (AAA) WITHOUT RUPTURE: ICD-10-CM

## 2023-08-31 DIAGNOSIS — E78.5 HYPERLIPIDEMIA LDL GOAL <70: Chronic | ICD-10-CM

## 2023-08-31 DIAGNOSIS — Z51.81 ENCOUNTER FOR MONITORING ANTIPLATELET THERAPY: ICD-10-CM

## 2023-08-31 DIAGNOSIS — I73.9 PAD (PERIPHERAL ARTERY DISEASE): Primary | ICD-10-CM

## 2023-08-31 DIAGNOSIS — I74.09 AORTOILIAC OCCLUSIVE DISEASE: ICD-10-CM

## 2023-08-31 DIAGNOSIS — Z79.02 ENCOUNTER FOR MONITORING ANTIPLATELET THERAPY: ICD-10-CM

## 2023-08-31 DIAGNOSIS — Z72.0 TOBACCO USE: ICD-10-CM

## 2023-08-31 DIAGNOSIS — I65.23 BILATERAL CAROTID ARTERY STENOSIS: ICD-10-CM

## 2023-08-31 DIAGNOSIS — I73.9 PAD (PERIPHERAL ARTERY DISEASE): ICD-10-CM

## 2023-08-31 DIAGNOSIS — I10 PRIMARY HYPERTENSION: Chronic | ICD-10-CM

## 2023-08-31 DIAGNOSIS — Z01.818 PREOP TESTING: ICD-10-CM

## 2023-08-31 LAB — CREAT BLDA-MCNC: 0.6 MG/DL (ref 0.6–1.3)

## 2023-08-31 PROCEDURE — 93923 UPR/LXTR ART STDY 3+ LVLS: CPT

## 2023-08-31 PROCEDURE — 93880 EXTRACRANIAL BILAT STUDY: CPT

## 2023-08-31 PROCEDURE — 25510000001 IOPAMIDOL PER 1 ML: Performed by: SURGERY

## 2023-08-31 PROCEDURE — 82565 ASSAY OF CREATININE: CPT

## 2023-08-31 PROCEDURE — 74174 CTA ABD&PLVS W/CONTRAST: CPT

## 2023-08-31 RX ORDER — TAMSULOSIN HYDROCHLORIDE 0.4 MG/1
1 CAPSULE ORAL DAILY
COMMUNITY
Start: 2023-08-01

## 2023-08-31 RX ADMIN — IOPAMIDOL 100 ML: 755 INJECTION, SOLUTION INTRAVENOUS at 08:33

## 2023-08-31 NOTE — PROGRESS NOTES
"08/31/2023       Nila Alexander, APRN  [address] [facility] [address]        Sharad Ryder  1960    Chief Complaint   Patient presents with    Follow-up     1 year follow up w/ testing. Last seen 9/13/22. Patient denies any new or worsening back or abdominal pains but does complain if pain in left leg. Wants [facility] to do surgery if possible.        Dear Nila Alexander, KIRBY:     I had the pleasure of seeing you patient in the office today for follow up.  As you recall, the patient is a 63 y.o. male who we are currently following for carotid stenosis and a small abdominal aortic aneurysm.    He had a previous right carotid endarterectomy on 2/1/2018.  He denies any abdominal pain.  He does have complaints of left leg pain mostly to the left hip and buttocks area.  He is a current daily smoker.  He is maintained on aspirin, Plavix, and Crestor.  He did have noninvasive testing which I did review.      Review of Systems   Constitutional: Negative.    HENT: Negative.     Eyes:  Positive for visual disturbance (Double vision intermittently).   Respiratory:  Positive for shortness of breath (on exertion).    Cardiovascular: Negative.  Negative for chest pain.        Left hip/buttock pain   Gastrointestinal: Negative.    Endocrine: Negative.    Genitourinary: Negative.    Musculoskeletal:  Positive for arthralgias and joint swelling.   Skin: Negative.    Allergic/Immunologic: Negative.    Neurological: Negative.    Hematological: Negative.    Psychiatric/Behavioral: Negative.     All other systems reviewed and are negative.    /58   Pulse 68   Ht 162.6 cm (64\")   Wt 52.2 kg (115 lb)   SpO2 98%   BMI 19.74 kg/m²   Physical Exam  Vitals and nursing note reviewed.   Constitutional:       General: He is not in acute distress.     Appearance: Normal appearance. He is well-developed. He is not diaphoretic.   HENT:      Head: Normocephalic and atraumatic.   Neck:      Vascular: No carotid " bruit or JVD.   Cardiovascular:      Rate and Rhythm: Normal rate and regular rhythm.      Pulses:           Femoral pulses are 2+ on the right side and 2+ on the left side.       Popliteal pulses are 2+ on the right side.        Dorsalis pedis pulses are detected w/ Doppler on the left side.        Posterior tibial pulses are detected w/ Doppler on the left side.      Heart sounds: S1 normal and S2 normal. Murmur heard.     No friction rub. No gallop.   Pulmonary:      Effort: Pulmonary effort is normal.      Breath sounds: Normal breath sounds.   Abdominal:      General: Bowel sounds are normal. There is no abdominal bruit.      Palpations: Abdomen is soft.      Tenderness: There is no abdominal tenderness.   Musculoskeletal:         General: Normal range of motion.   Skin:     General: Skin is warm and dry.   Neurological:      Mental Status: He is alert and oriented to person, place, and time.      Cranial Nerves: No cranial nerve deficit.   Psychiatric:         Mood and Affect: Mood normal.         Behavior: Behavior normal.         Thought Content: Thought content normal.         Judgment: Judgment normal.       DIAGNOSTIC DATA:  CT Angiogram Abdomen Pelvis    Result Date: 8/31/2023  Narrative: EXAMINATION: CT ANGIOGRAM ABDOMEN PELVIS-   8/31/2023 8:08 AM CDT  HISTORY: Aortic aneurysm (AAA), surveillance; I71.40-Abdominal aortic aneurysm, without rupture, unspecified  In order to have a CT radiation dose as low as reasonably achievable Automated Exposure Control was utilized for adjustment of the mA and/or KV according to patient size.  DLP in mGycm= 216.  CT angiogram abdomen/pelvis without and with IV contrast. CT angiography protocol. CT imaging with bolus IV contrast injection. Under concurrent supervision axial, sagittal, coronal, and MIP data sets were constructed on an independent work station.  Comparison is made with 09/13/2022.  Diffuse arterial calcification. Infrarenal abdominal aortic aneurysm.  Aneurysmal dilation begins 14 mm below the renal artery origins. The maximum diameter of the aneurysm is 43 x 45 mm. The aneurysm extends over a length of 57 mm. Distal to the aneurysm the aorta resumes a diameter of 19 mm over a length of 18 mm. The common iliac arteries are tortuous though symmetric and nonaneurysmal.  Within the aneurysm neck there is extensive irregular calcified plaque and a calcified dissection flap which is approximately 20 mm long.  Normally patent celiac artery and SMA. Normally patent right renal artery. Moderate stenosis at the origin of the left renal artery. An inferior mesenteric artery is not visualized.  Normal heart size. Hyperexpanded lung bases with chronic interstitial disease.  Normal liver and gallbladder. Normal pancreas and spleen. Normal and symmetric adrenal glands and kidneys.  No bowel dilation. No pelvic mass or fluid.  Prominent degenerative disc, endplate, and facet disease throughout the lumbar spine.  Summary: 1. AAA measuring approximately 43 x 45 mm. 2. No significant change from one year ago.              This report was finalized on 08/31/2023 09:04 by Dr. Ady Reaves MD.    US Carotid Bilateral    Result Date: 8/31/2023  Narrative: History: Carotid occlusive disease      Impression: Impression: 1. There is less than 50% stenosis of the right internal carotid artery. 2. There is less than 50% stenosis of the left internal carotid artery. 3. Antegrade flow is demonstrated in bilateral vertebral arteries.  Comments: Bilateral carotid vertebral arterial duplex scan was performed.  Grayscale imaging shows intimal thickening and calcified elements at the carotid bifurcation. The right internal carotid artery peak systolic velocity is 111 cm/sec. The end-diastolic velocity is 36.3 cm/sec. The right ICA/CCA ratio is approximately 1.0 . These findings correlate with less than 50% stenosis of the right internal carotid artery.  Grayscale imaging shows intimal thickening  and calcified elements at the carotid bifurcation. The left internal carotid artery peak systolic velocity is 112.6 cm/sec. The end-diastolic velocity is 34.9 cm/sec. The left ICA/CCA ratio is approximately 1.4 . These findings correlate with less than 50% stenosis of the left internal carotid artery.  Antegrade flow is demonstrated in bilateral vertebral arteries.  This report was finalized on 08/31/2023 16:12 by Dr. Jl Salgado MD.    US Ankle / Brachial Indices Extremity Complete    Result Date: 8/31/2023  Narrative:  History: PAD  Comments: Bilateral lower extremity arterial with multi-level pulse volume recordings and segmental pressures were performed at rest and stress.  The right ankle/brachial index is 1.04. The waveforms are triphasic without dampening.These findings are consistent with no significant arterial insufficiency of the right lower extremity at rest.  The left ankle/brachial index is 0.57. The waveforms are biphasic without dampening. These findings are consistent with moderate arterial insufficiency of the left lower extremity at rest.      Impression: Impression: 1. No significant arterial insufficiency of the right lower extremity at rest. 2. Moderate arterial insufficiency of the left lower extremity at rest.   This report was finalized on 08/31/2023 16:09 by Dr. Jl Salgado MD.       Patient Active Problem List   Diagnosis    Hyperlipidemia LDL goal <70    Primary hypertension    COPD (chronic obstructive pulmonary disease)    Transient ischemic attack (TIA)    Allergic reaction    Coronary artery disease of bypass graft of native heart with stable angina pectoris    PVC (premature ventricular contraction)    Leukocytosis    History of CVA (cerebrovascular accident)    Right sided weakness resolved    Acute CVA (cerebrovascular accident)    Right-sided carotid artery disease    S/P CABG x 3 2018 Dr Robin     Status post aortic valve replacement with bioprosthetic valve    LVH  (left ventricular hypertrophy)    History of right-sided carotid endarterectomy    Tobacco use    Chronic stable angina    PVD (peripheral vascular disease) with claudication         ICD-10-CM ICD-9-CM   1. PAD (peripheral artery disease)  I73.9 443.9   2. Aortoiliac occlusive disease  I74.09 444.09   3. Preop testing  Z01.818 V72.84   4. Encounter for monitoring antiplatelet therapy  Z51.81 V58.83    Z79.02 V58.63   5. Primary hypertension  I10 401.9   6. Hyperlipidemia LDL goal <70  E78.5 272.4   7. Tobacco use  Z72.0 305.1         PLAN: After thoroughly evaluating Sharad Ryder, I believe the best course of action is to proceed with a left lower extremity angiogram.  I did review his testing showing moderate arterial insufficiency of the left lower extremity and normal to the right.  His carotid duplex showed less than 50% stenosis bilaterally.  I did review his CTA of the abdomen and pelvis with Dr. Salgado as aneurysm is slightly bigger with a calcified dissection flap.  In the neck of the aneurysm there is extensive irregular plaque with moderate stenosis at the origin of the left renal artery.  This time no intervention regarding aneurysm per Dr. Salgdao but we will proceed with left lower extremity angiogram.  Risks of angiogram were discussed.  These include, but are not limited to, bleeding, infection, vessel damage, nerve damage, embolus, and loss of limb.  The patient understands these risks and wishes to proceed with procedure.  We will repeat his CTA of the abdomen pelvis and carotid duplex in 1 year. I did discuss vascular risk factors as they pertain to the progression of vascular disease including controlling his hypertension, hyperlipidemia, and smoking cessation.  His blood pressure stable on his current medications.  He should continue taking his aspirin 81 mg daily, Plavix 75 mg daily and Crestor 40 mg daily in addition to his other medications.  He is a current daily smoker.  We did discuss  smoking cessation in regards to continued risks and worsening vascular disease.  Smoking is the #1 cause of aneurysm formation and rapid growth.  He would like to quit smoking but has just not been able to for his report.  The patient is to continue taking their medications as previously discussed.   This was all discussed in full with complete understanding.  Thank you for allowing me to participate in the care of your patient.  Please do not hesitate to call with any questions or concerns.  We will keep you aware of any further encounters with Sharad Ryder.      Sincerely Yours,      KIRBY Nolan

## 2023-08-31 NOTE — LETTER
September 10, 2023     KIRBY Croft  [address] [facility] [address]    Patient: Sharad Ryder   YOB: 1960   Date of Visit: 8/31/2023     Dear KIRBY Croft:       Thank you for referring Sharad Ryder to me for evaluation. Below are the relevant portions of my assessment and plan of care.    If you have questions, please do not hesitate to call me. I look forward to following Sharad along with you.         Sincerely,        KIRBY Nolan        CC: No Recipients    Lianet Rascon APRN  09/10/23 1625  Sign when Signing Visit  08/31/2023       Nila Alexander APRN  [address] [facility] [address]        Sharad Ryder  1960    Chief Complaint   Patient presents with   • Follow-up     1 year follow up w/ testing. Last seen 9/13/22. Patient denies any new or worsening back or abdominal pains but does complain if pain in left leg. Wants [facility] to do surgery if possible.        Dear Nila Alexander APRN:     I had the pleasure of seeing you patient in the office today for follow up.  As you recall, the patient is a 63 y.o. male who we are currently following for carotid stenosis and a small abdominal aortic aneurysm.    He had a previous right carotid endarterectomy on 2/1/2018.  He denies any abdominal pain.  He does have complaints of left leg pain mostly to the left hip and buttocks area.  He is a current daily smoker.  He is maintained on aspirin, Plavix, and Crestor.  He did have noninvasive testing which I did review.      Review of Systems   Constitutional: Negative.    HENT: Negative.     Eyes:  Positive for visual disturbance (Double vision intermittently).   Respiratory:  Positive for shortness of breath (on exertion).    Cardiovascular: Negative.  Negative for chest pain.        Left hip/buttock pain   Gastrointestinal: Negative.    Endocrine: Negative.    Genitourinary: Negative.    Musculoskeletal:  Positive for arthralgias  "and joint swelling.   Skin: Negative.    Allergic/Immunologic: Negative.    Neurological: Negative.    Hematological: Negative.    Psychiatric/Behavioral: Negative.     All other systems reviewed and are negative.    /58   Pulse 68   Ht 162.6 cm (64\")   Wt 52.2 kg (115 lb)   SpO2 98%   BMI 19.74 kg/m²   Physical Exam  Vitals and nursing note reviewed.   Constitutional:       General: He is not in acute distress.     Appearance: Normal appearance. He is well-developed. He is not diaphoretic.   HENT:      Head: Normocephalic and atraumatic.   Neck:      Vascular: No carotid bruit or JVD.   Cardiovascular:      Rate and Rhythm: Normal rate and regular rhythm.      Pulses:           Femoral pulses are 2+ on the right side and 2+ on the left side.       Popliteal pulses are 2+ on the right side.        Dorsalis pedis pulses are detected w/ Doppler on the left side.        Posterior tibial pulses are detected w/ Doppler on the left side.      Heart sounds: S1 normal and S2 normal. Murmur heard.     No friction rub. No gallop.   Pulmonary:      Effort: Pulmonary effort is normal.      Breath sounds: Normal breath sounds.   Abdominal:      General: Bowel sounds are normal. There is no abdominal bruit.      Palpations: Abdomen is soft.      Tenderness: There is no abdominal tenderness.   Musculoskeletal:         General: Normal range of motion.   Skin:     General: Skin is warm and dry.   Neurological:      Mental Status: He is alert and oriented to person, place, and time.      Cranial Nerves: No cranial nerve deficit.   Psychiatric:         Mood and Affect: Mood normal.         Behavior: Behavior normal.         Thought Content: Thought content normal.         Judgment: Judgment normal.       DIAGNOSTIC DATA:  CT Angiogram Abdomen Pelvis    Result Date: 8/31/2023  Narrative: EXAMINATION: CT ANGIOGRAM ABDOMEN PELVIS-   8/31/2023 8:08 AM CDT  HISTORY: Aortic aneurysm (AAA), surveillance; I71.40-Abdominal aortic " aneurysm, without rupture, unspecified  In order to have a CT radiation dose as low as reasonably achievable Automated Exposure Control was utilized for adjustment of the mA and/or KV according to patient size.  DLP in mGycm= 216.  CT angiogram abdomen/pelvis without and with IV contrast. CT angiography protocol. CT imaging with bolus IV contrast injection. Under concurrent supervision axial, sagittal, coronal, and MIP data sets were constructed on an independent work station.  Comparison is made with 09/13/2022.  Diffuse arterial calcification. Infrarenal abdominal aortic aneurysm. Aneurysmal dilation begins 14 mm below the renal artery origins. The maximum diameter of the aneurysm is 43 x 45 mm. The aneurysm extends over a length of 57 mm. Distal to the aneurysm the aorta resumes a diameter of 19 mm over a length of 18 mm. The common iliac arteries are tortuous though symmetric and nonaneurysmal.  Within the aneurysm neck there is extensive irregular calcified plaque and a calcified dissection flap which is approximately 20 mm long.  Normally patent celiac artery and SMA. Normally patent right renal artery. Moderate stenosis at the origin of the left renal artery. An inferior mesenteric artery is not visualized.  Normal heart size. Hyperexpanded lung bases with chronic interstitial disease.  Normal liver and gallbladder. Normal pancreas and spleen. Normal and symmetric adrenal glands and kidneys.  No bowel dilation. No pelvic mass or fluid.  Prominent degenerative disc, endplate, and facet disease throughout the lumbar spine.  Summary: 1. AAA measuring approximately 43 x 45 mm. 2. No significant change from one year ago.              This report was finalized on 08/31/2023 09:04 by Dr. Ady Reaves MD.    US Carotid Bilateral    Result Date: 8/31/2023  Narrative: History: Carotid occlusive disease      Impression: Impression: 1. There is less than 50% stenosis of the right internal carotid artery. 2. There is  less than 50% stenosis of the left internal carotid artery. 3. Antegrade flow is demonstrated in bilateral vertebral arteries.  Comments: Bilateral carotid vertebral arterial duplex scan was performed.  Grayscale imaging shows intimal thickening and calcified elements at the carotid bifurcation. The right internal carotid artery peak systolic velocity is 111 cm/sec. The end-diastolic velocity is 36.3 cm/sec. The right ICA/CCA ratio is approximately 1.0 . These findings correlate with less than 50% stenosis of the right internal carotid artery.  Grayscale imaging shows intimal thickening and calcified elements at the carotid bifurcation. The left internal carotid artery peak systolic velocity is 112.6 cm/sec. The end-diastolic velocity is 34.9 cm/sec. The left ICA/CCA ratio is approximately 1.4 . These findings correlate with less than 50% stenosis of the left internal carotid artery.  Antegrade flow is demonstrated in bilateral vertebral arteries.  This report was finalized on 08/31/2023 16:12 by Dr. Jl Salgado MD.    US Ankle / Brachial Indices Extremity Complete    Result Date: 8/31/2023  Narrative:  History: PAD  Comments: Bilateral lower extremity arterial with multi-level pulse volume recordings and segmental pressures were performed at rest and stress.  The right ankle/brachial index is 1.04. The waveforms are triphasic without dampening.These findings are consistent with no significant arterial insufficiency of the right lower extremity at rest.  The left ankle/brachial index is 0.57. The waveforms are biphasic without dampening. These findings are consistent with moderate arterial insufficiency of the left lower extremity at rest.      Impression: Impression: 1. No significant arterial insufficiency of the right lower extremity at rest. 2. Moderate arterial insufficiency of the left lower extremity at rest.   This report was finalized on 08/31/2023 16:09 by Dr. Jl Salgado MD.       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 PAD (peripheral artery disease)  I73.9 443.9   2. Aortoiliac occlusive disease  I74.09 444.09   3. Preop testing  Z01.818 V72.84   4. Encounter for monitoring antiplatelet therapy  Z51.81 V58.83    Z79.02 V58.63   5. Primary hypertension  I10 401.9   6. Hyperlipidemia LDL goal <70  E78.5 272.4   7. Tobacco use  Z72.0 305.1         PLAN: After thoroughly evaluating Sharad Ryder, I believe the best course of action is to proceed with a left lower extremity angiogram.  I did review his testing showing moderate arterial insufficiency of the left lower extremity and normal to the right.  His carotid duplex showed less than 50% stenosis bilaterally.  I did review his CTA of the abdomen and pelvis with Dr. Salgado as aneurysm is slightly bigger with a calcified dissection flap.  In the neck of the aneurysm there is extensive irregular plaque with moderate stenosis at the origin of the left renal artery.  This time no intervention regarding aneurysm per Dr. Salgado but we will proceed with left lower extremity angiogram.  Risks of angiogram were discussed.  These include, but are not limited to, bleeding, infection,  vessel damage, nerve damage, embolus, and loss of limb.  The patient understands these risks and wishes to proceed with procedure.  We will repeat his CTA of the abdomen pelvis and carotid duplex in 1 year. I did discuss vascular risk factors as they pertain to the progression of vascular disease including controlling his hypertension, hyperlipidemia, and smoking cessation.  His blood pressure stable on his current medications.  He should continue taking his aspirin 81 mg daily, Plavix 75 mg daily and Crestor 40 mg daily in addition to his other medications.  He is a current daily smoker.  We did discuss smoking cessation in regards to continued risks and worsening vascular disease.  Smoking is the #1 cause of aneurysm formation and rapid growth.  He would like to quit smoking but has just not been able to for his report.  The patient is to continue taking their medications as previously discussed.   This was all discussed in full with complete understanding.  Thank you for allowing me to participate in the care of your patient.  Please do not hesitate to call with any questions or concerns.  We will keep you aware of any further encounters with Sharad Ryder.      Sincerely Yours,      KIRBY Nolan

## 2023-09-11 ENCOUNTER — TELEPHONE (OUTPATIENT)
Dept: VASCULAR SURGERY | Facility: CLINIC | Age: 63
End: 2023-09-11
Payer: MEDICAID

## 2023-09-11 NOTE — TELEPHONE ENCOUNTER
Call placed to patient and notified of CTA on 9/26/2023 at 1000 with nothing to eat or drink 6 hours prior to exam. Patient verbalized understanding.

## 2023-09-12 ENCOUNTER — TELEPHONE (OUTPATIENT)
Dept: VASCULAR SURGERY | Facility: CLINIC | Age: 63
End: 2023-09-12
Payer: MEDICAID

## 2023-09-12 NOTE — TELEPHONE ENCOUNTER
Spoke with patient and informed that Dr. Salgado would like to proceed with arteriogram to affected extremity and that [facility] would notify him for surgery date and time. Patiuent verbalized understanding. Also informed we monitor aneurysm for changes.

## 2023-09-18 ENCOUNTER — TELEPHONE (OUTPATIENT)
Dept: VASCULAR SURGERY | Facility: CLINIC | Age: 63
End: 2023-09-18
Payer: MEDICAID

## 2023-09-18 PROBLEM — Z01.818 PREOP TESTING: Status: ACTIVE | Noted: 2023-09-18

## 2023-09-18 PROBLEM — I74.09 AORTOILIAC OCCLUSIVE DISEASE: Status: ACTIVE | Noted: 2023-09-18

## 2023-09-18 NOTE — TELEPHONE ENCOUNTER
Pt expressed understanding of prework/surgery time and instruction for procedure scheduled 10/27/23 with Dr. Salgado.  Pt can not be moved up as trip to [place] and will not be back until 10/24/23.  Pt is to continue aspirin, plavix, and crestor but will not take morning of procedure.

## 2023-10-13 PROBLEM — I73.9 PAD (PERIPHERAL ARTERY DISEASE): Status: ACTIVE | Noted: 2023-08-31

## 2023-10-25 ENCOUNTER — HOSPITAL ENCOUNTER (OUTPATIENT)
Dept: GENERAL RADIOLOGY | Facility: HOSPITAL | Age: 63
Discharge: HOME | End: 2023-10-25
Payer: MEDICAID

## 2023-10-25 ENCOUNTER — PRE-ADMISSION TESTING (OUTPATIENT)
Dept: PREADMISSION TESTING | Facility: HOSPITAL | Age: 63
End: 2023-10-25
Payer: MEDICAID

## 2023-10-25 VITALS
OXYGEN SATURATION: 93 % | SYSTOLIC BLOOD PRESSURE: 110 MMHG | BODY MASS INDEX: 20.31 KG/M2 | RESPIRATION RATE: 18 BRPM | WEIGHT: 114.64 LBS | HEIGHT: 63 IN | DIASTOLIC BLOOD PRESSURE: 48 MMHG | HEART RATE: 91 BPM

## 2023-10-25 DIAGNOSIS — Z79.02 ENCOUNTER FOR MONITORING ANTIPLATELET THERAPY: ICD-10-CM

## 2023-10-25 DIAGNOSIS — I73.9 PAD (PERIPHERAL ARTERY DISEASE): ICD-10-CM

## 2023-10-25 DIAGNOSIS — Z01.818 PREOP TESTING: ICD-10-CM

## 2023-10-25 DIAGNOSIS — I74.09 AORTOILIAC OCCLUSIVE DISEASE: ICD-10-CM

## 2023-10-25 DIAGNOSIS — Z51.81 ENCOUNTER FOR MONITORING ANTIPLATELET THERAPY: ICD-10-CM

## 2023-10-25 LAB
ANION GAP SERPL CALCULATED.3IONS-SCNC: 10 MMOL/L (ref 5–15)
APTT PPP: 31.2 SECONDS (ref 24.5–36)
BASOPHILS # BLD AUTO: 0.03 10*3/MM3 (ref 0–0.2)
BASOPHILS NFR BLD AUTO: 0.4 % (ref 0–1.5)
BUN SERPL-MCNC: 8 MG/DL (ref 8–23)
BUN/CREAT SERPL: 14.3 (ref 7–25)
CALCIUM SPEC-SCNC: 9.3 MG/DL (ref 8.6–10.5)
CHLORIDE SERPL-SCNC: 100 MMOL/L (ref 98–107)
CO2 SERPL-SCNC: 28 MMOL/L (ref 22–29)
CREAT SERPL-MCNC: 0.56 MG/DL (ref 0.76–1.27)
DEPRECATED RDW RBC AUTO: 42.5 FL (ref 37–54)
EGFRCR SERPLBLD CKD-EPI 2021: 110.8 ML/MIN/1.73
EOSINOPHIL # BLD AUTO: 0.16 10*3/MM3 (ref 0–0.4)
EOSINOPHIL NFR BLD AUTO: 1.9 % (ref 0.3–6.2)
ERYTHROCYTE [DISTWIDTH] IN BLOOD BY AUTOMATED COUNT: 12.3 % (ref 12.3–15.4)
GLUCOSE SERPL-MCNC: 108 MG/DL (ref 65–99)
HCT VFR BLD AUTO: 41.1 % (ref 37.5–51)
HGB BLD-MCNC: 13.4 G/DL (ref 13–17.7)
IMM GRANULOCYTES # BLD AUTO: 0.02 10*3/MM3 (ref 0–0.05)
IMM GRANULOCYTES NFR BLD AUTO: 0.2 % (ref 0–0.5)
INR PPP: 0.98 (ref 0.91–1.09)
LYMPHOCYTES # BLD AUTO: 1.66 10*3/MM3 (ref 0.7–3.1)
LYMPHOCYTES NFR BLD AUTO: 19.9 % (ref 19.6–45.3)
MCH RBC QN AUTO: 30.6 PG (ref 26.6–33)
MCHC RBC AUTO-ENTMCNC: 32.6 G/DL (ref 31.5–35.7)
MCV RBC AUTO: 93.8 FL (ref 79–97)
MONOCYTES # BLD AUTO: 0.62 10*3/MM3 (ref 0.1–0.9)
MONOCYTES NFR BLD AUTO: 7.4 % (ref 5–12)
NEUTROPHILS NFR BLD AUTO: 5.84 10*3/MM3 (ref 1.7–7)
NEUTROPHILS NFR BLD AUTO: 70.2 % (ref 42.7–76)
NRBC BLD AUTO-RTO: 0 /100 WBC (ref 0–0.2)
PLATELET # BLD AUTO: 275 10*3/MM3 (ref 140–450)
PMV BLD AUTO: 9.9 FL (ref 6–12)
POTASSIUM SERPL-SCNC: 3.9 MMOL/L (ref 3.5–5.2)
PROTHROMBIN TIME: 13.1 SECONDS (ref 11.8–14.8)
RBC # BLD AUTO: 4.38 10*6/MM3 (ref 4.14–5.8)
SODIUM SERPL-SCNC: 138 MMOL/L (ref 136–145)
WBC NRBC COR # BLD: 8.33 10*3/MM3 (ref 3.4–10.8)

## 2023-10-25 PROCEDURE — 80048 BASIC METABOLIC PNL TOTAL CA: CPT

## 2023-10-25 PROCEDURE — 85610 PROTHROMBIN TIME: CPT

## 2023-10-25 PROCEDURE — 93010 ELECTROCARDIOGRAM REPORT: CPT | Performed by: STUDENT IN AN ORGANIZED HEALTH CARE EDUCATION/TRAINING PROGRAM

## 2023-10-25 PROCEDURE — 71046 X-RAY EXAM CHEST 2 VIEWS: CPT

## 2023-10-25 PROCEDURE — 85730 THROMBOPLASTIN TIME PARTIAL: CPT

## 2023-10-25 PROCEDURE — 85025 COMPLETE CBC W/AUTO DIFF WBC: CPT

## 2023-10-25 PROCEDURE — 36415 COLL VENOUS BLD VENIPUNCTURE: CPT

## 2023-10-25 PROCEDURE — 93005 ELECTROCARDIOGRAM TRACING: CPT

## 2023-10-25 NOTE — H&P
10/24/2023         Nila Alexander, KIRBY  [address] [facility] [address]           Sharad Ryder  1960          Chief Complaint   Patient presents with    Follow-up       1 year follow up w/ testing. Last seen 9/13/22. Patient denies any new or worsening back or abdominal pains but does complain if pain in left leg. Wants [facility] to do surgery if possible.          Dear Nila Alexander, KIRBY:      I had the pleasure of seeing you patient in the office today for follow up.  As you recall, the patient is a 63 y.o. male who we are currently following for carotid stenosis and a small abdominal aortic aneurysm.    He had a previous right carotid endarterectomy on 2/1/2018.  He denies any abdominal pain.  He does have complaints of left leg pain mostly to the left hip and buttocks area.  He is a current daily smoker.  He is maintained on aspirin, Plavix, and Crestor.  He did have noninvasive testing which I did review.       Past Medical History:   Diagnosis Date    Aneurysm     Anxiety     Arthritis     Carotid artery disease     Carotid stenosis, right 2/1/2018    Post right carotid endarterectomy    COPD (chronic obstructive pulmonary disease)     Heart murmur     History of right-sided carotid endarterectomy 2/16/2022    Hyperlipidemia LDL goal <70 12/14/2017    Left frontal lobe, right thalamus and right occipital CVA (cerebrovascular accident) (HCC) 1/27/2022    LVH (left ventricular hypertrophy) 2/16/2022    Pneumonia     Primary hypertension 12/14/2017    S/P CABG x 3 2/16/2022    LIMA to LAD, SVG to PDA and SVG to diagonal branch with TMR and left atrial appendage exclusion with atricure clip device 2/8/2018 per Dr. Cj Robin at [facility]     Severe aortic valve stenosis 12/14/2017    Status post aortic valve replacement with bioprosthetic valve 2/16/2022    Graciela Juarez bovine pericardial 19 mm valve 2/8/2018 per Dr. Cj Robin at [facility]     Tobacco use 12/14/2017    Wears glasses      Past Surgical History:   Procedure Laterality Date    APPENDECTOMY      CARDIAC CATHETERIZATION N/A 12/18/2017    Procedure: Left Heart Cath;  Surgeon: Aime Francois MD;  Location:  [facility];  Service:     CARDIAC CATHETERIZATION N/A 12/18/2017    Procedure: Right Heart Cath;  Surgeon: Aime Francois MD;  Location:  [facility];  Service:     CARDIAC CATHETERIZATION Bilateral 1/4/2018    Procedure: Coronary angiography;  Surgeon: Alfonso Yi MD;  Location:  [facility] CATH [facility];  Service:     CARDIAC CATHETERIZATION Left 9/15/2021    Procedure: Cardiac Catheterization/Vascular Study NIMCO OK  Has 3 graft 2018;  Surgeon: Aime Francois MD;  Location:  [facility];  Service: Cardiology;  Laterality: Left;    CAROTID ENDARTERECTOMY Right 2/1/2018    Procedure: RIGHT CAROTID ENDARTERECTOMY WITH EEG-awake;  Surgeon: Jl Salgado DO;  Location:  [facility] OR;  Service:     CORONARY ARTERY BYPASS GRAFT WITH AORTIC VALVE REPAIR/REPLACEMENT N/A 2/8/2018    Procedure: AORTIC VALVE REPLACEMENT,CORONARY ARTERY BYPASS GRAFTING x 3  WITH CONCHA AND RIGHT EVH, ATRIAL APPENDAGE EXCLUSION LEFT WITH TRANSESOPHAGEAL ECHOCARDIOGRAM, 17-CHANNEL TMR;  Surgeon: Cj Robin MD;  Location:  [facility] OR;  Service:     FINGER SURGERY Right 1990    OTHER SURGICAL HISTORY      skull srgery from accident in childhood.     Family History   Problem Relation Age of Onset    Heart disease Mother     Heart disease Father     Diabetes Father     Hypertension Sister     Asthma Sister     Kidney disease Sister     Hypertension Brother     Diabetes Brother     Cancer Maternal Uncle      Social History     Tobacco Use    Smoking status: Some Days     Packs/day: 1.50     Years: 35.00     Additional pack years: 0.00     Total pack years: 52.50     Types: Cigarettes    Smokeless tobacco: Current     Types: Snuff    Tobacco comments:     Would like to  "quit.   Vaping Use    Vaping Use: Never used   Substance Use Topics    Alcohol use: No    Drug use: No     No Known Allergies  Current Outpatient Medications   Medication Instructions    amLODIPine (NORVASC) 10 mg, Oral, Daily    aspirin 81 mg, Oral, Daily    busPIRone (BUSPAR) 5 mg, Oral, 2 Times Daily    citalopram (CELEXA) 20 mg, Oral, Daily    cloNIDine (CATAPRES) 0.1 MG tablet TAKE 1 TABLET BY MOUTH TWICE DAILY AS NEEDED FOR HIGH BLOOD PRESSURE >150/90    clopidogrel (PLAVIX) 75 mg, Oral, Daily    gabapentin (NEURONTIN) 400 mg, Oral, 3 Times Daily    HYDROcodone-acetaminophen (NORCO) 7.5-325 MG per tablet 1 tablet, Oral, Every 6 Hours PRN    isosorbide mononitrate (IMDUR) 120 mg, Oral, Daily    metoprolol succinate XL (TOPROL-XL) 50 mg, Oral, Every 24 Hours Scheduled    nicotine (Nicotine Step 1) 21 MG/24HR patch 1 patch, Transdermal, Every 24 Hours    nitroglycerin (NITROSTAT) 0.4 mg, Sublingual, Every 5 Minutes PRN, Take no more than 3 doses in 15 minutes.     rosuvastatin (CRESTOR) 40 mg, Oral, Daily    tamsulosin (FLOMAX) 0.4 MG capsule 24 hr capsule 1 capsule, Oral, Daily         Review of Systems   Constitutional: Negative.    HENT: Negative.     Eyes:  Positive for visual disturbance (Double vision intermittently).   Respiratory:  Positive for shortness of breath (on exertion).    Cardiovascular: Negative.  Negative for chest pain.        Left hip/buttock pain   Gastrointestinal: Negative.    Endocrine: Negative.    Genitourinary: Negative.    Musculoskeletal:  Positive for arthralgias and joint swelling.   Skin: Negative.    Allergic/Immunologic: Negative.    Neurological: Negative.    Hematological: Negative.    Psychiatric/Behavioral: Negative.     All other systems reviewed and are negative.     /58   Pulse 68   Ht 162.6 cm (64\")   Wt 52.2 kg (115 lb)   SpO2 98%   BMI 19.74 kg/m²   Physical Exam  Vitals and nursing note reviewed.   Constitutional:       General: He is not in acute " distress.     Appearance: Normal appearance. He is well-developed. He is not diaphoretic.   HENT:      Head: Normocephalic and atraumatic.   Neck:      Vascular: No carotid bruit or JVD.   Cardiovascular:      Rate and Rhythm: Normal rate and regular rhythm.      Pulses:           Femoral pulses are 2+ on the right side and 2+ on the left side.       Popliteal pulses are 2+ on the right side.        Dorsalis pedis pulses are detected w/ Doppler on the left side.        Posterior tibial pulses are detected w/ Doppler on the left side.      Heart sounds: S1 normal and S2 normal. Murmur heard.     No friction rub. No gallop.   Pulmonary:      Effort: Pulmonary effort is normal.      Breath sounds: Normal breath sounds.   Abdominal:      General: Bowel sounds are normal. There is no abdominal bruit.      Palpations: Abdomen is soft.      Tenderness: There is no abdominal tenderness.   Musculoskeletal:         General: Normal range of motion.   Skin:     General: Skin is warm and dry.   Neurological:      Mental Status: He is alert and oriented to person, place, and time.      Cranial Nerves: No cranial nerve deficit.   Psychiatric:         Mood and Affect: Mood normal.         Behavior: Behavior normal.         Thought Content: Thought content normal.         Judgment: Judgment normal.         DIAGNOSTIC DATA:  CT Angiogram Abdomen Pelvis     Result Date: 8/31/2023  Narrative: EXAMINATION: CT ANGIOGRAM ABDOMEN PELVIS-   8/31/2023 8:08 AM CDT  HISTORY: Aortic aneurysm (AAA), surveillance; I71.40-Abdominal aortic aneurysm, without rupture, unspecified  In order to have a CT radiation dose as low as reasonably achievable Automated Exposure Control was utilized for adjustment of the mA and/or KV according to patient size.  DLP in mGycm= 216.  CT angiogram abdomen/pelvis without and with IV contrast. CT angiography protocol. CT imaging with bolus IV contrast injection. Under concurrent supervision axial, sagittal, coronal,  and MIP data sets were constructed on an independent work station.  Comparison is made with 09/13/2022.  Diffuse arterial calcification. Infrarenal abdominal aortic aneurysm. Aneurysmal dilation begins 14 mm below the renal artery origins. The maximum diameter of the aneurysm is 43 x 45 mm. The aneurysm extends over a length of 57 mm. Distal to the aneurysm the aorta resumes a diameter of 19 mm over a length of 18 mm. The common iliac arteries are tortuous though symmetric and nonaneurysmal.  Within the aneurysm neck there is extensive irregular calcified plaque and a calcified dissection flap which is approximately 20 mm long.  Normally patent celiac artery and SMA. Normally patent right renal artery. Moderate stenosis at the origin of the left renal artery. An inferior mesenteric artery is not visualized.  Normal heart size. Hyperexpanded lung bases with chronic interstitial disease.  Normal liver and gallbladder. Normal pancreas and spleen. Normal and symmetric adrenal glands and kidneys.  No bowel dilation. No pelvic mass or fluid.  Prominent degenerative disc, endplate, and facet disease throughout the lumbar spine.  Summary: 1. AAA measuring approximately 43 x 45 mm. 2. No significant change from one year ago.              This report was finalized on 08/31/2023 09:04 by Dr. Ady Reaves MD.     US Carotid Bilateral     Result Date: 8/31/2023  Narrative: History: Carotid occlusive disease       Impression: Impression: 1. There is less than 50% stenosis of the right internal carotid artery. 2. There is less than 50% stenosis of the left internal carotid artery. 3. Antegrade flow is demonstrated in bilateral vertebral arteries.  Comments: Bilateral carotid vertebral arterial duplex scan was performed.  Grayscale imaging shows intimal thickening and calcified elements at the carotid bifurcation. The right internal carotid artery peak systolic velocity is 111 cm/sec. The end-diastolic velocity is 36.3 cm/sec. The  right ICA/CCA ratio is approximately 1.0 . These findings correlate with less than 50% stenosis of the right internal carotid artery.  Grayscale imaging shows intimal thickening and calcified elements at the carotid bifurcation. The left internal carotid artery peak systolic velocity is 112.6 cm/sec. The end-diastolic velocity is 34.9 cm/sec. The left ICA/CCA ratio is approximately 1.4 . These findings correlate with less than 50% stenosis of the left internal carotid artery.  Antegrade flow is demonstrated in bilateral vertebral arteries.  This report was finalized on 08/31/2023 16:12 by Dr. Jl Salgado MD.     US Ankle / Brachial Indices Extremity Complete     Result Date: 8/31/2023  Narrative:  History: [facility]  Comments: Bilateral lower extremity arterial with multi-level pulse volume recordings and segmental pressures were performed at rest and stress.  The right ankle/brachial index is 1.04. The waveforms are triphasic without dampening.These findings are consistent with no significant arterial insufficiency of the right lower extremity at rest.  The left ankle/brachial index is 0.57. The waveforms are biphasic without dampening. These findings are consistent with moderate arterial insufficiency of the left lower extremity at rest.       Impression: Impression: 1. No significant arterial insufficiency of the right lower extremity at rest. 2. Moderate arterial insufficiency of the left lower extremity at rest.   This report was finalized on 08/31/2023 16:09 by Dr. Jl Salgado MD.             Patient Active Problem List   Diagnosis    Hyperlipidemia LDL goal <70    Primary hypertension    COPD (chronic obstructive pulmonary disease)    Transient ischemic attack (TIA)    Allergic reaction    Coronary artery disease of bypass graft of native heart with stable angina pectoris    PVC (premature ventricular contraction)    Leukocytosis    History of CVA (cerebrovascular accident)    Right sided weakness  resolved    Acute CVA (cerebrovascular accident)    Right-sided carotid artery disease    S/P CABG x 3 2018 Dr Robin     Status post aortic valve replacement with bioprosthetic valve    LVH (left ventricular hypertrophy)    History of right-sided carotid endarterectomy    Tobacco use    Chronic stable angina    PVD (peripheral vascular disease) with claudication         Visit Diagnosis       ICD-10-CM ICD-9-CM   1. PAD (peripheral artery disease)  I73.9 443.9   2. Aortoiliac occlusive disease  I74.09 444.09   3. Preop testing  Z01.818 V72.84   4. Encounter for monitoring antiplatelet therapy  Z51.81 V58.83     Z79.02 V58.63   5. Primary hypertension  I10 401.9   6. Hyperlipidemia LDL goal <70  E78.5 272.4   7. Tobacco use  Z72.0 305.1               PLAN: After thoroughly evaluating Sharad Ryder, I believe the best course of action is to proceed with a left lower extremity angiogram.  I did review his testing showing moderate arterial insufficiency of the left lower extremity and normal to the right.  His carotid duplex showed less than 50% stenosis bilaterally.  I did review his CTA of the abdomen and pelvis with Dr. Salgado as aneurysm is slightly bigger with a calcified dissection flap.  In the neck of the aneurysm there is extensive irregular plaque with moderate stenosis at the origin of the left renal artery.  This time no intervention regarding aneurysm per Dr. Salgado but we will proceed with left lower extremity angiogram.  Risks of angiogram were discussed.  These include, but are not limited to, bleeding, infection, vessel damage, nerve damage, embolus, and loss of limb.  The patient understands these risks and wishes to proceed with procedure.  We will repeat his CTA of the abdomen pelvis and carotid duplex in 1 year. I did discuss vascular risk factors as they pertain to the progression of vascular disease including controlling his hypertension, hyperlipidemia, and smoking cessation.  His blood  pressure stable on his current medications.  He should continue taking his aspirin 81 mg daily, Plavix 75 mg daily and Crestor 40 mg daily in addition to his other medications.  He is a current daily smoker.  We did discuss smoking cessation in regards to continued risks and worsening vascular disease.  Smoking is the #1 cause of aneurysm formation and rapid growth.  He would like to quit smoking but has just not been able to for his report.  The patient is to continue taking their medications as previously discussed.   This was all discussed in full with complete understanding.  Thank you for allowing me to participate in the care of your patient.  Please do not hesitate to call with any questions or concerns.  We will keep you aware of any further encounters with Sharad Ryder.        Sincerely Yours,        Jl Salgado, DO

## 2023-10-25 NOTE — DISCHARGE INSTRUCTIONS
Before you come to the hospital        Arrival time: AS DIRECTED BY OFFICE     YOU MAY TAKE THE FOLLOWING MEDICATION(S) THE MORNING OF SURGERY WITH A SIP OF WATER: buspirone, gabapentin, hydrocodone           ALL OTHER HOME MEDICATION CHECK WITH YOUR PHYSICIAN (especially if   you are taking diabetes medicines or blood thinners)    Do not take any Erectile Dysfunction medications (EX: CIALIS, VIAGRA) 24 hours prior to surgery.      If you were given and instructed to use a germ- killing soap, use as directed the night before surgery and again the morning of surgery or as directed by your surgeon. (Use one-half of the bottle with each shower.)   See attached information for How to Use Chlorhexidine for Bathing if applicable.            Eating and drinking restrictions prior to scheduled arrival time    2 Hours before arrival time STOP   Drinking Clear liquids (water, black coffee-NO CREAM,  apple juice-no pulp)    Clear Liquids    Water and flavored water                                                                      Clear Fruit juices, such as cranberry juice and apple juice.  Black coffee (NO cream of any kind, including powdered).  Plain tea  Clear bouillon or broth.  Flavored gelatin.  Soda.  Gatorade or Powerade.    8 Hours before arrival time STOP   All food, full liquids, and dairy products  Full liquid examples  Juices that have pulp.  Frozen ice pops that contain fruit pieces.  Coffee with creamer  Milk.  Yogurt.    (It is extremely important that you follow these guidelines to prevent delay or cancelation of your procedure)                       MANAGING PAIN AFTER SURGERY    We know you are probably wondering what your pain will be like after surgery.  Following surgery it is unrealistic to expect you will not have pain.   Pain is how our bodies let us know that something is wrong or cautions us to be careful.  That said, our goal is to make your pain tolerable.    Methods we may use to treat your  pain include (oral or IV medications, PCAs, epidurals, nerve blocks, etc.)   While some procedures require IV pain medications for a short time after surgery, transitioning to pain medications by mouth allows for better management of pain.   Your nurse will encourage you to take oral pain medications whenever possible.  IV medications work almost immediately, but only last a short while.  Taking medications by mouth allows for a more constant level of medication in your blood stream for a longer period of time.      Once your pain is out of control it is harder to get back under control.  It is important you are aware when your next dose of pain medication is due.  If you are admitted, your nurse may write the time of your next dose on the white board in your room to help you remember.      We are interested in your pain and encourage you to inform us about aggravating factors during your visit.   Many times a simple repositioning every few hours can make a big difference.    If your physician says it is okay, do not let your pain prevent you from getting out of bed. Be sure to call your nurse for assistance prior to getting up so you do not fall.      Before surgery, please decide your tolerable pain goal.  These faces help describe the pain ratings we use on a 0-10 scale.   Be prepared to tell us your goal and whether or not you take pain or anxiety medications at home.          Preparing for Surgery  Preparing for surgery is an important part of your care. It can make things go more smoothly and help you avoid complications. The steps leading up to surgery may vary among hospitals. Follow all instructions given to you by your health care providers. Ask questions if you do not understand something. Talk about any concerns that you have.  Here are some questions to consider asking before your surgery:  If my surgery is not an emergency (is elective), when would be the best time to have the surgery?  What  arrangements do I need to make for work, home, or school?  What will my recovery be like? How long will it be before I can return to normal activities?  Will I need to prepare my home? Will I need to arrange care for me or my children?  Should I expect to have pain after surgery? What are my pain management options? Are there nonmedical options that I can try for pain?  Tell a health care provider about:  Any allergies you have.  All medicines you are taking, including vitamins, herbs, eye drops, creams, and over-the-counter medicines.  Any problems you or family members have had with anesthetic medicines.  Any blood disorders you have.  Any surgeries you have had.  Any medical conditions you have.  Whether you are pregnant or may be pregnant.  What are the risks?  The risks and complications of surgery depend on the specific procedure that you have. Discuss all the risks with your health care providers before your surgery. Ask about common surgical complications, which may include:  Infection.  Bleeding or a need for blood replacement (transfusion).  Allergic reactions to medicines.  Damage to surrounding nerves, tissues, or structures.  A blood clot.  Scarring.  Failure of the surgery to correct the problem.  Follow these instructions before the procedure:  Several days or weeks before your procedure  You may have a physical exam by your primary health care provider to make sure it is safe for you to have surgery.  You may have testing. This may include a chest X-ray, blood and urine tests, electrocardiogram (ECG), or other testing.  Ask your health care provider about:  Changing or stopping your regular medicines. This is especially important if you are taking diabetes medicines or blood thinners.  Taking medicines such as aspirin and ibuprofen. These medicines can thin your blood. Do not take these medicines unless your health care provider tells you to take them.  Taking over-the-counter medicines, vitamins,  herbs, and supplements.  Do not use any products that contain nicotine or tobacco, such as cigarettes and e-cigarettes. If you need help quitting, ask your health care provider.  Avoid alcohol.  Ask your health care provider if there are exercises you can do to prepare for surgery.  Eat a healthy diet.   Plan to have someone 18 years of age or older to take you home from the hospital. We will need to verify your ride on the morning of surgery if you are being discharged home on the same day. Tell your ride to be expecting a call from the hospital prior to your procedure.   Plan to have a responsible adult care for you for at least 24 hours after you leave the hospital or clinic. This is important.  The day before your procedure  You may be given antibiotic medicine to take by mouth to help prevent infection. Take it as told by your health care provider.  You may be asked to shower with a germ-killing soap.  Follow instructions from your health care provider about eating and drinking restrictions. This includes gum, mints and hard candy.  Pack comfortable clothes according to your procedure.   The day of your procedure  You may need to take another shower with a germ-killing soap before you leave home in the morning.  With a small sip of water, take only the medicines that you are told to take.  Remove all jewelry including rings.   Leave anything you consider valuable at home except hearing aids if needed.  You do not need to bring your home medications into the hospital.   Do not wear any makeup, nail polish, powder, deodorant, lotion, hair accessories, or anything on your skin or body except your clothes.  If you will be staying in the hospital, bring a case to hold your glasses, contacts, or dentures. You may also want to bring your robe and non-skid footwear.       (Do not use denture adhesives since you will be asked to remove them during  surgery).   If you wear oxygen at home, bring it with you the day of  surgery.  If instructed by your health care provider, bring your sleep apnea device with you on the day of your surgery (if this applies to you).  You may want to leave your suitcase and sleep apnea device in the car until after surgery.   Arrive at the hospital as scheduled.  Bring a friend or family member with you who can help to answer questions and be present while you meet with your health care provider.  At the hospital  When you arrive at the hospital:  Go to registration located at the main entrance of the hospital. You will be registered and given a beeper and a sticker sheet. Take the stickers to the Outpatient nurses desk and place in the black tray. This is to notify staff that you have arrived. Then return to the lobby to wait.   When your beeper lights up and vibrates proceed through the double doors, under the stairs, and a member of the Outpatient Surgery staff will escort you to your preoperative room.  You may have to wear compression sleeves. These help to prevent blood clots and reduce swelling in your legs.  An IV may be inserted into one of your veins.              In the operating room, you may be given one or more of the following:        A medicine to help you relax (sedative).        A medicine to numb the area (local anesthetic).        A medicine to make you fall asleep (general anesthetic).        A medicine that is injected into an area of your body to numb everything below the                      injection site (regional anesthetic).  You may be given an antibiotic through your IV to help prevent infection.  Your surgical site will be marked or identified.    Contact a health care provider if you:  Develop a fever of more than 100.4°F (38°C) or other feelings of illness during the 48 hours before your surgery.  Have symptoms that get worse.  Have questions or concerns about your surgery.  Summary  Preparing for surgery can make the procedure go more smoothly and lower your risk of  complications.  Before surgery, make a list of questions and concerns to discuss with your surgeon. Ask about the risks and possible complications.  In the days or weeks before your surgery, follow all instructions from your health care provider. You may need to stop smoking, avoid alcohol, follow eating restrictions, and change or stop your regular medicines.  Contact your surgeon if you develop a fever or other signs of illness during the few days before your surgery.  This information is not intended to replace advice given to you by your health care provider. Make sure you discuss any questions you have with your health care provider.  Document Revised: 12/21/2018 Document Reviewed: 10/23/2018  Elsevier Patient Education © 2021 [employer]

## 2023-10-26 ENCOUNTER — TELEPHONE (OUTPATIENT)
Dept: VASCULAR SURGERY | Facility: CLINIC | Age: 63
End: 2023-10-26
Payer: MEDICAID

## 2023-10-26 NOTE — TELEPHONE ENCOUNTER
Attempted to notify pt of arrival time of 9 am for procedure scheduled with Dr. Salgado on 10/27/23.  Pt advised NPO after midnight.  LM with all information on voicemail.

## 2023-10-27 ENCOUNTER — ANESTHESIA EVENT (OUTPATIENT)
Dept: PERIOP | Facility: HOSPITAL | Age: 63
End: 2023-10-27
Payer: MEDICAID

## 2023-10-27 ENCOUNTER — APPOINTMENT (OUTPATIENT)
Dept: INTERVENTIONAL RADIOLOGY/VASCULAR | Facility: HOSPITAL | Age: 63
End: 2023-10-27
Payer: MEDICAID

## 2023-10-27 ENCOUNTER — HOSPITAL ENCOUNTER (OUTPATIENT)
Facility: HOSPITAL | Age: 63
Setting detail: HOSPITAL OUTPATIENT SURGERY
Discharge: HOME | End: 2023-10-27
Attending: SURGERY | Admitting: SURGERY
Payer: MEDICAID

## 2023-10-27 ENCOUNTER — ANESTHESIA (OUTPATIENT)
Dept: PERIOP | Facility: HOSPITAL | Age: 63
End: 2023-10-27
Payer: MEDICAID

## 2023-10-27 VITALS
SYSTOLIC BLOOD PRESSURE: 151 MMHG | TEMPERATURE: 100 F | OXYGEN SATURATION: 95 % | HEART RATE: 82 BPM | DIASTOLIC BLOOD PRESSURE: 70 MMHG | RESPIRATION RATE: 16 BRPM

## 2023-10-27 DIAGNOSIS — I73.9 PAD (PERIPHERAL ARTERY DISEASE): ICD-10-CM

## 2023-10-27 DIAGNOSIS — Z01.818 PREOP TESTING: ICD-10-CM

## 2023-10-27 DIAGNOSIS — I74.09 AORTOILIAC OCCLUSIVE DISEASE: ICD-10-CM

## 2023-10-27 LAB
ABO GROUP BLD: NORMAL
BLD GP AB SCN SERPL QL: NEGATIVE
RH BLD: POSITIVE
T&S EXPIRATION DATE: NORMAL

## 2023-10-27 PROCEDURE — C1887 CATHETER, GUIDING: HCPCS | Performed by: SURGERY

## 2023-10-27 PROCEDURE — 25010000002 HEPARIN (PORCINE) PER 1000 UNITS: Performed by: NURSE ANESTHETIST, CERTIFIED REGISTERED

## 2023-10-27 PROCEDURE — 86900 BLOOD TYPING SEROLOGIC ABO: CPT | Performed by: NURSE PRACTITIONER

## 2023-10-27 PROCEDURE — 25010000002 FENTANYL CITRATE (PF) 100 MCG/2ML SOLUTION: Performed by: NURSE ANESTHETIST, CERTIFIED REGISTERED

## 2023-10-27 PROCEDURE — 75630 X-RAY AORTA LEG ARTERIES: CPT

## 2023-10-27 PROCEDURE — 75716 ARTERY X-RAYS ARMS/LEGS: CPT

## 2023-10-27 PROCEDURE — 25010000002 HEPARIN (PORCINE) PER 1000 UNITS: Performed by: SURGERY

## 2023-10-27 PROCEDURE — 25010000002 PROPOFOL 1000 MG/100ML EMULSION: Performed by: NURSE ANESTHETIST, CERTIFIED REGISTERED

## 2023-10-27 PROCEDURE — 75716 ARTERY X-RAYS ARMS/LEGS: CPT | Performed by: SURGERY

## 2023-10-27 PROCEDURE — C1874 STENT, COATED/COV W/DEL SYS: HCPCS | Performed by: SURGERY

## 2023-10-27 PROCEDURE — C1894 INTRO/SHEATH, NON-LASER: HCPCS | Performed by: SURGERY

## 2023-10-27 PROCEDURE — 86901 BLOOD TYPING SEROLOGIC RH(D): CPT | Performed by: NURSE PRACTITIONER

## 2023-10-27 PROCEDURE — 25010000002 BUPIVACAINE (PF) 0.5 % SOLUTION: Performed by: SURGERY

## 2023-10-27 PROCEDURE — 86850 RBC ANTIBODY SCREEN: CPT | Performed by: NURSE PRACTITIONER

## 2023-10-27 PROCEDURE — C1725 CATH, TRANSLUMIN NON-LASER: HCPCS | Performed by: SURGERY

## 2023-10-27 PROCEDURE — 37221 PR REVSC OPN/PRQ ILIAC ART W/STNT PLMT & ANGIOPLSTY: CPT | Performed by: SURGERY

## 2023-10-27 PROCEDURE — C1760 CLOSURE DEV, VASC: HCPCS | Performed by: SURGERY

## 2023-10-27 PROCEDURE — C1769 GUIDE WIRE: HCPCS | Performed by: SURGERY

## 2023-10-27 PROCEDURE — 25810000003 LACTATED RINGERS PER 1000 ML: Performed by: SURGERY

## 2023-10-27 PROCEDURE — 25510000001 IOPAMIDOL 61 % SOLUTION: Performed by: SURGERY

## 2023-10-27 PROCEDURE — 75625 CONTRAST EXAM ABDOMINL AORTA: CPT | Performed by: SURGERY

## 2023-10-27 PROCEDURE — 76000 FLUOROSCOPY <1 HR PHYS/QHP: CPT

## 2023-10-27 PROCEDURE — 25010000002 CEFAZOLIN 1-4 GM/50ML-% SOLUTION: Performed by: NURSE PRACTITIONER

## 2023-10-27 PROCEDURE — 25010000002 FENTANYL CITRATE (PF) 50 MCG/ML SOLUTION: Performed by: ANESTHESIOLOGY

## 2023-10-27 DEVICE — STENTGR ENDOPROSTH VIABAHN VBX EXP 7F 8X11X39MM 80CM: Type: IMPLANTABLE DEVICE | Site: GROIN | Status: FUNCTIONAL

## 2023-10-27 DEVICE — IMPLANTABLE DEVICE: Type: IMPLANTABLE DEVICE | Site: GROIN | Status: FUNCTIONAL

## 2023-10-27 RX ORDER — FENTANYL CITRATE 50 UG/ML
25 INJECTION, SOLUTION INTRAMUSCULAR; INTRAVENOUS
Status: DISCONTINUED | OUTPATIENT
Start: 2023-10-27 | End: 2023-10-27 | Stop reason: HOSPADM

## 2023-10-27 RX ORDER — PROPOFOL 10 MG/ML
INJECTION, EMULSION INTRAVENOUS CONTINUOUS PRN
Status: DISCONTINUED | OUTPATIENT
Start: 2023-10-27 | End: 2023-10-27 | Stop reason: SURG

## 2023-10-27 RX ORDER — HYDROCODONE BITARTRATE AND ACETAMINOPHEN 5; 325 MG/1; MG/1
1 TABLET ORAL ONCE AS NEEDED
Status: DISCONTINUED | OUTPATIENT
Start: 2023-10-27 | End: 2023-10-27 | Stop reason: HOSPADM

## 2023-10-27 RX ORDER — DROPERIDOL 2.5 MG/ML
0.62 INJECTION, SOLUTION INTRAMUSCULAR; INTRAVENOUS ONCE AS NEEDED
Status: DISCONTINUED | OUTPATIENT
Start: 2023-10-27 | End: 2023-10-27 | Stop reason: HOSPADM

## 2023-10-27 RX ORDER — BUPIVACAINE HYDROCHLORIDE 5 MG/ML
INJECTION, SOLUTION EPIDURAL; INTRACAUDAL AS NEEDED
Status: DISCONTINUED | OUTPATIENT
Start: 2023-10-27 | End: 2023-10-27 | Stop reason: HOSPADM

## 2023-10-27 RX ORDER — CEFAZOLIN SODIUM 1 G/50ML
1000 INJECTION, SOLUTION INTRAVENOUS ONCE
Status: COMPLETED | OUTPATIENT
Start: 2023-10-27 | End: 2023-10-27

## 2023-10-27 RX ORDER — SODIUM CHLORIDE 0.9 % (FLUSH) 0.9 %
3-10 SYRINGE (ML) INJECTION AS NEEDED
Status: DISCONTINUED | OUTPATIENT
Start: 2023-10-27 | End: 2023-10-27 | Stop reason: HOSPADM

## 2023-10-27 RX ORDER — SODIUM CHLORIDE 0.9 % (FLUSH) 0.9 %
3 SYRINGE (ML) INJECTION AS NEEDED
Status: DISCONTINUED | OUTPATIENT
Start: 2023-10-27 | End: 2023-10-27 | Stop reason: HOSPADM

## 2023-10-27 RX ORDER — HEPARIN SODIUM 1000 [USP'U]/ML
INJECTION, SOLUTION INTRAVENOUS; SUBCUTANEOUS AS NEEDED
Status: DISCONTINUED | OUTPATIENT
Start: 2023-10-27 | End: 2023-10-27 | Stop reason: SURG

## 2023-10-27 RX ORDER — LIDOCAINE HYDROCHLORIDE 10 MG/ML
0.5 INJECTION, SOLUTION EPIDURAL; INFILTRATION; INTRACAUDAL; PERINEURAL ONCE AS NEEDED
Status: DISCONTINUED | OUTPATIENT
Start: 2023-10-27 | End: 2023-10-27 | Stop reason: HOSPADM

## 2023-10-27 RX ORDER — SODIUM CHLORIDE 9 MG/ML
40 INJECTION, SOLUTION INTRAVENOUS AS NEEDED
Status: DISCONTINUED | OUTPATIENT
Start: 2023-10-27 | End: 2023-10-27 | Stop reason: HOSPADM

## 2023-10-27 RX ORDER — SODIUM CHLORIDE, SODIUM LACTATE, POTASSIUM CHLORIDE, CALCIUM CHLORIDE 600; 310; 30; 20 MG/100ML; MG/100ML; MG/100ML; MG/100ML
100 INJECTION, SOLUTION INTRAVENOUS CONTINUOUS
Status: DISCONTINUED | OUTPATIENT
Start: 2023-10-27 | End: 2023-10-27 | Stop reason: HOSPADM

## 2023-10-27 RX ORDER — FENTANYL CITRATE 50 UG/ML
INJECTION, SOLUTION INTRAMUSCULAR; INTRAVENOUS AS NEEDED
Status: DISCONTINUED | OUTPATIENT
Start: 2023-10-27 | End: 2023-10-27 | Stop reason: SURG

## 2023-10-27 RX ORDER — SODIUM CHLORIDE 0.9 % (FLUSH) 0.9 %
3 SYRINGE (ML) INJECTION EVERY 12 HOURS SCHEDULED
Status: DISCONTINUED | OUTPATIENT
Start: 2023-10-27 | End: 2023-10-27 | Stop reason: HOSPADM

## 2023-10-27 RX ORDER — CLOPIDOGREL BISULFATE 75 MG/1
75 TABLET ORAL ONCE
Status: COMPLETED | OUTPATIENT
Start: 2023-10-27 | End: 2023-10-27

## 2023-10-27 RX ORDER — LABETALOL HYDROCHLORIDE 5 MG/ML
5 INJECTION, SOLUTION INTRAVENOUS
Status: DISCONTINUED | OUTPATIENT
Start: 2023-10-27 | End: 2023-10-27 | Stop reason: HOSPADM

## 2023-10-27 RX ORDER — ONDANSETRON 2 MG/ML
4 INJECTION INTRAMUSCULAR; INTRAVENOUS ONCE AS NEEDED
Status: DISCONTINUED | OUTPATIENT
Start: 2023-10-27 | End: 2023-10-27 | Stop reason: HOSPADM

## 2023-10-27 RX ORDER — FLUMAZENIL 0.1 MG/ML
0.2 INJECTION INTRAVENOUS AS NEEDED
Status: DISCONTINUED | OUTPATIENT
Start: 2023-10-27 | End: 2023-10-27 | Stop reason: HOSPADM

## 2023-10-27 RX ORDER — HYDROCODONE BITARTRATE AND ACETAMINOPHEN 10; 325 MG/1; MG/1
1 TABLET ORAL EVERY 4 HOURS PRN
Status: DISCONTINUED | OUTPATIENT
Start: 2023-10-27 | End: 2023-10-27 | Stop reason: HOSPADM

## 2023-10-27 RX ORDER — SODIUM CHLORIDE, SODIUM LACTATE, POTASSIUM CHLORIDE, CALCIUM CHLORIDE 600; 310; 30; 20 MG/100ML; MG/100ML; MG/100ML; MG/100ML
1000 INJECTION, SOLUTION INTRAVENOUS CONTINUOUS
Status: DISCONTINUED | OUTPATIENT
Start: 2023-10-27 | End: 2023-10-27 | Stop reason: HOSPADM

## 2023-10-27 RX ORDER — ASPIRIN 81 MG/1
81 TABLET, CHEWABLE ORAL ONCE
Status: COMPLETED | OUTPATIENT
Start: 2023-10-27 | End: 2023-10-27

## 2023-10-27 RX ORDER — NALOXONE HCL 0.4 MG/ML
0.4 VIAL (ML) INJECTION AS NEEDED
Status: DISCONTINUED | OUTPATIENT
Start: 2023-10-27 | End: 2023-10-27 | Stop reason: HOSPADM

## 2023-10-27 RX ORDER — LIDOCAINE HYDROCHLORIDE 20 MG/ML
INJECTION, SOLUTION EPIDURAL; INFILTRATION; INTRACAUDAL; PERINEURAL AS NEEDED
Status: DISCONTINUED | OUTPATIENT
Start: 2023-10-27 | End: 2023-10-27 | Stop reason: SURG

## 2023-10-27 RX ADMIN — LIDOCAINE HYDROCHLORIDE 100 MG: 20 INJECTION, SOLUTION EPIDURAL; INFILTRATION; INTRACAUDAL; PERINEURAL at 11:24

## 2023-10-27 RX ADMIN — CLOPIDOGREL BISULFATE 75 MG: 75 TABLET ORAL at 11:16

## 2023-10-27 RX ADMIN — FENTANYL CITRATE 25 MCG: 50 INJECTION, SOLUTION INTRAMUSCULAR; INTRAVENOUS at 13:26

## 2023-10-27 RX ADMIN — HEPARIN SODIUM 1000 UNITS: 1000 INJECTION, SOLUTION INTRAVENOUS; SUBCUTANEOUS at 11:36

## 2023-10-27 RX ADMIN — FENTANYL CITRATE 50 MCG: 50 INJECTION, SOLUTION INTRAMUSCULAR; INTRAVENOUS at 11:54

## 2023-10-27 RX ADMIN — SODIUM CHLORIDE, POTASSIUM CHLORIDE, SODIUM LACTATE AND CALCIUM CHLORIDE 1000 ML: 600; 310; 30; 20 INJECTION, SOLUTION INTRAVENOUS at 08:58

## 2023-10-27 RX ADMIN — FENTANYL CITRATE 25 MCG: 50 INJECTION, SOLUTION INTRAMUSCULAR; INTRAVENOUS at 13:18

## 2023-10-27 RX ADMIN — HYDROCODONE BITARTRATE AND ACETAMINOPHEN 1 TABLET: 10; 325 TABLET ORAL at 13:01

## 2023-10-27 RX ADMIN — FENTANYL CITRATE 50 MCG: 50 INJECTION, SOLUTION INTRAMUSCULAR; INTRAVENOUS at 11:34

## 2023-10-27 RX ADMIN — PROPOFOL 100 MCG/KG/MIN: 10 INJECTION, EMULSION INTRAVENOUS at 11:24

## 2023-10-27 RX ADMIN — CEFAZOLIN SODIUM 1000 MG: 1 INJECTION, SOLUTION INTRAVENOUS at 11:28

## 2023-10-27 RX ADMIN — PROPOFOL 60 MG: 10 INJECTION, EMULSION INTRAVENOUS at 11:23

## 2023-10-27 RX ADMIN — HEPARIN SODIUM 4000 UNITS: 1000 INJECTION, SOLUTION INTRAVENOUS; SUBCUTANEOUS at 11:45

## 2023-10-27 RX ADMIN — ASPIRIN 81 MG: 81 TABLET, CHEWABLE ORAL at 11:16

## 2023-10-27 NOTE — ANESTHESIA POSTPROCEDURE EVALUATION
Patient: Sharad Ryder    Procedure Summary       Date: 10/27/23 Room / Location:  [facility] OR  /  [facility] 12    Anesthesia Start: 1120 Anesthesia Stop: 1229    Procedure: LEFT LOWER EXTREMITY ANGIOGRAM, BILATERAL ILIAC BALLOON ANGIOPLASTY, BILATERAL ILIAC STENT PLACEMENT, MYNX CLOSURE (Bilateral: Groin) Diagnosis:       PAD (peripheral artery disease)      Aortoiliac occlusive disease      Preop testing      (PAD (peripheral artery disease) [I73.9])      (Aortoiliac occlusive disease [I74.09])      (Preop testing [Z01.818])    Surgeons: Jl Salgado DO Provider: Holland Echeverria CRNA    Anesthesia Type: MAC ASA Status: 4            Anesthesia Type: MAC    Vitals  Vitals Value Taken Time   /59 10/27/23 1230   Temp     Pulse 70 10/27/23 1232   Resp     SpO2 96 % 10/27/23 1232   Vitals shown include unfiled device data.        Post Anesthesia Care and Evaluation    Patient location during evaluation: PACU  Patient participation: complete - patient participated  Level of consciousness: sleepy but conscious  Pain score: 0  Pain management: adequate    Airway patency: patent  Anesthetic complications: No anesthetic complications  PONV Status: none  Cardiovascular status: acceptable  Respiratory status: acceptable  Hydration status: acceptable    Comments: Blood pressure 131/66, pulse 86, temperature 97.8 °F (36.6 °C), temperature source Temporal, resp. rate 20, SpO2 94%.    Pt discharged from PACU based on lalo score >8

## 2023-10-27 NOTE — ANESTHESIA PREPROCEDURE EVALUATION
Anesthesia Evaluation     Patient summary reviewed   no history of anesthetic complications:   NPO Solid Status: > 8 hours             Airway   Mallampati: II  TM distance: >3 FB  Neck ROM: full  Dental    (+) upper dentures and lower dentures    Pulmonary    (+) a smoker Current, COPD,  (-) recent URI, sleep apnea  Cardiovascular   Exercise tolerance: poor (<4 METS)    ECG reviewed    (+) hypertension, valvular problems/murmurs (s/p AVR) AS, past MI (NSTEMI) , CAD, PVD, hyperlipidemia,  carotid artery disease (s/p right awake carotid 2018)  (-) pacemaker, CHF, cardiac stents      Neuro/Psych  (+) CVA  (-) seizures, TIA  GI/Hepatic/Renal/Endo    (-) GERD, liver disease, no renal disease, diabetes    Musculoskeletal     Abdominal    Substance History      OB/GYN          Other                          Anesthesia Plan    ASA 4     MAC     intravenous induction     Anesthetic plan, risks, benefits, and alternatives have been provided, discussed and informed consent has been obtained with: patient.    Use of blood products discussed with patient  Consented to blood products.

## 2023-10-27 NOTE — INTERVAL H&P NOTE
Risks of angiogram were discussed.  These include, but are not limited to, bleeding, infection, vessel damage, nerve damage, embolus, and loss of limb.  The patient understands these risks and wishes to proceed with procedure.  H&P reviewed. The patient was examined and there are no changes to the H&P.

## 2023-10-29 LAB
QT INTERVAL: 410 MS
QTC INTERVAL: 442 MS

## 2023-11-13 ENCOUNTER — TELEPHONE (OUTPATIENT)
Dept: VASCULAR SURGERY | Facility: CLINIC | Age: 63
End: 2023-11-13
Payer: MEDICAID

## 2023-11-16 ENCOUNTER — TELEPHONE (OUTPATIENT)
Dept: VASCULAR SURGERY | Facility: CLINIC | Age: 63
End: 2023-11-16
Payer: MEDICAID

## 2023-11-17 ENCOUNTER — OFFICE VISIT (OUTPATIENT)
Dept: VASCULAR SURGERY | Facility: CLINIC | Age: 63
End: 2023-11-17
Payer: MEDICAID

## 2023-11-17 VITALS
SYSTOLIC BLOOD PRESSURE: 134 MMHG | BODY MASS INDEX: 19.46 KG/M2 | HEIGHT: 64 IN | OXYGEN SATURATION: 94 % | WEIGHT: 114 LBS | DIASTOLIC BLOOD PRESSURE: 74 MMHG | HEART RATE: 71 BPM

## 2023-11-17 DIAGNOSIS — I73.9 PAD (PERIPHERAL ARTERY DISEASE): Primary | ICD-10-CM

## 2023-11-17 DIAGNOSIS — I10 PRIMARY HYPERTENSION: ICD-10-CM

## 2023-11-17 DIAGNOSIS — I71.43 INFRARENAL ABDOMINAL AORTIC ANEURYSM (AAA) WITHOUT RUPTURE: ICD-10-CM

## 2023-11-17 DIAGNOSIS — E78.5 HYPERLIPIDEMIA LDL GOAL <70: ICD-10-CM

## 2023-11-17 DIAGNOSIS — I65.23 BILATERAL CAROTID ARTERY STENOSIS: ICD-10-CM

## 2023-11-17 DIAGNOSIS — Z72.0 TOBACCO USE: ICD-10-CM

## 2023-11-17 DIAGNOSIS — I74.09 AORTOILIAC OCCLUSIVE DISEASE: ICD-10-CM

## 2023-11-17 PROCEDURE — 1160F RVW MEDS BY RX/DR IN RCRD: CPT | Performed by: NURSE PRACTITIONER

## 2023-11-17 PROCEDURE — 3075F SYST BP GE 130 - 139MM HG: CPT | Performed by: NURSE PRACTITIONER

## 2023-11-17 PROCEDURE — 1159F MED LIST DOCD IN RCRD: CPT | Performed by: NURSE PRACTITIONER

## 2023-11-17 PROCEDURE — 99214 OFFICE O/P EST MOD 30 MIN: CPT | Performed by: NURSE PRACTITIONER

## 2023-11-17 PROCEDURE — 3078F DIAST BP <80 MM HG: CPT | Performed by: NURSE PRACTITIONER

## 2023-11-17 NOTE — PROGRESS NOTES
"11/17/2023       Nila Alexander, APRN  [address] [facility] [address]        Sharad Ryder  1960    Chief Complaint   Patient presents with    Follow-up     2 week post op. Left Angio 10/27/23. Complains of left foot and ankle swelling since surgery.        Dear Nila Alexander, KIRBY:     I had the pleasure of seeing you patient in the office today for follow up.  As you recall, the patient is a 63 y.o. male who we are currently following for carotid stenosis and a small abdominal aortic aneurysm.    He had a previous right carotid endarterectomy on 2/1/2018.  He denies any abdominal pain.  He does have complaints of left leg pain mostly to the left hip and buttocks area.  He did undergo a left lower extremity angiogram with placement of kissing stents.  Overall he has done well and pain did resolve but has been having some left foot and ankle swelling since the procedure.  His groins healed.  He is maintained on aspirin, Plavix, and Crestor.  Unfortunately he is a current daily smoker.        Review of Systems   Constitutional: Negative.    HENT: Negative.     Eyes:  Positive for visual disturbance (Double vision intermittently).   Respiratory:  Positive for shortness of breath (on exertion).    Cardiovascular:  Positive for leg swelling (Left). Negative for chest pain.   Gastrointestinal: Negative.    Endocrine: Negative.    Genitourinary: Negative.    Musculoskeletal:  Positive for arthralgias and joint swelling.   Skin: Negative.    Allergic/Immunologic: Negative.    Neurological: Negative.    Hematological: Negative.    Psychiatric/Behavioral: Negative.     All other systems reviewed and are negative.      /74   Pulse 71   Ht 162.6 cm (64\")   Wt 51.7 kg (114 lb)   SpO2 94%   BMI 19.57 kg/m²   Physical Exam  Vitals and nursing note reviewed.   Constitutional:       General: He is not in acute distress.     Appearance: Normal appearance. He is well-developed. He is not " diaphoretic.   HENT:      Head: Normocephalic and atraumatic.   Neck:      Vascular: No carotid bruit or JVD.   Cardiovascular:      Rate and Rhythm: Normal rate and regular rhythm.      Pulses:           Femoral pulses are 2+ on the right side and 2+ on the left side.       Popliteal pulses are 2+ on the right side.        Dorsalis pedis pulses are detected w/ Doppler on the left side.        Posterior tibial pulses are detected w/ Doppler on the left side.      Heart sounds: S1 normal and S2 normal. Murmur heard.      No friction rub. No gallop.      Comments: Groins healed  Pulmonary:      Effort: Pulmonary effort is normal.      Breath sounds: Normal breath sounds.   Abdominal:      General: Bowel sounds are normal. There is no abdominal bruit.      Palpations: Abdomen is soft.      Tenderness: There is no abdominal tenderness.   Musculoskeletal:         General: Normal range of motion.   Skin:     General: Skin is warm and dry.   Neurological:      Mental Status: He is alert and oriented to person, place, and time.      Cranial Nerves: No cranial nerve deficit.   Psychiatric:         Mood and Affect: Mood normal.         Behavior: Behavior normal.         Thought Content: Thought content normal.         Judgment: Judgment normal.         DIAGNOSTIC DATA:  FL C Arm During Surgery    Result Date: 10/30/2023  Narrative: Performed by Dr. Salgado. Please see procedure note.  This report was signed and finalized on 10/30/2023 4:06 PM CDT by Dr. Jl Salgado MD.      IR Angiogram Extremity Bilateral    Result Date: 10/27/2023  Narrative: Performed by Dr. Salgado in OR. Please see operative dictation.  This report was signed and finalized on 10/27/2023 1:12 PM CDT by Dr. Jl Salgado MD.      XR Chest 2 View    Result Date: 10/25/2023  Narrative: EXAM/TECHNIQUE: XR CHEST 2 VW-  INDICATION: preop-LEFT lower extremity angiogram; I73.9-Peripheral vascular disease, unspecified; I74.09-Other arterial embolism and  thrombosis of abdominal aorta; Z01.818-Encounter for other preprocedural examination  COMPARISON: 4/7/2022  FINDINGS:  The cardiac silhouette is normal in size. Prior median sternotomy. Prosthetic cardiac valve and atrial appendage clip. No pleural effusion, pneumothorax, or focal consolidation. Hyperlucent lungs with diffuse interstitial coarsening. No acute osseous finding.      Impression: No acute findings. COPD/emphysema.  This report was signed and finalized on 10/25/2023 3:15 PM CDT by Dr. Tim Langley MD.         Patient Active Problem List   Diagnosis    Hyperlipidemia LDL goal <70    Primary hypertension    COPD (chronic obstructive pulmonary disease)    Transient ischemic attack (TIA)    Allergic reaction    Coronary artery disease of bypass graft of native heart with stable angina pectoris    PVC (premature ventricular contraction)    Leukocytosis    History of CVA (cerebrovascular accident)    Right sided weakness resolved    Acute CVA (cerebrovascular accident)    Right-sided carotid artery disease    S/P CABG x 3 2018 Dr Robin     Status post aortic valve replacement with bioprosthetic valve    LVH (left ventricular hypertrophy)    History of right-sided carotid endarterectomy    Tobacco use    Chronic stable angina    PVD (peripheral vascular disease) with claudication    Aortoiliac occlusive disease    Preop testing    PAD (peripheral artery disease)         ICD-10-CM ICD-9-CM   1. PAD (peripheral artery disease)  I73.9 443.9   2. Aortoiliac occlusive disease  I74.09 444.09   3. Primary hypertension  I10 401.9   4. Hyperlipidemia LDL goal <70  E78.5 272.4   5. Tobacco use  Z72.0 305.1   6. Infrarenal abdominal aortic aneurysm (AAA) without rupture  I71.43 441.4   7. Bilateral carotid artery stenosis  I65.23 433.10     433.30           PLAN: After thoroughly evaluating Sharad Ryder, I believe the best course of action is to remain conservative from vascular surgery standpoint.  Overall he is  doing well status post surgery.  He has some swelling postoperatively which is not unusual.  We will see him back in 6 months with repeat noninvasive testing including ABIs.  We will repeat his CTA of the abdomen pelvis and carotid duplex in 1 year.  Previously his CTA showed his aneurysm slightly bigger with calcified dissection flap however no intervention at that time due to extensive irregular plaquing.   I did discuss vascular risk factors as they pertain to the progression of vascular disease including controlling his hypertension, hyperlipidemia, and smoking cessation.  His blood pressure stable on his current medications.  He should continue taking his aspirin 81 mg daily, Plavix 75 mg daily and Crestor 40 mg daily in addition to his other medications.  He is a current daily smoker.  We did discuss smoking cessation in regards to continued risks and worsening vascular disease.  Smoking is the #1 cause of aneurysm formation and rapid growth.  He would like to quit smoking but has just not been able to for his report.  The patient is to continue taking their medications as previously discussed.   This was all discussed in full with complete understanding.  Thank you for allowing me to participate in the care of your patient.  Please do not hesitate to call with any questions or concerns.  We will keep you aware of any further encounters with Sharad Ryder.      Sincerely Yours,      KIRBY Nolan         No

## 2023-11-17 NOTE — LETTER
November 17, 2023     KIRBY Croft  [address] [facility] [address]    Patient: Sharad Ryder   YOB: 1960   Date of Visit: 11/17/2023     Dear KIRBY Croft:       Thank you for referring Sharad Ryder to me for evaluation. Below are the relevant portions of my assessment and plan of care.    If you have questions, please do not hesitate to call me. I look forward to following Sharad along with you.         Sincerely,        KIRBY Nolan        CC: No Recipients    Lianet Rascon APRN  11/17/23 1152  Sign when Signing Visit  11/17/2023       Nila Alexander APRN  [address] [facility] [address]        Sharad Ryder  1960    Chief Complaint   Patient presents with   • Follow-up     2 week post op. Left Angio 10/27/23. Complains of left foot and ankle swelling since surgery.        Dear Nila Alexander APRN:     I had the pleasure of seeing you patient in the office today for follow up.  As you recall, the patient is a 63 y.o. male who we are currently following for carotid stenosis and a small abdominal aortic aneurysm.    He had a previous right carotid endarterectomy on 2/1/2018.  He denies any abdominal pain.  He does have complaints of left leg pain mostly to the left hip and buttocks area.  He did undergo a left lower extremity angiogram with placement of kissing stents.  Overall he has done well and pain did resolve but has been having some left foot and ankle swelling since the procedure.  His groins healed.  He is maintained on aspirin, Plavix, and Crestor.  Unfortunately he is a current daily smoker.        Review of Systems   Constitutional: Negative.    HENT: Negative.     Eyes:  Positive for visual disturbance (Double vision intermittently).   Respiratory:  Positive for shortness of breath (on exertion).    Cardiovascular:  Positive for leg swelling (Left). Negative for chest pain.   Gastrointestinal: Negative.   "  Endocrine: Negative.    Genitourinary: Negative.    Musculoskeletal:  Positive for arthralgias and joint swelling.   Skin: Negative.    Allergic/Immunologic: Negative.    Neurological: Negative.    Hematological: Negative.    Psychiatric/Behavioral: Negative.     All other systems reviewed and are negative.      /74   Pulse 71   Ht 162.6 cm (64\")   Wt 51.7 kg (114 lb)   SpO2 94%   BMI 19.57 kg/m²   Physical Exam  Vitals and nursing note reviewed.   Constitutional:       General: He is not in acute distress.     Appearance: Normal appearance. He is well-developed. He is not diaphoretic.   HENT:      Head: Normocephalic and atraumatic.   Neck:      Vascular: No carotid bruit or JVD.   Cardiovascular:      Rate and Rhythm: Normal rate and regular rhythm.      Pulses:           Femoral pulses are 2+ on the right side and 2+ on the left side.       Popliteal pulses are 2+ on the right side.        Dorsalis pedis pulses are detected w/ Doppler on the left side.        Posterior tibial pulses are detected w/ Doppler on the left side.      Heart sounds: S1 normal and S2 normal. Murmur heard.      No friction rub. No gallop.      Comments: Groins healed  Pulmonary:      Effort: Pulmonary effort is normal.      Breath sounds: Normal breath sounds.   Abdominal:      General: Bowel sounds are normal. There is no abdominal bruit.      Palpations: Abdomen is soft.      Tenderness: There is no abdominal tenderness.   Musculoskeletal:         General: Normal range of motion.   Skin:     General: Skin is warm and dry.   Neurological:      Mental Status: He is alert and oriented to person, place, and time.      Cranial Nerves: No cranial nerve deficit.   Psychiatric:         Mood and Affect: Mood normal.         Behavior: Behavior normal.         Thought Content: Thought content normal.         Judgment: Judgment normal.         DIAGNOSTIC DATA:  FL C Arm During Surgery    Result Date: 10/30/2023  Narrative: Performed by " Dr. Salgado. Please see procedure note.  This report was signed and finalized on 10/30/2023 4:06 PM CDT by Dr. Jl Salgado MD.      IR Angiogram Extremity Bilateral    Result Date: 10/27/2023  Narrative: Performed by Dr. Salgado in OR. Please see operative dictation.  This report was signed and finalized on 10/27/2023 1:12 PM CDT by Dr. Jl Salgado MD.      XR Chest 2 View    Result Date: 10/25/2023  Narrative: EXAM/TECHNIQUE: XR CHEST 2 VW-  INDICATION: preop-LEFT lower extremity angiogram; I73.9-Peripheral vascular disease, unspecified; I74.09-Other arterial embolism and thrombosis of abdominal aorta; Z01.818-Encounter for other preprocedural examination  COMPARISON: 4/7/2022  FINDINGS:  The cardiac silhouette is normal in size. Prior median sternotomy. Prosthetic cardiac valve and atrial appendage clip. No pleural effusion, pneumothorax, or focal consolidation. Hyperlucent lungs with diffuse interstitial coarsening. No acute osseous finding.      Impression: No acute findings. COPD/emphysema.  This report was signed and finalized on 10/25/2023 3:15 PM CDT by Dr. Tim Langley MD.         Patient Active Problem List   Diagnosis   • Hyperlipidemia LDL goal <70   • Primary hypertension   • COPD (chronic obstructive pulmonary disease)   • Transient ischemic attack (TIA)   • Allergic reaction   • Coronary artery disease of bypass graft of native heart with stable angina pectoris   • PVC (premature ventricular contraction)   • Leukocytosis   • History of CVA (cerebrovascular accident)   • Right sided weakness resolved   • Acute CVA (cerebrovascular accident)   • Right-sided carotid artery disease   • S/P CABG x 3 2018 Dr Robin    • Status post aortic valve replacement with bioprosthetic valve   • LVH (left ventricular hypertrophy)   • History of right-sided carotid endarterectomy   • Tobacco use   • Chronic stable angina   • PVD (peripheral vascular disease) with claudication   • Aortoiliac occlusive  disease   • Preop testing   • PAD (peripheral artery disease)         ICD-10-CM ICD-9-CM   1. PAD (peripheral artery disease)  I73.9 443.9   2. Aortoiliac occlusive disease  I74.09 444.09   3. Primary hypertension  I10 401.9   4. Hyperlipidemia LDL goal <70  E78.5 272.4   5. Tobacco use  Z72.0 305.1   6. Infrarenal abdominal aortic aneurysm (AAA) without rupture  I71.43 441.4   7. Bilateral carotid artery stenosis  I65.23 433.10     433.30           PLAN: After thoroughly evaluating Sharad Ryder, I believe the best course of action is to remain conservative from vascular surgery standpoint.  Overall he is doing well status post surgery.  He has some swelling postoperatively which is not unusual.  We will see him back in 6 months with repeat noninvasive testing including ABIs.  We will repeat his CTA of the abdomen pelvis and carotid duplex in 1 year.  Previously his CTA showed his aneurysm slightly bigger with calcified dissection flap however no intervention at that time due to extensive irregular plaquing.   I did discuss vascular risk factors as they pertain to the progression of vascular disease including controlling his hypertension, hyperlipidemia, and smoking cessation.  His blood pressure stable on his current medications.  He should continue taking his aspirin 81 mg daily, Plavix 75 mg daily and Crestor 40 mg daily in addition to his other medications.  He is a current daily smoker.  We did discuss smoking cessation in regards to continued risks and worsening vascular disease.  Smoking is the #1 cause of aneurysm formation and rapid growth.  He would like to quit smoking but has just not been able to for his report.  The patient is to continue taking their medications as previously discussed.   This was all discussed in full with complete understanding.  Thank you for allowing me to participate in the care of your patient.  Please do not hesitate to call with any questions or concerns.  We will keep you  aware of any further encounters with Sharad Ryder.      Sincerely Yours,      KIRBY Nolan

## 2024-01-22 ENCOUNTER — APPOINTMENT (OUTPATIENT)
Dept: GENERAL RADIOLOGY | Facility: HOSPITAL | Age: 64
DRG: 177 | End: 2024-01-22
Payer: MEDICAID

## 2024-01-22 ENCOUNTER — APPOINTMENT (OUTPATIENT)
Dept: CT IMAGING | Facility: HOSPITAL | Age: 64
DRG: 177 | End: 2024-01-22
Payer: MEDICAID

## 2024-01-22 ENCOUNTER — HOSPITAL ENCOUNTER (INPATIENT)
Facility: HOSPITAL | Age: 64
LOS: 3 days | Discharge: HOME | DRG: 177 | End: 2024-01-26
Attending: EMERGENCY MEDICINE | Admitting: HOSPITALIST
Payer: MEDICAID

## 2024-01-22 DIAGNOSIS — R79.89 TROPONIN I ABOVE REFERENCE RANGE: ICD-10-CM

## 2024-01-22 DIAGNOSIS — J44.1 ACUTE EXACERBATION OF CHRONIC OBSTRUCTIVE PULMONARY DISEASE (COPD): Primary | ICD-10-CM

## 2024-01-22 DIAGNOSIS — U07.1 COVID: ICD-10-CM

## 2024-01-22 LAB
A-A DO2: 47.5 MMHG
ALBUMIN SERPL-MCNC: 4.5 G/DL (ref 3.5–5.2)
ALBUMIN/GLOB SERPL: 1.7 G/DL
ALP SERPL-CCNC: 84 U/L (ref 39–117)
ALT SERPL W P-5'-P-CCNC: 16 U/L (ref 1–41)
ANION GAP SERPL CALCULATED.3IONS-SCNC: 12 MMOL/L (ref 5–15)
ARTERIAL PATENCY WRIST A: ABNORMAL
AST SERPL-CCNC: 26 U/L (ref 1–40)
ATMOSPHERIC PRESS: 757 MMHG
BASE EXCESS BLDA CALC-SCNC: 2.5 MMOL/L (ref 0–2)
BASOPHILS # BLD AUTO: 0.04 10*3/MM3 (ref 0–0.2)
BASOPHILS NFR BLD AUTO: 0.4 % (ref 0–1.5)
BDY SITE: ABNORMAL
BILIRUB SERPL-MCNC: 0.2 MG/DL (ref 0–1.2)
BODY TEMPERATURE: 37
BUN SERPL-MCNC: 13 MG/DL (ref 8–23)
BUN/CREAT SERPL: 16.5 (ref 7–25)
CALCIUM SPEC-SCNC: 9.1 MG/DL (ref 8.6–10.5)
CHLORIDE SERPL-SCNC: 95 MMOL/L (ref 98–107)
CO2 SERPL-SCNC: 25 MMOL/L (ref 22–29)
COHGB MFR BLD: 7.6 % (ref 0–5)
CREAT SERPL-MCNC: 0.79 MG/DL (ref 0.76–1.27)
D DIMER PPP FEU-MCNC: 1.31 MCGFEU/ML (ref 0–0.63)
D-LACTATE SERPL-SCNC: 0.6 MMOL/L (ref 0.5–2)
DEPRECATED RDW RBC AUTO: 46 FL (ref 37–54)
EGFRCR SERPLBLD CKD-EPI 2021: 99.8 ML/MIN/1.73
EOSINOPHIL # BLD AUTO: 0.09 10*3/MM3 (ref 0–0.4)
EOSINOPHIL NFR BLD AUTO: 0.9 % (ref 0.3–6.2)
ERYTHROCYTE [DISTWIDTH] IN BLOOD BY AUTOMATED COUNT: 13.4 % (ref 12.3–15.4)
FLUAV RNA RESP QL NAA+PROBE: NOT DETECTED
FLUBV RNA RESP QL NAA+PROBE: NOT DETECTED
GAS FLOW AIRWAY: 6 LPM
GLOBULIN UR ELPH-MCNC: 2.6 GM/DL
GLUCOSE SERPL-MCNC: 132 MG/DL (ref 65–99)
HCO3 BLDA-SCNC: 26.5 MMOL/L (ref 20–26)
HCT VFR BLD AUTO: 36.6 % (ref 37.5–51)
HCT VFR BLD CALC: 35.7 % (ref 38–51)
HGB BLD-MCNC: 12.2 G/DL (ref 13–17.7)
HGB BLDA-MCNC: 11.6 G/DL (ref 14–18)
HOLD SPECIMEN: NORMAL
HOLD SPECIMEN: NORMAL
IMM GRANULOCYTES # BLD AUTO: 0.03 10*3/MM3 (ref 0–0.05)
IMM GRANULOCYTES NFR BLD AUTO: 0.3 % (ref 0–0.5)
INR PPP: 1.1 (ref 0.91–1.09)
LYMPHOCYTES # BLD AUTO: 0.53 10*3/MM3 (ref 0.7–3.1)
LYMPHOCYTES NFR BLD AUTO: 5.2 % (ref 19.6–45.3)
Lab: ABNORMAL
MCH RBC QN AUTO: 30.7 PG (ref 26.6–33)
MCHC RBC AUTO-ENTMCNC: 33.3 G/DL (ref 31.5–35.7)
MCV RBC AUTO: 92 FL (ref 79–97)
METHGB BLD QL: 0.6 % (ref 0–3)
MODALITY: ABNORMAL
MONOCYTES # BLD AUTO: 1.04 10*3/MM3 (ref 0.1–0.9)
MONOCYTES NFR BLD AUTO: 10.3 % (ref 5–12)
NEUTROPHILS NFR BLD AUTO: 8.41 10*3/MM3 (ref 1.7–7)
NEUTROPHILS NFR BLD AUTO: 82.9 % (ref 42.7–76)
NRBC BLD AUTO-RTO: 0 /100 WBC (ref 0–0.2)
NT-PROBNP SERPL-MCNC: 3578 PG/ML (ref 0–900)
OXYHGB MFR BLDV: 85 % (ref 94–99)
PCO2 BLDA: 37.7 MM HG (ref 35–45)
PCO2 TEMP ADJ BLD: 37.7 MM HG (ref 35–45)
PH BLDA: 7.46 PH UNITS (ref 7.35–7.45)
PH, TEMP CORRECTED: 7.46 PH UNITS (ref 7.35–7.45)
PLATELET # BLD AUTO: 225 10*3/MM3 (ref 140–450)
PMV BLD AUTO: 9.9 FL (ref 6–12)
PO2 BLDA: 59 MM HG (ref 83–108)
PO2 TEMP ADJ BLD: 59 MM HG (ref 83–108)
POTASSIUM BLDA-SCNC: 3.7 MMOL/L (ref 3.5–5.2)
POTASSIUM SERPL-SCNC: 3.9 MMOL/L (ref 3.5–5.2)
PROT SERPL-MCNC: 7.1 G/DL (ref 6–8.5)
PROTHROMBIN TIME: 14.4 SECONDS (ref 11.8–14.8)
RBC # BLD AUTO: 3.98 10*6/MM3 (ref 4.14–5.8)
SAO2 % BLDCOA: 92.6 % (ref 94–99)
SARS-COV-2 RNA RESP QL NAA+PROBE: DETECTED
SODIUM BLDA-SCNC: 132 MMOL/L (ref 136–145)
SODIUM SERPL-SCNC: 132 MMOL/L (ref 136–145)
TROPONIN T SERPL HS-MCNC: 68 NG/L
VENTILATOR MODE: ABNORMAL
WBC NRBC COR # BLD AUTO: 10.14 10*3/MM3 (ref 3.4–10.8)
WHOLE BLOOD HOLD COAG: NORMAL
WHOLE BLOOD HOLD SPECIMEN: NORMAL

## 2024-01-22 PROCEDURE — 25010000002 CEFTRIAXONE PER 250 MG: Performed by: EMERGENCY MEDICINE

## 2024-01-22 PROCEDURE — 85610 PROTHROMBIN TIME: CPT | Performed by: EMERGENCY MEDICINE

## 2024-01-22 PROCEDURE — 25810000003 SODIUM CHLORIDE 0.9 % SOLUTION 250 ML FLEX CONT: Performed by: EMERGENCY MEDICINE

## 2024-01-22 PROCEDURE — 25510000001 IOPAMIDOL PER 1 ML: Performed by: EMERGENCY MEDICINE

## 2024-01-22 PROCEDURE — 71275 CT ANGIOGRAPHY CHEST: CPT

## 2024-01-22 PROCEDURE — 83605 ASSAY OF LACTIC ACID: CPT | Performed by: EMERGENCY MEDICINE

## 2024-01-22 PROCEDURE — 36415 COLL VENOUS BLD VENIPUNCTURE: CPT

## 2024-01-22 PROCEDURE — 93005 ELECTROCARDIOGRAM TRACING: CPT | Performed by: EMERGENCY MEDICINE

## 2024-01-22 PROCEDURE — 87040 BLOOD CULTURE FOR BACTERIA: CPT | Performed by: EMERGENCY MEDICINE

## 2024-01-22 PROCEDURE — 82375 ASSAY CARBOXYHB QUANT: CPT

## 2024-01-22 PROCEDURE — 85025 COMPLETE CBC W/AUTO DIFF WBC: CPT | Performed by: EMERGENCY MEDICINE

## 2024-01-22 PROCEDURE — 87636 SARSCOV2 & INF A&B AMP PRB: CPT | Performed by: EMERGENCY MEDICINE

## 2024-01-22 PROCEDURE — 36600 WITHDRAWAL OF ARTERIAL BLOOD: CPT

## 2024-01-22 PROCEDURE — 83880 ASSAY OF NATRIURETIC PEPTIDE: CPT | Performed by: EMERGENCY MEDICINE

## 2024-01-22 PROCEDURE — 82805 BLOOD GASES W/O2 SATURATION: CPT

## 2024-01-22 PROCEDURE — 71045 X-RAY EXAM CHEST 1 VIEW: CPT

## 2024-01-22 PROCEDURE — 84484 ASSAY OF TROPONIN QUANT: CPT | Performed by: EMERGENCY MEDICINE

## 2024-01-22 PROCEDURE — 83050 HGB METHEMOGLOBIN QUAN: CPT

## 2024-01-22 PROCEDURE — 80053 COMPREHEN METABOLIC PANEL: CPT | Performed by: EMERGENCY MEDICINE

## 2024-01-22 PROCEDURE — 85379 FIBRIN DEGRADATION QUANT: CPT | Performed by: EMERGENCY MEDICINE

## 2024-01-22 PROCEDURE — 99285 EMERGENCY DEPT VISIT HI MDM: CPT

## 2024-01-22 PROCEDURE — 25010000002 AZITHROMYCIN PER 500 MG: Performed by: EMERGENCY MEDICINE

## 2024-01-22 PROCEDURE — 93010 ELECTROCARDIOGRAM REPORT: CPT | Performed by: EMERGENCY MEDICINE

## 2024-01-22 RX ORDER — ACETAMINOPHEN 500 MG
1000 TABLET ORAL ONCE
Status: COMPLETED | OUTPATIENT
Start: 2024-01-22 | End: 2024-01-22

## 2024-01-22 RX ORDER — SODIUM CHLORIDE 0.9 % (FLUSH) 0.9 %
10 SYRINGE (ML) INJECTION AS NEEDED
Status: DISCONTINUED | OUTPATIENT
Start: 2024-01-22 | End: 2024-01-26 | Stop reason: HOSPADM

## 2024-01-22 RX ADMIN — AZITHROMYCIN MONOHYDRATE 500 MG: 500 INJECTION, POWDER, LYOPHILIZED, FOR SOLUTION INTRAVENOUS at 23:41

## 2024-01-22 RX ADMIN — ACETAMINOPHEN TAB 500 MG 1000 MG: 500 TAB at 23:04

## 2024-01-22 RX ADMIN — IOPAMIDOL 100 ML: 755 INJECTION, SOLUTION INTRAVENOUS at 23:28

## 2024-01-22 RX ADMIN — CEFTRIAXONE 1000 MG: 1 INJECTION, POWDER, FOR SOLUTION INTRAMUSCULAR; INTRAVENOUS at 23:06

## 2024-01-23 ENCOUNTER — APPOINTMENT (OUTPATIENT)
Dept: CARDIOLOGY | Facility: HOSPITAL | Age: 64
DRG: 177 | End: 2024-01-23
Payer: MEDICAID

## 2024-01-23 PROBLEM — J44.1 COPD EXACERBATION: Status: ACTIVE | Noted: 2024-01-23

## 2024-01-23 PROBLEM — U07.1 COVID-19 VIRUS INFECTION: Status: ACTIVE | Noted: 2024-01-23

## 2024-01-23 PROBLEM — I21.4 NSTEMI (NON-ST ELEVATED MYOCARDIAL INFARCTION): Status: ACTIVE | Noted: 2024-01-23

## 2024-01-23 PROBLEM — J44.1 ACUTE EXACERBATION OF CHRONIC OBSTRUCTIVE PULMONARY DISEASE (COPD): Status: ACTIVE | Noted: 2024-01-23

## 2024-01-23 LAB
ALBUMIN SERPL-MCNC: 4.1 G/DL (ref 3.5–5.2)
ALBUMIN/GLOB SERPL: 1.6 G/DL
ALP SERPL-CCNC: 88 U/L (ref 39–117)
ALT SERPL W P-5'-P-CCNC: 28 U/L (ref 1–41)
ANION GAP SERPL CALCULATED.3IONS-SCNC: 11 MMOL/L (ref 5–15)
ARTERIAL PATENCY WRIST A: ABNORMAL
AST SERPL-CCNC: 43 U/L (ref 1–40)
ATMOSPHERIC PRESS: 756 MMHG
BASE EXCESS BLDA CALC-SCNC: 1.4 MMOL/L (ref 0–2)
BASOPHILS # BLD AUTO: 0.02 10*3/MM3 (ref 0–0.2)
BASOPHILS NFR BLD AUTO: 0.3 % (ref 0–1.5)
BDY SITE: ABNORMAL
BH CV ECHO LEFT VENTRICLE GLOBAL LONGITUDINAL STRAIN: -16.5 %
BH CV ECHO MEAS - AO MAX PG: 56.6 MMHG
BH CV ECHO MEAS - AO MEAN PG: 28.3 MMHG
BH CV ECHO MEAS - AO ROOT DIAM: 2.7 CM
BH CV ECHO MEAS - AO V2 MAX: 376 CM/SEC
BH CV ECHO MEAS - AO V2 VTI: 79.9 CM
BH CV ECHO MEAS - AVA(I,D): 0.73 CM2
BH CV ECHO MEAS - EDV(CUBED): 35.9 ML
BH CV ECHO MEAS - EDV(MOD-SP4): 73.2 ML
BH CV ECHO MEAS - EF(MOD-SP4): 67.1 %
BH CV ECHO MEAS - ESV(CUBED): 4.1 ML
BH CV ECHO MEAS - ESV(MOD-SP4): 24.1 ML
BH CV ECHO MEAS - FS: 51.5 %
BH CV ECHO MEAS - IVS/LVPW: 0.72 CM
BH CV ECHO MEAS - IVSD: 1.3 CM
BH CV ECHO MEAS - LA DIMENSION: 4.3 CM
BH CV ECHO MEAS - LAT PEAK E' VEL: 3.3 CM/SEC
BH CV ECHO MEAS - LV DIASTOLIC VOL/BSA (35-75): 45.7 CM2
BH CV ECHO MEAS - LV MASS(C)D: 188.8 GRAMS
BH CV ECHO MEAS - LV MAX PG: 11.1 MMHG
BH CV ECHO MEAS - LV MEAN PG: 6 MMHG
BH CV ECHO MEAS - LV SYSTOLIC VOL/BSA (12-30): 15 CM2
BH CV ECHO MEAS - LV V1 MAX: 166.5 CM/SEC
BH CV ECHO MEAS - LV V1 VTI: 29.1 CM
BH CV ECHO MEAS - LVIDD: 3.3 CM
BH CV ECHO MEAS - LVIDS: 1.6 CM
BH CV ECHO MEAS - LVOT AREA: 2.01 CM2
BH CV ECHO MEAS - LVOT DIAM: 1.6 CM
BH CV ECHO MEAS - LVPWD: 1.8 CM
BH CV ECHO MEAS - MED PEAK E' VEL: 5.3 CM/SEC
BH CV ECHO MEAS - MV A MAX VEL: 118 CM/SEC
BH CV ECHO MEAS - MV DEC SLOPE: 559 CM/SEC2
BH CV ECHO MEAS - MV DEC TIME: 0.24 SEC
BH CV ECHO MEAS - MV E MAX VEL: 174 CM/SEC
BH CV ECHO MEAS - MV E/A: 1.47
BH CV ECHO MEAS - MV MAX PG: 12.4 MMHG
BH CV ECHO MEAS - MV MEAN PG: 6 MMHG
BH CV ECHO MEAS - MV P1/2T: 96.9 MSEC
BH CV ECHO MEAS - MV V2 VTI: 54.7 CM
BH CV ECHO MEAS - MVA(P1/2T): 2.27 CM2
BH CV ECHO MEAS - MVA(VTI): 1.07 CM2
BH CV ECHO MEAS - SI(MOD-SP4): 30.7 ML/M2
BH CV ECHO MEAS - SV(LVOT): 58.5 ML
BH CV ECHO MEAS - SV(MOD-SP4): 49.1 ML
BH CV ECHO MEASUREMENTS AVERAGE E/E' RATIO: 40.47
BILIRUB SERPL-MCNC: 0.2 MG/DL (ref 0–1.2)
BODY TEMPERATURE: 37
BUN SERPL-MCNC: 14 MG/DL (ref 8–23)
BUN/CREAT SERPL: 25 (ref 7–25)
CALCIUM SPEC-SCNC: 9 MG/DL (ref 8.6–10.5)
CHLORIDE SERPL-SCNC: 103 MMOL/L (ref 98–107)
CO2 SERPL-SCNC: 25 MMOL/L (ref 22–29)
CREAT SERPL-MCNC: 0.56 MG/DL (ref 0.76–1.27)
DEPRECATED RDW RBC AUTO: 46.2 FL (ref 37–54)
EGFRCR SERPLBLD CKD-EPI 2021: 110.8 ML/MIN/1.73
EOSINOPHIL # BLD AUTO: 0.01 10*3/MM3 (ref 0–0.4)
EOSINOPHIL NFR BLD AUTO: 0.1 % (ref 0.3–6.2)
ERYTHROCYTE [DISTWIDTH] IN BLOOD BY AUTOMATED COUNT: 13.4 % (ref 12.3–15.4)
GAS FLOW AIRWAY: 6 LPM
GEN 5 2HR TROPONIN T REFLEX: 105 NG/L
GLOBULIN UR ELPH-MCNC: 2.6 GM/DL
GLUCOSE SERPL-MCNC: 102 MG/DL (ref 65–99)
HCO3 BLDA-SCNC: 26.7 MMOL/L (ref 20–26)
HCT VFR BLD AUTO: 37.4 % (ref 37.5–51)
HGB BLD-MCNC: 12 G/DL (ref 13–17.7)
IMM GRANULOCYTES # BLD AUTO: 0.03 10*3/MM3 (ref 0–0.05)
IMM GRANULOCYTES NFR BLD AUTO: 0.4 % (ref 0–0.5)
LEFT ATRIUM VOLUME INDEX: 47.5 ML/M2
LEFT ATRIUM VOLUME: 76 ML
LYMPHOCYTES # BLD AUTO: 1.09 10*3/MM3 (ref 0.7–3.1)
LYMPHOCYTES NFR BLD AUTO: 15.1 % (ref 19.6–45.3)
Lab: ABNORMAL
MCH RBC QN AUTO: 29.8 PG (ref 26.6–33)
MCHC RBC AUTO-ENTMCNC: 32.1 G/DL (ref 31.5–35.7)
MCV RBC AUTO: 92.8 FL (ref 79–97)
MODALITY: ABNORMAL
MONOCYTES # BLD AUTO: 0.97 10*3/MM3 (ref 0.1–0.9)
MONOCYTES NFR BLD AUTO: 13.4 % (ref 5–12)
NEUTROPHILS NFR BLD AUTO: 5.1 10*3/MM3 (ref 1.7–7)
NEUTROPHILS NFR BLD AUTO: 70.7 % (ref 42.7–76)
NRBC BLD AUTO-RTO: 0 /100 WBC (ref 0–0.2)
PCO2 BLDA: 43.8 MM HG (ref 35–45)
PCO2 TEMP ADJ BLD: 43.8 MM HG (ref 35–45)
PH BLDA: 7.39 PH UNITS (ref 7.35–7.45)
PH, TEMP CORRECTED: 7.39 PH UNITS (ref 7.35–7.45)
PLATELET # BLD AUTO: 225 10*3/MM3 (ref 140–450)
PMV BLD AUTO: 9.9 FL (ref 6–12)
PO2 BLDA: 80.5 MM HG (ref 83–108)
PO2 TEMP ADJ BLD: 80.5 MM HG (ref 83–108)
POTASSIUM SERPL-SCNC: 3.6 MMOL/L (ref 3.5–5.2)
PROT SERPL-MCNC: 6.7 G/DL (ref 6–8.5)
QT INTERVAL: 318 MS
QTC INTERVAL: 449 MS
RBC # BLD AUTO: 4.03 10*6/MM3 (ref 4.14–5.8)
SAO2 % BLDCOA: 95.6 % (ref 94–99)
SODIUM SERPL-SCNC: 139 MMOL/L (ref 136–145)
TROPONIN T DELTA: 37 NG/L
TROPONIN T SERPL HS-MCNC: 217 NG/L
VENTILATOR MODE: ABNORMAL
WBC NRBC COR # BLD AUTO: 7.22 10*3/MM3 (ref 3.4–10.8)

## 2024-01-23 PROCEDURE — 84484 ASSAY OF TROPONIN QUANT: CPT | Performed by: FAMILY MEDICINE

## 2024-01-23 PROCEDURE — 85025 COMPLETE CBC W/AUTO DIFF WBC: CPT | Performed by: FAMILY MEDICINE

## 2024-01-23 PROCEDURE — 80053 COMPREHEN METABOLIC PANEL: CPT | Performed by: FAMILY MEDICINE

## 2024-01-23 PROCEDURE — 93306 TTE W/DOPPLER COMPLETE: CPT | Performed by: INTERNAL MEDICINE

## 2024-01-23 PROCEDURE — 93356 MYOCRD STRAIN IMG SPCKL TRCK: CPT

## 2024-01-23 PROCEDURE — 94640 AIRWAY INHALATION TREATMENT: CPT

## 2024-01-23 PROCEDURE — 94799 UNLISTED PULMONARY SVC/PX: CPT

## 2024-01-23 PROCEDURE — 25010000002 ENOXAPARIN PER 10 MG: Performed by: FAMILY MEDICINE

## 2024-01-23 PROCEDURE — 82803 BLOOD GASES ANY COMBINATION: CPT

## 2024-01-23 PROCEDURE — 93306 TTE W/DOPPLER COMPLETE: CPT

## 2024-01-23 PROCEDURE — 36600 WITHDRAWAL OF ARTERIAL BLOOD: CPT

## 2024-01-23 PROCEDURE — 94761 N-INVAS EAR/PLS OXIMETRY MLT: CPT

## 2024-01-23 PROCEDURE — 84484 ASSAY OF TROPONIN QUANT: CPT | Performed by: EMERGENCY MEDICINE

## 2024-01-23 PROCEDURE — 93356 MYOCRD STRAIN IMG SPCKL TRCK: CPT | Performed by: INTERNAL MEDICINE

## 2024-01-23 PROCEDURE — 25010000002 DEXAMETHASONE PER 1 MG: Performed by: FAMILY MEDICINE

## 2024-01-23 RX ORDER — CLONIDINE HYDROCHLORIDE 0.1 MG/1
0.1 TABLET ORAL 2 TIMES DAILY PRN
COMMUNITY

## 2024-01-23 RX ORDER — NITROGLYCERIN 0.4 MG/1
0.4 TABLET SUBLINGUAL
Status: DISCONTINUED | OUTPATIENT
Start: 2024-01-23 | End: 2024-01-23

## 2024-01-23 RX ORDER — BUSPIRONE HYDROCHLORIDE 5 MG/1
5 TABLET ORAL 2 TIMES DAILY
Status: DISCONTINUED | OUTPATIENT
Start: 2024-01-23 | End: 2024-01-26 | Stop reason: HOSPADM

## 2024-01-23 RX ORDER — BISACODYL 10 MG
10 SUPPOSITORY, RECTAL RECTAL DAILY PRN
Status: DISCONTINUED | OUTPATIENT
Start: 2024-01-23 | End: 2024-01-26 | Stop reason: HOSPADM

## 2024-01-23 RX ORDER — GABAPENTIN 400 MG/1
400 CAPSULE ORAL 3 TIMES DAILY
Status: DISCONTINUED | OUTPATIENT
Start: 2024-01-23 | End: 2024-01-26 | Stop reason: HOSPADM

## 2024-01-23 RX ORDER — GUAIFENESIN 600 MG/1
600 TABLET, EXTENDED RELEASE ORAL EVERY 12 HOURS SCHEDULED
Status: DISCONTINUED | OUTPATIENT
Start: 2024-01-23 | End: 2024-01-26 | Stop reason: HOSPADM

## 2024-01-23 RX ORDER — ASPIRIN 81 MG/1
81 TABLET ORAL DAILY
Status: DISCONTINUED | OUTPATIENT
Start: 2024-01-23 | End: 2024-01-26 | Stop reason: HOSPADM

## 2024-01-23 RX ORDER — ALBUTEROL SULFATE 90 UG/1
2 AEROSOL, METERED RESPIRATORY (INHALATION) EVERY 6 HOURS PRN
Status: DISCONTINUED | OUTPATIENT
Start: 2024-01-23 | End: 2024-01-26 | Stop reason: HOSPADM

## 2024-01-23 RX ORDER — ACETAMINOPHEN 650 MG/1
650 SUPPOSITORY RECTAL EVERY 4 HOURS PRN
Status: DISCONTINUED | OUTPATIENT
Start: 2024-01-23 | End: 2024-01-26 | Stop reason: HOSPADM

## 2024-01-23 RX ORDER — BISACODYL 5 MG/1
5 TABLET, DELAYED RELEASE ORAL DAILY PRN
Status: DISCONTINUED | OUTPATIENT
Start: 2024-01-23 | End: 2024-01-26 | Stop reason: HOSPADM

## 2024-01-23 RX ORDER — AMOXICILLIN 250 MG
2 CAPSULE ORAL 2 TIMES DAILY
Status: DISCONTINUED | OUTPATIENT
Start: 2024-01-23 | End: 2024-01-26 | Stop reason: HOSPADM

## 2024-01-23 RX ORDER — ENOXAPARIN SODIUM 100 MG/ML
40 INJECTION SUBCUTANEOUS DAILY
Status: DISCONTINUED | OUTPATIENT
Start: 2024-01-23 | End: 2024-01-26 | Stop reason: HOSPADM

## 2024-01-23 RX ORDER — AMLODIPINE BESYLATE 10 MG/1
10 TABLET ORAL DAILY
COMMUNITY

## 2024-01-23 RX ORDER — METOPROLOL SUCCINATE 50 MG/1
50 TABLET, EXTENDED RELEASE ORAL
Status: DISCONTINUED | OUTPATIENT
Start: 2024-01-23 | End: 2024-01-26 | Stop reason: HOSPADM

## 2024-01-23 RX ORDER — DEXTROMETHORPHAN POLISTIREX 30 MG/5ML
60 SUSPENSION ORAL EVERY 12 HOURS PRN
Status: DISCONTINUED | OUTPATIENT
Start: 2024-01-23 | End: 2024-01-26 | Stop reason: HOSPADM

## 2024-01-23 RX ORDER — ACETAMINOPHEN 325 MG/1
650 TABLET ORAL EVERY 4 HOURS PRN
Status: DISCONTINUED | OUTPATIENT
Start: 2024-01-23 | End: 2024-01-26 | Stop reason: HOSPADM

## 2024-01-23 RX ORDER — HYDROCODONE BITARTRATE AND ACETAMINOPHEN 7.5; 325 MG/1; MG/1
1 TABLET ORAL ONCE
Status: COMPLETED | OUTPATIENT
Start: 2024-01-23 | End: 2024-01-23

## 2024-01-23 RX ORDER — HYDROCODONE BITARTRATE AND ACETAMINOPHEN 7.5; 325 MG/1; MG/1
1 TABLET ORAL EVERY 6 HOURS PRN
Status: DISCONTINUED | OUTPATIENT
Start: 2024-01-23 | End: 2024-01-26 | Stop reason: HOSPADM

## 2024-01-23 RX ORDER — SODIUM CHLORIDE 9 MG/ML
40 INJECTION, SOLUTION INTRAVENOUS AS NEEDED
Status: DISCONTINUED | OUTPATIENT
Start: 2024-01-23 | End: 2024-01-26 | Stop reason: HOSPADM

## 2024-01-23 RX ORDER — ROSUVASTATIN CALCIUM 20 MG/1
40 TABLET, COATED ORAL DAILY
Status: DISCONTINUED | OUTPATIENT
Start: 2024-01-23 | End: 2024-01-26 | Stop reason: HOSPADM

## 2024-01-23 RX ORDER — TAMSULOSIN HYDROCHLORIDE 0.4 MG/1
0.4 CAPSULE ORAL DAILY
Status: DISCONTINUED | OUTPATIENT
Start: 2024-01-23 | End: 2024-01-26 | Stop reason: HOSPADM

## 2024-01-23 RX ORDER — SODIUM CHLORIDE 0.9 % (FLUSH) 0.9 %
10 SYRINGE (ML) INJECTION AS NEEDED
Status: DISCONTINUED | OUTPATIENT
Start: 2024-01-23 | End: 2024-01-26 | Stop reason: HOSPADM

## 2024-01-23 RX ORDER — SODIUM CHLORIDE 0.9 % (FLUSH) 0.9 %
10 SYRINGE (ML) INJECTION EVERY 12 HOURS SCHEDULED
Status: DISCONTINUED | OUTPATIENT
Start: 2024-01-23 | End: 2024-01-26 | Stop reason: HOSPADM

## 2024-01-23 RX ORDER — AMLODIPINE BESYLATE 10 MG/1
10 TABLET ORAL DAILY
Status: DISCONTINUED | OUTPATIENT
Start: 2024-01-23 | End: 2024-01-26 | Stop reason: HOSPADM

## 2024-01-23 RX ORDER — ISOSORBIDE MONONITRATE 60 MG/1
120 TABLET, EXTENDED RELEASE ORAL DAILY
Status: DISCONTINUED | OUTPATIENT
Start: 2024-01-23 | End: 2024-01-26 | Stop reason: HOSPADM

## 2024-01-23 RX ORDER — ALBUTEROL SULFATE 2.5 MG/3ML
2.5 SOLUTION RESPIRATORY (INHALATION) EVERY 6 HOURS PRN
Status: DISCONTINUED | OUTPATIENT
Start: 2024-01-23 | End: 2024-01-23

## 2024-01-23 RX ORDER — NITROGLYCERIN 0.4 MG/1
0.4 TABLET SUBLINGUAL
Status: DISCONTINUED | OUTPATIENT
Start: 2024-01-23 | End: 2024-01-23 | Stop reason: SDUPTHER

## 2024-01-23 RX ORDER — POLYETHYLENE GLYCOL 3350 17 G/17G
17 POWDER, FOR SOLUTION ORAL DAILY PRN
Status: DISCONTINUED | OUTPATIENT
Start: 2024-01-23 | End: 2024-01-26 | Stop reason: HOSPADM

## 2024-01-23 RX ORDER — CITALOPRAM 20 MG/1
20 TABLET ORAL DAILY
Status: DISCONTINUED | OUTPATIENT
Start: 2024-01-23 | End: 2024-01-26 | Stop reason: HOSPADM

## 2024-01-23 RX ORDER — CLOPIDOGREL BISULFATE 75 MG/1
75 TABLET ORAL DAILY
Status: DISCONTINUED | OUTPATIENT
Start: 2024-01-23 | End: 2024-01-26 | Stop reason: HOSPADM

## 2024-01-23 RX ORDER — DEXAMETHASONE SODIUM PHOSPHATE 4 MG/ML
6 INJECTION, SOLUTION INTRA-ARTICULAR; INTRALESIONAL; INTRAMUSCULAR; INTRAVENOUS; SOFT TISSUE DAILY
Status: DISCONTINUED | OUTPATIENT
Start: 2024-01-23 | End: 2024-01-26 | Stop reason: HOSPADM

## 2024-01-23 RX ORDER — TERAZOSIN 5 MG/1
5 CAPSULE ORAL NIGHTLY
COMMUNITY

## 2024-01-23 RX ADMIN — METOPROLOL SUCCINATE 50 MG: 50 TABLET, EXTENDED RELEASE ORAL at 08:50

## 2024-01-23 RX ADMIN — ALBUTEROL SULFATE 2 PUFF: 90 AEROSOL, METERED RESPIRATORY (INHALATION) at 07:03

## 2024-01-23 RX ADMIN — TAMSULOSIN HYDROCHLORIDE 0.4 MG: 0.4 CAPSULE ORAL at 08:50

## 2024-01-23 RX ADMIN — ASPIRIN 81 MG: 81 TABLET, COATED ORAL at 08:50

## 2024-01-23 RX ADMIN — CLOPIDOGREL BISULFATE 75 MG: 75 TABLET, FILM COATED ORAL at 08:50

## 2024-01-23 RX ADMIN — DOCUSATE SODIUM 50 MG AND SENNOSIDES 8.6 MG 2 TABLET: 8.6; 5 TABLET, FILM COATED ORAL at 21:25

## 2024-01-23 RX ADMIN — BUSPIRONE HYDROCHLORIDE 5 MG: 5 TABLET ORAL at 08:50

## 2024-01-23 RX ADMIN — DEXAMETHASONE SODIUM PHOSPHATE 6 MG: 4 INJECTION INTRA-ARTICULAR; INTRALESIONAL; INTRAMUSCULAR; INTRAVENOUS; SOFT TISSUE at 08:50

## 2024-01-23 RX ADMIN — AMLODIPINE BESYLATE 10 MG: 10 TABLET ORAL at 08:50

## 2024-01-23 RX ADMIN — IPRATROPIUM BROMIDE 2 PUFF: 17 AEROSOL, METERED RESPIRATORY (INHALATION) at 18:44

## 2024-01-23 RX ADMIN — GABAPENTIN 400 MG: 400 CAPSULE ORAL at 21:25

## 2024-01-23 RX ADMIN — CITALOPRAM 20 MG: 20 TABLET, FILM COATED ORAL at 08:50

## 2024-01-23 RX ADMIN — HYDROCODONE BITARTRATE AND ACETAMINOPHEN 1 TABLET: 7.5; 325 TABLET ORAL at 08:58

## 2024-01-23 RX ADMIN — ROSUVASTATIN CALCIUM 40 MG: 20 TABLET, FILM COATED ORAL at 08:50

## 2024-01-23 RX ADMIN — DOCUSATE SODIUM 50 MG AND SENNOSIDES 8.6 MG 2 TABLET: 8.6; 5 TABLET, FILM COATED ORAL at 08:50

## 2024-01-23 RX ADMIN — Medication 10 ML: at 21:26

## 2024-01-23 RX ADMIN — GABAPENTIN 400 MG: 400 CAPSULE ORAL at 08:50

## 2024-01-23 RX ADMIN — ISOSORBIDE MONONITRATE 120 MG: 60 TABLET, EXTENDED RELEASE ORAL at 08:50

## 2024-01-23 RX ADMIN — ENOXAPARIN SODIUM 40 MG: 100 INJECTION SUBCUTANEOUS at 06:28

## 2024-01-23 RX ADMIN — GUAIFENESIN 600 MG: 600 TABLET, EXTENDED RELEASE ORAL at 08:50

## 2024-01-23 RX ADMIN — GABAPENTIN 400 MG: 400 CAPSULE ORAL at 15:57

## 2024-01-23 RX ADMIN — Medication 10 ML: at 08:51

## 2024-01-23 RX ADMIN — IPRATROPIUM BROMIDE 2 PUFF: 17 AEROSOL, METERED RESPIRATORY (INHALATION) at 11:44

## 2024-01-23 RX ADMIN — IPRATROPIUM BROMIDE 2 PUFF: 17 AEROSOL, METERED RESPIRATORY (INHALATION) at 14:26

## 2024-01-23 RX ADMIN — GUAIFENESIN 600 MG: 600 TABLET, EXTENDED RELEASE ORAL at 21:25

## 2024-01-23 RX ADMIN — HYDROCODONE BITARTRATE AND ACETAMINOPHEN 1 TABLET: 7.5; 325 TABLET ORAL at 00:40

## 2024-01-23 RX ADMIN — IPRATROPIUM BROMIDE 2 PUFF: 17 AEROSOL, METERED RESPIRATORY (INHALATION) at 07:07

## 2024-01-23 RX ADMIN — HYDROCODONE BITARTRATE AND ACETAMINOPHEN 1 TABLET: 7.5; 325 TABLET ORAL at 18:55

## 2024-01-23 NOTE — H&P
[facility] Medicine Services  HISTORY AND PHYSICAL    Date of Admission: 1/22/2024  Primary Care Physician: Nila Alexander APRN    Subjective   Primary Historian: Patient    Chief Complaint: Shortness of breath    History of Present Illness  63 year old male with PMH of CAD/CABG, COPD, Stroke, carotid artery disease, aortic aneurysm, HTN, that presents to the ER with complaints of shortness of breath for 2 nights, worsened tonight. He presents with oxygen saturation of 84% on room air and tachypnea, respiratory distress. Has required 6 lpm to control dyspnea. Afebrile, WBC is 10, COVID 19 positive, CTA chest shows marked pulmonary emphysema and bibasilar atelectasis.     Review of Systems   Otherwise complete ROS reviewed and negative except as mentioned in the HPI.    Past Medical History:   Past Medical History:   Diagnosis Date    Aneurysm     Anxiety     Arthritis     Carotid artery disease     Carotid stenosis, right 02/01/2018    Post right carotid endarterectomy    COPD (chronic obstructive pulmonary disease)     Coronary artery disease     Heart murmur     History of right-sided carotid endarterectomy 02/16/2022    Hyperlipidemia LDL goal <70 12/14/2017    Left frontal lobe, right thalamus and right occipital CVA (cerebrovascular accident) (HCC) 01/27/2022    LVH (left ventricular hypertrophy) 02/16/2022    Pneumonia     Primary hypertension 12/14/2017    S/P CABG x 3 02/16/2022    LIMA to LAD, SVG to PDA and SVG to diagonal branch with TMR and left atrial appendage exclusion with atricure clip device 2/8/2018 per Dr. Cj Robin at [facility]     Severe aortic valve stenosis 12/14/2017    Status post aortic valve replacement with bioprosthetic valve 02/16/2022    Graciela Juarez bovine pericardial 19 mm valve 2/8/2018 per Dr. Cj Robin at [facility]    Tobacco use 12/14/2017    Wears glasses      Past Surgical History:  Past  Surgical History:   Procedure Laterality Date    AORTAGRAM Bilateral 10/27/2023    Procedure: LEFT LOWER EXTREMITY ANGIOGRAM, BILATERAL ILIAC BALLOON ANGIOPLASTY, BILATERAL ILIAC STENT PLACEMENT, MYNX CLOSURE;  Surgeon: Jl Salgado DO;  Location:  [facility] HYBRID OR 12;  Service: Vascular;  Laterality: Bilateral;    APPENDECTOMY      CARDIAC CATHETERIZATION N/A 12/18/2017    Procedure: Left Heart Cath;  Surgeon: Aime Francois MD;  Location:  [facility] CATH INVASIVE LOCATION;  Service:     CARDIAC CATHETERIZATION N/A 12/18/2017    Procedure: Right Heart Cath;  Surgeon: Aime Francois MD;  Location:  [facility] CATH INVASIVE LOCATION;  Service:     CARDIAC CATHETERIZATION Bilateral 1/4/2018    Procedure: Coronary angiography;  Surgeon: Alfonso Yi MD;  Location:  [facility] CATH INVASIVE LOCATION;  Service:     CARDIAC CATHETERIZATION Left 9/15/2021    Procedure: Cardiac Catheterization/Vascular Study NIMCO OK  Has 3 graft 2018;  Surgeon: Aime Francois MD;  Location:  [facility] CATH INVASIVE LOCATION;  Service: Cardiology;  Laterality: Left;    CAROTID ENDARTERECTOMY Right 2/1/2018    Procedure: RIGHT CAROTID ENDARTERECTOMY WITH EEG-awake;  Surgeon: Jl Salgado DO;  Location:  [facility] OR;  Service:     CORONARY ARTERY BYPASS GRAFT WITH AORTIC VALVE REPAIR/REPLACEMENT N/A 2/8/2018    Procedure: AORTIC VALVE REPLACEMENT,CORONARY ARTERY BYPASS GRAFTING x 3  WITH CONCHA AND RIGHT EVH, ATRIAL APPENDAGE EXCLUSION LEFT WITH TRANSESOPHAGEAL ECHOCARDIOGRAM, 17-CHANNEL TMR;  Surgeon: Cj Robin MD;  Location:  [facility] OR;  Service:     FINGER SURGERY Right 1990    OTHER SURGICAL HISTORY      skull srgery from accident in childhood.     Social History:  reports that he has been smoking cigarettes. He has a 52.50 pack-year smoking history. His smokeless tobacco use includes snuff. He reports that he does not drink alcohol and does not use drugs.    Family History: family history includes Asthma in his sister; Cancer in his maternal  uncle; Diabetes in his brother and father; Heart disease in his father and mother; Hypertension in his brother and sister; Kidney disease in his sister.       Allergies:  No Known Allergies    Medications:  Prior to Admission medications    Medication Sig Start Date End Date Taking? Authorizing Provider   amLODIPine (NORVASC) 5 MG tablet Take 2 tablets by mouth Daily. 1/3/23  Yes Ishaan Simpson APRN   aspirin 81 MG tablet Take 1 tablet by mouth Daily. 2/16/18  Yes Cj Robin MD   busPIRone (BUSPAR) 5 MG tablet Take 1 tablet by mouth 2 (Two) Times a Day. 8/4/21  Yes Estrellita Wolfe MD   citalopram (CeleXA) 20 MG tablet Take 1 tablet by mouth Daily.   Yes Estrellita Wolfe MD   cloNIDine (CATAPRES) 0.1 MG tablet TAKE 1 TABLET BY MOUTH TWICE DAILY AS NEEDED FOR HIGH BLOOD PRESSURE >150/90 5/25/23  Yes Ishaan Simpson APRN   clopidogrel (PLAVIX) 75 MG tablet Take 1 tablet by mouth Daily. 2/16/18  Yes Cj Robin MD   gabapentin (NEURONTIN) 100 MG capsule Take 4 capsules by mouth 3 (Three) Times a Day.   Yes ProviderEstrellita MD   HYDROcodone-acetaminophen (NORCO) 7.5-325 MG per tablet Take 1 tablet by mouth Every 6 (Six) Hours As Needed for Moderate Pain.   Yes Estrellita Wolfe MD   isosorbide mononitrate (IMDUR) 120 MG 24 hr tablet Take 1 tablet by mouth Daily. 2/17/22  Yes Ashtyn Bay APRN   metoprolol succinate XL (TOPROL-XL) 50 MG 24 hr tablet Take 1 tablet by mouth Daily. 1/29/22  Yes Barbra Trent APRN   rosuvastatin (CRESTOR) 40 MG tablet Take 1 tablet by mouth Daily. 1/28/22  Yes Barbra Trent APRN   tamsulosin (FLOMAX) 0.4 MG capsule 24 hr capsule Take 1 capsule by mouth Daily. 8/1/23  Yes Estrellita Wolfe MD   nicotine (Nicotine Step 1) 21 MG/24HR patch Place 1 patch on the skin as directed by provider Daily. 11/7/22   Jasmin Sanabria PA   nitroglycerin (NITROSTAT) 0.4 MG SL tablet Place 1 tablet under the tongue Every 5 (Five) Minutes As Needed for  "Chest Pain. Take no more than 3 doses in 15 minutes.    Provider, MD AREN Haro have utilized all available immediate resources to obtain, update, or review the patient's current medications (including all prescriptions, over-the-counter products, herbals, cannabis/cannabidiol products, and vitamin/mineral/dietary (nutritional) supplements).    Objective     Vital Signs: /72   Pulse 100   Temp 98 °F (36.7 °C) (Oral)   Resp 18   Ht 162.6 cm (64\")   Wt 56.7 kg (125 lb)   SpO2 94%   BMI 21.46 kg/m²   Physical Exam  Constitutional:       Appearance: He is well-developed. He is ill-appearing. He is not toxic-appearing or diaphoretic.   HENT:      Head: Normocephalic and atraumatic.      Right Ear: External ear normal.      Left Ear: External ear normal.      Nose: Nose normal.      Mouth/Throat:      Mouth: Mucous membranes are dry.   Eyes:      General:         Right eye: No discharge.         Left eye: No discharge.      Extraocular Movements: Extraocular movements intact.      Conjunctiva/sclera: Conjunctivae normal.      Pupils: Pupils are equal, round, and reactive to light.   Neck:      Vascular: No JVD.   Cardiovascular:      Rate and Rhythm: Regular rhythm. Tachycardia present.      Heart sounds: Normal heart sounds. No murmur heard.  Pulmonary:      Effort: No respiratory distress.      Breath sounds: No stridor. Wheezing and rhonchi present. No rales.   Chest:      Chest wall: No tenderness.   Abdominal:      General: Bowel sounds are normal. There is no distension.      Palpations: Abdomen is soft.      Tenderness: There is no abdominal tenderness. There is no guarding or rebound.   Musculoskeletal:         General: No tenderness or deformity. Normal range of motion.      Cervical back: Normal range of motion and neck supple. No rigidity.      Right lower leg: No edema.      Left lower leg: No edema.   Skin:     General: Skin is warm and dry.      Findings: No rash.   Neurological:      " General: No focal deficit present.      Mental Status: He is alert and oriented to person, place, and time. Mental status is at baseline.      Cranial Nerves: No cranial nerve deficit.      Sensory: No sensory deficit.      Motor: No abnormal muscle tone.      Deep Tendon Reflexes: Reflexes normal.   Psychiatric:         Mood and Affect: Mood normal.         Behavior: Behavior normal.     Results Reviewed:  Lab Results (last 24 hours)       Procedure Component Value Units Date/Time    High Sensitivity Troponin T 2Hr [[id number]]  (Abnormal) Collected: 01/23/24 0013    Specimen: Blood from Arm, Left Updated: 01/23/24 0046     HS Troponin T 105 ng/L      Troponin T Delta 37 ng/L     Narrative:      High Sensitive Troponin T Reference Range:  <14.0 ng/L- Negative Female for AMI  <22.0 ng/L- Negative Male for AMI  >=14 - Abnormal Female indicating possible myocardial injury.  >=22 - Abnormal Male indicating possible myocardial injury.   Clinicians would have to utilize clinical acumen, EKG, Troponin, and serial changes to determine if it is an Acute Myocardial Infarction or myocardial injury due to an underlying chronic condition.         Lactic Acid, Plasma [[id number]]  (Normal) Collected: 01/22/24 2305    Specimen: Blood Updated: 01/22/24 2333     Lactate 0.6 mmol/L     [place] Draw [[id number]] Collected: 01/22/24 2219    Specimen: Blood Updated: 01/22/24 2330    Narrative:      The following orders were created for panel order [place] Draw.  Procedure                               Abnormality         Status                     ---------                               -----------         ------                     Green Top (Gel)[[id number]]                                  Final result               Lavender Top[[id number]]                                     Final result               Red Top[[id number]]                                          Final result               Light Blue Top[[id number]]                                    Final result                 Please view results for these tests on the individual orders.    Green Top (Gel) [[id number]] Collected: 01/22/24 2219    Specimen: Blood Updated: 01/22/24 2330     Extra Tube Hold for add-ons.     Comment: Auto resulted.       Lavender Top [[id number]] Collected: 01/22/24 2219    Specimen: Blood Updated: 01/22/24 2330     Extra Tube hold for add-on     Comment: Auto resulted       Red Top [[id number]] Collected: 01/22/24 2219    Specimen: Blood Updated: 01/22/24 2330     Extra Tube Hold for add-ons.     Comment: Auto resulted.       Light Blue Top [[id number]] Collected: 01/22/24 2219    Specimen: Blood Updated: 01/22/24 2330     Extra Tube Hold for add-ons.     Comment: Auto resulted       Blood Culture - Blood, Arm, Right [[id number]] Collected: 01/22/24 2305    Specimen: Blood from Arm, Right Updated: 01/22/24 2319    Blood Culture - Blood, Wrist, Right [[id number]] Collected: 01/22/24 2305    Specimen: Blood from Wrist, Right Updated: 01/22/24 2318    COVID-19 and FLU A/B PCR, 1 HR TAT - Swab, Nasopharynx [[id number]]  (Abnormal) Collected: 01/22/24 2219    Specimen: Swab from Nasopharynx Updated: 01/22/24 2304     COVID19 Detected     Influenza A PCR Not Detected     Influenza B PCR Not Detected    Narrative:      Fact sheet for providers: https://www.fda.gov/media/270455/download    Fact sheet for patients: https://www.fda.gov/media/107156/download    Test performed by PCR.  Influenza A and Influenza B negative results should be considered presumptive in samples that have a positive SARS-CoV-2 result.    Competitive inhibition studies showed that SARS-CoV-2 virus, when present at concentrations above 3.6E+04 copies/mL, can inhibit the detection and amplification of influenza A and influenza B virus RNA if present at or below 1.8E+02 copies/mL or 4.9E+02 copies/mL, respectively, and may lead to false negative influenza virus results. If co-infection with influenza A or influenza  "B virus is suspected in samples with a positive SARS-CoV-2 result, the sample should be re-tested with another FDA cleared, approved, or authorized influenza test, if influenza virus detection would change clinical management.    Protime-INR [[id number]]  (Abnormal) Collected: 01/22/24 2219    Specimen: Blood Updated: 01/22/24 2304     Protime 14.4 Seconds      INR 1.10    D-dimer, Quantitative [[id number]]  (Abnormal) Collected: 01/22/24 2219    Specimen: Blood Updated: 01/22/24 2304     D-Dimer, Quantitative 1.31 MCGFEU/mL     Narrative:      According to the assay 's published package insert, a normal (<0.50 MCGFEU/mL) D-dimer result in conjunction with a non-high clinical probability assessment, excludes deep vein thrombosis (DVT) and pulmonary embolism (PE) with high sensitivity.    D-dimer values increase with age and this can make VTE exclusion of an older population difficult. To address this, the [employer] College [employer], based on best available evidence and recent guidelines, recommends that clinicians use age-adjusted D-dimer thresholds in patients greater than 50 years of age with: a) a low probability of PE who do not meet all Pulmonary Embolism Rule Out Criteria, or b) in those with intermediate probability of PE.   The formula for an age-adjusted D-dimer cut-off is \"age/100\".  For example, a 60 year old patient would have an age-adjusted cut-off of 0.60 MCGFEU/mL and an 80 year old 0.80 MCGFEU/mL.    Single High Sensitivity Troponin T [[id number]]  (Abnormal) Collected: 01/22/24 2219    Specimen: Blood Updated: 01/22/24 2258     HS Troponin T 68 ng/L     Narrative:      High Sensitive Troponin T Reference Range:  <14.0 ng/L- Negative Female for AMI  <22.0 ng/L- Negative Male for AMI  >=14 - Abnormal Female indicating possible myocardial injury.  >=22 - Abnormal Male indicating possible myocardial injury.   Clinicians would have to utilize clinical acumen, EKG, Troponin, and serial " changes to determine if it is an Acute Myocardial Infarction or myocardial injury due to an underlying chronic condition.         Comprehensive Metabolic Panel [[id number]]  (Abnormal) Collected: 01/22/24 2219    Specimen: Blood Updated: 01/22/24 2256     Glucose 132 mg/dL      BUN 13 mg/dL      Creatinine 0.79 mg/dL      Sodium 132 mmol/L      Potassium 3.9 mmol/L      Chloride 95 mmol/L      CO2 25.0 mmol/L      Calcium 9.1 mg/dL      Total Protein 7.1 g/dL      Albumin 4.5 g/dL      ALT (SGPT) 16 U/L      AST (SGOT) 26 U/L      Alkaline Phosphatase 84 U/L      Total Bilirubin 0.2 mg/dL      Globulin 2.6 gm/dL      A/G Ratio 1.7 g/dL      BUN/Creatinine Ratio 16.5     Anion Gap 12.0 mmol/L      eGFR 99.8 mL/min/1.73     Narrative:      GFR Normal >60  Chronic Kidney Disease <60  Kidney Failure <15      BNP [[id number]]  (Abnormal) Collected: 01/22/24 2219    Specimen: Blood Updated: 01/22/24 2254     proBNP 3,578.0 pg/mL     Narrative:      This assay is used as an aid in the diagnosis of individuals suspected of having heart failure. It can be used as an aid in the diagnosis of acute decompensated heart failure (ADHF) in patients presenting with signs and symptoms of ADHF to the emergency department (ED). In addition, NT-proBNP of <300 pg/mL indicates ADHF is not likely.    Age Range Result Interpretation  NT-proBNP Concentration (pg/mL:      <50             Positive            >450                   Gray                 300-450                    Negative             <300    50-75           Positive            >900                  Gray                300-900                  Negative            <300      >75             Positive            >1800                  Gray                300-1800                  Negative            <300    CBC & Differential [[id number]]  (Abnormal) Collected: 01/22/24 2219    Specimen: Blood Updated: 01/22/24 2236    Narrative:      The following orders were created for panel order  CBC & Differential.  Procedure                               Abnormality         Status                     ---------                               -----------         ------                     CBC Auto Differential[[id number]]        Abnormal            Final result                 Please view results for these tests on the individual orders.    CBC Auto Differential [[id number]]  (Abnormal) Collected: 01/22/24 2219    Specimen: Blood Updated: 01/22/24 2236     WBC 10.14 10*3/mm3      RBC 3.98 10*6/mm3      Hemoglobin 12.2 g/dL      Hematocrit 36.6 %      MCV 92.0 fL      MCH 30.7 pg      MCHC 33.3 g/dL      RDW 13.4 %      RDW-SD 46.0 fl      MPV 9.9 fL      Platelets 225 10*3/mm3      Neutrophil % 82.9 %      Lymphocyte % 5.2 %      Monocyte % 10.3 %      Eosinophil % 0.9 %      Basophil % 0.4 %      Immature Grans % 0.3 %      Neutrophils, Absolute 8.41 10*3/mm3      Lymphocytes, Absolute 0.53 10*3/mm3      Monocytes, Absolute 1.04 10*3/mm3      Eosinophils, Absolute 0.09 10*3/mm3      Basophils, Absolute 0.04 10*3/mm3      Immature Grans, Absolute 0.03 10*3/mm3      nRBC 0.0 /100 WBC     Blood Gas, Arterial With Co-Ox [[id number]]  (Abnormal) Collected: 01/22/24 2226    Specimen: Arterial Blood Updated: 01/22/24 2223     Site Left Brachial     Glenroy's Test N/A     pH, Arterial 7.455 pH units      Comment: 83 Value above reference range        pCO2, Arterial 37.7 mm Hg      pO2, Arterial 59.0 mm Hg      Comment: 84 Value below reference range        HCO3, Arterial 26.5 mmol/L      Comment: 83 Value above reference range        Base Excess, Arterial 2.5 mmol/L      Comment: 83 Value above reference range        O2 Saturation, Arterial 92.6 %      Comment: 84 Value below reference range        Hemoglobin, Blood Gas 11.6 g/dL      Comment: 84 Value below reference range        Hematocrit, Blood Gas 35.7 %      Comment: 84 Value below reference range        Oxyhemoglobin 85.0 %      Comment: 84 Value below reference  range        Methemoglobin 0.60 %      Carboxyhemoglobin 7.6 %      Comment: 83 Value above reference range        A-a DO2 47.5 mmHg      Temperature 37.0     Sodium, Arterial 132 mmol/L      Comment: 84 Value below reference range        Potassium, Arterial 3.7 mmol/L      Barometric Pressure for Blood Gas 757 mmHg      Modality Nasal Cannula     Flow Rate 6.0 lpm      Ventilator Mode NA     Collected by [id number]     Comment: Meter: [id number]     :  [id number]        pH, Temp Corrected 7.455 pH Units      pCO2, Temperature Corrected 37.7 mm Hg      pO2, Temperature Corrected 59.0 mm Hg           Imaging Results (Last 24 Hours)       Procedure Component Value Units Date/Time    CT Angiogram Chest [[id number]] Resulted: 01/22/24 2317     Updated: 01/22/24 2329    XR Chest 1 View [[id number]] Resulted: 01/22/24 2227     Updated: 01/22/24 2228          I have personally reviewed and interpreted the radiology studies and ECG obtained at time of admission.     Assessment / Plan   Assessment:   Active Hospital Problems    Diagnosis     **Acute exacerbation of chronic obstructive pulmonary disease (COPD)     COVID-19 virus infection     NSTEMI (non-ST elevated myocardial infarction)     Aortoiliac occlusive disease     PAD (peripheral artery disease)     PVD (peripheral vascular disease) with claudication     S/P CABG x 3 2018 Dr Robin      Status post aortic valve replacement with bioprosthetic valve     LVH (left ventricular hypertrophy)     History of CVA (cerebrovascular accident)     Primary hypertension      Treatment Plan  The patient will be admitted to my service here at [facility].   Admit to medical floor with enhanced contact precautions  Vitals and pulse oxymetry continuous   IVF NS KVO  Cardiac diet  Dexamethasone 6 mg IV/PO  Consider Remdesevir  Oxygen by nasal canula titrate as needed to saturation above 92 %  Duoneb QID  Albuterol 1 UD q6h prn SOB/Wheezing    DVT prophylaxis > Lovenox  40 mg SQ daily    NSTEMI suspect demand from hypoxemia  ASA/Lipitor  A1C and lipid panel  Echocardiogram 2D in AM    Home medications reconciled    Medical Decision Making  Number and Complexity of problems: 4 complex medical problems  Differential Diagnosis: none    Conditions and Status        Condition is unchanged.     [facility] Data  External documents reviewed: [employer] EHR  Cardiac tracing (EKG, telemetry) interpretation: Sinus tachycardia  Radiology interpretation: See above  Labs reviewed: See above  Any tests that were considered but not ordered: none     Decision rules/scores evaluated (example [id number], Wells, etc): N/A     Discussed with: Patient     Care Planning  Shared decision making: Patient  Code status and discussions: Full code    Disposition  Social Determinants of Health that impact treatment or disposition: none  Estimated length of stay is over 2 midnights.     I confirmed that the patient's advanced care plan is present, code status is documented, and a surrogate decision maker is listed in the patient's medical record.     The patient's surrogate decision maker is Ekaterina Ryder, spouse.     The patient was seen and examined by me on 1/23/2024 at 0415 AM.    Electronically signed by Marco A Keys MD, 01/23/24, 05:34 CST.

## 2024-01-23 NOTE — CASE MANAGEMENT/SOCIAL WORK
Discharge Planning Assessment   [place]     Patient Name: Sharad Ryder  MRN: [id number]  Today's Date: 1/23/2024    Admit Date: 1/22/2024        Discharge Needs Assessment       Row Name 01/23/24 1238       Living Environment    People in Home spouse    Current Living Arrangements home    Potentially Unsafe Housing Conditions none    In the past 12 months has the electric, gas, oil, or water company threatened to shut off services in your home? No    Primary Care Provided by self    Provides Primary Care For no one    Family Caregiver if Needed spouse    Quality of Family Relationships helpful;involved;supportive    Able to Return to Prior Arrangements yes       Resource/Environmental Concerns    Resource/Environmental Concerns none    Transportation Concerns none       Transportation Needs    In the past 12 months, has lack of transportation kept you from medical appointments or from getting medications? no    In the past 12 months, has lack of transportation kept you from meetings, work, or from getting things needed for daily living? No       Food Insecurity    Within the past 12 months, you worried that your food would run out before you got the money to buy more. Never true    Within the past 12 months, the food you bought just didn't last and you didn't have money to get more. Never true       Transition Planning    Patient/Family Anticipates Transition to home    Patient/Family Anticipated Services at Transition none       Discharge Needs Assessment    Readmission Within the Last 30 Days no previous admission in last 30 days    Equipment Currently Used at Home cane, quad tip    Concerns to be Addressed denies needs/concerns at this time    Anticipated Changes Related to Illness none    Equipment Needed After Discharge none    Discharge Coordination/Progress PT resides at home with spouse and plans to dc to the same. PT has all required DME at home, has a PCP and Rx coverage. PT has no known needs at this  time and has declined HH. SW will follow and assist as needed.                   Discharge Plan    No documentation.                 Continued Care and Services - Admitted Since 1/22/2024    Coordination has not been started for this encounter.          Demographic Summary    No documentation.                  Functional Status    No documentation.                  Psychosocial    No documentation.                  Abuse/Neglect    No documentation.                  Legal    No documentation.                  Substance Abuse    No documentation.                  Patient Forms    No documentation.                     SAMANTHA Rawls

## 2024-01-23 NOTE — PAYOR COMM NOTE
"Sharad Lopes (63 y.o. Male)  [id number]   [facility]    fax          Date of Birth   1960    Social Security Number       Address   [address]  [place] [address]    Home Phone   [phone]    MRN   [id number]       Gnosticist   [facility]    Marital Status                               Admission Date   1/22/24    Admission Type   Emergency    Admitting Provider   Michaelle Mccord MD    Attending Provider   Michaelle Mccord MD    Department, Room/Bed   [facility] EMERGENCY DEPARTMENT, 22/22       Discharge Date       Discharge Disposition       Discharge Destination                                 Attending Provider: Michaelle Mccord MD    Allergies: No Known Allergies    Isolation: Enh Drop/Con   Infection: COVID (confirmed) (01/22/24)   Code Status: CPR    Ht: 162.6 cm (64\")   Wt: 56.7 kg (125 lb)    Admission Cmt: None   Principal Problem: Acute exacerbation of chronic obstructive pulmonary disease (COPD) [J44.1]                   Active Insurance as of 1/22/2024       Primary Coverage       Payor Plan Insurance Group Employer/Plan Group    [place] [id number]       Payor Plan Address Payor Plan Phone Number Payor Plan Fax Number Effective Dates    [employer] [address] [address] [phone]  4/1/2022 - None Entered    [facility] [id number]         Subscriber Name Subscriber Birth Date Member ID       SHARAD LOPES 1960 [id number]                     Emergency Contacts        (Rel.) Home Phone Work Phone Mobile Phone    Ekaterina Lopes (Spouse) [phone] -- [phone]                 History & Physical        Marco A Keys MD at 01/23/24 0534              [facility] Medicine Services  HISTORY AND PHYSICAL    Date of Admission: 1/22/2024  Primary Care Physician: Nila Alexander APRN    Subjective   Primary " Historian: Patient    Chief Complaint: Shortness of breath    History of Present Illness  63 year old male with PMH of CAD/CABG, COPD, Stroke, carotid artery disease, aortic aneurysm, HTN, that presents to the ER with complaints of shortness of breath for 2 nights, worsened tonight. He presents with oxygen saturation of 84% on room air and tachypnea, respiratory distress. Has required 6 lpm to control dyspnea. Afebrile, WBC is 10, COVID 19 positive, CTA chest shows marked pulmonary emphysema and bibasilar atelectasis.     Review of Systems   Otherwise complete ROS reviewed and negative except as mentioned in the HPI.    Past Medical History:   Past Medical History:   Diagnosis Date    Aneurysm     Anxiety     Arthritis     Carotid artery disease     Carotid stenosis, right 02/01/2018    Post right carotid endarterectomy    COPD (chronic obstructive pulmonary disease)     Coronary artery disease     Heart murmur     History of right-sided carotid endarterectomy 02/16/2022    Hyperlipidemia LDL goal <70 12/14/2017    Left frontal lobe, right thalamus and right occipital CVA (cerebrovascular accident) (HCC) 01/27/2022    LVH (left ventricular hypertrophy) 02/16/2022    Pneumonia     Primary hypertension 12/14/2017    S/P CABG x 3 02/16/2022    LIMA to LAD, SVG to PDA and SVG to diagonal branch with TMR and left atrial appendage exclusion with atricure clip device 2/8/2018 per Dr. Cj Robin at [facility]     Severe aortic valve stenosis 12/14/2017    Status post aortic valve replacement with bioprosthetic valve 02/16/2022    Graciela Juarez bovine pericardial 19 mm valve 2/8/2018 per Dr. Cj Robin at [facility]    Tobacco use 12/14/2017    Wears glasses      Past Surgical History:  Past Surgical History:   Procedure Laterality Date    AORTAGRAM Bilateral 10/27/2023    Procedure: LEFT LOWER EXTREMITY ANGIOGRAM, BILATERAL ILIAC BALLOON ANGIOPLASTY, BILATERAL ILIAC STENT PLACEMENT, MYNX  CLOSURE;  Surgeon: Jl Salgado DO;  Location:  [facility] HYBRID OR 12;  Service: Vascular;  Laterality: Bilateral;    APPENDECTOMY      CARDIAC CATHETERIZATION N/A 12/18/2017    Procedure: Left Heart Cath;  Surgeon: Aime Francois MD;  Location:  [facility] CATH INVASIVE LOCATION;  Service:     CARDIAC CATHETERIZATION N/A 12/18/2017    Procedure: Right Heart Cath;  Surgeon: Aime Francois MD;  Location:  [facility] CATH INVASIVE LOCATION;  Service:     CARDIAC CATHETERIZATION Bilateral 1/4/2018    Procedure: Coronary angiography;  Surgeon: Alfonso Yi MD;  Location:  [facility] CATH INVASIVE LOCATION;  Service:     CARDIAC CATHETERIZATION Left 9/15/2021    Procedure: Cardiac Catheterization/Vascular Study NIMCO OK  Has 3 graft 2018;  Surgeon: Aime Francois MD;  Location:  [facility] CATH INVASIVE LOCATION;  Service: Cardiology;  Laterality: Left;    CAROTID ENDARTERECTOMY Right 2/1/2018    Procedure: RIGHT CAROTID ENDARTERECTOMY WITH EEG-awake;  Surgeon: Jl Salgado DO;  Location:  [facility] OR;  Service:     CORONARY ARTERY BYPASS GRAFT WITH AORTIC VALVE REPAIR/REPLACEMENT N/A 2/8/2018    Procedure: AORTIC VALVE REPLACEMENT,CORONARY ARTERY BYPASS GRAFTING x 3  WITH CONCHA AND RIGHT EVH, ATRIAL APPENDAGE EXCLUSION LEFT WITH TRANSESOPHAGEAL ECHOCARDIOGRAM, 17-CHANNEL TMR;  Surgeon: Cj Robin MD;  Location:  [facility] OR;  Service:     FINGER SURGERY Right 1990    OTHER SURGICAL HISTORY      skull srgery from accident in childhood.     Social History:  reports that he has been smoking cigarettes. He has a 52.50 pack-year smoking history. His smokeless tobacco use includes snuff. He reports that he does not drink alcohol and does not use drugs.    Family History: family history includes Asthma in his sister; Cancer in his maternal uncle; Diabetes in his brother and father; Heart disease in his father and mother; Hypertension in his brother and sister; Kidney disease in his sister.       Allergies:  No Known  Allergies    Medications:  Prior to Admission medications    Medication Sig Start Date End Date Taking? Authorizing Provider   amLODIPine (NORVASC) 5 MG tablet Take 2 tablets by mouth Daily. 1/3/23  Yes Ishaan Simpson APRN   aspirin 81 MG tablet Take 1 tablet by mouth Daily. 2/16/18  Yes Cj Robin MD   busPIRone (BUSPAR) 5 MG tablet Take 1 tablet by mouth 2 (Two) Times a Day. 8/4/21  Yes Estrellita Wolfe MD   citalopram (CeleXA) 20 MG tablet Take 1 tablet by mouth Daily.   Yes Estrellita Wolfe MD   cloNIDine (CATAPRES) 0.1 MG tablet TAKE 1 TABLET BY MOUTH TWICE DAILY AS NEEDED FOR HIGH BLOOD PRESSURE >150/90 5/25/23  Yes Ishaan Simpson APRN   clopidogrel (PLAVIX) 75 MG tablet Take 1 tablet by mouth Daily. 2/16/18  Yes Cj Robin MD   gabapentin (NEURONTIN) 100 MG capsule Take 4 capsules by mouth 3 (Three) Times a Day.   Yes Estrellita Wolfe MD   HYDROcodone-acetaminophen (NORCO) 7.5-325 MG per tablet Take 1 tablet by mouth Every 6 (Six) Hours As Needed for Moderate Pain.   Yes Estrellita Wolfe MD   isosorbide mononitrate (IMDUR) 120 MG 24 hr tablet Take 1 tablet by mouth Daily. 2/17/22  Yes Ashtyn Bay APRN   metoprolol succinate XL (TOPROL-XL) 50 MG 24 hr tablet Take 1 tablet by mouth Daily. 1/29/22  Yes Barbra Trent APRN   rosuvastatin (CRESTOR) 40 MG tablet Take 1 tablet by mouth Daily. 1/28/22  Yes Barbra Trent APRN   tamsulosin (FLOMAX) 0.4 MG capsule 24 hr capsule Take 1 capsule by mouth Daily. 8/1/23  Yes Estrellita Wolfe MD   nicotine (Nicotine Step 1) 21 MG/24HR patch Place 1 patch on the skin as directed by provider Daily. 11/7/22   Jasmin Sanabria PA   nitroglycerin (NITROSTAT) 0.4 MG SL tablet Place 1 tablet under the tongue Every 5 (Five) Minutes As Needed for Chest Pain. Take no more than 3 doses in 15 minutes.    Estrellita Wolfe MD     I have utilized all available immediate resources to obtain, update, or review the patient's  "current medications (including all prescriptions, over-the-counter products, herbals, cannabis/cannabidiol products, and vitamin/mineral/dietary (nutritional) supplements).    Objective     Vital Signs: /72   Pulse 100   Temp 98 °F (36.7 °C) (Oral)   Resp 18   Ht 162.6 cm (64\")   Wt 56.7 kg (125 lb)   SpO2 94%   BMI 21.46 kg/m²   Physical Exam  Constitutional:       Appearance: He is well-developed. He is ill-appearing. He is not toxic-appearing or diaphoretic.   HENT:      Head: Normocephalic and atraumatic.      Right Ear: External ear normal.      Left Ear: External ear normal.      Nose: Nose normal.      Mouth/Throat:      Mouth: Mucous membranes are dry.   Eyes:      General:         Right eye: No discharge.         Left eye: No discharge.      Extraocular Movements: Extraocular movements intact.      Conjunctiva/sclera: Conjunctivae normal.      Pupils: Pupils are equal, round, and reactive to light.   Neck:      Vascular: No JVD.   Cardiovascular:      Rate and Rhythm: Regular rhythm. Tachycardia present.      Heart sounds: Normal heart sounds. No murmur heard.  Pulmonary:      Effort: No respiratory distress.      Breath sounds: No stridor. Wheezing and rhonchi present. No rales.   Chest:      Chest wall: No tenderness.   Abdominal:      General: Bowel sounds are normal. There is no distension.      Palpations: Abdomen is soft.      Tenderness: There is no abdominal tenderness. There is no guarding or rebound.   Musculoskeletal:         General: No tenderness or deformity. Normal range of motion.      Cervical back: Normal range of motion and neck supple. No rigidity.      Right lower leg: No edema.      Left lower leg: No edema.   Skin:     General: Skin is warm and dry.      Findings: No rash.   Neurological:      General: No focal deficit present.      Mental Status: He is alert and oriented to person, place, and time. Mental status is at baseline.      Cranial Nerves: No cranial nerve " deficit.      Sensory: No sensory deficit.      Motor: No abnormal muscle tone.      Deep Tendon Reflexes: Reflexes normal.   Psychiatric:         Mood and Affect: Mood normal.         Behavior: Behavior normal.     Results Reviewed:  Lab Results (last 24 hours)       Procedure Component Value Units Date/Time    High Sensitivity Troponin T 2Hr [[id number]]  (Abnormal) Collected: 01/23/24 0013    Specimen: Blood from Arm, Left Updated: 01/23/24 0046     HS Troponin T 105 ng/L      Troponin T Delta 37 ng/L     Narrative:      High Sensitive Troponin T Reference Range:  <14.0 ng/L- Negative Female for AMI  <22.0 ng/L- Negative Male for AMI  >=14 - Abnormal Female indicating possible myocardial injury.  >=22 - Abnormal Male indicating possible myocardial injury.   Clinicians would have to utilize clinical acumen, EKG, Troponin, and serial changes to determine if it is an Acute Myocardial Infarction or myocardial injury due to an underlying chronic condition.         Lactic Acid, Plasma [[id number]]  (Normal) Collected: 01/22/24 2305    Specimen: Blood Updated: 01/22/24 2333     Lactate 0.6 mmol/L     [place] Draw [[id number]] Collected: 01/22/24 2219    Specimen: Blood Updated: 01/22/24 2330    Narrative:      The following orders were created for panel order [place] Draw.  Procedure                               Abnormality         Status                     ---------                               -----------         ------                     Green Top (Gel)[[id number]]                                  Final result               Lavender Top[[id number]]                                     Final result               Red Top[[id number]]                                          Final result               Light Blue Top[[id number]]                                   Final result                 Please view results for these tests on the individual orders.    Green Top (Gel) [[id number]] Collected: 01/22/24 2219    Specimen: Blood  Updated: 01/22/24 2330     Extra Tube Hold for add-ons.     Comment: Auto resulted.       Lavender Top [[id number]] Collected: 01/22/24 2219    Specimen: Blood Updated: 01/22/24 2330     Extra Tube hold for add-on     Comment: Auto resulted       Red Top [[id number]] Collected: 01/22/24 2219    Specimen: Blood Updated: 01/22/24 2330     Extra Tube Hold for add-ons.     Comment: Auto resulted.       Light Blue Top [[id number]] Collected: 01/22/24 2219    Specimen: Blood Updated: 01/22/24 2330     Extra Tube Hold for add-ons.     Comment: Auto resulted       Blood Culture - Blood, Arm, Right [[id number]] Collected: 01/22/24 2305    Specimen: Blood from Arm, Right Updated: 01/22/24 2319    Blood Culture - Blood, Wrist, Right [[id number]] Collected: 01/22/24 2305    Specimen: Blood from Wrist, Right Updated: 01/22/24 2318    COVID-19 and FLU A/B PCR, 1 HR TAT - Swab, Nasopharynx [[id number]]  (Abnormal) Collected: 01/22/24 2219    Specimen: Swab from Nasopharynx Updated: 01/22/24 2304     COVID19 Detected     Influenza A PCR Not Detected     Influenza B PCR Not Detected    Narrative:      Fact sheet for providers: https://www.fda.gov/media/424666/download    Fact sheet for patients: https://www.fda.gov/media/727804/download    Test performed by PCR.  Influenza A and Influenza B negative results should be considered presumptive in samples that have a positive SARS-CoV-2 result.    Competitive inhibition studies showed that SARS-CoV-2 virus, when present at concentrations above 3.6E+04 copies/mL, can inhibit the detection and amplification of influenza A and influenza B virus RNA if present at or below 1.8E+02 copies/mL or 4.9E+02 copies/mL, respectively, and may lead to false negative influenza virus results. If co-infection with influenza A or influenza B virus is suspected in samples with a positive SARS-CoV-2 result, the sample should be re-tested with another FDA cleared, approved, or authorized influenza test, if  "influenza virus detection would change clinical management.    Protime-INR [[id number]]  (Abnormal) Collected: 01/22/24 2219    Specimen: Blood Updated: 01/22/24 2304     Protime 14.4 Seconds      INR 1.10    D-dimer, Quantitative [[id number]]  (Abnormal) Collected: 01/22/24 2219    Specimen: Blood Updated: 01/22/24 2304     D-Dimer, Quantitative 1.31 MCGFEU/mL     Narrative:      According to the assay 's published package insert, a normal (<0.50 MCGFEU/mL) D-dimer result in conjunction with a non-high clinical probability assessment, excludes deep vein thrombosis (DVT) and pulmonary embolism (PE) with high sensitivity.    D-dimer values increase with age and this can make VTE exclusion of an older population difficult. To address this, the American College of Physicians, based on best available evidence and recent guidelines, recommends that clinicians use age-adjusted D-dimer thresholds in patients greater than 50 years of age with: a) a low probability of PE who do not meet all Pulmonary Embolism Rule Out Criteria, or b) in those with intermediate probability of PE.   The formula for an age-adjusted D-dimer cut-off is \"age/100\".  For example, a 60 year old patient would have an age-adjusted cut-off of 0.60 MCGFEU/mL and an 80 year old 0.80 MCGFEU/mL.    Single High Sensitivity Troponin T [[id number]]  (Abnormal) Collected: 01/22/24 2219    Specimen: Blood Updated: 01/22/24 2258     HS Troponin T 68 ng/L     Narrative:      High Sensitive Troponin T Reference Range:  <14.0 ng/L- Negative Female for AMI  <22.0 ng/L- Negative Male for AMI  >=14 - Abnormal Female indicating possible myocardial injury.  >=22 - Abnormal Male indicating possible myocardial injury.   Clinicians would have to utilize clinical acumen, EKG, Troponin, and serial changes to determine if it is an Acute Myocardial Infarction or myocardial injury due to an underlying chronic condition.         Comprehensive Metabolic Panel [[id number]]  " (Abnormal) Collected: 01/22/24 2219    Specimen: Blood Updated: 01/22/24 2256     Glucose 132 mg/dL      BUN 13 mg/dL      Creatinine 0.79 mg/dL      Sodium 132 mmol/L      Potassium 3.9 mmol/L      Chloride 95 mmol/L      CO2 25.0 mmol/L      Calcium 9.1 mg/dL      Total Protein 7.1 g/dL      Albumin 4.5 g/dL      ALT (SGPT) 16 U/L      AST (SGOT) 26 U/L      Alkaline Phosphatase 84 U/L      Total Bilirubin 0.2 mg/dL      Globulin 2.6 gm/dL      A/G Ratio 1.7 g/dL      BUN/Creatinine Ratio 16.5     Anion Gap 12.0 mmol/L      eGFR 99.8 mL/min/1.73     Narrative:      GFR Normal >60  Chronic Kidney Disease <60  Kidney Failure <15      BNP [[id number]]  (Abnormal) Collected: 01/22/24 2219    Specimen: Blood Updated: 01/22/24 2254     proBNP 3,578.0 pg/mL     Narrative:      This assay is used as an aid in the diagnosis of individuals suspected of having heart failure. It can be used as an aid in the diagnosis of acute decompensated heart failure (ADHF) in patients presenting with signs and symptoms of ADHF to the emergency department (ED). In addition, NT-proBNP of <300 pg/mL indicates ADHF is not likely.    Age Range Result Interpretation  NT-proBNP Concentration (pg/mL:      <50             Positive            >450                   Gray                 300-450                    Negative             <300    50-75           Positive            >900                  Gray                300-900                  Negative            <300      >75             Positive            >1800                  Gray                300-1800                  Negative            <300    CBC & Differential [[id number]]  (Abnormal) Collected: 01/22/24 2219    Specimen: Blood Updated: 01/22/24 2236    Narrative:      The following orders were created for panel order CBC & Differential.  Procedure                               Abnormality         Status                     ---------                               -----------         ------                      CBC Auto Differential[[id number]]        Abnormal            Final result                 Please view results for these tests on the individual orders.    CBC Auto Differential [[id number]]  (Abnormal) Collected: 01/22/24 2219    Specimen: Blood Updated: 01/22/24 2236     WBC 10.14 10*3/mm3      RBC 3.98 10*6/mm3      Hemoglobin 12.2 g/dL      Hematocrit 36.6 %      MCV 92.0 fL      MCH 30.7 pg      MCHC 33.3 g/dL      RDW 13.4 %      RDW-SD 46.0 fl      MPV 9.9 fL      Platelets 225 10*3/mm3      Neutrophil % 82.9 %      Lymphocyte % 5.2 %      Monocyte % 10.3 %      Eosinophil % 0.9 %      Basophil % 0.4 %      Immature Grans % 0.3 %      Neutrophils, Absolute 8.41 10*3/mm3      Lymphocytes, Absolute 0.53 10*3/mm3      Monocytes, Absolute 1.04 10*3/mm3      Eosinophils, Absolute 0.09 10*3/mm3      Basophils, Absolute 0.04 10*3/mm3      Immature Grans, Absolute 0.03 10*3/mm3      nRBC 0.0 /100 WBC     Blood Gas, Arterial With Co-Ox [[id number]]  (Abnormal) Collected: 01/22/24 2226    Specimen: Arterial Blood Updated: 01/22/24 2223     Site Left Brachial     Glenroy's Test N/A     pH, Arterial 7.455 pH units      Comment: 83 Value above reference range        pCO2, Arterial 37.7 mm Hg      pO2, Arterial 59.0 mm Hg      Comment: 84 Value below reference range        HCO3, Arterial 26.5 mmol/L      Comment: 83 Value above reference range        Base Excess, Arterial 2.5 mmol/L      Comment: 83 Value above reference range        O2 Saturation, Arterial 92.6 %      Comment: 84 Value below reference range        Hemoglobin, Blood Gas 11.6 g/dL      Comment: 84 Value below reference range        Hematocrit, Blood Gas 35.7 %      Comment: 84 Value below reference range        Oxyhemoglobin 85.0 %      Comment: 84 Value below reference range        Methemoglobin 0.60 %      Carboxyhemoglobin 7.6 %      Comment: 83 Value above reference range        A-a DO2 47.5 mmHg      Temperature 37.0     Sodium, Arterial  132 mmol/L      Comment: 84 Value below reference range        Potassium, Arterial 3.7 mmol/L      Barometric Pressure for Blood Gas 757 mmHg      Modality Nasal Cannula     Flow Rate 6.0 lpm      Ventilator Mode NA     Collected by [id number]     Comment: Meter: [id number]     :  [id number]        pH, Temp Corrected 7.455 pH Units      pCO2, Temperature Corrected 37.7 mm Hg      pO2, Temperature Corrected 59.0 mm Hg           Imaging Results (Last 24 Hours)       Procedure Component Value Units Date/Time    CT Angiogram Chest [[id number]] Resulted: 01/22/24 2317     Updated: 01/22/24 2329    XR Chest 1 View [[id number]] Resulted: 01/22/24 2227     Updated: 01/22/24 2228          I have personally reviewed and interpreted the radiology studies and ECG obtained at time of admission.     Assessment / Plan   Assessment:   Active Hospital Problems    Diagnosis     **Acute exacerbation of chronic obstructive pulmonary disease (COPD)     COVID-19 virus infection     NSTEMI (non-ST elevated myocardial infarction)     Aortoiliac occlusive disease     PAD (peripheral artery disease)     PVD (peripheral vascular disease) with claudication     S/P CABG x 3 2018 Dr Robin      Status post aortic valve replacement with bioprosthetic valve     LVH (left ventricular hypertrophy)     History of CVA (cerebrovascular accident)     Primary hypertension      Treatment Plan  The patient will be admitted to my service [facility] at [facility].   Admit to medical floor with enhanced contact precautions  Vitals and pulse oxymetry continuous   IVF NS KVO  Cardiac diet  Dexamethasone 6 mg IV/PO  Consider Remdesevir  Oxygen by nasal canula titrate as needed to saturation above 92 %  Duoneb QID  Albuterol 1 UD q6h prn SOB/Wheezing    DVT prophylaxis > Lovenox 40 mg SQ daily    NSTEMI suspect demand from hypoxemia  ASA/Lipitor  A1C and lipid panel  Echocardiogram 2D in AM    Home medications reconciled    Medical Decision  Making  Number and Complexity of problems: 4 complex medical problems  Differential Diagnosis: none    Conditions and Status        Condition is unchanged.     [facility] Data  External documents reviewed: [employer] EHR  Cardiac tracing (EKG, telemetry) interpretation: Sinus tachycardia  Radiology interpretation: See above  Labs reviewed: See above  Any tests that were considered but not ordered: none     Decision rules/scores evaluated (example [id number], Wells, etc): N/A     Discussed with: Patient     Care Planning  Shared decision making: Patient  Code status and discussions: Full code    Disposition  Social Determinants of Health that impact treatment or disposition: none  Estimated length of stay is over 2 midnights.     I confirmed that the patient's advanced care plan is present, code status is documented, and a surrogate decision maker is listed in the patient's medical record.     The patient's surrogate decision maker is Ekaterina Ryder, spouse.     The patient was seen and examined by me on 1/23/2024 at 0415 AM.    Electronically signed by Marco A Keys MD, 01/23/24, 05:34 CST.              Electronically signed by Marco A Keys MD at 01/23/24 0552          Emergency Department Notes        Michelle Rivera, RN at 01/23/24 0607          RT here for ABG     Electronically signed by Michelle Rivera RN at 01/23/24 0607       Michelle Rivera, RN at 01/23/24 0041          WAITING ADMISSION ORDERS AND BED ASSIGNEMENT    Electronically signed by Michelle Rivera RN at 01/23/24 0042       Michelle Rivera, RN at 01/22/24 2222          XRAY @ bedside     Electronically signed by Michelle Rivera RN at 01/22/24 2222       Michael Alvarado MD at 01/22/24 2220          Subjective   History of Present Illness  Patient with a shortness of breath cough and congestion.    Shortness of Breath  Severity:  Moderate  Onset quality:  Gradual  Duration:  1 day  Timing:  Constant  Progression:   Worsening  Chronicity:  Recurrent  Context: activity    Context: not animal exposure, not emotional upset, not fumes, not occupational exposure, not pollens, not strong odors and not URI    Relieved by:  Nothing  Worsened by:  Exertion  Ineffective treatments:  None tried  Associated symptoms: cough and wheezing    Associated symptoms: no abdominal pain, no chest pain, no diaphoresis, no ear pain, no headaches, no hemoptysis, no neck pain, no sore throat, no sputum production and no swollen glands    Risk factors: no recent alcohol use, no family hx of DVT, no obesity, no oral contraceptive use and no tobacco use        Review of Systems   Constitutional: Negative.  Negative for diaphoresis.   HENT: Negative.  Negative for ear pain and sore throat.    Respiratory:  Positive for cough, shortness of breath and wheezing. Negative for hemoptysis and sputum production.    Cardiovascular:  Negative for chest pain.   Gastrointestinal: Negative.  Negative for abdominal distention, abdominal pain and nausea.   Endocrine: Negative.    Genitourinary: Negative.    Musculoskeletal: Negative.  Negative for back pain and neck pain.   Skin:  Negative for color change and pallor.   Neurological: Negative.  Negative for syncope, weakness, light-headedness, numbness and headaches.   Hematological: Negative.  Does not bruise/bleed easily.   All other systems reviewed and are negative.      Past Medical History:   Diagnosis Date    Aneurysm     Anxiety     Arthritis     Carotid artery disease     Carotid stenosis, right 02/01/2018    Post right carotid endarterectomy    COPD (chronic obstructive pulmonary disease)     Coronary artery disease     Heart murmur     History of right-sided carotid endarterectomy 02/16/2022    Hyperlipidemia LDL goal <70 12/14/2017    Left frontal lobe, right thalamus and right occipital CVA (cerebrovascular accident) (HCC) 01/27/2022    LVH (left ventricular hypertrophy) 02/16/2022    Pneumonia     Primary  hypertension 12/14/2017    S/P CABG x 3 02/16/2022    LIMA to LAD, SVG to PDA and SVG to diagonal branch with TMR and left atrial appendage exclusion with atricure clip device 2/8/2018 per Dr. Cj Robin at [facility]     Severe aortic valve stenosis 12/14/2017    Status post aortic valve replacement with bioprosthetic valve 02/16/2022    Graciela Juarez bovine pericardial 19 mm valve 2/8/2018 per Dr. Cj Robin at [facility]    Tobacco use 12/14/2017    Wears glasses        No Known Allergies    Past Surgical History:   Procedure Laterality Date    AORTAGRAM Bilateral 10/27/2023    Procedure: LEFT LOWER EXTREMITY ANGIOGRAM, BILATERAL ILIAC BALLOON ANGIOPLASTY, BILATERAL ILIAC STENT PLACEMENT, MYNX CLOSURE;  Surgeon: Jl Salgado DO;  Location: [facility] HYBRID OR 12;  Service: Vascular;  Laterality: Bilateral;    APPENDECTOMY      CARDIAC CATHETERIZATION N/A 12/18/2017    Procedure: Left Heart Cath;  Surgeon: Aime Francois MD;  Location:  [facility] CATH INVASIVE LOCATION;  Service:     CARDIAC CATHETERIZATION N/A 12/18/2017    Procedure: Right Heart Cath;  Surgeon: Aime Francois MD;  Location:  [facility] CATH INVASIVE LOCATION;  Service:     CARDIAC CATHETERIZATION Bilateral 1/4/2018    Procedure: Coronary angiography;  Surgeon: Alfonso Yi MD;  Location:  [facility] CATH INVASIVE LOCATION;  Service:     CARDIAC CATHETERIZATION Left 9/15/2021    Procedure: Cardiac Catheterization/Vascular Study NIMCO OK  Has 3 graft 2018;  Surgeon: Aime Francois MD;  Location:  [facility] CATH INVASIVE LOCATION;  Service: Cardiology;  Laterality: Left;    CAROTID ENDARTERECTOMY Right 2/1/2018    Procedure: RIGHT CAROTID ENDARTERECTOMY WITH EEG-awake;  Surgeon: Jl Salgado DO;  Location:  [facility] OR;  Service:     CORONARY ARTERY BYPASS GRAFT WITH AORTIC VALVE REPAIR/REPLACEMENT N/A 2/8/2018    Procedure: AORTIC VALVE REPLACEMENT,CORONARY ARTERY BYPASS GRAFTING x 3  WITH CONCHA AND RIGHT EVH, ATRIAL APPENDAGE  EXCLUSION LEFT WITH TRANSESOPHAGEAL ECHOCARDIOGRAM, 17-CHANNEL TMR;  Surgeon: Cj Robin MD;  Location: [facility] OR;  Service:     FINGER SURGERY Right 1990    OTHER SURGICAL HISTORY      skull srgery from accident in childhood.       Family History   Problem Relation Age of Onset    Heart disease Mother     Heart disease Father     Diabetes Father     Hypertension Sister     Asthma Sister     Kidney disease Sister     Hypertension Brother     Diabetes Brother     Cancer Maternal Uncle        Social History     Socioeconomic History    Marital status:    Tobacco Use    Smoking status: Some Days     Packs/day: 1.50     Years: 35.00     Additional pack years: 0.00     Total pack years: 52.50     Types: Cigarettes    Smokeless tobacco: Current     Types: Snuff    Tobacco comments:     Would like to quit.   Vaping Use    Vaping Use: Never used   Substance and Sexual Activity    Alcohol use: No    Drug use: No    Sexual activity: Defer           Objective   Physical Exam  Vitals and nursing note reviewed. Exam conducted with a chaperone present.   Constitutional:       General: He is in acute distress.      Appearance: Normal appearance. He is well-developed. He is ill-appearing. He is not toxic-appearing.   HENT:      Head: Normocephalic and atraumatic.      Nose: Nose normal.      Mouth/Throat:      Mouth: Mucous membranes are moist.      Pharynx: Uvula midline.   Eyes:      General: Lids are normal. Lids are everted, no foreign bodies appreciated.      Conjunctiva/sclera: Conjunctivae normal.      Pupils: Pupils are equal, round, and reactive to light.   Neck:      Vascular: Normal carotid pulses. No carotid bruit or JVD.      Trachea: Trachea and phonation normal. No tracheal deviation.   Cardiovascular:      Rate and Rhythm: Normal rate and regular rhythm.      Chest Wall: PMI is not displaced.      Pulses: Normal pulses.      Heart sounds: Normal heart sounds.      No gallop.   Pulmonary:      Effort:  Pulmonary effort is normal. Tachypnea present. No accessory muscle usage or respiratory distress.      Breath sounds: Normal breath sounds. No stridor. Examination of the right-middle field reveals wheezing. Examination of the left-middle field reveals wheezing. Examination of the right-lower field reveals wheezing and rales. Examination of the left-lower field reveals wheezing and rales. No decreased breath sounds, wheezing, rhonchi or rales.   Abdominal:      General: Bowel sounds are normal. There is no distension.      Palpations: Abdomen is soft.      Tenderness: There is no abdominal tenderness.   Musculoskeletal:         General: No swelling. Normal range of motion.      Cervical back: Full passive range of motion without pain, normal range of motion and neck supple. No rigidity.      Right lower leg: No tenderness.      Left lower leg: No tenderness.      Comments: Lower extremity exam bilaterally is unremarkable.  There is no right or left calf tenderness .  There is no palpable venous cord.  No obvious difference in the size of the legs.  No pitting edema.  The dorsalis pedis and posterior tibial femoral and popliteal pulses are palpable and +2 bilaterally.  Homans sign is negative   Skin:     General: Skin is warm and dry.      Capillary Refill: Capillary refill takes less than 2 seconds.      Coloration: Skin is not jaundiced or pale.      Nails: There is no clubbing.   Neurological:      General: No focal deficit present.      Mental Status: He is alert and oriented to person, place, and time.      GCS: GCS eye subscore is 4. GCS verbal subscore is 5. GCS motor subscore is 6.      Cranial Nerves: No cranial nerve deficit.      Motor: Motor function is intact.      Gait: Gait normal.      Deep Tendon Reflexes: Reflexes are normal and symmetric. Reflexes normal.   Psychiatric:         Speech: Speech normal.         Behavior: Behavior normal.         Procedures          ED Course  ED Course as of 01/23/24  0041   Mon Jan 22, 2024   2233 Sinus tac [TS]   Tue Jan 23, 2024   0004 CT scan no acute pulmonary emboli mediastinal lymphadenopathy patient be informed about that.  An infrarenal abdominal aortic aneurysm which the patient be informed about also [TS]   0035 [place] copd risk score is equal to 6 points which is very high risk 47.5% risk of adverse events    BAP 65 score for COPD exacerbation class III MAP 1 2.2% patient mortality [TS]      ED Course User Index  [TS] Michael Alvarado MD                                             Medical Decision Making  Differential Diagnosis:  I considered pulmonary etiology, asthma, chronic obstructive pulmonary disease, pneumonia, pulmonary embolism, adult respiratory distress syndrome, pneumothorax, pleural effusion, pulmonary fibrosis, cardiac etiology, congestive heart failure, myocardial infarction, metabolic etiology, diabetic ketoacidosis, uremia, acidosis, sepsis, anemia, drug related etiology, hyperventilation and CNS disease as a possible cause of dyspnea in this patient. This is a partial list of diagnoses considered.           Problems Addressed:  Acute exacerbation of chronic obstructive pulmonary disease (COPD): complicated acute illness or injury that poses a threat to life or bodily functions     Details: Was given steroids and neb treatments in the ED.  COVID: acute illness or injury     Details: Acute COVID infection.  Troponin I above reference range: complicated acute illness or injury     Details: Troponin is elevated this is probably type II elevation caused by demand ischemia.    Amount and/or Complexity of Data Reviewed  Labs: ordered.     Details: Labs reviewed  Radiology: ordered.     Details: CAT scan reviewed patient was informed about his lymphadenopathy in his aneurysm which she is aware of.  ECG/medicine tests: ordered.     Details: Nonspecific ischemic changes in the septal leads.  Discussion of management or test interpretation with external  provider(s): Discussed with the hospitalist.    Risk  OTC drugs.  Prescription drug management.  Risk Details: Patient's [place]  COPD risk scale is 6 point  [place] 4.5  Will be admitted to medicine service.        Final diagnoses:   Acute exacerbation of chronic obstructive pulmonary disease (COPD)   COVID   Troponin I above reference range       ED Disposition  ED Disposition       ED Disposition   Decision to Admit    Condition   --    Comment   Level of Care: Telemetry [5]   Diagnosis: Acute exacerbation of chronic obstructive pulmonary disease (COPD) [348963]   Admitting Physician: HOLA HUITRON [[id number]]   Attending Physician: HOLA HUITRON [[id number]]   Certification: I Certify That Inpatient Hospital Services Are Medically Necessary For Greater Than 2 Midnights                 No follow-up provider specified.       Medication List      No changes were made to your prescriptions during this visit.            Michael Alvarado MD  01/22/24 2221       Michael Alvarado MD  01/22/24 2254       Michael Alvarado MD  01/23/24 0041      Electronically signed by Michael Alvarado MD at 01/23/24 0041       Michelle Rivera, RN at 01/22/24 2122          RT here for ABG     Electronically signed by Michelle Rivera, RN at 01/22/24 2225       Vital Signs (last day)       Date/Time Temp Temp src Pulse Resp BP Patient Position SpO2    01/23/24 0711 -- -- 82 16 -- -- 95    01/23/24 0707 -- -- 80 15 -- -- --    01/23/24 0703 -- -- 72 15 118/61 -- 98    01/23/24 0600 -- -- 69 20 119/55 -- 92    01/23/24 0501 -- -- 71 -- 111/58 -- 93    01/23/24 0401 98 (36.7) Oral 100 18 136/72 -- 94    01/23/24 0301 -- -- 78 20 111/57 -- 95    01/23/24 0201 -- -- 84 18 96/49 -- 96    01/23/24 0100 -- -- 102 18 120/70 -- 94    01/22/24 23:43:12 99 (37.2) Oral 100 18 111/62 -- 99    01/22/24 2214 100 (37.8) Oral 119 27 120/70 Sitting 84          Current Facility-Administered Medications   Medication Dose Route Frequency Provider Last Rate  Last Admin    acetaminophen (TYLENOL) tablet 650 mg  650 mg Oral Q4H PRN Marco A Keys MD        Or    acetaminophen (TYLENOL) suppository 650 mg  650 mg Rectal Q4H PRN Marco A Keys MD        albuterol sulfate HFA (PROVENTIL HFA;VENTOLIN HFA;PROAIR HFA) inhaler 2 puff  2 puff Inhalation Q6H PRN Michael Alvarado MD   2 puff at 01/23/24 0703    amLODIPine (NORVASC) tablet 10 mg  10 mg Oral Daily MarcoA Keys MD        aspirin EC tablet 81 mg  81 mg Oral Daily Marco A Keys MD        sennosides-docusate (PERICOLACE) 8.6-50 MG per tablet 2 tablet  2 tablet Oral BID Marco A Keys MD        And    polyethylene glycol (MIRALAX) packet 17 g  17 g Oral Daily PRN Marco A Keys MD        And    bisacodyl (DULCOLAX) EC tablet 5 mg  5 mg Oral Daily PRN Marco A Keys MD        And    bisacodyl (DULCOLAX) suppository 10 mg  10 mg Rectal Daily PRN Marco A Keys MD        busPIRone (BUSPAR) tablet 5 mg  5 mg Oral BID Marco A Keys MD        citalopram (CeleXA) tablet 20 mg  20 mg Oral Daily Marco A Keys MD        clopidogrel (PLAVIX) tablet 75 mg  75 mg Oral Daily Marco A Keys MD        dexAMETHasone (DECADRON) tablet 6 mg  6 mg Oral Daily Marco A Keys MD        Or    dexAMETHasone (DECADRON) injection 6 mg  6 mg Intravenous Daily Marco A Keys MD        dextromethorphan polistirex ER (DELSYM) 30 MG/5ML oral suspension 60 mg  60 mg Oral Q12H PRN Marco A Keys MD        Enoxaparin Sodium (LOVENOX) syringe 40 mg  40 mg Subcutaneous Daily Marco A Keys MD   40 mg at 01/23/24 0628    gabapentin (NEURONTIN) capsule 400 mg  400 mg Oral TID Marco A Keys MD        guaiFENesin (MUCINEX) 12 hr tablet 600 mg  600 mg Oral Q12H Marco A Keys MD        HYDROcodone-acetaminophen (NORCO) 7.5-325 MG per tablet 1 tablet  1 tablet Oral Q6H PRN Marco A Keys  MD Martin        ipratropium (ATROVENT HFA) inhaler 2 puff  2 puff Inhalation 4x Daily - RT Michael Alvarado MD   2 puff at 01/23/24 0707    isosorbide mononitrate (IMDUR) 24 hr tablet 120 mg  120 mg Oral Daily Marco A Keys MD        metoprolol succinate XL (TOPROL-XL) 24 hr tablet 50 mg  50 mg Oral Q24H Marco A Keys MD        rosuvastatin (CRESTOR) tablet 40 mg  40 mg Oral Daily Marco A Keys MD        sodium chloride 0.9 % flush 10 mL  10 mL Intravenous PRN Marco A Keys MD        sodium chloride 0.9 % flush 10 mL  10 mL Intravenous Q12H Marco A Keys MD        sodium chloride 0.9 % flush 10 mL  10 mL Intravenous PRN Marco A Keys MD        sodium chloride 0.9 % infusion 40 mL  40 mL Intravenous PRN Marco A Keys MD        tamsulosin (FLOMAX) 24 hr capsule 0.4 mg  0.4 mg Oral Daily Marco A Keys MD         Current Outpatient Medications   Medication Sig Dispense Refill    amLODIPine (NORVASC) 5 MG tablet Take 2 tablets by mouth Daily. 30 tablet 11    aspirin 81 MG tablet Take 1 tablet by mouth Daily. 30 tablet 2    busPIRone (BUSPAR) 5 MG tablet Take 1 tablet by mouth 2 (Two) Times a Day.      citalopram (CeleXA) 20 MG tablet Take 1 tablet by mouth Daily.      cloNIDine (CATAPRES) 0.1 MG tablet TAKE 1 TABLET BY MOUTH TWICE DAILY AS NEEDED FOR HIGH BLOOD PRESSURE >150/90 60 tablet 0    clopidogrel (PLAVIX) 75 MG tablet Take 1 tablet by mouth Daily. 30 tablet 2    gabapentin (NEURONTIN) 100 MG capsule Take 4 capsules by mouth 3 (Three) Times a Day.      HYDROcodone-acetaminophen (NORCO) 7.5-325 MG per tablet Take 1 tablet by mouth Every 6 (Six) Hours As Needed for Moderate Pain.      isosorbide mononitrate (IMDUR) 120 MG 24 hr tablet Take 1 tablet by mouth Daily. 90 tablet 3    metoprolol succinate XL (TOPROL-XL) 50 MG 24 hr tablet Take 1 tablet by mouth Daily. 30 tablet 0    rosuvastatin (CRESTOR) 40 MG tablet Take 1 tablet by  mouth Daily. 30 tablet 0    tamsulosin (FLOMAX) 0.4 MG capsule 24 hr capsule Take 1 capsule by mouth Daily.      nicotine (Nicotine Step 1) 21 MG/24HR patch Place 1 patch on the skin as directed by provider Daily. 30 patch 3    nitroglycerin (NITROSTAT) 0.4 MG SL tablet Place 1 tablet under the tongue Every 5 (Five) Minutes As Needed for Chest Pain. Take no more than 3 doses in 15 minutes.

## 2024-01-23 NOTE — ED PROVIDER NOTES
Subjective   History of Present Illness  Patient with a shortness of breath cough and congestion.    Shortness of Breath  Severity:  Moderate  Onset quality:  Gradual  Duration:  1 day  Timing:  Constant  Progression:  Worsening  Chronicity:  Recurrent  Context: activity    Context: not animal exposure, not emotional upset, not fumes, not occupational exposure, not pollens, not strong odors and not URI    Relieved by:  Nothing  Worsened by:  Exertion  Ineffective treatments:  None tried  Associated symptoms: cough and wheezing    Associated symptoms: no abdominal pain, no chest pain, no diaphoresis, no ear pain, no headaches, no hemoptysis, no neck pain, no sore throat, no sputum production and no swollen glands    Risk factors: no recent alcohol use, no family hx of DVT, no obesity, no oral contraceptive use and no tobacco use        Review of Systems   Constitutional: Negative.  Negative for diaphoresis.   HENT: Negative.  Negative for ear pain and sore throat.    Respiratory:  Positive for cough, shortness of breath and wheezing. Negative for hemoptysis and sputum production.    Cardiovascular:  Negative for chest pain.   Gastrointestinal: Negative.  Negative for abdominal distention, abdominal pain and nausea.   Endocrine: Negative.    Genitourinary: Negative.    Musculoskeletal: Negative.  Negative for back pain and neck pain.   Skin:  Negative for color change and pallor.   Neurological: Negative.  Negative for syncope, weakness, light-headedness, numbness and headaches.   Hematological: Negative.  Does not bruise/bleed easily.   All other systems reviewed and are negative.      Past Medical History:   Diagnosis Date    Aneurysm     Anxiety     Arthritis     Carotid artery disease     Carotid stenosis, right 02/01/2018    Post right carotid endarterectomy    COPD (chronic obstructive pulmonary disease)     Coronary artery disease     Heart murmur     History of right-sided carotid endarterectomy 02/16/2022     Hyperlipidemia LDL goal <70 12/14/2017    Left frontal lobe, right thalamus and right occipital CVA (cerebrovascular accident) (HCC) 01/27/2022    LVH (left ventricular hypertrophy) 02/16/2022    Pneumonia     Primary hypertension 12/14/2017    S/P CABG x 3 02/16/2022    LIMA to LAD, SVG to PDA and SVG to diagonal branch with TMR and left atrial appendage exclusion with atricure clip device 2/8/2018 per Dr. Cj Robin at [facility]     Severe aortic valve stenosis 12/14/2017    Status post aortic valve replacement with bioprosthetic valve 02/16/2022    Graciela Juarez bovine pericardial 19 mm valve 2/8/2018 per Dr. Cj Robin at [facility]    Tobacco use 12/14/2017    Wears glasses        No Known Allergies    Past Surgical History:   Procedure Laterality Date    AORTAGRAM Bilateral 10/27/2023    Procedure: LEFT LOWER EXTREMITY ANGIOGRAM, BILATERAL ILIAC BALLOON ANGIOPLASTY, BILATERAL ILIAC STENT PLACEMENT, MYNX CLOSURE;  Surgeon: Jl Salgado DO;  Location: [facility] OR 12;  Service: Vascular;  Laterality: Bilateral;    APPENDECTOMY      CARDIAC CATHETERIZATION N/A 12/18/2017    Procedure: Left Heart Cath;  Surgeon: Aime Francois MD;  Location:  [facility] CATH INVASIVE LOCATION;  Service:     CARDIAC CATHETERIZATION N/A 12/18/2017    Procedure: Right Heart Cath;  Surgeon: Aime Francois MD;  Location:  [facility] CATH INVASIVE LOCATION;  Service:     CARDIAC CATHETERIZATION Bilateral 1/4/2018    Procedure: Coronary angiography;  Surgeon: Alfonso Yi MD;  Location:  [facility] CATH INVASIVE LOCATION;  Service:     CARDIAC CATHETERIZATION Left 9/15/2021    Procedure: Cardiac Catheterization/Vascular Study NIMCO [place]  Has 3 graft 2018;  Surgeon: Aime Francois MD;  Location:  [facility] CATH INVASIVE LOCATION;  Service: Cardiology;  Laterality: Left;    CAROTID ENDARTERECTOMY Right 2/1/2018    Procedure: RIGHT CAROTID ENDARTERECTOMY WITH EEG-awake;  Surgeon: Jl Salgado DO;  Location:   [facility] OR;  Service:     CORONARY ARTERY BYPASS GRAFT WITH AORTIC VALVE REPAIR/REPLACEMENT N/A 2/8/2018    Procedure: AORTIC VALVE REPLACEMENT,CORONARY ARTERY BYPASS GRAFTING x 3  WITH CONCHA AND RIGHT EVH, ATRIAL APPENDAGE EXCLUSION LEFT WITH TRANSESOPHAGEAL ECHOCARDIOGRAM, 17-CHANNEL TMR;  Surgeon: Cj Robin MD;  Location:  [facility] OR;  Service:     FINGER SURGERY Right 1990    OTHER SURGICAL HISTORY      skull srgery from accident in childhood.       Family History   Problem Relation Age of Onset    Heart disease Mother     Heart disease Father     Diabetes Father     Hypertension Sister     Asthma Sister     Kidney disease Sister     Hypertension Brother     Diabetes Brother     Cancer Maternal Uncle        Social History     Socioeconomic History    Marital status:    Tobacco Use    Smoking status: Some Days     Packs/day: 1.50     Years: 35.00     Additional pack years: 0.00     Total pack years: 52.50     Types: Cigarettes    Smokeless tobacco: Current     Types: Snuff    Tobacco comments:     Would like to quit.   Vaping Use    Vaping Use: Never used   Substance and Sexual Activity    Alcohol use: No    Drug use: No    Sexual activity: Defer           Objective   Physical Exam  Vitals and nursing note reviewed. Exam conducted with a chaperone present.   Constitutional:       General: He is in acute distress.      Appearance: Normal appearance. He is well-developed. He is ill-appearing. He is not toxic-appearing.   HENT:      Head: Normocephalic and atraumatic.      Nose: Nose normal.      Mouth/Throat:      Mouth: Mucous membranes are moist.      Pharynx: Uvula midline.   Eyes:      General: Lids are normal. Lids are everted, no foreign bodies appreciated.      Conjunctiva/sclera: Conjunctivae normal.      Pupils: Pupils are equal, round, and reactive to light.   Neck:      Vascular: Normal carotid pulses. No carotid bruit or JVD.      Trachea: Trachea and phonation normal. No tracheal deviation.    Cardiovascular:      Rate and Rhythm: Normal rate and regular rhythm.      Chest Wall: PMI is not displaced.      Pulses: Normal pulses.      Heart sounds: Normal heart sounds.      No gallop.   Pulmonary:      Effort: Pulmonary effort is normal. Tachypnea present. No accessory muscle usage or respiratory distress.      Breath sounds: Normal breath sounds. No stridor. Examination of the right-middle field reveals wheezing. Examination of the left-middle field reveals wheezing. Examination of the right-lower field reveals wheezing and rales. Examination of the left-lower field reveals wheezing and rales. No decreased breath sounds, wheezing, rhonchi or rales.   Abdominal:      General: Bowel sounds are normal. There is no distension.      Palpations: Abdomen is soft.      Tenderness: There is no abdominal tenderness.   Musculoskeletal:         General: No swelling. Normal range of motion.      Cervical back: Full passive range of motion without pain, normal range of motion and neck supple. No rigidity.      Right lower leg: No tenderness.      Left lower leg: No tenderness.      Comments: Lower extremity exam bilaterally is unremarkable.  There is no right or left calf tenderness .  There is no palpable venous cord.  No obvious difference in the size of the legs.  No pitting edema.  The dorsalis pedis and posterior tibial femoral and popliteal pulses are palpable and +2 bilaterally.  Homans sign is negative   Skin:     General: Skin is warm and dry.      Capillary Refill: Capillary refill takes less than 2 seconds.      Coloration: Skin is not jaundiced or pale.      Nails: There is no clubbing.   Neurological:      General: No focal deficit present.      Mental Status: He is alert and oriented to person, place, and time.      GCS: GCS eye subscore is 4. GCS verbal subscore is 5. GCS motor subscore is 6.      Cranial Nerves: No cranial nerve deficit.      Motor: Motor function is intact.      Gait: Gait normal.       Deep Tendon Reflexes: Reflexes are normal and symmetric. Reflexes normal.   Psychiatric:         Speech: Speech normal.         Behavior: Behavior normal.         Procedures           ED Course  ED Course as of 01/23/24 0041   Mon Jan 22, 2024   2233 Sinus tac [TS]   Tue Jan 23, 2024   0004 CT scan no acute pulmonary emboli mediastinal lymphadenopathy patient be informed about that.  An infrarenal abdominal aortic aneurysm which the patient be informed about also [TS]   0035 [place] copd risk score is equal to 6 points which is very high risk 47.5% risk of adverse events    BAP 65 score for COPD exacerbation class III MAP 1 2.2% patient mortality [TS]      ED Course User Index  [TS] Michael Alvarado MD                                             Medical Decision Making  Differential Diagnosis:  I considered pulmonary etiology, asthma, chronic obstructive pulmonary disease, pneumonia, pulmonary embolism, adult respiratory distress syndrome, pneumothorax, pleural effusion, pulmonary fibrosis, cardiac etiology, congestive heart failure, myocardial infarction, metabolic etiology, diabetic ketoacidosis, uremia, acidosis, sepsis, anemia, drug related etiology, hyperventilation and CNS disease as a possible cause of dyspnea in this patient. This is a partial list of diagnoses considered.           Problems Addressed:  Acute exacerbation of chronic obstructive pulmonary disease (COPD): complicated acute illness or injury that poses a threat to life or bodily functions     Details: Was given steroids and neb treatments in the ED.  COVID: acute illness or injury     Details: Acute COVID infection.  Troponin I above reference range: complicated acute illness or injury     Details: Troponin is elevated this is probably type II elevation caused by demand ischemia.    Amount and/or Complexity of Data Reviewed  Labs: ordered.     Details: Labs reviewed  Radiology: ordered.     Details: CAT scan reviewed patient was informed about  his lymphadenopathy in his aneurysm which she is aware of.  ECG/medicine tests: ordered.     Details: Nonspecific ischemic changes in the septal leads.  Discussion of management or test interpretation with external provider(s): Discussed with the hospitalist.    Risk  OTC drugs.  Prescription drug management.  Risk Details: Patient's [place]  COPD risk scale is 6 point  [place] 4.5  Will be admitted to medicine service.        Final diagnoses:   Acute exacerbation of chronic obstructive pulmonary disease (COPD)   COVID   Troponin I above reference range       ED Disposition  ED Disposition       ED Disposition   Decision to Admit    Condition   --    Comment   Level of Care: Telemetry [5]   Diagnosis: Acute exacerbation of chronic obstructive pulmonary disease (COPD) [615893]   Admitting Physician: HOLA HUITRON [[id number]]   Attending Physician: HOLA HUITRON [[id number]]   Certification: I Certify That Inpatient Hospital Services Are Medically Necessary For Greater Than 2 Midnights                 No follow-up provider specified.       Medication List      No changes were made to your prescriptions during this visit.            Michael Alvarado MD  01/22/24 2221       Michael Alvarado MD  01/22/24 2254       Michael Alvarado MD  01/23/24 0041

## 2024-01-24 PROBLEM — R79.89 ELEVATED TROPONIN LEVEL NOT DUE MYOCARDIAL INFARCTION: Status: ACTIVE | Noted: 2024-01-23

## 2024-01-24 LAB
ALBUMIN SERPL-MCNC: 4.1 G/DL (ref 3.5–5.2)
ALBUMIN/GLOB SERPL: 1.6 G/DL
ALP SERPL-CCNC: 79 U/L (ref 39–117)
ALT SERPL W P-5'-P-CCNC: 23 U/L (ref 1–41)
ANION GAP SERPL CALCULATED.3IONS-SCNC: 11 MMOL/L (ref 5–15)
AST SERPL-CCNC: 36 U/L (ref 1–40)
BASOPHILS # BLD AUTO: 0.01 10*3/MM3 (ref 0–0.2)
BASOPHILS NFR BLD AUTO: 0.1 % (ref 0–1.5)
BILIRUB SERPL-MCNC: 0.2 MG/DL (ref 0–1.2)
BUN SERPL-MCNC: 15 MG/DL (ref 8–23)
BUN/CREAT SERPL: 36.6 (ref 7–25)
CALCIUM SPEC-SCNC: 8.9 MG/DL (ref 8.6–10.5)
CHLORIDE SERPL-SCNC: 104 MMOL/L (ref 98–107)
CO2 SERPL-SCNC: 23 MMOL/L (ref 22–29)
CREAT SERPL-MCNC: 0.41 MG/DL (ref 0.76–1.27)
DEPRECATED RDW RBC AUTO: 46.6 FL (ref 37–54)
EGFRCR SERPLBLD CKD-EPI 2021: 121.7 ML/MIN/1.73
EOSINOPHIL # BLD AUTO: 0 10*3/MM3 (ref 0–0.4)
EOSINOPHIL NFR BLD AUTO: 0 % (ref 0.3–6.2)
ERYTHROCYTE [DISTWIDTH] IN BLOOD BY AUTOMATED COUNT: 13.6 % (ref 12.3–15.4)
GEN 5 2HR TROPONIN T REFLEX: 140 NG/L
GLOBULIN UR ELPH-MCNC: 2.5 GM/DL
GLUCOSE SERPL-MCNC: 121 MG/DL (ref 65–99)
HCT VFR BLD AUTO: 36.7 % (ref 37.5–51)
HGB BLD-MCNC: 11.9 G/DL (ref 13–17.7)
IMM GRANULOCYTES # BLD AUTO: 0.01 10*3/MM3 (ref 0–0.05)
IMM GRANULOCYTES NFR BLD AUTO: 0.1 % (ref 0–0.5)
LYMPHOCYTES # BLD AUTO: 0.99 10*3/MM3 (ref 0.7–3.1)
LYMPHOCYTES NFR BLD AUTO: 14.4 % (ref 19.6–45.3)
MCH RBC QN AUTO: 30.3 PG (ref 26.6–33)
MCHC RBC AUTO-ENTMCNC: 32.4 G/DL (ref 31.5–35.7)
MCV RBC AUTO: 93.4 FL (ref 79–97)
MONOCYTES # BLD AUTO: 0.8 10*3/MM3 (ref 0.1–0.9)
MONOCYTES NFR BLD AUTO: 11.6 % (ref 5–12)
NEUTROPHILS NFR BLD AUTO: 5.07 10*3/MM3 (ref 1.7–7)
NEUTROPHILS NFR BLD AUTO: 73.8 % (ref 42.7–76)
NRBC BLD AUTO-RTO: 0 /100 WBC (ref 0–0.2)
PLATELET # BLD AUTO: 225 10*3/MM3 (ref 140–450)
PMV BLD AUTO: 10.3 FL (ref 6–12)
POTASSIUM SERPL-SCNC: 3.9 MMOL/L (ref 3.5–5.2)
PROT SERPL-MCNC: 6.6 G/DL (ref 6–8.5)
RBC # BLD AUTO: 3.93 10*6/MM3 (ref 4.14–5.8)
SODIUM SERPL-SCNC: 138 MMOL/L (ref 136–145)
TROPONIN T DELTA: 4 NG/L
TROPONIN T SERPL HS-MCNC: 136 NG/L
WBC NRBC COR # BLD AUTO: 6.88 10*3/MM3 (ref 3.4–10.8)

## 2024-01-24 PROCEDURE — 25010000002 ENOXAPARIN PER 10 MG: Performed by: FAMILY MEDICINE

## 2024-01-24 PROCEDURE — 25010000002 AZITHROMYCIN PER 500 MG: Performed by: HOSPITALIST

## 2024-01-24 PROCEDURE — 85025 COMPLETE CBC W/AUTO DIFF WBC: CPT | Performed by: FAMILY MEDICINE

## 2024-01-24 PROCEDURE — 84484 ASSAY OF TROPONIN QUANT: CPT | Performed by: FAMILY MEDICINE

## 2024-01-24 PROCEDURE — 94664 DEMO&/EVAL PT USE INHALER: CPT

## 2024-01-24 PROCEDURE — 94799 UNLISTED PULMONARY SVC/PX: CPT

## 2024-01-24 PROCEDURE — 25010000002 FUROSEMIDE PER 20 MG: Performed by: NURSE PRACTITIONER

## 2024-01-24 PROCEDURE — 80053 COMPREHEN METABOLIC PANEL: CPT | Performed by: FAMILY MEDICINE

## 2024-01-24 PROCEDURE — 63710000001 DEXAMETHASONE PER 0.25 MG: Performed by: FAMILY MEDICINE

## 2024-01-24 PROCEDURE — 36415 COLL VENOUS BLD VENIPUNCTURE: CPT | Performed by: FAMILY MEDICINE

## 2024-01-24 PROCEDURE — 25810000003 SODIUM CHLORIDE 0.9 % SOLUTION 250 ML FLEX CONT: Performed by: HOSPITALIST

## 2024-01-24 PROCEDURE — 99222 1ST HOSP IP/OBS MODERATE 55: CPT | Performed by: NURSE PRACTITIONER

## 2024-01-24 RX ORDER — FUROSEMIDE 10 MG/ML
40 INJECTION INTRAMUSCULAR; INTRAVENOUS ONCE
Status: COMPLETED | OUTPATIENT
Start: 2024-01-24 | End: 2024-01-24

## 2024-01-24 RX ADMIN — AMLODIPINE BESYLATE 10 MG: 10 TABLET ORAL at 08:33

## 2024-01-24 RX ADMIN — HYDROCODONE BITARTRATE AND ACETAMINOPHEN 1 TABLET: 7.5; 325 TABLET ORAL at 04:01

## 2024-01-24 RX ADMIN — GABAPENTIN 400 MG: 400 CAPSULE ORAL at 16:36

## 2024-01-24 RX ADMIN — METOPROLOL SUCCINATE 50 MG: 50 TABLET, EXTENDED RELEASE ORAL at 08:33

## 2024-01-24 RX ADMIN — CLOPIDOGREL BISULFATE 75 MG: 75 TABLET, FILM COATED ORAL at 08:33

## 2024-01-24 RX ADMIN — GABAPENTIN 400 MG: 400 CAPSULE ORAL at 08:33

## 2024-01-24 RX ADMIN — TAMSULOSIN HYDROCHLORIDE 0.4 MG: 0.4 CAPSULE ORAL at 08:33

## 2024-01-24 RX ADMIN — DEXAMETHASONE 6 MG: 4 TABLET ORAL at 08:33

## 2024-01-24 RX ADMIN — CITALOPRAM 20 MG: 20 TABLET, FILM COATED ORAL at 08:33

## 2024-01-24 RX ADMIN — IPRATROPIUM BROMIDE 2 PUFF: 17 AEROSOL, METERED RESPIRATORY (INHALATION) at 11:24

## 2024-01-24 RX ADMIN — IPRATROPIUM BROMIDE 2 PUFF: 17 AEROSOL, METERED RESPIRATORY (INHALATION) at 20:13

## 2024-01-24 RX ADMIN — FUROSEMIDE 40 MG: 10 INJECTION, SOLUTION INTRAVENOUS at 12:44

## 2024-01-24 RX ADMIN — IPRATROPIUM BROMIDE 2 PUFF: 17 AEROSOL, METERED RESPIRATORY (INHALATION) at 05:54

## 2024-01-24 RX ADMIN — IPRATROPIUM BROMIDE 2 PUFF: 17 AEROSOL, METERED RESPIRATORY (INHALATION) at 14:20

## 2024-01-24 RX ADMIN — ASPIRIN 81 MG: 81 TABLET, COATED ORAL at 08:33

## 2024-01-24 RX ADMIN — HYDROCODONE BITARTRATE AND ACETAMINOPHEN 1 TABLET: 7.5; 325 TABLET ORAL at 16:36

## 2024-01-24 RX ADMIN — ISOSORBIDE MONONITRATE 120 MG: 60 TABLET, EXTENDED RELEASE ORAL at 08:33

## 2024-01-24 RX ADMIN — GUAIFENESIN 600 MG: 600 TABLET, EXTENDED RELEASE ORAL at 08:35

## 2024-01-24 RX ADMIN — GABAPENTIN 400 MG: 400 CAPSULE ORAL at 20:26

## 2024-01-24 RX ADMIN — BUSPIRONE HYDROCHLORIDE 5 MG: 5 TABLET ORAL at 20:25

## 2024-01-24 RX ADMIN — AZITHROMYCIN DIHYDRATE 500 MG: 500 INJECTION, POWDER, LYOPHILIZED, FOR SOLUTION INTRAVENOUS at 10:15

## 2024-01-24 RX ADMIN — HYDROCODONE BITARTRATE AND ACETAMINOPHEN 1 TABLET: 7.5; 325 TABLET ORAL at 10:16

## 2024-01-24 RX ADMIN — Medication 10 ML: at 08:34

## 2024-01-24 RX ADMIN — BUSPIRONE HYDROCHLORIDE 5 MG: 5 TABLET ORAL at 08:33

## 2024-01-24 RX ADMIN — GUAIFENESIN 600 MG: 600 TABLET, EXTENDED RELEASE ORAL at 20:25

## 2024-01-24 RX ADMIN — ROSUVASTATIN CALCIUM 40 MG: 20 TABLET, FILM COATED ORAL at 08:33

## 2024-01-24 RX ADMIN — ENOXAPARIN SODIUM 40 MG: 100 INJECTION SUBCUTANEOUS at 08:33

## 2024-01-24 RX ADMIN — Medication 10 ML: at 20:26

## 2024-01-24 NOTE — CONSULTS
[facility] HEART GROUP CONSULT NOTE    Referring Provider: No ref. provider found    Reason for Consultation: elevated troponin    Chief complaint:   Chief Complaint   Patient presents with    Shortness of Breath       Subjective .     History of present illness:  Sharad Ryder is a 63 y.o. male with CAD s/p CABG, AS s/p AVR, PVD and tobacco use who follows with Dr. Francois. He presented to the ER yesterday with complaints of shortness of breath, cough and congestion. He was noted to be hypoxic on RA with sat 84%. Labs on arrival revealed and elevated troponin of 68 with a delta of 37, d-dimer 1.31, BNP 3578. CTA of the chest was negative for PE, CXR noted chronic inflammatory and obstructive changes. He tested positive for COVID 19 and was admitted to medicine for acute COPD exacerbation with hypoxia. He underwent a 2D echo yesterday with EF noted to be normal, moderate-severe LVH, grade 2 diastolic dysfunction, moderate-severe AS with elevated peak and mean gradients of the prosthetic aortic valve. He has had troponins checked during his stay which have peaked at 217. EKG has not revealed any acute changes. Cardiology has been consulted regarding elevated troponin.     He notes he did have some chest pain prior to his hospitalization, prior to his acute illness. His pain was located in his lower chest on both sides of his chest. The pain was nonexertional and relieved after taking a few baby aspirins. He has had no chest pain since arrival to the hospital. He notes his breathing is better and is minimally requiring O2 which is currently at 1L.     History  Past Medical History:   Diagnosis Date    Aneurysm     Anxiety     Arthritis     Carotid artery disease     Carotid stenosis, right 02/01/2018    Post right carotid endarterectomy    COPD (chronic obstructive pulmonary disease)     Coronary artery disease     Heart murmur     History of right-sided carotid endarterectomy 02/16/2022    Hyperlipidemia  LDL goal <70 12/14/2017    Left frontal lobe, right thalamus and right occipital CVA (cerebrovascular accident) (HCC) 01/27/2022    LVH (left ventricular hypertrophy) 02/16/2022    Pneumonia     Primary hypertension 12/14/2017    S/P CABG x 3 02/16/2022    LIMA to LAD, SVG to PDA and SVG to diagonal branch with TMR and left atrial appendage exclusion with atricure clip device 2/8/2018 per Dr. Cj Robin at [facility]     Severe aortic valve stenosis 12/14/2017    Status post aortic valve replacement with bioprosthetic valve 02/16/2022    Graciela Juarez bovine pericardial 19 mm valve 2/8/2018 per Dr. Cj Robin at [facility]    Tobacco use 12/14/2017    Wears glasses    ,   Past Surgical History:   Procedure Laterality Date    AORTAGRAM Bilateral 10/27/2023    Procedure: LEFT LOWER EXTREMITY ANGIOGRAM, BILATERAL ILIAC BALLOON ANGIOPLASTY, BILATERAL ILIAC STENT PLACEMENT, MYNX CLOSURE;  Surgeon: Jl Salgado DO;  Location: [facility] HYBRID OR 12;  Service: Vascular;  Laterality: Bilateral;    APPENDECTOMY      CARDIAC CATHETERIZATION N/A 12/18/2017    Procedure: Left Heart Cath;  Surgeon: Aime Francois MD;  Location:  [facility] CATH INVASIVE LOCATION;  Service:     CARDIAC CATHETERIZATION N/A 12/18/2017    Procedure: Right Heart Cath;  Surgeon: Aime Francois MD;  Location:  [facility] CATH INVASIVE LOCATION;  Service:     CARDIAC CATHETERIZATION Bilateral 1/4/2018    Procedure: Coronary angiography;  Surgeon: Alfonso Yi MD;  Location:  [facility] CATH INVASIVE LOCATION;  Service:     CARDIAC CATHETERIZATION Left 9/15/2021    Procedure: Cardiac Catheterization/Vascular Study NIMCO OK  Has 3 graft 2018;  Surgeon: Aime Francois MD;  Location:  [facility] CATH INVASIVE LOCATION;  Service: Cardiology;  Laterality: Left;    CAROTID ENDARTERECTOMY Right 2/1/2018    Procedure: RIGHT CAROTID ENDARTERECTOMY WITH EEG-awake;  Surgeon: Jl Salgado DO;  Location: [facility] OR;  Service:     CORONARY ARTERY  BYPASS GRAFT WITH AORTIC VALVE REPAIR/REPLACEMENT N/A 2/8/2018    Procedure: AORTIC VALVE REPLACEMENT,CORONARY ARTERY BYPASS GRAFTING x 3  WITH CONCHA AND RIGHT EVH, ATRIAL APPENDAGE EXCLUSION LEFT WITH TRANSESOPHAGEAL ECHOCARDIOGRAM, 17-CHANNEL TMR;  Surgeon: Cj Robin MD;  Location: [facility] OR;  Service:     FINGER SURGERY Right 1990    OTHER SURGICAL HISTORY      skull srgery from accident in childhood.   ,   Family History   Problem Relation Age of Onset    Heart disease Mother     Heart disease Father     Diabetes Father     Hypertension Sister     Asthma Sister     Kidney disease Sister     Hypertension Brother     Diabetes Brother     Cancer Maternal Uncle    ,   Social History     Tobacco Use    Smoking status: Some Days     Packs/day: 1.50     Years: 35.00     Additional pack years: 0.00     Total pack years: 52.50     Types: Cigarettes    Smokeless tobacco: Current     Types: Snuff    Tobacco comments:     Would like to quit.   Vaping Use    Vaping Use: Never used   Substance Use Topics    Alcohol use: No    Drug use: No   ,     Medications    Prior to Admission medications    Medication Sig Start Date End Date Taking? Authorizing Provider   aspirin 81 MG tablet Take 1 tablet by mouth Daily. 2/16/18  Yes Cj Robin MD   busPIRone (BUSPAR) 10 MG tablet Take 1 tablet by mouth 2 (Two) Times a Day. 8/4/21  Yes Estrellita Wolfe MD   citalopram (CeleXA) 20 MG tablet Take 1 tablet by mouth Daily.   Yes Estrellita Wolfe MD   clopidogrel (PLAVIX) 75 MG tablet Take 1 tablet by mouth Daily. 2/16/18  Yes Cj Robin MD   gabapentin (NEURONTIN) 400 MG capsule Take 1 capsule by mouth 3 (Three) Times a Day.   Yes Estrellita Wolfe MD   HYDROcodone-acetaminophen (NORCO) 7.5-325 MG per tablet Take 1 tablet by mouth Every 6 (Six) Hours As Needed for Moderate Pain.   Yes Estrellita Wolfe MD   isosorbide mononitrate (IMDUR) 120 MG 24 hr tablet Take 1 tablet by mouth Daily. 2/17/22  Yes  Ashtyn Bay APRN   metoprolol succinate XL (TOPROL-XL) 50 MG 24 hr tablet Take 1 tablet by mouth Daily. 1/29/22  Yes Barbra Trent APRN   rosuvastatin (CRESTOR) 40 MG tablet Take 1 tablet by mouth Daily. 1/28/22  Yes Barbra Trent APRN   tamsulosin (FLOMAX) 0.4 MG capsule 24 hr capsule Take 1 capsule by mouth Daily. 8/1/23  Yes Estrellita Wolfe MD   amLODIPine (NORVASC) 10 MG tablet Take 1 tablet by mouth Daily.    Estrellita Wolfe MD   cloNIDine (CATAPRES) 0.1 MG tablet Take 1 tablet by mouth 2 (Two) Times a Day As Needed for High Blood Pressure (> 150/90).    Estrellita Wolfe MD   multivitamin with minerals (ONE-A-DAY MENS 50+ ADVANTAGE PO) Take 1 tablet by mouth Daily.    Estrellita Wolfe MD   nitroglycerin (NITROSTAT) 0.4 MG SL tablet Place 1 tablet under the tongue Every 5 (Five) Minutes As Needed for Chest Pain. Take no more than 3 doses in 15 minutes.    Estrellita Wolfe MD   terazosin (HYTRIN) 5 MG capsule Take 1 capsule by mouth Every Night.    Estrellita Wolfe MD       Current Facility-Administered Medications   Medication Dose Route Frequency Provider Last Rate Last Admin    acetaminophen (TYLENOL) tablet 650 mg  650 mg Oral Q4H PRN Marco A Keys MD        Or    acetaminophen (TYLENOL) suppository 650 mg  650 mg Rectal Q4H PRN Marc oA Keys MD        albuterol sulfate HFA (PROVENTIL HFA;VENTOLIN HFA;PROAIR HFA) inhaler 2 puff  2 puff Inhalation Q6H PRN Michael Alvarado MD   2 puff at 01/23/24 0703    amLODIPine (NORVASC) tablet 10 mg  10 mg Oral Daily Marco A Keys MD   10 mg at 01/24/24 0833    aspirin EC tablet 81 mg  81 mg Oral Daily Marco A Keys MD   81 mg at 01/24/24 0833    azithromycin (ZITHROMAX) 500 mg in sodium chloride 0.9 % 250 mL IVPB-VTB  500 mg Intravenous Q24H Michaelle Mccord MD   500 mg at 01/24/24 1015    sennosides-docusate (PERICOLACE) 8.6-50 MG per tablet 2 tablet  2 tablet Oral BID Massimo  Marco A Salazar MD   2 tablet at 01/23/24 2125    And    polyethylene glycol (MIRALAX) packet 17 g  17 g Oral Daily PRN Marco A Keys MD        And    bisacodyl (DULCOLAX) EC tablet 5 mg  5 mg Oral Daily PRN Marco A Keys MD        And    bisacodyl (DULCOLAX) suppository 10 mg  10 mg Rectal Daily PRN Marco A Keys MD        busPIRone (BUSPAR) tablet 5 mg  5 mg Oral BID Marco A Keys MD   5 mg at 01/24/24 0833    citalopram (CeleXA) tablet 20 mg  20 mg Oral Daily Marco A Keys MD   20 mg at 01/24/24 0833    clopidogrel (PLAVIX) tablet 75 mg  75 mg Oral Daily Marco A Keys MD   75 mg at 01/24/24 0833    dexAMETHasone (DECADRON) tablet 6 mg  6 mg Oral Daily Marco A Keys MD   6 mg at 01/24/24 0833    Or    dexAMETHasone (DECADRON) injection 6 mg  6 mg Intravenous Daily Marco A Keys MD   6 mg at 01/23/24 0850    dextromethorphan polistirex ER (DELSYM) 30 MG/5ML oral suspension 60 mg  60 mg Oral Q12H PRN Marco A Keys MD        Enoxaparin Sodium (LOVENOX) syringe 40 mg  40 mg Subcutaneous Daily Marco A Keys MD   40 mg at 01/24/24 0833    furosemide (LASIX) injection 40 mg  40 mg Intravenous Once Ca García APRN        gabapentin (NEURONTIN) capsule 400 mg  400 mg Oral TID Marco A Keys MD   400 mg at 01/24/24 0833    guaiFENesin (MUCINEX) 12 hr tablet 600 mg  600 mg Oral Q12H Marco A Keys MD   600 mg at 01/24/24 0835    HYDROcodone-acetaminophen (NORCO) 7.5-325 MG per tablet 1 tablet  1 tablet Oral Q6H PRN Marco A Keys MD   1 tablet at 01/24/24 1016    ipratropium (ATROVENT HFA) inhaler 2 puff  2 puff Inhalation 4x Daily - RT Michael Alvarado MD   2 puff at 01/24/24 0554    isosorbide mononitrate (IMDUR) 24 hr tablet 120 mg  120 mg Oral Daily Marco A Keys MD   120 mg at 01/24/24 0833    metoprolol succinate XL (TOPROL-XL) 24 hr tablet 50 mg  50 mg Oral Q24H  Marco A Keys MD   50 mg at 01/24/24 0833    rosuvastatin (CRESTOR) tablet 40 mg  40 mg Oral Daily Marco A Keys MD   40 mg at 01/24/24 0833    sodium chloride 0.9 % flush 10 mL  10 mL Intravenous PRN Marco A Keys MD        sodium chloride 0.9 % flush 10 mL  10 mL Intravenous Q12H Marco A Keys MD   10 mL at 01/24/24 0834    sodium chloride 0.9 % flush 10 mL  10 mL Intravenous PRN Marco A Keys MD        sodium chloride 0.9 % infusion 40 mL  40 mL Intravenous PRN Marco A Keys MD        tamsulosin (FLOMAX) 24 hr capsule 0.4 mg  0.4 mg Oral Daily Marco A Keys MD   0.4 mg at 01/24/24 0833       Allergies:  Patient has no known allergies.    Review of Systems  Review of Systems   Constitutional:  Negative for chills, diaphoresis, fatigue and unexpected weight change.   HENT:  Negative for nosebleeds.    Respiratory:  Positive for cough and shortness of breath. Negative for chest tightness and wheezing.    Cardiovascular:  Negative for chest pain, palpitations and leg swelling.   Gastrointestinal:  Negative for anal bleeding, blood in stool, diarrhea and nausea.   Neurological:  Negative for dizziness, syncope and light-headedness.   All other systems reviewed and are negative.      Objective     Physical Exam:  Patient Vitals for the past 24 hrs:   BP Temp Temp src Pulse Resp SpO2   01/24/24 0740 144/76 97 °F (36.1 °C) Oral 64 16 92 %   01/24/24 0556 -- -- -- 65 16 --   01/24/24 0554 -- -- -- 62 16 94 %   01/24/24 0301 132/68 96.8 °F (36 °C) Oral 72 16 94 %   01/23/24 2357 155/78 96.9 °F (36.1 °C) Oral 68 18 98 %   01/23/24 2022 132/70 97 °F (36.1 °C) Oral 65 18 94 %   01/23/24 1844 -- -- -- 83 18 94 %   01/23/24 1426 122/86 98.1 °F (36.7 °C) Oral 75 20 95 %   01/23/24 1347 -- 98 °F (36.7 °C) Oral 75 18 96 %   01/23/24 1200 123/84 98.1 °F (36.7 °C) Oral 82 18 91 %   01/23/24 1149 -- -- -- 95 18 94 %   01/23/24 1144 -- -- -- 74 18 94 %      Vitals reviewed.   Constitutional:       General: Not in acute distress.     Appearance: Healthy appearance. Well-developed. Not diaphoretic.   Eyes:      General: No scleral icterus.     Conjunctiva/sclera: Conjunctivae normal.      Pupils: Pupils are equal, round, and reactive to light.   HENT:      Head: Normocephalic.    Mouth/Throat:      Pharynx: No oropharyngeal exudate.   Neck:      Vascular: No JVR.   Pulmonary:      Effort: Pulmonary effort is normal. No respiratory distress.      Breath sounds: Examination of the right-lower field reveals rales. Examination of the left-lower field reveals rales. No wheezing. No rhonchi. Rales present.   Chest:      Chest wall: Not tender to palpatation.   Cardiovascular:      Normal rate. Regular rhythm.      Murmurs: There is a grade 2/6 systolic murmur.   Pulses:     Intact distal pulses.   Edema:     Peripheral edema absent.   Abdominal:      General: Bowel sounds are normal. There is no distension.      Palpations: Abdomen is soft.      Tenderness: There is no abdominal tenderness.   Musculoskeletal: Normal range of motion.      Cervical back: Normal range of motion and neck supple. Skin:     General: Skin is warm and dry.      Coloration: Skin is not pale.      Findings: No erythema or rash.   Neurological:      Mental Status: Alert, oriented to person, place, and time and oriented to person, place and time.      Deep Tendon Reflexes: Reflexes are normal and symmetric.   Psychiatric:         Behavior: Behavior normal.         Results Review:   I reviewed the patient's new clinical results.    Lab Results (last 72 hours)       Procedure Component Value Units Date/Time    High Sensitivity Troponin T 2Hr [[id number]]  (Abnormal) Collected: 01/24/24 0749    Specimen: Blood Updated: 01/24/24 0857     HS Troponin T 140 ng/L      Troponin T Delta 4 ng/L     Narrative:      High Sensitive Troponin T Reference Range:  <14.0 ng/L- Negative Female for AMI  <22.0 ng/L-  Negative Male for AMI  >=14 - Abnormal Female indicating possible myocardial injury.  >=22 - Abnormal Male indicating possible myocardial injury.   Clinicians would have to utilize clinical acumen, EKG, Troponin, and serial changes to determine if it is an Acute Myocardial Infarction or myocardial injury due to an underlying chronic condition.         High Sensitivity Troponin T [[id number]]  (Abnormal) Collected: 01/24/24 0621    Specimen: Blood Updated: 01/24/24 0713     HS Troponin T 136 ng/L     Narrative:      High Sensitive Troponin T Reference Range:  <14.0 ng/L- Negative Female for AMI  <22.0 ng/L- Negative Male for AMI  >=14 - Abnormal Female indicating possible myocardial injury.  >=22 - Abnormal Male indicating possible myocardial injury.   Clinicians would have to utilize clinical acumen, EKG, Troponin, and serial changes to determine if it is an Acute Myocardial Infarction or myocardial injury due to an underlying chronic condition.         Comprehensive Metabolic Panel [[id number]]  (Abnormal) Collected: 01/24/24 0621    Specimen: Blood Updated: 01/24/24 0711     Glucose 121 mg/dL      BUN 15 mg/dL      Creatinine 0.41 mg/dL      Sodium 138 mmol/L      Potassium 3.9 mmol/L      Chloride 104 mmol/L      CO2 23.0 mmol/L      Calcium 8.9 mg/dL      Total Protein 6.6 g/dL      Albumin 4.1 g/dL      ALT (SGPT) 23 U/L      AST (SGOT) 36 U/L      Alkaline Phosphatase 79 U/L      Total Bilirubin 0.2 mg/dL      Globulin 2.5 gm/dL      A/G Ratio 1.6 g/dL      BUN/Creatinine Ratio 36.6     Anion Gap 11.0 mmol/L      eGFR 121.7 mL/min/1.73     Narrative:      GFR Normal >60  Chronic Kidney Disease <60  Kidney Failure <15      CBC & Differential [[id number]]  (Abnormal) Collected: 01/24/24 0621    Specimen: Blood Updated: 01/24/24 0647    Narrative:      The following orders were created for panel order CBC & Differential.  Procedure                               Abnormality         Status                      ---------                               -----------         ------                     CBC Auto Differential[[id number]]        Abnormal            Final result                 Please view results for these tests on the individual orders.    CBC Auto Differential [[id number]]  (Abnormal) Collected: 01/24/24 0621    Specimen: Blood Updated: 01/24/24 0647     WBC 6.88 10*3/mm3      RBC 3.93 10*6/mm3      Hemoglobin 11.9 g/dL      Hematocrit 36.7 %      MCV 93.4 fL      MCH 30.3 pg      MCHC 32.4 g/dL      RDW 13.6 %      RDW-SD 46.6 fl      MPV 10.3 fL      Platelets 225 10*3/mm3      Neutrophil % 73.8 %      Lymphocyte % 14.4 %      Monocyte % 11.6 %      Eosinophil % 0.0 %      Basophil % 0.1 %      Immature Grans % 0.1 %      Neutrophils, Absolute 5.07 10*3/mm3      Lymphocytes, Absolute 0.99 10*3/mm3      Monocytes, Absolute 0.80 10*3/mm3      Eosinophils, Absolute 0.00 10*3/mm3      Basophils, Absolute 0.01 10*3/mm3      Immature Grans, Absolute 0.01 10*3/mm3      nRBC 0.0 /100 WBC     Blood Culture - Blood, Arm, Right [[id number]]  (Normal) Collected: 01/22/24 2305    Specimen: Blood from Arm, Right Updated: 01/23/24 2331     Blood Culture No growth at 24 hours    Blood Culture - Blood, Wrist, Right [[id number]]  (Normal) Collected: 01/22/24 2305    Specimen: Blood from Wrist, Right Updated: 01/23/24 2331     Blood Culture No growth at 24 hours    High Sensitivity Troponin T [[id number]]  (Abnormal) Collected: 01/23/24 0547    Specimen: Blood Updated: 01/23/24 0626     HS Troponin T 217 ng/L     Narrative:      High Sensitive Troponin T Reference Range:  <14.0 ng/L- Negative Female for AMI  <22.0 ng/L- Negative Male for AMI  >=14 - Abnormal Female indicating possible myocardial injury.  >=22 - Abnormal Male indicating possible myocardial injury.   Clinicians would have to utilize clinical acumen, EKG, Troponin, and serial changes to determine if it is an Acute Myocardial Infarction or myocardial injury due to an  underlying chronic condition.         Blood Gas, Arterial - [[id number]]  (Abnormal) Collected: 01/23/24 0627    Specimen: Arterial Blood Updated: 01/23/24 0624     Site Left Brachial     Glenroy's Test N/A     pH, Arterial 7.393 pH units      pCO2, Arterial 43.8 mm Hg      pO2, Arterial 80.5 mm Hg      Comment: 84 Value below reference range        HCO3, Arterial 26.7 mmol/L      Comment: 83 Value above reference range        Base Excess, Arterial 1.4 mmol/L      O2 Saturation, Arterial 95.6 %      Temperature 37.0     Barometric Pressure for Blood Gas 756 mmHg      Modality Nasal Cannula     Flow Rate 6.0 lpm      Ventilator Mode NA     Collected by [id number]     Comment: Meter: [id number]     :  [id number]        pCO2, Temperature Corrected 43.8 mm Hg      pH, Temp Corrected 7.393 pH Units      pO2, Temperature Corrected 80.5 mm Hg     Comprehensive Metabolic Panel [[id number]]  (Abnormal) Collected: 01/23/24 0547    Specimen: Blood Updated: 01/23/24 0623     Glucose 102 mg/dL      BUN 14 mg/dL      Creatinine 0.56 mg/dL      Sodium 139 mmol/L      Potassium 3.6 mmol/L      Chloride 103 mmol/L      CO2 25.0 mmol/L      Calcium 9.0 mg/dL      Total Protein 6.7 g/dL      Albumin 4.1 g/dL      ALT (SGPT) 28 U/L      AST (SGOT) 43 U/L      Alkaline Phosphatase 88 U/L      Total Bilirubin 0.2 mg/dL      Globulin 2.6 gm/dL      A/G Ratio 1.6 g/dL      BUN/Creatinine Ratio 25.0     Anion Gap 11.0 mmol/L      eGFR 110.8 mL/min/1.73     Narrative:      GFR Normal >60  Chronic Kidney Disease <60  Kidney Failure <15      CBC & Differential [[id number]]  (Abnormal) Collected: 01/23/24 0547    Specimen: Blood Updated: 01/23/24 0601    Narrative:      The following orders were created for panel order CBC & Differential.  Procedure                               Abnormality         Status                     ---------                               -----------         ------                     CBC Auto  Differential[[id number]]        Abnormal            Final result                 Please view results for these tests on the individual orders.    CBC Auto Differential [[id number]]  (Abnormal) Collected: 01/23/24 0547    Specimen: Blood Updated: 01/23/24 0601     WBC 7.22 10*3/mm3      RBC 4.03 10*6/mm3      Hemoglobin 12.0 g/dL      Hematocrit 37.4 %      MCV 92.8 fL      MCH 29.8 pg      MCHC 32.1 g/dL      RDW 13.4 %      RDW-SD 46.2 fl      MPV 9.9 fL      Platelets 225 10*3/mm3      Neutrophil % 70.7 %      Lymphocyte % 15.1 %      Monocyte % 13.4 %      Eosinophil % 0.1 %      Basophil % 0.3 %      Immature Grans % 0.4 %      Neutrophils, Absolute 5.10 10*3/mm3      Lymphocytes, Absolute 1.09 10*3/mm3      Monocytes, Absolute 0.97 10*3/mm3      Eosinophils, Absolute 0.01 10*3/mm3      Basophils, Absolute 0.02 10*3/mm3      Immature Grans, Absolute 0.03 10*3/mm3      nRBC 0.0 /100 WBC     High Sensitivity Troponin T 2Hr [[id number]]  (Abnormal) Collected: 01/23/24 0013    Specimen: Blood from Arm, Left Updated: 01/23/24 0046     HS Troponin T 105 ng/L      Troponin T Delta 37 ng/L     Narrative:      High Sensitive Troponin T Reference Range:  <14.0 ng/L- Negative Female for AMI  <22.0 ng/L- Negative Male for AMI  >=14 - Abnormal Female indicating possible myocardial injury.  >=22 - Abnormal Male indicating possible myocardial injury.   Clinicians would have to utilize clinical acumen, EKG, Troponin, and serial changes to determine if it is an Acute Myocardial Infarction or myocardial injury due to an underlying chronic condition.         Lactic Acid, Plasma [[id number]]  (Normal) Collected: 01/22/24 2305    Specimen: Blood Updated: 01/22/24 2333     Lactate 0.6 mmol/L     [facility] [[id number]] Collected: 01/22/24 2219    Specimen: Blood Updated: 01/22/24 2330    Narrative:      The following orders were created for panel order [facility].  Procedure                               Abnormality         Status                      ---------                               -----------         ------                     Green Top (Gel)[[id number]]                                  Final result               Lavender Top[[id number]]                                     Final result               Red Top[[id number]]                                          Final result               Light Blue Top[[id number]]                                   Final result                 Please view results for these tests on the individual orders.    Green Top (Gel) [[id number]] Collected: 01/22/24 2219    Specimen: Blood Updated: 01/22/24 2330     Extra Tube Hold for add-ons.     Comment: Auto resulted.       Lavender Top [[id number]] Collected: 01/22/24 2219    Specimen: Blood Updated: 01/22/24 2330     Extra Tube hold for add-on     Comment: Auto resulted       Red Top [[id number]] Collected: 01/22/24 2219    Specimen: Blood Updated: 01/22/24 2330     Extra Tube Hold for add-ons.     Comment: Auto resulted.       Light Blue Top [[id number]] Collected: 01/22/24 2219    Specimen: Blood Updated: 01/22/24 2330     Extra Tube Hold for add-ons.     Comment: Auto resulted       COVID-19 and FLU A/B PCR, 1 HR TAT - Swab, Nasopharynx [[id number]]  (Abnormal) Collected: 01/22/24 2219    Specimen: Swab from Nasopharynx Updated: 01/22/24 2304     COVID19 Detected     Influenza A PCR Not Detected     Influenza B PCR Not Detected    Narrative:      Fact sheet for providers: https://www.fda.gov/media/512742/download    Fact sheet for patients: https://www.fda.gov/media/625388/download    Test performed by PCR.  Influenza A and Influenza B negative results should be considered presumptive in samples that have a positive SARS-CoV-2 result.    Competitive inhibition studies showed that SARS-CoV-2 virus, when present at concentrations above 3.6E+04 copies/mL, can inhibit the detection and amplification of influenza A and influenza B virus RNA if present at or below 1.8E+02  "copies/mL or 4.9E+02 copies/mL, respectively, and may lead to false negative influenza virus results. If co-infection with influenza A or influenza B virus is suspected in samples with a positive SARS-CoV-2 result, the sample should be re-tested with another FDA cleared, approved, or authorized influenza test, if influenza virus detection would change clinical management.    Protime-INR [[id number]]  (Abnormal) Collected: 01/22/24 2219    Specimen: Blood Updated: 01/22/24 2304     Protime 14.4 Seconds      INR 1.10    D-dimer, Quantitative [[id number]]  (Abnormal) Collected: 01/22/24 2219    Specimen: Blood Updated: 01/22/24 2304     D-Dimer, Quantitative 1.31 MCGFEU/mL     Narrative:      According to the assay 's published package insert, a normal (<0.50 MCGFEU/mL) D-dimer result in conjunction with a non-high clinical probability assessment, excludes deep vein thrombosis (DVT) and pulmonary embolism (PE) with high sensitivity.    D-dimer values increase with age and this can make VTE exclusion of an older population difficult. To address this, the [employer], based on best available evidence and recent guidelines, recommends that clinicians use age-adjusted D-dimer thresholds in patients greater than 50 years of age with: a) a low probability of PE who do not meet all Pulmonary Embolism Rule Out Criteria, or b) in those with intermediate probability of PE.   The formula for an age-adjusted D-dimer cut-off is \"age/100\".  For example, a 60 year old patient would have an age-adjusted cut-off of 0.60 MCGFEU/mL and an 80 year old 0.80 MCGFEU/mL.    Single High Sensitivity Troponin T [[id number]]  (Abnormal) Collected: 01/22/24 2219    Specimen: Blood Updated: 01/22/24 2258     HS Troponin T 68 ng/L     Narrative:      High Sensitive Troponin T Reference Range:  <14.0 ng/L- Negative Female for AMI  <22.0 ng/L- Negative Male for AMI  >=14 - Abnormal Female indicating possible myocardial " injury.  >=22 - Abnormal Male indicating possible myocardial injury.   Clinicians would have to utilize clinical acumen, EKG, Troponin, and serial changes to determine if it is an Acute Myocardial Infarction or myocardial injury due to an underlying chronic condition.         Comprehensive Metabolic Panel [[id number]]  (Abnormal) Collected: 01/22/24 2219    Specimen: Blood Updated: 01/22/24 2256     Glucose 132 mg/dL      BUN 13 mg/dL      Creatinine 0.79 mg/dL      Sodium 132 mmol/L      Potassium 3.9 mmol/L      Chloride 95 mmol/L      CO2 25.0 mmol/L      Calcium 9.1 mg/dL      Total Protein 7.1 g/dL      Albumin 4.5 g/dL      ALT (SGPT) 16 U/L      AST (SGOT) 26 U/L      Alkaline Phosphatase 84 U/L      Total Bilirubin 0.2 mg/dL      Globulin 2.6 gm/dL      A/G Ratio 1.7 g/dL      BUN/Creatinine Ratio 16.5     Anion Gap 12.0 mmol/L      eGFR 99.8 mL/min/1.73     Narrative:      GFR Normal >60  Chronic Kidney Disease <60  Kidney Failure <15      BNP [[id number]]  (Abnormal) Collected: 01/22/24 2219    Specimen: Blood Updated: 01/22/24 2254     proBNP 3,578.0 pg/mL     Narrative:      This assay is used as an aid in the diagnosis of individuals suspected of having heart failure. It can be used as an aid in the diagnosis of acute decompensated heart failure (ADHF) in patients presenting with signs and symptoms of ADHF to the emergency department (ED). In addition, NT-proBNP of <300 pg/mL indicates ADHF is not likely.    Age Range Result Interpretation  NT-proBNP Concentration (pg/mL:      <50             Positive            >450                   Gray                 300-450                    Negative             <300    50-75           Positive            >900                  Gray                300-900                  Negative            <300      >75             Positive            >1800                  Gray                300-1800                  Negative            <300    CBC & Differential [[id number]]   (Abnormal) Collected: 01/22/24 2219    Specimen: Blood Updated: 01/22/24 2236    Narrative:      The following orders were created for panel order CBC & Differential.  Procedure                               Abnormality         Status                     ---------                               -----------         ------                     CBC Auto Differential[[id number]]        Abnormal            Final result                 Please view results for these tests on the individual orders.    CBC Auto Differential [[id number]]  (Abnormal) Collected: 01/22/24 2219    Specimen: Blood Updated: 01/22/24 2236     WBC 10.14 10*3/mm3      RBC 3.98 10*6/mm3      Hemoglobin 12.2 g/dL      Hematocrit 36.6 %      MCV 92.0 fL      MCH 30.7 pg      MCHC 33.3 g/dL      RDW 13.4 %      RDW-SD 46.0 fl      MPV 9.9 fL      Platelets 225 10*3/mm3      Neutrophil % 82.9 %      Lymphocyte % 5.2 %      Monocyte % 10.3 %      Eosinophil % 0.9 %      Basophil % 0.4 %      Immature Grans % 0.3 %      Neutrophils, Absolute 8.41 10*3/mm3      Lymphocytes, Absolute 0.53 10*3/mm3      Monocytes, Absolute 1.04 10*3/mm3      Eosinophils, Absolute 0.09 10*3/mm3      Basophils, Absolute 0.04 10*3/mm3      Immature Grans, Absolute 0.03 10*3/mm3      nRBC 0.0 /100 WBC     Blood Gas, Arterial With Co-Ox [[id number]]  (Abnormal) Collected: 01/22/24 2226    Specimen: Arterial Blood Updated: 01/22/24 2223     Site Left Brachial     Glenroy's Test N/A     pH, Arterial 7.455 pH units      Comment: 83 Value above reference range        pCO2, Arterial 37.7 mm Hg      pO2, Arterial 59.0 mm Hg      Comment: 84 Value below reference range        HCO3, Arterial 26.5 mmol/L      Comment: 83 Value above reference range        Base Excess, Arterial 2.5 mmol/L      Comment: 83 Value above reference range        O2 Saturation, Arterial 92.6 %      Comment: 84 Value below reference range        Hemoglobin, Blood Gas 11.6 g/dL      Comment: 84 Value below reference range         Hematocrit, Blood Gas 35.7 %      Comment: 84 Value below reference range        Oxyhemoglobin 85.0 %      Comment: 84 Value below reference range        Methemoglobin 0.60 %      Carboxyhemoglobin 7.6 %      Comment: 83 Value above reference range        A-a DO2 47.5 mmHg      Temperature 37.0     Sodium, Arterial 132 mmol/L      Comment: 84 Value below reference range        Potassium, Arterial 3.7 mmol/L      Barometric Pressure for Blood Gas 757 mmHg      Modality Nasal Cannula     Flow Rate 6.0 lpm      Ventilator Mode NA     Collected by [id number]     Comment: Meter: [id number]     :  [id number]        pH, Temp Corrected 7.455 pH Units      pCO2, Temperature Corrected 37.7 mm Hg      pO2, Temperature Corrected 59.0 mm Hg             Lab Results   Component Value Date    ECHOEFEST 65 02/08/2018       Imaging Results (Last 72 Hours)       Procedure Component Value Units Date/Time    CT Angiogram Chest [[id number]] Collected: 01/23/24 0555     Updated: 01/23/24 0751    Narrative:      EXAMINATION: CT ANGIOGRAM CHEST-      1/22/2024 10:17 PM     HISTORY: Pulmonary embolism (PE) suspected, unknown D-dimer     In order to have a CT radiation dose as low as reasonably achievable  Automated Exposure Control was utilized for adjustment of the mA and/or  KV according to patient size.     Total DLP = 152.96 mGy.cm     The CT angiography of the chest tube performed after intravenous  contrast enhancement.     The images are acquired in axial plane with subsequent 2D reconstruction  in coronal and sagittal planes and 3D maximum intensity projection  reconstruction.     The comparison is made with the previous study dated 4/16/2019.     There is normal opacification of the pulmonary arteries and branches  bilaterally. There are no filling defects in the opacified pulmonary  arterial bed. RV/LV ratio is 35/36 which may suggest a mild right heart  strain.     Atheromatous change of thoracic aorta seen. There is  mild aneurysmal  dilatation of the ascending thoracic aorta which measures 4.1 cm in  diameter. No dissection. A prosthetic valve is in place.     Severe atheromatous changes of coronary arteries are noted. There is  evidence of prior CABG.     There are mediastinal and hilar lymphadenopathy. A level 6 lymph node  measures 12 mm in short axis. A subcarinal, level 7, lymph node measures  19 mm in short axis. A right hilar, level R10, lymph node measures 14 mm  in short axis. A level 11 lymph node measures 16 mm in short axis.     Atheromatous changes are seen at the origin of the brachiocephalic, left  common carotid and left subclavian arteries. There is 50% focal stenosis  of the origin of the left subclavian artery with a large plaque in the  proximal left subclavian artery resulting in high-grade stenosis.     Limited visualized soft tissue of the neck are unremarkable.     Limited visualized thyroid gland is unremarkable.     Right axillary lymph nodes are not evaluated or excluded due to  extensive artifacts. There is a borderline prominent lymph node in the  left axilla measuring 1 cm in short axis. There is a fatty hilum.     There are extensive chronic emphysematous lung changes.     There are atelectatic changes most pronounced in the lower lungs.     Ill-defined opacities in the lingular segment of the left upper lobe  probably represent atelectatic changes. An incidental  infiltrate/evolving infiltrate may not be excluded.     Limited visualized abdomen is unremarkable. Incompletely and partially  visualized severe atheromatous changes of the abdominal aorta are noted  with aneurysmal dilatation of the infrarenal abdominal aorta with a  probable chronic dissection. It measures approximately 4 cm in diameter.  This was noted in the previous CT scan of the abdomen dated 9/13/2022.     The images reviewed in bone window show chronic degenerative changes of  the thoracolumbar spine. There is. Endplate  compression deformity of  vertebra T11 which appears similar to the previous study in 2022.       Impression:      1. No evidence of pulmonary embolism.  2. Mild aneurysmal dilatation of the ascending thoracic aorta. No  dissection.  3. Extensive chronic emphysematous lung changes.  4. Ill-defined opacities in the lingular segment of the left upper lobe  probably represent atelectatic changes. A coincidental evolving  infiltrate or a lung nodule not excluded. Follow-up examination/CT scan  of the chest in 3 months may be obtained to ensure stability/resolution.  5. Severe atheromatous changes of the abdominal aorta with aneurysmal  dilatation of the infrarenal abdominal aorta as mentioned above. This  may be further evaluated, if clinically warranted, with CT angiography  of the abdomen.  6. Nonspecific mediastinal and hilar lymphadenopathy.     The above study was initially reviewed and reported by [facility]. I do not  find any discrepancies.           This report was signed and finalized on 1/23/2024 7:47 AM by Dr. Anette Hair MD.       XR Chest 1 View [[id number]] Collected: 01/23/24 0617     Updated: 01/23/24 0623    Narrative:      EXAMINATION: XR CHEST 1 VW-     1/22/2024 9:26 PM     HISTORY: SOA. Shortness of breath triage Protocol     A frontal projection of the chest is compared with the previous study  dated 10/25/2023.     The lungs are hyperexpanded.     There are coarse interstitial changes in the lungs bilaterally more so  in the left upper lung parahilar area and in the left lower lung  adjacent to the cardiac border. These are similar to the previous study  and represent chronic inflammatory process/fibrosis.     There is no pleural effusion, pulmonary congestion or pneumothorax.     There is probable moderate cardiomegaly. Atheromatous changes of the  thoracic aorta are noted. There is evidence of prior cardiac surgery. A  prosthetic valve is in place. Left atrial appendage clamp is in place.      There is no acute bony abnormality. Deformity of the right distal  clavicle may represent a previous trauma or procedure/surgery.     Surgical staple along the right side of the neck may represent a prior  right carotid endarterectomy or thyroid surgery.       Impression:      1. Chronic inflammatory and obstructive lung changes similar to the  previous study.  2. No active infiltrate or pulmonary congestion.        This report was signed and finalized on 1/23/2024 6:20 AM by Dr. Anette Hair MD.             Assessment & Plan       Acute exacerbation of chronic obstructive pulmonary disease (COPD)    Primary hypertension    History of CVA (cerebrovascular accident)    S/P CABG x 3 2018 Dr Robin     Status post aortic valve replacement with bioprosthetic valve    LVH (left ventricular hypertrophy)    PVD (peripheral vascular disease) with claudication    Aortoiliac occlusive disease    PAD (peripheral artery disease)    COVID-19 virus infection    Elevated troponin level not due myocardial infarction    COPD exacerbation      Plan:   Elevated troponin: likely secondary to acute hypoxia due to COPD exacerbation and COVID. Recommend nuclear stress test to be completed outpatient. No need to continue to check troponins unless he complains of chest pain.     Volume overload: on exam does sound to be volume overloaded. Evidence of LHV, grade 2 diastolic dysfunction and elevated prosthetic valve measurements. Will give 1x dose of IV lasix 40mg and monitor response.     Stenosis of prosthetic valve: will plan to refer to structural heart clinic as outpatient.     COVID 19/COPD exacerbation: steroids and nebulizers per attending      Further orders per Dr. Hair     Thank you for asking us to follow this patient with you.     Electronically signed by KIRBY Moore, 01/24/24, 10:10 AM CST.    Please note this cardiology consultation note is the result of a face to face consultation with the patient, in  addition to reviewing medical records at length by myself, KIRBY Lopez      Time spent: 45minutes

## 2024-01-24 NOTE — PLAN OF CARE
Goal Outcome Evaluation:         Admit from ER today. COPD Exac. Covid+. VSS A&OX4. No c/o voiced. O2 3L per cannula. Sinus 68-76 per tele will continue to monitor.

## 2024-01-24 NOTE — PROGRESS NOTES
[facility] Medicine Services  INPATIENT PROGRESS NOTE    Patient Name: Sharad Ryder  Date of Admission: 1/22/2024  Today's Date: 01/24/24  Length of Stay: 1  Primary Care Physician: Nila lAexander APRN    Subjective   Chief Complaint: Shortness of breath  HPI     63 year old male with PMH of CAD/CABG, COPD, Stroke, carotid artery disease, aortic aneurysm, HTN, that presents to the ER with complaints of shortness of breath for 2 nights, worsened tonight. He presents with oxygen saturation of 84% on room air and tachypnea, respiratory distress. Has required 6 lpm to control dyspnea. Afebrile, WBC is 10, COVID 19 positive, CTA chest shows marked pulmonary emphysema and bibasilar atelectasis.   1/24  CAD/CABG, COPD, Stroke, carotid artery disease, aortic aneurysm, HTN, Admitted for acute on chronic COPD exacerbation positive COVID, troponin elevated echo of the heart is showing EF 70% with moderate to severe AS and bioprosthetic aortic valve, currently on 6 L oxygen, remains on steroid , bronchodilators we will add Zithromax consult cardiology for elevated troponin moderate to severe aortic stenosis history of coronary disease and CABG    Review of Systems   All pertinent negatives and positives are as above. All other systems have been reviewed and are negative unless otherwise stated.     Objective    Temp:  [96.8 °F (36 °C)-98.1 °F (36.7 °C)] 97 °F (36.1 °C)  Heart Rate:  [62-95] 64  Resp:  [16-20] 16  BP: (120-155)/(68-86) 144/76  Physical Exam  Constitutional:       Appearance: Normal appearance.   HENT:      Head: Normocephalic.      Right Ear: Tympanic membrane normal.      Nose: Nose normal.   Eyes:      Extraocular Movements: Extraocular movements intact.      Pupils: Pupils are equal, round, and reactive to light.   Cardiovascular:      Rate and Rhythm: Normal rate and regular rhythm.   Pulmonary:      Breath sounds: Stridor present. Wheezing present.   Abdominal:       General: Abdomen is flat.      Palpations: Abdomen is soft.   Musculoskeletal:         General: Normal range of motion.      Cervical back: Normal range of motion.   Skin:     Capillary Refill: Capillary refill takes less than 2 seconds.   Neurological:      Mental Status: He is alert.             Results Review:  I have reviewed the labs, radiology results, and diagnostic studies.    Laboratory Data:   Results from last 7 days   Lab Units 01/24/24  0621 01/23/24  0547 01/22/24  2219   WBC 10*3/mm3 6.88 7.22 10.14   HEMOGLOBIN g/dL 11.9* 12.0* 12.2*   HEMATOCRIT % 36.7* 37.4* 36.6*   PLATELETS 10*3/mm3 225 225 225        Results from last 7 days   Lab Units 01/24/24  0621 01/23/24  0547 01/22/24  2226 01/22/24  2219   SODIUM mmol/L 138 139  --  132*   SODIUM, ARTERIAL mmol/L  --   --  132*  --    POTASSIUM mmol/L 3.9 3.6  --  3.9   CHLORIDE mmol/L 104 103  --  95*   CO2 mmol/L 23.0 25.0  --  25.0   BUN mg/dL 15 14  --  13   CREATININE mg/dL 0.41* 0.56*  --  0.79   CALCIUM mg/dL 8.9 9.0  --  9.1   BILIRUBIN mg/dL 0.2 0.2  --  0.2   ALK PHOS U/L 79 88  --  84   ALT (SGPT) U/L 23 28  --  16   AST (SGOT) U/L 36 43*  --  26   GLUCOSE mg/dL 121* 102*  --  132*       Culture Data:   Blood Culture   Date Value Ref Range Status   01/22/2024 No growth at 24 hours  Preliminary   01/22/2024 No growth at 24 hours  Preliminary       Radiology Data:   Imaging Results (Last 24 Hours)       ** No results found for the last 24 hours. **            I have reviewed the patient's current medications.     Assessment/Plan   Assessment  Active Hospital Problems    Diagnosis     **Acute exacerbation of chronic obstructive pulmonary disease (COPD)     COVID-19 virus infection     NSTEMI (non-ST elevated myocardial infarction)     COPD exacerbation     Aortoiliac occlusive disease     PAD (peripheral artery disease)     PVD (peripheral vascular disease) with claudication     S/P CABG x 3 2018 Dr Robin      Status post aortic valve  replacement with bioprosthetic valve     LVH (left ventricular hypertrophy)     History of CVA (cerebrovascular accident)     Primary hypertension        Treatment Plan  The patient will be admitted to my service here at [facility].   Admit to medical floor with enhanced contact precautions  Vitals and pulse oxymetry continuous   IVF NS KVO  Cardiac diet  Dexamethasone 6 mg IV/PO  Consider Remdesevir  Oxygen by nasal canula titrate as needed to saturation above 92 %  Duoneb QID  Albuterol 1 UD q6h prn SOB/Wheezing     DVT prophylaxis > Lovenox 40 mg SQ daily     NSTEMI suspect demand from hypoxemia  ASA/Lipitor  A1C and lipid panel  Echocardiogram 2D in AM    Medical Decision Making  Number and Complexity of problems: 2 acute  Differential Diagnosis: As above    Conditions and Status        Condition is improving.     [facility] Data  External documents reviewed: No  Cardiac tracing (EKG, telemetry) interpretation: Yes  Radiology interpretation: Yes  Labs reviewed: Yes  Any tests that were considered but not ordered: No     Decision rules/scores evaluated (example [id number], [place], etc): No     Discussed with: Patient     Care Planning  Shared decision making: Patient apprised of current labs, vitals, imaging and treatment plan.  They are agreeable with proceeding with plans as discussed.   Code status and discussions: Patient    Disposition  Social Determinants of Health that impact treatment or disposition: No  I expect the patient to be discharged to TBD in TBD days.     Electronically signed by Michaelle Mccord MD, 01/24/24, 09:53 CST.

## 2024-01-25 PROBLEM — I50.31 ACUTE DIASTOLIC CHF (CONGESTIVE HEART FAILURE): Status: ACTIVE | Noted: 2024-01-25

## 2024-01-25 LAB
ALBUMIN SERPL-MCNC: 4.1 G/DL (ref 3.5–5.2)
ALBUMIN/GLOB SERPL: 1.5 G/DL
ALP SERPL-CCNC: 71 U/L (ref 39–117)
ALT SERPL W P-5'-P-CCNC: 21 U/L (ref 1–41)
ANION GAP SERPL CALCULATED.3IONS-SCNC: 9 MMOL/L (ref 5–15)
AST SERPL-CCNC: 31 U/L (ref 1–40)
BASOPHILS # BLD AUTO: 0 10*3/MM3 (ref 0–0.2)
BASOPHILS NFR BLD AUTO: 0 % (ref 0–1.5)
BILIRUB SERPL-MCNC: 0.2 MG/DL (ref 0–1.2)
BUN SERPL-MCNC: 15 MG/DL (ref 8–23)
BUN/CREAT SERPL: 26.3 (ref 7–25)
CALCIUM SPEC-SCNC: 9 MG/DL (ref 8.6–10.5)
CHLORIDE SERPL-SCNC: 105 MMOL/L (ref 98–107)
CO2 SERPL-SCNC: 27 MMOL/L (ref 22–29)
CREAT SERPL-MCNC: 0.57 MG/DL (ref 0.76–1.27)
DEPRECATED RDW RBC AUTO: 47.6 FL (ref 37–54)
EGFRCR SERPLBLD CKD-EPI 2021: 110.2 ML/MIN/1.73
EOSINOPHIL # BLD AUTO: 0 10*3/MM3 (ref 0–0.4)
EOSINOPHIL NFR BLD AUTO: 0 % (ref 0.3–6.2)
ERYTHROCYTE [DISTWIDTH] IN BLOOD BY AUTOMATED COUNT: 13.5 % (ref 12.3–15.4)
GLOBULIN UR ELPH-MCNC: 2.8 GM/DL
GLUCOSE SERPL-MCNC: 107 MG/DL (ref 65–99)
HCT VFR BLD AUTO: 40.2 % (ref 37.5–51)
HGB BLD-MCNC: 12.7 G/DL (ref 13–17.7)
IMM GRANULOCYTES # BLD AUTO: 0.02 10*3/MM3 (ref 0–0.05)
IMM GRANULOCYTES NFR BLD AUTO: 0.2 % (ref 0–0.5)
LYMPHOCYTES # BLD AUTO: 1.34 10*3/MM3 (ref 0.7–3.1)
LYMPHOCYTES NFR BLD AUTO: 14.9 % (ref 19.6–45.3)
MCH RBC QN AUTO: 30.1 PG (ref 26.6–33)
MCHC RBC AUTO-ENTMCNC: 31.6 G/DL (ref 31.5–35.7)
MCV RBC AUTO: 95.3 FL (ref 79–97)
MONOCYTES # BLD AUTO: 0.81 10*3/MM3 (ref 0.1–0.9)
MONOCYTES NFR BLD AUTO: 9 % (ref 5–12)
NEUTROPHILS NFR BLD AUTO: 6.84 10*3/MM3 (ref 1.7–7)
NEUTROPHILS NFR BLD AUTO: 75.9 % (ref 42.7–76)
NRBC BLD AUTO-RTO: 0 /100 WBC (ref 0–0.2)
PLATELET # BLD AUTO: 246 10*3/MM3 (ref 140–450)
PMV BLD AUTO: 10.2 FL (ref 6–12)
POTASSIUM SERPL-SCNC: 3.7 MMOL/L (ref 3.5–5.2)
PROT SERPL-MCNC: 6.9 G/DL (ref 6–8.5)
RBC # BLD AUTO: 4.22 10*6/MM3 (ref 4.14–5.8)
SODIUM SERPL-SCNC: 141 MMOL/L (ref 136–145)
WBC NRBC COR # BLD AUTO: 9.01 10*3/MM3 (ref 3.4–10.8)

## 2024-01-25 PROCEDURE — 63710000001 DEXAMETHASONE PER 0.25 MG: Performed by: FAMILY MEDICINE

## 2024-01-25 PROCEDURE — 94664 DEMO&/EVAL PT USE INHALER: CPT

## 2024-01-25 PROCEDURE — 25010000002 AZITHROMYCIN PER 500 MG: Performed by: HOSPITALIST

## 2024-01-25 PROCEDURE — 80053 COMPREHEN METABOLIC PANEL: CPT | Performed by: FAMILY MEDICINE

## 2024-01-25 PROCEDURE — 85025 COMPLETE CBC W/AUTO DIFF WBC: CPT | Performed by: FAMILY MEDICINE

## 2024-01-25 PROCEDURE — 99232 SBSQ HOSP IP/OBS MODERATE 35: CPT | Performed by: NURSE PRACTITIONER

## 2024-01-25 PROCEDURE — 94799 UNLISTED PULMONARY SVC/PX: CPT

## 2024-01-25 PROCEDURE — 25810000003 SODIUM CHLORIDE 0.9 % SOLUTION 250 ML FLEX CONT: Performed by: HOSPITALIST

## 2024-01-25 RX ADMIN — ROSUVASTATIN CALCIUM 40 MG: 20 TABLET, FILM COATED ORAL at 09:44

## 2024-01-25 RX ADMIN — ALBUTEROL SULFATE 2 PUFF: 90 AEROSOL, METERED RESPIRATORY (INHALATION) at 19:54

## 2024-01-25 RX ADMIN — BUSPIRONE HYDROCHLORIDE 5 MG: 5 TABLET ORAL at 09:44

## 2024-01-25 RX ADMIN — AZITHROMYCIN DIHYDRATE 500 MG: 500 INJECTION, POWDER, LYOPHILIZED, FOR SOLUTION INTRAVENOUS at 10:48

## 2024-01-25 RX ADMIN — Medication 10 ML: at 09:45

## 2024-01-25 RX ADMIN — METOPROLOL SUCCINATE 50 MG: 50 TABLET, EXTENDED RELEASE ORAL at 09:44

## 2024-01-25 RX ADMIN — IPRATROPIUM BROMIDE 2 PUFF: 17 AEROSOL, METERED RESPIRATORY (INHALATION) at 07:06

## 2024-01-25 RX ADMIN — CLOPIDOGREL BISULFATE 75 MG: 75 TABLET, FILM COATED ORAL at 09:44

## 2024-01-25 RX ADMIN — GABAPENTIN 400 MG: 400 CAPSULE ORAL at 09:44

## 2024-01-25 RX ADMIN — HYDROCODONE BITARTRATE AND ACETAMINOPHEN 1 TABLET: 7.5; 325 TABLET ORAL at 22:00

## 2024-01-25 RX ADMIN — HYDROCODONE BITARTRATE AND ACETAMINOPHEN 1 TABLET: 7.5; 325 TABLET ORAL at 00:40

## 2024-01-25 RX ADMIN — CITALOPRAM 20 MG: 20 TABLET, FILM COATED ORAL at 09:44

## 2024-01-25 RX ADMIN — BUSPIRONE HYDROCHLORIDE 5 MG: 5 TABLET ORAL at 20:08

## 2024-01-25 RX ADMIN — TAMSULOSIN HYDROCHLORIDE 0.4 MG: 0.4 CAPSULE ORAL at 09:44

## 2024-01-25 RX ADMIN — GUAIFENESIN 600 MG: 600 TABLET, EXTENDED RELEASE ORAL at 20:08

## 2024-01-25 RX ADMIN — ISOSORBIDE MONONITRATE 120 MG: 60 TABLET, EXTENDED RELEASE ORAL at 09:44

## 2024-01-25 RX ADMIN — GUAIFENESIN 600 MG: 600 TABLET, EXTENDED RELEASE ORAL at 09:44

## 2024-01-25 RX ADMIN — DEXAMETHASONE 6 MG: 4 TABLET ORAL at 09:44

## 2024-01-25 RX ADMIN — IPRATROPIUM BROMIDE 2 PUFF: 17 AEROSOL, METERED RESPIRATORY (INHALATION) at 11:09

## 2024-01-25 RX ADMIN — IPRATROPIUM BROMIDE 2 PUFF: 17 AEROSOL, METERED RESPIRATORY (INHALATION) at 19:54

## 2024-01-25 RX ADMIN — Medication 10 ML: at 20:08

## 2024-01-25 RX ADMIN — DOCUSATE SODIUM 50 MG AND SENNOSIDES 8.6 MG 2 TABLET: 8.6; 5 TABLET, FILM COATED ORAL at 20:08

## 2024-01-25 RX ADMIN — GABAPENTIN 400 MG: 400 CAPSULE ORAL at 16:28

## 2024-01-25 RX ADMIN — ASPIRIN 81 MG: 81 TABLET, COATED ORAL at 09:44

## 2024-01-25 RX ADMIN — AMLODIPINE BESYLATE 10 MG: 10 TABLET ORAL at 09:44

## 2024-01-25 RX ADMIN — GABAPENTIN 400 MG: 400 CAPSULE ORAL at 20:08

## 2024-01-25 RX ADMIN — HYDROCODONE BITARTRATE AND ACETAMINOPHEN 1 TABLET: 7.5; 325 TABLET ORAL at 10:00

## 2024-01-25 RX ADMIN — IPRATROPIUM BROMIDE 2 PUFF: 17 AEROSOL, METERED RESPIRATORY (INHALATION) at 15:25

## 2024-01-25 NOTE — PROGRESS NOTES
[facility] -  Progress Note     LOS: 2 days   Patient Care Team:  Nila Alexander APRN as PCP - General (Family Medicine)  Aime Francois MD as Cardiologist (Cardiology)  Nila Alexander PA-C as Referring Physician (Physician Assistant)    Chief Complaint:elevated troponin    Subjective     Interval History:   Diuresed well (2.8L) after 1x IV lasix yesterday. Now on RA. Reports his breathing is back to normal. Denies chest pain.         Review of Systems:   Review of Systems   Constitutional:  Negative for chills, diaphoresis, fatigue and unexpected weight change.   HENT:  Negative for nosebleeds.    Respiratory:  Negative for cough, chest tightness, shortness of breath and wheezing.    Cardiovascular:  Negative for chest pain, palpitations and leg swelling.   Gastrointestinal:  Negative for anal bleeding, blood in stool, diarrhea and nausea.   Neurological:  Negative for dizziness, syncope and light-headedness.   All other systems reviewed and are negative.      Objective     Vital Sign Min/Max for last 24 hours  Temp  Min: 96.8 °F (36 °C)  Max: 97.9 °F (36.6 °C)   BP  Min: 132/68  Max: 172/80   Pulse  Min: 58  Max: 82   Resp  Min: 16  Max: 18   SpO2  Min: 90 %  Max: 95 %   No data recorded   No data recorded         01/22/24  2214   Weight: 56.7 kg (125 lb)         Intake/Output Summary (Last 24 hours) at 1/25/2024 1120  Last data filed at 1/25/2024 0900  Gross per 24 hour   Intake --   Output 3025 ml   Net -3025 ml         Physical Exam:  Vitals reviewed.   Constitutional:       General: Not in acute distress.     Appearance: Healthy appearance. Well-developed. Not diaphoretic.   Eyes:      General: No scleral icterus.     Conjunctiva/sclera: Conjunctivae normal.      Pupils: Pupils are equal, round, and reactive to light.   HENT:      Head: Normocephalic.    Mouth/Throat:      Pharynx: No oropharyngeal exudate.   Neck:      Vascular: No JVR.   Pulmonary:      Effort: Pulmonary  effort is normal. No respiratory distress.      Breath sounds: Normal breath sounds. No wheezing. No rhonchi. No rales.   Chest:      Chest wall: Not tender to palpatation.   Cardiovascular:      Normal rate. Regular rhythm.      Murmurs: There is a grade 2/6 harsh midsystolic murmur at the URSB, radiating to the neck.   Pulses:     Intact distal pulses.   Edema:     Peripheral edema absent.   Abdominal:      General: Bowel sounds are normal. There is no distension.      Palpations: Abdomen is soft.      Tenderness: There is no abdominal tenderness.   Musculoskeletal: Normal range of motion.      Cervical back: Normal range of motion and neck supple. Skin:     General: Skin is warm and dry.      Coloration: Skin is not pale.      Findings: No erythema or rash.   Neurological:      Mental Status: Alert, oriented to person, place, and time and oriented to person, place and time.      Deep Tendon Reflexes: Reflexes are normal and symmetric.   Psychiatric:         Behavior: Behavior normal.          Results Review:   Lab Results (last 72 hours)       Procedure Component Value Units Date/Time    Comprehensive Metabolic Panel [[id number]]  (Abnormal) Collected: 01/25/24 0619    Specimen: Blood Updated: 01/25/24 0713     Glucose 107 mg/dL      BUN 15 mg/dL      Creatinine 0.57 mg/dL      Sodium 141 mmol/L      Potassium 3.7 mmol/L      Chloride 105 mmol/L      CO2 27.0 mmol/L      Calcium 9.0 mg/dL      Total Protein 6.9 g/dL      Albumin 4.1 g/dL      ALT (SGPT) 21 U/L      AST (SGOT) 31 U/L      Alkaline Phosphatase 71 U/L      Total Bilirubin 0.2 mg/dL      Globulin 2.8 gm/dL      A/G Ratio 1.5 g/dL      BUN/Creatinine Ratio 26.3     Anion Gap 9.0 mmol/L      eGFR 110.2 mL/min/1.73     Narrative:      GFR Normal >60  Chronic Kidney Disease <60  Kidney Failure <15      CBC & Differential [[id number]]  (Abnormal) Collected: 01/25/24 0619    Specimen: Blood Updated: 01/25/24 0656    Narrative:      The following orders were  created for panel order CBC & Differential.  Procedure                               Abnormality         Status                     ---------                               -----------         ------                     CBC Auto Differential[[id number]]        Abnormal            Final result                 Please view results for these tests on the individual orders.    CBC Auto Differential [[id number]]  (Abnormal) Collected: 01/25/24 0619    Specimen: Blood Updated: 01/25/24 0656     WBC 9.01 10*3/mm3      RBC 4.22 10*6/mm3      Hemoglobin 12.7 g/dL      Hematocrit 40.2 %      MCV 95.3 fL      MCH 30.1 pg      MCHC 31.6 g/dL      RDW 13.5 %      RDW-SD 47.6 fl      MPV 10.2 fL      Platelets 246 10*3/mm3      Neutrophil % 75.9 %      Lymphocyte % 14.9 %      Monocyte % 9.0 %      Eosinophil % 0.0 %      Basophil % 0.0 %      Immature Grans % 0.2 %      Neutrophils, Absolute 6.84 10*3/mm3      Lymphocytes, Absolute 1.34 10*3/mm3      Monocytes, Absolute 0.81 10*3/mm3      Eosinophils, Absolute 0.00 10*3/mm3      Basophils, Absolute 0.00 10*3/mm3      Immature Grans, Absolute 0.02 10*3/mm3      nRBC 0.0 /100 WBC     Blood Culture - Blood, Arm, Right [[id number]]  (Normal) Collected: 01/22/24 2305    Specimen: Blood from Arm, Right Updated: 01/24/24 2330     Blood Culture No growth at 2 days    Blood Culture - Blood, Wrist, Right [[id number]]  (Normal) Collected: 01/22/24 2305    Specimen: Blood from Wrist, Right Updated: 01/24/24 2330     Blood Culture No growth at 2 days    High Sensitivity Troponin T 2Hr [[id number]]  (Abnormal) Collected: 01/24/24 0749    Specimen: Blood Updated: 01/24/24 0857     HS Troponin T 140 ng/L      Troponin T Delta 4 ng/L     Narrative:      High Sensitive Troponin T Reference Range:  <14.0 ng/L- Negative Female for AMI  <22.0 ng/L- Negative Male for AMI  >=14 - Abnormal Female indicating possible myocardial injury.  >=22 - Abnormal Male indicating possible myocardial injury.   Clinicians  would have to utilize clinical acumen, EKG, Troponin, and serial changes to determine if it is an Acute Myocardial Infarction or myocardial injury due to an underlying chronic condition.         High Sensitivity Troponin T [[id number]]  (Abnormal) Collected: 01/24/24 0621    Specimen: Blood Updated: 01/24/24 0713     HS Troponin T 136 ng/L     Narrative:      High Sensitive Troponin T Reference Range:  <14.0 ng/L- Negative Female for AMI  <22.0 ng/L- Negative Male for AMI  >=14 - Abnormal Female indicating possible myocardial injury.  >=22 - Abnormal Male indicating possible myocardial injury.   Clinicians would have to utilize clinical acumen, EKG, Troponin, and serial changes to determine if it is an Acute Myocardial Infarction or myocardial injury due to an underlying chronic condition.         Comprehensive Metabolic Panel [[id number]]  (Abnormal) Collected: 01/24/24 0621    Specimen: Blood Updated: 01/24/24 0711     Glucose 121 mg/dL      BUN 15 mg/dL      Creatinine 0.41 mg/dL      Sodium 138 mmol/L      Potassium 3.9 mmol/L      Chloride 104 mmol/L      CO2 23.0 mmol/L      Calcium 8.9 mg/dL      Total Protein 6.6 g/dL      Albumin 4.1 g/dL      ALT (SGPT) 23 U/L      AST (SGOT) 36 U/L      Alkaline Phosphatase 79 U/L      Total Bilirubin 0.2 mg/dL      Globulin 2.5 gm/dL      A/G Ratio 1.6 g/dL      BUN/Creatinine Ratio 36.6     Anion Gap 11.0 mmol/L      eGFR 121.7 mL/min/1.73     Narrative:      GFR Normal >60  Chronic Kidney Disease <60  Kidney Failure <15      CBC & Differential [[id number]]  (Abnormal) Collected: 01/24/24 0621    Specimen: Blood Updated: 01/24/24 0647    Narrative:      The following orders were created for panel order CBC & Differential.  Procedure                               Abnormality         Status                     ---------                               -----------         ------                     CBC Auto Differential[[id number]]        Abnormal            Final result                  Please view results for these tests on the individual orders.    CBC Auto Differential [[id number]]  (Abnormal) Collected: 01/24/24 0621    Specimen: Blood Updated: 01/24/24 0647     WBC 6.88 10*3/mm3      RBC 3.93 10*6/mm3      Hemoglobin 11.9 g/dL      Hematocrit 36.7 %      MCV 93.4 fL      MCH 30.3 pg      MCHC 32.4 g/dL      RDW 13.6 %      RDW-SD 46.6 fl      MPV 10.3 fL      Platelets 225 10*3/mm3      Neutrophil % 73.8 %      Lymphocyte % 14.4 %      Monocyte % 11.6 %      Eosinophil % 0.0 %      Basophil % 0.1 %      Immature Grans % 0.1 %      Neutrophils, Absolute 5.07 10*3/mm3      Lymphocytes, Absolute 0.99 10*3/mm3      Monocytes, Absolute 0.80 10*3/mm3      Eosinophils, Absolute 0.00 10*3/mm3      Basophils, Absolute 0.01 10*3/mm3      Immature Grans, Absolute 0.01 10*3/mm3      nRBC 0.0 /100 WBC     High Sensitivity Troponin T [[id number]]  (Abnormal) Collected: 01/23/24 0547    Specimen: Blood Updated: 01/23/24 0626     HS Troponin T 217 ng/L     Narrative:      High Sensitive Troponin T Reference Range:  <14.0 ng/L- Negative Female for AMI  <22.0 ng/L- Negative Male for AMI  >=14 - Abnormal Female indicating possible myocardial injury.  >=22 - Abnormal Male indicating possible myocardial injury.   Clinicians would have to utilize clinical acumen, EKG, Troponin, and serial changes to determine if it is an Acute Myocardial Infarction or myocardial injury due to an underlying chronic condition.         Blood Gas, Arterial - [[id number]]  (Abnormal) Collected: 01/23/24 0627    Specimen: Arterial Blood Updated: 01/23/24 0624     Site Left Brachial     Glenroy's Test N/A     pH, Arterial 7.393 pH units      pCO2, Arterial 43.8 mm Hg      pO2, Arterial 80.5 mm Hg      Comment: 84 Value below reference range        HCO3, Arterial 26.7 mmol/L      Comment: 83 Value above reference range        Base Excess, Arterial 1.4 mmol/L      O2 Saturation, Arterial 95.6 %      Temperature 37.0     Barometric  Pressure for Blood Gas 756 mmHg      Modality Nasal Cannula     Flow Rate 6.0 lpm      Ventilator Mode NA     Collected by [id number]     Comment: Meter: [id number]     :  [id number]        pCO2, Temperature Corrected 43.8 mm Hg      pH, Temp Corrected 7.393 pH Units      pO2, Temperature Corrected 80.5 mm Hg     Comprehensive Metabolic Panel [[id number]]  (Abnormal) Collected: 01/23/24 0547    Specimen: Blood Updated: 01/23/24 0623     Glucose 102 mg/dL      BUN 14 mg/dL      Creatinine 0.56 mg/dL      Sodium 139 mmol/L      Potassium 3.6 mmol/L      Chloride 103 mmol/L      CO2 25.0 mmol/L      Calcium 9.0 mg/dL      Total Protein 6.7 g/dL      Albumin 4.1 g/dL      ALT (SGPT) 28 U/L      AST (SGOT) 43 U/L      Alkaline Phosphatase 88 U/L      Total Bilirubin 0.2 mg/dL      Globulin 2.6 gm/dL      A/G Ratio 1.6 g/dL      BUN/Creatinine Ratio 25.0     Anion Gap 11.0 mmol/L      eGFR 110.8 mL/min/1.73     Narrative:      GFR Normal >60  Chronic Kidney Disease <60  Kidney Failure <15      CBC & Differential [[id number]]  (Abnormal) Collected: 01/23/24 0547    Specimen: Blood Updated: 01/23/24 0601    Narrative:      The following orders were created for panel order CBC & Differential.  Procedure                               Abnormality         Status                     ---------                               -----------         ------                     CBC Auto Differential[[id number]]        Abnormal            Final result                 Please view results for these tests on the individual orders.    CBC Auto Differential [[id number]]  (Abnormal) Collected: 01/23/24 0547    Specimen: Blood Updated: 01/23/24 0601     WBC 7.22 10*3/mm3      RBC 4.03 10*6/mm3      Hemoglobin 12.0 g/dL      Hematocrit 37.4 %      MCV 92.8 fL      MCH 29.8 pg      MCHC 32.1 g/dL      RDW 13.4 %      RDW-SD 46.2 fl      MPV 9.9 fL      Platelets 225 10*3/mm3      Neutrophil % 70.7 %      Lymphocyte % 15.1 %      Monocyte % 13.4  %      Eosinophil % 0.1 %      Basophil % 0.3 %      Immature Grans % 0.4 %      Neutrophils, Absolute 5.10 10*3/mm3      Lymphocytes, Absolute 1.09 10*3/mm3      Monocytes, Absolute 0.97 10*3/mm3      Eosinophils, Absolute 0.01 10*3/mm3      Basophils, Absolute 0.02 10*3/mm3      Immature Grans, Absolute 0.03 10*3/mm3      nRBC 0.0 /100 WBC     High Sensitivity Troponin T 2Hr [[id number]]  (Abnormal) Collected: 01/23/24 0013    Specimen: Blood from Arm, Left Updated: 01/23/24 0046     HS Troponin T 105 ng/L      Troponin T Delta 37 ng/L     Narrative:      High Sensitive Troponin T Reference Range:  <14.0 ng/L- Negative Female for AMI  <22.0 ng/L- Negative Male for AMI  >=14 - Abnormal Female indicating possible myocardial injury.  >=22 - Abnormal Male indicating possible myocardial injury.   Clinicians would have to utilize clinical acumen, EKG, Troponin, and serial changes to determine if it is an Acute Myocardial Infarction or myocardial injury due to an underlying chronic condition.         Lactic Acid, Plasma [[id number]]  (Normal) Collected: 01/22/24 2305    Specimen: Blood Updated: 01/22/24 2333     Lactate 0.6 mmol/L     [place] Draw [[id number]] Collected: 01/22/24 2219    Specimen: Blood Updated: 01/22/24 2330    Narrative:      The following orders were created for panel order [place] Draw.  Procedure                               Abnormality         Status                     ---------                               -----------         ------                     Green Top (Gel)[[id number]]                                  Final result               Lavender Top[[id number]]                                     Final result               Red Top[[id number]]                                          Final result               Light Blue Top[[id number]]                                   Final result                 Please view results for these tests on the individual orders.    Green Top (Gel) [[id number]] Collected:  01/22/24 2219    Specimen: Blood Updated: 01/22/24 2330     Extra Tube Hold for add-ons.     Comment: Auto resulted.       Lavender Top [[id number]] Collected: 01/22/24 2219    Specimen: Blood Updated: 01/22/24 2330     Extra Tube hold for add-on     Comment: Auto resulted       Red Top [[id number]] Collected: 01/22/24 2219    Specimen: Blood Updated: 01/22/24 2330     Extra Tube Hold for add-ons.     Comment: Auto resulted.       Light Blue Top [[id number]] Collected: 01/22/24 2219    Specimen: Blood Updated: 01/22/24 2330     Extra Tube Hold for add-ons.     Comment: Auto resulted       COVID-19 and FLU A/B PCR, 1 HR TAT - Swab, Nasopharynx [[id number]]  (Abnormal) Collected: 01/22/24 2219    Specimen: Swab from Nasopharynx Updated: 01/22/24 2304     COVID19 Detected     Influenza A PCR Not Detected     Influenza B PCR Not Detected    Narrative:      Fact sheet for providers: https://www.fda.gov/media/294973/download    Fact sheet for patients: https://www.fda.gov/media/470120/download    Test performed by PCR.  Influenza A and Influenza B negative results should be considered presumptive in samples that have a positive SARS-CoV-2 result.    Competitive inhibition studies showed that SARS-CoV-2 virus, when present at concentrations above 3.6E+04 copies/mL, can inhibit the detection and amplification of influenza A and influenza B virus RNA if present at or below 1.8E+02 copies/mL or 4.9E+02 copies/mL, respectively, and may lead to false negative influenza virus results. If co-infection with influenza A or influenza B virus is suspected in samples with a positive SARS-CoV-2 result, the sample should be re-tested with another FDA cleared, approved, or authorized influenza test, if influenza virus detection would change clinical management.    Protime-INR [[id number]]  (Abnormal) Collected: 01/22/24 2219    Specimen: Blood Updated: 01/22/24 2304     Protime 14.4 Seconds      INR 1.10    D-dimer, Quantitative [[id number]]   "(Abnormal) Collected: 01/22/24 2219    Specimen: Blood Updated: 01/22/24 2304     D-Dimer, Quantitative 1.31 MCGFEU/mL     Narrative:      According to the assay 's published package insert, a normal (<0.50 MCGFEU/mL) D-dimer result in conjunction with a non-high clinical probability assessment, excludes deep vein thrombosis (DVT) and pulmonary embolism (PE) with high sensitivity.    D-dimer values increase with age and this can make VTE exclusion of an older population difficult. To address this, the [employer], based on best available evidence and recent guidelines, recommends that clinicians use age-adjusted D-dimer thresholds in patients greater than 50 years of age with: a) a low probability of PE who do not meet all Pulmonary Embolism Rule Out Criteria, or b) in those with intermediate probability of PE.   The formula for an age-adjusted D-dimer cut-off is \"age/100\".  For example, a 60 year old patient would have an age-adjusted cut-off of 0.60 MCGFEU/mL and an 80 year old 0.80 MCGFEU/mL.    Single High Sensitivity Troponin T [[id number]]  (Abnormal) Collected: 01/22/24 2219    Specimen: Blood Updated: 01/22/24 2258     HS Troponin T 68 ng/L     Narrative:      High Sensitive Troponin T Reference Range:  <14.0 ng/L- Negative Female for AMI  <22.0 ng/L- Negative Male for AMI  >=14 - Abnormal Female indicating possible myocardial injury.  >=22 - Abnormal Male indicating possible myocardial injury.   Clinicians would have to utilize clinical acumen, EKG, Troponin, and serial changes to determine if it is an Acute Myocardial Infarction or myocardial injury due to an underlying chronic condition.         Comprehensive Metabolic Panel [[id number]]  (Abnormal) Collected: 01/22/24 2219    Specimen: Blood Updated: 01/22/24 2256     Glucose 132 mg/dL      BUN 13 mg/dL      Creatinine 0.79 mg/dL      Sodium 132 mmol/L      Potassium 3.9 mmol/L      Chloride 95 mmol/L      CO2 25.0 mmol/L     "  Calcium 9.1 mg/dL      Total Protein 7.1 g/dL      Albumin 4.5 g/dL      ALT (SGPT) 16 U/L      AST (SGOT) 26 U/L      Alkaline Phosphatase 84 U/L      Total Bilirubin 0.2 mg/dL      Globulin 2.6 gm/dL      A/G Ratio 1.7 g/dL      BUN/Creatinine Ratio 16.5     Anion Gap 12.0 mmol/L      eGFR 99.8 mL/min/1.73     Narrative:      GFR Normal >60  Chronic Kidney Disease <60  Kidney Failure <15      BNP [[id number]]  (Abnormal) Collected: 01/22/24 2219    Specimen: Blood Updated: 01/22/24 2254     proBNP 3,578.0 pg/mL     Narrative:      This assay is used as an aid in the diagnosis of individuals suspected of having heart failure. It can be used as an aid in the diagnosis of acute decompensated heart failure (ADHF) in patients presenting with signs and symptoms of ADHF to the emergency department (ED). In addition, NT-proBNP of <300 pg/mL indicates ADHF is not likely.    Age Range Result Interpretation  NT-proBNP Concentration (pg/mL:      <50             Positive            >450                   Gray                 300-450                    Negative             <300    50-75           Positive            >900                  Gray                300-900                  Negative            <300      >75             Positive            >1800                  Gray                300-1800                  Negative            <300    CBC & Differential [[id number]]  (Abnormal) Collected: 01/22/24 2219    Specimen: Blood Updated: 01/22/24 2236    Narrative:      The following orders were created for panel order CBC & Differential.  Procedure                               Abnormality         Status                     ---------                               -----------         ------                     CBC Auto Differential[[id number]]        Abnormal            Final result                 Please view results for these tests on the individual orders.    CBC Auto Differential [[id number]]  (Abnormal) Collected: 01/22/24  2219    Specimen: Blood Updated: 01/22/24 2236     WBC 10.14 10*3/mm3      RBC 3.98 10*6/mm3      Hemoglobin 12.2 g/dL      Hematocrit 36.6 %      MCV 92.0 fL      MCH 30.7 pg      MCHC 33.3 g/dL      RDW 13.4 %      RDW-SD 46.0 fl      MPV 9.9 fL      Platelets 225 10*3/mm3      Neutrophil % 82.9 %      Lymphocyte % 5.2 %      Monocyte % 10.3 %      Eosinophil % 0.9 %      Basophil % 0.4 %      Immature Grans % 0.3 %      Neutrophils, Absolute 8.41 10*3/mm3      Lymphocytes, Absolute 0.53 10*3/mm3      Monocytes, Absolute 1.04 10*3/mm3      Eosinophils, Absolute 0.09 10*3/mm3      Basophils, Absolute 0.04 10*3/mm3      Immature Grans, Absolute 0.03 10*3/mm3      nRBC 0.0 /100 WBC     Blood Gas, Arterial With Co-Ox [[id number]]  (Abnormal) Collected: 01/22/24 2226    Specimen: Arterial Blood Updated: 01/22/24 2223     Site Left Brachial     Glenroy's Test N/A     pH, Arterial 7.455 pH units      Comment: 83 Value above reference range        pCO2, Arterial 37.7 mm Hg      pO2, Arterial 59.0 mm Hg      Comment: 84 Value below reference range        HCO3, Arterial 26.5 mmol/L      Comment: 83 Value above reference range        Base Excess, Arterial 2.5 mmol/L      Comment: 83 Value above reference range        O2 Saturation, Arterial 92.6 %      Comment: 84 Value below reference range        Hemoglobin, Blood Gas 11.6 g/dL      Comment: 84 Value below reference range        Hematocrit, Blood Gas 35.7 %      Comment: 84 Value below reference range        Oxyhemoglobin 85.0 %      Comment: 84 Value below reference range        Methemoglobin 0.60 %      Carboxyhemoglobin 7.6 %      Comment: 83 Value above reference range        A-a DO2 47.5 mmHg      Temperature 37.0     Sodium, Arterial 132 mmol/L      Comment: 84 Value below reference range        Potassium, Arterial 3.7 mmol/L      Barometric Pressure for Blood Gas 757 mmHg      Modality Nasal Cannula     Flow Rate 6.0 lpm      Ventilator Mode NA     Collected by [id number]      Comment: Meter: [id number]     :  [id number]        pH, Temp Corrected 7.455 pH Units      pCO2, Temperature Corrected 37.7 mm Hg      pO2, Temperature Corrected 59.0 mm Hg              Results for orders placed during the hospital encounter of 01/22/24    Adult Transthoracic Echo Complete W/ Cont if Necessary Per Protocol    Interpretation Summary    Left ventricular systolic function is normal. Left ventricular ejection fraction appears to be greater than 70%.    The left ventricular cavity is small in size.    Left ventricular wall thickness is consistent with moderate to severe concentric hypertrophy.    The findings are consistent with hypertrophic cardiomyopathy.    Left ventricular diastolic function is consistent with (grade II w/high LAP) pseudonormalization.    The left atrial cavity is moderate to severely dilated.    The right atrial cavity is mildly  dilated.    Moderate to severe aortic valve stenosis is present.    There is a bioprosthetic aortic valve present. The prosthetic aortic valve peak and mean gradients are elevated. There is stenosis or obstruction of the prosthetic aortic valve.    There is a small (<1cm) pericardial effusion adjacent to the right ventricle.          Medication Review: yes  Current Facility-Administered Medications   Medication Dose Route Frequency Provider Last Rate Last Admin    acetaminophen (TYLENOL) tablet 650 mg  650 mg Oral Q4H PRN Marco A Keys MD        Or    acetaminophen (TYLENOL) suppository 650 mg  650 mg Rectal Q4H PRN Marco A Keys MD        albuterol sulfate HFA (PROVENTIL HFA;VENTOLIN HFA;PROAIR HFA) inhaler 2 puff  2 puff Inhalation Q6H PRN Michael Alvarado MD   2 puff at 01/23/24 0703    amLODIPine (NORVASC) tablet 10 mg  10 mg Oral Daily Marco A Keys MD   10 mg at 01/25/24 0944    aspirin EC tablet 81 mg  81 mg Oral Daily Marco A Keys MD   81 mg at 01/25/24 0944    azithromycin (ZITHROMAX) 500  mg in sodium chloride 0.9 % 250 mL IVPB-VTB  500 mg Intravenous Q24H Michaelle Mccord MD   500 mg at 01/25/24 1048    sennosides-docusate (PERICOLACE) 8.6-50 MG per tablet 2 tablet  2 tablet Oral BID Marco A Keys MD   2 tablet at 01/23/24 2125    And    polyethylene glycol (MIRALAX) packet 17 g  17 g Oral Daily PRN Marco A Keys MD        And    bisacodyl (DULCOLAX) EC tablet 5 mg  5 mg Oral Daily PRN Marco A Keys MD        And    bisacodyl (DULCOLAX) suppository 10 mg  10 mg Rectal Daily PRN Marco A Keys MD        busPIRone (BUSPAR) tablet 5 mg  5 mg Oral BID Marco A Keys MD   5 mg at 01/25/24 0944    citalopram (CeleXA) tablet 20 mg  20 mg Oral Daily Marco A Keys MD   20 mg at 01/25/24 0944    clopidogrel (PLAVIX) tablet 75 mg  75 mg Oral Daily Marco A Keys MD   75 mg at 01/25/24 0944    dexAMETHasone (DECADRON) tablet 6 mg  6 mg Oral Daily Marco A Keys MD   6 mg at 01/25/24 0944    Or    dexAMETHasone (DECADRON) injection 6 mg  6 mg Intravenous Daily Marco A Keys MD   6 mg at 01/23/24 0850    dextromethorphan polistirex ER (DELSYM) 30 MG/5ML oral suspension 60 mg  60 mg Oral Q12H PRN Marco A Keys MD        Enoxaparin Sodium (LOVENOX) syringe 40 mg  40 mg Subcutaneous Daily Marco A Keys MD   40 mg at 01/24/24 0833    gabapentin (NEURONTIN) capsule 400 mg  400 mg Oral TID Marco A Keys MD   400 mg at 01/25/24 0944    guaiFENesin (MUCINEX) 12 hr tablet 600 mg  600 mg Oral Q12H Marco A Keys MD   600 mg at 01/25/24 0944    HYDROcodone-acetaminophen (NORCO) 7.5-325 MG per tablet 1 tablet  1 tablet Oral Q6H PRN Marco A Keys MD   1 tablet at 01/25/24 1000    ipratropium (ATROVENT HFA) inhaler 2 puff  2 puff Inhalation 4x Daily - RT Michael Alvarado MD   2 puff at 01/25/24 1109    isosorbide mononitrate (IMDUR) 24 hr tablet 120 mg  120 mg Oral Daily Massimo  Marco A Salazar MD   120 mg at 01/25/24 0944    metoprolol succinate XL (TOPROL-XL) 24 hr tablet 50 mg  50 mg Oral Q24H Marco A Keys MD   50 mg at 01/25/24 0944    rosuvastatin (CRESTOR) tablet 40 mg  40 mg Oral Daily Marco A Keys MD   40 mg at 01/25/24 0944    sodium chloride 0.9 % flush 10 mL  10 mL Intravenous PRN Marco A Keys MD        sodium chloride 0.9 % flush 10 mL  10 mL Intravenous Q12H Marco A Keys MD   10 mL at 01/25/24 0945    sodium chloride 0.9 % flush 10 mL  10 mL Intravenous PRN Marco A Keys MD        sodium chloride 0.9 % infusion 40 mL  40 mL Intravenous PRN Marco A Keys MD        tamsulosin (FLOMAX) 24 hr capsule 0.4 mg  0.4 mg Oral Daily Marco A Keys MD   0.4 mg at 01/25/24 0944         Assessment & Plan       Acute exacerbation of chronic obstructive pulmonary disease (COPD)    Primary hypertension    History of CVA (cerebrovascular accident)    S/P CABG x 3 2018 Dr Robin     Status post aortic valve replacement with bioprosthetic valve    LVH (left ventricular hypertrophy)    PVD (peripheral vascular disease) with claudication    Aortoiliac occlusive disease    PAD (peripheral artery disease)    COVID-19 virus infection    Elevated troponin level not due myocardial infarction    COPD exacerbation    Acute diastolic CHF (congestive heart failure)    Plan:   Elevated troponin: likely secondary to acute hypoxia due to COPD exacerbation and COVID. Recommend nuclear stress test to be completed outpatient. No need to continue to check troponins unless he complains of chest pain.      Acute diastolic CHF: likely secondary to acute viral illness. Diuresed well with 1x IV lasix. Evidence of LHV, grade 2 diastolic dysfunction and elevated prosthetic valve measurements. Does not need home diuretic at this time.       Stenosis of prosthetic valve: will plan to refer to structural heart clinic as outpatient.      COVID  19/COPD exacerbation: steroids and nebulizers per attending     Cardiology will sign off at this time. Please let us know if we can be of further assistance. He will need a follow up  with KIRBY Patel, KIRBY Castle, KIRBY Chris or myself in 1-2 weeks after discharge.     Further recommendations per Dr. Hair     Electronically signed by KIRBY Moore, 01/25/24, 10:19 AM CST.    Time spent: 30 minutes

## 2024-01-25 NOTE — PLAN OF CARE
Goal Outcome Evaluation:      VSS A&OX4 Medicated for chronic back pain as requested. Lasix IV given good UOP. IV ABX continue. Sinus 61-95 per tele will continue to monitor.

## 2024-01-25 NOTE — PROGRESS NOTES
[facility] Medicine Services  INPATIENT PROGRESS NOTE    Patient Name: Sharad Ryder  Date of Admission: 1/22/2024  Today's Date: 01/25/24  Length of Stay: 2  Primary Care Physician: Nila Alexander APRN    Subjective   Chief Complaint: Shortness of breath  Shortness of Breath         63 year old male with PMH of CAD/CABG, COPD, Stroke, carotid artery disease, aortic aneurysm, HTN, that presents to the ER with complaints of shortness of breath for 2 nights, worsened tonight. He presents with oxygen saturation of 84% on room air and tachypnea, respiratory distress. Has required 6 lpm to control dyspnea. Afebrile, WBC is 10, COVID 19 positive, CTA chest shows marked pulmonary emphysema and bibasilar atelectasis.   1/24  CAD/CABG, COPD, Stroke, carotid artery disease, aortic aneurysm, HTN, Admitted for acute on chronic COPD exacerbation positive COVID, troponin elevated echo of the heart is showing EF 70% with moderate to severe AS and bioprosthetic aortic valve, currently on 6 L oxygen, remains on steroid , bronchodilators we will add Zithromax consult cardiology for elevated troponin moderate to severe aortic stenosis history of coronary disease and CABG  1/25  He has a history of stroke and Parkinson he tells me his sore all over after he fell does not feel good to go home and does not want to go to SNF but not requiring any oxygen, appreciate cardiology Recommend nuclear stress test to be completed outpatient. No need to continue to check troponins unless he complains of chest pain , Stenosis of prosthetic valve: will plan to refer to structural heart clinic as outpatient. Acute diastolic CHF: likely secondary to acute viral illness. Diuresed well with 1x IV lasix. Evidence of LHV, grade 2 diastolic dysfunction and elevated prosthetic valve measurements. Does not need home diuretic at this time.  Cardiology has signed off  Review of Systems   Respiratory:  Positive for  shortness of breath.       All pertinent negatives and positives are as above. All other systems have been reviewed and are negative unless otherwise stated.     Objective    Temp:  [96.8 °F (36 °C)-97.9 °F (36.6 °C)] 97.1 °F (36.2 °C)  Heart Rate:  [58-82] 82  Resp:  [16-18] 18  BP: (132-172)/(68-80) 160/78  Physical Exam  Constitutional:       Appearance: Normal appearance.   HENT:      Head: Normocephalic.      Right Ear: Tympanic membrane normal.      Nose: Nose normal.   Eyes:      Extraocular Movements: Extraocular movements intact.      Pupils: Pupils are equal, round, and reactive to light.   Cardiovascular:      Rate and Rhythm: Normal rate and regular rhythm.   Pulmonary:      Breath sounds: Stridor present. Wheezing present.   Abdominal:      General: Abdomen is flat.      Palpations: Abdomen is soft.   Musculoskeletal:         General: Normal range of motion.      Cervical back: Normal range of motion.   Skin:     Capillary Refill: Capillary refill takes less than 2 seconds.   Neurological:      Mental Status: He is alert.             Results Review:  I have reviewed the labs, radiology results, and diagnostic studies.    Laboratory Data:   Results from last 7 days   Lab Units 01/25/24  0619 01/24/24  0621 01/23/24  0547   WBC 10*3/mm3 9.01 6.88 7.22   HEMOGLOBIN g/dL 12.7* 11.9* 12.0*   HEMATOCRIT % 40.2 36.7* 37.4*   PLATELETS 10*3/mm3 246 225 225        Results from last 7 days   Lab Units 01/25/24  0619 01/24/24  0621 01/23/24  0547   SODIUM mmol/L 141 138 139   POTASSIUM mmol/L 3.7 3.9 3.6   CHLORIDE mmol/L 105 104 103   CO2 mmol/L 27.0 23.0 25.0   BUN mg/dL 15 15 14   CREATININE mg/dL 0.57* 0.41* 0.56*   CALCIUM mg/dL 9.0 8.9 9.0   BILIRUBIN mg/dL 0.2 0.2 0.2   ALK PHOS U/L 71 79 88   ALT (SGPT) U/L 21 23 28   AST (SGOT) U/L 31 36 43*   GLUCOSE mg/dL 107* 121* 102*       Culture Data:   Blood Culture   Date Value Ref Range Status   01/22/2024 No growth at 24 hours  Preliminary   01/22/2024 No  growth at 24 hours  Preliminary       Radiology Data:   Imaging Results (Last 24 Hours)       ** No results found for the last 24 hours. **            I have reviewed the patient's current medications.     Assessment/Plan   Assessment  Active Hospital Problems    Diagnosis     **Acute exacerbation of chronic obstructive pulmonary disease (COPD)     Acute diastolic CHF (congestive heart failure)     COVID-19 virus infection     Elevated troponin level not due myocardial infarction     COPD exacerbation     Aortoiliac occlusive disease     PAD (peripheral artery disease)     PVD (peripheral vascular disease) with claudication     S/P CABG x 3 2018 Dr Robin      Status post aortic valve replacement with bioprosthetic valve     LVH (left ventricular hypertrophy)     History of CVA (cerebrovascular accident)     Primary hypertension        Treatment Plan  The patient will be admitted to my service here at [facility].   Admit to medical floor with enhanced contact precautions  Vitals and pulse oxymetry continuous   IVF NS KVO  Cardiac diet  Dexamethasone 6 mg IV/PO  Consider Remdesevir  Oxygen by nasal canula titrate as needed to saturation above 92 %  Duoneb QID  Albuterol 1 UD q6h prn SOB/Wheezing     DVT prophylaxis > Lovenox 40 mg SQ daily     NSTEMI suspect demand from hypoxemia  ASA/Lipitor  A1C and lipid panel  Echocardiogram 2D in AM    Medical Decision Making  Number and Complexity of problems: 2 acute  Differential Diagnosis: As above    Conditions and Status        Condition is improving.     [facility] Data  External documents reviewed: No  Cardiac tracing (EKG, telemetry) interpretation: Yes  Radiology interpretation: Yes  Labs reviewed: Yes  Any tests that were considered but not ordered: No     Decision rules/scores evaluated (example [id number], Wells, etc): No     Discussed with: Patient     Care Planning  Shared decision making: Patient apprised of current labs, vitals, imaging and treatment plan.   They are agreeable with proceeding with plans as discussed.   Code status and discussions: Patient    Disposition  Social Determinants of Health that impact treatment or disposition: No  I expect the patient to be discharged to TBD in TBD days.     Electronically signed by Michaelle Mccord MD, 01/25/24, 11:25 CST.

## 2024-01-25 NOTE — PLAN OF CARE
Problem: Infection COPD (Chronic Obstructive Pulmonary Disease)  Goal: Absence of Infection Signs and Symptoms  Outcome: Ongoing, Progressing  Intervention: Prevent or Manage Infection  Recent Flowsheet Documentation  Taken 1/24/2024 2020 by Janene Velarde RN  Isolation Precautions:   precautions initiated   enhanced contact   droplet     Problem: Infection Progression (Sepsis/Septic Shock)  Goal: Absence of Infection Signs and Symptoms  Outcome: Ongoing, Progressing  Intervention: Initiate Sepsis Management  Recent Flowsheet Documentation  Taken 1/24/2024 2020 by Janene Velarde RN  Infection Prevention: single patient room provided  Isolation Precautions:   precautions initiated   enhanced contact   droplet  Intervention: Promote Recovery  Recent Flowsheet Documentation  Taken 1/24/2024 2020 by Janene Velarde RN  Activity Management: up ad linda     Problem: Adjustment to Illness (Sepsis/Septic Shock)  Goal: Optimal Coping  Outcome: Ongoing, Progressing   Goal Outcome Evaluation:

## 2024-01-26 ENCOUNTER — READMISSION MANAGEMENT (OUTPATIENT)
Dept: CALL CENTER | Facility: HOSPITAL | Age: 64
End: 2024-01-26
Payer: MEDICAID

## 2024-01-26 VITALS
BODY MASS INDEX: 21.34 KG/M2 | HEART RATE: 73 BPM | HEIGHT: 64 IN | TEMPERATURE: 96 F | OXYGEN SATURATION: 93 % | DIASTOLIC BLOOD PRESSURE: 68 MMHG | RESPIRATION RATE: 18 BRPM | SYSTOLIC BLOOD PRESSURE: 138 MMHG | WEIGHT: 125 LBS

## 2024-01-26 LAB
ALBUMIN SERPL-MCNC: 4.1 G/DL (ref 3.5–5.2)
ALBUMIN/GLOB SERPL: 1.4 G/DL
ALP SERPL-CCNC: 72 U/L (ref 39–117)
ALT SERPL W P-5'-P-CCNC: 23 U/L (ref 1–41)
ANION GAP SERPL CALCULATED.3IONS-SCNC: 11 MMOL/L (ref 5–15)
AST SERPL-CCNC: 26 U/L (ref 1–40)
BASOPHILS # BLD AUTO: 0 10*3/MM3 (ref 0–0.2)
BASOPHILS NFR BLD AUTO: 0 % (ref 0–1.5)
BILIRUB SERPL-MCNC: 0.2 MG/DL (ref 0–1.2)
BUN SERPL-MCNC: 12 MG/DL (ref 8–23)
BUN/CREAT SERPL: 23.5 (ref 7–25)
CALCIUM SPEC-SCNC: 9.1 MG/DL (ref 8.6–10.5)
CHLORIDE SERPL-SCNC: 103 MMOL/L (ref 98–107)
CO2 SERPL-SCNC: 24 MMOL/L (ref 22–29)
CREAT SERPL-MCNC: 0.51 MG/DL (ref 0.76–1.27)
DEPRECATED RDW RBC AUTO: 45.7 FL (ref 37–54)
EGFRCR SERPLBLD CKD-EPI 2021: 113.9 ML/MIN/1.73
EOSINOPHIL # BLD AUTO: 0 10*3/MM3 (ref 0–0.4)
EOSINOPHIL NFR BLD AUTO: 0 % (ref 0.3–6.2)
ERYTHROCYTE [DISTWIDTH] IN BLOOD BY AUTOMATED COUNT: 13.3 % (ref 12.3–15.4)
GLOBULIN UR ELPH-MCNC: 3 GM/DL
GLUCOSE SERPL-MCNC: 140 MG/DL (ref 65–99)
HCT VFR BLD AUTO: 40.7 % (ref 37.5–51)
HGB BLD-MCNC: 13.1 G/DL (ref 13–17.7)
IMM GRANULOCYTES # BLD AUTO: 0.02 10*3/MM3 (ref 0–0.05)
IMM GRANULOCYTES NFR BLD AUTO: 0.2 % (ref 0–0.5)
LYMPHOCYTES # BLD AUTO: 1.28 10*3/MM3 (ref 0.7–3.1)
LYMPHOCYTES NFR BLD AUTO: 14.3 % (ref 19.6–45.3)
MCH RBC QN AUTO: 30 PG (ref 26.6–33)
MCHC RBC AUTO-ENTMCNC: 32.2 G/DL (ref 31.5–35.7)
MCV RBC AUTO: 93.1 FL (ref 79–97)
MONOCYTES # BLD AUTO: 0.59 10*3/MM3 (ref 0.1–0.9)
MONOCYTES NFR BLD AUTO: 6.6 % (ref 5–12)
NEUTROPHILS NFR BLD AUTO: 7.05 10*3/MM3 (ref 1.7–7)
NEUTROPHILS NFR BLD AUTO: 78.9 % (ref 42.7–76)
NRBC BLD AUTO-RTO: 0 /100 WBC (ref 0–0.2)
PLATELET # BLD AUTO: 236 10*3/MM3 (ref 140–450)
PMV BLD AUTO: 10.5 FL (ref 6–12)
POTASSIUM SERPL-SCNC: 3.8 MMOL/L (ref 3.5–5.2)
PROT SERPL-MCNC: 7.1 G/DL (ref 6–8.5)
RBC # BLD AUTO: 4.37 10*6/MM3 (ref 4.14–5.8)
SODIUM SERPL-SCNC: 138 MMOL/L (ref 136–145)
WBC NRBC COR # BLD AUTO: 8.94 10*3/MM3 (ref 3.4–10.8)

## 2024-01-26 PROCEDURE — 85025 COMPLETE CBC W/AUTO DIFF WBC: CPT | Performed by: FAMILY MEDICINE

## 2024-01-26 PROCEDURE — 36415 COLL VENOUS BLD VENIPUNCTURE: CPT | Performed by: FAMILY MEDICINE

## 2024-01-26 PROCEDURE — 94664 DEMO&/EVAL PT USE INHALER: CPT

## 2024-01-26 PROCEDURE — 63710000001 DEXAMETHASONE PER 0.25 MG: Performed by: FAMILY MEDICINE

## 2024-01-26 PROCEDURE — 94799 UNLISTED PULMONARY SVC/PX: CPT

## 2024-01-26 PROCEDURE — 80053 COMPREHEN METABOLIC PANEL: CPT | Performed by: FAMILY MEDICINE

## 2024-01-26 RX ORDER — METHYLPREDNISOLONE 4 MG/1
TABLET ORAL
Qty: 21 TABLET | Refills: 0 | Status: SHIPPED | OUTPATIENT
Start: 2024-01-26

## 2024-01-26 RX ORDER — ALBUTEROL SULFATE 90 UG/1
2 AEROSOL, METERED RESPIRATORY (INHALATION) EVERY 4 HOURS PRN
Qty: 18 G | Refills: 0 | Status: SHIPPED | OUTPATIENT
Start: 2024-01-26

## 2024-01-26 RX ORDER — AZITHROMYCIN 500 MG/1
1000 TABLET, FILM COATED ORAL ONCE
Qty: 2 TABLET | Refills: 0 | Status: SHIPPED | OUTPATIENT
Start: 2024-01-26 | End: 2024-01-27

## 2024-01-26 RX ADMIN — IPRATROPIUM BROMIDE 2 PUFF: 17 AEROSOL, METERED RESPIRATORY (INHALATION) at 10:34

## 2024-01-26 RX ADMIN — ASPIRIN 81 MG: 81 TABLET, COATED ORAL at 08:02

## 2024-01-26 RX ADMIN — GUAIFENESIN 600 MG: 600 TABLET, EXTENDED RELEASE ORAL at 08:02

## 2024-01-26 RX ADMIN — Medication 10 ML: at 08:03

## 2024-01-26 RX ADMIN — IPRATROPIUM BROMIDE 2 PUFF: 17 AEROSOL, METERED RESPIRATORY (INHALATION) at 07:36

## 2024-01-26 RX ADMIN — AMLODIPINE BESYLATE 10 MG: 10 TABLET ORAL at 08:02

## 2024-01-26 RX ADMIN — GABAPENTIN 400 MG: 400 CAPSULE ORAL at 08:02

## 2024-01-26 RX ADMIN — TAMSULOSIN HYDROCHLORIDE 0.4 MG: 0.4 CAPSULE ORAL at 08:02

## 2024-01-26 RX ADMIN — METOPROLOL SUCCINATE 50 MG: 50 TABLET, EXTENDED RELEASE ORAL at 08:02

## 2024-01-26 RX ADMIN — ISOSORBIDE MONONITRATE 120 MG: 60 TABLET, EXTENDED RELEASE ORAL at 08:02

## 2024-01-26 RX ADMIN — CITALOPRAM 20 MG: 20 TABLET, FILM COATED ORAL at 08:02

## 2024-01-26 RX ADMIN — DEXAMETHASONE 6 MG: 4 TABLET ORAL at 08:02

## 2024-01-26 RX ADMIN — ROSUVASTATIN CALCIUM 40 MG: 20 TABLET, FILM COATED ORAL at 08:02

## 2024-01-26 RX ADMIN — BUSPIRONE HYDROCHLORIDE 5 MG: 5 TABLET ORAL at 08:02

## 2024-01-26 RX ADMIN — CLOPIDOGREL BISULFATE 75 MG: 75 TABLET, FILM COATED ORAL at 08:02

## 2024-01-26 RX ADMIN — HYDROCODONE BITARTRATE AND ACETAMINOPHEN 1 TABLET: 7.5; 325 TABLET ORAL at 05:38

## 2024-01-26 NOTE — PLAN OF CARE
Goal Outcome Evaluation:         No issues through out night, room air, sr to sb 85 to 54

## 2024-01-26 NOTE — CASE MANAGEMENT/SOCIAL WORK
Continued Stay Note   Cynthia     Patient Name: Sharad Ryder  MRN: [id number]  Today's Date: 1/26/2024    Admit Date: 1/22/2024    Plan: Home   Discharge Plan       Row Name 01/26/24 0843       Plan    Plan Home    Patient/Family in Agreement with Plan yes    Plan Comments Pt will return home with spouse as before. He has denied needs in past CM visit. Please inform if SW assist needed.                   Discharge Codes    No documentation.                       KARIE Prater

## 2024-01-26 NOTE — PLAN OF CARE
Goal Outcome Evaluation:   Lasix responsive, a/ox4, blood sugars well controlled, VSS, aflutter 72-91 BBB. Got up to chair with OT.

## 2024-01-26 NOTE — DISCHARGE SUMMARY
[facility]  DISCHARGE SUMMARY       Date of Admission: 1/22/2024  Date of Discharge:  1/26/2024  Primary Care Physician: Nila Alexander APRN    Presenting Problem/History of Present Illness:         [facility]  INPATIENT PROGRESS NOTE     Patient Name: Sharad Ryder  Date of Admission: 1/22/2024  Today's Date: 01/25/24  Length of Stay: 2  Primary Care Physician: Nila Alexander APRN     Subjective   Chief Complaint: Shortness of breath  Shortness of Breath           63 year old male with PMH of CAD/CABG, COPD, Stroke, carotid artery disease, aortic aneurysm, HTN, that presents to the ER with complaints of shortness of breath for 2 nights, worsened tonight. He presents with oxygen saturation of 84% on room air and tachypnea, respiratory distress. Has required 6 lpm to control dyspnea. Afebrile, WBC is 10, COVID 19 positive, CTA chest shows marked pulmonary emphysema and bibasilar atelectasis.   1/24  CAD/CABG, COPD, Stroke, carotid artery disease, aortic aneurysm, HTN, Admitted for acute on chronic COPD exacerbation positive COVID, troponin elevated echo of the heart is showing EF 70% with moderate to severe AS and bioprosthetic aortic valve, currently on 6 L oxygen, remains on steroid , bronchodilators we will add Zithromax consult cardiology for elevated troponin moderate to severe aortic stenosis history of coronary disease and CABG  1/25  He has a history of stroke and Parkinson he tells me his sore all over after he fell does not feel good to go home and does not want to go to SNF but not requiring any oxygen, appreciate cardiology Recommend nuclear stress test to be completed outpatient. No need to continue to check troponins unless he complains of chest pain , Stenosis of prosthetic valve: will plan to refer to structural heart clinic as outpatient. Acute diastolic CHF: likely secondary to  acute viral illness. Diuresed well with 1x IV lasix. Evidence of LHV, grade 2 diastolic dysfunction and elevated prosthetic valve measurements. Does not need home diuretic at this time.  Cardiology has signed off          Final Discharge Diagnoses:  Active Hospital Problems    Diagnosis     **Acute exacerbation of chronic obstructive pulmonary disease (COPD)     Acute diastolic CHF (congestive heart failure)     COVID-19 virus infection     Elevated troponin level not due myocardial infarction     COPD exacerbation     Aortoiliac occlusive disease     PAD (peripheral artery disease)     PVD (peripheral vascular disease) with claudication     S/P CABG x 3 2018 Dr Robin      Status post aortic valve replacement with bioprosthetic valve     LVH (left ventricular hypertrophy)     History of CVA (cerebrovascular accident)     Primary hypertension        Consults: cards    Procedures Performed: no    Pertinent Test Results:   Results for orders placed during the hospital encounter of 01/22/24    Adult Transthoracic Echo Complete W/ Cont if Necessary Per Protocol    Interpretation Summary    Left ventricular systolic function is normal. Left ventricular ejection fraction appears to be greater than 70%.    The left ventricular cavity is small in size.    Left ventricular wall thickness is consistent with moderate to severe concentric hypertrophy.    The findings are consistent with hypertrophic cardiomyopathy.    Left ventricular diastolic function is consistent with (grade II w/high LAP) pseudonormalization.    The left atrial cavity is moderate to severely dilated.    The right atrial cavity is mildly  dilated.    Moderate to severe aortic valve stenosis is present.    There is a bioprosthetic aortic valve present. The prosthetic aortic valve peak and mean gradients are elevated. There is stenosis or obstruction of the prosthetic aortic valve.    There is a small (<1cm) pericardial effusion adjacent to the right  ventricle.      Imaging Results (All)       Procedure Component Value Units Date/Time    CT Angiogram Chest [[id number]] Collected: 01/23/24 0555     Updated: 01/23/24 0751    Narrative:      EXAMINATION: CT ANGIOGRAM CHEST-      1/22/2024 10:17 PM     HISTORY: Pulmonary embolism (PE) suspected, unknown D-dimer     In order to have a CT radiation dose as low as reasonably achievable  Automated Exposure Control was utilized for adjustment of the mA and/or  KV according to patient size.     Total DLP = 152.96 mGy.cm     The CT angiography of the chest tube performed after intravenous  contrast enhancement.     The images are acquired in axial plane with subsequent 2D reconstruction  in coronal and sagittal planes and 3D maximum intensity projection  reconstruction.     The comparison is made with the previous study dated 4/16/2019.     There is normal opacification of the pulmonary arteries and branches  bilaterally. There are no filling defects in the opacified pulmonary  arterial bed. RV/LV ratio is 35/36 which may suggest a mild right heart  strain.     Atheromatous change of thoracic aorta seen. There is mild aneurysmal  dilatation of the ascending thoracic aorta which measures 4.1 cm in  diameter. No dissection. A prosthetic valve is in place.     Severe atheromatous changes of coronary arteries are noted. There is  evidence of prior CABG.     There are mediastinal and hilar lymphadenopathy. A level 6 lymph node  measures 12 mm in short axis. A subcarinal, level 7, lymph node measures  19 mm in short axis. A right hilar, level R10, lymph node measures 14 mm  in short axis. A level 11 lymph node measures 16 mm in short axis.     Atheromatous changes are seen at the origin of the brachiocephalic, left  common carotid and left subclavian arteries. There is 50% focal stenosis  of the origin of the left subclavian artery with a large plaque in the  proximal left subclavian artery resulting in high-grade stenosis.      Limited visualized soft tissue of the neck are unremarkable.     Limited visualized thyroid gland is unremarkable.     Right axillary lymph nodes are not evaluated or excluded due to  extensive artifacts. There is a borderline prominent lymph node in the  left axilla measuring 1 cm in short axis. There is a fatty hilum.     There are extensive chronic emphysematous lung changes.     There are atelectatic changes most pronounced in the lower lungs.     Ill-defined opacities in the lingular segment of the left upper lobe  probably represent atelectatic changes. An incidental  infiltrate/evolving infiltrate may not be excluded.     Limited visualized abdomen is unremarkable. Incompletely and partially  visualized severe atheromatous changes of the abdominal aorta are noted  with aneurysmal dilatation of the infrarenal abdominal aorta with a  probable chronic dissection. It measures approximately 4 cm in diameter.  This was noted in the previous CT scan of the abdomen dated 9/13/2022.     The images reviewed in bone window show chronic degenerative changes of  the thoracolumbar spine. There is. Endplate compression deformity of  vertebra T11 which appears similar to the previous study in 2022.       Impression:      1. No evidence of pulmonary embolism.  2. Mild aneurysmal dilatation of the ascending thoracic aorta. No  dissection.  3. Extensive chronic emphysematous lung changes.  4. Ill-defined opacities in the lingular segment of the left upper lobe  probably represent atelectatic changes. A coincidental evolving  infiltrate or a lung nodule not excluded. Follow-up examination/CT scan  of the chest in 3 months may be obtained to ensure stability/resolution.  5. Severe atheromatous changes of the abdominal aorta with aneurysmal  dilatation of the infrarenal abdominal aorta as mentioned above. This  may be further evaluated, if clinically warranted, with CT angiography  of the abdomen.  6. Nonspecific mediastinal and  hilar lymphadenopathy.     The above study was initially reviewed and reported by [facility]. I do not  find any discrepancies.           This report was signed and finalized on 1/23/2024 7:47 AM by Dr. Anette Hair MD.       XR Chest 1 View [[id number]] Collected: 01/23/24 0617     Updated: 01/23/24 0623    Narrative:      EXAMINATION: XR CHEST 1 VW-     1/22/2024 9:26 PM     HISTORY: SOA. Shortness of breath triage Protocol     A frontal projection of the chest is compared with the previous study  dated 10/25/2023.     The lungs are hyperexpanded.     There are coarse interstitial changes in the lungs bilaterally more so  in the left upper lung parahilar area and in the left lower lung  adjacent to the cardiac border. These are similar to the previous study  and represent chronic inflammatory process/fibrosis.     There is no pleural effusion, pulmonary congestion or pneumothorax.     There is probable moderate cardiomegaly. Atheromatous changes of the  thoracic aorta are noted. There is evidence of prior cardiac surgery. A  prosthetic valve is in place. Left atrial appendage clamp is in place.     There is no acute bony abnormality. Deformity of the right distal  clavicle may represent a previous trauma or procedure/surgery.     Surgical staple along the right side of the neck may represent a prior  right carotid endarterectomy or thyroid surgery.       Impression:      1. Chronic inflammatory and obstructive lung changes similar to the  previous study.  2. No active infiltrate or pulmonary congestion.        This report was signed and finalized on 1/23/2024 6:20 AM by Dr. Anette Hair MD.             LAB RESULTS:      Lab 01/26/24  0524 01/25/24  0619 01/24/24  0621 01/23/24  0547 01/22/24  2305 01/22/24  2219   WBC 8.94 9.01 6.88 7.22  --  10.14   HEMOGLOBIN 13.1 12.7* 11.9* 12.0*  --  12.2*   HEMATOCRIT 40.7 40.2 36.7* 37.4*  --  36.6*   PLATELETS 236 246 225 225  --  225   NEUTROS ABS 7.05* 6.84 5.07  5.10  --  8.41*   IMMATURE GRANS (ABS) 0.02 0.02 0.01 0.03  --  0.03   LYMPHS ABS 1.28 1.34 0.99 1.09  --  0.53*   MONOS ABS 0.59 0.81 0.80 0.97*  --  1.04*   EOS ABS 0.00 0.00 0.00 0.01  --  0.09   MCV 93.1 95.3 93.4 92.8  --  92.0   LACTATE  --   --   --   --  0.6  --    PROTIME  --   --   --   --   --  14.4   D DIMER QUANT  --   --   --   --   --  1.31*         Lab 01/26/24  0524 01/25/24  0619 01/24/24  0621 01/23/24  0547 01/22/24  2226 01/22/24 2219   SODIUM 138 141 138 139  --  132*   SODIUM, ARTERIAL  --   --   --   --  132*  --    POTASSIUM 3.8 3.7 3.9 3.6  --  3.9   CHLORIDE 103 105 104 103  --  95*   CO2 24.0 27.0 23.0 25.0  --  25.0   ANION GAP 11.0 9.0 11.0 11.0  --  12.0   BUN 12 15 15 14  --  13   CREATININE 0.51* 0.57* 0.41* 0.56*  --  0.79   EGFR 113.9 110.2 121.7 110.8  --  99.8   GLUCOSE 140* 107* 121* 102*  --  132*   CALCIUM 9.1 9.0 8.9 9.0  --  9.1         Lab 01/26/24  0524 01/25/24  0619 01/24/24  0621 01/23/24  0547 01/22/24  2219   TOTAL PROTEIN 7.1 6.9 6.6 6.7 7.1   ALBUMIN 4.1 4.1 4.1 4.1 4.5   GLOBULIN 3.0 2.8 2.5 2.6 2.6   ALT (SGPT) 23 21 23 28 16   AST (SGOT) 26 31 36 43* 26   BILIRUBIN 0.2 0.2 0.2 0.2 0.2   ALK PHOS 72 71 79 88 84         Lab 01/24/24  0749 01/24/24  0621 01/23/24  0547 01/23/24  0013 01/22/24  2219   PROBNP  --   --   --   --  3,578.0*   HSTROP T 140* 136* 217* 105* 68*   PROTIME  --   --   --   --  14.4   INR  --   --   --   --  1.10*                 Lab 01/23/24  0627 01/22/24 2226   PH, ARTERIAL 7.393 7.455*   PCO2, ARTERIAL 43.8 37.7   PO2 ART 80.5* 59.0*   O2 SATURATION ART 95.6 92.6*   HCO3 ART 26.7* 26.5*   BASE EXCESS ART 1.4 2.5*   CARBOXYHEMOGLOBIN  --  7.6*     Brief Urine Lab Results       None          Microbiology Results (last 10 days)       Procedure Component Value - Date/Time    Blood Culture - Blood, Arm, Right [[id number]]  (Normal) Collected: 01/22/24 2306    Lab Status: Preliminary result Specimen: Blood from Arm, Right Updated: 01/25/24 6073  "    Blood Culture No growth at 3 days    Blood Culture - Blood, Wrist, Right [[id number]]  (Normal) Collected: 01/22/24 2305    Lab Status: Preliminary result Specimen: Blood from Wrist, Right Updated: 01/25/24 2330     Blood Culture No growth at 3 days    COVID-19 and FLU A/B PCR, 1 HR TAT - Swab, Nasopharynx [[id number]]  (Abnormal) Collected: 01/22/24 2219    Lab Status: Final result Specimen: Swab from Nasopharynx Updated: 01/22/24 2304     COVID19 Detected     Influenza A PCR Not Detected     Influenza B PCR Not Detected    Narrative:      Fact sheet for providers: https://www.fda.gov/media/026403/download    Fact sheet for patients: https://www.fda.gov/media/024785/download    Test performed by PCR.  Influenza A and Influenza B negative results should be considered presumptive in samples that have a positive SARS-CoV-2 result.    Competitive inhibition studies showed that SARS-CoV-2 virus, when present at concentrations above 3.6E+04 copies/mL, can inhibit the detection and amplification of influenza A and influenza B virus RNA if present at or below 1.8E+02 copies/mL or 4.9E+02 copies/mL, respectively, and may lead to false negative influenza virus results. If co-infection with influenza A or influenza B virus is suspected in samples with a positive SARS-CoV-2 result, the sample should be re-tested with another FDA cleared, approved, or authorized influenza test, if influenza virus detection would change clinical management.              Physical Exam on Discharge:  /68 (BP Location: Left arm, Patient Position: Sitting)   Pulse 73   Temp 96 °F (35.6 °C) (Oral)   Resp 18   Ht 162.6 cm (64\")   Wt 56.7 kg (125 lb)   SpO2 93%   BMI 21.46 kg/m²   Physical Exam  Constitutional:       Appearance: Normal appearance.   HENT:      Head: Normocephalic.   Cardiovascular:      Rate and Rhythm: Normal rate.      Pulses: Normal pulses.   Pulmonary:      Effort: Respiratory distress present.      Breath sounds: " Wheezing present.   Abdominal:      General: Abdomen is flat.   Musculoskeletal:         General: Normal range of motion.      Cervical back: Normal range of motion.   Neurological:      Mental Status: He is alert.           Condition on Discharge: stable    Discharge Disposition:  Home or Self Care    Discharge Medications:     Discharge Medications        New Medications        Instructions Start Date   albuterol sulfate  (90 Base) MCG/ACT inhaler  Commonly known as: PROVENTIL HFA;VENTOLIN HFA;PROAIR HFA   2 puffs, Inhalation, Every 4 Hours PRN      azithromycin 1 g powder  Commonly known as: Zithromax   1 g, Oral, Once      methylPREDNISolone 4 MG dose pack  Commonly known as: MEDROL   Take as directed on package instructions.             Continue These Medications        Instructions Start Date   amLODIPine 10 MG tablet  Commonly known as: NORVASC   10 mg, Oral, Daily      aspirin 81 MG tablet   81 mg, Oral, Daily      busPIRone 10 MG tablet  Commonly known as: BUSPAR   10 mg, Oral, 2 Times Daily      citalopram 20 MG tablet  Commonly known as: CeleXA   20 mg, Oral, Daily      cloNIDine 0.1 MG tablet  Commonly known as: CATAPRES   0.1 mg, Oral, 2 Times Daily PRN      clopidogrel 75 MG tablet  Commonly known as: PLAVIX   75 mg, Oral, Daily      gabapentin 400 MG capsule  Commonly known as: NEURONTIN   400 mg, Oral, 3 Times Daily      HYDROcodone-acetaminophen 7.5-325 MG per tablet  Commonly known as: NORCO   1 tablet, Oral, Every 6 Hours PRN      isosorbide mononitrate 120 MG 24 hr tablet  Commonly known as: IMDUR   120 mg, Oral, Daily      metoprolol succinate XL 50 MG 24 hr tablet  Commonly known as: TOPROL-XL   50 mg, Oral, Every 24 Hours Scheduled      multivitamin with minerals tablet tablet   1 tablet, Oral, Daily      nitroglycerin 0.4 MG SL tablet  Commonly known as: NITROSTAT   0.4 mg, Sublingual, Every 5 Minutes PRN, Take no more than 3 doses in 15 minutes.       rosuvastatin 40 MG  tablet  Commonly known as: CRESTOR   40 mg, Oral, Daily      tamsulosin 0.4 MG capsule 24 hr capsule  Commonly known as: FLOMAX   1 capsule, Oral, Daily      terazosin 5 MG capsule  Commonly known as: HYTRIN   5 mg, Oral, Nightly                   Discharge Diet: AHA    Activity at Discharge: as tolerated    Follow-up Appointments:   Future Appointments   Date Time Provider Department Center   5/24/2024 10:00 AM [facility]  [facility]   5/24/2024 11:30 AM Lianet Rascon APRN [facility]       Test Results Pending at Discharge: no    Electronically signed by Michaelle Mccord MD, 01/26/24, 14:54 CST.    Time: 45 minutes.

## 2024-01-27 LAB
BACTERIA SPEC AEROBE CULT: NORMAL
BACTERIA SPEC AEROBE CULT: NORMAL

## 2024-01-27 NOTE — OUTREACH NOTE
Prep Survey      Flowsheet Row Responses   [facility] facility patient discharged from? [facility]   Is LACE score < 7 ? No   Eligibility Readm Mgmt   Discharge diagnosis COPD   Does the patient have one of the following disease processes/diagnoses(primary or secondary)? COPD   Does the patient have Home health ordered? No   Is there a DME ordered? No   Prep survey completed? Yes            SUMAYA PERLA - Registered Nurse

## 2024-01-28 NOTE — PAYOR COMM NOTE
"PR 1-26-24    Aleksey Sharad D (63 y.o. Male)       Date of Birth   1960    Social Security Number       Address   [address]  [facility] [address]    Home Phone   [phone]    MRN   [id number]       [facility]    Marital Status                               Admission Date   1/22/24    Admission Type   Emergency    Admitting Provider   Michaelle Mccord MD    Attending Provider       Department, Room/Bed   [facility] 4B, 460/1       Discharge Date   1/26/2024    Discharge Disposition   Home or Self Care    Discharge Destination                                 Attending Provider: (none)   Allergies: No Known Allergies    Isolation: None   Infection: COVID (confirmed) (01/22/24)   Code Status: Prior    Ht: 162.6 cm (64\")   Wt: 56.7 kg (125 lb)    Admission Cmt: None   Principal Problem: Acute exacerbation of chronic obstructive pulmonary disease (COPD) [J44.1]                   Active Insurance as of 1/22/2024       Primary Coverage       Payor Plan Insurance Group Employer/Plan Group    [place] [id number]       Payor Plan Address Payor Plan Phone Number Payor Plan Fax Number Effective Dates    [employer] [address] [address] [phone]  4/1/2022 - None Entered    [facility] [id number]         Subscriber Name Subscriber Birth Date Member ID       SHARAD LOPES COTY 1960 [id number]                     Emergency Contacts        (Rel.) Home Phone Work Phone Mobile Phone    Ekaterina Lopes (Spouse) [phone] -- [phone]                 Discharge Summary        Michaelle Mccord MD at 01/26/24 1454                [facility]  DISCHARGE SUMMARY       Date of Admission: 1/22/2024  Date of Discharge:  1/26/2024  Primary Care Physician: Nila Alexander APRN    Presenting Problem/History of Present Illness:         [facility]  INPATIENT " PROGRESS NOTE     Patient Name: Sharad Ryder  Date of Admission: 1/22/2024  Today's Date: 01/25/24  Length of Stay: 2  Primary Care Physician: Nila Alexander APRN     Subjective   Chief Complaint: Shortness of breath  Shortness of Breath           63 year old male with PMH of CAD/CABG, COPD, Stroke, carotid artery disease, aortic aneurysm, HTN, that presents to the ER with complaints of shortness of breath for 2 nights, worsened tonight. He presents with oxygen saturation of 84% on room air and tachypnea, respiratory distress. Has required 6 lpm to control dyspnea. Afebrile, WBC is 10, COVID 19 positive, CTA chest shows marked pulmonary emphysema and bibasilar atelectasis.   1/24  CAD/CABG, COPD, Stroke, carotid artery disease, aortic aneurysm, HTN, Admitted for acute on chronic COPD exacerbation positive COVID, troponin elevated echo of the heart is showing EF 70% with moderate to severe AS and bioprosthetic aortic valve, currently on 6 L oxygen, remains on steroid , bronchodilators we will add Zithromax consult cardiology for elevated troponin moderate to severe aortic stenosis history of coronary disease and CABG  1/25  He has a history of stroke and Parkinson he tells me his sore all over after he fell does not feel good to go home and does not want to go to SNF but not requiring any oxygen, appreciate cardiology Recommend nuclear stress test to be completed outpatient. No need to continue to check troponins unless he complains of chest pain , Stenosis of prosthetic valve: will plan to refer to structural heart clinic as outpatient. Acute diastolic CHF: likely secondary to acute viral illness. Diuresed well with 1x IV lasix. Evidence of LHV, grade 2 diastolic dysfunction and elevated prosthetic valve measurements. Does not need home diuretic at this time.  Cardiology has signed off          Final Discharge Diagnoses:  Active Hospital Problems    Diagnosis     **Acute exacerbation of chronic obstructive  pulmonary disease (COPD)     Acute diastolic CHF (congestive heart failure)     COVID-19 virus infection     Elevated troponin level not due myocardial infarction     COPD exacerbation     Aortoiliac occlusive disease     PAD (peripheral artery disease)     PVD (peripheral vascular disease) with claudication     S/P CABG x 3 2018 Dr Robin      Status post aortic valve replacement with bioprosthetic valve     LVH (left ventricular hypertrophy)     History of CVA (cerebrovascular accident)     Primary hypertension        Consults: cards    Procedures Performed: no    Pertinent Test Results:   Results for orders placed during the hospital encounter of 01/22/24    Adult Transthoracic Echo Complete W/ Cont if Necessary Per Protocol    Interpretation Summary    Left ventricular systolic function is normal. Left ventricular ejection fraction appears to be greater than 70%.    The left ventricular cavity is small in size.    Left ventricular wall thickness is consistent with moderate to severe concentric hypertrophy.    The findings are consistent with hypertrophic cardiomyopathy.    Left ventricular diastolic function is consistent with (grade II w/high LAP) pseudonormalization.    The left atrial cavity is moderate to severely dilated.    The right atrial cavity is mildly  dilated.    Moderate to severe aortic valve stenosis is present.    There is a bioprosthetic aortic valve present. The prosthetic aortic valve peak and mean gradients are elevated. There is stenosis or obstruction of the prosthetic aortic valve.    There is a small (<1cm) pericardial effusion adjacent to the right ventricle.      Imaging Results (All)       Procedure Component Value Units Date/Time    CT Angiogram Chest [[id number]] Collected: 01/23/24 0555     Updated: 01/23/24 0751    Narrative:      EXAMINATION: CT ANGIOGRAM CHEST-      1/22/2024 10:17 PM     HISTORY: Pulmonary embolism (PE) suspected, unknown D-dimer     In order to have a CT  radiation dose as low as reasonably achievable  Automated Exposure Control was utilized for adjustment of the mA and/or  KV according to patient size.     Total DLP = 152.96 mGy.cm     The CT angiography of the chest tube performed after intravenous  contrast enhancement.     The images are acquired in axial plane with subsequent 2D reconstruction  in coronal and sagittal planes and 3D maximum intensity projection  reconstruction.     The comparison is made with the previous study dated 4/16/2019.     There is normal opacification of the pulmonary arteries and branches  bilaterally. There are no filling defects in the opacified pulmonary  arterial bed. RV/LV ratio is 35/36 which may suggest a mild right heart  strain.     Atheromatous change of thoracic aorta seen. There is mild aneurysmal  dilatation of the ascending thoracic aorta which measures 4.1 cm in  diameter. No dissection. A prosthetic valve is in place.     Severe atheromatous changes of coronary arteries are noted. There is  evidence of prior CABG.     There are mediastinal and hilar lymphadenopathy. A level 6 lymph node  measures 12 mm in short axis. A subcarinal, level 7, lymph node measures  19 mm in short axis. A right hilar, level R10, lymph node measures 14 mm  in short axis. A level 11 lymph node measures 16 mm in short axis.     Atheromatous changes are seen at the origin of the brachiocephalic, left  common carotid and left subclavian arteries. There is 50% focal stenosis  of the origin of the left subclavian artery with a large plaque in the  proximal left subclavian artery resulting in high-grade stenosis.     Limited visualized soft tissue of the neck are unremarkable.     Limited visualized thyroid gland is unremarkable.     Right axillary lymph nodes are not evaluated or excluded due to  extensive artifacts. There is a borderline prominent lymph node in the  left axilla measuring 1 cm in short axis. There is a fatty hilum.     There are  extensive chronic emphysematous lung changes.     There are atelectatic changes most pronounced in the lower lungs.     Ill-defined opacities in the lingular segment of the left upper lobe  probably represent atelectatic changes. An incidental  infiltrate/evolving infiltrate may not be excluded.     Limited visualized abdomen is unremarkable. Incompletely and partially  visualized severe atheromatous changes of the abdominal aorta are noted  with aneurysmal dilatation of the infrarenal abdominal aorta with a  probable chronic dissection. It measures approximately 4 cm in diameter.  This was noted in the previous CT scan of the abdomen dated 9/13/2022.     The images reviewed in bone window show chronic degenerative changes of  the thoracolumbar spine. There is. Endplate compression deformity of  vertebra T11 which appears similar to the previous study in 2022.       Impression:      1. No evidence of pulmonary embolism.  2. Mild aneurysmal dilatation of the ascending thoracic aorta. No  dissection.  3. Extensive chronic emphysematous lung changes.  4. Ill-defined opacities in the lingular segment of the left upper lobe  probably represent atelectatic changes. A coincidental evolving  infiltrate or a lung nodule not excluded. Follow-up examination/CT scan  of the chest in 3 months may be obtained to ensure stability/resolution.  5. Severe atheromatous changes of the abdominal aorta with aneurysmal  dilatation of the infrarenal abdominal aorta as mentioned above. This  may be further evaluated, if clinically warranted, with CT angiography  of the abdomen.  6. Nonspecific mediastinal and hilar lymphadenopathy.     The above study was initially reviewed and reported by [facility]. I do not  find any discrepancies.           This report was signed and finalized on 1/23/2024 7:47 AM by Dr. Anette Hair MD.       XR Chest 1 View [[id number]] Collected: 01/23/24 0617     Updated: 01/23/24 0623    Narrative:       EXAMINATION: XR CHEST 1 VW-     1/22/2024 9:26 PM     HISTORY: SOA. Shortness of breath triage Protocol     A frontal projection of the chest is compared with the previous study  dated 10/25/2023.     The lungs are hyperexpanded.     There are coarse interstitial changes in the lungs bilaterally more so  in the left upper lung parahilar area and in the left lower lung  adjacent to the cardiac border. These are similar to the previous study  and represent chronic inflammatory process/fibrosis.     There is no pleural effusion, pulmonary congestion or pneumothorax.     There is probable moderate cardiomegaly. Atheromatous changes of the  thoracic aorta are noted. There is evidence of prior cardiac surgery. A  prosthetic valve is in place. Left atrial appendage clamp is in place.     There is no acute bony abnormality. Deformity of the right distal  clavicle may represent a previous trauma or procedure/surgery.     Surgical staple along the right side of the neck may represent a prior  right carotid endarterectomy or thyroid surgery.       Impression:      1. Chronic inflammatory and obstructive lung changes similar to the  previous study.  2. No active infiltrate or pulmonary congestion.        This report was signed and finalized on 1/23/2024 6:20 AM by Dr. Anette Hair MD.             LAB RESULTS:      Lab 01/26/24  0524 01/25/24  0619 01/24/24  0621 01/23/24  0547 01/22/24  2305 01/22/24  2219   WBC 8.94 9.01 6.88 7.22  --  10.14   HEMOGLOBIN 13.1 12.7* 11.9* 12.0*  --  12.2*   HEMATOCRIT 40.7 40.2 36.7* 37.4*  --  36.6*   PLATELETS 236 246 225 225  --  225   NEUTROS ABS 7.05* 6.84 5.07 5.10  --  8.41*   IMMATURE GRANS (ABS) 0.02 0.02 0.01 0.03  --  0.03   LYMPHS ABS 1.28 1.34 0.99 1.09  --  0.53*   MONOS ABS 0.59 0.81 0.80 0.97*  --  1.04*   EOS ABS 0.00 0.00 0.00 0.01  --  0.09   MCV 93.1 95.3 93.4 92.8  --  92.0   LACTATE  --   --   --   --  0.6  --    PROTIME  --   --   --   --   --  14.4   D DIMER QUANT   --   --   --   --   --  1.31*         Lab 01/26/24  0524 01/25/24  0619 01/24/24  0621 01/23/24  0547 01/22/24 2226 01/22/24 2219   SODIUM 138 141 138 139  --  132*   SODIUM, ARTERIAL  --   --   --   --  132*  --    POTASSIUM 3.8 3.7 3.9 3.6  --  3.9   CHLORIDE 103 105 104 103  --  95*   CO2 24.0 27.0 23.0 25.0  --  25.0   ANION GAP 11.0 9.0 11.0 11.0  --  12.0   BUN 12 15 15 14  --  13   CREATININE 0.51* 0.57* 0.41* 0.56*  --  0.79   EGFR 113.9 110.2 121.7 110.8  --  99.8   GLUCOSE 140* 107* 121* 102*  --  132*   CALCIUM 9.1 9.0 8.9 9.0  --  9.1         Lab 01/26/24  0524 01/25/24  0619 01/24/24  0621 01/23/24  0547 01/22/24 2219   TOTAL PROTEIN 7.1 6.9 6.6 6.7 7.1   ALBUMIN 4.1 4.1 4.1 4.1 4.5   GLOBULIN 3.0 2.8 2.5 2.6 2.6   ALT (SGPT) 23 21 23 28 16   AST (SGOT) 26 31 36 43* 26   BILIRUBIN 0.2 0.2 0.2 0.2 0.2   ALK PHOS 72 71 79 88 84         Lab 01/24/24  0749 01/24/24  0621 01/23/24  0547 01/23/24  0013 01/22/24 2219   PROBNP  --   --   --   --  3,578.0*   HSTROP T 140* 136* 217* 105* 68*   PROTIME  --   --   --   --  14.4   INR  --   --   --   --  1.10*                 Lab 01/23/24  0627 01/22/24 2226   PH, ARTERIAL 7.393 7.455*   PCO2, ARTERIAL 43.8 37.7   PO2 ART 80.5* 59.0*   O2 SATURATION ART 95.6 92.6*   HCO3 ART 26.7* 26.5*   BASE EXCESS ART 1.4 2.5*   CARBOXYHEMOGLOBIN  --  7.6*     Brief Urine Lab Results       None          Microbiology Results (last 10 days)       Procedure Component Value - Date/Time    Blood Culture - Blood, Arm, Right [[id number]]  (Normal) Collected: 01/22/24 2305    Lab Status: Preliminary result Specimen: Blood from Arm, Right Updated: 01/25/24 2330     Blood Culture No growth at 3 days    Blood Culture - Blood, Wrist, Right [[id number]]  (Normal) Collected: 01/22/24 2305    Lab Status: Preliminary result Specimen: Blood from Wrist, Right Updated: 01/25/24 2330     Blood Culture No growth at 3 days    COVID-19 and FLU A/B PCR, 1 HR TAT - Swab, Nasopharynx [[id number]]   "(Abnormal) Collected: 01/22/24 2216    Lab Status: Final result Specimen: Swab from Nasopharynx Updated: 01/22/24 0479     COVID19 Detected     Influenza A PCR Not Detected     Influenza B PCR Not Detected    Narrative:      Fact sheet for providers: https://www.fda.gov/media/248125/download    Fact sheet for patients: https://www.fda.gov/media/913756/download    Test performed by PCR.  Influenza A and Influenza B negative results should be considered presumptive in samples that have a positive SARS-CoV-2 result.    Competitive inhibition studies showed that SARS-CoV-2 virus, when present at concentrations above 3.6E+04 copies/mL, can inhibit the detection and amplification of influenza A and influenza B virus RNA if present at or below 1.8E+02 copies/mL or 4.9E+02 copies/mL, respectively, and may lead to false negative influenza virus results. If co-infection with influenza A or influenza B virus is suspected in samples with a positive SARS-CoV-2 result, the sample should be re-tested with another FDA cleared, approved, or authorized influenza test, if influenza virus detection would change clinical management.              Physical Exam on Discharge:  /68 (BP Location: Left arm, Patient Position: Sitting)   Pulse 73   Temp 96 °F (35.6 °C) (Oral)   Resp 18   Ht 162.6 cm (64\")   Wt 56.7 kg (125 lb)   SpO2 93%   BMI 21.46 kg/m²   Physical Exam  Constitutional:       Appearance: Normal appearance.   HENT:      Head: Normocephalic.   Cardiovascular:      Rate and Rhythm: Normal rate.      Pulses: Normal pulses.   Pulmonary:      Effort: Respiratory distress present.      Breath sounds: Wheezing present.   Abdominal:      General: Abdomen is flat.   Musculoskeletal:         General: Normal range of motion.      Cervical back: Normal range of motion.   Neurological:      Mental Status: He is alert.           Condition on Discharge: stable    Discharge Disposition:  Home or Self Care    Discharge " Medications:     Discharge Medications        New Medications        Instructions Start Date   albuterol sulfate  (90 Base) MCG/ACT inhaler  Commonly known as: PROVENTIL HFA;VENTOLIN HFA;PROAIR HFA   2 puffs, Inhalation, Every 4 Hours PRN      azithromycin 1 g powder  Commonly known as: Zithromax   1 g, Oral, Once      methylPREDNISolone 4 MG dose pack  Commonly known as: MEDROL   Take as directed on package instructions.             Continue These Medications        Instructions Start Date   amLODIPine 10 MG tablet  Commonly known as: NORVASC   10 mg, Oral, Daily      aspirin 81 MG tablet   81 mg, Oral, Daily      busPIRone 10 MG tablet  Commonly known as: BUSPAR   10 mg, Oral, 2 Times Daily      citalopram 20 MG tablet  Commonly known as: CeleXA   20 mg, Oral, Daily      cloNIDine 0.1 MG tablet  Commonly known as: CATAPRES   0.1 mg, Oral, 2 Times Daily PRN      clopidogrel 75 MG tablet  Commonly known as: PLAVIX   75 mg, Oral, Daily      gabapentin 400 MG capsule  Commonly known as: NEURONTIN   400 mg, Oral, 3 Times Daily      HYDROcodone-acetaminophen 7.5-325 MG per tablet  Commonly known as: NORCO   1 tablet, Oral, Every 6 Hours PRN      isosorbide mononitrate 120 MG 24 hr tablet  Commonly known as: IMDUR   120 mg, Oral, Daily      metoprolol succinate XL 50 MG 24 hr tablet  Commonly known as: TOPROL-XL   50 mg, Oral, Every 24 Hours Scheduled      multivitamin with minerals tablet tablet   1 tablet, Oral, Daily      nitroglycerin 0.4 MG SL tablet  Commonly known as: NITROSTAT   0.4 mg, Sublingual, Every 5 Minutes PRN, Take no more than 3 doses in 15 minutes.       rosuvastatin 40 MG tablet  Commonly known as: CRESTOR   40 mg, Oral, Daily      tamsulosin 0.4 MG capsule 24 hr capsule  Commonly known as: FLOMAX   1 capsule, Oral, Daily      terazosin 5 MG capsule  Commonly known as: HYTRIN   5 mg, Oral, Nightly                   Discharge Diet: AHA    Activity at Discharge: as tolerated    Follow-up  Appointments:   Future Appointments   Date Time Provider Department Center   5/24/2024 10:00 AM [facility] US [facility] CART 1 BH [facility]   5/24/2024 11:30 AM Lianet Rascon APRN [facility]       Test Results Pending at Discharge: no    Electronically signed by Michaelle Mccord MD, 01/26/24, 14:54 CST.    Time: 45 minutes.         Electronically signed by Michaelle Mccord MD at 01/26/24 1500

## 2024-01-29 ENCOUNTER — READMISSION MANAGEMENT (OUTPATIENT)
Dept: CALL CENTER | Facility: HOSPITAL | Age: 64
End: 2024-01-29
Payer: MEDICAID

## 2024-01-29 NOTE — OUTREACH NOTE
COPD/PN Week 1 Survey      Flowsheet Row Responses   [facility] patient discharged from? [place]   Does the patient have one of the following disease processes/diagnoses(primary or secondary)? COPD   Week 1 attempt successful? Yes   Call start time 1821   Call end time 1822   Discharge diagnosis COPD   Person spoke with today (if not patient) and relationship patient   Meds reviewed with patient/caregiver? Yes   Does the patient have all medications ordered at discharge? Yes   Prescription comments no concerns or questions noted.   Is the patient taking all medications as directed (includes completed medication regime)? Yes   Does the patient have a primary care provider?  Yes   Comments regarding PCP Patient has FU with pcp- 2/1/2024,  @10:30am.   Has home health visited the patient within 72 hours of discharge? N/A   Psychosocial issues? No   What is the patient's perception of their health status since discharge? Improving   Nursing Interventions Nurse provided patient education   Is the patient/caregiver able to teach back the hierarchy of who to call/visit for symptoms/problems? PCP, Specialist, Home health nurse, Urgent Care, ED, 911 Yes   Is the patient able to teach back COPD zones? Yes   Patient reports what zone on this call? Green Zone   Green Zone Breathing without shortness of breath, Reports doing well   Green Zone interventions: Take daily medications   Week 1 call completed? Yes   Graduated Yes   Wrap up additional comments Patient reports doing well no concerns or questions noted.   Call end time 1822            Nely ANSARI - Registered Nurse

## 2024-02-15 ENCOUNTER — OFFICE VISIT (OUTPATIENT)
Dept: CARDIOLOGY | Facility: CLINIC | Age: 64
End: 2024-02-15
Payer: MEDICAID

## 2024-02-15 VITALS
SYSTOLIC BLOOD PRESSURE: 101 MMHG | HEART RATE: 105 BPM | WEIGHT: 117 LBS | HEIGHT: 64 IN | BODY MASS INDEX: 19.97 KG/M2 | DIASTOLIC BLOOD PRESSURE: 63 MMHG

## 2024-02-15 DIAGNOSIS — R06.09 DOE (DYSPNEA ON EXERTION): ICD-10-CM

## 2024-02-15 DIAGNOSIS — E78.5 HYPERLIPIDEMIA LDL GOAL <70: Primary | Chronic | ICD-10-CM

## 2024-02-15 DIAGNOSIS — T82.857D STENOSIS OF PROSTHETIC AORTIC VALVE, SUBSEQUENT ENCOUNTER: ICD-10-CM

## 2024-02-15 DIAGNOSIS — I50.31 ACUTE DIASTOLIC CHF (CONGESTIVE HEART FAILURE): ICD-10-CM

## 2024-02-15 DIAGNOSIS — I20.89 CHRONIC STABLE ANGINA: ICD-10-CM

## 2024-02-15 DIAGNOSIS — Z72.0 TOBACCO USE: ICD-10-CM

## 2024-02-15 DIAGNOSIS — Z95.3 STATUS POST AORTIC VALVE REPLACEMENT WITH BIOPROSTHETIC VALVE: Chronic | ICD-10-CM

## 2024-02-15 DIAGNOSIS — G45.9 TRANSIENT ISCHEMIC ATTACK (TIA): ICD-10-CM

## 2024-02-15 DIAGNOSIS — I73.9 PVD (PERIPHERAL VASCULAR DISEASE) WITH CLAUDICATION: ICD-10-CM

## 2024-02-15 DIAGNOSIS — I51.7 LVH (LEFT VENTRICULAR HYPERTROPHY): Chronic | ICD-10-CM

## 2024-02-15 DIAGNOSIS — Z95.1 S/P CABG X 3: Chronic | ICD-10-CM

## 2024-02-15 DIAGNOSIS — I74.09 AORTOILIAC OCCLUSIVE DISEASE: ICD-10-CM

## 2024-02-19 NOTE — CONSULTS
Patient Contact    Attempt # 1    Was call answered?  No.  Unable to leave message on voicemail, as phone continued to ring for over two minutes. Per chart review, patient does not view MyC messages.    Marissa Silva RN  [facility]     Referring Provider: KIRBY Chris  Reason for Consultation: CAD, severe AS, NSTEMI    Patient Care Team:  Sharad KLEIN MD as PCP - General (Family Medicine)    Chief complaint Chest pain     Subjective .     History of present illness:  Mr. Ryder is a 57 year old  male well known to our service. He was initially seen December 14 when he was admitted for shortness of breath. Workup demonstrated severe aortic stenosis and a bicuspid aortic valve. Pre op testing for aortic valve replacement was obtained and left heart cath demonstrated three vessel disease with mild pulmonary hypertension. Carotid ultrasound and CTA of the neck demonstrated significant disease on the right. The patient was agreeable to proceed with surgery, but he wanted to wait till the new year. Decision-making was made for patient to be discharged home and follow up with Dr. Salgado in the office and subsequently present at a later date for cardiac surgery. Mr. Ryder was seen in the office yesterday by Lianet ORR with Dr. Salgado with plan to proceed with right carotid endarterectomy coordinated with Dr. Robin for SAVR and CABG.     He states he developed severe chest pain last night. He reports he did not take any Nitro because he did not have any on hand. He presented to the ER. He was too hypotensive for Nitro and given narcotics. He was ruled in as a NSTEMI and taken to cardiac catheterization lab by Dr. Yi. Left heart cath demonstrated three vessel disease with no changes compared to prior heart cath.     Past medical history includes: hypertension, hyperlipidemia, chronic tobacco use, chronic back pain, chronic obstructive pulmonary disease, chronic tobacco use, carotid disease, coronary artery disease, and severe aortic stenosis with a bicuspid aortic valve. He has full dentures. He reports recent body aches, upper respiratory infection symptoms of nasal congestion, post nasal drip, sore throat, sweats and chills.  He is unsure of fevers as he has not taken temperature. He reports these symptoms are improving. He is still smoking about 1 ppd.     History  Past Medical History:   Diagnosis Date   • Aneurysm    • Arthritis    • Carotid artery disease    • Carotid stenosis    • COPD (chronic obstructive pulmonary disease)    • Hyperlipidemia    • Hypertension    , Past Surgical History:   Procedure Laterality Date   • APPENDECTOMY     • CARDIAC CATHETERIZATION N/A 12/18/2017    Procedure: Left Heart Cath;  Surgeon: Aime Francois MD;  Location:  [facility] CATH IN[facility]AS[facility];  Service:    • CARDIAC CATHETERIZATION N/A 12/18/2017    Procedure: Right Heart Cath;  Surgeon: Aime Francois MD;  Location:  [facility] CATH INV[facility] LOCATION;  Service:    • FINGER SURGERY Right 1990   • OTHER SURGICAL HISTORY      skull srgery from accident in childhood.   , Family History   Problem Relation Age of Onset   • Heart disease Mother    • Heart disease Father    • Hypertension Sister    • Hypertension Brother    , Social History   Substance Use Topics   • Smoking status: Current Every Day Smoker     Packs/day: 1.00     Types: Cigarettes   • Smokeless tobacco: Current User     Types: Snuff   • Alcohol use No   , Prescriptions Prior to Admission   Medication Sig Dispense Refill Last Dose   • albuterol (PROVENTIL HFA;VENTOLIN HFA) 108 (90 Base) MCG/ACT inhaler Inhale 2 puffs Every 4 (Four) Hours As Needed for Wheezing. 1 inhaler 0 1/3/2018 at Unknown time   • albuterol (PROVENTIL) (2.5 MG/3ML) 0.083% nebulizer solution Take 2.5 mg by nebulization Every 4 (Four) Hours As Needed for Wheezing or Shortness of Air.   1/3/2018 at Unknown time   • aspirin 81 MG tablet Take 1 tablet by mouth Daily. 30 tablet 0 1/4/2018 at 0000   • atorvastatin (LIPITOR) 80 MG tablet Take 1 tablet by mouth Every Night. 30 tablet 0 1/2/2018 at Unknown time   • budesonide-formoterol (SYMBICORT) 160-4.5 MCG/ACT inhaler Inhale 2 puffs 2 (Two) Times a Day. 10.2 g 0 1/3/2018 at Unknown  "time   • chlorthalidone (HYGROTON) 25 MG tablet Take 25 mg by mouth Daily.   1/3/2018 at 0800   • citalopram (CeleXA) 20 MG tablet Take 20 mg by mouth Daily.   1/4/2018 at 1100   • furosemide (LASIX) 40 MG tablet Take 1 tablet by mouth Daily. 30 tablet 0 1/3/2018 at 0800   • lisinopril (PRINIVIL,ZESTRIL) 20 MG tablet Take 20 mg by mouth Daily.   1/3/2018 at 0800   • pentazocine-naloxone (TALWIN NX) 50-0.5 MG per tablet Take 1 tablet by mouth Every 6 (Six) Hours As Needed for Moderate Pain .   1/2/2018 at Unknown time   • potassium chloride (K-DUR) 10 MEQ CR tablet Take 20 mEq by mouth 2 (Two) Times a Day.   1/3/2018 at Unknown time   , Allergies: Review of patient's allergies indicates no known allergies.    Review of Systems  Review of Systems   Constitutional: Positive for activity change, chills and diaphoresis. Negative for appetite change.   HENT: Positive for congestion, dental problem (full set dentures) and sore throat.    Respiratory: Positive for shortness of breath. Negative for stridor.    Cardiovascular: Positive for chest pain and leg swelling. Negative for palpitations.   Musculoskeletal: Positive for back pain.   Skin: Negative for color change, pallor, rash and wound.   Neurological: Negative for dizziness, seizures and headaches.   Hematological: Negative for adenopathy. Does not bruise/bleed easily.   Psychiatric/Behavioral: Negative.       Objective     Vital Signs   Visit Vitals   • BP 90/56 (BP Location: Right arm, Patient Position: Lying)   • Pulse 92   • Temp 99.3 °F (37.4 °C) (Temporal Artery )   • Resp 18   • Ht 167.6 cm (66\")   • Wt 54.3 kg (119 lb 11.2 oz)   • SpO2 97%   • BMI 19.32 kg/m2       Physical Exam   Constitutional: He is oriented to person, place, and time. No distress.   HENT:   Head: Normocephalic.   Eyes: Pupils are equal, round, and reactive to light.   Neck: Normal range of motion. No JVD present.   Cardiovascular: Normal rate, regular rhythm and intact distal pulses.  "   Murmur heard.   Systolic murmur is present with a grade of 3/6   Pulmonary/Chest: Effort normal and breath sounds normal. No stridor. No respiratory distress. He has no wheezes.   Abdominal: Soft. He exhibits no distension. There is no tenderness.   Musculoskeletal: He exhibits no edema.   Neurological: He is alert and oriented to person, place, and time.   Skin: Skin is warm and dry. He is not diaphoretic.   Right groin clean, dry, intact.    Psychiatric: He has a normal mood and affect. His behavior is normal.   Vitals reviewed.        LAB:   CBC:  Results from last 7 days  Lab Units 01/04/18  0047   WBC 10*3/mm3 7.94   HEMATOCRIT % 34.8*   PLATELETS 10*3/mm3 323          BMP:)  Results from last 7 days  Lab Units 01/04/18  0047   SODIUM mmol/L 136   POTASSIUM mmol/L 4.0   CHLORIDE mmol/L 97*   CO2 mmol/L 26.0   GLUCOSE mg/dL 112*   BUN mg/dL 14   CREATININE mg/dL 1.43*        IMAGES:       Imaging Results (last 24 hours)     Procedure Component Value Units Date/Time    XR Chest 1 View [[id number]] Collected:  01/04/18 0715     Updated:  01/04/18 0719    Narrative:       EXAMINATION: XR CHEST 1 VW- 1/4/2018 7:15 AM CST     HISTORY: Chest Pain protocol.     REPORT: Comparison is made with the study from 12/14/2017.     Lungs are mildly hyperinflated, no focal infiltrate or consolidation is  identified. Heart size is normal. No pneumothorax or effusion is  identified. The osseous structures show nothing acute, there are  degenerative changes in the thoracic spine with mild levoscoliosis.       Impression:       Mild COPD. No acute cardiopulmonary abnormality.  This report was finalized on 01/04/2018 07:16 by Dr. Son Womack MD.                                         Assessment/Plan      Principal Problem:    Chest pain  Active Problems:    Shortness of breath    Severe aortic valve stenosis, bicuspid aortic valve    Tobacco use    Hyperlipidemia    Hypertension    NSTEMI (non-ST elevated myocardial  infarction)    Coronary artery disease involving native coronary artery of native heart with angina pectoris    Stenosis of right carotid artery    COPD (chronic obstructive pulmonary disease)    LAZ (acute kidney injury)  Chronic back pain      I discussed the patients findings and my recommendations with the patient. Pre-op testing for cardiac surgery has been completed. Will discuss surgical timing with Dr. Robin and Dr. Salgado. Schedule SAVR and CABG by Dr. Robin in near future. All questions answered to the best of my ability. Patient has been counseled as to smoking cessation.       Nely Rodriguez, APRN  01/04/18  4:28 PM

## 2024-05-03 ENCOUNTER — TELEPHONE (OUTPATIENT)
Dept: VASCULAR SURGERY | Facility: CLINIC | Age: 64
End: 2024-05-03
Payer: MEDICAID

## 2024-05-03 NOTE — TELEPHONE ENCOUNTER
Unable to reach patient to inform of rescheduled appointment due to Lianet being out on 5/24/24. Patient is rescheduled to 5/31/24. Left VM, also sent reminders in mail today.

## 2024-05-31 ENCOUNTER — HOSPITAL ENCOUNTER (OUTPATIENT)
Dept: ULTRASOUND IMAGING | Facility: HOSPITAL | Age: 64
Discharge: HOME | End: 2024-05-31
Payer: MEDICAID

## 2024-08-01 ENCOUNTER — HOSPITAL ENCOUNTER (OUTPATIENT)
Dept: CARDIOLOGY | Facility: HOSPITAL | Age: 64
Discharge: HOME | End: 2024-08-01

## 2024-12-25 ENCOUNTER — HOSPITAL ENCOUNTER (INPATIENT)
Facility: HOSPITAL | Age: 64
LOS: 4 days | Discharge: HOME | End: 2024-12-29
Attending: EMERGENCY MEDICINE | Admitting: FAMILY MEDICINE

## 2024-12-25 ENCOUNTER — APPOINTMENT (OUTPATIENT)
Dept: CT IMAGING | Facility: HOSPITAL | Age: 64
End: 2024-12-25

## 2024-12-25 ENCOUNTER — APPOINTMENT (OUTPATIENT)
Dept: GENERAL RADIOLOGY | Facility: HOSPITAL | Age: 64
End: 2024-12-25

## 2024-12-25 DIAGNOSIS — J44.1 COPD EXACERBATION: Primary | ICD-10-CM

## 2024-12-25 PROBLEM — J18.9 BILATERAL PNEUMONIA: Status: ACTIVE | Noted: 2024-12-25

## 2024-12-25 PROBLEM — I51.89 DIASTOLIC DYSFUNCTION: Status: ACTIVE | Noted: 2024-12-25

## 2024-12-25 PROBLEM — I35.0 AORTIC STENOSIS: Status: ACTIVE | Noted: 2024-12-25

## 2024-12-25 PROBLEM — J96.21 ACUTE ON CHRONIC RESPIRATORY FAILURE WITH HYPOXIA: Status: ACTIVE | Noted: 2024-12-25

## 2024-12-25 LAB
ALBUMIN SERPL-MCNC: 4.2 G/DL (ref 3.5–5.2)
ALBUMIN/GLOB SERPL: 1.4 G/DL
ALP SERPL-CCNC: 99 U/L (ref 39–117)
ALT SERPL W P-5'-P-CCNC: 22 U/L (ref 1–41)
ANION GAP SERPL CALCULATED.3IONS-SCNC: 12 MMOL/L (ref 5–15)
ARTERIAL PATENCY WRIST A: POSITIVE
AST SERPL-CCNC: 35 U/L (ref 1–40)
ATMOSPHERIC PRESS: 757 MMHG
B PARAPERT DNA SPEC QL NAA+PROBE: NOT DETECTED
B PERT DNA SPEC QL NAA+PROBE: NOT DETECTED
BASE EXCESS BLDA CALC-SCNC: 2.1 MMOL/L (ref 0–2)
BASOPHILS # BLD AUTO: 0.03 10*3/MM3 (ref 0–0.2)
BASOPHILS NFR BLD AUTO: 0.2 % (ref 0–1.5)
BDY SITE: ABNORMAL
BILIRUB SERPL-MCNC: 0.4 MG/DL (ref 0–1.2)
BODY TEMPERATURE: 37
BUN SERPL-MCNC: 11 MG/DL (ref 8–23)
BUN/CREAT SERPL: 14.1 (ref 7–25)
C PNEUM DNA NPH QL NAA+NON-PROBE: NOT DETECTED
CALCIUM SPEC-SCNC: 8.6 MG/DL (ref 8.6–10.5)
CHLORIDE SERPL-SCNC: 98 MMOL/L (ref 98–107)
CO2 SERPL-SCNC: 26 MMOL/L (ref 22–29)
COHGB MFR BLD: 1.8 % (ref 0–5)
CREAT SERPL-MCNC: 0.78 MG/DL (ref 0.76–1.27)
D DIMER PPP FEU-MCNC: 1.13 MCGFEU/ML (ref 0–0.64)
D-LACTATE SERPL-SCNC: 1.4 MMOL/L (ref 0.5–2)
DEPRECATED RDW RBC AUTO: 46 FL (ref 37–54)
EGFRCR SERPLBLD CKD-EPI 2021: 99.6 ML/MIN/1.73
EOSINOPHIL # BLD AUTO: 0 10*3/MM3 (ref 0–0.4)
EOSINOPHIL NFR BLD AUTO: 0 % (ref 0.3–6.2)
ERYTHROCYTE [DISTWIDTH] IN BLOOD BY AUTOMATED COUNT: 14 % (ref 12.3–15.4)
FLUAV SUBTYP SPEC NAA+PROBE: NOT DETECTED
FLUBV RNA ISLT QL NAA+PROBE: NOT DETECTED
GAS FLOW AIRWAY: 4 LPM
GEN 5 1HR TROPONIN T REFLEX: 36 NG/L
GLOBULIN UR ELPH-MCNC: 2.9 GM/DL
GLUCOSE SERPL-MCNC: 132 MG/DL (ref 65–99)
HADV DNA SPEC NAA+PROBE: NOT DETECTED
HCO3 BLDA-SCNC: 26.6 MMOL/L (ref 20–26)
HCOV 229E RNA SPEC QL NAA+PROBE: NOT DETECTED
HCOV HKU1 RNA SPEC QL NAA+PROBE: NOT DETECTED
HCOV NL63 RNA SPEC QL NAA+PROBE: NOT DETECTED
HCOV OC43 RNA SPEC QL NAA+PROBE: NOT DETECTED
HCT VFR BLD AUTO: 35.1 % (ref 37.5–51)
HCT VFR BLD CALC: 36.8 % (ref 38–51)
HGB BLD-MCNC: 11.7 G/DL (ref 13–17.7)
HGB BLDA-MCNC: 12 G/DL (ref 14–18)
HMPV RNA NPH QL NAA+NON-PROBE: NOT DETECTED
HPIV1 RNA ISLT QL NAA+PROBE: NOT DETECTED
HPIV2 RNA SPEC QL NAA+PROBE: NOT DETECTED
HPIV3 RNA NPH QL NAA+PROBE: NOT DETECTED
HPIV4 P GENE NPH QL NAA+PROBE: NOT DETECTED
IMM GRANULOCYTES # BLD AUTO: 0.05 10*3/MM3 (ref 0–0.05)
IMM GRANULOCYTES NFR BLD AUTO: 0.4 % (ref 0–0.5)
L PNEUMO1 AG UR QL IA: NEGATIVE
LYMPHOCYTES # BLD AUTO: 0.73 10*3/MM3 (ref 0.7–3.1)
LYMPHOCYTES NFR BLD AUTO: 5.5 % (ref 19.6–45.3)
Lab: ABNORMAL
M PNEUMO IGG SER IA-ACNC: NOT DETECTED
MAGNESIUM SERPL-MCNC: 1.6 MG/DL (ref 1.6–2.4)
MCH RBC QN AUTO: 29.8 PG (ref 26.6–33)
MCHC RBC AUTO-ENTMCNC: 33.3 G/DL (ref 31.5–35.7)
MCV RBC AUTO: 89.5 FL (ref 79–97)
METHGB BLD QL: 0.7 % (ref 0–3)
MODALITY: ABNORMAL
MONOCYTES # BLD AUTO: 0.75 10*3/MM3 (ref 0.1–0.9)
MONOCYTES NFR BLD AUTO: 5.7 % (ref 5–12)
MRSA DNA SPEC QL NAA+PROBE: ABNORMAL
NEUTROPHILS NFR BLD AUTO: 11.62 10*3/MM3 (ref 1.7–7)
NEUTROPHILS NFR BLD AUTO: 88.2 % (ref 42.7–76)
NRBC BLD AUTO-RTO: 0 /100 WBC (ref 0–0.2)
NT-PROBNP SERPL-MCNC: 4589 PG/ML (ref 0–900)
OXYHGB MFR BLDV: 88.3 % (ref 94–99)
PCO2 BLDA: 40.2 MM HG (ref 35–45)
PCO2 TEMP ADJ BLD: 40.2 MM HG (ref 35–45)
PH BLDA: 7.43 PH UNITS (ref 7.35–7.45)
PH, TEMP CORRECTED: 7.43 PH UNITS (ref 7.35–7.45)
PLATELET # BLD AUTO: 234 10*3/MM3 (ref 140–450)
PMV BLD AUTO: 9.8 FL (ref 6–12)
PO2 BLDA: 58.1 MM HG (ref 83–108)
PO2 TEMP ADJ BLD: 58.1 MM HG (ref 83–108)
POTASSIUM BLDA-SCNC: 2.9 MMOL/L (ref 3.5–5.2)
POTASSIUM SERPL-SCNC: 2.9 MMOL/L (ref 3.5–5.2)
PROCALCITONIN SERPL-MCNC: 0.17 NG/ML (ref 0–0.25)
PROT SERPL-MCNC: 7.1 G/DL (ref 6–8.5)
RBC # BLD AUTO: 3.92 10*6/MM3 (ref 4.14–5.8)
RHINOVIRUS RNA SPEC NAA+PROBE: NOT DETECTED
RSV RNA NPH QL NAA+NON-PROBE: DETECTED
S PNEUM AG SPEC QL LA: NEGATIVE
SAO2 % BLDCOA: 90.6 % (ref 94–99)
SARS-COV-2 RNA RESP QL NAA+PROBE: NOT DETECTED
SODIUM BLDA-SCNC: 137 MMOL/L (ref 136–145)
SODIUM SERPL-SCNC: 136 MMOL/L (ref 136–145)
TROPONIN T % DELTA: 9 %
TROPONIN T NUMERIC DELTA: 3 NG/L
TROPONIN T SERPL HS-MCNC: 33 NG/L
VENTILATOR MODE: ABNORMAL
WBC NRBC COR # BLD AUTO: 13.18 10*3/MM3 (ref 3.4–10.8)

## 2024-12-25 PROCEDURE — 94640 AIRWAY INHALATION TREATMENT: CPT

## 2024-12-25 PROCEDURE — 25010000002 AMPICILLIN-SULBACTAM PER 1.5 G

## 2024-12-25 PROCEDURE — 71045 X-RAY EXAM CHEST 1 VIEW: CPT

## 2024-12-25 PROCEDURE — 25510000001 IOPAMIDOL PER 1 ML: Performed by: FAMILY MEDICINE

## 2024-12-25 PROCEDURE — 87641 MR-STAPH DNA AMP PROBE: CPT

## 2024-12-25 PROCEDURE — 85379 FIBRIN DEGRADATION QUANT: CPT | Performed by: EMERGENCY MEDICINE

## 2024-12-25 PROCEDURE — 82805 BLOOD GASES W/O2 SATURATION: CPT

## 2024-12-25 PROCEDURE — 83880 ASSAY OF NATRIURETIC PEPTIDE: CPT | Performed by: EMERGENCY MEDICINE

## 2024-12-25 PROCEDURE — 94762 N-INVAS EAR/PLS OXIMTRY CONT: CPT

## 2024-12-25 PROCEDURE — 36415 COLL VENOUS BLD VENIPUNCTURE: CPT

## 2024-12-25 PROCEDURE — 25010000002 MAGNESIUM SULFATE 2 GM/50ML SOLUTION

## 2024-12-25 PROCEDURE — 82375 ASSAY CARBOXYHB QUANT: CPT

## 2024-12-25 PROCEDURE — 94761 N-INVAS EAR/PLS OXIMETRY MLT: CPT

## 2024-12-25 PROCEDURE — 0202U NFCT DS 22 TRGT SARS-COV-2: CPT | Performed by: EMERGENCY MEDICINE

## 2024-12-25 PROCEDURE — 99285 EMERGENCY DEPT VISIT HI MDM: CPT

## 2024-12-25 PROCEDURE — 83605 ASSAY OF LACTIC ACID: CPT | Performed by: EMERGENCY MEDICINE

## 2024-12-25 PROCEDURE — 25010000002 ENOXAPARIN PER 10 MG: Performed by: FAMILY MEDICINE

## 2024-12-25 PROCEDURE — 93005 ELECTROCARDIOGRAM TRACING: CPT

## 2024-12-25 PROCEDURE — 94799 UNLISTED PULMONARY SVC/PX: CPT

## 2024-12-25 PROCEDURE — 87040 BLOOD CULTURE FOR BACTERIA: CPT | Performed by: EMERGENCY MEDICINE

## 2024-12-25 PROCEDURE — 80053 COMPREHEN METABOLIC PANEL: CPT | Performed by: EMERGENCY MEDICINE

## 2024-12-25 PROCEDURE — 85025 COMPLETE CBC W/AUTO DIFF WBC: CPT | Performed by: EMERGENCY MEDICINE

## 2024-12-25 PROCEDURE — 25010000002 METHYLPREDNISOLONE PER 40 MG

## 2024-12-25 PROCEDURE — 25010000002 FUROSEMIDE PER 20 MG

## 2024-12-25 PROCEDURE — 87449 NOS EACH ORGANISM AG IA: CPT

## 2024-12-25 PROCEDURE — 83735 ASSAY OF MAGNESIUM: CPT | Performed by: EMERGENCY MEDICINE

## 2024-12-25 PROCEDURE — 83050 HGB METHEMOGLOBIN QUAN: CPT

## 2024-12-25 PROCEDURE — 36600 WITHDRAWAL OF ARTERIAL BLOOD: CPT

## 2024-12-25 PROCEDURE — 25010000002 METHYLPREDNISOLONE PER 125 MG: Performed by: EMERGENCY MEDICINE

## 2024-12-25 PROCEDURE — 93010 ELECTROCARDIOGRAM REPORT: CPT | Performed by: HOSPITALIST

## 2024-12-25 PROCEDURE — 84484 ASSAY OF TROPONIN QUANT: CPT | Performed by: EMERGENCY MEDICINE

## 2024-12-25 PROCEDURE — 84145 PROCALCITONIN (PCT): CPT

## 2024-12-25 PROCEDURE — 71275 CT ANGIOGRAPHY CHEST: CPT

## 2024-12-25 PROCEDURE — 94664 DEMO&/EVAL PT USE INHALER: CPT

## 2024-12-25 RX ORDER — IBUPROFEN 800 MG/1
800 TABLET, FILM COATED ORAL ONCE
Status: COMPLETED | OUTPATIENT
Start: 2024-12-25 | End: 2024-12-25

## 2024-12-25 RX ORDER — BUSPIRONE HYDROCHLORIDE 10 MG/1
10 TABLET ORAL 2 TIMES DAILY
Status: DISCONTINUED | OUTPATIENT
Start: 2024-12-25 | End: 2024-12-29 | Stop reason: HOSPADM

## 2024-12-25 RX ORDER — BISACODYL 10 MG
10 SUPPOSITORY, RECTAL RECTAL DAILY PRN
Status: DISCONTINUED | OUTPATIENT
Start: 2024-12-25 | End: 2024-12-29 | Stop reason: HOSPADM

## 2024-12-25 RX ORDER — METHYLPREDNISOLONE SODIUM SUCCINATE 40 MG/ML
40 INJECTION, POWDER, LYOPHILIZED, FOR SOLUTION INTRAMUSCULAR; INTRAVENOUS EVERY 12 HOURS
Status: DISCONTINUED | OUTPATIENT
Start: 2024-12-25 | End: 2024-12-26

## 2024-12-25 RX ORDER — POLYETHYLENE GLYCOL 3350 17 G/17G
17 POWDER, FOR SOLUTION ORAL DAILY PRN
Status: DISCONTINUED | OUTPATIENT
Start: 2024-12-25 | End: 2024-12-29 | Stop reason: HOSPADM

## 2024-12-25 RX ORDER — TAMSULOSIN HYDROCHLORIDE 0.4 MG/1
0.4 CAPSULE ORAL DAILY
Status: DISCONTINUED | OUTPATIENT
Start: 2024-12-25 | End: 2024-12-29 | Stop reason: HOSPADM

## 2024-12-25 RX ORDER — CLOPIDOGREL BISULFATE 75 MG/1
75 TABLET ORAL DAILY
Status: DISCONTINUED | OUTPATIENT
Start: 2024-12-25 | End: 2024-12-29 | Stop reason: HOSPADM

## 2024-12-25 RX ORDER — NICOTINE 21 MG/24HR
1 PATCH, TRANSDERMAL 24 HOURS TRANSDERMAL EVERY 24 HOURS
Status: DISCONTINUED | OUTPATIENT
Start: 2024-12-25 | End: 2024-12-29 | Stop reason: HOSPADM

## 2024-12-25 RX ORDER — ISOSORBIDE MONONITRATE 60 MG/1
120 TABLET, EXTENDED RELEASE ORAL DAILY
Status: DISCONTINUED | OUTPATIENT
Start: 2024-12-25 | End: 2024-12-29 | Stop reason: HOSPADM

## 2024-12-25 RX ORDER — BISACODYL 5 MG/1
5 TABLET, DELAYED RELEASE ORAL DAILY PRN
Status: DISCONTINUED | OUTPATIENT
Start: 2024-12-25 | End: 2024-12-29 | Stop reason: HOSPADM

## 2024-12-25 RX ORDER — AMLODIPINE BESYLATE 10 MG/1
10 TABLET ORAL DAILY
Status: DISCONTINUED | OUTPATIENT
Start: 2024-12-25 | End: 2024-12-29 | Stop reason: HOSPADM

## 2024-12-25 RX ORDER — ENOXAPARIN SODIUM 100 MG/ML
30 INJECTION SUBCUTANEOUS EVERY 24 HOURS
Status: DISCONTINUED | OUTPATIENT
Start: 2024-12-25 | End: 2024-12-29 | Stop reason: HOSPADM

## 2024-12-25 RX ORDER — ACETAMINOPHEN 650 MG/1
650 SUPPOSITORY RECTAL EVERY 4 HOURS PRN
Status: DISCONTINUED | OUTPATIENT
Start: 2024-12-25 | End: 2024-12-29 | Stop reason: HOSPADM

## 2024-12-25 RX ORDER — TERAZOSIN 5 MG/1
5 CAPSULE ORAL NIGHTLY
Status: DISCONTINUED | OUTPATIENT
Start: 2024-12-25 | End: 2024-12-25

## 2024-12-25 RX ORDER — ROSUVASTATIN CALCIUM 20 MG/1
40 TABLET, COATED ORAL NIGHTLY
Status: DISCONTINUED | OUTPATIENT
Start: 2024-12-25 | End: 2024-12-29 | Stop reason: HOSPADM

## 2024-12-25 RX ORDER — AMOXICILLIN 250 MG
2 CAPSULE ORAL 2 TIMES DAILY PRN
Status: DISCONTINUED | OUTPATIENT
Start: 2024-12-25 | End: 2024-12-29 | Stop reason: HOSPADM

## 2024-12-25 RX ORDER — ASPIRIN 81 MG/1
81 TABLET ORAL ONCE
Status: COMPLETED | OUTPATIENT
Start: 2024-12-25 | End: 2024-12-25

## 2024-12-25 RX ORDER — METHYLPREDNISOLONE SODIUM SUCCINATE 125 MG/2ML
125 INJECTION, POWDER, LYOPHILIZED, FOR SOLUTION INTRAMUSCULAR; INTRAVENOUS ONCE
Status: COMPLETED | OUTPATIENT
Start: 2024-12-25 | End: 2024-12-25

## 2024-12-25 RX ORDER — ROSUVASTATIN CALCIUM 20 MG/1
40 TABLET, COATED ORAL DAILY
Status: DISCONTINUED | OUTPATIENT
Start: 2024-12-25 | End: 2024-12-25

## 2024-12-25 RX ORDER — IPRATROPIUM BROMIDE AND ALBUTEROL SULFATE 2.5; .5 MG/3ML; MG/3ML
3 SOLUTION RESPIRATORY (INHALATION)
Status: DISCONTINUED | OUTPATIENT
Start: 2024-12-25 | End: 2024-12-29 | Stop reason: HOSPADM

## 2024-12-25 RX ORDER — ONDANSETRON 4 MG/1
4 TABLET, ORALLY DISINTEGRATING ORAL EVERY 6 HOURS PRN
Status: DISCONTINUED | OUTPATIENT
Start: 2024-12-25 | End: 2024-12-29 | Stop reason: HOSPADM

## 2024-12-25 RX ORDER — POTASSIUM CHLORIDE 750 MG/1
40 CAPSULE, EXTENDED RELEASE ORAL EVERY 4 HOURS
Status: COMPLETED | OUTPATIENT
Start: 2024-12-25 | End: 2024-12-25

## 2024-12-25 RX ORDER — FUROSEMIDE 10 MG/ML
40 INJECTION INTRAMUSCULAR; INTRAVENOUS DAILY
Status: DISCONTINUED | OUTPATIENT
Start: 2024-12-26 | End: 2024-12-25

## 2024-12-25 RX ORDER — SODIUM CHLORIDE 0.9 % (FLUSH) 0.9 %
10 SYRINGE (ML) INJECTION AS NEEDED
Status: DISCONTINUED | OUTPATIENT
Start: 2024-12-25 | End: 2024-12-29 | Stop reason: HOSPADM

## 2024-12-25 RX ORDER — FUROSEMIDE 10 MG/ML
40 INJECTION INTRAMUSCULAR; INTRAVENOUS ONCE
Status: COMPLETED | OUTPATIENT
Start: 2024-12-25 | End: 2024-12-25

## 2024-12-25 RX ORDER — METOPROLOL SUCCINATE 50 MG/1
50 TABLET, EXTENDED RELEASE ORAL
Status: DISCONTINUED | OUTPATIENT
Start: 2024-12-25 | End: 2024-12-29 | Stop reason: HOSPADM

## 2024-12-25 RX ORDER — SODIUM CHLORIDE 0.9 % (FLUSH) 0.9 %
10 SYRINGE (ML) INJECTION EVERY 12 HOURS SCHEDULED
Status: DISCONTINUED | OUTPATIENT
Start: 2024-12-25 | End: 2024-12-29 | Stop reason: HOSPADM

## 2024-12-25 RX ORDER — ACETAMINOPHEN 160 MG/5ML
650 SOLUTION ORAL EVERY 4 HOURS PRN
Status: DISCONTINUED | OUTPATIENT
Start: 2024-12-25 | End: 2024-12-29 | Stop reason: HOSPADM

## 2024-12-25 RX ORDER — ACETAMINOPHEN 325 MG/1
650 TABLET ORAL EVERY 4 HOURS PRN
Status: DISCONTINUED | OUTPATIENT
Start: 2024-12-25 | End: 2024-12-29 | Stop reason: HOSPADM

## 2024-12-25 RX ORDER — ENOXAPARIN SODIUM 100 MG/ML
30 INJECTION SUBCUTANEOUS EVERY 24 HOURS
Status: DISCONTINUED | OUTPATIENT
Start: 2024-12-25 | End: 2024-12-25

## 2024-12-25 RX ORDER — MAGNESIUM SULFATE HEPTAHYDRATE 40 MG/ML
2 INJECTION, SOLUTION INTRAVENOUS ONCE
Status: COMPLETED | OUTPATIENT
Start: 2024-12-25 | End: 2024-12-25

## 2024-12-25 RX ORDER — SODIUM CHLORIDE 9 MG/ML
40 INJECTION, SOLUTION INTRAVENOUS AS NEEDED
Status: DISCONTINUED | OUTPATIENT
Start: 2024-12-25 | End: 2024-12-29 | Stop reason: HOSPADM

## 2024-12-25 RX ORDER — ONDANSETRON 2 MG/ML
4 INJECTION INTRAMUSCULAR; INTRAVENOUS EVERY 6 HOURS PRN
Status: DISCONTINUED | OUTPATIENT
Start: 2024-12-25 | End: 2024-12-29 | Stop reason: HOSPADM

## 2024-12-25 RX ORDER — IOPAMIDOL 755 MG/ML
100 INJECTION, SOLUTION INTRAVASCULAR
Status: COMPLETED | OUTPATIENT
Start: 2024-12-25 | End: 2024-12-25

## 2024-12-25 RX ORDER — CITALOPRAM HYDROBROMIDE 20 MG/1
20 TABLET ORAL DAILY
Status: DISCONTINUED | OUTPATIENT
Start: 2024-12-25 | End: 2024-12-29 | Stop reason: HOSPADM

## 2024-12-25 RX ADMIN — DOXYCYCLINE 100 MG: 100 INJECTION, POWDER, LYOPHILIZED, FOR SOLUTION INTRAVENOUS at 15:50

## 2024-12-25 RX ADMIN — AMLODIPINE BESYLATE 10 MG: 10 TABLET ORAL at 16:54

## 2024-12-25 RX ADMIN — BUSPIRONE HYDROCHLORIDE 10 MG: 10 TABLET ORAL at 20:52

## 2024-12-25 RX ADMIN — FUROSEMIDE 40 MG: 10 INJECTION, SOLUTION INTRAVENOUS at 15:50

## 2024-12-25 RX ADMIN — IPRATROPIUM BROMIDE AND ALBUTEROL SULFATE 3 ML: .5; 3 SOLUTION RESPIRATORY (INHALATION) at 19:04

## 2024-12-25 RX ADMIN — CITALOPRAM HYDROBROMIDE 20 MG: 20 TABLET ORAL at 16:34

## 2024-12-25 RX ADMIN — AMPICILLIN SODIUM, SULBACTAM SODIUM 3 G: 2; 1 INJECTION, POWDER, FOR SOLUTION INTRAMUSCULAR; INTRAVENOUS at 17:06

## 2024-12-25 RX ADMIN — METHYLPREDNISOLONE SODIUM SUCCINATE 125 MG: 125 INJECTION, POWDER, FOR SOLUTION INTRAMUSCULAR; INTRAVENOUS at 11:57

## 2024-12-25 RX ADMIN — MAGNESIUM SULFATE HEPTAHYDRATE 2 G: 2 INJECTION, SOLUTION INTRAVENOUS at 15:51

## 2024-12-25 RX ADMIN — POTASSIUM CHLORIDE 40 MEQ: 750 CAPSULE, EXTENDED RELEASE ORAL at 15:51

## 2024-12-25 RX ADMIN — METOPROLOL SUCCINATE 50 MG: 50 TABLET, FILM COATED, EXTENDED RELEASE ORAL at 16:54

## 2024-12-25 RX ADMIN — IBUPROFEN 800 MG: 800 TABLET, FILM COATED ORAL at 12:19

## 2024-12-25 RX ADMIN — AMPICILLIN SODIUM, SULBACTAM SODIUM 3 G: 2; 1 INJECTION, POWDER, FOR SOLUTION INTRAMUSCULAR; INTRAVENOUS at 23:21

## 2024-12-25 RX ADMIN — ROSUVASTATIN 40 MG: 20 TABLET, FILM COATED ORAL at 20:52

## 2024-12-25 RX ADMIN — POTASSIUM CHLORIDE 40 MEQ: 750 CAPSULE, EXTENDED RELEASE ORAL at 23:21

## 2024-12-25 RX ADMIN — IOPAMIDOL 69 ML: 755 INJECTION, SOLUTION INTRAVENOUS at 16:06

## 2024-12-25 RX ADMIN — METHYLPREDNISOLONE SODIUM SUCCINATE 40 MG: 40 INJECTION, POWDER, FOR SOLUTION INTRAMUSCULAR; INTRAVENOUS at 20:53

## 2024-12-25 RX ADMIN — ASPIRIN 81 MG: 81 TABLET, COATED ORAL at 16:32

## 2024-12-25 RX ADMIN — ENOXAPARIN SODIUM 30 MG: 100 INJECTION SUBCUTANEOUS at 20:51

## 2024-12-25 RX ADMIN — ISOSORBIDE MONONITRATE 120 MG: 60 TABLET, EXTENDED RELEASE ORAL at 16:54

## 2024-12-25 RX ADMIN — CLOPIDOGREL BISULFATE 75 MG: 75 TABLET ORAL at 16:32

## 2024-12-25 RX ADMIN — POTASSIUM CHLORIDE 40 MEQ: 750 CAPSULE, EXTENDED RELEASE ORAL at 20:52

## 2024-12-25 RX ADMIN — TAMSULOSIN HYDROCHLORIDE 0.4 MG: 0.4 CAPSULE ORAL at 16:55

## 2024-12-25 RX ADMIN — Medication 10 ML: at 20:53

## 2024-12-25 NOTE — H&P
"    [facility] Medicine Services  HISTORY AND PHYSICAL    Date of Admission: 12/25/2024  Primary Care Physician: Kerry Mtz APRN    Subjective   Primary Historian: Patient    Chief Complaint: Shortness of breath    History of Present Illness  Sharad Ryder is a 64-year-old male with a past medical history of anxiety, carotid artery disease, with right carotid endarterectomy, COPD on no oxygen at home, aortic valve stenosis with bioprosthetic valve, hyperlipidemia, CVA with no residual, CABG x 3 in 2018, 1.5 PPD smoker and diastolic dysfunction.  Patient presented to [facility] emergency department today with complaints of increasing shortness of breath over the last 3 days.  Patient attempted to take over-the-counter cold medicine as he felt like he had a virus but when he could not catch his breath and his oxygen saturation per his home machine showed 72 he called EMS.  When EMS arrived patient's oxygen saturation was 78% on room air.  He presented with compensated ABGs with a pH of 7.429, pCO2 of 40.2, pO2 of 58.1.  Patient was placed on 2 L of oxygen and had to be titrated up to 12 L high flow and is currently saturating at 90%.  In addition patient was febrile with a temperature of 101.3 upon arrival patient was given Motrin and repeat pressures currently 99.0.    Patient appears mildly tachypneic during assessment.  He reports no nausea, vomiting or diarrhea.  His he and his wife state there is no complaints of confusion, decreased level of consciousness or headaches.  Patient states that he was not aware that he had heart failure but he knows there is \"something wrong with my valve\".  Patient was made aware that we will treat his COPD exacerbation with IV steroids, empiric doxycycline and scheduled nebulizers.  He was made aware he has RSV and will be treated with 40 of care.  His BNP is mildly elevated and researching patient's previous cardiology notes his last " echocardiogram revealed a moderate-severe aortic stenosis present in his bioprosthetic aortic valve with recommendations for repeat echocardiogram in August.  Patient was unable to have that done, will repeat echocardiogram for any valvular disorder attributing to patient's shortness of breath.  All patient and spouse's questions were answered best my ability and he is in agreement for admission and treatment.    The workup with ABGs as stated above, initial troponin of 33, subsequent of 36, BNP of 4589, K of 2.9 (replaced with 40 mEq every 4 hours x 3 doses) , lactate 1.4, D-dimer of 1.13, ordered CTA of the chest awaiting results, WBC 13.18 with a left shift and no bandemia, Hb 11.7, HCT 35.1, , respiratory PCR positive for RSV.    CRX revealed interstitial edema in the mid and lower lung zones and small pleural effusion, probable CHF, stable bronchial wall thickening.    Review of Systems   Constitutional:  Positive for activity change, chills, fatigue and fever.   HENT:  Positive for congestion and rhinorrhea.    Respiratory:  Positive for shortness of breath and wheezing.    Cardiovascular:  Negative for chest pain, palpitations and leg swelling.   Gastrointestinal:  Negative for abdominal distention, abdominal pain, diarrhea, nausea and vomiting.   Genitourinary:  Negative for difficulty urinating.   Skin:  Positive for pallor.   Neurological:  Negative for weakness.      Otherwise complete ROS reviewed and negative except as mentioned in the HPI.    Past Medical History:   Past Medical History:   Diagnosis Date    Aneurysm     Anxiety     Arthritis     Carotid artery disease     Carotid stenosis, right 02/01/2018    Post right carotid endarterectomy    COPD (chronic obstructive pulmonary disease)     Coronary artery disease     Heart murmur     History of right-sided carotid endarterectomy 02/16/2022    Hyperlipidemia LDL goal <70 12/14/2017    Left frontal lobe, right thalamus and right  occipital CVA (cerebrovascular accident) (HCC) 01/27/2022    LVH (left ventricular hypertrophy) 02/16/2022    Pneumonia     Primary hypertension 12/14/2017    S/P CABG x 3 02/16/2022    LIMA to LAD, SVG to PDA and SVG to diagonal branch with TMR and left atrial appendage exclusion with atricure clip device 2/8/2018 per Dr. Cj Robin at [facility]     Severe aortic valve stenosis 12/14/2017    Status post aortic valve replacement with bioprosthetic valve 02/16/2022    Graciela Juarez bovine pericardial 19 mm valve 2/8/2018 per Dr. Cj Robin at [facility]    Tobacco use 12/14/2017    Wears glasses      Past Surgical History:  Past Surgical History:   Procedure Laterality Date    AORTAGRAM Bilateral 10/27/2023    Procedure: LEFT LOWER EXTREMITY ANGIOGRAM, BILATERAL ILIAC BALLOON ANGIOPLASTY, BILATERAL ILIAC STENT PLACEMENT, MYNX CLOSURE;  Surgeon: Jl Salgado DO;  Location:  [facility] HYBRID OR 12;  Service: Vascular;  Laterality: Bilateral;    APPENDECTOMY      CARDIAC CATHETERIZATION N/A 12/18/2017    Procedure: Left Heart Cath;  Surgeon: Aime Francois MD;  Location:  [facility] CATH INVASIVE LOCATION;  Service:     CARDIAC CATHETERIZATION N/A 12/18/2017    Procedure: Right Heart Cath;  Surgeon: Aime Francois MD;  Location:  [facility] CATH INVASIVE LOCATION;  Service:     CARDIAC CATHETERIZATION Bilateral 1/4/2018    Procedure: Coronary angiography;  Surgeon: Alfonso Yi MD;  Location:  [facility] CATH INVASIVE LOCATION;  Service:     CARDIAC CATHETERIZATION Left 9/15/2021    Procedure: Cardiac Catheterization/Vascular Study NIMCO OK  Has 3 graft 2018;  Surgeon: Aime Francois MD;  Location:  [facility] CATH INVASIVE LOCATION;  Service: Cardiology;  Laterality: Left;    CAROTID ENDARTERECTOMY Right 2/1/2018    Procedure: RIGHT CAROTID ENDARTERECTOMY WITH EEG-awake;  Surgeon: Jl Salgado DO;  Location: [facility] OR;  Service:     CORONARY ARTERY BYPASS GRAFT WITH AORTIC VALVE  REPAIR/REPLACEMENT N/A 2/8/2018    Procedure: AORTIC VALVE REPLACEMENT,CORONARY ARTERY BYPASS GRAFTING x 3  WITH CONCHA AND RIGHT EVH, ATRIAL APPENDAGE EXCLUSION LEFT WITH TRANSESOPHAGEAL ECHOCARDIOGRAM, 17-CHANNEL TMR;  Surgeon: Cj Robin MD;  Location: [facility] OR;  Service:     FINGER SURGERY Right 1990    OTHER SURGICAL HISTORY      skull srgery from accident in childhood.     Social History:  reports that he has been smoking cigarettes. He has a 52.5 pack-year smoking history. He has never been exposed to tobacco smoke. His smokeless tobacco use includes snuff. He reports that he does not drink alcohol and does not use drugs.    Family History: family history includes Asthma in his sister; Cancer in his maternal uncle; Diabetes in his brother and father; Heart disease in his father and mother; Hypertension in his brother and sister; Kidney disease in his sister.       Allergies:  No Known Allergies    Medications:  Prior to Admission medications    Medication Sig Start Date End Date Taking? Authorizing Provider   amLODIPine (NORVASC) 10 MG tablet Take 1 tablet by mouth Daily.   Yes ProviderEstrellita MD   busPIRone (BUSPAR) 10 MG tablet Take 1 tablet by mouth 2 (Two) Times a Day. 8/4/21  Yes ProviderEstrellita MD   citalopram (CeleXA) 20 MG tablet Take 1 tablet by mouth Daily.   Yes ProviderEstrellita MD   clopidogrel (PLAVIX) 75 MG tablet Take 1 tablet by mouth Daily. 2/16/18  Yes Cj Robin MD   isosorbide mononitrate (IMDUR) 120 MG 24 hr tablet Take 1 tablet by mouth Daily. 2/17/22  Yes Ashtyn Bay APRN   metoprolol succinate XL (TOPROL-XL) 50 MG 24 hr tablet Take 1 tablet by mouth Daily. 1/29/22  Yes Barbra Trent APRN   tamsulosin (FLOMAX) 0.4 MG capsule 24 hr capsule Take 1 capsule by mouth Daily. 8/1/23  Yes Estrellita Wolfe MD   terazosin (HYTRIN) 5 MG capsule Take 1 capsule by mouth Every Night.   Yes ProviderEstrellita MD   albuterol sulfate  (90 Base)  "MCG/ACT inhaler Inhale 2 puffs Every 4 (Four) Hours As Needed for Wheezing. 1/26/24   Michaelle Mccord MD   aspirin 81 MG tablet Take 1 tablet by mouth Daily. 2/16/18   Cj Robin MD   cloNIDine (CATAPRES) 0.1 MG tablet Take 1 tablet by mouth 2 (Two) Times a Day As Needed for High Blood Pressure (> 150/90).    Estrellita Wolfe MD   gabapentin (NEURONTIN) 400 MG capsule Take 1 capsule by mouth 3 (Three) Times a Day.    Estrellita Wolfe MD   HYDROcodone-acetaminophen (NORCO) 7.5-325 MG per tablet Take 1 tablet by mouth Every 6 (Six) Hours As Needed for Moderate Pain.    Estrellita Wolfe MD   methylPREDNISolone (MEDROL) 4 MG dose pack Take as directed on package instructions. 1/26/24   Michaelle Mccord MD   multivitamin with minerals (ONE-A-DAY MENS 50+ ADVANTAGE PO) Take 1 tablet by mouth Daily.    Estrellita Wolfe MD   nitroglycerin (NITROSTAT) 0.4 MG SL tablet Place 1 tablet under the tongue Every 5 (Five) Minutes As Needed for Chest Pain. Take no more than 3 doses in 15 minutes.    Estrellita Wolfe MD   rosuvastatin (CRESTOR) 40 MG tablet Take 1 tablet by mouth Daily. 1/28/22   Barbra Trent APRN     I have utilized all available immediate resources to obtain, update, or review the patient's current medications (including all prescriptions, over-the-counter products, herbals, cannabis/cannabidiol products, and vitamin/mineral/dietary (nutritional) supplements).    Objective     Vital Signs: /73 (BP Location: Right arm, Patient Position: Lying)   Pulse 97   Temp 99 °F (37.2 °C) (Oral)   Resp 22   Ht 162.6 cm (64\")   Wt 52.1 kg (114 lb 14.4 oz)   SpO2 97%   BMI 19.72 kg/m²   Physical Exam  Vitals and nursing note reviewed.   Constitutional:       General: He is in acute distress.      Appearance: He is ill-appearing. He is not toxic-appearing.      Interventions: Nasal cannula in place.      Comments: 12 L high flow   HENT:      Head: Normocephalic and atraumatic.      " Right Ear: Tympanic membrane normal.      Left Ear: Tympanic membrane normal.      Nose: Congestion and rhinorrhea present.      Mouth/Throat:      Mouth: Mucous membranes are moist.      Pharynx: Oropharynx is clear.   Eyes:      Pupils: Pupils are equal, round, and reactive to light.   Pulmonary:      Effort: Tachypnea present. No accessory muscle usage or respiratory distress.      Breath sounds: Examination of the right-upper field reveals wheezing. Examination of the left-upper field reveals wheezing. Examination of the right-middle field reveals wheezing. Examination of the left-middle field reveals wheezing. Examination of the right-lower field reveals wheezing and rales. Examination of the left-lower field reveals wheezing and rales. Wheezing and rales present.   Abdominal:      General: Abdomen is flat. Bowel sounds are normal. There is no distension.      Palpations: Abdomen is soft.      Tenderness: There is no abdominal tenderness.   Genitourinary:     Comments: Voiding per urinal-250 cc of susan urine  Musculoskeletal:      Cervical back: Normal range of motion. No rigidity or tenderness.      Right lower leg: No edema.      Left lower leg: No edema.   Skin:     General: Skin is warm and dry.      Capillary Refill: Capillary refill takes less than 2 seconds.      Coloration: Skin is pale.   Neurological:      General: No focal deficit present.      Mental Status: He is oriented to person, place, and time.   Psychiatric:         Mood and Affect: Mood normal.         Behavior: Behavior normal. Behavior is cooperative.         Thought Content: Thought content normal.         Judgment: Judgment normal.            Results Reviewed:  Lab Results (last 24 hours)       Procedure Component Value Units Date/Time    Respiratory Panel PCR w/COVID-19(SARS-CoV-2) JUNAID/LISA/INGRID/[facility]/LES[facility] In-House, NP Swab in UTM/VTM, 2 HR TAT - Swab, Nasopharynx [[id number]]  (Abnormal) Collected: 12/25/24 6121    Specimen: Swab from  Nasopharynx Updated: 12/25/24 1327     ADENOVIRUS, PCR Not Detected     Coronavirus 229E Not Detected     Coronavirus HKU1 Not Detected     Coronavirus NL63 Not Detected     Coronavirus OC43 Not Detected     COVID19 Not Detected     Human Metapneumovirus Not Detected     Human Rhinovirus/Enterovirus Not Detected     Influenza A PCR Not Detected     Influenza B PCR Not Detected     Parainfluenza Virus 1 Not Detected     Parainfluenza Virus 2 Not Detected     Parainfluenza Virus 3 Not Detected     Parainfluenza Virus 4 Not Detected     RSV, PCR Detected     Bordetella pertussis pcr Not Detected     Bordetella parapertussis PCR Not Detected     Chlamydophila pneumoniae PCR Not Detected     Mycoplasma pneumo by PCR Not Detected            High Sensitivity Troponin T 1Hr [[id number]]  (Abnormal) Collected: 12/25/24 1254    Specimen: Blood from Arm, Right Updated: 12/25/24 1321     HS Troponin T 36 ng/L      Troponin T Numeric Delta 3 ng/L      Troponin T % Delta 9 %             D-dimer, Quantitative [[id number]]  (Abnormal) Collected: 12/25/24 1153    Specimen: Blood Updated: 12/25/24 1256     D-Dimer, Quantitative 1.13 MCGFEU/mL             BNP [[id number]]  (Abnormal) Collected: 12/25/24 1153    Specimen: Blood Updated: 12/25/24 1236     proBNP 4,589.0 pg/mL             High Sensitivity Troponin T [[id number]]  (Abnormal) Collected: 12/25/24 1153    Specimen: Blood Updated: 12/25/24 1235     HS Troponin T 33 ng/L             Comprehensive Metabolic Panel [[id number]]  (Abnormal) Collected: 12/25/24 1153    Specimen: Blood Updated: 12/25/24 1230     Glucose 132 mg/dL      BUN 11 mg/dL      Creatinine 0.78 mg/dL      Sodium 136 mmol/L      Potassium 2.9 mmol/L      Chloride 98 mmol/L      CO2 26.0 mmol/L      Calcium 8.6 mg/dL      Total Protein 7.1 g/dL      Albumin 4.2 g/dL      ALT (SGPT) 22 U/L      AST (SGOT) 35 U/L      Alkaline Phosphatase 99 U/L      Total Bilirubin 0.4 mg/dL      Globulin 2.9 gm/dL      A/G Ratio 1.4  g/dL      BUN/Creatinine Ratio 14.1     Anion Gap 12.0 mmol/L      eGFR 99.6 mL/min/1.73             Blood Culture - Blood, Arm, Left [[id number]] Collected: 12/25/24 1153    Specimen: Blood from Arm, Left Updated: 12/25/24 1230    Blood Culture - Blood, Arm, Right [[id number]] Collected: 12/25/24 1153    Specimen: Blood from Arm, Right Updated: 12/25/24 1230    Lactic Acid, Plasma [[id number]]  (Normal) Collected: 12/25/24 1153    Specimen: Blood Updated: 12/25/24 1226     Lactate 1.4 mmol/L     Magnesium [[id number]]  (Normal) Collected: 12/25/24 1153    Specimen: Blood Updated: 12/25/24 1226     Magnesium 1.6 mg/dL     CBC & Differential [[id number]]  (Abnormal) Collected: 12/25/24 1153    Specimen: Blood Updated: 12/25/24 1211            CBC Auto Differential [[id number]]  (Abnormal) Collected: 12/25/24 1153    Specimen: Blood Updated: 12/25/24 1211     WBC 13.18 10*3/mm3      RBC 3.92 10*6/mm3      Hemoglobin 11.7 g/dL      Hematocrit 35.1 %      MCV 89.5 fL      MCH 29.8 pg      MCHC 33.3 g/dL      RDW 14.0 %      RDW-SD 46.0 fl      MPV 9.8 fL      Platelets 234 10*3/mm3      Neutrophil % 88.2 %      Lymphocyte % 5.5 %      Monocyte % 5.7 %      Eosinophil % 0.0 %      Basophil % 0.2 %      Immature Grans % 0.4 %      Neutrophils, Absolute 11.62 10*3/mm3      Lymphocytes, Absolute 0.73 10*3/mm3      Monocytes, Absolute 0.75 10*3/mm3      Eosinophils, Absolute 0.00 10*3/mm3      Basophils, Absolute 0.03 10*3/mm3      Immature Grans, Absolute 0.05 10*3/mm3      nRBC 0.0 /100 WBC     Blood Gas, Arterial With Co-Ox [[id number]]  (Abnormal) Collected: 12/25/24 1144    Specimen: Arterial Blood Updated: 12/25/24 1144     Site Right Radial     Glenroy's Test Positive     pH, Arterial 7.429 pH units      pCO2, Arterial 40.2 mm Hg      pO2, Arterial 58.1 mm Hg      Comment: 84 Value below reference range        HCO3, Arterial 26.6 mmol/L      Comment: 83 Value above reference range        Base Excess, Arterial 2.1 mmol/L       Comment: 83 Value above reference range        O2 Saturation, Arterial 90.6 %      Comment: 84 Value below reference range        Hemoglobin, Blood Gas 12.0 g/dL      Comment: 84 Value below reference range        Hematocrit, Blood Gas 36.8 %      Comment: 84 Value below reference range        Oxyhemoglobin 88.3 %      Comment: 84 Value below reference range        Methemoglobin 0.70 %      Carboxyhemoglobin 1.8 %      Temperature 37.0     Sodium, Arterial 137 mmol/L      Potassium, Arterial 2.9 mmol/L      Comment: 84 Value below reference range        Barometric Pressure for Blood Gas 757 mmHg      Modality Nasal Cannula     Flow Rate 4.0 lpm      Ventilator Mode NA     Collected by [id number].     Comment: Meter: [id number]     :  Ekaterina Glynn, RRT        pH, Temp Corrected 7.429 pH Units      pCO2, Temperature Corrected 40.2 mm Hg      pO2, Temperature Corrected 58.1 mm Hg           Imaging Results (Last 24 Hours)       Procedure Component Value Units Date/Time    XR Chest 1 View [[id number]] Collected: 12/25/24 1208     Updated: 12/25/24 1213    Narrative:      EXAMINATION:  XR CHEST 1 VW-  12/25/2024 11:00 AM     HISTORY: Shortness of breath and coughing.     COMPARISON: 1/22/2024.     TECHNIQUE: Single view AP image.     FINDINGS: There is interstitial edema in the mid and lower lung zones.  There is mild blunting of the costophrenic angles. There is bronchial  wall thickening. Heart size is upper limits of normal. Prior heart  bypass surgery. The thoracic aorta is atheromatous. There is an old  fracture of the distal right clavicle with nonbony union. There are  degenerative changes of the spine.          Impression:      1. Interstitial edema in the mid and lower lung zones and small pleural  effusions. Probable CHF.  2. Heart size upper limits of normal. Prior median sternotomy with  bypass surgery and valve replacement.  3. Stable bronchial wall thickening.           This report was signed  and finalized on 12/25/2024 12:10 PM by Dr. Mike Perez MD.               Assessment / Plan   Assessment:   Active Hospital Problems    Diagnosis     **Acute exacerbation of chronic obstructive pulmonary disease (COPD)     Aortic stenosis, in a bioprosthetic valve     Diastolic dysfunction     Acute on chronic respiratory failure with hypoxia     Primary hypertension     Tobacco use        Treatment Plan  The patient will be admitted to Dr. Bailey's service here at [facility].    1.  Acute exacerbation of COPD-acute on chronic respiratory failure with hypoxia-patient presented per EMS with their reported 78% oxygenation on room air.  Patient currently stable on 12 L high flow with 91%.  ABGs revealed a compensated pH of 7.429, with a pO2 of 58.1.  Initiate Solu-Medrol 40 mg every 12 hours, empiric doxycycline, I-S OPEP, pulmonary hygiene and supplemental oxygenation.  Positive D-dimer, awaiting CTA results    2.  Aortic stenosis-repeat echocardiogram was supposed to be obtained in August of this year to evaluate moderate-severe aortic stenosis and bioprosthetic valve, echocardiogram pending to evaluate.  Will consult cardiothoracic surgery if significant else.    3.  Diastolic dysfunction-BNP of 4589 upon arrival, due to patient's history of diastolic dysfunction and possible aortic valve stenosis patient will be given Lasix 40 mg IV x 1 and assess for any improvement.  Given that patient may be presenting with severe aortic stenosis aggressive diuresis is not recommended.  Will continue to follow closely and make adjustments as imaging is available.  Gait heart failure protocol continue ASA, Imdur, metoprolol, daily weight, strict intake and output, and heart failure education.    4.  Primary hypertension-continue home metoprolol and Imdur ,initiate every 4 vital signs.    5.  Tobacco abuse-NicoDerm CQ initiated, continue smoking cessation.    6.  Reviewed and resumed home medications as  appropriate.    VTE prophylaxis with SCDs  Labs in a.m.  Medical Decision Making  Acute exacerbation of COPD, acute, high complexity, unchanged  Acute on chronic respiratory failure with hypoxia, chronic, moderate complexity, unchanged  Aortic stenosis, acute on chronic, moderate complexity, unchanged  Diastolic dysfunction, acute on chronic, moderate complexity, unchanged  Primary hypertension, chronic, moderate complexity, unchanged  Tobacco abuse, chronic, moderate complexity, unchanged    Number and Complexity of problems: 6  Differential Diagnosis: None    Conditions and Status        Condition is unchanged.     [facility] Data  External documents reviewed: Review of previous hospitalizations, cardiology office notes  Cardiac tracing (EKG, telemetry) interpretation: 1/2023 echocardiogram per cardiology reviewed  Radiology interpretation: 12/25/2024 chest x-ray per radiology reviewed  Labs reviewed: 12/25/2024 ABG, CMP, CBC, blood culture, lactic acid, magnesium, BNP, D-dimer, troponin, respiratory PCR reviewed, repeat labs in a.m.  Any tests that were considered but not ordered: None     Decision rules/scores evaluated (example [id number], Wells, etc): SIRS, Sepsis, and Septic Shock Criteria - MDCalc  Calculated on Dec 25 2024 4:39 PM  This patient meets SIRS criteria.     Discussed with: Dr. Bailey, patient, and his wife Ekaterina     Care Planning  Shared decision making: Dr. Bailey, patient, and his wife Ekaterina  Code status and discussions: Full code per patient    Disposition  Social Determinants of Health that impact treatment or disposition: Undetermined at this time  Estimated length of stay is 2-3 days.     I confirmed that the patient's advanced care plan is present, code status is documented, and a surrogate decision maker is listed in the patient's medical record.     The patient's surrogate decision maker is alton Tobar.     The patient was seen and examined by me on 12/25/2024 at  1425.    Electronically signed by Ramya Gamble, KIRBY, 12/25/24, 15:41 CST.

## 2024-12-25 NOTE — ED PROVIDER NOTES
"Subjective   History of Present Illness  Patient presents with complaint of increasing shortness of breath.  He says has been getting worse for the past 3 days.  He does complain of a cough that is productive of some \"tannish\" little bits of phlegm.  He has had some subjective fever.  He denies any nausea vomiting or diarrhea or chest pains.  He does have known COPD but says it is in the early stages and he does not normally use oxygen.  He does continue to smoke though has not had anything since yesterday.  EMS says his pulse ox is in the 70s and 80s at home but they gave him a breathing treatment and put him on some the oxygen on the way here.    History provided by:  Patient   used: No    Shortness of Breath  Severity:  Severe  Onset quality:  Gradual  Duration:  3 days  Timing:  Constant  Progression:  Worsening  Chronicity:  New  Context: URI    Context: not activity, not animal exposure, not emotional upset, not fumes, not known allergens, not occupational exposure, not pollens, not smoke exposure, not strong odors and not weather changes    Relieved by:  Nothing  Worsened by:  Nothing  Ineffective treatments:  None tried  Associated symptoms: cough, fever and sputum production    Associated symptoms: no abdominal pain, no chest pain, no claudication, no diaphoresis, no ear pain, no headaches, no hemoptysis, no neck pain, no PND, no rash, no sore throat, no syncope, no swollen glands, no vomiting and no wheezing    Risk factors: tobacco use    Risk factors: no recent alcohol use, no family hx of DVT, no hx of cancer, no hx of PE/DVT, no obesity, no oral contraceptive use, no prolonged immobilization and no recent surgery        Review of Systems   Constitutional:  Positive for fever. Negative for diaphoresis.   HENT: Negative.  Negative for ear pain and sore throat.    Respiratory:  Positive for cough, sputum production and shortness of breath. Negative for hemoptysis and wheezing.  "   Cardiovascular: Negative.  Negative for chest pain, claudication, syncope and PND.   Gastrointestinal: Negative.  Negative for abdominal pain and vomiting.   Genitourinary: Negative.    Musculoskeletal: Negative.  Negative for neck pain.   Skin: Negative.  Negative for rash.   Neurological: Negative.  Negative for headaches.   Psychiatric/Behavioral: Negative.     All other systems reviewed and are negative.      Past Medical History:   Diagnosis Date    Aneurysm     Anxiety     Arthritis     Carotid artery disease     Carotid stenosis, right 02/01/2018    Post right carotid endarterectomy    COPD (chronic obstructive pulmonary disease)     Coronary artery disease     Heart murmur     History of right-sided carotid endarterectomy 02/16/2022    Hyperlipidemia LDL goal <70 12/14/2017    Left frontal lobe, right thalamus and right occipital CVA (cerebrovascular accident) (HCC) 01/27/2022    LVH (left ventricular hypertrophy) 02/16/2022    Pneumonia     Primary hypertension 12/14/2017    S/P CABG x 3 02/16/2022    LIMA to LAD, SVG to PDA and SVG to diagonal branch with TMR and left atrial appendage exclusion with atricure clip device 2/8/2018 per Dr. Cj Robin at [facility]     Severe aortic valve stenosis 12/14/2017    Status post aortic valve replacement with bioprosthetic valve 02/16/2022    Graciela Juarez bovine pericardial 19 mm valve 2/8/2018 per Dr. Cj Robin at [facility]    Tobacco use 12/14/2017    Wears glasses        No Known Allergies    Past Surgical History:   Procedure Laterality Date    AORTAGRAM Bilateral 10/27/2023    Procedure: LEFT LOWER EXTREMITY ANGIOGRAM, BILATERAL ILIAC BALLOON ANGIOPLASTY, BILATERAL ILIAC STENT PLACEMENT, MYNX CLOSURE;  Surgeon: Jl Salgado DO;  Location: [place] [id number];  Service: Vascular;  Laterality: Bilateral;    APPENDECTOMY      CARDIAC CATHETERIZATION N/A 12/18/2017    Procedure: Left Heart Cath;  Surgeon: Aime Francois  MD;  Location:  [facility];  Service:     CARDIAC CATHETERIZATION N/A 12/18/2017    Procedure: Right Heart Cath;  Surgeon: Aime Francois MD;  Location:  [facility];  Service:     CARDIAC CATHETERIZATION Bilateral 1/4/2018    Procedure: Coronary angiography;  Surgeon: Alfonso Yi MD;  Location:  [facility];  Service:     CARDIAC CATHETERIZATION Left 9/15/2021    Procedure: Cardiac Catheterization/Vascular Study NIMCO OK  Has 3 graft 2018;  Surgeon: Aime Francois MD;  Location:  [facility];  Service: Cardiology;  Laterality: Left;    CAROTID ENDARTERECTOMY Right 2/1/2018    Procedure: RIGHT CAROTID ENDARTERECTOMY WITH EEG-awake;  Surgeon: Jl Salgado DO;  Location:  [facility] OR;  Service:     CORONARY ARTERY BYPASS GRAFT WITH AORTIC VALVE REPAIR/REPLACEMENT N/A 2/8/2018    Procedure: AORTIC VALVE REPLACEMENT,CORONARY ARTERY BYPASS GRAFTING x 3  WITH CONCHA AND RIGHT EVH, ATRIAL APPENDAGE EXCLUSION LEFT WITH TRANSESOPHAGEAL ECHOCARDIOGRAM, 17-CHANNEL TMR;  Surgeon: Cj Robin MD;  Location:  [facility] OR;  Service:     FINGER SURGERY Right 1990    OTHER SURGICAL HISTORY      skull srgery from accident in childhood.       Family History   Problem Relation Age of Onset    Heart disease Mother     Heart disease Father     Diabetes Father     Hypertension Sister     Asthma Sister     Kidney disease Sister     Hypertension Brother     Diabetes Brother     Cancer Maternal Uncle        Social History     Socioeconomic History    Marital status:    Tobacco Use    Smoking status: Some Days     Current packs/day: 1.50     Average packs/day: 1.5 packs/day for 35.0 years (52.5 ttl pk-yrs)     Types: Cigarettes     Passive exposure: Never    Smokeless tobacco: Current     Types: Snuff    Tobacco comments:     Would like to quit.   Vaping Use    Vaping status: Never Used   Substance and Sexual Activity    Alcohol use: No    Drug use: No    Sexual  activity: Defer       Prior to Admission medications    Medication Sig Start Date End Date Taking? Authorizing Provider   amLODIPine (NORVASC) 10 MG tablet Take 1 tablet by mouth Daily.   Yes Estrellita Wolfe MD   busPIRone (BUSPAR) 10 MG tablet Take 1 tablet by mouth 2 (Two) Times a Day. 8/4/21  Yes Estrellita Wolfe MD   citalopram (CeleXA) 20 MG tablet Take 1 tablet by mouth Daily.   Yes Estrellita Wolfe MD   clopidogrel (PLAVIX) 75 MG tablet Take 1 tablet by mouth Daily. 2/16/18  Yes Cj Robin MD   isosorbide mononitrate (IMDUR) 120 MG 24 hr tablet Take 1 tablet by mouth Daily. 2/17/22  Yes Ashtyn Bay APRN   metoprolol succinate XL (TOPROL-XL) 50 MG 24 hr tablet Take 1 tablet by mouth Daily. 1/29/22  Yes Barbra Trent APRN   tamsulosin (FLOMAX) 0.4 MG capsule 24 hr capsule Take 1 capsule by mouth Daily. 8/1/23  Yes Estrellita Wolfe MD   terazosin (HYTRIN) 5 MG capsule Take 1 capsule by mouth Every Night.   Yes Estrellita Wolfe MD   albuterol sulfate  (90 Base) MCG/ACT inhaler Inhale 2 puffs Every 4 (Four) Hours As Needed for Wheezing. 1/26/24   Michaelle Mccord MD   aspirin 81 MG tablet Take 1 tablet by mouth Daily. 2/16/18   Cj Robin MD   cloNIDine (CATAPRES) 0.1 MG tablet Take 1 tablet by mouth 2 (Two) Times a Day As Needed for High Blood Pressure (> 150/90).    Estrellita Wolfe MD   gabapentin (NEURONTIN) 400 MG capsule Take 1 capsule by mouth 3 (Three) Times a Day.    Estrellita Wolfe MD   HYDROcodone-acetaminophen (NORCO) 7.5-325 MG per tablet Take 1 tablet by mouth Every 6 (Six) Hours As Needed for Moderate Pain.    Estrellita Wolfe MD   methylPREDNISolone (MEDROL) 4 MG dose pack Take as directed on package instructions. 1/26/24   Michaelle Mccord MD   multivitamin with minerals (ONE-A-DAY MENS 50+ ADVANTAGE PO) Take 1 tablet by mouth Daily.    Estrellita Wolfe MD   nitroglycerin (NITROSTAT) 0.4 MG SL tablet Place 1 tablet  under the tongue Every 5 (Five) Minutes As Needed for Chest Pain. Take no more than 3 doses in 15 minutes.    Provider, MD Estrellita   rosuvastatin (CRESTOR) 40 MG tablet Take 1 tablet by mouth Daily. 1/28/22   Barbra Trent APRN       Medications   sodium chloride 0.9 % flush 10 mL (has no administration in time range)   methylPREDNISolone sodium succinate (SOLU-Medrol) injection 125 mg (125 mg Intravenous Given 12/25/24 1157)   ibuprofen (ADVIL,MOTRIN) tablet 800 mg (800 mg Oral Given 12/25/24 1219)       Vitals:    12/25/24 1403   BP: 128/74   Pulse: 100   Resp:    Temp:    SpO2: 94%         Objective   Physical Exam  Vitals and nursing note reviewed.   Constitutional:       Appearance: He is well-developed.   HENT:      Head: Normocephalic and atraumatic.   Cardiovascular:      Rate and Rhythm: Regular rhythm. Tachycardia present.   Pulmonary:      Effort: Tachypnea and accessory muscle usage present.      Breath sounds: Examination of the right-middle field reveals wheezing. Examination of the left-middle field reveals wheezing.   Abdominal:      General: Bowel sounds are normal.      Palpations: Abdomen is soft.   Musculoskeletal:         General: Normal range of motion.      Cervical back: Normal range of motion and neck supple.   Skin:     General: Skin is warm and dry.   Neurological:      General: No focal deficit present.      Mental Status: He is alert and oriented to person, place, and time.   Psychiatric:         Mood and Affect: Mood normal.         Behavior: Behavior normal.         Procedures         Lab Results (last 24 hours)       Procedure Component Value Units Date/Time    Blood Gas, Arterial With Co-Ox [[id number]]  (Abnormal) Collected: 12/25/24 1144    Specimen: Arterial Blood Updated: 12/25/24 1144     Site Right Radial     Glenroy's Test Positive     pH, Arterial 7.429 pH units      pCO2, Arterial 40.2 mm Hg      pO2, Arterial 58.1 mm Hg      Comment: 84 Value below reference range         HCO3, Arterial 26.6 mmol/L      Comment: 83 Value above reference range        Base Excess, Arterial 2.1 mmol/L      Comment: 83 Value above reference range        O2 Saturation, Arterial 90.6 %      Comment: 84 Value below reference range        Hemoglobin, Blood Gas 12.0 g/dL      Comment: 84 Value below reference range        Hematocrit, Blood Gas 36.8 %      Comment: 84 Value below reference range        Oxyhemoglobin 88.3 %      Comment: 84 Value below reference range        Methemoglobin 0.70 %      Carboxyhemoglobin 1.8 %      Temperature 37.0     Sodium, Arterial 137 mmol/L      Potassium, Arterial 2.9 mmol/L      Comment: 84 Value below reference range        Barometric Pressure for Blood Gas 757 mmHg      Modality Nasal Cannula     Flow Rate 4.0 lpm      Ventilator Mode NA     Collected by [id number].     Comment: Meter: [id number]     :  Ekaterina Glynn, RRT        pH, Temp Corrected 7.429 pH Units      pCO2, Temperature Corrected 40.2 mm Hg      pO2, Temperature Corrected 58.1 mm Hg     CBC & Differential [[id number]]  (Abnormal) Collected: 12/25/24 1153    Specimen: Blood Updated: 12/25/24 1211    Narrative:      The following orders were created for panel order CBC & Differential.  Procedure                               Abnormality         Status                     ---------                               -----------         ------                     CBC Auto Differential[[id number]]        Abnormal            Final result                 Please view results for these tests on the individual orders.    Comprehensive Metabolic Panel [[id number]]  (Abnormal) Collected: 12/25/24 1153    Specimen: Blood Updated: 12/25/24 1230     Glucose 132 mg/dL      BUN 11 mg/dL      Creatinine 0.78 mg/dL      Sodium 136 mmol/L      Potassium 2.9 mmol/L      Chloride 98 mmol/L      CO2 26.0 mmol/L      Calcium 8.6 mg/dL      Total Protein 7.1 g/dL      Albumin 4.2 g/dL      ALT (SGPT) 22 U/L      AST  (SGOT) 35 U/L      Alkaline Phosphatase 99 U/L      Total Bilirubin 0.4 mg/dL      Globulin 2.9 gm/dL      A/G Ratio 1.4 g/dL      BUN/Creatinine Ratio 14.1     Anion Gap 12.0 mmol/L      eGFR 99.6 mL/min/1.73     Narrative:      GFR Categories in Chronic Kidney Disease (CKD)      GFR Category          GFR (mL/min/1.73)    Interpretation  G1                     90 or greater         Normal or high (1)  G2                      60-89                Mild decrease (1)  G3a                   45-59                Mild to moderate decrease  G3b                   30-44                Moderate to severe decrease  G4                    15-29                Severe decrease  G5                    14 or less           Kidney failure          (1)In the absence of evidence of kidney disease, neither GFR category G1 or G2 fulfill the criteria for CKD.    eGFR calculation 2021 CKD-EPI creatinine equation, which does not include race as a factor    Blood Culture - Blood, Arm, Left [[id number]] Collected: 12/25/24 1153    Specimen: Blood from Arm, Left Updated: 12/25/24 1230    Blood Culture - Blood, Arm, Right [[id number]] Collected: 12/25/24 1153    Specimen: Blood from Arm, Right Updated: 12/25/24 1230    Lactic Acid, Plasma [[id number]]  (Normal) Collected: 12/25/24 1153    Specimen: Blood Updated: 12/25/24 1226     Lactate 1.4 mmol/L     Magnesium [[id number]]  (Normal) Collected: 12/25/24 1153    Specimen: Blood Updated: 12/25/24 1226     Magnesium 1.6 mg/dL     CBC Auto Differential [[id number]]  (Abnormal) Collected: 12/25/24 1153    Specimen: Blood Updated: 12/25/24 1211     WBC 13.18 10*3/mm3      RBC 3.92 10*6/mm3      Hemoglobin 11.7 g/dL      Hematocrit 35.1 %      MCV 89.5 fL      MCH 29.8 pg      MCHC 33.3 g/dL      RDW 14.0 %      RDW-SD 46.0 fl      MPV 9.8 fL      Platelets 234 10*3/mm3      Neutrophil % 88.2 %      Lymphocyte % 5.5 %      Monocyte % 5.7 %      Eosinophil % 0.0 %      Basophil % 0.2 %      Immature Grans  % 0.4 %      Neutrophils, Absolute 11.62 10*3/mm3      Lymphocytes, Absolute 0.73 10*3/mm3      Monocytes, Absolute 0.75 10*3/mm3      Eosinophils, Absolute 0.00 10*3/mm3      Basophils, Absolute 0.03 10*3/mm3      Immature Grans, Absolute 0.05 10*3/mm3      nRBC 0.0 /100 WBC     BNP [[id number]]  (Abnormal) Collected: 12/25/24 1153    Specimen: Blood Updated: 12/25/24 1236     proBNP 4,589.0 pg/mL     Narrative:      This assay is used as an aid in the diagnosis of individuals suspected of having heart failure. It can be used as an aid in the diagnosis of acute decompensated heart failure (ADHF) in patients presenting with signs and symptoms of ADHF to the emergency department (ED). In addition, NT-proBNP of <300 pg/mL indicates ADHF is not likely.    Age Range Result Interpretation  NT-proBNP Concentration (pg/mL:      <50             Positive            >450                   Gray                 300-450                    Negative             <300    50-75           Positive            >900                  Gray                300-900                  Negative            <300      >75             Positive            >1800                  Gray                300-1800                  Negative            <300    D-dimer, Quantitative [[id number]]  (Abnormal) Collected: 12/25/24 1153    Specimen: Blood Updated: 12/25/24 1256     D-Dimer, Quantitative 1.13 MCGFEU/mL     Narrative:      According to the assay 's published package insert, a normal (<0.50 MCGFEU/mL) D-dimer result in conjunction with a non-high clinical probability assessment, excludes deep vein thrombosis (DVT) and pulmonary embolism (PE) with high sensitivity.    D-dimer values increase with age and this can make VTE exclusion of an older population difficult. To address this, the [employer], based on best available evidence and recent guidelines, recommends that clinicians use age-adjusted D-dimer thresholds in  "patients greater than 50 years of age with: a) a low probability of PE who do not meet all Pulmonary Embolism Rule Out Criteria, or b) in those with intermediate probability of PE.   The formula for an age-adjusted D-dimer cut-off is \"age/100\".  For example, a 60 year old patient would have an age-adjusted cut-off of 0.60 MCGFEU/mL and an 80 year old 0.80 MCGFEU/mL.    High Sensitivity Troponin T [[id number]]  (Abnormal) Collected: 12/25/24 1153    Specimen: Blood Updated: 12/25/24 1235     HS Troponin T 33 ng/L     Narrative:      High Sensitive Troponin T Reference Range:  <14.0 ng/L- Negative Female for AMI  <22.0 ng/L- Negative Male for AMI  >=14 - Abnormal Female indicating possible myocardial injury.  >=22 - Abnormal Male indicating possible myocardial injury.   Clinicians would have to utilize clinical acumen, EKG, Troponin, and serial changes to determine if it is an Acute Myocardial Infarction or myocardial injury due to an underlying chronic condition.         Respiratory Panel PCR w/COVID-19(SARS-CoV-2) [facility] In-House, NP Swab in UTM/VTM, 2 HR TAT - Swab, Nasopharynx [[id number]]  (Abnormal) Collected: 12/25/24 1154    Specimen: Swab from Nasopharynx Updated: 12/25/24 1327     ADENOVIRUS, PCR Not Detected     Coronavirus 229E Not Detected     Coronavirus HKU1 Not Detected     Coronavirus NL63 Not Detected     Coronavirus OC43 Not Detected     COVID19 Not Detected     Human Metapneumovirus Not Detected     Human Rhinovirus/Enterovirus Not Detected     Influenza A PCR Not Detected     Influenza B PCR Not Detected     Parainfluenza Virus 1 Not Detected     Parainfluenza Virus 2 Not Detected     Parainfluenza Virus 3 Not Detected     Parainfluenza Virus 4 Not Detected     RSV, PCR Detected     Bordetella pertussis pcr Not Detected     Bordetella parapertussis PCR Not Detected     Chlamydophila pneumoniae PCR Not Detected     Mycoplasma pneumo by PCR Not Detected    Narrative:      In the " setting of a positive respiratory panel with a viral infection PLUS a negative procalcitonin without other underlying concern for bacterial infection, consider observing off antibiotics or discontinuation of antibiotics and continue supportive care. If the respiratory panel is positive for atypical bacterial infection (Bordetella pertussis, Chlamydophila pneumoniae, or Mycoplasma pneumoniae), consider antibiotic de-escalation to target atypical bacterial infection.    High Sensitivity Troponin T 1Hr [[id number]]  (Abnormal) Collected: 12/25/24 1254    Specimen: Blood from Arm, Right Updated: 12/25/24 1321     HS Troponin T 36 ng/L      Troponin T Numeric Delta 3 ng/L      Troponin T % Delta 9 %     Narrative:      High Sensitive Troponin T Reference Range:  <14.0 ng/L- Negative Female for AMI  <22.0 ng/L- Negative Male for AMI  >=14 - Abnormal Female indicating possible myocardial injury.  >=22 - Abnormal Male indicating possible myocardial injury.   Clinicians would have to utilize clinical acumen, EKG, Troponin, and serial changes to determine if it is an Acute Myocardial Infarction or myocardial injury due to an underlying chronic condition.                 XR Chest 1 View   Final Result   1. Interstitial edema in the mid and lower lung zones and small pleural   effusions. Probable CHF.   2. Heart size upper limits of normal. Prior median sternotomy with   bypass surgery and valve replacement.   3. Stable bronchial wall thickening.               This report was signed and finalized on 12/25/2024 12:10 PM by Dr. Mike Perez MD.              ED Course          MDM  Number of Diagnoses or Management Options  COPD exacerbation: new and requires workup  Diagnosis management comments: Patient is positive for RSV but negative for everything else.  His chest x-ray is read as possible heart failure but I think clinically it is more consistent with a respiratory infection on top of his COPD.  He is febrile here.  He is  BNP is elevated but has been similarly in the past.  He had any rate he does require admission for further treatment and oxygen therapy because he does not have at home.  He is admitted in stable condition.       Amount and/or Complexity of Data Reviewed  Clinical lab tests: ordered and reviewed  Tests in the radiology section of CPT®: ordered and reviewed  Tests in the medicine section of CPT®: reviewed and ordered  Decide to obtain previous medical records or to obtain history from someone other than the patient: yes  Discuss the patient with other providers: yes    Risk of Complications, Morbidity, and/or Mortality  Presenting problems: moderate  Diagnostic procedures: moderate  Management options: moderate    Patient Progress  Patient progress: stable        Final diagnoses:   COPD exacerbation          Ramirez Bryant Jr., MD  12/25/24 [id number]

## 2024-12-25 NOTE — ED NOTES
Nursing report ED to floor  Sharad Ryder  64 y.o.  male    HPI:   Chief Complaint   Patient presents with    Shortness of Breath    Cough       Admitting doctor:   Mihir Bailey MD    Consulting provider(s):  Consults       No orders found from 11/26/2024 to 12/26/2024.             Admitting diagnosis:   The encounter diagnosis was COPD exacerbation.    Code status:   Current Code Status       Date Active Code Status Order ID Comments User Context       Prior            Allergies:   Patient has no known allergies.    Intake and Output  No intake or output data in the 24 hours ending 12/25/24 1459    Weight:       12/25/24  1144   Weight: 52.1 kg (114 lb 14.4 oz)       Most recent vitals:   Vitals:    12/25/24 1316 12/25/24 1333 12/25/24 1403 12/25/24 1450   BP: 107/80 120/75 128/74 138/74   BP Location: Right arm   Right arm   Patient Position: Lying   Lying   Pulse: 108 109 100 100   Resp: 22   24   Temp:    99 °F (37.2 °C)   TempSrc:    Oral   SpO2: 97% 94% 94% 96%   Weight:       Height:         Oxygen Therapy: .    Active LDAs/IV Access:   Lines, Drains & Airways       Active LDAs       Name Placement date Placement time Site Days    Peripheral IV 12/25/24 1151 Distal;Left;Posterior Forearm 12/25/24  1151  Forearm  less than 1    Peripheral IV 12/25/24 1151 Anterior;Right Forearm 12/25/24  1151  Forearm  less than 1                    Labs (abnormal labs have a star):   Labs Reviewed   RESPIRATORY PANEL PCR W/ COVID-19 (SARS-COV-2), NP SWAB IN UTM/VTP, 2 HR TAT - Abnormal; Notable for the following components:       Result Value    RSV, PCR Detected (*)     All other components within normal limits    Narrative:     In the setting of a positive respiratory panel with a viral infection PLUS a negative procalcitonin without other underlying concern for bacterial infection, consider observing off antibiotics or discontinuation of antibiotics and continue supportive care. If the respiratory panel is positive  for atypical bacterial infection (Bordetella pertussis, Chlamydophila pneumoniae, or Mycoplasma pneumoniae), consider antibiotic de-escalation to target atypical bacterial infection.   BLOOD GAS, ARTERIAL W/CO-OXIMETRY - Abnormal; Notable for the following components:    pO2, Arterial 58.1 (*)     HCO3, Arterial 26.6 (*)     Base Excess, Arterial 2.1 (*)     O2 Saturation, Arterial 90.6 (*)     Hemoglobin, Blood Gas 12.0 (*)     Hematocrit, Blood Gas 36.8 (*)     Oxyhemoglobin 88.3 (*)     Potassium, Arterial 2.9 (*)     pO2, Temperature Corrected 58.1 (*)     All other components within normal limits   COMPREHENSIVE METABOLIC PANEL - Abnormal; Notable for the following components:    Glucose 132 (*)     Potassium 2.9 (*)     All other components within normal limits    Narrative:     GFR Categories in Chronic Kidney Disease (CKD)      GFR Category          GFR (mL/min/1.73)    Interpretation  G1                     90 or greater         Normal or high (1)  G2                      60-89                Mild decrease (1)  G3a                   45-59                Mild to moderate decrease  G3b                   30-44                Moderate to severe decrease  G4                    15-29                Severe decrease  G5                    14 or less           Kidney failure          (1)In the absence of evidence of kidney disease, neither GFR category G1 or G2 fulfill the criteria for CKD.    eGFR calculation 2021 CKD-EPI creatinine equation, which does not include race as a factor   CBC WITH AUTO DIFFERENTIAL - Abnormal; Notable for the following components:    WBC 13.18 (*)     RBC 3.92 (*)     Hemoglobin 11.7 (*)     Hematocrit 35.1 (*)     Neutrophil % 88.2 (*)     Lymphocyte % 5.5 (*)     Eosinophil % 0.0 (*)     Neutrophils, Absolute 11.62 (*)     All other components within normal limits   BNP (IN-HOUSE) - Abnormal; Notable for the following components:    proBNP 4,589.0 (*)     All other components within  "normal limits    Narrative:     This assay is used as an aid in the diagnosis of individuals suspected of having heart failure. It can be used as an aid in the diagnosis of acute decompensated heart failure (ADHF) in patients presenting with signs and symptoms of ADHF to the emergency department (ED). In addition, NT-proBNP of <300 pg/mL indicates ADHF is not likely.    Age Range Result Interpretation  NT-proBNP Concentration (pg/mL:      <50             Positive            >450                   Gray                 300-450                    Negative             <300    50-75           Positive            >900                  Gray                300-900                  Negative            <300      >75             Positive            >1800                  Gray                300-1800                  Negative            <300   D-DIMER, QUANTITATIVE - Abnormal; Notable for the following components:    D-Dimer, Quantitative 1.13 (*)     All other components within normal limits    Narrative:     According to the assay 's published package insert, a normal (<0.50 MCGFEU/mL) D-dimer result in conjunction with a non-high clinical probability assessment, excludes deep vein thrombosis (DVT) and pulmonary embolism (PE) with high sensitivity.    D-dimer values increase with age and this can make VTE exclusion of an older population difficult. To address this, the [employer], based on best available evidence and recent guidelines, recommends that clinicians use age-adjusted D-dimer thresholds in patients greater than 50 years of age with: a) a low probability of PE who do not meet all Pulmonary Embolism Rule Out Criteria, or b) in those with intermediate probability of PE.   The formula for an age-adjusted D-dimer cut-off is \"age/100\".  For example, a 60 year old patient would have an age-adjusted cut-off of 0.60 MCGFEU/mL and an 80 year old 0.80 MCGFEU/mL.   TROPONIN - Abnormal; Notable " for the following components:    HS Troponin T 33 (*)     All other components within normal limits    Narrative:     High Sensitive Troponin T Reference Range:  <14.0 ng/L- Negative Female for AMI  <22.0 ng/L- Negative Male for AMI  >=14 - Abnormal Female indicating possible myocardial injury.  >=22 - Abnormal Male indicating possible myocardial injury.   Clinicians would have to utilize clinical acumen, EKG, Troponin, and serial changes to determine if it is an Acute Myocardial Infarction or myocardial injury due to an underlying chronic condition.        HIGH SENSITIVITIY TROPONIN T 1HR - Abnormal; Notable for the following components:    HS Troponin T 36 (*)     All other components within normal limits    Narrative:     High Sensitive Troponin T Reference Range:  <14.0 ng/L- Negative Female for AMI  <22.0 ng/L- Negative Male for AMI  >=14 - Abnormal Female indicating possible myocardial injury.  >=22 - Abnormal Male indicating possible myocardial injury.   Clinicians would have to utilize clinical acumen, EKG, Troponin, and serial changes to determine if it is an Acute Myocardial Infarction or myocardial injury due to an underlying chronic condition.        LACTIC ACID, PLASMA - Normal   MAGNESIUM - Normal   BLOOD CULTURE   BLOOD CULTURE   BLOOD GAS, ARTERIAL W/CO-OXIMETRY   CBC AND DIFFERENTIAL    Narrative:     The following orders were created for panel order CBC & Differential.  Procedure                               Abnormality         Status                     ---------                               -----------         ------                     CBC Auto Differential[[id number]]        Abnormal            Final result                 Please view results for these tests on the individual orders.       Meds given in ED:   Medications   sodium chloride 0.9 % flush 10 mL (has no administration in time range)   furosemide (LASIX) injection 40 mg (has no administration in time range)   potassium chloride  (MICRO-K/KLOR-CON) CR capsule (has no administration in time range)   magnesium sulfate 2g/50 mL (PREMIX) infusion (has no administration in time range)   doxycycline (VIBRAMYCIN) 100 mg in sodium chloride 0.9 % 100 mL MBP (has no administration in time range)   methylPREDNISolone sodium succinate (SOLU-Medrol) injection 125 mg (125 mg Intravenous Given 12/25/24 1157)   ibuprofen (ADVIL,MOTRIN) tablet 800 mg (800 mg Oral Given 12/25/24 1219)           NIH Stroke Scale:       Isolation/Infection(s):  No active isolations   RSV     COVID Testing  Collected .  Resulted .    Nursing report ED to floor:  Mental status: . A/O X3  Ambulatory status: .  Precautions: .    ED nurse phone extentsion- ..  [phone]

## 2024-12-25 NOTE — PLAN OF CARE
Goal Outcome Evaluation:   A&Ox4, on 12L hi ena n/c, resting in bed. SOB w/ exertion noted- x1 assist & urinal at bedside to minimize activity at this time. Hourly rounding. Lovenox VTE. IV antibx given. Electrolyte replacement. Sinus/ST on tele monitor. No c/o pain just coughing discomfort. IV lasix given- strict I/O. Fall risk- bed alarm on- safety maintained. VSS. Will continue to monitor until change of shift.

## 2024-12-26 ENCOUNTER — APPOINTMENT (OUTPATIENT)
Dept: CARDIOLOGY | Facility: HOSPITAL | Age: 64
End: 2024-12-26

## 2024-12-26 LAB
ALBUMIN SERPL-MCNC: 3.7 G/DL (ref 3.5–5.2)
ALBUMIN/GLOB SERPL: 1.2 G/DL
ALP SERPL-CCNC: 87 U/L (ref 39–117)
ALT SERPL W P-5'-P-CCNC: 21 U/L (ref 1–41)
ANION GAP SERPL CALCULATED.3IONS-SCNC: 12 MMOL/L (ref 5–15)
ARTERIAL PATENCY WRIST A: POSITIVE
AST SERPL-CCNC: 33 U/L (ref 1–40)
ATMOSPHERIC PRESS: 757 MMHG
BASE EXCESS BLDA CALC-SCNC: 0.8 MMOL/L (ref 0–2)
BDY SITE: ABNORMAL
BILIRUB SERPL-MCNC: 0.3 MG/DL (ref 0–1.2)
BODY TEMPERATURE: 37
BUN SERPL-MCNC: 18 MG/DL (ref 8–23)
BUN/CREAT SERPL: 34.6 (ref 7–25)
CALCIUM SPEC-SCNC: 8.8 MG/DL (ref 8.6–10.5)
CHLORIDE SERPL-SCNC: 105 MMOL/L (ref 98–107)
CHOLEST SERPL-MCNC: 118 MG/DL (ref 0–200)
CO2 SERPL-SCNC: 24 MMOL/L (ref 22–29)
CREAT SERPL-MCNC: 0.52 MG/DL (ref 0.76–1.27)
DEPRECATED RDW RBC AUTO: 47.8 FL (ref 37–54)
EGFRCR SERPLBLD CKD-EPI 2021: 112.6 ML/MIN/1.73
ERYTHROCYTE [DISTWIDTH] IN BLOOD BY AUTOMATED COUNT: 14.2 % (ref 12.3–15.4)
GAS FLOW AIRWAY: 10 LPM
GLOBULIN UR ELPH-MCNC: 3.1 GM/DL
GLUCOSE SERPL-MCNC: 127 MG/DL (ref 65–99)
HBA1C MFR BLD: 5.5 % (ref 4.8–5.6)
HCO3 BLDA-SCNC: 24.9 MMOL/L (ref 20–26)
HCT VFR BLD AUTO: 35.4 % (ref 37.5–51)
HDLC SERPL-MCNC: 51 MG/DL (ref 40–60)
HGB BLD-MCNC: 11.4 G/DL (ref 13–17.7)
LDLC SERPL CALC-MCNC: 55 MG/DL (ref 0–100)
LDLC/HDLC SERPL: 1.1 {RATIO}
Lab: ABNORMAL
MAGNESIUM SERPL-MCNC: 2.5 MG/DL (ref 1.6–2.4)
MCH RBC QN AUTO: 29.4 PG (ref 26.6–33)
MCHC RBC AUTO-ENTMCNC: 32.2 G/DL (ref 31.5–35.7)
MCV RBC AUTO: 91.2 FL (ref 79–97)
MODALITY: ABNORMAL
PCO2 BLDA: 37.4 MM HG (ref 35–45)
PCO2 TEMP ADJ BLD: 37.4 MM HG (ref 35–45)
PH BLDA: 7.43 PH UNITS (ref 7.35–7.45)
PH, TEMP CORRECTED: 7.43 PH UNITS (ref 7.35–7.45)
PLATELET # BLD AUTO: 229 10*3/MM3 (ref 140–450)
PMV BLD AUTO: 9.8 FL (ref 6–12)
PO2 BLDA: 156 MM HG (ref 83–108)
PO2 TEMP ADJ BLD: 156 MM HG (ref 83–108)
POTASSIUM SERPL-SCNC: 4.4 MMOL/L (ref 3.5–5.2)
PROT SERPL-MCNC: 6.8 G/DL (ref 6–8.5)
QT INTERVAL: 328 MS
QTC INTERVAL: 463 MS
RBC # BLD AUTO: 3.88 10*6/MM3 (ref 4.14–5.8)
SAO2 % BLDCOA: 99.4 % (ref 94–99)
SODIUM SERPL-SCNC: 141 MMOL/L (ref 136–145)
TRIGL SERPL-MCNC: 54 MG/DL (ref 0–150)
TSH SERPL DL<=0.05 MIU/L-ACNC: 0.3 UIU/ML (ref 0.27–4.2)
VENTILATOR MODE: ABNORMAL
VLDLC SERPL-MCNC: 12 MG/DL (ref 5–40)
WBC NRBC COR # BLD AUTO: 12.2 10*3/MM3 (ref 3.4–10.8)

## 2024-12-26 PROCEDURE — 94664 DEMO&/EVAL PT USE INHALER: CPT

## 2024-12-26 PROCEDURE — 25010000002 AMPICILLIN-SULBACTAM PER 1.5 G

## 2024-12-26 PROCEDURE — 25010000002 METHYLPREDNISOLONE PER 40 MG

## 2024-12-26 PROCEDURE — 93306 TTE W/DOPPLER COMPLETE: CPT

## 2024-12-26 PROCEDURE — 36600 WITHDRAWAL OF ARTERIAL BLOOD: CPT

## 2024-12-26 PROCEDURE — 87070 CULTURE OTHR SPECIMN AEROBIC: CPT

## 2024-12-26 PROCEDURE — 93306 TTE W/DOPPLER COMPLETE: CPT | Performed by: INTERNAL MEDICINE

## 2024-12-26 PROCEDURE — 94761 N-INVAS EAR/PLS OXIMETRY MLT: CPT

## 2024-12-26 PROCEDURE — 94799 UNLISTED PULMONARY SVC/PX: CPT

## 2024-12-26 PROCEDURE — 82803 BLOOD GASES ANY COMBINATION: CPT

## 2024-12-26 PROCEDURE — 25010000002 METHYLPREDNISOLONE PER 40 MG: Performed by: NURSE PRACTITIONER

## 2024-12-26 PROCEDURE — 85027 COMPLETE CBC AUTOMATED: CPT

## 2024-12-26 PROCEDURE — 80053 COMPREHEN METABOLIC PANEL: CPT

## 2024-12-26 PROCEDURE — 87205 SMEAR GRAM STAIN: CPT

## 2024-12-26 PROCEDURE — 83735 ASSAY OF MAGNESIUM: CPT

## 2024-12-26 PROCEDURE — 25510000001 PERFLUTREN 6.52 MG/ML SUSPENSION: Performed by: FAMILY MEDICINE

## 2024-12-26 PROCEDURE — 80061 LIPID PANEL: CPT

## 2024-12-26 PROCEDURE — 83036 HEMOGLOBIN GLYCOSYLATED A1C: CPT

## 2024-12-26 PROCEDURE — 93356 MYOCRD STRAIN IMG SPCKL TRCK: CPT

## 2024-12-26 PROCEDURE — 84443 ASSAY THYROID STIM HORMONE: CPT

## 2024-12-26 PROCEDURE — 25010000002 ENOXAPARIN PER 10 MG: Performed by: FAMILY MEDICINE

## 2024-12-26 PROCEDURE — 93356 MYOCRD STRAIN IMG SPCKL TRCK: CPT | Performed by: INTERNAL MEDICINE

## 2024-12-26 RX ORDER — GABAPENTIN 400 MG/1
400 CAPSULE ORAL 3 TIMES DAILY
Status: DISCONTINUED | OUTPATIENT
Start: 2024-12-26 | End: 2024-12-29 | Stop reason: HOSPADM

## 2024-12-26 RX ORDER — PREDNISONE 20 MG/1
40 TABLET ORAL
Status: DISCONTINUED | OUTPATIENT
Start: 2024-12-27 | End: 2024-12-29 | Stop reason: HOSPADM

## 2024-12-26 RX ORDER — METHYLPREDNISOLONE SODIUM SUCCINATE 40 MG/ML
40 INJECTION, POWDER, LYOPHILIZED, FOR SOLUTION INTRAMUSCULAR; INTRAVENOUS EVERY 12 HOURS
Status: COMPLETED | OUTPATIENT
Start: 2024-12-26 | End: 2024-12-26

## 2024-12-26 RX ORDER — HYDROCODONE BITARTRATE AND ACETAMINOPHEN 7.5; 325 MG/1; MG/1
1 TABLET ORAL EVERY 6 HOURS PRN
Status: DISCONTINUED | OUTPATIENT
Start: 2024-12-26 | End: 2024-12-29 | Stop reason: HOSPADM

## 2024-12-26 RX ORDER — BENZONATATE 100 MG/1
200 CAPSULE ORAL 3 TIMES DAILY PRN
Status: DISCONTINUED | OUTPATIENT
Start: 2024-12-26 | End: 2024-12-29 | Stop reason: HOSPADM

## 2024-12-26 RX ADMIN — GABAPENTIN 400 MG: 400 CAPSULE ORAL at 16:51

## 2024-12-26 RX ADMIN — IPRATROPIUM BROMIDE AND ALBUTEROL SULFATE 3 ML: .5; 3 SOLUTION RESPIRATORY (INHALATION) at 07:16

## 2024-12-26 RX ADMIN — GABAPENTIN 400 MG: 400 CAPSULE ORAL at 21:07

## 2024-12-26 RX ADMIN — METHYLPREDNISOLONE SODIUM SUCCINATE 40 MG: 40 INJECTION, POWDER, FOR SOLUTION INTRAMUSCULAR; INTRAVENOUS at 09:00

## 2024-12-26 RX ADMIN — BENZONATATE 200 MG: 100 CAPSULE ORAL at 17:47

## 2024-12-26 RX ADMIN — AMPICILLIN SODIUM, SULBACTAM SODIUM 3 G: 2; 1 INJECTION, POWDER, FOR SOLUTION INTRAMUSCULAR; INTRAVENOUS at 12:01

## 2024-12-26 RX ADMIN — NICOTINE 1 PATCH: 21 PATCH, EXTENDED RELEASE TRANSDERMAL at 16:59

## 2024-12-26 RX ADMIN — GABAPENTIN 400 MG: 400 CAPSULE ORAL at 09:25

## 2024-12-26 RX ADMIN — HYDROCODONE BITARTRATE AND ACETAMINOPHEN 1 TABLET: 7.5; 325 TABLET ORAL at 09:25

## 2024-12-26 RX ADMIN — HYDROCODONE BITARTRATE AND ACETAMINOPHEN 1 TABLET: 7.5; 325 TABLET ORAL at 23:49

## 2024-12-26 RX ADMIN — ACETAMINOPHEN 650 MG: 325 TABLET ORAL at 00:15

## 2024-12-26 RX ADMIN — TAMSULOSIN HYDROCHLORIDE 0.4 MG: 0.4 CAPSULE ORAL at 08:49

## 2024-12-26 RX ADMIN — ACETAMINOPHEN 650 MG: 325 TABLET ORAL at 08:49

## 2024-12-26 RX ADMIN — AMPICILLIN SODIUM, SULBACTAM SODIUM 3 G: 2; 1 INJECTION, POWDER, FOR SOLUTION INTRAMUSCULAR; INTRAVENOUS at 05:22

## 2024-12-26 RX ADMIN — DOXYCYCLINE 100 MG: 100 INJECTION, POWDER, LYOPHILIZED, FOR SOLUTION INTRAVENOUS at 04:30

## 2024-12-26 RX ADMIN — ISOSORBIDE MONONITRATE 120 MG: 60 TABLET, EXTENDED RELEASE ORAL at 08:49

## 2024-12-26 RX ADMIN — DOXYCYCLINE 100 MG: 100 INJECTION, POWDER, LYOPHILIZED, FOR SOLUTION INTRAVENOUS at 16:51

## 2024-12-26 RX ADMIN — Medication 10 ML: at 21:07

## 2024-12-26 RX ADMIN — ENOXAPARIN SODIUM 30 MG: 100 INJECTION SUBCUTANEOUS at 21:07

## 2024-12-26 RX ADMIN — AMPICILLIN SODIUM, SULBACTAM SODIUM 3 G: 2; 1 INJECTION, POWDER, FOR SOLUTION INTRAMUSCULAR; INTRAVENOUS at 23:17

## 2024-12-26 RX ADMIN — PERFLUTREN 6.52 MG: 6.52 INJECTION, SUSPENSION INTRAVENOUS at 12:04

## 2024-12-26 RX ADMIN — IPRATROPIUM BROMIDE AND ALBUTEROL SULFATE 3 ML: .5; 3 SOLUTION RESPIRATORY (INHALATION) at 11:09

## 2024-12-26 RX ADMIN — METHYLPREDNISOLONE SODIUM SUCCINATE 40 MG: 40 INJECTION, POWDER, FOR SOLUTION INTRAMUSCULAR; INTRAVENOUS at 21:07

## 2024-12-26 RX ADMIN — METOPROLOL SUCCINATE 50 MG: 50 TABLET, FILM COATED, EXTENDED RELEASE ORAL at 08:49

## 2024-12-26 RX ADMIN — IPRATROPIUM BROMIDE AND ALBUTEROL SULFATE 3 ML: .5; 3 SOLUTION RESPIRATORY (INHALATION) at 15:25

## 2024-12-26 RX ADMIN — HYDROCODONE BITARTRATE AND ACETAMINOPHEN 1 TABLET: 7.5; 325 TABLET ORAL at 16:59

## 2024-12-26 RX ADMIN — IPRATROPIUM BROMIDE AND ALBUTEROL SULFATE 3 ML: .5; 3 SOLUTION RESPIRATORY (INHALATION) at 21:04

## 2024-12-26 RX ADMIN — BUSPIRONE HYDROCHLORIDE 10 MG: 10 TABLET ORAL at 21:07

## 2024-12-26 RX ADMIN — AMPICILLIN SODIUM, SULBACTAM SODIUM 3 G: 2; 1 INJECTION, POWDER, FOR SOLUTION INTRAMUSCULAR; INTRAVENOUS at 17:48

## 2024-12-26 RX ADMIN — Medication 10 ML: at 08:50

## 2024-12-26 RX ADMIN — BENZONATATE 200 MG: 100 CAPSULE ORAL at 09:25

## 2024-12-26 RX ADMIN — ROSUVASTATIN 40 MG: 20 TABLET, FILM COATED ORAL at 21:07

## 2024-12-26 RX ADMIN — AMLODIPINE BESYLATE 10 MG: 10 TABLET ORAL at 08:49

## 2024-12-26 RX ADMIN — BUSPIRONE HYDROCHLORIDE 10 MG: 10 TABLET ORAL at 08:49

## 2024-12-26 RX ADMIN — CLOPIDOGREL BISULFATE 75 MG: 75 TABLET ORAL at 17:47

## 2024-12-26 RX ADMIN — CITALOPRAM HYDROBROMIDE 20 MG: 20 TABLET ORAL at 08:49

## 2024-12-26 NOTE — CASE MANAGEMENT/SOCIAL WORK
Continued Stay Note   [place]     Patient Name: Sharad Ryder  MRN: [id number]  Today's Date: 12/26/2024    Admit Date: 12/25/2024        Discharge Plan       Row Name 12/26/24 1123       Plan    Plan Comments VM left for spouse requesting return call about DC planning screen                   Discharge Codes    No documentation.                       Nely Jarvis RN

## 2024-12-26 NOTE — CASE MANAGEMENT/SOCIAL WORK
Discharge Planning Assessment  [facility]     Patient Name: Sharad Ryder  MRN: [id number]  Today's Date: 12/26/2024    Admit Date: 12/25/2024        Discharge Needs Assessment       Row Name 12/26/24 1137       Living Environment    People in Home spouse    Name(s) of People in Home Ekaterina    Current Living Arrangements home    Primary Care Provided by self    Provides Primary Care For no one    Family Caregiver if Needed spouse    Family Caregiver Names Ekaterina    Able to Return to Prior Arrangements yes       Resource/Environmental Concerns    Resource/Environmental Concerns none       Transition Planning    Patient/Family Anticipates Transition to home with family    Transportation Anticipated family or friend will provide       Discharge Needs Assessment    Readmission Within the Last 30 Days no previous admission in last 30 days    Equipment Currently Used at Home none    Concerns to be Addressed no discharge needs identified    Equipment Needed After Discharge none    Discharge Coordination/Progress spoke to spouse who patient lives with; independent at home prior to illness;  has no RX coverage no insurance at this time refused meds to beds and has a PCP; will follow for DC needs                   Discharge Plan       Row Name 12/26/24 1128       Plan    Plan Comments VM left for spouse requesting return call about DC planning screen                  Continued Care and Services - Admitted Since 12/25/2024    No active coordination exists for this encounter.          Demographic Summary    No documentation.                  Functional Status    No documentation.                  Psychosocial    No documentation.                  Abuse/Neglect    No documentation.                  Legal    No documentation.                  Substance Abuse    No documentation.                  Patient Forms    No documentation.                     Nely Jarvis RN

## 2024-12-26 NOTE — PROGRESS NOTES
[facility] Medicine Services  INPATIENT PROGRESS NOTE    Patient Name: Sharad Ryder  Date of Admission: 12/25/2024  Today's Date: 12/26/24  Length of Stay: 1  Primary Care Physician: Kerry Mtz APRN    Subjective   Chief Complaint: shortness of breath  HPI   He was sitting up in bed.  He is on 1 L nasal cannula with oxygen saturation 94%.  He feels like he is breathing better today.  He has had a productive cough with tan sputum.  No fever.  WBC 12.  He is hopeful for discharge home soon.    Review of Systems   All pertinent negatives and positives are as above. All other systems have been reviewed and are negative unless otherwise stated.     Objective    Temp:  [97.6 °F (36.4 °C)-99 °F (37.2 °C)] 97.9 °F (36.6 °C)  Heart Rate:  [] 95  Resp:  [16-24] 22  BP: (115-148)/(54-75) 141/68  Physical Exam  Vitals reviewed.   Constitutional:       General: He is not in acute distress.     Appearance: He is not toxic-appearing.      Interventions: Nasal cannula in place.   HENT:      Head: Normocephalic and atraumatic.      Mouth/Throat:      Mouth: Mucous membranes are moist.      Pharynx: Oropharynx is clear.   Eyes:      Extraocular Movements: Extraocular movements intact.      Conjunctiva/sclera: Conjunctivae normal.      Pupils: Pupils are equal, round, and reactive to light.   Cardiovascular:      Rate and Rhythm: Normal rate and regular rhythm.      Pulses: Normal pulses.   Pulmonary:      Effort: Pulmonary effort is normal. No respiratory distress.   Abdominal:      General: Bowel sounds are normal. There is no distension.      Palpations: Abdomen is soft.      Tenderness: There is no abdominal tenderness.   Musculoskeletal:         General: No swelling or tenderness. Normal range of motion.      Cervical back: Normal range of motion and neck supple. No muscular tenderness.   Skin:     General: Skin is warm and dry.      Findings: No erythema or rash.  "  Neurological:      General: No focal deficit present.      Mental Status: He is alert and oriented to person, place, and time.      Cranial Nerves: No cranial nerve deficit.      Motor: No weakness.   Psychiatric:         Mood and Affect: Mood normal.         Behavior: Behavior normal.       Results Review:  I have reviewed the labs, radiology results, and diagnostic studies.    Laboratory Data:   Results from last 7 days   Lab Units 12/26/24  0458 12/25/24  1153   WBC 10*3/mm3 12.20* 13.18*   HEMOGLOBIN g/dL 11.4* 11.7*   HEMATOCRIT % 35.4* 35.1*   PLATELETS 10*3/mm3 229 234        Results from last 7 days   Lab Units 12/26/24  0458 12/25/24  1153 12/25/24  1144   SODIUM mmol/L 141 136  --    SODIUM, ARTERIAL mmol/L  --   --  137   POTASSIUM mmol/L 4.4 2.9*  --    CHLORIDE mmol/L 105 98  --    CO2 mmol/L 24.0 26.0  --    BUN mg/dL 18 11  --    CREATININE mg/dL 0.52* 0.78  --    CALCIUM mg/dL 8.8 8.6  --    BILIRUBIN mg/dL 0.3 0.4  --    ALK PHOS U/L 87 99  --    ALT (SGPT) U/L 21 22  --    AST (SGOT) U/L 33 35  --    GLUCOSE mg/dL 127* 132*  --        Culture Data:   Blood Culture   Date Value Ref Range Status   12/25/2024 No growth at 24 hours  Preliminary   12/25/2024 No growth at 24 hours  Preliminary     No results found for: \"BCIDPCR\", \"CXREFLEX\", \"CSFCX\", \"CULTURETIS\"  No results found for: \"CULTURES\", \"HSVCX\", \"URCX\"  No results found for: \"EYECULTURE\", \"GCCX\", \"HSVCULTURE\", \"LABHSV\"  No results found for: \"LEGIONELLA\", \"MRSACX\", \"MUMPSCX\", \"MYCOPLASCX\"  No results found for: \"NOCARDIACX\", \"STOOLCX\"  No results found for: \"THROATCX\", \"UNSTIMCULT\", \"URINECX\", \"CULTURE\", \"VZVCULTUR\"  No results found for: \"VIRALCULTU\", \"WOUNDCX\"    Radiology Data:   Imaging Results (Last 24 Hours)       Procedure Component Value Units Date/Time    CT Angiogram Chest [[id number]] Collected: 12/25/24 1628     Updated: 12/25/24 1638    Narrative:      EXAM: CT ANGIOGRAM CHEST-      DATE: 12/25/2024 3:00 PM     HISTORY: Positive " D-dimer; J44.1-Chronic obstructive pulmonary disease  with (acute) exacerbation       COMPARISON: 1/22/2024.     DOSE LENGTH PRODUCT: 146.41 mGy.cm mGy cm. Automatic exposure control  was utilized to make radiation dose as low as reasonably achievable.     TECHNIQUE: Enhanced CT images of the chest obtained with multiplanar  reformats.     FINDINGS:     MEDIASTINUM/EXTRATHORACIC:   There is cardiomegaly without pericardial  effusion. There is a trace amount of right pleural fluid and a small  left pleural effusion. There is mitral valve calcification and the  patient is status post aortic valve replacement. There is coronary  artery calcification and left ventricular hypertrophy. Mediastinal and  bilateral hilar lymphadenopathy is unchanged. Subcarinal lymphadenopathy  measures 1.8 cm in short axis.     PULMONARY ARTERIES: Pulmonary arteries are opacified and no filling  defects are seen.     LUNGS/AIRWAYS: There is severe emphysema. There is mild scarring at the  lung apices. There are patchy areas of opacity in the lower segment of  the left upper lobe and left lower lobe and to a lesser degree in the  right lung which likely infectious in etiology. A short interval  follow-up CT of the chest in 1 to 3 months is recommended. Interlobar  septal thickening at both lung bases may also represent underlying  edema. Bronchial wall thickening noted likely acute or chronic  bronchitis.        INCLUDED UPPER ABDOMEN: There is calcification of the abdominal aorta  and incompletely imaged abdominal aortic aneurysm measuring 4.3 cm which  is unchanged. There is significant calcification of the proximal  abdominal aorta proximal to the aneurysm. There is  The colon and diverticulosis.     SOFT TISSUES: Submitted soft tissues of the chest are unremarkable.     BONES: There is a chronic compression fracture at T11. Patient is status  post median sternotomy. There is stable diastases of the sternum  superiorly.       Impression:       1. Patchy bilateral areas of opacity in both lungs left greater than  right worrisome for multifocal pneumonia. Follow-up CT chest in 1 to 3  months is recommended.  2. Interlobar septal thickening at both lower lobes at the lung bases  may represent underlying edema.  3. Chronic changes of emphysema and scarring and mediastinal and hilar  lymphadenopathy, unchanged.  4. Trace fluid and small left pleural effusions.  5. Stable aminal aortic aneurysm and severe calcified atherosclerosis.     This report was signed and finalized on 12/25/2024 4:35 PM by Gabe Chapman.               I have reviewed the patient's current medications.     Assessment/Plan   Assessment  Active Hospital Problems    Diagnosis     **Acute exacerbation of chronic obstructive pulmonary disease (COPD)     Aortic stenosis, in a bioprosthetic valve     Diastolic dysfunction     Acute on chronic respiratory failure with hypoxia     Bilateral pneumonia, suspected     Primary hypertension     Tobacco use      Sharad Ryder is a 64-year-old male with a past medical history of anxiety, carotid artery disease, with right carotid endarterectomy, COPD on no oxygen at home, aortic valve stenosis with bioprosthetic valve, hyperlipidemia, CVA with no residual, CABG x 3 in 2018, 1.5 PPD smoker and diastolic dysfunction.  Patient presented to [facility] emergency department with complaints of increasing shortness of breath over the last 3 days.  Patient attempted to take over-the-counter cold medicine as he felt like he had a virus but when he could not catch his breath and his oxygen saturation per his home machine showed 72 he called EMS.  When EMS arrived patient's oxygen saturation was 78% on room air.  He presented with compensated ABGs with a pH of 7.429, pCO2 of 40.2, pO2 of 58.1.  Patient was placed on 2 L of oxygen and had to be titrated up to 12 L high flow.  In addition patient was febrile with a temperature of 101.3 upon arrival.   Comprehensive respiratory panel positive for RSV. Last echocardiogram revealed a moderate-severe aortic stenosis present in his bioprosthetic aortic valve with recommendations for repeat echocardiogram in August. CRX revealed interstitial edema in the mid and lower lung zones and small pleural effusion, probable CHF, stable bronchial wall thickening.  CTA chest showed patchy bilateral areas of opacity in both lungs left greater than right worrisome for multifocal pneumonia. Follow-up CT chest in 1 to 3 months is recommended.     Treatment Plan  Acute exacerbation of COPD-acute on chronic respiratory failure with hypoxia-patient presented per EMS with reported 78% oxygenation on room air.  ABGs revealed a compensated pH of 7.429, with a pO2 of 58. Patient currently stable on 4 L. Initiate Solu-Medrol 40 mg every 12 hours, empiric doxycycline, I-S OPEP, pulmonary hygiene and supplemental oxygenation.    CTA chest showed patchy bilateral areas of opacity in both lungs left greater than right worrisome for multifocal pneumonia.  Comprehensive respiratory panel positive for RSV.  Strep pneumo and Legionella urinary antigens negative.  MRSA nasal swab positive.  Blood cultures with no growth to date.  Continue Unasyn and doxycycline.     Aortic stenosis-repeat echocardiogram was supposed to be obtained in August of this year to evaluate moderate-severe aortic stenosis and bioprosthetic valve. Echocardiogram pending to evaluate.      Diastolic dysfunction-BNP of 4589 upon arrival.  He was given 1 dose of IV Lasix.  Continue aspirin, Imdur, metoprolol.     Primary hypertension-continue home metoprolol and Imdur ,initiate every 4 vital signs.     Tobacco abuse-NicoDerm CQ initiated, continue smoking cessation.     VTE prophylaxis with SCDs  Labs in a.m.    Medical Decision Making  Acute exacerbation of COPD, acute, high complexity, unchanged  Acute on chronic respiratory failure with hypoxia, chronic, moderate complexity,  unchanged  Aortic stenosis, acute on chronic, moderate complexity, unchanged  Diastolic dysfunction, acute on chronic, moderate complexity, unchanged  Primary hypertension, chronic, moderate complexity, unchanged  Tobacco abuse, chronic, moderate complexity, unchanged  Number and Complexity of problems: 6  Differential Diagnosis: None    Conditions and Status        Condition is unchanged.     [facility] Data  External documents reviewed: Prior epic records  Cardiac tracing (EKG, telemetry) interpretation: Reviewed  Radiology interpretation: Interpreted by radiology  Labs reviewed: As above  Any tests that were considered but not ordered: None considered at present     Decision rules/scores evaluated (example [id number], Wells, etc): None considered at present     Discussed with: Patient and Dr. Bailey     Care Planning  Shared decision making: Patient is agreeable to ongoing workup and treatment  Code status and discussions: Full code with limited support to include no intubation    Disposition  Social Determinants of Health that impact treatment or disposition: none  I expect the patient to be discharged to home in 1-2 days.     Electronically signed by KIRBY Gaviria, 12/26/24, 13:19 CST.

## 2024-12-26 NOTE — PLAN OF CARE
Goal Outcome Evaluation:  Plan of Care Reviewed With: patient        Progress: no change  Outcome Evaluation: AOx4, Hi ena 10L humidfied NC, productive cough, waiting for sputum sample from patient. Severe SOB when patient does any activity, including eating and talking. C/o pain from coughing. PRN pain medication given, good relief provided. IV ABX infused per orders. Electrolyte replacement protcol in place. Safety precautions maintained with call light within reach.  RT turned the Pt to 8L hi flow NC at 0444.

## 2024-12-27 ENCOUNTER — APPOINTMENT (OUTPATIENT)
Dept: GENERAL RADIOLOGY | Facility: HOSPITAL | Age: 64
End: 2024-12-27

## 2024-12-27 LAB
ANION GAP SERPL CALCULATED.3IONS-SCNC: 11 MMOL/L (ref 5–15)
AV MEAN PRESS GRAD SYS DOP V1V2: 33.3 MMHG
AV VMAX SYS DOP: 441 CM/SEC
BH CV ECHO MEAS - AO MAX PG: 77.8 MMHG
BH CV ECHO MEAS - AO V2 VTI: 73.4 CM
BH CV ECHO MEAS - AVA(I,D): 1.13 CM2
BH CV ECHO MEAS - EDV(CUBED): 57.1 ML
BH CV ECHO MEAS - EDV(MOD-SP2): 121 ML
BH CV ECHO MEAS - EDV(MOD-SP4): 85.4 ML
BH CV ECHO MEAS - EF(MOD-SP2): 82.1 %
BH CV ECHO MEAS - EF(MOD-SP4): 65.3 %
BH CV ECHO MEAS - ESV(CUBED): 18.8 ML
BH CV ECHO MEAS - ESV(MOD-SP2): 21.6 ML
BH CV ECHO MEAS - ESV(MOD-SP4): 29.6 ML
BH CV ECHO MEAS - FS: 30.9 %
BH CV ECHO MEAS - IVS/LVPW: 1.17 CM
BH CV ECHO MEAS - IVSD: 1.5 CM
BH CV ECHO MEAS - LA DIMENSION: 4.5 CM
BH CV ECHO MEAS - LAT PEAK E' VEL: 7.7 CM/SEC
BH CV ECHO MEAS - LV DIASTOLIC VOL/BSA (35-75): 55.2 CM2
BH CV ECHO MEAS - LV MASS(C)D: 150.5 GRAMS
BH CV ECHO MEAS - LV MAX PG: 7.8 MMHG
BH CV ECHO MEAS - LV MEAN PG: 4 MMHG
BH CV ECHO MEAS - LV SYSTOLIC VOL/BSA (12-30): 19.1 CM2
BH CV ECHO MEAS - LV V1 MAX: 139.2 CM/SEC
BH CV ECHO MEAS - LV V1 VTI: 29.2 CM
BH CV ECHO MEAS - LVIDD: 3.9 CM
BH CV ECHO MEAS - LVIDS: 2.7 CM
BH CV ECHO MEAS - LVOT AREA: 2.8 CM2
BH CV ECHO MEAS - LVOT DIAM: 1.9 CM
BH CV ECHO MEAS - LVPWD: 1.5 CM
BH CV ECHO MEAS - MED PEAK E' VEL: 4.8 CM/SEC
BH CV ECHO MEAS - MV A MAX VEL: 146 CM/SEC
BH CV ECHO MEAS - MV DEC SLOPE: 1089 CM/SEC2
BH CV ECHO MEAS - MV DEC TIME: 0.18 SEC
BH CV ECHO MEAS - MV E MAX VEL: 211 CM/SEC
BH CV ECHO MEAS - MV E/A: 1.45
BH CV ECHO MEAS - MV MAX PG: 28.3 MMHG
BH CV ECHO MEAS - MV MEAN PG: 13 MMHG
BH CV ECHO MEAS - MV P1/2T: 72.1 MSEC
BH CV ECHO MEAS - MV V2 VTI: 61.4 CM
BH CV ECHO MEAS - MVA(P1/2T): 3.1 CM2
BH CV ECHO MEAS - MVA(VTI): 1.35 CM2
BH CV ECHO MEAS - PA V2 MAX: 112.3 CM/SEC
BH CV ECHO MEAS - RVSP: 27 MMHG
BH CV ECHO MEAS - SV(LVOT): 82.8 ML
BH CV ECHO MEAS - SV(MOD-SP2): 99.4 ML
BH CV ECHO MEAS - SV(MOD-SP4): 55.8 ML
BH CV ECHO MEAS - SVI(LVOT): 53.5 ML/M2
BH CV ECHO MEAS - SVI(MOD-SP2): 64.3 ML/M2
BH CV ECHO MEAS - SVI(MOD-SP4): 36.1 ML/M2
BH CV ECHO MEAS - TAPSE (>1.6): 1.79 CM
BH CV ECHO MEAS - TR MAX PG: 22.1 MMHG
BH CV ECHO MEAS - TR MAX VEL: 235 CM/SEC
BH CV ECHO MEASUREMENTS AVERAGE E/E' RATIO: 33.76
BH CV XLRA - TDI S': 9.6 CM/SEC
BUN SERPL-MCNC: 17 MG/DL (ref 8–23)
BUN/CREAT SERPL: 32.1 (ref 7–25)
CALCIUM SPEC-SCNC: 8.7 MG/DL (ref 8.6–10.5)
CHLORIDE SERPL-SCNC: 102 MMOL/L (ref 98–107)
CO2 SERPL-SCNC: 28 MMOL/L (ref 22–29)
CREAT SERPL-MCNC: 0.53 MG/DL (ref 0.76–1.27)
DEPRECATED RDW RBC AUTO: 49.4 FL (ref 37–54)
EGFRCR SERPLBLD CKD-EPI 2021: 111.9 ML/MIN/1.73
ERYTHROCYTE [DISTWIDTH] IN BLOOD BY AUTOMATED COUNT: 14.6 % (ref 12.3–15.4)
GLUCOSE SERPL-MCNC: 156 MG/DL (ref 65–99)
HCT VFR BLD AUTO: 34.5 % (ref 37.5–51)
HGB BLD-MCNC: 11.4 G/DL (ref 13–17.7)
LV EF BIPLANE MOD: 75.5 %
MCH RBC QN AUTO: 30.2 PG (ref 26.6–33)
MCHC RBC AUTO-ENTMCNC: 33 G/DL (ref 31.5–35.7)
MCV RBC AUTO: 91.5 FL (ref 79–97)
PLATELET # BLD AUTO: 278 10*3/MM3 (ref 140–450)
PMV BLD AUTO: 9.7 FL (ref 6–12)
POTASSIUM SERPL-SCNC: 3.6 MMOL/L (ref 3.5–5.2)
POTASSIUM SERPL-SCNC: 4.2 MMOL/L (ref 3.5–5.2)
RBC # BLD AUTO: 3.77 10*6/MM3 (ref 4.14–5.8)
SODIUM SERPL-SCNC: 141 MMOL/L (ref 136–145)
WBC NRBC COR # BLD AUTO: 13.15 10*3/MM3 (ref 3.4–10.8)

## 2024-12-27 PROCEDURE — 94799 UNLISTED PULMONARY SVC/PX: CPT

## 2024-12-27 PROCEDURE — 71046 X-RAY EXAM CHEST 2 VIEWS: CPT

## 2024-12-27 PROCEDURE — 25010000002 AMPICILLIN-SULBACTAM PER 1.5 G

## 2024-12-27 PROCEDURE — 25010000002 ENOXAPARIN PER 10 MG: Performed by: FAMILY MEDICINE

## 2024-12-27 PROCEDURE — 63710000001 PREDNISONE PER 1 MG: Performed by: NURSE PRACTITIONER

## 2024-12-27 PROCEDURE — 94664 DEMO&/EVAL PT USE INHALER: CPT

## 2024-12-27 PROCEDURE — 84132 ASSAY OF SERUM POTASSIUM: CPT | Performed by: NURSE PRACTITIONER

## 2024-12-27 PROCEDURE — 94761 N-INVAS EAR/PLS OXIMETRY MLT: CPT

## 2024-12-27 PROCEDURE — 85027 COMPLETE CBC AUTOMATED: CPT | Performed by: NURSE PRACTITIONER

## 2024-12-27 PROCEDURE — 80048 BASIC METABOLIC PNL TOTAL CA: CPT | Performed by: NURSE PRACTITIONER

## 2024-12-27 RX ORDER — DOXYCYCLINE 100 MG/1
100 TABLET ORAL EVERY 12 HOURS SCHEDULED
Status: DISCONTINUED | OUTPATIENT
Start: 2024-12-27 | End: 2024-12-29 | Stop reason: HOSPADM

## 2024-12-27 RX ORDER — LORAZEPAM 2 MG/ML
0.5 CONCENTRATE ORAL EVERY 12 HOURS PRN
Status: DISCONTINUED | OUTPATIENT
Start: 2024-12-27 | End: 2024-12-29 | Stop reason: HOSPADM

## 2024-12-27 RX ORDER — POTASSIUM CHLORIDE 1500 MG/1
40 TABLET, EXTENDED RELEASE ORAL EVERY 4 HOURS
Status: COMPLETED | OUTPATIENT
Start: 2024-12-27 | End: 2024-12-27

## 2024-12-27 RX ORDER — LORAZEPAM 1 MG/1
1 TABLET ORAL ONCE AS NEEDED
Status: COMPLETED | OUTPATIENT
Start: 2024-12-27 | End: 2024-12-27

## 2024-12-27 RX ADMIN — POTASSIUM CHLORIDE 40 MEQ: 20 TABLET, EXTENDED RELEASE ORAL at 20:20

## 2024-12-27 RX ADMIN — ISOSORBIDE MONONITRATE 120 MG: 60 TABLET, EXTENDED RELEASE ORAL at 08:06

## 2024-12-27 RX ADMIN — BUSPIRONE HYDROCHLORIDE 10 MG: 10 TABLET ORAL at 20:20

## 2024-12-27 RX ADMIN — DOXYCYCLINE 100 MG: 100 TABLET ORAL at 17:43

## 2024-12-27 RX ADMIN — TAMSULOSIN HYDROCHLORIDE 0.4 MG: 0.4 CAPSULE ORAL at 08:06

## 2024-12-27 RX ADMIN — IPRATROPIUM BROMIDE AND ALBUTEROL SULFATE 3 ML: .5; 3 SOLUTION RESPIRATORY (INHALATION) at 21:11

## 2024-12-27 RX ADMIN — HYDROCODONE BITARTRATE AND ACETAMINOPHEN 1 TABLET: 7.5; 325 TABLET ORAL at 16:05

## 2024-12-27 RX ADMIN — BENZONATATE 200 MG: 100 CAPSULE ORAL at 05:52

## 2024-12-27 RX ADMIN — ROSUVASTATIN 40 MG: 20 TABLET, FILM COATED ORAL at 20:20

## 2024-12-27 RX ADMIN — BUSPIRONE HYDROCHLORIDE 10 MG: 10 TABLET ORAL at 08:06

## 2024-12-27 RX ADMIN — METOPROLOL SUCCINATE 50 MG: 50 TABLET, FILM COATED, EXTENDED RELEASE ORAL at 08:06

## 2024-12-27 RX ADMIN — GABAPENTIN 400 MG: 400 CAPSULE ORAL at 16:05

## 2024-12-27 RX ADMIN — HYDROCODONE BITARTRATE AND ACETAMINOPHEN 1 TABLET: 7.5; 325 TABLET ORAL at 08:06

## 2024-12-27 RX ADMIN — IPRATROPIUM BROMIDE AND ALBUTEROL SULFATE 3 ML: .5; 3 SOLUTION RESPIRATORY (INHALATION) at 07:16

## 2024-12-27 RX ADMIN — AMLODIPINE BESYLATE 10 MG: 10 TABLET ORAL at 08:06

## 2024-12-27 RX ADMIN — GABAPENTIN 400 MG: 400 CAPSULE ORAL at 20:20

## 2024-12-27 RX ADMIN — IPRATROPIUM BROMIDE AND ALBUTEROL SULFATE 3 ML: .5; 3 SOLUTION RESPIRATORY (INHALATION) at 16:03

## 2024-12-27 RX ADMIN — IPRATROPIUM BROMIDE AND ALBUTEROL SULFATE 3 ML: .5; 3 SOLUTION RESPIRATORY (INHALATION) at 11:19

## 2024-12-27 RX ADMIN — POTASSIUM CHLORIDE 40 MEQ: 20 TABLET, EXTENDED RELEASE ORAL at 16:05

## 2024-12-27 RX ADMIN — AMPICILLIN SODIUM, SULBACTAM SODIUM 3 G: 2; 1 INJECTION, POWDER, FOR SOLUTION INTRAMUSCULAR; INTRAVENOUS at 08:22

## 2024-12-27 RX ADMIN — LORAZEPAM 0.5 MG: 2 SOLUTION, CONCENTRATE ORAL at 16:05

## 2024-12-27 RX ADMIN — AMPICILLIN SODIUM, SULBACTAM SODIUM 3 G: 2; 1 INJECTION, POWDER, FOR SOLUTION INTRAMUSCULAR; INTRAVENOUS at 23:55

## 2024-12-27 RX ADMIN — DOXYCYCLINE 100 MG: 100 TABLET ORAL at 05:48

## 2024-12-27 RX ADMIN — CLOPIDOGREL BISULFATE 75 MG: 75 TABLET ORAL at 17:43

## 2024-12-27 RX ADMIN — GABAPENTIN 400 MG: 400 CAPSULE ORAL at 08:06

## 2024-12-27 RX ADMIN — ENOXAPARIN SODIUM 30 MG: 100 INJECTION SUBCUTANEOUS at 20:20

## 2024-12-27 RX ADMIN — AMPICILLIN SODIUM, SULBACTAM SODIUM 3 G: 2; 1 INJECTION, POWDER, FOR SOLUTION INTRAMUSCULAR; INTRAVENOUS at 11:57

## 2024-12-27 RX ADMIN — LORAZEPAM 1 MG: 1 TABLET ORAL at 05:48

## 2024-12-27 RX ADMIN — AMPICILLIN SODIUM, SULBACTAM SODIUM 3 G: 2; 1 INJECTION, POWDER, FOR SOLUTION INTRAMUSCULAR; INTRAVENOUS at 17:41

## 2024-12-27 RX ADMIN — CITALOPRAM HYDROBROMIDE 20 MG: 20 TABLET ORAL at 08:05

## 2024-12-27 RX ADMIN — DOXYCYCLINE 100 MG: 100 INJECTION, POWDER, LYOPHILIZED, FOR SOLUTION INTRAVENOUS at 04:26

## 2024-12-27 RX ADMIN — PREDNISONE 40 MG: 20 TABLET ORAL at 08:05

## 2024-12-27 RX ADMIN — Medication 10 ML: at 20:20

## 2024-12-27 RX ADMIN — BENZONATATE 200 MG: 100 CAPSULE ORAL at 16:05

## 2024-12-27 NOTE — PROGRESS NOTES
Dr. Leon has discussed with Dr. Volodymyr Carrillo for outpatient follow-up with Dr. Robin to discuss bioprosthetic aortic valve stenosis.  No acute intervention needed.

## 2024-12-27 NOTE — PROGRESS NOTES
[facility]  INPATIENT PROGRESS NOTE    Patient Name: Sharad Ryder  Date of Admission: 12/25/2024  Today's Date: 12/27/24  Length of Stay: 2  Primary Care Physician: Kerry Mtz APRN    Subjective   Chief Complaint: shortness of breath  HPI   He was sitting up in bed.  He tells me that he had a rough night.  His main complaint was shortness of breath with exertion when he was getting up to use the bathroom.  Per RN, he would desat to the mid 80s.  His oxygen was ultimately increased to 7 L.  Repeat chest x-ray this afternoon shows stable infiltrates, no worsening consolidation, lungs are well-expanded.  No fever.  WBC 13.  He continues on Unasyn and doxycycline.    Review of Systems   All pertinent negatives and positives are as above. All other systems have been reviewed and are negative unless otherwise stated.     Objective    Temp:  [97.8 °F (36.6 °C)-98.6 °F (37 °C)] 98.4 °F (36.9 °C)  Heart Rate:  [] 125  Resp:  [16-24] 18  BP: (105-167)/(55-86) 105/65  Physical Exam  Vitals reviewed.   Constitutional:       General: He is not in acute distress.     Appearance: He is not toxic-appearing.      Interventions: Nasal cannula in place.   HENT:      Head: Normocephalic and atraumatic.      Mouth/Throat:      Mouth: Mucous membranes are moist.      Pharynx: Oropharynx is clear.   Eyes:      Extraocular Movements: Extraocular movements intact.      Conjunctiva/sclera: Conjunctivae normal.      Pupils: Pupils are equal, round, and reactive to light.   Cardiovascular:      Rate and Rhythm: Normal rate and regular rhythm.      Pulses: Normal pulses.      Heart sounds: Murmur heard.   Pulmonary:      Effort: Pulmonary effort is normal. No respiratory distress.   Abdominal:      General: Bowel sounds are normal. There is no distension.      Palpations: Abdomen is soft.      Tenderness: There is no abdominal tenderness.   Musculoskeletal:         General:  "No swelling or tenderness. Normal range of motion.      Cervical back: Normal range of motion and neck supple. No muscular tenderness.   Skin:     General: Skin is warm and dry.      Findings: No erythema or rash.   Neurological:      General: No focal deficit present.      Mental Status: He is alert and oriented to person, place, and time.      Cranial Nerves: No cranial nerve deficit.      Motor: No weakness.   Psychiatric:         Mood and Affect: Mood normal.         Behavior: Behavior normal.       Results Review:  I have reviewed the labs, radiology results, and diagnostic studies.    Laboratory Data:   Results from last 7 days   Lab Units 12/27/24  1238 12/26/24  0458 12/25/24  1153   WBC 10*3/mm3 13.15* 12.20* 13.18*   HEMOGLOBIN g/dL 11.4* 11.4* 11.7*   HEMATOCRIT % 34.5* 35.4* 35.1*   PLATELETS 10*3/mm3 278 229 234        Results from last 7 days   Lab Units 12/26/24  0458 12/25/24  1153 12/25/24  1144   SODIUM mmol/L 141 136  --    SODIUM, ARTERIAL mmol/L  --   --  137   POTASSIUM mmol/L 4.4 2.9*  --    CHLORIDE mmol/L 105 98  --    CO2 mmol/L 24.0 26.0  --    BUN mg/dL 18 11  --    CREATININE mg/dL 0.52* 0.78  --    CALCIUM mg/dL 8.8 8.6  --    BILIRUBIN mg/dL 0.3 0.4  --    ALK PHOS U/L 87 99  --    ALT (SGPT) U/L 21 22  --    AST (SGOT) U/L 33 35  --    GLUCOSE mg/dL 127* 132*  --        Culture Data:   Blood Culture   Date Value Ref Range Status   12/25/2024 No growth at 24 hours  Preliminary   12/25/2024 No growth at 24 hours  Preliminary     No results found for: \"BCIDPCR\", \"CXREFLEX\", \"CSFCX\", \"CULTURETIS\"  No results found for: \"CULTURES\", \"HSVCX\", \"URCX\"  No results found for: \"EYECULTURE\", \"GCCX\", \"HSVCULTURE\", \"LABHSV\"  No results found for: \"LEGIONELLA\", \"MRSACX\", \"MUMPSCX\", \"MYCOPLASCX\"  No results found for: \"NOCARDIACX\", \"STOOLCX\"  No results found for: \"THROATCX\", \"UNSTIMCULT\", \"URINECX\", \"CULTURE\", \"VZVCULTUR\"  No results found for: \"VIRALCULTU\", \"WOUNDCX\"    Radiology Data:   Imaging " Results (Last 24 Hours)       ** No results found for the last 24 hours. **            I have reviewed the patient's current medications.     Assessment/Plan   Assessment  Active Hospital Problems    Diagnosis     **Acute exacerbation of chronic obstructive pulmonary disease (COPD)     Aortic stenosis, in a bioprosthetic valve     Diastolic dysfunction     Acute on chronic respiratory failure with hypoxia     Bilateral pneumonia, suspected     Primary hypertension     Tobacco use      Sharad Ryder is a 64-year-old male with a past medical history of anxiety, carotid artery disease, with right carotid endarterectomy, COPD on no oxygen at home, aortic valve stenosis with bioprosthetic valve, hyperlipidemia, CVA with no residual, CABG x 3 in 2018, 1.5 PPD smoker and diastolic dysfunction.  Patient presented to [facility], [place], operated by [facility] emergency department with complaints of increasing shortness of breath over the last 3 days.  Patient attempted to take over-the-counter cold medicine as he felt like he had a virus but when he could not catch his breath and his oxygen saturation per his home machine showed 72 he called EMS.  When EMS arrived patient's oxygen saturation was 78% on room air.  He presented with compensated ABGs with a pH of 7.429, pCO2 of 40.2, pO2 of 58.1.  Patient was placed on 2 L of oxygen and had to be titrated up to 12 L high flow.  In addition patient was febrile with a temperature of 101.3 upon arrival.  Comprehensive respiratory panel positive for RSV. Last echocardiogram revealed a moderate-severe aortic stenosis present in his bioprosthetic aortic valve with recommendations for repeat echocardiogram in August. CRX revealed interstitial edema in the mid and lower lung zones and small pleural effusion, probable CHF, stable bronchial wall thickening.  CTA chest showed patchy bilateral areas of opacity in both lungs left greater than right worrisome for multifocal pneumonia. Follow-up CT chest in 1 to 3 months is  recommended.     Treatment Plan  Acute exacerbation of COPD-acute on chronic respiratory failure with hypoxia-patient presented per EMS with reported 78% oxygenation on room air.  ABGs revealed a compensated pH of 7.429, with a pO2 of 58. Patient currently stable on 4 L. Continue prednisone, I-S OPEP, pulmonary hygiene and supplemental oxygenation.    Hypoxic initially requiring 12 L on admission.  He was weaned to 1 L when I saw him yesterday.  Overnight, he had increased oxygen demand up to 7 L. No fever, WBC 13.  Repeat chest x-ray.    CTA chest showed patchy bilateral areas of opacity in both lungs left greater than right worrisome for multifocal pneumonia.  Comprehensive respiratory panel positive for RSV.  Strep pneumo and Legionella urinary antigens negative.  MRSA nasal swab positive.  Blood cultures with no growth to date.  Continue Unasyn and doxycycline.     Aortic stenosis-repeat echocardiogram was supposed to be obtained in August of this year to evaluate moderate-severe aortic stenosis and bioprosthetic valve.   Results for orders placed during the hospital encounter of 12/25/24    Adult Transthoracic Echo Complete w/ Color, Spectral and Contrast if Necessary Per Protocol    Interpretation Summary    Left ventricular ejection fraction appears to be 66 - 70%.    Left ventricular wall thickness is consistent with moderate concentric hypertrophy.    The following left ventricular wall segments are hypokinetic: apical septal and apex hypokinetic.    Left ventricular diastolic function is consistent with (grade II w/high LAP) pseudonormalization.    The left atrial cavity is mildly dilated.    Severe bioprosthetic aortic valve stenosis is present.    Peak velocity of the flow distal to the aortic valve is 441 cm/s. Aortic valve maximum pressure gradient is 78 mmHg.    Estimated right ventricular systolic pressure from tricuspid regurgitation is normal (<35 mmHg).     Diastolic dysfunction-BNP of 4589 upon  arrival.  He was given 1 dose of IV Lasix.  Continue aspirin, Imdur, metoprolol.     Primary hypertension-continue home metoprolol and Imdur ,initiate every 4 vital signs.     Tobacco abuse-NicoDerm CQ initiated, continue smoking cessation.     VTE prophylaxis with SCDs  Labs in a.m.    Medical Decision Making  Acute exacerbation of COPD, acute, high complexity, unchanged  Acute on chronic respiratory failure with hypoxia, chronic, moderate complexity, unchanged  Aortic stenosis, acute on chronic, moderate complexity, unchanged  Diastolic dysfunction, acute on chronic, moderate complexity, unchanged  Primary hypertension, chronic, moderate complexity, unchanged  Tobacco abuse, chronic, moderate complexity, unchanged  Number and Complexity of problems: 6  Differential Diagnosis: None    Conditions and Status        Condition is unchanged.     [facility] Data  External documents reviewed: Prior epic records  Cardiac tracing (EKG, telemetry) interpretation: Reviewed  Radiology interpretation: Interpreted by radiology  Labs reviewed: As above  Any tests that were considered but not ordered: None considered at present     Decision rules/scores evaluated (example [id number], Wells, etc): None considered at present     Discussed with: Patient and Dr. Bailey     Care Planning  Shared decision making: Patient is agreeable to ongoing workup and treatment  Code status and discussions: Full code with limited support to include no intubation    Disposition  Social Determinants of Health that impact treatment or disposition: none  I expect the patient to be discharged to home in 1-2 days.     Electronically signed by KIRBY Gaviria, 12/27/24, 13:14 CST.

## 2024-12-27 NOTE — PLAN OF CARE
Goal Outcome Evaluation:           Progress: improving  Outcome Evaluation: A&Ox4. 2-4L High flow NC, humidified. SOA with exertion, requiring more oxygen to recover. Up ad linda. Voiding via urinal. BM this shift, per pt. Telemetry in place. IV abx given per orders. C/o pain, soreness from coughing, PRN tesslon and pain medicine received and given with relief. Echo completed this shift. Call light within reach.

## 2024-12-27 NOTE — PLAN OF CARE
Goal Outcome Evaluation: Arrived to complete PT eval.  Pt reports he has been getting up and denies need for skilled PT service.  Pt reports he is ready to go home.  Education re: importance of activity prior to discharge to improve activity tolerance and safety.  Pt reports I w/ no needs for skilled PT services.  Reports he is just tired today because he did not sleep well last night.  PT to sign off at this time.  Please reconsult if anything changes.

## 2024-12-27 NOTE — PLAN OF CARE
Goal Outcome Evaluation:  Plan of Care Reviewed With: patient        Progress: improving  Outcome Evaluation: AOx4, anxious, SOB with exertion. O2 titrated back up to 7L hi ena NC. RT called, assessd patient, discussed that pt anxiety is potiental factory worsening patient desating and he would befit from PRN anti-anxiety medication and PRN breathing treatments. MD was contacted and made aware. Wife is currently at bedside and keeping pt calm. Safety precautions maintained with call light within reach.

## 2024-12-27 NOTE — PLAN OF CARE
Goal Outcome Evaluation:           Progress: improving  Outcome Evaluation: A&Ox4. 3L NC. Up ad linda to BR. New IV this shift. IV abx given per orders. C/o anxiety, new orders for PRN meds received. C/o pain, prn pain meds given with relief. Call light within reach.

## 2024-12-28 PROCEDURE — 25010000002 ENOXAPARIN PER 10 MG: Performed by: FAMILY MEDICINE

## 2024-12-28 PROCEDURE — 63710000001 PREDNISONE PER 1 MG: Performed by: NURSE PRACTITIONER

## 2024-12-28 PROCEDURE — 94799 UNLISTED PULMONARY SVC/PX: CPT

## 2024-12-28 PROCEDURE — 94762 N-INVAS EAR/PLS OXIMTRY CONT: CPT

## 2024-12-28 PROCEDURE — 94761 N-INVAS EAR/PLS OXIMETRY MLT: CPT

## 2024-12-28 PROCEDURE — 94664 DEMO&/EVAL PT USE INHALER: CPT

## 2024-12-28 PROCEDURE — 25010000002 AMPICILLIN-SULBACTAM PER 1.5 G

## 2024-12-28 RX ADMIN — HYDROCODONE BITARTRATE AND ACETAMINOPHEN 1 TABLET: 7.5; 325 TABLET ORAL at 04:58

## 2024-12-28 RX ADMIN — BUSPIRONE HYDROCHLORIDE 10 MG: 10 TABLET ORAL at 20:43

## 2024-12-28 RX ADMIN — LORAZEPAM 0.5 MG: 2 SOLUTION, CONCENTRATE ORAL at 20:43

## 2024-12-28 RX ADMIN — ENOXAPARIN SODIUM 30 MG: 100 INJECTION SUBCUTANEOUS at 20:43

## 2024-12-28 RX ADMIN — GABAPENTIN 400 MG: 400 CAPSULE ORAL at 16:36

## 2024-12-28 RX ADMIN — GABAPENTIN 400 MG: 400 CAPSULE ORAL at 08:46

## 2024-12-28 RX ADMIN — Medication 10 ML: at 20:43

## 2024-12-28 RX ADMIN — AMPICILLIN SODIUM, SULBACTAM SODIUM 3 G: 2; 1 INJECTION, POWDER, FOR SOLUTION INTRAMUSCULAR; INTRAVENOUS at 23:34

## 2024-12-28 RX ADMIN — PREDNISONE 40 MG: 20 TABLET ORAL at 08:46

## 2024-12-28 RX ADMIN — NICOTINE 1 PATCH: 21 PATCH, EXTENDED RELEASE TRANSDERMAL at 16:36

## 2024-12-28 RX ADMIN — CLOPIDOGREL BISULFATE 75 MG: 75 TABLET ORAL at 18:44

## 2024-12-28 RX ADMIN — TAMSULOSIN HYDROCHLORIDE 0.4 MG: 0.4 CAPSULE ORAL at 08:46

## 2024-12-28 RX ADMIN — AMLODIPINE BESYLATE 10 MG: 10 TABLET ORAL at 08:41

## 2024-12-28 RX ADMIN — BUSPIRONE HYDROCHLORIDE 10 MG: 10 TABLET ORAL at 08:46

## 2024-12-28 RX ADMIN — DOXYCYCLINE 100 MG: 100 TABLET ORAL at 06:20

## 2024-12-28 RX ADMIN — HYDROCODONE BITARTRATE AND ACETAMINOPHEN 1 TABLET: 7.5; 325 TABLET ORAL at 16:36

## 2024-12-28 RX ADMIN — GABAPENTIN 400 MG: 400 CAPSULE ORAL at 20:43

## 2024-12-28 RX ADMIN — IPRATROPIUM BROMIDE AND ALBUTEROL SULFATE 3 ML: .5; 3 SOLUTION RESPIRATORY (INHALATION) at 16:09

## 2024-12-28 RX ADMIN — LORAZEPAM 0.5 MG: 2 SOLUTION, CONCENTRATE ORAL at 05:03

## 2024-12-28 RX ADMIN — ISOSORBIDE MONONITRATE 120 MG: 60 TABLET, EXTENDED RELEASE ORAL at 08:41

## 2024-12-28 RX ADMIN — DOXYCYCLINE 100 MG: 100 TABLET ORAL at 18:44

## 2024-12-28 RX ADMIN — AMPICILLIN SODIUM, SULBACTAM SODIUM 3 G: 2; 1 INJECTION, POWDER, FOR SOLUTION INTRAMUSCULAR; INTRAVENOUS at 12:30

## 2024-12-28 RX ADMIN — CITALOPRAM HYDROBROMIDE 20 MG: 20 TABLET ORAL at 08:39

## 2024-12-28 RX ADMIN — IPRATROPIUM BROMIDE AND ALBUTEROL SULFATE 3 ML: .5; 3 SOLUTION RESPIRATORY (INHALATION) at 11:25

## 2024-12-28 RX ADMIN — ROSUVASTATIN 40 MG: 20 TABLET, FILM COATED ORAL at 20:43

## 2024-12-28 RX ADMIN — IPRATROPIUM BROMIDE AND ALBUTEROL SULFATE 3 ML: .5; 3 SOLUTION RESPIRATORY (INHALATION) at 07:18

## 2024-12-28 RX ADMIN — AMPICILLIN SODIUM, SULBACTAM SODIUM 3 G: 2; 1 INJECTION, POWDER, FOR SOLUTION INTRAMUSCULAR; INTRAVENOUS at 04:58

## 2024-12-28 RX ADMIN — AMPICILLIN SODIUM, SULBACTAM SODIUM 3 G: 2; 1 INJECTION, POWDER, FOR SOLUTION INTRAMUSCULAR; INTRAVENOUS at 18:44

## 2024-12-28 RX ADMIN — ACETAMINOPHEN 650 MG: 325 TABLET ORAL at 22:19

## 2024-12-28 RX ADMIN — Medication 10 ML: at 08:48

## 2024-12-28 RX ADMIN — BENZONATATE 200 MG: 100 CAPSULE ORAL at 06:25

## 2024-12-28 RX ADMIN — IPRATROPIUM BROMIDE AND ALBUTEROL SULFATE 3 ML: .5; 3 SOLUTION RESPIRATORY (INHALATION) at 19:57

## 2024-12-28 RX ADMIN — METOPROLOL SUCCINATE 50 MG: 50 TABLET, FILM COATED, EXTENDED RELEASE ORAL at 08:41

## 2024-12-28 NOTE — PLAN OF CARE
Problem: Adult Inpatient Plan of Care  Goal: Plan of Care Review  Outcome: Progressing  Flowsheets (Taken 12/28/2024 0150)  Progress: improving  Outcome Evaluation: Pt A&Ox4, VSS. O2 @3L NC. Up ad linda. Voiding, urinal at bedside. Pt slept well this shift in between care. IV antibiotics administered. No c/o pain. Safety maintained, will continue to monitor.  Plan of Care Reviewed With: patient

## 2024-12-28 NOTE — PLAN OF CARE
Goal Outcome Evaluation:  Plan of Care Reviewed With: patient        Progress: improving  Outcome Evaluation: [facility] assessment. [facility] PO intake 75% of two meals recorded over past three day review. No new wt since 12/26, pt has daily weights ordered. Boost Glucose control oral supplement with breakfast and dinner meals. Cont to follow for plan of care.

## 2024-12-28 NOTE — PROGRESS NOTES
Malnutrition Severity Assessment    Patient Name:  Sharad Ryder  YOB: 1960  MRN: [id number]  Admit Date:  12/25/2024    Patient meets criteria for : Moderate (non-severe) Malnutrition (Secondary signs: generalized weakness,pallor and fatigue)    Malnutrition Severity Assessment  Malnutrition Type: Chronic Disease - Related Malnutrition  Malnutrition Type (Last 8 Hours)       Malnutrition Severity Assessment       Row Name 12/28/24 1729       Malnutrition Severity Assessment    Malnutrition Type Chronic Disease - Related Malnutrition      Row Name 12/28/24 1729       Insufficient Energy Intake     Insufficient Energy Intake Findings Moderate    Insufficient Energy Intake  <75% of est. energy requirement for > or equal to 1 month  suspect      Row Name 12/28/24 1729       Unintentional Weight Loss     Unintentional Weight Loss Findings --  limited wt hx to review      Row Name 12/28/24 1729       Muscle Loss    Loss of Muscle Mass Findings Moderate    Tracy Region Moderate - slight depression    Clavicle Bone Region Moderate - some protrusion in females, visible in males      Row Name 12/28/24 1729       Fat Loss    Subcutaneous Fat Loss Findings Moderate    Orbital Region  Moderate -  somewhat hollowness, slightly dark circles    Upper Arm Region Moderate - some fat tissue, not ample      Row Name 12/28/24 1729       Criteria Met (Must meet criteria for severity in at least 2 of these categories: M Wasting, Fat Loss, Fluid, Secondary Signs, Wt. Status, Intake)    Patient meets criteria for  Moderate (non-severe) Malnutrition  Secondary signs: generalized weakness,pallor and fatigue                    Electronically signed by:  Stacey Lopez RDN, LD  12/28/24 17:42 CST

## 2024-12-28 NOTE — PROGRESS NOTES
[facility] Medicine Services  INPATIENT PROGRESS NOTE    Patient Name: Sharad Ryder  Date of Admission: 12/25/2024  Today's Date: 12/28/24  Length of Stay: 3  Primary Care Physician: Kerry Mtz APRN    Subjective   Chief Complaint: shortness of breath  HPI   Patient seen and examined at bedside this morning.  He rested well overnight.  He is feeling better today.  He is on 2 L with oxygen saturation 95%.  His cough is starting to break loose.  No fever.   Repeat chest x-ray yesterday shows stable infiltrates, no worsening consolidation, lungs are well-expanded. Encouraged for him to use incentive spirometer and get up to the chair today.    Review of Systems   All pertinent negatives and positives are as above. All other systems have been reviewed and are negative unless otherwise stated.     Objective    Temp:  [98.1 °F (36.7 °C)-98.4 °F (36.9 °C)] 98.4 °F (36.9 °C)  Heart Rate:  [] 83  Resp:  [18-20] 18  BP: (105-140)/(62-73) 138/62  Physical Exam  Vitals reviewed.   Constitutional:       General: He is not in acute distress.     Appearance: He is not toxic-appearing.      Interventions: Nasal cannula in place.   HENT:      Head: Normocephalic and atraumatic.      Mouth/Throat:      Mouth: Mucous membranes are moist.      Pharynx: Oropharynx is clear.   Eyes:      Extraocular Movements: Extraocular movements intact.      Conjunctiva/sclera: Conjunctivae normal.      Pupils: Pupils are equal, round, and reactive to light.   Cardiovascular:      Rate and Rhythm: Normal rate and regular rhythm.      Pulses: Normal pulses.      Heart sounds: Murmur heard.   Pulmonary:      Effort: Pulmonary effort is normal. No respiratory distress.   Abdominal:      General: Bowel sounds are normal. There is no distension.      Palpations: Abdomen is soft.      Tenderness: There is no abdominal tenderness.   Musculoskeletal:         General: No swelling or tenderness. Normal range  "of motion.      Cervical back: Normal range of motion and neck supple. No muscular tenderness.   Skin:     General: Skin is warm and dry.      Findings: No erythema or rash.   Neurological:      General: No focal deficit present.      Mental Status: He is alert and oriented to person, place, and time.      Cranial Nerves: No cranial nerve deficit.      Motor: No weakness.   Psychiatric:         Mood and Affect: Mood normal.         Behavior: Behavior normal.       Results Review:  I have reviewed the labs, radiology results, and diagnostic studies.    Laboratory Data:   Results from last 7 days   Lab Units 12/27/24  1238 12/26/24  0458 12/25/24  1153   WBC 10*3/mm3 13.15* 12.20* 13.18*   HEMOGLOBIN g/dL 11.4* 11.4* 11.7*   HEMATOCRIT % 34.5* 35.4* 35.1*   PLATELETS 10*3/mm3 278 229 234        Results from last 7 days   Lab Units 12/27/24  2338 12/27/24  1238 12/26/24  0458 12/25/24  1153   SODIUM mmol/L  --  141 141 136   POTASSIUM mmol/L 4.2 3.6 4.4 2.9*   CHLORIDE mmol/L  --  102 105 98   CO2 mmol/L  --  28.0 24.0 26.0   BUN mg/dL  --  17 18 11   CREATININE mg/dL  --  0.53* 0.52* 0.78   CALCIUM mg/dL  --  8.7 8.8 8.6   BILIRUBIN mg/dL  --   --  0.3 0.4   ALK PHOS U/L  --   --  87 99   ALT (SGPT) U/L  --   --  21 22   AST (SGOT) U/L  --   --  33 35   GLUCOSE mg/dL  --  156* 127* 132*       Culture Data:   Blood Culture   Date Value Ref Range Status   12/25/2024 No growth at 24 hours  Preliminary   12/25/2024 No growth at 24 hours  Preliminary     No results found for: \"BCIDPCR\", \"CXREFLEX\", \"CSFCX\", \"CULTURETIS\"  No results found for: \"CULTURES\", \"HSVCX\", \"URCX\"  No results found for: \"EYECULTURE\", \"GCCX\", \"HSVCULTURE\", \"LABHSV\"  No results found for: \"LEGIONELLA\", \"MRSACX\", \"MUMPSCX\", \"MYCOPLASCX\"  No results found for: \"NOCARDIACX\", \"STOOLCX\"  No results found for: \"THROATCX\", \"UNSTIMCULT\", \"URINECX\", \"CULTURE\", \"VZVCULTUR\"  No results found for: \"VIRALCULTU\", \"WOUNDCX\"    Radiology Data:   Imaging Results (Last " 24 Hours)       Procedure Component Value Units Date/Time    XR Chest PA & Lateral [[id number]] Collected: 12/27/24 1455     Updated: 12/27/24 1501    Narrative:      XR CHEST PA AND LATERAL- 12/27/2024 1:31 PM     HISTORY: hypoxia; J44.1-Chronic obstructive pulmonary disease with  (acute) exacerbation       COMPARISON: Chest x-ray dated 12/25/2024     FINDINGS:  Upright frontal and lateral radiographs of the chest were obtained.     Underlying lung emphysema. Superimposed bilateral peribronchial  thickening as well as asymmetric patchy airspace disease (left greater  than right and favoring the lung bases). Pulmonary infiltrates appear  very similar to what was seen on the recent 12-25 exam. Lungs are well  expanded. There is no pleural effusion or pneumothorax. Previous  coronary bypass and aortic valve replacement. Pulmonary vasculature are  nondilated.       Impression:      1.  Lung emphysema with superimposed atypical pneumonia, as described  above. Overall extent of the pulmonary infiltrates is stable when  compared to the recent 12-25 exam. No worsening consolidation. Lungs are  well expanded.     This report was signed and finalized on 12/27/2024 2:58 PM by Dr Javy Romano.               I have reviewed the patient's current medications.     Assessment/Plan   Assessment  Active Hospital Problems    Diagnosis     **Acute exacerbation of chronic obstructive pulmonary disease (COPD)     Aortic stenosis, in a bioprosthetic valve     Diastolic dysfunction     Acute on chronic respiratory failure with hypoxia     Bilateral pneumonia, suspected     Primary hypertension     Tobacco use      Sharad Ryder is a 64-year-old male with a past medical history of anxiety, carotid artery disease, with right carotid endarterectomy, COPD on no oxygen at home, aortic valve stenosis with bioprosthetic valve, hyperlipidemia, CVA with no residual, CABG x 3 in 2018, 1.5 PPD smoker and diastolic dysfunction.  Patient presented to  [facility] emergency department with complaints of increasing shortness of breath over the last 3 days.  Patient attempted to take over-the-counter cold medicine as he felt like he had a virus but when he could not catch his breath and his oxygen saturation per his home machine showed 72 he called EMS.  When EMS arrived patient's oxygen saturation was 78% on room air.  He presented with compensated ABGs with a pH of 7.429, pCO2 of 40.2, pO2 of 58.1.  Patient was placed on 2 L of oxygen and had to be titrated up to 12 L high flow.  In addition patient was febrile with a temperature of 101.3 upon arrival.  Comprehensive respiratory panel positive for RSV. Last echocardiogram revealed a moderate-severe aortic stenosis present in his bioprosthetic aortic valve with recommendations for repeat echocardiogram in August. CRX revealed interstitial edema in the mid and lower lung zones and small pleural effusion, probable CHF, stable bronchial wall thickening.  CTA chest showed patchy bilateral areas of opacity in both lungs left greater than right worrisome for multifocal pneumonia. Follow-up CT chest in 1 to 3 months is recommended.     Treatment Plan  Acute exacerbation of COPD-acute on chronic respiratory failure with hypoxia-patient presented per EMS with reported 78% oxygenation on room air.  ABGs revealed a compensated pH of 7.429, with a pO2 of 58. Patient currently stable on 2 L. Continue prednisone, I-S OPEP, pulmonary hygiene and supplemental oxygenation.    Hypoxic initially requiring 12 L on admission.  Now requiring 2 L.  Continue to wean oxygen as tolerated.  Will need walking oximetry at time of discharge.    CTA chest showed patchy bilateral areas of opacity in both lungs left greater than right worrisome for multifocal pneumonia.  Comprehensive respiratory panel positive for RSV.  Strep pneumo and Legionella urinary antigens negative.  MRSA nasal swab positive.  Blood cultures with no growth to date.  Repeat  chest x-ray yesterday shows stable infiltrates, no worsening consolidation, lungs are well-expanded.  Continue Unasyn and doxycycline.     Aortic stenosis-repeat echocardiogram was supposed to be obtained in August of this year to evaluate moderate-severe aortic stenosis and bioprosthetic valve.   Results for orders placed during the hospital encounter of 12/25/24    Adult Transthoracic Echo Complete w/ Color, Spectral and Contrast if Necessary Per Protocol    Interpretation Summary    Left ventricular ejection fraction appears to be 66 - 70%.    Left ventricular wall thickness is consistent with moderate concentric hypertrophy.    The following left ventricular wall segments are hypokinetic: apical septal and apex hypokinetic.    Left ventricular diastolic function is consistent with (grade II w/high LAP) pseudonormalization.    The left atrial cavity is mildly dilated.    Severe bioprosthetic aortic valve stenosis is present.    Peak velocity of the flow distal to the aortic valve is 441 cm/s. Aortic valve maximum pressure gradient is 78 mmHg.    Estimated right ventricular systolic pressure from tricuspid regurgitation is normal (<35 mmHg).  He will need follow-up with CT surgery as outpatient.    Diastolic dysfunction-BNP of 4589 upon arrival.  He was given 1 dose of IV Lasix in ED.  Continue aspirin, Imdur, metoprolol.     Primary hypertension-continue home metoprolol and Imdur ,initiate every 4 vital signs.     Tobacco abuse-NicoDerm CQ initiated, continue smoking cessation.     VTE prophylaxis with SCDs  Labs in a.m.    Medical Decision Making  Acute exacerbation of COPD, acute, high complexity, unchanged  Acute on chronic respiratory failure with hypoxia, chronic, moderate complexity, unchanged  Aortic stenosis, acute on chronic, moderate complexity, unchanged  Diastolic dysfunction, acute on chronic, moderate complexity, unchanged  Primary hypertension, chronic, moderate complexity, unchanged  Tobacco  abuse, chronic, moderate complexity, unchanged  Number and Complexity of problems: 6  Differential Diagnosis: None    Conditions and Status        Condition is unchanged.     [facility] Data  External documents reviewed: Prior epic records  Cardiac tracing (EKG, telemetry) interpretation: Reviewed  Radiology interpretation: Interpreted by radiology  Labs reviewed: As above  Any tests that were considered but not ordered: None considered at present     Decision rules/scores evaluated (example [id number], Wells, etc): None considered at present     Discussed with: Patient and Dr. Joyce     Care Planning  Shared decision making: Patient is agreeable to ongoing workup and treatment  Code status and discussions: Full code with limited support to include no intubation    Disposition  Social Determinants of Health that impact treatment or disposition: none  I expect the patient to be discharged to home in 1-2 days.     Electronically signed by KIRBY Gaviria, 12/28/24, 09:27 CST.

## 2024-12-29 VITALS
HEART RATE: 81 BPM | SYSTOLIC BLOOD PRESSURE: 139 MMHG | RESPIRATION RATE: 18 BRPM | DIASTOLIC BLOOD PRESSURE: 64 MMHG | HEIGHT: 64 IN | WEIGHT: 115 LBS | TEMPERATURE: 97.9 F | BODY MASS INDEX: 19.63 KG/M2 | OXYGEN SATURATION: 95 %

## 2024-12-29 PROBLEM — E44.0 MODERATE PROTEIN-CALORIE MALNUTRITION: Status: ACTIVE | Noted: 2024-12-29

## 2024-12-29 LAB
BACTERIA SPEC RESP CULT: NORMAL
GRAM STN SPEC: NORMAL

## 2024-12-29 PROCEDURE — 94760 N-INVAS EAR/PLS OXIMETRY 1: CPT

## 2024-12-29 PROCEDURE — 94664 DEMO&/EVAL PT USE INHALER: CPT

## 2024-12-29 PROCEDURE — 94799 UNLISTED PULMONARY SVC/PX: CPT

## 2024-12-29 PROCEDURE — 63710000001 PREDNISONE PER 1 MG: Performed by: NURSE PRACTITIONER

## 2024-12-29 PROCEDURE — 25010000002 AMPICILLIN-SULBACTAM PER 1.5 G

## 2024-12-29 RX ORDER — IPRATROPIUM BROMIDE AND ALBUTEROL SULFATE 2.5; .5 MG/3ML; MG/3ML
3 SOLUTION RESPIRATORY (INHALATION) EVERY 4 HOURS PRN
Qty: 90 ML | Refills: 0 | Status: SHIPPED | OUTPATIENT
Start: 2024-12-29

## 2024-12-29 RX ORDER — PREDNISONE 10 MG/1
TABLET ORAL
Qty: 18 TABLET | Refills: 0 | Status: SHIPPED | OUTPATIENT
Start: 2024-12-30 | End: 2025-01-08

## 2024-12-29 RX ORDER — DOXYCYCLINE 100 MG/1
100 TABLET ORAL 2 TIMES DAILY
Qty: 6 TABLET | Refills: 0 | Status: SHIPPED | OUTPATIENT
Start: 2024-12-29 | End: 2025-01-01

## 2024-12-29 RX ADMIN — Medication 10 ML: at 08:02

## 2024-12-29 RX ADMIN — HYDROCODONE BITARTRATE AND ACETAMINOPHEN 1 TABLET: 7.5; 325 TABLET ORAL at 03:47

## 2024-12-29 RX ADMIN — GABAPENTIN 400 MG: 400 CAPSULE ORAL at 08:01

## 2024-12-29 RX ADMIN — TAMSULOSIN HYDROCHLORIDE 0.4 MG: 0.4 CAPSULE ORAL at 08:01

## 2024-12-29 RX ADMIN — AMPICILLIN SODIUM, SULBACTAM SODIUM 3 G: 2; 1 INJECTION, POWDER, FOR SOLUTION INTRAMUSCULAR; INTRAVENOUS at 12:50

## 2024-12-29 RX ADMIN — METOPROLOL SUCCINATE 50 MG: 50 TABLET, FILM COATED, EXTENDED RELEASE ORAL at 08:01

## 2024-12-29 RX ADMIN — AMPICILLIN SODIUM, SULBACTAM SODIUM 3 G: 2; 1 INJECTION, POWDER, FOR SOLUTION INTRAMUSCULAR; INTRAVENOUS at 05:25

## 2024-12-29 RX ADMIN — NICOTINE 1 PATCH: 21 PATCH, EXTENDED RELEASE TRANSDERMAL at 17:10

## 2024-12-29 RX ADMIN — IPRATROPIUM BROMIDE AND ALBUTEROL SULFATE 3 ML: .5; 3 SOLUTION RESPIRATORY (INHALATION) at 07:10

## 2024-12-29 RX ADMIN — BUSPIRONE HYDROCHLORIDE 10 MG: 10 TABLET ORAL at 08:02

## 2024-12-29 RX ADMIN — DOXYCYCLINE 100 MG: 100 TABLET ORAL at 05:25

## 2024-12-29 RX ADMIN — GABAPENTIN 400 MG: 400 CAPSULE ORAL at 17:09

## 2024-12-29 RX ADMIN — ISOSORBIDE MONONITRATE 120 MG: 60 TABLET, EXTENDED RELEASE ORAL at 08:01

## 2024-12-29 RX ADMIN — IPRATROPIUM BROMIDE AND ALBUTEROL SULFATE 3 ML: .5; 3 SOLUTION RESPIRATORY (INHALATION) at 15:44

## 2024-12-29 RX ADMIN — PREDNISONE 40 MG: 20 TABLET ORAL at 07:55

## 2024-12-29 RX ADMIN — IPRATROPIUM BROMIDE AND ALBUTEROL SULFATE 3 ML: .5; 3 SOLUTION RESPIRATORY (INHALATION) at 11:22

## 2024-12-29 RX ADMIN — AMLODIPINE BESYLATE 10 MG: 10 TABLET ORAL at 08:02

## 2024-12-29 RX ADMIN — CITALOPRAM HYDROBROMIDE 20 MG: 20 TABLET ORAL at 08:01

## 2024-12-29 NOTE — PLAN OF CARE
Goal Outcome Evaluation:  Plan of Care Reviewed With: patient        Progress: improving  Outcome Evaluation: AOx4, VSS on 2L NC. C/o headache, PRN tylenol given with good relief provided. IV ABX infused per orders. Up voiding at BS with urinal. Lovenox for DVT prevention. Safety precautions maintained with call light within reach.

## 2024-12-29 NOTE — PROCEDURES
Exercise Oximetry    Patient Name:Sharad Ryder   MRN: [id number]   Date: 12/29/24             ROOM AIR BASELINE   SpO2% 93   Heart Rate 91   Blood Pressure      EXERCISE ON ROOM AIR SpO2% EXERCISE ON O2 @  LPM SpO2%   1 MINUTE  93 1 MINUTE    2 MINUTES  93 2 MINUTES    3 MINUTES  92 3 MINUTES    4 MINUTES  92 4 MINUTES    5 MINUTES  92 5 MINUTES    6 MINUTES  6 MINUTES               Distance Walked  120 ft Distance Walked   Dyspnea (Christina Scale)  2 Dyspnea (Christina Scale)   Fatigue (Christina Scale)  2 Fatigue (Christina Scale)   SpO2% Post Exercise  94 SpO2% Post Exercise   HR Post Exercise  107 HR Post Exercise   Time to Recovery  >1 minute Time to Recovery     Comments: Put on room air and after 20 minutes sats were 93%. Walked from door to window 4 times with no problems. Would not recommend oxygen use at this time.

## 2024-12-29 NOTE — PLAN OF CARE
Goal Outcome Evaluation:  Plan of Care Reviewed With: patient        Progress: improving  Outcome Evaluation: AOx4. VSS. Droplet precautions maintained. PRN pain medicine given upon request. IV abx given. Urinal to void. Safety maintained. Call light within reach. Pt to be discharged home today.

## 2024-12-29 NOTE — PLAN OF CARE
Goal Outcome Evaluation:  Plan of Care Reviewed With: patient        Progress: improving  Outcome Evaluation: AOx4. VSS. Droplet precautions maintained. PRN pain medicine given upon request. IV abx given. Urinal to void. Safety maintained. Call light within reach.

## 2024-12-29 NOTE — DISCHARGE SUMMARY
[facility]  DISCHARGE SUMMARY       Date of Admission: 12/25/2024  Date of Discharge:  12/29/2024  Primary Care Physician: Kerry Mtz APRN    Presenting Problem/History of Present Illness:  Shortness of breath    Final Discharge Diagnoses:  Active Hospital Problems    Diagnosis     **Acute exacerbation of chronic obstructive pulmonary disease (COPD)     Moderate protein-calorie malnutrition     Aortic stenosis, in a bioprosthetic valve     Diastolic dysfunction     Acute on chronic respiratory failure with hypoxia     Bilateral pneumonia, suspected     Primary hypertension     Tobacco use        Consults: none.    Procedures Performed: none.    Pertinent Test Results:   Results for orders placed during the hospital encounter of 12/25/24    Adult Transthoracic Echo Complete w/ Color, Spectral and Contrast if Necessary Per Protocol    Interpretation Summary    Left ventricular ejection fraction appears to be 66 - 70%.    Left ventricular wall thickness is consistent with moderate concentric hypertrophy.    The following left ventricular wall segments are hypokinetic: apical septal and apex hypokinetic.    Left ventricular diastolic function is consistent with (grade II w/high LAP) pseudonormalization.    The left atrial cavity is mildly dilated.    Severe bioprosthetic aortic valve stenosis is present.    Peak velocity of the flow distal to the aortic valve is 441 cm/s. Aortic valve maximum pressure gradient is 78 mmHg.    Estimated right ventricular systolic pressure from tricuspid regurgitation is normal (<35 mmHg).      Imaging Results (All)       Procedure Component Value Units Date/Time    XR Chest PA & Lateral [[id number]] Collected: 12/27/24 1455     Updated: 12/27/24 1501    Narrative:      XR CHEST PA AND LATERAL- 12/27/2024 1:31 PM     HISTORY: hypoxia; J44.1-Chronic obstructive pulmonary disease with  (acute) exacerbation       COMPARISON:  Chest x-ray dated 12/25/2024     FINDINGS:  Upright frontal and lateral radiographs of the chest were obtained.     Underlying lung emphysema. Superimposed bilateral peribronchial  thickening as well as asymmetric patchy airspace disease (left greater  than right and favoring the lung bases). Pulmonary infiltrates appear  very similar to what was seen on the recent 12-25 exam. Lungs are well  expanded. There is no pleural effusion or pneumothorax. Previous  coronary bypass and aortic valve replacement. Pulmonary vasculature are  nondilated.       Impression:      1.  Lung emphysema with superimposed atypical pneumonia, as described  above. Overall extent of the pulmonary infiltrates is stable when  compared to the recent 12-25 exam. No worsening consolidation. Lungs are  well expanded.     This report was signed and finalized on 12/27/2024 2:58 PM by Dr Javy Romano.       CT Angiogram Chest [[id number]] Collected: 12/25/24 1628     Updated: 12/25/24 1638    Narrative:      EXAM: CT ANGIOGRAM CHEST-      DATE: 12/25/2024 3:00 PM     HISTORY: Positive D-dimer; J44.1-Chronic obstructive pulmonary disease  with (acute) exacerbation       COMPARISON: 1/22/2024.     DOSE LENGTH PRODUCT: 146.41 mGy.cm mGy cm. Automatic exposure control  was utilized to make radiation dose as low as reasonably achievable.     TECHNIQUE: Enhanced CT images of the chest obtained with multiplanar  reformats.     FINDINGS:     MEDIASTINUM/EXTRATHORACIC:   There is cardiomegaly without pericardial  effusion. There is a trace amount of right pleural fluid and a small  left pleural effusion. There is mitral valve calcification and the  patient is status post aortic valve replacement. There is coronary  artery calcification and left ventricular hypertrophy. Mediastinal and  bilateral hilar lymphadenopathy is unchanged. Subcarinal lymphadenopathy  measures 1.8 cm in short axis.     PULMONARY ARTERIES: Pulmonary arteries are opacified and no  filling  defects are seen.     LUNGS/AIRWAYS: There is severe emphysema. There is mild scarring at the  lung apices. There are patchy areas of opacity in the lower segment of  the left upper lobe and left lower lobe and to a lesser degree in the  right lung which likely infectious in etiology. A short interval  follow-up CT of the chest in 1 to 3 months is recommended. Interlobar  septal thickening at both lung bases may also represent underlying  edema. Bronchial wall thickening noted likely acute or chronic  bronchitis.        INCLUDED UPPER ABDOMEN: There is calcification of the abdominal aorta  and incompletely imaged abdominal aortic aneurysm measuring 4.3 cm which  is unchanged. There is significant calcification of the proximal  abdominal aorta proximal to the aneurysm. There is  The colon and diverticulosis.     SOFT TISSUES: Submitted soft tissues of the chest are unremarkable.     BONES: There is a chronic compression fracture at T11. Patient is status  post median sternotomy. There is stable diastases of the sternum  superiorly.       Impression:      1. Patchy bilateral areas of opacity in both lungs left greater than  right worrisome for multifocal pneumonia. Follow-up CT chest in 1 to 3  months is recommended.  2. Interlobar septal thickening at both lower lobes at the lung bases  may represent underlying edema.  3. Chronic changes of emphysema and scarring and mediastinal and hilar  lymphadenopathy, unchanged.  4. Trace fluid and small left pleural effusions.  5. Stable aminal aortic aneurysm and severe calcified atherosclerosis.     This report was signed and finalized on 12/25/2024 4:35 PM by Gabe Chapman.       XR Chest 1 View [[id number]] Collected: 12/25/24 1208     Updated: 12/25/24 1213    Narrative:      EXAMINATION:  XR CHEST 1 VW-  12/25/2024 11:00 AM     HISTORY: Shortness of breath and coughing.     COMPARISON: 1/22/2024.     TECHNIQUE: Single view AP image.     FINDINGS: There is  interstitial edema in the mid and lower lung zones.  There is mild blunting of the costophrenic angles. There is bronchial  wall thickening. Heart size is upper limits of normal. Prior heart  bypass surgery. The thoracic aorta is atheromatous. There is an old  fracture of the distal right clavicle with nonbony union. There are  degenerative changes of the spine.          Impression:      1. Interstitial edema in the mid and lower lung zones and small pleural  effusions. Probable CHF.  2. Heart size upper limits of normal. Prior median sternotomy with  bypass surgery and valve replacement.  3. Stable bronchial wall thickening.           This report was signed and finalized on 12/25/2024 12:10 PM by Dr. Mike Perez MD.             LAB RESULTS:      Lab 12/27/24  1238 12/26/24  0458 12/25/24  1254 12/25/24  1153   WBC 13.15* 12.20*  --  13.18*   HEMOGLOBIN 11.4* 11.4*  --  11.7*   HEMATOCRIT 34.5* 35.4*  --  35.1*   PLATELETS 278 229  --  234   NEUTROS ABS  --   --   --  11.62*   IMMATURE GRANS (ABS)  --   --   --  0.05   LYMPHS ABS  --   --   --  0.73   MONOS ABS  --   --   --  0.75   EOS ABS  --   --   --  0.00   MCV 91.5 91.2  --  89.5   PROCALCITONIN  --   --  0.17  --    LACTATE  --   --   --  1.4   D DIMER QUANT  --   --   --  1.13*         Lab 12/27/24  2338 12/27/24  1238 12/26/24  0458 12/25/24  1153 12/25/24  1144   SODIUM  --  141 141 136  --    SODIUM, ARTERIAL  --   --   --   --  137   POTASSIUM 4.2 3.6 4.4 2.9*  --    CHLORIDE  --  102 105 98  --    CO2  --  28.0 24.0 26.0  --    ANION GAP  --  11.0 12.0 12.0  --    BUN  --  17 18 11  --    CREATININE  --  0.53* 0.52* 0.78  --    EGFR  --  111.9 112.6 99.6  --    GLUCOSE  --  156* 127* 132*  --    CALCIUM  --  8.7 8.8 8.6  --    MAGNESIUM  --   --  2.5* 1.6  --    HEMOGLOBIN A1C  --   --  5.50  --   --    TSH  --   --  0.304  --   --          Lab 12/26/24  0458 12/25/24  1153   TOTAL PROTEIN 6.8 7.1   ALBUMIN 3.7 4.2   GLOBULIN 3.1 2.9   ALT (SGPT) 21  22   AST (SGOT) 33 35   BILIRUBIN 0.3 0.4   ALK PHOS 87 99         Lab 12/25/24  1254 12/25/24  1153   PROBNP  --  4,589.0*   HSTROP T 36* 33*         Lab 12/26/24  0458   CHOLESTEROL 118   LDL CHOL 55   HDL CHOL 51   TRIGLYCERIDES 54             Lab 12/26/24  0423 12/25/24  1144   PH, ARTERIAL 7.433 7.429   PCO2, ARTERIAL 37.4 40.2   PO2 .0* 58.1*   O2 SATURATION ART 99.4* 90.6*   HCO3 ART 24.9 26.6*   BASE EXCESS ART 0.8 2.1*   CARBOXYHEMOGLOBIN  --  1.8     Brief Urine Lab Results       None          Microbiology Results (last 10 days)       Procedure Component Value - Date/Time    Respiratory Culture - Sputum, Cough [[id number]] Collected: 12/26/24 1757    Lab Status: Final result Specimen: Sputum from Cough Updated: 12/29/24 0940     Respiratory Culture Scant growth (1+) Normal respiratory pippa. No S. aureus or Pseudomonas aeruginosa detected. Final report.     Gram Stain Moderate (3+) WBCs per low power field      Rare (1+) Epithelial cells per low power field      Few (2+) Gram positive cocci      Rare (1+) Gram negative bacilli    S. Pneumo Ag Urine or CSF - Urine, Urine, Clean Catch [[id number]]  (Normal) Collected: 12/25/24 1709    Lab Status: Final result Specimen: Urine, Clean Catch Updated: 12/25/24 1756     Strep Pneumo Ag Negative    Legionella Antigen, Urine - Urine, Urine, Clean Catch [[id number]]  (Normal) Collected: 12/25/24 1709    Lab Status: Final result Specimen: Urine, Clean Catch Updated: 12/25/24 1756     LEGIONELLA ANTIGEN, URINE Negative    MRSA Screen, PCR (Inpatient) - Swab, Nares [[id number]]  (Abnormal) Collected: 12/25/24 1709    Lab Status: Final result Specimen: Swab from Nares Updated: 12/25/24 1840     MRSA PCR MRSA Detected    Narrative:      The negative predictive value of this diagnostic test is high and should only be used to consider de-escalating anti-MRSA therapy. A positive result may indicate colonization with MRSA and must be correlated clinically.    Respiratory  Panel PCR w/COVID-19(SARS-CoV-2) JUNAID/LISA/INGRID/PAD/COR/LESIA In-House, NP Swab in UTM/VTM, 2 HR TAT - Swab, Nasopharynx [[id number]]  (Abnormal) Collected: 12/25/24 115    Lab Status: Final result Specimen: Swab from Nasopharynx Updated: 12/25/24 1327     ADENOVIRUS, PCR Not Detected     Coronavirus 229E Not Detected     Coronavirus HKU1 Not Detected     Coronavirus NL63 Not Detected     Coronavirus OC43 Not Detected     COVID19 Not Detected     Human Metapneumovirus Not Detected     Human Rhinovirus/Enterovirus Not Detected     Influenza A PCR Not Detected     Influenza B PCR Not Detected     Parainfluenza Virus 1 Not Detected     Parainfluenza Virus 2 Not Detected     Parainfluenza Virus 3 Not Detected     Parainfluenza Virus 4 Not Detected     RSV, PCR Detected     Bordetella pertussis pcr Not Detected     Bordetella parapertussis PCR Not Detected     Chlamydophila pneumoniae PCR Not Detected     Mycoplasma pneumo by PCR Not Detected    Narrative:      In the setting of a positive respiratory panel with a viral infection PLUS a negative procalcitonin without other underlying concern for bacterial infection, consider observing off antibiotics or discontinuation of antibiotics and continue supportive care. If the respiratory panel is positive for atypical bacterial infection (Bordetella pertussis, Chlamydophila pneumoniae, or Mycoplasma pneumoniae), consider antibiotic de-escalation to target atypical bacterial infection.    Blood Culture - Blood, Arm, Left [[id number]]  (Normal) Collected: 12/25/24 1153    Lab Status: Preliminary result Specimen: Blood from Arm, Left Updated: 12/29/24 1230     Blood Culture No growth at 4 days    Blood Culture - Blood, Arm, Right [[id number]]  (Normal) Collected: 12/25/24 1153    Lab Status: Preliminary result Specimen: Blood from Arm, Right Updated: 12/29/24 1230     Blood Culture No growth at 4 days            Hospital Course:   Sharad Ryder is a 64-year-old male with a past medical  history of anxiety, carotid artery disease, with right carotid endarterectomy, COPD on no oxygen at home, aortic valve stenosis with bioprosthetic valve, hyperlipidemia, CVA with no residual, CABG x 3 in 2018, 1.5 PPD smoker and diastolic dysfunction.  Patient presented to [facility] emergency department with complaints of increasing shortness of breath over the last 3 days.  Patient attempted to take over-the-counter cold medicine as he felt like he had a virus but when he could not catch his breath and his oxygen saturation per his home machine showed 72 he called EMS.  When EMS arrived patient's oxygen saturation was 78% on room air.  He presented with compensated ABGs with a pH of 7.429, pCO2 of 40.2, pO2 of 58.1.  Patient was placed on 2 L of oxygen and had to be titrated up to 12 L high flow.  In addition patient was febrile with a temperature of 101.3 upon arrival.  Comprehensive respiratory panel positive for RSV. Last echocardiogram revealed a moderate-severe aortic stenosis present in his bioprosthetic aortic valve with recommendations for repeat echocardiogram in August. CRX revealed interstitial edema in the mid and lower lung zones and small pleural effusion, probable CHF, stable bronchial wall thickening.  CTA chest showed patchy bilateral areas of opacity in both lungs left greater than right worrisome for multifocal pneumonia. Follow-up CT chest in 1 to 3 months is recommended.      Acute exacerbation of COPD-acute on chronic respiratory failure with hypoxia-patient presented per EMS with reported 78% oxygenation on room air.  ABGs revealed a compensated pH of 7.429, with a pO2 of 58.  IV steroids transitioned to oral taper at time of discharge.     Hypoxic initially requiring 12 L on admission.  Oxygen has been weaned to room air.  Walking oximetry reveals he has no oxygen needs at this time.     CTA chest showed patchy bilateral areas of opacity in both lungs left greater than right worrisome for  multifocal pneumonia.  Comprehensive respiratory panel positive for RSV.  Strep pneumo and Legionella urinary antigens negative.  MRSA nasal swab positive.  Blood cultures with no growth to date.  Repeat chest x-ray yesterday shows stable infiltrates, no worsening consolidation, lungs are well-expanded.  He has completed 4 days of IV Unasyn and doxycycline, will transition to oral Augmentin and doxycycline to complete a total of 7 days antibiotic therapy.     Aortic stenosis-repeat echocardiogram was supposed to be obtained in August of this year to evaluate moderate-severe aortic stenosis and bioprosthetic valve.   Results for orders placed during the hospital encounter of 12/25/24     Adult Transthoracic Echo Complete w/ Color, Spectral and Contrast if Necessary Per Protocol     Interpretation Summary    Left ventricular ejection fraction appears to be 66 - 70%.    Left ventricular wall thickness is consistent with moderate concentric hypertrophy.    The following left ventricular wall segments are hypokinetic: apical septal and apex hypokinetic.    Left ventricular diastolic function is consistent with (grade II w/high LAP) pseudonormalization.    The left atrial cavity is mildly dilated.    Severe bioprosthetic aortic valve stenosis is present.    Peak velocity of the flow distal to the aortic valve is 441 cm/s. Aortic valve maximum pressure gradient is 78 mmHg.    Estimated right ventricular systolic pressure from tricuspid regurgitation is normal (<35 mmHg).  Dr. Leon consulted and recommends to follow-up with primary cardiothoracic surgeon Dr. Robin as outpatient.     Strongly encourage complete tobacco cessation.  Patient states he plans to quit moving forward.    Patient states overall he is feeling much better.  He denies any acute complaints.  He is eager for discharge home.  He has reached maximum benefit of hospitalization.  He is medically stable and appropriate for discharge today.    Physical Exam on  "Discharge:  /64 (BP Location: Left arm, Patient Position: Lying)   Pulse 105   Temp 97.9 °F (36.6 °C) (Oral)   Resp 18   Ht 162.6 cm (64\")   Wt 52.2 kg (115 lb)   SpO2 93%   BMI 19.74 kg/m²   Physical Exam  Vitals reviewed.   Constitutional:       General: He is not in acute distress.     Appearance: He is not toxic-appearing.      Interventions: Nasal cannula in place.   HENT:      Head: Normocephalic and atraumatic.      Mouth/Throat:      Mouth: Mucous membranes are moist.      Pharynx: Oropharynx is clear.   Eyes:      Extraocular Movements: Extraocular movements intact.      Conjunctiva/sclera: Conjunctivae normal.      Pupils: Pupils are equal, round, and reactive to light.   Cardiovascular:      Rate and Rhythm: Normal rate and regular rhythm.      Pulses: Normal pulses.      Heart sounds: Murmur heard.   Pulmonary:      Effort: Pulmonary effort is normal. No respiratory distress.   Abdominal:      General: Bowel sounds are normal. There is no distension.      Palpations: Abdomen is soft.      Tenderness: There is no abdominal tenderness.   Musculoskeletal:         General: No swelling or tenderness. Normal range of motion.      Cervical back: Normal range of motion and neck supple. No muscular tenderness.   Skin:     General: Skin is warm and dry.      Findings: No erythema or rash.   Neurological:      General: No focal deficit present.      Mental Status: He is alert and oriented to person, place, and time.      Cranial Nerves: No cranial nerve deficit.      Motor: No weakness.   Psychiatric:         Mood and Affect: Mood normal.         Behavior: Behavior normal.     Condition on Discharge: medically stable.    Discharge Disposition:  Home or Self Care    Discharge Medications:     Discharge Medications        New Medications        Instructions Start Date   amoxicillin-clavulanate 875-125 MG per tablet  Commonly known as: AUGMENTIN   1 tablet, Oral, 2 Times Daily      doxycycline 100 MG " tablet  Commonly known as: ADOXA   100 mg, Oral, 2 Times Daily      ipratropium-albuterol 0.5-2.5 mg/3 ml nebulizer  Commonly known as: DUO-NEB   3 mL, Nebulization, Every 4 Hours PRN      predniSONE 10 MG tablet  Commonly known as: DELTASONE   Take 3 tablets by mouth Daily With Breakfast for 3 days, THEN 2 tablets Daily With Breakfast for 3 days, THEN 1 tablet Daily With Breakfast for 3 days.   Start Date: December 30, 2024            Continue These Medications        Instructions Start Date   amLODIPine 10 MG tablet  Commonly known as: NORVASC   10 mg, Oral, Daily      aspirin 81 MG tablet   81 mg, Oral, Daily      busPIRone 10 MG tablet  Commonly known as: BUSPAR   10 mg, Oral, 2 Times Daily      citalopram 20 MG tablet  Commonly known as: CeleXA   20 mg, Oral, Daily      clopidogrel 75 MG tablet  Commonly known as: PLAVIX   75 mg, Oral, Daily      isosorbide mononitrate 120 MG 24 hr tablet  Commonly known as: IMDUR   120 mg, Oral, Daily      metoprolol succinate XL 50 MG 24 hr tablet  Commonly known as: TOPROL-XL   50 mg, Oral, Every 24 Hours Scheduled      multivitamin with minerals tablet tablet   1 tablet, Oral, Daily      nitroglycerin 0.4 MG SL tablet  Commonly known as: NITROSTAT   0.4 mg, Sublingual, Every 5 Minutes PRN, Take no more than 3 doses in 15 minutes.       tamsulosin 0.4 MG capsule 24 hr capsule  Commonly known as: FLOMAX   1 capsule, Oral, Daily      terazosin 5 MG capsule  Commonly known as: HYTRIN   5 mg, Oral, Nightly             Discharge Diet:   Diet Instructions       Diet: Cardiac Diets; Low Sodium (2g)      Discharge Diet: Cardiac Diets    Cardiac Diet: Low Sodium (2g)    Texture: Regular Texture (IDDSI 7)    Fluid Consistency: Thin (IDDSI 0)            Activity at Discharge:   Activity as tolerated    Follow-up Appointments:   1.  Follow-up with primary care provider 1 week.  2.  Follow-up with Dr. Robin in 2 to 4 weeks.    Test Results Pending at Discharge: none.    Electronically  signed by Claudia Bey, APRN, 12/29/24, 14:43 CST.    Time: 35 minutes.

## 2024-12-30 ENCOUNTER — READMISSION MANAGEMENT (OUTPATIENT)
Dept: CALL CENTER | Facility: HOSPITAL | Age: 64
End: 2024-12-30

## 2024-12-30 LAB
BACTERIA SPEC AEROBE CULT: NORMAL
BACTERIA SPEC AEROBE CULT: NORMAL

## 2024-12-30 NOTE — OUTREACH NOTE
Prep Survey      Flowsheet Row Responses   Mandaeism facility patient discharged from? [place]   Is LACE score < 7 ? No   Eligibility Readm Mgmt   Discharge diagnosis Acute exacerbation of chronic obstructive pulmonary disease (COPD)   Does the patient have one of the following disease processes/diagnoses(primary or secondary)? COPD   Does the patient have Home health ordered? No   Is there a DME ordered? No   Prep survey completed? Yes            Karly JAVIER - Registered Nurse

## 2025-01-08 ENCOUNTER — READMISSION MANAGEMENT (OUTPATIENT)
Dept: CALL CENTER | Facility: HOSPITAL | Age: 65
End: 2025-01-08

## 2025-01-08 NOTE — OUTREACH NOTE
COPD/PN Week 2 Survey      Flowsheet Row Responses   Jainism facility patient discharged from? [place]   Does the patient have one of the following disease processes/diagnoses(primary or secondary)? COPD   Week 2 attempt successful? No   Unsuccessful attempts Attempt 1            Alysha Hernandez Registered Nurse

## 2025-01-14 ENCOUNTER — READMISSION MANAGEMENT (OUTPATIENT)
Dept: CALL CENTER | Facility: HOSPITAL | Age: 65
End: 2025-01-14

## 2025-01-14 NOTE — OUTREACH NOTE
COPD/PN Week 2 Survey      Flowsheet Row Responses   Muslim facility patient discharged from? [place]   Does the patient have one of the following disease processes/diagnoses(primary or secondary)? COPD   Week 2 attempt successful? No   Unsuccessful attempts Attempt 2  [attempted pt and wife]            EZEQUIEL BECKER - Registered Nurse

## 2025-01-17 ENCOUNTER — TELEPHONE (OUTPATIENT)
Dept: CARDIAC SURGERY | Facility: CLINIC | Age: 65
End: 2025-01-17

## 2025-01-17 NOTE — TELEPHONE ENCOUNTER
Attempted to call pt to sched hosp follow up appt with Dr Robin per d/c summary directions.  No answer.  Detailed vm message left for pt with appt info and instructions to call if not able to keep this appt.  Appt sched for 1-22-25 at 8am to hold slot for pt/kahm

## 2025-01-22 NOTE — TELEPHONE ENCOUNTER
Attempted to call pt re: this.  No answer.  Detailed vm message left requesting pt call back re: this/cailin

## 2025-01-22 NOTE — TELEPHONE ENCOUNTER
Can someone reach out to patient to reschedule appointment for bioprosthetic aortic valve stenosis?  He was seen by Dr. Leon, who was on call, during hospitalization on December 27, 2024.  He was advised to have follow-up with our office to discuss the aforementioned.

## 2025-01-24 ENCOUNTER — READMISSION MANAGEMENT (OUTPATIENT)
Dept: CALL CENTER | Facility: HOSPITAL | Age: 65
End: 2025-01-24

## 2025-01-24 NOTE — OUTREACH NOTE
COPD/PN Week 3 Survey      Flowsheet Row Responses   [facility] patient discharged from? [place]   Does the patient have one of the following disease processes/diagnoses(primary or secondary)? COPD   Week 3 attempt successful? Yes   Call start time 1102   Call end time 1103   Discharge diagnosis Acute exacerbation of chronic obstructive pulmonary disease (COPD)   Is patient permission given to speak with other caregiver? Yes   List who call center can speak with Ekaterina spouse   Person spoke with today (if not patient) and relationship Ekaterina spouse   Meds reviewed with patient/caregiver? Yes   Is the patient taking all medications as directed (includes completed medication regime)? Yes   Does the patient have a primary care provider?  Yes   Has the patient kept scheduled appointments due by today? N/A   Pulse Ox monitoring None   Did the patient receive a copy of their discharge instructions? Yes   Nursing interventions Reviewed instructions with patient   What is the patient's perception of their health status since discharge? Improving   If the patient is a current smoker, are they able to teach back resources for cessation? --  [spouse states pt is quitting smoking cold turkey]   Is the patient/caregiver able to teach back the hierarchy of who to call/visit for symptoms/problems? PCP, Specialist, Home health nurse, Urgent Care, ED, 911 Yes   Is the patient able to teach back COPD zones? Yes   Nursing interventions Education provided on various zones   Patient reports what zone on this call? Green Zone   Green Zone Reports doing well, Slept well last night, Usual activity and exercise level, Breathing without shortness of breath   Green Zone interventions: Take daily medications, Continue regular exercise/diet plan, At all times avoid cigarette smoking, vaping and inhaled irritants   Week 3 call completed? Yes   Graduated Yes   Graduated/Revoked comments Pt is improving per spouse   Call end time 1103             Gabi YOU - Registered Nurse

## 2025-03-01 ENCOUNTER — APPOINTMENT (OUTPATIENT)
Dept: CT IMAGING | Facility: HOSPITAL | Age: 65
End: 2025-03-01

## 2025-03-01 ENCOUNTER — HOSPITAL ENCOUNTER (INPATIENT)
Facility: HOSPITAL | Age: 65
LOS: 3 days | Discharge: HOME | End: 2025-03-04
Attending: STUDENT IN AN ORGANIZED HEALTH CARE EDUCATION/TRAINING PROGRAM | Admitting: INTERNAL MEDICINE

## 2025-03-01 ENCOUNTER — APPOINTMENT (OUTPATIENT)
Dept: GENERAL RADIOLOGY | Facility: HOSPITAL | Age: 65
End: 2025-03-01

## 2025-03-01 DIAGNOSIS — J81.0 ACUTE PULMONARY EDEMA: ICD-10-CM

## 2025-03-01 DIAGNOSIS — R50.9 FEVER, UNSPECIFIED: ICD-10-CM

## 2025-03-01 DIAGNOSIS — J44.1 ACUTE EXACERBATION OF CHRONIC OBSTRUCTIVE PULMONARY DISEASE (COPD): ICD-10-CM

## 2025-03-01 DIAGNOSIS — E83.42 HYPOMAGNESEMIA: ICD-10-CM

## 2025-03-01 DIAGNOSIS — J18.9 PNEUMONIA OF BOTH LOWER LOBES DUE TO INFECTIOUS ORGANISM: ICD-10-CM

## 2025-03-01 DIAGNOSIS — Z86.79 HISTORY OF CHF (CONGESTIVE HEART FAILURE): ICD-10-CM

## 2025-03-01 DIAGNOSIS — F17.200 SMOKER: ICD-10-CM

## 2025-03-01 DIAGNOSIS — I51.7 LEFT VENTRICULAR HYPERTROPHY BY ELECTROCARDIOGRAM: ICD-10-CM

## 2025-03-01 DIAGNOSIS — J96.01 ACUTE RESPIRATORY FAILURE WITH HYPOXIA: Primary | ICD-10-CM

## 2025-03-01 DIAGNOSIS — R09.02 HYPOXIA: ICD-10-CM

## 2025-03-01 DIAGNOSIS — R79.89 ELEVATED BRAIN NATRIURETIC PEPTIDE (BNP) LEVEL: ICD-10-CM

## 2025-03-01 DIAGNOSIS — E87.6 HYPOKALEMIA: ICD-10-CM

## 2025-03-01 DIAGNOSIS — R06.02 SHORTNESS OF BREATH: ICD-10-CM

## 2025-03-01 PROBLEM — R65.20 SEVERE SEPSIS: Status: ACTIVE | Noted: 2025-03-01

## 2025-03-01 PROBLEM — A41.9 SEVERE SEPSIS: Status: ACTIVE | Noted: 2025-03-01

## 2025-03-01 LAB
ALBUMIN SERPL-MCNC: 3.9 G/DL (ref 3.5–5.2)
ALBUMIN/GLOB SERPL: 1.3 G/DL
ALP SERPL-CCNC: 64 U/L (ref 39–117)
ALT SERPL W P-5'-P-CCNC: 11 U/L (ref 1–41)
ANION GAP SERPL CALCULATED.3IONS-SCNC: 15 MMOL/L (ref 5–15)
ARTERIAL PATENCY WRIST A: POSITIVE
AST SERPL-CCNC: 18 U/L (ref 1–40)
ATMOSPHERIC PRESS: 750 MMHG
ATMOSPHERIC PRESS: 750 MMHG
B PARAPERT DNA SPEC QL NAA+PROBE: NOT DETECTED
B PERT DNA SPEC QL NAA+PROBE: NOT DETECTED
BASE EXCESS BLDA CALC-SCNC: 1.8 MMOL/L (ref 0–2)
BASE EXCESS BLDV CALC-SCNC: 3.3 MMOL/L (ref 0–2)
BASOPHILS # BLD AUTO: 0.03 10*3/MM3 (ref 0–0.2)
BASOPHILS NFR BLD AUTO: 0.2 % (ref 0–1.5)
BDY SITE: ABNORMAL
BDY SITE: ABNORMAL
BILIRUB SERPL-MCNC: 0.3 MG/DL (ref 0–1.2)
BODY TEMPERATURE: 37
BODY TEMPERATURE: 37
BUN SERPL-MCNC: 12 MG/DL (ref 8–23)
BUN/CREAT SERPL: 15.8 (ref 7–25)
C PNEUM DNA NPH QL NAA+NON-PROBE: NOT DETECTED
CALCIUM SPEC-SCNC: 8.7 MG/DL (ref 8.6–10.5)
CHLORIDE SERPL-SCNC: 98 MMOL/L (ref 98–107)
CO2 SERPL-SCNC: 24 MMOL/L (ref 22–29)
CREAT SERPL-MCNC: 0.76 MG/DL (ref 0.76–1.27)
D DIMER PPP FEU-MCNC: 1.41 MCGFEU/ML (ref 0–0.64)
D-LACTATE SERPL-SCNC: 2 MMOL/L (ref 0.5–2)
DEPRECATED RDW RBC AUTO: 48.9 FL (ref 37–54)
EGFRCR SERPLBLD CKD-EPI 2021: 100.4 ML/MIN/1.73
EOSINOPHIL # BLD AUTO: 0.01 10*3/MM3 (ref 0–0.4)
EOSINOPHIL NFR BLD AUTO: 0.1 % (ref 0.3–6.2)
EPAP: 6
ERYTHROCYTE [DISTWIDTH] IN BLOOD BY AUTOMATED COUNT: 14.2 % (ref 12.3–15.4)
FLUAV RNA RESP QL NAA+PROBE: NOT DETECTED
FLUAV SUBTYP SPEC NAA+PROBE: NOT DETECTED
FLUBV RNA ISLT QL NAA+PROBE: NOT DETECTED
FLUBV RNA RESP QL NAA+PROBE: NOT DETECTED
GEN 5 1HR TROPONIN T REFLEX: 44 NG/L
GLOBULIN UR ELPH-MCNC: 2.9 GM/DL
GLUCOSE SERPL-MCNC: 155 MG/DL (ref 65–99)
HADV DNA SPEC NAA+PROBE: NOT DETECTED
HCO3 BLDA-SCNC: 25.7 MMOL/L (ref 20–26)
HCO3 BLDV-SCNC: 27.8 MMOL/L (ref 22–28)
HCOV 229E RNA SPEC QL NAA+PROBE: NOT DETECTED
HCOV HKU1 RNA SPEC QL NAA+PROBE: NOT DETECTED
HCOV NL63 RNA SPEC QL NAA+PROBE: NOT DETECTED
HCOV OC43 RNA SPEC QL NAA+PROBE: NOT DETECTED
HCT VFR BLD AUTO: 33.3 % (ref 37.5–51)
HGB BLD-MCNC: 10.8 G/DL (ref 13–17.7)
HMPV RNA NPH QL NAA+NON-PROBE: NOT DETECTED
HOLD SPECIMEN: NORMAL
HPIV1 RNA ISLT QL NAA+PROBE: NOT DETECTED
HPIV2 RNA SPEC QL NAA+PROBE: NOT DETECTED
HPIV3 RNA NPH QL NAA+PROBE: NOT DETECTED
HPIV4 P GENE NPH QL NAA+PROBE: NOT DETECTED
IMM GRANULOCYTES # BLD AUTO: 0.09 10*3/MM3 (ref 0–0.05)
IMM GRANULOCYTES NFR BLD AUTO: 0.5 % (ref 0–0.5)
INHALED O2 CONCENTRATION: 40 %
IPAP: 12
LIPASE SERPL-CCNC: 11 U/L (ref 13–60)
LYMPHOCYTES # BLD AUTO: 1.41 10*3/MM3 (ref 0.7–3.1)
LYMPHOCYTES NFR BLD AUTO: 7.6 % (ref 19.6–45.3)
Lab: ABNORMAL
Lab: ABNORMAL
M PNEUMO IGG SER IA-ACNC: NOT DETECTED
MAGNESIUM SERPL-MCNC: 1.5 MG/DL (ref 1.6–2.4)
MCH RBC QN AUTO: 30.3 PG (ref 26.6–33)
MCHC RBC AUTO-ENTMCNC: 32.4 G/DL (ref 31.5–35.7)
MCV RBC AUTO: 93.3 FL (ref 79–97)
MODALITY: ABNORMAL
MODALITY: ABNORMAL
MONOCYTES # BLD AUTO: 1.37 10*3/MM3 (ref 0.1–0.9)
MONOCYTES NFR BLD AUTO: 7.4 % (ref 5–12)
NEUTROPHILS NFR BLD AUTO: 15.58 10*3/MM3 (ref 1.7–7)
NEUTROPHILS NFR BLD AUTO: 84.2 % (ref 42.7–76)
NRBC BLD AUTO-RTO: 0 /100 WBC (ref 0–0.2)
NT-PROBNP SERPL-MCNC: 6516 PG/ML (ref 0–900)
PCO2 BLDA: 36.8 MM HG (ref 35–45)
PCO2 BLDV: 41.4 MM HG (ref 41–51)
PCO2 TEMP ADJ BLD: 36.8 MM HG (ref 35–45)
PH BLDA: 7.45 PH UNITS (ref 7.35–7.45)
PH BLDV: 7.44 PH UNITS (ref 7.32–7.42)
PH, TEMP CORRECTED: 7.45 PH UNITS (ref 7.35–7.45)
PLATELET # BLD AUTO: 274 10*3/MM3 (ref 140–450)
PMV BLD AUTO: 9.1 FL (ref 6–12)
PO2 BLD: 172 MM[HG] (ref 0–500)
PO2 BLDA: 68.8 MM HG (ref 83–108)
PO2 BLDV: 36.1 MM HG (ref 27–53)
PO2 TEMP ADJ BLD: 68.8 MM HG (ref 83–108)
POTASSIUM SERPL-SCNC: 2.8 MMOL/L (ref 3.5–5.2)
POTASSIUM SERPL-SCNC: 3.5 MMOL/L (ref 3.5–5.2)
PROCALCITONIN SERPL-MCNC: 0.71 NG/ML (ref 0–0.25)
PROT SERPL-MCNC: 6.8 G/DL (ref 6–8.5)
RBC # BLD AUTO: 3.57 10*6/MM3 (ref 4.14–5.8)
RHINOVIRUS RNA SPEC NAA+PROBE: NOT DETECTED
RSV RNA NPH QL NAA+NON-PROBE: NOT DETECTED
RSV RNA RESP QL NAA+PROBE: NOT DETECTED
S PNEUM AG SPEC QL LA: NEGATIVE
SAO2 % BLDCOA: 94.5 % (ref 94–99)
SAO2 % BLDCOV: 65.8 % (ref 45–75)
SARS-COV-2 RNA RESP QL NAA+PROBE: NOT DETECTED
SARS-COV-2 RNA RESP QL NAA+PROBE: NOT DETECTED
SET MECH RESP RATE: 16
SODIUM SERPL-SCNC: 137 MMOL/L (ref 136–145)
TROPONIN T % DELTA: 7
TROPONIN T NUMERIC DELTA: 3 NG/L
TROPONIN T SERPL HS-MCNC: 41 NG/L
VENTILATOR MODE: ABNORMAL
VENTILATOR MODE: ABNORMAL
WBC NRBC COR # BLD AUTO: 18.49 10*3/MM3 (ref 3.4–10.8)
WHOLE BLOOD HOLD COAG: NORMAL
WHOLE BLOOD HOLD SPECIMEN: NORMAL

## 2025-03-01 PROCEDURE — 84132 ASSAY OF SERUM POTASSIUM: CPT | Performed by: INTERNAL MEDICINE

## 2025-03-01 PROCEDURE — 87040 BLOOD CULTURE FOR BACTERIA: CPT | Performed by: STUDENT IN AN ORGANIZED HEALTH CARE EDUCATION/TRAINING PROGRAM

## 2025-03-01 PROCEDURE — 94640 AIRWAY INHALATION TREATMENT: CPT

## 2025-03-01 PROCEDURE — 94799 UNLISTED PULMONARY SVC/PX: CPT

## 2025-03-01 PROCEDURE — 94660 CPAP INITIATION&MGMT: CPT

## 2025-03-01 PROCEDURE — 25010000002 CEFTRIAXONE PER 250 MG: Performed by: NURSE PRACTITIONER

## 2025-03-01 PROCEDURE — 85025 COMPLETE CBC W/AUTO DIFF WBC: CPT | Performed by: STUDENT IN AN ORGANIZED HEALTH CARE EDUCATION/TRAINING PROGRAM

## 2025-03-01 PROCEDURE — 82805 BLOOD GASES W/O2 SATURATION: CPT

## 2025-03-01 PROCEDURE — 82803 BLOOD GASES ANY COMBINATION: CPT

## 2025-03-01 PROCEDURE — 25010000002 MAGNESIUM SULFATE 2 GM/50ML SOLUTION: Performed by: STUDENT IN AN ORGANIZED HEALTH CARE EDUCATION/TRAINING PROGRAM

## 2025-03-01 PROCEDURE — 84145 PROCALCITONIN (PCT): CPT | Performed by: NURSE PRACTITIONER

## 2025-03-01 PROCEDURE — 25010000002 CEFTRIAXONE PER 250 MG: Performed by: STUDENT IN AN ORGANIZED HEALTH CARE EDUCATION/TRAINING PROGRAM

## 2025-03-01 PROCEDURE — 99291 CRITICAL CARE FIRST HOUR: CPT

## 2025-03-01 PROCEDURE — 83735 ASSAY OF MAGNESIUM: CPT | Performed by: STUDENT IN AN ORGANIZED HEALTH CARE EDUCATION/TRAINING PROGRAM

## 2025-03-01 PROCEDURE — 25010000002 ENOXAPARIN PER 10 MG: Performed by: NURSE PRACTITIONER

## 2025-03-01 PROCEDURE — 25810000003 SODIUM CHLORIDE 0.9 % SOLUTION 250 ML FLEX CONT: Performed by: STUDENT IN AN ORGANIZED HEALTH CARE EDUCATION/TRAINING PROGRAM

## 2025-03-01 PROCEDURE — 25510000001 IOPAMIDOL PER 1 ML: Performed by: STUDENT IN AN ORGANIZED HEALTH CARE EDUCATION/TRAINING PROGRAM

## 2025-03-01 PROCEDURE — 25810000003 LACTATED RINGERS SOLUTION: Performed by: STUDENT IN AN ORGANIZED HEALTH CARE EDUCATION/TRAINING PROGRAM

## 2025-03-01 PROCEDURE — 71275 CT ANGIOGRAPHY CHEST: CPT

## 2025-03-01 PROCEDURE — 83880 ASSAY OF NATRIURETIC PEPTIDE: CPT | Performed by: STUDENT IN AN ORGANIZED HEALTH CARE EDUCATION/TRAINING PROGRAM

## 2025-03-01 PROCEDURE — 25010000002 AZITHROMYCIN PER 500 MG: Performed by: STUDENT IN AN ORGANIZED HEALTH CARE EDUCATION/TRAINING PROGRAM

## 2025-03-01 PROCEDURE — 0202U NFCT DS 22 TRGT SARS-COV-2: CPT | Performed by: STUDENT IN AN ORGANIZED HEALTH CARE EDUCATION/TRAINING PROGRAM

## 2025-03-01 PROCEDURE — 83605 ASSAY OF LACTIC ACID: CPT | Performed by: STUDENT IN AN ORGANIZED HEALTH CARE EDUCATION/TRAINING PROGRAM

## 2025-03-01 PROCEDURE — 36415 COLL VENOUS BLD VENIPUNCTURE: CPT

## 2025-03-01 PROCEDURE — 84484 ASSAY OF TROPONIN QUANT: CPT | Performed by: STUDENT IN AN ORGANIZED HEALTH CARE EDUCATION/TRAINING PROGRAM

## 2025-03-01 PROCEDURE — 36600 WITHDRAWAL OF ARTERIAL BLOOD: CPT

## 2025-03-01 PROCEDURE — 25010000002 METHYLPREDNISOLONE PER 40 MG: Performed by: NURSE PRACTITIONER

## 2025-03-01 PROCEDURE — 85379 FIBRIN DEGRADATION QUANT: CPT | Performed by: STUDENT IN AN ORGANIZED HEALTH CARE EDUCATION/TRAINING PROGRAM

## 2025-03-01 PROCEDURE — 93005 ELECTROCARDIOGRAM TRACING: CPT | Performed by: STUDENT IN AN ORGANIZED HEALTH CARE EDUCATION/TRAINING PROGRAM

## 2025-03-01 PROCEDURE — 93010 ELECTROCARDIOGRAM REPORT: CPT | Performed by: INTERNAL MEDICINE

## 2025-03-01 PROCEDURE — 80053 COMPREHEN METABOLIC PANEL: CPT | Performed by: STUDENT IN AN ORGANIZED HEALTH CARE EDUCATION/TRAINING PROGRAM

## 2025-03-01 PROCEDURE — 87637 SARSCOV2&INF A&B&RSV AMP PRB: CPT | Performed by: STUDENT IN AN ORGANIZED HEALTH CARE EDUCATION/TRAINING PROGRAM

## 2025-03-01 PROCEDURE — 83690 ASSAY OF LIPASE: CPT | Performed by: STUDENT IN AN ORGANIZED HEALTH CARE EDUCATION/TRAINING PROGRAM

## 2025-03-01 PROCEDURE — 25810000003 LACTATED RINGERS PER 1000 ML: Performed by: NURSE PRACTITIONER

## 2025-03-01 PROCEDURE — 71045 X-RAY EXAM CHEST 1 VIEW: CPT

## 2025-03-01 RX ORDER — FUROSEMIDE 10 MG/ML
40 INJECTION INTRAMUSCULAR; INTRAVENOUS ONCE
Status: DISCONTINUED | OUTPATIENT
Start: 2025-03-01 | End: 2025-03-01

## 2025-03-01 RX ORDER — ACETAMINOPHEN 500 MG
1000 TABLET ORAL ONCE
Status: COMPLETED | OUTPATIENT
Start: 2025-03-01 | End: 2025-03-01

## 2025-03-01 RX ORDER — ACETAMINOPHEN 325 MG/1
650 TABLET ORAL EVERY 4 HOURS PRN
Status: DISCONTINUED | OUTPATIENT
Start: 2025-03-01 | End: 2025-03-04 | Stop reason: HOSPADM

## 2025-03-01 RX ORDER — CHLORHEXIDINE GLUCONATE 500 MG/1
1 CLOTH TOPICAL EVERY 24 HOURS
Status: DISCONTINUED | OUTPATIENT
Start: 2025-03-02 | End: 2025-03-03 | Stop reason: HOSPADM

## 2025-03-01 RX ORDER — NICOTINE 21 MG/24HR
1 PATCH, TRANSDERMAL 24 HOURS TRANSDERMAL EVERY 24 HOURS
Status: DISCONTINUED | OUTPATIENT
Start: 2025-03-01 | End: 2025-03-04 | Stop reason: HOSPADM

## 2025-03-01 RX ORDER — BUSPIRONE HYDROCHLORIDE 10 MG/1
10 TABLET ORAL 2 TIMES DAILY
Status: DISCONTINUED | OUTPATIENT
Start: 2025-03-01 | End: 2025-03-04 | Stop reason: HOSPADM

## 2025-03-01 RX ORDER — NITROGLYCERIN 0.4 MG/1
0.4 TABLET SUBLINGUAL
Status: DISCONTINUED | OUTPATIENT
Start: 2025-03-01 | End: 2025-03-04 | Stop reason: HOSPADM

## 2025-03-01 RX ORDER — SODIUM CHLORIDE, SODIUM LACTATE, POTASSIUM CHLORIDE, CALCIUM CHLORIDE 600; 310; 30; 20 MG/100ML; MG/100ML; MG/100ML; MG/100ML
50 INJECTION, SOLUTION INTRAVENOUS CONTINUOUS
Status: DISPENSED | OUTPATIENT
Start: 2025-03-01 | End: 2025-03-02

## 2025-03-01 RX ORDER — AMOXICILLIN 250 MG
2 CAPSULE ORAL 2 TIMES DAILY PRN
Status: DISCONTINUED | OUTPATIENT
Start: 2025-03-01 | End: 2025-03-04 | Stop reason: HOSPADM

## 2025-03-01 RX ORDER — POTASSIUM CHLORIDE 1500 MG/1
40 TABLET, EXTENDED RELEASE ORAL EVERY 4 HOURS
Status: COMPLETED | OUTPATIENT
Start: 2025-03-01 | End: 2025-03-02

## 2025-03-01 RX ORDER — TRAMADOL HYDROCHLORIDE 50 MG/1
50 TABLET ORAL EVERY 6 HOURS PRN
Status: DISCONTINUED | OUTPATIENT
Start: 2025-03-01 | End: 2025-03-03

## 2025-03-01 RX ORDER — NITROGLYCERIN 0.4 MG/1
0.4 TABLET SUBLINGUAL
Status: DISCONTINUED | OUTPATIENT
Start: 2025-03-01 | End: 2025-03-01

## 2025-03-01 RX ORDER — TAMSULOSIN HYDROCHLORIDE 0.4 MG/1
0.4 CAPSULE ORAL DAILY
Status: DISCONTINUED | OUTPATIENT
Start: 2025-03-01 | End: 2025-03-04 | Stop reason: HOSPADM

## 2025-03-01 RX ORDER — POLYETHYLENE GLYCOL 3350 17 G/17G
17 POWDER, FOR SOLUTION ORAL DAILY PRN
Status: DISCONTINUED | OUTPATIENT
Start: 2025-03-01 | End: 2025-03-04 | Stop reason: HOSPADM

## 2025-03-01 RX ORDER — IOPAMIDOL 755 MG/ML
100 INJECTION, SOLUTION INTRAVASCULAR
Status: COMPLETED | OUTPATIENT
Start: 2025-03-01 | End: 2025-03-01

## 2025-03-01 RX ORDER — NOREPINEPHRINE BITARTRATE 0.03 MG/ML
.02-.3 INJECTION, SOLUTION INTRAVENOUS
Status: DISCONTINUED | OUTPATIENT
Start: 2025-03-01 | End: 2025-03-02

## 2025-03-01 RX ORDER — IPRATROPIUM BROMIDE AND ALBUTEROL SULFATE 2.5; .5 MG/3ML; MG/3ML
3 SOLUTION RESPIRATORY (INHALATION) ONCE
Status: COMPLETED | OUTPATIENT
Start: 2025-03-01 | End: 2025-03-01

## 2025-03-01 RX ORDER — SODIUM CHLORIDE 0.9 % (FLUSH) 0.9 %
10 SYRINGE (ML) INJECTION AS NEEDED
Status: DISCONTINUED | OUTPATIENT
Start: 2025-03-01 | End: 2025-03-04 | Stop reason: HOSPADM

## 2025-03-01 RX ORDER — METHYLPREDNISOLONE SODIUM SUCCINATE 40 MG/ML
40 INJECTION, POWDER, LYOPHILIZED, FOR SOLUTION INTRAMUSCULAR; INTRAVENOUS EVERY 8 HOURS
Status: DISCONTINUED | OUTPATIENT
Start: 2025-03-01 | End: 2025-03-03

## 2025-03-01 RX ORDER — METOPROLOL SUCCINATE 50 MG/1
50 TABLET, EXTENDED RELEASE ORAL
Status: DISCONTINUED | OUTPATIENT
Start: 2025-03-01 | End: 2025-03-04 | Stop reason: HOSPADM

## 2025-03-01 RX ORDER — MAGNESIUM SULFATE HEPTAHYDRATE 40 MG/ML
2 INJECTION, SOLUTION INTRAVENOUS ONCE
Status: COMPLETED | OUTPATIENT
Start: 2025-03-01 | End: 2025-03-01

## 2025-03-01 RX ORDER — NOREPINEPHRINE BITARTRATE 0.03 MG/ML
.02-.3 INJECTION, SOLUTION INTRAVENOUS
Status: DISCONTINUED | OUTPATIENT
Start: 2025-03-01 | End: 2025-03-01

## 2025-03-01 RX ORDER — ISOSORBIDE MONONITRATE 120 MG/1
120 TABLET, EXTENDED RELEASE ORAL DAILY
Status: DISCONTINUED | OUTPATIENT
Start: 2025-03-01 | End: 2025-03-04 | Stop reason: HOSPADM

## 2025-03-01 RX ORDER — ASPIRIN 81 MG/1
81 TABLET, CHEWABLE ORAL DAILY
Status: DISCONTINUED | OUTPATIENT
Start: 2025-03-01 | End: 2025-03-04 | Stop reason: HOSPADM

## 2025-03-01 RX ORDER — IPRATROPIUM BROMIDE AND ALBUTEROL SULFATE 2.5; .5 MG/3ML; MG/3ML
3 SOLUTION RESPIRATORY (INHALATION)
Status: DISCONTINUED | OUTPATIENT
Start: 2025-03-01 | End: 2025-03-04 | Stop reason: HOSPADM

## 2025-03-01 RX ORDER — CITALOPRAM HYDROBROMIDE 20 MG/1
20 TABLET ORAL DAILY
Status: DISCONTINUED | OUTPATIENT
Start: 2025-03-01 | End: 2025-03-04 | Stop reason: HOSPADM

## 2025-03-01 RX ORDER — POTASSIUM CHLORIDE 1500 MG/1
40 TABLET, EXTENDED RELEASE ORAL EVERY 4 HOURS
Status: COMPLETED | OUTPATIENT
Start: 2025-03-01 | End: 2025-03-01

## 2025-03-01 RX ORDER — CHLORHEXIDINE GLUCONATE 500 MG/1
1 CLOTH TOPICAL ONCE
Status: COMPLETED | OUTPATIENT
Start: 2025-03-01 | End: 2025-03-01

## 2025-03-01 RX ORDER — BISACODYL 5 MG/1
5 TABLET, DELAYED RELEASE ORAL DAILY PRN
Status: DISCONTINUED | OUTPATIENT
Start: 2025-03-01 | End: 2025-03-04 | Stop reason: HOSPADM

## 2025-03-01 RX ORDER — BISACODYL 10 MG
10 SUPPOSITORY, RECTAL RECTAL DAILY PRN
Status: DISCONTINUED | OUTPATIENT
Start: 2025-03-01 | End: 2025-03-04 | Stop reason: HOSPADM

## 2025-03-01 RX ORDER — ENOXAPARIN SODIUM 100 MG/ML
40 INJECTION SUBCUTANEOUS DAILY
Status: DISCONTINUED | OUTPATIENT
Start: 2025-03-01 | End: 2025-03-04 | Stop reason: HOSPADM

## 2025-03-01 RX ORDER — CLOPIDOGREL BISULFATE 75 MG/1
75 TABLET ORAL DAILY
Status: DISCONTINUED | OUTPATIENT
Start: 2025-03-01 | End: 2025-03-04 | Stop reason: HOSPADM

## 2025-03-01 RX ADMIN — ACETAMINOPHEN 1000 MG: 500 TABLET, FILM COATED ORAL at 03:40

## 2025-03-01 RX ADMIN — ASPIRIN 81 MG: 81 TABLET, CHEWABLE ORAL at 12:14

## 2025-03-01 RX ADMIN — Medication 1 APPLICATION: at 20:07

## 2025-03-01 RX ADMIN — SODIUM CHLORIDE, SODIUM LACTATE, POTASSIUM CHLORIDE, CALCIUM CHLORIDE 50 ML/HR: 20; 30; 600; 310 INJECTION, SOLUTION INTRAVENOUS at 08:17

## 2025-03-01 RX ADMIN — CLOPIDOGREL BISULFATE 75 MG: 75 TABLET ORAL at 12:14

## 2025-03-01 RX ADMIN — MAGNESIUM SULFATE HEPTAHYDRATE 2 G: 2 INJECTION, SOLUTION INTRAVENOUS at 04:34

## 2025-03-01 RX ADMIN — POTASSIUM CHLORIDE 40 MEQ: 1500 TABLET, EXTENDED RELEASE ORAL at 16:39

## 2025-03-01 RX ADMIN — METHYLPREDNISOLONE SODIUM SUCCINATE 40 MG: 40 INJECTION, POWDER, FOR SOLUTION INTRAMUSCULAR; INTRAVENOUS at 22:03

## 2025-03-01 RX ADMIN — IPRATROPIUM BROMIDE AND ALBUTEROL SULFATE 3 ML: 2.5; .5 SOLUTION RESPIRATORY (INHALATION) at 19:56

## 2025-03-01 RX ADMIN — BUSPIRONE HYDROCHLORIDE 10 MG: 10 TABLET ORAL at 12:14

## 2025-03-01 RX ADMIN — SODIUM CHLORIDE 1000 MG: 900 INJECTION INTRAVENOUS at 07:08

## 2025-03-01 RX ADMIN — BUSPIRONE HYDROCHLORIDE 10 MG: 10 TABLET ORAL at 20:07

## 2025-03-01 RX ADMIN — NICOTINE 1 PATCH: 21 PATCH, EXTENDED RELEASE TRANSDERMAL at 06:58

## 2025-03-01 RX ADMIN — CITALOPRAM HYDROBROMIDE 20 MG: 20 TABLET ORAL at 14:12

## 2025-03-01 RX ADMIN — METHYLPREDNISOLONE SODIUM SUCCINATE 40 MG: 40 INJECTION, POWDER, FOR SOLUTION INTRAMUSCULAR; INTRAVENOUS at 06:58

## 2025-03-01 RX ADMIN — IPRATROPIUM BROMIDE AND ALBUTEROL SULFATE 3 ML: 2.5; .5 SOLUTION RESPIRATORY (INHALATION) at 10:37

## 2025-03-01 RX ADMIN — POTASSIUM CHLORIDE 40 MEQ: 1500 TABLET, EXTENDED RELEASE ORAL at 22:03

## 2025-03-01 RX ADMIN — ACETAMINOPHEN 650 MG: 325 TABLET ORAL at 12:14

## 2025-03-01 RX ADMIN — TRAMADOL HYDROCHLORIDE 50 MG: 50 TABLET, COATED ORAL at 13:35

## 2025-03-01 RX ADMIN — IOPAMIDOL 100 ML: 755 INJECTION, SOLUTION INTRAVENOUS at 05:15

## 2025-03-01 RX ADMIN — IPRATROPIUM BROMIDE AND ALBUTEROL SULFATE 3 ML: 2.5; .5 SOLUTION RESPIRATORY (INHALATION) at 14:56

## 2025-03-01 RX ADMIN — TRAMADOL HYDROCHLORIDE 50 MG: 50 TABLET, COATED ORAL at 22:03

## 2025-03-01 RX ADMIN — CEFTRIAXONE SODIUM 2000 MG: 2 INJECTION, POWDER, FOR SOLUTION INTRAMUSCULAR; INTRAVENOUS at 03:39

## 2025-03-01 RX ADMIN — POTASSIUM CHLORIDE 40 MEQ: 1500 TABLET, EXTENDED RELEASE ORAL at 12:14

## 2025-03-01 RX ADMIN — SODIUM CHLORIDE, POTASSIUM CHLORIDE, SODIUM LACTATE AND CALCIUM CHLORIDE 500 ML: 600; 310; 30; 20 INJECTION, SOLUTION INTRAVENOUS at 04:41

## 2025-03-01 RX ADMIN — CHLORHEXIDINE GLUCONATE 1 APPLICATION: 500 CLOTH TOPICAL at 12:15

## 2025-03-01 RX ADMIN — METHYLPREDNISOLONE SODIUM SUCCINATE 40 MG: 40 INJECTION, POWDER, FOR SOLUTION INTRAMUSCULAR; INTRAVENOUS at 14:12

## 2025-03-01 RX ADMIN — IPRATROPIUM BROMIDE AND ALBUTEROL SULFATE 3 ML: .5; 3 SOLUTION RESPIRATORY (INHALATION) at 06:15

## 2025-03-01 RX ADMIN — Medication 1 APPLICATION: at 12:14

## 2025-03-01 RX ADMIN — NOREPINEPHRINE BITARTRATE 0.02 MCG/KG/MIN: 0.03 INJECTION, SOLUTION INTRAVENOUS at 07:04

## 2025-03-01 RX ADMIN — POTASSIUM CHLORIDE 40 MEQ: 1500 TABLET, EXTENDED RELEASE ORAL at 08:17

## 2025-03-01 RX ADMIN — ENOXAPARIN SODIUM 40 MG: 100 INJECTION SUBCUTANEOUS at 08:17

## 2025-03-01 RX ADMIN — AZITHROMYCIN DIHYDRATE 500 MG: 500 INJECTION, POWDER, LYOPHILIZED, FOR SOLUTION INTRAVENOUS at 03:44

## 2025-03-01 NOTE — H&P
[facility] Intensivist Services  HISTORY AND PHYSICAL    Date of Admission: 3/1/2025  Primary Care Physician: Kerry Mtz APRN    Subjective   Primary Historian: Spouse/patient    Chief Complaint: Shortness of breath    History of Present Illness  The patient is a 64-year-old male with a PMH of diastolic CHF, CAD s/p CABG, s/p aortic bioprosthetic valve with stenosis of bioprosthetic valve, COPD, and and ongoing tobacco abuse who presented to [facility] ED on 3/1/2025 with c/o shortness of breath. He reported a 2 day history of worsening shortness of breath with productive cough, and increased sputum production. He reported a fever, documented 100.6 per EMS. He denied chest pain, weight gain, or leg swelling. He states that he feels similar as to when he required hospitalization for COPD exacerbation for RSV in 12/2024.    ED work-up revealed-Temp 101, HR-130 RR-130. WBC-18, H&H-10.8/33.3 and plt-274 with neutrophilia-84.2, negative for bandemia. RPP negative for viral illness. Dimer-1.41.  Chest x-ray interval worsening of interstitial and airspace opacities in both mid and lower lung zones.  CTA pulmonary no evidence of PE.  Mixed atelectasis and consolidation in both posterior lower lungs diffuse interlobular septal thickening, he is a groundglass opacity small bilateral pleural effusions findings consistent with pulmonary edema given areas of consolidation with superimposed pneumonia is possible.  Severe emphysema.  Mild mediastinal and bilateral hilar lymphadenopathy without significant change.  He was additionally found to have hypokalemia K2.8 and hypomagnesia anemia 1.5 with a proBNP of 6516.  He was placed on BiPAP due to his work of breathing with improved work of breathing and oxygenation.  He was given a 500 mL liter bolus due to his previous history of diastolic dysfunction.  Despite IV fluids he continued to have hypotension.  He was admitted to intensivist for  severe sepsis secondary to pneumonia.    He was seen and examined in ICU bed 8.  He does not appear to be in acute distress.  Overall mainly complains of fatigue.  Levophed infusing.      Review of Systems   Constitutional:  Positive for fatigue.   Respiratory:  Positive for cough, chest tightness and shortness of breath.       Otherwise complete ROS reviewed and negative except as mentioned in the HPI.    Past Medical History:   Past Medical History:   Diagnosis Date    Aneurysm     Anxiety     Arthritis     Carotid artery disease     Carotid stenosis, right 02/01/2018    Post right carotid endarterectomy    COPD (chronic obstructive pulmonary disease)     Coronary artery disease     Heart murmur     History of right-sided carotid endarterectomy 02/16/2022    Hyperlipidemia LDL goal <70 12/14/2017    Left frontal lobe, right thalamus and right occipital CVA (cerebrovascular accident) (HCC) 01/27/2022    LVH (left ventricular hypertrophy) 02/16/2022    Pneumonia     Primary hypertension 12/14/2017    S/P CABG x 3 02/16/2022    LIMA to LAD, SVG to PDA and SVG to diagonal branch with TMR and left atrial appendage exclusion with atricure clip device 2/8/2018 per Dr. Cj Robin at [facility]     Severe aortic valve stenosis 12/14/2017    Status post aortic valve replacement with bioprosthetic valve 02/16/2022    Graciela Juarez bovine pericardial 19 mm valve 2/8/2018 per Dr. Cj Robin at [facility]    Tobacco use 12/14/2017    Wears glasses      Past Surgical History:  Past Surgical History:   Procedure Laterality Date    AORTAGRAM Bilateral 10/27/2023    Procedure: LEFT LOWER EXTREMITY ANGIOGRAM, BILATERAL ILIAC BALLOON ANGIOPLASTY, BILATERAL ILIAC STENT PLACEMENT, MYNX CLOSURE;  Surgeon: Jl Salgado DO;  Location: [place] [address];  Service: Vascular;  Laterality: Bilateral;    APPENDECTOMY      CARDIAC CATHETERIZATION N/A 12/18/2017    Procedure: Left Heart Cath;   Surgeon: Aime Francois MD;  Location:  [facility] CATH INVASIVE LOC[facility];  Service:     CARDIAC CATHETERIZATION N/A 12/18/2017    Procedure: Right Heart Cath;  Surgeon: Aime Francois MD;  Location:  [facility] CATH INV[facility];  Service:     CARDIAC CATHETERIZATION Bilateral 1/4/2018    Procedure: Coronary angiography;  Surgeon: Alfonso Yi MD;  Location:  [facility] CATH INVASIVE LOC[facility];  Service:     CARDIAC CATHETERIZATION Left 9/15/2021    Procedure: Cardiac Catheterization/Vascular Study NIMCO OK  Has 3 graft 2018;  Surgeon: Aime Francois MD;  Location:  [facility] CATH INVASIVE [facility];  Service: Cardiology;  Laterality: Left;    CAROTID ENDARTERECTOMY Right 2/1/2018    Procedure: RIGHT CAROTID ENDARTERECTOMY WITH EEG-awake;  Surgeon: Jl Salgado DO;  Location:  [facility] OR;  Service:     CORONARY ARTERY BYPASS GRAFT WITH AORTIC VALVE REPAIR/REPLACEMENT N/A 2/8/2018    Procedure: AORTIC VALVE REPLACEMENT,CORONARY ARTERY BYPASS GRAFTING x 3  WITH CONCHA AND RIGHT EVH, ATRIAL APPENDAGE EXCLUSION LEFT WITH TRANSESOPHAGEAL ECHOCARDIOGRAM, 17-CHANNEL TMR;  Surgeon: Cj Robin MD;  Location:  [facility] OR;  Service:     FINGER SURGERY Right 1990    OTHER SURGICAL HISTORY      skull srgery from accident in childhood.     Social History:  reports that he has been smoking cigarettes. He has a 52.5 pack-year smoking history. He has never been exposed to tobacco smoke. His smokeless tobacco use includes snuff. He reports that he does not drink alcohol and does not use drugs.    Family History: family history includes Asthma in his sister; Cancer in his maternal uncle; Diabetes in his brother and father; Heart disease in his father and mother; Hypertension in his brother and sister; Kidney disease in his sister.       Allergies:  No Known Allergies    Medications:  Prior to Admission medications    Medication Sig Start Date End Date Taking? Authorizing Provider   amLODIPine (NORVASC) 10 MG tablet Take 1 tablet by mouth Daily.     "Estrellita Wolfe MD   aspirin 81 MG tablet Take 1 tablet by mouth Daily. 2/16/18   Cj Robin MD   busPIRone (BUSPAR) 10 MG tablet Take 1 tablet by mouth 2 (Two) Times a Day. 8/4/21   Estrellita Wolfe MD   citalopram (CeleXA) 20 MG tablet Take 1 tablet by mouth Daily.    Estrellita Wolfe MD   clopidogrel (PLAVIX) 75 MG tablet Take 1 tablet by mouth Daily. 2/16/18   Cj Robin MD   ipratropium-albuterol (DUO-NEB) 0.5-2.5 mg/3 ml nebulizer Take 3 mL by nebulization Every 4 (Four) Hours As Needed for Wheezing. 12/29/24   Claudia Bey APRN   isosorbide mononitrate (IMDUR) 120 MG 24 hr tablet Take 1 tablet by mouth Daily. 2/17/22   Ashtyn Bay APRN   metoprolol succinate XL (TOPROL-XL) 50 MG 24 hr tablet Take 1 tablet by mouth Daily. 1/29/22   Barbra Trent APRN   multivitamin with minerals (ONE-A-DAY MENS 50+ ADVANTAGE PO) Take 1 tablet by mouth Daily.    Estrellita Wolfe MD   nitroglycerin (NITROSTAT) 0.4 MG SL tablet Place 1 tablet under the tongue Every 5 (Five) Minutes As Needed for Chest Pain. Take no more than 3 doses in 15 minutes.    Estrellita Wolfe MD   tamsulosin (FLOMAX) 0.4 MG capsule 24 hr capsule Take 1 capsule by mouth Daily. 8/1/23   Estrellita Wolfe MD   terazosin (HYTRIN) 5 MG capsule Take 1 capsule by mouth Every Night.    Estrellita Wolfe MD     I have utilized all available immediate resources to obtain, update, or review the patient's current medications (including all prescriptions, over-the-counter products, herbals, cannabis/cannabidiol products, and vitamin/mineral/dietary (nutritional) supplements).    Objective     Vital Signs: /63   Pulse 84   Temp 97.8 °F (36.6 °C) (Oral)   Resp 14   Ht 162.6 cm (64\")   Wt 54.6 kg (120 lb 5.9 oz)   SpO2 92%   BMI 20.66 kg/m²   Physical Exam  Vitals reviewed.   Constitutional:       General: He is sleeping.      Appearance: He is ill-appearing.      Comments: Prematurely aged, " frail appearing   HENT:      Head: Normocephalic.      Mouth/Throat:      Mouth: Mucous membranes are dry.      Comments: Tacky mucous membranes  Eyes:      Pupils: Pupils are equal, round, and reactive to light.   Cardiovascular:      Rate and Rhythm: Normal rate and regular rhythm.      Pulses: Normal pulses.      Heart sounds: Normal heart sounds.   Pulmonary:      Effort: Tachypnea present.      Breath sounds: Decreased breath sounds (throughout) and wheezing (occasional scattered) present.   Abdominal:      General: Bowel sounds are normal.      Palpations: Abdomen is soft.   Musculoskeletal:         General: Normal range of motion.      Cervical back: Normal range of motion and neck supple.   Skin:     Coloration: Skin is pale.   Neurological:      General: No focal deficit present.      Mental Status: He is easily aroused. Mental status is at baseline.      Motor: Weakness present.          Results Reviewed:  Lab Results (last 24 hours)       Procedure Component Value Units Date/Time    S. Pneumo Ag Urine or CSF - Urine, Urine, Clean Catch [[id number]]  (Normal) Collected: 03/01/25 0922    Specimen: Urine, Clean Catch Updated: 03/01/25 0946     Strep Pneumo Ag Negative    Procalcitonin [[id number]]  (Abnormal) Collected: 03/01/25 0519    Specimen: Blood Updated: 03/01/25 0713     Procalcitonin 0.71 ng/mL     Narrative:      As a Marker for Sepsis (Non-Neonates):    1. <0.5 ng/mL represents a low risk of severe sepsis and/or septic shock.  2. >2 ng/mL represents a high risk of severe sepsis and/or septic shock.    As a Marker for Lower Respiratory Tract Infections that require antibiotic therapy:    PCT on Admission    Antibiotic Therapy       6-12 Hrs later    >0.5                Strongly Recommended  >0.25 - <0.5        Recommended   0.1 - 0.25          Discouraged              Remeasure/reassess PCT  <0.1                Strongly Discouraged     Remeasure/reassess PCT    As 28 day mortality risk marker:  "\"Change in Procalcitonin Result\" (>80% or <=80%) if Day 0 (or Day 1) and Day 4 values are available. Refer to http://www.[facility]-pct-calculator.com    Change in PCT <=80%  A decrease of PCT levels below or equal to 80% defines a positive change in PCT test result representing a higher risk for 28-day all-cause mortality of patients diagnosed with severe sepsis for septic shock.    Change in PCT >80%  A decrease of PCT levels of more than 80% defines a negative change in PCT result representing a lower risk for 28-day all-cause mortality of patients diagnosed with severe sepsis or septic shock.       Respiratory Panel PCR w/COVID-19(SARS-CoV-2) [facility]/LISA/[facility] In-House, NP Swab in UTM/VTM, 2 HR TAT - Swab, Nasopharynx [[id number]]  (Normal) Collected: 03/01/25 0332    Specimen: Swab from Nasopharynx Updated: 03/01/25 0628     ADENOVIRUS, PCR Not Detected     Coronavirus 229E Not Detected     Coronavirus HKU1 Not Detected     Coronavirus NL63 Not Detected     Coronavirus OC43 Not Detected     COVID19 Not Detected     Human Metapneumovirus Not Detected     Human Rhinovirus/Enterovirus Not Detected     Influenza A PCR Not Detected     Influenza B PCR Not Detected     Parainfluenza Virus 1 Not Detected     Parainfluenza Virus 2 Not Detected     Parainfluenza Virus 3 Not Detected     Parainfluenza Virus 4 Not Detected     RSV, PCR Not Detected     Bordetella pertussis pcr Not Detected     Bordetella parapertussis PCR Not Detected     Chlamydophila pneumoniae PCR Not Detected     Mycoplasma pneumo by PCR Not Detected    Narrative:      In the setting of a positive respiratory panel with a viral infection PLUS a negative procalcitonin without other underlying concern for bacterial infection, consider observing off antibiotics or discontinuation of antibiotics and continue supportive care. If the respiratory panel is positive for atypical bacterial infection (Bordetella pertussis, Chlamydophila pneumoniae, or " Mycoplasma pneumoniae), consider antibiotic de-escalation to target atypical bacterial infection.    High Sensitivity Troponin T 1Hr [[id number]]  (Abnormal) Collected: 03/01/25 0519    Specimen: Blood Updated: 03/01/25 0543     HS Troponin T 44 ng/L      Troponin T Numeric Delta 3 ng/L      Troponin T % Delta 7    Narrative:      High Sensitive Troponin T Reference Range:  <14.0 ng/L- Negative Female for AMI  <22.0 ng/L- Negative Male for AMI  >=14 - Abnormal Female indicating possible myocardial injury.  >=22 - Abnormal Male indicating possible myocardial injury.   Clinicians would have to utilize clinical acumen, EKG, Troponin, and serial changes to determine if it is an Acute Myocardial Infarction or myocardial injury due to an underlying chronic condition.         Blood Gas, Arterial - [[id number]]  (Abnormal) Collected: 03/01/25 0434    Specimen: Arterial Blood Updated: 03/01/25 0432     Site Left Radial     Glenroy's Test Positive     pH, Arterial 7.452 pH units      Comment: 83 Value above reference range        pCO2, Arterial 36.8 mm Hg      pO2, Arterial 68.8 mm Hg      Comment: 84 Value below reference range        HCO3, Arterial 25.7 mmol/L      Base Excess, Arterial 1.8 mmol/L      O2 Saturation, Arterial 94.5 %      Temperature 37.0     Barometric Pressure for Blood Gas 750 mmHg      Modality BiPap     FIO2 40 %      Ventilator Mode NA     Set Mech Resp Rate 16.0     IPAP 12     Comment: Meter: [id number]     :  Delaney Lucas, RRT        EPAP 6     Collected by [id number]     pCO2, Temperature Corrected 36.8 mm Hg      pH, Temp Corrected 7.452 pH Units      pO2, Temperature Corrected 68.8 mm Hg      PO2/FIO2 172    D-dimer, Quantitative [[id number]]  (Abnormal) Collected: 03/01/25 0323    Specimen: Blood Updated: 03/01/25 0419     D-Dimer, Quantitative 1.41 MCGFEU/mL     Narrative:      According to the assay 's published package insert, a normal (<0.50 MCGFEU/mL) D-dimer result in  "conjunction with a non-high clinical probability assessment, excludes deep vein thrombosis (DVT) and pulmonary embolism (PE) with high sensitivity.    D-dimer values increase with age and this can make VTE exclusion of an older population difficult. To address this, the [employer], based on best available evidence and recent guidelines, recommends that clinicians use age-adjusted D-dimer thresholds in patients greater than 50 years of age with: a) a low probability of PE who do not meet all Pulmonary Embolism Rule Out Criteria, or b) in those with intermediate probability of PE.   The formula for an age-adjusted D-dimer cut-off is \"age/100\".  For example, a 60 year old patient would have an age-adjusted cut-off of 0.60 MCGFEU/mL and an 80 year old 0.80 MCGFEU/mL.    COVID-19, FLU A/B, RSV PCR 1 HR TAT - Swab, Nasopharynx [[id number]]  (Normal) Collected: 03/01/25 0332    Specimen: Swab from Nasopharynx Updated: 03/01/25 0419     COVID19 Not Detected     Influenza A PCR Not Detected     Influenza B PCR Not Detected     RSV, PCR Not Detected    Narrative:      Fact sheet for providers: https://www.fda.gov/media/934088/download    Fact sheet for patients: https://www.fda.gov/media/320787/download    Test performed by PCR.    Comprehensive Metabolic Panel [[id number]]  (Abnormal) Collected: 03/01/25 0323    Specimen: Blood Updated: 03/01/25 0359     Glucose 155 mg/dL      BUN 12 mg/dL      Creatinine 0.76 mg/dL      Sodium 137 mmol/L      Potassium 2.8 mmol/L      Chloride 98 mmol/L      CO2 24.0 mmol/L      Calcium 8.7 mg/dL      Total Protein 6.8 g/dL      Albumin 3.9 g/dL      ALT (SGPT) 11 U/L      AST (SGOT) 18 U/L      Alkaline Phosphatase 64 U/L      Total Bilirubin 0.3 mg/dL      Globulin 2.9 gm/dL      A/G Ratio 1.3 g/dL      BUN/Creatinine Ratio 15.8     Anion Gap 15.0 mmol/L      eGFR 100.4 mL/min/1.73     Narrative:      GFR Categories in Chronic Kidney Disease (CKD)      GFR Category       "    GFR (mL/min/1.73)    Interpretation  G1                     90 or greater         Normal or high (1)  G2                      60-89                Mild decrease (1)  G3a                   45-59                Mild to moderate decrease  G3b                   30-44                Moderate to severe decrease  G4                    15-29                Severe decrease  G5                    14 or less           Kidney failure          (1)In the absence of evidence of kidney disease, neither GFR category G1 or G2 fulfill the criteria for CKD.    eGFR calculation 2021 CKD-EPI creatinine equation, which does not include race as a factor    Magnesium [[id number]]  (Abnormal) Collected: 03/01/25 0323    Specimen: Blood Updated: 03/01/25 0359     Magnesium 1.5 mg/dL     BNP [[id number]]  (Abnormal) Collected: 03/01/25 0323    Specimen: Blood Updated: 03/01/25 0357     proBNP 6,516.0 pg/mL     Narrative:      This assay is used as an aid in the diagnosis of individuals suspected of having heart failure. It can be used as an aid in the diagnosis of acute decompensated heart failure (ADHF) in patients presenting with signs and symptoms of ADHF to the emergency department (ED). In addition, NT-proBNP of <300 pg/mL indicates ADHF is not likely.    Age Range Result Interpretation  NT-proBNP Concentration (pg/mL:      <50             Positive            >450                   Gray                 300-450                    Negative             <300    50-75           Positive            >900                  Gray                300-900                  Negative            <300      >75             Positive            >1800                  Gray                300-1800                  Negative            <300    High Sensitivity Troponin T [[id number]]  (Abnormal) Collected: 03/01/25 0323    Specimen: Blood Updated: 03/01/25 0356     HS Troponin T 41 ng/L     Narrative:      High Sensitive Troponin T Reference Range:  <14.0 ng/L-  Negative Female for AMI  <22.0 ng/L- Negative Male for AMI  >=14 - Abnormal Female indicating possible myocardial injury.  >=22 - Abnormal Male indicating possible myocardial injury.   Clinicians would have to utilize clinical acumen, EKG, Troponin, and serial changes to determine if it is an Acute Myocardial Infarction or myocardial injury due to an underlying chronic condition.         Lipase [[id number]]  (Abnormal) Collected: 03/01/25 0323    Specimen: Blood Updated: 03/01/25 0354     Lipase 11 U/L     Lactic Acid, Plasma [[id number]]  (Normal) Collected: 03/01/25 0323    Specimen: Blood Updated: 03/01/25 0352     Lactate 2.0 mmol/L     [place] Draw [[id number]] Collected: 03/01/25 0323    Specimen: Blood Updated: 03/01/25 0345    Narrative:      The following orders were created for panel order [place] Draw.  Procedure                               Abnormality         Status                     ---------                               -----------         ------                     Green Top (Gel)[[id number]]                                  Final result               Lavender Top[[id number]]                                     Final result               Red Top[[id number]]                                          Final result               Light Blue Top[[id number]]                                   Final result                 Please view results for these tests on the individual orders.    Green Top (Gel) [[id number]] Collected: 03/01/25 0323    Specimen: Blood Updated: 03/01/25 0345     Extra Tube Hold for add-ons.     Comment: Auto resulted.       Lavender Top [[id number]] Collected: 03/01/25 0323    Specimen: Blood Updated: 03/01/25 0345     Extra Tube hold for add-on     Comment: Auto resulted       Red Top [[id number]] Collected: 03/01/25 0323    Specimen: Blood Updated: 03/01/25 0345     Extra Tube Hold for add-ons.     Comment: Auto resulted.       Light Blue Top [[id number]] Collected: 03/01/25 0323    Specimen:  Blood Updated: 03/01/25 0345     Extra Tube Hold for add-ons.     Comment: Auto resulted       [place] Draw [[id number]] Collected: 03/01/25 0323    Specimen: Blood Updated: 03/01/25 0345    Narrative:      The following orders were created for panel order [place] Draw.  Procedure                               Abnormality         Status                     ---------                               -----------         ------                     Green Top (Gel)[[id number]]                                                             Lavender Top[[id number]]                                                                Ts[place] Top[[id number]]                                         Final result               Light Blue Top[[id number]]                                                                Please view results for these tests on the individual orders.    Tsang Top [[id number]] Collected: 03/01/25 0323    Specimen: Blood Updated: 03/01/25 0345     Extra Tube Hold for add-ons.     Comment: Auto resulted.       CBC & Differential [[id number]]  (Abnormal) Collected: 03/01/25 0323    Specimen: Blood Updated: 03/01/25 0338    Narrative:      The following orders were created for panel order CBC & Differential.  Procedure                               Abnormality         Status                     ---------                               -----------         ------                     CBC Auto Differential[[id number]]        Abnormal            Final result                 Please view results for these tests on the individual orders.    CBC Auto Differential [[id number]]  (Abnormal) Collected: 03/01/25 0323    Specimen: Blood Updated: 03/01/25 0338     WBC 18.49 10*3/mm3      RBC 3.57 10*6/mm3      Hemoglobin 10.8 g/dL      Hematocrit 33.3 %      MCV 93.3 fL      MCH 30.3 pg      MCHC 32.4 g/dL      RDW 14.2 %      RDW-SD 48.9 fl      MPV 9.1 fL      Platelets 274 10*3/mm3      Neutrophil % 84.2 %      Lymphocyte % 7.6 %      Monocyte %  7.4 %      Eosinophil % 0.1 %      Basophil % 0.2 %      Immature Grans % 0.5 %      Neutrophils, Absolute 15.58 10*3/mm3      Lymphocytes, Absolute 1.41 10*3/mm3      Monocytes, Absolute 1.37 10*3/mm3      Eosinophils, Absolute 0.01 10*3/mm3      Basophils, Absolute 0.03 10*3/mm3      Immature Grans, Absolute 0.09 10*3/mm3      nRBC 0.0 /100 WBC     Blood Culture - Blood, Arm, Right [[id number]] Collected: 03/01/25 0329    Specimen: Blood from Arm, Right Updated: 03/01/25 0336    Blood Culture - Blood, Arm, Left [[id number]] Collected: 03/01/25 0330    Specimen: Blood from Arm, Left Updated: 03/01/25 0336    Blood Gas, Venous - [[id number]]  (Abnormal) Collected: 03/01/25 0331    Specimen: Venous Blood Updated: 03/01/25 0329     Site OTHER     pH, Venous 7.436 pH Units      Comment: 83 Value above reference range        pCO2, Venous 41.4 mm Hg      pO2, Venous 36.1 mm Hg      HCO3, Venous 27.8 mmol/L      Base Excess, Venous 3.3 mmol/L      Comment: 83 Value above reference range        O2 Saturation, Venous 65.8 %      Temperature 37.0     Barometric Pressure for Blood Gas 750 mmHg      Modality Room Air     Ventilator Mode NA     Collected by SUMAYA ELENA RN     Comment: Meter: [id number]     :  Delaney Lucas RRT                CT Angiogram Chest    Result Date: 3/1/2025   1. No evidence of pulmonary embolus. 2. Mixed atelectasis and consolidation in both posterior lower lungs, diffuse interlobular septal thickening, hazy groundglass opacity, and small bilateral pleural effusions. Findings are consistent with pulmonary edema. Given areas of consolidation, a superimposed pneumonia is possible. 3. Severe emphysema. 4. Mild mediastinal and bilateral hilar lymphadenopathy, without significant change. 5. No change in mild dilatation of ascending aorta measuring 4 cm diameter. Partially imaged abdominal aneurysm measuring up to 4 cm. Heavy atherosclerotic plaque in the aorta at the level of the diaphragm  likely causing severe stenosis.  This report was signed and finalized on 3/1/2025 6:23 AM by Dr. Tim Langley MD.      XR Chest 1 View    Result Date: 3/1/2025   Interval worsening of interstitial and airspace opacities in both mid and lower lung zones.  This report was signed and finalized on 3/1/2025 5:07 AM by Dr. Tim Langley MD.       Result Review:  I have personally reviewed the results from the time of this admission to 3/1/2025 12:46 CST and agree with these findings:  [x]  Laboratory list / accordion  [x]  Microbiology  []  Radiology  []  EKG/Telemetry   []  Cardiology/Vascular   []  Pathology  []  Old records  []  Other:  Most notable findings include:   Vital signs, leukocytosis with evidence of pneumonia consistent with severe sepsis.  Hypokalemia-K2.8, hypomagnesia anemia 1.5 will be supplemented by appropriate electrolyte protocols.  ABGs pH 7.45, pCO2 36 pO2 68 consistent with hypoxia, however, not respiratory failure while on BiPAP.    I have personally reviewed and interpreted the radiology studies and ECG obtained at time of admission.     Assessment / Plan   Assessment:   Active Hospital Problems    Diagnosis     **COPD with acute exacerbation     Severe sepsis     Community acquired bilateral lower lobe pneumonia     Diastolic dysfunction    64-year-old male with a past medical history of diastolic dysfunction, CAD, CABG, bioprosthetic aortic valve with stenosis, COPD, current everyday smoker and atherosclerosis presented to [facility] on 3/1/2025 with complaints of shortness of breath.  ED findings consistent with COPD exacerbation and severe sepsis secondary to bilateral pneumonia.  Soft BPs currently on BiPAP therefore he was admitted to ICU medicine for further monitoring.    Severe sepsis/Bilateral lower lobe pneumonia  -Admit to ICU  -Bipap, wean as tolerated  -Oxygen therapy to maintain adequate oxygenation for tissue perfusion goal 90  -Sepsis bolus not given due to previous history of  diastolic dysfunction  -Gentle IV fluids  -IV azithromycin 500 mg daily x 5 days  -Rocephin 1 g IV every 24 hours  -Strep pneumonia antigen urine  -Monitor blood cultures obtained in ED  -Obtain sputum culture  -Norepinephrine per protocol to maintain MAP greater than 60  -Repeat chest x-ray in the a.m.  -Daily labs    COPD exacerbation  -Solu-Medrol 40 mg every 8 hours IV  -Bronchodilators scheduled  -Continue azithromycin IV  -Aggressive pulmonary toilet  -Continue O2 to maintain adequate oxygenation for SpO2 greater than 90  -Repeat chest x-ray in the a.m.    Diastolic dysfunction  -No evidence of acute exacerbation at this time  -Will resume GDMT as able  -Recent echo 2/27/2024 reviewed grade 2 diastolic dysfunction with severe bioprosthetic valve stenosis    Treatment Plan  The patient will be admitted to my service here at [facility].         Disposition  Social Determinants of Health that impact treatment or disposition: none identified currently  Estimated length of stay is 4 days.     I confirmed that the patient's advanced care plan is present, code status is documented, and a surrogate decision maker is listed in the patient's medical record.     The patient's surrogate decision maker is spouse.     The patient was seen and examined by me on 3/1/2025 at 0803.    Electronically signed by KIRBY Copeland on 3/1/2025 at 12:46 CST        Total critical care time: 68 minutes

## 2025-03-01 NOTE — Clinical Note
Level of Care: Telemetry [5]   Diagnosis: COPD with acute exacerbation [659622]   Admitting Physician: KAREEM SMITH [[id number]]   Attending Physician: KAREEM SMITH [[id number]]   Certification: I Certify That Inpatient Hospital Services Are Medically Necessary For Greater Than 2 Midnights

## 2025-03-01 NOTE — ED NOTES
Nursing report ED to floor  Sharad Ryder  64 y.o.  male    HPI:   Chief Complaint   Patient presents with    Shortness of Breath       Admitting doctor:   Twan Long MD    Consulting provider(s):  Consults       No orders found from 1/31/2025 to 3/2/2025.             Admitting diagnosis:   The primary encounter diagnosis was Acute respiratory failure with hypoxia. Diagnoses of Shortness of breath, Acute exacerbation of chronic obstructive pulmonary disease (COPD), History of CHF (congestive heart failure), Smoker, Fever, unspecified, Hypoxia, Left ventricular hypertrophy by electrocardiogram, Hypokalemia, Hypomagnesemia, Elevated brain natriuretic peptide (BNP) level, Pneumonia of both lower lobes due to infectious organism, and Acute pulmonary edema were also pertinent to this visit.    Code status:   Current Code Status       Date Active Code Status Order ID Comments User Context       Prior            Allergies:   Patient has no known allergies.    Intake and Output    Intake/Output Summary (Last 24 hours) at 3/1/2025 0729  Last data filed at 3/1/2025 0519  Gross per 24 hour   Intake 900 ml   Output --   Net 900 ml       Weight:       03/01/25  0319   Weight: 53.5 kg (118 lb)       Most recent vitals:   Vitals:    03/01/25 0516 03/01/25 0546 03/01/25 0615 03/01/25 0704   BP: 91/61 (!) 86/57  101/62   BP Location:       Patient Position:       Pulse: 85 78 75 75   Resp:   18    Temp:       TempSrc:       SpO2: 92% 92% 95%    Weight:       Height:         Oxygen Therapy: .    Active LDAs/IV Access:   Lines, Drains & Airways       Active LDAs       Name Placement date Placement time Site Days    Peripheral IV 12/27/24 0820 Anterior;Left;Proximal Forearm 12/27/24  0820  Forearm  63    Peripheral IV 03/01/25 0343 Anterior;Right Forearm 03/01/25  0343  Forearm  less than 1                    Labs (abnormal labs have a star):   Labs Reviewed   COMPREHENSIVE METABOLIC PANEL - Abnormal; Notable for the following  components:       Result Value    Glucose 155 (*)     Potassium 2.8 (*)     All other components within normal limits    Narrative:     GFR Categories in Chronic Kidney Disease (CKD)      GFR Category          GFR (mL/min/1.73)    Interpretation  G1                     90 or greater         Normal or high (1)  G2                      60-89                Mild decrease (1)  G3a                   45-59                Mild to moderate decrease  G3b                   30-44                Moderate to severe decrease  G4                    15-29                Severe decrease  G5                    14 or less           Kidney failure          (1)In the absence of evidence of kidney disease, neither GFR category G1 or G2 fulfill the criteria for CKD.    eGFR calculation 2021 CKD-EPI creatinine equation, which does not include race as a factor   TROPONIN - Abnormal; Notable for the following components:    HS Troponin T 41 (*)     All other components within normal limits    Narrative:     High Sensitive Troponin T Reference Range:  <14.0 ng/L- Negative Female for AMI  <22.0 ng/L- Negative Male for AMI  >=14 - Abnormal Female indicating possible myocardial injury.  >=22 - Abnormal Male indicating possible myocardial injury.   Clinicians would have to utilize clinical acumen, EKG, Troponin, and serial changes to determine if it is an Acute Myocardial Infarction or myocardial injury due to an underlying chronic condition.        BNP (IN-HOUSE) - Abnormal; Notable for the following components:    proBNP 6,516.0 (*)     All other components within normal limits    Narrative:     This assay is used as an aid in the diagnosis of individuals suspected of having heart failure. It can be used as an aid in the diagnosis of acute decompensated heart failure (ADHF) in patients presenting with signs and symptoms of ADHF to the emergency department (ED). In addition, NT-proBNP of <300 pg/mL indicates ADHF is not likely.    Age  Range Result Interpretation  NT-proBNP Concentration (pg/mL:      <50             Positive            >450                   Gray                 300-450                    Negative             <300    50-75           Positive            >900                  Gray                300-900                  Negative            <300      >75             Positive            >1800                  Gray                300-1800                  Negative            <300   LIPASE - Abnormal; Notable for the following components:    Lipase 11 (*)     All other components within normal limits   MAGNESIUM - Abnormal; Notable for the following components:    Magnesium 1.5 (*)     All other components within normal limits   CBC WITH AUTO DIFFERENTIAL - Abnormal; Notable for the following components:    WBC 18.49 (*)     RBC 3.57 (*)     Hemoglobin 10.8 (*)     Hematocrit 33.3 (*)     Neutrophil % 84.2 (*)     Lymphocyte % 7.6 (*)     Eosinophil % 0.1 (*)     Neutrophils, Absolute 15.58 (*)     Monocytes, Absolute 1.37 (*)     Immature Grans, Absolute 0.09 (*)     All other components within normal limits   BLOOD GAS, VENOUS - Abnormal; Notable for the following components:    pH, Venous 7.436 (*)     Base Excess, Venous 3.3 (*)     All other components within normal limits   HIGH SENSITIVITIY TROPONIN T 1HR - Abnormal; Notable for the following components:    HS Troponin T 44 (*)     All other components within normal limits    Narrative:     High Sensitive Troponin T Reference Range:  <14.0 ng/L- Negative Female for AMI  <22.0 ng/L- Negative Male for AMI  >=14 - Abnormal Female indicating possible myocardial injury.  >=22 - Abnormal Male indicating possible myocardial injury.   Clinicians would have to utilize clinical acumen, EKG, Troponin, and serial changes to determine if it is an Acute Myocardial Infarction or myocardial injury due to an underlying chronic condition.        D-DIMER, QUANTITATIVE - Abnormal; Notable for the  "following components:    D-Dimer, Quantitative 1.41 (*)     All other components within normal limits    Narrative:     According to the assay 's published package insert, a normal (<0.50 MCGFEU/mL) D-dimer result in conjunction with a non-high clinical probability assessment, excludes deep vein thrombosis (DVT) and pulmonary embolism (PE) with high sensitivity.    D-dimer values increase with age and this can make VTE exclusion of an older population difficult. To address this, the [employer], based on best available evidence and recent guidelines, recommends that clinicians use age-adjusted D-dimer thresholds in patients greater than 50 years of age with: a) a low probability of PE who do not meet all Pulmonary Embolism Rule Out Criteria, or b) in those with intermediate probability of PE.   The formula for an age-adjusted D-dimer cut-off is \"age/100\".  For example, a 60 year old patient would have an age-adjusted cut-off of 0.60 MCGFEU/mL and an 80 year old 0.80 MCGFEU/mL.   BLOOD GAS, ARTERIAL - Abnormal; Notable for the following components:    pH, Arterial 7.452 (*)     pO2, Arterial 68.8 (*)     pH, Temp Corrected 7.452 (*)     pO2, Temperature Corrected 68.8 (*)     All other components within normal limits   PROCALCITONIN - Abnormal; Notable for the following components:    Procalcitonin 0.71 (*)     All other components within normal limits    Narrative:     As a Marker for Sepsis (Non-Neonates):    1. <0.5 ng/mL represents a low risk of severe sepsis and/or septic shock.  2. >2 ng/mL represents a high risk of severe sepsis and/or septic shock.    As a Marker for Lower Respiratory Tract Infections that require antibiotic therapy:    PCT on Admission    Antibiotic Therapy       6-12 Hrs later    >0.5                Strongly Recommended  >0.25 - <0.5        Recommended   0.1 - 0.25          Discouraged              Remeasure/reassess PCT  <0.1                Strongly " "Discouraged     Remeasure/reassess PCT    As 28 day mortality risk marker: \"Change in Procalcitonin Result\" (>80% or <=80%) if Day 0 (or Day 1) and Day 4 values are available. Refer to http://www.[facility]-pct-calculator.com    Change in PCT <=80%  A decrease of PCT levels below or equal to 80% defines a positive change in PCT test result representing a higher risk for 28-day all-cause mortality of patients diagnosed with severe sepsis for septic shock.    Change in PCT >80%  A decrease of PCT levels of more than 80% defines a negative change in PCT result representing a lower risk for 28-day all-cause mortality of patients diagnosed with severe sepsis or septic shock.      COVID-19/FLUA&B/RSV, NP SWAB IN TRANSPORT MEDIA 1 HR TAT - Normal    Narrative:     Fact sheet for providers: https://www.fda.gov/media/659194/download    Fact sheet for patients: https://www.fda.gov/media/515242/download    Test performed by PCR.   RESPIRATORY PANEL PCR W/ COVID-19 (SARS-COV-2), NP SWAB IN UTM/VTP, 2 HR TAT - Normal    Narrative:     In the setting of a positive respiratory panel with a viral infection PLUS a negative procalcitonin without other underlying concern for bacterial infection, consider observing off antibiotics or discontinuation of antibiotics and continue supportive care. If the respiratory panel is positive for atypical bacterial infection (Bordetella pertussis, Chlamydophila pneumoniae, or Mycoplasma pneumoniae), consider antibiotic de-escalation to target atypical bacterial infection.   LACTIC ACID, PLASMA - Normal   BLOOD CULTURE   BLOOD CULTURE   STREP PNEUMO AG, URINE OR CSF   RESPIRATORY CULTURE   BLOOD GAS, VENOUS   RAINBOW DRAW    Narrative:     The following orders were created for panel order [place] Draw.  Procedure                               Abnormality         Status                     ---------                               -----------         ------                     Green Top (Gel)[[id number]]    "                               Final result               Lavender Top[[id number]]                                     Final result               Red Top[[id number]]                                          Final result               Light Blue Top[[id number]]                                   Final result                 Please view results for these tests on the individual orders.   RAINBOW DRAW    Narrative:     The following orders were created for panel order [place] Draw.  Procedure                               Abnormality         Status                     ---------                               -----------         ------                     Green Top (Gel)[[id number]]                                                             Lavender Top[[id number]]                                                                Stang Top[[id number]]                                         Final result               Light Blue Top[[id number]]                                                                Please view results for these tests on the individual orders.   BLOOD GAS, ARTERIAL   BLOOD GAS, VENOUS   CBC AND DIFFERENTIAL    Narrative:     The following orders were created for panel order CBC & Differential.  Procedure                               Abnormality         Status                     ---------                               -----------         ------                     CBC Auto Differential[[id number]]        Abnormal            Final result                 Please view results for these tests on the individual orders.   GREEN TOP   LAVENDER TOP   RED TOP   LIGHT BLUE TOP   GRAY TOP       Meds given in ED:   Medications   sodium chloride 0.9 % flush 10 mL (has no administration in time range)   Potassium Replacement - Follow Nurse / BPA Driven Protocol (has no administration in time range)   Magnesium Standard Dose Replacement - Follow Nurse / BPA Driven Protocol (has no administration in time range)   nicotine  (NICODERM CQ) 21 MG/24HR patch 1 patch (1 patch Transdermal Medication Applied 3/1/25 0658)   nicotine polacrilex (NICORETTE) gum 4 mg (has no administration in time range)   cefTRIAXone (ROCEPHIN) 1,000 mg in sodium chloride 0.9 % 100 mL MBP (1,000 mg Intravenous New Bag 3/1/25 0708)   methylPREDNISolone sodium succinate (SOLU-Medrol) injection 40 mg (40 mg Intravenous Given 3/1/25 0658)   ipratropium-albuterol (DUO-NEB) nebulizer solution 3 mL (3 mL Nebulization Not Given 3/1/25 0634)   norepinephrine (LEVOPHED) 8 mg in 250 mL NS infusion (premix) (0.02 mcg/kg/min × 53.5 kg Intravenous New Bag 3/1/25 0704)   nitroglycerin (NITROSTAT) SL tablet 0.4 mg (has no administration in time range)   enoxaparin sodium (LOVENOX) syringe 40 mg (has no administration in time range)   acetaminophen (TYLENOL) tablet 650 mg (has no administration in time range)   sennosides-docusate (PERICOLACE) 8.6-50 MG per tablet 2 tablet (has no administration in time range)     And   polyethylene glycol (MIRALAX) packet 17 g (has no administration in time range)     And   bisacodyl (DULCOLAX) EC tablet 5 mg (has no administration in time range)     And   bisacodyl (DULCOLAX) suppository 10 mg (has no administration in time range)   azithromycin (ZITHROMAX) 500 mg in sodium chloride 0.9 % 250 mL IVPB-VTB (has no administration in time range)   acetaminophen (TYLENOL) tablet 1,000 mg (1,000 mg Oral Given 3/1/25 0340)   cefTRIAXone (ROCEPHIN) 2,000 mg in sodium chloride 0.9 % 100 mL MBP (0 mg Intravenous Stopped 3/1/25 0409)   azithromycin (ZITHROMAX) 500 mg in sodium chloride 0.9 % 250 mL IVPB-VTB (0 mg Intravenous Stopped 3/1/25 0519)   magnesium sulfate 2g/50 mL (PREMIX) infusion (0 g Intravenous Stopped 3/1/25 0519)   ipratropium-albuterol (DUO-NEB) nebulizer solution 3 mL (3 mL Nebulization Given 3/1/25 0615)   iopamidol (ISOVUE-370) 76 % injection 100 mL (100 mL Intravenous Given 3/1/25 0515)     norepinephrine, 0.02-0.3 mcg/kg/min, Last  Rate: 0.02 mcg/kg/min (03/01/25 0704)         NIH Stroke Scale:       Isolation/Infection(s):  Droplet   MRSA     COVID Testing  Collected .  Resulted .    Nursing report ED to floor:  Mental status: A/O x 4  Ambulatory status:   Precautions: .    ED nurse phone extentsion- [phone]

## 2025-03-01 NOTE — ED PROVIDER NOTES
"EMERGENCY DEPARTMENT ATTENDING NOTE    Patient Name: Sharad Ryder    Chief Complaint   Patient presents with    Shortness of Breath       PATIENT PRESENTATION:  Sharad Ryder is a very pleasant 64 y.o. male with PMH significant for COPD and CHF presenting the emergency department brought in by EMS due to shortness of breath.    Per EMS report directly to myself patient has history of COPD still smokes.  They initiated 125 mg of Solu-Medrol as well as a DuoNeb.  Patient was febrile.    On my interview with the patient at the bedside states has been feeling short of breath and is having a cough he does feel febrile wife states it is 100.6 with EMS prior.  Denies any chest pain.  He states he felt similar when he had RSV in December of this year.  Denies any abdominal pain.  Has a history of heart failure but not on Lasix or Bumex least that he is aware of but not in his chart.  He is a smoker and also has a history of CABG.      PHYSICAL EXAM:   VS: /80   Pulse 88   Temp 97.9 °F (36.6 °C) (Oral)   Resp 17   Ht 162.6 cm (64\")   Wt 54.6 kg (120 lb 5.9 oz)   SpO2 95%   BMI 20.66 kg/m²   GENERAL: Chronically ill-appearing tachypneic elderly gentleman sitting up in stretcher with pursed lip breathing; otherwise well-nourished, well-developed, awake, alert, ill-appearing  EYES: PERRL, sclerae anicteric, extraocular movements grossly intact, symmetric lids  EARS, NOSE, MOUTH, THROAT: atraumatic external nose and ears, moist mucous membranes  NECK: symmetric, trachea midline  RESPIRATORY: Tachypneic with rhonchorous breath sounds throughout with some end expiratory wheezes   CARDIOVASCULAR: no murmurs, peripheral pulses 2+ and equal in all extremities  GI: soft, nontender, nondistended  MUSCULOSKELETAL/EXTREMITIES: extremities without obvious deformity  SKIN: warm and dry with no obvious rashes  NEUROLOGIC: moving all 4 extremities symmetrically, awake and alert  PSYCHIATRIC: alert, pleasant and cooperative. " Appropriate mood and affect.      MEDICAL DECISION MAKING:    Sharad Ryder is a 64 y.o. male who presented to the ED due to shortness of breath.  Patient history of CHF and COPD he has no leg swelling suspect COPD during presentation more very rhonchorous breath sounds chest x-ray pending initially.  Debated initial BiPAP patient in no significant distress but is fairly tight lung sounds ultimately did initiate BiPAP although his initial VBG did not show hypercarbia.  Patient already received methylprednisolone and DuoNeb added a DuoNeb.  Patient febrile on arrival covering for sepsis with both ceftriaxone and azithromycin but patient is a sepsis fluid overload volume exclusion so target volume initial 0 mL added 500 mL after chest x-ray did not show pleural effusions although suspicion for interstitial pneumonia versus pulmonary edema still holding there his blood pressure remains soft maps hovering around 65 though not dropping clinically his heart rate is improving so I do not think this is truly septic shock just low blood pressure reading.  Lactic is ultimately not consistent with sepsis and normal.  Continually reassessing fluid status ultimately added D-dimer as felt would be best pursued CT to better evaluate his pulmonary tumor versus pneumonia versus both and given D-dimer is positive at angiogram study there is no pulmonary embolism but there is suspicion for pulmonary edema in addition to bilateral lower lobe consolidations ultimately added Lasix.  I did admitted the patient to the hospital service but when he the patient emerged from he is concerned about his very borderline blood pressures to which I am not in disagreement so ultimately he requested ICU admission so I called IC intensivist and accepted patient for ICU admission.      Differential Diagnosis Considered: Acute COPD exacerbation, acute CHF exacerbation, pneumonia, respiratory failure including hypoxic and hypercarbic, viral illness such  as influenza and COVID    Labs Ordered:  Labs Reviewed   COMPREHENSIVE METABOLIC PANEL - Abnormal; Notable for the following components:       Result Value    Glucose 155 (*)     Potassium 2.8 (*)     All other components within normal limits    Narrative:     GFR Categories in Chronic Kidney Disease (CKD)      GFR Category          GFR (mL/min/1.73)    Interpretation  G1                     90 or greater         Normal or high (1)  G2                      60-89                Mild decrease (1)  G3a                   45-59                Mild to moderate decrease  G3b                   30-44                Moderate to severe decrease  G4                    15-29                Severe decrease  G5                    14 or less           Kidney failure          (1)In the absence of evidence of kidney disease, neither GFR category G1 or G2 fulfill the criteria for CKD.    eGFR calculation 2021 CKD-EPI creatinine equation, which does not include race as a factor   TROPONIN - Abnormal; Notable for the following components:    HS Troponin T 41 (*)     All other components within normal limits    Narrative:     High Sensitive Troponin T Reference Range:  <14.0 ng/L- Negative Female for AMI  <22.0 ng/L- Negative Male for AMI  >=14 - Abnormal Female indicating possible myocardial injury.  >=22 - Abnormal Male indicating possible myocardial injury.   Clinicians would have to utilize clinical acumen, EKG, Troponin, and serial changes to determine if it is an Acute Myocardial Infarction or myocardial injury due to an underlying chronic condition.        BNP (IN-HOUSE) - Abnormal; Notable for the following components:    proBNP 6,516.0 (*)     All other components within normal limits    Narrative:     This assay is used as an aid in the diagnosis of individuals suspected of having heart failure. It can be used as an aid in the diagnosis of acute decompensated heart failure (ADHF) in patients presenting with signs and symptoms  of ADHF to the emergency department (ED). In addition, NT-proBNP of <300 pg/mL indicates ADHF is not likely.    Age Range Result Interpretation  NT-proBNP Concentration (pg/mL:      <50             Positive            >450                   Gray                 300-450                    Negative             <300    50-75           Positive            >900                  Gray                300-900                  Negative            <300      >75             Positive            >1800                  Gray                300-1800                  Negative            <300   LIPASE - Abnormal; Notable for the following components:    Lipase 11 (*)     All other components within normal limits   MAGNESIUM - Abnormal; Notable for the following components:    Magnesium 1.5 (*)     All other components within normal limits   CBC WITH AUTO DIFFERENTIAL - Abnormal; Notable for the following components:    WBC 18.49 (*)     RBC 3.57 (*)     Hemoglobin 10.8 (*)     Hematocrit 33.3 (*)     Neutrophil % 84.2 (*)     Lymphocyte % 7.6 (*)     Eosinophil % 0.1 (*)     Neutrophils, Absolute 15.58 (*)     Monocytes, Absolute 1.37 (*)     Immature Grans, Absolute 0.09 (*)     All other components within normal limits   BLOOD GAS, VENOUS - Abnormal; Notable for the following components:    pH, Venous 7.436 (*)     Base Excess, Venous 3.3 (*)     All other components within normal limits   HIGH SENSITIVITIY TROPONIN T 1HR - Abnormal; Notable for the following components:    HS Troponin T 44 (*)     All other components within normal limits    Narrative:     High Sensitive Troponin T Reference Range:  <14.0 ng/L- Negative Female for AMI  <22.0 ng/L- Negative Male for AMI  >=14 - Abnormal Female indicating possible myocardial injury.  >=22 - Abnormal Male indicating possible myocardial injury.   Clinicians would have to utilize clinical acumen, EKG, Troponin, and serial changes to determine if it is an Acute Myocardial Infarction or  "myocardial injury due to an underlying chronic condition.        D-DIMER, QUANTITATIVE - Abnormal; Notable for the following components:    D-Dimer, Quantitative 1.41 (*)     All other components within normal limits    Narrative:     According to the assay 's published package insert, a normal (<0.50 MCGFEU/mL) D-dimer result in conjunction with a non-high clinical probability assessment, excludes deep vein thrombosis (DVT) and pulmonary embolism (PE) with high sensitivity.    D-dimer values increase with age and this can make VTE exclusion of an older population difficult. To address this, the [employer], based on best available evidence and recent guidelines, recommends that clinicians use age-adjusted D-dimer thresholds in patients greater than 50 years of age with: a) a low probability of PE who do not meet all Pulmonary Embolism Rule Out Criteria, or b) in those with intermediate probability of PE.   The formula for an age-adjusted D-dimer cut-off is \"age/100\".  For example, a 60 year old patient would have an age-adjusted cut-off of 0.60 MCGFEU/mL and an 80 year old 0.80 MCGFEU/mL.   BLOOD GAS, ARTERIAL - Abnormal; Notable for the following components:    pH, Arterial 7.452 (*)     pO2, Arterial 68.8 (*)     pH, Temp Corrected 7.452 (*)     pO2, Temperature Corrected 68.8 (*)     All other components within normal limits   PROCALCITONIN - Abnormal; Notable for the following components:    Procalcitonin 0.71 (*)     All other components within normal limits    Narrative:     As a Marker for Sepsis (Non-Neonates):    1. <0.5 ng/mL represents a low risk of severe sepsis and/or septic shock.  2. >2 ng/mL represents a high risk of severe sepsis and/or septic shock.    As a Marker for Lower Respiratory Tract Infections that require antibiotic therapy:    PCT on Admission    Antibiotic Therapy       6-12 Hrs later    >0.5                Strongly Recommended  >0.25 - <0.5        " "Recommended   0.1 - 0.25          Discouraged              Remeasure/reassess PCT  <0.1                Strongly Discouraged     Remeasure/reassess PCT    As 28 day mortality risk marker: \"Change in Procalcitonin Result\" (>80% or <=80%) if Day 0 (or Day 1) and Day 4 values are available. Refer to http://www.[facility]pct-calculator.com    Change in PCT <=80%  A decrease of PCT levels below or equal to 80% defines a positive change in PCT test result representing a higher risk for 28-day all-cause mortality of patients diagnosed with severe sepsis for septic shock.    Change in PCT >80%  A decrease of PCT levels of more than 80% defines a negative change in PCT result representing a lower risk for 28-day all-cause mortality of patients diagnosed with severe sepsis or septic shock.      COVID-19/FLUA&B/RSV, NP SWAB IN TRANSPORT MEDIA 1 HR TAT - Normal    Narrative:     Fact sheet for providers: https://www.fda.gov/media/778623/download    Fact sheet for patients: https://www.fda.gov/media/289066/download    Test performed by PCR.   RESPIRATORY PANEL PCR W/ COVID-19 (SARS-COV-2), NP SWAB IN UTM/VTP, 2 HR TAT - Normal    Narrative:     In the setting of a positive respiratory panel with a viral infection PLUS a negative procalcitonin without other underlying concern for bacterial infection, consider observing off antibiotics or discontinuation of antibiotics and continue supportive care. If the respiratory panel is positive for atypical bacterial infection (Bordetella pertussis, Chlamydophila pneumoniae, or Mycoplasma pneumoniae), consider antibiotic de-escalation to target atypical bacterial infection.   STREP PNEUMO AG, URINE OR CSF - Normal   LACTIC ACID, PLASMA - Normal   BLOOD CULTURE   BLOOD CULTURE   RESPIRATORY CULTURE   BLOOD GAS, VENOUS   RAINBOW DRAW    Narrative:     The following orders were created for panel order [place] Draw.  Procedure                               Abnormality         Status                 "     ---------                               -----------         ------                     Green Top (Gel)[[id number]]                                  Final result               Lavender Top[[id number]]                                     Final result               Red Top[[id number]]                                          Final result               Light Blue Top[[id number]]                                   Final result                 Please view results for these tests on the individual orders.   RAINBOW DRAW    Narrative:     The following orders were created for panel order [place] Draw.  Procedure                               Abnormality         Status                     ---------                               -----------         ------                     Green Top (Gel)[[id number]]                                                             Lavender Top[[id number]]                                                                Tsang Top[[id number]]                                         Final result               Light Blue Top[[id number]]                                                                Please view results for these tests on the individual orders.   BLOOD GAS, ARTERIAL   BLOOD GAS, VENOUS   POTASSIUM   CBC AND DIFFERENTIAL    Narrative:     The following orders were created for panel order CBC & Differential.  Procedure                               Abnormality         Status                     ---------                               -----------         ------                     CBC Auto Differential[[id number]]        Abnormal            Final result                 Please view results for these tests on the individual orders.   GREEN TOP   LAVENDER TOP   RED TOP   LIGHT BLUE TOP   GRAY TOP        Imaging Ordered:   CT Angiogram Chest   Final Result       1. No evidence of pulmonary embolus.   2. Mixed atelectasis and consolidation in both posterior lower lungs,   diffuse interlobular septal  thickening, hazy groundglass opacity, and   small bilateral pleural effusions. Findings are consistent with   pulmonary edema. Given areas of consolidation, a superimposed pneumonia   is possible.   3. Severe emphysema.   4. Mild mediastinal and bilateral hilar lymphadenopathy, without   significant change.   5. No change in mild dilatation of ascending aorta measuring 4 cm   diameter. Partially imaged abdominal aneurysm measuring up to 4 cm.   Heavy atherosclerotic plaque in the aorta at the level of the diaphragm   likely causing severe stenosis.       This report was signed and finalized on 3/1/2025 6:23 AM by Dr. Tim Langley MD.          XR Chest 1 View   Final Result       Interval worsening of interstitial and airspace opacities in both mid   and lower lung zones.        This report was signed and finalized on 3/1/2025 5:07 AM by Dr. Tim Langley MD.              Internal chart review:   Past Medical History:   Diagnosis Date    Aneurysm     Anxiety     Arthritis     Carotid artery disease     Carotid stenosis, right 02/01/2018    Post right carotid endarterectomy    COPD (chronic obstructive pulmonary disease)     Coronary artery disease     Heart murmur     History of right-sided carotid endarterectomy 02/16/2022    Hyperlipidemia LDL goal <70 12/14/2017    Left frontal lobe, right thalamus and right occipital CVA (cerebrovascular accident) (HCC) 01/27/2022    LVH (left ventricular hypertrophy) 02/16/2022    Pneumonia     Primary hypertension 12/14/2017    S/P CABG x 3 02/16/2022    LIMA to LAD, SVG to PDA and SVG to diagonal branch with TMR and left atrial appendage exclusion with atricure clip device 2/8/2018 per Dr. Cj Robin at [facility]     Severe aortic valve stenosis 12/14/2017    Status post aortic valve replacement with bioprosthetic valve 02/16/2022    Graciela Juarez bovine pericardial 19 mm valve 2/8/2018 per Dr. Cj Robin at [facility]     Tobacco use 12/14/2017    Wears glasses        Past Surgical History:   Procedure Laterality Date    AORTAGRAM Bilateral 10/27/2023    Procedure: LEFT LOWER EXTREMITY ANGIOGRAM, BILATERAL ILIAC BALLOON ANGIOPLASTY, BILATERAL ILIAC STENT PLACEMENT, MYNX CLOSURE;  Surgeon: Jl Salgado DO;  Location:  [facility] HYBRID OR 12;  Service: Vascular;  Laterality: Bilateral;    APPENDECTOMY      CARDIAC CATHETERIZATION N/A 12/18/2017    Procedure: Left Heart Cath;  Surgeon: Aime Francois MD;  Location:  [facility] CATH INVASIVE LOCATION;  Service:     CARDIAC CATHETERIZATION N/A 12/18/2017    Procedure: Right Heart Cath;  Surgeon: Aime Francois MD;  Location:  [facility] CATH INVASIVE LOCATION;  Service:     CARDIAC CATHETERIZATION Bilateral 1/4/2018    Procedure: Coronary angiography;  Surgeon: Alfonso Yi MD;  Location:  [facility] CATH INVASIVE LOCATION;  Service:     CARDIAC CATHETERIZATION Left 9/15/2021    Procedure: Cardiac Catheterization/Vascular Study NIMCO OK  Has 3 graft 2018;  Surgeon: Aime Francois MD;  Location:  [facility] CATH INVASIVE LOCATION;  Service: Cardiology;  Laterality: Left;    CAROTID ENDARTERECTOMY Right 2/1/2018    Procedure: RIGHT CAROTID ENDARTERECTOMY WITH EEG-awake;  Surgeon: Jl Salgado DO;  Location:  [facility] OR;  Service:     CORONARY ARTERY BYPASS GRAFT WITH AORTIC VALVE REPAIR/REPLACEMENT N/A 2/8/2018    Procedure: AORTIC VALVE REPLACEMENT,CORONARY ARTERY BYPASS GRAFTING x 3  WITH CONCHA AND RIGHT EVH, ATRIAL APPENDAGE EXCLUSION LEFT WITH TRANSESOPHAGEAL ECHOCARDIOGRAM, 17-CHANNEL TMR;  Surgeon: Cj Robin MD;  Location:  [facility] OR;  Service:     FINGER SURGERY Right 1990    OTHER SURGICAL HISTORY      skull srgery from accident in childhood.       No Known Allergies      Current Facility-Administered Medications:     acetaminophen (TYLENOL) tablet 650 mg, 650 mg, Oral, Q4H PRN, Brandi Mares APRN, 650 mg at 03/01/25 1214    aspirin chewable tablet 81 mg, 81 mg, Oral, Daily, Suzan  KIRBY Paz, 81 mg at 03/01/25 1214    [START ON 3/2/2025] azithromycin (ZITHROMAX) 500 mg in sodium chloride 0.9 % 250 mL IVPB-VTB, 500 mg, Intravenous, Q24H, Brandi Mares APRN    sennosides-docusate (PERICOLACE) 8.6-50 MG per tablet 2 tablet, 2 tablet, Oral, BID PRN **AND** polyethylene glycol (MIRALAX) packet 17 g, 17 g, Oral, Daily PRN **AND** bisacodyl (DULCOLAX) EC tablet 5 mg, 5 mg, Oral, Daily PRN **AND** bisacodyl (DULCOLAX) suppository 10 mg, 10 mg, Rectal, Daily PRN, Brandi Mares APRN    busPIRone (BUSPAR) tablet 10 mg, 10 mg, Oral, BID, Brandi Mares APRN, 10 mg at 03/01/25 1214    cefTRIAXone (ROCEPHIN) 1,000 mg in sodium chloride 0.9 % 100 mL MBP, 1,000 mg, Intravenous, Q24H, Brandi Mares APRN, Stopped at 03/01/25 0736    [START ON 3/2/2025] Chlorhexidine Gluconate Cloth 2 % pads 1 Application, 1 Application, Topical, Q24H, Twan Long MD    citalopram (CeleXA) tablet 20 mg, 20 mg, Oral, Daily, Brandi Mares APRN, 20 mg at 03/01/25 1412    clopidogrel (PLAVIX) tablet 75 mg, 75 mg, Oral, Daily, Brandi Mares APRN, 75 mg at 03/01/25 1214    enoxaparin sodium (LOVENOX) syringe 40 mg, 40 mg, Subcutaneous, Daily, Brandi Mares APRN, 40 mg at 03/01/25 0817    ipratropium-albuterol (DUO-NEB) nebulizer solution 3 mL, 3 mL, Nebulization, 4x Daily - RT, Brandi Mares APRN, 3 mL at 03/01/25 1456    [Held by provider] isosorbide mononitrate (IMDUR) 24 hr tablet 120 mg, 120 mg, Oral, Daily, Brandi Mares APRN    lactated ringers infusion, 50 mL/hr, Intravenous, Continuous, Brandi Mares APRN, Last Rate: 50 mL/hr at 03/01/25 0817, 50 mL/hr at 03/01/25 0817    Magnesium Standard Dose Replacement - Follow Nurse / BPA Driven Protocol, , Not Applicable, PRN, Bladimir Braun MD    methylPREDNISolone sodium succinate (SOLU-Medrol) injection 40 mg, 40 mg, Intravenous, Q8H, Brandi Mares APRN, 40 mg at 03/01/25 1412    [Held by provider]  metoprolol succinate XL (TOPROL-XL) 24 hr tablet 50 mg, 50 mg, Oral, Q24H, Brandi Mares APRN    mupirocin (BACTROBAN) 2 % nasal ointment 1 Application, 1 Application, Each Nare, BID, Twan Long MD, 1 Application at 03/01/25 1214    nicotine (NICODERM CQ) 21 MG/24HR patch 1 patch, 1 patch, Transdermal, Q24H, Brandi Mares APRN, 1 patch at 03/01/25 0658    nicotine polacrilex (NICORETTE) gum 4 mg, 4 mg, Mouth/Throat, Q1H PRN, Brandi Mares APRN    nitroglycerin (NITROSTAT) SL tablet 0.4 mg, 0.4 mg, Sublingual, Q5 Min PRN, Brandi Mares APRN    norepinephrine (LEVOPHED) 8 mg in 250 mL NS infusion (premix), 0.02-0.3 mcg/kg/min, Intravenous, Titrated, Brandi Mares APRN, Stopped at 03/01/25 0900    Potassium Replacement - Follow Nurse / BPA Driven Protocol, , Not Applicable, PRN, Bladimir Braun MD    [COMPLETED] Insert Peripheral IV, , , Once **AND** sodium chloride 0.9 % flush 10 mL, 10 mL, Intravenous, PRN, Bladimir Braun MD    [Held by provider] tamsulosin (FLOMAX) 24 hr capsule 0.4 mg, 0.4 mg, Oral, Daily, Brandi Mares APRN    traMADol (ULTRAM) tablet 50 mg, 50 mg, Oral, Q6H PRN, Brandi Mares APRN, 50 mg at 03/01/25 1335    External documents reviewed: Last cardiac echo report is from 12/27/2024 and shows an EF of 65 to 70%.    My EKG interpretation: Sinus tachycardia with a rate of 111 no acute ST elevations or ST depressions.  Peers to be T wave inversions in lead V5 V6 left ventricular hypertrophy in the lateral leads.    My lab interpretation: Blood gas with slight alkalosis though normal CO2.  Initial troponin of 41 with repeat of 44 with negative delta.  BNP notably elevated at 6516 which is uptrending from prior values.  D-dimer 1.41.  White blood cell 18.49 with neutrophilic shift.  Negative COVID influenza and RSV testing.    My imaging interpretation: Chest x-ray spacious for interstitial edema versus pneumonia given no pleural effusions.  CT  angiogram shows trace pleural effusion on the right slight perforation on the left with some pulm edema groundglass opacities again suspicion for [place] pneumonia versus pulmonary edema.    ED Course, Consultation, and Re-evaluation:     ED Course as of 03/01/25 1830   Sat Mar 01, 2025   0351 On my initial evaluation clear tachypneic rhonchorous breath sounds throughout some wheezes chest x-ray to show some interstitial prominence I think would benefit from BiPAP finishing BiPAP close initial blood gases reassuring deafly has no hypercarbia sepsis or alkalosis deletes post for sepsis he is a sepsis volume overload fluid exclusions not initiating IV fluids given history of heart failure pending BNP although I reviewed his echo report which at least does not show significant decreased ejection fraction so may add fluids following additional workup but will not initiate fluid immediately.  Initiate ceftriaxone azithromycin to cover for sepsis pneumonia.  He is afebrile.  Suspicion for viral illness like influenza though definitely would consider pneumonia driving this picture as well.  Given Tylenol for his fever. [MW]   0400 Lactic normal which is not consistent with sepsis.  BNP is elevated at 6516 which is up from all of his prior suspicion for heart failure exacerbation chest x-ray some interstitial prominence.  Considering Lasix although patient may actually need fluids given blood pressure not turning to trialing 500 mL of fluids initially.  Easier to follow-up fluids with Lasix then to do the reverse and make patient hypotensive secondary to decreased volume status. [MW]   0512 Immediately upon completion of CT angiogram I reviewed the film there are some groundglass opacities throughout with some lower lobe atelectasis but I see no pleural effusion so I do not think patient is grossly volume overloaded so we will pursue additional IV fluids in the setting of his soft blood pressures. I also see no large  pulmonary embolism. [MW]   0533 Discussed case inpatient hospitalist Dr. Corley.  I discussed his difficult fluid status he does have elevated BNP but not a lot of pleural effusion on the initial x-ray or on CT just trace with some atelectatic changes given his fever probably more likely interstitial pneumonia given no pulmonary was him.  He is in agreement so trialing just 500 mL of fluid in total and then continually reassessing the patient at the bedside.  He accepted patient for admission. [MW]   0534 Although the patient's blood pressure slightly soft his other vital signs look phenomenal his heart rate is 79 satting 93% on BiPAP only breathing 17 stress is markedly proved so I think we are heading the right direction. [MW]   0548 Patient's blood pressure is teetering around a MAP of 65 so I discussed with the inpatient hospitalist when he came emerged primary and he was in agreement and therefore felt that this patient will to go to the ICU which I think is appropriate. [MW]   0549 IC intensivist has been paged for admission. [MW]   0614 Discussed case with ICU intensivist KESHIA Villatoor and he accepted patient for admission under intensivist, Dr. Long. [MW]   0636 CT has now been read and is suspicious for pulmonary edema and superimposed bilateral lower lobe pneumonia. Given this will ultimately hold additional fluids and initiate lasix. Patient is boarding in the ED for ICU admission.  [MW]      ED Course User Index  [MW] Bladimir Braun MD         CRITICAL CARE ATTESTATION:  I provided 42 minutes of non-continuous critical care time, excluding procedures, evaluating this patient's critical illness and devoting time to direct management, repeat assessments, review of the labs, discussing their care with consultants, and documenting in the record.     RESPIRATORY: This patient was at risk for worsening of the respiratory system and potential respiratory collapse if no intervention was done.  I personally  provided repeat exams before and after interventions and monitored the vital signs and oxygen saturations.      ED Diagnosis:  (J96.01) Acute respiratory failure with hypoxia    (R06.02) Shortness of breath    (J44.1) Acute exacerbation of chronic obstructive pulmonary disease (COPD)    (Z86.79) History of CHF (congestive heart failure)    (F17.200) Smoker    (R50.9) Fever, unspecified    (R09.02) Hypoxia    (I51.7) Left ventricular hypertrophy by electrocardiogram    (E87.6) Hypokalemia    (E83.42) Hypomagnesemia    (R79.89) Elevated brain natriuretic peptide (BNP) level    (J18.9) Pneumonia of both lower lobes due to infectious organism    (J81.0) Acute pulmonary edema     Disposition: admit to ICU intensivist          Signed:  Bladimir Braun MD  Emergency Medicine Physician    Please note that portions of this note were completed with a voice recognition program.      Bladimir Braun MD  03/01/25 4555

## 2025-03-02 LAB
ALBUMIN SERPL-MCNC: 3.5 G/DL (ref 3.5–5.2)
ALBUMIN/GLOB SERPL: 1.2 G/DL
ALP SERPL-CCNC: 59 U/L (ref 39–117)
ALT SERPL W P-5'-P-CCNC: 17 U/L (ref 1–41)
ANION GAP SERPL CALCULATED.3IONS-SCNC: 15 MMOL/L (ref 5–15)
AST SERPL-CCNC: 19 U/L (ref 1–40)
BASOPHILS # BLD MANUAL: 0 10*3/MM3 (ref 0–0.2)
BASOPHILS NFR BLD MANUAL: 0 % (ref 0–1.5)
BILIRUB SERPL-MCNC: 0.2 MG/DL (ref 0–1.2)
BUN SERPL-MCNC: 9 MG/DL (ref 8–23)
BUN/CREAT SERPL: 21.4 (ref 7–25)
BURR CELLS BLD QL SMEAR: ABNORMAL
CALCIUM SPEC-SCNC: 8.1 MG/DL (ref 8.6–10.5)
CHLORIDE SERPL-SCNC: 107 MMOL/L (ref 98–107)
CO2 SERPL-SCNC: 22 MMOL/L (ref 22–29)
CREAT SERPL-MCNC: 0.42 MG/DL (ref 0.76–1.27)
DEPRECATED RDW RBC AUTO: 51.3 FL (ref 37–54)
EGFRCR SERPLBLD CKD-EPI 2021: 120.1 ML/MIN/1.73
EOSINOPHIL # BLD MANUAL: 0 10*3/MM3 (ref 0–0.4)
EOSINOPHIL NFR BLD MANUAL: 0 % (ref 0.3–6.2)
ERYTHROCYTE [DISTWIDTH] IN BLOOD BY AUTOMATED COUNT: 14.6 % (ref 12.3–15.4)
GIANT PLATELETS: ABNORMAL
GLOBULIN UR ELPH-MCNC: 3 GM/DL
GLUCOSE SERPL-MCNC: 146 MG/DL (ref 65–99)
HCT VFR BLD AUTO: 32.3 % (ref 37.5–51)
HGB BLD-MCNC: 10.5 G/DL (ref 13–17.7)
LYMPHOCYTES # BLD MANUAL: 1.09 10*3/MM3 (ref 0.7–3.1)
LYMPHOCYTES NFR BLD MANUAL: 3 % (ref 5–12)
MCH RBC QN AUTO: 30.9 PG (ref 26.6–33)
MCHC RBC AUTO-ENTMCNC: 32.5 G/DL (ref 31.5–35.7)
MCV RBC AUTO: 95 FL (ref 79–97)
MICROCYTES BLD QL: ABNORMAL
MONOCYTES # BLD: 0.54 10*3/MM3 (ref 0.1–0.9)
NEUTROPHILS # BLD AUTO: 16.47 10*3/MM3 (ref 1.7–7)
NEUTROPHILS NFR BLD MANUAL: 88 % (ref 42.7–76)
NEUTS BAND NFR BLD MANUAL: 3 % (ref 0–5)
NEUTS VAC BLD QL SMEAR: ABNORMAL
PLATELET # BLD AUTO: 265 10*3/MM3 (ref 140–450)
PMV BLD AUTO: 9.6 FL (ref 6–12)
POTASSIUM SERPL-SCNC: 4.1 MMOL/L (ref 3.5–5.2)
PROT SERPL-MCNC: 6.5 G/DL (ref 6–8.5)
RBC # BLD AUTO: 3.4 10*6/MM3 (ref 4.14–5.8)
SODIUM SERPL-SCNC: 144 MMOL/L (ref 136–145)
VARIANT LYMPHS NFR BLD MANUAL: 2 % (ref 0–5)
VARIANT LYMPHS NFR BLD MANUAL: 4 % (ref 19.6–45.3)
WBC NRBC COR # BLD AUTO: 18.1 10*3/MM3 (ref 3.4–10.8)

## 2025-03-02 PROCEDURE — 94799 UNLISTED PULMONARY SVC/PX: CPT

## 2025-03-02 PROCEDURE — 25010000002 ENOXAPARIN PER 10 MG: Performed by: NURSE PRACTITIONER

## 2025-03-02 PROCEDURE — 25810000003 SODIUM CHLORIDE 0.9 % SOLUTION 250 ML FLEX CONT: Performed by: NURSE PRACTITIONER

## 2025-03-02 PROCEDURE — 36415 COLL VENOUS BLD VENIPUNCTURE: CPT | Performed by: NURSE PRACTITIONER

## 2025-03-02 PROCEDURE — 85027 COMPLETE CBC AUTOMATED: CPT | Performed by: NURSE PRACTITIONER

## 2025-03-02 PROCEDURE — 25010000002 CEFTRIAXONE PER 250 MG: Performed by: NURSE PRACTITIONER

## 2025-03-02 PROCEDURE — 85007 BL SMEAR W/DIFF WBC COUNT: CPT | Performed by: NURSE PRACTITIONER

## 2025-03-02 PROCEDURE — 25010000002 AZITHROMYCIN PER 500 MG: Performed by: NURSE PRACTITIONER

## 2025-03-02 PROCEDURE — 94761 N-INVAS EAR/PLS OXIMETRY MLT: CPT

## 2025-03-02 PROCEDURE — 94664 DEMO&/EVAL PT USE INHALER: CPT

## 2025-03-02 PROCEDURE — 25810000003 LACTATED RINGERS PER 1000 ML: Performed by: NURSE PRACTITIONER

## 2025-03-02 PROCEDURE — 80053 COMPREHEN METABOLIC PANEL: CPT | Performed by: NURSE PRACTITIONER

## 2025-03-02 PROCEDURE — 94762 N-INVAS EAR/PLS OXIMTRY CONT: CPT

## 2025-03-02 PROCEDURE — 25010000002 METHYLPREDNISOLONE PER 40 MG: Performed by: NURSE PRACTITIONER

## 2025-03-02 RX ORDER — AZITHROMYCIN 250 MG/1
500 TABLET, FILM COATED ORAL
Status: COMPLETED | OUTPATIENT
Start: 2025-03-03 | End: 2025-03-03

## 2025-03-02 RX ADMIN — METHYLPREDNISOLONE SODIUM SUCCINATE 40 MG: 40 INJECTION, POWDER, FOR SOLUTION INTRAMUSCULAR; INTRAVENOUS at 22:49

## 2025-03-02 RX ADMIN — BUSPIRONE HYDROCHLORIDE 10 MG: 10 TABLET ORAL at 08:13

## 2025-03-02 RX ADMIN — TRAMADOL HYDROCHLORIDE 50 MG: 50 TABLET, COATED ORAL at 16:43

## 2025-03-02 RX ADMIN — CITALOPRAM HYDROBROMIDE 20 MG: 20 TABLET ORAL at 08:13

## 2025-03-02 RX ADMIN — CHLORHEXIDINE GLUCONATE 1 APPLICATION: 500 CLOTH TOPICAL at 04:10

## 2025-03-02 RX ADMIN — POTASSIUM CHLORIDE 40 MEQ: 1500 TABLET, EXTENDED RELEASE ORAL at 04:10

## 2025-03-02 RX ADMIN — BUSPIRONE HYDROCHLORIDE 10 MG: 10 TABLET ORAL at 20:30

## 2025-03-02 RX ADMIN — ASPIRIN 81 MG: 81 TABLET, CHEWABLE ORAL at 08:13

## 2025-03-02 RX ADMIN — TRAMADOL HYDROCHLORIDE 50 MG: 50 TABLET, COATED ORAL at 22:49

## 2025-03-02 RX ADMIN — TRAMADOL HYDROCHLORIDE 50 MG: 50 TABLET, COATED ORAL at 06:06

## 2025-03-02 RX ADMIN — IPRATROPIUM BROMIDE AND ALBUTEROL SULFATE 3 ML: 2.5; .5 SOLUTION RESPIRATORY (INHALATION) at 14:28

## 2025-03-02 RX ADMIN — METHYLPREDNISOLONE SODIUM SUCCINATE 40 MG: 40 INJECTION, POWDER, FOR SOLUTION INTRAMUSCULAR; INTRAVENOUS at 13:57

## 2025-03-02 RX ADMIN — ENOXAPARIN SODIUM 40 MG: 100 INJECTION SUBCUTANEOUS at 08:33

## 2025-03-02 RX ADMIN — Medication 1 APPLICATION: at 08:13

## 2025-03-02 RX ADMIN — TAMSULOSIN HYDROCHLORIDE 0.4 MG: 0.4 CAPSULE ORAL at 08:13

## 2025-03-02 RX ADMIN — AZITHROMYCIN DIHYDRATE 500 MG: 500 INJECTION, POWDER, LYOPHILIZED, FOR SOLUTION INTRAVENOUS at 06:06

## 2025-03-02 RX ADMIN — SODIUM CHLORIDE, SODIUM LACTATE, POTASSIUM CHLORIDE, CALCIUM CHLORIDE 50 ML/HR: 20; 30; 600; 310 INJECTION, SOLUTION INTRAVENOUS at 04:12

## 2025-03-02 RX ADMIN — IPRATROPIUM BROMIDE AND ALBUTEROL SULFATE 3 ML: 2.5; .5 SOLUTION RESPIRATORY (INHALATION) at 06:51

## 2025-03-02 RX ADMIN — SODIUM CHLORIDE 1000 MG: 900 INJECTION INTRAVENOUS at 06:06

## 2025-03-02 RX ADMIN — Medication 1 APPLICATION: at 20:32

## 2025-03-02 RX ADMIN — METOPROLOL SUCCINATE 50 MG: 50 TABLET, FILM COATED, EXTENDED RELEASE ORAL at 08:13

## 2025-03-02 RX ADMIN — Medication 10 ML: at 08:14

## 2025-03-02 RX ADMIN — NICOTINE 1 PATCH: 21 PATCH, EXTENDED RELEASE TRANSDERMAL at 06:05

## 2025-03-02 RX ADMIN — IPRATROPIUM BROMIDE AND ALBUTEROL SULFATE 3 ML: 2.5; .5 SOLUTION RESPIRATORY (INHALATION) at 19:22

## 2025-03-02 RX ADMIN — IPRATROPIUM BROMIDE AND ALBUTEROL SULFATE 3 ML: 2.5; .5 SOLUTION RESPIRATORY (INHALATION) at 10:57

## 2025-03-02 RX ADMIN — CLOPIDOGREL BISULFATE 75 MG: 75 TABLET ORAL at 08:13

## 2025-03-02 RX ADMIN — METHYLPREDNISOLONE SODIUM SUCCINATE 40 MG: 40 INJECTION, POWDER, FOR SOLUTION INTRAMUSCULAR; INTRAVENOUS at 06:07

## 2025-03-02 NOTE — PROGRESS NOTES
[facility] Intensivist Services  INPATIENT PROGRESS NOTE    Patient Name: Sharad Ryder  Date of Admission: 3/1/2025  Today's Date: 03/02/25  Length of Stay: 1  Primary Care Physician: Kerry Mtz APRN    Subjective   Chief Complaint: Shortness of breath  Shortness of Breath         The patient is a 64-year-old male with a PMH of diastolic CHF, CAD s/p CABG, s/p aortic bioprosthetic valve with stenosis of bioprosthetic valve, COPD, and and ongoing tobacco abuse who presented to [facility] ED on 3/1/2025 with c/o shortness of breath. He reported a 2 day history of worsening shortness of breath with productive cough, and increased sputum production. He reported a fever, documented 100.6 per EMS. He denied chest pain, weight gain, or leg swelling. He states that he feels similar as to when he required hospitalization for COPD exacerbation for RSV in 12/2024.     ED work-up revealed-Temp 101, HR-130 RR-130. WBC-18, H&H-10.8/33.3 and plt-274 with neutrophilia-84.2, negative for bandemia. RPP negative for viral illness. Dimer-1.41.  Chest x-ray interval worsening of interstitial and airspace opacities in both mid and lower lung zones.  CTA pulmonary no evidence of PE.  Mixed atelectasis and consolidation in both posterior lower lungs diffuse interlobular septal thickening, he is a groundglass opacity small bilateral pleural effusions findings consistent with pulmonary edema given areas of consolidation with superimposed pneumonia is possible.  Severe emphysema.  Mild mediastinal and bilateral hilar lymphadenopathy without significant change.  He was additionally found to have hypokalemia K2.8 and hypomagnesia anemia 1.5 with a proBNP of 6516.  He was placed on BiPAP due to his work of breathing with improved work of breathing and oxygenation.  He was given a 500 mL liter bolus due to his previous history of diastolic dysfunction.  Despite IV fluids he continued to have hypotension.   He was admitted to intensivist for severe sepsis secondary to pneumonia.    He was initiated on empiric antibiotics with azithromycin and Rocephin due to his bilateral pneumonia lower lobes.  He was started on IV steroids for COPD exacerbation in addition to nebulized bronchodilators.  Patient only was given gentle IV fluids.  He has been weaned to 2 L nasal cannula.    Interval history  3/2/2025  He has been afebrile.  He has been weaned to 2 L nasal cannula.  He has not had any further episodes of hypoxia.  Levophed was discontinued shortly after admission to ICU.  He has not had any further attention.  Remains NSR.  Leukocytosis improving electrolytes stable.  He will be transferred to the floor for further care.      Review of Systems   Respiratory:  Positive for shortness of breath.       All pertinent negatives and positives are as above. All other systems have been reviewed and are negative unless otherwise stated.     Objective    Temp:  [96.7 °F (35.9 °C)-98.1 °F (36.7 °C)] 96.7 °F (35.9 °C)  Heart Rate:  [] 107  Resp:  [10-18] 10  BP: ()/(50-97) 131/84  Physical Exam  Vitals reviewed.   Constitutional:       Appearance: He is ill-appearing.      Comments: Prematurely aged   HENT:      Head: Normocephalic.      Mouth/Throat:      Mouth: Mucous membranes are moist.   Eyes:      Pupils: Pupils are equal, round, and reactive to light.   Cardiovascular:      Rate and Rhythm: Normal rate and regular rhythm.      Pulses: Normal pulses.      Heart sounds: Murmur heard.   Pulmonary:      Effort: Pulmonary effort is normal.      Breath sounds: Normal breath sounds. Examination of the right-lower field reveals decreased breath sounds. Examination of the left-lower field reveals decreased breath sounds.      Comments: Mildly diminished to bilateral lower lobes  Abdominal:      General: Bowel sounds are normal.      Palpations: Abdomen is soft.   Musculoskeletal:      Cervical back: Normal range of motion  and neck supple.      Right lower leg: No edema.      Left lower leg: No edema.   Skin:     General: Skin is warm.      Capillary Refill: Capillary refill takes less than 2 seconds.      Coloration: Skin is pale.   Neurological:      General: No focal deficit present.      Mental Status: He is alert. Mental status is at baseline.           Results Review:  Lab Results (last 24 hours)       Procedure Component Value Units Date/Time    CBC & Differential [[id number]]  (Abnormal) Collected: 03/02/25 0634    Specimen: Blood Updated: 03/02/25 0736    Narrative:      The following orders were created for panel order CBC & Differential.  Procedure                               Abnormality         Status                     ---------                               -----------         ------                     Manual Differential[[id number]]          Abnormal            Final result               CBC Auto Differential[[id number]]        Abnormal            Final result                 Please view results for these tests on the individual orders.    Manual Differential [[id number]]  (Abnormal) Collected: 03/02/25 0634    Specimen: Blood Updated: 03/02/25 0736     Neutrophil % 88.0 %      Lymphocyte % 4.0 %      Monocyte % 3.0 %      Eosinophil % 0.0 %      Basophil % 0.0 %      Bands %  3.0 %      Atypical Lymphocyte % 2.0 %      Neutrophils Absolute 16.47 10*3/mm3      Lymphocytes Absolute 1.09 10*3/mm3      Monocytes Absolute 0.54 10*3/mm3      Eosinophils Absolute 0.00 10*3/mm3      Basophils Absolute 0.00 10*3/mm3      Crenated RBC's Slight/1+     Microcytes Slight/1+     Vacuolated Neutrophils Slight/1+     Giant Platelets Slight/1+    Comprehensive Metabolic Panel [[id number]]  (Abnormal) Collected: 03/02/25 0634    Specimen: Blood Updated: 03/02/25 0709     Glucose 146 mg/dL      BUN 9 mg/dL      Creatinine 0.42 mg/dL      Sodium 144 mmol/L      Potassium 4.1 mmol/L      Chloride 107 mmol/L      CO2 22.0 mmol/L      Calcium  8.1 mg/dL      Total Protein 6.5 g/dL      Albumin 3.5 g/dL      ALT (SGPT) 17 U/L      AST (SGOT) 19 U/L      Alkaline Phosphatase 59 U/L      Total Bilirubin 0.2 mg/dL      Globulin 3.0 gm/dL      A/G Ratio 1.2 g/dL      BUN/Creatinine Ratio 21.4     Anion Gap 15.0 mmol/L      eGFR 120.1 mL/min/1.73     Narrative:      GFR Categories in Chronic Kidney Disease (CKD)      GFR Category          GFR (mL/min/1.73)    Interpretation  G1                     90 or greater         Normal or high (1)  G2                      60-89                Mild decrease (1)  G3a                   45-59                Mild to moderate decrease  G3b                   30-44                Moderate to severe decrease  G4                    15-29                Severe decrease  G5                    14 or less           Kidney failure          (1)In the absence of evidence of kidney disease, neither GFR category G1 or G2 fulfill the criteria for CKD.    eGFR calculation 2021 CKD-EPI creatinine equation, which does not include race as a factor    CBC Auto Differential [[id number]]  (Abnormal) Collected: 03/02/25 0634    Specimen: Blood Updated: 03/02/25 0657     WBC 18.10 10*3/mm3      RBC 3.40 10*6/mm3      Hemoglobin 10.5 g/dL      Hematocrit 32.3 %      MCV 95.0 fL      MCH 30.9 pg      MCHC 32.5 g/dL      RDW 14.6 %      RDW-SD 51.3 fl      MPV 9.6 fL      Platelets 265 10*3/mm3     Blood Culture - Blood, Arm, Right [[id number]]  (Normal) Collected: 03/01/25 0329    Specimen: Blood from Arm, Right Updated: 03/02/25 0345     Blood Culture No growth at 24 hours    Blood Culture - Blood, Arm, Left [[id number]]  (Normal) Collected: 03/01/25 0330    Specimen: Blood from Arm, Left Updated: 03/02/25 0345     Blood Culture No growth at 24 hours    Potassium [[id number]]  (Normal) Collected: 03/01/25 2104    Specimen: Blood Updated: 03/01/25 2122     Potassium 3.5 mmol/L     S. Pneumo Ag Urine or CSF - Urine, Urine, Clean Catch [[id number]]  (Normal)  Collected: 03/01/25 0922    Specimen: Urine, Clean Catch Updated: 03/01/25 0946     Strep Pneumo Ag Negative             CT Angiogram Chest    Result Date: 3/1/2025   1. No evidence of pulmonary embolus. 2. Mixed atelectasis and consolidation in both posterior lower lungs, diffuse interlobular septal thickening, hazy groundglass opacity, and small bilateral pleural effusions. Findings are consistent with pulmonary edema. Given areas of consolidation, a superimposed pneumonia is possible. 3. Severe emphysema. 4. Mild mediastinal and bilateral hilar lymphadenopathy, without significant change. 5. No change in mild dilatation of ascending aorta measuring 4 cm diameter. Partially imaged abdominal aneurysm measuring up to 4 cm. Heavy atherosclerotic plaque in the aorta at the level of the diaphragm likely causing severe stenosis.  This report was signed and finalized on 3/1/2025 6:23 AM by Dr. Tim Langley MD.      XR Chest 1 View    Result Date: 3/1/2025   Interval worsening of interstitial and airspace opacities in both mid and lower lung zones.  This report was signed and finalized on 3/1/2025 5:07 AM by Dr. Tim Langley MD.       Result Review:  I have personally reviewed the results from the time of this admission to 3/2/2025 08:37 CST and agree with these findings:  [x]  Laboratory list / accordion  [x]  Microbiology  []  Radiology  []  EKG/Telemetry   []  Cardiology/Vascular   []  Pathology  []  Old records  []  Other:  Most notable findings include:     AM ABG show low oxygen 68.8 with an SpO2 of 95%.  Hypokalemia has resolved with a K of 4.1.  All other electrolytes are within normal limits.  Leukocytosis has improved with a WBC count of 18.1, H&H 10.5/32.3 with a platelet count of 265.    Culture Data:   Blood Culture   Date Value Ref Range Status   03/01/2025 No growth at 24 hours  Preliminary   03/01/2025 No growth at 24 hours  Preliminary       I have reviewed the patient's current medications.      Assessment/Plan   Assessment  Active Hospital Problems    Diagnosis     **COPD with acute exacerbation     Severe sepsis     Community acquired bilateral lower lobe pneumonia     Diastolic dysfunction    64-year-old male with a past medical history of diastolic dysfunction, CAD, CABG, bioprosthetic aortic valve with stenosis, COPD, current everyday smoker and atherosclerosis presented to [facility] on 3/1/2025 with complaints of shortness of breath. ED findings consistent with COPD exacerbation and severe sepsis secondary to bilateral pneumonia.  Mild hypotension upon admission requiring short-term vasopressors.  Additionally required BiPAP upon admission.  Pressors have been weaned after adequate IV fluid resuscitation and he is currently on 2 L nasal cannula.  He is felt to be stable to be transferred to [facility] to hospitalist service.      Treatment Plan  Severe sepsis/Bilateral lower lobe pneumonia  -Oxygen therapy to maintain adequate oxygenation for tissue perfusion goal 90  -Sepsis bolus not given due to previous history of diastolic dysfunction  -Gentle IV fluids will be discontinued  -IV azithromycin 500 mg daily x 5 days  -Rocephin 1 g IV every 24 hours  -Strep pneumonia antigen urine-negative  -Monitor blood cultures obtained in ED-NGTD  -Obtain sputum culture-pending  -Daily labs     COPD exacerbation  -Solu-Medrol 40 mg every 8 hours IV  -Bronchodilators scheduled  -Continue azithromycin IV  -Aggressive pulmonary toilet  -Continue O2 to maintain adequate oxygenation for SpO2 greater than 90       Diastolic dysfunction  -No evidence of acute exacerbation at this time  -Will resume GDMT as able  -Recent echo 2/27/2024 reviewed grade 2 diastolic dysfunction with severe bioprosthetic valve stenosis      Electronically signed by KIRBY Copeland on 3/2/2025 at 08:37 CST     Total critical care time: 31 minutes

## 2025-03-02 NOTE — PLAN OF CARE
Goal Outcome Evaluation:    A&Ox4. 3L NC. VSS. C/o chronic back and leg pain; see MAR. Patient did not require Levophed this shift. IVF @ 50. IV abx continued. Remains on a clear liquid diet- patient tolerating. Up x1 asst. Voiding via urinal. NSR on tele. Lovenox for VTE prevention. Call light within reach, safety maintained.

## 2025-03-02 NOTE — NURSING NOTE
Patient arrived on the unit at approximately 0935. Received report from Sergio JONES in ICU. Patients IV x2 are intact and saline flushed. O2@3L BNC intact. Patient stable. Denies pain. Bed low and locked with call light in reach. Denies needs.

## 2025-03-02 NOTE — PLAN OF CARE
Goal Outcome Evaluation:  Plan of Care Reviewed With: patient   Progress: improving   Patient AAOx4. Able to make needs known. O2@3L BNC intact. IV x2 intact and patent. Denies SOB. Able to ambulate without assistance. Denies pain. Bed low and locked with call light in reach. Family at bedside.

## 2025-03-03 PROBLEM — I50.33 ACUTE ON CHRONIC DIASTOLIC CHF (CONGESTIVE HEART FAILURE): Status: ACTIVE | Noted: 2024-01-25

## 2025-03-03 LAB
ALBUMIN SERPL-MCNC: 3.7 G/DL (ref 3.5–5.2)
ALBUMIN/GLOB SERPL: 1.4 G/DL
ALP SERPL-CCNC: 66 U/L (ref 39–117)
ALT SERPL W P-5'-P-CCNC: 62 U/L (ref 1–41)
ANION GAP SERPL CALCULATED.3IONS-SCNC: 10 MMOL/L (ref 5–15)
AST SERPL-CCNC: 39 U/L (ref 1–40)
BASOPHILS # BLD MANUAL: 0 10*3/MM3 (ref 0–0.2)
BASOPHILS NFR BLD MANUAL: 0 % (ref 0–1.5)
BILIRUB SERPL-MCNC: 0.2 MG/DL (ref 0–1.2)
BUN SERPL-MCNC: 16 MG/DL (ref 8–23)
BUN/CREAT SERPL: 32 (ref 7–25)
CALCIUM SPEC-SCNC: 9 MG/DL (ref 8.6–10.5)
CHLORIDE SERPL-SCNC: 105 MMOL/L (ref 98–107)
CO2 SERPL-SCNC: 25 MMOL/L (ref 22–29)
CREAT SERPL-MCNC: 0.5 MG/DL (ref 0.76–1.27)
DEPRECATED RDW RBC AUTO: 51.6 FL (ref 37–54)
EGFRCR SERPLBLD CKD-EPI 2021: 113.9 ML/MIN/1.73
EOSINOPHIL # BLD MANUAL: 0 10*3/MM3 (ref 0–0.4)
EOSINOPHIL NFR BLD MANUAL: 0 % (ref 0.3–6.2)
ERYTHROCYTE [DISTWIDTH] IN BLOOD BY AUTOMATED COUNT: 14.6 % (ref 12.3–15.4)
GLOBULIN UR ELPH-MCNC: 2.7 GM/DL
GLUCOSE SERPL-MCNC: 145 MG/DL (ref 65–99)
HCT VFR BLD AUTO: 35.8 % (ref 37.5–51)
HGB BLD-MCNC: 11.4 G/DL (ref 13–17.7)
LYMPHOCYTES # BLD MANUAL: 0.48 10*3/MM3 (ref 0.7–3.1)
LYMPHOCYTES NFR BLD MANUAL: 3 % (ref 5–12)
MCH RBC QN AUTO: 30.4 PG (ref 26.6–33)
MCHC RBC AUTO-ENTMCNC: 31.8 G/DL (ref 31.5–35.7)
MCV RBC AUTO: 95.5 FL (ref 79–97)
MONOCYTES # BLD: 0.48 10*3/MM3 (ref 0.1–0.9)
NEUTROPHILS # BLD AUTO: 15.09 10*3/MM3 (ref 1.7–7)
NEUTROPHILS NFR BLD MANUAL: 94 % (ref 42.7–76)
PLAT MORPH BLD: NORMAL
PLATELET # BLD AUTO: 343 10*3/MM3 (ref 140–450)
PMV BLD AUTO: 9.8 FL (ref 6–12)
POLYCHROMASIA BLD QL SMEAR: ABNORMAL
POTASSIUM SERPL-SCNC: 4.7 MMOL/L (ref 3.5–5.2)
PROT SERPL-MCNC: 6.4 G/DL (ref 6–8.5)
QT INTERVAL: 348 MS
QTC INTERVAL: 473 MS
RBC # BLD AUTO: 3.75 10*6/MM3 (ref 4.14–5.8)
SODIUM SERPL-SCNC: 140 MMOL/L (ref 136–145)
VARIANT LYMPHS NFR BLD MANUAL: 1 % (ref 0–5)
VARIANT LYMPHS NFR BLD MANUAL: 2 % (ref 19.6–45.3)
WBC MORPH BLD: NORMAL
WBC NRBC COR # BLD AUTO: 16.05 10*3/MM3 (ref 3.4–10.8)

## 2025-03-03 PROCEDURE — 25010000002 METHYLPREDNISOLONE PER 40 MG: Performed by: NURSE PRACTITIONER

## 2025-03-03 PROCEDURE — 94664 DEMO&/EVAL PT USE INHALER: CPT

## 2025-03-03 PROCEDURE — 94799 UNLISTED PULMONARY SVC/PX: CPT

## 2025-03-03 PROCEDURE — 25010000002 ENOXAPARIN PER 10 MG: Performed by: NURSE PRACTITIONER

## 2025-03-03 PROCEDURE — 25010000002 METHYLPREDNISOLONE PER 40 MG: Performed by: INTERNAL MEDICINE

## 2025-03-03 PROCEDURE — 25010000002 CEFTRIAXONE PER 250 MG: Performed by: NURSE PRACTITIONER

## 2025-03-03 PROCEDURE — 85027 COMPLETE CBC AUTOMATED: CPT | Performed by: NURSE PRACTITIONER

## 2025-03-03 PROCEDURE — 25010000002 FUROSEMIDE PER 20 MG: Performed by: INTERNAL MEDICINE

## 2025-03-03 PROCEDURE — 80053 COMPREHEN METABOLIC PANEL: CPT | Performed by: NURSE PRACTITIONER

## 2025-03-03 RX ORDER — BUDESONIDE AND FORMOTEROL FUMARATE DIHYDRATE 160; 4.5 UG/1; UG/1
2 AEROSOL RESPIRATORY (INHALATION)
Status: DISCONTINUED | OUTPATIENT
Start: 2025-03-03 | End: 2025-03-04 | Stop reason: HOSPADM

## 2025-03-03 RX ORDER — METHYLPREDNISOLONE SODIUM SUCCINATE 40 MG/ML
40 INJECTION, POWDER, LYOPHILIZED, FOR SOLUTION INTRAMUSCULAR; INTRAVENOUS EVERY 12 HOURS
Status: DISCONTINUED | OUTPATIENT
Start: 2025-03-03 | End: 2025-03-04 | Stop reason: HOSPADM

## 2025-03-03 RX ORDER — HYDROCODONE BITARTRATE AND ACETAMINOPHEN 5; 325 MG/1; MG/1
1 TABLET ORAL EVERY 6 HOURS PRN
Status: DISCONTINUED | OUTPATIENT
Start: 2025-03-03 | End: 2025-03-04 | Stop reason: HOSPADM

## 2025-03-03 RX ORDER — FUROSEMIDE 10 MG/ML
20 INJECTION INTRAMUSCULAR; INTRAVENOUS ONCE
Status: COMPLETED | OUTPATIENT
Start: 2025-03-03 | End: 2025-03-03

## 2025-03-03 RX ADMIN — METHYLPREDNISOLONE SODIUM SUCCINATE 40 MG: 40 INJECTION, POWDER, FOR SOLUTION INTRAMUSCULAR; INTRAVENOUS at 18:03

## 2025-03-03 RX ADMIN — METOPROLOL SUCCINATE 50 MG: 50 TABLET, FILM COATED, EXTENDED RELEASE ORAL at 09:49

## 2025-03-03 RX ADMIN — CITALOPRAM HYDROBROMIDE 20 MG: 20 TABLET ORAL at 09:49

## 2025-03-03 RX ADMIN — HYDROCODONE BITARTRATE AND ACETAMINOPHEN 1 TABLET: 5; 325 TABLET ORAL at 14:18

## 2025-03-03 RX ADMIN — TAMSULOSIN HYDROCHLORIDE 0.4 MG: 0.4 CAPSULE ORAL at 09:49

## 2025-03-03 RX ADMIN — Medication 1 APPLICATION: at 09:49

## 2025-03-03 RX ADMIN — IPRATROPIUM BROMIDE AND ALBUTEROL SULFATE 3 ML: 2.5; .5 SOLUTION RESPIRATORY (INHALATION) at 14:13

## 2025-03-03 RX ADMIN — METHYLPREDNISOLONE SODIUM SUCCINATE 40 MG: 40 INJECTION, POWDER, FOR SOLUTION INTRAMUSCULAR; INTRAVENOUS at 06:17

## 2025-03-03 RX ADMIN — HYDROCODONE BITARTRATE AND ACETAMINOPHEN 1 TABLET: 5; 325 TABLET ORAL at 20:06

## 2025-03-03 RX ADMIN — IPRATROPIUM BROMIDE AND ALBUTEROL SULFATE 3 ML: 2.5; .5 SOLUTION RESPIRATORY (INHALATION) at 10:11

## 2025-03-03 RX ADMIN — BUDESONIDE AND FORMOTEROL FUMARATE DIHYDRATE 2 PUFF: 160; 4.5 AEROSOL RESPIRATORY (INHALATION) at 19:04

## 2025-03-03 RX ADMIN — ENOXAPARIN SODIUM 40 MG: 100 INJECTION SUBCUTANEOUS at 09:49

## 2025-03-03 RX ADMIN — SODIUM CHLORIDE 1000 MG: 900 INJECTION INTRAVENOUS at 06:17

## 2025-03-03 RX ADMIN — ACETAMINOPHEN 650 MG: 325 TABLET ORAL at 12:38

## 2025-03-03 RX ADMIN — NICOTINE 1 PATCH: 21 PATCH, EXTENDED RELEASE TRANSDERMAL at 06:18

## 2025-03-03 RX ADMIN — CLOPIDOGREL BISULFATE 75 MG: 75 TABLET ORAL at 09:49

## 2025-03-03 RX ADMIN — TRAMADOL HYDROCHLORIDE 50 MG: 50 TABLET, COATED ORAL at 07:46

## 2025-03-03 RX ADMIN — AZITHROMYCIN 500 MG: 250 TABLET, FILM COATED ORAL at 09:49

## 2025-03-03 RX ADMIN — IPRATROPIUM BROMIDE AND ALBUTEROL SULFATE 3 ML: 2.5; .5 SOLUTION RESPIRATORY (INHALATION) at 06:27

## 2025-03-03 RX ADMIN — IPRATROPIUM BROMIDE AND ALBUTEROL SULFATE 3 ML: 2.5; .5 SOLUTION RESPIRATORY (INHALATION) at 19:04

## 2025-03-03 RX ADMIN — ASPIRIN 81 MG: 81 TABLET, CHEWABLE ORAL at 09:49

## 2025-03-03 RX ADMIN — BUSPIRONE HYDROCHLORIDE 10 MG: 10 TABLET ORAL at 09:49

## 2025-03-03 RX ADMIN — BUSPIRONE HYDROCHLORIDE 10 MG: 10 TABLET ORAL at 20:06

## 2025-03-03 RX ADMIN — FUROSEMIDE 20 MG: 10 INJECTION, SOLUTION INTRAMUSCULAR; INTRAVENOUS at 16:09

## 2025-03-03 RX ADMIN — Medication 1 APPLICATION: at 20:06

## 2025-03-03 NOTE — PLAN OF CARE
Goal Outcome Evaluation:  Plan of Care Reviewed With: patient        Progress: improving  Outcome Evaluation: A/o x4. C/o pain throughout shift. MD notified per pt request, ultram not helping with pain. MD changed pain meds to [place]. Medicated x1 with norco per pt request. Safety maintained. IV IID. Cont IV abx as ordered. IV lasix administered x1 per MD order. Up ad linda. Voiding per urinal. O2 @1 L.  in place. Plan to possibly dc home tomorrow per MD. Cont to monitor.

## 2025-03-03 NOTE — PLAN OF CARE
Goal Outcome Evaluation:  Plan of Care Reviewed With: patient           Outcome Evaluation: AOx4, VSS on 3L NC. C/o pain, PRN pain medication given with good relief provided. Voiding via urinal at BS. Up ad linda in room. IV steroids given per orders. No changes to report. Safety precautions maintained with call light within reach.

## 2025-03-03 NOTE — PROGRESS NOTES
[facility] Medicine Services  INPATIENT PROGRESS NOTE    Patient Name: Sharad Ryder  Date of Admission: 3/1/2025  Today's Date: 03/03/25  Length of Stay: 2  Primary Care Physician: Kerry Mtz APRN    Subjective   Chief Complaint: Back pain  HPI   Patient describes having some chronic back pain.  He indicates that his pain is not new.  Denies any new neurochanges.  Reports that the tramadol has not been working very well, and states that he will take hydrocodone at times at home.  He does feel like that he is breathing much better.  Denies any lower extremity swelling.  Prior to coming into the hospital he described having some orthopnea, but feels like those symptoms have improved.  He is hoping to be able to go home soon.    Review of Systems   All pertinent negatives and positives are as above. All other systems have been reviewed and are negative unless otherwise stated.     Objective    Temp:  [97.5 °F (36.4 °C)-97.6 °F (36.4 °C)] 97.5 °F (36.4 °C)  Heart Rate:  [] 96  Resp:  [16-18] 16  BP: (112-143)/(67-93) 143/93  Physical Exam  Vitals reviewed.   Constitutional:       General: He is not in acute distress.     Appearance: He is not toxic-appearing.   HENT:      Head: Normocephalic.   Cardiovascular:      Rate and Rhythm: Normal rate.   Pulmonary:      Effort: Pulmonary effort is normal. No respiratory distress.      Comments: On 1LNC; diminished at bases with some crackly BSs  Musculoskeletal:      Right lower leg: No edema.      Left lower leg: No edema.   Skin:     General: Skin is warm.   Neurological:      General: No focal deficit present.      Mental Status: He is alert.   Psychiatric:         Mood and Affect: Mood normal.         Results Review:  I have reviewed the labs, radiology results, and diagnostic studies.    Laboratory Data:   Results from last 7 days   Lab Units 03/03/25  0931 03/02/25  0634 03/01/25  0323   WBC 10*3/mm3 16.05* 18.10*  18.49*   HEMOGLOBIN g/dL 11.4* 10.5* 10.8*   HEMATOCRIT % 35.8* 32.3* 33.3*   PLATELETS 10*3/mm3 343 265 274        Results from last 7 days   Lab Units 03/03/25  0616 03/02/25  0634 03/01/25  2104 03/01/25  0323   SODIUM mmol/L 140 144  --  137   POTASSIUM mmol/L 4.7 4.1 3.5 2.8*   CHLORIDE mmol/L 105 107  --  98   CO2 mmol/L 25.0 22.0  --  24.0   BUN mg/dL 16 9  --  12   CREATININE mg/dL 0.50* 0.42*  --  0.76   CALCIUM mg/dL 9.0 8.1*  --  8.7   BILIRUBIN mg/dL 0.2 0.2  --  0.3   ALK PHOS U/L 66 59  --  64   ALT (SGPT) U/L 62* 17  --  11   AST (SGOT) U/L 39 19  --  18   GLUCOSE mg/dL 145* 146*  --  155*       Culture Data:   Blood Culture   Date Value Ref Range Status   03/01/2025 No growth at 2 days  Preliminary   03/01/2025 No growth at 2 days  Preliminary       Radiology Data:   Imaging Results (Last 24 Hours)       ** No results found for the last 24 hours. **            I have reviewed the patient's current medications.     Assessment/Plan   Assessment  Active Hospital Problems    Diagnosis     **COPD with acute exacerbation     Severe sepsis     Community acquired bilateral lower lobe pneumonia     Acute on chronic diastolic CHF (congestive heart failure)     S/P CABG x 3 2018 Dr Robin      Status post aortic valve replacement with bioprosthetic valve        Treatment Plan  IV Azithromycin and Ceftriaxone - day 3  Taper Solumedrol to q12hr dosing  Scheduled Duonebs  Add Symbicort  Encourage use of incentive spirometry  Last echo results were as follows:  Results for orders placed during the hospital encounter of 12/25/24    Adult Transthoracic Echo Complete w/ Color, Spectral and Contrast if Necessary Per Protocol    Interpretation Summary    Left ventricular ejection fraction appears to be 66 - 70%.    Left ventricular wall thickness is consistent with moderate concentric hypertrophy.    The following left ventricular wall segments are hypokinetic: apical septal and apex hypokinetic.    Left ventricular  diastolic function is consistent with (grade II w/high LAP) pseudonormalization.    The left atrial cavity is mildly dilated.    Severe bioprosthetic aortic valve stenosis is present.    Peak velocity of the flow distal to the aortic valve is 441 cm/s. Aortic valve maximum pressure gradient is 78 mmHg.    Estimated right ventricular systolic pressure from tricuspid regurgitation is normal (<35 mmHg).    7.  Low dose of IV Lasix today  8.  Tobacco cessation counseling  9.  Down to 1LNC - hopefully we can wean to off.  Patient not on supp 02 at home  10.  BP trend reviewed - remains stable  11.  Dispo: possible home tomorrow pending the above      Medical Decision Making  Number and Complexity of problems: Moderate complexity      Conditions and Status        Condition is improving.     [facility] Data  External documents reviewed: none  Cardiac tracing (EKG, telemetry) interpretation: no new EKGs  Radiology interpretation: no new radiology studies  Labs reviewed: as above  Any tests that were considered but not ordered: none     Decision rules/scores evaluated (example [id number], [place], etc): none     Discussed with: patient and bedside nurse (Gloria)     Care Planning  Shared decision making: Discussed with patient with agreement to proceed with treatment plan as outlined  Code status and discussions: Full code    Disposition  Social Determinants of Health that impact treatment or disposition: None apparent at this time  I expect the patient to be discharged to home likely in 1-2 days; possibly tomorrow pending the above        Electronically signed by Adolfo Barker MD, 03/03/25, 13:44 CST.

## 2025-03-03 NOTE — CASE MANAGEMENT/SOCIAL WORK
Discharge Planning Assessment  [facility]     Patient Name: Sharad Ryder  MRN: [id number]  Today's Date: 3/3/2025    Admit Date: 3/1/2025        Discharge Needs Assessment       Row Name 03/03/25 0913       Living Environment    People in Home spouse    Name(s) of People in Home Ekaterina Ryder (Spouse)  [phone] (Mobile)    Current Living Arrangements home    Potentially Unsafe Housing Conditions none    In the past 12 months has the electric, gas, oil, or water company threatened to shut off services in your home? No    Primary Care Provided by self    Provides Primary Care For no one    Family Caregiver if Needed spouse    Family Caregiver Names Ekaterina Ryder (Spouse)  [phone] ([place])    Quality of Family Relationships helpful;involved;supportive    Able to Return to Prior Arrangements yes       Resource/Environmental Concerns    Resource/Environmental Concerns none    Transportation Concerns none       Transportation Needs    In the past 12 months, has lack of transportation kept you from medical appointments or from getting medications? no    In the past 12 months, has lack of transportation kept you from meetings, work, or from getting things needed for daily living? No       Food Insecurity    Within the past 12 months, you worried that your food would run out before you got the money to buy more. Never true    Within the past 12 months, the food you bought just didn't last and you didn't have money to get more. Never true       Transition Planning    Patient/Family Anticipates Transition to home with family    Patient/Family Anticipated Services at Transition none    Transportation Anticipated family or friend will provide       Discharge Needs Assessment    Equipment Currently Used at Home none    Concerns to be Addressed denies needs/concerns at this time    Do you want help finding or keeping work or a job? I do not need or want help    Do you want help with school or training? For  example, starting or completing job training or getting a high school diploma, GED or equivalent No    Anticipated Changes Related to Illness none    Equipment Needed After Discharge none    Discharge Coordination/Progress Lives at home with wife, Ekaterina. No DME used. Patient is self pay and has previously been here. I'm sure Yaakov has spoken with him.  Denies any immediate needs but CM/SS will be available to assist with any discharge planning should it arise.                   Discharge Plan    No documentation.                 Continued Care and Services - Admitted Since 3/1/2025    No active coordination exists for this encounter.          Demographic Summary    No documentation.                  Functional Status    No documentation.                  Psychosocial    No documentation.                  Abuse/Neglect    No documentation.                  Legal    No documentation.                  Substance Abuse    No documentation.                  Patient Forms    No documentation.                     Basilia Rodgers RN

## 2025-03-04 ENCOUNTER — TELEPHONE (OUTPATIENT)
Dept: CARDIOLOGY | Facility: CLINIC | Age: 65
End: 2025-03-04

## 2025-03-04 ENCOUNTER — READMISSION MANAGEMENT (OUTPATIENT)
Dept: CALL CENTER | Facility: HOSPITAL | Age: 65
End: 2025-03-04

## 2025-03-04 VITALS
HEIGHT: 64 IN | WEIGHT: 114.86 LBS | HEART RATE: 87 BPM | SYSTOLIC BLOOD PRESSURE: 159 MMHG | DIASTOLIC BLOOD PRESSURE: 78 MMHG | TEMPERATURE: 98.2 F | BODY MASS INDEX: 19.61 KG/M2 | OXYGEN SATURATION: 96 % | RESPIRATION RATE: 18 BRPM

## 2025-03-04 PROCEDURE — 94799 UNLISTED PULMONARY SVC/PX: CPT

## 2025-03-04 PROCEDURE — 25010000002 CEFTRIAXONE PER 250 MG: Performed by: NURSE PRACTITIONER

## 2025-03-04 PROCEDURE — 94761 N-INVAS EAR/PLS OXIMETRY MLT: CPT

## 2025-03-04 PROCEDURE — 25010000002 ENOXAPARIN PER 10 MG: Performed by: NURSE PRACTITIONER

## 2025-03-04 PROCEDURE — 94618 PULMONARY STRESS TESTING: CPT

## 2025-03-04 PROCEDURE — 25010000002 METHYLPREDNISOLONE PER 40 MG: Performed by: INTERNAL MEDICINE

## 2025-03-04 RX ORDER — CEFDINIR 300 MG/1
300 CAPSULE ORAL 2 TIMES DAILY
Qty: 10 CAPSULE | Refills: 0 | Status: SHIPPED | OUTPATIENT
Start: 2025-03-04 | End: 2025-03-09

## 2025-03-04 RX ORDER — BUDESONIDE AND FORMOTEROL FUMARATE DIHYDRATE 160; 4.5 UG/1; UG/1
2 AEROSOL RESPIRATORY (INHALATION)
Qty: 1 EACH | Refills: 6 | Status: SHIPPED | OUTPATIENT
Start: 2025-03-04

## 2025-03-04 RX ORDER — PREDNISONE 10 MG/1
10 TABLET ORAL DAILY
Qty: 10 TABLET | Refills: 0 | Status: SHIPPED | OUTPATIENT
Start: 2025-03-04

## 2025-03-04 RX ADMIN — ENOXAPARIN SODIUM 40 MG: 100 INJECTION SUBCUTANEOUS at 09:25

## 2025-03-04 RX ADMIN — METHYLPREDNISOLONE SODIUM SUCCINATE 40 MG: 40 INJECTION, POWDER, FOR SOLUTION INTRAMUSCULAR; INTRAVENOUS at 06:32

## 2025-03-04 RX ADMIN — BUDESONIDE AND FORMOTEROL FUMARATE DIHYDRATE 2 PUFF: 160; 4.5 AEROSOL RESPIRATORY (INHALATION) at 07:04

## 2025-03-04 RX ADMIN — HYDROCODONE BITARTRATE AND ACETAMINOPHEN 1 TABLET: 5; 325 TABLET ORAL at 10:00

## 2025-03-04 RX ADMIN — IPRATROPIUM BROMIDE AND ALBUTEROL SULFATE 3 ML: 2.5; .5 SOLUTION RESPIRATORY (INHALATION) at 07:04

## 2025-03-04 RX ADMIN — METOPROLOL SUCCINATE 50 MG: 50 TABLET, FILM COATED, EXTENDED RELEASE ORAL at 09:25

## 2025-03-04 RX ADMIN — TAMSULOSIN HYDROCHLORIDE 0.4 MG: 0.4 CAPSULE ORAL at 09:25

## 2025-03-04 RX ADMIN — NICOTINE 1 PATCH: 21 PATCH, EXTENDED RELEASE TRANSDERMAL at 06:34

## 2025-03-04 RX ADMIN — ASPIRIN 81 MG: 81 TABLET, CHEWABLE ORAL at 09:25

## 2025-03-04 RX ADMIN — CLOPIDOGREL BISULFATE 75 MG: 75 TABLET ORAL at 10:00

## 2025-03-04 RX ADMIN — HYDROCODONE BITARTRATE AND ACETAMINOPHEN 1 TABLET: 5; 325 TABLET ORAL at 03:56

## 2025-03-04 RX ADMIN — BUSPIRONE HYDROCHLORIDE 10 MG: 10 TABLET ORAL at 09:25

## 2025-03-04 RX ADMIN — Medication 1 APPLICATION: at 09:25

## 2025-03-04 RX ADMIN — IPRATROPIUM BROMIDE AND ALBUTEROL SULFATE 3 ML: 2.5; .5 SOLUTION RESPIRATORY (INHALATION) at 09:55

## 2025-03-04 RX ADMIN — CITALOPRAM HYDROBROMIDE 20 MG: 20 TABLET ORAL at 09:25

## 2025-03-04 RX ADMIN — SODIUM CHLORIDE 1000 MG: 900 INJECTION INTRAVENOUS at 06:31

## 2025-03-04 NOTE — PROGRESS NOTES
Exercise Oximetry    Patient Name:Sharad Ryder   MRN: [id number]   Date: 03/04/25             ROOM AIR BASELINE   SpO2% 96   Heart Rate 88   Blood Pressure      EXERCISE ON ROOM AIR SpO2% EXERCISE ON O2 @  LPM SpO2%   1 MINUTE  1 MINUTE    2 MINUTES  2 MINUTES    3 MINUTES  3 MINUTES    4 MINUTES 92 4 MINUTES    5 MINUTES  5 MINUTES    6 MINUTES  6 MINUTES               Distance Walked  200 ft Distance Walked   Dyspnea (Christina Scale)  1 Dyspnea (Christina Scale)   Fatigue (Christina Scale)  1 Fatigue (Christina Scale)   SpO2% Post Exercise  92 SpO2% Post Exercise   HR Post Exercise  117 HR Post Exercise   Time to Recovery  less then 1 min Time to Recovery     Comments: pt did very well on his walk, he talked to me and did not appear to be out of breath.

## 2025-03-04 NOTE — PLAN OF CARE
Goal Outcome Evaluation:  Plan of Care Reviewed With: patient        Progress: improving   Pt up ad linda, voiding in urinal. Pt is A/O on RA. VSS except BP elevated, scheduled meds given. NSR on tele. C/o pain, prn med given. Pt is up in chair, call bell within reach. Pt plans to dc home with wife today.

## 2025-03-04 NOTE — DISCHARGE SUMMARY
[facility] Medicine Services  DISCHARGE SUMMARY       Date of Admission: 3/1/2025  Date of Discharge:  3/4/2025  Primary Care Physician: Kerry Mtz APRN    Presenting Problem/History of Present Illness:  Shortness of breath    Final Discharge Diagnoses:  Active Hospital Problems    Diagnosis     **COPD with acute exacerbation     Community acquired bilateral lower lobe pneumonia     Severe aortic stenosis     Acute on chronic diastolic CHF (congestive heart failure)     S/P CABG x 3 2018 Dr Robin      Status post aortic valve replacement with bioprosthetic valve     Tobacco use        Pertinent Test Results:   Results for orders placed during the hospital encounter of 12/25/24    Adult Transthoracic Echo Complete w/ Color, Spectral and Contrast if Necessary Per Protocol    Interpretation Summary    Left ventricular ejection fraction appears to be 66 - 70%.    Left ventricular wall thickness is consistent with moderate concentric hypertrophy.    The following left ventricular wall segments are hypokinetic: apical septal and apex hypokinetic.    Left ventricular diastolic function is consistent with (grade II w/high LAP) pseudonormalization.    The left atrial cavity is mildly dilated.    Severe bioprosthetic aortic valve stenosis is present.    Peak velocity of the flow distal to the aortic valve is 441 cm/s. Aortic valve maximum pressure gradient is 78 mmHg.    Estimated right ventricular systolic pressure from tricuspid regurgitation is normal (<35 mmHg).      Imaging Results (All)       Procedure Component Value Units Date/Time    CT Angiogram Chest [[id number]] Collected: 03/01/25 0616     Updated: 03/01/25 0626    Narrative:      EXAM/TECHNIQUE: CT chest angiography with 3D MIP images with IV contrast     INDICATION: elevated d-dimer, fever, COPD/CHF exacerbation;  J44.1-Chronic obstructive pulmonary disease with (acute) exacerbation;  R06.02-Shortness of breath;  Z86.79-Personal history of other diseases of  the circulatory system; F17.200-Nicotine dependence, unspecified,  uncomplicated; R50.9-Fever, unspecified; R09.02-Hypoxemia;  I51.7-Cardiomegaly; E87.6-Hypokalemia; E83.42-Hypomagnesemia;  R79.89-Other specifi     COMPARISON: 12/25/2024     DLP: 139.02 mGy.cm. Automated exposure control was utilized to decrease  patient radiation dose.     FINDINGS:     The central airways are clear. Mixed atelectasis and consolidation is  noted in both posterior lower lungs. Diffuse interlobular septal  thickening. Hazy diffuse groundglass opacity throughout both lungs.  Small bilateral pleural effusions. Severe emphysema. No suspicious  pulmonary nodule although some of the lung fields are obscured by  consolidation/atelectasis.     Mild mediastinal and bilateral hilar lymphadenopathy, without  significant change. Nondilated pulmonary artery. No evidence of  pulmonary embolus. No change in dilatation of the ascending aorta  measuring 4 cm. Heavy atherosclerotic calcification/plaque is noted in  the aortic arch and descending thoracic aorta. Partially imaged  abdominal aneurysm measuring up to 4 cm. There is heavy atherosclerotic  plaque in the aorta at the level of the diaphragm. No pericardial  effusion. Postoperative change of median sternotomy and CABG. Heavy  atherosclerotic calcification in the aortic arch branches.     No acute soft tissue finding. Upper abdomen appears unremarkable.  Chronic mild T6 and T11 compression deformities. No acute osseous  finding.       Impression:         1. No evidence of pulmonary embolus.  2. Mixed atelectasis and consolidation in both posterior lower lungs,  diffuse interlobular septal thickening, hazy groundglass opacity, and  small bilateral pleural effusions. Findings are consistent with  pulmonary edema. Given areas of consolidation, a superimposed pneumonia  is possible.  3. Severe emphysema.  4. Mild mediastinal and bilateral hilar  lymphadenopathy, without  significant change.  5. No change in mild dilatation of ascending aorta measuring 4 cm  diameter. Partially imaged abdominal aneurysm measuring up to 4 cm.  Heavy atherosclerotic plaque in the aorta at the level of the diaphragm  likely causing severe stenosis.     This report was signed and finalized on 3/1/2025 6:23 AM by Dr. Tim Langley MD.       XR Chest 1 View [[id number]] Collected: 03/01/25 0505     Updated: 03/01/25 0515    Narrative:      EXAM/TECHNIQUE: XR CHEST 1 VW-     INDICATION: shortness of breath, hx copd and chf; J44.1-Chronic  obstructive pulmonary disease with (acute) exacerbation;  R06.02-Shortness of breath; Z86.79-Personal history of other diseases of  the circulatory system; F17.200-Nicotine dependence, unspecified,  uncomplicated; R50.9-Fever, unspecified; R09.02-Hypoxemia;  I51.7-Cardiomegaly; E87.6-Hypokalemia; E83.42-Hypomagnesemia;  I50.9-Heart failure, unspecifi     COMPARISON: 12/27/2024     FINDINGS:     Cardiac silhouette is within normal limits. Prior median sternotomy,  aortic valve replacement, and atrial appendage exclusion device  placement. Atherosclerotic aortic arch.     No large pleural effusion or visible pneumothorax. Interval worsening of  interstitial and airspace opacities in both mid and lower lung zones.     Old right distal clavicle deformity. No acute osseous finding.       Impression:         Interval worsening of interstitial and airspace opacities in both mid  and lower lung zones.      This report was signed and finalized on 3/1/2025 5:07 AM by Dr. Tim Langley MD.             LAB RESULTS:      Lab 03/03/25  0931 03/02/25  0634 03/01/25  0519 03/01/25  0323   WBC 16.05* 18.10*  --  18.49*   HEMOGLOBIN 11.4* 10.5*  --  10.8*   HEMATOCRIT 35.8* 32.3*  --  33.3*   PLATELETS 343 265  --  274   NEUTROS ABS 15.09* 16.47*  --  15.58*   IMMATURE GRANS (ABS)  --   --   --  0.09*   LYMPHS ABS  --   --   --  1.41   MONOS ABS  --   --    --  1.37*   EOS ABS 0.00 0.00  --  0.01   MCV 95.5 95.0  --  93.3   PROCALCITONIN  --   --  0.71*  --    LACTATE  --   --   --  2.0   D DIMER QUANT  --   --   --  1.41*         Lab 03/03/25  0616 03/02/25  0634 03/01/25  2104 03/01/25  0323   SODIUM 140 144  --  137   POTASSIUM 4.7 4.1 3.5 2.8*   CHLORIDE 105 107  --  98   CO2 25.0 22.0  --  24.0   ANION GAP 10.0 15.0  --  15.0   BUN 16 9  --  12   CREATININE 0.50* 0.42*  --  0.76   EGFR 113.9 120.1  --  100.4   GLUCOSE 145* 146*  --  155*   CALCIUM 9.0 8.1*  --  8.7   MAGNESIUM  --   --   --  1.5*         Lab 03/03/25  0616 03/02/25  0634 03/01/25  0323   TOTAL PROTEIN 6.4 6.5 6.8   ALBUMIN 3.7 3.5 3.9   GLOBULIN 2.7 3.0 2.9   ALT (SGPT) 62* 17 11   AST (SGOT) 39 19 18   BILIRUBIN 0.2 0.2 0.3   ALK PHOS 66 59 64   LIPASE  --   --  11*         Lab 03/01/25  0519 03/01/25  0323   PROBNP  --  6,516.0*   HSTROP T 44* 41*                 Lab 03/01/25  0434   PH, ARTERIAL 7.452*   PCO2, ARTERIAL 36.8   PO2 ART 68.8*   O2 SATURATION ART 94.5   FIO2 40   HCO3 ART 25.7   BASE EXCESS ART 1.8     Brief Urine Lab Results       None          Microbiology Results (last 10 days)       Procedure Component Value - Date/Time    S. Pneumo Ag Urine or CSF - Urine, Urine, Clean Catch [[id number]]  (Normal) Collected: 03/01/25 0922    Lab Status: Final result Specimen: Urine, Clean Catch Updated: 03/01/25 0946     Strep Pneumo Ag Negative    COVID-19, FLU A/B, RSV PCR 1 HR TAT - Swab, Nasopharynx [[id number]]  (Normal) Collected: 03/01/25 0332    Lab Status: Final result Specimen: Swab from Nasopharynx Updated: 03/01/25 0419     COVID19 Not Detected     Influenza A PCR Not Detected     Influenza B PCR Not Detected     RSV, PCR Not Detected    Narrative:      Fact sheet for providers: https://www.fda.gov/media/066469/download    Fact sheet for patients: https://www.fda.gov/media/168405/download    Test performed by PCR.    Respiratory Panel PCR w/COVID-19(SARS-CoV-2)  JUNAID/LISA/INGRID/PAD/COR/LESIA In-House, NP Swab in UTM/VTM, 2 HR TAT - Swab, Nasopharynx [[id number]]  (Normal) Collected: 03/01/25 0332    Lab Status: Final result Specimen: Swab from Nasopharynx Updated: 03/01/25 0628     ADENOVIRUS, PCR Not Detected     Coronavirus 229E Not Detected     Coronavirus HKU1 Not Detected     Coronavirus NL63 Not Detected     Coronavirus OC43 Not Detected     COVID19 Not Detected     Human Metapneumovirus Not Detected     Human Rhinovirus/Enterovirus Not Detected     Influenza A PCR Not Detected     Influenza B PCR Not Detected     Parainfluenza Virus 1 Not Detected     Parainfluenza Virus 2 Not Detected     Parainfluenza Virus 3 Not Detected     Parainfluenza Virus 4 Not Detected     RSV, PCR Not Detected     Bordetella pertussis pcr Not Detected     Bordetella parapertussis PCR Not Detected     Chlamydophila pneumoniae PCR Not Detected     Mycoplasma pneumo by PCR Not Detected    Narrative:      In the setting of a positive respiratory panel with a viral infection PLUS a negative procalcitonin without other underlying concern for bacterial infection, consider observing off antibiotics or discontinuation of antibiotics and continue supportive care. If the respiratory panel is positive for atypical bacterial infection (Bordetella pertussis, Chlamydophila pneumoniae, or Mycoplasma pneumoniae), consider antibiotic de-escalation to target atypical bacterial infection.    Blood Culture - Blood, Arm, Left [[id number]]  (Normal) Collected: 03/01/25 0330    Lab Status: Preliminary result Specimen: Blood from Arm, Left Updated: 03/04/25 0345     Blood Culture No growth at 3 days    Blood Culture - Blood, Arm, Right [[id number]]  (Normal) Collected: 03/01/25 0329    Lab Status: Preliminary result Specimen: Blood from Arm, Right Updated: 03/04/25 0345     Blood Culture No growth at 3 days            Hospital Course:   Patient is a very pleasant 64-year-old  male with past medical history  significant for coronary artery disease and coronary artery bypass grafting, history of aortic valve replacement (bioprosthetic valve replacement by Dr. Robin), chronic diastolic congestive heart failure, tobacco abuse, in addition to COPD that presented to our facility on 3/1/2025 with symptoms of shortness of breath.  Patient did report having a new cough, increased sputum production, in addition to having symptoms of fever.  It was documented that when EMS evaluated patient he had a temperature of 100.6.  On arrival a respiratory PCR panel was performed which was negative.  A chest x-ray in addition to a CT angiogram of the chest were performed revealing some consolidation in both posterior lower lungs, some scattered and hazy groundglass opacities, and small pleural effusions.  Patient also has superimposed severe emphysematous changes.    Patient was initially admitted to the intensive care unit given hypotension in the emergency department.  Patient was only given a small amount of intravenous fluids on arrival given his cardiac history, continued to have some hypotension, and patient was admitted to the ICU given concern for possible severe sepsis secondary to pneumonia.  Patient initially required BiPAP to ease his work of breathing, was started on empiric antibiotics, and it looks like briefly was requiring some Levophed.  Patient was able to be weaned off of Levophed uneventfully.  He also was able to come off of BiPAP uneventfully.  He was transition from the intensive care unit to the medical floor.    Patient was started on antibiotics with azithromycin and Rocephin.  He has completed 3 days of azithromycin.  Will transition his ceftriaxone to oral Omnicef for an additional 5 days at discharge.  Plan for a short prednisone taper at discharge as well.  He will continue his outpatient nebulizer treatments, and we will also start him on a COPD maintenance inhaler with Symbicort.  Tobacco cessation  counseling was performed during this hospitalization, and patient is also adamant that he will quit, particularly given his recent history.    When I saw patient this morning he was able to take off his supplemental oxygen, his O2 sats remained 93-96% during my assessment, this including when he got out of bed and was ambulating around the room.  To confirm, I will add a walking oximetry study today to ensure that he does not need or qualify for oxygen in the outpatient setting.    I did give patient a low dose of intravenous Lasix x 1.  He appears euvolemic on examination.  A recent echocardiogram was performed in December, 2024 with the following results:    Results for orders placed during the hospital encounter of 12/25/24    Adult Transthoracic Echo Complete w/ Color, Spectral and Contrast if Necessary Per Protocol    Interpretation Summary    Left ventricular ejection fraction appears to be 66 - 70%.    Left ventricular wall thickness is consistent with moderate concentric hypertrophy.    The following left ventricular wall segments are hypokinetic: apical septal and apex hypokinetic.    Left ventricular diastolic function is consistent with (grade II w/high LAP) pseudonormalization.    The left atrial cavity is mildly dilated.    Severe bioprosthetic aortic valve stenosis is present.    Peak velocity of the flow distal to the aortic valve is 441 cm/s. Aortic valve maximum pressure gradient is 78 mmHg.    Estimated right ventricular systolic pressure from tricuspid regurgitation is normal (<35 mmHg).    I talked with patient about the importance of close outpatient follow-up with his cardiologist, Dr. Francois, in addition to his CT surgeon, Dr. Robin, based upon these echocardiogram findings.    In addition, patient had a partially imaged abdominal aortic aneurysm, is followed by Dr. Salgado of vascular surgery in the outpatient setting, and encouraged on compliance with follow-up as well.    All in all, patient  "is doing much better and is eager for discharge home.  Plans for discharge home with close outpatient follow-up as described.    Physical Exam on Discharge:  /78 (BP Location: Right arm, Patient Position: Lying)   Pulse 75   Temp 98.2 °F (36.8 °C) (Oral)   Resp 16   Ht 162.6 cm (64\")   Wt 52.1 kg (114 lb 13.8 oz)   SpO2 95%   BMI 19.72 kg/m²   Physical Exam  Vitals reviewed.   Constitutional:       General: He is not in acute distress.     Comments: Able to walk around room independently; on room air; maintaining 02 sats 93-96% even with activity   HENT:      Head: Normocephalic.      Mouth/Throat:      Mouth: Mucous membranes are moist.   Cardiovascular:      Rate and Rhythm: Normal rate.      Heart sounds: Murmur heard.   Pulmonary:      Effort: Pulmonary effort is normal. No respiratory distress.      Comments: On room air during my assessment; no work of breathing; no accessory muscle use  Musculoskeletal:      Right lower leg: No edema.      Left lower leg: No edema.   Skin:     General: Skin is warm.   Neurological:      General: No focal deficit present.      Mental Status: He is alert. Mental status is at baseline.   Psychiatric:         Mood and Affect: Mood normal.       Condition on Discharge: medically stable    Discharge Disposition:  Home or Self Care    Discharge Medications:     Discharge Medications        New Medications        Instructions Start Date   budesonide-formoterol 160-4.5 MCG/ACT inhaler  Commonly known as: SYMBICORT   2 puffs, Inhalation, 2 Times Daily - RT      cefdinir 300 MG capsule  Commonly known as: OMNICEF   300 mg, Oral, 2 Times Daily      predniSONE 10 MG tablet  Commonly known as: DELTASONE   10 mg, Oral, Daily, 40mg X 1 day, 30mg X 1 day, 20mg X 1 day, 10mg X 1 day, stop             Continue These Medications        Instructions Start Date   amLODIPine 10 MG tablet  Commonly known as: NORVASC   10 mg, Daily      aspirin 81 MG tablet   81 mg, Oral, Daily    "   busPIRone 10 MG tablet  Commonly known as: BUSPAR   10 mg, 2 Times Daily      citalopram 20 MG tablet  Commonly known as: CeleXA   20 mg, Daily      clopidogrel 75 MG tablet  Commonly known as: PLAVIX   75 mg, Oral, Daily      ipratropium-albuterol 0.5-2.5 mg/3 ml nebulizer  Commonly known as: DUO-NEB   3 mL, Nebulization, Every 4 Hours PRN      isosorbide mononitrate 120 MG 24 hr tablet  Commonly known as: IMDUR   120 mg, Oral, Daily      metoprolol succinate XL 50 MG 24 hr tablet  Commonly known as: TOPROL-XL   50 mg, Oral, Every 24 Hours Scheduled      multivitamin with minerals tablet tablet   1 tablet, Daily      nitroglycerin 0.4 MG SL tablet  Commonly known as: NITROSTAT   0.4 mg, Sublingual, Every 5 Minutes PRN, Take no more than 3 doses in 15 minutes.       tamsulosin 0.4 MG capsule 24 hr capsule  Commonly known as: FLOMAX   1 capsule, Daily             Stop These Medications      terazosin 5 MG capsule  Commonly known as: HYTRIN              Discharge Diet: heart healthy diet    Activity at Discharge: as tolerated    Follow-up Appointments:   1 week follow-up with primary care provider, and also will arrange for close outpatient follow-up with his cardiologist, Dr. Francois, in addition to Dr. Robin with CT surgery.    Test Results Pending at Discharge: none    Electronically signed by Adolfo Barker MD, 03/04/25, 09:10 CST.    Time: 32 minutes.

## 2025-03-04 NOTE — PLAN OF CARE
Goal Outcome Evaluation:  Plan of Care Reviewed With: patient        Progress: improving  Outcome Evaluation: . A&Ox4. VSS on 1 L NC. C/o pain with PRN medication given x2 this shift. Up ad linda in room. Voiding per urinal.  in place. Call light within reach, safety maintained.

## 2025-03-04 NOTE — TELEPHONE ENCOUNTER
Caller: KIRSTY BASHIR    Relationship to patient:     Best call back number: [phone] PATIENT'S NUMBER    Chief complaint: EXACERBATION OF COPD    Type of visit: HOSPITAL FOLLOW UP    Requested date: 3-4 WEEKS     If rescheduling, when is the original appointment:      Additional notes: PATIENT WAS IN [employer].

## 2025-03-05 NOTE — OUTREACH NOTE
Prep Survey      Flowsheet Row Responses   [facility] facility patient discharged from? [facility]   Is LACE score < 7 ? No   Eligibility Readm Mgmt   Discharge diagnosis COPD with acute exacerbation   Does the patient have one of the following disease processes/diagnoses(primary or secondary)? COPD   Prep survey completed? Yes            Nely ANSARI - Registered Nurse

## 2025-03-06 LAB
BACTERIA SPEC AEROBE CULT: NORMAL
BACTERIA SPEC AEROBE CULT: NORMAL

## 2025-03-12 ENCOUNTER — READMISSION MANAGEMENT (OUTPATIENT)
Dept: CALL CENTER | Facility: HOSPITAL | Age: 65
End: 2025-03-12

## 2025-03-12 NOTE — OUTREACH NOTE
COPD/PN Week 1 Survey      Flowsheet Row Responses   [facility] facility patient discharged from? [facility]   Does the patient have one of the following disease processes/diagnoses(primary or secondary)? COPD   Week 1 attempt successful? No   Unsuccessful attempts Attempt 1            Yun Hernandez Licensed Nurse

## 2025-03-17 ENCOUNTER — READMISSION MANAGEMENT (OUTPATIENT)
Dept: CALL CENTER | Facility: HOSPITAL | Age: 65
End: 2025-03-17

## 2025-03-17 NOTE — OUTREACH NOTE
COPD/PN Week 1 Survey      Flowsheet Row Responses   [facility] facility patient discharged from? [place]   Does the patient have one of the following disease processes/diagnoses(primary or secondary)? COPD   Week 1 attempt successful? No   Unsuccessful attempts Attempt 2            Nely ANSARI - Registered Nurse

## 2025-03-27 ENCOUNTER — READMISSION MANAGEMENT (OUTPATIENT)
Dept: CALL CENTER | Facility: HOSPITAL | Age: 65
End: 2025-03-27

## 2025-03-27 NOTE — OUTREACH NOTE
COPD/PN Week 3 Survey      Flowsheet Row Responses   [facility] facility patient discharged from? [facility]   Does the patient have one of the following disease processes/diagnoses(primary or secondary)? COPD   Week 3 attempt successful? No   Unsuccessful attempts Attempt 1  [All numbers listed were attempted,  no answer]            Gabi H - Registered Nurse

## 2025-04-08 ENCOUNTER — OFFICE VISIT (OUTPATIENT)
Dept: CARDIAC SURGERY | Facility: CLINIC | Age: 65
End: 2025-04-08
Payer: MEDICARE

## 2025-04-08 VITALS
HEART RATE: 100 BPM | HEIGHT: 64 IN | SYSTOLIC BLOOD PRESSURE: 115 MMHG | BODY MASS INDEX: 20.01 KG/M2 | DIASTOLIC BLOOD PRESSURE: 64 MMHG | OXYGEN SATURATION: 92 % | WEIGHT: 117.2 LBS

## 2025-04-08 DIAGNOSIS — I73.9 PAD (PERIPHERAL ARTERY DISEASE): ICD-10-CM

## 2025-04-08 DIAGNOSIS — Z86.73 HISTORY OF CVA (CEREBROVASCULAR ACCIDENT): Chronic | ICD-10-CM

## 2025-04-08 DIAGNOSIS — I50.33 ACUTE ON CHRONIC DIASTOLIC CHF (CONGESTIVE HEART FAILURE): ICD-10-CM

## 2025-04-08 DIAGNOSIS — Z95.1 S/P CABG X 3: Primary | Chronic | ICD-10-CM

## 2025-04-08 DIAGNOSIS — J44.9 CHRONIC OBSTRUCTIVE PULMONARY DISEASE, UNSPECIFIED COPD TYPE: ICD-10-CM

## 2025-04-08 DIAGNOSIS — I74.09 AORTOILIAC OCCLUSIVE DISEASE: ICD-10-CM

## 2025-04-08 DIAGNOSIS — Z72.0 TOBACCO USE: ICD-10-CM

## 2025-04-08 DIAGNOSIS — Z95.3 STATUS POST AORTIC VALVE REPLACEMENT WITH BIOPROSTHETIC VALVE: Chronic | ICD-10-CM

## 2025-04-14 ENCOUNTER — OFFICE VISIT (OUTPATIENT)
Dept: CARDIOLOGY | Facility: CLINIC | Age: 65
End: 2025-04-14
Payer: MEDICARE

## 2025-04-14 VITALS
DIASTOLIC BLOOD PRESSURE: 72 MMHG | BODY MASS INDEX: 19.29 KG/M2 | SYSTOLIC BLOOD PRESSURE: 132 MMHG | OXYGEN SATURATION: 96 % | WEIGHT: 113 LBS | RESPIRATION RATE: 18 BRPM | HEART RATE: 69 BPM | HEIGHT: 64 IN

## 2025-04-14 DIAGNOSIS — G45.9 TRANSIENT ISCHEMIC ATTACK (TIA): ICD-10-CM

## 2025-04-14 DIAGNOSIS — I35.0 SEVERE AORTIC STENOSIS: Primary | ICD-10-CM

## 2025-04-14 DIAGNOSIS — I49.3 PVC (PREMATURE VENTRICULAR CONTRACTION): ICD-10-CM

## 2025-04-14 DIAGNOSIS — Z95.1 S/P CABG X 3: Chronic | ICD-10-CM

## 2025-04-14 PROCEDURE — 1159F MED LIST DOCD IN RCRD: CPT | Performed by: INTERNAL MEDICINE

## 2025-04-14 PROCEDURE — 3078F DIAST BP <80 MM HG: CPT | Performed by: INTERNAL MEDICINE

## 2025-04-14 PROCEDURE — 99214 OFFICE O/P EST MOD 30 MIN: CPT | Performed by: INTERNAL MEDICINE

## 2025-04-14 PROCEDURE — 3075F SYST BP GE 130 - 139MM HG: CPT | Performed by: INTERNAL MEDICINE

## 2025-04-14 PROCEDURE — 1160F RVW MEDS BY RX/DR IN RCRD: CPT | Performed by: INTERNAL MEDICINE

## 2025-04-14 RX ORDER — SODIUM CHLORIDE 9 MG/ML
40 INJECTION, SOLUTION INTRAVENOUS AS NEEDED
OUTPATIENT
Start: 2025-04-14

## 2025-04-14 RX ORDER — SODIUM CHLORIDE 0.9 % (FLUSH) 0.9 %
3 SYRINGE (ML) INJECTION EVERY 12 HOURS SCHEDULED
OUTPATIENT
Start: 2025-04-14

## 2025-04-14 RX ORDER — SODIUM CHLORIDE 0.9 % (FLUSH) 0.9 %
10 SYRINGE (ML) INJECTION AS NEEDED
OUTPATIENT
Start: 2025-04-14

## 2025-04-14 RX ORDER — ROSUVASTATIN CALCIUM 5 MG/1
5 TABLET, COATED ORAL DAILY
Qty: 30 TABLET | Refills: 11 | Status: SHIPPED | OUTPATIENT
Start: 2025-04-14

## 2025-04-14 NOTE — PROGRESS NOTES
Sharad Ryder  [id number]  1960  65 y.o.  male    Referring Provider: Kerry Mtz APRN    Reason for  Visit:         Here for routine follow up   aortic valve replacement with bioprosthetic valve 2018  coronary artery bypass grafting x 3 grafts 2018  Did not mail outpatient cardiac telemetry as did not get time   Cardiac workup test results as below: cath and most recent echo          Subjective    Moderate to severe exertional shortness of breath on exertion relieved with rest  No significant cough or wheezing    No palpitations  Occasional associated chest pain  Chest pain non pleuritic  Chest pain non positional and non gustatory   If rests chest pain resolves   No significant pedal edema    No fever or chills  No significant expectoration    No hemoptysis  No presyncope or syncope    Tolerating current medications well with no untoward side effects   Compliant with prescribed medication regimen. Tries to adhere to cardiac diet.   Easy fatiguability and increasing tired  Feels energy levels running low    Quit smoking   Vapes some     Echo as below         History of present illness:  Sharad Ryder is a 65 y.o. yo male with coronary artery disease coronary artery bypass grafting aortic valve replacement with bioprosthetic valve  who presents today for   Chief Complaint   Patient presents with   • HOSPITAL F/U   • Coronary Artery Disease   .    History  Past Medical History:   Diagnosis Date   • Aneurysm    • Anxiety    • Arthritis    • Carotid artery disease    • Carotid stenosis, right 02/01/2018    Post right carotid endarterectomy   • COPD (chronic obstructive pulmonary disease)    • Coronary artery disease    • Heart murmur    • History of right-sided carotid endarterectomy 02/16/2022   • Hyperlipidemia LDL goal <70 12/14/2017   • Left frontal lobe, right thalamus and right occipital CVA (cerebrovascular accident) ([facility]) 01/27/2022   • LVH (left ventricular hypertrophy) 02/16/2022   • Pneumonia     • Primary hypertension 12/14/2017   • S/P CABG x 3 02/16/2022    LIMA to LAD, SVG to PDA and SVG to diagonal branch with TMR and left atrial appendage exclusion with atricure clip device 2/8/2018 per Dr. Cj Robin at [facility]    • Severe aortic valve stenosis 12/14/2017   • Status post aortic valve replacement with bioprosthetic valve 02/16/2022    Graciela Juarez bovine pericardial 19 mm valve 2/8/2018 per Dr. Cj Robin at [facility]   • Tobacco use 12/14/2017   • Wears glasses    ,   Past Surgical History:   Procedure Laterality Date   • AORTAGRAM Bilateral 10/27/2023    Procedure: LEFT LOWER EXTREMITY ANGIOGRAM, BILATERAL ILIAC BALLOON ANGIOPLASTY, BILATERAL ILIAC STENT PLACEMENT, MYNX CLOSURE;  Surgeon: Jl Salgado DO;  Location: [facility] HYBRID OR 12;  Service: Vascular;  Laterality: Bilateral;   • APPENDECTOMY     • CARDIAC CATHETERIZATION N/A 12/18/2017    Procedure: Left Heart Cath;  Surgeon: Aime Francois MD;  Location:  [facility] CATH INVASIVE LOCATION;  Service:    • CARDIAC CATHETERIZATION N/A 12/18/2017    Procedure: Right Heart Cath;  Surgeon: Aime Francois MD;  Location:  [facility] CATH INVASIVE LOCATION;  Service:    • CARDIAC CATHETERIZATION Bilateral 1/4/2018    Procedure: Coronary angiography;  Surgeon: Alfonso Yi MD;  Location:  [facility] CATH INVASIVE LOCATION;  Service:    • CARDIAC CATHETERIZATION Left 9/15/2021    Procedure: Cardiac Catheterization/Vascular Study NIMCO OK  Has 3 graft 2018;  Surgeon: Aime Francois MD;  Location:  [facility] CATH INVASIVE LOCATION;  Service: Cardiology;  Laterality: Left;   • CAROTID ENDARTERECTOMY Right 2/1/2018    Procedure: RIGHT CAROTID ENDARTERECTOMY WITH EEG-awake;  Surgeon: Jl Salgado DO;  Location: [facility] OR;  Service:    • CORONARY ARTERY BYPASS GRAFT WITH AORTIC VALVE REPAIR/REPLACEMENT N/A 2/8/2018    Procedure: AORTIC VALVE REPLACEMENT,CORONARY ARTERY BYPASS GRAFTING x 3  WITH CONHCA AND RIGHT EVH, ATRIAL  APPENDAGE EXCLUSION LEFT WITH TRANSESOPHAGEAL ECHOCARDIOGRAM, 17-CHANNEL TMR;  Surgeon: Cj Robin MD;  Location: [facility] OR;  Service:    • FINGER SURGERY Right    • OTHER SURGICAL HISTORY      skull srgery from accident in childhood.   ,   Family History   Problem Relation Age of Onset   • Heart disease Mother    • Heart disease Father    • Diabetes Father    • Hypertension Sister    • Asthma Sister    • Kidney disease Sister    • Hypertension Brother    • Diabetes Brother    • Cancer Maternal Uncle    ,   Social History     Tobacco Use   • Smoking status: Former     Current packs/day: 0.00     Average packs/day: 1.5 packs/day for 49.8 years (74.8 ttl pk-yrs)     Types: Cigarettes     Start date:      Quit date: 2024     Years since quittin.4     Passive exposure: Never   • Smokeless tobacco: Current     Types: Snuff   Vaping Use   • Vaping status: Never Used   Substance Use Topics   • Alcohol use: No   • Drug use: No   ,     Medications  Current Outpatient Medications   Medication Sig Dispense Refill   • amLODIPine (NORVASC) 10 MG tablet Take 1 tablet by mouth Daily.     • aspirin 81 MG tablet Take 1 tablet by mouth Daily. 30 tablet 2   • budesonide-formoterol (SYMBICORT) 160-4.5 MCG/ACT inhaler Inhale 2 puffs 2 (Two) Times a Day. 1 each 6   • busPIRone (BUSPAR) 10 MG tablet Take 1 tablet by mouth 2 (Two) Times a Day.     • citalopram (CeleXA) 20 MG tablet Take 1 tablet by mouth Daily.     • clopidogrel (PLAVIX) 75 MG tablet Take 1 tablet by mouth Daily. 30 tablet 2   • ipratropium-albuterol (DUO-NEB) 0.5-2.5 mg/3 ml nebulizer Take 3 mL by nebulization Every 4 (Four) Hours As Needed for Wheezing. 90 mL 0   • isosorbide mononitrate (IMDUR) 120 MG 24 hr tablet Take 1 tablet by mouth Daily. 90 tablet 3   • metoprolol succinate XL (TOPROL-XL) 50 MG 24 hr tablet Take 1 tablet by mouth Daily. 30 tablet 0   • multivitamin with minerals (ONE-A-DAY MENS 50+ ADVANTAGE PO) Take 1 tablet by mouth Daily.   "   • nitroglycerin (NITROSTAT) 0.4 MG SL tablet Place 1 tablet under the tongue Every 5 (Five) Minutes As Needed for Chest Pain. Take no more than 3 doses in 15 minutes.     • predniSONE (DELTASONE) 10 MG tablet Take 1 tablet by mouth Daily. 40mg X 1 day, 30mg X 1 day, 20mg X 1 day, 10mg X 1 day, stop 10 tablet 0   • tamsulosin (FLOMAX) 0.4 MG capsule 24 hr capsule Take 1 capsule by mouth Daily.     • rosuvastatin (CRESTOR) 5 MG tablet Take 1 tablet by mouth Daily. 30 tablet 11     No current facility-administered medications for this visit.       Allergies:  Patient has no known allergies.    Review of Systems  Review of Systems   Constitutional: Negative.   HENT: Negative.     Eyes: Negative.    Cardiovascular:  Positive for chest pain and dyspnea on exertion. Negative for claudication, cyanosis, irregular heartbeat, leg swelling, near-syncope, orthopnea, palpitations, paroxysmal nocturnal dyspnea and syncope.   Respiratory: Negative.     Endocrine: Negative.    Hematologic/Lymphatic: Negative.    Skin: Negative.    Musculoskeletal:  Positive for arthritis, back pain and joint pain.   Gastrointestinal:  Negative for anorexia.   Genitourinary: Negative.    Neurological: Negative.    Psychiatric/Behavioral: Negative.         Objective     Physical Exam:    Vitals:    04/14/25 0823   BP: 132/72   BP Location: Left arm   Patient Position: Sitting   Cuff Size: Adult   Pulse: 69   Resp: 18   SpO2: 96%   Weight: 51.3 kg (113 lb)   Height: 162.6 cm (64\")     /72 (BP Location: Left arm, Patient Position: Sitting, Cuff Size: Adult)   Pulse 69   Resp 18   Ht 162.6 cm (64\")   Wt 51.3 kg (113 lb)   SpO2 96%   BMI 19.40 kg/m²     Physical Exam  Constitutional:       Appearance: He is well-developed.   HENT:      Head: Normocephalic.   Neck:      Vascular: Normal carotid pulses. No carotid bruit or JVD.      Trachea: No tracheal tenderness or tracheal deviation.   Cardiovascular:      Rate and Rhythm: Regular rhythm. "      Pulses: Normal pulses.      Heart sounds: Murmur heard.      Systolic murmur is present with a grade of 3/6.   Pulmonary:      Effort: Pulmonary effort is normal.      Breath sounds: No stridor.   Abdominal:      General: There is no distension.      Palpations: Abdomen is soft.      Tenderness: There is no abdominal tenderness.   Musculoskeletal:      Cervical back: No edema.   Skin:     General: Skin is warm.   Neurological:      Mental Status: He is alert.      Cranial Nerves: No cranial nerve deficit.      Sensory: No sensory deficit.   Psychiatric:         Speech: Speech normal.         Behavior: Behavior normal.       Results Review:    US Carotid Bilateral [[id number]] (Order [id number])  Order  Status: Final result     Appointment Information    PACS Images     Radiology Images  Study Result    Narrative & Impression   History: Carotid occlusive disease     IMPRESSION:  Impression:  1. There is less than 50% stenosis of the right internal carotid artery.  2. There is less than 50% stenosis of the left internal carotid artery.  3. Antegrade flow is demonstrated in bilateral vertebral arteries.          Results for orders placed during the hospital encounter of 12/25/24    Adult Transthoracic Echo Complete w/ Color, Spectral and Contrast if Necessary Per Protocol    Interpretation Summary  •  Left ventricular ejection fraction appears to be 66 - 70%.  •  Left ventricular wall thickness is consistent with moderate concentric hypertrophy.  •  The following left ventricular wall segments are hypokinetic: apical septal and apex hypokinetic.  •  Left ventricular diastolic function is consistent with (grade II w/high LAP) pseudonormalization.  •  The left atrial cavity is mildly dilated.  •  Severe bioprosthetic aortic valve stenosis is present.  •  Peak velocity of the flow distal to the aortic valve is 441 cm/s. Aortic valve maximum pressure gradient is 78 mmHg.  •  Estimated right ventricular systolic pressure  from tricuspid regurgitation is normal (<35 mmHg).         Conclusion of cardiac catheterization    9/15/2021     Distal left main coronary artery has 40 to 50% stenosis with normal FFR of 0.95  Proximal left anterior descending coronary artery 70% stenosis  Patent left internal mammary artery graft to the left anterior descending coronary artery  Occluded saphenous venous graft to diagonal branch  Occluded mid right coronary artery  Occluded saphenous venous graft to right posterior descending coronary artery  No significant disease noted of left circumflex coronary artery  Ectasia of the aortic root by aortic root angiography which confirms occluded saphenous venous grafts  Mild to moderate aortic regurgitation  Normally functioning bioprosthetic aortic valve with peak to peak gradient of 20 mmHg on pullback  Severely elevated left ventricular end-diastolic pressure 32 mmHg [LV gram not performed]  Evidence of infrarenal aortic aneurysm on fluoroscopy        ____________________________________________________________________________________________________________________________________________     Plan after cardiac catheterization     No targets for intervention  Continue medical therapy  Acceptable risk for any planned procedures  Intensive risk factor modifications for both primary and secondary prevention if applicable  Hydration   Observation    · Normal left ventricular ejection fraction   · Bioprosthetic aortic valve without specific evidence of dysfunction   · Moderate mitral annular calcification   · Moderate left atrial dilatation   · Moderate pulmonary hypertension       This result has an attachment that is not available.     Date: 04/13/21   Received From: [facility]           ____________________________________________________________________________________________________________________________________________  Health maintenance and recommendations    Low salt/ HTN/ Heart  healthy carbohydrate restricted cardiac diet (printed dietary and general instructions provided for home review )  The patient is advised to reduce or avoid caffeine or other cardiac stimulants.     The patient was advised to avoid long term NSAIDS , use Tylenol PRN instead  Avoid cardiac stimulants including common drugs like Pseudoephedrine or excessive amounts of caffeine  Monitor for any signs of bleeding including red or dark stools. Fall precautions.   Advised staying uptodate with immunizations per established standard guidelines.  Offered to give patient  a copy      Questions were encouraged, asked and answered to the patient's  understanding and satisfaction. Questions if any regarding current medications and side effects, need for refills and importance of compliance to medications stressed.    Reviewed available prior notes, consults, prior visits, laboratory findings, radiology and cardiology relevant reports. Updated chart as applicable. I have reviewed the patient's medical history in detail and updated the computerized patient record as relevant.      Updated patient regarding any new or relevant abnormalities on review of records or any new findings on physical exam. Mentioned to patient about purpose of visit and desirable health short and long term goals and objectives.    Primary to monitor CBC CMP Lipid panel and TSH as applicable    ___________________________________________________________________________________________________________________________________________         Procedures    Assessment & Plan   Diagnoses and all orders for this visit:    1. Severe aortic stenosis (Primary)  -     Case Request Cath Lab: Cardiac Catheterization/Vascular Study; Standing  -     CBC and Differential; Future  -     Comprehensive metabolic panel; Future  -     sodium chloride 0.9 % flush 3 mL  -     sodium chloride 0.9 % flush 10 mL  -     sodium chloride 0.9 % infusion 40 mL  -     sodium chloride  0.9 % bolus 200 mL  -     Case Request Cath Lab: Cardiac Catheterization/Vascular Study    2. Transient ischemic attack (TIA): no recurrence     3. PVC (premature ventricular contraction): same meds   ContinueConclusion of cardiac catheterization    9/15/2021     Distal left main coronary artery has 40 to 50% stenosis with normal FFR of 0.95  Proximal left anterior descending coronary artery 70% stenosis  Patent left internal mammary artery graft to the left anterior descending coronary artery  Occluded saphenous venous graft to diagonal branch  Occluded mid right coronary artery  Occluded saphenous venous graft to right posterior descending coronary artery  No significant disease noted of left circumflex coronary artery  Ectasia of the aortic root by aortic root angiography which confirms occluded saphenous venous grafts  Mild to moderate aortic regurgitation  Normally functioning bioprosthetic aortic valve with peak to peak gradient of 20 mmHg on pullback  Severely elevated left ventricular end-diastolic pressure 32 mmHg [LV gram not performed]  Evidence of infrarenal aortic aneurysm on fluoroscopy        ____________________________________________________________________________________________________________________________________________     Plan after cardiac catheterization     No targets for intervention  Continue medical therapy  Acceptable risk for any planned procedures  Intensive risk factor modifications for both primary and secondary prevention if applicable  Hydration   Observation          4. S/P CABG x 3 2018 Dr Robin     Other orders  -     Clip bilateral groin.; Standing  -     Instruct Patient To Stop Apixaban, Rivaroxaban, Edoxaban, Dibigatran, Vorapaxar, Atopaxar 48 Hours Before Scheduled Cardiac Procedure; Future  -     Verify On Day of Procedure: No Apixaban, Rivaroxaban, Edoxaban, Dabigatran, Vorapaxar, Atopaxar Administration 48 Hours Before Cardiac Procedure; Standing  -     Instruct  Patient To Stop Metformin (including any combination product containing Metformin) 48 Hours Before Scheduled Cardiac Procedure; Future  -     Verify On Day of Procedure: No Metformin (including any combination product containing Metformin) 48 Hours Before Scheduled Cardiac Procedure; Standing  -     CBC & Differential; Standing  -     Comprehensive Metabolic Panel; Standing  -     Insert Peripheral IV; Standing  -     Saline Lock & Maintain IV Access; Standing  -     No Solid Food or Milk for 6 Hours Prior to Scheduled Arrival Time for Cardiac Catheterization  -     Nothing by Mouth for 2 Hours prior to Scheduled Arrival Time  -     Clear Liquids Allowed and Encouraged up to 2 hours Prior to Scheduled Arrival Time - Including at Least 28 [place] Within 3-Hour Window Prior to Scheduled Arrival Time        Plan    Check BP and heart rates twice daily initially till blood pressures and heart rates under good control and then at least 3x / week,   If blood pressures continue to be well-controlled then can check week a month  at home and bring a recording for review next visit  If BP >130/85 or < 100/60 persistently over 3 reading 30 mins apart or if heart rates persistently above 100 bpm or less than 55 bpm call sooner for evaluation and advise     Continue same medications   Patient expressed understanding  Encouraged and answered all questions   Discussed with the patient and all questioned fully answered. He will call me if any problems arise.   Discussed results of prior testing with patient : echo and cath   as well as electrocardiogram available for review     Recommend cardiac catheterization, selective coronary angiography, left ventriculography and percutaneous coronary intervention with application of arteriotomy hemostatic closure device.    I discussed cardiac catheterization, the procedure, risks (including bleeding, infection, vascular damage [including minor oozing, bruising, bleeding, and up to and  including but not limited to the need for vascular surgery, emergency cardiothoracic surgery, contrast reaction, renal failure, respiratory failure, heart attack, stroke, arrhythmia and even death), benefits, and alternatives and the patient has voiced understanding and is willing to proceed.    Adequate pre-hydration and post cardiac catheterization hydration.  Premedications as required and indicated for cardiac catheterization.    No contraindication to drug eluting stent placement if required  Further recommendations pending results of cardiac catheterization      Orders Placed This Encounter   Procedures   • Comprehensive metabolic panel     Standing Status:   Future     Expected Date:   4/19/2025     Expiration Date:   4/14/2026     Release to patient:   Routine Release [[id number]]   • Instruct Patient To Stop Apixaban, Rivaroxaban, Edoxaban, Dibigatran, Vorapaxar, Atopaxar 48 Hours Before Scheduled Cardiac Procedure     Instruct Patient To Stop Apixaban, Rivaroxaban, Edoxaban, Dibigatran, Vorapaxar, Atopaxar 48 Hours Before Scheduled Cardiac Procedure     Standing Status:   Future     Expected Date:   4/14/2025     Expiration Date:   4/14/2026   • Instruct Patient To Stop Metformin (including any combination product containing Metformin) 48 Hours Before Scheduled Cardiac Procedure     Standing Status:   Future     Expected Date:   4/14/2025     Expiration Date:   4/14/2026   • No Solid Food or Milk for 6 Hours Prior to Scheduled Arrival Time for Cardiac Catheterization   • Nothing by Mouth for 2 Hours prior to Scheduled Arrival Time   • Clear Liquids Allowed and Encouraged up to 2 hours Prior to Scheduled Arrival Time - Including at Least [address] Within 3-Hour Window Prior to Scheduled Arrival Time   • CBC and Differential     Standing Status:   Future     Expected Date:   4/19/2025     Expiration Date:   4/14/2026     Manual Differential:   No     Release to patient:   Routine Release [[id number]]       Start low dose statin and escalate as tolerated   Requested Prescriptions     Signed Prescriptions Disp Refills   • rosuvastatin (CRESTOR) 5 MG tablet 30 tablet 11     Sig: Take 1 tablet by mouth Daily.      Keep LDL below 70 mg/dl. Monitor liver and renal functions.   Monitor CBC, CMP, TSH (as indicated) and Lipid Panel by primary           Return in about 3 months (around 7/14/2025).

## 2025-04-20 NOTE — PROGRESS NOTES
Chief Complaint   Patient presents with    Aortic Stenosis     Patient is here for hospital follow up          Subjective     History of Present Illness  The patient is a 64-year-old male who presents for evaluation of severe aortic valve stenosis, bioprosthetic valve, peripheral arterial disease, carotid artery disease, and coronary artery disease with known previous CABG.    He has been experiencing urinary urgency, necessitating immediate access to a restroom. This symptom began approximately 1.5 weeks ago. His prostate is managed by his primary care physician. He also reports episodes of dyspnea, which he attributes to anxiety. He has a history of smoking but has since ceased this habit. He has undergone 2 bypass surgeries and has 2 stents in place.He has a scheduled appointment with his cardiologist, Dr. Francois, on the upcoming Monday. He reports a decrease in energy levels following his bypass surgery. He has previously undergone a procedure to address blockages in his neck vessels.    He has a spot on his kidney and goes to [facility] in [place], [place] every 3 months to keep a check on it. He is not losing any weight intentionally, coughing up blood, or experiencing new chest pains.    SOCIAL HISTORY  He is a non-smoker.    FAMILY HISTORY  His first cousin had open heart surgery with three or four bypasses and a stent placed in one of his valves. The cousin also had diabetes.    Review of Systems       Past Medical History:   Diagnosis Date    Aneurysm     Anxiety     Arthritis     Carotid artery disease     Carotid stenosis, right 02/01/2018    Post right carotid endarterectomy    COPD (chronic obstructive pulmonary disease)     Coronary artery disease     Heart murmur     History of right-sided carotid endarterectomy 02/16/2022    Hyperlipidemia LDL goal <70 12/14/2017    Left frontal lobe, right thalamus and right occipital CVA (cerebrovascular accident) (HCC) 01/27/2022    LVH (left ventricular  hypertrophy) 02/16/2022    Pneumonia     Primary hypertension 12/14/2017    S/P CABG x 3 02/16/2022    LIMA to LAD, SVG to PDA and SVG to diagonal branch with TMR and left atrial appendage exclusion with atricure clip device 2/8/2018 per Dr. Cj Robin at [facility]     Severe aortic valve stenosis 12/14/2017    Status post aortic valve replacement with bioprosthetic valve 02/16/2022    Graciela Juarez bovine pericardial 19 mm valve 2/8/2018 per Dr. Cj Robin at [facility]    Tobacco use 12/14/2017    Wears glasses      Past Surgical History:   Procedure Laterality Date    AORTAGRAM Bilateral 10/27/2023    Procedure: LEFT LOWER EXTREMITY ANGIOGRAM, BILATERAL ILIAC BALLOON ANGIOPLASTY, BILATERAL ILIAC STENT PLACEMENT, MYNX CLOSURE;  Surgeon: Jl Salgado DO;  Location: [facility] HYBRID OR 12;  Service: Vascular;  Laterality: Bilateral;    APPENDECTOMY      CARDIAC CATHETERIZATION N/A 12/18/2017    Procedure: Left Heart Cath;  Surgeon: Aime Francois MD;  Location:  [facility] CATH INVASIVE LOCATION;  Service:     CARDIAC CATHETERIZATION N/A 12/18/2017    Procedure: Right Heart Cath;  Surgeon: Aime Francois MD;  Location:  [facility] CATH INVASIVE LOCATION;  Service:     CARDIAC CATHETERIZATION Bilateral 1/4/2018    Procedure: Coronary angiography;  Surgeon: Alfonso Yi MD;  Location:  [facility] CATH INVASIVE LOCATION;  Service:     CARDIAC CATHETERIZATION Left 9/15/2021    Procedure: Cardiac Catheterization/Vascular Study NIMCO OK  Has 3 graft 2018;  Surgeon: Aime Francois MD;  Location:  [facility] CATH INVASIVE LOCATION;  Service: Cardiology;  Laterality: Left;    CAROTID ENDARTERECTOMY Right 2/1/2018    Procedure: RIGHT CAROTID ENDARTERECTOMY WITH EEG-awake;  Surgeon: Jl Salgado DO;  Location: [facility] OR;  Service:     CORONARY ARTERY BYPASS GRAFT WITH AORTIC VALVE REPAIR/REPLACEMENT N/A 2/8/2018    Procedure: AORTIC VALVE REPLACEMENT,CORONARY ARTERY BYPASS GRAFTING x 3  WITH CONCHA AND  RIGHT EVH, ATRIAL APPENDAGE EXCLUSION LEFT WITH TRANSESOPHAGEAL ECHOCARDIOGRAM, 17-CHANNEL TMR;  Surgeon: Cj Robin MD;  Location: [facility] OR;  Service:     FINGER SURGERY Right     OTHER SURGICAL HISTORY      skull srgery from accident in childhood.     Family History   Problem Relation Age of Onset    Heart disease Mother     Heart disease Father     Diabetes Father     Hypertension Sister     Asthma Sister     Kidney disease Sister     Hypertension Brother     Diabetes Brother     Cancer Maternal Uncle      Social History     Tobacco Use    Smoking status: Former     Current packs/day: 0.00     Average packs/day: 1.5 packs/day for 49.8 years (74.8 ttl pk-yrs)     Types: Cigarettes     Start date:      Quit date: 2024     Years since quittin.4     Passive exposure: Never    Smokeless tobacco: Current     Types: Snuff   Vaping Use    Vaping status: Never Used   Substance Use Topics    Alcohol use: No    Drug use: No     Current Outpatient Medications   Medication Sig Dispense Refill    amLODIPine (NORVASC) 10 MG tablet Take 1 tablet by mouth Daily.      aspirin 81 MG tablet Take 1 tablet by mouth Daily. 30 tablet 2    budesonide-formoterol (SYMBICORT) 160-4.5 MCG/ACT inhaler Inhale 2 puffs 2 (Two) Times a Day. 1 each 6    busPIRone (BUSPAR) 10 MG tablet Take 1 tablet by mouth 2 (Two) Times a Day.      citalopram (CeleXA) 20 MG tablet Take 1 tablet by mouth Daily.      clopidogrel (PLAVIX) 75 MG tablet Take 1 tablet by mouth Daily. 30 tablet 2    ipratropium-albuterol (DUO-NEB) 0.5-2.5 mg/3 ml nebulizer Take 3 mL by nebulization Every 4 (Four) Hours As Needed for Wheezing. 90 mL 0    isosorbide mononitrate (IMDUR) 120 MG 24 hr tablet Take 1 tablet by mouth Daily. 90 tablet 3    metoprolol succinate XL (TOPROL-XL) 50 MG 24 hr tablet Take 1 tablet by mouth Daily. 30 tablet 0    multivitamin with minerals (ONE-A-DAY MENS 50+ ADVANTAGE PO) Take 1 tablet by mouth Daily.      nitroglycerin  "(NITROSTAT) 0.4 MG SL tablet Place 1 tablet under the tongue Every 5 (Five) Minutes As Needed for Chest Pain. Take no more than 3 doses in 15 minutes.      predniSONE (DELTASONE) 10 MG tablet Take 1 tablet by mouth Daily. 40mg X 1 day, 30mg X 1 day, 20mg X 1 day, 10mg X 1 day, stop 10 tablet 0    tamsulosin (FLOMAX) 0.4 MG capsule 24 hr capsule Take 1 capsule by mouth Daily.      rosuvastatin (CRESTOR) 5 MG tablet Take 1 tablet by mouth Daily. 30 tablet 11     No current facility-administered medications for this visit.     Allergies:  Patient has no known allergies.    Objective      Vital Signs  Visit Vitals  /64   Pulse 100   Ht 162.6 cm (64\")   Wt 53.2 kg (117 lb 3.2 oz)   SpO2 92%   BMI 20.12 kg/m²         Physical Exam  Physical Exam  The patient appears older than his stated age and is in no acute distress.  Lungs are clear to auscultation bilaterally without wheezing, rubs or rales.  The heart has a regular rate and rhythm. No external heave. There is a murmur 3 out of 6. No rubs, no gallops.  Abdomen is soft, nondistended, nontender.  There is clubbing to the hands. No cyanosis or edema. The patient moves all extremities.    Vital Signs  Height is 5 feet 4 inches.    Results Review:   No results found.  Results  Imaging  Transthoracic echocardiogram obtained on 12/26/2024 reveals an ejection fraction of 66-70%, LVH, hypokinetic apical septal and apex segments, dilated left atrium, severe bioprosthetic aortic valve stenosis with a peak velocity of 4.4 and a mean gradient of 33.3, max gradient of 77.8.  CAT scan from 03/13/2025 shows right upper lobe wedge resection, ground glass appearance, no evidence of metastatic disease.  I reviewed the patient's new clinical results.  Discussed with patient      Assessment & Plan       Diagnoses and all orders for this visit:    1. S/P CABG x 3 2018 Dr Robin  (Primary)    2. Status post aortic valve replacement with bioprosthetic valve    3. Aortoiliac occlusive " disease    4. Acute on chronic diastolic CHF (congestive heart failure)    5. PAD (peripheral artery disease)    6. History of CVA (cerebrovascular accident)    7. Chronic obstructive pulmonary disease, unspecified COPD type    8. Tobacco use    NYHA class I   Assessment & Plan  1. Severe aortic valve stenosis.  The patient presented to [facility] with COPD exacerbation and acute on chronic diastolic heart failure. He was treated for severe sepsis and respiratory failure with antibiotics and supportive efforts, requiring an ICU stay before being transferred to the floor. He underwent coronary artery bypass grafting with TMR on 02/08/2018, including TMR to his right coronary artery territory. It is not surprising that his right coronary artery bypass graft failed. The diagonal artery bypass graft is also occluded. The most recent left heart catheterization performed in 2021 shows a patent LAD with 70% proximal LAD stenosis and 50% left main stenosis, not considered hemodynamically significant. An invasive hemodynamic test at that time showed a bioprosthetic valve with a mean gradient of 20, similar to his initial echocardiography. His mean gradient is now 33 on the most recent echo with noted elevated velocities. He likely presented with heart failure and pneumonia, though the initial evaluation was not conducted during hospitalization. Based on history, this is more likely a heart failure presentation rather than a true infectious presentation. Further evaluation will begin, including a CT TAVR chest, abdomen, pelvis protocol CAT scan, new pulmonary function test pre and post-bronchodilator complete with DLCO, and a left heart catheterization. Treatment options discussed include continued expectant management, a valve-in-valve technique with TAVR, or SAVR. Given his age of 64 years, redo sternotomy and AVR and redo AVR may be favored. His implant of a 19 mm valve may confer a favorable treatment plan to be more  likely SAVR rather than TAVR valve in valve approach. The process for valve therapy will include a heart team approach. While he already sees a cardiologist, Dr. Francois, there are 2 specific cardiologists who specialize in structural therapy. Patients undergoing valve therapy are discussed weekly to determine the best treatment plan.  If a surgical solution is ultimately recommended, surgical therapy will proceed. All questions were answered. He is clinically stable at this time and is a non-smoker. Care will continue to be provided.    2. Bioprosthetic valve.  The patient has a bioprosthetic valve with a mean gradient of 33 on the most recent echo. Further evaluation will include a CT TAVR chest, abdomen, pelvis protocol CAT scan, new pulmonary function test pre and post-bronchodilator complete with DLCO, and a left heart catheterization. Treatment options discussed include continued expectant management, a valve-in-valve technique with TAVR, or SAVR. Given his age of 64 years, redo sternotomy and AVR and redo AVR may be favored.    3. Peripheral arterial disease.  The patient has known peripheral arterial disease. Further evaluation will include a CT TAVR chest, abdomen, pelvis protocol CAT scan and new pulmonary function test pre and post-bronchodilator complete with DLCO.    4. Carotid artery disease.  The patient has known carotid artery disease. Further evaluation will include a CT TAVR chest, abdomen, pelvis protocol CAT scan and new pulmonary function test pre and post-bronchodilator complete with DLCO.    5. Coronary artery disease with known previous CABG.  The patient has a history of coronary artery disease with known previous CABG. Further evaluation will include a CT TAVR chest, abdomen, pelvis protocol CAT scan and new pulmonary function test pre and post-bronchodilator complete with DLCO.    6. Lung nodule.  The patient has a spot on his kidney and goes to [facility] in [place], [place] every 3  months to keep a check on it. He is not losing any weight intentionally, coughing up blood, or experiencing new chest pains.    PROCEDURE  The patient underwent coronary artery bypass grafting with TMR on 02/08/2018, including TMR to his right coronary artery territory.    He is a smoker. Counseling has been provided    Many thanks for the opportunity to care for your patient.    I will continue to keep you apprised of provided care as it ensues.        Will obtain consultation with my structural heart colleagues and begin heart team approach for redo SAVR vs valve in valve TAVR.  All questions have been answered to the best of my ability and he is agreeable to the plan of care .        Transcribed from ambient dictation for Cj Robin MD by Cj Robin MD.  04/20/25   18:16 CDT    Patient or patient representative verbalized consent for the use of Ambient Listening during the visit with  Cj Robin MD for chart documentation. 4/20/2025  18:58 CDT

## 2025-04-21 DIAGNOSIS — I25.10 CORONARY ARTERY DISEASE INVOLVING NATIVE CORONARY ARTERY OF NATIVE HEART, UNSPECIFIED WHETHER ANGINA PRESENT: ICD-10-CM

## 2025-04-21 DIAGNOSIS — Z95.3 STATUS POST AORTIC VALVE REPLACEMENT WITH BIOPROSTHETIC VALVE: Primary | ICD-10-CM

## 2025-04-21 DIAGNOSIS — Z86.73 HISTORY OF CVA (CEREBROVASCULAR ACCIDENT): ICD-10-CM

## 2025-04-21 DIAGNOSIS — Z95.1 S/P CABG X 3: ICD-10-CM

## 2025-04-22 ENCOUNTER — TELEPHONE (OUTPATIENT)
Dept: CARDIOLOGY | Facility: CLINIC | Age: 65
End: 2025-04-22
Payer: MEDICARE

## 2025-04-22 NOTE — TELEPHONE ENCOUNTER
----- Message from Gabi BRISENO sent at 4/22/2025 10:50 AM CDT -----  Pt needs LHC with Dr Priscila Rodriguez!  ----- Message -----  From: Nely Rodriguez APRN  Sent: 4/21/2025  12:47 PM CDT  To: Gabi Chavez MA    He wanted testing on him, ordered statAlso needs LHC by Dr. Francois can you pass alongNeeds dental clearance-not sure patient knows

## 2025-04-23 ENCOUNTER — APPOINTMENT (OUTPATIENT)
Dept: GENERAL RADIOLOGY | Facility: HOSPITAL | Age: 65
DRG: 193 | End: 2025-04-23
Payer: MEDICARE

## 2025-04-23 ENCOUNTER — HOSPITAL ENCOUNTER (INPATIENT)
Facility: HOSPITAL | Age: 65
LOS: 2 days | Discharge: HOME | DRG: 193 | End: 2025-04-25
Attending: EMERGENCY MEDICINE | Admitting: HOSPITALIST
Payer: MEDICARE

## 2025-04-23 DIAGNOSIS — Z74.09 IMPAIRED FUNCTIONAL MOBILITY AND ACTIVITY TOLERANCE: ICD-10-CM

## 2025-04-23 DIAGNOSIS — J18.9 PNEUMONIA OF RIGHT UPPER LOBE DUE TO INFECTIOUS ORGANISM: ICD-10-CM

## 2025-04-23 DIAGNOSIS — J96.01 ACUTE RESPIRATORY FAILURE WITH HYPOXIA: ICD-10-CM

## 2025-04-23 DIAGNOSIS — I50.9 ACUTE CONGESTIVE HEART FAILURE, UNSPECIFIED HEART FAILURE TYPE: ICD-10-CM

## 2025-04-23 DIAGNOSIS — I50.33 ACUTE ON CHRONIC HEART FAILURE WITH PRESERVED EJECTION FRACTION (HFPEF): Primary | ICD-10-CM

## 2025-04-23 DIAGNOSIS — J44.1 COPD EXACERBATION: ICD-10-CM

## 2025-04-23 LAB
ALBUMIN SERPL-MCNC: 3.9 G/DL (ref 3.5–5.2)
ALBUMIN/GLOB SERPL: 1.3 G/DL
ALP SERPL-CCNC: 69 U/L (ref 39–117)
ALT SERPL W P-5'-P-CCNC: 11 U/L (ref 1–41)
ANION GAP SERPL CALCULATED.3IONS-SCNC: 15 MMOL/L (ref 5–15)
ARTERIAL PATENCY WRIST A: ABNORMAL
AST SERPL-CCNC: 16 U/L (ref 1–40)
ATMOSPHERIC PRESS: 755 MMHG
B PARAPERT DNA SPEC QL NAA+PROBE: NOT DETECTED
B PERT DNA SPEC QL NAA+PROBE: NOT DETECTED
BACTERIA UR QL AUTO: ABNORMAL /HPF
BASE EXCESS BLDA CALC-SCNC: 4.6 MMOL/L (ref 0–2)
BASOPHILS # BLD AUTO: 0.07 10*3/MM3 (ref 0–0.2)
BASOPHILS NFR BLD AUTO: 0.3 % (ref 0–1.5)
BDY SITE: ABNORMAL
BILIRUB SERPL-MCNC: 0.4 MG/DL (ref 0–1.2)
BILIRUB UR QL STRIP: NEGATIVE
BODY TEMPERATURE: 37
BUN SERPL-MCNC: 11 MG/DL (ref 8–23)
BUN/CREAT SERPL: 18 (ref 7–25)
C PNEUM DNA NPH QL NAA+NON-PROBE: NOT DETECTED
CALCIUM SPEC-SCNC: 8.9 MG/DL (ref 8.6–10.5)
CHLORIDE SERPL-SCNC: 92 MMOL/L (ref 98–107)
CLARITY UR: CLEAR
CO2 SERPL-SCNC: 26 MMOL/L (ref 22–29)
COHGB MFR BLD: 1.5 % (ref 0–5)
COLOR UR: YELLOW
CREAT SERPL-MCNC: 0.61 MG/DL (ref 0.76–1.27)
D-LACTATE SERPL-SCNC: 2 MMOL/L (ref 0.5–2)
DEPRECATED RDW RBC AUTO: 42 FL (ref 37–54)
EGFRCR SERPLBLD CKD-EPI 2021: 106.6 ML/MIN/1.73
EOSINOPHIL # BLD AUTO: 0.04 10*3/MM3 (ref 0–0.4)
EOSINOPHIL NFR BLD AUTO: 0.2 % (ref 0.3–6.2)
ERYTHROCYTE [DISTWIDTH] IN BLOOD BY AUTOMATED COUNT: 12.9 % (ref 12.3–15.4)
FLUAV SUBTYP SPEC NAA+PROBE: NOT DETECTED
FLUBV RNA ISLT QL NAA+PROBE: NOT DETECTED
GAS FLOW AIRWAY: 3 LPM
GLOBULIN UR ELPH-MCNC: 3 GM/DL
GLUCOSE SERPL-MCNC: 128 MG/DL (ref 65–99)
GLUCOSE UR STRIP-MCNC: NEGATIVE MG/DL
HADV DNA SPEC NAA+PROBE: NOT DETECTED
HCO3 BLDA-SCNC: 27.9 MMOL/L (ref 20–26)
HCOV 229E RNA SPEC QL NAA+PROBE: NOT DETECTED
HCOV HKU1 RNA SPEC QL NAA+PROBE: NOT DETECTED
HCOV NL63 RNA SPEC QL NAA+PROBE: NOT DETECTED
HCOV OC43 RNA SPEC QL NAA+PROBE: NOT DETECTED
HCT VFR BLD AUTO: 32.5 % (ref 37.5–51)
HCT VFR BLD CALC: 33.4 % (ref 38–51)
HGB BLD-MCNC: 11.2 G/DL (ref 13–17.7)
HGB BLDA-MCNC: 10.9 G/DL (ref 14–18)
HGB UR QL STRIP.AUTO: ABNORMAL
HMPV RNA NPH QL NAA+NON-PROBE: NOT DETECTED
HPIV1 RNA ISLT QL NAA+PROBE: NOT DETECTED
HPIV2 RNA SPEC QL NAA+PROBE: NOT DETECTED
HPIV3 RNA NPH QL NAA+PROBE: NOT DETECTED
HPIV4 P GENE NPH QL NAA+PROBE: NOT DETECTED
HYALINE CASTS UR QL AUTO: ABNORMAL /LPF
IMM GRANULOCYTES # BLD AUTO: 0.2 10*3/MM3 (ref 0–0.05)
IMM GRANULOCYTES NFR BLD AUTO: 0.9 % (ref 0–0.5)
KETONES UR QL STRIP: NEGATIVE
L PNEUMO1 AG UR QL IA: NEGATIVE
LEUKOCYTE ESTERASE UR QL STRIP.AUTO: ABNORMAL
LYMPHOCYTES # BLD AUTO: 1.27 10*3/MM3 (ref 0.7–3.1)
LYMPHOCYTES NFR BLD AUTO: 5.6 % (ref 19.6–45.3)
Lab: ABNORMAL
Lab: ABNORMAL
M PNEUMO IGG SER IA-ACNC: NOT DETECTED
MCH RBC QN AUTO: 30.7 PG (ref 26.6–33)
MCHC RBC AUTO-ENTMCNC: 34.5 G/DL (ref 31.5–35.7)
MCV RBC AUTO: 89 FL (ref 79–97)
METHGB BLD QL: 0.2 % (ref 0–3)
MODALITY: ABNORMAL
MONOCYTES # BLD AUTO: 1.37 10*3/MM3 (ref 0.1–0.9)
MONOCYTES NFR BLD AUTO: 6.1 % (ref 5–12)
MRSA DNA SPEC QL NAA+PROBE: NORMAL
NEUTROPHILS NFR BLD AUTO: 19.62 10*3/MM3 (ref 1.7–7)
NEUTROPHILS NFR BLD AUTO: 86.9 % (ref 42.7–76)
NITRITE UR QL STRIP: POSITIVE
NOTIFIED BY: ABNORMAL
NOTIFIED WHO: ABNORMAL
NRBC BLD AUTO-RTO: 0 /100 WBC (ref 0–0.2)
NT-PROBNP SERPL-MCNC: 7295 PG/ML (ref 0–900)
OXYHGB MFR BLDV: 87.8 % (ref 94–99)
PCO2 BLDA: 35.8 MM HG (ref 35–45)
PCO2 TEMP ADJ BLD: 35.8 MM HG (ref 35–45)
PH BLDA: 7.5 PH UNITS (ref 7.35–7.45)
PH UR STRIP.AUTO: 7 [PH] (ref 5–8)
PH, TEMP CORRECTED: 7.5 PH UNITS (ref 7.35–7.45)
PLATELET # BLD AUTO: 408 10*3/MM3 (ref 140–450)
PMV BLD AUTO: 8.7 FL (ref 6–12)
PO2 BLDA: 53.1 MM HG (ref 83–108)
PO2 TEMP ADJ BLD: 53.1 MM HG (ref 83–108)
POTASSIUM BLDA-SCNC: 2.9 MMOL/L (ref 3.5–5.2)
POTASSIUM SERPL-SCNC: 3.5 MMOL/L (ref 3.5–5.2)
PROT SERPL-MCNC: 6.9 G/DL (ref 6–8.5)
PROT UR QL STRIP: NEGATIVE
RBC # BLD AUTO: 3.65 10*6/MM3 (ref 4.14–5.8)
RBC # UR STRIP: ABNORMAL /HPF
REF LAB TEST METHOD: ABNORMAL
RHINOVIRUS RNA SPEC NAA+PROBE: NOT DETECTED
RSV RNA NPH QL NAA+NON-PROBE: NOT DETECTED
S PNEUM AG SPEC QL LA: NEGATIVE
SAO2 % BLDCOA: 89.3 % (ref 94–99)
SARS-COV-2 RNA RESP QL NAA+PROBE: NOT DETECTED
SODIUM BLDA-SCNC: 134 MMOL/L (ref 136–145)
SODIUM SERPL-SCNC: 133 MMOL/L (ref 136–145)
SP GR UR STRIP: <=1.005 (ref 1–1.03)
SQUAMOUS #/AREA URNS HPF: ABNORMAL /HPF
UROBILINOGEN UR QL STRIP: ABNORMAL
VENTILATOR MODE: ABNORMAL
WBC # UR STRIP: ABNORMAL /HPF
WBC CLUMPS # UR AUTO: ABNORMAL /HPF
WBC NRBC COR # BLD AUTO: 22.57 10*3/MM3 (ref 3.4–10.8)

## 2025-04-23 PROCEDURE — 25010000002 DOXYCYCLINE 100 MG RECONSTITUTED SOLUTION 1 EACH VIAL: Performed by: FAMILY MEDICINE

## 2025-04-23 PROCEDURE — 25010000002 METHYLPREDNISOLONE PER 125 MG: Performed by: NURSE PRACTITIONER

## 2025-04-23 PROCEDURE — 80053 COMPREHEN METABOLIC PANEL: CPT | Performed by: EMERGENCY MEDICINE

## 2025-04-23 PROCEDURE — 87449 NOS EACH ORGANISM AG IA: CPT | Performed by: FAMILY MEDICINE

## 2025-04-23 PROCEDURE — 25010000002 AZITHROMYCIN PER 500 MG: Performed by: EMERGENCY MEDICINE

## 2025-04-23 PROCEDURE — 83050 HGB METHEMOGLOBIN QUAN: CPT

## 2025-04-23 PROCEDURE — 71045 X-RAY EXAM CHEST 1 VIEW: CPT

## 2025-04-23 PROCEDURE — 36600 WITHDRAWAL OF ARTERIAL BLOOD: CPT

## 2025-04-23 PROCEDURE — 93005 ELECTROCARDIOGRAM TRACING: CPT | Performed by: EMERGENCY MEDICINE

## 2025-04-23 PROCEDURE — 85025 COMPLETE CBC W/AUTO DIFF WBC: CPT | Performed by: EMERGENCY MEDICINE

## 2025-04-23 PROCEDURE — 82375 ASSAY CARBOXYHB QUANT: CPT

## 2025-04-23 PROCEDURE — 25010000002 FUROSEMIDE PER 20 MG: Performed by: EMERGENCY MEDICINE

## 2025-04-23 PROCEDURE — 87086 URINE CULTURE/COLONY COUNT: CPT | Performed by: FAMILY MEDICINE

## 2025-04-23 PROCEDURE — 93010 ELECTROCARDIOGRAM REPORT: CPT | Performed by: HOSPITALIST

## 2025-04-23 PROCEDURE — 0202U NFCT DS 22 TRGT SARS-COV-2: CPT | Performed by: EMERGENCY MEDICINE

## 2025-04-23 PROCEDURE — 93005 ELECTROCARDIOGRAM TRACING: CPT

## 2025-04-23 PROCEDURE — 87641 MR-STAPH DNA AMP PROBE: CPT | Performed by: NURSE PRACTITIONER

## 2025-04-23 PROCEDURE — 94640 AIRWAY INHALATION TREATMENT: CPT

## 2025-04-23 PROCEDURE — 94799 UNLISTED PULMONARY SVC/PX: CPT

## 2025-04-23 PROCEDURE — 87040 BLOOD CULTURE FOR BACTERIA: CPT | Performed by: EMERGENCY MEDICINE

## 2025-04-23 PROCEDURE — 25010000002 CEFTRIAXONE PER 250 MG: Performed by: EMERGENCY MEDICINE

## 2025-04-23 PROCEDURE — 25810000003 SODIUM CHLORIDE 0.9 % SOLUTION 250 ML FLEX CONT: Performed by: EMERGENCY MEDICINE

## 2025-04-23 PROCEDURE — 36415 COLL VENOUS BLD VENIPUNCTURE: CPT

## 2025-04-23 PROCEDURE — 99285 EMERGENCY DEPT VISIT HI MDM: CPT

## 2025-04-23 PROCEDURE — 81001 URINALYSIS AUTO W/SCOPE: CPT | Performed by: FAMILY MEDICINE

## 2025-04-23 PROCEDURE — 82805 BLOOD GASES W/O2 SATURATION: CPT

## 2025-04-23 PROCEDURE — 83880 ASSAY OF NATRIURETIC PEPTIDE: CPT | Performed by: EMERGENCY MEDICINE

## 2025-04-23 PROCEDURE — 83605 ASSAY OF LACTIC ACID: CPT | Performed by: EMERGENCY MEDICINE

## 2025-04-23 RX ORDER — CITALOPRAM HYDROBROMIDE 20 MG/1
20 TABLET ORAL DAILY
Status: CANCELLED | OUTPATIENT
Start: 2025-04-24

## 2025-04-23 RX ORDER — FUROSEMIDE 10 MG/ML
60 INJECTION INTRAMUSCULAR; INTRAVENOUS ONCE
Status: COMPLETED | OUTPATIENT
Start: 2025-04-23 | End: 2025-04-23

## 2025-04-23 RX ORDER — ISOSORBIDE MONONITRATE 60 MG/1
120 TABLET, EXTENDED RELEASE ORAL DAILY
Status: CANCELLED | OUTPATIENT
Start: 2025-04-24

## 2025-04-23 RX ORDER — ONDANSETRON 2 MG/ML
4 INJECTION INTRAMUSCULAR; INTRAVENOUS EVERY 6 HOURS PRN
Status: DISCONTINUED | OUTPATIENT
Start: 2025-04-23 | End: 2025-04-25 | Stop reason: HOSPADM

## 2025-04-23 RX ORDER — IPRATROPIUM BROMIDE AND ALBUTEROL SULFATE 2.5; .5 MG/3ML; MG/3ML
3 SOLUTION RESPIRATORY (INHALATION) EVERY 4 HOURS PRN
Status: DISCONTINUED | OUTPATIENT
Start: 2025-04-23 | End: 2025-04-25 | Stop reason: HOSPADM

## 2025-04-23 RX ORDER — ACETAMINOPHEN 325 MG/1
650 TABLET ORAL EVERY 4 HOURS PRN
Status: DISCONTINUED | OUTPATIENT
Start: 2025-04-23 | End: 2025-04-25 | Stop reason: HOSPADM

## 2025-04-23 RX ORDER — ALBUTEROL SULFATE 0.83 MG/ML
10 SOLUTION RESPIRATORY (INHALATION)
Status: COMPLETED | OUTPATIENT
Start: 2025-04-23 | End: 2025-04-23

## 2025-04-23 RX ORDER — ACETAMINOPHEN 650 MG/1
650 SUPPOSITORY RECTAL EVERY 4 HOURS PRN
Status: DISCONTINUED | OUTPATIENT
Start: 2025-04-23 | End: 2025-04-25 | Stop reason: HOSPADM

## 2025-04-23 RX ORDER — POLYETHYLENE GLYCOL 3350 17 G/17G
17 POWDER, FOR SOLUTION ORAL DAILY PRN
Status: DISCONTINUED | OUTPATIENT
Start: 2025-04-23 | End: 2025-04-25 | Stop reason: HOSPADM

## 2025-04-23 RX ORDER — SODIUM CHLORIDE 9 MG/ML
40 INJECTION, SOLUTION INTRAVENOUS AS NEEDED
Status: DISCONTINUED | OUTPATIENT
Start: 2025-04-23 | End: 2025-04-25 | Stop reason: HOSPADM

## 2025-04-23 RX ORDER — CLOPIDOGREL BISULFATE 75 MG/1
75 TABLET ORAL DAILY
Status: CANCELLED | OUTPATIENT
Start: 2025-04-24

## 2025-04-23 RX ORDER — CLOPIDOGREL BISULFATE 75 MG/1
75 TABLET ORAL DAILY
Status: DISCONTINUED | OUTPATIENT
Start: 2025-04-23 | End: 2025-04-25 | Stop reason: HOSPADM

## 2025-04-23 RX ORDER — TERAZOSIN 5 MG/1
5 CAPSULE ORAL DAILY
COMMUNITY

## 2025-04-23 RX ORDER — POTASSIUM CHLORIDE 1500 MG/1
40 TABLET, EXTENDED RELEASE ORAL ONCE
Status: COMPLETED | OUTPATIENT
Start: 2025-04-23 | End: 2025-04-23

## 2025-04-23 RX ORDER — AMOXICILLIN 250 MG
2 CAPSULE ORAL 2 TIMES DAILY PRN
Status: DISCONTINUED | OUTPATIENT
Start: 2025-04-23 | End: 2025-04-25 | Stop reason: HOSPADM

## 2025-04-23 RX ORDER — CITALOPRAM HYDROBROMIDE 20 MG/1
20 TABLET ORAL DAILY
Status: DISCONTINUED | OUTPATIENT
Start: 2025-04-24 | End: 2025-04-25 | Stop reason: HOSPADM

## 2025-04-23 RX ORDER — SODIUM CHLORIDE 0.9 % (FLUSH) 0.9 %
10 SYRINGE (ML) INJECTION AS NEEDED
Status: DISCONTINUED | OUTPATIENT
Start: 2025-04-23 | End: 2025-04-25 | Stop reason: HOSPADM

## 2025-04-23 RX ORDER — FUROSEMIDE 10 MG/ML
40 INJECTION INTRAMUSCULAR; INTRAVENOUS 2 TIMES DAILY
Status: DISCONTINUED | OUTPATIENT
Start: 2025-04-24 | End: 2025-04-24

## 2025-04-23 RX ORDER — ACETAMINOPHEN 160 MG/5ML
650 SOLUTION ORAL EVERY 4 HOURS PRN
Status: DISCONTINUED | OUTPATIENT
Start: 2025-04-23 | End: 2025-04-25 | Stop reason: HOSPADM

## 2025-04-23 RX ORDER — BISACODYL 5 MG/1
5 TABLET, DELAYED RELEASE ORAL DAILY PRN
Status: DISCONTINUED | OUTPATIENT
Start: 2025-04-23 | End: 2025-04-25 | Stop reason: HOSPADM

## 2025-04-23 RX ORDER — METOPROLOL SUCCINATE 25 MG/1
25 TABLET, EXTENDED RELEASE ORAL
Status: DISCONTINUED | OUTPATIENT
Start: 2025-04-23 | End: 2025-04-25 | Stop reason: HOSPADM

## 2025-04-23 RX ORDER — NITROGLYCERIN 0.4 MG/1
0.4 TABLET SUBLINGUAL
Status: DISCONTINUED | OUTPATIENT
Start: 2025-04-23 | End: 2025-04-25 | Stop reason: HOSPADM

## 2025-04-23 RX ORDER — ACETAMINOPHEN 500 MG
1000 TABLET ORAL ONCE
Status: COMPLETED | OUTPATIENT
Start: 2025-04-23 | End: 2025-04-23

## 2025-04-23 RX ORDER — SODIUM CHLORIDE 0.9 % (FLUSH) 0.9 %
10 SYRINGE (ML) INJECTION EVERY 12 HOURS SCHEDULED
Status: DISCONTINUED | OUTPATIENT
Start: 2025-04-23 | End: 2025-04-25 | Stop reason: HOSPADM

## 2025-04-23 RX ORDER — BUDESONIDE AND FORMOTEROL FUMARATE DIHYDRATE 160; 4.5 UG/1; UG/1
2 AEROSOL RESPIRATORY (INHALATION)
Status: DISCONTINUED | OUTPATIENT
Start: 2025-04-23 | End: 2025-04-25 | Stop reason: HOSPADM

## 2025-04-23 RX ORDER — ONDANSETRON 4 MG/1
4 TABLET, ORALLY DISINTEGRATING ORAL EVERY 6 HOURS PRN
Status: DISCONTINUED | OUTPATIENT
Start: 2025-04-23 | End: 2025-04-25 | Stop reason: HOSPADM

## 2025-04-23 RX ORDER — BISACODYL 10 MG
10 SUPPOSITORY, RECTAL RECTAL DAILY PRN
Status: DISCONTINUED | OUTPATIENT
Start: 2025-04-23 | End: 2025-04-25 | Stop reason: HOSPADM

## 2025-04-23 RX ORDER — ISOSORBIDE MONONITRATE 60 MG/1
120 TABLET, EXTENDED RELEASE ORAL DAILY
Status: DISCONTINUED | OUTPATIENT
Start: 2025-04-23 | End: 2025-04-25 | Stop reason: HOSPADM

## 2025-04-23 RX ORDER — ROSUVASTATIN CALCIUM 5 MG/1
5 TABLET, COATED ORAL DAILY
Status: DISCONTINUED | OUTPATIENT
Start: 2025-04-24 | End: 2025-04-25 | Stop reason: HOSPADM

## 2025-04-23 RX ORDER — TAMSULOSIN HYDROCHLORIDE 0.4 MG/1
0.4 CAPSULE ORAL DAILY
Status: DISCONTINUED | OUTPATIENT
Start: 2025-04-24 | End: 2025-04-25 | Stop reason: HOSPADM

## 2025-04-23 RX ORDER — METHYLPREDNISOLONE SODIUM SUCCINATE 125 MG/2ML
80 INJECTION, POWDER, LYOPHILIZED, FOR SOLUTION INTRAMUSCULAR; INTRAVENOUS EVERY 8 HOURS SCHEDULED
Status: DISCONTINUED | OUTPATIENT
Start: 2025-04-23 | End: 2025-04-24

## 2025-04-23 RX ADMIN — METHYLPREDNISOLONE SODIUM SUCCINATE 80 MG: 125 INJECTION, POWDER, FOR SOLUTION INTRAMUSCULAR; INTRAVENOUS at 18:23

## 2025-04-23 RX ADMIN — CLOPIDOGREL BISULFATE 75 MG: 75 TABLET, FILM COATED ORAL at 18:24

## 2025-04-23 RX ADMIN — ALBUTEROL SULFATE 10 MG: 2.5 SOLUTION RESPIRATORY (INHALATION) at 13:17

## 2025-04-23 RX ADMIN — AZITHROMYCIN DIHYDRATE 500 MG: 500 INJECTION, POWDER, LYOPHILIZED, FOR SOLUTION INTRAVENOUS at 14:59

## 2025-04-23 RX ADMIN — POTASSIUM CHLORIDE 40 MEQ: 1500 TABLET, EXTENDED RELEASE ORAL at 16:15

## 2025-04-23 RX ADMIN — SODIUM CHLORIDE 1000 MG: 900 INJECTION INTRAVENOUS at 14:20

## 2025-04-23 RX ADMIN — FUROSEMIDE 60 MG: 10 INJECTION, SOLUTION INTRAVENOUS at 14:14

## 2025-04-23 RX ADMIN — Medication 10 ML: at 22:16

## 2025-04-23 RX ADMIN — METOPROLOL SUCCINATE 25 MG: 25 TABLET, EXTENDED RELEASE ORAL at 18:24

## 2025-04-23 RX ADMIN — ACETAMINOPHEN 1000 MG: 500 TABLET, FILM COATED ORAL at 13:14

## 2025-04-23 RX ADMIN — DOXYCYCLINE 100 MG: 100 INJECTION, POWDER, LYOPHILIZED, FOR SOLUTION INTRAVENOUS at 16:16

## 2025-04-23 NOTE — H&P
[facility] Medicine Services  HISTORY AND PHYSICAL    Date of Admission: 4/23/2025  Primary Care Physician: Kerry Mtz APRN    Subjective   Primary Historian: Patient and wife Ekaterina    Chief Complaint: Fever, not feeling well, shortness of breath    History of Present Illness  Sharad Ryder 64-year-old male who presented to the emergency department for further evaluation of his fevers up to 103 F, increased shortness of breath from baseline x 3 days.  Has a past medical history remarkable for COPD, CHF, recent hospitalization proximately 2 months ago for sepsis and bilateral lower lobe pneumonia, severe aortic stenosis.  ED workup remarkable for WBC 22.57, Hgb 11.2, Hct 32.5, BNP 7295, sodium 133, creatinine 0.6, SPO2 78% on arrival, ABG pH 7.5, pCO2 35.8, PO2 53.1, HCO3 27.9, BE 4.6, oxyhemoglobin 87.8.  Chest x-ray with severe emphysema, persistent coarse mixed interstitial and alveolar infiltrates, appears slightly increased on the right upper lobe.  Patient received IV antibiotics Rocephin and azithromycin, IV Lasix x 1 dose, albuterol nebulized.  He is being admitted to hospitalist service for further evaluation and management of COPD exacerbation with bilateral lower lobe pneumonia and treatment of pulmonary edema.    On admission assessment done in the ED, patient was lying flat on his right side, has had 1200 mL urine out since given Lasix, SpO2 98% on 4 L, titrated O2 down in the ED this assessment to 2 L and maintain SpO2 of 94%.  Heart RRR, bilateral lower lobe expiratory wheezing otherwise diminished lung sounds in all lobes.  No rales appreciated.  Denies nausea, vomiting, diarrhea, chest pain, abdominal pain or signs or symptoms of urinary tract infection.  Orders for UA placed, IV antibiotics changed to cefepime and doxycycline IV, continued Solu-Medrol IV 80 mg every 8 hours, DuoNebs 4 times daily scheduled and as needed, RT to eval and treat, incentive  spirometry and OPEP orders placed.  Viral swab negative.  Patient currently afebrile.    Review of Systems   Otherwise complete ROS reviewed and negative except as mentioned in the HPI.    Past Medical History:   Past Medical History:   Diagnosis Date    Aneurysm     Anxiety     Arthritis     Carotid artery disease     Carotid stenosis, right 02/01/2018    Post right carotid endarterectomy    COPD (chronic obstructive pulmonary disease)     Coronary artery disease     Heart murmur     History of right-sided carotid endarterectomy 02/16/2022    Hyperlipidemia LDL goal <70 12/14/2017    Left frontal lobe, right thalamus and right occipital CVA (cerebrovascular accident) (HCC) 01/27/2022    LVH (left ventricular hypertrophy) 02/16/2022    Pneumonia     Primary hypertension 12/14/2017    S/P CABG x 3 02/16/2022    LIMA to LAD, SVG to PDA and SVG to diagonal branch with TMR and left atrial appendage exclusion with atricure clip device 2/8/2018 per Dr. Cj Robin at [facility]     Severe aortic valve stenosis 12/14/2017    Status post aortic valve replacement with bioprosthetic valve 02/16/2022    Graciela Juarez bovine pericardial 19 mm valve 2/8/2018 per Dr. Cj Robin at [facility]    Tobacco use 12/14/2017    Wears glasses      Past Surgical History:  Past Surgical History:   Procedure Laterality Date    AORTAGRAM Bilateral 10/27/2023    Procedure: LEFT LOWER EXTREMITY ANGIOGRAM, BILATERAL ILIAC BALLOON ANGIOPLASTY, BILATERAL ILIAC STENT PLACEMENT, MYNX CLOSURE;  Surgeon: Jl Salgaod DO;  Location: [facility] OR ;  Service: Vascular;  Laterality: Bilateral;    APPENDECTOMY      CARDIAC CATHETERIZATION N/A 12/18/2017    Procedure: Left Heart Cath;  Surgeon: Aime Francois MD;  Location: [facility] CATH INVASIVE LOCATION;  Service:     CARDIAC CATHETERIZATION N/A 12/18/2017    Procedure: Right Heart Cath;  Surgeon: Aime Francois MD;  Location: [facility] CATH INVASIVE LOCATION;   Service:     CARDIAC CATHETERIZATION Bilateral 1/4/2018    Procedure: Coronary angiography;  Surgeon: Alfonso Yi MD;  Location:  [facility] CATH INVASIVE LOCATION;  Service:     CARDIAC CATHETERIZATION Left 9/15/2021    Procedure: Cardiac Catheterization/Vascular Study NIMCO OK  Has 3 graft 2018;  Surgeon: Aime Francois MD;  Location:  [facility] CATH INVASIVE LOCATION;  Service: Cardiology;  Laterality: Left;    CAROTID ENDARTERECTOMY Right 2/1/2018    Procedure: RIGHT CAROTID ENDARTERECTOMY WITH EEG-awake;  Surgeon: Jl Salgado DO;  Location:  [facility] OR;  Service:     CORONARY ARTERY BYPASS GRAFT WITH AORTIC VALVE REPAIR/REPLACEMENT N/A 2/8/2018    Procedure: AORTIC VALVE REPLACEMENT,CORONARY ARTERY BYPASS GRAFTING x 3  WITH CONCHA AND RIGHT EVH, ATRIAL APPENDAGE EXCLUSION LEFT WITH TRANSESOPHAGEAL ECHOCARDIOGRAM, 17-CHANNEL TMR;  Surgeon: Cj Robin MD;  Location:  [facility] OR;  Service:     FINGER SURGERY Right 1990    OTHER SURGICAL HISTORY      skull srgery from accident in childhood.     Social History:  reports that he quit smoking about 5 months ago. His smoking use included cigarettes. He started smoking about 50 years ago. He has a 74.8 pack-year smoking history. He has never been exposed to tobacco smoke. His smokeless tobacco use includes snuff. He reports that he does not drink alcohol and does not use drugs.    Family History: family history includes Asthma in his sister; Cancer in his maternal uncle; Diabetes in his brother and father; Heart disease in his father and mother; Hypertension in his brother and sister; Kidney disease in his sister.       Allergies:  No Known Allergies    Medications:  Prior to Admission medications    Medication Sig Start Date End Date Taking? Authorizing Provider   amLODIPine (NORVASC) 10 MG tablet Take 1 tablet by mouth Daily.    Provider, MD Estrellita   aspirin 81 MG tablet Take 1 tablet by mouth Daily. 2/16/18   Cj Robin MD   budesonide-formoterol  (SYMBICORT) 160-4.5 MCG/ACT inhaler Inhale 2 puffs 2 (Two) Times a Day. 3/4/25   Adolfo Barker MD   busPIRone (BUSPAR) 10 MG tablet Take 1 tablet by mouth 2 (Two) Times a Day. 8/4/21   Estrellita Wolfe MD   citalopram (CeleXA) 20 MG tablet Take 1 tablet by mouth Daily.    Estrellita Wolfe MD   clopidogrel (PLAVIX) 75 MG tablet Take 1 tablet by mouth Daily. 2/16/18   Cj Robin MD   ipratropium-albuterol (DUO-NEB) 0.5-2.5 mg/3 ml nebulizer Take 3 mL by nebulization Every 4 (Four) Hours As Needed for Wheezing. 12/29/24   Claudia Bey APRN   isosorbide mononitrate (IMDUR) 120 MG 24 hr tablet Take 1 tablet by mouth Daily. 2/17/22   Ashtyn Bay APRN   metoprolol succinate XL (TOPROL-XL) 50 MG 24 hr tablet Take 1 tablet by mouth Daily. 1/29/22   Barbra Trent APRN   multivitamin with minerals (ONE-A-DAY MENS 50+ ADVANTAGE PO) Take 1 tablet by mouth Daily.    Estrellita Wolfe MD   nitroglycerin (NITROSTAT) 0.4 MG SL tablet Place 1 tablet under the tongue Every 5 (Five) Minutes As Needed for Chest Pain. Take no more than 3 doses in 15 minutes.    Estrellita Wolfe MD   predniSONE (DELTASONE) 10 MG tablet Take 1 tablet by mouth Daily. 40mg X 1 day, 30mg X 1 day, 20mg X 1 day, 10mg X 1 day, stop 3/4/25   Adolfo Barker MD   rosuvastatin (CRESTOR) 5 MG tablet Take 1 tablet by mouth Daily. 4/14/25   Aime Francois MD   tamsulosin (FLOMAX) 0.4 MG capsule 24 hr capsule Take 1 capsule by mouth Daily. 8/1/23   Estrellita Wolfe MD I have utilized all available immediate resources to obtain, update, or review the patient's current medications (including all prescriptions, over-the-counter products, herbals, cannabis/cannabidiol products, and vitamin/mineral/dietary (nutritional) supplements).    Results for orders placed during the hospital encounter of 12/25/24    Adult Transthoracic Echo Complete w/ Color, Spectral and Contrast if Necessary Per  "Protocol    Interpretation Summary    Left ventricular ejection fraction appears to be 66 - 70%.    Left ventricular wall thickness is consistent with moderate concentric hypertrophy.    The following left ventricular wall segments are hypokinetic: apical septal and apex hypokinetic.    Left ventricular diastolic function is consistent with (grade II w/high LAP) pseudonormalization.    The left atrial cavity is mildly dilated.    Severe bioprosthetic aortic valve stenosis is present.    Peak velocity of the flow distal to the aortic valve is 441 cm/s. Aortic valve maximum pressure gradient is 78 mmHg.    Estimated right ventricular systolic pressure from tricuspid regurgitation is normal (<35 mmHg).       Objective     Vital Signs: /74   Pulse 106   Temp 98.7 °F (37.1 °C) (Oral)   Resp 20   Ht 162.6 cm (64\")   Wt 47.9 kg (105 lb 9.6 oz)   SpO2 92%   BMI 18.13 kg/m²     Physical Exam  Vitals and nursing note reviewed.   Constitutional:       Appearance: Normal appearance.   HENT:      Head: Normocephalic and atraumatic.      Jaw: There is normal jaw occlusion.      Nose: Nose normal.      Mouth/Throat:      Mouth: Mucous membranes are moist.      Pharynx: Oropharynx is clear.   Cardiovascular:      Rate and Rhythm: Normal rate and regular rhythm.      Heart sounds: Normal heart sounds.   Pulmonary:      Effort: Pulmonary effort is normal.      Breath sounds: Normal breath sounds.   Abdominal:      General: Abdomen is flat. Bowel sounds are normal.      Palpations: Abdomen is soft.   Musculoskeletal:         General: Normal range of motion.      Cervical back: Full passive range of motion without pain and normal range of motion.   Skin:     General: Skin is warm and dry.      Capillary Refill: Capillary refill takes less than 2 seconds.   Neurological:      General: No focal deficit present.      Mental Status: He is alert and oriented to person, place, and time. Mental status is at baseline. "   Psychiatric:         Mood and Affect: Mood normal.         Behavior: Behavior is cooperative.         Thought Content: Thought content normal.              Results Reviewed:  Lab Results (last 24 hours)       Procedure Component Value Units Date/Time    S. Pneumo Ag Urine or CSF - Urine, Urine, Clean Catch [[id number]]  (Normal) Collected: 04/23/25 1620    Specimen: Urine, Clean Catch Updated: 04/23/25 1642     Strep Pneumo Ag Negative    Urinalysis With Culture If Indicated - Urine, Clean Catch [[id number]]  (Abnormal) Collected: 04/23/25 1620    Specimen: Urine, Clean Catch Updated: 04/23/25 1642     Color, UA Yellow     Appearance, UA Clear     pH, UA 7.0     Specific Gravity, UA <=1.005     Glucose, UA Negative     Ketones, UA Negative     Bilirubin, UA Negative     Blood, UA Small (1+)     Protein, UA Negative     Leuk Esterase, UA Large (3+)     Nitrite, UA Positive     Urobilinogen, UA 0.2 E.U./dL    Narrative:      In absence of clinical symptoms, the presence of pyuria, bacteria, and/or nitrites on the urinalysis result does not correlate with infection.    Urinalysis, Microscopic Only - Urine, Clean Catch [[id number]]  (Abnormal) Collected: 04/23/25 1620    Specimen: Urine, Clean Catch Updated: 04/23/25 1641     RBC, UA 0-2 /HPF      WBC, UA Too Numerous to Count /HPF      Bacteria, UA Trace /HPF      Squamous Epithelial Cells, UA 0-2 /HPF      Hyaline Casts, UA None Seen /LPF      WBC Clumps, UA Small/1+ /HPF      Methodology Manual Light Microscopy    Urine Culture - Urine, Urine, Clean Catch [[id number]] Collected: 04/23/25 1620    Specimen: Urine, Clean Catch Updated: 04/23/25 1641    Legionella Antigen, Urine - Urine, Urine, Clean Catch [[id number]]  (Normal) Collected: 04/23/25 1620    Specimen: Urine, Clean Catch Updated: 04/23/25 1641     LEGIONELLA ANTIGEN, URINE Negative    Respiratory Panel PCR w/COVID-19(SARS-CoV-2) [facility] In-House, NP Swab in UTM/VTM, 2 HR TAT - Swab, Nasopharynx  [[id number]]  (Normal) Collected: 04/23/25 1312    Specimen: Swab from Nasopharynx Updated: 04/23/25 1414     ADENOVIRUS, PCR Not Detected     Coronavirus 229E Not Detected     Coronavirus HKU1 Not Detected     Coronavirus NL63 Not Detected     Coronavirus OC43 Not Detected     COVID19 Not Detected     Human Metapneumovirus Not Detected     Human Rhinovirus/Enterovirus Not Detected     Influenza A PCR Not Detected     Influenza B PCR Not Detected     Parainfluenza Virus 1 Not Detected     Parainfluenza Virus 2 Not Detected     Parainfluenza Virus 3 Not Detected     Parainfluenza Virus 4 Not Detected     RSV, PCR Not Detected     Bordetella pertussis pcr Not Detected     Bordetella parapertussis PCR Not Detected     Chlamydophila pneumoniae PCR Not Detected     Mycoplasma pneumo by PCR Not Detected    Narrative:      In the setting of a positive respiratory panel with a viral infection PLUS a negative procalcitonin without other underlying concern for bacterial infection, consider observing off antibiotics or discontinuation of antibiotics and continue supportive care. If the respiratory panel is positive for atypical bacterial infection (Bordetella pertussis, Chlamydophila pneumoniae, or Mycoplasma pneumoniae), consider antibiotic de-escalation to target atypical bacterial infection.    Comprehensive Metabolic Panel [[id number]]  (Abnormal) Collected: 04/23/25 1308    Specimen: Blood Updated: 04/23/25 1351     Glucose 128 mg/dL      BUN 11 mg/dL      Creatinine 0.61 mg/dL      Sodium 133 mmol/L      Potassium 3.5 mmol/L      Chloride 92 mmol/L      CO2 26.0 mmol/L      Calcium 8.9 mg/dL      Total Protein 6.9 g/dL      Albumin 3.9 g/dL      ALT (SGPT) 11 U/L      AST (SGOT) 16 U/L      Alkaline Phosphatase 69 U/L      Total Bilirubin 0.4 mg/dL      Globulin 3.0 gm/dL      A/G Ratio 1.3 g/dL      BUN/Creatinine Ratio 18.0     Anion Gap 15.0 mmol/L      eGFR 106.6 mL/min/1.73     Narrative:      GFR Categories in  Chronic Kidney Disease (CKD)      GFR Category          GFR (mL/min/1.73)    Interpretation  G1                     90 or greater         Normal or high (1)  G2                      60-89                Mild decrease (1)  G3a                   45-59                Mild to moderate decrease  G3b                   30-44                Moderate to severe decrease  G4                    15-29                Severe decrease  G5                    14 or less           Kidney failure          (1)In the absence of evidence of kidney disease, neither GFR category G1 or G2 fulfill the criteria for CKD.    eGFR calculation 2021 CKD-EPI creatinine equation, which does not include race as a factor    BNP [[id number]]  (Abnormal) Collected: 04/23/25 1308    Specimen: Blood Updated: 04/23/25 1349     proBNP 7,295.0 pg/mL     Narrative:      This assay is used as an aid in the diagnosis of individuals suspected of having heart failure. It can be used as an aid in the diagnosis of acute decompensated heart failure (ADHF) in patients presenting with signs and symptoms of ADHF to the emergency department (ED). In addition, NT-proBNP of <300 pg/mL indicates ADHF is not likely.    Age Range Result Interpretation  NT-proBNP Concentration (pg/mL:      <50             Positive            >450                   Gray                 300-450                    Negative             <300    50-75           Positive            >900                  Gray                300-900                  Negative            <300      >75             Positive            >1800                  Gray                300-1800                  Negative            <300    Lactic Acid, Plasma [[id number]]  (Normal) Collected: 04/23/25 1308    Specimen: Blood Updated: 04/23/25 1348     Lactate 2.0 mmol/L     Blood Culture - Blood, Arm, Right [[id number]] Collected: 04/23/25 1302    Specimen: Blood from Arm, Right Updated: 04/23/25 1324    Blood Culture - Blood, Arm,  Left [[id number]] Collected: 04/23/25 1308    Specimen: Blood from Arm, Left Updated: 04/23/25 1323    CBC & Differential [[id number]]  (Abnormal) Collected: 04/23/25 1308    Specimen: Blood Updated: 04/23/25 1322    Narrative:      The following orders were created for panel order CBC & Differential.  Procedure                               Abnormality         Status                     ---------                               -----------         ------                     CBC Auto Differential[[id number]]        Abnormal            Final result                 Please view results for these tests on the individual orders.    CBC Auto Differential [[id number]]  (Abnormal) Collected: 04/23/25 1308    Specimen: Blood Updated: 04/23/25 1322     WBC 22.57 10*3/mm3      RBC 3.65 10*6/mm3      Hemoglobin 11.2 g/dL      Hematocrit 32.5 %      MCV 89.0 fL      MCH 30.7 pg      MCHC 34.5 g/dL      RDW 12.9 %      RDW-SD 42.0 fl      MPV 8.7 fL      Platelets 408 10*3/mm3      Neutrophil % 86.9 %      Lymphocyte % 5.6 %      Monocyte % 6.1 %      Eosinophil % 0.2 %      Basophil % 0.3 %      Immature Grans % 0.9 %      Neutrophils, Absolute 19.62 10*3/mm3      Lymphocytes, Absolute 1.27 10*3/mm3      Monocytes, Absolute 1.37 10*3/mm3      Eosinophils, Absolute 0.04 10*3/mm3      Basophils, Absolute 0.07 10*3/mm3      Immature Grans, Absolute 0.20 10*3/mm3      nRBC 0.0 /100 WBC     Blood Gas, Arterial With Co-Ox [[id number]]  (Abnormal) Collected: 04/23/25 1304    Specimen: Arterial Blood Updated: 04/23/25 1302     Site Right Brachial     Glenroy's Test N/A     pH, Arterial 7.500 pH units      Comment: 83 Value above reference range        pCO2, Arterial 35.8 mm Hg      pO2, Arterial 53.1 mm Hg      Comment: 85 Value below critical limit        HCO3, Arterial 27.9 mmol/L      Comment: 83 Value above reference range        Base Excess, Arterial 4.6 mmol/L      Comment: 83 Value above reference range        O2 Saturation, Arterial 89.3  %      Comment: 84 Value below reference range        Hemoglobin, Blood Gas 10.9 g/dL      Comment: 84 Value below reference range        Hematocrit, Blood Gas 33.4 %      Comment: 84 Value below reference range        Oxyhemoglobin 87.8 %      Comment: 84 Value below reference range        Methemoglobin 0.20 %      Carboxyhemoglobin 1.5 %      Temperature 37.0     Sodium, Arterial 134 mmol/L      Comment: 84 Value below reference range        Potassium, Arterial 2.9 mmol/L      Comment: 84 Value below reference range        Barometric Pressure for Blood Gas 755 mmHg      Modality Nasal Cannula     Flow Rate 3.0 lpm      Ventilator Mode NA     Notified Who Dr. RADHA Yi     Notified By Sammie Garcia, RRT     Notified Time 04/23/2025 13:05     Collected by [id number]     Comment: Meter: [id number]     :  Sammie Garcia RRT        pH, Temp Corrected 7.500 pH Units      pCO2, Temperature Corrected 35.8 mm Hg      pO2, Temperature Corrected 53.1 mm Hg           Imaging Results (Last 24 Hours)       Procedure Component Value Units Date/Time    XR Chest 1 View [[id number]] Collected: 04/23/25 1306     Updated: 04/23/25 1312    Narrative:      EXAMINATION: XR CHEST 1 VW- 4/23/2025 1:06 PM     HISTORY: soa.     REPORT: Frontal view of the chest was obtained.     COMPARISON: Chest x-ray 3/1/2025.     The lungs are hyperinflated with areas of associated hyperlucency  compatible with advanced emphysema. There are persistent interstitial  and parenchymal opacities with a similar distribution, though possibly  slightly increased in the right upper lobe. This may represent ongoing  pulmonary edema or pneumonia, and some background pulmonary fibrosis is  not excluded. No pneumothorax or pleural effusion is identified. There  is previous CABG, aortic valvuloplasty. Heart size is normal. A left  atrial appendage occlusion clamp is present. No acute osseous  abnormality is identified.       Impression:      Severe emphysema  with persistent coarse mixed interstitial  and alveolar infiltrates, this appears slightly increased on the right  upper lobe. Differential includes ongoing pulmonary edema, as well as  pneumonia and possible underlying pulmonary fibrosis.     This report was signed and finalized on 4/23/2025 1:09 PM by Dr. Son Womack MD.             I have personally reviewed and interpreted the radiology studies and ECG obtained at time of admission.     Assessment / Plan   Assessment:   Active Hospital Problems    Diagnosis     **Community acquired bilateral lower lobe pneumonia     Acute on chronic congestive heart failure     Acute exacerbation of chronic obstructive pulmonary disease (COPD)        Treatment Plan  The patient will be admitted to hospitalist service to Dr. Bailey at [facility].   Community acquired bilateral lower lobe pneumonia  Received IV Rocephin and azithromycin in the ED  Start IV cefepime and doxycycline   DuoNebs every 4 hours scheduled and as needed  Vital signs every 4 hours and as needed  Continuous telemetry  Continuous pulse oximetry  Respiratory therapy to eval and treat  Wean oxygen for SpO2 90% or greater, baseline is with no O2  Treat fever with acetaminophen as ordered  I-S and OPEP  Acute on chronic congestive heart failure  Received Lasix IV 60 mg in the ED with 1200 mL output prior to admission  IV Lasix 40 mg every 12 hours  Continuous pulse oximetry  Continuous telemetry  Strict I/O  Cardiac diet 2 g sodium restriction  PT/OT to eval and treat  Respiratory therapy to evaluate and treat as indicated  Continue DuoNebs 4 times daily and scheduled Solu-Medrol  BP home meds metoprolol XL continued decreased to 25 mg daily  Home Crestor 5 mg daily started  Acute exacerbation of chronic obstructive pulmonary disease  Continuous pulse oximetry and telemetry  Respiratory therapy to evaluate and treat as indicated  DuoNebs 4 times daily and as needed  Solu-Medrol 80 mg every 8  hours  IV antibiotics cefepime and doxycycline started  IS and OPEP per RT    Medical Decision Making  Number and Complexity of problems: 3  1 acute problem, high complexity, recurrent, unchanged and stable  2 acute on chronic problems, high complexity, unchanged, continuing to monitor    Differential Diagnosis: Bilateral lower lobe pneumonia versus pulmonary edema    Conditions and Status        Condition is unchanged.     [facility] Data  External documents reviewed: Yes  Cardiac tracing (EKG, telemetry) interpretation: Yes  Radiology interpretation: Yes  Labs reviewed: Yes  Any tests that were considered but not ordered: No     Decision rules/scores evaluated (example [id number], [place], etc): None     Discussed with: Patient and his wife Ekaterina as well as Dr. Bailey.     Care Planning  Shared decision making: Discussed plan of care with patient, his wife, ER physician and Dr. Bailey.  All questions answered for patient and his wife, they are agreeable to current plan of care.  Code status and discussions: Discussed CODE STATUS with patient and his wife at bedside, patient wishes to remain full code at this time.    Disposition  Social Determinants of Health that impact treatment or disposition: Now  Estimated length of stay is 2-3 days.     I confirmed that the patient's advanced care plan is present, code status is documented, and a surrogate decision maker is listed in the patient's medical record.     The patient's surrogate decision maker is Ekaterina Ryder, his wife.     The patient was seen and examined by me on 4/23/2025 at 1530.    Electronically signed by MICHAEL Castro, 04/23/25, 17:52 CDT.    I spent 50 minutes caring for patient. This time includes time spent by me in the following activities: preparing for the visit, reviewing tests, performing a medically appropriate examination and/or evaluation, counseling and educating the patient/family/caregiver, referring and communicating with other  health care professionals, documenting information in the medical record, independently interpreting results and communicating that information with the patient/family/caregiver, ordering test(s), and obtaining a separately obtained history.

## 2025-04-23 NOTE — ED PROVIDER NOTES
Subjective   History of Present Illness  Patient is a 65-year-old male with a history of COPD, congestive heart failure and coronary artery disease who presents to the ER with shortness of air.  Patient states he has been short of breath since last night, progressively worsening.  Patient also complains of a fever.  Patient states his fever started last night and got as high as 103 °F.  He is not on home oxygen.  He denies any chest pain, abdominal pain, nausea vomiting diarrhea, urinary changes, neurologic changes, cough.      Review of Systems   Constitutional:  Positive for fever.   HENT: Negative.     Eyes: Negative.    Respiratory:  Positive for shortness of breath.    Cardiovascular: Negative.    Gastrointestinal: Negative.    Endocrine: Negative.    Genitourinary: Negative.    Musculoskeletal: Negative.    Skin: Negative.    Allergic/Immunologic: Negative.    Neurological: Negative.    Hematological: Negative.    Psychiatric/Behavioral: Negative.     All other systems reviewed and are negative.      Past Medical History:   Diagnosis Date    Aneurysm     Anxiety     Arthritis     Carotid artery disease     Carotid stenosis, right 02/01/2018    Post right carotid endarterectomy    COPD (chronic obstructive pulmonary disease)     Coronary artery disease     Heart murmur     History of right-sided carotid endarterectomy 02/16/2022    Hyperlipidemia LDL goal <70 12/14/2017    Left frontal lobe, right thalamus and right occipital CVA (cerebrovascular accident) (HCC) 01/27/2022    LVH (left ventricular hypertrophy) 02/16/2022    Pneumonia     Primary hypertension 12/14/2017    S/P CABG x 3 02/16/2022    LIMA to LAD, SVG to PDA and SVG to diagonal branch with TMR and left atrial appendage exclusion with atricure clip device 2/8/2018 [facility]     Severe aortic valve stenosis 12/14/2017    Status post aortic valve replacement with bioprosthetic valve 02/16/2022    Graciela  Juarez bovine pericardial 19 mm valve 2/8/2018 per Dr. Cj [facility] at [facility]    Tobacco use 12/14/2017    Wears glasses        No Known Allergies    Past Surgical History:   Procedure Laterality Date    AORTAGRAM Bilateral 10/27/2023    Procedure: LEFT LOWER EXTREMITY ANGIOGRAM, BILATERAL ILIAC BALLOON ANGIOPLASTY, BILATERAL ILIAC STENT PLACEMENT, MYNX CLOSURE;  Surgeon: Jl Salgado DO;  Location:  [facility] HYBRID OR 12;  Service: Vascular;  Laterality: Bilateral;    APPENDECTOMY      CARDIAC CATHETERIZATION N/A 12/18/2017    Procedure: Left Heart Cath;  Surgeon: Aime Francois MD;  Location:  [facility] CATH INVASIVE LOCATION;  Service:     CARDIAC CATHETERIZATION N/A 12/18/2017    Procedure: Right Heart Cath;  Surgeon: Aime Francois MD;  Location:  [facility] CATH INVASIVE LOCATION;  Service:     CARDIAC CATHETERIZATION Bilateral 1/4/2018    Procedure: Coronary angiography;  Surgeon: Alfonso Yi MD;  Location:  [facility] CATH INVASIVE LOCATION;  Service:     CARDIAC CATHETERIZATION Left 9/15/2021    Procedure: Cardiac Catheterization/Vascular Study NIMCO OK  Has 3 graft 2018;  Surgeon: Aime Francois MD;  Location:  [facility] CATH INVASIVE LOCATION;  Service: Cardiology;  Laterality: Left;    CAROTID ENDARTERECTOMY Right 2/1/2018    Procedure: RIGHT CAROTID ENDARTERECTOMY WITH EEG-awake;  Surgeon: Jl Salgado DO;  Location:  [facility] OR;  Service:     CORONARY ARTERY BYPASS GRAFT WITH AORTIC VALVE REPAIR/REPLACEMENT N/A 2/8/2018    Procedure: AORTIC VALVE REPLACEMENT,CORONARY ARTERY BYPASS GRAFTING x 3  WITH CONCHA AND RIGHT EVH, ATRIAL APPENDAGE EXCLUSION LEFT WITH TRANSESOPHAGEAL ECHOCARDIOGRAM, 17-CHANNEL TMR;  Surgeon: Cj Robin MD;  Location:  [facility] OR;  Service:     FINGER SURGERY Right 1990    OTHER SURGICAL HISTORY      skull srgery from accident in childhood.       Family History   Problem Relation Age of Onset    Heart disease Mother     Heart disease Father     Diabetes Father      Hypertension Sister     Asthma Sister     Kidney disease Sister     Hypertension Brother     Diabetes Brother     Cancer Maternal Uncle        Social History     Socioeconomic History    Marital status:    Tobacco Use    Smoking status: Former     Current packs/day: 0.00     Average packs/day: 1.5 packs/day for 49.8 years (74.8 ttl pk-yrs)     Types: Cigarettes     Start date:      Quit date: 2024     Years since quittin.4     Passive exposure: Never    Smokeless tobacco: Current     Types: Snuff   Vaping Use    Vaping status: Never Used   Substance and Sexual Activity    Alcohol use: No    Drug use: No    Sexual activity: Defer           Objective   Physical Exam  Vitals and nursing note reviewed.   Constitutional:       Appearance: He is well-developed.   HENT:      Head: Normocephalic and atraumatic.   Eyes:      Conjunctiva/sclera: Conjunctivae normal.      Pupils: Pupils are equal, round, and reactive to light.   Cardiovascular:      Rate and Rhythm: Regular rhythm. Tachycardia present.      Heart sounds: Normal heart sounds. Murmur heard.   Pulmonary:      Effort: Tachypnea present. No respiratory distress.      Breath sounds: Wheezing and rales present.   Abdominal:      Palpations: Abdomen is soft.      Tenderness: There is no abdominal tenderness.   Musculoskeletal:         General: No deformity. Normal range of motion.      Cervical back: Normal range of motion.   Skin:     General: Skin is warm.   Neurological:      Mental Status: He is alert and oriented to person, place, and time.   Psychiatric:         Behavior: Behavior normal.         Procedures           ED Course      EKG as interpreted by me: Sinus tachycardia with a rate of 120, left ventricular hypertrophy, no acute ischemia or infarction                                       Lab Results (last 24 hours)       Procedure Component Value Units Date/Time    Blood Culture - Blood, Arm, Right [[id number]] Collected: 25 1302     Specimen: Blood from Arm, Right Updated: 04/23/25 1324    Blood Gas, Arterial With Co-Ox [[id number]]  (Abnormal) Collected: 04/23/25 1304    Specimen: Arterial Blood Updated: 04/23/25 1302     Site Right Brachial     Glenroy's Test N/A     pH, Arterial 7.500 pH units      Comment: 83 Value above reference range        pCO2, Arterial 35.8 mm Hg      pO2, Arterial 53.1 mm Hg      Comment: 85 Value below critical limit        HCO3, Arterial 27.9 mmol/L      Comment: 83 Value above reference range        Base Excess, Arterial 4.6 mmol/L      Comment: 83 Value above reference range        O2 Saturation, Arterial 89.3 %      Comment: 84 Value below reference range        Hemoglobin, Blood Gas 10.9 g/dL      Comment: 84 Value below reference range        Hematocrit, Blood Gas 33.4 %      Comment: 84 Value below reference range        Oxyhemoglobin 87.8 %      Comment: 84 Value below reference range        Methemoglobin 0.20 %      Carboxyhemoglobin 1.5 %      Temperature 37.0     Sodium, Arterial 134 mmol/L      Comment: 84 Value below reference range        Potassium, Arterial 2.9 mmol/L      Comment: 84 Value below reference range        Barometric Pressure for Blood Gas 755 mmHg      Modality Nasal Cannula     Flow Rate 3.0 lpm      Ventilator Mode NA     Notified Who Dr. RADHA Yi     Notified By Sammie Garcia, RRT     Notified Time 04/23/2025 13:05     Collected by [id number]     Comment: Meter: [id number]     :  Sammie Garcia, GASTON        pH, Temp Corrected 7.500 pH Units      pCO2, Temperature Corrected 35.8 mm Hg      pO2, Temperature Corrected 53.1 mm Hg     CBC & Differential [[id number]]  (Abnormal) Collected: 04/23/25 1308    Specimen: Blood Updated: 04/23/25 1322    Narrative:      The following orders were created for panel order CBC & Differential.  Procedure                               Abnormality         Status                     ---------                               -----------         ------                      CBC Auto Differential[[id number]]        Abnormal            Final result                 Please view results for these tests on the individual orders.    Comprehensive Metabolic Panel [[id number]]  (Abnormal) Collected: 04/23/25 1308    Specimen: Blood Updated: 04/23/25 1351     Glucose 128 mg/dL      BUN 11 mg/dL      Creatinine 0.61 mg/dL      Sodium 133 mmol/L      Potassium 3.5 mmol/L      Chloride 92 mmol/L      CO2 26.0 mmol/L      Calcium 8.9 mg/dL      Total Protein 6.9 g/dL      Albumin 3.9 g/dL      ALT (SGPT) 11 U/L      AST (SGOT) 16 U/L      Alkaline Phosphatase 69 U/L      Total Bilirubin 0.4 mg/dL      Globulin 3.0 gm/dL      A/G Ratio 1.3 g/dL      BUN/Creatinine Ratio 18.0     Anion Gap 15.0 mmol/L      eGFR 106.6 mL/min/1.73     Narrative:      GFR Categories in Chronic Kidney Disease (CKD)      GFR Category          GFR (mL/min/1.73)    Interpretation  G1                     90 or greater         Normal or high (1)  G2                      60-89                Mild decrease (1)  G3a                   45-59                Mild to moderate decrease  G3b                   30-44                Moderate to severe decrease  G4                    15-29                Severe decrease  G5                    14 or less           Kidney failure          (1)In the absence of evidence of kidney disease, neither GFR category G1 or G2 fulfill the criteria for CKD.    eGFR calculation 2021 CKD-EPI creatinine equation, which does not include race as a factor    BNP [[id number]]  (Abnormal) Collected: 04/23/25 1308    Specimen: Blood Updated: 04/23/25 1349     proBNP 7,295.0 pg/mL     Narrative:      This assay is used as an aid in the diagnosis of individuals suspected of having heart failure. It can be used as an aid in the diagnosis of acute decompensated heart failure (ADHF) in patients presenting with signs and symptoms of ADHF to the emergency department (ED). In addition, NT-proBNP of <300 pg/mL  indicates ADHF is not likely.    Age Range Result Interpretation  NT-proBNP Concentration (pg/mL:      <50             Positive            >450                   Gray                 300-450                    Negative             <300    50-75           Positive            >900                  Gray                300-900                  Negative            <300      >75             Positive            >1800                  Gray                300-1800                  Negative            <300    Lactic Acid, Plasma [[id number]]  (Normal) Collected: 04/23/25 1308    Specimen: Blood Updated: 04/23/25 1348     Lactate 2.0 mmol/L     Blood Culture - Blood, Arm, Left [[id number]] Collected: 04/23/25 1308    Specimen: Blood from Arm, Left Updated: 04/23/25 1323    CBC Auto Differential [[id number]]  (Abnormal) Collected: 04/23/25 1308    Specimen: Blood Updated: 04/23/25 1322     WBC 22.57 10*3/mm3      RBC 3.65 10*6/mm3      Hemoglobin 11.2 g/dL      Hematocrit 32.5 %      MCV 89.0 fL      MCH 30.7 pg      MCHC 34.5 g/dL      RDW 12.9 %      RDW-SD 42.0 fl      MPV 8.7 fL      Platelets 408 10*3/mm3      Neutrophil % 86.9 %      Lymphocyte % 5.6 %      Monocyte % 6.1 %      Eosinophil % 0.2 %      Basophil % 0.3 %      Immature Grans % 0.9 %      Neutrophils, Absolute 19.62 10*3/mm3      Lymphocytes, Absolute 1.27 10*3/mm3      Monocytes, Absolute 1.37 10*3/mm3      Eosinophils, Absolute 0.04 10*3/mm3      Basophils, Absolute 0.07 10*3/mm3      Immature Grans, Absolute 0.20 10*3/mm3      nRBC 0.0 /100 WBC     Respiratory Panel PCR w/COVID-19(SARS-CoV-2) [facility] In-House, NP Swab in UTM/VTM, 2 HR TAT - Swab, Nasopharynx [[id number]]  (Normal) Collected: 04/23/25 1312    Specimen: Swab from Nasopharynx Updated: 04/23/25 1414     ADENOVIRUS, PCR Not Detected     Coronavirus 229E Not Detected     Coronavirus HKU1 Not Detected     Coronavirus NL63 Not Detected     Coronavirus OC43 Not Detected     COVID19 Not  Detected     Human Metapneumovirus Not Detected     Human Rhinovirus/Enterovirus Not Detected     Influenza A PCR Not Detected     Influenza B PCR Not Detected     Parainfluenza Virus 1 Not Detected     Parainfluenza Virus 2 Not Detected     Parainfluenza Virus 3 Not Detected     Parainfluenza Virus 4 Not Detected     RSV, PCR Not Detected     Bordetella pertussis pcr Not Detected     Bordetella parapertussis PCR Not Detected     Chlamydophila pneumoniae PCR Not Detected     Mycoplasma pneumo by PCR Not Detected    Narrative:      In the setting of a positive respiratory panel with a viral infection PLUS a negative procalcitonin without other underlying concern for bacterial infection, consider observing off antibiotics or discontinuation of antibiotics and continue supportive care. If the respiratory panel is positive for atypical bacterial infection (Bordetella pertussis, Chlamydophila pneumoniae, or Mycoplasma pneumoniae), consider antibiotic de-escalation to target atypical bacterial infection.           XR Chest 1 View   Final Result   Severe emphysema with persistent coarse mixed interstitial   and alveolar infiltrates, this appears slightly increased on the right   upper lobe. Differential includes ongoing pulmonary edema, as well as   pneumonia and possible underlying pulmonary fibrosis.       This report was signed and finalized on 4/23/2025 1:09 PM by Dr. Son Womack MD.                         Medical Decision Making  Patient is a 65-year-old male with a history of COPD, congestive heart failure and coronary artery disease who presents to the ER with shortness of air.  Patient states he has been short of breath since last night, progressively worsening.  Patient also complains of a fever.  Patient states his fever started last night and got as high as 103 °F.  He is not on home oxygen.  He denies any chest pain, abdominal pain, nausea vomiting diarrhea, urinary changes, neurologic changes,  cough.    Differential diagnosis: Pneumonia, viral syndrome, congestive heart failure, COPD exacerbation    Patient arrived hypoxic.  He was placed on nasal cannula and improving.  Patient was given Tylenol and a continuous nebulizer treatment.  Labs showed leukocytosis along with anemia and an elevated BNP.  ABG showed a decreased pO2.  Chest x-ray showed severe emphysema with persistent coarse mixed interstitial and alveolar infiltrates worse on the right upper lobe consistent with pulmonary edema versus pneumonia.  Patient was given Lasix, Rocephin and azithromycin.  I discussed the case with Michelle Barker and her student Stacie with the hospitalist service and patient was admitted to Dr. Bailey's service for further treatment.      Problems Addressed:  Acute congestive heart failure, unspecified heart failure type: complicated acute illness or injury  Acute respiratory failure with hypoxia: complicated acute illness or injury  COPD exacerbation: complicated acute illness or injury  Pneumonia of right upper lobe due to infectious organism: complicated acute illness or injury    Amount and/or Complexity of Data Reviewed  Labs: ordered. Decision-making details documented in ED Course.  Radiology: ordered. Decision-making details documented in ED Course.  ECG/medicine tests: ordered. Decision-making details documented in ED Course.  Discussion of management or test interpretation with external provider(s): Michelle Barker and elvira Quinones with the hospitalist service    Risk  OTC drugs.  Prescription drug management.  Decision regarding hospitalization.        Final diagnoses:   Acute respiratory failure with hypoxia   COPD exacerbation   Acute congestive heart failure, unspecified heart failure type   Pneumonia of right upper lobe due to infectious organism       ED Disposition  ED Disposition       ED Disposition   Decision to Admit    Condition   --    Comment   Level of Care: Telemetry [5]   Diagnosis:  Pneumonia [435821]   Admitting Physician: VASQUEZ DOYLE [[id number]]   Attending Physician: VASQUEZ DOYLE [[id number]]   Certification: I Certify That Inpatient Hospital Services Are Medically Necessary For Greater Than 2 Midnights                 No follow-up provider specified.       Medication List      No changes were made to your prescriptions during this visit.            Meredith Yi MD  04/23/25 2559

## 2025-04-24 ENCOUNTER — APPOINTMENT (OUTPATIENT)
Dept: CARDIOLOGY | Facility: HOSPITAL | Age: 65
DRG: 193 | End: 2025-04-24
Payer: MEDICARE

## 2025-04-24 LAB
ANION GAP SERPL CALCULATED.3IONS-SCNC: 13 MMOL/L (ref 5–15)
AORTIC DIMENSIONLESS INDEX: 0.34 (DI)
AV MEAN PRESS GRAD SYS DOP V1V2: 55.6 MMHG
AV VMAX SYS DOP: 461 CM/SEC
BACTERIA SPEC AEROBE CULT: NO GROWTH
BASOPHILS # BLD MANUAL: 0 10*3/MM3 (ref 0–0.2)
BASOPHILS NFR BLD MANUAL: 0 % (ref 0–1.5)
BH CV ECHO LEFT VENTRICLE GLOBAL LONGITUDINAL STRAIN: -12.2 %
BH CV ECHO MEAS - 2D AUTO EF SIEMENS: 48.8 %
BH CV ECHO MEAS - AI P1/2T: 251.5 MSEC
BH CV ECHO MEAS - AO MAX PG: 85.2 MMHG
BH CV ECHO MEAS - AO ROOT DIAM: 2.24 CM
BH CV ECHO MEAS - AO V2 VTI: 104.6 CM
BH CV ECHO MEAS - AVA(I,D): 0.85 CM2
BH CV ECHO MEAS - EDV(CUBED): 39.3 ML
BH CV ECHO MEAS - EDV(MOD-SP2): 67 ML
BH CV ECHO MEAS - EDV(MOD-SP4): 64.6 ML
BH CV ECHO MEAS - EF(MOD-SP2): 68.9 %
BH CV ECHO MEAS - EF(MOD-SP4): 66 %
BH CV ECHO MEAS - ESV(CUBED): 27 ML
BH CV ECHO MEAS - ESV(MOD-SP2): 20.8 ML
BH CV ECHO MEAS - ESV(MOD-SP4): 21.9 ML
BH CV ECHO MEAS - FS: 11.7 %
BH CV ECHO MEAS - IVS/LVPW: 0.96 CM
BH CV ECHO MEAS - IVSD: 1.26 CM
BH CV ECHO MEAS - LA DIMENSION: 4.6 CM
BH CV ECHO MEAS - LAT PEAK E' VEL: 5.5 CM/SEC
BH CV ECHO MEAS - LV DIASTOLIC VOL/BSA (35-75): 42.7 CM2
BH CV ECHO MEAS - LV MASS(C)D: 145.2 GRAMS
BH CV ECHO MEAS - LV MAX PG: 8.7 MMHG
BH CV ECHO MEAS - LV MEAN PG: 5.5 MMHG
BH CV ECHO MEAS - LV SYSTOLIC VOL/BSA (12-30): 14.5 CM2
BH CV ECHO MEAS - LV V1 MAX: 147 CM/SEC
BH CV ECHO MEAS - LV V1 VTI: 35.2 CM
BH CV ECHO MEAS - LVIDD: 3.4 CM
BH CV ECHO MEAS - LVIDS: 3 CM
BH CV ECHO MEAS - LVOT AREA: 2.5 CM2
BH CV ECHO MEAS - LVOT DIAM: 1.8 CM
BH CV ECHO MEAS - LVPWD: 1.31 CM
BH CV ECHO MEAS - MED PEAK E' VEL: 4.8 CM/SEC
BH CV ECHO MEAS - MV A MAX VEL: 152.2 CM/SEC
BH CV ECHO MEAS - MV DEC SLOPE: 696.8 CM/SEC2
BH CV ECHO MEAS - MV DEC TIME: 0.23 SEC
BH CV ECHO MEAS - MV E MAX VEL: 162 CM/SEC
BH CV ECHO MEAS - MV E/A: 1.06
BH CV ECHO MEAS - MV MAX PG: 9.7 MMHG
BH CV ECHO MEAS - MV MEAN PG: 6.7 MMHG
BH CV ECHO MEAS - MV V2 VTI: 42.2 CM
BH CV ECHO MEAS - MVA(VTI): 2.12 CM2
BH CV ECHO MEAS - PA V2 MAX: 89.6 CM/SEC
BH CV ECHO MEAS - SV(LVOT): 89.3 ML
BH CV ECHO MEAS - SV(MOD-SP2): 46.1 ML
BH CV ECHO MEAS - SV(MOD-SP4): 42.6 ML
BH CV ECHO MEAS - SVI(LVOT): 59.1 ML/M2
BH CV ECHO MEAS - SVI(MOD-SP2): 30.5 ML/M2
BH CV ECHO MEAS - SVI(MOD-SP4): 28.2 ML/M2
BH CV ECHO MEAS - TAPSE (>1.6): 1.43 CM
BH CV ECHO MEAS - TR MAX PG: 8.9 MMHG
BH CV ECHO MEAS - TR MAX VEL: 149 CM/SEC
BH CV ECHO MEASUREMENTS AVERAGE E/E' RATIO: 31.46
BUN SERPL-MCNC: 11 MG/DL (ref 8–23)
BUN/CREAT SERPL: 21.2 (ref 7–25)
CALCIUM SPEC-SCNC: 9.2 MG/DL (ref 8.6–10.5)
CHLORIDE SERPL-SCNC: 97 MMOL/L (ref 98–107)
CO2 SERPL-SCNC: 28 MMOL/L (ref 22–29)
CREAT SERPL-MCNC: 0.52 MG/DL (ref 0.76–1.27)
DEPRECATED RDW RBC AUTO: 42.6 FL (ref 37–54)
EGFRCR SERPLBLD CKD-EPI 2021: 111.9 ML/MIN/1.73
EOSINOPHIL # BLD MANUAL: 0 10*3/MM3 (ref 0–0.4)
EOSINOPHIL NFR BLD MANUAL: 0 % (ref 0.3–6.2)
ERYTHROCYTE [DISTWIDTH] IN BLOOD BY AUTOMATED COUNT: 12.8 % (ref 12.3–15.4)
GLUCOSE SERPL-MCNC: 159 MG/DL (ref 65–99)
HCT VFR BLD AUTO: 35.7 % (ref 37.5–51)
HGB BLD-MCNC: 12.1 G/DL (ref 13–17.7)
LEFT ATRIUM VOLUME INDEX: 61 ML/M2
LEFT ATRIUM VOLUME: 92 ML
LYMPHOCYTES # BLD MANUAL: 1.02 10*3/MM3 (ref 0.7–3.1)
LYMPHOCYTES NFR BLD MANUAL: 0 % (ref 5–12)
MAGNESIUM SERPL-MCNC: 2 MG/DL (ref 1.6–2.4)
MCH RBC QN AUTO: 30.6 PG (ref 26.6–33)
MCHC RBC AUTO-ENTMCNC: 33.9 G/DL (ref 31.5–35.7)
MCV RBC AUTO: 90.2 FL (ref 79–97)
MONOCYTES # BLD: 0 10*3/MM3 (ref 0.1–0.9)
NEUTROPHILS # BLD AUTO: 19.39 10*3/MM3 (ref 1.7–7)
NEUTROPHILS NFR BLD MANUAL: 92 % (ref 42.7–76)
NEUTS BAND NFR BLD MANUAL: 3 % (ref 0–5)
PLAT MORPH BLD: NORMAL
PLATELET # BLD AUTO: 444 10*3/MM3 (ref 140–450)
PMV BLD AUTO: 8.9 FL (ref 6–12)
POLYCHROMASIA BLD QL SMEAR: ABNORMAL
POTASSIUM SERPL-SCNC: 3.2 MMOL/L (ref 3.5–5.2)
QT INTERVAL: 336 MS
QTC INTERVAL: 474 MS
RBC # BLD AUTO: 3.96 10*6/MM3 (ref 4.14–5.8)
SODIUM SERPL-SCNC: 138 MMOL/L (ref 136–145)
VARIANT LYMPHS NFR BLD MANUAL: 5 % (ref 19.6–45.3)
WBC MORPH BLD: NORMAL
WBC NRBC COR # BLD AUTO: 20.41 10*3/MM3 (ref 3.4–10.8)

## 2025-04-24 PROCEDURE — 94761 N-INVAS EAR/PLS OXIMETRY MLT: CPT

## 2025-04-24 PROCEDURE — 25010000002 METHYLPREDNISOLONE PER 40 MG: Performed by: FAMILY MEDICINE

## 2025-04-24 PROCEDURE — 25010000002 METHYLPREDNISOLONE PER 125 MG: Performed by: NURSE PRACTITIONER

## 2025-04-24 PROCEDURE — 93306 TTE W/DOPPLER COMPLETE: CPT | Performed by: INTERNAL MEDICINE

## 2025-04-24 PROCEDURE — 25010000002 FUROSEMIDE PER 20 MG: Performed by: FAMILY MEDICINE

## 2025-04-24 PROCEDURE — 97165 OT EVAL LOW COMPLEX 30 MIN: CPT | Performed by: OCCUPATIONAL THERAPIST

## 2025-04-24 PROCEDURE — 93356 MYOCRD STRAIN IMG SPCKL TRCK: CPT

## 2025-04-24 PROCEDURE — 93356 MYOCRD STRAIN IMG SPCKL TRCK: CPT | Performed by: INTERNAL MEDICINE

## 2025-04-24 PROCEDURE — 94799 UNLISTED PULMONARY SVC/PX: CPT

## 2025-04-24 PROCEDURE — 85007 BL SMEAR W/DIFF WBC COUNT: CPT | Performed by: NURSE PRACTITIONER

## 2025-04-24 PROCEDURE — 25010000002 FUROSEMIDE PER 20 MG: Performed by: NURSE PRACTITIONER

## 2025-04-24 PROCEDURE — 97161 PT EVAL LOW COMPLEX 20 MIN: CPT

## 2025-04-24 PROCEDURE — 94664 DEMO&/EVAL PT USE INHALER: CPT

## 2025-04-24 PROCEDURE — 80048 BASIC METABOLIC PNL TOTAL CA: CPT | Performed by: NURSE PRACTITIONER

## 2025-04-24 PROCEDURE — 25010000002 DOXYCYCLINE 100 MG RECONSTITUTED SOLUTION 1 EACH VIAL: Performed by: NURSE PRACTITIONER

## 2025-04-24 PROCEDURE — 93306 TTE W/DOPPLER COMPLETE: CPT

## 2025-04-24 PROCEDURE — 36415 COLL VENOUS BLD VENIPUNCTURE: CPT | Performed by: NURSE PRACTITIONER

## 2025-04-24 PROCEDURE — 99222 1ST HOSP IP/OBS MODERATE 55: CPT | Performed by: NURSE PRACTITIONER

## 2025-04-24 PROCEDURE — 25010000002 CEFEPIME PER 500 MG: Performed by: NURSE PRACTITIONER

## 2025-04-24 PROCEDURE — 85027 COMPLETE CBC AUTOMATED: CPT | Performed by: NURSE PRACTITIONER

## 2025-04-24 PROCEDURE — 83735 ASSAY OF MAGNESIUM: CPT | Performed by: NURSE PRACTITIONER

## 2025-04-24 RX ORDER — FUROSEMIDE 10 MG/ML
40 INJECTION INTRAMUSCULAR; INTRAVENOUS 2 TIMES DAILY
Status: COMPLETED | OUTPATIENT
Start: 2025-04-24 | End: 2025-04-25

## 2025-04-24 RX ORDER — POTASSIUM CHLORIDE 1500 MG/1
40 TABLET, EXTENDED RELEASE ORAL EVERY 4 HOURS
Status: COMPLETED | OUTPATIENT
Start: 2025-04-24 | End: 2025-04-24

## 2025-04-24 RX ORDER — TERAZOSIN 5 MG/1
5 CAPSULE ORAL DAILY
Status: DISCONTINUED | OUTPATIENT
Start: 2025-04-24 | End: 2025-04-25 | Stop reason: HOSPADM

## 2025-04-24 RX ORDER — HYDROCODONE BITARTRATE AND ACETAMINOPHEN 7.5; 325 MG/1; MG/1
1 TABLET ORAL EVERY 6 HOURS PRN
Refills: 0 | Status: DISCONTINUED | OUTPATIENT
Start: 2025-04-24 | End: 2025-04-25 | Stop reason: HOSPADM

## 2025-04-24 RX ORDER — METHYLPREDNISOLONE SODIUM SUCCINATE 40 MG/ML
40 INJECTION, POWDER, LYOPHILIZED, FOR SOLUTION INTRAMUSCULAR; INTRAVENOUS EVERY 12 HOURS
Status: DISCONTINUED | OUTPATIENT
Start: 2025-04-24 | End: 2025-04-25 | Stop reason: HOSPADM

## 2025-04-24 RX ORDER — POTASSIUM CHLORIDE 1.5 G/1.58G
40 POWDER, FOR SOLUTION ORAL EVERY 4 HOURS
Status: DISCONTINUED | OUTPATIENT
Start: 2025-04-24 | End: 2025-04-24

## 2025-04-24 RX ORDER — SPIRONOLACTONE 25 MG/1
25 TABLET ORAL DAILY
Status: DISCONTINUED | OUTPATIENT
Start: 2025-04-25 | End: 2025-04-25 | Stop reason: HOSPADM

## 2025-04-24 RX ADMIN — METHYLPREDNISOLONE SODIUM SUCCINATE 40 MG: 40 INJECTION, POWDER, FOR SOLUTION INTRAMUSCULAR; INTRAVENOUS at 17:10

## 2025-04-24 RX ADMIN — POTASSIUM CHLORIDE 40 MEQ: 1500 TABLET, EXTENDED RELEASE ORAL at 15:24

## 2025-04-24 RX ADMIN — BUDESONIDE AND FORMOTEROL FUMARATE DIHYDRATE 2 PUFF: 160; 4.5 AEROSOL RESPIRATORY (INHALATION) at 06:14

## 2025-04-24 RX ADMIN — FUROSEMIDE 40 MG: 10 INJECTION, SOLUTION INTRAVENOUS at 17:10

## 2025-04-24 RX ADMIN — METOPROLOL SUCCINATE 25 MG: 25 TABLET, EXTENDED RELEASE ORAL at 21:03

## 2025-04-24 RX ADMIN — BUDESONIDE AND FORMOTEROL FUMARATE DIHYDRATE 2 PUFF: 160; 4.5 AEROSOL RESPIRATORY (INHALATION) at 18:30

## 2025-04-24 RX ADMIN — HYDROCODONE BITARTRATE AND ACETAMINOPHEN 1 TABLET: 7.5; 325 TABLET ORAL at 17:32

## 2025-04-24 RX ADMIN — CEFEPIME 2000 MG: 2 INJECTION, POWDER, FOR SOLUTION INTRAVENOUS at 17:10

## 2025-04-24 RX ADMIN — DOXYCYCLINE 100 MG: 100 INJECTION, POWDER, LYOPHILIZED, FOR SOLUTION INTRAVENOUS at 05:38

## 2025-04-24 RX ADMIN — CEFEPIME 2000 MG: 2 INJECTION, POWDER, FOR SOLUTION INTRAVENOUS at 08:14

## 2025-04-24 RX ADMIN — DOXYCYCLINE 100 MG: 100 INJECTION, POWDER, LYOPHILIZED, FOR SOLUTION INTRAVENOUS at 16:09

## 2025-04-24 RX ADMIN — TERAZOSIN HYDROCHLORIDE 5 MG: 5 CAPSULE ORAL at 10:03

## 2025-04-24 RX ADMIN — TAMSULOSIN HYDROCHLORIDE 0.4 MG: 0.4 CAPSULE ORAL at 08:13

## 2025-04-24 RX ADMIN — Medication 10 ML: at 21:01

## 2025-04-24 RX ADMIN — CITALOPRAM HYDROBROMIDE 20 MG: 20 TABLET ORAL at 08:13

## 2025-04-24 RX ADMIN — FUROSEMIDE 40 MG: 10 INJECTION, SOLUTION INTRAVENOUS at 05:41

## 2025-04-24 RX ADMIN — HYDROCODONE BITARTRATE AND ACETAMINOPHEN 1 TABLET: 7.5; 325 TABLET ORAL at 10:03

## 2025-04-24 RX ADMIN — METHYLPREDNISOLONE SODIUM SUCCINATE 80 MG: 125 INJECTION, POWDER, FOR SOLUTION INTRAMUSCULAR; INTRAVENOUS at 05:40

## 2025-04-24 RX ADMIN — Medication 10 ML: at 08:14

## 2025-04-24 RX ADMIN — SENNOSIDES, DOCUSATE SODIUM 2 TABLET: 50; 8.6 TABLET, FILM COATED ORAL at 08:13

## 2025-04-24 RX ADMIN — EMPAGLIFLOZIN 10 MG: 10 TABLET, FILM COATED ORAL at 11:58

## 2025-04-24 RX ADMIN — CLOPIDOGREL BISULFATE 75 MG: 75 TABLET, FILM COATED ORAL at 08:13

## 2025-04-24 RX ADMIN — CEFEPIME 2000 MG: 2 INJECTION, POWDER, FOR SOLUTION INTRAVENOUS at 01:34

## 2025-04-24 RX ADMIN — POTASSIUM CHLORIDE 40 MEQ: 1500 TABLET, EXTENDED RELEASE ORAL at 10:03

## 2025-04-24 RX ADMIN — ROSUVASTATIN CALCIUM 5 MG: 5 TABLET, FILM COATED ORAL at 08:13

## 2025-04-24 NOTE — PROGRESS NOTES
[facility] Medicine Services  INPATIENT PROGRESS NOTE    Patient Name: Sharad Ryder  Date of Admission: 4/23/2025  Today's Date: 04/24/25  Length of Stay: 1  Primary Care Physician: Kerry Mtz APRN    Subjective   Chief Complaint: Fever, shortness of breath       Shortness of Breath       Today:  Patient standing up by bed, in no acute distress.  He got back in bed without difficulty during my assessment.  Patient was wearing O2 at 2 L via nasal cannula, O2 sat 95%.  Patient reports he has never had to wear oxygen at home.        Review of Systems   Respiratory:  Positive for shortness of breath.       All pertinent negatives and positives are as above. All other systems have been reviewed and are negative unless otherwise stated.     Objective    Temp:  [97.5 °F (36.4 °C)-100.4 °F (38 °C)] 97.5 °F (36.4 °C)  Heart Rate:  [] 89  Resp:  [12-26] 16  BP: (101-158)/(49-80) 158/69  Physical Exam  Constitutional:       General: He is awake.      Appearance: He is underweight.   HENT:      Head: Normocephalic and atraumatic.      Nose: Nose normal.      Mouth/Throat:      Mouth: Mucous membranes are moist.      Pharynx: Oropharynx is clear.   Eyes:      Extraocular Movements: Extraocular movements intact.      Conjunctiva/sclera: Conjunctivae normal.   Cardiovascular:      Rate and Rhythm: Regular rhythm. Tachycardia present.      Pulses: Normal pulses.      Heart sounds: Normal heart sounds.   Pulmonary:      Effort: Pulmonary effort is normal. No respiratory distress.      Breath sounds: Decreased breath sounds present.   Abdominal:      General: Bowel sounds are normal.      Palpations: Abdomen is soft.   Musculoskeletal:         General: Normal range of motion.      Cervical back: Normal range of motion and neck supple.      Right lower leg: No edema.      Left lower leg: No edema.   Skin:     General: Skin is warm and dry.      Capillary Refill: Capillary refill  "takes 2 to 3 seconds.   Neurological:      General: No focal deficit present.      Mental Status: He is alert and oriented to person, place, and time.   Psychiatric:         Mood and Affect: Mood normal.         Behavior: Behavior normal. Behavior is cooperative.       Results Review:  I have reviewed the labs, radiology results, and diagnostic studies.    Laboratory Data:   Results from last 7 days   Lab Units 04/24/25  0430 04/23/25  1308   WBC 10*3/mm3 20.41* 22.57*   HEMOGLOBIN g/dL 12.1* 11.2*   HEMATOCRIT % 35.7* 32.5*   PLATELETS 10*3/mm3 444 408        Results from last 7 days   Lab Units 04/24/25  0430 04/23/25  1308 04/23/25  1304   SODIUM mmol/L 138 133*  --    SODIUM, ARTERIAL mmol/L  --   --  134*   POTASSIUM mmol/L 3.2* 3.5  --    CHLORIDE mmol/L 97* 92*  --    CO2 mmol/L 28.0 26.0  --    BUN mg/dL 11 11  --    CREATININE mg/dL 0.52* 0.61*  --    CALCIUM mg/dL 9.2 8.9  --    BILIRUBIN mg/dL  --  0.4  --    ALK PHOS U/L  --  69  --    ALT (SGPT) U/L  --  11  --    AST (SGOT) U/L  --  16  --    GLUCOSE mg/dL 159* 128*  --        Culture Data:   No results found for: \"BLOODCX\", \"URINECX\", \"WOUNDCX\", \"MRSACX\", \"RESPCX\", \"STOOLCX\"    Radiology Data:   Imaging Results (Last 24 Hours)       Procedure Component Value Units Date/Time    XR Chest 1 View [[id number]] Collected: 04/23/25 1306     Updated: 04/23/25 1312    Narrative:      EXAMINATION: XR CHEST 1 VW- 4/23/2025 1:06 PM     HISTORY: soa.     REPORT: Frontal view of the chest was obtained.     COMPARISON: Chest x-ray 3/1/2025.     The lungs are hyperinflated with areas of associated hyperlucency  compatible with advanced emphysema. There are persistent interstitial  and parenchymal opacities with a similar distribution, though possibly  slightly increased in the right upper lobe. This may represent ongoing  pulmonary edema or pneumonia, and some background pulmonary fibrosis is  not excluded. No pneumothorax or pleural effusion is identified. There  is " previous CABG, aortic valvuloplasty. Heart size is normal. A left  atrial appendage occlusion clamp is present. No acute osseous  abnormality is identified.       Impression:      Severe emphysema with persistent coarse mixed interstitial  and alveolar infiltrates, this appears slightly increased on the right  upper lobe. Differential includes ongoing pulmonary edema, as well as  pneumonia and possible underlying pulmonary fibrosis.     This report was signed and finalized on 4/23/2025 1:09 PM by Dr. Son Womack MD.               I have reviewed the patient's current medications.     Assessment/Plan   Assessment  Active Hospital Problems    Diagnosis     **Community acquired bilateral lower lobe pneumonia     Acute on chronic congestive heart failure     Acute exacerbation of chronic obstructive pulmonary disease (COPD)        Treatment Plan  Community acquired bilateral lower lobe pneumonia-Continue Cefepime 2000 mg IV every 8 hours.  Continue Doxycycline 100 mg IV every 12 hours.  Continue DuoNebs 4 times daily. Vital signs every 4 hours and as needed.  Continue continuous pulse oximetry.  Wean Oxygen as able.    Acute on chronic congestive heart failure-Continue Lasix 40 mg every 12 hours.  Strict I/O.  Continue Telemetry.  Continue Solu-Medrol 40 mg IV every 12 hours.  PT/OT to eval and treat.    Acute exacerbation of chronic obstructive pulmonary disease-Continue O2 at 2 L via nasal cannula as needed.  Continue pulse ox and telemetry monitoring.  Continue DuoNebs 4 times daily.  Continue Solu-Medrol 40 mg IV every 12 hours.  Continue incentive spirometry and OPEP per respiratory therapy.      Medical Decision Making  Community acquired bilateral lower lobe pneumonia-acute, moderate complexity, unchanged  Acute on chronic congestive heart failure-chronic, moderate complexity, unchanged  Acute exacerbation of chronic obstructive pulmonary disease-chronic, moderate complexity, improving  Number and Complexity  of problems: 3  Differential Diagnosis: None    Conditions and Status        Condition is improving.     [facility] Data  External documents reviewed: Epic records  Cardiac tracing (EKG, telemetry) interpretation: EKG 4/23/2025 per cardiology  Radiology interpretation: Chest x-ray 4/23/2025 per radiology  Labs reviewed: Urine 4/23/2025, CBC BMP 4/24/2025  Any tests that were considered but not ordered: None     Decision rules/scores evaluated (example [id number], Wells, etc): None     Discussed with: Patient and Dr. Bailey     Care Planning  Shared decision making: Patient and Dr. Bailey  Code status and discussions: CPR-full support    Disposition  Social Determinants of Health that impact treatment or disposition: None  I expect the patient to be discharged to home in 1-2 days.         Electronically signed by KIRBY Hector, 04/24/25, 10:55 CDT.

## 2025-04-24 NOTE — THERAPY EVALUATION
Patient Name: Sharad Ryder  : 1960    MRN: [id number]                              Today's Date: 2025       Admit Date: 2025    Visit Dx:     ICD-10-CM ICD-9-CM   1. Acute respiratory failure with hypoxia  J96.01 518.81   2. COPD exacerbation  J44.1 491.21   3. Acute congestive heart failure, unspecified heart failure type  I50.9 428.0   4. Pneumonia of right upper lobe due to infectious organism  J18.9 486     Patient Active Problem List   Diagnosis    Hyperlipidemia LDL goal <70    Primary hypertension    COPD (chronic obstructive pulmonary disease)    Transient ischemic attack (TIA)    Allergic reaction    Coronary artery disease of bypass graft of native heart with stable angina pectoris    PVC (premature ventricular contraction)    Leukocytosis    History of CVA (cerebrovascular accident)    Right sided weakness resolved    Acute CVA (cerebrovascular accident)    Right-sided carotid artery disease    S/P CABG x 3  Dr Robin     Status post aortic valve replacement with bioprosthetic valve    LVH (left ventricular hypertrophy)    History of right-sided carotid endarterectomy    Tobacco use    Chronic stable angina    PVD (peripheral vascular disease) with claudication    Aortoiliac occlusive disease    Preop testing    PAD (peripheral artery disease)    Acute exacerbation of chronic obstructive pulmonary disease (COPD)    COVID-19 virus infection    Elevated troponin level not due myocardial infarction    Acute on chronic diastolic CHF (congestive heart failure)    Severe aortic stenosis    Diastolic dysfunction    Acute on chronic respiratory failure with hypoxia    Community acquired bilateral lower lobe pneumonia    Moderate protein-calorie malnutrition    COPD with acute exacerbation    Severe sepsis    Pneumonia    Acute on chronic congestive heart failure     Past Medical History:   Diagnosis Date    Aneurysm     Anxiety     Arthritis     Carotid artery disease     Carotid stenosis,  right 02/01/2018    Post right carotid endarterectomy    COPD (chronic obstructive pulmonary disease)     Coronary artery disease     Heart murmur     History of right-sided carotid endarterectomy 02/16/2022    Hyperlipidemia LDL goal <70 12/14/2017    Left frontal lobe, right thalamus and right occipital CVA (cerebrovascular accident) (HCC) 01/27/2022    LVH (left ventricular hypertrophy) 02/16/2022    Pneumonia     Primary hypertension 12/14/2017    S/P CABG x 3 02/16/2022    LIMA to LAD, SVG to PDA and SVG to diagonal branch with TMR and left atrial appendage exclusion with atricure clip device 2/8/2018 per Dr. Cj Robin at [facility]     Severe aortic valve stenosis 12/14/2017    Status post aortic valve replacement with bioprosthetic valve 02/16/2022    Graciela Juarez bovine pericardial 19 mm valve 2/8/2018 per Dr. Cj Robin at [facility]    Tobacco use 12/14/2017    Wears glasses      Past Surgical History:   Procedure Laterality Date    AORTAGRAM Bilateral 10/27/2023    Procedure: LEFT LOWER EXTREMITY ANGIOGRAM, BILATERAL ILIAC BALLOON ANGIOPLASTY, BILATERAL ILIAC STENT PLACEMENT, MYNX CLOSURE;  Surgeon: Jl Salgado DO;  Location: [facility] OR ;  Service: Vascular;  Laterality: Bilateral;    APPENDECTOMY      CARDIAC CATHETERIZATION N/A 12/18/2017    Procedure: Left Heart Cath;  Surgeon: Aime Francois MD;  Location:  [facility] CATH INVASIVE LOC[facility];  Service:     CARDIAC CATHETERIZATION N/A 12/18/2017    Procedure: Right Heart Cath;  Surgeon: Aime Francois MD;  Location:  [facility] CATH [facility];  Service:     CARDIAC CATHETERIZATION Bilateral 1/4/2018    Procedure: Coronary angiography;  Surgeon: Alfonso Yi MD;  Location:  [facility] CATH [facility];  Service:     CARDIAC CATHETERIZATION Left 9/15/2021    Procedure: Cardiac Catheterization/Vascular Study NIMCO OK  Has 3 graft 2018;  Surgeon: Aime Francois MD;  Location:  [facility]ATH [facility];   Service: Cardiology;  Laterality: Left;    CAROTID ENDARTERECTOMY Right 2/1/2018    Procedure: RIGHT CAROTID ENDARTERECTOMY WITH EEG-awake;  Surgeon: Jl Salgado DO;  Location:  [facility] OR;  Service:     CORONARY ARTERY BYPASS GRAFT WITH AORTIC VALVE REPAIR/REPLACEMENT N/A 2/8/2018    Procedure: AORTIC VALVE REPLACEMENT,CORONARY ARTERY BYPASS GRAFTING x 3  WITH CONCHA AND RIGHT EVH, ATRIAL APPENDAGE EXCLUSION LEFT WITH TRANSESOPHAGEAL ECHOCARDIOGRAM, 17-CHANNEL TMR;  Surgeon: Cj Robin MD;  Location:  [facility] OR;  Service:     FINGER SURGERY Right 1990    OTHER SURGICAL HISTORY      skull srgery from accident in childhood.      General Information       Row Name 04/24/25 1039          Physical Therapy Time and Intention    Document Type evaluation;other (see comments)  see MAR  -JE     Mode of Treatment physical therapy  -       Row Name 04/24/25 1039          General Information    Patient Profile Reviewed yes  -JE     Prior Level of Function independent:;all household mobility;community mobility;ADL's;home management;driving;shopping;using stairs  -JE     Existing Precautions/Restrictions fall;oxygen therapy device and L/min  -JE     Barriers to Rehab medically complex;hearing deficit;impaired sensation  -JE       Row Name 04/24/25 1039          Living Environment    Current Living Arrangements home  -JE     People in Home spouse  -JE     Name(s) of People in Home spouse - Ekaterina  -       Row Name 04/24/25 1039          Home Main Entrance    Number of Stairs, Main Entrance none  -JE       Row Name 04/24/25 1039          Stairs Within Home, Primary    Number of Stairs, Within Home, Primary none  -JE       Row Name 04/24/25 1039          Cognition    Orientation Status (Cognition) oriented x 4  -JE       Row Name 04/24/25 1039          Safety Issues/Impairments Affecting Functional Mobility    Safety Issues Affecting Function (Mobility) ability to follow commands;at risk behavior observed;awareness of  need for assistance;impulsivity;safety precaution awareness;safety precautions follow-through/compliance  -     Impairments Affecting Function (Mobility) balance;endurance/activity tolerance;shortness of breath;sensation/sensory awareness;pain  -               User Key  (r) = Recorded By, (t) = Taken By, (c) = Cosigned By      Initials Name Provider Type    Ellen Joaquin, PT Physical Therapist                   Mobility       Row Name 04/24/25 1039          Bed Mobility    Bed Mobility supine-sit  -     Supine-Sit [place] (Bed Mobility) standby assist;modified independence  -     Assistive Device (Bed Mobility) head of bed elevated  -       Row Name 04/24/25 1039          Sit-Stand Transfer    Sit-Stand [place] (Transfers) standby assist  -       Row Name 04/24/25 1039          Gait/Stairs (Locomotion)    [place] Level (Gait) contact guard  -     Distance in Feet (Gait) 200  -     Deviations/Abnormal Patterns (Gait) gait speed decreased;antalgic;stride length decreased  -     Bilateral Gait Deviations heel strike decreased;forward flexed posture  -     Comment, (Gait/Stairs) forward head, rounded shlds  -               User Key  (r) = Recorded By, (t) = Taken By, (c) = Cosigned By      Initials Name Provider Type    Ellen Joaquin, PT Physical Therapist                   Obj/Interventions       Row Name 04/24/25 1039          Range of Motion Comprehensive    Comment, General Range of Motion AROM all 4 extremities WFLs  -       Row Name 04/24/25 1039          Strength Comprehensive (MMT)    Comment, General Manual Muscle Testing (MMT) Assessment defer to OT for formal UE strength assessment, however pt moves B UEs against gravity w/out difficulty; B LEs grossly 4+/5  -       Row Name 04/24/25 1039          Balance    Balance Assessment sitting static balance;sitting dynamic balance;standing static balance;standing dynamic balance  -     Static Sitting Balance  independent  -     Dynamic Sitting Balance independent  -     Position, Sitting Balance supported;unsupported;sitting edge of bed;sitting in chair  -     Static Standing Balance standby assist;contact guard  -     Dynamic Standing Balance contact guard  -     Position/Device Used, Standing Balance unsupported  -       Row Name 04/24/25 1039          Sensory Assessment (Somatosensory)    Sensory Assessment (Somatosensory) --  reports tingling in toes, numbness throughout B LEs w/ reported achiness in legs  -               User Key  (r) = Recorded By, (t) = Taken By, (c) = Cosigned By      Initials Name Provider Type    Ellen Joaquin, PT Physical Therapist                   Goals/Plan       Row Name 04/24/25 1039          Transfer Goal 1 (PT)    Activity/Assistive Device (Transfer Goal 1, PT) sit-to-stand/stand-to-sit;bed-to-chair/chair-to-bed  -     [place] Level/Cues Needed (Transfer Goal 1, PT) independent  -     Time Frame (Transfer Goal 1, PT) long term goal (LTG);10 days  -     Progress/Outcome (Transfer Goal 1, PT) goal ongoing  -       Row Name 04/24/25 1039          Gait Training Goal 1 (PT)    Activity/Assistive Device (Gait Training Goal 1, PT) gait (walking locomotion);decrease fall risk;improve balance and speed;increase endurance/gait distance;increase energy conservation  -     [place] Level (Gait Training Goal 1, PT) standby assist;independent  -     Distance (Gait Training Goal 1, PT) 250-330 ft+  -     Time Frame (Gait Training Goal 1, PT) long term goal (LTG);10 days  -     Progress/Outcome (Gait Training Goal 1, PT) goal ongoing  -       Row Name 04/24/25 1039          Therapy Assessment/Plan (PT)    Planned Therapy Interventions (PT) balance training;gait training;patient/family education;home exercise program;postural re-education;strengthening;transfer training;ROM (range of motion);other (see comments)  safety/falls prevention, energy conservation  techniques  -JE               User Key  (r) = Recorded By, (t) = Taken By, (c) = Cosigned By      Initials Name Provider Type    Ellen Joaquin, PT Physical Therapist                   Clinical Impression       Row Name 04/24/25 1039          Pain    Pretreatment Pain Rating 4/10  pain at his neck reported as 2-3/10  -JE     Posttreatment Pain Rating 3/10  -JE     Pain Location back  lower back and L shld  -     Pain Management Interventions activity modification encouraged;exercise or physical activity utilized  -     Response to Pain Interventions activity participation with increased pain  -JE     Pre/Posttreatment Pain Comment reported increase back pain 4-5/10 w/ increase gait distance  -       Row Name 04/24/25 1039          Plan of Care Review    Plan of Care Reviewed With patient  -     Progress improving  -     Outcome Evaluation PT eval completed.  Pt pleasant and agreeable to therapy.  Pt reports pain at his low back and at his L shld.  Pt reports numbness throughout B LEs w/ tingling at B toes and aching throughout legs.  Pt w/ decrease safety awareness impulsively moving to complete tasks.  Pt performs bed mobility w/ SBA to modified I, tfers w/ SBA, and performs gait w/ CGA.  Pt w/ noted antalgic gait w/ increase c/os back pain w/ increase gait distance.  Pt tolerated ~ 200 ft w/ noted decrease gait speed, step length, heel strike and foot clearance w/ forward head, rounded shlds.  Pt will benefit from continued PT services to improve activity tolerance, balance, safety awareness, and to improve I w/ functional mobility reducing fall risk.  Anticipate pt to return home at discharge.  Will follow for progress and needs.  -       Row Name 04/24/25 1039          Therapy Assessment/Plan (PT)    Patient/Family Therapy Goals Statement (PT) returnt to baseline I  -JE     Rehab Potential (PT) good  -JE     Criteria for Skilled Interventions Met (PT) yes;meets criteria;skilled treatment is  necessary  -JE     Therapy Frequency (PT) 2 times/day  -     Predicted Duration of Therapy Intervention (PT) until discharge or goals achieved  -       Row Name 04/24/25 1039          Vital Signs    Posttreatment Heart Rate (beats/min) 92  -JE     Pre SpO2 (%) 92  -JE     O2 Delivery Pre Treatment nasal cannula  -JE     O2 Delivery Intra Treatment nasal cannula  -JE     Post SpO2 (%) 97  -JE     O2 Delivery Post Treatment nasal cannula  -JE     Pre Patient Position Supine  -JE     Intra Patient Position Standing  -JE     Post Patient Position Sitting  -       Row Name 04/24/25 1039          Positioning and Restraints    Pre-Treatment Position in bed  -JE     Post Treatment Position chair  -JE     In Chair reclined;call light within reach;encouraged to call for assist;legs elevated  -               User Key  (r) = Recorded By, (t) = Taken By, (c) = Cosigned By      Initials Name Provider Type    Ellen Joaquin, PT Physical Therapist                   Outcome Measures       Row Name 04/24/25 1039 04/24/25 0724       How much help from another person do you currently need...    Turning from your back to your side while in flat bed without using bedrails? 4  - 4  -NS    Moving from lying on back to sitting on the side of a flat bed without bedrails? 4  - 4  -NS    Moving to and from a bed to a chair (including a wheelchair)? 3  - 4  -NS    Standing up from a chair using your arms (e.g., wheelchair, bedside chair)? 3  - 4  -NS    Climbing 3-5 steps with a railing? 3  - 4  -NS    To walk in hospital room? 3  - --    AM-PAC 6 Clicks Score (PT) 20  -JE --    Highest Level of Mobility Goal 6 --> Walk 10 steps or more  - --      Row Name 04/24/25 1039 04/24/25 1023       Functional Assessment    Outcome Measure Options AM-PAC 6 Clicks Basic Mobility (PT)  - AM-PAC 6 Clicks Daily Activity (OT)  -MM              User Key  (r) = Recorded By, (t) = Taken By, (c) = Cosigned By      Initials Name  Provider Type    MM Ruiz Mar E, OTR/L Occupational Therapist    Ellen Joaquin, PT Physical Therapist    Peggy Agee, RN Registered Nurse                                 Physical Therapy Education       Title: PT OT SLP Therapies (In Progress)       Topic: Physical Therapy (Done)       Point: Mobility training (Done)       Learning Progress Summary            Patient Acceptance, E,TB,D, VU,NR by  at 4/24/2025 1502    Comment: education re: purpose of PT/importance of activity, safety/falls prevention w/ emphasis on impulse control, LE/UE ROM ex while in sitting                      Point: Home exercise program (Done)       Learning Progress Summary            Patient Acceptance, E,TB,D, VU,NR by  at 4/24/2025 1502    Comment: education re: purpose of PT/importance of activity, safety/falls prevention w/ emphasis on impulse control, LE/UE ROM ex while in sitting                      Point: Precautions (Done)       Learning Progress Summary            Patient Acceptance, E,TB,D, VU,NR by  at 4/24/2025 1502    Comment: education re: purpose of PT/importance of activity, safety/falls prevention w/ emphasis on impulse control, LE/UE ROM ex while in sitting                                      User Key       Initials Effective Dates Name Provider Type Discipline     08/02/18 -  Ellen Juarez, PT Physical Therapist PT                  PT Recommendation and Plan  Planned Therapy Interventions (PT): balance training, gait training, patient/family education, home exercise program, postural re-education, strengthening, transfer training, ROM (range of motion), other (see comments) (safety/falls prevention, energy conservation techniques)  Progress: improving  Outcome Evaluation: PT eval completed.  Pt pleasant and agreeable to therapy.  Pt reports pain at his low back and at his L shld.  Pt reports numbness throughout B LEs w/ tingling at B toes and aching throughout legs.  Pt w/ decrease  safety awareness impulsively moving to complete tasks.  Pt performs bed mobility w/ SBA to modified I, tfers w/ SBA, and performs gait w/ CGA.  Pt w/ noted antalgic gait w/ increase c/os back pain w/ increase gait distance.  Pt tolerated ~ 200 ft w/ noted decrease gait speed, step length, heel strike and foot clearance w/ forward head, rounded shlds.  Pt will benefit from continued PT services to improve activity tolerance, balance, safety awareness, and to improve I w/ functional mobility reducing fall risk.  Anticipate pt to return home at discharge.  Will follow for progress and needs.     Time Calculation:              PT G-Codes  Outcome Measure Options: AM-PAC 6 Clicks Basic Mobility (PT)  AM-PAC 6 Clicks Score (PT): 20  AM-PAC 6 Clicks Score (OT): 21  PT Discharge Summary  Anticipated Discharge Disposition (PT): home with assist    Ellen Juarez, PT  4/24/2025

## 2025-04-24 NOTE — PLAN OF CARE
Goal Outcome Evaluation:  Plan of Care Reviewed With: patient        Progress: improving  Outcome Evaluation: PT eval completed.  Pt pleasant and agreeable to therapy.  Pt reports pain at his low back and at his L shld.  Pt reports numbness throughout B LEs w/ tingling at B toes and aching throughout legs.  Pt w/ decrease safety awareness impulsively moving to complete tasks.  Pt performs bed mobility w/ SBA to modified I, tfers w/ SBA, and performs gait w/ CGA.  Pt w/ noted antalgic gait w/ increase c/os back pain w/ increase gait distance.  Pt tolerated ~ 200 ft w/ noted decrease gait speed, step length, heel strike and foot clearance w/ forward head, rounded shlds.  Pt will benefit from continued PT services to improve activity tolerance, balance, safety awareness, and to improve I w/ functional mobility reducing fall risk.  Anticipate pt to return home at discharge.  Will follow for progress and needs.    Anticipated Discharge Disposition (PT): home with assist

## 2025-04-24 NOTE — CONSULTS
[facility] HEART GROUP CONSULT NOTE    Referring Provider: No Known Provider    Reason for Consultation: CHF exacerbation with stenosis    Chief complaint:   Chief Complaint   Patient presents with    Shortness of Breath       Subjective .     History of present illness:  Sharad Ryder is a 65 y.o. male with chronic diastolic CHF, CAD s/p 4v CABG and AS s/p AVR with bioprosthetic aortic valve in 2018 who currently follows with Dr. Francois. His last cath was in 2021 and revealed patent LIMA-LAD with all SVGs occluded. He presented to the ER yesterday with complaints of worsening shortness of breathing that started the night prior. He also reported a temp of 103. He was admitted in March and treated for acute COPD exacerbation and PNA. He was also admitted to the hospital in December and treated for COPD exacerbation, PNA, RSV +. He had a 2D echo completed during that stay that revealed severe bioprosthetic aortic valve stenosis. He saw Dr. Francois in follow up 10 days ago at which time a [facility] was scheduled to assess aortic valve function. On arrival to ER, WBC were noted to be 22, Na 133, BNP 7295 (which is higher than it has been in past admissions). CXR noted persistent coarse mixed interstitial and alveolar infiltrates. He has been started on IV diuretics and antibiotics. He has diuresed 2.8L since admission yesterday. He reports his breathing is near his baseline. He does not weigh daily at home and reports his ER weight was stable with what his weight has been when checked. He reports ELIZABETH that started a few days ago and progressed to at rest and eventually developed orthopnea. He denies edema.       History  Past Medical History:   Diagnosis Date    Aneurysm     Anxiety     Arthritis     Carotid artery disease     Carotid stenosis, right 02/01/2018    Post right carotid endarterectomy    COPD (chronic obstructive pulmonary disease)     Coronary artery disease     Heart murmur     History of right-sided  carotid endarterectomy 02/16/2022    Hyperlipidemia LDL goal <70 12/14/2017    Left frontal lobe, right thalamus and right occipital CVA (cerebrovascular accident) (HCC) 01/27/2022    LVH (left ventricular hypertrophy) 02/16/2022    Pneumonia     Primary hypertension 12/14/2017    S/P CABG x 3 02/16/2022    LIMA to LAD, SVG to PDA and SVG to diagonal branch with TMR and left atrial appendage exclusion with atricure clip device 2/8/2018 per Dr. Cj Robin at [facility]     Severe aortic valve stenosis 12/14/2017    Status post aortic valve replacement with bioprosthetic valve 02/16/2022    Graciela Juarez bovine pericardial 19 mm valve 2/8/2018 per Dr. Cj Robin at [facility]    Tobacco use 12/14/2017    Wears glasses    ,   Past Surgical History:   Procedure Laterality Date    AORTAGRAM Bilateral 10/27/2023    Procedure: LEFT LOWER EXTREMITY ANGIOGRAM, BILATERAL ILIAC BALLOON ANGIOPLASTY, BILATERAL ILIAC STENT PLACEMENT, MYNX CLOSURE;  Surgeon: Jl Salgado DO;  Location: [facility] OR ;  Service: Vascular;  Laterality: Bilateral;    APPENDECTOMY      CARDIAC CATHETERIZATION N/A 12/18/2017    Procedure: Left Heart Cath;  Surgeon: Aime Francois MD;  Location:  [facility] CATH INVASIVE LOCATION;  Service:     CARDIAC CATHETERIZATION N/A 12/18/2017    Procedure: Right Heart Cath;  Surgeon: Aime Francois MD;  Location:  [facility] CATH INVASIVE LOCATION;  Service:     CARDIAC CATHETERIZATION Bilateral 1/4/2018    Procedure: Coronary angiography;  Surgeon: Alfonso Yi MD;  Location:  [facility] CATH INVASIVE LOCATION;  Service:     CARDIAC CATHETERIZATION Left 9/15/2021    Procedure: Cardiac Catheterization/Vascular Study NIMCO OK  Has 3 graft 2018;  Surgeon: Aime Francois MD;  Location:  [facility] CATH INVASIVE LOCATION;  Service: Cardiology;  Laterality: Left;    CAROTID ENDARTERECTOMY Right 2/1/2018    Procedure: RIGHT CAROTID ENDARTERECTOMY WITH EEG-awake;  Surgeon: Jl Salgado DO;   Location:  [facility] OR;  Service:     CORONARY ARTERY BYPASS GRAFT WITH AORTIC VALVE REPAIR/REPLACEMENT N/A 2018    Procedure: AORTIC VALVE REPLACEMENT,CORONARY ARTERY BYPASS GRAFTING x 3  WITH CONCHA AND RIGHT EVH, ATRIAL APPENDAGE EXCLUSION LEFT WITH TRANSESOPHAGEAL ECHOCARDIOGRAM, 17-CHANNEL TMR;  Surgeon: Cj Robin MD;  Location:  [facility] OR;  Service:     FINGER SURGERY Right     OTHER SURGICAL HISTORY      skull srgery from accident in childhood.   ,   Family History   Problem Relation Age of Onset    Heart disease Mother     Heart disease Father     Diabetes Father     Hypertension Sister     Asthma Sister     Kidney disease Sister     Hypertension Brother     Diabetes Brother     Cancer Maternal Uncle    ,   Social History     Tobacco Use    Smoking status: Former     Current packs/day: 0.00     Average packs/day: 1.5 packs/day for 49.8 years (74.8 ttl pk-yrs)     Types: Cigarettes     Start date:      Quit date: 2024     Years since quittin.4     Passive exposure: Never    Smokeless tobacco: Current     Types: Snuff   Vaping Use    Vaping status: Never Used   Substance Use Topics    Alcohol use: No    Drug use: No   ,     Medications    Prior to Admission medications    Medication Sig Start Date End Date Taking? Authorizing Provider   amLODIPine (NORVASC) 10 MG tablet Take 1 tablet by mouth Daily.   Yes ProviderEstrellita MD   aspirin 81 MG tablet Take 1 tablet by mouth Daily. 18  Yes Cj Robin MD   busPIRone (BUSPAR) 10 MG tablet Take 1 tablet by mouth 2 (Two) Times a Day. 21  Yes ProviderEstrellita MD   citalopram (CeleXA) 20 MG tablet Take 1 tablet by mouth Daily.   Yes ProviderEstrellita MD   clopidogrel (PLAVIX) 75 MG tablet Take 1 tablet by mouth Daily. 18  Yes Cj Robin MD   isosorbide mononitrate (IMDUR) 120 MG 24 hr tablet Take 1 tablet by mouth Daily. 22  Yes Ashtyn Bay APRN   metoprolol succinate XL (TOPROL-XL) 50 MG 24 hr  tablet Take 1 tablet by mouth Daily. 1/29/22  Yes Barbra Trent APRN   multivitamin with minerals (ONE-A-DAY MENS 50+ ADVANTAGE PO) Take 1 tablet by mouth Daily.   Yes ProviderEstrellita MD   rosuvastatin (CRESTOR) 5 MG tablet Take 1 tablet by mouth Daily. 4/14/25  Yes Aime Francois MD   tamsulosin (FLOMAX) 0.4 MG capsule 24 hr capsule Take 1 capsule by mouth Daily. 8/1/23  Yes Estrellita Wolfe MD   terazosin (HYTRIN) 5 MG capsule Take 1 capsule by mouth Daily.   Yes ProviderEstrellita MD   nitroglycerin (NITROSTAT) 0.4 MG SL tablet Place 1 tablet under the tongue Every 5 (Five) Minutes As Needed for Chest Pain. Take no more than 3 doses in 15 minutes.    Provider, MD Estrellita       Current Facility-Administered Medications   Medication Dose Route Frequency Provider Last Rate Last Admin    acetaminophen (TYLENOL) tablet 650 mg  650 mg Oral Q4H PRN Christianne Dow APRN        Or    acetaminophen (TYLENOL) 160 MG/5ML oral solution 650 mg  650 mg Oral Q4H PRN Christianne Dow APRN        Or    acetaminophen (TYLENOL) suppository 650 mg  650 mg Rectal Q4H PRN Christianne Dow APRN        sennosides-docusate (PERICOLACE) 8.6-50 MG per tablet 2 tablet  2 tablet Oral BID PRN Christianne Dow APRN   2 tablet at 04/24/25 0813    And    polyethylene glycol (MIRALAX) packet 17 g  17 g Oral Daily PRN Christianne Dow APRN        And    bisacodyl (DULCOLAX) EC tablet 5 mg  5 mg Oral Daily PRN Christianne Dow APRN        And    bisacodyl (DULCOLAX) suppository 10 mg  10 mg Rectal Daily PRN Christianne Dow APRN        budesonide-formoterol (SYMBICORT) 160-4.5 MCG/ACT inhaler 2 puff  2 puff Inhalation BID - RT Christianne Dow APRN   2 puff at 04/24/25 0614    Calcium Replacement - Follow Nurse / BPA Driven Protocol   Not Applicable PRN Christianne Dow APRN        cefepime 2000 mg IVPB in 100 mL NS (MBP)  2,000 mg Intravenous Q8H Christianne Dow APRN   2,000 mg at 04/24/25 0814     citalopram (CeleXA) tablet 20 mg  20 mg Oral Daily Christianne Dow APRN   20 mg at 04/24/25 0813    clopidogrel (PLAVIX) tablet 75 mg  75 mg Oral Daily Christianne Dow APRN   75 mg at 04/24/25 0813    doxycycline (VIBRAMYCIN) 100 mg in sodium chloride 0.9 % 100 mL MBP  100 mg Intravenous Q12H Christianne Dow APRN 0 mL/hr at 04/23/25 1734 100 mg at 04/24/25 0538    empagliflozin (JARDIANCE) tablet 10 mg  10 mg Oral Daily Ca García APRN   10 mg at 04/24/25 1158    furosemide (LASIX) injection 40 mg  40 mg Intravenous BID Mihir Bailey MD        HYDROcodone-acetaminophen (NORCO) 7.5-325 MG per tablet 1 tablet  1 tablet Oral Q6H PRN Mihir Bailey MD   1 tablet at 04/24/25 1003    ipratropium-albuterol (DUO-NEB) nebulizer solution 3 mL  3 mL Nebulization Q4H PRN Christianne Dow APRN        [Held by provider] isosorbide mononitrate (IMDUR) 24 hr tablet 120 mg  120 mg Oral Daily Christianne Dow APRN        Magnesium Standard Dose Replacement - Follow Nurse / BPA Driven Protocol   Not Applicable PRN Christianne Dow APRN        methylPREDNISolone sodium succinate (SOLU-Medrol) injection 40 mg  40 mg Intravenous Q12H Mihir Bailey MD        metoprolol succinate XL (TOPROL-XL) 24 hr tablet 25 mg  25 mg Oral Q24H Christianne Dow APRN   25 mg at 04/23/25 1824    nitroglycerin (NITROSTAT) SL tablet 0.4 mg  0.4 mg Sublingual Q5 Min PRN Christianne Dow APRN        ondansetron ODT (ZOFRAN-ODT) disintegrating tablet 4 mg  4 mg Oral Q6H PRN Christianne Dow APRN        Or    ondansetron (ZOFRAN) injection 4 mg  4 mg Intravenous Q6H PRN Christianne Dow APRN        Phosphorus Replacement - Follow Nurse / BPA Driven Protocol   Not Applicable PRN Christianne Dow APRN        Potassium Replacement - Follow Nurse / BPA Driven Protocol   Not Applicable PRN Christianne Dow APRN        rosuvastatin (CRESTOR) tablet 5 mg  5 mg Oral Daily Christianne Dow APRN   5 mg at 04/24/25 0825     "sodium chloride 0.9 % flush 10 mL  10 mL Intravenous PRN Christianne Dow APRN        sodium chloride 0.9 % flush 10 mL  10 mL Intravenous Q12H Christianne Dow APRN   10 mL at 04/24/25 0814    sodium chloride 0.9 % flush 10 mL  10 mL Intravenous PRN Christianne Dow APRN        sodium chloride 0.9 % infusion 40 mL  40 mL Intravenous PRN Christianne Dow APRN        [START ON 4/25/2025] spironolactone (ALDACTONE) tablet 25 mg  25 mg Oral Daily Ca García APRN        tamsulosin (FLOMAX) 24 hr capsule 0.4 mg  0.4 mg Oral Daily Christianne Dow APRN   0.4 mg at 04/24/25 0813    terazosin (HYTRIN) capsule 5 mg  5 mg Oral Daily Mihir Bailey MD   5 mg at 04/24/25 1003       Allergies:  Patient has no known allergies.    Review of Systems  Review of Systems   Constitutional:  Negative for chills, diaphoresis, fatigue and unexpected weight change.   HENT:  Negative for nosebleeds.    Respiratory:  Negative for cough, chest tightness, shortness of breath and wheezing.    Cardiovascular:  Negative for chest pain, palpitations and leg swelling.   Gastrointestinal:  Negative for anal bleeding, blood in stool, diarrhea and nausea.   Neurological:  Negative for dizziness, syncope and light-headedness.   All other systems reviewed and are negative.      Objective     Physical Exam:  Patient Vitals for the past 24 hrs:   BP Temp Temp src Pulse Resp SpO2 Height Weight   04/24/25 1525 156/81 97.4 °F (36.3 °C) Oral 102 16 98 % -- --   04/24/25 1400 120/62 -- -- -- -- -- 162.6 cm (64\") 49.4 kg (109 lb)   04/24/25 1141 120/62 98.1 °F (36.7 °C) Oral 91 16 93 % -- --   04/24/25 0802 158/69 97.5 °F (36.4 °C) Oral 89 16 97 % -- --   04/24/25 0618 -- -- -- 86 16 96 % -- --   04/24/25 0614 -- -- -- 93 16 97 % -- --   04/24/25 0416 140/70 97.9 °F (36.6 °C) Oral 87 16 95 % 162.6 cm (64\") 49.8 kg (109 lb 12.8 oz)   04/24/25 0059 123/49 -- -- 83 16 96 % -- --   04/23/25 2217 124/66 97.9 °F (36.6 °C) Oral 88 18 94 % -- -- "   04/23/25 1807 158/78 97.8 °F (36.6 °C) Oral 109 20 98 % -- --   04/23/25 1737 -- 98.7 °F (37.1 °C) Oral -- 20 -- -- --   04/23/25 1731 107/74 -- -- 106 -- 92 % -- --     Vitals reviewed.   Constitutional:       General: Not in acute distress.     Appearance: Healthy appearance. Well-developed. Not diaphoretic.   Eyes:      General: No scleral icterus.     Conjunctiva/sclera: Conjunctivae normal.      Pupils: Pupils are equal, round, and reactive to light.   HENT:      Head: Normocephalic.    Mouth/Throat:      Pharynx: No oropharyngeal exudate.   Neck:      Vascular: No JVR.   Pulmonary:      Effort: Pulmonary effort is normal. No respiratory distress.      Breath sounds: Normal breath sounds. No wheezing. No rhonchi. No rales.   Chest:      Chest wall: Not tender to palpatation.   Cardiovascular:      Normal rate. Regular rhythm.      Murmurs: There is a grade 2/6 harsh midsystolic murmur at the URSB, radiating to the neck.   Pulses:     Intact distal pulses.   Edema:     Peripheral edema absent.   Abdominal:      General: Bowel sounds are normal. There is no distension.      Palpations: Abdomen is soft.      Tenderness: There is no abdominal tenderness.   Musculoskeletal: Normal range of motion.      Cervical back: Normal range of motion and neck supple. Skin:     General: Skin is warm and dry.      Coloration: Skin is not pale.      Findings: No erythema or rash.   Neurological:      Mental Status: Alert, oriented to person, place, and time and oriented to person, place and time.      Deep Tendon Reflexes: Reflexes are normal and symmetric.   Psychiatric:         Behavior: Behavior normal.         Results Review:   I reviewed the patient's new clinical results.    Lab Results (last 72 hours)       Procedure Component Value Units Date/Time    Basic Metabolic Panel [[id number]]  (Abnormal) Collected: 04/24/25 0430    Specimen: Blood Updated: 04/24/25 0522     Glucose 159 mg/dL      BUN 11 mg/dL      Creatinine  0.52 mg/dL      Sodium 138 mmol/L      Potassium 3.2 mmol/L      Chloride 97 mmol/L      CO2 28.0 mmol/L      Calcium 9.2 mg/dL      BUN/Creatinine Ratio 21.2     Anion Gap 13.0 mmol/L      eGFR 111.9 mL/min/1.73     Narrative:      GFR Categories in Chronic Kidney Disease (CKD)      GFR Category          GFR (mL/min/1.73)    Interpretation  G1                     90 or greater         Normal or high (1)  G2                      60-89                Mild decrease (1)  G3a                   45-59                Mild to moderate decrease  G3b                   30-44                Moderate to severe decrease  G4                    15-29                Severe decrease  G5                    14 or less           Kidney failure          (1)In the absence of evidence of kidney disease, neither GFR category G1 or G2 fulfill the criteria for CKD.    eGFR calculation 2021 CKD-EPI creatinine equation, which does not include race as a factor    Magnesium [[id number]]  (Normal) Collected: 04/24/25 0430    Specimen: Blood Updated: 04/24/25 0522     Magnesium 2.0 mg/dL     CBC & Differential [[id number]]  (Abnormal) Collected: 04/24/25 0430    Specimen: Blood Updated: 04/24/25 0520    Narrative:      The following orders were created for panel order CBC & Differential.  Procedure                               Abnormality         Status                     ---------                               -----------         ------                     Manual Differential[[id number]]          Abnormal            Final result               CBC Auto Differential[[id number]]        Abnormal            Final result                 Please view results for these tests on the individual orders.    Manual Differential [[id number]]  (Abnormal) Collected: 04/24/25 0430    Specimen: Blood Updated: 04/24/25 0520     Neutrophil % 92.0 %      Lymphocyte % 5.0 %      Monocyte % 0.0 %      Eosinophil % 0.0 %      Basophil % 0.0 %      Bands %  3.0 %       Neutrophils Absolute 19.39 10*3/mm3      Lymphocytes Absolute 1.02 10*3/mm3      Monocytes Absolute 0.00 10*3/mm3      Eosinophils Absolute 0.00 10*3/mm3      Basophils Absolute 0.00 10*3/mm3      Polychromasia Slight/1+     WBC Morphology Normal     Platelet Morphology Normal    CBC Auto Differential [[id number]]  (Abnormal) Collected: 04/24/25 0430    Specimen: Blood Updated: 04/24/25 0520     WBC 20.41 10*3/mm3      RBC 3.96 10*6/mm3      Hemoglobin 12.1 g/dL      Hematocrit 35.7 %      MCV 90.2 fL      MCH 30.6 pg      MCHC 33.9 g/dL      RDW 12.8 %      RDW-SD 42.6 fl      MPV 8.9 fL      Platelets 444 10*3/mm3     MRSA Screen, PCR (Inpatient) - Swab, Nares [[id number]]  (Normal) Collected: 04/23/25 1859    Specimen: Swab from Nares Updated: 04/23/25 2024     MRSA PCR No MRSA Detected    Narrative:      The negative predictive value of this diagnostic test is high and should only be used to consider de-escalating anti-MRSA therapy. A positive result may indicate colonization with MRSA and must be correlated clinically.    S. Pneumo Ag Urine or CSF - Urine, Urine, Clean Catch [[id number]]  (Normal) Collected: 04/23/25 1620    Specimen: Urine, Clean Catch Updated: 04/23/25 1642     Strep Pneumo Ag Negative    Urinalysis With Culture If Indicated - Urine, Clean Catch [[id number]]  (Abnormal) Collected: 04/23/25 1620    Specimen: Urine, Clean Catch Updated: 04/23/25 1642     Color, UA Yellow     Appearance, UA Clear     pH, UA 7.0     Specific Gravity, UA <=1.005     Glucose, UA Negative     Ketones, UA Negative     Bilirubin, UA Negative     Blood, UA Small (1+)     Protein, UA Negative     Leuk Esterase, UA Large (3+)     Nitrite, UA Positive     Urobilinogen, UA 0.2 E.U./dL    Narrative:      In absence of clinical symptoms, the presence of pyuria, bacteria, and/or nitrites on the urinalysis result does not correlate with infection.    Urinalysis, Microscopic Only - Urine, Clean Catch [[id number]]  (Abnormal)  Collected: 04/23/25 1620    Specimen: Urine, Clean Catch Updated: 04/23/25 1641     RBC, UA 0-2 /HPF      WBC, UA Too Numerous to Count /HPF      Bacteria, UA Trace /HPF      Squamous Epithelial Cells, UA 0-2 /HPF      Hyaline Casts, UA None Seen /LPF      WBC Clumps, UA Small/1+ /HPF      Methodology Manual Light Microscopy    Urine Culture - Urine, Urine, Clean Catch [[id number]] Collected: 04/23/25 1620    Specimen: Urine, Clean Catch Updated: 04/23/25 1641    Legionella Antigen, Urine - Urine, Urine, Clean Catch [[id number]]  (Normal) Collected: 04/23/25 1620    Specimen: Urine, Clean Catch Updated: 04/23/25 1641     LEGIONELLA ANTIGEN, URINE Negative    Respiratory Panel PCR w/COVID-19(SARS-CoV-2) [facility] In-House, NP Swab in UTM/VTM, 2 HR TAT - Swab, Nasopharynx [[id number]]  (Normal) Collected: 04/23/25 1312    Specimen: Swab from Nasopharynx Updated: 04/23/25 1414     ADENOVIRUS, PCR Not Detected     Coronavirus 229E Not Detected     Coronavirus HKU1 Not Detected     Coronavirus NL63 Not Detected     Coronavirus OC43 Not Detected     COVID19 Not Detected     Human Metapneumovirus Not Detected     Human Rhinovirus/Enterovirus Not Detected     Influenza A PCR Not Detected     Influenza B PCR Not Detected     Parainfluenza Virus 1 Not Detected     Parainfluenza Virus 2 Not Detected     Parainfluenza Virus 3 Not Detected     Parainfluenza Virus 4 Not Detected     RSV, PCR Not Detected     Bordetella pertussis pcr Not Detected     Bordetella parapertussis PCR Not Detected     Chlamydophila pneumoniae PCR Not Detected     Mycoplasma pneumo by PCR Not Detected    Narrative:      In the setting of a positive respiratory panel with a viral infection PLUS a negative procalcitonin without other underlying concern for bacterial infection, consider observing off antibiotics or discontinuation of antibiotics and continue supportive care. If the respiratory panel is positive for atypical bacterial infection  (Bordetella pertussis, Chlamydophila pneumoniae, or Mycoplasma pneumoniae), consider antibiotic de-escalation to target atypical bacterial infection.    Comprehensive Metabolic Panel [[id number]]  (Abnormal) Collected: 04/23/25 1308    Specimen: Blood Updated: 04/23/25 1351     Glucose 128 mg/dL      BUN 11 mg/dL      Creatinine 0.61 mg/dL      Sodium 133 mmol/L      Potassium 3.5 mmol/L      Chloride 92 mmol/L      CO2 26.0 mmol/L      Calcium 8.9 mg/dL      Total Protein 6.9 g/dL      Albumin 3.9 g/dL      ALT (SGPT) 11 U/L      AST (SGOT) 16 U/L      Alkaline Phosphatase 69 U/L      Total Bilirubin 0.4 mg/dL      Globulin 3.0 gm/dL      A/G Ratio 1.3 g/dL      BUN/Creatinine Ratio 18.0     Anion Gap 15.0 mmol/L      eGFR 106.6 mL/min/1.73     Narrative:      GFR Categories in Chronic Kidney Disease (CKD)      GFR Category          GFR (mL/min/1.73)    Interpretation  G1                     90 or greater         Normal or high (1)  G2                      60-89                Mild decrease (1)  G3a                   45-59                Mild to moderate decrease  G3b                   30-44                Moderate to severe decrease  G4                    15-29                Severe decrease  G5                    14 or less           Kidney failure          (1)In the absence of evidence of kidney disease, neither GFR category G1 or G2 fulfill the criteria for CKD.    eGFR calculation 2021 CKD-EPI creatinine equation, which does not include race as a factor    BNP [[id number]]  (Abnormal) Collected: 04/23/25 1308    Specimen: Blood Updated: 04/23/25 1349     proBNP 7,295.0 pg/mL     Narrative:      This assay is used as an aid in the diagnosis of individuals suspected of having heart failure. It can be used as an aid in the diagnosis of acute decompensated heart failure (ADHF) in patients presenting with signs and symptoms of ADHF to the emergency department (ED). In addition, NT-proBNP of <300 pg/mL indicates ADHF  is not likely.    Age Range Result Interpretation  NT-proBNP Concentration (pg/mL:      <50             Positive            >450                   Gray                 300-450                    Negative             <300    50-75           Positive            >900                  Gray                300-900                  Negative            <300      >75             Positive            >1800                  Gray                300-1800                  Negative            <300    Lactic Acid, Plasma [[id number]]  (Normal) Collected: 04/23/25 1308    Specimen: Blood Updated: 04/23/25 1348     Lactate 2.0 mmol/L     Blood Culture - Blood, Arm, Right [[id number]] Collected: 04/23/25 1302    Specimen: Blood from Arm, Right Updated: 04/23/25 1324    Blood Culture - Blood, Arm, Left [[id number]] Collected: 04/23/25 1308    Specimen: Blood from Arm, Left Updated: 04/23/25 1323    CBC & Differential [[id number]]  (Abnormal) Collected: 04/23/25 1308    Specimen: Blood Updated: 04/23/25 1322    Narrative:      The following orders were created for panel order CBC & Differential.  Procedure                               Abnormality         Status                     ---------                               -----------         ------                     CBC Auto Differential[[id number]]        Abnormal            Final result                 Please view results for these tests on the individual orders.    CBC Auto Differential [[id number]]  (Abnormal) Collected: 04/23/25 1308    Specimen: Blood Updated: 04/23/25 1322     WBC 22.57 10*3/mm3      RBC 3.65 10*6/mm3      Hemoglobin 11.2 g/dL      Hematocrit 32.5 %      MCV 89.0 fL      MCH 30.7 pg      MCHC 34.5 g/dL      RDW 12.9 %      RDW-SD 42.0 fl      MPV 8.7 fL      Platelets 408 10*3/mm3      Neutrophil % 86.9 %      Lymphocyte % 5.6 %      Monocyte % 6.1 %      Eosinophil % 0.2 %      Basophil % 0.3 %      Immature Grans % 0.9 %      Neutrophils, Absolute 19.62 10*3/mm3       Lymphocytes, Absolute 1.27 10*3/mm3      Monocytes, Absolute 1.37 10*3/mm3      Eosinophils, Absolute 0.04 10*3/mm3      Basophils, Absolute 0.07 10*3/mm3      Immature Grans, Absolute 0.20 10*3/mm3      nRBC 0.0 /100 WBC     Blood Gas, Arterial With Co-Ox [[id number]]  (Abnormal) Collected: 04/23/25 1304    Specimen: Arterial Blood Updated: 04/23/25 1302     Site Right Brachial     Glenroy's Test N/A     pH, Arterial 7.500 pH units      Comment: 83 Value above reference range        pCO2, Arterial 35.8 mm Hg      pO2, Arterial 53.1 mm Hg      Comment: 85 Value below critical limit        HCO3, Arterial 27.9 mmol/L      Comment: 83 Value above reference range        Base Excess, Arterial 4.6 mmol/L      Comment: 83 Value above reference range        O2 Saturation, Arterial 89.3 %      Comment: 84 Value below reference range        Hemoglobin, Blood Gas 10.9 g/dL      Comment: 84 Value below reference range        Hematocrit, Blood Gas 33.4 %      Comment: 84 Value below reference range        Oxyhemoglobin 87.8 %      Comment: 84 Value below reference range        Methemoglobin 0.20 %      Carboxyhemoglobin 1.5 %      Temperature 37.0     Sodium, Arterial 134 mmol/L      Comment: 84 Value below reference range        Potassium, Arterial 2.9 mmol/L      Comment: 84 Value below reference range        Barometric Pressure for Blood Gas 755 mmHg      Modality Nasal Cannula     Flow Rate 3.0 lpm      Ventilator Mode NA     Notified Who Dr. RADHA Yi     Notified By Sammie Garcia, GASTON     Notified Time 04/23/2025 13:05     Collected by [id number]     Comment: Meter: [id number]     :  Sammie Garcia RRT        pH, Temp Corrected 7.500 pH Units      pCO2, Temperature Corrected 35.8 mm Hg      pO2, Temperature Corrected 53.1 mm Hg             Lab Results   Component Value Date    ECHOEFEST 65 02/08/2018       Imaging Results (Last 72 Hours)       Procedure Component Value Units Date/Time    XR Chest 1 View [[id number]]  Collected: 04/23/25 1306     Updated: 04/23/25 1312    Narrative:      EXAMINATION: XR CHEST 1 VW- 4/23/2025 1:06 PM     HISTORY: soa.     REPORT: Frontal view of the chest was obtained.     COMPARISON: Chest x-ray 3/1/2025.     The lungs are hyperinflated with areas of associated hyperlucency  compatible with advanced emphysema. There are persistent interstitial  and parenchymal opacities with a similar distribution, though possibly  slightly increased in the right upper lobe. This may represent ongoing  pulmonary edema or pneumonia, and some background pulmonary fibrosis is  not excluded. No pneumothorax or pleural effusion is identified. There  is previous CABG, aortic valvuloplasty. Heart size is normal. A left  atrial appendage occlusion clamp is present. No acute osseous  abnormality is identified.       Impression:      Severe emphysema with persistent coarse mixed interstitial  and alveolar infiltrates, this appears slightly increased on the right  upper lobe. Differential includes ongoing pulmonary edema, as well as  pneumonia and possible underlying pulmonary fibrosis.     This report was signed and finalized on 4/23/2025 1:09 PM by Dr. Son Womack MD.               Assessment & Plan       Community acquired bilateral lower lobe pneumonia    Acute exacerbation of chronic obstructive pulmonary disease (COPD)    Acute on chronic congestive heart failure      Plan:  Acute on chronic diastolic CHF: improving with diuretics. Will recheck an echo to assess LVEF given previously documented severe bioprosthetic aortic stenosis. Start jardiance 10mg daily. Is to have 1 more IV dose of lasix tonight. Will start aldactone tomorrow. Continue daily weights and strict I&O.     Severe bioprosthetic aortic stenosis: noted per echo in December at which time his EF was normal. Will repeat echo to assess EF. Patient has already seen Dr. Robin and Dr. Francois and is scheduled  for outpatient LHC, CT TAVR, ect.     CAP: abx  per attending.     COPD: steroid treatment per attending.         Further orders per Dr. Hair    Thank you for asking us to follow this patient with you.     Electronically signed by KIRBY Moore, 04/24/25, 9:26 AM CDT.    Please note this cardiology consultation note is the result of a face to face consultation with the patient, in addition to reviewing medical records at length by myself, KIRBY Lopez      Time spent: 50 minutes

## 2025-04-24 NOTE — CASE MANAGEMENT/SOCIAL WORK
Discharge Planning Assessment  [facility]     Patient Name: Sharad Ryder  MRN: [id number]  Today's Date: 4/24/2025    Admit Date: 4/23/2025        Discharge Needs Assessment       Row Name 04/24/25 1040       Living Environment    People in Home spouse    Name(s) of People in Home Ekateirna    Current Living Arrangements home    Primary Care Provided by self    Provides Primary Care For no one    Family Caregiver if Needed spouse    Family Caregiver Names Ekaterina    Able to Return to Prior Arrangements yes       Resource/Environmental Concerns    Resource/Environmental Concerns none       Transition Planning    Patient/Family Anticipates Transition to home with family    Transportation Anticipated family or friend will provide       Discharge Needs Assessment    Readmission Within the Last 30 Days no previous admission in last 30 days    Equipment Currently Used at Home none    Concerns to be Addressed no discharge needs identified    Equipment Needed After Discharge none    Discharge Coordination/Progress spoke to patient who lives with spouse and is independent at home prior to illness; has RX coverage and PCP; will follow for DC needs                   Discharge Plan    No documentation.                      Demographic Summary    No documentation.                  Functional Status    No documentation.                  Psychosocial    No documentation.                  Abuse/Neglect    No documentation.                  Legal    No documentation.                  Substance Abuse    No documentation.                  Patient Forms    No documentation.                     Nely Jarvis RN

## 2025-04-24 NOTE — TELEPHONE ENCOUNTER
Called pt to advise of testing. He is currently admitted and requests I mail these reminders to him. Mailed today.     I asked pt re: dental clearance and pt states he has dentures.

## 2025-04-24 NOTE — PLAN OF CARE
Goal Outcome Evaluation:  Plan of Care Reviewed With: patient        Progress: no change  Outcome Evaluation: OT evaluation completed. Pt is A&Ox4. Pt is eager to get up and moving. Pt is impulsive and with decreased safety awareness at times. Pt was SBA for bed mobility with HOB elevated. Pt was min A for total body dressing, assist only to manage clothing d/t IV line and O2/NC. Pt was supervision to SBA for sit to stand t/f. pt was CGA for functional mobility bed to landeros to chair. Skilled OT recommended to address adls, functional mobility and safety awareness. Recommended d/c home with spouse.    Anticipated Discharge Disposition (OT): home with assist

## 2025-04-24 NOTE — PLAN OF CARE
Goal Outcome Evaluation:              Outcome Evaluation: Pt in room alone. When asked about wt loss, pt denied any. Admission wt 109 lb; per past medical notes, pt was 120 lb on 3/1/2025, reflecting a 9.2% wt loss over the past month. UBW: 120 lb. Pt stated the recorded wt of 109 lb is incorrect. Pt receiving Lasix while in patient. Per I/O flowsheets negative 1767ml. Will reorder wt for more accurate assessment. However wt loss hard to asses due to fluid. Pt has a PMH of moderate malnutrition during a previous admission on 12/28/24. Currently reports good appetite at home and denies N/V, diarrhea, or difficulty with chewing/swallowing. Noted SOB. Dietitian reviewed calorie and protein needs with pt and discussed the impact of wt loss and COPD on muscle mass. Pt encouraged to consume three well-balanced meals per day and include calorie-dense snacks to support healthy wt maintenance. Pt agreeable to trying fortified vanilla pudding. Will monitor.

## 2025-04-24 NOTE — THERAPY EVALUATION
Patient Name: Sharad Ryder  : 1960    MRN: [id number]                              Today's Date: 2025       Admit Date: 2025    Visit Dx:     ICD-10-CM ICD-9-CM   1. Acute respiratory failure with hypoxia  J96.01 518.81   2. COPD exacerbation  J44.1 491.21   3. Acute congestive heart failure, unspecified heart failure type  I50.9 428.0   4. Pneumonia of right upper lobe due to infectious organism  J18.9 486     Patient Active Problem List   Diagnosis    Hyperlipidemia LDL goal <70    Primary hypertension    COPD (chronic obstructive pulmonary disease)    Transient ischemic attack (TIA)    Allergic reaction    Coronary artery disease of bypass graft of native heart with stable angina pectoris    PVC (premature ventricular contraction)    Leukocytosis    History of CVA (cerebrovascular accident)    Right sided weakness resolved    Acute CVA (cerebrovascular accident)    Right-sided carotid artery disease    S/P CABG x 3  Dr Robin     Status post aortic valve replacement with bioprosthetic valve    LVH (left ventricular hypertrophy)    History of right-sided carotid endarterectomy    Tobacco use    Chronic stable angina    PVD (peripheral vascular disease) with claudication    Aortoiliac occlusive disease    Preop testing    PAD (peripheral artery disease)    Acute exacerbation of chronic obstructive pulmonary disease (COPD)    COVID-19 virus infection    Elevated troponin level not due myocardial infarction    Acute on chronic diastolic CHF (congestive heart failure)    Severe aortic stenosis    Diastolic dysfunction    Acute on chronic respiratory failure with hypoxia    Community acquired bilateral lower lobe pneumonia    Moderate protein-calorie malnutrition    COPD with acute exacerbation    Severe sepsis    Pneumonia    Acute on chronic congestive heart failure     Past Medical History:   Diagnosis Date    Aneurysm     Anxiety     Arthritis     Carotid artery disease     Carotid stenosis,  right 02/01/2018    Post right carotid endarterectomy    COPD (chronic obstructive pulmonary disease)     Coronary artery disease     Heart murmur     History of right-sided carotid endarterectomy 02/16/2022    Hyperlipidemia LDL goal <70 12/14/2017    Left frontal lobe, right thalamus and right occipital CVA (cerebrovascular accident) (HCC) 01/27/2022    LVH (left ventricular hypertrophy) 02/16/2022    Pneumonia     Primary hypertension 12/14/2017    S/P CABG x 3 02/16/2022    LIMA to LAD, SVG to PDA and SVG to diagonal branch with TMR and left atrial appendage exclusion with atricure clip device 2/8/2018 per Dr. Cj Robin at [facility]     Severe aortic valve stenosis 12/14/2017    Status post aortic valve replacement with bioprosthetic valve 02/16/2022    Graciela Juarez bovine pericardial 19 mm valve 2/8/2018 per Dr. Cj Robin at [facility]    Tobacco use 12/14/2017    Wears glasses      Past Surgical History:   Procedure Laterality Date    AORTAGRAM Bilateral 10/27/2023    Procedure: LEFT LOWER EXTREMITY ANGIOGRAM, BILATERAL ILIAC BALLOON ANGIOPLASTY, BILATERAL ILIAC STENT PLACEMENT, MYNX CLOSURE;  Surgeon: Jl Salgado DO;  Location: [facility] OR ;  Service: Vascular;  Laterality: Bilateral;    APPENDECTOMY      CARDIAC CATHETERIZATION N/A 12/18/2017    Procedure: Left Heart Cath;  Surgeon: Aime Francois MD;  Location:  [facility] CATH INVASIVE LOC[facility];  Service:     CARDIAC CATHETERIZATION N/A 12/18/2017    Procedure: Right Heart Cath;  Surgeon: Aime Francois MD;  Location:  [facility] CATH INVAS[facility];  Service:     CARDIAC CATHETERIZATION Bilateral 1/4/2018    Procedure: Coronary angiography;  Surgeon: Alfonso Yi MD;  Location:  [facility] CATH INVAS[facility];  Service:     CARDIAC CATHETERIZATION Left 9/15/2021    Procedure: Cardiac Catheterization/Vascular Study NIMCO OK  Has 3 graft 2018;  Surgeon: Aime Francois MD;  Location:  [facility] CATH INVAS[facility];   Service: Cardiology;  Laterality: Left;    CAROTID ENDARTERECTOMY Right 2/1/2018    Procedure: RIGHT CAROTID ENDARTERECTOMY WITH EEG-awake;  Surgeon: Jl Salgado DO;  Location:  [facility] OR;  Service:     CORONARY ARTERY BYPASS GRAFT WITH AORTIC VALVE REPAIR/REPLACEMENT N/A 2/8/2018    Procedure: AORTIC VALVE REPLACEMENT,CORONARY ARTERY BYPASS GRAFTING x 3  WITH CONCHA AND RIGHT EVH, ATRIAL APPENDAGE EXCLUSION LEFT WITH TRANSESOPHAGEAL ECHOCARDIOGRAM, 17-CHANNEL TMR;  Surgeon: Cj Robin MD;  Location:  [facility] OR;  Service:     FINGER SURGERY Right 1990    OTHER SURGICAL HISTORY      skull srgery from accident in childhood.      General Information       Row Name 04/24/25 1023          OT Time and Intention    Subjective Information complains of;pain;numbness  -MM     Document Type evaluation  -MM     Mode of Treatment occupational therapy  -MM       Row Name 04/24/25 1023          General Information    Patient Profile Reviewed yes  -MM     Prior Level of Function independent:;all household mobility;community mobility;ADL's  -MM     Existing Precautions/Restrictions fall;oxygen therapy device and L/min  2L O2/NC  -MM     Barriers to Rehab medically complex;previous functional deficit  -MM       Row Name 04/24/25 1023          Living Environment    Current Living Arrangements home  -MM     People in Home spouse  tub/shower combo, does not use any medical equpipment but has a walking cane.  -MM       Row Name 04/24/25 1023          Home Main Entrance    Number of Stairs, Main Entrance none  -MM       Row Name 04/24/25 1023          Stairs Within Home, Primary    Number of Stairs, Within Home, Primary none  -MM       Row Name 04/24/25 1023          Cognition    Orientation Status (Cognition) oriented x 4  -MM       Row Name 04/24/25 1023          Safety Issues/Impairments Affecting Functional Mobility    Safety Issues Affecting Function (Mobility) ability to follow commands;at risk behavior observed;awareness  of need for assistance;impulsivity;insight into deficits/self-awareness;judgment;safety precaution awareness;safety precautions follow-through/compliance  -MM     Impairments Affecting Function (Mobility) balance;endurance/activity tolerance;pain;shortness of breath;strength  -MM               User Key  (r) = Recorded By, (t) = Taken By, (c) = Cosigned By      Initials Name Provider Type    MM Ruiz Mar, OTR/L Occupational Therapist                     Mobility/ADL's       Row Name 04/24/25 1023          Bed Mobility    Bed Mobility supine-sit  -MM     Supine-Sit [place] (Bed Mobility) standby assist  -MM     Assistive Device (Bed Mobility) head of bed elevated  -MM       Row Name 04/24/25 1023          Transfers    Transfers sit-stand transfer;stand-sit transfer  -MM       Row Name 04/24/25 1023          Sit-Stand Transfer    Sit-Stand [place] (Transfers) standby assist;supervision;verbal cues  -MM       Row Name 04/24/25 1023          Stand-Sit Transfer    Stand-Sit [place] (Transfers) supervision;verbal cues  -MM       Row Name 04/24/25 1023          Functional Mobility    Functional Mobility- Ind. Level contact guard assist;verbal cues required  -MM     Functional Mobility- Comment bed to landeros to chair.  -MM       Row Name 04/24/25 1023          Activities of Daily Living    BADL Assessment/Intervention upper body dressing;lower body dressing  -MM       Row Name 04/24/25 1023          Upper Body Dressing Assessment/Training    [place] Level (Upper Body Dressing) doff;don;minimum assist (75% patient effort);verbal cues;set up  -MM     Position (Upper Body Dressing) edge of bed sitting  -MM       Row Name 04/24/25 1023          Lower Body Dressing Assessment/Training    [place] Level (Lower Body Dressing) don;pants/bottoms;minimum assist (75% patient effort);set up;verbal cues  -MM     Position (Lower Body Dressing) edge of bed sitting;unsupported standing  -MM               User  Key  (r) = Recorded By, (t) = Taken By, (c) = Cosigned By      Initials Name Provider Type    MM Ruiz Mar, OTR/L Occupational Therapist                   Obj/Interventions       [facility] Name 04/24/25 1023          Sensory Assessment (Somatosensory)    Sensory Assessment (Somatosensory) UE sensation intact;other (see comments)  pt reports tingling in toes BLE, numbness bilateral feet, and numbness and achiness in BLE otherwise.  -MM       [facility] Name 04/24/25 1023          Vision Assessment/Intervention    Visual Impairment/Limitations corrective lenses full-time  -MM       [facility] Name 04/24/25 1023          Range of Motion Comprehensive    General Range of Motion bilateral upper extremity ROM WFL  -MM       Row Name 04/24/25 1023          Strength Comprehensive (MMT)    Comment, General Manual Muscle Testing (MMT) Assessment BUE 4+/5  -MM       Row Name 04/24/25 1023          Motor Skills    Motor Skills coordination  -MM     Coordination WFL;bilateral;upper extremity  opposition  -MM       Row Name 04/24/25 1023          Balance    Balance Assessment sitting static balance;sitting dynamic balance;standing static balance;standing dynamic balance  -MM     Static Sitting Balance independent  -MM     Dynamic Sitting Balance independent  -MM     Position, Sitting Balance unsupported;sitting edge of bed;supported;sitting in chair  -MM     Static Standing Balance supervision;contact guard;verbal cues  -MM     Dynamic Standing Balance contact guard;verbal cues  -MM     Position/Device Used, Standing Balance unsupported  -MM               User Key  (r) = Recorded By, (t) = Taken By, (c) = Cosigned By      Initials Name Provider Type    Ruiz Rodriguez, OTR/L Occupational Therapist                   Goals/Plan       Row Name 04/24/25 1023          Transfer Goal 1 (OT)    Activity/Assistive Device (Transfer Goal 1, OT) bed-to-chair/chair-to-bed;toilet;tub  -MM     [place] Level/Cues Needed (Transfer Goal 1, OT)  independent  -MM     Time Frame (Transfer Goal 1, OT) long term goal (LTG);by discharge  -MM     Progress/Outcome (Transfer Goal 1, OT) new goal  -MM       Row Name 04/24/25 1023          Bathing Goal 1 (OT)    Activity/Device (Bathing Goal 1, OT) bathing skills, all  -MM     [place] Level/Cues Needed (Bathing Goal 1, OT) independent  -MM     Time Frame (Bathing Goal 1, OT) long term goal (LTG);by discharge  -MM     Progress/Outcomes (Bathing Goal 1, OT) new goal  -MM       Row Name 04/24/25 1023          Dressing Goal 1 (OT)    Activity/Device (Dressing Goal 1, OT) dressing skills, all  -MM     [place]/Cues Needed (Dressing Goal 1, OT) independent  -MM     Time Frame (Dressing Goal 1, OT) long term goal (LTG);by discharge  -MM     Progress/Outcome (Dressing Goal 1, OT) new goal  -MM       Row Name 04/24/25 1023          Problem Specific Goal 1 (OT)    Problem Specific Goal 1 (OT) Pt will independently implement one pain management technique to decrease pain and improve functional adl performance.  -MM     Time Frame (Problem Specific Goal 1, OT) long term goal (LTG);by discharge  -MM     Progress/Outcome (Problem Specific Goal 1, OT) new goal  -MM       Row Name 04/24/25 1023          Therapy Assessment/Plan (OT)    Planned Therapy Interventions (OT) activity tolerance training;BADL retraining;functional balance retraining;occupation/activity based interventions;patient/caregiver education/training;ROM/therapeutic exercise;strengthening exercise;transfer/mobility retraining  -MM               User Key  (r) = Recorded By, (t) = Taken By, (c) = Cosigned By      Initials Name Provider Type    MM Ruiz Mar, OTR/L Occupational Therapist                   Clinical Impression       Row Name 04/24/25 1023          Pain Assessment    Pretreatment Pain Rating 4/10  -MM     Posttreatment Pain Rating 3/10  -MM     Pain Location back  -MM     Pain Side/Orientation generalized;lower  -MM     Pain Management  "Interventions activity modification encouraged;exercise or physical activity utilized;movement retraining implemented  -MM     Pre/Posttreatment Pain Comment Pt reports pain in left shoulder \"2-3\"/10, for last 6 months but reports it has improved since his neck popped a couple of days ago.  -MM       Row Name 04/24/25 1023          Plan of Care Review    Plan of Care Reviewed With patient  -MM     Progress no change  -MM     Outcome Evaluation OT evaluation completed. Pt is A&Ox4. Pt is eager to get up and moving. Pt is impulsive and with decreased safety awareness at times. Pt was SBA for bed mobility with HOB elevated. Pt was min A for total body dressing, assist only to manage clothing d/t IV line and O2/NC. Pt was supervision to SBA for sit to stand t/f. pt was CGA for functional mobility bed to landeros to chair. Skilled OT recommended to address adls, functional mobility and safety awareness. Recommended d/c home with spouse.  -MM       Row Name 04/24/25 1023          Therapy Assessment/Plan (OT)    Patient/Family Therapy Goal Statement (OT) to go home  -MM     Rehab Potential (OT) good  -MM     Criteria for Skilled Therapeutic Interventions Met (OT) yes;meets criteria;skilled treatment is necessary  -MM     Therapy Frequency (OT) 3 times/wk  -MM     Predicted Duration of Therapy Intervention (OT) until hospital discharge  -MM       Row Name 04/24/25 1023          Therapy Plan Review/Discharge Plan (OT)    Anticipated Discharge Disposition (OT) home with assist  -       Row Name 04/24/25 1023          Vital Signs    Posttreatment Heart Rate (beats/min) 94  -MM     Pre SpO2 (%) 92  -MM     O2 Delivery Pre Treatment supplemental O2  2L O2/NC.  -MM     Intra SpO2 (%) --  SpO2 remains greater than 90%.  -MM     O2 Delivery Intra Treatment supplemental O2  2L O2/NC.  -MM     Post SpO2 (%) 99  -MM     O2 Delivery Post Treatment supplemental O2  2L O2/NC.  -MM     Pre Patient Position --  fowlers  -MM     Intra Patient " Position Standing  -MM     Post Patient Position Sitting  -MM       Row Name 04/24/25 1023          Positioning and Restraints    Pre-Treatment Position in bed  -MM     Post Treatment Position chair  -MM     In Chair notified nsg;sitting;call light within reach;encouraged to call for assist  -MM               User Key  (r) = Recorded By, (t) = Taken By, (c) = Cosigned By      Initials Name Provider Type    MM Ruiz Mar, OTR/L Occupational Therapist                   Outcome Measures       Row Name 04/24/25 1023          How much help from another is currently needed...    Putting on and taking off regular lower body clothing? 3  -MM     Bathing (including washing, rinsing, and drying) 3  -MM     Toileting (which includes using toilet bed pan or urinal) 3  -MM     Putting on and taking off regular upper body clothing 4  -MM     Taking care of personal grooming (such as brushing teeth) 4  -MM     Eating meals 4  -MM     AM-PAC 6 Clicks Score (OT) 21  -MM       Row Name 04/24/25 1023          Functional Assessment    Outcome Measure Options AM-PAC 6 Clicks Daily Activity (OT)  -MM               User Key  (r) = Recorded By, (t) = Taken By, (c) = Cosigned By      Initials Name Provider Type    MM Ruiz Mar OTR/L Occupational Therapist                    Occupational Therapy Education       Title: PT OT SLP Therapies (In Progress)       Topic: Occupational Therapy (In Progress)       Point: ADL training (Done)       Learning Progress Summary            Patient Acceptance, E, VU,NR by MM at 4/24/2025 1122                      Point: Precautions (Done)       Learning Progress Summary            Patient Acceptance, E, VU,NR by MM at 4/24/2025 1122                      Point: Body mechanics (Done)       Learning Progress Summary            Patient Acceptance, E, VU,NR by MM at 4/24/2025 1122                                      User Key       Initials Effective Dates Name Provider Type [facility]    ROBBY  07/11/23 -  Ruiz Mar, OTR/L Occupational Therapist OT                  OT Recommendation and Plan  Planned Therapy Interventions (OT): activity tolerance training, BADL retraining, functional balance retraining, occupation/activity based interventions, patient/caregiver education/training, ROM/therapeutic exercise, strengthening exercise, transfer/mobility retraining  Therapy Frequency (OT): 3 times/wk  Plan of Care Review  Plan of Care Reviewed With: patient  Progress: no change  Outcome Evaluation: OT evaluation completed. Pt is A&Ox4. Pt is eager to get up and moving. Pt is impulsive and with decreased safety awareness at times. Pt was SBA for bed mobility with HOB elevated. Pt was min A for total body dressing, assist only to manage clothing d/t IV line and O2/NC. Pt was supervision to SBA for sit to stand t/f. pt was CGA for functional mobility bed to landeros to chair. Skilled OT recommended to address adls, functional mobility and safety awareness. Recommended d/c home with spouse.     Time Calculation:         Time Calculation- OT       Row Name 04/24/25 1023             Time Calculation- OT    OT Start Time 1023  -MM      OT Stop Time 1128  -MM      OT Time Calculation (min) 65 min  -MM      OT Non-Billable Time (min) 5 min  -MM      OT Received On 04/24/25  -MM      OT Goal Re-Cert Due Date 05/04/25  -MM                User Key  (r) = Recorded By, (t) = Taken By, (c) = Cosigned By      Initials Name Provider Type    MM Ruiz Mar, OTR/L Occupational Therapist                  Therapy Charges for Today       Code Description Service Date Service Provider Modifiers Qty    [id number]  OT EVAL LOW COMPLEXITY 4 4/24/2025 Ruiz Mar, OTR/L GO 1                 FANI Miranda/TINO  4/24/2025

## 2025-04-24 NOTE — PLAN OF CARE
Patient w/c/o chronic low back pain.  Norco 7.5 given.  O2 weaned to RA.  Echo, EF 61-65%, Jardiance started.   Diuresis continued w/IV lasix.  Patient voiding adequate amounts.  Solumedrol Q12.  MRSA swab negative.  IV abx given per MAR.  VSS    Problem: Adult Inpatient Plan of Care  Goal: Plan of Care Review  Outcome: Progressing  Goal: Patient-Specific Goal (Individualized)  Outcome: Progressing  Goal: Absence of Hospital-Acquired Illness or Injury  Outcome: Progressing  Intervention: Identify and Manage Fall Risk  Recent Flowsheet Documentation  Taken 4/24/2025 0724 by Peggy Bone RN  Safety Promotion/Fall Prevention: safety round/check completed  Intervention: Prevent Skin Injury  Recent Flowsheet Documentation  Taken 4/24/2025 0724 by Peggy Bone RN  Body Position: position changed independently  Goal: Optimal Comfort and Wellbeing  Outcome: Progressing  Goal: Readiness for Transition of Care  Outcome: Progressing  Intervention: Mutually Develop Transition Plan  Recent Flowsheet Documentation  Taken 4/24/2025 5069 by Peggy Bone RN  Transportation Anticipated: family or friend will provide  Patient/Family Anticipated Services at Transition: none  Patient/Family Anticipates Transition to: home with family     Problem: Heart Failure  Goal: Optimal Coping  Outcome: Progressing  Goal: Optimal Cardiac Output and Blood Flow  Outcome: Progressing  Goal: Stable Heart Rate and Rhythm  Outcome: Progressing  Goal: Fluid and Electrolyte Balance  Outcome: Progressing  Goal: Optimal Functional Ability  Outcome: Progressing  Intervention: Optimize Functional Ability  Recent Flowsheet Documentation  Taken 4/24/2025 0724 by Peggy Bone RN  Activity Management: ambulated in room  Goal: Improved Oral Intake  Outcome: Progressing  Goal: Effective Oxygenation and Ventilation  Outcome: Progressing  Intervention: Promote Airway Secretion Clearance  Recent Flowsheet Documentation  Taken 4/24/2025 0724  by Peggy Bone, RN  Activity Management: ambulated in room  Goal: Effective Breathing Pattern During Sleep  Outcome: Progressing     Problem: Comorbidity Management  Goal: Maintenance of COPD Symptom Control  Outcome: Progressing  Goal: Maintenance of Heart Failure Symptom Control  Outcome: Progressing  Goal: Blood Pressure in Desired Range  Outcome: Progressing   Goal Outcome Evaluation:

## 2025-04-25 ENCOUNTER — APPOINTMENT (OUTPATIENT)
Dept: CT IMAGING | Facility: HOSPITAL | Age: 65
DRG: 193 | End: 2025-04-25
Payer: MEDICARE

## 2025-04-25 ENCOUNTER — READMISSION MANAGEMENT (OUTPATIENT)
Dept: CALL CENTER | Facility: HOSPITAL | Age: 65
End: 2025-04-25
Payer: MEDICARE

## 2025-04-25 VITALS
OXYGEN SATURATION: 94 % | HEIGHT: 64 IN | WEIGHT: 109.6 LBS | DIASTOLIC BLOOD PRESSURE: 64 MMHG | TEMPERATURE: 97.5 F | RESPIRATION RATE: 18 BRPM | HEART RATE: 97 BPM | BODY MASS INDEX: 18.71 KG/M2 | SYSTOLIC BLOOD PRESSURE: 143 MMHG

## 2025-04-25 PROBLEM — J18.9 PNEUMONIA OF BOTH LUNGS DUE TO INFECTIOUS ORGANISM: Status: RESOLVED | Noted: 2025-04-23 | Resolved: 2025-04-25

## 2025-04-25 PROBLEM — I50.33 ACUTE ON CHRONIC HEART FAILURE WITH PRESERVED EJECTION FRACTION (HFPEF): Status: ACTIVE | Noted: 2025-04-23

## 2025-04-25 PROBLEM — J18.9 CAP (COMMUNITY ACQUIRED PNEUMONIA): Status: RESOLVED | Noted: 2024-12-25 | Resolved: 2025-04-25

## 2025-04-25 PROBLEM — J12.9 VIRAL PNEUMONIA: Status: ACTIVE | Noted: 2025-04-25

## 2025-04-25 LAB
ANION GAP SERPL CALCULATED.3IONS-SCNC: 16 MMOL/L (ref 5–15)
BUN SERPL-MCNC: 23 MG/DL (ref 8–23)
BUN/CREAT SERPL: 37.1 (ref 7–25)
CALCIUM SPEC-SCNC: 9.3 MG/DL (ref 8.6–10.5)
CHLORIDE SERPL-SCNC: 97 MMOL/L (ref 98–107)
CO2 SERPL-SCNC: 26 MMOL/L (ref 22–29)
CREAT SERPL-MCNC: 0.62 MG/DL (ref 0.76–1.27)
DEPRECATED RDW RBC AUTO: 43.2 FL (ref 37–54)
EGFRCR SERPLBLD CKD-EPI 2021: 106.1 ML/MIN/1.73
ERYTHROCYTE [DISTWIDTH] IN BLOOD BY AUTOMATED COUNT: 12.9 % (ref 12.3–15.4)
GLUCOSE SERPL-MCNC: 150 MG/DL (ref 65–99)
HCT VFR BLD AUTO: 35.8 % (ref 37.5–51)
HGB BLD-MCNC: 11.5 G/DL (ref 13–17.7)
MCH RBC QN AUTO: 29.6 PG (ref 26.6–33)
MCHC RBC AUTO-ENTMCNC: 32.1 G/DL (ref 31.5–35.7)
MCV RBC AUTO: 92.3 FL (ref 79–97)
PLATELET # BLD AUTO: 471 10*3/MM3 (ref 140–450)
PMV BLD AUTO: 9.2 FL (ref 6–12)
POTASSIUM SERPL-SCNC: 3.7 MMOL/L (ref 3.5–5.2)
RBC # BLD AUTO: 3.88 10*6/MM3 (ref 4.14–5.8)
SODIUM SERPL-SCNC: 139 MMOL/L (ref 136–145)
WBC NRBC COR # BLD AUTO: 26.86 10*3/MM3 (ref 3.4–10.8)

## 2025-04-25 PROCEDURE — 71250 CT THORAX DX C-: CPT

## 2025-04-25 PROCEDURE — 25010000002 FUROSEMIDE PER 20 MG: Performed by: FAMILY MEDICINE

## 2025-04-25 PROCEDURE — 25010000002 CEFEPIME PER 500 MG: Performed by: NURSE PRACTITIONER

## 2025-04-25 PROCEDURE — 94799 UNLISTED PULMONARY SVC/PX: CPT

## 2025-04-25 PROCEDURE — 25010000002 METHYLPREDNISOLONE PER 40 MG: Performed by: FAMILY MEDICINE

## 2025-04-25 PROCEDURE — 25010000002 DOXYCYCLINE 100 MG RECONSTITUTED SOLUTION 1 EACH VIAL: Performed by: NURSE PRACTITIONER

## 2025-04-25 PROCEDURE — 80048 BASIC METABOLIC PNL TOTAL CA: CPT | Performed by: NURSE PRACTITIONER

## 2025-04-25 PROCEDURE — 85027 COMPLETE CBC AUTOMATED: CPT | Performed by: NURSE PRACTITIONER

## 2025-04-25 PROCEDURE — 94664 DEMO&/EVAL PT USE INHALER: CPT

## 2025-04-25 PROCEDURE — 99232 SBSQ HOSP IP/OBS MODERATE 35: CPT | Performed by: NURSE PRACTITIONER

## 2025-04-25 RX ORDER — FUROSEMIDE 20 MG/1
20 TABLET ORAL DAILY PRN
Qty: 30 TABLET | Refills: 3 | Status: SHIPPED | OUTPATIENT
Start: 2025-04-25

## 2025-04-25 RX ORDER — SPIRONOLACTONE 25 MG/1
25 TABLET ORAL DAILY
Qty: 90 TABLET | Refills: 0 | Status: SHIPPED | OUTPATIENT
Start: 2025-04-26

## 2025-04-25 RX ORDER — PREDNISONE 20 MG/1
TABLET ORAL
Qty: 6 TABLET | Refills: 0 | Status: SHIPPED | OUTPATIENT
Start: 2025-04-25 | End: 2025-05-01

## 2025-04-25 RX ORDER — FUROSEMIDE 40 MG/1
40 TABLET ORAL DAILY PRN
Status: DISCONTINUED | OUTPATIENT
Start: 2025-04-26 | End: 2025-04-25 | Stop reason: HOSPADM

## 2025-04-25 RX ORDER — BUDESONIDE AND FORMOTEROL FUMARATE DIHYDRATE 160; 4.5 UG/1; UG/1
2 AEROSOL RESPIRATORY (INHALATION)
Qty: 10.2 G | Refills: 1 | Status: SHIPPED | OUTPATIENT
Start: 2025-04-25

## 2025-04-25 RX ORDER — METOPROLOL SUCCINATE 25 MG/1
25 TABLET, EXTENDED RELEASE ORAL
Qty: 90 TABLET | Refills: 0 | Status: SHIPPED | OUTPATIENT
Start: 2025-04-25

## 2025-04-25 RX ORDER — SPIRONOLACTONE 25 MG/1
25 TABLET ORAL DAILY
Qty: 30 TABLET | Refills: 3 | Status: SHIPPED | OUTPATIENT
Start: 2025-04-25 | End: 2025-05-01

## 2025-04-25 RX ADMIN — CEFEPIME 2000 MG: 2 INJECTION, POWDER, FOR SOLUTION INTRAVENOUS at 08:57

## 2025-04-25 RX ADMIN — BUDESONIDE AND FORMOTEROL FUMARATE DIHYDRATE 2 PUFF: 160; 4.5 AEROSOL RESPIRATORY (INHALATION) at 07:21

## 2025-04-25 RX ADMIN — TERAZOSIN HYDROCHLORIDE 5 MG: 5 CAPSULE ORAL at 08:58

## 2025-04-25 RX ADMIN — TAMSULOSIN HYDROCHLORIDE 0.4 MG: 0.4 CAPSULE ORAL at 08:59

## 2025-04-25 RX ADMIN — CEFEPIME 2000 MG: 2 INJECTION, POWDER, FOR SOLUTION INTRAVENOUS at 00:14

## 2025-04-25 RX ADMIN — CITALOPRAM HYDROBROMIDE 20 MG: 20 TABLET ORAL at 08:58

## 2025-04-25 RX ADMIN — EMPAGLIFLOZIN 10 MG: 10 TABLET, FILM COATED ORAL at 08:58

## 2025-04-25 RX ADMIN — HYDROCODONE BITARTRATE AND ACETAMINOPHEN 1 TABLET: 7.5; 325 TABLET ORAL at 04:16

## 2025-04-25 RX ADMIN — Medication 10 ML: at 08:59

## 2025-04-25 RX ADMIN — SPIRONOLACTONE 25 MG: 25 TABLET ORAL at 08:58

## 2025-04-25 RX ADMIN — HYDROCODONE BITARTRATE AND ACETAMINOPHEN 1 TABLET: 7.5; 325 TABLET ORAL at 12:56

## 2025-04-25 RX ADMIN — FUROSEMIDE 40 MG: 10 INJECTION, SOLUTION INTRAVENOUS at 06:03

## 2025-04-25 RX ADMIN — ROSUVASTATIN CALCIUM 5 MG: 5 TABLET, FILM COATED ORAL at 08:58

## 2025-04-25 RX ADMIN — METHYLPREDNISOLONE SODIUM SUCCINATE 40 MG: 40 INJECTION, POWDER, FOR SOLUTION INTRAMUSCULAR; INTRAVENOUS at 06:03

## 2025-04-25 RX ADMIN — CLOPIDOGREL BISULFATE 75 MG: 75 TABLET, FILM COATED ORAL at 08:58

## 2025-04-25 RX ADMIN — DOXYCYCLINE 100 MG: 100 INJECTION, POWDER, LYOPHILIZED, FOR SOLUTION INTRAVENOUS at 04:06

## 2025-04-25 NOTE — PLAN OF CARE
Goal Outcome Evaluation:   PT. AAOX4.  Per Tele - SR - ST .  Decreased shortness of breath this shift. Pt. On RA during the night. VSS. Pain controlled with PRN Hydrocodone. Safety maintained.

## 2025-04-25 NOTE — DISCHARGE INSTRUCTIONS
1.  Return to ER for new or worsening symptoms.    2.  Follow-up with the Heart Failure clinic at [facility] in 1 week.    3.  Follow-up with PCP in 1 week.    4.  Follow-up with Dr. Francois, Cardiologist as scheduled.    5.  New prescription for Lasix 20 mg 1 p.o. daily as needed for swelling or shortness of breath.    6.  Toprol-XL dose is now 25 mg daily.    7.   New prescription for Prednisone 20 mg, take 2 daily x 3 days, then take 1 daily x 3 days, then stop.    8.  New prescription for Jardiance 10 mg 1 daily.    9.  New prescription for Spironolactone 25 mg 1 daily.    10.  New prescription for Symbicort inhaler.    11.  Weigh self daily and record for Heart Failure Clinic.

## 2025-04-25 NOTE — DISCHARGE SUMMARY
[facility] Medicine Services  DISCHARGE SUMMARY       Date of Admission: 4/23/2025  Date of Discharge:  4/25/2025  Primary Care Physician: Kerry Mtz APRN    Presenting Problem/History of Present Illness:  Fever, shortness of breath     Final Discharge Diagnoses:  Active Hospital Problems    Diagnosis     Viral pneumonia     Acute on chronic heart failure with preserved ejection fraction (HFpEF)     Acute on chronic diastolic CHF (congestive heart failure)     Acute exacerbation of chronic obstructive pulmonary disease (COPD)        Consults: Cardiology    Procedures Performed: none    Pertinent Test Results:   Results for orders placed during the hospital encounter of 04/23/25    Adult Transthoracic Echo Complete w/ Color, Spectral and Contrast if Necessary Per Protocol    Interpretation Summary    Left ventricular systolic function is normal. Left ventricular ejection fraction appears to be 61 - 65%.    There is a bioprosthetic aortic valve present. The prosthetic aortic valve peak and mean gradients are elevated. There is severe stenosis or obstruction of the prosthetic aortic valve.    Severe aortic valve stenosis is present.    Left ventricular wall thickness is consistent with moderate to severe concentric hypertrophy.    Left ventricular diastolic function is consistent with (grade II w/high LAP) pseudonormalization.    The left atrial cavity is moderate to severely dilated.    Left atrial volume is moderately increased.    The right atrial cavity is borderline dilated.      Imaging Results (All)       Procedure Component Value Units Date/Time    CT Chest Without Contrast Diagnostic [[id number]] Collected: 04/25/25 1231     Updated: 04/25/25 1234    Narrative:      EXAMINATION: CT CHEST WO CONTRAST DIAGNOSTIC- 4/25/2025 12:22 PM     HISTORY: PNA vs pulmonary edema; J96.01-Acute respiratory failure with  hypoxia; J44.1-Chronic obstructive pulmonary disease with  (acute)  exacerbation; I50.9-Heart failure, unspecified; J18.9-Pneumonia,  unspecified organism; Z74.09-Other reduced mobility     TOTAL DOSE: 80.63 mGy.cm (Automatic exposure control technique was  implemented in an effort to keep the radiation dose as low as possible  without compromising image quality)     REPORT: Spiral CT of the chest was performed without intravenous  contrast from the thoracic inlet through the upper abdomen.  Reconstructed coronal and sagittal images are also reviewed.     Comparison: CT angio chest 3/1/2025, chest x-ray 4/23/2025.     Review of lung windows redemonstrates severe changes of diffuse  centrilobular emphysema, the consolidating dependent bibasilar  infiltrates have resolved and there is minimal dependent atelectasis,  greater on the right. The vascular congestion and pulmonary edema seen  before is resolved also. No pneumothorax or pleural effusion is  identified. No lung mass or suspicious pulmonary nodule is identified.     Review of mediastinal windows demonstrates a normal appearance of the  thyroid gland. There is heavy calcified atherosclerotic plaque within  the thoracic aortic arch and at the origins of the great vessels as  before. There is ectasia of the ascending aorta with a diameter of 3.7  cm. This is stable. Previous median sternotomy is noted, a left atrial  appendage clamp is present. There is heavy calcified plaque within the  native coronary arteries. A prosthetic aortic valve is present. There is  dense calcification of the mitral annulus. Heart size appears to be  normal. No acute abnormality in the upper abdomen, however there is  extensive calcified plaque within the upper abdominal aorta with  probable severe stenosis images 144 through 150 of series 2. Review of  bone windows shows no acute osseous abnormality.       Impression:      1. Interval resolution of pulmonary edema and bibasilar pulmonary  infiltrates.  2. Severe emphysema as before, no  pneumothorax or pleural effusion.  3. Previous median sternotomy, aortic valvuloplasty and placement of a  left atrial appendage occlusion device.  4. Extensive stable calcified plaque within the thoracoabdominal aorta  appears unchanged.                       This report was signed and finalized on 4/25/2025 12:31 PM by Dr. Son Womack MD.       XR Chest 1 View [[id number]] Collected: 04/23/25 1306     Updated: 04/23/25 1312    Narrative:      EXAMINATION: XR CHEST 1 VW- 4/23/2025 1:06 PM     HISTORY: soa.     REPORT: Frontal view of the chest was obtained.     COMPARISON: Chest x-ray 3/1/2025.     The lungs are hyperinflated with areas of associated hyperlucency  compatible with advanced emphysema. There are persistent interstitial  and parenchymal opacities with a similar distribution, though possibly  slightly increased in the right upper lobe. This may represent ongoing  pulmonary edema or pneumonia, and some background pulmonary fibrosis is  not excluded. No pneumothorax or pleural effusion is identified. There  is previous CABG, aortic valvuloplasty. Heart size is normal. A left  atrial appendage occlusion clamp is present. No acute osseous  abnormality is identified.       Impression:      Severe emphysema with persistent coarse mixed interstitial  and alveolar infiltrates, this appears slightly increased on the right  upper lobe. Differential includes ongoing pulmonary edema, as well as  pneumonia and possible underlying pulmonary fibrosis.     This report was signed and finalized on 4/23/2025 1:09 PM by Dr. Son Womack MD.             LAB RESULTS:      Lab 04/25/25  0445 04/24/25  0430 04/23/25  1308   WBC 26.86* 20.41* 22.57*   HEMOGLOBIN 11.5* 12.1* 11.2*   HEMATOCRIT 35.8* 35.7* 32.5*   PLATELETS 471* 444 408   NEUTROS ABS  --  19.39* 19.62*   IMMATURE GRANS (ABS)  --   --  0.20*   LYMPHS ABS  --   --  1.27   MONOS ABS  --   --  1.37*   EOS ABS  --  0.00 0.04   MCV 92.3 90.2 89.0   LACTATE  --    --  2.0         Lab 04/25/25  0445 04/24/25  0430 04/23/25  1308 04/23/25  1304   SODIUM 139 138 133*  --    SODIUM, ARTERIAL  --   --   --  134*   POTASSIUM 3.7 3.2* 3.5  --    CHLORIDE 97* 97* 92*  --    CO2 26.0 28.0 26.0  --    ANION GAP 16.0* 13.0 15.0  --    BUN 23 11 11  --    CREATININE 0.62* 0.52* 0.61*  --    EGFR 106.1 111.9 106.6  --    GLUCOSE 150* 159* 128*  --    CALCIUM 9.3 9.2 8.9  --    MAGNESIUM  --  2.0  --   --          Lab 04/23/25  1308   TOTAL PROTEIN 6.9   ALBUMIN 3.9   GLOBULIN 3.0   ALT (SGPT) 11   AST (SGOT) 16   BILIRUBIN 0.4   ALK PHOS 69         Lab 04/23/25  1308   PROBNP 7,295.0*                 Lab 04/23/25  1304   PH, ARTERIAL 7.500*   PCO2, ARTERIAL 35.8   PO2 ART 53.1*   O2 SATURATION ART 89.3*   HCO3 ART 27.9*   BASE EXCESS ART 4.6*   CARBOXYHEMOGLOBIN 1.5     Brief Urine Lab Results  (Last result in the past 365 days)        Color   Clarity   Blood   Leuk Est   Nitrite   Protein   CREAT   Urine HCG        04/23/25 1620 Yellow   Clear   Small (1+)   Large (3+)   Positive   Negative                 Microbiology Results (last 10 days)       Procedure Component Value - Date/Time    MRSA Screen, PCR (Inpatient) - Swab, Nares [[id number]]  (Normal) Collected: 04/23/25 1859    Lab Status: Final result Specimen: Swab from Nares Updated: 04/23/25 2024     MRSA PCR No MRSA Detected    Narrative:      The negative predictive value of this diagnostic test is high and should only be used to consider de-escalating anti-MRSA therapy. A positive result may indicate colonization with MRSA and must be correlated clinically.    S. Pneumo Ag Urine or CSF - Urine, Urine, Clean Catch [[id number]]  (Normal) Collected: 04/23/25 1620    Lab Status: Final result Specimen: Urine, Clean Catch Updated: 04/23/25 1642     Strep Pneumo Ag Negative    Legionella Antigen, Urine - Urine, Urine, Clean Catch [[id number]]  (Normal) Collected: 04/23/25 1620    Lab Status: Final result Specimen: Urine, Clean Catch  Updated: 04/23/25 1641     LEGIONELLA ANTIGEN, URINE Negative    Urine Culture - Urine, Urine, Clean Catch [[id number]]  (Normal) Collected: 04/23/25 1620    Lab Status: Final result Specimen: Urine, Clean Catch Updated: 04/24/25 1701     Urine Culture No growth    Respiratory Panel PCR w/COVID-19(SARS-CoV-2) JUNAID/LISA/INGRID/PAD/COR/LESIA In-House, NP Swab in UTM/VTM, 2 HR TAT - Swab, Nasopharynx [[id number]]  (Normal) Collected: 04/23/25 1312    Lab Status: Final result Specimen: Swab from Nasopharynx Updated: 04/23/25 1414     ADENOVIRUS, PCR Not Detected     Coronavirus 229E Not Detected     Coronavirus HKU1 Not Detected     Coronavirus NL63 Not Detected     Coronavirus OC43 Not Detected     COVID19 Not Detected     Human Metapneumovirus Not Detected     Human Rhinovirus/Enterovirus Not Detected     Influenza A PCR Not Detected     Influenza B PCR Not Detected     Parainfluenza Virus 1 Not Detected     Parainfluenza Virus 2 Not Detected     Parainfluenza Virus 3 Not Detected     Parainfluenza Virus 4 Not Detected     RSV, PCR Not Detected     Bordetella pertussis pcr Not Detected     Bordetella parapertussis PCR Not Detected     Chlamydophila pneumoniae PCR Not Detected     Mycoplasma pneumo by PCR Not Detected    Narrative:      In the setting of a positive respiratory panel with a viral infection PLUS a negative procalcitonin without other underlying concern for bacterial infection, consider observing off antibiotics or discontinuation of antibiotics and continue supportive care. If the respiratory panel is positive for atypical bacterial infection (Bordetella pertussis, Chlamydophila pneumoniae, or Mycoplasma pneumoniae), consider antibiotic de-escalation to target atypical bacterial infection.    Blood Culture - Blood, Arm, Left [[id number]]  (Normal) Collected: 04/23/25 1308    Lab Status: Preliminary result Specimen: Blood from Arm, Left Updated: 04/25/25 1331     Blood Culture No growth at 2 days    Blood Culture -  Blood, Arm, Right [[id number]]  (Normal) Collected: 04/23/25 1302    Lab Status: Preliminary result Specimen: Blood from Arm, Right Updated: 04/25/25 1331     Blood Culture No growth at 2 days          Microbiology Results (last 10 days)       Procedure Component Value - Date/Time    MRSA Screen, PCR (Inpatient) - Swab, Nares [[id number]]  (Normal) Collected: 04/23/25 1859    Lab Status: Final result Specimen: Swab from Nares Updated: 04/23/25 2024     MRSA PCR No MRSA Detected    Narrative:      The negative predictive value of this diagnostic test is high and should only be used to consider de-escalating anti-MRSA therapy. A positive result may indicate colonization with MRSA and must be correlated clinically.    S. Pneumo Ag Urine or CSF - Urine, Urine, Clean Catch [[id number]]  (Normal) Collected: 04/23/25 1620    Lab Status: Final result Specimen: Urine, Clean Catch Updated: 04/23/25 1642     Strep Pneumo Ag Negative    Legionella Antigen, Urine - Urine, Urine, Clean Catch [[id number]]  (Normal) Collected: 04/23/25 1620    Lab Status: Final result Specimen: Urine, Clean Catch Updated: 04/23/25 1641     LEGIONELLA ANTIGEN, URINE Negative    Urine Culture - Urine, Urine, Clean Catch [[id number]]  (Normal) Collected: 04/23/25 1620    Lab Status: Final result Specimen: Urine, Clean Catch Updated: 04/24/25 1701     Urine Culture No growth    Respiratory Panel PCR w/COVID-19(SARS-CoV-2) JUNAID/LISA/INGRID/PAD/COR/LESIA In-House, NP Swab in UTM/VTM, 2 HR TAT - Swab, Nasopharynx [[id number]]  (Normal) Collected: 04/23/25 1312    Lab Status: Final result Specimen: Swab from Nasopharynx Updated: 04/23/25 1414     ADENOVIRUS, PCR Not Detected     Coronavirus 229E Not Detected     Coronavirus HKU1 Not Detected     Coronavirus NL63 Not Detected     Coronavirus OC43 Not Detected     COVID19 Not Detected     Human Metapneumovirus Not Detected     Human Rhinovirus/Enterovirus Not Detected     Influenza A PCR Not Detected     Influenza B PCR  Not Detected     Parainfluenza Virus 1 Not Detected     Parainfluenza Virus 2 Not Detected     Parainfluenza Virus 3 Not Detected     Parainfluenza Virus 4 Not Detected     RSV, PCR Not Detected     Bordetella pertussis pcr Not Detected     Bordetella parapertussis PCR Not Detected     Chlamydophila pneumoniae PCR Not Detected     Mycoplasma pneumo by PCR Not Detected    Narrative:      In the setting of a positive respiratory panel with a viral infection PLUS a negative procalcitonin without other underlying concern for bacterial infection, consider observing off antibiotics or discontinuation of antibiotics and continue supportive care. If the respiratory panel is positive for atypical bacterial infection (Bordetella pertussis, Chlamydophila pneumoniae, or Mycoplasma pneumoniae), consider antibiotic de-escalation to target atypical bacterial infection.    Blood Culture - Blood, Arm, Left [[id number]]  (Normal) Collected: 04/23/25 1308    Lab Status: Preliminary result Specimen: Blood from Arm, Left Updated: 04/25/25 1331     Blood Culture No growth at 2 days    Blood Culture - Blood, Arm, Right [[id number]]  (Normal) Collected: 04/23/25 1302    Lab Status: Preliminary result Specimen: Blood from Arm, Right Updated: 04/25/25 1331     Blood Culture No growth at 2 days             Documented weights    04/23/25 1257 04/24/25 0416 04/24/25 1400 04/25/25 0528   Weight: 47.9 kg (105 lb 9.6 oz) 49.8 kg (109 lb 12.8 oz) 49.4 kg (109 lb) 49.7 kg (109 lb 9.6 oz)        Hospital Course:   Sharad Ryder 64-year-old male who presented to the emergency department on 4/23/2025 for further evaluation of his fevers up to 103 F, increased shortness of breath from baseline x 3 days.  Has a past medical history remarkable for COPD, CHF, recent hospitalization proximately 2 months ago for sepsis and bilateral lower lobe pneumonia, severe aortic stenosis.  ED workup remarkable for WBC 22.57, Hgb 11.2, Hct 32.5, BNP 7295, sodium 133,  creatinine 0.6, SPO2 78% on arrival, ABG pH 7.5, pCO2 35.8, PO2 53.1, HCO3 27.9, BE 4.6, oxyhemoglobin 87.8.  Chest x-ray with severe emphysema, persistent coarse mixed interstitial and alveolar infiltrates, appeared slightly increased on the right upper lobe.  Patient received IV antibiotics Rocephin and azithromycin, IV Lasix x 1 dose, albuterol nebulized.  He was  admitted to hospitalist service for further evaluation and management of COPD exacerbation with bilateral lower lobe pneumonia and treatment of pulmonary edema.     On admission assessment done in the ED on 4/23/2025 patient was lying flat on his right side, had 1200 mL urine out since given Lasix, SpO2 98% on 4 L, titrated O2 down in the ED this assessment to 2 L and maintain SpO2 of 94%.  Heart RRR, bilateral lower lobe expiratory wheezing otherwise diminished lung sounds in all lobes.  No rales appreciated.  Denied nausea, vomiting, diarrhea, chest pain, abdominal pain or signs or symptoms of urinary tract infection.  Orders for UA placed, IV antibiotics changed to cefepime and doxycycline IV, continued Solu-Medrol IV 80 mg every 8 hours, DuoNebs 4 times daily scheduled and as needed, RT to eval and treat, incentive spirometry and OPEP orders placed.  Viral swab negative.  Patient currently afebrile.       Cardiology evaluated patient on 4/24/2025 and noted the following plan plan:  Acute on chronic diastolic CHF: improving with diuretics. Will recheck an echo to assess LVEF given previously documented severe bioprosthetic aortic stenosis. Start jardiance 10mg daily. Is to have 1 more IV dose of lasix tonight. Will start aldactone tomorrow. Continue daily weights and strict I&O.   Severe bioprosthetic aortic stenosis: noted per echo in December at which time his EF was normal. Will repeat echo to assess EF. Patient has already seen Dr. Robin and Dr. Francois and is scheduled  for outpatient LHC, CT TAVR, ect. CAP: abx per attending. COPD: steroid  treatment per attending.      On 4/25/2025 patient was standing up by bed, in no acute distress.  He got back in bed without difficulty during my assessment.  Patient was wearing O2 at 2 L via nasal cannula, O2 sat 95%.  Patient reported he has never had to wear oxygen at home.  Continued IV Doxycycline, Cefepime, DuoNebs and pulse oximetry monitoring.  Continued telemetry monitoring Solu-Medrol 40 mg IV every 12 hours PT and OT evaluated.    Cardiology saw patient on 4/25/2025 with the following assessment and plan: -acute on chronic diastolic CHF: Echo 4/24/2025 showed LVEF 61-65% severe bioprosthetic aortic valve stenosis. Grade II diastolic dysfunction. Improved with diuretics. Continue Jardiance and spironolactone. Discussed importance of daily weights. Would recommend lasix at discharge to use as needed for weight gain or leg swelling.    -severe bioprosthetic aortic valve stenosis: Noted on echo 12/2024 and again 4/24/2025. Patient is already scheduled with Dr Francois for a [facility] 5/8/2025. He has seen Dr Chaudhari in consideration for TAVR.    -Pneumonia: Antibiotics per attending   -COPD: Treatment per primary    Plan:   - continue antibiotics per attending; continue COPD treatment per primary   - Continue Jardiance and spironolactone  - continue metoprolol xl, and rosuvastatin  - lasix prn for leg swelling; weight gain at discharge  - discussed importance of daily weights   - [facility] 5/8/20225 with Dr Francois  - continue follow up with Dr Robin and Dr Francois going forward     4/25/2025  Patient was sitting up in chair in no acute distress.  Patient's O2 sat was 94% on room air.  Patient would like to go home.  Advised that he does have a urinary tract infection also, awaiting culture results.  Discussed his echo results, and patient stated he has been following with Dr. Francois as an outpatient.  Telemetry sinus 96, overnight sinus 80s to 90s. Patient's urine culture results came back with no growth.  Patient has not used any  "oxygen overnight.  Patient's white blood count is elevated at 26.86, likely due to IV Solumedrol, no fever, no positive results for bacterial infection.    Ordered CT of the chest to verify pneumonia diagnosis due to all respiratory panels were negative and urine culture showed no growth:   1. Interval resolution of pulmonary edema and bibasilar pulmonary  infiltrates.  2. Severe emphysema as before, no pneumothorax or pleural effusion.  3. Previous median sternotomy, aortic valvuloplasty and placement of a  left atrial appendage occlusion device.  4. Extensive stable calcified plaque within the thoracoabdominal aorta  appears unchanged.     Cardiology noted medication recommendations at discharge, and patient is stable and agreeable for discharge.      Patient has reached the maximum benefit of hospitalization and is stable for discharge  Patient has been evaluated today 04/25/25 and is stable for discharge.   Physical Exam on Discharge:  /64 (BP Location: Left arm, Patient Position: Sitting)   Pulse 97   Temp 97.5 °F (36.4 °C) (Oral)   Resp 18   Ht 162.6 cm (64\")   Wt 49.7 kg (109 lb 9.6 oz)   SpO2 94%   BMI 18.81 kg/m²   Physical Exam  Constitutional:       General: He is awake.      Appearance: He is underweight.   HENT:      Head: Normocephalic and atraumatic.      Nose: Nose normal.      Mouth/Throat:      Mouth: Mucous membranes are moist.      Pharynx: Oropharynx is clear.   Eyes:      Extraocular Movements: Extraocular movements intact.      Conjunctiva/sclera: Conjunctivae normal.   Cardiovascular:      Rate and Rhythm: Regular rhythm. Tachycardia present.      Pulses: Normal pulses.      Heart sounds: Normal heart sounds.   Pulmonary:      Effort: Pulmonary effort is normal. No respiratory distress.      Breath sounds: Wheezing present.   Abdominal:      General: Bowel sounds are normal.      Palpations: Abdomen is soft.   Musculoskeletal:         General: Normal range of motion.      " Cervical back: Normal range of motion and neck supple.      Right lower leg: No edema.      Left lower leg: No edema.   Skin:     General: Skin is warm and dry.      Capillary Refill: Capillary refill takes 2 to 3 seconds.   Neurological:      General: No focal deficit present.      Mental Status: He is alert and oriented to person, place, and time.   Psychiatric:         Mood and Affect: Mood normal.         Behavior: Behavior normal. Behavior is cooperative.           Condition on Discharge:  stable/improving    Discharge Disposition:  home      Discharge Medications:     Discharge Medications        ASK your doctor about these medications        Instructions Start Date   amLODIPine 10 MG tablet  Commonly known as: NORVASC   10 mg, Daily      aspirin 81 MG tablet   81 mg, Oral, Daily      busPIRone 10 MG tablet  Commonly known as: BUSPAR   10 mg, 2 Times Daily      citalopram 20 MG tablet  Commonly known as: CeleXA   20 mg, Daily      clopidogrel 75 MG tablet  Commonly known as: PLAVIX   75 mg, Oral, Daily      isosorbide mononitrate 120 MG 24 hr tablet  Commonly known as: IMDUR   120 mg, Oral, Daily      metoprolol succinate XL 50 MG 24 hr tablet  Commonly known as: TOPROL-XL   50 mg, Oral, Every 24 Hours Scheduled      multivitamin with minerals tablet tablet   1 tablet, Daily      nitroglycerin 0.4 MG SL tablet  Commonly known as: NITROSTAT   0.4 mg, Every 5 Minutes PRN      rosuvastatin 5 MG tablet  Commonly known as: CRESTOR   5 mg, Oral, Daily      tamsulosin 0.4 MG capsule 24 hr capsule  Commonly known as: FLOMAX   1 capsule, Daily      terazosin 5 MG capsule  Commonly known as: HYTRIN   5 mg, Daily                 Discharge Diet:  Cardiac, Low sodium diet.    Activity at Discharge:  May resume normal activities    Discharge Instructions:   Return to ER for new or worsening symptoms.  Follow-up with the Heart Failure clinic at [facility] in 1 week.  Follow-up with PCP in 1 week.  Follow-up with   [facility] Cardiologist as scheduled.  New prescription for Lasix 20 mg 1 p.o. daily as needed for swelling or shortness of breath.  Toprol-XL dose is now 25 mg daily.  New prescription for Prednisone 20 mg, take 2 daily x 3 days, then take 1 daily x 3 days, then stop.  New prescription for Jardiance 10 mg 1 daily.  New prescription for Spironolactone 25 mg 1 daily.  New prescription for Symbicort inhaler.  Weigh self daily and record for Heart Failure Clinic.      Follow-up Appointments:   Future Appointments   Date Time Provider Department Center   4/29/2025  9:00 AM [facility] US NIVAS CART 2  [facility] US [facility]   5/2/2025  7:00 AM  [facility] PULM LAB ROOM 2  [facility] PFT [facility]   5/2/2025  8:30 AM PAD CT 2  [facility] CAT [facility]   7/15/2025  2:00 PM Ishaan Simpson APRN [facility] CD  [facility]       Test Results Pending at Discharge: Blood cultures no growth x 2 days.    Electronically signed by MICHAEL Hector, 04/25/25, 13:46 CDT.    Time: 60 minutes.

## 2025-04-25 NOTE — PROGRESS NOTES
[facility] -  Progress Note     LOS: 2 days   Patient Care Team:  Kerry Mtz APRN as PCP - General (Urgent Care)  Aime Francois MD as Cardiologist (Cardiology)  Nila Alexander PA-C as Referring Physician (Physician Assistant)    Chief Complaint: shortness of breath    Subjective     Interval History:     Patient Complaints: Echo 4/24/2025 showed LVEF 61-65% severe bioprosthetic aortic valve stenosis. Grade II diastolic dysfunction. patient is sitting in chair at bedside. He reports that his shortness of breath has continued to improve. He reports that he was able to move to room air yesterday and has remained on room air. He reports that he has had good urine output from IV lasix. He denies any chest pain. He denies any leg swelling. He reports resolution of orthopnea. He denies any heart racing or palpitations.     Review of Systems:     Review of Systems   Respiratory:  Positive for shortness of breath.    Cardiovascular:  Negative for chest pain, palpitations and leg swelling.   All other systems reviewed and are negative.    Objective     Vital Sign Min/Max for last 24 hours  Temp  Min: 97.4 °F (36.3 °C)  Max: 97.8 °F (36.6 °C)   BP  Min: 120/62  Max: 156/81   Pulse  Min: 91  Max: 103   Resp  Min: 16  Max: 18   SpO2  Min: 94 %  Max: 98 %   No data recorded   Weight  Min: 49.4 kg (109 lb)  Max: 49.7 kg (109 lb 9.6 oz)         04/25/25  0528   Weight: 49.7 kg (109 lb 9.6 oz)       Intake/Output Summary (Last 24 hours) at 4/25/2025 1201  Last data filed at 4/25/2025 1054  Gross per 24 hour   Intake 1200 ml   Output 2900 ml   Net -1700 ml     Telemetry:  Sinus rhythm to sinus tachycardia with rates  today       Physical Exam:    Vitals reviewed.   Constitutional:       General: Not in acute distress.     Appearance: Normal appearance. Well-developed.   Eyes:      Pupils: Pupils are equal, round, and reactive to light.   HENT:      Head: Normocephalic and atraumatic.      Nose:  Nose normal.   Neck:      Vascular: No carotid bruit.   Pulmonary:      Effort: Pulmonary effort is normal. No respiratory distress.      Breath sounds: Normal breath sounds. No wheezing. No rales.   Cardiovascular:      Normal rate. Regular rhythm.      Murmurs: There is a grade 2/6 systolic murmur.   Edema:     Peripheral edema absent.   Abdominal:      General: There is no distension.      Palpations: Abdomen is soft.   Musculoskeletal: Normal range of motion.      Cervical back: Normal range of motion and neck supple. Skin:     General: Skin is warm.      Findings: No erythema or rash.   Neurological:      General: No focal deficit present.      Mental Status: Alert and oriented to person, place, and time.   Psychiatric:         Attention and Perception: Attention normal.         Mood and Affect: Mood normal.         Speech: Speech normal.         Behavior: Behavior normal.         Thought Content: Thought content normal.         Judgment: Judgment normal.       Results Review:   Lab Results (last 72 hours)       Procedure Component Value Units Date/Time    Basic Metabolic Panel [[id number]]  (Abnormal) Collected: 04/25/25 0445    Specimen: Blood Updated: 04/25/25 0531     Glucose 150 mg/dL      BUN 23 mg/dL      Creatinine 0.62 mg/dL      Sodium 139 mmol/L      Potassium 3.7 mmol/L      Chloride 97 mmol/L      CO2 26.0 mmol/L      Calcium 9.3 mg/dL      BUN/Creatinine Ratio 37.1     Anion Gap 16.0 mmol/L      eGFR 106.1 mL/min/1.73     Narrative:      GFR Categories in Chronic Kidney Disease (CKD)      GFR Category          GFR (mL/min/1.73)    Interpretation  G1                     90 or greater         Normal or high (1)  G2                      60-89                Mild decrease (1)  G3a                   45-59                Mild to moderate decrease  G3b                   30-44                Moderate to severe decrease  G4                    15-29                Severe decrease  G5                    14 or  less           Kidney failure          (1)In the absence of evidence of kidney disease, neither GFR category G1 or G2 fulfill the criteria for CKD.    eGFR calculation 2021 CKD-EPI creatinine equation, which does not include race as a factor    CBC (No Diff) [[id number]]  (Abnormal) Collected: 04/25/25 0445    Specimen: Blood Updated: 04/25/25 0503     WBC 26.86 10*3/mm3      RBC 3.88 10*6/mm3      Hemoglobin 11.5 g/dL      Hematocrit 35.8 %      MCV 92.3 fL      MCH 29.6 pg      MCHC 32.1 g/dL      RDW 12.9 %      RDW-SD 43.2 fl      MPV 9.2 fL      Platelets 471 10*3/mm3     Urine Culture - Urine, Urine, Clean Catch [[id number]]  (Normal) Collected: 04/23/25 1620    Specimen: Urine, Clean Catch Updated: 04/24/25 1701     Urine Culture No growth    Blood Culture - Blood, Arm, Right [[id number]]  (Normal) Collected: 04/23/25 1302    Specimen: Blood from Arm, Right Updated: 04/24/25 1330     Blood Culture No growth at 24 hours    Blood Culture - Blood, Arm, Left [[id number]]  (Normal) Collected: 04/23/25 1308    Specimen: Blood from Arm, Left Updated: 04/24/25 1330     Blood Culture No growth at 24 hours    Basic Metabolic Panel [[id number]]  (Abnormal) Collected: 04/24/25 0430    Specimen: Blood Updated: 04/24/25 0522     Glucose 159 mg/dL      BUN 11 mg/dL      Creatinine 0.52 mg/dL      Sodium 138 mmol/L      Potassium 3.2 mmol/L      Chloride 97 mmol/L      CO2 28.0 mmol/L      Calcium 9.2 mg/dL      BUN/Creatinine Ratio 21.2     Anion Gap 13.0 mmol/L      eGFR 111.9 mL/min/1.73     Narrative:      GFR Categories in Chronic Kidney Disease (CKD)      GFR Category          GFR (mL/min/1.73)    Interpretation  G1                     90 or greater         Normal or high (1)  G2                      60-89                Mild decrease (1)  G3a                   45-59                Mild to moderate decrease  G3b                   30-44                Moderate to severe decrease  G4                    15-29                 Severe decrease  G5                    14 or less           Kidney failure          (1)In the absence of evidence of kidney disease, neither GFR category G1 or G2 fulfill the criteria for CKD.    eGFR calculation 2021 CKD-EPI creatinine equation, which does not include race as a factor    Magnesium [[id number]]  (Normal) Collected: 04/24/25 0430    Specimen: Blood Updated: 04/24/25 0522     Magnesium 2.0 mg/dL     CBC & Differential [[id number]]  (Abnormal) Collected: 04/24/25 0430    Specimen: Blood Updated: 04/24/25 0520    Narrative:      The following orders were created for panel order CBC & Differential.  Procedure                               Abnormality         Status                     ---------                               -----------         ------                     Manual Differential[[id number]]          Abnormal            Final result               CBC Auto Differential[[id number]]        Abnormal            Final result                 Please view results for these tests on the individual orders.    Manual Differential [[id number]]  (Abnormal) Collected: 04/24/25 0430    Specimen: Blood Updated: 04/24/25 0520     Neutrophil % 92.0 %      Lymphocyte % 5.0 %      Monocyte % 0.0 %      Eosinophil % 0.0 %      Basophil % 0.0 %      Bands %  3.0 %      Neutrophils Absolute 19.39 10*3/mm3      Lymphocytes Absolute 1.02 10*3/mm3      Monocytes Absolute 0.00 10*3/mm3      Eosinophils Absolute 0.00 10*3/mm3      Basophils Absolute 0.00 10*3/mm3      Polychromasia Slight/1+     WBC Morphology Normal     Platelet Morphology Normal    CBC Auto Differential [[id number]]  (Abnormal) Collected: 04/24/25 0430    Specimen: Blood Updated: 04/24/25 0520     WBC 20.41 10*3/mm3      RBC 3.96 10*6/mm3      Hemoglobin 12.1 g/dL      Hematocrit 35.7 %      MCV 90.2 fL      MCH 30.6 pg      MCHC 33.9 g/dL      RDW 12.8 %      RDW-SD 42.6 fl      MPV 8.9 fL      Platelets 444 10*3/mm3     MRSA Screen, PCR (Inpatient) - Swab,  Nares [[id number]]  (Normal) Collected: 04/23/25 1859    Specimen: Swab from Nares Updated: 04/23/25 2024     MRSA PCR No MRSA Detected    Narrative:      The negative predictive value of this diagnostic test is high and should only be used to consider de-escalating anti-MRSA therapy. A positive result may indicate colonization with MRSA and must be correlated clinically.    S. Pneumo Ag Urine or CSF - Urine, Urine, Clean Catch [[id number]]  (Normal) Collected: 04/23/25 1620    Specimen: Urine, Clean Catch Updated: 04/23/25 1642     Strep Pneumo Ag Negative    Urinalysis With Culture If Indicated - Urine, Clean Catch [[id number]]  (Abnormal) Collected: 04/23/25 1620    Specimen: Urine, Clean Catch Updated: 04/23/25 1642     Color, UA Yellow     Appearance, UA Clear     pH, UA 7.0     Specific Gravity, UA <=1.005     Glucose, UA Negative     Ketones, UA Negative     Bilirubin, UA Negative     Blood, UA Small (1+)     Protein, UA Negative     Leuk Esterase, UA Large (3+)     Nitrite, UA Positive     Urobilinogen, UA 0.2 E.U./dL    Narrative:      In absence of clinical symptoms, the presence of pyuria, bacteria, and/or nitrites on the urinalysis result does not correlate with infection.    Urinalysis, Microscopic Only - Urine, Clean Catch [[id number]]  (Abnormal) Collected: 04/23/25 1620    Specimen: Urine, Clean Catch Updated: 04/23/25 1641     RBC, UA 0-2 /HPF      WBC, UA Too Numerous to Count /HPF      Bacteria, UA Trace /HPF      Squamous Epithelial Cells, UA 0-2 /HPF      Hyaline Casts, UA None Seen /LPF      WBC Clumps, UA Small/1+ /HPF      Methodology Manual Light Microscopy    Legionella Antigen, Urine - Urine, Urine, Clean Catch [[id number]]  (Normal) Collected: 04/23/25 1620    Specimen: Urine, Clean Catch Updated: 04/23/25 1641     LEGIONELLA ANTIGEN, URINE Negative    Respiratory Panel PCR w/COVID-19(SARS-CoV-2) JUNAID/LISA/INGRID/PAD/COR/LESIA In-House, NP Swab in UTM/VTM, 2 HR TAT - Swab, Nasopharynx [[id number]]   (Normal) Collected: 04/23/25 1312    Specimen: Swab from Nasopharynx Updated: 04/23/25 1414     ADENOVIRUS, PCR Not Detected     Coronavirus 229E Not Detected     Coronavirus HKU1 Not Detected     Coronavirus NL63 Not Detected     Coronavirus OC43 Not Detected     COVID19 Not Detected     Human Metapneumovirus Not Detected     Human Rhinovirus/Enterovirus Not Detected     Influenza A PCR Not Detected     Influenza B PCR Not Detected     Parainfluenza Virus 1 Not Detected     Parainfluenza Virus 2 Not Detected     Parainfluenza Virus 3 Not Detected     Parainfluenza Virus 4 Not Detected     RSV, PCR Not Detected     Bordetella pertussis pcr Not Detected     Bordetella parapertussis PCR Not Detected     Chlamydophila pneumoniae PCR Not Detected     Mycoplasma pneumo by PCR Not Detected    Narrative:      In the setting of a positive respiratory panel with a viral infection PLUS a negative procalcitonin without other underlying concern for bacterial infection, consider observing off antibiotics or discontinuation of antibiotics and continue supportive care. If the respiratory panel is positive for atypical bacterial infection (Bordetella pertussis, Chlamydophila pneumoniae, or Mycoplasma pneumoniae), consider antibiotic de-escalation to target atypical bacterial infection.    Comprehensive Metabolic Panel [[id number]]  (Abnormal) Collected: 04/23/25 1308    Specimen: Blood Updated: 04/23/25 1351     Glucose 128 mg/dL      BUN 11 mg/dL      Creatinine 0.61 mg/dL      Sodium 133 mmol/L      Potassium 3.5 mmol/L      Chloride 92 mmol/L      CO2 26.0 mmol/L      Calcium 8.9 mg/dL      Total Protein 6.9 g/dL      Albumin 3.9 g/dL      ALT (SGPT) 11 U/L      AST (SGOT) 16 U/L      Alkaline Phosphatase 69 U/L      Total Bilirubin 0.4 mg/dL      Globulin 3.0 gm/dL      A/G Ratio 1.3 g/dL      BUN/Creatinine Ratio 18.0     Anion Gap 15.0 mmol/L      eGFR 106.6 mL/min/1.73     Narrative:      GFR Categories in Chronic Kidney  Disease (CKD)      GFR Category          GFR (mL/min/1.73)    Interpretation  G1                     90 or greater         Normal or high (1)  G2                      60-89                Mild decrease (1)  G3a                   45-59                Mild to moderate decrease  G3b                   30-44                Moderate to severe decrease  G4                    15-29                Severe decrease  G5                    14 or less           Kidney failure          (1)In the absence of evidence of kidney disease, neither GFR category G1 or G2 fulfill the criteria for CKD.    eGFR calculation 2021 CKD-EPI creatinine equation, which does not include race as a factor    BNP [[id number]]  (Abnormal) Collected: 04/23/25 1308    Specimen: Blood Updated: 04/23/25 1349     proBNP 7,295.0 pg/mL     Narrative:      This assay is used as an aid in the diagnosis of individuals suspected of having heart failure. It can be used as an aid in the diagnosis of acute decompensated heart failure (ADHF) in patients presenting with signs and symptoms of ADHF to the emergency department (ED). In addition, NT-proBNP of <300 pg/mL indicates ADHF is not likely.    Age Range Result Interpretation  NT-proBNP Concentration (pg/mL:      <50             Positive            >450                   Gray                 300-450                    Negative             <300    50-75           Positive            >900                  Gray                300-900                  Negative            <300      >75             Positive            >1800                  Gray                300-1800                  Negative            <300    Lactic Acid, Plasma [[id number]]  (Normal) Collected: 04/23/25 1308    Specimen: Blood Updated: 04/23/25 1348     Lactate 2.0 mmol/L     CBC & Differential [[id number]]  (Abnormal) Collected: 04/23/25 1308    Specimen: Blood Updated: 04/23/25 1322    Narrative:      The following orders were created for panel order  CBC & Differential.  Procedure                               Abnormality         Status                     ---------                               -----------         ------                     CBC Auto Differential[[id number]]        Abnormal            Final result                 Please view results for these tests on the individual orders.    CBC Auto Differential [[id number]]  (Abnormal) Collected: 04/23/25 1308    Specimen: Blood Updated: 04/23/25 1322     WBC 22.57 10*3/mm3      RBC 3.65 10*6/mm3      Hemoglobin 11.2 g/dL      Hematocrit 32.5 %      MCV 89.0 fL      MCH 30.7 pg      MCHC 34.5 g/dL      RDW 12.9 %      RDW-SD 42.0 fl      MPV 8.7 fL      Platelets 408 10*3/mm3      Neutrophil % 86.9 %      Lymphocyte % 5.6 %      Monocyte % 6.1 %      Eosinophil % 0.2 %      Basophil % 0.3 %      Immature Grans % 0.9 %      Neutrophils, Absolute 19.62 10*3/mm3      Lymphocytes, Absolute 1.27 10*3/mm3      Monocytes, Absolute 1.37 10*3/mm3      Eosinophils, Absolute 0.04 10*3/mm3      Basophils, Absolute 0.07 10*3/mm3      Immature Grans, Absolute 0.20 10*3/mm3      nRBC 0.0 /100 WBC     Blood Gas, Arterial With Co-Ox [[id number]]  (Abnormal) Collected: 04/23/25 1304    Specimen: Arterial Blood Updated: 04/23/25 1302     Site Right Brachial     Glenroy's Test N/A     pH, Arterial 7.500 pH units      Comment: 83 Value above reference range        pCO2, Arterial 35.8 mm Hg      pO2, Arterial 53.1 mm Hg      Comment: 85 Value below critical limit        HCO3, Arterial 27.9 mmol/L      Comment: 83 Value above reference range        Base Excess, Arterial 4.6 mmol/L      Comment: 83 Value above reference range        O2 Saturation, Arterial 89.3 %      Comment: 84 Value below reference range        Hemoglobin, Blood Gas 10.9 g/dL      Comment: 84 Value below reference range        Hematocrit, Blood Gas 33.4 %      Comment: 84 Value below reference range        Oxyhemoglobin 87.8 %      Comment: 84 Value below reference  "range        Methemoglobin 0.20 %      Carboxyhemoglobin 1.5 %      Temperature 37.0     Sodium, Arterial 134 mmol/L      Comment: 84 Value below reference range        Potassium, Arterial 2.9 mmol/L      Comment: 84 Value below reference range        Barometric Pressure for Blood Gas 755 mmHg      Modality Nasal Cannula     Flow Rate 3.0 lpm      Ventilator Mode NA     Notified Who Dr. RADHA Yi     Notified By Sammie Garcia RRT     Notified Time 04/23/2025 13:05     Collected by [id number]     Comment: Meter: [id number]     :  Sammie Garcia, GASTON        pH, Temp Corrected 7.500 pH Units      pCO2, Temperature Corrected 35.8 mm Hg      pO2, Temperature Corrected 53.1 mm Hg                 Echo EF Estimated  Lab Results   Component Value Date    ECHOEFEST 65 02/08/2018         Cath Ejection Fraction Quantitative  No results found for: \"CATHEF\"        Medication Review: yes  Current Facility-Administered Medications   Medication Dose Route Frequency Provider Last Rate Last Admin    acetaminophen (TYLENOL) tablet 650 mg  650 mg Oral Q4H PRN Christianne Dow APRN        Or    acetaminophen (TYLENOL) 160 MG/5ML oral solution 650 mg  650 mg Oral Q4H PRN Christianne Dow APRN        Or    acetaminophen (TYLENOL) suppository 650 mg  650 mg Rectal Q4H PRN Christianne Dow APRN        sennosides-docusate (PERICOLACE) 8.6-50 MG per tablet 2 tablet  2 tablet Oral BID PRN Christianne Dow APRN   2 tablet at 04/24/25 0813    And    polyethylene glycol (MIRALAX) packet 17 g  17 g Oral Daily PRN Christianne Dow APRN        And    bisacodyl (DULCOLAX) EC tablet 5 mg  5 mg Oral Daily PRN Christianne Dow APRN        And    bisacodyl (DULCOLAX) suppository 10 mg  10 mg Rectal Daily PRN Christianne Dow APRN        budesonide-formoterol (SYMBICORT) 160-4.5 MCG/ACT inhaler 2 puff  2 puff Inhalation BID - RT Christianne Dow APRN   2 puff at 04/25/25 0721    Calcium Replacement - Follow Nurse / BPA Driven " Protocol   Not Applicable PRN Christianne Dow APRN        cefepime 2000 mg IVPB in 100 mL NS (MBP)  2,000 mg Intravenous Q8H Christianne Dow APRN   2,000 mg at 04/25/25 0857    citalopram (CeleXA) tablet 20 mg  20 mg Oral Daily Christianne Dow APRN   20 mg at 04/25/25 0858    clopidogrel (PLAVIX) tablet 75 mg  75 mg Oral Daily Christianne Dow APRN   75 mg at 04/25/25 0858    doxycycline (VIBRAMYCIN) 100 mg in sodium chloride 0.9 % 100 mL MBP  100 mg Intravenous Q12H Christianne Dow APRN 0 mL/hr at 04/23/25 1734 100 mg at 04/25/25 0406    empagliflozin (JARDIANCE) tablet 10 mg  10 mg Oral Daily Ca García APRN   10 mg at 04/25/25 0858    HYDROcodone-acetaminophen (NORCO) 7.5-325 MG per tablet 1 tablet  1 tablet Oral Q6H PRN Mihir Bailey MD   1 tablet at 04/25/25 0416    ipratropium-albuterol (DUO-NEB) nebulizer solution 3 mL  3 mL Nebulization Q4H PRN Christianne Dow APRN        [Held by provider] isosorbide mononitrate (IMDUR) 24 hr tablet 120 mg  120 mg Oral Daily Christianne Dow APRN        Magnesium Standard Dose Replacement - Follow Nurse / BPA Driven Protocol   Not Applicable PRN Christianne Dow APRN        methylPREDNISolone sodium succinate (SOLU-Medrol) injection 40 mg  40 mg Intravenous Q12H Mihir Bailey MD   40 mg at 04/25/25 0603    metoprolol succinate XL (TOPROL-XL) 24 hr tablet 25 mg  25 mg Oral Q24H Christianne Dow APRN   25 mg at 04/24/25 2103    nitroglycerin (NITROSTAT) SL tablet 0.4 mg  0.4 mg Sublingual Q5 Min PRN Christianne oDw APRN        ondansetron ODT (ZOFRAN-ODT) disintegrating tablet 4 mg  4 mg Oral Q6H PRN Christianne Dow APRN        Or    ondansetron (ZOFRAN) injection 4 mg  4 mg Intravenous Q6H PRN Christianne Dow, KIRBY        Phosphorus Replacement - Follow Nurse / BPA Driven Protocol   Not Applicable PRN Christianne Dow, KIRBY        Potassium Replacement - Follow Nurse / BPA Driven Protocol   Not Applicable PRN Christianne Dow,  APRN        rosuvastatin (CRESTOR) tablet 5 mg  5 mg Oral Daily Christianne Dow TINO, APRN   5 mg at 04/25/25 0858    sodium chloride 0.9 % flush 10 mL  10 mL Intravenous PRN Victor M Christianne JIMÉNEZ, APRN        sodium chloride 0.9 % flush 10 mL  10 mL Intravenous Q12H Adam Dowmarbin JIMÉNEZ, APRN   10 mL at 04/25/25 0859    sodium chloride 0.9 % flush 10 mL  10 mL Intravenous PRN Victor M Christianne JIMÉNEZ, APRN        sodium chloride 0.9 % infusion 40 mL  40 mL Intravenous PRN Victor M Christianne JIMÉNEZ, APRN        spironolactone (ALDACTONE) tablet 25 mg  25 mg Oral Daily Ca García APRN   25 mg at 04/25/25 0858    tamsulosin (FLOMAX) 24 hr capsule 0.4 mg  0.4 mg Oral Daily Victor M Christianne JIMÉNEZ, APRN   0.4 mg at 04/25/25 0859    terazosin (HYTRIN) capsule 5 mg  5 mg Oral Daily Mihir Bailey MD   5 mg at 04/25/25 0858         Assessment & Plan   -acute on chronic diastolic CHF: Echo 4/24/2025 showed LVEF 61-65% severe bioprosthetic aortic valve stenosis. Grade II diastolic dysfunction. Improved with diuretics. Continue Jardiance and spironolactone. Discussed importance of daily weights. Would recommend lasix at discharge to use as needed for weight gain or leg swelling.     -severe bioprosthetic aortic valve stenosis: Noted on echo 12/2024 and again 4/24/2025. Patient is already scheduled with Dr Francois for a [facility] 5/8/2025. He has seen Dr Chaudhari in consideration for TAVR.     -Pneumonia: Antibiotics per attending    -COPD: Treatment per primary     Plan:   - continue antibiotics per attending; continue COPD treatment per primary   - Continue Jardiance and spironolactone  - continue metoprolol xl, and rosuvastatin  - lasix prn for leg swelling; weight gain at discharge  - discussed importance of daily weights   - [facility] 5/8/20225 with Dr Francois  - continue follow up with Dr Robin and Dr Francois going forward    Electronically signed by KIRBY Patel, 04/25/25, 12:00 PM CDT.

## 2025-04-26 NOTE — THERAPY DISCHARGE NOTE
Acute Care - Physical Therapy Discharge Summary  [facility]       Patient Name: Sharad Ryder  : 1960  MRN: [id number]    Today's Date: 2025                 Admit Date: 2025      PT Recommendation and Plan    Visit Dx:    ICD-10-CM ICD-9-CM   1. Acute on chronic heart failure with preserved ejection fraction (HFpEF)  I50.33 428.23   2. Acute respiratory failure with hypoxia  J96.01 518.81   3. COPD exacerbation  J44.1 491.21   4. Acute congestive heart failure, unspecified heart failure type  I50.9 428.0   5. Pneumonia of right upper lobe due to infectious organism  J18.9 486   6. Impaired functional mobility and activity tolerance [Z74.09]  Z74.09 V49.89                PT Rehab Goals       Row Name 25 1611             Transfer Goal 1 (PT)    Activity/Assistive Device (Transfer Goal 1, PT) sit-to-stand/stand-to-sit;bed-to-chair/chair-to-bed  eval only, pt d/c before treatment could be performed.  -WK      [place] Level/Cues Needed (Transfer Goal 1, PT) independent  -WK      Time Frame (Transfer Goal 1, PT) long term goal (LTG);10 days  -WK      Progress/Outcome (Transfer Goal 1, PT) goal not met  -WK         Gait Training Goal 1 (PT)    Activity/Assistive Device (Gait Training Goal 1, PT) gait (walking locomotion);decrease fall risk;improve balance and speed;increase endurance/gait distance;increase energy conservation  -WK      [place] Level (Gait Training Goal 1, PT) standby assist;independent  -WK      Distance (Gait Training Goal 1, PT) 250-330 ft+  -WK      Time Frame (Gait Training Goal 1, PT) long term goal (LTG);10 days  -WK      Progress/Outcome (Gait Training Goal 1, PT) goal not met  -WK                User Key  (r) = Recorded By, (t) = Taken By, (c) = Cosigned By      Initials Name Provider Type Discipline    Maya Ozuna PTA Physical Therapist Assistant PT                        PT Discharge Summary  Anticipated Discharge Disposition (PT): home  Reason for  Discharge: Discharge from facility  Outcomes Achieved: Refer to plan of care for updates on goals achieved  Discharge Destination: Home      Maya Mckay, PTA   4/26/2025

## 2025-04-26 NOTE — OUTREACH NOTE
Prep Survey      Flowsheet Row Responses   [facility] facility patient discharged from? [place]   Is LACE score < 7 ? No   Eligibility Readm Mgmt   Discharge diagnosis Community acquired pneumonia   Does the patient have one of the following disease processes/diagnoses(primary or secondary)? Pneumonia   Does the patient have Home health ordered? No   Is there a DME ordered? No   Prep survey completed? Yes            SUMAYA PERLA - Registered Nurse

## 2025-04-26 NOTE — THERAPY DISCHARGE NOTE
Acute Care - Occupational Therapy Discharge Summary  [facility]     Patient Name: Sharad Ryder  : 1960  MRN: [id number]    Today's Date: 2025                 Admit Date: 2025        OT Recommendation and Plan    Visit Dx:    ICD-10-CM ICD-9-CM   1. Acute on chronic heart failure with preserved ejection fraction (HFpEF)  I50.33 428.23   2. Acute respiratory failure with hypoxia  J96.01 518.81   3. COPD exacerbation  J44.1 491.21   4. Acute congestive heart failure, unspecified heart failure type  I50.9 428.0   5. Pneumonia of right upper lobe due to infectious organism  J18.9 486   6. Impaired functional mobility and activity tolerance [Z74.09]  Z74.09 V49.89                OT Rehab Goals       Row Name 25 0800             Transfer Goal 1 (OT)    Activity/Assistive Device (Transfer Goal 1, OT) bed-to-chair/chair-to-bed;toilet;tub  -LS      [facility] [place] Level/Cues Needed (Transfer Goal 1, OT) independent  -LS      Time Frame (Transfer Goal 1, OT) long term goal (LTG);by discharge  -LS      Progress/Outcome (Transfer Goal 1, OT) goal not met  -LS         Bathing Goal 1 (OT)    Activity/Device (Bathing Goal 1, OT) bathing skills, all  -LS      [facility] [place] Level/Cues Needed (Bathing Goal 1, OT) independent  -LS      Time Frame (Bathing Goal 1, OT) long term goal (LTG);by discharge  -LS      Progress/Outcomes (Bathing Goal 1, OT) goal not met  -LS         Dressing Goal 1 (OT)    Activity/Device (Dressing Goal 1, OT) dressing skills, all  -LS      [facility] [place]/Cues Needed (Dressing Goal 1, OT) independent  -LS      Time Frame (Dressing Goal 1, OT) long term goal (LTG);by discharge  -LS      Progress/Outcome (Dressing Goal 1, OT) goal not met  -LS                User Key  (r) = Recorded By, (t) = Taken By, (c) = Cosigned By      Initials Name Provider Type Discipline    LS Racquel Peterson COTA Occupational Therapist Assistant THERAPIES                                OT Discharge  Summary  Anticipated Discharge Disposition (OT): home with assist  Reason for Discharge: Discharge from facility  Outcomes Achieved: Refer to plan of care for updates on goals achieved  Discharge Destination: Home with assist      ANGELA Tucker  4/26/2025

## 2025-04-28 ENCOUNTER — READMISSION MANAGEMENT (OUTPATIENT)
Dept: CALL CENTER | Facility: HOSPITAL | Age: 65
End: 2025-04-28
Payer: MEDICARE

## 2025-04-28 LAB
BACTERIA SPEC AEROBE CULT: NORMAL
BACTERIA SPEC AEROBE CULT: NORMAL

## 2025-04-28 NOTE — OUTREACH NOTE
COPD/PN Week 1 Survey      Flowsheet Row Responses   [facility] patient discharged from? [place]   Does the patient have one of the following disease processes/diagnoses(primary or secondary)? Pneumonia   Week 1 attempt successful? Yes   Call start time 1553   Call end time 1602   Discharge diagnosis Community acquired pneumonia   Meds reviewed with patient/caregiver? Yes   Is the patient having any side effects they believe may be caused by any medication additions or changes? No   Does the patient have all medications ordered at discharge? No   What is keeping the patient from filling the prescriptions? --  [pt is still waiting on Jardiance to be delivered]   Nursing Interventions Nurse called pharmacy  [Pt wanted RN to call and verify address]   Is the patient taking all medications as directed (includes completed medication regime)? Yes   Comments regarding appointments Cards appt on 5/1/25, heart failure clinic appt on 5/20/25   Does the patient have a primary care provider?  Yes   Does the patient have an appointment with their PCP or specialist within 7 days of discharge? Yes   Comments regarding PCP 4/29/25 @2pm   Has the patient kept scheduled appointments due by today? N/A   Has home health visited the patient within 72 hours of discharge? N/A   Pulse Ox monitoring None   Psychosocial issues? No   Did the patient receive a copy of their discharge instructions? Yes   Nursing interventions Reviewed instructions with patient   What is the patient's perception of their health status since discharge? Improving   Nursing Interventions Nurse provided patient education   Is the patient/caregiver able to teach back the hierarchy of who to call/visit for symptoms/problems? PCP, Specialist, Home health nurse, Urgent Care, ED, 911 Yes   Is the patient/caregiver able to teach back signs and symptoms of worsening condition: Fever/chills, Shortness of breath, Chest pain   Week 1 call completed? Yes   Graduated Yes    Is the patient interested in additional calls from an ambulatory ? No   Would this patient benefit from a Referral to [facility] Social [facility]? No   Wrap up additional comments Pt reports doing well, no needs at this time   Call end time 1602            Ekaterina JAVIER - Registered Nurse

## 2025-04-29 ENCOUNTER — HOSPITAL ENCOUNTER (OUTPATIENT)
Dept: ULTRASOUND IMAGING | Facility: HOSPITAL | Age: 65
Discharge: HOME | End: 2025-04-29
Admitting: NURSE PRACTITIONER
Payer: MEDICARE

## 2025-04-29 DIAGNOSIS — I25.10 CORONARY ARTERY DISEASE INVOLVING NATIVE CORONARY ARTERY OF NATIVE HEART, UNSPECIFIED WHETHER ANGINA PRESENT: ICD-10-CM

## 2025-04-29 DIAGNOSIS — Z86.73 HISTORY OF CVA (CEREBROVASCULAR ACCIDENT): ICD-10-CM

## 2025-04-29 DIAGNOSIS — Z95.3 STATUS POST AORTIC VALVE REPLACEMENT WITH BIOPROSTHETIC VALVE: ICD-10-CM

## 2025-04-29 PROCEDURE — 93880 EXTRACRANIAL BILAT STUDY: CPT | Performed by: SURGERY

## 2025-04-29 PROCEDURE — 93880 EXTRACRANIAL BILAT STUDY: CPT

## 2025-04-30 DIAGNOSIS — I65.22 LEFT CAROTID ARTERY STENOSIS: Primary | ICD-10-CM

## 2025-04-30 NOTE — PROGRESS NOTES
Subjective:     Encounter Date:05/01/2025      Patient ID: Sharad Ryder is a 65 y.o. male with chronic diastolic CHF, CAD s/p 4v CABG and AS s/p AVR with bioprosthetic aortic valve in 2018.    Chief Complaint: no complaints  History of Present Illness  Patient presents today for management of coronary artery disease. Patient was recently admitted to [facility] for complaints of shortness of breath he reported a temp of 103. He was admitted in March and treated for acute COPD exacerbation and PNA. He was also admitted to the hospital in December and treated for COPD exacerbation, PNA, RSV +. BNP was elevated at 7295. CXR noted persistent coarse mixed interstitial and alveolar infiltrates. He was started on IV diuretics and antibiotics. He diuresed about 2.8 L initially. Echo was done that showed LVEF 61-65%. Bioprosthetic aortic valve. The prosthetic aortic valve peak and mean gradients are elevated. Severe aortic stenosis, moderate to severe LVH. Grade II diastolic dysfunction. He has a [facility] scheduled for 5/8/2025 to assess aortic valve. Today he reports that he has been feeling pretty good. He had some fatigue the first couple of days but reports that he is now back to normal. He denies any chest pain. He quit smoking about 6 months ago; he reports that he is still dipping. He reports that his shortness of breath has resolved. He denies doing his daily weights. He has lasix to take as needed; he reports that he hasn't taken any since hospital. He denies any leg swelling, orthopnea or PND. He reports that his BP has been 120/70 at recent dr offices. He denies any heart racing or palpitations. He denies any dizziness or near syncope. He is scheduled for PFTs and CTA neck for tomorrow for Dr Robin. Patient follows with [facility] as PCP    The following portions of the patient's history were reviewed and updated as appropriate: allergies, current medications, past family history, past medical history, past  social history, past surgical history and problem list.    No Known Allergies    Current Outpatient Medications:     amLODIPine (NORVASC) 10 MG tablet, , Disp: , Rfl:     aspirin 81 MG tablet, Take 1 tablet by mouth Daily., Disp: 30 tablet, Rfl: 2    budesonide-formoterol (SYMBICORT) 160-4.5 MCG/ACT inhaler, Inhale 2 puffs 2 (Two) Times a Day., Disp: 10.2 g, Rfl: 1    busPIRone (BUSPAR) 10 MG tablet, Take 1 tablet by mouth 2 (Two) Times a Day., Disp: , Rfl:     citalopram (CeleXA) 20 MG tablet, Take 1 tablet by mouth Daily., Disp: , Rfl:     clopidogrel (PLAVIX) 75 MG tablet, Take 1 tablet by mouth Daily., Disp: 30 tablet, Rfl: 2    empagliflozin (JARDIANCE) 10 MG tablet tablet, Take 1 tablet by mouth Daily., Disp: 90 tablet, Rfl: 0    furosemide (LASIX) 20 MG tablet, Take 1 tablet by mouth Daily As Needed (for swelling or shortness of breath)., Disp: 30 tablet, Rfl: 3    isosorbide mononitrate (IMDUR) 120 MG 24 hr tablet, , Disp: , Rfl:     metoprolol succinate XL (TOPROL-XL) 25 MG 24 hr tablet, Take 1 tablet by mouth Daily., Disp: 90 tablet, Rfl: 0    multivitamin with minerals (ONE-A-DAY MENS 50+ ADVANTAGE PO), Take 1 tablet by mouth Daily., Disp: , Rfl:     nitroglycerin (NITROSTAT) 0.4 MG SL tablet, Place 1 tablet under the tongue Every 5 (Five) Minutes As Needed for Chest Pain. Take no more than 3 doses in 15 minutes., Disp: , Rfl:     rosuvastatin (CRESTOR) 5 MG tablet, Take 1 tablet by mouth Daily., Disp: 30 tablet, Rfl: 11    spironolactone (ALDACTONE) 25 MG tablet, Take 1 tablet by mouth Daily., Disp: 90 tablet, Rfl: 0    tamsulosin (FLOMAX) 0.4 MG capsule 24 hr capsule, Take 1 capsule by mouth Daily., Disp: , Rfl:     terazosin (HYTRIN) 5 MG capsule, Take 1 capsule by mouth Daily., Disp: , Rfl:     ipratropium-albuterol (DUO-NEB) 0.5-2.5 mg/3 ml nebulizer, USE 1 AMPULE IN NEBULIZER EVERY 4 HOURS AS NEEDED FOR WHEEZING, Disp: , Rfl:   Past Medical History:   Diagnosis Date    Aneurysm     Anxiety      Arthritis     Carotid artery disease     Carotid stenosis, right 2018    Post right carotid endarterectomy    COPD (chronic obstructive pulmonary disease)     Coronary artery disease     Heart murmur     History of right-sided carotid endarterectomy 2022    Hyperlipidemia LDL goal <70 2017    Left frontal lobe, right thalamus and right occipital CVA (cerebrovascular accident) (HCC) 2022    LVH (left ventricular hypertrophy) 2022    Pneumonia     Primary hypertension 2017    S/P CABG x 3 2022    LIMA to LAD, SVG to PDA and SVG to diagonal branch with TMR and left atrial appendage exclusion with atricure clip device 2018 per Dr. Cj Robin at [facility]     Severe aortic valve stenosis 2017    Status post aortic valve replacement with bioprosthetic valve 2022    Graciela Juarez bovine pericardial 19 mm valve 2018 per Dr. Cj Robin at [facility]    Tobacco use 2017    Wears glasses      Social History     Socioeconomic History    Marital status:    Tobacco Use    Smoking status: Former     Current packs/day: 0.00     Average packs/day: 1.5 packs/day for 49.8 years (74.8 ttl pk-yrs)     Types: Cigarettes     Start date:      Quit date: 2024     Years since quittin.4     Passive exposure: Never    Smokeless tobacco: Current     Types: Snuff   Vaping Use    Vaping status: Never Used   Substance and Sexual Activity    Alcohol use: No    Drug use: No    Sexual activity: Defer       Review of Systems   Constitutional: Negative for malaise/fatigue.   Cardiovascular:  Negative for chest pain, dyspnea on exertion, irregular heartbeat, leg swelling, near-syncope, orthopnea, palpitations, paroxysmal nocturnal dyspnea and syncope.   Hematologic/Lymphatic: Does not bruise/bleed easily.   Genitourinary:  Negative for hematuria.   Neurological:  Negative for dizziness and weakness.   All other systems reviewed and  "are negative.         Objective:     Vitals reviewed.   Constitutional:       General: Not in acute distress.     Appearance: Normal appearance. Well-developed.   Eyes:      Pupils: Pupils are equal, round, and reactive to light.   HENT:      Head: Normocephalic and atraumatic.      Nose: Nose normal.   Neck:      Vascular: No carotid bruit.   Pulmonary:      Effort: Pulmonary effort is normal. No respiratory distress.      Breath sounds: Normal breath sounds. No wheezing. No rales.   Cardiovascular:      Normal rate. Regular rhythm.      Murmurs: There is a grade 3/6 systolic murmur.   Edema:     Peripheral edema absent.   Abdominal:      General: There is no distension.      Palpations: Abdomen is soft.   Musculoskeletal: Normal range of motion.      Cervical back: Normal range of motion and neck supple. Skin:     General: Skin is warm.      Findings: No erythema or rash.   Neurological:      General: No focal deficit present.      Mental Status: Alert and oriented to person, place, and time.   Psychiatric:         Attention and Perception: Attention normal.         Mood and Affect: Mood normal.         Speech: Speech normal.         Behavior: Behavior normal.         Thought Content: Thought content normal.         Judgment: Judgment normal.         /76   Pulse 78   Ht 162.6 cm (64\")   Wt 53.5 kg (118 lb)   SpO2 97%   BMI 20.25 kg/m²     Procedures    Lab Review:     Results for orders placed during the hospital encounter of 04/23/25    Adult Transthoracic Echo Complete w/ Color, Spectral and Contrast if Necessary Per Protocol    Interpretation Summary    Left ventricular systolic function is normal. Left ventricular ejection fraction appears to be 61 - 65%.    There is a bioprosthetic aortic valve present. The prosthetic aortic valve peak and mean gradients are elevated. There is severe stenosis or obstruction of the prosthetic aortic valve.    Severe aortic valve stenosis is present.    Left " ventricular wall thickness is consistent with moderate to severe concentric hypertrophy.    Left ventricular diastolic function is consistent with (grade II w/high LAP) pseudonormalization.    The left atrial cavity is moderate to severely dilated.    Left atrial volume is moderately increased.    The right atrial cavity is borderline dilated.        I have personally reviewed echo, hospitalization notes and past office notes prior to patients visit  Assessment:          Diagnosis Plan   1. Chronic diastolic CHF (congestive heart failure), NYHA class 2        2. Coronary artery disease involving coronary bypass graft of native heart without angina pectoris        3. Stenosis of prosthetic aortic valve, subsequent encounter        4. Primary hypertension        5. Transient ischemic attack (TIA)        6. PVD (peripheral vascular disease) with claudication        7. Tobacco use               Plan:       Chronic diastolic CHF: Echo 4/23/2025 showed grade II diastolic CHF and severe bioprosthetic aortic valve stenosis. Recently admitted and received diuresis. NYHA class II. Continue Jardiance and spironolactone. Patient has lasix to use prn but hasn't needed any since discharge. Discussed importance of daily weights and low sodium diet. Recommended to start daily weights and take lasix if he has 2-3 lbs weight gain overnight    2. Coronary artery disease: s/p 4 V CABG: patient denies any chest pain. Continue aspirin, metoprolol and rosuvastatin. He is set up for [facility] 5/8/2025 with Dr Francois.     3. Severe bioprosthetic aortic valve stenosis: echo 4/2024 showed severe bioprosthetic aortic valve stenosis. He has been seen by Dr Robin in office. Patient is scheduled for a [facility] 5/8/2025 with Dr Francois. He is scheduled for PFTs and CTA neck tomorrow.     4. Hypertension: slightly elevated in office today. Patient reports better BP control. Continue current medications    5. TIA    6. PVD    7. tobacco use: Sharad Ryder   reports that he quit smoking about 5 months ago. His smoking use included cigarettes. He started smoking about 50 years ago. He has a 74.8 pack-year smoking history. He has never been exposed to tobacco smoke. His smokeless tobacco use includes snuff. I have educated him on the risk of diseases from using tobacco products such as cancer, COPD, and heart disease.     Current outpatient and discharge medications have been reconciled for the patient.  Reviewed by: KIRBY Patel    I spent 35 minutes caring for Sharad on this date of service. This time includes time spent by me in the following activities:preparing for the visit, reviewing tests, obtaining and/or reviewing a separately obtained history, performing a medically appropriate examination and/or evaluation , counseling and educating the patient/family/caregiver, and documenting information in the medical record     Patient is to follow up in July as previously scheduled or sooner if needed

## 2025-04-30 NOTE — H&P (VIEW-ONLY)
Subjective:     Encounter Date:05/01/2025      Patient ID: Sharad Ryder is a 65 y.o. male with chronic diastolic CHF, CAD s/p 4v CABG and AS s/p AVR with bioprosthetic aortic valve in 2018.    Chief Complaint: no complaints  History of Present Illness  Patient presents today for management of coronary artery disease. Patient was recently admitted to [facility] for complaints of shortness of breath he reported a temp of 103. He was admitted in March and treated for acute COPD exacerbation and PNA. He was also admitted to the hospital in December and treated for COPD exacerbation, PNA, RSV +. BNP was elevated at 7295. CXR noted persistent coarse mixed interstitial and alveolar infiltrates. He was started on IV diuretics and antibiotics. He diuresed about 2.8 L initially. Echo was done that showed LVEF 61-65%. Bioprosthetic aortic valve. The prosthetic aortic valve peak and mean gradients are elevated. Severe aortic stenosis, moderate to severe LVH. Grade II diastolic dysfunction. He has a [facility] scheduled for 5/8/2025 to assess aortic valve. Today he reports that he has been feeling pretty good. He had some fatigue the first couple of days but reports that he is now back to normal. He denies any chest pain. He quit smoking about 6 months ago; he reports that he is still dipping. He reports that his shortness of breath has resolved. He denies doing his daily weights. He has lasix to take as needed; he reports that he hasn't taken any since hospital. He denies any leg swelling, orthopnea or PND. He reports that his BP has been 120/70 at recent dr offices. He denies any heart racing or palpitations. He denies any dizziness or near syncope. He is scheduled for PFTs and CTA neck for tomorrow for Dr Robin. Patient follows with [facility] as PCP    The following portions of the patient's history were reviewed and updated as appropriate: allergies, current medications, past family history, past medical history, past  social history, past surgical history and problem list.    No Known Allergies    Current Outpatient Medications:     amLODIPine (NORVASC) 10 MG tablet, , Disp: , Rfl:     aspirin 81 MG tablet, Take 1 tablet by mouth Daily., Disp: 30 tablet, Rfl: 2    budesonide-formoterol (SYMBICORT) 160-4.5 MCG/ACT inhaler, Inhale 2 puffs 2 (Two) Times a Day., Disp: 10.2 g, Rfl: 1    busPIRone (BUSPAR) 10 MG tablet, Take 1 tablet by mouth 2 (Two) Times a Day., Disp: , Rfl:     citalopram (CeleXA) 20 MG tablet, Take 1 tablet by mouth Daily., Disp: , Rfl:     clopidogrel (PLAVIX) 75 MG tablet, Take 1 tablet by mouth Daily., Disp: 30 tablet, Rfl: 2    empagliflozin (JARDIANCE) 10 MG tablet tablet, Take 1 tablet by mouth Daily., Disp: 90 tablet, Rfl: 0    furosemide (LASIX) 20 MG tablet, Take 1 tablet by mouth Daily As Needed (for swelling or shortness of breath)., Disp: 30 tablet, Rfl: 3    isosorbide mononitrate (IMDUR) 120 MG 24 hr tablet, , Disp: , Rfl:     metoprolol succinate XL (TOPROL-XL) 25 MG 24 hr tablet, Take 1 tablet by mouth Daily., Disp: 90 tablet, Rfl: 0    multivitamin with minerals (ONE-A-DAY MENS 50+ ADVANTAGE PO), Take 1 tablet by mouth Daily., Disp: , Rfl:     nitroglycerin (NITROSTAT) 0.4 MG SL tablet, Place 1 tablet under the tongue Every 5 (Five) Minutes As Needed for Chest Pain. Take no more than 3 doses in 15 minutes., Disp: , Rfl:     rosuvastatin (CRESTOR) 5 MG tablet, Take 1 tablet by mouth Daily., Disp: 30 tablet, Rfl: 11    spironolactone (ALDACTONE) 25 MG tablet, Take 1 tablet by mouth Daily., Disp: 90 tablet, Rfl: 0    tamsulosin (FLOMAX) 0.4 MG capsule 24 hr capsule, Take 1 capsule by mouth Daily., Disp: , Rfl:     terazosin (HYTRIN) 5 MG capsule, Take 1 capsule by mouth Daily., Disp: , Rfl:     ipratropium-albuterol (DUO-NEB) 0.5-2.5 mg/3 ml nebulizer, USE 1 AMPULE IN NEBULIZER EVERY 4 HOURS AS NEEDED FOR WHEEZING, Disp: , Rfl:   Past Medical History:   Diagnosis Date    Aneurysm     Anxiety      Arthritis     Carotid artery disease     Carotid stenosis, right 2018    Post right carotid endarterectomy    COPD (chronic obstructive pulmonary disease)     Coronary artery disease     Heart murmur     History of right-sided carotid endarterectomy 2022    Hyperlipidemia LDL goal <70 2017    Left frontal lobe, right thalamus and right occipital CVA (cerebrovascular accident) (HCC) 2022    LVH (left ventricular hypertrophy) 2022    Pneumonia     Primary hypertension 2017    S/P CABG x 3 2022    LIMA to LAD, SVG to PDA and SVG to diagonal branch with TMR and left atrial appendage exclusion with atricure clip device 2018 per Dr. Cj Robin at [facility]     Severe aortic valve stenosis 2017    Status post aortic valve replacement with bioprosthetic valve 2022    Graciela Juarez bovine pericardial 19 mm valve 2018 per Dr. Cj Robin at [facility]    Tobacco use 2017    Wears glasses      Social History     Socioeconomic History    Marital status:    Tobacco Use    Smoking status: Former     Current packs/day: 0.00     Average packs/day: 1.5 packs/day for 49.8 years (74.8 ttl pk-yrs)     Types: Cigarettes     Start date:      Quit date: 2024     Years since quittin.4     Passive exposure: Never    Smokeless tobacco: Current     Types: Snuff   Vaping Use    Vaping status: Never Used   Substance and Sexual Activity    Alcohol use: No    Drug use: No    Sexual activity: Defer       Review of Systems   Constitutional: Negative for malaise/fatigue.   Cardiovascular:  Negative for chest pain, dyspnea on exertion, irregular heartbeat, leg swelling, near-syncope, orthopnea, palpitations, paroxysmal nocturnal dyspnea and syncope.   Hematologic/Lymphatic: Does not bruise/bleed easily.   Genitourinary:  Negative for hematuria.   Neurological:  Negative for dizziness and weakness.   All other systems reviewed and  "are negative.         Objective:     Vitals reviewed.   Constitutional:       General: Not in acute distress.     Appearance: Normal appearance. Well-developed.   Eyes:      Pupils: Pupils are equal, round, and reactive to light.   HENT:      Head: Normocephalic and atraumatic.      Nose: Nose normal.   Neck:      Vascular: No carotid bruit.   Pulmonary:      Effort: Pulmonary effort is normal. No respiratory distress.      Breath sounds: Normal breath sounds. No wheezing. No rales.   Cardiovascular:      Normal rate. Regular rhythm.      Murmurs: There is a grade 3/6 systolic murmur.   Edema:     Peripheral edema absent.   Abdominal:      General: There is no distension.      Palpations: Abdomen is soft.   Musculoskeletal: Normal range of motion.      Cervical back: Normal range of motion and neck supple. Skin:     General: Skin is warm.      Findings: No erythema or rash.   Neurological:      General: No focal deficit present.      Mental Status: Alert and oriented to person, place, and time.   Psychiatric:         Attention and Perception: Attention normal.         Mood and Affect: Mood normal.         Speech: Speech normal.         Behavior: Behavior normal.         Thought Content: Thought content normal.         Judgment: Judgment normal.         /76   Pulse 78   Ht 162.6 cm (64\")   Wt 53.5 kg (118 lb)   SpO2 97%   BMI 20.25 kg/m²     Procedures    Lab Review:     Results for orders placed during the hospital encounter of 04/23/25    Adult Transthoracic Echo Complete w/ Color, Spectral and Contrast if Necessary Per Protocol    Interpretation Summary    Left ventricular systolic function is normal. Left ventricular ejection fraction appears to be 61 - 65%.    There is a bioprosthetic aortic valve present. The prosthetic aortic valve peak and mean gradients are elevated. There is severe stenosis or obstruction of the prosthetic aortic valve.    Severe aortic valve stenosis is present.    Left " ventricular wall thickness is consistent with moderate to severe concentric hypertrophy.    Left ventricular diastolic function is consistent with (grade II w/high LAP) pseudonormalization.    The left atrial cavity is moderate to severely dilated.    Left atrial volume is moderately increased.    The right atrial cavity is borderline dilated.        I have personally reviewed echo, hospitalization notes and past office notes prior to patients visit  Assessment:          Diagnosis Plan   1. Chronic diastolic CHF (congestive heart failure), NYHA class 2        2. Coronary artery disease involving coronary bypass graft of native heart without angina pectoris        3. Stenosis of prosthetic aortic valve, subsequent encounter        4. Primary hypertension        5. Transient ischemic attack (TIA)        6. PVD (peripheral vascular disease) with claudication        7. Tobacco use               Plan:       Chronic diastolic CHF: Echo 4/23/2025 showed grade II diastolic CHF and severe bioprosthetic aortic valve stenosis. Recently admitted and received diuresis. NYHA class II. Continue Jardiance and spironolactone. Patient has lasix to use prn but hasn't needed any since discharge. Discussed importance of daily weights and low sodium diet. Recommended to start daily weights and take lasix if he has 2-3 lbs weight gain overnight    2. Coronary artery disease: s/p 4 V CABG: patient denies any chest pain. Continue aspirin, metoprolol and rosuvastatin. He is set up for [facility] 5/8/2025 with Dr Francois.     3. Severe bioprosthetic aortic valve stenosis: echo 4/2024 showed severe bioprosthetic aortic valve stenosis. He has been seen by Dr Robin in office. Patient is scheduled for a [facility] 5/8/2025 with Dr Francois. He is scheduled for PFTs and CTA neck tomorrow.     4. Hypertension: slightly elevated in office today. Patient reports better BP control. Continue current medications    5. TIA    6. PVD    7. tobacco use: Sharad Ryder   reports that he quit smoking about 5 months ago. His smoking use included cigarettes. He started smoking about 50 years ago. He has a 74.8 pack-year smoking history. He has never been exposed to tobacco smoke. His smokeless tobacco use includes snuff. I have educated him on the risk of diseases from using tobacco products such as cancer, COPD, and heart disease.     Current outpatient and discharge medications have been reconciled for the patient.  Reviewed by: KIRBY Patel    I spent 35 minutes caring for Sharad on this date of service. This time includes time spent by me in the following activities:preparing for the visit, reviewing tests, obtaining and/or reviewing a separately obtained history, performing a medically appropriate examination and/or evaluation , counseling and educating the patient/family/caregiver, and documenting information in the medical record     Patient is to follow up in July as previously scheduled or sooner if needed

## 2025-05-01 ENCOUNTER — OFFICE VISIT (OUTPATIENT)
Dept: CARDIOLOGY | Facility: CLINIC | Age: 65
End: 2025-05-01
Payer: MEDICARE

## 2025-05-01 VITALS
HEIGHT: 64 IN | OXYGEN SATURATION: 97 % | WEIGHT: 118 LBS | BODY MASS INDEX: 20.14 KG/M2 | SYSTOLIC BLOOD PRESSURE: 158 MMHG | DIASTOLIC BLOOD PRESSURE: 76 MMHG | HEART RATE: 78 BPM

## 2025-05-01 DIAGNOSIS — T82.857D STENOSIS OF PROSTHETIC AORTIC VALVE, SUBSEQUENT ENCOUNTER: ICD-10-CM

## 2025-05-01 DIAGNOSIS — Z72.0 TOBACCO USE: ICD-10-CM

## 2025-05-01 DIAGNOSIS — I25.810 CORONARY ARTERY DISEASE INVOLVING CORONARY BYPASS GRAFT OF NATIVE HEART WITHOUT ANGINA PECTORIS: ICD-10-CM

## 2025-05-01 DIAGNOSIS — I73.9 PVD (PERIPHERAL VASCULAR DISEASE) WITH CLAUDICATION: ICD-10-CM

## 2025-05-01 DIAGNOSIS — I50.32 CHRONIC DIASTOLIC CHF (CONGESTIVE HEART FAILURE), NYHA CLASS 2: Primary | ICD-10-CM

## 2025-05-01 DIAGNOSIS — G45.9 TRANSIENT ISCHEMIC ATTACK (TIA): ICD-10-CM

## 2025-05-01 DIAGNOSIS — I10 PRIMARY HYPERTENSION: Chronic | ICD-10-CM

## 2025-05-01 RX ORDER — AMLODIPINE BESYLATE 10 MG/1
TABLET ORAL
COMMUNITY
Start: 2025-04-29

## 2025-05-01 RX ORDER — IPRATROPIUM BROMIDE AND ALBUTEROL SULFATE 2.5; .5 MG/3ML; MG/3ML
SOLUTION RESPIRATORY (INHALATION)
COMMUNITY

## 2025-05-01 RX ORDER — ISOSORBIDE MONONITRATE 120 MG/1
TABLET, EXTENDED RELEASE ORAL
COMMUNITY
Start: 2025-04-29

## 2025-05-02 ENCOUNTER — HOSPITAL ENCOUNTER (OUTPATIENT)
Dept: CT IMAGING | Facility: HOSPITAL | Age: 65
Discharge: HOME | End: 2025-05-02
Payer: MEDICARE

## 2025-05-02 ENCOUNTER — HOSPITAL ENCOUNTER (OUTPATIENT)
Dept: PULMONOLOGY | Facility: HOSPITAL | Age: 65
Discharge: HOME | End: 2025-05-02
Payer: MEDICARE

## 2025-05-02 DIAGNOSIS — Z95.1 S/P CABG X 3: ICD-10-CM

## 2025-05-02 DIAGNOSIS — I25.10 CORONARY ARTERY DISEASE INVOLVING NATIVE CORONARY ARTERY OF NATIVE HEART, UNSPECIFIED WHETHER ANGINA PRESENT: ICD-10-CM

## 2025-05-02 DIAGNOSIS — Z95.3 STATUS POST AORTIC VALVE REPLACEMENT WITH BIOPROSTHETIC VALVE: ICD-10-CM

## 2025-05-02 LAB — CREAT BLDA-MCNC: 0.8 MG/DL (ref 0.6–1.3)

## 2025-05-02 PROCEDURE — 94060 EVALUATION OF WHEEZING: CPT

## 2025-05-02 PROCEDURE — 25510000001 IOPAMIDOL PER 1 ML: Performed by: NURSE PRACTITIONER

## 2025-05-02 PROCEDURE — 71275 CT ANGIOGRAPHY CHEST: CPT

## 2025-05-02 PROCEDURE — 74174 CTA ABD&PLVS W/CONTRAST: CPT

## 2025-05-02 PROCEDURE — 82565 ASSAY OF CREATININE: CPT

## 2025-05-02 PROCEDURE — 94729 DIFFUSING CAPACITY: CPT

## 2025-05-02 PROCEDURE — 94726 PLETHYSMOGRAPHY LUNG VOLUMES: CPT

## 2025-05-02 RX ORDER — IOPAMIDOL 755 MG/ML
100 INJECTION, SOLUTION INTRAVASCULAR
Status: COMPLETED | OUTPATIENT
Start: 2025-05-02 | End: 2025-05-02

## 2025-05-02 RX ORDER — ALBUTEROL SULFATE 0.83 MG/ML
2.5 SOLUTION RESPIRATORY (INHALATION) ONCE
Status: COMPLETED | OUTPATIENT
Start: 2025-05-02 | End: 2025-05-02

## 2025-05-02 RX ADMIN — ALBUTEROL SULFATE 2.5 MG: 2.5 SOLUTION RESPIRATORY (INHALATION) at 07:30

## 2025-05-02 RX ADMIN — IOPAMIDOL 100 ML: 755 INJECTION, SOLUTION INTRAVENOUS at 08:37

## 2025-05-05 ENCOUNTER — OFFICE VISIT (OUTPATIENT)
Dept: CARDIOLOGY | Facility: CLINIC | Age: 65
End: 2025-05-05
Payer: MEDICARE

## 2025-05-05 VITALS
SYSTOLIC BLOOD PRESSURE: 138 MMHG | BODY MASS INDEX: 19.46 KG/M2 | HEART RATE: 81 BPM | DIASTOLIC BLOOD PRESSURE: 70 MMHG | WEIGHT: 114 LBS | OXYGEN SATURATION: 98 % | HEIGHT: 64 IN

## 2025-05-05 DIAGNOSIS — I35.0 SEVERE AORTIC STENOSIS: Primary | ICD-10-CM

## 2025-05-05 DIAGNOSIS — I50.33 ACUTE ON CHRONIC HEART FAILURE WITH PRESERVED EJECTION FRACTION (HFPEF): ICD-10-CM

## 2025-05-05 DIAGNOSIS — I25.810 CORONARY ARTERY DISEASE INVOLVING CORONARY BYPASS GRAFT OF NATIVE HEART WITHOUT ANGINA PECTORIS: ICD-10-CM

## 2025-05-05 PROCEDURE — 1160F RVW MEDS BY RX/DR IN RCRD: CPT | Performed by: INTERNAL MEDICINE

## 2025-05-05 PROCEDURE — 1159F MED LIST DOCD IN RCRD: CPT | Performed by: INTERNAL MEDICINE

## 2025-05-05 PROCEDURE — 3075F SYST BP GE 130 - 139MM HG: CPT | Performed by: INTERNAL MEDICINE

## 2025-05-05 PROCEDURE — 99214 OFFICE O/P EST MOD 30 MIN: CPT | Performed by: INTERNAL MEDICINE

## 2025-05-05 PROCEDURE — 3078F DIAST BP <80 MM HG: CPT | Performed by: INTERNAL MEDICINE

## 2025-05-05 NOTE — PROGRESS NOTES
Subjective:     Encounter Date:05/05/2025      Patient ID: Sharad Ryder is a 65 y.o. male with coronary artery disease, prior coronary artery bypass grafting, peripheral arterial disease, bioprosthetic aortic valve replacement due to aortic valve stenosis, now with severe bioprosthetic aortic valve stenosis, presenting today to the structural heart clinic to discuss further management of bioprosthetic aortic valve stenosis.    Chief Complaint: Here to discuss options for aortic valve replacement    History of Present Illness    Structural clinic for further evaluation management of bioprosthetic aortic valve stenosis.  The patient has previous undergone coronary artery bypass grafting as well as bioprosthetic aortic valve replacement, 19 mm valve.  He now has been admitted to the hospital with an episode of heart failure.  He is feeling better at this time with no significant shortness of breath or dyspnea with exertion but he says that he was quite short of breath when he was admitted to the hospital.  Denies having chest pain or exertional lightheadedness or dizziness denies having syncope.  He denies having significant lower extremity edema.  The patient notes being a former smoker.  He otherwise says that his breathing is stable at this time.  He is tolerating all of his medications well with no side effects.  He has been evaluated by Dr. Robin and has undergone some preoperative testing which is noted below.  He otherwise says that he is doing okay at this time but would like to get his valve replaced if at all possible, particularly if this was the cause of his heart failure exacerbation.      Current Outpatient Medications:     amLODIPine (NORVASC) 10 MG tablet, , Disp: , Rfl:     aspirin 81 MG tablet, Take 1 tablet by mouth Daily., Disp: 30 tablet, Rfl: 2    budesonide-formoterol (SYMBICORT) 160-4.5 MCG/ACT inhaler, Inhale 2 puffs 2 (Two) Times a Day., Disp: 10.2 g, Rfl: 1    busPIRone (BUSPAR) 10 MG  tablet, Take 1 tablet by mouth 2 (Two) Times a Day., Disp: , Rfl:     citalopram (CeleXA) 20 MG tablet, Take 1 tablet by mouth Daily., Disp: , Rfl:     clopidogrel (PLAVIX) 75 MG tablet, Take 1 tablet by mouth Daily., Disp: 30 tablet, Rfl: 2    empagliflozin (JARDIANCE) 10 MG tablet tablet, Take 1 tablet by mouth Daily., Disp: 90 tablet, Rfl: 0    furosemide (LASIX) 20 MG tablet, Take 1 tablet by mouth Daily As Needed (for swelling or shortness of breath)., Disp: 30 tablet, Rfl: 3    ipratropium-albuterol (DUO-NEB) 0.5-2.5 mg/3 ml nebulizer, USE 1 AMPULE IN NEBULIZER EVERY 4 HOURS AS NEEDED FOR WHEEZING, Disp: , Rfl:     isosorbide mononitrate (IMDUR) 120 MG 24 hr tablet, , Disp: , Rfl:     metoprolol succinate XL (TOPROL-XL) 25 MG 24 hr tablet, Take 1 tablet by mouth Daily., Disp: 90 tablet, Rfl: 0    multivitamin with minerals (ONE-A-DAY MENS 50+ ADVANTAGE PO), Take 1 tablet by mouth Daily., Disp: , Rfl:     nitroglycerin (NITROSTAT) 0.4 MG SL tablet, Place 1 tablet under the tongue Every 5 (Five) Minutes As Needed for Chest Pain. Take no more than 3 doses in 15 minutes., Disp: , Rfl:     rosuvastatin (CRESTOR) 5 MG tablet, Take 1 tablet by mouth Daily., Disp: 30 tablet, Rfl: 11    spironolactone (ALDACTONE) 25 MG tablet, Take 1 tablet by mouth Daily., Disp: 90 tablet, Rfl: 0    tamsulosin (FLOMAX) 0.4 MG capsule 24 hr capsule, Take 1 capsule by mouth Daily., Disp: , Rfl:     terazosin (HYTRIN) 5 MG capsule, Take 1 capsule by mouth Daily., Disp: , Rfl:     No Known Allergies    Social History     Tobacco Use    Smoking status: Former     Current packs/day: 0.00     Average packs/day: 1.5 packs/day for 49.8 years (74.8 ttl pk-yrs)     Types: Cigarettes     Start date:      Quit date: 2024     Years since quittin.5     Passive exposure: Never    Smokeless tobacco: Current     Types: Snuff   Substance Use Topics    Alcohol use: No     Review of Systems   Constitutional: Positive for malaise/fatigue.  "Negative for weight gain.   Cardiovascular:  Negative for chest pain, dyspnea on exertion, leg swelling, orthopnea, palpitations, paroxysmal nocturnal dyspnea and syncope.   Respiratory:  Negative for cough, shortness of breath and wheezing.    Gastrointestinal:  Negative for abdominal pain, nausea and vomiting.   Neurological:  Negative for dizziness and light-headedness.       Procedures: None today but I reviewed ECG from 4/23/2025 which showed sinus tachycardia, ventricular rate of 120 bpm, poor R wave progression, left ventricular hypertrophy with repolarization abnormality       Objective:     Vitals reviewed.   Constitutional:       General: Not in acute distress.     Appearance: Well-developed and not in distress. Chronically ill-appearing.   HENT:      Head: Normocephalic and atraumatic.   Neck:      Vascular: No carotid bruit or JVD.   Pulmonary:      Effort: Pulmonary effort is normal.      Breath sounds: Decreased breath sounds present. No wheezing. No rhonchi. No rales.   Cardiovascular:      Normal rate. Regular rhythm.      Murmurs: There is a grade 2/6 midsystolic murmur at the URSB.      No gallop.    Pulses:     Intact distal pulses.   Edema:     Peripheral edema absent.   Abdominal:      General: Bowel sounds are normal. There is no distension.      Palpations: Abdomen is soft.      Tenderness: There is no abdominal tenderness.   Skin:     General: Skin is warm and dry. There is no cyanosis.      Findings: No erythema or rash.   Neurological:      Mental Status: Alert. Mental status is at baseline.       /70   Pulse 81   Ht 162.6 cm (64\")   Wt 51.7 kg (114 lb)   SpO2 98%   BMI 19.57 kg/m²     Data/Lab Review:     Results for orders placed during the hospital encounter of 04/23/25    Adult Transthoracic Echo Complete w/ Color, Spectral and Contrast if Necessary Per Protocol    Interpretation Summary    Left ventricular systolic function is normal. Left ventricular ejection fraction " appears to be 61 - 65%.    There is a bioprosthetic aortic valve present. The prosthetic aortic valve peak and mean gradients are elevated. There is severe stenosis or obstruction of the prosthetic aortic valve.    Severe aortic valve stenosis is present.    Left ventricular wall thickness is consistent with moderate to severe concentric hypertrophy.    Left ventricular diastolic function is consistent with (grade II w/high LAP) pseudonormalization.    The left atrial cavity is moderate to severely dilated.    Left atrial volume is moderately increased.    The right atrial cavity is borderline dilated.    Lab Results   Component Value Date    WBC 26.86 (H) 04/25/2025    HGB 11.5 (L) 04/25/2025    HCT 35.8 (L) 04/25/2025    MCV 92.3 04/25/2025     (H) 04/25/2025     Lab Results   Component Value Date    GLUCOSE 150 (H) 04/25/2025    CALCIUM 9.3 04/25/2025     04/25/2025    K 3.7 04/25/2025    CO2 26.0 04/25/2025    CL 97 (L) 04/25/2025    BUN 23 04/25/2025    CREATININE 0.80 05/02/2025    EGFR 106.1 04/25/2025    BCR 37.1 (H) 04/25/2025    ANIONGAP 16.0 (H) 04/25/2025         Assessment:          Diagnosis Plan   1. Severe aortic stenosis        2. Coronary artery disease involving coronary bypass graft of native heart without angina pectoris        3. Acute on chronic heart failure with preserved ejection fraction (HFpEF)             Plan:       1.  Severe bioprosthetic aortic valve stenosis: I did review the echocardiographic images from the study performed on 4/23/2025 and the patient does have severe bioprosthetic aortic valve stenosis.  The CT scan was also reviewed showing very low left coronary artery height.  This may prohibit him from undergoing transcatheter aortic valve replacement.  We will discuss his case further at the structural heart meeting later this week and make further plans.  The patient will also undergo coronary angiography and bypass angiography by Dr. Francois in the near future.   Therefore, his future course may be determined further by his angiography as well.  Particular, I did discuss the risks of valve in valve TAVR with him today and the procedural details.  If a candidate, he would like to proceed if possible.    2.  Coronary artery disease: The patient is status post bypass grafting.  He is stable at this time with no anginal symptoms.  He will undergo coronary angiography as part of his preoperative testing by Dr. Francois.    3.  Acute on chronic heart failure with preserved ejection fraction: This is likely result of his worsening valvular dysfunction.  He is clinically stable at this time and euvolemic on today's exam.    Follow-up will be pending the patient's workup and discussion with the structural heart team.

## 2025-05-08 ENCOUNTER — HOSPITAL ENCOUNTER (OUTPATIENT)
Facility: HOSPITAL | Age: 65
Setting detail: HOSPITAL OUTPATIENT SURGERY
Discharge: HOME | End: 2025-05-08
Attending: INTERNAL MEDICINE | Admitting: INTERNAL MEDICINE
Payer: MEDICARE

## 2025-05-08 ENCOUNTER — ANESTHESIA (OUTPATIENT)
Dept: CARDIOLOGY | Facility: HOSPITAL | Age: 65
End: 2025-05-08
Payer: MEDICARE

## 2025-05-08 ENCOUNTER — ANESTHESIA EVENT (OUTPATIENT)
Dept: CARDIOLOGY | Facility: HOSPITAL | Age: 65
End: 2025-05-08
Payer: MEDICARE

## 2025-05-08 VITALS
DIASTOLIC BLOOD PRESSURE: 74 MMHG | RESPIRATION RATE: 17 BRPM | WEIGHT: 114 LBS | HEIGHT: 64 IN | HEART RATE: 87 BPM | BODY MASS INDEX: 19.46 KG/M2 | SYSTOLIC BLOOD PRESSURE: 130 MMHG | OXYGEN SATURATION: 93 %

## 2025-05-08 DIAGNOSIS — I35.0 SEVERE AORTIC STENOSIS: ICD-10-CM

## 2025-05-08 LAB
ALBUMIN SERPL-MCNC: 4.4 G/DL (ref 3.5–5.2)
ALBUMIN/GLOB SERPL: 1.6 G/DL
ALP SERPL-CCNC: 63 U/L (ref 39–117)
ALT SERPL W P-5'-P-CCNC: 13 U/L (ref 1–41)
ANION GAP SERPL CALCULATED.3IONS-SCNC: 10 MMOL/L (ref 5–15)
AST SERPL-CCNC: 18 U/L (ref 1–40)
BASOPHILS # BLD AUTO: 0.04 10*3/MM3 (ref 0–0.2)
BASOPHILS NFR BLD AUTO: 0.4 % (ref 0–1.5)
BILIRUB SERPL-MCNC: 0.4 MG/DL (ref 0–1.2)
BUN SERPL-MCNC: 13 MG/DL (ref 8–23)
BUN/CREAT SERPL: 15.5 (ref 7–25)
CALCIUM SPEC-SCNC: 9.5 MG/DL (ref 8.6–10.5)
CHLORIDE SERPL-SCNC: 100 MMOL/L (ref 98–107)
CO2 SERPL-SCNC: 25 MMOL/L (ref 22–29)
CREAT SERPL-MCNC: 0.84 MG/DL (ref 0.76–1.27)
DEPRECATED RDW RBC AUTO: 46.9 FL (ref 37–54)
EGFRCR SERPLBLD CKD-EPI 2021: 96.8 ML/MIN/1.73
EOSINOPHIL # BLD AUTO: 0.14 10*3/MM3 (ref 0–0.4)
EOSINOPHIL NFR BLD AUTO: 1.5 % (ref 0.3–6.2)
ERYTHROCYTE [DISTWIDTH] IN BLOOD BY AUTOMATED COUNT: 13.9 % (ref 12.3–15.4)
GLOBULIN UR ELPH-MCNC: 2.8 GM/DL
GLUCOSE SERPL-MCNC: 107 MG/DL (ref 65–99)
HCT VFR BLD AUTO: 38.5 % (ref 37.5–51)
HGB BLD-MCNC: 12.5 G/DL (ref 13–17.7)
IMM GRANULOCYTES # BLD AUTO: 0.05 10*3/MM3 (ref 0–0.05)
IMM GRANULOCYTES NFR BLD AUTO: 0.5 % (ref 0–0.5)
LYMPHOCYTES # BLD AUTO: 1.86 10*3/MM3 (ref 0.7–3.1)
LYMPHOCYTES NFR BLD AUTO: 20 % (ref 19.6–45.3)
MCH RBC QN AUTO: 30 PG (ref 26.6–33)
MCHC RBC AUTO-ENTMCNC: 32.5 G/DL (ref 31.5–35.7)
MCV RBC AUTO: 92.5 FL (ref 79–97)
MONOCYTES # BLD AUTO: 0.82 10*3/MM3 (ref 0.1–0.9)
MONOCYTES NFR BLD AUTO: 8.8 % (ref 5–12)
NEUTROPHILS NFR BLD AUTO: 6.41 10*3/MM3 (ref 1.7–7)
NEUTROPHILS NFR BLD AUTO: 68.8 % (ref 42.7–76)
NRBC BLD AUTO-RTO: 0 /100 WBC (ref 0–0.2)
PLATELET # BLD AUTO: 291 10*3/MM3 (ref 140–450)
PMV BLD AUTO: 9.5 FL (ref 6–12)
POTASSIUM SERPL-SCNC: 4.2 MMOL/L (ref 3.5–5.2)
PROT SERPL-MCNC: 7.2 G/DL (ref 6–8.5)
RBC # BLD AUTO: 4.16 10*6/MM3 (ref 4.14–5.8)
SODIUM SERPL-SCNC: 135 MMOL/L (ref 136–145)
WBC NRBC COR # BLD AUTO: 9.32 10*3/MM3 (ref 3.4–10.8)

## 2025-05-08 PROCEDURE — 25010000002 LIDOCAINE 2% SOLUTION: Performed by: INTERNAL MEDICINE

## 2025-05-08 PROCEDURE — C1769 GUIDE WIRE: HCPCS | Performed by: INTERNAL MEDICINE

## 2025-05-08 PROCEDURE — 25510000001 IOPAMIDOL PER 1 ML: Performed by: INTERNAL MEDICINE

## 2025-05-08 PROCEDURE — 25010000002 HEPARIN (PORCINE) 1000-0.9 UT/500ML-% SOLUTION: Performed by: INTERNAL MEDICINE

## 2025-05-08 PROCEDURE — C1887 CATHETER, GUIDING: HCPCS | Performed by: INTERNAL MEDICINE

## 2025-05-08 PROCEDURE — 99153 MOD SED SAME PHYS/QHP EA: CPT | Performed by: INTERNAL MEDICINE

## 2025-05-08 PROCEDURE — C1894 INTRO/SHEATH, NON-LASER: HCPCS | Performed by: INTERNAL MEDICINE

## 2025-05-08 PROCEDURE — L1830 KO IMMOB CANVAS LONG PRE OTS: HCPCS | Performed by: INTERNAL MEDICINE

## 2025-05-08 PROCEDURE — 85025 COMPLETE CBC W/AUTO DIFF WBC: CPT | Performed by: INTERNAL MEDICINE

## 2025-05-08 PROCEDURE — 99152 MOD SED SAME PHYS/QHP 5/>YRS: CPT | Performed by: INTERNAL MEDICINE

## 2025-05-08 PROCEDURE — 25810000003 SODIUM CHLORIDE 0.9 % SOLUTION: Performed by: INTERNAL MEDICINE

## 2025-05-08 PROCEDURE — 25010000002 LABETALOL 5 MG/ML SOLUTION: Performed by: NURSE ANESTHETIST, CERTIFIED REGISTERED

## 2025-05-08 PROCEDURE — 25010000002 FENTANYL CITRATE (PF) 100 MCG/2ML SOLUTION: Performed by: NURSE ANESTHETIST, CERTIFIED REGISTERED

## 2025-05-08 PROCEDURE — 25810000003 SODIUM CHLORIDE 0.9 % SOLUTION: Performed by: NURSE ANESTHETIST, CERTIFIED REGISTERED

## 2025-05-08 PROCEDURE — 93455 CORONARY ART/GRFT ANGIO S&I: CPT | Performed by: INTERNAL MEDICINE

## 2025-05-08 PROCEDURE — 25010000002 HEPARIN (PORCINE) 2000-0.9 UNIT/L-% SOLUTION: Performed by: INTERNAL MEDICINE

## 2025-05-08 PROCEDURE — 25010000002 DIPHENHYDRAMINE PER 50 MG: Performed by: NURSE ANESTHETIST, CERTIFIED REGISTERED

## 2025-05-08 PROCEDURE — 80053 COMPREHEN METABOLIC PANEL: CPT | Performed by: INTERNAL MEDICINE

## 2025-05-08 PROCEDURE — 25010000002 PROPOFOL 1000 MG/100ML EMULSION: Performed by: NURSE ANESTHETIST, CERTIFIED REGISTERED

## 2025-05-08 RX ORDER — SODIUM CHLORIDE 9 MG/ML
40 INJECTION, SOLUTION INTRAVENOUS AS NEEDED
Status: DISCONTINUED | OUTPATIENT
Start: 2025-05-08 | End: 2025-05-08 | Stop reason: HOSPADM

## 2025-05-08 RX ORDER — SODIUM CHLORIDE 9 MG/ML
INJECTION, SOLUTION INTRAVENOUS CONTINUOUS PRN
Status: DISCONTINUED | OUTPATIENT
Start: 2025-05-08 | End: 2025-05-08 | Stop reason: SURG

## 2025-05-08 RX ORDER — SODIUM CHLORIDE 9 MG/ML
100 INJECTION, SOLUTION INTRAVENOUS CONTINUOUS
Status: CANCELLED | OUTPATIENT
Start: 2025-05-08 | End: 2025-05-08

## 2025-05-08 RX ORDER — HEPARIN SODIUM 200 [USP'U]/100ML
INJECTION, SOLUTION INTRAVENOUS
Status: DISCONTINUED | OUTPATIENT
Start: 2025-05-08 | End: 2025-05-08 | Stop reason: HOSPADM

## 2025-05-08 RX ORDER — LABETALOL HYDROCHLORIDE 5 MG/ML
INJECTION, SOLUTION INTRAVENOUS AS NEEDED
Status: DISCONTINUED | OUTPATIENT
Start: 2025-05-08 | End: 2025-05-08 | Stop reason: SURG

## 2025-05-08 RX ORDER — IOPAMIDOL 755 MG/ML
INJECTION, SOLUTION INTRAVASCULAR
Status: DISCONTINUED | OUTPATIENT
Start: 2025-05-08 | End: 2025-05-08 | Stop reason: HOSPADM

## 2025-05-08 RX ORDER — HYDROCODONE BITARTRATE AND ACETAMINOPHEN 7.5; 325 MG/1; MG/1
TABLET ORAL
Status: COMPLETED
Start: 2025-05-08 | End: 2025-05-08

## 2025-05-08 RX ORDER — HYDROCODONE BITARTRATE AND ACETAMINOPHEN 7.5; 325 MG/1; MG/1
1 TABLET ORAL ONCE
Refills: 0 | Status: COMPLETED | OUTPATIENT
Start: 2025-05-08 | End: 2025-05-08

## 2025-05-08 RX ORDER — PROPOFOL 10 MG/ML
INJECTION, EMULSION INTRAVENOUS CONTINUOUS PRN
Status: DISCONTINUED | OUTPATIENT
Start: 2025-05-08 | End: 2025-05-08 | Stop reason: SURG

## 2025-05-08 RX ORDER — FENTANYL CITRATE 50 UG/ML
INJECTION, SOLUTION INTRAMUSCULAR; INTRAVENOUS AS NEEDED
Status: DISCONTINUED | OUTPATIENT
Start: 2025-05-08 | End: 2025-05-08 | Stop reason: SURG

## 2025-05-08 RX ORDER — SODIUM CHLORIDE 0.9 % (FLUSH) 0.9 %
3 SYRINGE (ML) INJECTION EVERY 12 HOURS SCHEDULED
Status: DISCONTINUED | OUTPATIENT
Start: 2025-05-08 | End: 2025-05-08 | Stop reason: HOSPADM

## 2025-05-08 RX ORDER — ACETAMINOPHEN 325 MG/1
650 TABLET ORAL EVERY 4 HOURS PRN
Status: CANCELLED | OUTPATIENT
Start: 2025-05-08

## 2025-05-08 RX ORDER — LIDOCAINE HYDROCHLORIDE 20 MG/ML
INJECTION, SOLUTION INFILTRATION; PERINEURAL
Status: DISCONTINUED | OUTPATIENT
Start: 2025-05-08 | End: 2025-05-08 | Stop reason: HOSPADM

## 2025-05-08 RX ORDER — SODIUM CHLORIDE 9 MG/ML
40 INJECTION, SOLUTION INTRAVENOUS AS NEEDED
Status: CANCELLED | OUTPATIENT
Start: 2025-05-08

## 2025-05-08 RX ORDER — SODIUM CHLORIDE 0.9 % (FLUSH) 0.9 %
10 SYRINGE (ML) INJECTION AS NEEDED
Status: CANCELLED | OUTPATIENT
Start: 2025-05-08

## 2025-05-08 RX ORDER — HYDROCODONE BITARTRATE AND ACETAMINOPHEN 7.5; 325 MG/1; MG/1
1 TABLET ORAL EVERY 6 HOURS PRN
Refills: 0 | Status: DISCONTINUED | OUTPATIENT
Start: 2025-05-08 | End: 2025-05-08

## 2025-05-08 RX ORDER — HYDROCODONE BITARTRATE AND ACETAMINOPHEN 7.5; 325 MG/1; MG/1
1 TABLET ORAL EVERY 6 HOURS PRN
COMMUNITY

## 2025-05-08 RX ORDER — SODIUM CHLORIDE 0.9 % (FLUSH) 0.9 %
10 SYRINGE (ML) INJECTION AS NEEDED
Status: DISCONTINUED | OUTPATIENT
Start: 2025-05-08 | End: 2025-05-08 | Stop reason: HOSPADM

## 2025-05-08 RX ORDER — DIPHENHYDRAMINE HYDROCHLORIDE 50 MG/ML
INJECTION, SOLUTION INTRAMUSCULAR; INTRAVENOUS AS NEEDED
Status: DISCONTINUED | OUTPATIENT
Start: 2025-05-08 | End: 2025-05-08 | Stop reason: SURG

## 2025-05-08 RX ORDER — SODIUM CHLORIDE 0.9 % (FLUSH) 0.9 %
10 SYRINGE (ML) INJECTION EVERY 12 HOURS SCHEDULED
Status: CANCELLED | OUTPATIENT
Start: 2025-05-08

## 2025-05-08 RX ADMIN — SODIUM CHLORIDE 200 ML: 9 INJECTION, SOLUTION INTRAVENOUS at 07:58

## 2025-05-08 RX ADMIN — HYDROCODONE BITARTRATE AND ACETAMINOPHEN 1 TABLET: 7.5; 325 TABLET ORAL at 13:24

## 2025-05-08 RX ADMIN — FENTANYL CITRATE 100 MCG: 50 INJECTION, SOLUTION INTRAMUSCULAR; INTRAVENOUS at 11:51

## 2025-05-08 RX ADMIN — SODIUM CHLORIDE: 9 INJECTION, SOLUTION INTRAVENOUS at 11:28

## 2025-05-08 RX ADMIN — DIPHENHYDRAMINE HYDROCHLORIDE 50 MG: 50 INJECTION, SOLUTION INTRAMUSCULAR; INTRAVENOUS at 11:34

## 2025-05-08 RX ADMIN — LABETALOL HYDROCHLORIDE 10 MG: 5 INJECTION, SOLUTION INTRAVENOUS at 11:34

## 2025-05-08 RX ADMIN — PROPOFOL 100 MCG/KG/MIN: 10 INJECTION, EMULSION INTRAVENOUS at 11:28

## 2025-05-08 NOTE — ANESTHESIA PREPROCEDURE EVALUATION
" Anesthesia Evaluation     Patient summary reviewed   no history of anesthetic complications:   NPO Solid Status: > 8 hours             Airway   Mallampati: II  TM distance: >3 FB  Neck ROM: full  Dental    (+) upper dentures and lower dentures    Pulmonary    (+) a smoker Current, COPD,shortness of breath  (-) recent URI, sleep apnea  Cardiovascular   Exercise tolerance: poor (<4 METS)    ECG reviewed    (+) hypertension, valvular problems/murmurs (s/p AVR) AS, past MI (NSTEMI) , CAD, ELIZABETH, PVD, hyperlipidemia,  carotid artery disease (s/p right awake carotid 2018)  (-) pacemaker, CHF, cardiac stents      Neuro/Psych  (+) CVA  (-) seizures, TIA  GI/Hepatic/Renal/Endo    (-) GERD, liver disease, no renal disease, diabetes    Musculoskeletal     Abdominal    Substance History      OB/GYN          Other                      Anesthesia Plan    ASA 4     MAC     (Very high risk. Personally discussed with patient.     \"No targets for intervention\".    He and family seem to understand well. )  intravenous induction     Anesthetic plan, risks, benefits, and alternatives have been provided, discussed and informed consent has been obtained with: patient.    Use of blood products discussed with patient  Consented to blood products.      "

## 2025-05-08 NOTE — ANESTHESIA POSTPROCEDURE EVALUATION
"Patient: Sharad Ryder    Procedure Summary       Date: 05/08/25 Room / Location: [facility] CATH LAB 3 /  [facility] CATH INVASIVE LOCATION    Anesthesia Start: 1122 Anesthesia Stop: 1245    Procedure: Cardiac Catheterization/Vascular Study (Left) Diagnosis:       Severe aortic stenosis      (CAD)    Providers: Aime Francois MD Provider: Angi Florez CRNA    Anesthesia Type: MAC ASA Status: 4            Anesthesia Type: MAC    Vitals  Vitals Value Taken Time   /83 05/08/25 13:16   Temp     Pulse 70 05/08/25 13:23   Resp     SpO2 94 % 05/08/25 13:23   Vitals shown include unfiled device data.        Post Anesthesia Care and Evaluation    Patient location during evaluation: PACU  Patient participation: complete - patient participated  Level of consciousness: awake and alert  Pain management: adequate    Airway patency: patent  Anesthetic complications: No anesthetic complications    Cardiovascular status: acceptable  Respiratory status: acceptable  Hydration status: acceptable    Comments: Blood pressure 130/74, pulse 74, resp. rate 20, height 162.6 cm (64\"), weight 51.7 kg (114 lb), SpO2 97%.    Pt discharged from PACU based on lalo score >8    "

## 2025-05-08 NOTE — Clinical Note
Hemostasis started on the right femoral artery. Topical hemostasis patch was used in achieving hemostasis. Manual pressure applied to vessel. Manual pressure was held by Dr Francois/ Anastasiya donato. Manual pressure was held for 15 min. Hemostasis achieved successfully.

## 2025-05-09 ENCOUNTER — HOSPITAL ENCOUNTER (OUTPATIENT)
Dept: CT IMAGING | Facility: HOSPITAL | Age: 65
Discharge: HOME | End: 2025-05-09
Payer: MEDICARE

## 2025-05-09 DIAGNOSIS — I65.22 LEFT CAROTID ARTERY STENOSIS: ICD-10-CM

## 2025-05-09 PROCEDURE — 25510000001 IOPAMIDOL PER 1 ML: Performed by: NURSE PRACTITIONER

## 2025-05-09 PROCEDURE — 70498 CT ANGIOGRAPHY NECK: CPT

## 2025-05-09 RX ORDER — IOPAMIDOL 755 MG/ML
100 INJECTION, SOLUTION INTRAVASCULAR
Status: COMPLETED | OUTPATIENT
Start: 2025-05-09 | End: 2025-05-09

## 2025-05-09 RX ADMIN — IOPAMIDOL 87 ML: 755 INJECTION, SOLUTION INTRAVENOUS at 07:32

## 2025-05-09 NOTE — INTERVAL H&P NOTE
H&P reviewed. The patient was examined and there are no changes to the H&P.    Recommend cardiac catheterization, selective coronary angiography, left ventriculography and percutaneous coronary intervention with application of arteriotomy hemostatic closure device.    I discussed cardiac catheterization, the procedure, risks (including bleeding, infection, vascular damage [including minor oozing, bruising, bleeding, and up to and including but not limited to the need for vascular surgery, emergency cardiothoracic surgery, contrast reaction, renal failure, respiratory failure, heart attack, stroke, arrhythmia and even death), benefits, and alternatives and the patient has voiced understanding and is willing to proceed.    Adequate pre-hydration and post cardiac catheterization hydration.  Premedications as required and indicated for cardiac catheterization.    No contraindication to drug eluting stent placement if required  Further recommendations pending results of cardiac catheterization

## 2025-05-13 ENCOUNTER — RESULTS FOLLOW-UP (OUTPATIENT)
Dept: CARDIAC SURGERY | Facility: CLINIC | Age: 65
End: 2025-05-13

## 2025-05-13 DIAGNOSIS — I65.23 BILATERAL CAROTID ARTERY STENOSIS: Primary | ICD-10-CM

## 2025-05-13 DIAGNOSIS — Z98.890 HISTORY OF RIGHT-SIDED CAROTID ENDARTERECTOMY: ICD-10-CM

## 2025-05-13 NOTE — PROGRESS NOTES
Reviewed with Dr. Robin.  Referral placed to Dr. Salgado for known carotid artery disease and to re-establish care. Please inform patient.

## 2025-05-19 ENCOUNTER — TELEPHONE (OUTPATIENT)
Dept: CARDIAC SURGERY | Facility: CLINIC | Age: 65
End: 2025-05-19
Payer: MEDICARE

## 2025-05-19 NOTE — TELEPHONE ENCOUNTER
Attempted to reach patient to check on him.  He was referred to vascular surgery for carotid disease but their office has been unable to reach him through telephone and [employer].  No answer and left message to return call.  Vanessa, can you please send him a letter that explains we have been trying to reach him and he needs to call our office

## 2025-05-20 PROBLEM — I50.32 CHRONIC HEART FAILURE WITH PRESERVED EJECTION FRACTION (HFPEF): Status: ACTIVE | Noted: 2025-05-20

## 2025-05-22 NOTE — PROGRESS NOTES
Dr. Robin was informed that we have been unable to reach patient and have mailed a certified letter.

## 2025-05-29 ENCOUNTER — TELEPHONE (OUTPATIENT)
Dept: CARDIOLOGY | Facility: CLINIC | Age: 65
End: 2025-05-29
Payer: MEDICARE

## 2025-05-29 NOTE — TELEPHONE ENCOUNTER
Call to the pt to discuss valve intervention plan. Called pt's cell, no answer and unable to leave a message.    Called pt's wife and left a voicemail requesting a return call.

## 2025-05-30 ENCOUNTER — TELEPHONE (OUTPATIENT)
Dept: VASCULAR SURGERY | Facility: CLINIC | Age: 65
End: 2025-05-30
Payer: MEDICARE

## 2025-05-30 ENCOUNTER — TELEPHONE (OUTPATIENT)
Dept: CARDIOLOGY | Facility: CLINIC | Age: 65
End: 2025-05-30
Payer: MEDICARE

## 2025-05-30 NOTE — TELEPHONE ENCOUNTER
Call back from the pt today after we have been trying to reach him for a couple of weeks. Discussed his valve replacement plan the best I could over the phone.    He is going to come in Monday morning and meet with me so that we can go over some education material and we can discuss his procedure in detail.

## 2025-05-30 NOTE — TELEPHONE ENCOUNTER
Call received and patient would like to reschedule appointment, appointment arranged for 6/6/2025 at 1030

## 2025-06-02 ENCOUNTER — TELEPHONE (OUTPATIENT)
Dept: CARDIOLOGY | Facility: CLINIC | Age: 65
End: 2025-06-02
Payer: MEDICARE

## 2025-06-02 NOTE — TELEPHONE ENCOUNTER
The patient came in this morning so that we could have a discussion about his upcoming TAVR procedure. We discussed TAVR in detail and the type of access that he would need. We discussed the follow up. Frailty testing was performed and all of his questions were answered.    Informed his procedure would likely be July 1 and that I would be in contact with him closer to time. He verbalized understanding.

## 2025-06-06 ENCOUNTER — TELEPHONE (OUTPATIENT)
Dept: VASCULAR SURGERY | Facility: CLINIC | Age: 65
End: 2025-06-06
Payer: MEDICARE

## 2025-06-06 DIAGNOSIS — I73.9 PAD (PERIPHERAL ARTERY DISEASE): Primary | ICD-10-CM

## 2025-06-06 NOTE — TELEPHONE ENCOUNTER
CALLED PATIENT TO LET HIM KNOW HE DID NOT HAVE ALL TESTING DONE NEEDED FOR APPOINTMENT WITH [facility] AND WE WOULD NEED TO RESCHEDULE. PATIENT DID NOT ANSWER AND HAD NO VOICEMAIL AVAILABLE TO LEAVE A MESSAGE.

## 2025-06-09 ENCOUNTER — HOSPITAL ENCOUNTER (OUTPATIENT)
Dept: ULTRASOUND IMAGING | Facility: HOSPITAL | Age: 65
Discharge: HOME | End: 2025-06-09
Admitting: NURSE PRACTITIONER
Payer: MEDICARE

## 2025-06-09 ENCOUNTER — OFFICE VISIT (OUTPATIENT)
Dept: VASCULAR SURGERY | Facility: CLINIC | Age: 65
End: 2025-06-09
Payer: MEDICARE

## 2025-06-09 VITALS
OXYGEN SATURATION: 96 % | SYSTOLIC BLOOD PRESSURE: 128 MMHG | WEIGHT: 117 LBS | HEIGHT: 64 IN | HEART RATE: 73 BPM | DIASTOLIC BLOOD PRESSURE: 78 MMHG | BODY MASS INDEX: 19.97 KG/M2

## 2025-06-09 DIAGNOSIS — I35.0 SEVERE AORTIC STENOSIS: ICD-10-CM

## 2025-06-09 DIAGNOSIS — I73.9 PAD (PERIPHERAL ARTERY DISEASE): ICD-10-CM

## 2025-06-09 DIAGNOSIS — I71.43 INFRARENAL ABDOMINAL AORTIC ANEURYSM (AAA) WITHOUT RUPTURE: ICD-10-CM

## 2025-06-09 DIAGNOSIS — E78.5 HYPERLIPIDEMIA LDL GOAL <70: Chronic | ICD-10-CM

## 2025-06-09 DIAGNOSIS — I10 PRIMARY HYPERTENSION: Chronic | ICD-10-CM

## 2025-06-09 DIAGNOSIS — I65.22 STENOSIS OF LEFT CAROTID ARTERY: Primary | ICD-10-CM

## 2025-06-09 PROCEDURE — 93923 UPR/LXTR ART STDY 3+ LVLS: CPT | Performed by: SURGERY

## 2025-06-09 PROCEDURE — 99214 OFFICE O/P EST MOD 30 MIN: CPT | Performed by: NURSE PRACTITIONER

## 2025-06-09 PROCEDURE — 3078F DIAST BP <80 MM HG: CPT | Performed by: NURSE PRACTITIONER

## 2025-06-09 PROCEDURE — 1160F RVW MEDS BY RX/DR IN RCRD: CPT | Performed by: NURSE PRACTITIONER

## 2025-06-09 PROCEDURE — 93923 UPR/LXTR ART STDY 3+ LVLS: CPT

## 2025-06-09 PROCEDURE — 3074F SYST BP LT 130 MM HG: CPT | Performed by: NURSE PRACTITIONER

## 2025-06-09 PROCEDURE — 1159F MED LIST DOCD IN RCRD: CPT | Performed by: NURSE PRACTITIONER

## 2025-06-09 NOTE — PROGRESS NOTES
"06/09/2025       Kerry Mtz, APRN  [address] [facility] [address]        Sharad ANSARI Aleksey  1960    Chief Complaint   Patient presents with    SET PAD     No new concerns. Had testing completed.        Dear Kerry Mtz, APRZAHRAA:     I had the pleasure of seeing you patient in the office today for follow up.  As you recall, the patient is a 65 y.o. male who we are currently following for carotid stenosis and a small abdominal aortic aneurysm.    He had a previous right carotid endarterectomy on 2/1/2018.  He denies any abdominal pain.  He does have complaints of left leg pain mostly to the left hip and buttocks area.  He did undergo a left lower extremity angiogram with placement of kissing stents 10/27/23.  He was last seen here in 2023.  He is maintained on aspirin, Plavix, and Crestor.  He did quit smoking in November 2024.He did have noninvasive testing performed which I did review in office.       Review of Systems   Constitutional: Negative.    HENT: Negative.     Eyes:  Positive for visual disturbance (Double vision intermittently).   Respiratory:  Positive for shortness of breath (on exertion).    Cardiovascular:  Positive for leg swelling (Left). Negative for chest pain.   Gastrointestinal: Negative.    Endocrine: Negative.    Genitourinary: Negative.    Musculoskeletal:  Positive for arthralgias and joint swelling.   Skin: Negative.    Allergic/Immunologic: Negative.    Neurological: Negative.    Hematological: Negative.    Psychiatric/Behavioral: Negative.     All other systems reviewed and are negative.      /78   Pulse 73   Ht 162.6 cm (64\")   Wt 53.1 kg (117 lb)   SpO2 96%   BMI 20.08 kg/m²   Physical Exam  Vitals and nursing note reviewed.   Constitutional:       General: He is not in acute distress.     Appearance: Normal appearance. He is well-developed and normal weight. He is not diaphoretic.   HENT:      Head: Normocephalic and atraumatic.   Neck:      Vascular: No " carotid bruit or JVD.   Cardiovascular:      Rate and Rhythm: Normal rate and regular rhythm.      Pulses:           Femoral pulses are 2+ on the right side and 2+ on the left side.       Popliteal pulses are 2+ on the right side.        Dorsalis pedis pulses are detected w/ Doppler on the left side.        Posterior tibial pulses are detected w/ Doppler on the left side.      Heart sounds: S1 normal and S2 normal. Murmur heard.      No friction rub. No gallop.   Pulmonary:      Effort: Pulmonary effort is normal.      Breath sounds: Normal breath sounds.   Abdominal:      General: Bowel sounds are normal. There is no abdominal bruit.      Palpations: Abdomen is soft.      Tenderness: There is no abdominal tenderness.   Musculoskeletal:         General: Normal range of motion.   Skin:     General: Skin is warm and dry.   Neurological:      General: No focal deficit present.      Mental Status: He is alert and oriented to person, place, and time.      Cranial Nerves: No cranial nerve deficit.   Psychiatric:         Mood and Affect: Mood normal.         Behavior: Behavior normal.         Thought Content: Thought content normal.         Judgment: Judgment normal.         DIAGNOSTIC DATA:  US Ankle / Brachial Indices Extremity Complete  Result Date: 6/9/2025  Narrative:  History: PAD  Comments: Bilateral lower extremity arterial with multi-level pulse volume recordings and segmental pressures were performed at rest and stress.  The right ankle/brachial index is 0.94. The waveforms are triphasic without dampening. These findings are consistent with mild arterial insufficiency of the right lower extremity at rest.  The left ankle/brachial index is 0.92. The waveforms are triphasic without dampening. These findings are consistent with mild arterial insufficiency of the left lower extremity at rest.      Impression: Impression: 1. Mild arterial insufficiency of the right lower extremity at rest. 2. Mild arterial  insufficiency of the left lower extremity at rest.    This report was signed and finalized on 6/9/2025 12:32 PM by Dr. Jl Salgado MD.      CT ANGIOGRAM NECK- 5/9/2025 6:28 AM     HISTORY: Left carotid artery stenosis; I65.22-Occlusion and stenosis of  left carotid artery     DOSE LENGTH PRODUCT: 288.29 mGy.cm. Automated exposure control was also  utilized to decrease patient radiation dose.     EXAM: Axial CT angiogram of the neck after the uneventful administration  of Isovue IV contrast. Sagittal and coronal reformations were also  provided for review. 3D images were created on an independent  workstation to better evaluate potential vascular abnormalities.     COMPARISON: CT angiogram dated 12/15/2017     FINDINGS:     Typical three-vessel branching pattern at the aortic arch. Heavily  calcified atheromatous plaque at the right brachiocephalic, left common  carotid and left subclavian artery origins. There appears to be a fairly  high-grade atheromatous narrowing at both the right brachiocephalic and  left subclavian artery origins as seen on axial images 114 through 117.  Additional mild to moderate atheromatous narrowing along the proximal  left subclavian artery. The left common carotid artery is diffusely  narrow in caliber relative to the right. Heavily calcified atheromatous  plaque in the left carotid bulb. Calcified 49% left ICA origin  atheromatous narrowing. Previous right carotid endarterectomy with no  flow-limiting stenosis along the right carotid system beyond the  brachiocephalic origin. Distal ICAs are patent. Patent bilateral  vertebral arteries. Dominant right vertebral artery.     Visualized intracranial contents are unremarkable. Orbital contents are  unremarkable. No cervical adenopathy. Thyroid gland is homogeneous.  Advanced lung emphysema. Prior median sternotomy.     IMPRESSION:     1.  Previous right carotid endarterectomy.  2.  Calculated 49% left ICA origin atheromatous  narrowing.  3.  Heavily calcified atheromatous plaque at the level of the right  brachiocephalic and left common carotid artery origins with what appears  to be a fairly high-grade atheromatous narrowing at both of these  origins.  4.  Patent bilateral vertebral arteries.     This report was signed and finalized on 5/9/2025 8:06 AM by Dr Javy Romano.       Patient Active Problem List   Diagnosis    Hyperlipidemia LDL goal <70    Primary hypertension    COPD (chronic obstructive pulmonary disease)    Transient ischemic attack (TIA)    Allergic reaction    Coronary artery disease involving coronary bypass graft of native heart without angina pectoris    PVC (premature ventricular contraction)    Leukocytosis    History of CVA (cerebrovascular accident)    Right sided weakness resolved    Acute CVA (cerebrovascular accident)    Right-sided carotid artery disease    S/P CABG x 3 2018 Dr Robin     Status post aortic valve replacement with bioprosthetic valve    LVH (left ventricular hypertrophy)    History of right-sided carotid endarterectomy    Tobacco use    Chronic stable angina    PVD (peripheral vascular disease) with claudication    Aortoiliac occlusive disease    Preop testing    PAD (peripheral artery disease)    Acute exacerbation of chronic obstructive pulmonary disease (COPD)    COVID-19 virus infection    Elevated troponin level not due myocardial infarction    Acute on chronic diastolic CHF (congestive heart failure)    Severe aortic stenosis    Diastolic dysfunction    Acute on chronic respiratory failure with hypoxia    Moderate protein-calorie malnutrition    COPD with acute exacerbation    Acute on chronic heart failure with preserved ejection fraction (HFpEF)    Viral pneumonia    Chronic heart failure with preserved ejection fraction (HFpEF)         ICD-10-CM ICD-9-CM   1. Stenosis of left carotid artery  I65.22 433.10   2. Severe aortic stenosis  I35.0 424.1   3. Hyperlipidemia LDL goal <70  E78.5  272.4   4. Primary hypertension  I10 401.9   5. PAD (peripheral artery disease)  I73.9 443.9   6. Infrarenal abdominal aortic aneurysm (AAA) without rupture  I71.43 441.4             PLAN: After thoroughly evaluating Sharad Ryder, I believe the best course of action is to proceed with left transcarotid artery revascularization for stroke risk reduction.  I did review his testing myself as well as with Dr. Salgado.  He does have about 70% left carotid stenosis.  He can proceed with TAVR for severe aortic stenosis prior to procedure. We will see him back end of July to set up.  At that time, I will order at CTA of the abdomen and pelvis to re-evaluate his aneurysm as well. I did discuss vascular risk factors as it pertains to the progression of vascular disease as controlling his hypertension blood pressure stable to current medications.  He should continue Crestor 40 mg in addition to his other medications.  This was all discussed in full with complete understanding.  Thank you for allowing me to participate in the care of your patient.  Please do not hesitate to call with any questions or concerns.  We will keep you aware of any further encounters with Sharad Ryder.      Sincerely Yours,      KIRBY Nolan

## 2025-06-09 NOTE — LETTER
June 21, 2025     KIRBY Smith  [address] [facility] [address]    Patient: Sharad Ryder   YOB: 1960   Date of Visit: 6/9/2025     Dear KIRBY Smith:       Thank you for referring Sharad Ryder to me for evaluation. Below are the relevant portions of my assessment and plan of care.    If you have questions, please do not hesitate to call me. I look forward to following Sharad along with you.         Sincerely,        KIRBY Nolan        CC: No Recipients    Lianet Rascon APRN  06/21/25 [id number]  Sign when Signing Visit  06/09/2025       Kerry Mtz APRN  [address] [facility] [address]        Sharad Ryder  1960    Chief Complaint   Patient presents with   • SET PAD     No new concerns. Had testing completed.        Dear Kerry Mtz APRN:     I had the pleasure of seeing you patient in the office today for follow up.  As you recall, the patient is a 65 y.o. male who we are currently following for carotid stenosis and a small abdominal aortic aneurysm.    He had a previous right carotid endarterectomy on 2/1/2018.  He denies any abdominal pain.  He does have complaints of left leg pain mostly to the left hip and buttocks area.  He did undergo a left lower extremity angiogram with placement of kissing stents 10/27/23.  He was last seen here in 2023.  He is maintained on aspirin, Plavix, and Crestor.  He did quit smoking in November 2024.He did have noninvasive testing performed which I did review in office.       Review of Systems   Constitutional: Negative.    HENT: Negative.     Eyes:  Positive for visual disturbance (Double vision intermittently).   Respiratory:  Positive for shortness of breath (on exertion).    Cardiovascular:  Positive for leg swelling (Left). Negative for chest pain.   Gastrointestinal: Negative.    Endocrine: Negative.    Genitourinary: Negative.    Musculoskeletal:  Positive for arthralgias and joint swelling.  "  Skin: Negative.    Allergic/Immunologic: Negative.    Neurological: Negative.    Hematological: Negative.    Psychiatric/Behavioral: Negative.     All other systems reviewed and are negative.      /78   Pulse 73   Ht 162.6 cm (64\")   Wt 53.1 kg (117 lb)   SpO2 96%   BMI 20.08 kg/m²   Physical Exam  Vitals and nursing note reviewed.   Constitutional:       General: He is not in acute distress.     Appearance: Normal appearance. He is well-developed and normal weight. He is not diaphoretic.   HENT:      Head: Normocephalic and atraumatic.   Neck:      Vascular: No carotid bruit or JVD.   Cardiovascular:      Rate and Rhythm: Normal rate and regular rhythm.      Pulses:           Femoral pulses are 2+ on the right side and 2+ on the left side.       Popliteal pulses are 2+ on the right side.        Dorsalis pedis pulses are detected w/ Doppler on the left side.        Posterior tibial pulses are detected w/ Doppler on the left side.      Heart sounds: S1 normal and S2 normal. Murmur heard.      No friction rub. No gallop.   Pulmonary:      Effort: Pulmonary effort is normal.      Breath sounds: Normal breath sounds.   Abdominal:      General: Bowel sounds are normal. There is no abdominal bruit.      Palpations: Abdomen is soft.      Tenderness: There is no abdominal tenderness.   Musculoskeletal:         General: Normal range of motion.   Skin:     General: Skin is warm and dry.   Neurological:      General: No focal deficit present.      Mental Status: He is alert and oriented to person, place, and time.      Cranial Nerves: No cranial nerve deficit.   Psychiatric:         Mood and Affect: Mood normal.         Behavior: Behavior normal.         Thought Content: Thought content normal.         Judgment: Judgment normal.         DIAGNOSTIC DATA:  US Ankle / Brachial Indices Extremity Complete  Result Date: 6/9/2025  Narrative:  History: PAD  Comments: Bilateral lower extremity arterial with multi-level " pulse volume recordings and segmental pressures were performed at rest and stress.  The right ankle/brachial index is 0.94. The waveforms are triphasic without dampening. These findings are consistent with mild arterial insufficiency of the right lower extremity at rest.  The left ankle/brachial index is 0.92. The waveforms are triphasic without dampening. These findings are consistent with mild arterial insufficiency of the left lower extremity at rest.      Impression: Impression: 1. Mild arterial insufficiency of the right lower extremity at rest. 2. Mild arterial insufficiency of the left lower extremity at rest.    This report was signed and finalized on 6/9/2025 12:32 PM by Dr. Jl Salgado MD.      CT ANGIOGRAM NECK- 5/9/2025 6:28 AM     HISTORY: Left carotid artery stenosis; I65.22-Occlusion and stenosis of  left carotid artery     DOSE LENGTH PRODUCT: 288.29 mGy.cm. Automated exposure control was also  utilized to decrease patient radiation dose.     EXAM: Axial CT angiogram of the neck after the uneventful administration  of Isovue IV contrast. Sagittal and coronal reformations were also  provided for review. 3D images were created on an independent  workstation to better evaluate potential vascular abnormalities.     COMPARISON: CT angiogram dated 12/15/2017     FINDINGS:     Typical three-vessel branching pattern at the aortic arch. Heavily  calcified atheromatous plaque at the right brachiocephalic, left common  carotid and left subclavian artery origins. There appears to be a fairly  high-grade atheromatous narrowing at both the right brachiocephalic and  left subclavian artery origins as seen on axial images 114 through 117.  Additional mild to moderate atheromatous narrowing along the proximal  left subclavian artery. The left common carotid artery is diffusely  narrow in caliber relative to the right. Heavily calcified atheromatous  plaque in the left carotid bulb. Calcified 49% left ICA  origin  atheromatous narrowing. Previous right carotid endarterectomy with no  flow-limiting stenosis along the right carotid system beyond the  brachiocephalic origin. Distal ICAs are patent. Patent bilateral  vertebral arteries. Dominant right vertebral artery.     Visualized intracranial contents are unremarkable. Orbital contents are  unremarkable. No cervical adenopathy. Thyroid gland is homogeneous.  Advanced lung emphysema. Prior median sternotomy.     IMPRESSION:     1.  Previous right carotid endarterectomy.  2.  Calculated 49% left ICA origin atheromatous narrowing.  3.  Heavily calcified atheromatous plaque at the level of the right  brachiocephalic and left common carotid artery origins with what appears  to be a fairly high-grade atheromatous narrowing at both of these  origins.  4.  Patent bilateral vertebral arteries.     This report was signed and finalized on 5/9/2025 8:06 AM by Dr Javy Romano.       Patient Active Problem List   Diagnosis   • Hyperlipidemia LDL goal <70   • Primary hypertension   • COPD (chronic obstructive pulmonary disease)   • Transient ischemic attack (TIA)   • Allergic reaction   • Coronary artery disease involving coronary bypass graft of native heart without angina pectoris   • PVC (premature ventricular contraction)   • Leukocytosis   • History of CVA (cerebrovascular accident)   • Right sided weakness resolved   • Acute CVA (cerebrovascular accident)   • Right-sided carotid artery disease   • S/P CABG x 3 2018 Dr Robin    • Status post aortic valve replacement with bioprosthetic valve   • LVH (left ventricular hypertrophy)   • History of right-sided carotid endarterectomy   • Tobacco use   • Chronic stable angina   • PVD (peripheral vascular disease) with claudication   • Aortoiliac occlusive disease   • Preop testing   • PAD (peripheral artery disease)   • Acute exacerbation of chronic obstructive pulmonary disease (COPD)   • COVID-19 virus infection   • Elevated  troponin level not due myocardial infarction   • Acute on chronic diastolic CHF (congestive heart failure)   • Severe aortic stenosis   • Diastolic dysfunction   • Acute on chronic respiratory failure with hypoxia   • Moderate protein-calorie malnutrition   • COPD with acute exacerbation   • Acute on chronic heart failure with preserved ejection fraction (HFpEF)   • Viral pneumonia   • Chronic heart failure with preserved ejection fraction (HFpEF)         ICD-10-CM ICD-9-CM   1. Stenosis of left carotid artery  I65.22 433.10   2. Severe aortic stenosis  I35.0 424.1   3. Hyperlipidemia LDL goal <70  E78.5 272.4   4. Primary hypertension  I10 401.9   5. PAD (peripheral artery disease)  I73.9 443.9   6. Infrarenal abdominal aortic aneurysm (AAA) without rupture  I71.43 441.4             PLAN: After thoroughly evaluating Sharad COTY Ryder, I believe the best course of action is to proceed with left transcarotid artery revascularization for stroke risk reduction.  I did review his testing myself as well as with Dr. Salgado.  He does have about 70% left carotid stenosis.  He can proceed with TAVR for severe aortic stenosis prior to procedure. We will see him back end of July to set up.  At that time, I will order at CTA of the abdomen and pelvis to re-evaluate his aneurysm as well. I did discuss vascular risk factors as it pertains to the progression of vascular disease as controlling his hypertension blood pressure stable to current medications.  He should continue Crestor 40 mg in addition to his other medications.  This was all discussed in full with complete understanding.  Thank you for allowing me to participate in the care of your patient.  Please do not hesitate to call with any questions or concerns.  We will keep you aware of any further encounters with Sharad Ryder.      Sincerely Yours,      KIRBY Nolan

## 2025-06-10 ENCOUNTER — TELEPHONE (OUTPATIENT)
Dept: VASCULAR SURGERY | Facility: CLINIC | Age: 65
End: 2025-06-10
Payer: MEDICARE

## 2025-06-10 NOTE — TELEPHONE ENCOUNTER
----- Message from Lianet Rascon sent at 6/9/2025  4:16 PM CDT -----  Will you let him know that Dr. Salgado does feel we should fix left carotid with stent after his TAVR with Dr. Robin.  Get him and appt mid to en of July so we can make sure doing ok and get set up. Thanks.

## 2025-06-11 ENCOUNTER — TELEPHONE (OUTPATIENT)
Dept: VASCULAR SURGERY | Facility: CLINIC | Age: 65
End: 2025-06-11
Payer: MEDICARE

## 2025-06-11 NOTE — TELEPHONE ENCOUNTER
Call placed to wife with no answer, voicemail left informing of appointment on 7/21/2025 at 1115, requested call back to confirm

## 2025-06-20 ENCOUNTER — PREP FOR SURGERY (OUTPATIENT)
Dept: OTHER | Facility: HOSPITAL | Age: 65
End: 2025-06-20
Payer: MEDICARE

## 2025-06-20 ENCOUNTER — TELEPHONE (OUTPATIENT)
Dept: CARDIAC SURGERY | Facility: CLINIC | Age: 65
End: 2025-06-20
Payer: MEDICARE

## 2025-06-20 DIAGNOSIS — R06.02 SHORTNESS OF BREATH: ICD-10-CM

## 2025-06-20 DIAGNOSIS — I35.0 SEVERE AORTIC STENOSIS: Primary | ICD-10-CM

## 2025-06-20 RX ORDER — SODIUM CHLORIDE 0.9 % (FLUSH) 0.9 %
10 SYRINGE (ML) INJECTION AS NEEDED
OUTPATIENT
Start: 2025-06-20

## 2025-06-20 RX ORDER — NITROGLYCERIN 0.4 MG/1
0.4 TABLET SUBLINGUAL
OUTPATIENT
Start: 2025-06-20

## 2025-06-20 RX ORDER — SODIUM CHLORIDE 9 MG/ML
40 INJECTION, SOLUTION INTRAVENOUS AS NEEDED
OUTPATIENT
Start: 2025-06-20

## 2025-06-20 RX ORDER — ASPIRIN 81 MG/1
324 TABLET, CHEWABLE ORAL ONCE
OUTPATIENT
Start: 2025-06-20 | End: 2025-06-20

## 2025-06-20 RX ORDER — SODIUM CHLORIDE 0.9 % (FLUSH) 0.9 %
10 SYRINGE (ML) INJECTION EVERY 12 HOURS SCHEDULED
OUTPATIENT
Start: 2025-06-20

## 2025-06-20 NOTE — TELEPHONE ENCOUNTER
Case #: [id number]  CPT Code: 10678  Patient aware of prework date/time and OR date/arrival time 0500/npo/no meds. Aware to take last dose of Plavix on 6/27.

## 2025-06-30 ENCOUNTER — PRE-ADMISSION TESTING (OUTPATIENT)
Dept: PREADMISSION TESTING | Facility: HOSPITAL | Age: 65
DRG: 267 | End: 2025-06-30
Payer: MEDICARE

## 2025-06-30 ENCOUNTER — HOSPITAL ENCOUNTER (OUTPATIENT)
Dept: GENERAL RADIOLOGY | Facility: HOSPITAL | Age: 65
Discharge: HOME | End: 2025-06-30
Payer: MEDICARE

## 2025-06-30 VITALS
WEIGHT: 115.3 LBS | BODY MASS INDEX: 20.43 KG/M2 | RESPIRATION RATE: 16 BRPM | OXYGEN SATURATION: 97 % | HEIGHT: 63 IN | HEART RATE: 86 BPM | DIASTOLIC BLOOD PRESSURE: 72 MMHG | SYSTOLIC BLOOD PRESSURE: 114 MMHG

## 2025-06-30 DIAGNOSIS — I35.0 SEVERE AORTIC STENOSIS: ICD-10-CM

## 2025-06-30 DIAGNOSIS — R06.02 SHORTNESS OF BREATH: ICD-10-CM

## 2025-06-30 LAB
ABO GROUP BLD: NORMAL
ALBUMIN SERPL-MCNC: 4.3 G/DL (ref 3.5–5.2)
ALBUMIN/GLOB SERPL: 1.8 G/DL
ALP SERPL-CCNC: 69 U/L (ref 39–117)
ALT SERPL W P-5'-P-CCNC: 11 U/L (ref 1–41)
ANION GAP SERPL CALCULATED.3IONS-SCNC: 13 MMOL/L (ref 5–15)
AST SERPL-CCNC: 19 U/L (ref 1–40)
BASOPHILS # BLD AUTO: 0.06 10*3/MM3 (ref 0–0.2)
BASOPHILS NFR BLD AUTO: 0.7 % (ref 0–1.5)
BILIRUB SERPL-MCNC: 0.3 MG/DL (ref 0–1.2)
BLD GP AB SCN SERPL QL: NEGATIVE
BUN SERPL-MCNC: 14.2 MG/DL (ref 8–23)
BUN/CREAT SERPL: 18 (ref 7–25)
CALCIUM SPEC-SCNC: 9.6 MG/DL (ref 8.6–10.5)
CHLORIDE SERPL-SCNC: 101 MMOL/L (ref 98–107)
CO2 SERPL-SCNC: 25 MMOL/L (ref 22–29)
CREAT SERPL-MCNC: 0.79 MG/DL (ref 0.76–1.27)
DEPRECATED RDW RBC AUTO: 41.2 FL (ref 37–54)
EGFRCR SERPLBLD CKD-EPI 2021: 98.6 ML/MIN/1.73
EOSINOPHIL # BLD AUTO: 0.79 10*3/MM3 (ref 0–0.4)
EOSINOPHIL NFR BLD AUTO: 9 % (ref 0.3–6.2)
ERYTHROCYTE [DISTWIDTH] IN BLOOD BY AUTOMATED COUNT: 12.7 % (ref 12.3–15.4)
GLOBULIN UR ELPH-MCNC: 2.4 GM/DL
GLUCOSE SERPL-MCNC: 119 MG/DL (ref 65–99)
HCT VFR BLD AUTO: 38.6 % (ref 37.5–51)
HGB BLD-MCNC: 12.8 G/DL (ref 13–17.7)
IMM GRANULOCYTES # BLD AUTO: 0.02 10*3/MM3 (ref 0–0.05)
IMM GRANULOCYTES NFR BLD AUTO: 0.2 % (ref 0–0.5)
INR PPP: 1 (ref 0.91–1.09)
LYMPHOCYTES # BLD AUTO: 1.86 10*3/MM3 (ref 0.7–3.1)
LYMPHOCYTES NFR BLD AUTO: 21.1 % (ref 19.6–45.3)
MCH RBC QN AUTO: 29.4 PG (ref 26.6–33)
MCHC RBC AUTO-ENTMCNC: 33.2 G/DL (ref 31.5–35.7)
MCV RBC AUTO: 88.7 FL (ref 79–97)
MONOCYTES # BLD AUTO: 0.88 10*3/MM3 (ref 0.1–0.9)
MONOCYTES NFR BLD AUTO: 10 % (ref 5–12)
NEUTROPHILS NFR BLD AUTO: 5.2 10*3/MM3 (ref 1.7–7)
NEUTROPHILS NFR BLD AUTO: 59 % (ref 42.7–76)
NRBC BLD AUTO-RTO: 0 /100 WBC (ref 0–0.2)
NT-PROBNP SERPL-MCNC: 1833 PG/ML (ref 0–900)
PLATELET # BLD AUTO: 296 10*3/MM3 (ref 140–450)
PMV BLD AUTO: 9.8 FL (ref 6–12)
POTASSIUM SERPL-SCNC: 3.4 MMOL/L (ref 3.5–5.2)
PROT SERPL-MCNC: 6.7 G/DL (ref 6–8.5)
PROTHROMBIN TIME: 13.7 SECONDS (ref 11.8–14.8)
RBC # BLD AUTO: 4.35 10*6/MM3 (ref 4.14–5.8)
RH BLD: POSITIVE
SODIUM SERPL-SCNC: 139 MMOL/L (ref 136–145)
T&S EXPIRATION DATE: NORMAL
WBC NRBC COR # BLD AUTO: 8.81 10*3/MM3 (ref 3.4–10.8)

## 2025-06-30 PROCEDURE — 36415 COLL VENOUS BLD VENIPUNCTURE: CPT

## 2025-06-30 PROCEDURE — 86900 BLOOD TYPING SEROLOGIC ABO: CPT | Performed by: THORACIC SURGERY (CARDIOTHORACIC VASCULAR SURGERY)

## 2025-06-30 PROCEDURE — 71046 X-RAY EXAM CHEST 2 VIEWS: CPT

## 2025-06-30 PROCEDURE — 83880 ASSAY OF NATRIURETIC PEPTIDE: CPT

## 2025-06-30 PROCEDURE — 93010 ELECTROCARDIOGRAM REPORT: CPT | Performed by: EMERGENCY MEDICINE

## 2025-06-30 PROCEDURE — 86901 BLOOD TYPING SEROLOGIC RH(D): CPT

## 2025-06-30 PROCEDURE — 85025 COMPLETE CBC W/AUTO DIFF WBC: CPT

## 2025-06-30 PROCEDURE — 93005 ELECTROCARDIOGRAM TRACING: CPT

## 2025-06-30 PROCEDURE — 86900 BLOOD TYPING SEROLOGIC ABO: CPT

## 2025-06-30 PROCEDURE — 86923 COMPATIBILITY TEST ELECTRIC: CPT

## 2025-06-30 PROCEDURE — 80053 COMPREHEN METABOLIC PANEL: CPT

## 2025-06-30 PROCEDURE — 86850 RBC ANTIBODY SCREEN: CPT | Performed by: THORACIC SURGERY (CARDIOTHORACIC VASCULAR SURGERY)

## 2025-06-30 PROCEDURE — 85610 PROTHROMBIN TIME: CPT

## 2025-06-30 PROCEDURE — 86901 BLOOD TYPING SEROLOGIC RH(D): CPT | Performed by: THORACIC SURGERY (CARDIOTHORACIC VASCULAR SURGERY)

## 2025-06-30 NOTE — DISCHARGE INSTRUCTIONS
Preparing for Surgery  Follow these instructions before the procedure:  Several days or weeks before your procedure  PLEASE FOLLOW DR PAN/DR MURDOCK'S INSTRUCTIONS RE: ALL MEDS NOT LISTED ON THIS HANDOUT    Ask your health care provider about:  Changing or stopping your regular medicines. This is especially important if you are taking diabetes medicines or blood thinners.  Taking medicines such as aspirin and ibuprofen. These medicines can thin your blood. Do not take these medicines unless your health care provider tells you to take them.  Taking over-the-counter medicines, vitamins, herbs, and supplements.    Contact your surgeon if you:  Develop a fever of more than 100.4°F (38°C) or other feelings of illness during the 48 hours before your surgery.  Have symptoms that get worse.  Have questions or concerns about your surgery.  If you are going home the same day of your surgery you will need to arrange for a responsible adult, age 18 years old or older, to drive you home from the hospital and stay with you for 24 hours. Verification of the  will be made prior to any procedure requiring sedation. You may not go home in a taxi or any form of public transportation by yourself.     Day before your procedure  Medication(s) you need to stop the day before your surgery:NONE    24 hours before your procedure DO NOT drink alcoholic beverages or smoke.  24 hours before your procedure STOP taking Erectile Dysfunction medication (i.e.,Cialis, Viagra)   You may be asked to shower with a germ-killing soap.  Day of your procedure   You may take the following medication(s) the morning of surgery with a sip of water: BUSPAR, HYDROCODONE    8 hours before your scheduled arrival time, STOP all food, any dairy products, and full liquids. This includes hard candy, chewing gum or mints. This is extremely important to prevent serious complications.     Up to 2 hours before your scheduled arrival time, you may have clear  liquids no cream, powder, or pulp of any kind. Safe options are water, black coffee, plain tea, soda, Gatorade/Powerade, clear broth, apple juice.    2 hours before your scheduled arrival time, STOP drinking clear liquids.    You may need to take another shower with a germ-killing soap before you leave home in the morning. Do not use perfumes, colognes, or body lotions.  Wear comfortable loose-fitting clothing.  Remove all jewelry including body piercing and rings, dark colored nail polish, and make up prior to arrival at the hospital. Leave all valuables at home.   Bring your hearing aids if you rely on them.  Do not wear contact lenses. If you wear eyeglasses remember to bring a case to store them in while you are in surgery.  Do not use denture adhesives since you will be asked to remove them during your surgery.    You do not need to bring your home medications into the hospital.   Bring your sleep apnea device with you on the day of your surgery (if this applies to you).  If you have an Inspire implant for sleep apnea, please bring the remote with you on the day of surgery.  If you wear portable oxygen, bring it with you.   If you are staying overnight, you may bring a bag of items you may need such as slippers, robe and a change of clothes for your discharge. You may want to leave these items in the car until you are ready for them since your family will take your belongings when you leave the pre-operative area.  Arrive at the hospital as scheduled by the office. You will be asked to arrive 2 hours prior to your surgery time in order to prepare for your procedure.  When you arrive at the hospital  Go to the registration desk located at the main entrance of the hospital.  After registration is completed, you will be given a beeper and a sticker sheet. Take the stickers to Outpatient Surgery and place in the tray at the end of the desk to notify the staff that you have arrived and registered.   Return to the  lobby to wait. You are not always called back according to the time of arrival but rather the time your doctor will be ready.  When your beeper lights up and vibrates proceed through the double doors, under the stairs, and a member of the Outpatient Surgery staff will escort you to your preoperative room.

## 2025-07-01 ENCOUNTER — HOSPITAL ENCOUNTER (INPATIENT)
Facility: HOSPITAL | Age: 65
LOS: 4 days | Discharge: HOME | End: 2025-07-05
Attending: THORACIC SURGERY (CARDIOTHORACIC VASCULAR SURGERY) | Admitting: THORACIC SURGERY (CARDIOTHORACIC VASCULAR SURGERY)
Payer: MEDICARE

## 2025-07-01 ENCOUNTER — APPOINTMENT (OUTPATIENT)
Dept: CARDIOLOGY | Facility: HOSPITAL | Age: 65
End: 2025-07-01
Payer: MEDICARE

## 2025-07-01 ENCOUNTER — ANESTHESIA (OUTPATIENT)
Dept: PERIOP | Facility: HOSPITAL | Age: 65
End: 2025-07-01
Payer: MEDICARE

## 2025-07-01 ENCOUNTER — APPOINTMENT (OUTPATIENT)
Dept: GENERAL RADIOLOGY | Facility: HOSPITAL | Age: 65
End: 2025-07-01
Payer: MEDICARE

## 2025-07-01 ENCOUNTER — ANESTHESIA EVENT (OUTPATIENT)
Dept: PERIOP | Facility: HOSPITAL | Age: 65
End: 2025-07-01
Payer: MEDICARE

## 2025-07-01 DIAGNOSIS — I35.0 SEVERE AORTIC STENOSIS: ICD-10-CM

## 2025-07-01 DIAGNOSIS — Z95.2 S/P TAVR (TRANSCATHETER AORTIC VALVE REPLACEMENT): ICD-10-CM

## 2025-07-01 DIAGNOSIS — Z74.09 IMPAIRED MOBILITY: Primary | ICD-10-CM

## 2025-07-01 DIAGNOSIS — Z95.3 STATUS POST AORTIC VALVE REPLACEMENT WITH BIOPROSTHETIC VALVE: Chronic | ICD-10-CM

## 2025-07-01 LAB
ANION GAP SERPL CALCULATED.3IONS-SCNC: 12 MMOL/L (ref 5–15)
AORTIC DIMENSIONLESS INDEX: 0.32 (DI)
APTT PPP: 31.3 SECONDS (ref 24.5–36)
AV MEAN PRESS GRAD SYS DOP V1V2: 26 MMHG
AV VMAX SYS DOP: 469 CM/SEC
BH CV ECHO MEAS - AO MAX PG: 88 MMHG
BH CV ECHO MEAS - AO V2 VTI: 69.5 CM
BH CV ECHO MEAS - LV MAX PG: 6.2 MMHG
BH CV ECHO MEAS - LV MEAN PG: 4 MMHG
BH CV ECHO MEAS - LV V1 MAX: 124 CM/SEC
BH CV ECHO MEAS - LV V1 VTI: 22 CM
BH CV ECHO MEAS - MV DEC SLOPE: 789 CM/SEC2
BH CV ECHO MEAS - MV MAX PG: 11.6 MMHG
BH CV ECHO MEAS - MV MEAN PG: 4 MMHG
BH CV ECHO MEAS - MV P1/2T: 63.1 MSEC
BH CV ECHO MEAS - MV V2 VTI: 45.8 CM
BH CV ECHO MEAS - MVA(P1/2T): 3.5 CM2
BUN SERPL-MCNC: 14.3 MG/DL (ref 8–23)
BUN/CREAT SERPL: 17.9 (ref 7–25)
CALCIUM SPEC-SCNC: 8.5 MG/DL (ref 8.6–10.5)
CHLORIDE SERPL-SCNC: 104 MMOL/L (ref 98–107)
CO2 SERPL-SCNC: 21 MMOL/L (ref 22–29)
CREAT SERPL-MCNC: 0.8 MG/DL (ref 0.76–1.27)
DEPRECATED RDW RBC AUTO: 40.6 FL (ref 37–54)
EGFRCR SERPLBLD CKD-EPI 2021: 98.2 ML/MIN/1.73
ERYTHROCYTE [DISTWIDTH] IN BLOOD BY AUTOMATED COUNT: 12.7 % (ref 12.3–15.4)
GLUCOSE BLDC GLUCOMTR-MCNC: 117 MG/DL (ref 70–130)
GLUCOSE BLDC GLUCOMTR-MCNC: 129 MG/DL (ref 70–130)
GLUCOSE BLDC GLUCOMTR-MCNC: 130 MG/DL (ref 70–130)
GLUCOSE SERPL-MCNC: 126 MG/DL (ref 65–99)
HCT VFR BLD AUTO: 31.4 % (ref 37.5–51)
HGB BLD-MCNC: 10.6 G/DL (ref 13–17.7)
INR PPP: 1.16 (ref 0.91–1.09)
MAGNESIUM SERPL-MCNC: 1.6 MG/DL (ref 1.6–2.4)
MCH RBC QN AUTO: 29.5 PG (ref 26.6–33)
MCHC RBC AUTO-ENTMCNC: 33.8 G/DL (ref 31.5–35.7)
MCV RBC AUTO: 87.5 FL (ref 79–97)
PLATELET # BLD AUTO: 221 10*3/MM3 (ref 140–450)
PMV BLD AUTO: 9.6 FL (ref 6–12)
POTASSIUM SERPL-SCNC: 3.5 MMOL/L (ref 3.5–5.2)
POTASSIUM SERPL-SCNC: 3.6 MMOL/L (ref 3.5–5.2)
PROTHROMBIN TIME: 15.4 SECONDS (ref 11.8–14.8)
RBC # BLD AUTO: 3.59 10*6/MM3 (ref 4.14–5.8)
SODIUM SERPL-SCNC: 137 MMOL/L (ref 136–145)
WBC NRBC COR # BLD AUTO: 9.41 10*3/MM3 (ref 3.4–10.8)

## 2025-07-01 PROCEDURE — 93005 ELECTROCARDIOGRAM TRACING: CPT | Performed by: THORACIC SURGERY (CARDIOTHORACIC VASCULAR SURGERY)

## 2025-07-01 PROCEDURE — 25010000002 PROTAMINE SULFATE PER 10 MG: Performed by: NURSE ANESTHETIST, CERTIFIED REGISTERED

## 2025-07-01 PROCEDURE — 02RF38H REPLACEMENT OF AORTIC VALVE WITH ZOOPLASTIC TISSUE, TRANSAPICAL, PERCUTANEOUS APPROACH: ICD-10-PCS | Performed by: THORACIC SURGERY (CARDIOTHORACIC VASCULAR SURGERY)

## 2025-07-01 PROCEDURE — C1725 CATH, TRANSLUMIN NON-LASER: HCPCS | Performed by: THORACIC SURGERY (CARDIOTHORACIC VASCULAR SURGERY)

## 2025-07-01 PROCEDURE — C1769 GUIDE WIRE: HCPCS | Performed by: THORACIC SURGERY (CARDIOTHORACIC VASCULAR SURGERY)

## 2025-07-01 PROCEDURE — B24BZZ4 ULTRASONOGRAPHY OF HEART WITH AORTA, TRANSESOPHAGEAL: ICD-10-PCS | Performed by: INTERNAL MEDICINE

## 2025-07-01 PROCEDURE — 80048 BASIC METABOLIC PNL TOTAL CA: CPT | Performed by: THORACIC SURGERY (CARDIOTHORACIC VASCULAR SURGERY)

## 2025-07-01 PROCEDURE — 33366 TRCATH REPLACE AORTIC VALVE: CPT | Performed by: THORACIC SURGERY (CARDIOTHORACIC VASCULAR SURGERY)

## 2025-07-01 PROCEDURE — C1894 INTRO/SHEATH, NON-LASER: HCPCS | Performed by: THORACIC SURGERY (CARDIOTHORACIC VASCULAR SURGERY)

## 2025-07-01 PROCEDURE — 85730 THROMBOPLASTIN TIME PARTIAL: CPT | Performed by: THORACIC SURGERY (CARDIOTHORACIC VASCULAR SURGERY)

## 2025-07-01 PROCEDURE — S0260 H&P FOR SURGERY: HCPCS | Performed by: THORACIC SURGERY (CARDIOTHORACIC VASCULAR SURGERY)

## 2025-07-01 PROCEDURE — 33366 TRCATH REPLACE AORTIC VALVE: CPT | Performed by: INTERNAL MEDICINE

## 2025-07-01 PROCEDURE — 82948 REAGENT STRIP/BLOOD GLUCOSE: CPT

## 2025-07-01 PROCEDURE — 93010 ELECTROCARDIOGRAM REPORT: CPT | Performed by: EMERGENCY MEDICINE

## 2025-07-01 PROCEDURE — 25810000003 LACTATED RINGERS PER 1000 ML: Performed by: THORACIC SURGERY (CARDIOTHORACIC VASCULAR SURGERY)

## 2025-07-01 PROCEDURE — 25010000002 HYDROMORPHONE 1 MG/ML SOLUTION: Performed by: NURSE ANESTHETIST, CERTIFIED REGISTERED

## 2025-07-01 PROCEDURE — 25010000002 HEPARIN (PORCINE) PER 1000 UNITS: Performed by: INTERNAL MEDICINE

## 2025-07-01 PROCEDURE — 85610 PROTHROMBIN TIME: CPT | Performed by: THORACIC SURGERY (CARDIOTHORACIC VASCULAR SURGERY)

## 2025-07-01 PROCEDURE — 94799 UNLISTED PULMONARY SVC/PX: CPT

## 2025-07-01 PROCEDURE — 94640 AIRWAY INHALATION TREATMENT: CPT

## 2025-07-01 PROCEDURE — 25010000002 HEPARIN (PORCINE) PER 1000 UNITS: Performed by: NURSE ANESTHETIST, CERTIFIED REGISTERED

## 2025-07-01 PROCEDURE — 93010 ELECTROCARDIOGRAM REPORT: CPT | Performed by: HOSPITALIST

## 2025-07-01 PROCEDURE — 25010000002 CEFAZOLIN PER 500 MG: Performed by: THORACIC SURGERY (CARDIOTHORACIC VASCULAR SURGERY)

## 2025-07-01 PROCEDURE — 25010000002 FENTANYL CITRATE (PF) 50 MCG/ML SOLUTION: Performed by: ANESTHESIOLOGY

## 2025-07-01 PROCEDURE — 93355 ECHO TRANSESOPHAGEAL (TEE): CPT

## 2025-07-01 PROCEDURE — 25010000002 LIDOCAINE PF 2% 2 % SOLUTION: Performed by: NURSE ANESTHETIST, CERTIFIED REGISTERED

## 2025-07-01 PROCEDURE — C1889 IMPLANT/INSERT DEVICE, NOC: HCPCS | Performed by: THORACIC SURGERY (CARDIOTHORACIC VASCULAR SURGERY)

## 2025-07-01 PROCEDURE — 25810000003 SODIUM CHLORIDE 0.9 % SOLUTION: Performed by: THORACIC SURGERY (CARDIOTHORACIC VASCULAR SURGERY)

## 2025-07-01 PROCEDURE — 25010000002 MAGNESIUM SULFATE 4 GM/100ML SOLUTION: Performed by: THORACIC SURGERY (CARDIOTHORACIC VASCULAR SURGERY)

## 2025-07-01 PROCEDURE — 85027 COMPLETE CBC AUTOMATED: CPT | Performed by: THORACIC SURGERY (CARDIOTHORACIC VASCULAR SURGERY)

## 2025-07-01 PROCEDURE — C1887 CATHETER, GUIDING: HCPCS | Performed by: THORACIC SURGERY (CARDIOTHORACIC VASCULAR SURGERY)

## 2025-07-01 PROCEDURE — 25010000002 POTASSIUM CHLORIDE 10 MEQ/100ML SOLUTION: Performed by: THORACIC SURGERY (CARDIOTHORACIC VASCULAR SURGERY)

## 2025-07-01 PROCEDURE — 25010000002 VASOPRESSIN 20 UNIT/ML SOLUTION: Performed by: NURSE ANESTHETIST, CERTIFIED REGISTERED

## 2025-07-01 PROCEDURE — 94761 N-INVAS EAR/PLS OXIMETRY MLT: CPT

## 2025-07-01 PROCEDURE — 83735 ASSAY OF MAGNESIUM: CPT | Performed by: THORACIC SURGERY (CARDIOTHORACIC VASCULAR SURGERY)

## 2025-07-01 PROCEDURE — 25010000002 FENTANYL CITRATE (PF) 50 MCG/ML SOLUTION: Performed by: THORACIC SURGERY (CARDIOTHORACIC VASCULAR SURGERY)

## 2025-07-01 PROCEDURE — 84132 ASSAY OF SERUM POTASSIUM: CPT | Performed by: THORACIC SURGERY (CARDIOTHORACIC VASCULAR SURGERY)

## 2025-07-01 PROCEDURE — 25010000002 PROPOFOL 10 MG/ML EMULSION: Performed by: NURSE ANESTHETIST, CERTIFIED REGISTERED

## 2025-07-01 PROCEDURE — 25010000002 MIDAZOLAM PER 1MG: Performed by: ANESTHESIOLOGY

## 2025-07-01 PROCEDURE — 71045 X-RAY EXAM CHEST 1 VIEW: CPT

## 2025-07-01 PROCEDURE — 25810000003 SODIUM CHLORIDE 0.9 % SOLUTION 250 ML FLEX CONT: Performed by: NURSE ANESTHETIST, CERTIFIED REGISTERED

## 2025-07-01 PROCEDURE — 25510000001 IOPAMIDOL PER 1 ML: Performed by: INTERNAL MEDICINE

## 2025-07-01 PROCEDURE — 82948 REAGENT STRIP/BLOOD GLUCOSE: CPT | Performed by: THORACIC SURGERY (CARDIOTHORACIC VASCULAR SURGERY)

## 2025-07-01 PROCEDURE — 25010000002 FENTANYL CITRATE (PF) 100 MCG/2ML SOLUTION: Performed by: NURSE ANESTHETIST, CERTIFIED REGISTERED

## 2025-07-01 PROCEDURE — 25010000002 PHENYLEPHRINE 10 MG/ML SOLUTION: Performed by: NURSE ANESTHETIST, CERTIFIED REGISTERED

## 2025-07-01 DEVICE — IMPLANTABLE DEVICE: Type: IMPLANTABLE DEVICE | Site: HEART | Status: FUNCTIONAL

## 2025-07-01 DEVICE — INCISIONLINE PLEDGET SFT 133X25.5MM: Type: IMPLANTABLE DEVICE | Status: FUNCTIONAL

## 2025-07-01 RX ORDER — PREGABALIN 25 MG/1
25 CAPSULE ORAL EVERY 12 HOURS SCHEDULED
Status: DISCONTINUED | OUTPATIENT
Start: 2025-07-01 | End: 2025-07-05 | Stop reason: HOSPADM

## 2025-07-01 RX ORDER — CLOPIDOGREL BISULFATE 75 MG/1
75 TABLET ORAL DAILY
Status: DISCONTINUED | OUTPATIENT
Start: 2025-07-01 | End: 2025-07-05 | Stop reason: HOSPADM

## 2025-07-01 RX ORDER — LIDOCAINE 4 G/G
2 PATCH TOPICAL
Status: DISCONTINUED | OUTPATIENT
Start: 2025-07-01 | End: 2025-07-05 | Stop reason: HOSPADM

## 2025-07-01 RX ORDER — NITROGLYCERIN 0.4 MG/1
0.4 TABLET SUBLINGUAL
Status: DISCONTINUED | OUTPATIENT
Start: 2025-07-01 | End: 2025-07-05 | Stop reason: HOSPADM

## 2025-07-01 RX ORDER — SODIUM CHLORIDE, SODIUM LACTATE, POTASSIUM CHLORIDE, CALCIUM CHLORIDE 600; 310; 30; 20 MG/100ML; MG/100ML; MG/100ML; MG/100ML
1000 INJECTION, SOLUTION INTRAVENOUS CONTINUOUS
Status: DISCONTINUED | OUTPATIENT
Start: 2025-07-01 | End: 2025-07-02

## 2025-07-01 RX ORDER — MIDAZOLAM HYDROCHLORIDE 2 MG/2ML
1 INJECTION, SOLUTION INTRAMUSCULAR; INTRAVENOUS
Status: DISCONTINUED | OUTPATIENT
Start: 2025-07-01 | End: 2025-07-01 | Stop reason: HOSPADM

## 2025-07-01 RX ORDER — PROTAMINE SULFATE 10 MG/ML
INJECTION, SOLUTION INTRAVENOUS AS NEEDED
Status: DISCONTINUED | OUTPATIENT
Start: 2025-07-01 | End: 2025-07-01 | Stop reason: SURG

## 2025-07-01 RX ORDER — LABETALOL HYDROCHLORIDE 5 MG/ML
5 INJECTION, SOLUTION INTRAVENOUS
Status: DISCONTINUED | OUTPATIENT
Start: 2025-07-01 | End: 2025-07-01 | Stop reason: HOSPADM

## 2025-07-01 RX ORDER — NOREPINEPHRINE BITARTRATE 0.03 MG/ML
INJECTION, SOLUTION INTRAVENOUS CONTINUOUS PRN
Status: DISCONTINUED | OUTPATIENT
Start: 2025-07-01 | End: 2025-07-01

## 2025-07-01 RX ORDER — TAMSULOSIN HYDROCHLORIDE 0.4 MG/1
0.4 CAPSULE ORAL DAILY
Status: DISCONTINUED | OUTPATIENT
Start: 2025-07-01 | End: 2025-07-05 | Stop reason: HOSPADM

## 2025-07-01 RX ORDER — IBUPROFEN 600 MG/1
600 TABLET, FILM COATED ORAL EVERY 6 HOURS PRN
Status: DISCONTINUED | OUTPATIENT
Start: 2025-07-01 | End: 2025-07-01 | Stop reason: HOSPADM

## 2025-07-01 RX ORDER — FENTANYL CITRATE 50 UG/ML
50 INJECTION, SOLUTION INTRAMUSCULAR; INTRAVENOUS ONCE
Refills: 0 | Status: COMPLETED | OUTPATIENT
Start: 2025-07-01 | End: 2025-07-01

## 2025-07-01 RX ORDER — SODIUM CHLORIDE 0.9 % (FLUSH) 0.9 %
3 SYRINGE (ML) INJECTION AS NEEDED
Status: DISCONTINUED | OUTPATIENT
Start: 2025-07-01 | End: 2025-07-01 | Stop reason: HOSPADM

## 2025-07-01 RX ORDER — AMLODIPINE BESYLATE 10 MG/1
10 TABLET ORAL DAILY
Status: DISCONTINUED | OUTPATIENT
Start: 2025-07-01 | End: 2025-07-05 | Stop reason: HOSPADM

## 2025-07-01 RX ORDER — SODIUM CHLORIDE 0.9 % (FLUSH) 0.9 %
10 SYRINGE (ML) INJECTION EVERY 12 HOURS SCHEDULED
Status: DISCONTINUED | OUTPATIENT
Start: 2025-07-01 | End: 2025-07-01 | Stop reason: HOSPADM

## 2025-07-01 RX ORDER — NITROGLYCERIN 0.4 MG/1
0.4 TABLET SUBLINGUAL
Status: DISCONTINUED | OUTPATIENT
Start: 2025-07-01 | End: 2025-07-01 | Stop reason: HOSPADM

## 2025-07-01 RX ORDER — CHLORHEXIDINE GLUCONATE 500 MG/1
1 CLOTH TOPICAL DAILY
Status: DISCONTINUED | OUTPATIENT
Start: 2025-07-01 | End: 2025-07-04 | Stop reason: HOSPADM

## 2025-07-01 RX ORDER — LIDOCAINE HYDROCHLORIDE 10 MG/ML
0.5 INJECTION, SOLUTION EPIDURAL; INFILTRATION; INTRACAUDAL; PERINEURAL ONCE AS NEEDED
Status: DISCONTINUED | OUTPATIENT
Start: 2025-07-01 | End: 2025-07-01 | Stop reason: HOSPADM

## 2025-07-01 RX ORDER — IPRATROPIUM BROMIDE AND ALBUTEROL SULFATE 2.5; .5 MG/3ML; MG/3ML
1.5 SOLUTION RESPIRATORY (INHALATION)
Status: DISCONTINUED | OUTPATIENT
Start: 2025-07-01 | End: 2025-07-01

## 2025-07-01 RX ORDER — FENTANYL CITRATE 50 UG/ML
50 INJECTION, SOLUTION INTRAMUSCULAR; INTRAVENOUS
Refills: 0 | Status: DISCONTINUED | OUTPATIENT
Start: 2025-07-01 | End: 2025-07-03

## 2025-07-01 RX ORDER — PHENYLEPHRINE HYDROCHLORIDE 10 MG/ML
INJECTION INTRAVENOUS AS NEEDED
Status: DISCONTINUED | OUTPATIENT
Start: 2025-07-01 | End: 2025-07-01 | Stop reason: SURG

## 2025-07-01 RX ORDER — SODIUM CHLORIDE 9 MG/ML
40 INJECTION, SOLUTION INTRAVENOUS AS NEEDED
Status: DISCONTINUED | OUTPATIENT
Start: 2025-07-01 | End: 2025-07-01 | Stop reason: HOSPADM

## 2025-07-01 RX ORDER — ONDANSETRON 2 MG/ML
4 INJECTION INTRAMUSCULAR; INTRAVENOUS
Status: DISCONTINUED | OUTPATIENT
Start: 2025-07-01 | End: 2025-07-01 | Stop reason: HOSPADM

## 2025-07-01 RX ORDER — FLUMAZENIL 0.1 MG/ML
0.2 INJECTION INTRAVENOUS AS NEEDED
Status: DISCONTINUED | OUTPATIENT
Start: 2025-07-01 | End: 2025-07-01 | Stop reason: HOSPADM

## 2025-07-01 RX ORDER — FENTANYL CITRATE 50 UG/ML
50 INJECTION, SOLUTION INTRAMUSCULAR; INTRAVENOUS
Status: DISCONTINUED | OUTPATIENT
Start: 2025-07-01 | End: 2025-07-01 | Stop reason: HOSPADM

## 2025-07-01 RX ORDER — ISOSORBIDE MONONITRATE 60 MG/1
60 TABLET, EXTENDED RELEASE ORAL DAILY
Status: DISCONTINUED | OUTPATIENT
Start: 2025-07-02 | End: 2025-07-05 | Stop reason: HOSPADM

## 2025-07-01 RX ORDER — HEPARIN SODIUM 1000 [USP'U]/ML
INJECTION, SOLUTION INTRAVENOUS; SUBCUTANEOUS AS NEEDED
Status: DISCONTINUED | OUTPATIENT
Start: 2025-07-01 | End: 2025-07-01 | Stop reason: SURG

## 2025-07-01 RX ORDER — METOPROLOL SUCCINATE 25 MG/1
25 TABLET, EXTENDED RELEASE ORAL
Status: DISCONTINUED | OUTPATIENT
Start: 2025-07-01 | End: 2025-07-05 | Stop reason: HOSPADM

## 2025-07-01 RX ORDER — ASPIRIN 81 MG/1
324 TABLET, CHEWABLE ORAL ONCE
Status: COMPLETED | OUTPATIENT
Start: 2025-07-01 | End: 2025-07-01

## 2025-07-01 RX ORDER — MULTIPLE VITAMINS W/ MINERALS TAB 9MG-400MCG
1 TAB ORAL DAILY
Status: DISCONTINUED | OUTPATIENT
Start: 2025-07-01 | End: 2025-07-05 | Stop reason: HOSPADM

## 2025-07-01 RX ORDER — POTASSIUM CHLORIDE 7.45 MG/ML
10 INJECTION INTRAVENOUS
Status: COMPLETED | OUTPATIENT
Start: 2025-07-01 | End: 2025-07-01

## 2025-07-01 RX ORDER — SPIRONOLACTONE 25 MG/1
25 TABLET ORAL DAILY
Status: DISCONTINUED | OUTPATIENT
Start: 2025-07-01 | End: 2025-07-05 | Stop reason: HOSPADM

## 2025-07-01 RX ORDER — ASPIRIN 81 MG/1
81 TABLET ORAL DAILY
Status: DISCONTINUED | OUTPATIENT
Start: 2025-07-01 | End: 2025-07-05 | Stop reason: HOSPADM

## 2025-07-01 RX ORDER — FENTANYL CITRATE 50 UG/ML
INJECTION, SOLUTION INTRAMUSCULAR; INTRAVENOUS AS NEEDED
Status: DISCONTINUED | OUTPATIENT
Start: 2025-07-01 | End: 2025-07-01 | Stop reason: SURG

## 2025-07-01 RX ORDER — DEXTROSE MONOHYDRATE 25 G/50ML
12.5 INJECTION, SOLUTION INTRAVENOUS AS NEEDED
Status: DISCONTINUED | OUTPATIENT
Start: 2025-07-01 | End: 2025-07-01 | Stop reason: HOSPADM

## 2025-07-01 RX ORDER — SODIUM CHLORIDE 0.9 % (FLUSH) 0.9 %
10 SYRINGE (ML) INJECTION AS NEEDED
Status: DISCONTINUED | OUTPATIENT
Start: 2025-07-01 | End: 2025-07-01 | Stop reason: HOSPADM

## 2025-07-01 RX ORDER — HYDROMORPHONE HYDROCHLORIDE 1 MG/ML
0.5 INJECTION, SOLUTION INTRAMUSCULAR; INTRAVENOUS; SUBCUTANEOUS
Status: DISCONTINUED | OUTPATIENT
Start: 2025-07-01 | End: 2025-07-01 | Stop reason: HOSPADM

## 2025-07-01 RX ORDER — IOPAMIDOL 755 MG/ML
INJECTION, SOLUTION INTRAVASCULAR
Status: DISCONTINUED | OUTPATIENT
Start: 2025-07-01 | End: 2025-07-01 | Stop reason: HOSPADM

## 2025-07-01 RX ORDER — BUDESONIDE AND FORMOTEROL FUMARATE DIHYDRATE 160; 4.5 UG/1; UG/1
2 AEROSOL RESPIRATORY (INHALATION)
Status: DISCONTINUED | OUTPATIENT
Start: 2025-07-01 | End: 2025-07-05 | Stop reason: HOSPADM

## 2025-07-01 RX ORDER — ROCURONIUM BROMIDE 10 MG/ML
INJECTION, SOLUTION INTRAVENOUS AS NEEDED
Status: DISCONTINUED | OUTPATIENT
Start: 2025-07-01 | End: 2025-07-01 | Stop reason: SURG

## 2025-07-01 RX ORDER — VERAPAMIL HYDROCHLORIDE 2.5 MG/ML
INJECTION INTRAVENOUS
Status: DISCONTINUED | OUTPATIENT
Start: 2025-07-01 | End: 2025-07-01 | Stop reason: HOSPADM

## 2025-07-01 RX ORDER — CITALOPRAM HYDROBROMIDE 20 MG/1
20 TABLET ORAL DAILY
Status: DISCONTINUED | OUTPATIENT
Start: 2025-07-01 | End: 2025-07-05 | Stop reason: HOSPADM

## 2025-07-01 RX ORDER — PROPOFOL 10 MG/ML
VIAL (ML) INTRAVENOUS AS NEEDED
Status: DISCONTINUED | OUTPATIENT
Start: 2025-07-01 | End: 2025-07-01 | Stop reason: SURG

## 2025-07-01 RX ORDER — MIDAZOLAM HYDROCHLORIDE 2 MG/2ML
0.5 INJECTION, SOLUTION INTRAMUSCULAR; INTRAVENOUS
Status: DISCONTINUED | OUTPATIENT
Start: 2025-07-01 | End: 2025-07-01 | Stop reason: HOSPADM

## 2025-07-01 RX ORDER — HEPARIN SODIUM 1000 [USP'U]/ML
INJECTION, SOLUTION INTRAVENOUS; SUBCUTANEOUS
Status: DISCONTINUED | OUTPATIENT
Start: 2025-07-01 | End: 2025-07-01 | Stop reason: HOSPADM

## 2025-07-01 RX ORDER — LIDOCAINE HYDROCHLORIDE 20 MG/ML
INJECTION, SOLUTION EPIDURAL; INFILTRATION; INTRACAUDAL; PERINEURAL AS NEEDED
Status: DISCONTINUED | OUTPATIENT
Start: 2025-07-01 | End: 2025-07-01 | Stop reason: SURG

## 2025-07-01 RX ORDER — HYDROCODONE BITARTRATE AND ACETAMINOPHEN 10; 325 MG/1; MG/1
1 TABLET ORAL EVERY 4 HOURS PRN
Refills: 0 | Status: DISCONTINUED | OUTPATIENT
Start: 2025-07-01 | End: 2025-07-03

## 2025-07-01 RX ORDER — OXYCODONE AND ACETAMINOPHEN 10; 325 MG/1; MG/1
1 TABLET ORAL EVERY 4 HOURS PRN
Status: DISCONTINUED | OUTPATIENT
Start: 2025-07-01 | End: 2025-07-01 | Stop reason: HOSPADM

## 2025-07-01 RX ORDER — NALOXONE HCL 0.4 MG/ML
0.04 VIAL (ML) INJECTION AS NEEDED
Status: DISCONTINUED | OUTPATIENT
Start: 2025-07-01 | End: 2025-07-01 | Stop reason: HOSPADM

## 2025-07-01 RX ORDER — SODIUM CHLORIDE 9 MG/ML
1 INJECTION, SOLUTION INTRAVENOUS CONTINUOUS
Status: DISPENSED | OUTPATIENT
Start: 2025-07-01 | End: 2025-07-01

## 2025-07-01 RX ORDER — ENOXAPARIN SODIUM 100 MG/ML
40 INJECTION SUBCUTANEOUS EVERY 24 HOURS
Status: DISCONTINUED | OUTPATIENT
Start: 2025-07-02 | End: 2025-07-05 | Stop reason: HOSPADM

## 2025-07-01 RX ORDER — POTASSIUM CHLORIDE 1500 MG/1
40 TABLET, EXTENDED RELEASE ORAL EVERY 4 HOURS
Status: COMPLETED | OUTPATIENT
Start: 2025-07-01 | End: 2025-07-02

## 2025-07-01 RX ORDER — BUSPIRONE HYDROCHLORIDE 10 MG/1
10 TABLET ORAL 2 TIMES DAILY
Status: DISCONTINUED | OUTPATIENT
Start: 2025-07-01 | End: 2025-07-05 | Stop reason: HOSPADM

## 2025-07-01 RX ORDER — TERAZOSIN 5 MG/1
5 CAPSULE ORAL NIGHTLY
Status: DISCONTINUED | OUTPATIENT
Start: 2025-07-01 | End: 2025-07-05 | Stop reason: HOSPADM

## 2025-07-01 RX ORDER — HYDROCODONE BITARTRATE AND ACETAMINOPHEN 7.5; 325 MG/1; MG/1
1 TABLET ORAL EVERY 6 HOURS PRN
Refills: 0 | Status: DISCONTINUED | OUTPATIENT
Start: 2025-07-01 | End: 2025-07-01

## 2025-07-01 RX ORDER — ROSUVASTATIN CALCIUM 10 MG/1
5 TABLET, COATED ORAL DAILY
Status: DISCONTINUED | OUTPATIENT
Start: 2025-07-01 | End: 2025-07-05 | Stop reason: HOSPADM

## 2025-07-01 RX ORDER — MAGNESIUM SULFATE HEPTAHYDRATE 40 MG/ML
4 INJECTION, SOLUTION INTRAVENOUS ONCE
Status: COMPLETED | OUTPATIENT
Start: 2025-07-01 | End: 2025-07-01

## 2025-07-01 RX ORDER — IPRATROPIUM BROMIDE AND ALBUTEROL SULFATE 2.5; .5 MG/3ML; MG/3ML
1.5 SOLUTION RESPIRATORY (INHALATION)
Status: DISCONTINUED | OUTPATIENT
Start: 2025-07-01 | End: 2025-07-05 | Stop reason: HOSPADM

## 2025-07-01 RX ORDER — FENTANYL CITRATE 50 UG/ML
25 INJECTION, SOLUTION INTRAMUSCULAR; INTRAVENOUS
Status: DISCONTINUED | OUTPATIENT
Start: 2025-07-01 | End: 2025-07-01 | Stop reason: HOSPADM

## 2025-07-01 RX ADMIN — ASPIRIN 324 MG: 81 TABLET, CHEWABLE ORAL at 05:56

## 2025-07-01 RX ADMIN — MIDAZOLAM HYDROCHLORIDE 1 MG: 1 INJECTION, SOLUTION INTRAMUSCULAR; INTRAVENOUS at 06:39

## 2025-07-01 RX ADMIN — HYDROMORPHONE HYDROCHLORIDE 0.5 MG: 1 INJECTION, SOLUTION INTRAMUSCULAR; INTRAVENOUS; SUBCUTANEOUS at 10:38

## 2025-07-01 RX ADMIN — PROPOFOL 30 MG: 10 INJECTION, EMULSION INTRAVENOUS at 07:26

## 2025-07-01 RX ADMIN — FENTANYL CITRATE 100 MCG: 50 INJECTION, SOLUTION INTRAMUSCULAR; INTRAVENOUS at 07:26

## 2025-07-01 RX ADMIN — BUSPIRONE HYDROCHLORIDE 10 MG: 10 TABLET ORAL at 20:43

## 2025-07-01 RX ADMIN — LIDOCAINE 2 PATCH: 4 PATCH TOPICAL at 12:49

## 2025-07-01 RX ADMIN — CEFAZOLIN 2 G: 2 INJECTION, POWDER, FOR SOLUTION INTRAMUSCULAR; INTRAVENOUS at 23:54

## 2025-07-01 RX ADMIN — POTASSIUM CHLORIDE 10 MEQ: 7.46 INJECTION, SOLUTION INTRAVENOUS at 14:14

## 2025-07-01 RX ADMIN — PROTAMINE SULFATE 50 MG: 10 INJECTION, SOLUTION INTRAVENOUS at 09:55

## 2025-07-01 RX ADMIN — FENTANYL CITRATE 100 MCG: 50 INJECTION, SOLUTION INTRAMUSCULAR; INTRAVENOUS at 09:01

## 2025-07-01 RX ADMIN — LIDOCAINE HYDROCHLORIDE 3 ML: 20 INJECTION, SOLUTION EPIDURAL; INFILTRATION; INTRACAUDAL; PERINEURAL at 07:26

## 2025-07-01 RX ADMIN — MAGNESIUM SULFATE HEPTAHYDRATE 4 G: 40 INJECTION, SOLUTION INTRAVENOUS at 12:13

## 2025-07-01 RX ADMIN — NOREPINEPHRINE BITARTRATE 0.02 MCG/KG/MIN: 1 INJECTION, SOLUTION, CONCENTRATE INTRAVENOUS at 07:36

## 2025-07-01 RX ADMIN — PHENYLEPHRINE HYDROCHLORIDE 160 MCG: 10 INJECTION INTRAVENOUS at 07:35

## 2025-07-01 RX ADMIN — ROSUVASTATIN CALCIUM 5 MG: 5 TABLET, FILM COATED ORAL at 15:44

## 2025-07-01 RX ADMIN — HEPARIN SODIUM 3000 UNITS: 1000 INJECTION, SOLUTION INTRAVENOUS; SUBCUTANEOUS at 09:28

## 2025-07-01 RX ADMIN — PHENYLEPHRINE HYDROCHLORIDE 80 MCG: 10 INJECTION INTRAVENOUS at 07:44

## 2025-07-01 RX ADMIN — PHENYLEPHRINE HYDROCHLORIDE 80 MCG: 10 INJECTION INTRAVENOUS at 07:32

## 2025-07-01 RX ADMIN — POTASSIUM CHLORIDE 10 MEQ: 7.46 INJECTION, SOLUTION INTRAVENOUS at 13:15

## 2025-07-01 RX ADMIN — FENTANYL CITRATE 50 MCG: 50 INJECTION, SOLUTION INTRAMUSCULAR; INTRAVENOUS at 10:47

## 2025-07-01 RX ADMIN — TAMSULOSIN HYDROCHLORIDE 0.4 MG: 0.4 CAPSULE ORAL at 15:48

## 2025-07-01 RX ADMIN — HYDROCODONE BITARTRATE AND ACETAMINOPHEN 1 TABLET: 10; 325 TABLET ORAL at 15:21

## 2025-07-01 RX ADMIN — ASPIRIN 81 MG: 81 TABLET, COATED ORAL at 15:44

## 2025-07-01 RX ADMIN — TERAZOSIN HYDROCHLORIDE 5 MG: 5 CAPSULE ORAL at 20:43

## 2025-07-01 RX ADMIN — SPIRONOLACTONE 25 MG: 25 TABLET ORAL at 15:47

## 2025-07-01 RX ADMIN — PROPOFOL 30 MG: 10 INJECTION, EMULSION INTRAVENOUS at 07:28

## 2025-07-01 RX ADMIN — ROCURONIUM 50 MG: 50 INJECTION, SOLUTION INTRAVENOUS at 07:28

## 2025-07-01 RX ADMIN — CEFAZOLIN 2000 MG: 2 INJECTION, POWDER, FOR SOLUTION INTRAMUSCULAR; INTRAVENOUS at 07:25

## 2025-07-01 RX ADMIN — POTASSIUM CHLORIDE 10 MEQ: 7.46 INJECTION, SOLUTION INTRAVENOUS at 15:29

## 2025-07-01 RX ADMIN — ROCURONIUM 50 MG: 50 INJECTION, SOLUTION INTRAVENOUS at 08:49

## 2025-07-01 RX ADMIN — PREGABALIN 25 MG: 25 CAPSULE ORAL at 20:43

## 2025-07-01 RX ADMIN — Medication 1 TABLET: at 15:45

## 2025-07-01 RX ADMIN — IPRATROPIUM BROMIDE AND ALBUTEROL SULFATE 1.5 ML: .5; 3 SOLUTION RESPIRATORY (INHALATION) at 13:37

## 2025-07-01 RX ADMIN — BUDESONIDE AND FORMOTEROL FUMARATE DIHYDRATE 2 PUFF: 160; 4.5 AEROSOL RESPIRATORY (INHALATION) at 14:04

## 2025-07-01 RX ADMIN — HEPARIN SODIUM 3500 UNITS: 1000 INJECTION, SOLUTION INTRAVENOUS; SUBCUTANEOUS at 08:28

## 2025-07-01 RX ADMIN — CITALOPRAM 20 MG: 20 TABLET, FILM COATED ORAL at 15:44

## 2025-07-01 RX ADMIN — PHENYLEPHRINE HYDROCHLORIDE 240 MCG: 10 INJECTION INTRAVENOUS at 07:36

## 2025-07-01 RX ADMIN — FENTANYL CITRATE 100 MCG: 50 INJECTION, SOLUTION INTRAMUSCULAR; INTRAVENOUS at 09:05

## 2025-07-01 RX ADMIN — POTASSIUM CHLORIDE 40 MEQ: 1500 TABLET, EXTENDED RELEASE ORAL at 23:54

## 2025-07-01 RX ADMIN — CLOPIDOGREL BISULFATE 75 MG: 75 TABLET, FILM COATED ORAL at 15:45

## 2025-07-01 RX ADMIN — SODIUM CHLORIDE, POTASSIUM CHLORIDE, SODIUM LACTATE AND CALCIUM CHLORIDE 1000 ML: 600; 310; 30; 20 INJECTION, SOLUTION INTRAVENOUS at 06:06

## 2025-07-01 RX ADMIN — POTASSIUM CHLORIDE 10 MEQ: 7.46 INJECTION, SOLUTION INTRAVENOUS at 12:13

## 2025-07-01 RX ADMIN — HYDROCODONE BITARTRATE AND ACETAMINOPHEN 1 TABLET: 7.5; 325 TABLET ORAL at 11:27

## 2025-07-01 RX ADMIN — EMPAGLIFLOZIN 10 MG: 10 TABLET, FILM COATED ORAL at 15:47

## 2025-07-01 RX ADMIN — METOPROLOL SUCCINATE 25 MG: 25 TABLET, EXTENDED RELEASE ORAL at 15:48

## 2025-07-01 RX ADMIN — PREGABALIN 25 MG: 25 CAPSULE ORAL at 12:49

## 2025-07-01 RX ADMIN — IPRATROPIUM BROMIDE AND ALBUTEROL SULFATE 1.5 ML: .5; 3 SOLUTION RESPIRATORY (INHALATION) at 19:21

## 2025-07-01 RX ADMIN — FENTANYL CITRATE 50 MCG: 50 INJECTION, SOLUTION INTRAMUSCULAR; INTRAVENOUS at 17:24

## 2025-07-01 RX ADMIN — CHLORHEXIDINE GLUCONATE 1 APPLICATION: 500 CLOTH TOPICAL at 20:43

## 2025-07-01 RX ADMIN — SODIUM CHLORIDE 1 ML/KG/HR: 9 INJECTION, SOLUTION INTRAVENOUS at 11:16

## 2025-07-01 RX ADMIN — HYDROCODONE BITARTRATE AND ACETAMINOPHEN 1 TABLET: 10; 325 TABLET ORAL at 20:42

## 2025-07-01 RX ADMIN — Medication 1 APPLICATION: at 20:43

## 2025-07-01 RX ADMIN — AMLODIPINE BESYLATE 10 MG: 10 TABLET ORAL at 15:45

## 2025-07-01 RX ADMIN — BUDESONIDE AND FORMOTEROL FUMARATE DIHYDRATE 2 PUFF: 160; 4.5 AEROSOL RESPIRATORY (INHALATION) at 19:20

## 2025-07-01 RX ADMIN — FENTANYL CITRATE 50 MCG: 50 INJECTION, SOLUTION INTRAMUSCULAR; INTRAVENOUS at 12:49

## 2025-07-01 RX ADMIN — CEFAZOLIN 2 G: 2 INJECTION, POWDER, FOR SOLUTION INTRAMUSCULAR; INTRAVENOUS at 15:21

## 2025-07-01 RX ADMIN — HYDROMORPHONE HYDROCHLORIDE 0.5 MG: 1 INJECTION, SOLUTION INTRAMUSCULAR; INTRAVENOUS; SUBCUTANEOUS at 10:32

## 2025-07-01 NOTE — H&P
Chief Complaint   Patient presents with    Aortic Stenosis       Patient is here for hospital follow up             Subjective  History of Present Illness  The patient is a 64-year-old male who presents for evaluation of severe aortic valve stenosis, bioprosthetic valve, peripheral arterial disease, carotid artery disease, and coronary artery disease with known previous CABG.     He has been experiencing urinary urgency, necessitating immediate access to a restroom. This symptom began approximately 1.5 weeks ago. His prostate is managed by his primary care physician. He also reports episodes of dyspnea, which he attributes to anxiety. He has a history of smoking but has since ceased this habit. He has undergone 2 bypass surgeries and has 2 stents in place.He has a scheduled appointment with his cardiologist, Dr. Francois, on the upcoming Monday. He reports a decrease in energy levels following his bypass surgery. He has previously undergone a procedure to address blockages in his neck vessels.     He has a spot on his kidney and [facility] [place], [place] every 3 months to keep a check on it. He is not losing any weight intentionally, coughing up blood, or experiencing new chest pains.     SOCIAL HISTORY  He is a non-smoker.     FAMILY HISTORY  His first cousin had open heart surgery with three or four bypasses and a stent placed in one of his valves. The cousin also had diabetes.     Review of Systems         Medical History        Past Medical History:   Diagnosis Date    Aneurysm      Anxiety      Arthritis      Carotid artery disease      Carotid stenosis, right 02/01/2018     Post right carotid endarterectomy    COPD (chronic obstructive pulmonary disease)      Coronary artery disease      Heart murmur      History of right-sided carotid endarterectomy 02/16/2022    Hyperlipidemia LDL goal <70 12/14/2017    Left frontal lobe, right thalamus and right occipital CVA (cerebrovascular accident) (HCC)  01/27/2022    LVH (left ventricular hypertrophy) 02/16/2022    Pneumonia      Primary hypertension 12/14/2017    S/P CABG x 3 02/16/2022     LIMA to LAD, SVG to PDA and SVG to diagonal branch with TMR and left atrial appendage exclusion with atricure clip device 2/8/2018 per Dr. Cj Robin at [facility]     Severe aortic valve stenosis 12/14/2017    Status post aortic valve replacement with bioprosthetic valve 02/16/2022     Graciela Juarez bovine pericardial 19 mm valve 2/8/2018 per Dr. Cj Robin at [facility]    Tobacco use 12/14/2017    Wears glasses           Surgical History         Past Surgical History:   Procedure Laterality Date    AORTAGRAM Bilateral 10/27/2023     Procedure: LEFT LOWER EXTREMITY ANGIOGRAM, BILATERAL ILIAC BALLOON ANGIOPLASTY, BILATERAL ILIAC STENT PLACEMENT, MYNX CLOSURE;  Surgeon: Jl Salgado DO;  Location: [facility] OR 12;  Service: Vascular;  Laterality: Bilateral;    APPENDECTOMY        CARDIAC CATHETERIZATION N/A 12/18/2017     Procedure: Left Heart Cath;  Surgeon: Aime Francois MD;  Location:  [facility] CATH INVASIVE LOCATION;  Service:     CARDIAC CATHETERIZATION N/A 12/18/2017     Procedure: Right Heart Cath;  Surgeon: Aime Francois MD;  Location:  [facility] CATH INVASIVE LOC[facility];  Service:     CARDIAC CATHETERIZATION Bilateral 1/4/2018     Procedure: Coronary angiography;  Surgeon: Alfonso Yi MD;  Location:  [facility] CATH INVASIVE LOC[facility];  Service:     CARDIAC CATHETERIZATION Left 9/15/2021     Procedure: Cardiac Catheterization/Vascular Study NIMCO OK  Has 3 graft 2018;  Surgeon: Aime Francois MD;  Location:  [facility] CATH INVASIVE LOC[facility];  Service: Cardiology;  Laterality: Left;    CAROTID ENDARTERECTOMY Right 2/1/2018     Procedure: RIGHT CAROTID ENDARTERECTOMY WITH EEG-awake;  Surgeon: Jl Salgado DO;  Location: [facility] OR;  Service:     CORONARY ARTERY BYPASS GRAFT WITH AORTIC VALVE REPAIR/REPLACEMENT N/A 2/8/2018     Procedure:  AORTIC VALVE REPLACEMENT,CORONARY ARTERY BYPASS GRAFTING x 3  WITH CONCHA AND RIGHT EVH, ATRIAL APPENDAGE EXCLUSION LEFT WITH TRANSESOPHAGEAL ECHOCARDIOGRAM, 17-CHANNEL TMR;  Surgeon: Cj Robin MD;  Location: [facility] OR;  Service:     FINGER SURGERY Right     OTHER SURGICAL HISTORY         skull srgery from accident in childhood.               Family History   Problem Relation Age of Onset    Heart disease Mother      Heart disease Father      Diabetes Father      Hypertension Sister      Asthma Sister      Kidney disease Sister      Hypertension Brother      Diabetes Brother      Cancer Maternal Uncle        Social History   Social History            Tobacco Use    Smoking status: Former       Current packs/day: 0.00       Average packs/day: 1.5 packs/day for 49.8 years (74.8 ttl pk-yrs)       Types: Cigarettes       Start date:        Quit date: 2024       Years since quittin.4       Passive exposure: Never    Smokeless tobacco: Current       Types: Snuff   Vaping Use    Vaping status: Never Used   Substance Use Topics    Alcohol use: No    Drug use: No         Current Medications          Current Outpatient Medications   Medication Sig Dispense Refill    amLODIPine (NORVASC) 10 MG tablet Take 1 tablet by mouth Daily.        aspirin 81 MG tablet Take 1 tablet by mouth Daily. 30 tablet 2    budesonide-formoterol (SYMBICORT) 160-4.5 MCG/ACT inhaler Inhale 2 puffs 2 (Two) Times a Day. 1 each 6    busPIRone (BUSPAR) 10 MG tablet Take 1 tablet by mouth 2 (Two) Times a Day.        citalopram (CeleXA) 20 MG tablet Take 1 tablet by mouth Daily.        clopidogrel (PLAVIX) 75 MG tablet Take 1 tablet by mouth Daily. 30 tablet 2    ipratropium-albuterol (DUO-NEB) 0.5-2.5 mg/3 ml nebulizer Take 3 mL by nebulization Every 4 (Four) Hours As Needed for Wheezing. 90 mL 0    isosorbide mononitrate (IMDUR) 120 MG 24 hr tablet Take 1 tablet by mouth Daily. 90 tablet 3    metoprolol succinate XL (TOPROL-XL) 50 MG  "24 hr tablet Take 1 tablet by mouth Daily. 30 tablet 0    multivitamin with minerals (ONE-A-DAY MENS 50+ ADVANTAGE PO) Take 1 tablet by mouth Daily.        nitroglycerin (NITROSTAT) 0.4 MG SL tablet Place 1 tablet under the tongue Every 5 (Five) Minutes As Needed for Chest Pain. Take no more than 3 doses in 15 minutes.        predniSONE (DELTASONE) 10 MG tablet Take 1 tablet by mouth Daily. 40mg X 1 day, 30mg X 1 day, 20mg X 1 day, 10mg X 1 day, stop 10 tablet 0    tamsulosin (FLOMAX) 0.4 MG capsule 24 hr capsule Take 1 capsule by mouth Daily.        rosuvastatin (CRESTOR) 5 MG tablet Take 1 tablet by mouth Daily. 30 tablet 11      No current facility-administered medications for this visit.         Allergies:  Patient has no known allergies.     Objective  Visit Vitals  /72   Pulse 84   Temp 97.3 °F (36.3 °C) (Temporal)   Resp 18   Ht 160 cm (63\")   Wt 51.7 kg (114 lb)   SpO2 96%   BMI 20.19 kg/m²          Physical Exam  Physical Exam  The patient appears older than his stated age and is in no acute distress.  Lungs are clear to auscultation bilaterally without wheezing, rubs or rales.  The heart has a regular rate and rhythm. No external heave. There is a murmur 3 out of 6. No rubs, no gallops.  Abdomen is soft, nondistended, nontender.  There is clubbing to the hands. No cyanosis or edema. The patient moves all extremities.     Vital Signs  Height is 5 feet 4 inches.     Results Review:   No results found.  Results  Imaging  Transthoracic echocardiogram obtained on 12/26/2024 reveals an ejection fraction of 66-70%, LVH, hypokinetic apical septal and apex segments, dilated left atrium, severe bioprosthetic aortic valve stenosis with a peak velocity of 4.4 and a mean gradient of 33.3, max gradient of 77.8.  CAT scan from 03/13/2025 shows right upper lobe wedge resection, ground glass appearance, no evidence of metastatic disease.  I reviewed the patient's new clinical results.  Discussed with patient         "   Assessment & Plan  Diagnoses and all orders for this visit:     1. S/P CABG x 3 2018 Dr Robin  (Primary)     2. Status post aortic valve replacement with bioprosthetic valve     3. Aortoiliac occlusive disease     4. Acute on chronic diastolic CHF (congestive heart failure)     5. PAD (peripheral artery disease)     6. History of CVA (cerebrovascular accident)     7. Chronic obstructive pulmonary disease, unspecified COPD type     8. Tobacco use     NYHA class I   Assessment & Plan  1. Severe aortic valve stenosis.  The [facility] with COPD exacerbation and acute on chronic diastolic heart failure. He was treated for severe sepsis and respiratory failure with antibiotics and supportive efforts, requiring an ICU stay before being transferred to the floor. He underwent coronary artery bypass grafting with TMR on 02/08/2018, including TMR to his right coronary artery territory. It is not surprising that his right coronary artery bypass graft failed. The diagonal artery bypass graft is also occluded. The most recent left heart catheterization performed in 2021 shows a patent LAD with 70% proximal LAD stenosis and 50% left main stenosis, not considered hemodynamically significant. An invasive hemodynamic test at that time showed a bioprosthetic valve with a mean gradient of 20, similar to his initial echocardiography. His mean gradient is now 33 on the most recent echo with noted elevated velocities. He likely presented with heart failure and pneumonia, though the initial evaluation was not conducted during hospitalization. Based on history, this is more likely a heart failure presentation rather than a true infectious presentation. Further evaluation will begin, including a CT TAVR chest, abdomen, pelvis protocol CAT scan, new pulmonary function test pre and post-bronchodilator complete with DLCO, and a left heart catheterization. Treatment options discussed include continued expectant  management, a valve-in-valve technique with TAVR, or SAVR. Given his age of 64 years, redo sternotomy and AVR and redo AVR may be favored. His implant of a 19 mm valve may confer a favorable treatment plan to be more likely SAVR rather than TAVR valve in valve approach. The process for valve therapy will include a heart team approach. While he already sees a cardiologist, Dr. Francois, there are 2 specific cardiologists who specialize in structural therapy. Patients undergoing valve therapy are discussed weekly to determine the best treatment plan.  If a surgical solution is ultimately recommended, surgical therapy will proceed. All questions were answered. He is clinically stable at this time and is a non-smoker. Care will continue to be provided.     2. Bioprosthetic valve.  The patient has a bioprosthetic valve with a mean gradient of 33 on the most recent echo. Further evaluation will include a CT TAVR chest, abdomen, pelvis protocol CAT scan, new pulmonary function test pre and post-bronchodilator complete with DLCO, and a left heart catheterization. Treatment options discussed include continued expectant management, a valve-in-valve technique with TAVR, or SAVR. Given his age of 64 years, redo sternotomy and AVR and redo AVR may be favored.     3. Peripheral arterial disease.  The patient has known peripheral arterial disease. Further evaluation will include a CT TAVR chest, abdomen, pelvis protocol CAT scan and new pulmonary function test pre and post-bronchodilator complete with DLCO.     4. Carotid artery disease.  The patient has known carotid artery disease. Further evaluation will include a CT TAVR chest, abdomen, pelvis protocol CAT scan and new pulmonary function test pre and post-bronchodilator complete with DLCO.     5. Coronary artery disease with known previous CABG.  The patient has a history of coronary artery disease with known previous CABG. Further evaluation will include a CT TAVR chest, abdomen,  pelvis protocol CAT scan and new pulmonary function test pre and post-bronchodilator complete with DLCO.     6. Lung nodule.  The patient has a spot on his kidney and goes to [facility] in [place], [place] every 3 months to keep a check on it. He is not losing any weight intentionally, coughing up blood, or experiencing new chest pains.     PROCEDURE  The patient underwent coronary artery bypass grafting with TMR on 02/08/2018, including TMR to his right coronary artery territory.       Ultimately he was evaluated in structural heart conference and workup completed including left heart catheterization.  He has been recommended possible PCI of the right coronary artery system with transapical access TAVR.  Risks, benefits, and alternatives were discussed at length with the patient.  All questions have been answered to the best of my ability and he is agreeable to proceed forward providing consent and verbalizing understanding.    He is a smoker. Counseling has been provided     Many thanks for the opportunity to care for your patient.     I will continue to keep you apprised of provided care as it ensues.          Will obtain consultation with my structural heart colleagues and begin heart team approach for redo SAVR vs valve in valve TAVR.  All questions have been answered to the best of my ability and he is agreeable to the plan of care .         Transcribed from ambient dictation for Cj Robin MD by Cj Robin MD.  04/20/25   18:16 CDT     Patient or patient representative verbalized consent for the use of Ambient Listening during the visit with  Cj Robin MD for chart documentation. 4/20/2025  18:58 CDT

## 2025-07-01 NOTE — PROGRESS NOTES
"[facility] Clinical Pharmacy Services: Enoxaparin Consult  Sharad Ryder is a 65 y.o. male who has been consulted to Initiate prophylactic enoxaparin.     Indication: VTE Prophylaxis    Relevant clinical data and objective history reviewed:  Ht: 160 cm (63\"); Wt: 51.7 kg (114 lb); BMI: Body mass index is 20.19 kg/m².  Estimated Creatinine Clearance: 68.2 mL/min (by C-G formula based on SCr of 0.79 mg/dL).  Results from last 7 days   Lab Units 07/01/25  1046 06/30/25  0903   HEMOGLOBIN g/dL 10.6* 12.8*   HEMATOCRIT % 31.4* 38.6   PLATELETS 10*3/mm3 221 296   CREATININE mg/dL  --  0.79       Indication(s) for therapeutic anti-Xa monitoring: No indication for anti-Xa level monitoring (delete below table)        Asessment/Plan:  Initiate 40mg subcutaneous every 12 hours.  Pharmacy will continue to monitor renal function and clinical status and adjust the dose and/or frequency as needed.    Irma Barrett, PharmD  7/1/2025  11:23 CDT  "

## 2025-07-01 NOTE — PAYOR COMM NOTE
"ADMIT INPT 7-1-25  [id number]     Sharad Lopes (65 y.o. Male)       Date of Birth   1960    Social Security Number       Address   [address]  [facility] [address]    Home Phone   [phone]    MRN   [id number]       [facility]    Marital Status                               Admission Date   7/1/2025    Admission Type   Elective    Admitting Provider   Cj Robin MD    Attending Provider   Cj Robin MD    Department, Room/Bed   [facility] INTENSIVE CARE, I001/1       Discharge Date       Discharge Disposition       Discharge Destination                                 Attending Provider: Cj Robin MD    Allergies: No Known Allergies    Isolation: None   Infection: None   Code Status: Prior    Ht: 160 cm (62.99\")   Wt: 51.7 kg (114 lb)    Admission Cmt: None   Principal Problem: Severe aortic stenosis [I35.0]                   Active Insurance as of 7/1/2025       Primary Coverage       Payor Plan Insurance Group Employer/Plan Group    HUMANA MEDICARE REPLACEMENT HUMANA MEDICARE ADVANTAGE PPO [id number]       Payor Plan Address Payor Plan Phone Number Payor Plan Fax Number Effective Dates    [address] [address] [phone]  4/1/2025 - None Entered    [facility] [id number]         Subscriber Name Subscriber Birth Date Member ID       SHARAD LOPES 1960 [id number]                     Emergency Contacts        (Rel.) Home Phone Work Phone Mobile Phone    Ekaterina Lopes (Spouse) [phone] -- [phone]                 History & Physical        Cj Robin MD at 07/01/25 0658            Chief Complaint   Patient presents with    Aortic Stenosis       Patient is here for hospital follow up             Subjective  History of Present Illness  The patient is a 64-year-old male who presents for evaluation of severe aortic valve stenosis, bioprosthetic valve, peripheral arterial disease, carotid artery disease, and coronary " artery disease with known previous CABG.     He has been experiencing urinary urgency, necessitating immediate access to a restroom. This symptom began approximately 1.5 weeks ago. His prostate is managed by his primary care physician. He also reports episodes of dyspnea, which he attributes to anxiety. He has a history of smoking but has since ceased this habit. He has undergone 2 bypass surgeries and has 2 stents in place.He has a scheduled appointment with his cardiologist, Dr. Francois, on the upcoming Monday. He reports a decrease in energy levels following his bypass surgery. He has previously undergone a procedure to address blockages in his neck vessels.     He has a spot on his kidney and goes to [facility] in [place], [place] every 3 months to keep a check on it. He is not losing any weight intentionally, coughing up blood, or experiencing new chest pains.     SOCIAL HISTORY  He is a non-smoker.     FAMILY HISTORY  His first cousin had open heart surgery with three or four bypasses and a stent placed in one of his valves. The cousin also had diabetes.     Review of Systems         Medical History        Past Medical History:   Diagnosis Date    Aneurysm      Anxiety      Arthritis      Carotid artery disease      Carotid stenosis, right 02/01/2018     Post right carotid endarterectomy    COPD (chronic obstructive pulmonary disease)      Coronary artery disease      Heart murmur      History of right-sided carotid endarterectomy 02/16/2022    Hyperlipidemia LDL goal <70 12/14/2017    Left frontal lobe, right thalamus and right occipital CVA (cerebrovascular accident) (HCC) 01/27/2022    LVH (left ventricular hypertrophy) 02/16/2022    Pneumonia      Primary hypertension 12/14/2017    S/P CABG x 3 02/16/2022     LIMA to LAD, SVG to PDA and SVG to diagonal branch with TMR and left atrial appendage exclusion with atricure clip device 2/8/2018 per Dr. Cj Robin at [facility]     Severe aortic  valve stenosis 12/14/2017    Status post aortic valve replacement with bioprosthetic valve 02/16/2022     Graciela Juarez bovine pericardial 19 mm valve 2/8/2018 per Dr. Cj Robin at [facility]    Tobacco use 12/14/2017    Wears glasses           Surgical History         Past Surgical History:   Procedure Laterality Date    AORTAGRAM Bilateral 10/27/2023     Procedure: LEFT LOWER EXTREMITY ANGIOGRAM, BILATERAL ILIAC BALLOON ANGIOPLASTY, BILATERAL ILIAC STENT PLACEMENT, MYNX CLOSURE;  Surgeon: Jl Salgado DO;  Location:  [facility] HYBRID OR 12;  Service: Vascular;  Laterality: Bilateral;    APPENDECTOMY        CARDIAC CATHETERIZATION N/A 12/18/2017     Procedure: Left Heart Cath;  Surgeon: Aime Francois MD;  Location:  [facility] CATH INVASIVE LOCATION;  Service:     CARDIAC CATHETERIZATION N/A 12/18/2017     Procedure: Right Heart Cath;  Surgeon: Aime Francois MD;  Location:  [facility] CATH INVASIVE LOCATION;  Service:     CARDIAC CATHETERIZATION Bilateral 1/4/2018     Procedure: Coronary angiography;  Surgeon: Alfonso Yi MD;  Location:  [facility] CATH INVASIVE LOCATION;  Service:     CARDIAC CATHETERIZATION Left 9/15/2021     Procedure: Cardiac Catheterization/Vascular Study NIMCO OK  Has 3 graft 2018;  Surgeon: Aime Francois MD;  Location:  [facility] CATH INVASIVE LOCATION;  Service: Cardiology;  Laterality: Left;    CAROTID ENDARTERECTOMY Right 2/1/2018     Procedure: RIGHT CAROTID ENDARTERECTOMY WITH EEG-awake;  Surgeon: Jl Salgado DO;  Location:  [facility] OR;  Service:     CORONARY ARTERY BYPASS GRAFT WITH AORTIC VALVE REPAIR/REPLACEMENT N/A 2/8/2018     Procedure: AORTIC VALVE REPLACEMENT,CORONARY ARTERY BYPASS GRAFTING x 3  WITH CONCHA AND RIGHT EVH, ATRIAL APPENDAGE EXCLUSION LEFT WITH TRANSESOPHAGEAL ECHOCARDIOGRAM, 17-CHANNEL TMR;  Surgeon: Cj Robin MD;  Location:  [facility] OR;  Service:     FINGER SURGERY Right 1990    OTHER SURGICAL HISTORY         skull srgery from accident in childhood.                Family History   Problem Relation Age of Onset    Heart disease Mother      Heart disease Father      Diabetes Father      Hypertension Sister      Asthma Sister      Kidney disease Sister      Hypertension Brother      Diabetes Brother      Cancer Maternal Uncle        Social History   Social History            Tobacco Use    Smoking status: Former       Current packs/day: 0.00       Average packs/day: 1.5 packs/day for 49.8 years (74.8 ttl pk-yrs)       Types: Cigarettes       Start date:        Quit date: 2024       Years since quittin.4       Passive exposure: Never    Smokeless tobacco: Current       Types: Snuff   Vaping Use    Vaping status: Never Used   Substance Use Topics    Alcohol use: No    Drug use: No         Current Medications          Current Outpatient Medications   Medication Sig Dispense Refill    amLODIPine (NORVASC) 10 MG tablet Take 1 tablet by mouth Daily.        aspirin 81 MG tablet Take 1 tablet by mouth Daily. 30 tablet 2    budesonide-formoterol (SYMBICORT) 160-4.5 MCG/ACT inhaler Inhale 2 puffs 2 (Two) Times a Day. 1 each 6    busPIRone (BUSPAR) 10 MG tablet Take 1 tablet by mouth 2 (Two) Times a Day.        citalopram (CeleXA) 20 MG tablet Take 1 tablet by mouth Daily.        clopidogrel (PLAVIX) 75 MG tablet Take 1 tablet by mouth Daily. 30 tablet 2    ipratropium-albuterol (DUO-NEB) 0.5-2.5 mg/3 ml nebulizer Take 3 mL by nebulization Every 4 (Four) Hours As Needed for Wheezing. 90 mL 0    isosorbide mononitrate (IMDUR) 120 MG 24 hr tablet Take 1 tablet by mouth Daily. 90 tablet 3    metoprolol succinate XL (TOPROL-XL) 50 MG 24 hr tablet Take 1 tablet by mouth Daily. 30 tablet 0    multivitamin with minerals (ONE-A-DAY MENS 50+ ADVANTAGE PO) Take 1 tablet by mouth Daily.        nitroglycerin (NITROSTAT) 0.4 MG SL tablet Place 1 tablet under the tongue Every 5 (Five) Minutes As Needed for Chest Pain. Take no more than 3 doses in 15 minutes.        predniSONE  "(DELTASONE) 10 MG tablet Take 1 tablet by mouth Daily. 40mg X 1 day, 30mg X 1 day, 20mg X 1 day, 10mg X 1 day, stop 10 tablet 0    tamsulosin (FLOMAX) 0.4 MG capsule 24 hr capsule Take 1 capsule by mouth Daily.        rosuvastatin (CRESTOR) 5 MG tablet Take 1 tablet by mouth Daily. 30 tablet 11      No current facility-administered medications for this visit.         Allergies:  Patient has no known allergies.     Objective  Visit Vitals  /72   Pulse 84   Temp 97.3 °F (36.3 °C) (Temporal)   Resp 18   Ht 160 cm (63\")   Wt 51.7 kg (114 lb)   SpO2 96%   BMI 20.19 kg/m²          Physical Exam  Physical Exam  The patient appears older than his stated age and is in no acute distress.  Lungs are clear to auscultation bilaterally without wheezing, rubs or rales.  The heart has a regular rate and rhythm. No external heave. There is a murmur 3 out of 6. No rubs, no gallops.  Abdomen is soft, nondistended, nontender.  There is clubbing to the hands. No cyanosis or edema. The patient moves all extremities.     Vital Signs  Height is 5 feet 4 inches.     Results Review:   No results found.  Results  Imaging  Transthoracic echocardiogram obtained on 12/26/2024 reveals an ejection fraction of 66-70%, LVH, hypokinetic apical septal and apex segments, dilated left atrium, severe bioprosthetic aortic valve stenosis with a peak velocity of 4.4 and a mean gradient of 33.3, max gradient of 77.8.  CAT scan from 03/13/2025 shows right upper lobe wedge resection, ground glass appearance, no evidence of metastatic disease.  I reviewed the patient's new clinical results.  Discussed with patient           Assessment & Plan  Diagnoses and all orders for this visit:     1. S/P CABG x 3 2018 Dr Robin  (Primary)     2. Status post aortic valve replacement with bioprosthetic valve     3. Aortoiliac occlusive disease     4. Acute on chronic diastolic CHF (congestive heart failure)     5. PAD (peripheral artery disease)     6. History of CVA " (cerebrovascular accident)     7. Chronic obstructive pulmonary disease, unspecified COPD type     8. Tobacco use     NYHA class I   Assessment & Plan  1. Severe aortic valve stenosis.  The patient presented to [facility] with COPD exacerbation and acute on chronic diastolic heart failure. He was treated for severe sepsis and respiratory failure with antibiotics and supportive efforts, requiring an ICU stay before being transferred to the floor. He underwent coronary artery bypass grafting with TMR on 02/08/2018, including TMR to his right coronary artery territory. It is not surprising that his right coronary artery bypass graft failed. The diagonal artery bypass graft is also occluded. The most recent left heart catheterization performed in 2021 shows a patent LAD with 70% proximal LAD stenosis and 50% left main stenosis, not considered hemodynamically significant. An invasive hemodynamic test at that time showed a bioprosthetic valve with a mean gradient of 20, similar to his initial echocardiography. His mean gradient is now 33 on the most recent echo with noted elevated velocities. He likely presented with heart failure and pneumonia, though the initial evaluation was not conducted during hospitalization. Based on history, this is more likely a heart failure presentation rather than a true infectious presentation. Further evaluation will begin, including a CT TAVR chest, abdomen, pelvis protocol CAT scan, new pulmonary function test pre and post-bronchodilator complete with DLCO, and a left heart catheterization. Treatment options discussed include continued expectant management, a valve-in-valve technique with TAVR, or SAVR. Given his age of 64 years, redo sternotomy and AVR and redo AVR may be favored. His implant of a 19 mm valve may confer a favorable treatment plan to be more likely SAVR rather than TAVR valve in valve approach. The process for valve therapy will include a heart team approach. While he  already sees a cardiologist, Dr. Francois, there are 2 specific cardiologists who specialize in structural therapy. Patients undergoing valve therapy are discussed weekly to determine the best treatment plan.  If a surgical solution is ultimately recommended, surgical therapy will proceed. All questions were answered. He is clinically stable at this time and is a non-smoker. Care will continue to be provided.     2. Bioprosthetic valve.  The patient has a bioprosthetic valve with a mean gradient of 33 on the most recent echo. Further evaluation will include a CT TAVR chest, abdomen, pelvis protocol CAT scan, new pulmonary function test pre and post-bronchodilator complete with DLCO, and a left heart catheterization. Treatment options discussed include continued expectant management, a valve-in-valve technique with TAVR, or SAVR. Given his age of 64 years, redo sternotomy and AVR and redo AVR may be favored.     3. Peripheral arterial disease.  The patient has known peripheral arterial disease. Further evaluation will include a CT TAVR chest, abdomen, pelvis protocol CAT scan and new pulmonary function test pre and post-bronchodilator complete with DLCO.     4. Carotid artery disease.  The patient has known carotid artery disease. Further evaluation will include a CT TAVR chest, abdomen, pelvis protocol CAT scan and new pulmonary function test pre and post-bronchodilator complete with DLCO.     5. Coronary artery disease with known previous CABG.  The patient has a history of coronary artery disease with known previous CABG. Further evaluation will include a CT TAVR chest, abdomen, pelvis protocol CAT scan and new pulmonary function test pre and post-bronchodilator complete with DLCO.     6. Lung nodule.  The patient has a spot on his kidney and goes to [facility] in [place], [place] every 3 months to keep a check on it. He is not losing any weight intentionally, coughing up blood, or experiencing new chest  pains.     PROCEDURE  The patient underwent coronary artery bypass grafting with TMR on 02/08/2018, including TMR to his right coronary artery territory.       Ultimately he was evaluated in structural heart conference and workup completed including left heart catheterization.  He has been recommended possible PCI of the right coronary artery system with transapical access TAVR.  Risks, benefits, and alternatives were discussed at length with the patient.  All questions have been answered to the best of my ability and he is agreeable to proceed forward providing consent and verbalizing understanding.    He is a smoker. Counseling has been provided     Many thanks for the opportunity to care for your patient.     I will continue to keep you apprised of provided care as it ensues.          Will obtain consultation with my structural heart colleagues and begin heart team approach for redo SAVR vs valve in valve TAVR.  All questions have been answered to the best of my ability and he is agreeable to the plan of care .         Transcribed from ambient dictation for Cj Robin MD by Cj Robin MD.  04/20/25   18:16 CDT     Patient or patient representative verbalized consent for the use of Ambient Listening during the visit with  Cj Robin MD for chart documentation. 4/20/2025  18:58 CDT            Electronically signed by Cj Robin MD at 07/01/25 0701       Facility-Administered Medications as of 7/1/2025   Medication Dose Route Frequency Provider Last Rate Last Admin    amLODIPine (NORVASC) tablet 10 mg  10 mg Oral Daily Cj Robin MD        [COMPLETED] aspirin chewable tablet 324 mg  324 mg Oral Once Cj Robin MD   324 mg at 07/01/25 0556    aspirin EC tablet 81 mg  81 mg Oral Daily Cj Robin MD        budesonide-formoterol (SYMBICORT) 160-4.5 MCG/ACT inhaler 2 puff  2 puff Inhalation BID - RT Cj Robin MD        busPIRone (BUSPAR) tablet 10 mg  10 mg  Oral BID Cj Robin MD        Calcium Replacement - Follow Nurse / BPA Driven Protocol   Not Applicable PRN Cj Robin MD        [COMPLETED] ceFAZolin 2000 mg IVPB in 100 mL NS (MBP)  2,000 mg Intravenous Once Cj Robin MD   2,000 mg at 07/01/25 0725    ceFAZolin 2000 mg IVPB in 100 mL NS (MBP)  2 g Intravenous Q8H Cj Robin MD        citalopram (CeleXA) tablet 20 mg  20 mg Oral Daily Cj Robin MD        clopidogrel (PLAVIX) tablet 75 mg  75 mg Oral Daily Cj Robin MD        empagliflozin (JARDIANCE) tablet 10 mg  10 mg Oral Daily Cj Robin MD        enoxaparin sodium (LOVENOX) syringe 40 mg  40 mg Subcutaneous Q24H Cj Robin MD        HYDROcodone-acetaminophen (NORCO) 7.5-325 MG per tablet 1 tablet  1 tablet Oral Q6H PRN Cj Robin MD   1 tablet at 07/01/25 1127    ipratropium-albuterol (DUO-NEB) nebulizer solution 1.5 mL  1.5 mL Nebulization Q4H - RT Cj Robin MD        [START ON 7/2/2025] isosorbide mononitrate (IMDUR) 24 hr tablet 60 mg  60 mg Oral Daily Cj Robin MD        lactated ringers infusion 1,000 mL  1,000 mL Intravenous Continuous Cj Robin MD   Stopped at 07/01/25 1031    lactated ringers infusion 1,000 mL  1,000 mL Intravenous Continuous Cj Robin MD   Stopped at 07/01/25 1031    Magnesium Cardiology Dose Replacement - Follow Nurse / BPA Driven Protocol   Not Applicable PRN Cj Robin MD        metoprolol succinate XL (TOPROL-XL) 24 hr tablet 25 mg  25 mg Oral Q24H Cj Robin MD        multivitamin with minerals 1 tablet  1 tablet Oral Daily Cj Robin MD        nitroglycerin (NITROSTAT) SL tablet 0.4 mg  0.4 mg Sublingual Q5 Min PRN Cj Robin MD        Pharmacy to Dose enoxaparin (LOVENOX)   Not Applicable Continuous PRN Cj Robin MD        Phosphorus Replacement - Follow Nurse / BPA Driven Protocol   Not Applicable Cj Prasad MD         Potassium Replacement - Follow Nurse / BPA Driven Protocol   Not Applicable PRN Cj Robin MD        rosuvastatin (CRESTOR) tablet 5 mg  5 mg Oral Daily Cj Robin MD        sodium chloride 0.9 % infusion  1 mL/kg/hr Intravenous Continuous Cj Robin MD 51.7 mL/hr at 07/01/25 1116 1 mL/kg/hr at 07/01/25 1116    spironolactone (ALDACTONE) tablet 25 mg  25 mg Oral Daily Cj Robin MD        tamsulosin (FLOMAX) 24 hr capsule 0.4 mg  0.4 mg Oral Daily Cj Robin MD        terazosin (HYTRIN) capsule 5 mg  5 mg Oral Nightly Cj Robin MD         Orders (all)        Start     Ordered    07/02/25 0900  isosorbide mononitrate (IMDUR) 24 hr tablet 60 mg  Daily         07/01/25 1040    07/02/25 0800  Wean & Extubate Per Rapid Wean and Extubation Protocol  Every Morning       07/01/25 1041    07/02/25 0600  Primary Femoral Sheath Discontinue 24 Hours Post-Op (If Patient Stable & ACT Less Than 170)  Once         07/01/25 1112    07/02/25 0600  CBC (No Diff)  Daily       07/01/25 1112    07/02/25 0600  Basic Metabolic Panel  Daily       07/01/25 1112    07/02/25 0600  ECG 12 Lead Other; Post-Op TAVR  Daily       07/01/25 1112    07/01/25 2100  busPIRone (BUSPAR) tablet 10 mg  2 Times Daily         07/01/25 1040    07/01/25 2100  terazosin (HYTRIN) capsule 5 mg  Nightly         07/01/25 1040    07/01/25 1700  POC Glucose 4x Daily Before Meals & at Bedtime  4 Times Daily Before Meals & at Bedtime      Comments: Complete no more than 45 minutes prior to patient eating      07/01/25 1112    07/01/25 1530  ceFAZolin 2000 mg IVPB in 100 mL NS (MBP)  Every 8 Hours         07/01/25 1112    07/01/25 1432  If [place]-Chasity Present: Cardiac Output Parameters Until 24 Hours Post Op  Every 2 Hours       07/01/25 1041    07/01/25 1332  Vital Signs Every Hour Until 24 Hours Post Op  Every Hour      Comments: Perform Vitals Less Frequently Only if Patient Stable      07/01/25 1041    07/01/25 1300   Ambulate Patient  3 Times Daily         07/01/25 1041    07/01/25 1215  enoxaparin sodium (LOVENOX) syringe 40 mg  Every 24 Hours         07/01/25 1123    07/01/25 1200  sodium chloride 0.9 % infusion  Continuous         07/01/25 1112    07/01/25 1130  ipratropium-albuterol (DUO-NEB) nebulizer solution 1.5 mL  Every 4 Hours - RT         07/01/25 1040    07/01/25 1113  Intake & Output  Every Shift       07/01/25 1112    07/01/25 1113  Continuous Cardiac Monitoring  Continuous        Comments: Follow Standing Orders As Outlined in Process Instructions (Open Order Report to View Full Instructions)    07/01/25 1112    07/01/25 1113  Maintain IV Access  Continuous         07/01/25 1112    07/01/25 1113  Telemetry - Place Orders & Notify Provider of Results When Patient Experiences Acute Chest Pain, Dysrhythmia or Respiratory Distress  Continuous        Comments: Open Order Report to View Parameters Requiring Provider Notification    07/01/25 1112    07/01/25 1113  May Be Off Telemetry for Tests  Continuous         07/01/25 1112    07/01/25 1113  Secondary Femoral Sheath - If Patient is Stable, Discontinue When ACT Less Than 170  Once         07/01/25 1112    07/01/25 1113  Notify Provider Post-TAVR  Continuous        Comments: Open Order Report to View Parameters Requiring Provider Notification    07/01/25 1112    07/01/25 1113  Advance Diet As Tolerated -  Until Discontinued         07/01/25 1112    07/01/25 1113  NPO Diet NPO Type: Strict NPO  Diet Effective Now         07/01/25 1112    07/01/25 1112  nitroglycerin (NITROSTAT) SL tablet 0.4 mg  Every 5 Minutes PRN         07/01/25 1112    07/01/25 1112  Potassium Replacement - Follow Nurse / BPA Driven Protocol  As Needed         07/01/25 1112    07/01/25 1112  Magnesium Cardiology Dose Replacement - Follow Nurse / BPA Driven Protocol  As Needed         07/01/25 1112    07/01/25 1112  Phosphorus Replacement - Follow Nurse / BPA Driven Protocol  As Needed          07/01/25 1112    07/01/25 1112  Calcium Replacement - Follow Nurse / BPA Driven Protocol  As Needed         07/01/25 1112    07/01/25 1112  Pharmacy to Dose enoxaparin (LOVENOX)  Continuous PRN         07/01/25 1112    07/01/25 1100  Check Peripheral Pulses Every 1 Hour x4  Every Hour       07/01/25 1041    07/01/25 1100  Incentive Spirometry  Every Hour While Awake       07/01/25 1041    07/01/25 1042  Oxygen Therapy- Nasal Cannula; Titrate 1-6 LPM Per SpO2; 90 - 95%  Continuous         07/01/25 1041    07/01/25 1042  Continuous Pulse Oximetry  Continuous,   Status:  Canceled         07/01/25 1041    07/01/25 1042  amLODIPine (NORVASC) tablet 10 mg  Daily         07/01/25 1040    07/01/25 1042  aspirin EC tablet 81 mg  Daily         07/01/25 1040    07/01/25 1042  budesonide-formoterol (SYMBICORT) 160-4.5 MCG/ACT inhaler 2 puff  2 Times Daily - RT         07/01/25 1040    07/01/25 1042  citalopram (CeleXA) tablet 20 mg  Daily         07/01/25 1040    07/01/25 1042  clopidogrel (PLAVIX) tablet 75 mg  Daily         07/01/25 1040    07/01/25 1042  empagliflozin (JARDIANCE) tablet 10 mg  Daily         07/01/25 1040    07/01/25 1042  metoprolol succinate XL (TOPROL-XL) 24 hr tablet 25 mg  Every 24 Hours Scheduled         07/01/25 1040    07/01/25 1042  multivitamin with minerals 1 tablet  Daily         07/01/25 1040    07/01/25 1042  rosuvastatin (CRESTOR) tablet 5 mg  Daily         07/01/25 1040    07/01/25 1042  spironolactone (ALDACTONE) tablet 25 mg  Daily         07/01/25 1040    07/01/25 1042  tamsulosin (FLOMAX) 24 hr capsule 0.4 mg  Daily         07/01/25 1040    07/01/25 1042  Vital Signs & Post-Op Checks Every 15 Minutes x2, Every 30 Minutes x4  Per Order Details        Comments: Perform Vitals Less Frequently Only If Patient Stable    07/01/25 1041    07/01/25 1042  Encourage Fluids  Until Discontinued         07/01/25 1041    07/01/25 1042  If [place]-Chasity Present, Cardiac Output Monitoring Protocol: Cardiac  Output Parameters On Admission, Then Every 1 Hour x4  Every Hour       07/01/25 1041    07/01/25 1042  Continuous Pulse Oximetry  Continuous         07/01/25 1041    07/01/25 1042  Insert Nasogastric Tube If Indicated & Not Already in Place  Per Order Details        Comments: Indications: Nausea, Vomiting, Prolonged Intubation or to Administer Medications  Attach to Low Wall Suction If Any Residual    07/01/25 1041    07/01/25 1042  Discontinue NG After Extubation  Once         07/01/25 1041    07/01/25 1042  Begin Rewarming with Thermal Unit As Needed For Temp Less Than 96F  Once         07/01/25 1041    07/01/25 1042  Turn Patient Every 2 Hours or Begin Bed Rotation Once Hemodynamically Stable  Now Then Every 2 Hours         07/01/25 1041    07/01/25 1042  Advance PROM to AROM  Now Then Every 4 Hours         07/01/25 1041    07/01/25 1042  Activity as Tolerated  Until Discontinued         07/01/25 1041    07/01/25 1042  Initial Ventilator Settings Per Pulmonologist / Anesthesiologist  Once         07/01/25 1041    07/01/25 1042  XR Chest 1 View  1 Time Imaging         07/01/25 1041    07/01/25 1042  ECG 12 Lead Other; Post Op Cardiac Surgery  Once         07/01/25 1041    07/01/25 1042  Protime-INR  STAT         07/01/25 1041    07/01/25 1042  aPTT  STAT         07/01/25 1041    07/01/25 1042  CBC (No Diff)  Once         07/01/25 1041    07/01/25 1042  Basic Metabolic Panel  Once         07/01/25 1041    07/01/25 1042  Magnesium  Once         07/01/25 1041    07/01/25 1035  POC Glucose STAT  STAT        Comments: Post op Glucose Check on All Diabetic Patients, Notify Anesthesia if Blood Sugar is Less Than 80 mg/dL or Greater Than 250mg/dL      07/01/25 1034    07/01/25 1035  Vital signs every 5 minutes for 15 minutes, every 15 minutes thereafter.  Once         07/01/25 1034    07/01/25 1035  Call Anesthesiologist for additional IV Fluid bolus for Hypotension/Tachycardia  Continuous         07/01/25 1034     07/01/25 1035  Notify Anesthesia of Any Acute Changes in Patient Condition  Until Discontinued         07/01/25 1034 07/01/25 1035  Notify Anesthesia for Unrelieved Pain  Until Discontinued         07/01/25 1034 07/01/25 1035  Once DC criteria to floor met, follow surgeon's orders.  Until Discontinued         07/01/25 1034 07/01/25 1035  Discharge patient from PACU when discharge criteria is met.  Until Discontinued         07/01/25 1034 07/01/25 1035  Inpatient Admission  Once         07/01/25 1034 07/01/25 1034  Apply warming blanket  As Needed,   Status:  Canceled      Comments: For a recorded temp of <36.9 C    07/01/25 1034 07/01/25 1034  ibuprofen (ADVIL,MOTRIN) tablet 600 mg  Every 6 Hours PRN,   Status:  Discontinued         07/01/25 1034 07/01/25 1034  oxyCODONE-acetaminophen (PERCOCET)  MG per tablet 1 tablet  Every 4 Hours PRN,   Status:  Discontinued         07/01/25 1034 07/01/25 1034  HYDROmorphone (DILAUDID) injection 0.5 mg  Every 10 Minutes PRN,   Status:  Discontinued         07/01/25 1034 07/01/25 1034  fentaNYL citrate (PF) (SUBLIMAZE) injection 50 mcg  Every 10 Minutes PRN,   Status:  Discontinued         07/01/25 1034 07/01/25 1034  naloxone (NARCAN) injection 0.04 mg  As Needed,   Status:  Discontinued         07/01/25 1034 07/01/25 1034  flumazenil (ROMAZICON) injection 0.2 mg  As Needed,   Status:  Discontinued         07/01/25 1034 07/01/25 1034  ondansetron (ZOFRAN) injection 4 mg  Every 15 Minutes PRN,   Status:  Discontinued         07/01/25 1034 07/01/25 1034  labetalol (NORMODYNE,TRANDATE) injection 5 mg  Every 5 Minutes PRN,   Status:  Discontinued         07/01/25 1034 07/01/25 1034  atropine sulfate (0.1 mg/mL) Intravenous 0.5 mg / 5 mL  Once As Needed,   Status:  Discontinued         07/01/25 1034 07/01/25 1032  Inpatient Admission  Once         07/01/25 1040    07/01/25 1029  HYDROcodone-acetaminophen (NORCO) 7.5-325 MG per tablet  1 tablet  Every 6 Hours PRN         07/01/25 1040    07/01/25 1016  iopamidol (ISOVUE-370) 76 % injection  Code / Trauma / Sedation Medication,   Status:  Discontinued         07/01/25 1016    07/01/25 0912  thrombin topical  Code / Trauma / Sedation Medication,   Status:  Discontinued         07/01/25 0913    07/01/25 0900  sodium chloride 0.9 % flush 10 mL  Every 12 Hours Scheduled,   Status:  Discontinued         07/01/25 0539    07/01/25 0900  sodium chloride 0.9 % flush 10 mL  Every 12 Hours Scheduled,   Status:  Discontinued         07/01/25 0636    07/01/25 0822  verapamil (ISOPTIN) injection  Code / Trauma / Sedation Medication,   Status:  Discontinued         07/01/25 0822 07/01/25 0821  heparin (porcine) injection  Code / Trauma / Sedation Medication,   Status:  Discontinued         07/01/25 0822 07/01/25 0821  nitroglycerin 200 mcg/ml (TRIDIL) syringe solution  Code / Trauma / Sedation Medication,   Status:  Discontinued         07/01/25 0821 07/01/25 0637  Oxygen Therapy- Nasal Cannula; Titrate 1-6 LPM Per SpO2; 90 - 95%  Continuous,   Status:  Canceled         07/01/25 0636 07/01/25 0637  Continuous Pulse Oximetry  Continuous,   Status:  Canceled         07/01/25 0636    07/01/25 0637  Insert Peripheral IV  Once         07/01/25 0636 07/01/25 0637  Saline Lock & Maintain IV Access  Continuous,   Status:  Canceled         07/01/25 0636    07/01/25 0636  Vital Signs - Per Anesthesia Protocol  As Needed,   Status:  Canceled       07/01/25 0636    07/01/25 0636  sodium chloride 0.9 % flush 10 mL  As Needed,   Status:  Discontinued         07/01/25 0636    07/01/25 0636  fentaNYL citrate (PF) (SUBLIMAZE) injection 25 mcg  Every 5 Minutes PRN,   Status:  Discontinued         07/01/25 0636    07/01/25 0636  Midazolam HCl (PF) (VERSED) injection 1 mg  Every 10 Minutes PRN,   Status:  Discontinued         07/01/25 0636    07/01/25 0636  Midazolam HCl (PF) (VERSED) injection 0.5 mg  Every 10  Minutes PRN,   Status:  Discontinued         07/01/25 0636    07/01/25 0636  dextrose (D50W) (25 g/50 mL) IV injection 12.5 g  As Needed,   Status:  Discontinued         07/01/25 0636    07/01/25 0633  Intra-Op TAVR Transesophageal Echo  Once         07/01/25 0633    07/01/25 0542  lactated ringers infusion 1,000 mL  Continuous         07/01/25 0540    07/01/25 0542  lactated ringers infusion 1,000 mL  Continuous         07/01/25 0540    07/01/25 0541  ceFAZolin 2000 mg IVPB in 100 mL NS (MBP)  Once         07/01/25 0539    07/01/25 0541  aspirin chewable tablet 324 mg  Once         07/01/25 0539    07/01/25 0540  Follow Anesthesia Guidelines / Protocol  Once         07/01/25 0539    07/01/25 0540  Verify NPO Status  Continuous         07/01/25 0539    07/01/25 0540  Chlorhexidine Shower / Bath After Skin Prep  Once        Comments: Chlorhexidine Skin Prep and Instructions For All Patients Having A Procedure Requiring an Outward Incision if Not Allergic.  If Allergic, Give Antibacterial Skin Wipes and Instructions.  Do Not Use For Facial Cases or on Any Mucus Membranes.    07/01/25 0539    07/01/25 0540  Clip Hair Chin to Knees  Once         07/01/25 0539    07/01/25 0540  Prepare RBC, 2 Units  Blood - Once         07/01/25 0539    07/01/25 0540  Insert Peripheral IV x2  Once         07/01/25 0539    07/01/25 0540  Saline Lock & Maintain IV Access  Continuous,   Status:  Canceled         07/01/25 0539    07/01/25 0540  Please Insert a Second Peripheral IV If Indicated For Procedure (All DaVinci Cases, Gastric Bypass, Sleeve Surgery, AAA, Any Vascular Bypass, Carotid, Sigmoidectomy, Colectomy (Lap Assisted), Colon Resection, Nissen, Exploratory Laparotomy, Spinal...  Once        Comments: Please Insert a Second Peripheral IV If Indicated For Procedure (All DaVinci Cases, Gastric Bypass, Sleeve Surgery, AAA, Any Vascular Bypass, Carotid, Sigmoidectomy, Colectomy (Lap Assisted), Colon Resection, Nissen, Exploratory  Laparotomy, Spinal Fusion, Craniotomy, Thoracotomy, CABG, TAVR, Valve Surgery or Mediastinoscopy, Femoral Endarterectomy, Carotid Endarterectomy, Anterior Lumbar Exposure, or all Aneurysm Repairs    07/01/25 0540 07/01/25 0540  Insert Peripheral IV  Once         07/01/25 0540 07/01/25 0540  Maintain IV Access  Continuous,   Status:  Canceled         07/01/25 0540    07/01/25 0540  Insert Peripheral IV  Once         07/01/25 0540 07/01/25 0540  Maintain IV Access  Continuous,   Status:  Canceled         07/01/25 0540 07/01/25 0539  sodium chloride 0.9 % flush 10 mL  As Needed,   Status:  Discontinued         07/01/25 0540 07/01/25 0539  lidocaine PF 1% (XYLOCAINE) injection 0.5 mL  Once As Needed,   Status:  Discontinued         07/01/25 0540 07/01/25 0539  sodium chloride 0.9 % flush 3 mL  As Needed,   Status:  Discontinued         07/01/25 0540 07/01/25 0539  nitroglycerin (NITROSTAT) SL tablet 0.4 mg  Every 5 Minutes PRN,   Status:  Discontinued         07/01/25 0539 07/01/25 0539  sodium chloride 0.9 % flush 10 mL  As Needed,   Status:  Discontinued         07/01/25 0539 07/01/25 0539  sodium chloride 0.9 % infusion 40 mL  As Needed,   Status:  Discontinued         07/01/25 0539    06/20/25 1411  Cardiac Catheterization/Vascular Study  Once         06/20/25 1410    Unscheduled  Check Peripheral Pulses As Needed  As Needed       07/01/25 1112    Unscheduled  Check Distal Extemity for Warmth, Color, Sensation, and Pulses With Each Vital Sign and Site Check  As Needed       07/01/25 1041    Unscheduled  If [place]asity Present: Cardiac Output Parameters PRN Until 24 Hours Post-Op  As Needed       07/01/25 1041    Unscheduled  Once Sheaths Out and Hemostasis Achieved For Six Hours, Patient Can Be Out of Bed to Chair  As Needed       07/01/25 1041    Unscheduled  Oxygen Therapy- Nasal Cannula; Titrate 1-6 LPM Per SpO2; 90 - 95%  Continuous PRN       07/01/25 1041                   Ventilator/Non-Invasive Ventilation Settings (From admission, onward)      None             Operative/Procedure Notes (all)        Cj Robin MD at 07/01/25 0753  Version 1 of 1         TRANSAPICAL TRANSCATHETER AORTIC VALVE REPLACEMENT  Progress Note    Sharad COTY ZimmermanRyder  7/1/2025    Pre-op Diagnosis:   Severe aortic stenosis [I35.0]       Post-Op Diagnosis Codes:     * Severe aortic stenosis [I35.0]    Procedure(s):      Procedure(s):  TRANSAPICAL TRANSCATHETER AORTIC VALVE REPLACEMENT  20 mm S3 ultra w Resilia         Surgeon(s):  Cj Robin MD Faulkner, Michael Wade, MD Ward, MD Kd Nguyen Martin G, MD      Anesthesia: General    Staff:   Circulator: Nils Burk RN  Scrub Person: Isaias Mo  Documenter: Saurabh Prieto RN  Other: Odette Ball       Estimated Blood Loss: minimal    Urine Voided: * No values recorded between 7/1/2025  7:18 AM and 7/1/2025  9:59 AM *    Specimens:                None      Drains:   Chest Tube 1 Left mediastinal (Active)       Chest Tube 2 Left pleural  (Active)       Urethral Catheter Latex 16 Fr. (Active)       Findings: see op note      Complications: none immediate           Cj Robin MD     Date: 7/1/2025  Time: 10:23 CDT          Electronically signed by Cj Robin MD at 07/01/25 9336

## 2025-07-01 NOTE — ANESTHESIA PROCEDURE NOTES
Airway  Reason: elective    Date/Time: 7/1/2025 7:29 AM  Airway not difficult    General Information and Staff    Patient location during procedure: OR  CRNA/CAA: Ruben Woods CRNA    Indications and Patient Condition  Indications for airway management: airway protection    Preoxygenated: yes    Mask difficulty assessment: 2 - vent by mask + OA or adjuvant +/- NMBA    Final Airway Details    Final airway type: endotracheal airway      Successful airway: ETT  Cuffed: yes   Successful intubation technique: direct laryngoscopy  Adjuncts used in placement: intubating stylet  Endotracheal tube insertion site: oral  Blade: Villatoro  Blade size: 2  ETT size (mm): 7.5  Cormack-Lehane Classification: grade I - full view of glottis  Placement verified by: chest auscultation and capnometry   Cuff volume (mL): 8  Measured from: teeth  ETT/EBT  to teeth (cm): 22  Number of attempts at approach: 1  Assessment: lips, teeth, and gum same as pre-op and atraumatic intubation    Additional Comments  Intubation by Soren ANDRADE

## 2025-07-01 NOTE — ANESTHESIA PREPROCEDURE EVALUATION
Anesthesia Evaluation     Patient summary reviewed   no history of anesthetic complications:   NPO Solid Status: > 8 hours             Airway   Mallampati: II  Dental      Pulmonary    (+) a smoker, COPD,  (-) asthma, sleep apnea  Cardiovascular     ECG reviewed    (+) hypertension, valvular problems/murmurs (s/p AVR with residual stenosis) AS, CAD, CABG (2018), angina, hyperlipidemia  (-) pacemaker, past MI, CHF, cardiac stents      Neuro/Psych  (+) TIA, CVA  (-) seizures  GI/Hepatic/Renal/Endo    (-) GERD, liver disease, no renal disease, diabetes    Musculoskeletal     Abdominal    Substance History      OB/GYN          Other                      Anesthesia Plan    ASA 4     general and [place]     intravenous induction     Anesthetic plan, risks, benefits, and alternatives have been provided, discussed and informed consent has been obtained with: patient.    Use of blood products discussed with patient  Consented to blood products.      CODE STATUS:

## 2025-07-01 NOTE — Clinical Note
Hemostasis started on the right femoral artery. Hemostasis achieved successfully. Closure device additional comment: Sheath removed. Unable to advance

## 2025-07-01 NOTE — Clinical Note
PATIENT BROUGHT TO OPERATIVE SUITE AND POSITIONED IN SUPINE POSITION ON OR TABLE. 5 LEAD EKG LEADS, BALLOON PUMP LEADS, AND COMBO PADS APPLIED PER PROTOCOL. ARMS TUCKED AT SIDE AND ALL CAROLINE PROMINENCES PADDED. AFTER INDUCTION OF ANESTHESIA, SHOULDER ROLL WAS PLACED TRANSVERSELY, GROUNDING PADS WERE APPLIED TO BILATERAL BUTTOCKS, AND PATIENT WAS SHAVED AND PREPPED FROM CHIN TO KNEES.

## 2025-07-01 NOTE — BRIEF OP NOTE
TRANSAPICAL TRANSCATHETER AORTIC VALVE REPLACEMENT  Progress Note    Sharad Ryder  7/1/2025    Pre-op Diagnosis:   Severe aortic stenosis [I35.0]       Post-Op Diagnosis Codes:     * Severe aortic stenosis [I35.0]    Procedure(s):      Procedure(s):  TRANSAPICAL TRANSCATHETER AORTIC VALVE REPLACEMENT  20 mm S3 ultra w Resilia         Surgeon(s):  Cj Robin MD Faulkner, Michael Wade, MD Ward, MD Kd Nguyen Martin G, MD      Anesthesia: General    Staff:   Circulator: Nils Burk RN  Scrub Person: Isaias Mo  Documenter: Saurabh Prieto RN  Other: Odette Ball       Estimated Blood Loss: minimal    Urine Voided: * No values recorded between 7/1/2025  7:18 AM and 7/1/2025  9:59 AM *    Specimens:                None      Drains:   Chest Tube 1 Left mediastinal (Active)       Chest Tube 2 Left pleural  (Active)       Urethral Catheter Latex 16 Fr. (Active)       Findings: see op note      Complications: none immediate           Cj Robin MD     Date: 7/1/2025  Time: 10:23 CDT

## 2025-07-01 NOTE — Clinical Note
Hemostasis started on the right femoral vein. Manual pressure applied to vessel. Manual pressure was held by gurdeep jp. Manual pressure was held for 10 min. Hemostasis achieved successfully.

## 2025-07-01 NOTE — ANESTHESIA POSTPROCEDURE EVALUATION
"Patient: Sharad Ryder    Procedure Summary       Date: 07/01/25 Room / Location:  [facility] OR  /  [facility]    Anesthesia Start: 0721 Anesthesia Stop: 1040    Procedures:       TRANSAPICAL TRANSCATHETER AORTIC VALVE REPLACEMENT (Left: Chest)      Transapical Transcatheter Aortic Valve Replacement (Left) Diagnosis:       Severe aortic stenosis      (Severe aortic stenosis [I35.0])    Surgeons: Cj Robin MD; Tyson Zarate MD Provider: Ruben Woods CRNA    Anesthesia Type: general, Carrie ASA Status: 4            Anesthesia Type: general, Burns    Vitals  Vitals Value Taken Time   /73 07/01/25 10:55   Temp 97 °F (36.1 °C) 07/01/25 10:33   Pulse 79 07/01/25 11:00   Resp     SpO2 94 % 07/01/25 11:00   Vitals shown include unfiled device data.        Post Anesthesia Care and Evaluation    PONV Status: none  Comments: Patient d/c from PACU prior to anes eval based on Nita score.  Please see RN notes for details of d/c criteria.    Blood pressure 151/69, pulse 79, temperature 97 °F (36.1 °C), temperature source Temporal, resp. rate 18, height 160 cm (63\"), weight 51.7 kg (114 lb), SpO2 94%.        "

## 2025-07-01 NOTE — Clinical Note
Post-Op Assessment Note    CV Status:  Stable  Pain Score: 0    Pain management: adequate     Mental Status:  Sleepy   Hydration Status:  Stable   PONV Controlled:  None   Airway Patency:  Patent and adequate   Post Op Vitals Reviewed: Yes      Staff: CRNA           BP   122/65   Temp     Pulse  69   Resp   16   SpO2   95 inserted over wire.

## 2025-07-02 ENCOUNTER — APPOINTMENT (OUTPATIENT)
Dept: CARDIOLOGY | Facility: HOSPITAL | Age: 65
End: 2025-07-02
Payer: MEDICARE

## 2025-07-02 ENCOUNTER — APPOINTMENT (OUTPATIENT)
Dept: GENERAL RADIOLOGY | Facility: HOSPITAL | Age: 65
End: 2025-07-02
Payer: MEDICARE

## 2025-07-02 LAB
ANION GAP SERPL CALCULATED.3IONS-SCNC: 12 MMOL/L (ref 5–15)
BUN SERPL-MCNC: 10.6 MG/DL (ref 8–23)
BUN/CREAT SERPL: 18.9 (ref 7–25)
CALCIUM SPEC-SCNC: 8.6 MG/DL (ref 8.6–10.5)
CHLORIDE SERPL-SCNC: 103 MMOL/L (ref 98–107)
CO2 SERPL-SCNC: 22 MMOL/L (ref 22–29)
CREAT SERPL-MCNC: 0.56 MG/DL (ref 0.76–1.27)
DEPRECATED RDW RBC AUTO: 41.1 FL (ref 37–54)
EGFRCR SERPLBLD CKD-EPI 2021: 109.4 ML/MIN/1.73
ERYTHROCYTE [DISTWIDTH] IN BLOOD BY AUTOMATED COUNT: 12.9 % (ref 12.3–15.4)
GLUCOSE BLDC GLUCOMTR-MCNC: 119 MG/DL (ref 70–130)
GLUCOSE BLDC GLUCOMTR-MCNC: 126 MG/DL (ref 70–130)
GLUCOSE BLDC GLUCOMTR-MCNC: 139 MG/DL (ref 70–130)
GLUCOSE SERPL-MCNC: 102 MG/DL (ref 65–99)
HCT VFR BLD AUTO: 31.4 % (ref 37.5–51)
HGB BLD-MCNC: 10.9 G/DL (ref 13–17.7)
MAGNESIUM SERPL-MCNC: 2.2 MG/DL (ref 1.6–2.4)
MCH RBC QN AUTO: 30.1 PG (ref 26.6–33)
MCHC RBC AUTO-ENTMCNC: 34.7 G/DL (ref 31.5–35.7)
MCV RBC AUTO: 86.7 FL (ref 79–97)
PLATELET # BLD AUTO: 211 10*3/MM3 (ref 140–450)
PMV BLD AUTO: 9.5 FL (ref 6–12)
POTASSIUM SERPL-SCNC: 3.7 MMOL/L (ref 3.5–5.2)
QT INTERVAL: 426 MS
QT INTERVAL: 496 MS
QTC INTERVAL: 475 MS
QTC INTERVAL: 553 MS
RBC # BLD AUTO: 3.62 10*6/MM3 (ref 4.14–5.8)
SODIUM SERPL-SCNC: 137 MMOL/L (ref 136–145)
WBC NRBC COR # BLD AUTO: 12.7 10*3/MM3 (ref 3.4–10.8)

## 2025-07-02 PROCEDURE — 94799 UNLISTED PULMONARY SVC/PX: CPT

## 2025-07-02 PROCEDURE — 94664 DEMO&/EVAL PT USE INHALER: CPT

## 2025-07-02 PROCEDURE — 71045 X-RAY EXAM CHEST 1 VIEW: CPT

## 2025-07-02 PROCEDURE — 25010000002 FENTANYL CITRATE (PF) 50 MCG/ML SOLUTION: Performed by: THORACIC SURGERY (CARDIOTHORACIC VASCULAR SURGERY)

## 2025-07-02 PROCEDURE — 85027 COMPLETE CBC AUTOMATED: CPT | Performed by: THORACIC SURGERY (CARDIOTHORACIC VASCULAR SURGERY)

## 2025-07-02 PROCEDURE — 94761 N-INVAS EAR/PLS OXIMETRY MLT: CPT

## 2025-07-02 PROCEDURE — 93308 TTE F-UP OR LMTD: CPT | Performed by: INTERNAL MEDICINE

## 2025-07-02 PROCEDURE — 82948 REAGENT STRIP/BLOOD GLUCOSE: CPT | Performed by: THORACIC SURGERY (CARDIOTHORACIC VASCULAR SURGERY)

## 2025-07-02 PROCEDURE — 93321 DOPPLER ECHO F-UP/LMTD STD: CPT

## 2025-07-02 PROCEDURE — 82948 REAGENT STRIP/BLOOD GLUCOSE: CPT

## 2025-07-02 PROCEDURE — 93325 DOPPLER ECHO COLOR FLOW MAPG: CPT

## 2025-07-02 PROCEDURE — 93321 DOPPLER ECHO F-UP/LMTD STD: CPT | Performed by: INTERNAL MEDICINE

## 2025-07-02 PROCEDURE — 25010000002 ENOXAPARIN PER 10 MG: Performed by: THORACIC SURGERY (CARDIOTHORACIC VASCULAR SURGERY)

## 2025-07-02 PROCEDURE — 83735 ASSAY OF MAGNESIUM: CPT | Performed by: THORACIC SURGERY (CARDIOTHORACIC VASCULAR SURGERY)

## 2025-07-02 PROCEDURE — 93010 ELECTROCARDIOGRAM REPORT: CPT | Performed by: HOSPITALIST

## 2025-07-02 PROCEDURE — 99232 SBSQ HOSP IP/OBS MODERATE 35: CPT | Performed by: INTERNAL MEDICINE

## 2025-07-02 PROCEDURE — 80048 BASIC METABOLIC PNL TOTAL CA: CPT | Performed by: THORACIC SURGERY (CARDIOTHORACIC VASCULAR SURGERY)

## 2025-07-02 PROCEDURE — 99231 SBSQ HOSP IP/OBS SF/LOW 25: CPT | Performed by: THORACIC SURGERY (CARDIOTHORACIC VASCULAR SURGERY)

## 2025-07-02 PROCEDURE — 93308 TTE F-UP OR LMTD: CPT

## 2025-07-02 PROCEDURE — 93325 DOPPLER ECHO COLOR FLOW MAPG: CPT | Performed by: INTERNAL MEDICINE

## 2025-07-02 PROCEDURE — 93005 ELECTROCARDIOGRAM TRACING: CPT | Performed by: THORACIC SURGERY (CARDIOTHORACIC VASCULAR SURGERY)

## 2025-07-02 RX ORDER — POTASSIUM CHLORIDE 1500 MG/1
20 TABLET, EXTENDED RELEASE ORAL 2 TIMES DAILY WITH MEALS
Status: DISCONTINUED | OUTPATIENT
Start: 2025-07-02 | End: 2025-07-05 | Stop reason: HOSPADM

## 2025-07-02 RX ADMIN — BUSPIRONE HYDROCHLORIDE 10 MG: 10 TABLET ORAL at 20:17

## 2025-07-02 RX ADMIN — PREGABALIN 25 MG: 25 CAPSULE ORAL at 20:17

## 2025-07-02 RX ADMIN — LIDOCAINE 2 PATCH: 4 PATCH TOPICAL at 09:11

## 2025-07-02 RX ADMIN — POTASSIUM CHLORIDE 20 MEQ: 1500 TABLET, EXTENDED RELEASE ORAL at 09:45

## 2025-07-02 RX ADMIN — TERAZOSIN HYDROCHLORIDE 5 MG: 5 CAPSULE ORAL at 20:17

## 2025-07-02 RX ADMIN — Medication 1 APPLICATION: at 20:17

## 2025-07-02 RX ADMIN — Medication 1 APPLICATION: at 09:09

## 2025-07-02 RX ADMIN — METOPROLOL SUCCINATE 25 MG: 25 TABLET, EXTENDED RELEASE ORAL at 09:09

## 2025-07-02 RX ADMIN — POTASSIUM CHLORIDE 40 MEQ: 1500 TABLET, EXTENDED RELEASE ORAL at 02:30

## 2025-07-02 RX ADMIN — ROSUVASTATIN CALCIUM 5 MG: 5 TABLET, FILM COATED ORAL at 09:09

## 2025-07-02 RX ADMIN — HYDROCODONE BITARTRATE AND ACETAMINOPHEN 1 TABLET: 10; 325 TABLET ORAL at 10:31

## 2025-07-02 RX ADMIN — ENOXAPARIN SODIUM 40 MG: 100 INJECTION SUBCUTANEOUS at 09:11

## 2025-07-02 RX ADMIN — CITALOPRAM 20 MG: 20 TABLET, FILM COATED ORAL at 09:11

## 2025-07-02 RX ADMIN — BUDESONIDE AND FORMOTEROL FUMARATE DIHYDRATE 2 PUFF: 160; 4.5 AEROSOL RESPIRATORY (INHALATION) at 06:00

## 2025-07-02 RX ADMIN — HYDROCODONE BITARTRATE AND ACETAMINOPHEN 1 TABLET: 10; 325 TABLET ORAL at 06:46

## 2025-07-02 RX ADMIN — POTASSIUM CHLORIDE 20 MEQ: 1500 TABLET, EXTENDED RELEASE ORAL at 17:49

## 2025-07-02 RX ADMIN — ISOSORBIDE MONONITRATE 60 MG: 60 TABLET, EXTENDED RELEASE ORAL at 09:45

## 2025-07-02 RX ADMIN — CLOPIDOGREL BISULFATE 75 MG: 75 TABLET, FILM COATED ORAL at 09:10

## 2025-07-02 RX ADMIN — HYDROCODONE BITARTRATE AND ACETAMINOPHEN 1 TABLET: 10; 325 TABLET ORAL at 02:30

## 2025-07-02 RX ADMIN — EMPAGLIFLOZIN 10 MG: 10 TABLET, FILM COATED ORAL at 09:10

## 2025-07-02 RX ADMIN — IPRATROPIUM BROMIDE AND ALBUTEROL SULFATE 1.5 ML: .5; 3 SOLUTION RESPIRATORY (INHALATION) at 06:00

## 2025-07-02 RX ADMIN — IPRATROPIUM BROMIDE AND ALBUTEROL SULFATE 1.5 ML: .5; 3 SOLUTION RESPIRATORY (INHALATION) at 14:00

## 2025-07-02 RX ADMIN — PREGABALIN 25 MG: 25 CAPSULE ORAL at 09:10

## 2025-07-02 RX ADMIN — FENTANYL CITRATE 50 MCG: 50 INJECTION, SOLUTION INTRAMUSCULAR; INTRAVENOUS at 11:14

## 2025-07-02 RX ADMIN — BUSPIRONE HYDROCHLORIDE 10 MG: 10 TABLET ORAL at 09:09

## 2025-07-02 RX ADMIN — IPRATROPIUM BROMIDE AND ALBUTEROL SULFATE 1.5 ML: .5; 3 SOLUTION RESPIRATORY (INHALATION) at 19:34

## 2025-07-02 RX ADMIN — BUDESONIDE AND FORMOTEROL FUMARATE DIHYDRATE 2 PUFF: 160; 4.5 AEROSOL RESPIRATORY (INHALATION) at 19:34

## 2025-07-02 RX ADMIN — HYDROCODONE BITARTRATE AND ACETAMINOPHEN 1 TABLET: 10; 325 TABLET ORAL at 20:17

## 2025-07-02 RX ADMIN — HYDROCODONE BITARTRATE AND ACETAMINOPHEN 1 TABLET: 10; 325 TABLET ORAL at 16:07

## 2025-07-02 RX ADMIN — FENTANYL CITRATE 50 MCG: 50 INJECTION, SOLUTION INTRAMUSCULAR; INTRAVENOUS at 05:32

## 2025-07-02 RX ADMIN — Medication 1 TABLET: at 09:09

## 2025-07-02 RX ADMIN — ASPIRIN 81 MG: 81 TABLET, COATED ORAL at 09:10

## 2025-07-02 NOTE — PLAN OF CARE
Goal Outcome Evaluation:  Plan of Care Reviewed With: patient        Progress: improving     A/Ox 4. walked 1 full lap before bed. Only needed NORCO 10mg throughout night. Up to chair this morning needing PRN fentanyl d/t increased pain. Replaced K. MST-132ml,  PL-59ml,  Urine-1375ml. F/C in place. NSR. Safety maintained. Call light in reach.        Problem: Adult Inpatient Plan of Care  Goal: Plan of Care Review  Outcome: Progressing  Flowsheets (Taken 7/2/2025 0608)  Progress: improving  Plan of Care Reviewed With: patient  Goal: Patient-Specific Goal (Individualized)  Outcome: Progressing  Goal: Absence of Hospital-Acquired Illness or Injury  Outcome: Progressing  Intervention: Identify and Manage Fall Risk  Recent Flowsheet Documentation  Taken 7/2/2025 0500 by Karo Abrams RN  Safety Promotion/Fall Prevention: safety round/check completed  Taken 7/2/2025 0400 by Karo Abrams RN  Safety Promotion/Fall Prevention: safety round/check completed  Taken 7/2/2025 0300 by Karo Abrams RN  Safety Promotion/Fall Prevention: safety round/check completed  Taken 7/2/2025 0200 by Karo Abrams RN  Safety Promotion/Fall Prevention: safety round/check completed  Taken 7/2/2025 0100 by Karo Abrams RN  Safety Promotion/Fall Prevention: safety round/check completed  Taken 7/2/2025 0000 by Karo Abrams RN  Safety Promotion/Fall Prevention: safety round/check completed  Taken 7/1/2025 2300 by Karo Abrams RN  Safety Promotion/Fall Prevention: safety round/check completed  Taken 7/1/2025 2200 by Karo Abrams RN  Safety Promotion/Fall Prevention: safety round/check completed  Taken 7/1/2025 2100 by Karo Abrams RN  Safety Promotion/Fall Prevention: safety round/check completed  Taken 7/1/2025 2000 by Karo Abrams RN  Safety Promotion/Fall Prevention: safety round/check completed  Taken 7/1/2025 1900 by Karo Abrams RN  Safety Promotion/Fall Prevention: safety  round/check completed  Intervention: Prevent Skin Injury  Recent Flowsheet Documentation  Taken 7/2/2025 0500 by Karo Abrams RN  Body Position:   supine   weight shifting  Taken 7/2/2025 0300 by Karo Abrams RN  Body Position:   position changed independently   side-lying   right  Taken 7/2/2025 0100 by Karo Abrams RN  Body Position:   position changed independently   supine  Taken 7/1/2025 2300 by Karo Abrams RN  Body Position:   position changed independently   side-lying   left  Taken 7/1/2025 2100 by Karo Abrams RN  Body Position:   position changed independently   side-lying   right  Taken 7/1/2025 2000 by Karo Abrams RN  Skin Protection:   incontinence pads utilized   silicone foam dressing in place   pulse oximeter probe site changed  Taken 7/1/2025 1900 by Karo Abrams RN  Body Position:   weight shifting   right  Intervention: Prevent and Manage VTE (Venous Thromboembolism) Risk  Recent Flowsheet Documentation  Taken 7/1/2025 2000 by Karo Abrams RN  VTE Prevention/Management:   bilateral   SCDs (sequential compression devices) on  Goal: Optimal Comfort and Wellbeing  Outcome: Progressing  Intervention: Monitor Pain and Promote Comfort  Recent Flowsheet Documentation  Taken 7/2/2025 0532 by Karo Abrams RN  Pain Management Interventions: pain medication given  Taken 7/2/2025 0500 by Karo Abrams RN  Pain Management Interventions:   position adjusted   pillow support provided  Taken 7/2/2025 0400 by Karo Abrams RN  Pain Management Interventions: position adjusted  Taken 7/2/2025 0230 by Karo Abrams RN  Pain Management Interventions: pain medication given  Taken 7/1/2025 2042 by Karo Abrams RN  Pain Management Interventions: pain medication given  Taken 7/1/2025 2000 by Karo Abrams RN  Pain Management Interventions:   position adjusted   pillow support provided  Intervention: Provide Person-Centered Care  Recent  Flowsheet Documentation  Taken 7/2/2025 0400 by Karo Abrams RN  Trust Relationship/Rapport:   care explained   choices provided   thoughts/feelings acknowledged  Taken 7/1/2025 2000 by Karo Abrams RN  Trust Relationship/Rapport:   care explained   choices provided   other (see comments)  Goal: Readiness for Transition of Care  Outcome: Progressing     Problem: Skin Injury Risk Increased  Goal: Skin Health and Integrity  Outcome: Progressing  Intervention: Optimize Skin Protection  Recent Flowsheet Documentation  Taken 7/2/2025 0500 by Karo Abrams RN  Head of Bed (HOB) Positioning: HOB at 60-90 degrees  Taken 7/2/2025 0400 by Karo Abrams RN  Activity Management: up in chair  Taken 7/2/2025 0300 by Karo Abrams RN  Head of Bed (HOB) Positioning: HOB at 20-30 degrees  Taken 7/2/2025 0100 by Karo Abrams RN  Head of Bed (HOB) Positioning: HOB at 20-30 degrees  Taken 7/1/2025 2300 by Karo Abrasm RN  Head of Bed (HOB) Positioning: HOB at 20-30 degrees  Taken 7/1/2025 2100 by Karo Abrams RN  Head of Bed (HOB) Positioning: HOB at 20-30 degrees  Taken 7/1/2025 2000 by Karo Abrams RN  Activity Management:   ambulated outside room   back to bed  Pressure Reduction Techniques: weight shift assistance provided  Pressure Reduction Devices: pressure-redistributing mattress utilized  Skin Protection:   incontinence pads utilized   silicone foam dressing in place   pulse oximeter probe site changed  Taken 7/1/2025 1900 by Karo Abrams RN  Activity Management: up in chair  Head of Bed (HOB) Positioning: HOB at 60-90 degrees     Problem: Comorbidity Management  Goal: Blood Pressure in Desired Range  Outcome: Progressing     Problem: Fall Injury Risk  Goal: Absence of Fall and Fall-Related Injury  Outcome: Progressing  Intervention: Promote Injury-Free Environment  Recent Flowsheet Documentation  Taken 7/2/2025 0500 by Karo Abrams RN  Safety Promotion/Fall  Prevention: safety round/check completed  Taken 7/2/2025 0400 by Karo Abrams RN  Safety Promotion/Fall Prevention: safety round/check completed  Taken 7/2/2025 0300 by Karo Abrams RN  Safety Promotion/Fall Prevention: safety round/check completed  Taken 7/2/2025 0200 by Karo Abarms RN  Safety Promotion/Fall Prevention: safety round/check completed  Taken 7/2/2025 0100 by Karo Abrams RN  Safety Promotion/Fall Prevention: safety round/check completed  Taken 7/2/2025 0000 by Karo Abrams RN  Safety Promotion/Fall Prevention: safety round/check completed  Taken 7/1/2025 2300 by aKro Abrams RN  Safety Promotion/Fall Prevention: safety round/check completed  Taken 7/1/2025 2200 by Karo Abrams RN  Safety Promotion/Fall Prevention: safety round/check completed  Taken 7/1/2025 2100 by Karo Abrams RN  Safety Promotion/Fall Prevention: safety round/check completed  Taken 7/1/2025 2000 by Karo Abrams RN  Safety Promotion/Fall Prevention: safety round/check completed  Taken 7/1/2025 1900 by Karo Abrams RN  Safety Promotion/Fall Prevention: safety round/check completed

## 2025-07-02 NOTE — PLAN OF CARE
Goal Outcome Evaluation:           Progress: no change  Outcome Evaluation: pt c/o unrelieved pain have given PRN Fentanyl and Norco as available. Was not able to ambulate this AM d/t pain level. Required assist x 2 & walker when did ambulate this PM and was not able to go far d/t fatigue, SOB & pain. PT/OT consult. Voiding per urinal. O2 per NC at 4L

## 2025-07-02 NOTE — PROGRESS NOTES
"Chief Complaint: Follow-up after TAVR      S: No acute events overnight.  The patient notes that his chest is sore and it is difficult for him to take a deep breath due to discomfort in the chest, otherwise breathing is stable.    Medications: Reviewed    Review of Systems: All pertinent negatives and positives as noted above.  Otherwise, all systems reviewed and found to be negative.    Telemetry: No high risk AV block overnight left bundle branch block noted    O:  /68   Pulse 91   Temp 98 °F (36.7 °C) (Oral)   Resp 23   Ht 160 cm (63\")   Wt 51.7 kg (114 lb)   SpO2 (!) 89%   BMI 20.19 kg/m²   Temp:  [97 °F (36.1 °C)-98.8 °F (37.1 °C)] 98 °F (36.7 °C)  Heart Rate:  [] 91  Resp:  [18-26] 23  BP: ()/() 101/68    General: Chronically ill in appearance but in no acute distress, sitting up in a chair at bedside  CV: Regular rate and rhythm with soft systolic murmur noted, chest tubes in place  Pulmonary: Decreased bilaterally but otherwise relatively clear to auscultation  GI: Soft, nontender, nondistended, active bowel sounds  Extremities: Warm and well-perfused with no significant peripheral edema, radial access site clean, dry, intact    Diagnostic Data:    Lab Results   Component Value Date    WBC 12.70 (H) 07/02/2025    HGB 10.9 (L) 07/02/2025    HCT 31.4 (L) 07/02/2025    MCV 86.7 07/02/2025     07/02/2025     Lab Results   Component Value Date    GLUCOSE 102 (H) 07/02/2025    CALCIUM 8.6 07/02/2025     07/02/2025    K 3.7 07/02/2025    CO2 22.0 07/02/2025     07/02/2025    BUN 10.6 07/02/2025    CREATININE 0.56 (L) 07/02/2025    EGFR 109.4 07/02/2025    BCR 18.9 07/02/2025    ANIONGAP 12.0 07/02/2025     ECG today shows sinus rhythm with an isolated PVC, left bundle branch block pattern, 93 bpm    ASSESSMENT/PLAN:    1.  Severe native aortic valve stenosis, now status post TAVR  2.  Left bundle branch block  3.  Coronary artery disease  4.  Peripheral arterial " disease  5.  Acute on chronic diastolic heart failure  6.  COPD  7.  Anemia without active bleeding    -Overall, the patient is doing well this morning.  Mediastinal chest tube will be left in place with the pleural chest tube being removed later today.  - Echocardiogram pending at this time.  - Continue dual antiplatelet therapy.  - Possible transfer to telemetry later today.

## 2025-07-02 NOTE — CASE MANAGEMENT/SOCIAL WORK
Discharge Planning Assessment  [facility]     Patient Name: Sharad Ryder  MRN: [id number]  Today's Date: 7/2/2025    Admit Date: 7/1/2025        Discharge Needs Assessment       Row Name 07/02/25 0956       Living Environment    People in Home spouse    Name(s) of People in Home Ekaterina    Current Living Arrangements home    Primary Care Provided by self    Provides Primary Care For no one    Family Caregiver if Needed spouse    Family Caregiver Names Ekaterina    Able to Return to Prior Arrangements yes       Resource/Environmental Concerns    Resource/Environmental Concerns none       Transition Planning    Patient/Family Anticipates Transition to home with family    Transportation Anticipated family or friend will provide       Discharge Needs Assessment    Readmission Within the Last 30 Days no previous admission in last 30 days    Equipment Currently Used at Home none    Concerns to be Addressed no discharge needs identified    Equipment Needed After Discharge none    Discharge Coordination/Progress spoke to patient who lives with spouse and has been independent at home prior to surgery; has RX coverage and PCP; will follow for DC needs                   Discharge Plan    No documentation.                 Continued Care and Services - Admitted Since 7/1/2025    No active coordination exists.          Demographic Summary    No documentation.                  Functional Status    No documentation.                  Psychosocial    No documentation.                  Abuse/Neglect    No documentation.                  Legal    No documentation.                  Substance Abuse    No documentation.                  Patient Forms    No documentation.                     Nely Jarvis RN

## 2025-07-02 NOTE — CASE MANAGEMENT/SOCIAL WORK
"Continued Stay Note  [facility]     Patient Name: Sharad Ryder  MRN: [id number]  Today's Date: 7/2/2025    Admit Date: 7/1/2025    Plan: Home   Discharge Plan       Row Name 07/02/25 1013       Plan    Plan Home    Plan Comments SS received the following consult:  \"Case Managmeent Consult for financial needs: Symbicort, and assistance with Jardiance or possibly switch to something more affordable.\"  Switching medication would be up to physicians.  Patient has prescription coverage and would not be eligible for patient assistance programs.    Final Discharge Disposition Code 01 - home or self-care                   Discharge Codes    No documentation.                       KARIE Houston    "

## 2025-07-02 NOTE — PROGRESS NOTES
"Transapical transcatheter aortic valve replacement with 20 mm S3 ultra with Resilia    POD 1    CC: shortness of breath.   Up in the chair. On 3LNC. Rates left sided chest pain 7-8/10. No bleeding issues overnight. He did walk a full lap around the unit overnight. No arrhythmias overnight. Spouse at bedside.     ROS: (+) left chest pain and shortness of breath, afebrile, Hemodynamically stable    IV gtts: None  Telemetry: Sinus 90s, PVCs    Visit Vitals  /68 (BP Location: Right arm, Patient Position: Sitting)   Pulse 91   Temp 98 °F (36.7 °C) (Oral)   Resp 23   Ht 160 cm (62.99\")   Wt 51.7 kg (114 lb)   SpO2 (!) 89%   BMI 20.20 kg/m²       Intake/Output Summary (Last 24 hours) at 7/2/2025 0921  Last data filed at 7/2/2025 0800  Gross per 24 hour   Intake 3033 ml   Output 2248 ml   Net 785 ml     Mediastinal chest tube: 210 mL / 24 hours  Left pleural chest tube: 59 mL / 24 hours  No airleak, serosanguineous fluid    Labs:      CXR:     HISTORY: AV stenosis, s/p TAVR; I35.0-Nonrheumatic aortic (valve)  stenosis       COMPARISON: Chest x-ray dated 7/25     FINDINGS:  Upright frontal radiograph of the chest was obtained     Postop TAVR with left-sided pleural drains remaining in place. Stable  mild atelectasis in the left base. No pneumothorax. No pleural effusion  is seen. Heart size is stable. Pulmonary vasculature are nondilated.      Impression:     1.  No significant interval change.  2.  Postop TAVR with left-sided pleural drains in place. No  pneumothorax.     This report was signed and finalized on 7/2/2025 7:38 AM by Dr Javy Romano.       Independent review: Left-sided mediastinal and chest tubes in place, no pneumothorax, no significant pleural effusion    Physical Exam:  General: No apparent distress.Up in the chair.   Cardiovascular: Regular rate and rhythm without murmur, rubs, or gallops.    Pulmonary: Clear to auscultation bilaterally without wheezing, rubs, or rales.  Chest: left sided thoracic " incisions clean, dry, intact.  Mediastinal and left pleural drain in place.  Abdomen: Soft, non distended and non tender.  Extremities: Warm, moves all extremities. No edema. Right groin site clean and dry. No bleeding. No hematoma.   Neurologic: Grossly intact with no focal deficits.       Impression:  Aortic valve stenosis status post aortic valve replacement in 2018 now status post TAVR  CAD status post CABG  Aortoiliac occlusive disease  Acute on chronic diastolic congestive heart failure  Peripheral arterial disease  History of stroke  COPD  Former smoker      Plan:  Keep mediastinal chest tube, will remove left pleural chest tube today  Discontinue Hinojosa catheter  Hold Norvasc and spironolactone this a.m. due to blood pressure  Replace potassium  Limited echo today   Continue multimodality pain control strategies  Chest x-ray in a.m.  Keep in ICU for now, will reassess for transfer to  later today  Discussed with patient, wife, and nursing

## 2025-07-03 ENCOUNTER — APPOINTMENT (OUTPATIENT)
Dept: GENERAL RADIOLOGY | Facility: HOSPITAL | Age: 65
End: 2025-07-03
Payer: MEDICARE

## 2025-07-03 LAB
ANION GAP SERPL CALCULATED.3IONS-SCNC: 12 MMOL/L (ref 5–15)
AORTIC DIMENSIONLESS INDEX: 0.58 (DI)
AV MEAN PRESS GRAD SYS DOP V1V2: 16.4 MMHG
AV VMAX SYS DOP: 262.2 CM/SEC
BH CV ECHO MEAS - AO MAX PG: 27.5 MMHG
BH CV ECHO MEAS - AO V2 VTI: 44.5 CM
BH CV ECHO MEAS - AVA(I,D): 1.26 CM2
BH CV ECHO MEAS - LV MAX PG: 6.3 MMHG
BH CV ECHO MEAS - LV MEAN PG: 3.5 MMHG
BH CV ECHO MEAS - LV V1 MAX: 125.3 CM/SEC
BH CV ECHO MEAS - LV V1 VTI: 25.7 CM
BH CV ECHO MEAS - LVOT AREA: 2.18 CM2
BH CV ECHO MEAS - LVOT DIAM: 1.67 CM
BH CV ECHO MEAS - SV(LVOT): 56 ML
BH CV ECHO MEAS - SVI(LVOT): 36.8 ML/M2
BUN SERPL-MCNC: 13.8 MG/DL (ref 8–23)
BUN/CREAT SERPL: 23.4 (ref 7–25)
CALCIUM SPEC-SCNC: 8.9 MG/DL (ref 8.6–10.5)
CHLORIDE SERPL-SCNC: 101 MMOL/L (ref 98–107)
CO2 SERPL-SCNC: 20 MMOL/L (ref 22–29)
CREAT SERPL-MCNC: 0.59 MG/DL (ref 0.76–1.27)
DEPRECATED RDW RBC AUTO: 42.2 FL (ref 37–54)
DEVICE COMMENT: NORMAL
EGFRCR SERPLBLD CKD-EPI 2021: 107.7 ML/MIN/1.73
ERYTHROCYTE [DISTWIDTH] IN BLOOD BY AUTOMATED COUNT: 12.8 % (ref 12.3–15.4)
GLUCOSE BLDC GLUCOMTR-MCNC: 107 MG/DL (ref 70–130)
GLUCOSE BLDC GLUCOMTR-MCNC: 110 MG/DL (ref 70–130)
GLUCOSE BLDC GLUCOMTR-MCNC: 121 MG/DL (ref 70–130)
GLUCOSE SERPL-MCNC: 115 MG/DL (ref 65–99)
HCT VFR BLD AUTO: 28.4 % (ref 37.5–51)
HGB BLD-MCNC: 9.4 G/DL (ref 13–17.7)
MCH RBC QN AUTO: 29.6 PG (ref 26.6–33)
MCHC RBC AUTO-ENTMCNC: 33.1 G/DL (ref 31.5–35.7)
MCV RBC AUTO: 89.3 FL (ref 79–97)
PLATELET # BLD AUTO: 203 10*3/MM3 (ref 140–450)
PMV BLD AUTO: 9.6 FL (ref 6–12)
POTASSIUM SERPL-SCNC: 4.3 MMOL/L (ref 3.5–5.2)
QT INTERVAL: 406 MS
QT INTERVAL: 420 MS
QT INTERVAL: 474 MS
QTC INTERVAL: 504 MS
QTC INTERVAL: 505 MS
QTC INTERVAL: 540 MS
RBC # BLD AUTO: 3.18 10*6/MM3 (ref 4.14–5.8)
SODIUM SERPL-SCNC: 133 MMOL/L (ref 136–145)
WBC NRBC COR # BLD AUTO: 13.98 10*3/MM3 (ref 3.4–10.8)

## 2025-07-03 PROCEDURE — 94799 UNLISTED PULMONARY SVC/PX: CPT

## 2025-07-03 PROCEDURE — 85027 COMPLETE CBC AUTOMATED: CPT | Performed by: THORACIC SURGERY (CARDIOTHORACIC VASCULAR SURGERY)

## 2025-07-03 PROCEDURE — 93010 ELECTROCARDIOGRAM REPORT: CPT | Performed by: HOSPITALIST

## 2025-07-03 PROCEDURE — 82948 REAGENT STRIP/BLOOD GLUCOSE: CPT

## 2025-07-03 PROCEDURE — 80048 BASIC METABOLIC PNL TOTAL CA: CPT | Performed by: THORACIC SURGERY (CARDIOTHORACIC VASCULAR SURGERY)

## 2025-07-03 PROCEDURE — 97161 PT EVAL LOW COMPLEX 20 MIN: CPT

## 2025-07-03 PROCEDURE — 71045 X-RAY EXAM CHEST 1 VIEW: CPT

## 2025-07-03 PROCEDURE — 99232 SBSQ HOSP IP/OBS MODERATE 35: CPT | Performed by: INTERNAL MEDICINE

## 2025-07-03 PROCEDURE — 93005 ELECTROCARDIOGRAM TRACING: CPT | Performed by: THORACIC SURGERY (CARDIOTHORACIC VASCULAR SURGERY)

## 2025-07-03 PROCEDURE — 99231 SBSQ HOSP IP/OBS SF/LOW 25: CPT | Performed by: THORACIC SURGERY (CARDIOTHORACIC VASCULAR SURGERY)

## 2025-07-03 PROCEDURE — 25010000002 ENOXAPARIN PER 10 MG: Performed by: THORACIC SURGERY (CARDIOTHORACIC VASCULAR SURGERY)

## 2025-07-03 PROCEDURE — 94664 DEMO&/EVAL PT USE INHALER: CPT

## 2025-07-03 PROCEDURE — 97166 OT EVAL MOD COMPLEX 45 MIN: CPT

## 2025-07-03 PROCEDURE — 94761 N-INVAS EAR/PLS OXIMETRY MLT: CPT

## 2025-07-03 PROCEDURE — 82948 REAGENT STRIP/BLOOD GLUCOSE: CPT | Performed by: THORACIC SURGERY (CARDIOTHORACIC VASCULAR SURGERY)

## 2025-07-03 RX ORDER — POLYETHYLENE GLYCOL 3350 17 G/17G
17 POWDER, FOR SOLUTION ORAL DAILY
Status: DISCONTINUED | OUTPATIENT
Start: 2025-07-03 | End: 2025-07-05 | Stop reason: HOSPADM

## 2025-07-03 RX ORDER — HYDROCODONE BITARTRATE AND ACETAMINOPHEN 5; 325 MG/1; MG/1
1 TABLET ORAL EVERY 4 HOURS PRN
Refills: 0 | Status: DISCONTINUED | OUTPATIENT
Start: 2025-07-03 | End: 2025-07-05 | Stop reason: HOSPADM

## 2025-07-03 RX ORDER — HYDROCODONE BITARTRATE AND ACETAMINOPHEN 10; 325 MG/1; MG/1
1 TABLET ORAL EVERY 6 HOURS PRN
Refills: 0 | Status: DISCONTINUED | OUTPATIENT
Start: 2025-07-03 | End: 2025-07-05 | Stop reason: HOSPADM

## 2025-07-03 RX ADMIN — ISOSORBIDE MONONITRATE 60 MG: 60 TABLET, EXTENDED RELEASE ORAL at 08:30

## 2025-07-03 RX ADMIN — Medication 1 TABLET: at 08:30

## 2025-07-03 RX ADMIN — EMPAGLIFLOZIN 10 MG: 10 TABLET, FILM COATED ORAL at 08:29

## 2025-07-03 RX ADMIN — CITALOPRAM 20 MG: 20 TABLET, FILM COATED ORAL at 08:30

## 2025-07-03 RX ADMIN — HYDROCODONE BITARTRATE AND ACETAMINOPHEN 1 TABLET: 10; 325 TABLET ORAL at 22:02

## 2025-07-03 RX ADMIN — ASPIRIN 81 MG: 81 TABLET, COATED ORAL at 08:30

## 2025-07-03 RX ADMIN — HYDROCODONE BITARTRATE AND ACETAMINOPHEN 1 TABLET: 10; 325 TABLET ORAL at 15:34

## 2025-07-03 RX ADMIN — HYDROCODONE BITARTRATE AND ACETAMINOPHEN 1 TABLET: 10; 325 TABLET ORAL at 00:59

## 2025-07-03 RX ADMIN — POTASSIUM CHLORIDE 20 MEQ: 1500 TABLET, EXTENDED RELEASE ORAL at 08:29

## 2025-07-03 RX ADMIN — IPRATROPIUM BROMIDE AND ALBUTEROL SULFATE 1.5 ML: .5; 3 SOLUTION RESPIRATORY (INHALATION) at 06:11

## 2025-07-03 RX ADMIN — BUSPIRONE HYDROCHLORIDE 10 MG: 10 TABLET ORAL at 20:18

## 2025-07-03 RX ADMIN — IPRATROPIUM BROMIDE AND ALBUTEROL SULFATE 1.5 ML: .5; 3 SOLUTION RESPIRATORY (INHALATION) at 14:14

## 2025-07-03 RX ADMIN — HYDROCODONE BITARTRATE AND ACETAMINOPHEN 1 TABLET: 10; 325 TABLET ORAL at 09:26

## 2025-07-03 RX ADMIN — Medication 1 APPLICATION: at 20:18

## 2025-07-03 RX ADMIN — ROSUVASTATIN CALCIUM 5 MG: 5 TABLET, FILM COATED ORAL at 08:30

## 2025-07-03 RX ADMIN — IPRATROPIUM BROMIDE AND ALBUTEROL SULFATE 1.5 ML: .5; 3 SOLUTION RESPIRATORY (INHALATION) at 19:34

## 2025-07-03 RX ADMIN — POLYETHYLENE GLYCOL 3350 17 G: 17 POWDER, FOR SOLUTION ORAL at 09:26

## 2025-07-03 RX ADMIN — ENOXAPARIN SODIUM 40 MG: 100 INJECTION SUBCUTANEOUS at 08:31

## 2025-07-03 RX ADMIN — IPRATROPIUM BROMIDE AND ALBUTEROL SULFATE 1.5 ML: .5; 3 SOLUTION RESPIRATORY (INHALATION) at 10:04

## 2025-07-03 RX ADMIN — PREGABALIN 25 MG: 25 CAPSULE ORAL at 20:18

## 2025-07-03 RX ADMIN — METOPROLOL SUCCINATE 25 MG: 25 TABLET, EXTENDED RELEASE ORAL at 08:30

## 2025-07-03 RX ADMIN — BUSPIRONE HYDROCHLORIDE 10 MG: 10 TABLET ORAL at 08:30

## 2025-07-03 RX ADMIN — TERAZOSIN HYDROCHLORIDE 5 MG: 5 CAPSULE ORAL at 20:18

## 2025-07-03 RX ADMIN — PREGABALIN 25 MG: 25 CAPSULE ORAL at 08:29

## 2025-07-03 RX ADMIN — BUDESONIDE AND FORMOTEROL FUMARATE DIHYDRATE 2 PUFF: 160; 4.5 AEROSOL RESPIRATORY (INHALATION) at 06:45

## 2025-07-03 RX ADMIN — POTASSIUM CHLORIDE 20 MEQ: 1500 TABLET, EXTENDED RELEASE ORAL at 17:28

## 2025-07-03 RX ADMIN — CLOPIDOGREL BISULFATE 75 MG: 75 TABLET, FILM COATED ORAL at 08:30

## 2025-07-03 RX ADMIN — TAMSULOSIN HYDROCHLORIDE 0.4 MG: 0.4 CAPSULE ORAL at 08:30

## 2025-07-03 RX ADMIN — LIDOCAINE 2 PATCH: 4 PATCH TOPICAL at 08:31

## 2025-07-03 RX ADMIN — BUDESONIDE AND FORMOTEROL FUMARATE DIHYDRATE 2 PUFF: 160; 4.5 AEROSOL RESPIRATORY (INHALATION) at 19:33

## 2025-07-03 RX ADMIN — HYDROCODONE BITARTRATE AND ACETAMINOPHEN 1 TABLET: 10; 325 TABLET ORAL at 05:29

## 2025-07-03 NOTE — PROGRESS NOTES
RT EQUIPMENT DEVICE RELATED - SKIN ASSESSMENT    Parish Score:  Parish Score: 20     RT Medical Equipment/Device:     High Flow Nasal Cannula:   Vapotherm    Skin Assessment:      Cheek:  Intact  Ears:  Intact    Device Skin Pressure Protection:  Skin-to-device areas padded:  None Required    Nurse Notification:  Yasmine Hamilton, RRT

## 2025-07-03 NOTE — PROGRESS NOTES
"Transapical transcatheter aortic valve replacement with 20 mm S3 ultra with Resilia    POD 2    CC: shortness of breath.     Up in the chair. Increased oxygen requirements overnight.  Placed on Vapotherm at 30 L, 55% with SpO2 95%.  He was given incentive spirometer by nursing staff this morning and is able to reach up to 1000 to 1500 mL with prompting.  Limited echo performed yesterday is pending  Spouse at bedside.     ROS: (+) left chest pain and shortness of breath, afebrile, Hemodynamically stable    IV gtts: None  Telemetry: Sinus 90s, PVCs    Visit Vitals  BP 95/71   Pulse 112   Temp 98.6 °F (37 °C) (Axillary)   Resp 20   Ht 160 cm (63\")   Wt 52.1 kg (114 lb 12.8 oz)   SpO2 92%   BMI 20.34 kg/m²       Intake/Output Summary (Last 24 hours) at 7/3/2025 1015  Last data filed at 7/3/2025 0900  Gross per 24 hour   Intake 360 ml   Output 2390 ml   Net -2030 ml     Mediastinal chest tube: 150 mL / 24 hours  No airleak, serosanguineous fluid    Labs:  Basic Metabolic Panel    Sodium Sodium   Date Value Ref Range Status   07/03/2025 133 (L) 136 - 145 mmol/L Final   07/02/2025 137 136 - 145 mmol/L Final   07/01/2025 137 136 - 145 mmol/L Final      Potassium Potassium   Date Value Ref Range Status   07/03/2025 4.3 3.5 - 5.2 mmol/L Final   07/02/2025 3.7 3.5 - 5.2 mmol/L Final   07/01/2025 3.5 3.5 - 5.2 mmol/L Final   07/01/2025 3.6 3.5 - 5.2 mmol/L Final      Chloride Chloride   Date Value Ref Range Status   07/03/2025 101 98 - 107 mmol/L Final   07/02/2025 103 98 - 107 mmol/L Final   07/01/2025 104 98 - 107 mmol/L Final      Bicarbonate No results found for: \"PLASMABICARB\"   BUN BUN   Date Value Ref Range Status   07/03/2025 13.8 8.0 - 23.0 mg/dL Final   07/02/2025 10.6 8.0 - 23.0 mg/dL Final   07/01/2025 14.3 8.0 - 23.0 mg/dL Final      Creatinine Creatinine   Date Value Ref Range Status   07/03/2025 0.59 (L) 0.76 - 1.27 mg/dL Final   07/02/2025 0.56 (L) 0.76 - 1.27 mg/dL Final   07/01/2025 0.80 0.76 - 1.27 mg/dL " "Final      Calcium Calcium   Date Value Ref Range Status   07/03/2025 8.9 8.6 - 10.5 mg/dL Final   07/02/2025 8.6 8.6 - 10.5 mg/dL Final   07/01/2025 8.5 (L) 8.6 - 10.5 mg/dL Final      Glucose      No components found for: \"GLUCOSE.*\"     WBC   Date Value Ref Range Status   07/03/2025 13.98 (H) 3.40 - 10.80 10*3/mm3 Final     RBC   Date Value Ref Range Status   07/03/2025 3.18 (L) 4.14 - 5.80 10*6/mm3 Final     Hemoglobin   Date Value Ref Range Status   07/03/2025 9.4 (L) 13.0 - 17.7 g/dL Final     Hematocrit   Date Value Ref Range Status   07/03/2025 28.4 (L) 37.5 - 51.0 % Final     MCV   Date Value Ref Range Status   07/03/2025 89.3 79.0 - 97.0 fL Final     MCH   Date Value Ref Range Status   07/03/2025 29.6 26.6 - 33.0 pg Final     MCHC   Date Value Ref Range Status   07/03/2025 33.1 31.5 - 35.7 g/dL Final     RDW   Date Value Ref Range Status   07/03/2025 12.8 12.3 - 15.4 % Final     RDW-SD   Date Value Ref Range Status   07/03/2025 42.2 37.0 - 54.0 fl Final     MPV   Date Value Ref Range Status   07/03/2025 9.6 6.0 - 12.0 fL Final     Platelets   Date Value Ref Range Status   07/03/2025 203 140 - 450 10*3/mm3 Final     CXR:  Study Result    Narrative & Impression   EXAM: XR CHEST 1 VW-      DATE: 7/3/2025 2:03 AM     HISTORY: Transapical TAVR; I35.0-Nonrheumatic aortic (valve) stenosis       COMPARISON: 7/2/2025, 7/1/2025.     TECHNIQUE:  Single view. Frontal view of the chest. 1 images.       FINDINGS:    Possible persistent trace LEFT pneumothorax. No RIGHT pneumothorax. No  large pleural effusion. Patchy bibasilar and interstitial opacities.     Calcified aortic atherosclerosis. Prominent cardiac silhouette.  Sternotomy wires, ostial markers, LEFT atrial appendage clip type  occlusion device, changes of TAVR.     Similar chronic fragmented appearance of the RIGHT distal clavicle. No  acute bony finding.        IMPRESSION:  1. Possible persistent trace LEFT pneumothorax.  2. Mildly increased patchy bibasilar " opacities and mild interstitial  opacities.     This report was signed and finalized on 7/3/2025 7:53 AM by Dr Meredith Schneider MD     Limited echo post TAVR pending    Physical Exam:  General: No apparent distress.Up in the chair.   Cardiovascular: Regular rate and rhythm without murmur, rubs, or gallops.    Pulmonary: Clear to auscultation bilaterally without wheezing, rubs, or rales. On Vapotherm.   Chest: left sided thoracic incisions clean, dry, intact.  Mediastinal chest tube in place.   Abdomen: Soft, non distended and non tender.  Extremities: Warm, moves all extremities. No edema. Right groin site clean and dry. No bleeding. No hematoma.   Neurologic: Grossly intact with no focal deficits.       Impression:  Aortic valve stenosis status post aortic valve replacement in 2018 now status post TAVR  CAD status post CABG  Aortoiliac occlusive disease  Acute on chronic diastolic congestive heart failure  Peripheral arterial disease  History of stroke  COPD  Former smoker      Plan:  DC mediastinal chest tube  DC fentanyl IV prn pain  Bowel regimen  No free water fluid restriction  Encourage pulmonary toilet and ambulation.  Wean Vapotherm.  Consider transfer to medical floor if on less oxygen later today  PA and lateral chest x-ray in a.m.  Discussed with patient, wife, and nursing

## 2025-07-03 NOTE — THERAPY EVALUATION
Patient Name: Sharad Ryder  : 1960    MRN: [id number]                              Today's Date: 7/3/2025       Admit Date: 2025    Visit Dx:     ICD-10-CM ICD-9-CM   1. Impaired mobility [Z74.09]  Z74.09 799.89   2. Severe aortic stenosis  I35.0 424.1     Patient Active Problem List   Diagnosis    Hyperlipidemia LDL goal <70    Primary hypertension    COPD (chronic obstructive pulmonary disease)    Transient ischemic attack (TIA)    Allergic reaction    Coronary artery disease involving coronary bypass graft of native heart without angina pectoris    PVC (premature ventricular contraction)    Leukocytosis    History of CVA (cerebrovascular accident)    Right sided weakness resolved    Acute CVA (cerebrovascular accident)    Right-sided carotid artery disease    S/P CABG x 3  Dr Robin     Status post aortic valve replacement with bioprosthetic valve    LVH (left ventricular hypertrophy)    History of right-sided carotid endarterectomy    Tobacco use    Chronic stable angina    PVD (peripheral vascular disease) with claudication    Aortoiliac occlusive disease    Preop testing    PAD (peripheral artery disease)    Acute exacerbation of chronic obstructive pulmonary disease (COPD)    COVID-19 virus infection    Elevated troponin level not due myocardial infarction    Acute on chronic diastolic CHF (congestive heart failure)    Severe aortic stenosis    Diastolic dysfunction    Acute on chronic respiratory failure with hypoxia    Moderate protein-calorie malnutrition    COPD with acute exacerbation    Acute on chronic heart failure with preserved ejection fraction (HFpEF)    Viral pneumonia    Chronic heart failure with preserved ejection fraction (HFpEF)    Aortic stenosis     Past Medical History:   Diagnosis Date    Aneurysm     Anxiety     Arthritis     Carotid artery disease     Carotid stenosis, right 2018    Post right carotid endarterectomy    COPD (chronic obstructive pulmonary  disease)     Coronary artery disease     Heart murmur     History of right-sided carotid endarterectomy 02/16/2022    Hyperlipidemia LDL goal <70 12/14/2017    Left frontal lobe, right thalamus and right occipital CVA (cerebrovascular accident) (HCC) 01/27/2022    LVH (left ventricular hypertrophy) 02/16/2022    Pneumonia     Primary hypertension 12/14/2017    S/P CABG x 3 02/16/2022    LIMA to LAD, SVG to PDA and SVG to diagonal branch with TMR and left atrial appendage exclusion with atricure clip device 2/8/2018 per Dr. Cj Robin at [facility]     Severe aortic valve stenosis 12/14/2017    Status post aortic valve replacement with bioprosthetic valve 02/16/2022    Graciela Juarez bovine pericardial 19 mm valve 2/8/2018 per Dr. Cj Robin at [facility]    Tobacco use 12/14/2017    Wears glasses      Past Surgical History:   Procedure Laterality Date    AORTOGRAM Bilateral 10/27/2023    Procedure: LEFT LOWER EXTREMITY ANGIOGRAM, BILATERAL ILIAC BALLOON ANGIOPLASTY, BILATERAL ILIAC STENT PLACEMENT, MYNX CLOSURE;  Surgeon: Jl Salgado DO;  Location: [facility] OR 12;  Service: Vascular;  Laterality: Bilateral;    APPENDECTOMY      CARDIAC CATHETERIZATION N/A 12/18/2017    Procedure: Left Heart Cath;  Surgeon: Aime Francois MD;  Location:  [facility] CATH INVASIVE LOCATION;  Service:     CARDIAC CATHETERIZATION N/A 12/18/2017    Procedure: Right Heart Cath;  Surgeon: Aime Francois MD;  Location:  [facility] CATH INVASIVE LOC[facility];  Service:     CARDIAC CATHETERIZATION Bilateral 1/4/2018    Procedure: Coronary angiography;  Surgeon: Alfonso Yi MD;  Location:  [facility] CATH INVAS[facility];  Service:     CARDIAC CATHETERIZATION Left 9/15/2021    Procedure: Cardiac Catheterization/Vascular Study NICMO OK  Has 3 graft 2018;  Surgeon: Aime Francois MD;  Location:  [facility] CATH INVAS[facility];  Service: Cardiology;  Laterality: Left;    CARDIAC CATHETERIZATION Left 5/8/2025     Procedure: Cardiac Catheterization/Vascular Study;  Surgeon: Aime Francois MD;  Location:  [facility] CATH [facility]VAS[facility];  Service: Cardiology;  Laterality: Left;  anesthesia to sedate due to sever back pain    CAROTID ENDARTERECTOMY Right 2/1/2018    Procedure: RIGHT CAROTID ENDARTERECTOMY WITH EEG-awake;  Surgeon: Jl Salgado DO;  Location:  [facility] OR;  Service:     CORONARY ARTERY BYPASS GRAFT WITH AORTIC VALVE REPAIR/REPLACEMENT N/A 2/8/2018    Procedure: AORTIC VALVE REPLACEMENT,CORONARY ARTERY BYPASS GRAFTING x 3  WITH CONCHA AND RIGHT EVH, ATRIAL APPENDAGE EXCLUSION LEFT WITH TRANSESOPHAGEAL ECHOCARDIOGRAM, 17-CHANNEL TMR;  Surgeon: Cj Robin MD;  Location:  [facility] OR;  Service:     FINGER SURGERY Right 1990    OTHER SURGICAL HISTORY      skull srgery from accident in childhood.      General Information       Row Name 07/03/25 1024 07/03/25 0851       OT Time and Intention    Document Type evaluation  Pt has been experiencing urinary urgency as well as episodes of dyspnea. He attributes anxiety to dyspnea episodes. These symptoms began roughly 1.5 weeks ago.  -CS (r) BS (t) CS (c) --  -CS (r) BS (t) CS (c)    Mode of Treatment occupational therapy  Pt underwent procedure of Transapical Transcatheter Aortic Valve Replacement on 7/1/25. PMH is extensive including a CABG in 2018. Pt dx: aortic stenosis.  -CS (r) BS (t) CS (c) --  -CS (r) BS (t) CS (c)    Patient Effort good  -CS (r) BS (t) CS (c) --      Row Name 07/03/25 1024          General Information    Patient Profile Reviewed yes  -CS (r) BS (t) CS (c)     Prior Level of Function independent:;ADL's  -CS (r) BS (t) CS (c)     Existing Precautions/Restrictions fall;oxygen therapy device and L/min  vapotherm, 25 L  -CS (r) BS (t) CS (c)     Barriers to Rehab medically complex  -CS (r) BS (t) CS (c)       Row Name 07/03/25 1024          Occupational Profile    Environmental Supports and Barriers (Occupational Profile) has shower chair but does not  use, tub shower, no grab bars  -CS (r) BS (t) CS (c)       Row Name 07/03/25 1024          Living Environment    Current Living Arrangements home  -CS (r) BS (t) CS (c)     People in Home spouse  -CS (r) BS (t) CS (c)       Row Name 07/03/25 1024          Home Main Entrance    Number of Stairs, Main Entrance none  -CS (r) BS (t) CS (c)     Stair Railings, Main Entrance none  -CS (r) BS (t) CS (c)       Row Name 07/03/25 1024          Stairs Within Home, Primary    Number of Stairs, Within Home, Primary none  -CS (r) BS (t) CS (c)     Stair Railings, Within Home, Primary none  -CS (r) BS (t) CS (c)       Row Name 07/03/25 1024          Cognition    Orientation Status (Cognition) oriented x 4  -CS (r) BS (t) CS (c)       Row Name 07/03/25 1024          Safety Issues/Impairments Affecting Functional Mobility    Safety Issues Affecting Function (Mobility) awareness of need for assistance;insight into deficits/self-awareness;judgment;safety precaution awareness;safety precautions follow-through/compliance;impulsivity;at risk behavior observed  -CS (r) BS (t) CS (c)     Impairments Affecting Function (Mobility) balance;endurance/activity tolerance;shortness of breath;pain  -CS (r) BS (t) CS (c)               User Key  (r) = Recorded By, (t) = Taken By, (c) = Cosigned By      Initials Name Provider Type    CS Nirali Yeboah S, OTR/L, CNT Occupational Therapist    Domenica iFelds, OT Student OT Student                     Mobility/ADL's       Row Name 07/03/25 1030 07/03/25 1024       Bed Mobility    Comment, (Bed Mobility) --  -CS (r) BS (t) CS (c) in chair upon entering  -CS (r) BS (t) CS (c)      Row Name 07/03/25 1030 07/03/25 1024       Functional Mobility    Functional Mobility- Ind. Level -- contact guard assist;1 person  -CS (r) BS (t) CS (c)    Functional Mobility- Device -- walker, front-wheeled  -CS (r) BS (t) CS (c)    Functional Mobility- Safety Issues -- other (see comments)  cues for maintaining FWW closer  to ROSSANA  -CS (r) BS (t) CS (c)    Patient was able to Ambulate --  -CS (r) BS (t) CS (c) yes  -CS (r) BS (t) CS (c)      Row Name 07/03/25 1024          Activities of Daily Living    BADL Assessment/Intervention lower body dressing  -CS (r) BS (t) CS (c)       Row Name 07/03/25 1024          Lower Body Dressing Assessment/Training    Lea Level (Lower Body Dressing) don;socks;minimum assist (75% patient effort);doff;standby assist  -CS (r) BS (t) CS (c)     Position (Lower Body Dressing) supported sitting  sitting in chair  -CS (r) BS (t) CS (c)               User Key  (r) = Recorded By, (t) = Taken By, (c) = Cosigned By      Initials Name Provider Type    CS Nirali Yeboah, OTR/L, CNT Occupational Therapist    Domenica Fields, OT Student OT Student                   Obj/Interventions       Row Name 07/03/25 1024          Sensory Assessment (Somatosensory)    Sensory Assessment (Somatosensory) sensation intact  -CS (r) BS (t) CS (c)       Row Name 07/03/25 1024          Vision Assessment/Intervention    Visual Impairment/Limitations corrective lenses full-time;WFL  -CS (r) BS (t) CS (c)       Row Name 07/03/25 1024          Range of Motion Comprehensive    General Range of Motion bilateral upper extremity ROM WFL  -CS (r) BS (t) CS (c)     Comment, General Range of Motion B UE WFL  -CS (r) BS (t) CS (c)       Row Name 07/03/25 1024          Strength Comprehensive (MMT)    General Manual Muscle Testing (MMT) Assessment no strength deficits identified  -CS (r) BS (t) CS (c)     Comment, General Manual Muscle Testing (MMT) Assessment Pt. B UE MMT 5/5  -CS (r) BS (t) CS (c)       Row Name 07/03/25 1024          Balance    Balance Assessment sitting static balance;sit to stand dynamic balance;standing static balance;standing dynamic balance;sitting dynamic balance  -CS (r) BS (t) CS (c)     Static Sitting Balance independent  -CS (r) BS (t) CS (c)     Dynamic Sitting Balance standby assist  -CS (r) BS (t) CS  (c)     Position, Sitting Balance supported;sitting in chair  -CS (r) BS (t) CS (c)     Sit to Stand Dynamic Balance contact guard;1-person assist  -CS (r) BS (t) CS (c)     Static Standing Balance contact guard;1-person assist  -CS (r) BS (t) CS (c)     Dynamic Standing Balance contact guard;1-person assist  -CS (r) BS (t) CS (c)     Position/Device Used, Standing Balance supported;walker, front-wheeled  -CS (r) BS (t) CS (c)     Balance Interventions sitting;standing;sit to stand;static;dynamic  -CS (r) BS (t) CS (c)               User Key  (r) = Recorded By, (t) = Taken By, (c) = Cosigned By      Initials Name Provider Type    CS Nirali Yeboah S, OTR/L, CNT Occupational Therapist    BS Domenica Terry, OT Student OT Student                   Goals/Plan       Row Name 07/03/25 1024          Dressing Goal 1 (OT)    Activity/Device (Dressing Goal 1, OT) dressing skills, all  -CS (r) BS (t) CS (c)     [place]/Cues Needed (Dressing Goal 1, OT) independent  -CS (r) BS (t) CS (c)     Time Frame (Dressing Goal 1, OT) long term goal (LTG);by discharge  -CS (r) BS (t) CS (c)     Progress/Outcome (Dressing Goal 1, OT) new goal  -CS (r) BS (t) CS (c)       Row Name 07/03/25 1024          Grooming Goal 1 (OT)    Activity/Device (Grooming Goal 1, OT) oral care;wash face, hands  -CS (r) BS (t) CS (c)     [place] (Grooming Goal 1, OT) standby assist  -CS (r) BS (t) CS (c)     Time Frame (Grooming Goal 1, OT) long term goal (LTG);by discharge  -CS (r) BS (t) CS (c)     Strategies/Barriers (Grooming Goal 1, OT) standing at sink side or at tray table.  -CS (r) BS (t) CS (c)     Progress/Outcome (Grooming Goal 1, OT) new goal  -CS (r) BS (t) CS (c)       Row Name 07/03/25 1024          Problem Specific Goal 1 (OT)    Problem Specific Goal 1 (OT) Pt will independently implement one pain management technique to decrease pain and improve functional adl performance.  -CS (r) BS (t) CS (c)     Time Frame (Problem Specific Goal  1, OT) long term goal (LTG);by discharge  -CS (r) BS (t) CS (c)     Progress/Outcome (Problem Specific Goal 1, OT) new goal  -CS (r) BS (t) CS (c)       Row Name 07/03/25 1024          Therapy Assessment/Plan (OT)    Planned Therapy Interventions (OT) transfer/mobility retraining;strengthening exercise;patient/caregiver education/training;occupation/activity based interventions;functional balance retraining;activity tolerance training;BADL retraining;adaptive equipment training;IADL retraining;ROM/therapeutic exercise  -CS (r) BS (t) CS (c)               User Key  (r) = Recorded By, (t) = Taken By, (c) = Cosigned By      Initials Name Provider Type    CS Nirali Yeboah, OTR/L, CNT Occupational Therapist    Domenica Fields, OT Student OT Student                   Clinical Impression       Row Name 07/03/25 1024          Pain Assessment    Pretreatment Pain Rating 5/10  -CS (r) BS (t) CS (c)     Pain Location abdomen  -CS (r) BS (t) CS (c)     Pain Side/Orientation lateral;left  -CS (r) BS (t) CS (c)       Row Name 07/03/25 1024          Plan of Care Review    Plan of Care Reviewed With patient  -CS (r) BS (t) CS (c)     Progress no change  -CS (r) BS (t) CS (c)     Outcome Evaluation OT eval completed. Pt presented sitting up in chair upon entering and was agreeable to participate in therapy. He is A&O x4. Pt was able to independently answer all questions during session, reporting independence in ADLs prior to hospitilization. He demonstrated BUE ROM and strength to be WFL. Pt required CGA during sit<>stand and utilized FWW once in standing for stability. Pt required vcs for positioning of FWW for safety. Pt ambulated down hallway with CGA. He required Min A to don socks, however, was able to doff socks with SBA. Mr. Ryder would benefit from skilled OT intervention indicated in order to address deficits in fxl mobility, fxl activity tolerance, balance, pain management, and use of adaptive techniques/equipment  during performance of BADLs. Recommend d/c to home with assist/HH  -CS (r) BS (t) CS (c)       Row Name 07/03/25 1024          Therapy Assessment/Plan (OT)    Rehab Potential (OT) good  -CS (r) BS (t) CS (c)     Criteria for Skilled Therapeutic Interventions Met (OT) yes;skilled treatment is necessary  -CS (r) BS (t) CS (c)     Therapy Frequency (OT) 5 times/wk  -CS (r) BS (t) CS (c)     Predicted Duration of Therapy Intervention (OT) 10 days  -CS (r) BS (t) CS (c)       Row Name 07/03/25 1024          Therapy Plan Review/Discharge Plan (OT)    Equipment Needs Upon Discharge (OT) raised toilet seat;shower chair  -CS (r) BS (t) CS (c)     Anticipated Discharge Disposition (OT) home with assist;home with home health  -CS (r) BS (t) CS (c)       Row Name 07/03/25 1024          Vital Signs    O2 Delivery Pre Treatment hi-flow  -CS (r) BS (t) CS (c)     O2 Delivery Intra Treatment hi-flow  -CS (r) BS (t) CS (c)     O2 Delivery Post Treatment hi-flow  -CS (r) BS (t) CS (c)     Pre Patient Position Sitting  -CS (r) BS (t) CS (c)     Intra Patient Position Standing  -CS (r) BS (t) CS (c)     Post Patient Position Sitting  -CS (r) BS (t) CS (c)       Row Name 07/03/25 1024          Positioning and Restraints    Pre-Treatment Position sitting in chair/recliner  -CS (r) BS (t) CS (c)     Post Treatment Position chair  -CS (r) BS (t) CS (c)     In Chair sitting;call light within reach;encouraged to call for assist;patient within staff view  -CS (r) BS (t) CS (c)               User Key  (r) = Recorded By, (t) = Taken By, (c) = Cosigned By      Initials Name Provider Type    Nirali Andres, OTR/L, CNT Occupational Therapist    Domenica Fields, OT Student OT Student                   Outcome Measures       Row Name 07/03/25 1024          How much help from another is currently needed...    Putting on and taking off regular lower body clothing? 2  -CS (r) BS (t) CS (c)     Bathing (including washing, rinsing, and drying) 2   -CS (r) BS (t) CS (c)     Toileting (which includes using toilet bed pan or urinal) 3  -CS (r) BS (t) CS (c)     Putting on and taking off regular upper body clothing 3  -CS (r) BS (t) CS (c)     Taking care of personal grooming (such as brushing teeth) 3  -CS (r) BS (t) CS (c)     Eating meals 3  -CS (r) BS (t) CS (c)     AM-PAC 6 Clicks Score (OT) 16  -CS (r) BS (t)       Row Name 07/03/25 1017 07/03/25 0701       How much help from another person do you currently need...    Turning from your back to your side while in flat bed without using bedrails? 3  -LIBRA (r) GM (t) LIBRA (c) 3  -SE    Moving from lying on back to sitting on the side of a flat bed without bedrails? 3  -LIBRA (r) GM (t) LIBRA (c) 3  -SE    Moving to and from a bed to a chair (including a wheelchair)? 3  -LIBRA (r) GM (t) LIBRA (c) 3  -SE    Standing up from a chair using your arms (e.g., wheelchair, bedside chair)? 3  -LIBRA (r) GM (t) LIBRA (c) 3  -SE    Climbing 3-5 steps with a railing? 3  -LIBRA (r) GM (t) LIBRA (c) 3  -SE    To walk in hospital room? 3  -LIBRA (r) GM (t) LIBRA (c) 3  -SE    AM-PAC 6 Clicks Score (PT) 18  -LIBAR (r) GM (t) 18  -SE    Highest Level of Mobility Goal Walk 10 Steps or More-6  -LIBRA (r) GM (t) Walk 10 Steps or More-6  -SE      Row Name 07/03/25 1024 07/03/25 1017       Functional Assessment    Outcome Measure Options AM-PAC 6 Clicks Daily Activity (OT)  -CS (r) BS (t) CS (c) AM-PAC 6 Clicks Basic Mobility (PT)  -LIBRA              User Key  (r) = Recorded By, (t) = Taken By, (c) = Cosigned By      Initials Name Provider Type    Jony Aleman, PT DPT Physical Therapist    CS Yeboah, Nirali S, OTR/L, CNT Occupational Therapist    Bethel Medrano, RN Registered Nurse    Zaria Eaton, PT Student PT Student    Domenica Fields, OT Student OT Student                    Occupational Therapy Education       Title: PT OT SLP Therapies (In Progress)       Topic: Occupational Therapy (In Progress)       Point: ADL training (Done)       Learning  Progress Summary            Patient Acceptance, E, DU by BS at 7/3/2025 1024                      Point: Precautions (Done)       Learning Progress Summary            Patient Acceptance, E, DU by BS at 7/3/2025 1024                                      User Key       Initials Effective Dates Name Provider Type Discipline     05/12/25 -  Domenica Terry, OT Student OT Student OT                  OT Recommendation and Plan  Planned Therapy Interventions (OT): transfer/mobility retraining, strengthening exercise, patient/caregiver education/training, occupation/activity based interventions, functional balance retraining, activity tolerance training, BADL retraining, adaptive equipment training, IADL retraining, ROM/therapeutic exercise  Therapy Frequency (OT): 5 times/wk  Plan of Care Review  Plan of Care Reviewed With: patient  Progress: no change  Outcome Evaluation: OT eval completed. Pt presented sitting up in chair upon entering and was agreeable to participate in therapy. He is A&O x4. Pt was able to independently answer all questions during session, reporting independence in ADLs prior to hospitilization. He demonstrated BUE ROM and strength to be WFL. Pt required CGA during sit<>stand and utilized FWW once in standing for stability. Pt required vcs for positioning of FWW for safety. Pt ambulated down hallway with CGA. He required Min A to don socks, however, was able to doff socks with SBA. Mr. Ryder would benefit from skilled OT intervention indicated in order to address deficits in fxl mobility, fxl activity tolerance, balance, pain management, and use of adaptive techniques/equipment during performance of BADLs. Recommend d/c to home with assist/HH     Time Calculation:         Time Calculation- OT       Row Name 07/03/25 1024 07/03/25 0904          Time Calculation- OT    OT Start Time 1024  add 15 minutes for chart review.  -CS (r) BS (t) CS (c) --  -CS (r) BS (t) CS (c)     OT Stop Time 1042  -CS (r)  BS (t) CS (c) --     OT Time Calculation (min) 18 min  -CS (r) BS (t) --     Total Timed Code Minutes- OT 33 minute(s)  -CS (r) BS (t) CS (c) --     OT Received On 07/03/25  -CS (r) BS (t) CS (c) --     OT Goal Re-Cert Due Date 07/13/25  -CS (r) BS (t) CS (c) --        Untimed Charges    OT Eval/Re-eval Minutes 33  -CS (r) BS (t) CS (c) --        Total Minutes    Untimed Charges Total Minutes 33  -CS (r) BS (t) --      Total Minutes 33  -CS (r) BS (t) --               User Key  (r) = Recorded By, (t) = Taken By, (c) = Cosigned By      Initials Name Provider Type    CS Nirali Yeboah, OTR/L, CNT Occupational Therapist    Domenica Fields, OT Student OT Student                           Domenica Terry, OT Student  7/3/2025

## 2025-07-03 NOTE — PROGRESS NOTES
"Chief complaint: Follow-up after TAVR    S: The patient notes that he is still somewhat short of breath but this seems to be improving at this time.  He is currently on Vapotherm.  He notes chest discomfort with deep inspiration.  Otherwise, he is without any significant complaints.    Medications: Reviewed    Review of Systems: All pertinent negatives and positives as noted above.  Otherwise, all systems reviewed and found to be negative.    Telemetry: Sinus rhythm and sinus tachycardia    O:  BP (!) 86/74   Pulse 100   Temp 98.6 °F (37 °C) (Axillary)   Resp 20   Ht 160 cm (63\")   Wt 52.1 kg (114 lb 12.8 oz)   SpO2 90%   BMI 20.34 kg/m²   Temp:  [98.3 °F (36.8 °C)-98.6 °F (37 °C)] 98.6 °F (37 °C)  Heart Rate:  [] 100  Resp:  [15-34] 20  BP: ()/() 86/74    Intake/Output Summary (Last 24 hours) at 7/3/2025 1130  Last data filed at 7/3/2025 1000  Gross per 24 hour   Intake 600 ml   Output 2790 ml   Net -2190 ml     General: Chronically ill in appearance but in no acute distress, sitting in a chair at bedside  CV: Regular rate and rhythm with 2/6 systolic murmur noted  Pulmonary: Decreased breath sounds bilaterally but otherwise relatively clear, no respiratory distress  GI: Soft, nontender, nondistended, active bowel sounds  Extremities: No significant edema, warm well-perfused    Diagnostic Data:    Lab Results   Component Value Date    WBC 13.98 (H) 07/03/2025    HGB 9.4 (L) 07/03/2025    HCT 28.4 (L) 07/03/2025    MCV 89.3 07/03/2025     07/03/2025     Lab Results   Component Value Date    GLUCOSE 115 (H) 07/03/2025    CALCIUM 8.9 07/03/2025     (L) 07/03/2025    K 4.3 07/03/2025    CO2 20.0 (L) 07/03/2025     07/03/2025    BUN 13.8 07/03/2025    CREATININE 0.59 (L) 07/03/2025    EGFR 107.7 07/03/2025    BCR 23.4 07/03/2025    ANIONGAP 12.0 07/03/2025     Today's ECG shows sinus rhythm, ventricular rate 87, left bundle branch block pattern    Results for orders placed " during the hospital encounter of 04/23/25    Adult Transthoracic Echo Complete w/ Color, Spectral and Contrast if Necessary Per Protocol    Interpretation Summary    Left ventricular systolic function is normal. Left ventricular ejection fraction appears to be 61 - 65%.    There is a bioprosthetic aortic valve present. The prosthetic aortic valve peak and mean gradients are elevated. There is severe stenosis or obstruction of the prosthetic aortic valve.    Severe aortic valve stenosis is present.    Left ventricular wall thickness is consistent with moderate to severe concentric hypertrophy.    Left ventricular diastolic function is consistent with (grade II w/high LAP) pseudonormalization.    The left atrial cavity is moderate to severely dilated.    Left atrial volume is moderately increased.    The right atrial cavity is borderline dilated.    ASSESSMENT/PLAN:    1.  Severe native aortic valve stenosis, now status post TAVR  2.  Left bundle branch block  3.  Coronary artery disease  4.  Peripheral arterial disease  5.  Acute on chronic diastolic heart failure  6.  COPD  7.  Anemia without active bleeding     - All chest tubes now removed.  - The patient continues dual antiplatelet therapy.  - Wean Vapotherm as tolerated today.  Ambulate as tolerated.  - Pending oxygenation, potential transfer to the floor later today.

## 2025-07-03 NOTE — PLAN OF CARE
Problem: Adult Inpatient Plan of Care  Goal: Plan of Care Review  Outcome: Progressing  Flowsheets (Taken 7/3/2025 0638)  Progress: no change  Plan of Care Reviewed With: patient  Goal: Patient-Specific Goal (Individualized)  Outcome: Progressing  Goal: Absence of Hospital-Acquired Illness or Injury  Outcome: Progressing  Intervention: Identify and Manage Fall Risk  Recent Flowsheet Documentation  Taken 7/3/2025 0500 by Karo Abrams RN  Safety Promotion/Fall Prevention: safety round/check completed  Taken 7/3/2025 0400 by Karo Arbams RN  Safety Promotion/Fall Prevention: safety round/check completed  Taken 7/3/2025 0300 by Karo Abrams RN  Safety Promotion/Fall Prevention: safety round/check completed  Taken 7/3/2025 0200 by Karo Abrams RN  Safety Promotion/Fall Prevention: safety round/check completed  Taken 7/3/2025 0100 by Karo Abrams RN  Safety Promotion/Fall Prevention: safety round/check completed  Taken 7/3/2025 0000 by Karo Abrams RN  Safety Promotion/Fall Prevention: safety round/check completed  Taken 7/2/2025 2100 by Karo Abrams RN  Safety Promotion/Fall Prevention: safety round/check completed  Taken 7/2/2025 2000 by Karo Abrams RN  Safety Promotion/Fall Prevention: safety round/check completed  Intervention: Prevent Skin Injury  Recent Flowsheet Documentation  Taken 7/3/2025 0529 by Karo Abrams RN  Body Position: (up in chaur)   supine   weight shifting  Taken 7/3/2025 0500 by Karo Abrams RN  Body Position:   position changed independently   side-lying   left  Taken 7/3/2025 0400 by Karo Abrams RN  Skin Protection: silicone foam dressing in place  Taken 7/3/2025 0300 by Karo Abrams RN  Body Position:   position changed independently   supine  Taken 7/3/2025 0100 by Karo Abrams RN  Body Position:   position changed independently   side-lying   right  Taken 7/3/2025 0000 by Karo Abrams RN  Skin Protection:  silicone foam dressing in place  Taken 7/2/2025 2300 by Karo Abrams RN  Body Position:   position changed independently   side-lying   right  Taken 7/2/2025 2100 by Karo Abrams RN  Body Position:   position changed independently   sitting up in bed   supine  Taken 7/2/2025 2000 by Karo Abrams RN  Skin Protection:   incontinence pads utilized   silicone foam dressing in place   pulse oximeter probe site changed  Taken 7/2/2025 1900 by Karo Abrams RN  Body Position: (up in chair)   weight shifting   right   position changed independently  Intervention: Prevent and Manage VTE (Venous Thromboembolism) Risk  Recent Flowsheet Documentation  Taken 7/2/2025 2000 by Karo Abrams RN  VTE Prevention/Management:   bilateral   SCDs (sequential compression devices) on  Goal: Optimal Comfort and Wellbeing  Outcome: Progressing  Intervention: Monitor Pain and Promote Comfort  Recent Flowsheet Documentation  Taken 7/3/2025 0600 by Karo Abrams RN  Pain Management Interventions: position adjusted  Taken 7/3/2025 0529 by Karo Abrams RN  Pain Management Interventions: pain medication given  Taken 7/3/2025 0100 by Karo Abrams RN  Pain Management Interventions: pain medication given  Taken 7/3/2025 0000 by Karo Abrams RN  Pain Management Interventions: (pt refusing to talk or answer staff)   medication offered but refused   quiet environment facilitated  Taken 7/2/2025 2200 by Karo Abrams RN  Pain Management Interventions:   pillow support provided   position adjusted  Taken 7/2/2025 2100 by Karo Abrams RN  Pain Management Interventions:   position adjusted   pillow support provided  Taken 7/2/2025 2017 by Karo Abrams RN  Pain Management Interventions: pain medication given  Taken 7/2/2025 2000 by Karo Abrams RN  Pain Management Interventions:   position adjusted   pillow support provided  Intervention: Provide Person-Centered Care  Recent Flowsheet  Documentation  Taken 7/3/2025 0400 by Karo Abrams RN  Trust Relationship/Rapport:   care explained   choices provided  Taken 7/3/2025 0000 by Karo Abrams RN  Trust Relationship/Rapport:   care explained   choices provided   empathic listening provided   emotional support provided   questions answered   questions encouraged   reassurance provided   thoughts/feelings acknowledged  Taken 7/2/2025 2000 by Karo Abrams RN  Trust Relationship/Rapport:   care explained   choices provided  Goal: Readiness for Transition of Care  Outcome: Progressing     Problem: Skin Injury Risk Increased  Goal: Skin Health and Integrity  Outcome: Progressing  Intervention: Optimize Skin Protection  Recent Flowsheet Documentation  Taken 7/3/2025 0529 by Karo Abrams RN  Activity Management: up in chair  Taken 7/3/2025 0500 by Karo Abrams RN  Head of Bed ([facility]) Positioning: HOB at 60-90 degrees  Taken 7/3/2025 0400 by Karo Abrams RN  Pressure Reduction Techniques: weight shift assistance provided  Pressure Reduction Devices: pressure-redistributing mattress utilized  Skin Protection: silicone foam dressing in place  Taken 7/3/2025 0300 by Karo Abrams RN  Head of Bed ([facility]OB) Positioning: HOB at 60-90 degrees  Taken 7/3/2025 0100 by Karo Abrams RN  Head of Bed ([facility]) Positioning: HOB at 60-90 degrees  Taken 7/3/2025 0000 by Karo Abrams RN  Pressure Reduction Techniques: weight shift assistance provided  Pressure Reduction Devices: pressure-redistributing mattress utilized  Skin Protection: silicone foam dressing in place  Taken 7/2/2025 2300 by Karo Abrams RN  Head of Bed ([facility]) Positioning: HOB at 30-45 degrees  Taken 7/2/2025 2130 by Karo Abrams RN  Activity Management:   ambulated outside room   back to bed  Taken 7/2/2025 2100 by Karo Abrams RN  Head of Bed ([facility]) Positioning: HOB at 30-45 degrees  Taken 7/2/2025 2000 by Karo Abrams RN  Activity Management:  up in chair  Pressure Reduction Techniques:   frequent weight shift encouraged   weight shift assistance provided  Pressure Reduction Devices: pressure-redistributing mattress utilized  Skin Protection:   incontinence pads utilized   silicone foam dressing in place   pulse oximeter probe site changed  Taken 7/2/2025 1900 by Karo Abrams RN  Activity Management: up in chair     Problem: Comorbidity Management  Goal: Blood Pressure in Desired Range  Outcome: Progressing     Problem: Fall Injury Risk  Goal: Absence of Fall and Fall-Related Injury  Outcome: Progressing  Intervention: Promote Injury-Free Environment  Recent Flowsheet Documentation  Taken 7/3/2025 0500 by Karo Abrams RN  Safety Promotion/Fall Prevention: safety round/check completed  Taken 7/3/2025 0400 by Karo Abrams RN  Safety Promotion/Fall Prevention: safety round/check completed  Taken 7/3/2025 0300 by Karo Abrams RN  Safety Promotion/Fall Prevention: safety round/check completed  Taken 7/3/2025 0200 by Karo Abrams RN  Safety Promotion/Fall Prevention: safety round/check completed  Taken 7/3/2025 0100 by Karo Abrams RN  Safety Promotion/Fall Prevention: safety round/check completed  Taken 7/3/2025 0000 by Karo Abrams RN  Safety Promotion/Fall Prevention: safety round/check completed  Taken 7/2/2025 2100 by Karo Abrams RN  Safety Promotion/Fall Prevention: safety round/check completed  Taken 7/2/2025 2000 by Karo Abrams RN  Safety Promotion/Fall Prevention: safety round/check completed     Problem: Cardiovascular Surgery  Goal: Improved Activity Tolerance  Outcome: Progressing  Goal: Optimal Coping with Heart Surgery  Outcome: Progressing  Intervention: Support Psychosocial Response to Surgery  Recent Flowsheet Documentation  Taken 7/3/2025 0000 by Karo Abrams RN  Family/Support System Care: support provided  Taken 7/2/2025 2000 by Karo Abrams RN  Family/Support System Care:  support provided  Goal: Absence of Bleeding  Outcome: Progressing  Goal: Effective Bowel Elimination  Outcome: Progressing  Goal: Effective Cardiac Function  Outcome: Progressing  Goal: Optimal Cerebral Tissue Perfusion  Outcome: Progressing  Intervention: Protect and Optimize Cerebral Perfusion  Recent Flowsheet Documentation  Taken 7/3/2025 0500 by Karo Abrams RN  Head of Bed ([facility]) Positioning: HOB at 60-90 degrees  Taken 7/3/2025 0300 by Karo Abrams RN  Head of Bed ([facility]) Positioning: HOB at 60-90 degrees  Taken 7/3/2025 0100 by Karo Abrams RN  Head of Bed ([facility]) Positioning: HOB at 60-90 degrees  Taken 7/2/2025 2300 by Karo Abrams RN  Head of Bed ([facility]) Positioning: HOB at 30-45 degrees  Taken 7/2/2025 2100 by Karo Abrams RN  Head of Bed ([facility]) Positioning: HOB at 30-45 degrees  Goal: Fluid and Electrolyte Balance  Outcome: Progressing  Goal: Blood Glucose Level Within Target Range  Outcome: Progressing  Goal: Absence of Infection Signs and Symptoms  Outcome: Progressing  Goal: Anesthesia/Sedation Recovery  Outcome: Progressing  Intervention: Optimize Anesthesia Recovery  Recent Flowsheet Documentation  Taken 7/3/2025 0500 by Karo Abrams RN  Safety Promotion/Fall Prevention: safety round/check completed  Taken 7/3/2025 0400 by Karo Abrams RN  Safety Promotion/Fall Prevention: safety round/check completed  Taken 7/3/2025 0300 by Karo Abrams RN  Safety Promotion/Fall Prevention: safety round/check completed  Taken 7/3/2025 0200 by Karo Abrams RN  Safety Promotion/Fall Prevention: safety round/check completed  Taken 7/3/2025 0100 by Karo Abrams RN  Safety Promotion/Fall Prevention: safety round/check completed  Taken 7/3/2025 0000 by Karo Abrams RN  Safety Promotion/Fall Prevention: safety round/check completed  Taken 7/2/2025 2100 by Karo Abrams RN  Safety Promotion/Fall Prevention: safety round/check completed  Taken 7/2/2025 2000 by  Jose Alberto, Karo, RN  Safety Promotion/Fall Prevention: safety round/check completed  Goal: Acceptable Pain Control  Outcome: Progressing  Intervention: Prevent or Manage Pain  Recent Flowsheet Documentation  Taken 7/3/2025 0600 by Karo Abrams RN  Pain Management Interventions: position adjusted  Taken 7/3/2025 0529 by Karo Abrams RN  Pain Management Interventions: pain medication given  Taken 7/3/2025 0100 by Karo Abrams RN  Pain Management Interventions: pain medication given  Taken 7/3/2025 0000 by Karo Abrams RN  Pain Management Interventions: (pt refusing to talk or answer staff)   medication offered but refused   quiet environment facilitated  Diversional Activities:   smartphone   television  Taken 7/2/2025 2200 by Karo Abrams RN  Pain Management Interventions:   pillow support provided   position adjusted  Taken 7/2/2025 2100 by Karo Abrams RN  Pain Management Interventions:   position adjusted   pillow support provided  Taken 7/2/2025 2017 by Karo Abrams RN  Pain Management Interventions: pain medication given  Taken 7/2/2025 2000 by Karo Abrams RN  Pain Management Interventions:   position adjusted   pillow support provided  Diversional Activities:   television   smartphone  Goal: Nausea and Vomiting Relief  Outcome: Progressing  Goal: Effective Urinary Elimination  Outcome: Progressing  Goal: Effective Oxygenation and Ventilation  Outcome: Progressing   Goal Outcome Evaluation:  Plan of Care Reviewed With: patient        Progress: no change

## 2025-07-03 NOTE — PLAN OF CARE
Goal Outcome Evaluation:  Plan of Care Reviewed With: patient        Progress: no change  Outcome Evaluation: OT eval completed. Pt presented sitting up in chair upon entering and was agreeable to participate in therapy. He is A&O x4. Pt was able to independently answer all questions during session, reporting independence in ADLs prior to hospitilization. He demonstrated BUE ROM and strength to be WFL. Pt required CGA during sit<>stand and utilized FWW once in standing for stability. Pt required vcs for positioning of FWW for safety. Pt ambulated down hallway with CGA. He required Min A to don socks, however, was able to doff socks with SBA. Mr. Ryder would benefit from skilled OT intervention indicated in order to address deficits in fxl mobility, fxl activity tolerance, balance, pain management, and use of adaptive techniques/equipment during performance of BADLs. Recommend d/c to home with assist/HH    Anticipated Discharge Disposition (OT): home with assist, home with home health

## 2025-07-03 NOTE — DISCHARGE SUMMARY
[facility] Cardiothoracic Surgery  DISCHARGE SUMMARY        Date of Admission: 7/1/2025  Date of Discharge:  7/5/2025  Primary Care Physician: Kerry Mtz APRN    Discharge Diagnoses:  Active Hospital Problems    Diagnosis     **Severe aortic stenosis     Type 2 diabetes mellitus, without long-term current use of insulin     Carotid artery disease     Atherosclerosis of aorta     Chronic heart failure with preserved ejection fraction (HFpEF)     Aortoiliac occlusive disease     PAD (peripheral artery disease)     PVD (peripheral vascular disease) with claudication     Chronic stable angina     S/P CABG x 3 2018 Dr Robin      History of right-sided carotid endarterectomy     History of CVA (cerebrovascular accident)     Coronary artery disease involving coronary bypass graft of native heart without angina pectoris     Transient ischemic attack (TIA)-history of     COPD (chronic obstructive pulmonary disease)     Tobacco use      Procedures Performed: Transcatheter aortic valve replacement (TAVR- 20mm Juarez Emily 3 Ultra valve with Resilia) using transapical approach, Ultrasound-guided vascular access of left radial artery and right common femoral vein, Placement of a transvenous temporary venous pacemaker performed on July 1, 2025 by Dr. Robin and Dr. Zarate.    HPI:  Mr. Sharad Ryder is a 65 y.o. male who presents for evaluation of severe aortic valve stenosis, bioprosthetic valve, peripheral arterial disease, carotid artery disease, and coronary artery disease with known previous CABG. He has been experiencing urinary urgency, necessitating immediate access to a restroom. This symptom began approximately 1.5 weeks ago. His prostate is managed by his primary care physician. He also reports episodes of dyspnea, which he attributes to anxiety. He has a history of smoking but has since ceased this habit. He has undergone 2 bypass surgeries and has 2 stents in place.He has a  scheduled appointment with his cardiologist, Dr. Francois, on the upcoming Monday. He reports a decrease in energy levels following his bypass surgery. He has previously undergone a procedure to address blockages in his neck vessels. He has a spot on his kidney and goes to [facility] in [place], [place] every 3 months to keep a check on it. He is not losing any weight intentionally, coughing up blood, or experiencing new chest pains.  He was seen by the structural heart team and I think ultimately recommendations were made to proceed with transapical approach for transcatheter aortic valve replacement, valve in valve technique.  He is agreeable to proceed.    Hospital Course: Mr. Ryder was admitted on the morning of surgery on July 1.  Please see a separate op note by Dr. Robin and Dr. Zarate.  Operative findings were remarkable for no paravalvular leak post balloon aortic valvuloplasty for performance of fracturing the known bioprosthetic valve, post valve deployment gradient 4 mmHg by HONG.  Following surgery, he was transferred to PACU extubated and hemodynamically stable.  After meeting PACU discharge criteria, he was transferred to the intensive care unit in stable guarded condition.  Limited echocardiogram was performed which showed 20 mm Juarez S3 Ultra in place with no evidence of transvalvular or paravalvular regurgitation and a mean gradient of 16 mmHg.  Postop day 1, the left pleural chest tube was removed without remark.  Overnight, he was more hypoxic requiring Vapotherm high flow nasal cannula.  This was felt secondary to suboptimal pain control.  On postop day 2, he met criteria for removal of mediastinal chest tubes.  Postop day 3, he was given diuresis and no free water restriction.  Supplemental oxygen was weaned off.  The remaining stay of his hospitalization was remarkable for weaning supplemental oxygen, diuresis, and pain control.  He meets criteria to discharge home on postoperative day  4.  He is agreeable to discharge home with his wife.  He will follow-up in 1 month with complete echocardiogram.    Condition on Discharge:  Neurologically intact and has good pain control.  He is eating well. The heart is in normal sinus rhythm.  The groins are clean and dry without evidence of bleeding.        Discharge Disposition:  Home or Self Care [1]    Discharge Medications:     Discharge Medications        New Medications        Instructions Start Date   HYDROcodone-acetaminophen 5-325 MG per tablet  Commonly known as: NORCO   1 tablet, Oral, Every 8 Hours PRN             Changes to Medications        Instructions Start Date   amLODIPine 5 MG tablet  Commonly known as: NORVASC  What changed:   medication strength  how much to take   5 mg, Oral, Daily      isosorbide mononitrate 60 MG 24 hr tablet  Commonly known as: IMDUR  What changed:   medication strength  how much to take   60 mg, Oral, Daily             Continue These Medications        Instructions Start Date   aspirin 81 MG tablet   81 mg, Oral, Daily      busPIRone 10 MG tablet  Commonly known as: BUSPAR   10 mg, 2 Times Daily      citalopram 20 MG tablet  Commonly known as: CeleXA   20 mg, Daily      clopidogrel 75 MG tablet  Commonly known as: PLAVIX   75 mg, Oral, Daily      furosemide 20 MG tablet  Commonly known as: LASIX   20 mg, Oral, Daily PRN      metoprolol succinate XL 25 MG 24 hr tablet  Commonly known as: TOPROL-XL   25 mg, Oral, Every 24 Hours Scheduled      multivitamin with minerals tablet tablet   1 tablet, Daily      nitroglycerin 0.4 MG SL tablet  Commonly known as: NITROSTAT   0.4 mg, Every 5 Minutes PRN      rosuvastatin 5 MG tablet  Commonly known as: CRESTOR   5 mg, Oral, Daily      spironolactone 25 MG tablet  Commonly known as: ALDACTONE   25 mg, Oral, Daily      tamsulosin 0.4 MG capsule 24 hr capsule  Commonly known as: FLOMAX   1 capsule, Daily             ASK your doctor about these medications        Instructions Start  Date   budesonide-formoterol 160-4.5 MCG/ACT inhaler  Commonly known as: SYMBICORT   2 puffs, Inhalation, 2 Times Daily - RT      empagliflozin 10 MG tablet tablet  Commonly known as: JARDIANCE   10 mg, Oral, Daily               Discharge Diet: No concentrated sweets diet with an effort to maintain acceptable glycemic control.      Discharge Care Plan / Instructions: No bathing or soaking in a tub for 1 week. OK for showers. Clean surgical sites daily.     Activity at Discharge:    No heavy lifting for 5-7 days greater than 10 pounds. No driving for 1 week.   No heavy or strenuous pushing or pulling.  No tub baths, hot tubs, swimming pools, or submerging groin or wrist underwater for 1 week.  Wash groin or wrist site daily with antibacterial soap and water. Rinse and keep clean and dry.  No lotions, powders, or topical ointments to site. Keep open to air and dry.  Call our office with any concerns or if you notice redness, swelling, drainage, warmth, or pain at the site.    Tobacco:  Patient has been counseled as to smoking cessation. We discussed its benefits in regards to immediate recovery and the long-term benefits of avoidance of tobacco products. Informed of the smoking cessation program available here. We discussed the benefit of long-term health that tobacco cessation would convey.      BMI: BMI is within normal parameters. No other follow-up for BMI required.    Follow-up Appointments: Sharad Ryder  is requested to see Kerry Mtz APRN within 1-2 weeks from time of discharge, to follow-up with Dr. Robin in 4 weeks,  and to follow-up with Dr. Zarate of the cardiology service in 4 weeks. A complete echocardiogram will be coordinated for the day of follow up with the structural heart clinic.     I spent less than 30 minutes on the separately reported service of discharge planning.

## 2025-07-03 NOTE — OP NOTE
Date: 07/01/2025  Pre-operative diagnosis:  1.  Aortic valve stenosis severe,  symptomatic  2.  Coronary artery disease  3.  Type 2 diabetes mellitus  4.  S/p CABG  5.  COPD  6.  Peripheral artery disease  7.  Chronic heart failure with preserved ejection fraction  7.  Atherosclerosis of the aorta.    8.  Previous TIA and CVA  9. Carotid artery disease and previous CEA     Post-operative diagnosis:  Same      Procedures:  1.  Transcatheter aortic valve replacement (TAVR- 20mm Juarez Emily 3 Ultra valve with Resilia) using transapical approach   2.  Ultrasound-guided vascular access of left radial artery and right common femoral vein  3.  Placement of a transvenous temporary venous pacemaker     Physicians:  Cardiothoracic surgeon: Cj Robin M.D.  Structural heart cardiologist: Tyson Zarate MD  Assisting Cardiothoracic surgeon: Ranjith Leon M.D.  Assisting Structural heart cardiologist: Alfonso Yi MD     Estimated Blood Loss: minimal  Specimens: None  Complications: None immediate  Drains: 24 Bengali marlene drain pericardial/mediastinal  28 Bengali pleural drain     Operative findings: No perivalvular leak of significance post balloon aortic valvuloplasty for the performance of fracturing the known bioprosthetic valve.  Post valve deployment gradient measured 4 mmHg by HONG.       Procedural details:  Mr. Ryder was brought to the hybrid operating room and prepped and draped in the usual sterile fashion.  Time out was performed.  Antibiotics were administered.  A team based approach was used to simultaneously establish access.  Micropuncture needle was used to directly access the left radial artery using ultrasound guidance and performed by Dr. Zarate. Ultrasound guided vascular access was obtained of the right femoral vein and a long 6 Fr sheath was placed using modified seldinger technique and a cook needle.  Though the femoral sheath, a 5Fr balloon-tipped temporary pacing wire was advanced  into the right ventricular apex.  Balloon was deflated and capture thresholds appropriate.  This was performed by Dr. Zarate.    Please see the corresponding operative note by Dr. Zarate for details.  Efforts were made for PCI of the right but ultimately deferred.     The patient was administered heparin with ACT's monitored for effective anticoagulation for TAVR implant.      Separately, Using previous CT scan imaging as well as in real-time ultrasound the apex of the left ventricle was located.  A left anterolateral minithoracotomy was performed.  Briefly a curvilinear incision was made dividing the skin sharply.  This was deep electrocautery dividing Tamika's fascia and thoracic musculature entering the left hemithorax.  This access site was undercut anteriorly and posteriorly.  A limited resection of rib was performed.  The pericardial sac was opened at the level of the apex.  Adhesions were encountered.  Using a combination of electrocautery, sharp, and blunt dissection adhesions about the apex and to the extent appreciated heart was dissected out.  A soft tissue retractor was then used to optimize exposure in the heart was deflected while being mindful hemodynamics for planned transapical access.  With that pledgeted 2-0 Prolene suture was placed above the left ventricular apex doubly in a concentric fashion.  Using HONG guidance the apex was once again confirmed and a Cook needle was then used to access the left ventricular cavity.  A 0.035 inch J-tipped wire was advanced into the apex and then advanced through left ventricular outflow tract and across the aortic valve into the descending aorta.  A 6 Mongolian sheath was then inserted into the ventricle.  Thereafter a multipurpose catheter was advanced through the sheath and a wire exchange was made for an Amplatz ES wire.  6 Mongolian sheath previously placed was then removed and a certitude 21 Mongolian sheath was advanced maintaining a 3 cm depth into the  ventricle.    At this point, valve timeout was performed.  All potentially necessary equipment was confirmed in the room, with valve size and orientation also confirmed. The valve had been prepped and loaded at the back table.  The delivery device with valve was then advanced into the certitude sheath and advanced and positioned flouroscopically, using the the previously placed Magna 19 mm valve for marking/position.  Rapid pacing was initiated at 180 bpm.  With SBP < 50 mmHg and PP < 10 mmHg.  The valve was deployed without remark.  Balloon had nominal volume inflation.  Pacing was turned off.   With that we preceded to post dilate the valve to fracture the bioprosthetic valve: which was carried out with a 20 mm true balloon under rapid pacing conditions. The valve did fracture and the postprocedure gradient by transesophageal echocardiography was found to be approximately 4 mmHg with no appreciable paravalvular or transvalvular regurgitation.   The delivery system as was the Certitude sheath was removed and the apical pursestring sutures tied down without remark.  The left radial access was removed and a TR band was used for hemostasis.  The temp wire and sheath were removed with manual pressure held given limited heart block and thereafter continued sinus rhythm.     24 Mongolian Omid drain was placed within the pleural space as well as the pericardium.  An additional 28 Mongolian chest tube was placed within the pleural cavity.  In layers and the soft tissues reapproximated Vicryl suture and the skin reapproximated 4-0 Monocryl suture and thereafter skin affix was applied.  Instruments, sharps, and sponge counts were correct at the completion of the case.  Hemostasis was pristine.        Plans:  to PACU hemodynamically stable and extubated.    -complete surgical prophylactic abx x24 hrs  -ASA 81mg daily, plavix 75 mg daily  -Limited echocardiogram for tomorrow

## 2025-07-03 NOTE — PLAN OF CARE
Goal Outcome Evaluation:  Plan of Care Reviewed With: patient        Progress: no change  Outcome Evaluation: PT eval completed. Pt presented in recliner and was A&Ox4. Prior to hospitalization, pt was independent with all functional mobility, ADLs, and ambulation without AD. Currently, pt is CGA with sit<>stand, standing balance, and ambulation. Pt amb 75 ft with RW. Pt required verbal cues to step closer to walker for safety. Pt demonstrates pain, decreased activity tolerance, and decreased balance. Pt would benefit from continued PT services to address the previously stated impairments. Recommend dc home with wife.    Anticipated Discharge Disposition (PT): home with assist, home

## 2025-07-03 NOTE — THERAPY EVALUATION
Patient Name: Sharad Ryder  : 1960    MRN: [id number]                              Today's Date: 7/3/2025       Admit Date: 2025    Visit Dx:     ICD-10-CM ICD-9-CM   1. Impaired mobility [Z74.09]  Z74.09 799.89   2. Severe aortic stenosis  I35.0 424.1     Patient Active Problem List   Diagnosis    Hyperlipidemia LDL goal <70    Primary hypertension    COPD (chronic obstructive pulmonary disease)    Transient ischemic attack (TIA)    Allergic reaction    Coronary artery disease involving coronary bypass graft of native heart without angina pectoris    PVC (premature ventricular contraction)    Leukocytosis    History of CVA (cerebrovascular accident)    Right sided weakness resolved    Acute CVA (cerebrovascular accident)    Right-sided carotid artery disease    S/P CABG x 3  Dr Robin     Status post aortic valve replacement with bioprosthetic valve    LVH (left ventricular hypertrophy)    History of right-sided carotid endarterectomy    Tobacco use    Chronic stable angina    PVD (peripheral vascular disease) with claudication    Aortoiliac occlusive disease    Preop testing    PAD (peripheral artery disease)    Acute exacerbation of chronic obstructive pulmonary disease (COPD)    COVID-19 virus infection    Elevated troponin level not due myocardial infarction    Acute on chronic diastolic CHF (congestive heart failure)    Severe aortic stenosis    Diastolic dysfunction    Acute on chronic respiratory failure with hypoxia    Moderate protein-calorie malnutrition    COPD with acute exacerbation    Acute on chronic heart failure with preserved ejection fraction (HFpEF)    Viral pneumonia    Chronic heart failure with preserved ejection fraction (HFpEF)    Aortic stenosis     Past Medical History:   Diagnosis Date    Aneurysm     Anxiety     Arthritis     Carotid artery disease     Carotid stenosis, right 2018    Post right carotid endarterectomy    COPD (chronic obstructive pulmonary  disease)     Coronary artery disease     Heart murmur     History of right-sided carotid endarterectomy 02/16/2022    Hyperlipidemia LDL goal <70 12/14/2017    Left frontal lobe, right thalamus and right occipital CVA (cerebrovascular accident) (HCC) 01/27/2022    LVH (left ventricular hypertrophy) 02/16/2022    Pneumonia     Primary hypertension 12/14/2017    S/P CABG x 3 02/16/2022    LIMA to LAD, SVG to PDA and SVG to diagonal branch with TMR and left atrial appendage exclusion with atricure clip device 2/8/2018 per Dr. Cj Robin at [facility]     Severe aortic valve stenosis 12/14/2017    Status post aortic valve replacement with bioprosthetic valve 02/16/2022    Graciela Juarez bovine pericardial 19 mm valve 2/8/2018 per Dr. Cj Robin at [facility]    Tobacco use 12/14/2017    Wears glasses      Past Surgical History:   Procedure Laterality Date    AORTOGRAM Bilateral 10/27/2023    Procedure: LEFT LOWER EXTREMITY ANGIOGRAM, BILATERAL ILIAC BALLOON ANGIOPLASTY, BILATERAL ILIAC STENT PLACEMENT, MYNX CLOSURE;  Surgeon: Jl Salgado DO;  Location: [facility] OR 12;  Service: Vascular;  Laterality: Bilateral;    APPENDECTOMY      CARDIAC CATHETERIZATION N/A 12/18/2017    Procedure: Left Heart Cath;  Surgeon: Aime Francois MD;  Location:  [facility] CATH INVASIVE LOC[facility];  Service:     CARDIAC CATHETERIZATION N/A 12/18/2017    Procedure: Right Heart Cath;  Surgeon: Aime Francois MD;  Location:  [facility] CATH INVASIVE LOC[facility];  Service:     CARDIAC CATHETERIZATION Bilateral 1/4/2018    Procedure: Coronary angiography;  Surgeon: Alfonso Yi MD;  Location:  [facility] CATH INVAS[facility];  Service:     CARDIAC CATHETERIZATION Left 9/15/2021    Procedure: Cardiac Catheterization/Vascular Study NIMCO OK  Has 3 graft 2018;  Surgeon: Aime rFancois MD;  Location:  [facility] CATH INVAS[facility];  Service: Cardiology;  Laterality: Left;    CARDIAC CATHETERIZATION Left 5/8/2025     Procedure: Cardiac Catheterization/Vascular Study;  Surgeon: Aime Francois MD;  Location:  [facility] CATH [facility];  Service: Cardiology;  Laterality: Left;  anesthesia to sedate due to sever back pain    CAROTID ENDARTERECTOMY Right 2/1/2018    Procedure: RIGHT CAROTID ENDARTERECTOMY WITH EEG-awake;  Surgeon: Jl Salgado DO;  Location:  [facility] OR;  Service:     CORONARY ARTERY BYPASS GRAFT WITH AORTIC VALVE REPAIR/REPLACEMENT N/A 2/8/2018    Procedure: AORTIC VALVE REPLACEMENT,CORONARY ARTERY BYPASS GRAFTING x 3  WITH CONCHA AND RIGHT EVH, ATRIAL APPENDAGE EXCLUSION LEFT WITH TRANSESOPHAGEAL ECHOCARDIOGRAM, 17-CHANNEL TMR;  Surgeon: Cj Robin MD;  Location:  [facility] OR;  Service:     FINGER SURGERY Right 1990    OTHER SURGICAL HISTORY      skull srgery from accident in childhood.      General Information       Row Name 07/03/25 1017          Physical Therapy Time and Intention    Document Type evaluation  Pt presents s/p transapical TAVR and transvenous temporary venous pacemaker. Hx: bioprosthetic valve, CAD, PAD, previous CABG, COPD, former smoker. Pre-op dx: severe aortic valve stenosis, SOB.  -LIBRA (r) GM (t) LIBRA (c)     Mode of Treatment physical therapy  -LIBRA (r) GM (t) LIBRA (c)       Row Name 07/03/25 1017          General Information    Patient Profile Reviewed yes  -LIBRA (r) GM (t) LIBRA (c)     Prior Level of Function independent:;gait;ADL's;all household mobility;community mobility;using stairs  -LIBRA (r) GM (t) LIBRA (c)     Existing Precautions/Restrictions fall;oxygen therapy device and L/min  -LIBRA (r) GM (t) LIBRA (c)     Barriers to Rehab medically complex  -LIBRA (r) GM (t) LIBRA (c)       Row Name 07/03/25 1017          Living Environment    Current Living Arrangements home  -LIBRA (r) GM (t) LIBRA (c)     People in Home spouse  -LIBRA (r) GM (t) LIBRA (c)       Row Name 07/03/25 1017          Home Main Entrance    Number of Stairs, Main Entrance none  -LIBRA (r) GM (t) LIBRA (c)     Stair Railings, Main Entrance none  -LIBRA (r)  GM (t) LIBRA (c)       Row Name 07/03/25 1017          Stairs Within Home, Primary    Number of Stairs, Within Home, Primary none  -LIBRA (r) GM (t) LIBRA (c)       Row Name 07/03/25 1017          Cognition    Orientation Status (Cognition) oriented x 4  -LIBRA (r) GM (t) LIBRA (c)       Row Name 07/03/25 1017          Safety Issues/Impairments Affecting Functional Mobility    Safety Issues Affecting Function (Mobility) positioning of assistive device  -LIBRA (r) GM (t) LIBRA (c)     Impairments Affecting Function (Mobility) balance;endurance/activity tolerance;shortness of breath;pain  -LIBRA (r) GM (t) LIBRA (c)               User Key  (r) = Recorded By, (t) = Taken By, (c) = Cosigned By      Initials Name Provider Type    Jony Aleman, PT DPT Physical Therapist    Zaria Eaton, PT Student PT Student                   Mobility       Row Name 07/03/25 1017          Sit-Stand Transfer    Sit-Stand [place] (Transfers) contact guard;verbal cues  -LIBRA (r) GM (t) LIBRA (c)       Row Name 07/03/25 1017          Gait/Stairs (Locomotion)    [place] Level (Gait) contact guard;verbal cues;nonverbal cues (demo/gesture);other (see comments)  verbal cues to step closer to walker for safety  -LIBRA (r) GM (t) LIBRA (c)     Assistive Device (Gait) walker, front-wheeled  -LIBRA (r) GM (t) LIBRA (c)     Distance in Feet (Gait) 75  -LIBRA (r) GM (t) LIBRA (c)               User Key  (r) = Recorded By, (t) = Taken By, (c) = Cosigned By      Initials Name Provider Type    Jony Aleman PT DPT Physical Therapist    Zaria Eaton, PT Student PT Student                   Obj/Interventions       Row Name 07/03/25 1017          Range of Motion Comprehensive    General Range of Motion no range of motion deficits identified  -LIBRA (r) GM (t) LIBRA (c)       Row Name 07/03/25 1017          Strength Comprehensive (MMT)    Comment, General Manual Muscle Testing (MMT) Assessment BLE: knee, ankle 5/5, hip flx 4/5  -LIBRA (r) GM (t) LIBRA (c)       Row Name 07/03/25  1017          Balance    Balance Assessment sitting static balance;standing static balance;standing dynamic balance  -LIBRA (r) GM (t) LIBRA (c)     Static Sitting Balance independent  -LIBRA (r) GM (t) LIBRA (c)     Position, Sitting Balance sitting in chair  -LIBRA (r) GM (t) LIBRA (c)     Static Standing Balance contact guard  -LIBRA (r) GM (t) LIBRA (c)     Dynamic Standing Balance contact guard  -LIBRA (r) GM (t) LIBRA (c)     Position/Device Used, Standing Balance supported;walker, front-wheeled  -LIBRA (r) GM (t) LIBRA (c)       Row Name 07/03/25 1017          Sensory Assessment (Somatosensory)    Sensory Assessment (Somatosensory) sensation intact  -LIBRA (r) GM (t) LIBRA (c)               User Key  (r) = Recorded By, (t) = Taken By, (c) = Cosigned By      Initials Name Provider Type    Jony Aleman, PT DPT Physical Therapist    Zaria Eaton, PT Student PT Student                   Goals/Plan       Row Name 07/03/25 1017          Bed Mobility Goal 1 (PT)    Activity/Assistive Device (Bed Mobility Goal 1, PT) sit to supine/supine to sit  -LIBRA (r) GM (t) LIBRA (c)     [place] Level/Cues Needed (Bed Mobility Goal 1, PT) independent  -LIBRA (r) GM (t) LIBRA (c)     Time Frame (Bed Mobility Goal 1, PT) long term goal (LTG);10 days  -LIBRA (r) GM (t) LIBRA (c)     Progress/Outcomes (Bed Mobility Goal 1, PT) new goal  -LIBRA (r) GM (t) LIBRA (c)       Row Name 07/03/25 1017          Transfer Goal 1 (PT)    Activity/Assistive Device (Transfer Goal 1, PT) sit-to-stand/stand-to-sit  -LIBRA (r) GM (t) LIBRA (c)     [place] Level/Cues Needed (Transfer Goal 1, PT) independent  -LIBRA (r) GM (t) LIBRA (c)     Time Frame (Transfer Goal 1, PT) long term goal (LTG);10 days  -LIBRA (r) GM (t) LIBRA (c)     Progress/Outcome (Transfer Goal 1, PT) new goal  -LIBRA (r) GM (t) LIBRA (c)       Row Name 07/03/25 1017          Gait Training Goal 1 (PT)    Activity/Assistive Device (Gait Training Goal 1, PT) gait (walking locomotion)  -LIBRA (r) GM (t) LIBRA (c)     [place] Level (Gait Training  Goal 1, PT) independent  -LIBRA (r) GM (t) LIBRA (c)     Distance (Gait Training Goal 1, PT) 200 ft  -LIBRA (r) GM (t) LIBRA (c)     Time Frame (Gait Training Goal 1, PT) long term goal (LTG);10 days  -LIBRA (r) GM (t) LIBRA (c)     Progress/Outcome (Gait Training Goal 1, PT) new goal  -LIBRA (r) GM (t) LIBRA (c)       Row Name 07/03/25 1017          Stairs Goal 1 (PT)    Activity/Assistive Device (Stairs Goal 1, PT) ascending stairs;descending stairs  -LIBRA (r) GM (t) LIBRA (c)     [place] Level/Cues Needed (Stairs Goal 1, PT) independent  -LIBRA (r) GM (t) LIBRA (c)     Number of Stairs (Stairs Goal 1, PT) 4  -LIBRA (r) GM (t) LIBRA (c)     Time Frame (Stairs Goal 1, PT) long term goal (LTG);10 days  -LIBRA (r) GM (t) LIBRA (c)     Progress/Outcome (Stairs Goal 1, PT) new goal  -LIBRA (r) GM (t) LIBRA (c)       Row Name 07/03/25 1017          Therapy Assessment/Plan (PT)    Planned Therapy Interventions (PT) balance training;gait training;bed mobility training;postural re-education;stair training;strengthening;transfer training;patient/family education  -LIBRA (r) GM (t) LIBRA (c)               User Key  (r) = Recorded By, (t) = Taken By, (c) = Cosigned By      Initials Name Provider Type    Jony Aleman, PT DPT Physical Therapist    Zaria Eaton, PT Student PT Student                   Clinical Impression       Row Name 07/03/25 1017          Pain    Pretreatment Pain Rating 5/10  -LIBRA (r) GM (t) LIBRA (c)     Pain Location abdomen  -LIBRA (r) GM (t) LIBRA (c)     Pain Side/Orientation lateral;left  -LIBRA (r) GM (t) LIBRA (c)     Pain Management Interventions exercise or physical activity utilized  -LIBRA (r) GM (t) LIBRA (c)       Row Name 07/03/25 1017          Plan of Care Review    Plan of Care Reviewed With patient  -LIBRA (r) GM (t) LIBRA (c)     Progress no change  -LIBRA (r) GM (t) LIBRA (c)       Row Name 07/03/25 1017          Therapy Assessment/Plan (PT)    Patient/Family Therapy Goals Statement (PT) go home  -LIBRA (r) NATALIE (t) LIBRA (c)     Rehab Potential (PT) good  -LIBRA (r) NATALIE  (t) LIBRA (c)     Criteria for Skilled Interventions Met (PT) yes;meets criteria  -LIBRA (r) GM (t) LIBRA (c)     Therapy Frequency (PT) 2 times/day  -LIBRA (r) GM (t) LIBRA (c)     Predicted Duration of Therapy Intervention (PT) until dc  -LIBRA (r) GM (t) LIBRA (c)       Row Name 07/03/25 1017          Vital Signs    Pre Systolic BP Rehab 138  -LIBRA (r) GM (t) LIBRA (c)     Pre Treatment Diastolic BP 84  -LIBRA (r) GM (t) LIBRA (c)     Post Systolic BP Rehab 109  -LIBRA (r) GM (t) LIBRA (c)     Post Treatment Diastolic BP 68  -LIBRA (r) GM (t) LIBRA (c)     Pretreatment Heart Rate (beats/min) 102  -LIBRA (r) GM (t) LIBRA (c)     Posttreatment Heart Rate (beats/min) 106  -LIBRA (r) GM (t) LIBRA (c)     O2 Delivery Pre Treatment --  vapotherm 25 L  -LIBRA (r) GM (t) LIBRA (c)     O2 Delivery Intra Treatment non-rebreather  -LIBRA (r) GM (t) LIBRA (c)     O2 Delivery Post Treatment --  vapotherm 25 L  -LIBRA (r) GM (t) LIBRA (c)     Pre Patient Position Sitting  -LIBRA     Post Patient Position Sitting  -LIBRA       Row Name 07/03/25 1017          Positioning and Restraints    Pre-Treatment Position sitting in chair/recliner  -LIBRA (r) GM (t) LIBRA (c)     Post Treatment Position chair  -LIBRA (r) GM (t) LIBRA (c)     In Chair reclined;call light within reach;encouraged to call for assist;patient within staff view;notified nsg  -LIBRA               User Key  (r) = Recorded By, (t) = Taken By, (c) = Cosigned By      Initials Name Provider Type    Jony Aleman, PT DPT Physical Therapist    Zaria Eaton, BERT Student PT Student                   Outcome Measures       Row Name 07/03/25 1017 07/03/25 0701       How much help from another person do you currently need...    Turning from your back to your side while in flat bed without using bedrails? 3  -LIBRA (r) GM (t) LIBRA (c) 3  -SE    Moving from lying on back to sitting on the side of a flat bed without bedrails? 3  -LIBRA (r) GM (t) LIBRA (c) 3  -SE    Moving to and from a bed to a chair (including a wheelchair)? 3  -LIBRA (r) GM (t) LIBRA (c) 3  -SE    Standing  up from a chair using your arms (e.g., wheelchair, bedside chair)? 3  -LIBRA (r) GM (t) LIBRA (c) 3  -SE    Climbing 3-5 steps with a railing? 3  -LIBRA (r) GM (t) LIBRA (c) 3  -SE    To walk in hospital room? 3  -LIBRA (r) GM (t) LIBRA (c) 3  -SE    AM-PAC 6 Clicks Score (PT) 18  -LIBRA (r) GM (t) 18  -SE    Highest Level of Mobility Goal Walk 10 Steps or More-6  -LIBRA (r) GM (t) Walk 10 Steps or More-6  -SE      Row Name 07/03/25 1024 07/03/25 1017       Functional Assessment    Outcome Measure Options AM-PAC 6 Clicks Daily Activity (OT) (P)   -BS AM-PAC 6 Clicks Basic Mobility (PT)  -LIBRA              User Key  (r) = Recorded By, (t) = Taken By, (c) = Cosigned By      Initials Name Provider Type    Jony Aleman, PT DPT Physical Therapist    Bethel Medrano RN Registered Nurse     Zaria Varela, PT Student PT Student    Domenica Fields, OT Student OT Student                                 Physical Therapy Education       Title: PT OT SLP Therapies (In Progress)       Topic: Physical Therapy (Done)       Point: Mobility training (Done)       Learning Progress Summary            Patient Acceptance, E,TB, VU,DU by  at 7/3/2025 1017    Comment: benefits of PT and mobility                      Point: Home exercise program (Done)       Learning Progress Summary            Patient Acceptance, E,TB, VU,DU by  at 7/3/2025 1017    Comment: benefits of PT and mobility                      Point: Body mechanics (Done)       Learning Progress Summary            Patient Acceptance, E,TB, VU,DU by  at 7/3/2025 1017    Comment: benefits of PT and mobility                      Point: Precautions (Done)       Learning Progress Summary            Patient Acceptance, E,TB, VU,DU by  at 7/3/2025 1017    Comment: benefits of PT and mobility                                      User Key       Initials Effective Dates Name Provider Type [facility] 04/21/25 -  Zaria Varela, PT Student PT Student PT                  PT  Recommendation and Plan  Planned Therapy Interventions (PT): balance training, gait training, bed mobility training, postural re-education, stair training, strengthening, transfer training, patient/family education  Progress: no change  Outcome Evaluation: PT eval completed. Pt presented in recliner and was A&Ox4. Prior to hospitalization, pt was independent with all functional mobility, ADLs, and ambulation without AD. Currently, pt is CGA with sit<>stand, standing balance, and ambulation. Pt amb 75 ft with RW. Pt required verbal cues to step closer to walker for safety. Pt demonstrates pain, decreased activity tolerance, and decreased balance. Pt would benefit from continued PT services to address the previously stated impairments. Recommend dc home with wife.     Time Calculation:         PT Charges       Row Name 07/03/25 1017             Time Calculation    Start Time 1017  -LIBRA (r) GM (t) LIBRA (c)      Stop Time 1052  -LIBRA (r) GM (t) LIBRA (c)      Time Calculation (min) 35 min  -LIBRA (r) GM (t)      PT Received On 07/03/25  -LIBRA (r) GM (t) LIBRA (c)      PT Goal Re-Cert Due Date 07/13/25  -LIBRA (r) GM (t) LIBRA (c)         Untimed Charges    PT Eval/Re-eval Minutes 35  -LIBRA (r) GM (t) LIBRA (c)         Total Minutes    Untimed Charges Total Minutes 35  -LIBRA (r) GM (t)       Total Minutes 35  -LIBRA (r) GM (t)                User Key  (r) = Recorded By, (t) = Taken By, (c) = Cosigned By      Initials Name Provider Type    Joyn Aleman, PT DPT Physical Therapist    Zaria Eaton, PT Student PT Student                      PT G-Codes  Outcome Measure Options: (P) AM-PAC 6 Clicks Daily Activity (OT)  AM-PAC 6 Clicks Score (PT): 18  AM-PAC 6 Clicks Score (OT): (P) 16  PT Discharge Summary  Anticipated Discharge Disposition (PT): home with assist, home    Zaria Varela PT Student  7/3/2025

## 2025-07-04 ENCOUNTER — APPOINTMENT (OUTPATIENT)
Dept: GENERAL RADIOLOGY | Facility: HOSPITAL | Age: 65
End: 2025-07-04
Payer: MEDICARE

## 2025-07-04 LAB
ANION GAP SERPL CALCULATED.3IONS-SCNC: 14 MMOL/L (ref 5–15)
BH BB BLOOD EXPIRATION DATE: NORMAL
BH BB BLOOD EXPIRATION DATE: NORMAL
BH BB BLOOD TYPE BARCODE: 7300
BH BB BLOOD TYPE BARCODE: 7300
BH BB DISPENSE STATUS: NORMAL
BH BB DISPENSE STATUS: NORMAL
BH BB PRODUCT CODE: NORMAL
BH BB PRODUCT CODE: NORMAL
BH BB UNIT NUMBER: NORMAL
BH BB UNIT NUMBER: NORMAL
BUN SERPL-MCNC: 10.4 MG/DL (ref 8–23)
BUN/CREAT SERPL: 20 (ref 7–25)
CALCIUM SPEC-SCNC: 8.9 MG/DL (ref 8.6–10.5)
CHLORIDE SERPL-SCNC: 99 MMOL/L (ref 98–107)
CO2 SERPL-SCNC: 23 MMOL/L (ref 22–29)
CREAT SERPL-MCNC: 0.52 MG/DL (ref 0.76–1.27)
CROSSMATCH INTERPRETATION: NORMAL
CROSSMATCH INTERPRETATION: NORMAL
DEPRECATED RDW RBC AUTO: 42.7 FL (ref 37–54)
DEVICE COMMENT: ABNORMAL
EGFRCR SERPLBLD CKD-EPI 2021: 111.9 ML/MIN/1.73
ERYTHROCYTE [DISTWIDTH] IN BLOOD BY AUTOMATED COUNT: 13.1 % (ref 12.3–15.4)
GLUCOSE BLDC GLUCOMTR-MCNC: 112 MG/DL (ref 70–130)
GLUCOSE BLDC GLUCOMTR-MCNC: 146 MG/DL (ref 70–130)
GLUCOSE BLDC GLUCOMTR-MCNC: 167 MG/DL (ref 70–130)
GLUCOSE BLDC GLUCOMTR-MCNC: 187 MG/DL (ref 70–130)
GLUCOSE SERPL-MCNC: 91 MG/DL (ref 65–99)
HCT VFR BLD AUTO: 29.6 % (ref 37.5–51)
HGB BLD-MCNC: 9.8 G/DL (ref 13–17.7)
MCH RBC QN AUTO: 30.2 PG (ref 26.6–33)
MCHC RBC AUTO-ENTMCNC: 33.1 G/DL (ref 31.5–35.7)
MCV RBC AUTO: 91.1 FL (ref 79–97)
PLATELET # BLD AUTO: 227 10*3/MM3 (ref 140–450)
PMV BLD AUTO: 10.3 FL (ref 6–12)
POTASSIUM SERPL-SCNC: 3.6 MMOL/L (ref 3.5–5.2)
RBC # BLD AUTO: 3.25 10*6/MM3 (ref 4.14–5.8)
SODIUM SERPL-SCNC: 136 MMOL/L (ref 136–145)
UNIT  ABO: NORMAL
UNIT  ABO: NORMAL
UNIT  RH: NORMAL
UNIT  RH: NORMAL
WBC NRBC COR # BLD AUTO: 11.5 10*3/MM3 (ref 3.4–10.8)

## 2025-07-04 PROCEDURE — 85027 COMPLETE CBC AUTOMATED: CPT | Performed by: THORACIC SURGERY (CARDIOTHORACIC VASCULAR SURGERY)

## 2025-07-04 PROCEDURE — 80048 BASIC METABOLIC PNL TOTAL CA: CPT | Performed by: THORACIC SURGERY (CARDIOTHORACIC VASCULAR SURGERY)

## 2025-07-04 PROCEDURE — 97116 GAIT TRAINING THERAPY: CPT

## 2025-07-04 PROCEDURE — 94664 DEMO&/EVAL PT USE INHALER: CPT

## 2025-07-04 PROCEDURE — 99231 SBSQ HOSP IP/OBS SF/LOW 25: CPT | Performed by: THORACIC SURGERY (CARDIOTHORACIC VASCULAR SURGERY)

## 2025-07-04 PROCEDURE — 82948 REAGENT STRIP/BLOOD GLUCOSE: CPT

## 2025-07-04 PROCEDURE — 94799 UNLISTED PULMONARY SVC/PX: CPT

## 2025-07-04 PROCEDURE — 82948 REAGENT STRIP/BLOOD GLUCOSE: CPT | Performed by: THORACIC SURGERY (CARDIOTHORACIC VASCULAR SURGERY)

## 2025-07-04 PROCEDURE — 71046 X-RAY EXAM CHEST 2 VIEWS: CPT

## 2025-07-04 PROCEDURE — 25010000002 ENOXAPARIN PER 10 MG: Performed by: THORACIC SURGERY (CARDIOTHORACIC VASCULAR SURGERY)

## 2025-07-04 PROCEDURE — 25010000002 FUROSEMIDE PER 20 MG: Performed by: NURSE PRACTITIONER

## 2025-07-04 RX ORDER — FUROSEMIDE 10 MG/ML
20 INJECTION INTRAMUSCULAR; INTRAVENOUS
Status: DISCONTINUED | OUTPATIENT
Start: 2025-07-04 | End: 2025-07-05

## 2025-07-04 RX ADMIN — BUSPIRONE HYDROCHLORIDE 10 MG: 10 TABLET ORAL at 20:39

## 2025-07-04 RX ADMIN — HYDROCODONE BITARTRATE AND ACETAMINOPHEN 1 TABLET: 10; 325 TABLET ORAL at 18:43

## 2025-07-04 RX ADMIN — BUDESONIDE AND FORMOTEROL FUMARATE DIHYDRATE 2 PUFF: 160; 4.5 AEROSOL RESPIRATORY (INHALATION) at 20:11

## 2025-07-04 RX ADMIN — HYDROCODONE BITARTRATE AND ACETAMINOPHEN 1 TABLET: 10; 325 TABLET ORAL at 04:00

## 2025-07-04 RX ADMIN — PREGABALIN 25 MG: 25 CAPSULE ORAL at 08:47

## 2025-07-04 RX ADMIN — POTASSIUM CHLORIDE 20 MEQ: 1500 TABLET, EXTENDED RELEASE ORAL at 08:47

## 2025-07-04 RX ADMIN — Medication 1 APPLICATION: at 20:39

## 2025-07-04 RX ADMIN — IPRATROPIUM BROMIDE AND ALBUTEROL SULFATE 1.5 ML: .5; 3 SOLUTION RESPIRATORY (INHALATION) at 06:11

## 2025-07-04 RX ADMIN — ROSUVASTATIN CALCIUM 5 MG: 5 TABLET, FILM COATED ORAL at 08:48

## 2025-07-04 RX ADMIN — BUDESONIDE AND FORMOTEROL FUMARATE DIHYDRATE 2 PUFF: 160; 4.5 AEROSOL RESPIRATORY (INHALATION) at 06:11

## 2025-07-04 RX ADMIN — POTASSIUM CHLORIDE 20 MEQ: 1500 TABLET, EXTENDED RELEASE ORAL at 18:20

## 2025-07-04 RX ADMIN — PREGABALIN 25 MG: 25 CAPSULE ORAL at 20:39

## 2025-07-04 RX ADMIN — IPRATROPIUM BROMIDE AND ALBUTEROL SULFATE 1.5 ML: .5; 3 SOLUTION RESPIRATORY (INHALATION) at 10:22

## 2025-07-04 RX ADMIN — FUROSEMIDE 20 MG: 10 INJECTION, SOLUTION INTRAMUSCULAR; INTRAVENOUS at 18:20

## 2025-07-04 RX ADMIN — EMPAGLIFLOZIN 10 MG: 10 TABLET, FILM COATED ORAL at 08:48

## 2025-07-04 RX ADMIN — IPRATROPIUM BROMIDE AND ALBUTEROL SULFATE 1.5 ML: .5; 3 SOLUTION RESPIRATORY (INHALATION) at 13:57

## 2025-07-04 RX ADMIN — IPRATROPIUM BROMIDE AND ALBUTEROL SULFATE 1.5 ML: .5; 3 SOLUTION RESPIRATORY (INHALATION) at 20:11

## 2025-07-04 RX ADMIN — POLYETHYLENE GLYCOL 3350 17 G: 17 POWDER, FOR SOLUTION ORAL at 08:49

## 2025-07-04 RX ADMIN — HYDROCODONE BITARTRATE AND ACETAMINOPHEN 1 TABLET: 10; 325 TABLET ORAL at 11:49

## 2025-07-04 RX ADMIN — METOPROLOL SUCCINATE 25 MG: 25 TABLET, EXTENDED RELEASE ORAL at 08:48

## 2025-07-04 RX ADMIN — ISOSORBIDE MONONITRATE 60 MG: 60 TABLET, EXTENDED RELEASE ORAL at 08:47

## 2025-07-04 RX ADMIN — BUSPIRONE HYDROCHLORIDE 10 MG: 10 TABLET ORAL at 08:47

## 2025-07-04 RX ADMIN — Medication 1 APPLICATION: at 08:49

## 2025-07-04 RX ADMIN — Medication 1 TABLET: at 08:48

## 2025-07-04 RX ADMIN — TERAZOSIN HYDROCHLORIDE 5 MG: 5 CAPSULE ORAL at 20:39

## 2025-07-04 RX ADMIN — TAMSULOSIN HYDROCHLORIDE 0.4 MG: 0.4 CAPSULE ORAL at 08:47

## 2025-07-04 RX ADMIN — FUROSEMIDE 20 MG: 10 INJECTION, SOLUTION INTRAMUSCULAR; INTRAVENOUS at 08:48

## 2025-07-04 RX ADMIN — CITALOPRAM 20 MG: 20 TABLET, FILM COATED ORAL at 08:48

## 2025-07-04 RX ADMIN — ENOXAPARIN SODIUM 40 MG: 100 INJECTION SUBCUTANEOUS at 08:48

## 2025-07-04 RX ADMIN — CLOPIDOGREL BISULFATE 75 MG: 75 TABLET, FILM COATED ORAL at 08:47

## 2025-07-04 RX ADMIN — ASPIRIN 81 MG: 81 TABLET, COATED ORAL at 08:47

## 2025-07-04 NOTE — PLAN OF CARE
Goal Outcome Evaluation:  Plan of Care Reviewed With: patient, spouse        Progress: improving  Outcome Evaluation: Pt AxO sitting up in chair most of the day. Tolerated meals and medications well. WBC dropping since yesterday. C/O pain on left side/flank where the chest tube was. Other than that, no other c/o discomfort. PRN Norco given per order. Pt. left O2 off for most of the day. O2 sats staying between 90-93 on room air. O2 drops into low 80s when ambulating. VSS. Call light in reach. Safety maintained.

## 2025-07-04 NOTE — PLAN OF CARE
Goal Outcome Evaluation:           Progress: improving  Outcome Evaluation: Pt A&Ox4 with tele reading SR/ST , PVC-BBB. C/o 9/10 pain to L lateral chest s/p recent pleural tube removal. PRN Norco administered x2 this shift with effective results voiced by pt. VSS. O2 decreased from 5L to 4L O2 sat 90% on 4L. x1 assist with walker. Safety maintained.

## 2025-07-04 NOTE — PROGRESS NOTES
"Transapical transcatheter aortic valve replacement with 20 mm S3 ultra with Resilia    POD 3    CC: shortness of breath.     Patient transferred to .  He is sitting up in bed eating breakfast.  O2 sat 91% on 3.5 L nasal cannula, heart rate 107.  He rates chest pain 4-5/10 and back pain 7/10.  He has had a bowel movement.  Limited echo performed yesterday was reviewed.      ROS: (+) left chest pain, afebrile, (+) chronic back pain    IV gtts: None  Telemetry: Sinus 90s, PVCs    Visit Vitals  /70 (BP Location: Left arm, Patient Position: Sitting)   Pulse 110   Temp 97.6 °F (36.4 °C) (Oral)   Resp 16   Ht 160 cm (63\")   Wt 52.3 kg (115 lb 3.2 oz)   SpO2 91%   BMI 20.41 kg/m²       Intake/Output Summary (Last 24 hours) at 7/4/2025 1039  Last data filed at 7/4/2025 0942  Gross per 24 hour   Intake 240 ml   Output 2425 ml   Net -2185 ml       Labs:  Basic Metabolic Panel    Sodium Sodium   Date Value Ref Range Status   07/04/2025 136 136 - 145 mmol/L Final   07/03/2025 133 (L) 136 - 145 mmol/L Final   07/02/2025 137 136 - 145 mmol/L Final   07/01/2025 137 136 - 145 mmol/L Final      Potassium Potassium   Date Value Ref Range Status   07/04/2025 3.6 3.5 - 5.2 mmol/L Final   07/03/2025 4.3 3.5 - 5.2 mmol/L Final   07/02/2025 3.7 3.5 - 5.2 mmol/L Final   07/01/2025 3.5 3.5 - 5.2 mmol/L Final   07/01/2025 3.6 3.5 - 5.2 mmol/L Final      Chloride Chloride   Date Value Ref Range Status   07/04/2025 99 98 - 107 mmol/L Final   07/03/2025 101 98 - 107 mmol/L Final   07/02/2025 103 98 - 107 mmol/L Final   07/01/2025 104 98 - 107 mmol/L Final      Bicarbonate No results found for: \"PLASMABICARB\"   BUN BUN   Date Value Ref Range Status   07/04/2025 10.4 8.0 - 23.0 mg/dL Final   07/03/2025 13.8 8.0 - 23.0 mg/dL Final   07/02/2025 10.6 8.0 - 23.0 mg/dL Final   07/01/2025 14.3 8.0 - 23.0 mg/dL Final      Creatinine Creatinine   Date Value Ref Range Status   07/04/2025 0.52 (L) 0.76 - 1.27 mg/dL Final   07/03/2025 0.59 (L) 0.76 - " "1.27 mg/dL Final   07/02/2025 0.56 (L) 0.76 - 1.27 mg/dL Final   07/01/2025 0.80 0.76 - 1.27 mg/dL Final      Calcium Calcium   Date Value Ref Range Status   07/04/2025 8.9 8.6 - 10.5 mg/dL Final   07/03/2025 8.9 8.6 - 10.5 mg/dL Final   07/02/2025 8.6 8.6 - 10.5 mg/dL Final   07/01/2025 8.5 (L) 8.6 - 10.5 mg/dL Final      Glucose      No components found for: \"GLUCOSE.*\"     WBC   Date Value Ref Range Status   07/04/2025 11.50 (H) 3.40 - 10.80 10*3/mm3 Final     RBC   Date Value Ref Range Status   07/04/2025 3.25 (L) 4.14 - 5.80 10*6/mm3 Final     Hemoglobin   Date Value Ref Range Status   07/04/2025 9.8 (L) 13.0 - 17.7 g/dL Final     Hematocrit   Date Value Ref Range Status   07/04/2025 29.6 (L) 37.5 - 51.0 % Final     MCV   Date Value Ref Range Status   07/04/2025 91.1 79.0 - 97.0 fL Final     MCH   Date Value Ref Range Status   07/04/2025 30.2 26.6 - 33.0 pg Final     MCHC   Date Value Ref Range Status   07/04/2025 33.1 31.5 - 35.7 g/dL Final     RDW   Date Value Ref Range Status   07/04/2025 13.1 12.3 - 15.4 % Final     RDW-SD   Date Value Ref Range Status   07/04/2025 42.7 37.0 - 54.0 fl Final     MPV   Date Value Ref Range Status   07/04/2025 10.3 6.0 - 12.0 fL Final     Platelets   Date Value Ref Range Status   07/04/2025 227 140 - 450 10*3/mm3 Final     EXAMINATION: XR CHEST PA AND LATERAL-  7/4/2025 6:18 AM 2 views     HISTORY: Transapical TAVR, status post removal of left chest tube;  Z74.09-Other reduced mobility; I35.0-Nonrheumatic aortic (valve)  stenosis; Z95.3-Presence of xenogenic heart valve; Z95.2-Presence of  prosthetic heart valve     COMPARISON: 7/3/2025     TECHNIQUE: Frontal and lateral views of the chest were obtained.     FINDINGS:  No definite pneumothorax on this examination. Emphysema and chronic  interstitial changes again noted with linear and patchy opacities in  both lung bases, likely related to atelectasis. Median sternotomy wires,  atrial appendage clip and changes of TAVR are " noted. Aeration at the  LEFT lung base is actually slightly improved since the prior study. Old  nonunion fracture of the distal clavicle on the RIGHT. Degenerative  changes in the thoracic spine with some mild chronic vertebral wedging  which is unchanged from the preoperative CT.         Impression:        1.  Extensive necrotic changes of emphysema with improving aeration of  the LEFT lung base and postoperative changes as discussed above. No new  acute findings.         Adult Transthoracic Echo Limited W/ Cont if Necessary Per Protocol (07/02/2025 10:54)   Interpretation Summary         The study is technically difficult for diagnosis    Left ventricular systolic function is normal.    There is a 20 mm Juarez S3 Ultra in place with no evidence of transvalvular or paravalvular regurgitation and a mean gradient of 16 mmHg.      Physical Exam:  General: No apparent distress.Up in the chair.   Cardiovascular: Regular rate and rhythm without murmur, rubs, or gallops.    Pulmonary: Clear to auscultation bilaterally without wheezing, rubs, or rales. On Vapotherm.   Chest: left sided thoracic incisions clean, dry, intact.    Abdomen: Soft, non distended and non tender.  Extremities: Warm, moves all extremities. No edema. Right groin site clean and dry. No bleeding. No hematoma.   Neurologic: Grossly intact with no focal deficits.       Impression:  Aortic valve stenosis status post aortic valve replacement in 2018 now status post TAVR  CAD status post CABG  Aortoiliac occlusive disease  Acute on chronic diastolic congestive heart failure  Peripheral arterial disease  History of stroke  COPD  Former smoker    Plan:  Begin Lasix 20 mg IV twice daily, potassium chloride 20 mEq p.o. twice daily  No free water fluid restriction.  Continue to titrate oxygen.  Encourage pulmonary toilet and ambulation.   Discussed with patient and nursing    Nely Lopez, KIRBY  7/4/2025  10:44 CDT

## 2025-07-04 NOTE — THERAPY TREATMENT NOTE
Acute Care - Physical Therapy Treatment Note  [facility]     Patient Name: Sharad Ryder  : 1960  MRN: [id number]  Today's Date: 2025      Visit Dx:     ICD-10-CM ICD-9-CM   1. Impaired mobility [Z74.09]  Z74.09 799.89   2. Severe aortic stenosis  I35.0 424.1   3. Status post aortic valve replacement with bioprosthetic valve  Z95.3 V42.2   4. S/P TAVR (transcatheter aortic valve replacement)  Z95.2 V43.3     Patient Active Problem List   Diagnosis    Hyperlipidemia LDL goal <70    Primary hypertension    COPD (chronic obstructive pulmonary disease)    Transient ischemic attack (TIA)    Allergic reaction    Coronary artery disease involving coronary bypass graft of native heart without angina pectoris    PVC (premature ventricular contraction)    Leukocytosis    History of CVA (cerebrovascular accident)    Right sided weakness resolved    Acute CVA (cerebrovascular accident)    Right-sided carotid artery disease    S/P CABG x 3  Dr Robin     Status post aortic valve replacement with bioprosthetic valve    LVH (left ventricular hypertrophy)    History of right-sided carotid endarterectomy    Tobacco use    Chronic stable angina    PVD (peripheral vascular disease) with claudication    Aortoiliac occlusive disease    Preop testing    PAD (peripheral artery disease)    Acute exacerbation of chronic obstructive pulmonary disease (COPD)    COVID-19 virus infection    Elevated troponin level not due myocardial infarction    Acute on chronic diastolic CHF (congestive heart failure)    Severe aortic stenosis    Diastolic dysfunction    Acute on chronic respiratory failure with hypoxia    Moderate protein-calorie malnutrition    COPD with acute exacerbation    Acute on chronic heart failure with preserved ejection fraction (HFpEF)    Viral pneumonia    Chronic heart failure with preserved ejection fraction (HFpEF)    Aortic stenosis     Past Medical History:   Diagnosis Date    Aneurysm     Anxiety      Arthritis     Carotid artery disease     Carotid stenosis, right 02/01/2018    Post right carotid endarterectomy    COPD (chronic obstructive pulmonary disease)     Coronary artery disease     Heart murmur     History of right-sided carotid endarterectomy 02/16/2022    Hyperlipidemia LDL goal <70 12/14/2017    Left frontal lobe, right thalamus and right occipital CVA (cerebrovascular accident) (HCC) 01/27/2022    LVH (left ventricular hypertrophy) 02/16/2022    Pneumonia     Primary hypertension 12/14/2017    S/P CABG x 3 02/16/2022    LIMA to LAD, SVG to PDA and SVG to diagonal branch with TMR and left atrial appendage exclusion with atricure clip device 2/8/2018 per Dr. Cj Robin at [facility]     Severe aortic valve stenosis 12/14/2017    Status post aortic valve replacement with bioprosthetic valve 02/16/2022    Graciela Juarez bovine pericardial 19 mm valve 2/8/2018 per Dr. Cj Robin at [facility]    Tobacco use 12/14/2017    Wears glasses      Past Surgical History:   Procedure Laterality Date    AORTOGRAM Bilateral 10/27/2023    Procedure: LEFT LOWER EXTREMITY ANGIOGRAM, BILATERAL ILIAC BALLOON ANGIOPLASTY, BILATERAL ILIAC STENT PLACEMENT, MYNX CLOSURE;  Surgeon: Jl Salgado DO;  Location: [facility] OR ;  Service: Vascular;  Laterality: Bilateral;    APPENDECTOMY      CARDIAC CATHETERIZATION N/A 12/18/2017    Procedure: Left Heart Cath;  Surgeon: Aime Francois MD;  Location:  [facility] CATH INVASIVE LOCATION;  Service:     CARDIAC CATHETERIZATION N/A 12/18/2017    Procedure: Right Heart Cath;  Surgeon: Aime Francois MD;  Location:  [facility] CATH INVASIVE LOC[facility];  Service:     CARDIAC CATHETERIZATION Bilateral 1/4/2018    Procedure: Coronary angiography;  Surgeon: Alfonso Yi MD;  Location:  [facility] CATH INVASIVE LOC[facility];  Service:     CARDIAC CATHETERIZATION Left 9/15/2021    Procedure: Cardiac Catheterization/Vascular Study NIMCO OK  Has 3 graft 2018;  Surgeon:  Aime Francois MD;  Location:  [facility] CATH INVASIVE LOC[facility];  Service: Cardiology;  Laterality: Left;    CARDIAC CATHETERIZATION Left 5/8/2025    Procedure: Cardiac Catheterization/Vascular Study;  Surgeon: Aime Francois MD;  Location:  [facility] CATH INVASIVE [facility];  Service: Cardiology;  Laterality: Left;  anesthesia to sedate due to sever back pain    CAROTID ENDARTERECTOMY Right 2/1/2018    Procedure: RIGHT CAROTID ENDARTERECTOMY WITH EEG-awake;  Surgeon: Jl Salgado DO;  Location:  [facility] OR;  Service:     CORONARY ARTERY BYPASS GRAFT WITH AORTIC VALVE REPAIR/REPLACEMENT N/A 2/8/2018    Procedure: AORTIC VALVE REPLACEMENT,CORONARY ARTERY BYPASS GRAFTING x 3  WITH CONCHA AND RIGHT EVH, ATRIAL APPENDAGE EXCLUSION LEFT WITH TRANSESOPHAGEAL ECHOCARDIOGRAM, 17-CHANNEL TMR;  Surgeon: Cj Robin MD;  Location:  [facility] OR;  Service:     FINGER SURGERY Right 1990    OTHER SURGICAL HISTORY      skull srgery from accident in childhood.     PT Assessment (Last 12 Hours)       PT Evaluation and Treatment       Row Name 07/04/25 1111          Physical Therapy Time and Intention    Subjective Information complains of;pain  -NHUNG     Document Type therapy note (daily note)  -     Mode of Treatment physical therapy  -       Row Name 07/04/25 1111          General Information    Existing Precautions/Restrictions fall  -       Row Name 07/04/25 1111          Pain    Pretreatment Pain Rating 5/10  -NHUNG     Posttreatment Pain Rating 5/10  -NHUNG     Pain Location back  -       Row Name 07/04/25 1111          Sit-Stand Transfer    Sit-Stand [place] (Transfers) verbal cues;contact guard  -       Row Name 07/04/25 1111          Shower Transfer    Type (Shower Transfer) stand-sit  -     [place] Level (Shower Transfer) stand by assist  -     Assistive Device (Shower Transfer) shower chair  -       Row Name 07/04/25 1111          Gait/Stairs (Locomotion)    [place] Level (Gait) contact guard;minimum assist  (75% patient effort)  -NHUNG     Distance in Feet (Gait) 220  -NHUNG     Pattern (Gait) --  HH  -NHUNG     Deviations/Abnormal Patterns (Gait) gait speed decreased;stride length decreased  -NHUNG     Bilateral Gait Deviations forward flexed posture  -NHUNG       Row Name             Wound 07/01/25 0858 Left lower chest    Wound - Properties Group Placement Date: 07/01/25  -KM Placement Time: 0858  -KM Present on Original Admission: N  -KM Side: Left  -KM Orientation: lower  -KM Location: chest  -KM    Retired Wound - Properties Group Placement Date: 07/01/25  -KM Placement Time: 0858  -KM Present on Original Admission: N  -KM Side: Left  -KM Orientation: lower  -KM Location: chest  -KM    Retired Wound - Properties Group Placement Date: 07/01/25  -KM Placement Time: 0858  -KM Present on Original Admission: N  -KM Side: Left  -KM Orientation: lower  -KM Location: chest  -KM    Retired Wound - Properties Group Date first assessed: 07/01/25  -KM Time first assessed: 0858  -KM Present on Original Admission: N  -KM Side: Left  -KM Location: chest  -KM      Row Name 07/04/25 1111          Positioning and Restraints    Pre-Treatment Position sitting in chair/recliner  -NHUNG     Post Treatment Position bathroom  -NHUNG     Bathroom sitting;with nsg  shower chair  -NHUNG               User Key  (r) = Recorded By, (t) = Taken By, (c) = Cosigned By      Initials Name Provider Type    Abby Shirley PTA Physical Therapist Assistant     Saurabh Prieto, RN Registered Nurse                    Physical Therapy Education       Title: PT OT SLP Therapies (In Progress)       Topic: Physical Therapy (Done)       Point: Mobility training (Done)       Learning Progress Summary            Patient Acceptance, E,TB, VU,DU by  at 7/3/2025 1017    Comment: benefits of PT and mobility                      Point: Home exercise program (Done)       Learning Progress Summary            Patient Acceptance, E,TB, VU,DU by  at 7/3/2025 1017    Comment:  benefits of PT and mobility                      Point: Body mechanics (Done)       Learning Progress Summary            Patient Acceptance, E,TB, VU,DU by  at 7/3/2025 1017    Comment: benefits of PT and mobility                      Point: Precautions (Done)       Learning Progress Summary            Patient Acceptance, E,TB, VU,DU by  at 7/3/2025 1017    Comment: benefits of PT and mobility                                      User Key       Initials Effective Dates Name Provider Type Discipline     04/21/25 -  Zaria Varela PT Student PT Student PT                  PT Recommendation and Plan             Time Calculation:    PT Charges       Row Name 07/04/25 1224             Time Calculation    Start Time 1111  -NHUNG      Stop Time 1126  -NHUNG      Time Calculation (min) 15 min  -NHUNG         Timed Charges    18907 - Gait Training Minutes  15  -NUHNG         Total Minutes    Timed Charges Total Minutes 15  -NHUNG       Total Minutes 15  -NHUNG                User Key  (r) = Recorded By, (t) = Taken By, (c) = Cosigned By      Initials Name Provider Type    Abby Shirley PTA Physical Therapist Assistant                  Therapy Charges for Today       Code Description Service Date Service Provider Modifiers Qty    61484519167 HC GAIT TRAINING EA 15 MIN 7/4/2025 Abby Corona PTA GP 1            PT G-Codes  Outcome Measure Options: AM-PAC 6 Clicks Daily Activity (OT)  AM-PAC 6 Clicks Score (PT): 18  AM-PAC 6 Clicks Score (OT): 16    Abby Corona PTA  7/4/2025

## 2025-07-04 NOTE — PLAN OF CARE
Goal Outcome Evaluation:   Patient stood and ambulated 220' with minimal assist x1. Patient has forward flexed trunk, decreased step length.

## 2025-07-04 NOTE — PLAN OF CARE
Goal Outcome Evaluation:      Patient is independent in bed transfers. Stands and sits with supervision. Patient ambulated 220' with CGA. RA SATs 95 -93%. Tolerated walk very well.

## 2025-07-05 VITALS
OXYGEN SATURATION: 95 % | BODY MASS INDEX: 20.41 KG/M2 | DIASTOLIC BLOOD PRESSURE: 72 MMHG | HEART RATE: 94 BPM | HEIGHT: 63 IN | SYSTOLIC BLOOD PRESSURE: 117 MMHG | WEIGHT: 115.2 LBS | RESPIRATION RATE: 16 BRPM | TEMPERATURE: 98 F

## 2025-07-05 LAB
ANION GAP SERPL CALCULATED.3IONS-SCNC: 13 MMOL/L (ref 5–15)
BUN SERPL-MCNC: 15.3 MG/DL (ref 8–23)
BUN/CREAT SERPL: 25.5 (ref 7–25)
CALCIUM SPEC-SCNC: 9.6 MG/DL (ref 8.6–10.5)
CHLORIDE SERPL-SCNC: 95 MMOL/L (ref 98–107)
CO2 SERPL-SCNC: 25 MMOL/L (ref 22–29)
CREAT SERPL-MCNC: 0.6 MG/DL (ref 0.76–1.27)
EGFRCR SERPLBLD CKD-EPI 2021: 107.1 ML/MIN/1.73
GLUCOSE SERPL-MCNC: 130 MG/DL (ref 65–99)
POTASSIUM SERPL-SCNC: 3.5 MMOL/L (ref 3.5–5.2)
SODIUM SERPL-SCNC: 133 MMOL/L (ref 136–145)

## 2025-07-05 PROCEDURE — 99232 SBSQ HOSP IP/OBS MODERATE 35: CPT | Performed by: NURSE PRACTITIONER

## 2025-07-05 PROCEDURE — 94799 UNLISTED PULMONARY SVC/PX: CPT

## 2025-07-05 PROCEDURE — 97116 GAIT TRAINING THERAPY: CPT

## 2025-07-05 PROCEDURE — 99231 SBSQ HOSP IP/OBS SF/LOW 25: CPT | Performed by: THORACIC SURGERY (CARDIOTHORACIC VASCULAR SURGERY)

## 2025-07-05 PROCEDURE — 25010000002 FUROSEMIDE PER 20 MG: Performed by: NURSE PRACTITIONER

## 2025-07-05 PROCEDURE — 94664 DEMO&/EVAL PT USE INHALER: CPT

## 2025-07-05 PROCEDURE — 80048 BASIC METABOLIC PNL TOTAL CA: CPT | Performed by: NURSE PRACTITIONER

## 2025-07-05 PROCEDURE — 25010000002 ENOXAPARIN PER 10 MG: Performed by: THORACIC SURGERY (CARDIOTHORACIC VASCULAR SURGERY)

## 2025-07-05 RX ORDER — AMLODIPINE BESYLATE 5 MG/1
5 TABLET ORAL DAILY
Qty: 30 TABLET | Refills: 2 | Status: SHIPPED | OUTPATIENT
Start: 2025-07-05

## 2025-07-05 RX ORDER — HYDROCODONE BITARTRATE AND ACETAMINOPHEN 5; 325 MG/1; MG/1
1 TABLET ORAL EVERY 8 HOURS PRN
Qty: 10 TABLET | Refills: 0 | Status: SHIPPED | OUTPATIENT
Start: 2025-07-05

## 2025-07-05 RX ORDER — ISOSORBIDE MONONITRATE 60 MG/1
60 TABLET, EXTENDED RELEASE ORAL DAILY
Qty: 90 TABLET | Refills: 2 | Status: SHIPPED | OUTPATIENT
Start: 2025-07-06

## 2025-07-05 RX ORDER — FUROSEMIDE 20 MG/1
20 TABLET ORAL DAILY
Status: DISCONTINUED | OUTPATIENT
Start: 2025-07-06 | End: 2025-07-05 | Stop reason: HOSPADM

## 2025-07-05 RX ADMIN — BUSPIRONE HYDROCHLORIDE 10 MG: 10 TABLET ORAL at 08:13

## 2025-07-05 RX ADMIN — POLYETHYLENE GLYCOL 3350 17 G: 17 POWDER, FOR SOLUTION ORAL at 08:13

## 2025-07-05 RX ADMIN — ASPIRIN 81 MG: 81 TABLET, COATED ORAL at 08:13

## 2025-07-05 RX ADMIN — ISOSORBIDE MONONITRATE 60 MG: 60 TABLET, EXTENDED RELEASE ORAL at 08:13

## 2025-07-05 RX ADMIN — EMPAGLIFLOZIN 10 MG: 10 TABLET, FILM COATED ORAL at 08:13

## 2025-07-05 RX ADMIN — FUROSEMIDE 20 MG: 10 INJECTION, SOLUTION INTRAMUSCULAR; INTRAVENOUS at 08:13

## 2025-07-05 RX ADMIN — ENOXAPARIN SODIUM 40 MG: 100 INJECTION SUBCUTANEOUS at 08:14

## 2025-07-05 RX ADMIN — Medication 1 TABLET: at 08:13

## 2025-07-05 RX ADMIN — BUDESONIDE AND FORMOTEROL FUMARATE DIHYDRATE 2 PUFF: 160; 4.5 AEROSOL RESPIRATORY (INHALATION) at 06:02

## 2025-07-05 RX ADMIN — METOPROLOL SUCCINATE 25 MG: 25 TABLET, EXTENDED RELEASE ORAL at 08:12

## 2025-07-05 RX ADMIN — ROSUVASTATIN CALCIUM 5 MG: 5 TABLET, FILM COATED ORAL at 08:13

## 2025-07-05 RX ADMIN — HYDROCODONE BITARTRATE AND ACETAMINOPHEN 1 TABLET: 5; 325 TABLET ORAL at 05:23

## 2025-07-05 RX ADMIN — IPRATROPIUM BROMIDE AND ALBUTEROL SULFATE 1.5 ML: .5; 3 SOLUTION RESPIRATORY (INHALATION) at 09:51

## 2025-07-05 RX ADMIN — TAMSULOSIN HYDROCHLORIDE 0.4 MG: 0.4 CAPSULE ORAL at 08:13

## 2025-07-05 RX ADMIN — IPRATROPIUM BROMIDE AND ALBUTEROL SULFATE 1.5 ML: .5; 3 SOLUTION RESPIRATORY (INHALATION) at 13:13

## 2025-07-05 RX ADMIN — PREGABALIN 25 MG: 25 CAPSULE ORAL at 08:13

## 2025-07-05 RX ADMIN — CITALOPRAM 20 MG: 20 TABLET, FILM COATED ORAL at 08:13

## 2025-07-05 RX ADMIN — CLOPIDOGREL BISULFATE 75 MG: 75 TABLET, FILM COATED ORAL at 08:13

## 2025-07-05 RX ADMIN — POTASSIUM CHLORIDE 20 MEQ: 1500 TABLET, EXTENDED RELEASE ORAL at 08:13

## 2025-07-05 RX ADMIN — IPRATROPIUM BROMIDE AND ALBUTEROL SULFATE 1.5 ML: .5; 3 SOLUTION RESPIRATORY (INHALATION) at 05:55

## 2025-07-05 NOTE — PROGRESS NOTES
[facility] -  Progress Note     LOS: 4 days   Patient Care Team:  Kerry Mtz APRN as PCP - General (Urgent Care)  Aime Francois MD as Cardiologist (Cardiology)  Nila Alexander PA-C as Referring Physician (Physician Assistant)  Cj Robin MD as Surgeon (Cardiothoracic Surgery)    Chief Complaint:TAVR f/u    Subjective     Interval History:   Sitting in the chair and hopeful to go home today after BM. Denies chest pain, dyspnea and palpitations.         Review of Systems:   Review of Systems   Constitutional:  Negative for chills, diaphoresis, fatigue and unexpected weight change.   HENT:  Negative for nosebleeds.    Respiratory:  Negative for cough, chest tightness, shortness of breath and wheezing.    Cardiovascular:  Negative for chest pain, palpitations and leg swelling.   Gastrointestinal:  Negative for anal bleeding, blood in stool, diarrhea and nausea.   Neurological:  Negative for dizziness, syncope and light-headedness.   All other systems reviewed and are negative.      Objective     Vital Sign Min/Max for last 24 hours  Temp  Min: 97.8 °F (36.6 °C)  Max: 98.7 °F (37.1 °C)   BP  Min: 112/55  Max: 154/89   Pulse  Min: 96  Max: 106   Resp  Min: 16  Max: 18   SpO2  Min: 88 %  Max: 100 %   No data recorded   No data recorded         07/03/25  1748   Weight: 52.3 kg (115 lb 3.2 oz)         Intake/Output Summary (Last 24 hours) at 7/5/2025 1029  Last data filed at 7/5/2025 0900  Gross per 24 hour   Intake 680 ml   Output 4300 ml   Net -3620 ml         Physical Exam:  Vitals reviewed.   Constitutional:       General: Not in acute distress.     Appearance: Healthy appearance. Well-developed. Not diaphoretic.   Eyes:      General: No scleral icterus.     Conjunctiva/sclera: Conjunctivae normal.      Pupils: Pupils are equal, round, and reactive to light.   HENT:      Head: Normocephalic.    Mouth/Throat:      Pharynx: No oropharyngeal exudate.   Neck:      Vascular: No JVR.    Pulmonary:      Effort: Pulmonary effort is normal. No respiratory distress.      Breath sounds: Normal breath sounds. No wheezing. No rhonchi. No rales.   Chest:      Chest wall: Not tender to palpatation.   Cardiovascular:      Normal rate. Regular rhythm.      Murmurs: There is a grade 1/6 systolic murmur.   Pulses:     Intact distal pulses.   Edema:     Peripheral edema absent.   Abdominal:      General: Bowel sounds are normal. There is no distension.      Palpations: Abdomen is soft.      Tenderness: There is no abdominal tenderness.   Musculoskeletal: Normal range of motion.      Cervical back: Normal range of motion and neck supple. Skin:     General: Skin is warm and dry.      Coloration: Skin is not pale.      Findings: No erythema or rash.   Neurological:      Mental Status: Alert, oriented to person, place, and time and oriented to person, place and time.      Deep Tendon Reflexes: Reflexes are normal and symmetric.   Psychiatric:         Behavior: Behavior normal.          Results Review:   Lab Results (last 72 hours)       Procedure Component Value Units Date/Time    Basic Metabolic Panel [[id number]]  (Abnormal) Collected: 07/05/25 0624    Specimen: Blood Updated: 07/05/25 0653     Glucose 130 mg/dL      BUN 15.3 mg/dL      Creatinine 0.60 mg/dL      Sodium 133 mmol/L      Potassium 3.5 mmol/L      Chloride 95 mmol/L      CO2 25.0 mmol/L      Calcium 9.6 mg/dL      BUN/Creatinine Ratio 25.5     Anion Gap 13.0 mmol/L      eGFR 107.1 mL/min/1.73     Narrative:      GFR Categories in Chronic Kidney Disease (CKD)              GFR Category          GFR (mL/min/1.73)    Interpretation  G1                    90 or greater        Normal or high (1)  G2                    60-89                Mild decrease (1)  G3a                   45-59                Mild to moderate decrease  G3b                   30-44                Moderate to severe decrease  G4                    15-29                Severe  decrease  G5                    14 or less           Kidney failure    (1)In the absence of evidence of kidney disease, neither GFR category G1 or G2 fulfill the criteria for CKD.    eGFR calculation 2021 CKD-EPI creatinine equation, which does not include race as a factor    POC Glucose Once [[id number]]  (Abnormal) Collected: 07/04/25 2003    Specimen: Blood Updated: 07/04/25 2007     Glucose 187 mg/dL      Comment: Serial Number: [id number]:  [id number]       POC Glucose Once [[id number]]  (Abnormal) Collected: 07/04/25 1632    Specimen: Blood Updated: 07/04/25 1637     Glucose 146 mg/dL      Comment: Serial Number: [id number]:  [id number]        Device Comment Notified Patients RN    POC Glucose Once [[id number]]  (Abnormal) Collected: 07/04/25 1051    Specimen: Blood Updated: 07/04/25 1128     Glucose 167 mg/dL      Comment: Serial Number: [id number]:  [id number]       POC Glucose 4x Daily Before Meals & at Bedtime [[id number]]  (Normal) Collected: 07/04/25 0802    Specimen: Blood Updated: 07/04/25 0808     Glucose 112 mg/dL      Comment: Serial Number: [id number]:  [id number]       Basic Metabolic Panel [[id number]]  (Abnormal) Collected: 07/04/25 0440    Specimen: Blood Updated: 07/04/25 0556     Glucose 91 mg/dL      BUN 10.4 mg/dL      Creatinine 0.52 mg/dL      Sodium 136 mmol/L      Potassium 3.6 mmol/L      Chloride 99 mmol/L      CO2 23.0 mmol/L      Calcium 8.9 mg/dL      BUN/Creatinine Ratio 20.0     Anion Gap 14.0 mmol/L      eGFR 111.9 mL/min/1.73     Narrative:      GFR Categories in Chronic Kidney Disease (CKD)              GFR Category          GFR (mL/min/1.73)    Interpretation  G1                    90 or greater        Normal or high (1)  G2                    60-89                Mild decrease (1)  G3a                   45-59                Mild to moderate decrease  G3b                   30-44                Moderate to severe decrease  G4                    15-29                 Severe decrease  G5                    14 or less           Kidney failure    (1)In the absence of evidence of kidney disease, neither GFR category G1 or G2 fulfill the criteria for CKD.    eGFR calculation 2021 CKD-EPI creatinine equation, which does not include race as a factor    CBC (No Diff) [[id number]]  (Abnormal) Collected: 07/04/25 0440    Specimen: Blood Updated: 07/04/25 0535     WBC 11.50 10*3/mm3      RBC 3.25 10*6/mm3      Hemoglobin 9.8 g/dL      Hematocrit 29.6 %      MCV 91.1 fL      MCH 30.2 pg      MCHC 33.1 g/dL      RDW 13.1 %      RDW-SD 42.7 fl      MPV 10.3 fL      Platelets 227 10*3/mm3     POC Glucose Once [[id number]] Collected: 07/03/25 1928    Specimen: Blood Updated: 07/03/25 1930     Glucose 110 mg/dL      Comment: Serial Number: [id number]:  [id number]        Device Comment Notified Patients RN    POC Glucose 4x Daily Before Meals & at Bedtime [[id number]]  (Normal) Collected: 07/03/25 1139    Specimen: Blood Updated: 07/03/25 1141     Glucose 121 mg/dL      Comment: Serial Number: [id number]:  [id number]       POC Glucose 4x Daily Before Meals & at Bedtime [[id number]]  (Normal) Collected: 07/03/25 0757    Specimen: Blood Updated: 07/03/25 0759     Glucose 107 mg/dL      Comment: Serial Number: [id number]:  [id number]       Basic Metabolic Panel [[id number]]  (Abnormal) Collected: 07/03/25 0231    Specimen: Blood Updated: 07/03/25 0300     Glucose 115 mg/dL      BUN 13.8 mg/dL      Creatinine 0.59 mg/dL      Sodium 133 mmol/L      Potassium 4.3 mmol/L      Chloride 101 mmol/L      CO2 20.0 mmol/L      Calcium 8.9 mg/dL      BUN/Creatinine Ratio 23.4     Anion Gap 12.0 mmol/L      eGFR 107.7 mL/min/1.73     Narrative:      GFR Categories in Chronic Kidney Disease (CKD)              GFR Category          GFR (mL/min/1.73)    Interpretation  G1                    90 or greater        Normal or high (1)  G2                    60-89                Mild decrease  (1)  G3a                   45-59                Mild to moderate decrease  G3b                   30-44                Moderate to severe decrease  G4                    15-29                Severe decrease  G5                    14 or less           Kidney failure    (1)In the absence of evidence of kidney disease, neither GFR category G1 or G2 fulfill the criteria for CKD.    eGFR calculation 2021 CKD-EPI creatinine equation, which does not include race as a factor    CBC (No Diff) [[id number]]  (Abnormal) Collected: 07/03/25 0231    Specimen: Blood Updated: 07/03/25 0244     WBC 13.98 10*3/mm3      RBC 3.18 10*6/mm3      Hemoglobin 9.4 g/dL      Hematocrit 28.4 %      MCV 89.3 fL      MCH 29.6 pg      MCHC 33.1 g/dL      RDW 12.8 %      RDW-SD 42.2 fl      MPV 9.6 fL      Platelets 203 10*3/mm3     POC Glucose 4x Daily Before Meals & at Bedtime [[id number]]  (Normal) Collected: 07/02/25 2309    Specimen: Blood Updated: 07/02/25 2310     Glucose 126 mg/dL      Comment: Serial Number: [id number]:  [id number]       POC Glucose 4x Daily Before Meals & at Bedtime [[id number]]  (Abnormal) Collected: 07/02/25 1138    Specimen: Blood Updated: 07/02/25 1140     Glucose 139 mg/dL      Comment: Serial Number: [id number]:  [id number]                Results for orders placed during the hospital encounter of 07/01/25    Adult Transthoracic Echo Limited W/ Cont if Necessary Per Protocol 07/03/2025 10:55 AM    Interpretation Summary    The study is technically difficult for diagnosis    Left ventricular systolic function is normal.    There is a 20 mm Juarez S3 Ultra in place with no evidence of transvalvular or paravalvular regurgitation and a mean gradient of 16 mmHg.          Medication Review: yes  Current Facility-Administered Medications   Medication Dose Route Frequency Provider Last Rate Last Admin    [Held by provider] amLODIPine (NORVASC) tablet 10 mg  10 mg Oral Daily Cj Robin MD   10 mg at 07/01/25  1545    aspirin EC tablet 81 mg  81 mg Oral Daily Cj Robin MD   81 mg at 07/05/25 0813    budesonide-formoterol (SYMBICORT) 160-4.5 MCG/ACT inhaler 2 puff  2 puff Inhalation BID - RT Cj Robin MD   2 puff at 07/05/25 0602    busPIRone (BUSPAR) tablet 10 mg  10 mg Oral BID Cj Robin MD   10 mg at 07/05/25 0813    citalopram (CeleXA) tablet 20 mg  20 mg Oral Daily Cj Robin MD   20 mg at 07/05/25 0813    clopidogrel (PLAVIX) tablet 75 mg  75 mg Oral Daily Cj Robin MD   75 mg at 07/05/25 0813    empagliflozin (JARDIANCE) tablet 10 mg  10 mg Oral Daily Cj Robin MD   10 mg at 07/05/25 0813    enoxaparin sodium (LOVENOX) syringe 40 mg  40 mg Subcutaneous Q24H Cj Robin MD   40 mg at 07/05/25 0814    furosemide (LASIX) injection 20 mg  20 mg Intravenous BID AC Nely Lopez, APRN   20 mg at 07/05/25 0813    HYDROcodone-acetaminophen (NORCO)  MG per tablet 1 tablet  1 tablet Oral Q6H PRN Nely Rodriguez, KIRBY   1 tablet at 07/04/25 1843    HYDROcodone-acetaminophen (NORCO) 5-325 MG per tablet 1 tablet  1 tablet Oral Q4H PRN Nely Rodriguez APRN   1 tablet at 07/05/25 0523    ipratropium-albuterol (DUO-NEB) nebulizer solution 1.5 mL  1.5 mL Nebulization 4x Daily - RT Cj Robin MD   1.5 mL at 07/05/25 0951    isosorbide mononitrate (IMDUR) 24 hr tablet 60 mg  60 mg Oral Daily jC oRbin MD   60 mg at 07/05/25 0813    Lidocaine 4 % 2 patch  2 patch Transdermal Q24H Cj Robin MD   2 patch at 07/03/25 0831    metoprolol succinate XL (TOPROL-XL) 24 hr tablet 25 mg  25 mg Oral Q24H Cj Robin MD   25 mg at 07/05/25 0812    multivitamin with minerals 1 tablet  1 tablet Oral Daily Cj Robin MD   1 tablet at 07/05/25 0813    mupirocin (BACTROBAN) 2 % nasal ointment 1 Application  1 Application Each Nare BID Cj Robin MD   1 Application at 07/04/25 2039    nitroglycerin (NITROSTAT) SL tablet 0.4 mg   0.4 mg Sublingual Q5 Min PRN Cj Robin MD        polyethylene glycol (MIRALAX) packet 17 g  17 g Oral Daily Nely Rodriguez APRN   17 g at 25    potassium chloride (KLOR-CON M20) CR tablet 20 mEq  20 mEq Oral BID With Meals Nely Rodriguez APRN   20 mEq at 25    pregabalin (LYRICA) capsule 25 mg  25 mg Oral Q12H Cj Robin MD   25 mg at 25    rosuvastatin (CRESTOR) tablet 5 mg  5 mg Oral Daily Cj Robin MD   5 mg at 25 08    [Held by provider] spironolactone (ALDACTONE) tablet 25 mg  25 mg Oral Daily Cj Robin MD   25 mg at 25    tamsulosin (FLOMAX) 24 hr capsule 0.4 mg  0.4 mg Oral Daily Cj Robin MD   0.4 mg at 25    terazosin (HYTRIN) capsule 5 mg  5 mg Oral Nightly Cj Robin MD   5 mg at 25         Assessment & Plan       Severe aortic stenosis    COPD (chronic obstructive pulmonary disease)    S/P CABG x 3  Dr Robin     Tobacco use    Chronic stable angina    PVD (peripheral vascular disease) with claudication    Aortoiliac occlusive disease    Chronic heart failure with preserved ejection fraction (HFpEF)    Plan:   Severe AS: s/p TAVR (20mm Juarez Emily 3 Ultra valve with Resilia) on . Normal valve function per post-op echo. Continue ASA and plavix and statin    CAD s/p CAB/3 patent grafts per cath in 2025. Denies ischemic symptoms. Continue ASA, plavix, toprol, imdur, crestor      Acute on chronic diastolic CHF: compensated. Stop IV lasix and restart home dose of lasix 20mg daily.     Plans for possible d/c today.     Further recommendations per Dr. Hair    Electronically signed by KIRBY Moore, 25, 10:29 AM CDT.    Time spent: 50 minutes

## 2025-07-05 NOTE — PROGRESS NOTES
"Transapical transcatheter aortic valve replacement with 20 mm S3 ultra with Resilia    POD 4    CC: shortness of breath.     Patient is up walking around in room on room air.  The patient reports chronic back pain.  He states that he would like to go home today.  It is documented that the patient had a bowel movement shift of July 2 to July 3; however, the patient states that he has not had a bowel movement.     ROS: (+) left chest pain-improving, afebrile, (+) chronic back pain    IV gtts: None  Telemetry: Sinus to sinus tach 90s-100s, PVCs    Visit Vitals  /89 (BP Location: Left arm, Patient Position: Sitting)   Pulse 99   Temp 98 °F (36.7 °C) (Oral)   Resp 18   Ht 160 cm (63\")   Wt 52.3 kg (115 lb 3.2 oz)   SpO2 98%   BMI 20.41 kg/m²       Intake/Output Summary (Last 24 hours) at 7/5/2025 0840  Last data filed at 7/5/2025 0500  Gross per 24 hour   Intake 440 ml   Output 4900 ml   Net -4460 ml       Labs:  Basic Metabolic Panel    Sodium Sodium   Date Value Ref Range Status   07/05/2025 133 (L) 136 - 145 mmol/L Final   07/04/2025 136 136 - 145 mmol/L Final   07/03/2025 133 (L) 136 - 145 mmol/L Final      Potassium Potassium   Date Value Ref Range Status   07/05/2025 3.5 3.5 - 5.2 mmol/L Final   07/04/2025 3.6 3.5 - 5.2 mmol/L Final   07/03/2025 4.3 3.5 - 5.2 mmol/L Final      Chloride Chloride   Date Value Ref Range Status   07/05/2025 95 (L) 98 - 107 mmol/L Final   07/04/2025 99 98 - 107 mmol/L Final   07/03/2025 101 98 - 107 mmol/L Final      Bicarbonate No results found for: \"PLASMABICARB\"   BUN BUN   Date Value Ref Range Status   07/05/2025 15.3 8.0 - 23.0 mg/dL Final   07/04/2025 10.4 8.0 - 23.0 mg/dL Final   07/03/2025 13.8 8.0 - 23.0 mg/dL Final      Creatinine Creatinine   Date Value Ref Range Status   07/05/2025 0.60 (L) 0.76 - 1.27 mg/dL Final   07/04/2025 0.52 (L) 0.76 - 1.27 mg/dL Final   07/03/2025 0.59 (L) 0.76 - 1.27 mg/dL Final      Calcium Calcium   Date Value Ref Range Status   07/05/2025 " "9.6 8.6 - 10.5 mg/dL Final   07/04/2025 8.9 8.6 - 10.5 mg/dL Final   07/03/2025 8.9 8.6 - 10.5 mg/dL Final      Glucose      No components found for: \"GLUCOSE.*\"     WBC   Date Value Ref Range Status   07/04/2025 11.50 (H) 3.40 - 10.80 10*3/mm3 Final     RBC   Date Value Ref Range Status   07/04/2025 3.25 (L) 4.14 - 5.80 10*6/mm3 Final     Hemoglobin   Date Value Ref Range Status   07/04/2025 9.8 (L) 13.0 - 17.7 g/dL Final     Hematocrit   Date Value Ref Range Status   07/04/2025 29.6 (L) 37.5 - 51.0 % Final     MCV   Date Value Ref Range Status   07/04/2025 91.1 79.0 - 97.0 fL Final     MCH   Date Value Ref Range Status   07/04/2025 30.2 26.6 - 33.0 pg Final     MCHC   Date Value Ref Range Status   07/04/2025 33.1 31.5 - 35.7 g/dL Final     RDW   Date Value Ref Range Status   07/04/2025 13.1 12.3 - 15.4 % Final     RDW-SD   Date Value Ref Range Status   07/04/2025 42.7 37.0 - 54.0 fl Final     MPV   Date Value Ref Range Status   07/04/2025 10.3 6.0 - 12.0 fL Final     Platelets   Date Value Ref Range Status   07/04/2025 227 140 - 450 10*3/mm3 Final     EXAMINATION: XR CHEST PA AND LATERAL-  7/4/2025 6:18 AM 2 views     HISTORY: Transapical TAVR, status post removal of left chest tube;  Z74.09-Other reduced mobility; I35.0-Nonrheumatic aortic (valve)  stenosis; Z95.3-Presence of xenogenic heart valve; Z95.2-Presence of  prosthetic heart valve     COMPARISON: 7/3/2025     TECHNIQUE: Frontal and lateral views of the chest were obtained.     FINDINGS:  No definite pneumothorax on this examination. Emphysema and chronic  interstitial changes again noted with linear and patchy opacities in  both lung bases, likely related to atelectasis. Median sternotomy wires,  atrial appendage clip and changes of TAVR are noted. Aeration at the  LEFT lung base is actually slightly improved since the prior study. Old  nonunion fracture of the distal clavicle on the RIGHT. Degenerative  changes in the thoracic spine with some mild " chronic vertebral wedging  which is unchanged from the preoperative CT.         Impression:        1.  Extensive necrotic changes of emphysema with improving aeration of  the LEFT lung base and postoperative changes as discussed above. No new  acute findings.         Adult Transthoracic Echo Limited W/ Cont if Necessary Per Protocol (07/02/2025 10:54)   Interpretation Summary         The study is technically difficult for diagnosis    Left ventricular systolic function is normal.    There is a 20 mm Juraez S3 Ultra in place with no evidence of transvalvular or paravalvular regurgitation and a mean gradient of 16 mmHg.      Physical Exam:  General: No apparent distress.Up in the chair.   Cardiovascular: Regular rate and rhythm without murmur, rubs, or gallops.    Pulmonary: Clear to auscultation bilaterally without wheezing, rubs, or rales. On Vapotherm.   Chest: left sided thoracic incisions clean, dry, intact.    Abdomen: Soft, non distended and non tender.  Extremities: Warm, moves all extremities. No edema. Right groin site clean and dry. No bleeding. No hematoma.   Neurologic: Grossly intact with no focal deficits.       Impression:  Aortic valve stenosis status post aortic valve replacement in 2018 now status post TAVR  CAD status post CABG  Aortoiliac occlusive disease  Acute on chronic diastolic congestive heart failure  Peripheral arterial disease  History of stroke  COPD  Former smoker    Plan:  Give gently warmed milk of molasses enema x 1.  No free water fluid restriction.  Encourage pulmonary toilet and ambulation.   Discussed with patient and nursing  Patient will likely discharge home today after bowel movement.    Nely Lopez, APRN  7/5/2025  08:40 CDT

## 2025-07-05 NOTE — PLAN OF CARE
Goal Outcome Evaluation:  Plan of Care Reviewed With: patient        Problem: Adult Inpatient Plan of Care  Goal: Plan of Care Review  Outcome: Progressing  Flowsheets (Taken 7/5/2025 3106)  Progress: no change  Outcome Evaluation: AAOx4, VSS Pt has had no O2 on tonight, sats in low to mid 90's, He c/o back flank pain on left side this am, pain meds given.  He did ambulate in the hallway and hunched over because of back pain. S/ST  with BBB, PVC's on tele. Pt did sleep all night and said he rested well. He anticipates being discharged to home today.  Safety maintained, care plan ongoing.  Plan of Care Reviewed With: patient

## 2025-07-05 NOTE — PLAN OF CARE
Goal Outcome Evaluation:   Patient is independent with sit to stand to sit. Ambulated 240' with supervision. SATs on RA 96 -93%.

## 2025-07-06 NOTE — THERAPY DISCHARGE NOTE
Acute Care - Occupational Therapy Discharge Summary  [facility]     Patient Name: Sharad Ryder  : 1960  MRN: [id number]    Today's Date: 2025                 Admit Date: 2025        OT Recommendation and Plan    Visit Dx:    ICD-10-CM ICD-9-CM   1. Impaired mobility [Z74.09]  Z74.09 799.89   2. Severe aortic stenosis  I35.0 424.1   3. Status post aortic valve replacement with bioprosthetic valve  Z95.3 V42.2   4. S/P TAVR (transcatheter aortic valve replacement)  Z95.2 V43.3                OT Rehab Goals       Row Name 25 0800             Dressing Goal 1 (OT)    Activity/Device (Dressing Goal 1, OT) dressing skills, all  -LS      [place]/Cues Needed (Dressing Goal 1, OT) independent  -LS      Time Frame (Dressing Goal 1, OT) long term goal (LTG);by discharge  -LS      Progress/Outcome (Dressing Goal 1, OT) goal not met  -LS         Grooming Goal 1 (OT)    Activity/Device (Grooming Goal 1, OT) oral care;wash face, hands  -LS      [place] (Grooming Goal 1, OT) standby assist  -LS      Time Frame (Grooming Goal 1, OT) long term goal (LTG);by discharge  -LS      Strategies/Barriers (Grooming Goal 1, OT) standing at sink side or at tray table.  -LS      Progress/Outcome (Grooming Goal 1, OT) goal not met  -LS         Problem Specific Goal 1 (OT)    Problem Specific Goal 1 (OT) Pt will independently implement one pain management technique to decrease pain and improve functional adl performance.  -LS      Time Frame (Problem Specific Goal 1, OT) long term goal (LTG);by discharge  -LS      Progress/Outcome (Problem Specific Goal 1, OT) goal not met  -LS                User Key  (r) = Recorded By, (t) = Taken By, (c) = Cosigned By      Initials Name Provider Type Discipline    LS Racquel Peterson COTA Occupational Therapist Assistant THERAPIES                                OT Discharge Summary  Anticipated Discharge Disposition (OT): home with assist, home with home health  Reason for  Discharge: Discharge from facility  Outcomes Achieved: Refer to plan of care for updates on goals achieved  Discharge Destination: Home with assist, Home with home health      ANGELA Tucker  7/6/2025

## 2025-07-06 NOTE — PAYOR COMM NOTE
"DC HOME 7-5-25    Sharad Lopes (65 y.o. Male)       Date of Birth   1960    Social Security Number       Address   [address]  [facility] [address]    Home Phone   [phone]    MRN   [id number]       [facility]    Marital Status                               Admission Date   7/1/2025    Admission Type   Elective    Admitting Provider   Cj Robin MD    Attending Provider       Department, Room/Bed   [address], 437/1       Discharge Date   7/5/2025    Discharge Disposition   Home or Self Care    Discharge Destination                                 Attending Provider: (none)   Allergies: No Known Allergies    Isolation: None   Infection: None   Code Status: CPR    Ht: 160 cm (63\")   Wt: 52.3 kg (115 lb 3.2 oz)    Admission Cmt: None   Principal Problem: Severe aortic stenosis [I35.0]                   Active Insurance as of 7/1/2025       Primary Coverage       Payor Plan Insurance Group Employer/Plan Group    HU[employer] MEDICARE REPLACEMENT [employer] MEDICARE ADVANTAGE PPO [id number]       Payor Plan Address Payor Plan Phone Number Payor Plan Fax Number Effective Dates    [address] [address] [phone]  4/1/2025 - None Entered    [facility] [id number]         Subscriber Name Subscriber Birth Date Member ID       SHARAD LOPES 1960 [id number]                     Emergency Contacts        (Rel.) Home Phone Work Phone Mobile Phone    Ekaterina Lopes (Spouse) [phone] -- [phone]              Discharge Summary    No notes of this type exist for this encounter.       "

## 2025-07-06 NOTE — THERAPY DISCHARGE NOTE
Acute Care - Physical Therapy Discharge Summary  [facility]       Patient Name: Sharad Ryder  : 1960  MRN: [id number]    Today's Date: 2025                 Admit Date: 2025      PT Recommendation and Plan    Visit Dx:    ICD-10-CM ICD-9-CM   1. Impaired mobility [Z74.09]  Z74.09 799.89   2. Severe aortic stenosis  I35.0 424.1   3. Status post aortic valve replacement with bioprosthetic valve  Z95.3 V42.2   4. S/P TAVR (transcatheter aortic valve replacement)  Z95.2 V43.3                PT Rehab Goals       Row Name 25 0748             Bed Mobility Goal 1 (PT)    Activity/Assistive Device (Bed Mobility Goal 1, PT) sit to supine/supine to sit  -SHERMAN      Moss Level/Cues Needed (Bed Mobility Goal 1, PT) independent  -SHERMAN      Time Frame (Bed Mobility Goal 1, PT) long term goal (LTG);10 days  -SHERMAN      Progress/Outcomes (Bed Mobility Goal 1, PT) goal met  -SHERMAN         Transfer Goal 1 (PT)    Activity/Assistive Device (Transfer Goal 1, PT) sit-to-stand/stand-to-sit  -SHERMAN      Moss Level/Cues Needed (Transfer Goal 1, PT) independent  -SHERMAN      Time Frame (Transfer Goal 1, PT) long term goal (LTG);10 days  -SHERMAN      Progress/Outcome (Transfer Goal 1, PT) goal met  -SHERMAN         Gait Training Goal 1 (PT)    Activity/Assistive Device (Gait Training Goal 1, PT) gait (walking locomotion)  -SHERMAN      Moss Level (Gait Training Goal 1, PT) independent  -SHERMAN      Distance (Gait Training Goal 1, PT) 200 ft  -SHERMAN      Time Frame (Gait Training Goal 1, PT) long term goal (LTG);10 days  -SHERMAN      Progress/Outcome (Gait Training Goal 1, PT) goal met  -SHERMAN         Stairs Goal 1 (PT)    Activity/Assistive Device (Stairs Goal 1, PT) ascending stairs;descending stairs  -SHERMAN      Moss Level/Cues Needed (Stairs Goal 1, PT) independent  -SHERMAN      Number of Stairs (Stairs Goal 1, PT) 4  -SHERMAN      Time Frame (Stairs Goal 1, PT) long term goal (LTG);10 days  -SHERMAN      Progress/Outcome (Stairs Goal 1, PT) goal  met  -SHERMAN                User Key  (r) = Recorded By, (t) = Taken By, (c) = Cosigned By      Initials Name Provider Type Discipline    Juan Miguel Miner, PTA Physical Therapist Assistant PT                        PT Discharge Summary  Anticipated Discharge Disposition (PT): home  Reason for Discharge: Discharge from facility  Outcomes Achieved: Refer to plan of care for updates on goals achieved  Discharge Destination: Home      Juan Miguel Garcia PTA   7/6/2025

## 2025-07-07 ENCOUNTER — READMISSION MANAGEMENT (OUTPATIENT)
Dept: CALL CENTER | Facility: HOSPITAL | Age: 65
End: 2025-07-07
Payer: MEDICARE

## 2025-07-07 DIAGNOSIS — Z95.3 STATUS POST AORTIC VALVE REPLACEMENT WITH BIOPROSTHETIC VALVE: Primary | ICD-10-CM

## 2025-07-07 DIAGNOSIS — Z95.2 S/P TAVR (TRANSCATHETER AORTIC VALVE REPLACEMENT): ICD-10-CM

## 2025-07-07 PROBLEM — I70.0 ATHEROSCLEROSIS OF AORTA: Status: ACTIVE | Noted: 2025-07-07

## 2025-07-07 PROBLEM — E11.9 TYPE 2 DIABETES MELLITUS, WITHOUT LONG-TERM CURRENT USE OF INSULIN: Status: ACTIVE | Noted: 2025-07-07

## 2025-07-07 PROBLEM — I77.9 CAROTID ARTERY DISEASE: Status: ACTIVE | Noted: 2025-07-07

## 2025-07-07 NOTE — OUTREACH NOTE
Prep Survey      Flowsheet Row Responses   [facility] facility patient discharged from? [facility]   Is LACE score < 7 ? No   Eligibility Readm Mgmt   Discharge diagnosis Aortic Stenosis/TAVR   Does the patient have one of the following disease processes/diagnoses(primary or secondary)? Cardiothoracic surgery   Does the patient have Home health ordered? No   Is there a DME ordered? No   Prep survey completed? Yes            YANELIS VELA - Registered Nurse

## 2025-07-14 NOTE — PROGRESS NOTES
Enter Query Response Below      Query Response: Other-Chronic heart failure with preserved ejection fraction    Electronically signed by KIRBY Munoz, 07/14/25, 11:09 AM CDT.           If applicable, please update the problem list.

## 2025-07-15 ENCOUNTER — OFFICE VISIT (OUTPATIENT)
Dept: CARDIOLOGY | Facility: CLINIC | Age: 65
End: 2025-07-15
Payer: MEDICARE

## 2025-07-15 VITALS
BODY MASS INDEX: 20.2 KG/M2 | DIASTOLIC BLOOD PRESSURE: 77 MMHG | HEIGHT: 63 IN | RESPIRATION RATE: 18 BRPM | SYSTOLIC BLOOD PRESSURE: 133 MMHG | WEIGHT: 114 LBS | HEART RATE: 79 BPM

## 2025-07-15 DIAGNOSIS — I10 PRIMARY HYPERTENSION: ICD-10-CM

## 2025-07-15 DIAGNOSIS — I25.810 CORONARY ARTERY DISEASE INVOLVING CORONARY BYPASS GRAFT OF NATIVE HEART WITHOUT ANGINA PECTORIS: ICD-10-CM

## 2025-07-15 DIAGNOSIS — I73.9 PVD (PERIPHERAL VASCULAR DISEASE) WITH CLAUDICATION: ICD-10-CM

## 2025-07-15 DIAGNOSIS — G45.9 TRANSIENT ISCHEMIC ATTACK (TIA): ICD-10-CM

## 2025-07-15 DIAGNOSIS — I35.0 SEVERE AORTIC STENOSIS: ICD-10-CM

## 2025-07-15 DIAGNOSIS — I50.32 CHRONIC DIASTOLIC CHF (CONGESTIVE HEART FAILURE), NYHA CLASS 2: Primary | ICD-10-CM

## 2025-07-15 DIAGNOSIS — Z72.0 TOBACCO USE: ICD-10-CM

## 2025-07-15 NOTE — PROGRESS NOTES
Subjective:     Encounter Date:07/15/2025      Patient ID: Sharad Ryder is a 65 y.o. male with chronic diastolic CHF, CAD s/p 4v CABG and AS s/p AVR with bioprosthetic aortic valve in 2018 s/p TAVR 7/1/2025.    Chief Complaint: no complaints   History of Present Illness  Patient presents today for management of coronary artery disease. C 5/8/2025 found 40-50% stenosis of distal left main, calcified lesion in mid LAD which his unchanged from 2018 and had LIMA to LAD graft, patent LIMA to mid LAD graft, occluded RCA which is chronic and unchanged, known occluded SVG to right and left coronary arteries. Patient was discharged on 7/5/2025 after having Transcatheter aortic valve replacement (TAVR- 20mm Juarez Emily 3 Ultra valve with Resilia) using transapical approach, Ultrasound-guided vascular access of left radial artery and right common femoral vein, Placement of a transvenous temporary venous pacemaker performed on July 1, 2025 by Dr. Robin and Dr. Zarate. His hospital stay included oxygen weaning, diuresis and pain control.  Today he reports that he has been doing ok. He reports that he started feeling better the past couple of days. He reports that he had some sharp chest pain initially. He reports that this has improved. He reports that his breathing is better. He denies any dyspnea with normal activities. He denies any heart racing or palpitations. He denies any dizziness or near syncope. He reports that his BP has remained controlled. He reports that his appetite is starting to come back. He denies any leg swelling, orthopnea or PND. He denies taking any doses of lasix. Patient follows with [facility] as PCP    The following portions of the patient's history were reviewed and updated as appropriate: allergies, current medications, past family history, past medical history, past social history, past surgical history and problem list.    No Known Allergies    Current Outpatient Medications:      amLODIPine (NORVASC) 5 MG tablet, Take 1 tablet by mouth Daily., Disp: 30 tablet, Rfl: 2    aspirin 81 MG tablet, Take 1 tablet by mouth Daily., Disp: 30 tablet, Rfl: 2    budesonide-formoterol (SYMBICORT) 160-4.5 MCG/ACT inhaler, Inhale 2 puffs 2 (Two) Times a Day., Disp: 10.2 g, Rfl: 1    busPIRone (BUSPAR) 10 MG tablet, Take 1 tablet by mouth 2 (Two) Times a Day., Disp: , Rfl:     citalopram (CeleXA) 20 MG tablet, Take 1 tablet by mouth Daily., Disp: , Rfl:     clopidogrel (PLAVIX) 75 MG tablet, Take 1 tablet by mouth Daily., Disp: 30 tablet, Rfl: 2    empagliflozin (JARDIANCE) 10 MG tablet tablet, Take 1 tablet by mouth Daily., Disp: 90 tablet, Rfl: 0    furosemide (LASIX) 20 MG tablet, Take 1 tablet by mouth Daily As Needed (for swelling or shortness of breath)., Disp: 30 tablet, Rfl: 3    HYDROcodone-acetaminophen (NORCO) 5-325 MG per tablet, Take 1 tablet by mouth Every 8 (Eight) Hours As Needed (pain)., Disp: 10 tablet, Rfl: 0    isosorbide mononitrate (IMDUR) 60 MG 24 hr tablet, Take 1 tablet by mouth Daily., Disp: 90 tablet, Rfl: 2    metoprolol succinate XL (TOPROL-XL) 25 MG 24 hr tablet, Take 1 tablet by mouth Daily., Disp: 90 tablet, Rfl: 0    multivitamin with minerals (ONE-A-DAY MENS 50+ ADVANTAGE PO), Take 1 tablet by mouth Daily., Disp: , Rfl:     nitroglycerin (NITROSTAT) 0.4 MG SL tablet, Place 1 tablet under the tongue Every 5 (Five) Minutes As Needed for Chest Pain. Take no more than 3 doses in 15 minutes., Disp: , Rfl:     rosuvastatin (CRESTOR) 5 MG tablet, Take 1 tablet by mouth Daily., Disp: 30 tablet, Rfl: 11    spironolactone (ALDACTONE) 25 MG tablet, Take 1 tablet by mouth Daily., Disp: 90 tablet, Rfl: 0    tamsulosin (FLOMAX) 0.4 MG capsule 24 hr capsule, Take 1 capsule by mouth Daily., Disp: , Rfl:   Past Medical History:   Diagnosis Date    Aneurysm     Anxiety     Arthritis     Carotid artery disease     Carotid stenosis, right 02/01/2018    Post right carotid endarterectomy     COPD (chronic obstructive pulmonary disease)     Coronary artery disease     Heart murmur     History of right-sided carotid endarterectomy 2022    Hyperlipidemia LDL goal <70 2017    Left frontal lobe, right thalamus and right occipital CVA (cerebrovascular accident) (HCC) 2022    LVH (left ventricular hypertrophy) 2022    Pneumonia     Primary hypertension 2017    S/P CABG x 3 2022    LIMA to LAD, SVG to PDA and SVG to diagonal branch with TMR and left atrial appendage exclusion with atricure clip device 2018 per Dr. Cj Robin at [facility]     Severe aortic valve stenosis 2017    Status post aortic valve replacement with bioprosthetic valve 2022    Graciela Juarez bovine pericardial 19 mm valve 2018 per Dr. Cj Robin at [facility]    Tobacco use 2017    Wears glasses      Social History     Socioeconomic History    Marital status:    Tobacco Use    Smoking status: Former     Current packs/day: 0.00     Average packs/day: 1.5 packs/day for 49.8 years (74.8 ttl pk-yrs)     Types: Cigarettes     Start date:      Quit date: 2024     Years since quittin.7     Passive exposure: Never    Smokeless tobacco: Current     Types: Snuff   Vaping Use    Vaping status: Never Used   Substance and Sexual Activity    Alcohol use: No    Drug use: No    Sexual activity: Defer       Review of Systems   Constitutional: Positive for malaise/fatigue (improving).   Cardiovascular:  Negative for chest pain, dyspnea on exertion, irregular heartbeat, leg swelling, near-syncope, orthopnea, palpitations, paroxysmal nocturnal dyspnea and syncope.   Hematologic/Lymphatic: Does not bruise/bleed easily.   Genitourinary:  Negative for hematuria.   Neurological:  Negative for dizziness and weakness.   All other systems reviewed and are negative.         Objective:     Vitals reviewed.   Constitutional:       General: Not in acute  "distress.     Appearance: Normal appearance. Well-developed.   Eyes:      Pupils: Pupils are equal, round, and reactive to light.   HENT:      Head: Normocephalic and atraumatic.      Nose: Nose normal.   Neck:      Vascular: No carotid bruit.   Pulmonary:      Effort: Pulmonary effort is normal. No respiratory distress.      Breath sounds: Normal breath sounds. No wheezing. No rales.   Cardiovascular:      Normal rate. Regular rhythm.      Murmurs: There is a grade 3/6 systolic murmur.   Edema:     Peripheral edema absent.   Abdominal:      General: There is no distension.      Palpations: Abdomen is soft.   Musculoskeletal: Normal range of motion.      Cervical back: Normal range of motion and neck supple. Skin:     General: Skin is warm.      Findings: No erythema or rash.   Neurological:      General: No focal deficit present.      Mental Status: Alert and oriented to person, place, and time.   Psychiatric:         Attention and Perception: Attention normal.         Mood and Affect: Mood normal.         Speech: Speech normal.         Behavior: Behavior normal.         Thought Content: Thought content normal.         Judgment: Judgment normal.         /77 (BP Location: Right arm, Patient Position: Sitting, Cuff Size: Adult)   Pulse 79   Resp 18   Ht 160 cm (62.99\")   Wt 51.7 kg (114 lb)   BMI 20.20 kg/m²     Procedures    Lab Review:     Results for orders placed during the hospital encounter of 07/01/25    Adult Transthoracic Echo Limited W/ Cont if Necessary Per Protocol 07/03/2025 10:55 AM    Interpretation Summary    The study is technically difficult for diagnosis    Left ventricular systolic function is normal.    There is a 20 mm Juarez S3 Ultra in place with no evidence of transvalvular or paravalvular regurgitation and a mean gradient of 16 mmHg.        I have personally reviewed echo, hospitalization notes and past office notes prior to patients visit  Assessment:          Diagnosis Plan "   1. Chronic diastolic CHF (congestive heart failure), NYHA class 2        2. Coronary artery disease involving coronary bypass graft of native heart without angina pectoris        3. Severe aortic stenosis        4. Primary hypertension        5. Transient ischemic attack (TIA)        6. PVD (peripheral vascular disease) with claudication        7. Tobacco use                 Plan:       Chronic diastolic CHF: Echo 4/23/2025 showed grade II diastolic CHF and severe bioprosthetic aortic valve stenosis. Recently admitted and received diuresis. NYHA class II. Continue Jardiance and spironolactone. Patient has lasix to use prn but hasn't needed any since discharge. Discussed importance of daily weights and low sodium diet. Recommended to start daily weights and take lasix if he has 2-3 lbs weight gain overnight    2. Coronary artery disease: s/p 4 V CABG: patient denies any chest pain. Continue aspirin, metoprolol and rosuvastatin. [facility] 5/8/2025 found 40-50% stenosis of distal left main, calcified lesion in mid LAD which his unchanged from 2018 and had LIMA to LAD graft, patent LIMA to mid LAD graft, occluded RCA which is chronic and unchanged, known occluded SVG to right and left coronary arteries. Patient has been recovering well. He denies any chest pain. Continue aspirin, plavix, metoprolol and imdur.     3. Severe bioprosthetic aortic valve stenosis: echo 4/2024 showed severe bioprosthetic aortic valve stenosis. S/P TAVR 7/1/2025.     4. Hypertension: Controlled in office today. Patient reports better BP control. Continue current medications    5. TIA    6. PVD    7. tobacco use: Sharad COTY Ryder  reports that he quit smoking about 8 months ago. His smoking use included cigarettes. He started smoking about 50 years ago. He has a 74.8 pack-year smoking history. He has never been exposed to tobacco smoke. His smokeless tobacco use includes snuff. I have educated him on the risk of diseases from using tobacco products  such as cancer, COPD, and heart disease.     Current outpatient and discharge medications have been reconciled for the patient.  Reviewed by: KIRBY Patel    I spent 35 minutes caring for Sharad on this date of service. This time includes time spent by me in the following activities:preparing for the visit, reviewing tests, obtaining and/or reviewing a separately obtained history, performing a medically appropriate examination and/or evaluation , counseling and educating the patient/family/caregiver, and documenting information in the medical record    Patient is to follow up in 3-4 months or sooner if needed

## 2025-07-16 ENCOUNTER — READMISSION MANAGEMENT (OUTPATIENT)
Dept: CALL CENTER | Facility: HOSPITAL | Age: 65
End: 2025-07-16
Payer: MEDICARE

## 2025-07-16 NOTE — OUTREACH NOTE
CT Surgery Week 1 Survey      Flowsheet Row Responses   [facility] facility patient discharged from? [facility]   Does the patient have one of the following disease processes/diagnoses(primary or secondary)? Cardiothoracic surgery   Week 1 attempt successful? No   Call start time 1154   Unsuccessful attempts Attempt 1              Janey Hernandez Registered Nurse

## 2025-07-18 ENCOUNTER — TELEPHONE (OUTPATIENT)
Dept: VASCULAR SURGERY | Facility: CLINIC | Age: 65
End: 2025-07-18
Payer: MEDICARE

## 2025-07-21 ENCOUNTER — TELEPHONE (OUTPATIENT)
Dept: VASCULAR SURGERY | Facility: CLINIC | Age: 65
End: 2025-07-21
Payer: MEDICARE

## 2025-07-22 ENCOUNTER — TELEPHONE (OUTPATIENT)
Dept: VASCULAR SURGERY | Facility: CLINIC | Age: 65
End: 2025-07-22
Payer: MEDICARE

## 2025-07-22 NOTE — TELEPHONE ENCOUNTER
Left message on pts phone and spouse phone. Try to get patients cancelled appointment and testing rescheduled to get sursgery scheduled. If pt calls please transfer to Mckenna Gandhi

## 2025-08-06 ENCOUNTER — OFFICE VISIT (OUTPATIENT)
Dept: CARDIAC SURGERY | Facility: CLINIC | Age: 65
End: 2025-08-06
Payer: MEDICARE

## 2025-08-06 ENCOUNTER — TELEPHONE (OUTPATIENT)
Dept: CARDIAC SURGERY | Facility: CLINIC | Age: 65
End: 2025-08-06

## 2025-08-06 DIAGNOSIS — Z95.2 S/P TAVR (TRANSCATHETER AORTIC VALVE REPLACEMENT): Primary | ICD-10-CM

## 2025-08-06 PROCEDURE — 99024 POSTOP FOLLOW-UP VISIT: CPT | Performed by: THORACIC SURGERY (CARDIOTHORACIC VASCULAR SURGERY)

## 2025-08-06 PROCEDURE — 1160F RVW MEDS BY RX/DR IN RCRD: CPT | Performed by: THORACIC SURGERY (CARDIOTHORACIC VASCULAR SURGERY)

## 2025-08-06 PROCEDURE — 1159F MED LIST DOCD IN RCRD: CPT | Performed by: THORACIC SURGERY (CARDIOTHORACIC VASCULAR SURGERY)

## 2025-08-11 PROBLEM — Z95.2 S/P TAVR (TRANSCATHETER AORTIC VALVE REPLACEMENT): Status: ACTIVE | Noted: 2022-02-16

## 2025-08-13 ENCOUNTER — TELEPHONE (OUTPATIENT)
Dept: CARDIOLOGY | Facility: CLINIC | Age: 65
End: 2025-08-13
Payer: MEDICARE

## 2025-08-20 ENCOUNTER — HOSPITAL ENCOUNTER (OUTPATIENT)
Dept: CARDIOLOGY | Facility: HOSPITAL | Age: 65
Discharge: HOME | End: 2025-08-20
Admitting: NURSE PRACTITIONER
Payer: MEDICARE

## 2025-08-20 VITALS
HEIGHT: 63 IN | WEIGHT: 114 LBS | SYSTOLIC BLOOD PRESSURE: 133 MMHG | BODY MASS INDEX: 20.2 KG/M2 | DIASTOLIC BLOOD PRESSURE: 77 MMHG

## 2025-08-20 DIAGNOSIS — Z95.2 S/P TAVR (TRANSCATHETER AORTIC VALVE REPLACEMENT): ICD-10-CM

## 2025-08-20 DIAGNOSIS — Z95.3 STATUS POST AORTIC VALVE REPLACEMENT WITH BIOPROSTHETIC VALVE: Chronic | ICD-10-CM

## 2025-08-20 DIAGNOSIS — I35.0 SEVERE AORTIC STENOSIS: ICD-10-CM

## 2025-08-20 PROCEDURE — 93306 TTE W/DOPPLER COMPLETE: CPT

## 2025-08-20 PROCEDURE — 93356 MYOCRD STRAIN IMG SPCKL TRCK: CPT

## 2025-08-22 ENCOUNTER — TELEPHONE (OUTPATIENT)
Dept: CARDIAC SURGERY | Facility: CLINIC | Age: 65
End: 2025-08-22
Payer: MEDICARE

## 2025-08-24 LAB
AORTIC DIMENSIONLESS INDEX: 0.5 (DI)
ASCENDING AORTA: 3.4 CM
AV MEAN PRESS GRAD SYS DOP V1V2: 12.9 MMHG
AV VMAX SYS DOP: 245.6 CM/SEC
BH CV ECHO LEFT VENTRICLE GLOBAL LONGITUDINAL STRAIN: -11.3 %
BH CV ECHO MEAS - 2D AUTO EF SIEMENS: 56.7 %
BH CV ECHO MEAS - AO MAX PG: 24.2 MMHG
BH CV ECHO MEAS - AO ROOT DIAM: 3.2 CM
BH CV ECHO MEAS - AO V2 VTI: 53.5 CM
BH CV ECHO MEAS - AVA(I,D): 1.54 CM2
BH CV ECHO MEAS - EDV(CUBED): 35.1 ML
BH CV ECHO MEAS - EDV(MOD-SP2): 69.4 ML
BH CV ECHO MEAS - EDV(MOD-SP4): 67.5 ML
BH CV ECHO MEAS - EF(MOD-SP2): 73.1 %
BH CV ECHO MEAS - EF(MOD-SP4): 65.4 %
BH CV ECHO MEAS - ESV(CUBED): 12.6 ML
BH CV ECHO MEAS - ESV(MOD-SP2): 18.7 ML
BH CV ECHO MEAS - ESV(MOD-SP4): 23.3 ML
BH CV ECHO MEAS - FS: 28.9 %
BH CV ECHO MEAS - IVS/LVPW: 0.86 CM
BH CV ECHO MEAS - IVSD: 1.37 CM
BH CV ECHO MEAS - LA DIMENSION: 3.9 CM
BH CV ECHO MEAS - LAT PEAK E' VEL: 3.6 CM/SEC
BH CV ECHO MEAS - LV DIASTOLIC VOL/BSA (35-75): 44.3 CM2
BH CV ECHO MEAS - LV MASS(C)D: 172.9 GRAMS
BH CV ECHO MEAS - LV MAX PG: 6.7 MMHG
BH CV ECHO MEAS - LV MEAN PG: 3.9 MMHG
BH CV ECHO MEAS - LV SYSTOLIC VOL/BSA (12-30): 15.3 CM2
BH CV ECHO MEAS - LV V1 MAX: 129.5 CM/SEC
BH CV ECHO MEAS - LV V1 VTI: 26.6 CM
BH CV ECHO MEAS - LVIDD: 3.3 CM
BH CV ECHO MEAS - LVIDS: 2.33 CM
BH CV ECHO MEAS - LVOT AREA: 3.1 CM2
BH CV ECHO MEAS - LVOT DIAM: 1.98 CM
BH CV ECHO MEAS - LVPWD: 1.59 CM
BH CV ECHO MEAS - MED PEAK E' VEL: 5.1 CM/SEC
BH CV ECHO MEAS - MV A MAX VEL: 138.8 CM/SEC
BH CV ECHO MEAS - MV DEC SLOPE: 526.4 CM/SEC2
BH CV ECHO MEAS - MV DEC TIME: 0.27 SEC
BH CV ECHO MEAS - MV E MAX VEL: 144.2 CM/SEC
BH CV ECHO MEAS - MV E/A: 1.04
BH CV ECHO MEAS - MV MAX PG: 10.1 MMHG
BH CV ECHO MEAS - MV MEAN PG: 4.7 MMHG
BH CV ECHO MEAS - MV V2 VTI: 55.8 CM
BH CV ECHO MEAS - MVA(VTI): 1.47 CM2
BH CV ECHO MEAS - PA V2 MAX: 97.8 CM/SEC
BH CV ECHO MEAS - PULM A REVS DUR: 0.14 SEC
BH CV ECHO MEAS - PULM A REVS VEL: 23.7 CM/SEC
BH CV ECHO MEAS - PULM DIAS VEL: 36.8 CM/SEC
BH CV ECHO MEAS - PULM S/D: 1.19
BH CV ECHO MEAS - PULM SYS VEL: 43.9 CM/SEC
BH CV ECHO MEAS - RAP SYSTOLE: 3 MMHG
BH CV ECHO MEAS - RV MAX PG: 2.8 MMHG
BH CV ECHO MEAS - RV V1 MAX: 83.8 CM/SEC
BH CV ECHO MEAS - RV V1 VTI: 18.4 CM
BH CV ECHO MEAS - RVDD: 3.4 CM
BH CV ECHO MEAS - RVSP: 39.4 MMHG
BH CV ECHO MEAS - SV(LVOT): 82.3 ML
BH CV ECHO MEAS - SV(MOD-SP2): 50.7 ML
BH CV ECHO MEAS - SV(MOD-SP4): 44.2 ML
BH CV ECHO MEAS - SVI(LVOT): 54.1 ML/M2
BH CV ECHO MEAS - SVI(MOD-SP2): 33.3 ML/M2
BH CV ECHO MEAS - SVI(MOD-SP4): 29 ML/M2
BH CV ECHO MEAS - TAPSE (>1.6): 1.59 CM
BH CV ECHO MEAS - TR MAX PG: 36.4 MMHG
BH CV ECHO MEAS - TR MAX VEL: 301.7 CM/SEC
BH CV ECHO MEASUREMENTS AVERAGE E/E' RATIO: 33.15
BH CV XLRA - RV BASE: 3.7 CM
BH CV XLRA - RV LENGTH: 6.6 CM
BH CV XLRA - RV MID: 1.79 CM
BH CV XLRA - TDI S': 11 CM/SEC
LEFT ATRIUM VOLUME INDEX: 52 ML/M2
LEFT ATRIUM VOLUME: 74 ML
LV EF BIPLANE MOD: 69 %

## (undated) DEVICE — GLV SURG BIOGEL LTX PF 7 1/2

## (undated) DEVICE — Device: Brand: RETRACT-O-TAPE 18G X 30.5CM BLUNT NEEDLE

## (undated) DEVICE — MODEL AT P65, P/N 701554-001KIT CONTENTS: HAND CONTROLLER, 3-WAY HIGH-PRESSURE STOPCOCK WITH ROTATING END AND PREMIUM HIGH-PRESSURE TUBING: Brand: ANGIOTOUCH® KIT

## (undated) DEVICE — CATHETER,URETHRAL,REDRUBBER,STRL,16FR: Brand: MEDLINE

## (undated) DEVICE — 6F .070 JL4 ECO PK: Brand: VISTA BRITE TIP

## (undated) DEVICE — DEV CLS VASC MYNXCONTROL 6FTO7F

## (undated) DEVICE — SUT ETHLN 3/0 FS1 30IN 669H

## (undated) DEVICE — INTENDED FOR TISSUE SEPARATION, AND OTHER PROCEDURES THAT REQUIRE A SHARP SURGICAL BLADE TO PUNCTURE OR CUT.: Brand: BARD-PARKER ® STAINLESS STEEL BLADES

## (undated) DEVICE — GW STARTER FXD/CORE PTFE/COAT J/TP/3MM .035IN 10X150CM

## (undated) DEVICE — SUT ETHIB 2/0 SH SH 36IN X523H

## (undated) DEVICE — CATH F6INF TL AL I 100CM: Brand: INFINITI

## (undated) DEVICE — PINNACLE R/O II INTRODUCER SHEATH WITH RADIOPAQUE MARKER: Brand: PINNACLE

## (undated) DEVICE — SOL IRR NACL 0.9PCT BO 1000ML

## (undated) DEVICE — SUT SILK 2/0 FS BLK 18IN 685G

## (undated) DEVICE — PINNACLE INTRODUCER SHEATH: Brand: PINNACLE

## (undated) DEVICE — FR5 INFINITI MULTIPAC: Brand: INFINITI

## (undated) DEVICE — CATH ELECTRD PACE TEMP BI NONHEP 5F110CM

## (undated) DEVICE — A2000 MULTI-USE SYRINGE KIT, P/N 701277-003KIT CONTENTS: 100ML CONTRAST RESERVOIR AND TUBING WITH CONTRAST SPIKE AND CLAMP: Brand: A2000 MULTI-USE SYRINGE KIT

## (undated) DEVICE — INTENDED FOR TISSUE SEPARATION, AND OTHER PROCEDURES THAT REQUIRE A SHARP SURGICAL BLADE TO PUNCTURE OR CUT.: Brand: BARD-PARKER ®  SAFETY SCALPED

## (undated) DEVICE — HEMOST ABS SURGIFOAM SZ100 8X12 10MM
Type: IMPLANTABLE DEVICE | Site: HEART | Status: NON-FUNCTIONAL
Removed: 2025-07-01

## (undated) DEVICE — Device

## (undated) DEVICE — SCANLAN® SURG-I-PAW® INSTRUMENT COVERS - RED, 1/10" X 5"/ 3 MMX13 CM (2 - 5" PCS /PKG): Brand: SCANLAN® SURG-I-PAW® INSTRUMENT COVERS

## (undated) DEVICE — SHEET,DRAPE,53X77,STERILE: Brand: MEDLINE

## (undated) DEVICE — CARTRDG LOGICAL TRANSD PRESS W/2STPCK

## (undated) DEVICE — PAD, DEFIB, ADULT, RADIOTRANS, PHILIPS: Brand: MEDLINE

## (undated) DEVICE — SKIN AFFIX SURG ADHESIVE 72/CS 0.55ML: Brand: MEDLINE

## (undated) DEVICE — GW STARTER FXD CORE J .035 3X150CM 3MM

## (undated) DEVICE — STERNUM BLADE, OFFSET (32.0 X 0.8 X 6.3MM)

## (undated) DEVICE — Device: Brand: PROWATER

## (undated) DEVICE — CANN NASL ETCO2 LO/FLO A/

## (undated) DEVICE — KT HEMOST ABS SURGIFOAM PORCN 1GRAM
Type: IMPLANTABLE DEVICE | Site: HEART | Status: NON-FUNCTIONAL
Removed: 2025-07-01

## (undated) DEVICE — WIPE MEROCEL 3.625X3IN

## (undated) DEVICE — CANN CO2/O2 NASL A/

## (undated) DEVICE — PAD ENDOVASCULAR: Brand: MEDLINE INDUSTRIES, INC.

## (undated) DEVICE — SOLIDIFIER LIQUI LOC PLUS 2000CC

## (undated) DEVICE — SUT PROLN 6/0 4/24IN BV1 MON BL M8805

## (undated) DEVICE — BLAKE SILICONE DRAINS CARDIO CONNECTOR 2:1: Brand: BLAKE

## (undated) DEVICE — SAPHENOUS VEIN DISTENTION SYSTEM - 300MMHG - STERILE: Brand: SVDS300 SAPHENOUS VEIN DISTENTION SYSTEM - 300MMHG

## (undated) DEVICE — NDL CNTR 40CT FM MAG: Brand: MEDLINE INDUSTRIES, INC.

## (undated) DEVICE — GW AMPLTZ SUPERSTIFF JTIP .035IN 180CM

## (undated) DEVICE — BALN EVERCROSS OTW .035 5F 7X40 80

## (undated) DEVICE — VASOVIEW HEMOPRO: Brand: VASOVIEW HEMOPRO

## (undated) DEVICE — PK CATH CARD 30

## (undated) DEVICE — PK CATH CARD 30 CA/4

## (undated) DEVICE — 3M™ STERI-STRIP™ REINFORCED ADHESIVE SKIN CLOSURES, R1547, 1/2 IN X 4 IN (12 MM X 100 MM), 6 STRIPS/ENVELOPE: Brand: 3M™ STERI-STRIP™

## (undated) DEVICE — SOLIDIFIER LIQ LIQUILOC/PLUS W/TREAT 2000CC

## (undated) DEVICE — CATH F6INF TL 3DRC 100CM: Brand: INFINITI

## (undated) DEVICE — GW STARTER FXD/CORE PTFE/COAT STR/TP .035IN 4X150CM

## (undated) DEVICE — INF TL MULIPACK FR6: Brand: INFINITI

## (undated) DEVICE — NDL HYPO PRECISIONGLIDE REG 22G 1 1/2

## (undated) DEVICE — BLAKE SILICONE DRAIN, 19 FR ROUND, HUBLESS WITH 1/4" BENDABLE TROCAR: Brand: BLAKE

## (undated) DEVICE — GLV SURG TRIUMPH MICRO PF LTX 8.5 STRL

## (undated) DEVICE — DRSNG SURESITE WNDW 2.38X2.75

## (undated) DEVICE — MODEL BT2000 P/N 700287-012KIT CONTENTS: MANIFOLD WITH SALINE AND CONTRAST PORTS, SALINE TUBING WITH SPIKE AND HAND SYRINGE, TRANSDUCER: Brand: BT2000 AUTOMATED MANIFOLD KIT

## (undated) DEVICE — SYR CATH/TIP 50ML 2OZ STRL 1P/U

## (undated) DEVICE — FAN BLADE, OFFSET (19.0 X 0.38 X 41.5MM)

## (undated) DEVICE — AORTIC PUNCHES ARE USED TO CREATE A UNIFORM OPENING IN BLOOD VESSELS DURING CARDIOVASCULAR SURGERY. THE VESSEL GRAFT IS INSERTED INTO THE CREATED OPENING AND SUTURED TO THE VESSEL WALL. AORTIC LANCETS ARE USED TO MAKE A SMALL UNIFORM CUT IN A BLOOD VESSEL TO FACILITATE INSERTION OF AN AORTIC PUNCH.  PUNCHES COME IN VARIOUS LENGTHS, DIAMETERS AND TIP CONFIGURATIONS.: Brand: CLEANCUT ROTATING AORTIC PUNCH

## (undated) DEVICE — DISPOSABLE ADAPTER

## (undated) DEVICE — PAD MAJOR VASCULAR: Brand: MEDLINE INDUSTRIES, INC.

## (undated) DEVICE — 3M™ IOBAN™ 2 ANTIMICROBIAL INCISE DRAPE 6650EZ: Brand: IOBAN™ 2

## (undated) DEVICE — PAD, DEFIB, ADULT, RADIOTRANS, PHYSIO: Brand: MEDLINE

## (undated) DEVICE — PROXIMATE RH ROTATING HEAD SKIN STAPLERS (35 REGULAR) CONTAINS 35 STAINLESS STEEL STAPLES: Brand: PROXIMATE

## (undated) DEVICE — SOL IRR NACL 0.9PCT BT 1000ML

## (undated) DEVICE — RESERVOIR,SUCTION,100CC,SILICONE: Brand: MEDLINE

## (undated) DEVICE — SUT ETHIB 2/0 RB1 DA 30IN WHT MX523

## (undated) DEVICE — PERCLOSE PROGLIDE™ SUTURE-MEDIATED CLOSURE SYSTEM: Brand: PERCLOSE PROGLIDE™

## (undated) DEVICE — COPILOT BLEEDBACK CONTROL VALVE: Brand: COPILOT

## (undated) DEVICE — PK OPN HEART 30

## (undated) DEVICE — DRAPE,UTILITY,TAPE,15X26,STERILE: Brand: MEDLINE

## (undated) DEVICE — SUT PROLN 4/0 RB1 36IN 4PK M8557

## (undated) DEVICE — RADIFOCUS GLIDEWIRE ADVANTAGE GUIDEWIRE: Brand: GLIDEWIRE ADVANTAGE

## (undated) DEVICE — TRUE™ DILATATION BALLOON VALVULOPLASTY CATHETER, 20 MM X 4.5 CM, 110 CM CATHETER: Brand: TRUE DILATATION

## (undated) DEVICE — TB SXN SURG DLP MALL/CARD SFT/TP 6F 6IN

## (undated) DEVICE — SUT PROLN 5/0 C1 DA 24IN 8725H

## (undated) DEVICE — SYR TB PRECISIONGLIDE 1CC 26G 3/8IN LF

## (undated) DEVICE — RADIFOCUS GLIDECATH: Brand: GLIDECATH

## (undated) DEVICE — PK TURNOVER RM ADV

## (undated) DEVICE — NDL SURG MAYO CATGUT 1/2CIR TPR SS SZ6 2PK REUS

## (undated) DEVICE — GLV SURG BIOGEL LTX PF 6 1/2

## (undated) DEVICE — GC 7F 078 JL 4 SH: Brand: VISTA BRITE TIP

## (undated) DEVICE — KT VLV HEMO MAP ACC PLS LG/BORE MTL/INTRO

## (undated) DEVICE — DRAIN,WOUND,ROUND,24FR,5/16",FULL-FLUTED: Brand: MEDLINE

## (undated) DEVICE — GW STARTER FXD/CORE PTFE/COAT J/TP/3MM .035IN 10X260CM

## (undated) DEVICE — KT NDL GUIDE STRL 18GA

## (undated) DEVICE — ELECTRD PAD DEFIB A/

## (undated) DEVICE — GW WHOLEY HITORQ MOD/J .035 175CM

## (undated) DEVICE — DRSNG PRESS SAFEGUARD

## (undated) DEVICE — PTCH HEMOCON PRO 2X2IN

## (undated) DEVICE — GW XCHG AMPLTZ XSTIF PTFE CRV .035 3X260

## (undated) DEVICE — CATHETER,URETHRAL,REDRUBBER,STRL,18FR: Brand: MEDLINE

## (undated) DEVICE — CATH F5 INF IM 100CM: Brand: INFINITI

## (undated) DEVICE — GW PRESSUREWIRE X WIRELESS FFR 175CM

## (undated) DEVICE — Device: Brand: MEDEX

## (undated) DEVICE — TOOL INSRT GW MTL OR PLSTC

## (undated) DEVICE — 4-PORT MANIFOLD: Brand: NEPTUNE 2

## (undated) DEVICE — GLIDESHEATH SLENDER STAINLESS STEEL KIT: Brand: GLIDESHEATH SLENDER

## (undated) DEVICE — HEMOST ABS GELFOAM GELATIN SPNG SZ100

## (undated) DEVICE — PTCA DILATATION CATHETER: Brand: EMERGE™

## (undated) DEVICE — CATH FLSH OMNI SFT 5F 90CM

## (undated) DEVICE — SPNG GZ STRL 2S 4X4 12PLY

## (undated) DEVICE — PAD MINOR UNIVERSAL: Brand: MEDLINE INDUSTRIES, INC.

## (undated) DEVICE — HANDPC DEL FIBR LASER W/SOLOGRIP3 SYS

## (undated) DEVICE — GLV SURG SENSICARE W/ALOE PF LF 6.5 STRL

## (undated) DEVICE — GUIDE SELECT ATRICLIP

## (undated) DEVICE — SUT MNCRYL 4/0 PS2 27IN UD MCP426H

## (undated) DEVICE — DRSNG SURESITE WNDW 4X4.5

## (undated) DEVICE — GW SS .018 60CM

## (undated) DEVICE — WIPE THERAWASH SLV SPEC CARE 2PK

## (undated) DEVICE — CATH DIAG IMPULSE MPA1 5F 100CM

## (undated) DEVICE — PK SET UP ANES OPN HEART 30

## (undated) DEVICE — DEV TORQ GW HOT/PINK

## (undated) DEVICE — DRP SLUSH MACH OM-ORS-320

## (undated) DEVICE — TOWEL,OR,DSP,ST,BLUE,STD,4/PK,20PK/CS: Brand: MEDLINE

## (undated) DEVICE — STPCK 3/WY HP M/RA W/OFF/HNDL 1050PSI STRL

## (undated) DEVICE — CATH F5 INF AL I 100CM: Brand: INFINITI

## (undated) DEVICE — SWAN-GANZ POLYMER BLEND TRUE SIZE C-TIP CONTROLCATH TD: Brand: SWAN-GANZ CONTROLCATH TRUE SIZE

## (undated) DEVICE — CATH F6 ST+ MP A1 100CM: Brand: SUPER TORQUE

## (undated) DEVICE — DEFOGGER!" ANTI FOG KIT: Brand: DEROYAL

## (undated) DEVICE — ANTIBACTERIAL UNDYED BRAIDED (POLYGLACTIN 910), SYNTHETIC ABSORBABLE SUTURE: Brand: COATED VICRYL

## (undated) DEVICE — BG BANDED WRUBBER BAND AND TP 36X54IN

## (undated) DEVICE — GLV SURG NEOLON 2G PF LF 7.5 STRL

## (undated) DEVICE — BALN EVERCROSS OTW .035 6F 7X60MM 80CM

## (undated) DEVICE — INFLATION DEVICE: Brand: ENCORE™ 26

## (undated) DEVICE — ORGANIZER SUT SHELIGH 3T 213013

## (undated) DEVICE — OCEAN DRAIN, SINGLE, IN-LINE, STOP: Brand: OCEAN

## (undated) DEVICE — 1/2 FORCE SURGICAL SPRING CLIP: Brand: STEALTH® SPRING CLIP

## (undated) DEVICE — CATH F6INF PIG 145 110CM 6SH: Brand: INFINITI

## (undated) DEVICE — BG OR ZSUT SADDLE 20IN CLR STRL

## (undated) DEVICE — PAD GRND REM POLYHESIVE A/ DISP

## (undated) DEVICE — KT MINI ACC MAK401

## (undated) DEVICE — COVER,MAYO STAND,STERILE: Brand: MEDLINE

## (undated) DEVICE — MYNXGRIP 6F/7F: Brand: MYNXGRIP

## (undated) DEVICE — SUT PROLN 7/0 BV1 24IN 4PK M8702

## (undated) DEVICE — 6F .070 JR4 ECO PK: Brand: VISTA BRITE TIP

## (undated) DEVICE — ORGANIZER HLDR TB

## (undated) DEVICE — BAPTIST TURNOVER KIT: Brand: MEDLINE INDUSTRIES, INC.

## (undated) DEVICE — SUT SILK 1 LBYRNTH TIES 30IN A307H

## (undated) DEVICE — GW ZIPWIRE STD ANGL .035IN 150CM

## (undated) DEVICE — ST MIC/INTRO ACC SHRP/NDL TUNG/TP NITNL 5F 45CM 7CM

## (undated) DEVICE — SUT ETHIB 2/0 V5 DA 30IN GW PLED PXX50

## (undated) DEVICE — IMMOB KN 3PNL DLX CANVS 19IN BLU

## (undated) DEVICE — CANN VESL ACRN TP 4MM